# Patient Record
Sex: FEMALE | Race: WHITE | NOT HISPANIC OR LATINO | URBAN - METROPOLITAN AREA
[De-identification: names, ages, dates, MRNs, and addresses within clinical notes are randomized per-mention and may not be internally consistent; named-entity substitution may affect disease eponyms.]

---

## 2019-11-17 ENCOUNTER — EMERGENCY (EMERGENCY)
Facility: HOSPITAL | Age: 54
LOS: 0 days | Discharge: HOME | End: 2019-11-17
Attending: EMERGENCY MEDICINE | Admitting: EMERGENCY MEDICINE
Payer: MEDICARE

## 2019-11-17 ENCOUNTER — TRANSCRIPTION ENCOUNTER (OUTPATIENT)
Age: 54
End: 2019-11-17

## 2019-11-17 VITALS
DIASTOLIC BLOOD PRESSURE: 80 MMHG | OXYGEN SATURATION: 98 % | RESPIRATION RATE: 18 BRPM | SYSTOLIC BLOOD PRESSURE: 167 MMHG | TEMPERATURE: 98 F | HEART RATE: 86 BPM

## 2019-11-17 VITALS
SYSTOLIC BLOOD PRESSURE: 181 MMHG | OXYGEN SATURATION: 95 % | HEART RATE: 92 BPM | RESPIRATION RATE: 20 BRPM | DIASTOLIC BLOOD PRESSURE: 83 MMHG | TEMPERATURE: 98 F

## 2019-11-17 DIAGNOSIS — R06.02 SHORTNESS OF BREATH: ICD-10-CM

## 2019-11-17 DIAGNOSIS — J44.9 CHRONIC OBSTRUCTIVE PULMONARY DISEASE, UNSPECIFIED: ICD-10-CM

## 2019-11-17 DIAGNOSIS — R60.0 LOCALIZED EDEMA: ICD-10-CM

## 2019-11-17 LAB
ALBUMIN SERPL ELPH-MCNC: 3.4 G/DL — LOW (ref 3.5–5.2)
ALP SERPL-CCNC: 121 U/L — HIGH (ref 30–115)
ALT FLD-CCNC: 12 U/L — SIGNIFICANT CHANGE UP (ref 0–41)
ANION GAP SERPL CALC-SCNC: 12 MMOL/L — SIGNIFICANT CHANGE UP (ref 7–14)
AST SERPL-CCNC: 16 U/L — SIGNIFICANT CHANGE UP (ref 0–41)
BILIRUB SERPL-MCNC: 0.3 MG/DL — SIGNIFICANT CHANGE UP (ref 0.2–1.2)
BUN SERPL-MCNC: 11 MG/DL — SIGNIFICANT CHANGE UP (ref 10–20)
CALCIUM SERPL-MCNC: 8.9 MG/DL — SIGNIFICANT CHANGE UP (ref 8.5–10.1)
CHLORIDE SERPL-SCNC: 96 MMOL/L — LOW (ref 98–110)
CO2 SERPL-SCNC: 26 MMOL/L — SIGNIFICANT CHANGE UP (ref 17–32)
CREAT SERPL-MCNC: 0.6 MG/DL — LOW (ref 0.7–1.5)
GLUCOSE SERPL-MCNC: 231 MG/DL — HIGH (ref 70–99)
HCT VFR BLD CALC: 49.7 % — HIGH (ref 37–47)
HGB BLD-MCNC: 16.3 G/DL — HIGH (ref 12–16)
MCHC RBC-ENTMCNC: 31 PG — SIGNIFICANT CHANGE UP (ref 27–31)
MCHC RBC-ENTMCNC: 32.8 G/DL — SIGNIFICANT CHANGE UP (ref 32–37)
MCV RBC AUTO: 94.5 FL — SIGNIFICANT CHANGE UP (ref 81–99)
NRBC # BLD: 0 /100 WBCS — SIGNIFICANT CHANGE UP (ref 0–0)
NT-PROBNP SERPL-SCNC: 1397 PG/ML — HIGH (ref 0–300)
PLATELET # BLD AUTO: 174 K/UL — SIGNIFICANT CHANGE UP (ref 130–400)
POTASSIUM SERPL-MCNC: 4.8 MMOL/L — SIGNIFICANT CHANGE UP (ref 3.5–5)
POTASSIUM SERPL-SCNC: 4.8 MMOL/L — SIGNIFICANT CHANGE UP (ref 3.5–5)
PROT SERPL-MCNC: 6.5 G/DL — SIGNIFICANT CHANGE UP (ref 6–8)
RBC # BLD: 5.26 M/UL — SIGNIFICANT CHANGE UP (ref 4.2–5.4)
RBC # FLD: 13.9 % — SIGNIFICANT CHANGE UP (ref 11.5–14.5)
SODIUM SERPL-SCNC: 134 MMOL/L — LOW (ref 135–146)
TROPONIN T SERPL-MCNC: <0.01 NG/ML — SIGNIFICANT CHANGE UP
WBC # BLD: 8.92 K/UL — SIGNIFICANT CHANGE UP (ref 4.8–10.8)
WBC # FLD AUTO: 8.92 K/UL — SIGNIFICANT CHANGE UP (ref 4.8–10.8)

## 2019-11-17 PROCEDURE — 93010 ELECTROCARDIOGRAM REPORT: CPT

## 2019-11-17 PROCEDURE — 99285 EMERGENCY DEPT VISIT HI MDM: CPT

## 2019-11-17 PROCEDURE — 71046 X-RAY EXAM CHEST 2 VIEWS: CPT | Mod: 26

## 2019-11-17 RX ORDER — AZITHROMYCIN 500 MG/1
500 TABLET, FILM COATED ORAL ONCE
Refills: 0 | Status: COMPLETED | OUTPATIENT
Start: 2019-11-17 | End: 2019-11-17

## 2019-11-17 RX ORDER — FUROSEMIDE 40 MG
40 TABLET ORAL ONCE
Refills: 0 | Status: COMPLETED | OUTPATIENT
Start: 2019-11-17 | End: 2019-11-17

## 2019-11-17 RX ORDER — AZITHROMYCIN 500 MG/1
1 TABLET, FILM COATED ORAL
Qty: 4 | Refills: 0
Start: 2019-11-17 | End: 2019-11-20

## 2019-11-17 RX ORDER — IPRATROPIUM/ALBUTEROL SULFATE 18-103MCG
3 AEROSOL WITH ADAPTER (GRAM) INHALATION
Refills: 0 | Status: COMPLETED | OUTPATIENT
Start: 2019-11-17 | End: 2019-11-17

## 2019-11-17 RX ADMIN — Medication 3 MILLILITER(S): at 18:16

## 2019-11-17 RX ADMIN — AZITHROMYCIN 500 MILLIGRAM(S): 500 TABLET, FILM COATED ORAL at 18:17

## 2019-11-17 RX ADMIN — Medication 125 MILLIGRAM(S): at 17:58

## 2019-11-17 RX ADMIN — Medication 200 MILLIGRAM(S): at 17:58

## 2019-11-17 RX ADMIN — Medication 3 MILLILITER(S): at 17:58

## 2019-11-17 RX ADMIN — Medication 40 MILLIGRAM(S): at 18:16

## 2019-11-17 RX ADMIN — Medication 3 MILLILITER(S): at 18:18

## 2019-11-17 NOTE — ED ADULT TRIAGE NOTE - CHIEF COMPLAINT QUOTE
pt presents to ED c/o worsening cough and increased SOB. Pt sent from [facility], pt has hx of COPD and CHF, pt did not take lasix. Pt speaking in full sentences, not on home 02. Pt denies chest pain. +productive cough. pt presents to ED c/o worsening cough and increased SOB. Cough and congestion x 2 weeks. Pt sent from [facility], pt has hx of COPD and CHF, pt did not take lasix. Pt speaking in full sentences, not on home 02. Pt denies chest pain. +productive cough.

## 2019-11-17 NOTE — ED PROVIDER NOTE - NSFOLLOWUPINSTRUCTIONS_ED_ALL_ED_FT
Chronic Obstructive Pulmonary Disease     Chronic obstructive pulmonary disease (COPD) is a common lung condition in which airflow from the lungs is limited. Causes include smoking, secondhand smoke exposure, genetics, or recurrent infections. Symptoms include shortness of breath, coughing, wheezing, rapid breathing, fatigue, chest tightness, or frequent infections. Take all medicines (inhaled or pills) as directed by your health care provider. Avoid exposure to irritants such as smoke, chemicals, and fumes that aggravate your breathing.    If you are a smoker, the most important thing that you can do is stop smoking. Continuing to smoke will cause further lung damage and breathing trouble. Ask your health care provider for help with quitting smoking. He or she can direct you to community resources or hospitals that provide support.    SEEK IMMEDIATE MEDICAL CARE IF YOU HAVE THE FOLLOWING SYMPTOMS: shortness of breath at rest or when talking, bluish discoloration of lips, skin, fever, worsening cough, unexplained chest pain, or lightheadedness/dizziness.     Peripheral Edema    Peripheral edema is swelling that is caused by a buildup of fluid. Peripheral edema most often affects the lower legs, ankles, and feet. It can also develop in the arms, hands, and face. The area of the body that has peripheral edema will look swollen. It may also feel heavy or warm. Your clothes may start to feel tight. Pressing on the area may make a temporary dent in your skin. You may not be able to move your arm or leg as much as usual.     There are many causes of peripheral edema. It can be a complication of other diseases, such as congestive heart failure, kidney disease, or a problem with your blood circulation. It also can be a side effect of certain medicines. It often happens to women during pregnancy. Sometimes, the cause is not known. Treating the underlying condition is often the only treatment for peripheral edema.    HOME CARE INSTRUCTIONS  Pay attention to any changes in your symptoms. Take these actions to help with your discomfort:    Raise (elevate) your legs while you are sitting or lying down.  Move around often to prevent stiffness and to lessen swelling. Do not sit or stand for long periods of time.  Wear support stockings as told by your health care provider.  Follow instructions from your health care provider about limiting salt (sodium) in your diet. Sometimes eating less salt can reduce swelling.  Take over-the-counter and prescription medicines only as told by your health care provider. Your health care provider may prescribe medicine to help your body get rid of excess water (diuretic).  Keep all follow-up visits as told by your health care provider. This is important.    SEEK MEDICAL CARE IF:  You have a fever.  Your edema starts suddenly or is getting worse, especially if you are pregnant or have a medical condition.  You have swelling in only one leg.  You have increased swelling and pain in your legs.        SEEK IMMEDIATE MEDICAL CARE IF:  You develop shortness of breath, especially when you are lying down.  You have pain in your chest or abdomen.  You feel weak.  You faint.    ADDITIONAL NOTES AND INSTRUCTIONS    Please follow up with your Primary MD in 24-48 hr.  Seek immediate medical care for any new/worsening signs or symptoms.

## 2019-11-17 NOTE — ED PROVIDER NOTE - NS ED ROS FT
Constitutional: No fever, chills, unintended weight loss.  Eyes:  No visual changes, eye pain or discharge.  ENMT:  No hearing changes, pain, no sore throat or runny nose, no difficulty swallowing  Cardiac:  No chest pain, SOB, +edema. No chest pain with exertion.  Respiratory:  see hpi  GI:  No nausea, vomiting, diarrhea or abdominal pain.  :  No dysuria, frequency or burning.  MS:  No myalgia, muscle weakness, joint pain or back pain.  Neuro:  No headache or weakness.  No LOC.  Skin:  No skin rash.   Endocrine: No history of thyroid disease +dm

## 2019-11-17 NOTE — ED ADULT NURSE NOTE - PMH
CHF (congestive heart failure)    COPD (chronic obstructive pulmonary disease)    DM (diabetes mellitus)

## 2019-11-17 NOTE — ED PROVIDER NOTE - CARE PROVIDER_API CALL
Hang Santiago)  Internal Medicine  [address] [place] [address], [address]  [place] [address]  Phone: [phone]  Fax: [phone]  Established Patient  Follow Up Time: 4-6 Days

## 2019-11-17 NOTE — ED PROVIDER NOTE - CARE PLAN
Principal Discharge DX:	COPD (chronic obstructive pulmonary disease)  Secondary Diagnosis:	Edema  Secondary Diagnosis:	Cough

## 2019-11-17 NOTE — ED ADULT NURSE NOTE - CHIEF COMPLAINT QUOTE
pt presents to ED c/o worsening cough and increased SOB. Cough and congestion x 2 weeks. Pt sent from [facility], pt has hx of COPD and CHF, pt did not take lasix. Pt speaking in full sentences, not on home 02. Pt denies chest pain. +productive cough.

## 2019-11-17 NOTE — ED PROVIDER NOTE - OBJECTIVE STATEMENT
54y f h/o copd, gale, chf on bumetanide 2mg daily, cva no deficits, dm, htn p/w cough & sob x 2 wks. Sx similar to previous copd exac. Has been [place] [facility] [place] sx began incl today, cxr nl, s/p prednisone 20mg daily x 5d & home nebs x 2 today w/o improvement. Also rpts recent incr LE edema, which is chronic for her, bumetanide dosing was incr recently. No f/c, nv, abd pain, flank pain, calf pain. No recent travel/sx/immob. PMD Santiago / [facility].

## 2019-11-17 NOTE — ED PROVIDER NOTE - PATIENT PORTAL LINK FT
You can access the FollowMyHealth Patient Portal offered by [facility] by registering at the following website: [email or web address]. By joining [facility]’s FollowMyHealth portal, you will also be able to view your health information using other applications (apps) compatible with our system.

## 2019-11-17 NOTE — ED PROVIDER NOTE - PROGRESS NOTE DETAILS
s/w PMD Dr. Chatman covering Dr. Santiago, a/w plan for copd tx incl zpak & discharge, she will have office staff call pt vince to arrange close outpt f/u

## 2019-11-17 NOTE — ED ADULT NURSE NOTE - OBJECTIVE STATEMENT
pt sent by  for worsening SOB/cough/congestion x 2 weeks. pt has hx of copd, chf. does not wear home 02. denies chest pain. pt was given po steroid taper with no relief. denies fever/chills.

## 2019-11-17 NOTE — ED PROVIDER NOTE - CLINICAL SUMMARY MEDICAL DECISION MAKING FREE TEXT BOX
copd exac, worsening LE edema - copd>?chf - cxr napi, bnp 1300s, sx improved after nebs/steroids, pt eager for discharge - d/w pmd assoc Dr. Chatman, a/w plan for copd tx and close outpt f/u - all results d/w pt & copies given, strict return precautions discussed, rec outpt pmd f/u

## 2019-11-17 NOTE — ED PROVIDER NOTE - PHYSICAL EXAMINATION
VITAL SIGNS: I have reviewed nursing notes and confirm.  CONSTITUTIONAL: Well-developed; well-nourished; in no acute distress.  SKIN: Skin exam is warm and dry, no acute rash.  HEAD: Normocephalic; atraumatic.  EYES: PERRL, EOM intact; conjunctiva and sclera clear.  ENT: No nasal discharge; airway clear.  NECK: Supple; non tender.  CARD: S1, S2 normal; no murmurs, gallops, or rubs. Regular rate and rhythm.  RESP: mild tachypnea, good air entry bl, +wheezes bl, no rales/rhonchi  ABD: Normal bowel sounds; soft; non-distended; non-tender; no hepatosplenomegaly; no rgr.  BACK: non-tender, no CVAT  EXT: Normal ROM. No clubbing, cyanosis. +pitting edema distal LEs bl, L=R. DPI.  NEURO: Alert, oriented. Grossly unremarkable. No focal deficits.  PSYCH: Cooperative, appropriate.

## 2021-03-27 ENCOUNTER — APPOINTMENT (OUTPATIENT)
Dept: PULMONOLOGY | Facility: CLINIC | Age: 56
End: 2021-03-27

## 2022-05-27 ENCOUNTER — INPATIENT (INPATIENT)
Facility: HOSPITAL | Age: 57
LOS: 55 days | Discharge: NURSING, REHAB OR LONG-TERM CARE | End: 2022-07-22
Attending: INTERNAL MEDICINE | Admitting: INTERNAL MEDICINE
Payer: MEDICARE

## 2022-05-27 VITALS
RESPIRATION RATE: 20 BRPM | SYSTOLIC BLOOD PRESSURE: 110 MMHG | DIASTOLIC BLOOD PRESSURE: 72 MMHG | TEMPERATURE: 98 F | OXYGEN SATURATION: 96 % | HEART RATE: 118 BPM

## 2022-05-27 DIAGNOSIS — B96.1 KLEBSIELLA PNEUMONIAE [K. PNEUMONIAE] AS THE CAUSE OF DISEASES CLASSIFIED ELSEWHERE: ICD-10-CM

## 2022-05-27 DIAGNOSIS — E66.9 OBESITY, UNSPECIFIED: ICD-10-CM

## 2022-05-27 DIAGNOSIS — B96.5 PSEUDOMONAS (AERUGINOSA) (MALLEI) (PSEUDOMALLEI) AS THE CAUSE OF DISEASES CLASSIFIED ELSEWHERE: ICD-10-CM

## 2022-05-27 DIAGNOSIS — N31.9 NEUROMUSCULAR DYSFUNCTION OF BLADDER, UNSPECIFIED: ICD-10-CM
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
## 2022-05-27 DIAGNOSIS — Z88.0 ALLERGY STATUS TO PENICILLIN: ICD-10-CM

## 2022-05-27 DIAGNOSIS — Z79.01 LONG TERM (CURRENT) USE OF ANTICOAGULANTS: ICD-10-CM
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
## 2022-05-27 DIAGNOSIS — Z98.890 OTHER SPECIFIED POSTPROCEDURAL STATES: Chronic | ICD-10-CM
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
## 2022-05-27 LAB
ALBUMIN SERPL ELPH-MCNC: 3.9 G/DL — SIGNIFICANT CHANGE UP (ref 3.5–5.2)
ALP SERPL-CCNC: 101 U/L — SIGNIFICANT CHANGE UP (ref 30–115)
ALT FLD-CCNC: 26 U/L — SIGNIFICANT CHANGE UP (ref 0–41)
ANION GAP SERPL CALC-SCNC: 14 MMOL/L — SIGNIFICANT CHANGE UP (ref 7–14)
APPEARANCE UR: ABNORMAL
AST SERPL-CCNC: 25 U/L — SIGNIFICANT CHANGE UP (ref 0–41)
BACTERIA # UR AUTO: ABNORMAL
BASE EXCESS BLDV CALC-SCNC: 7.8 MMOL/L — HIGH (ref -2–3)
BASOPHILS # BLD AUTO: 0.06 K/UL — SIGNIFICANT CHANGE UP (ref 0–0.2)
BASOPHILS NFR BLD AUTO: 0.4 % — SIGNIFICANT CHANGE UP (ref 0–1)
BILIRUB SERPL-MCNC: 0.5 MG/DL — SIGNIFICANT CHANGE UP (ref 0.2–1.2)
BILIRUB UR-MCNC: NEGATIVE — SIGNIFICANT CHANGE UP
BUN SERPL-MCNC: 10 MG/DL — SIGNIFICANT CHANGE UP (ref 10–20)
CA-I SERPL-SCNC: 1.3 MMOL/L — SIGNIFICANT CHANGE UP (ref 1.15–1.33)
CALCIUM SERPL-MCNC: 10 MG/DL — SIGNIFICANT CHANGE UP (ref 8.5–10.1)
CHLORIDE SERPL-SCNC: 93 MMOL/L — LOW (ref 98–110)
CO2 SERPL-SCNC: 28 MMOL/L — SIGNIFICANT CHANGE UP (ref 17–32)
COLOR SPEC: YELLOW — SIGNIFICANT CHANGE UP
CREAT SERPL-MCNC: <0.5 MG/DL — LOW (ref 0.7–1.5)
DIFF PNL FLD: ABNORMAL
EGFR: 115 ML/MIN/1.73M2 — SIGNIFICANT CHANGE UP
EOSINOPHIL # BLD AUTO: 0.09 K/UL — SIGNIFICANT CHANGE UP (ref 0–0.7)
EOSINOPHIL NFR BLD AUTO: 0.6 % — SIGNIFICANT CHANGE UP (ref 0–8)
EPI CELLS # UR: 1 /HPF — SIGNIFICANT CHANGE UP (ref 0–5)
GAS PNL BLDV: 130 MMOL/L — LOW (ref 136–145)
GAS PNL BLDV: SIGNIFICANT CHANGE UP
GAS PNL BLDV: SIGNIFICANT CHANGE UP
GLUCOSE BLDC GLUCOMTR-MCNC: 152 MG/DL — HIGH (ref 70–99)
GLUCOSE SERPL-MCNC: 280 MG/DL — HIGH (ref 70–99)
GLUCOSE UR QL: ABNORMAL
HCO3 BLDV-SCNC: 35 MMOL/L — HIGH (ref 22–29)
HCT VFR BLD CALC: 27.9 % — LOW (ref 37–47)
HCT VFR BLDA CALC: 32 % — LOW (ref 39–51)
HGB BLD CALC-MCNC: 10.7 G/DL — LOW (ref 12.6–17.4)
HGB BLD-MCNC: 9.4 G/DL — LOW (ref 12–16)
HYALINE CASTS # UR AUTO: 5 /LPF — SIGNIFICANT CHANGE UP (ref 0–7)
IMM GRANULOCYTES NFR BLD AUTO: 2.3 % — HIGH (ref 0.1–0.3)
KETONES UR-MCNC: NEGATIVE — SIGNIFICANT CHANGE UP
LACTATE BLDV-MCNC: 2.2 MMOL/L — HIGH (ref 0.5–2)
LACTATE SERPL-SCNC: 2.3 MMOL/L — HIGH (ref 0.7–2)
LEUKOCYTE ESTERASE UR-ACNC: ABNORMAL
LYMPHOCYTES # BLD AUTO: 0.95 K/UL — LOW (ref 1.2–3.4)
LYMPHOCYTES # BLD AUTO: 6.4 % — LOW (ref 20.5–51.1)
MCHC RBC-ENTMCNC: 32.3 PG — HIGH (ref 27–31)
MCHC RBC-ENTMCNC: 33.7 G/DL — SIGNIFICANT CHANGE UP (ref 32–37)
MCV RBC AUTO: 95.9 FL — SIGNIFICANT CHANGE UP (ref 81–99)
MONOCYTES # BLD AUTO: 0.84 K/UL — HIGH (ref 0.1–0.6)
MONOCYTES NFR BLD AUTO: 5.7 % — SIGNIFICANT CHANGE UP (ref 1.7–9.3)
NEUTROPHILS # BLD AUTO: 12.54 K/UL — HIGH (ref 1.4–6.5)
NEUTROPHILS NFR BLD AUTO: 84.6 % — HIGH (ref 42.2–75.2)
NITRITE UR-MCNC: NEGATIVE — SIGNIFICANT CHANGE UP
NRBC # BLD: 0 /100 WBCS — SIGNIFICANT CHANGE UP (ref 0–0)
NT-PROBNP SERPL-SCNC: 397 PG/ML — HIGH (ref 0–300)
PCO2 BLDV: 60 MMHG — HIGH (ref 39–42)
PH BLDV: 7.37 — SIGNIFICANT CHANGE UP (ref 7.32–7.43)
PH UR: 7 — SIGNIFICANT CHANGE UP (ref 5–8)
PLATELET # BLD AUTO: 235 K/UL — SIGNIFICANT CHANGE UP (ref 130–400)
PO2 BLDV: 49 MMHG — SIGNIFICANT CHANGE UP
POTASSIUM BLDV-SCNC: 4.8 MMOL/L — SIGNIFICANT CHANGE UP (ref 3.5–5.1)
POTASSIUM SERPL-MCNC: 5 MMOL/L — SIGNIFICANT CHANGE UP (ref 3.5–5)
POTASSIUM SERPL-SCNC: 5 MMOL/L — SIGNIFICANT CHANGE UP (ref 3.5–5)
PROT SERPL-MCNC: 6.6 G/DL — SIGNIFICANT CHANGE UP (ref 6–8)
PROT UR-MCNC: ABNORMAL
RAPID RVP RESULT: SIGNIFICANT CHANGE UP
RBC # BLD: 2.91 M/UL — LOW (ref 4.2–5.4)
RBC # FLD: 14.6 % — HIGH (ref 11.5–14.5)
RBC CASTS # UR COMP ASSIST: 10 /HPF — HIGH (ref 0–4)
SAO2 % BLDV: 76.7 % — SIGNIFICANT CHANGE UP
SARS-COV-2 RNA SPEC QL NAA+PROBE: SIGNIFICANT CHANGE UP
SODIUM SERPL-SCNC: 135 MMOL/L — SIGNIFICANT CHANGE UP (ref 135–146)
SP GR SPEC: 1.02 — SIGNIFICANT CHANGE UP (ref 1.01–1.03)
TROPONIN T SERPL-MCNC: 0.02 NG/ML — HIGH
TROPONIN T SERPL-MCNC: 0.03 NG/ML — CRITICAL HIGH
UROBILINOGEN FLD QL: SIGNIFICANT CHANGE UP
WBC # BLD: 14.82 K/UL — HIGH (ref 4.8–10.8)
WBC # FLD AUTO: 14.82 K/UL — HIGH (ref 4.8–10.8)
WBC UR QL: 76 /HPF — HIGH (ref 0–5)

## 2022-05-27 PROCEDURE — 99223 1ST HOSP IP/OBS HIGH 75: CPT

## 2022-05-27 PROCEDURE — 93010 ELECTROCARDIOGRAM REPORT: CPT

## 2022-05-27 PROCEDURE — 93925 LOWER EXTREMITY STUDY: CPT | Mod: 26

## 2022-05-27 PROCEDURE — 71045 X-RAY EXAM CHEST 1 VIEW: CPT | Mod: 26

## 2022-05-27 PROCEDURE — 71275 CT ANGIOGRAPHY CHEST: CPT | Mod: 26,MA

## 2022-05-27 PROCEDURE — 93970 EXTREMITY STUDY: CPT | Mod: 26

## 2022-05-27 PROCEDURE — 99285 EMERGENCY DEPT VISIT HI MDM: CPT

## 2022-05-27 RX ORDER — INSULIN LISPRO 100/ML
VIAL (ML) SUBCUTANEOUS AT BEDTIME
Refills: 0 | Status: DISCONTINUED | OUTPATIENT
Start: 2022-05-27 | End: 2022-06-01

## 2022-05-27 RX ORDER — ALPRAZOLAM 0.25 MG
1 TABLET ORAL ONCE
Refills: 0 | Status: DISCONTINUED | OUTPATIENT
Start: 2022-05-27 | End: 2022-05-27

## 2022-05-27 RX ORDER — DEXTROSE 50 % IN WATER 50 %
12.5 SYRINGE (ML) INTRAVENOUS ONCE
Refills: 0 | Status: DISCONTINUED | OUTPATIENT
Start: 2022-05-27 | End: 2022-06-01

## 2022-05-27 RX ORDER — BUDESONIDE AND FORMOTEROL FUMARATE DIHYDRATE 160; 4.5 UG/1; UG/1
2 AEROSOL RESPIRATORY (INHALATION)
Refills: 0 | Status: DISCONTINUED | OUTPATIENT
Start: 2022-05-27 | End: 2022-05-29

## 2022-05-27 RX ORDER — OXYCODONE AND ACETAMINOPHEN 5; 325 MG/1; MG/1
1 TABLET ORAL ONCE
Refills: 0 | Status: DISCONTINUED | OUTPATIENT
Start: 2022-05-27 | End: 2022-05-27

## 2022-05-27 RX ORDER — APIXABAN 2.5 MG/1
5 TABLET, FILM COATED ORAL
Refills: 0 | Status: DISCONTINUED | OUTPATIENT
Start: 2022-05-27 | End: 2022-05-29

## 2022-05-27 RX ORDER — SODIUM CHLORIDE 9 MG/ML
1000 INJECTION, SOLUTION INTRAVENOUS
Refills: 0 | Status: DISCONTINUED | OUTPATIENT
Start: 2022-05-27 | End: 2022-06-01

## 2022-05-27 RX ORDER — BUMETANIDE 0.25 MG/ML
2 INJECTION INTRAMUSCULAR; INTRAVENOUS DAILY
Refills: 0 | Status: DISCONTINUED | OUTPATIENT
Start: 2022-05-27 | End: 2022-06-01

## 2022-05-27 RX ORDER — CLOPIDOGREL BISULFATE 75 MG/1
75 TABLET, FILM COATED ORAL DAILY
Refills: 0 | Status: DISCONTINUED | OUTPATIENT
Start: 2022-05-27 | End: 2022-05-31

## 2022-05-27 RX ORDER — TIOTROPIUM BROMIDE 18 UG/1
1 CAPSULE ORAL; RESPIRATORY (INHALATION) DAILY
Refills: 0 | Status: DISCONTINUED | OUTPATIENT
Start: 2022-05-27 | End: 2022-05-29

## 2022-05-27 RX ORDER — OXYCODONE AND ACETAMINOPHEN 5; 325 MG/1; MG/1
1 TABLET ORAL EVERY 6 HOURS
Refills: 0 | Status: DISCONTINUED | OUTPATIENT
Start: 2022-05-27 | End: 2022-05-30

## 2022-05-27 RX ORDER — CEFEPIME 1 G/1
1000 INJECTION, POWDER, FOR SOLUTION INTRAMUSCULAR; INTRAVENOUS EVERY 12 HOURS
Refills: 0 | Status: DISCONTINUED | OUTPATIENT
Start: 2022-05-27 | End: 2022-06-01

## 2022-05-27 RX ORDER — INSULIN LISPRO 100/ML
25 VIAL (ML) SUBCUTANEOUS
Refills: 0 | Status: DISCONTINUED | OUTPATIENT
Start: 2022-05-27 | End: 2022-05-30

## 2022-05-27 RX ORDER — GLUCAGON INJECTION, SOLUTION 0.5 MG/.1ML
1 INJECTION, SOLUTION SUBCUTANEOUS ONCE
Refills: 0 | Status: DISCONTINUED | OUTPATIENT
Start: 2022-05-27 | End: 2022-06-01

## 2022-05-27 RX ORDER — AMLODIPINE BESYLATE 2.5 MG/1
5 TABLET ORAL DAILY
Refills: 0 | Status: DISCONTINUED | OUTPATIENT
Start: 2022-05-27 | End: 2022-06-01

## 2022-05-27 RX ORDER — DEXTROSE 50 % IN WATER 50 %
25 SYRINGE (ML) INTRAVENOUS ONCE
Refills: 0 | Status: DISCONTINUED | OUTPATIENT
Start: 2022-05-27 | End: 2022-06-01

## 2022-05-27 RX ORDER — GABAPENTIN 400 MG/1
300 CAPSULE ORAL THREE TIMES A DAY
Refills: 0 | Status: DISCONTINUED | OUTPATIENT
Start: 2022-05-27 | End: 2022-06-01

## 2022-05-27 RX ORDER — ATORVASTATIN CALCIUM 80 MG/1
20 TABLET, FILM COATED ORAL AT BEDTIME
Refills: 0 | Status: DISCONTINUED | OUTPATIENT
Start: 2022-05-27 | End: 2022-06-01

## 2022-05-27 RX ORDER — INFLUENZA VIRUS VACCINE 15; 15; 15; 15 UG/.5ML; UG/.5ML; UG/.5ML; UG/.5ML
0.5 SUSPENSION INTRAMUSCULAR ONCE
Refills: 0 | Status: DISCONTINUED | OUTPATIENT
Start: 2022-05-27 | End: 2022-07-22

## 2022-05-27 RX ORDER — LOSARTAN POTASSIUM 100 MG/1
100 TABLET, FILM COATED ORAL DAILY
Refills: 0 | Status: DISCONTINUED | OUTPATIENT
Start: 2022-05-27 | End: 2022-06-01

## 2022-05-27 RX ORDER — SODIUM CHLORIDE 9 MG/ML
1000 INJECTION, SOLUTION INTRAVENOUS ONCE
Refills: 0 | Status: DISCONTINUED | OUTPATIENT
Start: 2022-05-27 | End: 2022-05-27

## 2022-05-27 RX ORDER — CEFEPIME 1 G/1
2000 INJECTION, POWDER, FOR SOLUTION INTRAMUSCULAR; INTRAVENOUS ONCE
Refills: 0 | Status: COMPLETED | OUTPATIENT
Start: 2022-05-27 | End: 2022-05-27

## 2022-05-27 RX ORDER — ALBUTEROL 90 UG/1
2.5 AEROSOL, METERED ORAL ONCE
Refills: 0 | Status: DISCONTINUED | OUTPATIENT
Start: 2022-05-27 | End: 2022-06-01

## 2022-05-27 RX ORDER — ALPRAZOLAM 0.25 MG
0.25 TABLET ORAL AT BEDTIME
Refills: 0 | Status: DISCONTINUED | OUTPATIENT
Start: 2022-05-27 | End: 2022-06-01

## 2022-05-27 RX ORDER — DEXTROSE 50 % IN WATER 50 %
15 SYRINGE (ML) INTRAVENOUS ONCE
Refills: 0 | Status: DISCONTINUED | OUTPATIENT
Start: 2022-05-27 | End: 2022-06-01

## 2022-05-27 RX ORDER — INSULIN GLARGINE 100 [IU]/ML
55 INJECTION, SOLUTION SUBCUTANEOUS AT BEDTIME
Refills: 0 | Status: DISCONTINUED | OUTPATIENT
Start: 2022-05-27 | End: 2022-05-30

## 2022-05-27 RX ADMIN — OXYCODONE AND ACETAMINOPHEN 1 TABLET(S): 5; 325 TABLET ORAL at 20:48

## 2022-05-27 RX ADMIN — GABAPENTIN 300 MILLIGRAM(S): 400 CAPSULE ORAL at 21:40

## 2022-05-27 RX ADMIN — Medication 1 MILLIGRAM(S): at 13:01

## 2022-05-27 RX ADMIN — CEFEPIME 100 MILLIGRAM(S): 1 INJECTION, POWDER, FOR SOLUTION INTRAMUSCULAR; INTRAVENOUS at 11:27

## 2022-05-27 RX ADMIN — ATORVASTATIN CALCIUM 20 MILLIGRAM(S): 80 TABLET, FILM COATED ORAL at 21:32

## 2022-05-27 RX ADMIN — INSULIN GLARGINE 55 UNIT(S): 100 INJECTION, SOLUTION SUBCUTANEOUS at 22:25

## 2022-05-27 RX ADMIN — APIXABAN 5 MILLIGRAM(S): 2.5 TABLET, FILM COATED ORAL at 17:58

## 2022-05-27 RX ADMIN — Medication 0.25 MILLIGRAM(S): at 21:40

## 2022-05-27 RX ADMIN — Medication 25 UNIT(S): at 17:58

## 2022-05-27 RX ADMIN — OXYCODONE AND ACETAMINOPHEN 1 TABLET(S): 5; 325 TABLET ORAL at 13:39

## 2022-05-27 RX ADMIN — CEFEPIME 100 MILLIGRAM(S): 1 INJECTION, POWDER, FOR SOLUTION INTRAMUSCULAR; INTRAVENOUS at 20:48

## 2022-05-27 NOTE — PATIENT PROFILE ADULT - FALL HARM RISK - RISK INTERVENTIONS

## 2022-05-27 NOTE — H&P ADULT - TIME BILLING
HPI as above.  Interval history: Pt seen and examined at bedside. No cp or sob. When pt was hypoxic at NH, ems came and was suctioned and found to have mucus plug.   Vital Signs (24 Hrs):  T(C): 37.7 (05-27-22 @ 10:14), Max: 37.7 (05-27-22 @ 10:14)  HR: 105 (05-27-22 @ 12:27) (105 - 118)  BP: 103/59 (05-27-22 @ 12:27) (103/59 - 110/72)  RR: 20 (05-27-22 @ 12:27) (20 - 20)  SpO2: 97% (05-27-22 @ 12:27) (96% - 97%)  Wt(kg): --  Daily     Daily     I&O's Summary    PHYSICAL EXAM:  GENERAL: NAD, well-developed  HEAD:  Atraumatic, Normocephalic  EYES: EOMI, PERRLA, conjunctiva and sclera clear  NECK: Supple, No JVD, trach  CHEST/LUNG: Clear to auscultation bilaterally; No wheeze  HEART: Regular rate and rhythm; No murmurs, rubs, or gallops  ABDOMEN: Soft, Nontender, Nondistended; Bowel sounds present  EXTREMITIES:  2+ Peripheral Pulses, No clubbing, cyanosis, or edema  PSYCH: AAOx3  NEUROLOGY: non-focal  SKIN: No rashes or lesions  Labs reviewed  Imaging reviewed independently and reviewed read  < from: Xray Chest 1 View-PORTABLE IMMEDIATE (05.27.22 @ 10:59) >    Impression:    1.  Left lower lobe atelectasis.    2.  Left perihilar opacity.    A CT scan of the chest was performed the same day, please see that report   for further information.    < end of copied text >      no new ekg    Plan      #Progress Note Handoff  Pending (specify):  Consults_________, Tests________, Test Results_______, Other_________  Family discussion:  Disposition: Home___/SNF___/Other________/Unknown at this time________ HPI as above.  Interval history: Pt seen and examined at bedside. No cp or sob. When pt was hypoxic at NH, ems came and was suctioned and found to have mucus plug.   Vital Signs (24 Hrs):  T(C): 37.7 (05-27-22 @ 10:14), Max: 37.7 (05-27-22 @ 10:14)  HR: 105 (05-27-22 @ 12:27) (105 - 118)  BP: 103/59 (05-27-22 @ 12:27) (103/59 - 110/72)  RR: 20 (05-27-22 @ 12:27) (20 - 20)  SpO2: 97% (05-27-22 @ 12:27) (96% - 97%)  Wt(kg): --  Daily     Daily     I&O's Summary    PHYSICAL EXAM:  GENERAL: NAD, well-developed  HEAD:  Atraumatic, Normocephalic  EYES: EOMI, PERRLA, conjunctiva and sclera clear  NECK: Supple, No JVD, trach  CHEST/LUNG: Clear to auscultation bilaterally; No wheeze  HEART: Regular rate and rhythm; No murmurs, rubs, or gallops  ABDOMEN: Soft, Nontender, Nondistended; Bowel sounds present  EXTREMITIES:  2+ Peripheral Pulses, No clubbing, cyanosis, or edema  PSYCH: AAOx3  NEUROLOGY: non-focal  SKIN: No rashes or lesions  Labs reviewed  Imaging reviewed independently and reviewed read  < from: Xray Chest 1 View-PORTABLE IMMEDIATE (05.27.22 @ 10:59) >    Impression:    1.  Left lower lobe atelectasis.    2.  Left perihilar opacity.    A CT scan of the chest was performed the same day, please see that report   for further information.    < end of copied text >      no new ekg    Plan as above, tam resident in real time, edits made      #Progress Note Handoff  Pending (specify):  follow up pulm, procal, wbc, am cortisol, cultures, will need chest PT and PT  Family discussion: tam pt and family at bedside  Disposition: possible cause is mucus plugging, prepare for DC placed deepti 48 hr dispo

## 2022-05-27 NOTE — ED PROVIDER NOTE - PHYSICAL EXAMINATION
VITAL SIGNS: I have reviewed nursing notes and confirm.  CONSTITUTIONAL: Well-developed; well-nourished; in no acute distress.  SKIN: Skin exam is warm and dry, no acute rash.  HEAD: Normocephalic; atraumatic.  EYES: PERRL, EOM intact; conjunctiva and sclera clear.  ENT: No nasal discharge; airway clear. TMs clear.  NECK: Supple; non tender. trach collar  CARD: S1, S2 normal; no murmurs, gallops, or rubs. Regular rate and rhythm.  RESP: No wheezes, rales or rhonchi.  ABD: Normal bowel sounds; soft; non-distended; non-tender;  EXT: Normal ROM. No clubbing, cyanosis or edema.  PSYCH: Cooperative, appropriate.

## 2022-05-27 NOTE — ED PROVIDER NOTE - OBJECTIVE STATEMENT
57 yo f with pmh of dvt on eliquis, chf, copd, respiratory failure, trach collar, obesity, iddm, uti, sent by [facility] for hypoxia.  as per records, pt had resp distress and was saturating to 70s on RA.  pt denies that she was in resp distress.  pt says she was sleeping and was woken up to be told she was going to the hospital.  pt has no sob, cp, abd pain, n/v/d, fever or chills.  pt says sometimes they put her on o2, but normally she is not on o2 by trach collar

## 2022-05-27 NOTE — ED ADULT TRIAGE NOTE - CHIEF COMPLAINT QUOTE
BIBA from [facility] home with low pox, patient has a trach mask, patient alert and responding, denies SOB and looks comfortable at this time.

## 2022-05-27 NOTE — ED PROVIDER NOTE - CARE PLAN
Principal Discharge DX:	Acute respiratory failure with hypoxia  Secondary Diagnosis:	PNA (pneumonia)   1

## 2022-05-27 NOTE — H&P ADULT - NSHPLABSRESULTS_GEN_ALL_CORE
Spoke to patient in regards to abnormal labs.    CBC Full  -  ( 27 May 2022 10:00 )  WBC Count : 14.82 K/uL  Hemoglobin : 9.4 g/dL  Hematocrit : 27.9 %  Platelet Count - Automated : 235 K/uL  Mean Cell Volume : 95.9 fL  Mean Cell Hemoglobin : 32.3 pg  Mean Cell Hemoglobin Concentration : 33.7 g/dL  Auto Neutrophil # : 12.54 K/uL  Auto Lymphocyte # : 0.95 K/uL  Auto Monocyte # : 0.84 K/uL  Auto Eosinophil # : 0.09 K/uL  Auto Basophil # : 0.06 K/uL  Auto Neutrophil % : 84.6 %  Auto Lymphocyte % : 6.4 %  Auto Monocyte % : 5.7 %  Auto Eosinophil % : 0.6 %  Auto Basophil % : 0.4 %    BMP:    05-27 @ 10:00    Blood Urea Nitrogen - 10  Calcium - 10.0  Carbond Dioxide - 28  Chloride - 93  Creatinine - <0.5  Glucose - 280  Potassium - 5.0  Sodium - 135      Hemoglobin A1c -     Urine Culture: Spoke to patient in regards to abnormal labs.    CBC Full  -  ( 27 May 2022 10:00 )  WBC Count : 14.82 K/uL  Hemoglobin : 9.4 g/dL  Hematocrit : 27.9 %  Platelet Count - Automated : 235 K/uL  Mean Cell Volume : 95.9 fL  Mean Cell Hemoglobin : 32.3 pg  Mean Cell Hemoglobin Concentration : 33.7 g/dL  Auto Neutrophil # : 12.54 K/uL  Auto Lymphocyte # : 0.95 K/uL  Auto Monocyte # : 0.84 K/uL  Auto Eosinophil # : 0.09 K/uL  Auto Basophil # : 0.06 K/uL  Auto Neutrophil % : 84.6 %  Auto Lymphocyte % : 6.4 %  Auto Monocyte % : 5.7 %  Auto Eosinophil % : 0.6 %  Auto Basophil % : 0.4 %    BMP:    05-27 @ 10:00    Blood Urea Nitrogen - 10  Calcium - 10.0  Carbond Dioxide - 28  Chloride - 93  Creatinine - <0.5  Glucose - 280  Potassium - 5.0  Sodium - 135      Hemoglobin A1c - pending    Urine Culture: pending    < from: CT Angio Chest PE Protocol w/ IV Cont (05.27.22 @ 10:42) >      Impression:    No emboli within the main pulmonary arteries. Segmental arteries are   poorly evaluated due to dilution artifact.    Interstitial edema. Volume loss of the left lung with left lower lobe  atelectasis.    Prominent mediastinal and right hilar adenopathy.    Atherosclerotic changes. Cardiomegaly.    Nodularity within the left adrenal gland. Dedicated CAT scan of the left   adrenal gland recommended on an outpatient basis.    < end of copied text >    < from: Xray Chest 1 View-PORTABLE IMMEDIATE (05.27.22 @ 10:59) >    Impression:    1.  Left lower lobe atelectasis.    2.  Left perihilar opacity.    A CT scan of the chest was performed the same day, please see that report   for further information.    < end of copied text >

## 2022-05-27 NOTE — H&P ADULT - HISTORY OF PRESENT ILLNESS
57 yo f with PMHx of DVT on Eliquis, COPD,  trach collar, obesity, IDDM, UTI, sent by [facility] for hypoxia. History provided by daughter at bedside, she reports the current illness going back to April 4th when pt was intubated on admission at [facility] ICU for hypoxic respiratory failure diagnosed with copd exacerbation and superimposed pneumonia, of note she also appears to have been in CHF exacerbation with severe b/l LE swelling treated with IV Lasix. Remained intubated 37 days per daughter, diagnosed with fever of unknown origin (up to 106F), treated with empiric broad spectrum antibiotics s/p tracheostomy and discharged to nursing home s/p 48 hours without fever (source never identified. Of note, patient's daughter and pt herself say the wiseman has not been replaced for over 3 weeks. She also reports a developing sacral pressure ulcer. Was at [facility] only 2 days when was noted to be hypoxic (saturating low 70's) and sent to [facility] ED. Prior to all this pt was independent and ambulatory. Pt denies sob, cp, abd pain, n/v/d, fever or chills.     In the ED pt was placed on 15L O2 via tracheostomy collar (baseline 8 L at [facility] with resolution of dyspnea. VS:    /59  T 99.9F RR 20  Admission VBG pH 7.37 pCO2 60 pO2 49. Pt not wheezing, not coughing. CXR suspicious for L-sided PNA, CTA neg for PE. Pt received x1 IV Levoquin and Cefepime in ED, admitted to medicine for acute hypoxic hypercapnic respiratory failure secondary to pneumonia, hospital-acquired. Records request sent to [facility] medical records  [phone]) 55 yo f with PMHx of DVT on Eliquis, COPD,  trach collar, obesity, IDDM, UTI, sent by [facility] for hypoxia. History provided by daughter at bedside, she reports the current illness going back to April 4th when pt was intubated on admission at [facility] ICU for hypoxic respiratory failure diagnosed with copd exacerbation and superimposed pneumonia, of note she also appears to have been in CHF exacerbation with severe b/l LE swelling treated with IV bumex. Remained intubated 37 days per daughter, diagnosed with fever of unknown origin (up to 106F), treated with empiric broad spectrum antibiotics and once 2 weeks s/p tracheostomy placement and 48 hours afebrile she was was discharged to nursing home for PT. Of note, patient's daughter and pt herself say the wiseman has not been replaced for over 3 weeks. She also reports a developing sacral pressure ulcer. Was at [facility] only 2 days when was noted to be hypoxic (saturating low 70's) and EMS called. While awaiting EMS, floor nurse on the unit suctioned the patient, and managed to bring up a very large mucus plug, with pt's saturation immediately improving afterwards to 80's. Nonetheless pt was sent to [facility] ED. Prior to all this pt was independent and ambulatory. Pt denies sob, cp, abd pain, n/v/d, fever or chills.     On arrival to ED pt was saturating 97%, 15L O2 via tracheostomy collar (baseline 8 L at [facility]) VS:    /59  T 99.9F RR 20  Admission VBG pH 7.37 pCO2 60 pO2 49. Pt not wheezing, not coughing. CXR suspicious for L-sided PNA, inconclusive. CTA neg for PE. Pt received x1 IV Levoquin and Cefepime in ED, admitted to medicine for acute hypoxic hypercapnic respiratory failure secondary to acute mucus plug (now resolved vs superimposed pneumonia  hospital-acquired.(less likely), Records request sent to [facility] medical records  [phone]) and patient will be continued on empiric antibiotics pending procal

## 2022-05-27 NOTE — ED ADULT NURSE NOTE - CHIEF COMPLAINT QUOTE
BIBA from [facility] [place] with low pox, patient has a trach mask, patient alert and responding, denies SOB and looks comfortable at this time.

## 2022-05-27 NOTE — ED PROVIDER NOTE - NS ED ROS FT
Review of Systems:  	•	CONSTITUTIONAL - no fever, no diaphoresis, no weight change  	•	SKIN - no rash  	•	HEMATOLOGIC - no bleeding, no bruising  	•	EYES - no eye pain, no blurred vision  	•	ENT - no change in hearing, no pain  	•	RESPIRATORY - no shortness of breath, no cough, +hypoxia  	•	CARDIAC - no chest pain, no palpitations  	•	GI - no abd pain, no nausea, no vomiting, no diarrhea, no constipation, no bleeding  	•	 - no dysuria, no hematuria, no flank pain, no urinary retention  	•	MUSCULOSKELETAL - no joint paint, no swelling, no redness  	•	NEUROLOGIC - no focal weakness or numbness, no headache, no paresthesias, no dizziness, no facial droop, no slurred speech, no vision changes  All other systems negative, unless specified in HPI

## 2022-05-27 NOTE — H&P ADULT - ASSESSMENT
Acute hypoxic respiratory failure secondary to   #Pneumonia, hospital acquired with possible superimposed   #COPD exacerbation in   #tracheostomy pt  - baseline O2 at [facility] 8L 35% via trach collar (per daughter--confirm via record request)  - current sat 99% on 15L via trach collar--titrated down to 13L, tolerating well, sat remains 99%  PLAN:   c/w empiric Cefepime IV for now  mrsa nares  continue weaning O2 as tolerated  Head out of bed 30 degrees  check procal  Pulmonary consult  ABG + CXR tonight    #Congestive HF?   - Pt and daughter deny history of HF but endorse leg swelling months ago,  - BNP 1.4K in 2019 --> now mildly elevated 397, no lower extremity edema/ JVD/ edema at lung bases on cxr. chronic LE skin changes noted  - will hold off on lasix for now  - continue to monitor closely, consider diuretics/ echocardiogram/ cardio consult should pt become volume overloaded    #Suspected dysphagia in   #Tracheostomy pt  -Pt noted to be coughing after meals during exam  -Concern for recurrent aspiration pneumonia  -S&S eval ordered, note speech remains intact  -HoB 35 degrees during feeds  -Carb Consistent diet for now as tolerated, f/u swallow recs    #Anxiety #Insomnia  - Pt reports panic attacks while at [facility], was started on Xanax 0.25 daily prn  - Also notes episodes of sundowning, delerium, confusion   - will hold off on xanax for now given high risk for delerium, shared decision making with pt  - melatonin prn      #UTI secondary to chronic indwelling wiseman  -wiseman not replaced x3 weeks  - UA LE (+) WBC 76  RBC 10 many bacteria  PLAN:  -change wiseman today  -urine culture ordered--> f/u  -empiric abx coverage      DVT at [facility] now on Eliquis   will order arterial and venous duplex--> f/u  c/w home eliquis  consider vascular consult    #DM  On Lantus 55 + Lispro 25 TID   c/w home regimen  gabapentin for diabetic neuropathy  FSBG q routine, a1c      Diet: Carb consistent  DVT Prophylaxis: Eliquis  Code status: Full Code   Acute hypoxic respiratory failure secondary to   #mucus plug (now resolved s/p suctioning) vs possible  #Pneumonia, hospital acquired vs possible superimposed   #COPD exacerbation (less likely) in  #tracheostomy pt  - baseline O2 suppl. at [facility] 8L 35% via trach collar (per daughter--confirm via record request)  - sat in ED 99%on 15L via trach collar--titrated down to 10L, tolerating well, sat remains 99%  PLAN:   c/w empiric Cefepime IV for now--doubt infection but continue monitoring for fever, f/u procal  c/w Trillegy 2 puff 2x daily, albuterol prn  Not on steroid taper s/p discharge from [facility] (confirmed w nursing home) holding off on steroids for now  mrsa nares  continue weaning O2 as tolerated  Head out of bed 30 degrees  Pulmonary consult  Chest PT  ABG in am    #Congestive HF?   - Pt and daughter deny history of HF but endorse leg swelling months ago,  - BNP 1.4K in 2019 --> now mildly elevated 397, no lower extremity edema/ JVD/ edema at lung bases on cxr. chronic LE skin changes noted  - will continue with home bumex 2mg daily  - continue to monitor closely, consider diuretics/ echocardiogram/ cardio consult should pt become volume overloaded    #Suspected dysphagia in   #Tracheostomy pt  -Pt noted to be coughing after meals during exam  -Concern for recurrent aspiration pneumonia  -S&S eval ordered, note speech remains intact  -HoB 35 degrees during feeds  -Carb Consistent diet for now as tolerated, f/u swallow recs    #Anxiety #Insomnia  - Pt reports panic attacks while at [facility], was started on Xanax 0.25 daily prn  - Also notes episodes of sundowning, delerium, confusion   - will hold off on xanax for now given high risk for delerium, shared decision making with pt  - melatonin prn      #UTI secondary to chronic indwelling wiseman  -wiseman not replaced x3 weeks  - UA LE (+) WBC 76  RBC 10 many bacteria  PLAN:  -change wiseman today  -urine culture ordered--> f/u  -empiric abx coverage      DVT at [facility] now on Eliquis   will order arterial and venous duplex--> f/u  c/w home eliquis  consider vascular consult    #DM  On Lantus 55 + Lispro 25 TID   c/w home regimen  gabapentin for diabetic neuropathy  FSBG q routine, a1c      Diet: Carb consistent  DVT Prophylaxis: Eliquis  Code status: Full Code   Acute hypoxic respiratory failure secondary to   #mucus plug (now resolved s/p suctioning) vs possible  #Pneumonia, hospital acquired vs possible superimposed, possible gram neg (perhilar opacity)   #COPD exacerbation (less likely) in  #tracheostomy pt  - baseline O2 suppl. at [facility] 8L 35% via trach collar (per daughter--confirm via record request)  - sat in ED 99%on 15L via trach collar--titrated down to 10L, tolerating well, sat remains 99%  PLAN:   c/w empiric Cefepime IV for now--doubt infection but continue monitoring for fever, f/u procal  c/w Trillegy 2 puff 2x daily, albuterol prn  Not on steroid taper s/p discharge from [facility] (confirmed w nursing home) holding off on steroids for now  mrsa nares  continue weaning O2 as tolerated  Head out of bed 30 degrees  Pulmonary consult  Chest PT  ABG in am  cortisol am     #Congestive HF  - Pt and daughter deny history of HF but endorse leg swelling months ago,  - BNP 1.4K in 2019 --> now mildly elevated 397, no lower extremity edema/ JVD/ edema at lung bases on cxr. chronic LE skin changes noted  - will continue with home bumex 2mg daily  - check echo  - continue to monitor closely, consider diuretics/ echocardiogram/ cardio consult should pt become volume overloaded    #Suspected dysphagia   #Tracheostomy pt  -Pt noted to be coughing after meals during exam  -Concern for recurrent aspiration pneumonia  -S&S eval ordered, note speech remains intact  -HoB 35 degrees during feeds  -Carb Consistent diet for now as tolerated, f/u swallow recs    #Anxiety #Insomnia  - Pt reports panic attacks while at [facility], was started on Xanax 0.25 daily prn  - Also notes episodes of sundowning, delerium, confusion   - will hold off on xanax for now given high risk for delerium, shared decision making with pt  - melatonin prn      #UTI secondary to chronic indwelling wiseman  -wiseman not replaced x3 weeks  - UA LE (+) WBC 76  RBC 10 many bacteria  PLAN:  -change wiseman today  -urine culture ordered--> f/u  -empiric abx coverage      DVT at [facility] now on Eliquis   will order arterial and venous duplex--> f/u  c/w home eliquis  consider vascular consult    #DM  On Lantus 55 + Lispro 25 TID   c/w home regimen  gabapentin for diabetic neuropathy  FSBG q routine, a1c      Diet: Carb consistent  DVT Prophylaxis: Eliquis  Code status: Full Code   Acute hypoxic respiratory failure secondary to   #mucus plug (now resolved s/p suctioning) vs possible  #Pneumonia, hospital acquired vs possible superimposed, possible gram neg (perhilar opacity)   #COPD exacerbation (less likely) in  #tracheostomy pt  - baseline O2 suppl. at [facility] 8L 35% via trach collar (per daughter--confirm via record request)  - sat in ED 99%on 15L via trach collar--titrated down to 10L, tolerating well, sat remains 99%  PLAN:   c/w empiric Cefepime IV for now--doubt infection but continue monitoring for fever, f/u procal  c/w Trillegy 2 puff 2x daily, albuterol prn  Not on steroid taper s/p discharge from [facility] (confirmed w nursing home) holding off on steroids for now  mrsa nares  continue weaning O2 as tolerated  Head out of bed 30 degrees  Pulmonary consult  Chest PT  ABG in am  cortisol am     #suspected Chronic HF  - Pt and daughter deny history of HF but endorse leg swelling months ago,  - BNP 1.4K in 2019 --> now mildly elevated 397, no lower extremity edema/ JVD/ edema at lung bases on cxr. chronic LE skin changes noted  - will continue with home bumex 2mg daily  - check echo  - continue to monitor closely, consider diuretics/ echocardiogram/ cardio consult should pt become volume overloaded    #Suspected dysphagia   #Tracheostomy pt  -Pt noted to be coughing after meals during exam  -Concern for recurrent aspiration pneumonia  -S&S eval ordered, note speech remains intact  -HoB 35 degrees during feeds  -Carb Consistent diet for now as tolerated, f/u swallow recs    #Anxiety #Insomnia  - Pt reports panic attacks while at [facility], was started on Xanax 0.25 daily prn  - Also notes episodes of sundowning, delerium, confusion   - will hold off on xanax for now given high risk for delerium, shared decision making with pt  - melatonin prn      #UTI secondary to chronic indwelling wiseman  -wiseman not replaced x3 weeks  - UA LE (+) WBC 76  RBC 10 many bacteria  PLAN:  -change wiseman today  -urine culture ordered--> f/u  -empiric abx coverage      DVT at [facility] now on Eliquis   will order arterial and venous duplex--> f/u  c/w home eliquis  consider vascular consult    #DM  On Lantus 55 + Lispro 25 TID   c/w home regimen  gabapentin for diabetic neuropathy  FSBG q routine, a1c      Diet: Carb consistent  DVT Prophylaxis: Eliquis  Code status: Full Code

## 2022-05-27 NOTE — ED PROVIDER NOTE - NSICDXPASTMEDICALHX_GEN_ALL_CORE_FT
PAST MEDICAL HISTORY:  CHF (congestive heart failure)     COPD (chronic obstructive pulmonary disease)     DM (diabetes mellitus)

## 2022-05-27 NOTE — ED ADULT NURSE NOTE - OBJECTIVE STATEMENT
as per transfer paper, patient sent from [facility] for low pOx. patient states she has no idea why she was sent to the hospital since she feels fine. states she was woken frrom sleep and taken to the hospital

## 2022-05-27 NOTE — H&P ADULT - NSHPPHYSICALEXAM_GEN_ALL_CORE
CONSTITUTIONAL: No acute distress, well-developed, well-groomed, AAOx3, conversational  HEAD: Atraumatic, normocephalic  NECK: Trach site dry and well perfused, no erythema/ swelling at entry site             No masses/ echymoses/ swelling  EYES: EOM intact, PERRLA, conjunctiva and sclera clear  ENT: Supple, no masses, no thyromegaly, no bruits, no JVD; moist mucous membranes  PULMONARY: Clear to auscultation bilaterally; no wheezes, rales, or rhonchi  CARDIOVASCULAR: Regular rate and rhythm; no murmurs, rubs, or gallops  GASTROINTESTINAL: Soft, non-tender, non-distended; bowel sounds present  MUSCULOSKELETAL: 2+ peripheral pulses; no clubbing, no cyanosis, no edema  NEUROLOGY: non-focal  SKIN: b/l lower extremity chronic venous stasis skin changes          midline buttocks sacral pressure ulcer stage 1

## 2022-05-27 NOTE — ED PROVIDER NOTE - PROGRESS NOTE DETAILS
86% on RA called asif's office for admission, waiting for call back as per dr luna, they do not cover [place] and has not seen pt in a few years. she would like pt admitted to a different doctor

## 2022-05-28 LAB
A1C WITH ESTIMATED AVERAGE GLUCOSE RESULT: 7.2 % — HIGH (ref 4–5.6)
ALBUMIN SERPL ELPH-MCNC: 3.9 G/DL — SIGNIFICANT CHANGE UP (ref 3.5–5.2)
ALP SERPL-CCNC: 100 U/L — SIGNIFICANT CHANGE UP (ref 30–115)
ALT FLD-CCNC: 24 U/L — SIGNIFICANT CHANGE UP (ref 0–41)
ANION GAP SERPL CALC-SCNC: 12 MMOL/L — SIGNIFICANT CHANGE UP (ref 7–14)
APTT BLD: 34.4 SEC — SIGNIFICANT CHANGE UP (ref 27–39.2)
AST SERPL-CCNC: 21 U/L — SIGNIFICANT CHANGE UP (ref 0–41)
BASOPHILS # BLD AUTO: 0.06 K/UL — SIGNIFICANT CHANGE UP (ref 0–0.2)
BASOPHILS NFR BLD AUTO: 0.6 % — SIGNIFICANT CHANGE UP (ref 0–1)
BILIRUB SERPL-MCNC: 0.5 MG/DL — SIGNIFICANT CHANGE UP (ref 0.2–1.2)
BUN SERPL-MCNC: 8 MG/DL — LOW (ref 10–20)
CALCIUM SERPL-MCNC: 10.2 MG/DL — HIGH (ref 8.5–10.1)
CHLORIDE SERPL-SCNC: 95 MMOL/L — LOW (ref 98–110)
CO2 SERPL-SCNC: 29 MMOL/L — SIGNIFICANT CHANGE UP (ref 17–32)
CREAT SERPL-MCNC: <0.5 MG/DL — LOW (ref 0.7–1.5)
EGFR: 124 ML/MIN/1.73M2 — SIGNIFICANT CHANGE UP
EOSINOPHIL # BLD AUTO: 0.17 K/UL — SIGNIFICANT CHANGE UP (ref 0–0.7)
EOSINOPHIL NFR BLD AUTO: 1.6 % — SIGNIFICANT CHANGE UP (ref 0–8)
ESTIMATED AVERAGE GLUCOSE: 160 MG/DL — HIGH (ref 68–114)
GLUCOSE BLDC GLUCOMTR-MCNC: 101 MG/DL — HIGH (ref 70–99)
GLUCOSE BLDC GLUCOMTR-MCNC: 122 MG/DL — HIGH (ref 70–99)
GLUCOSE BLDC GLUCOMTR-MCNC: 135 MG/DL — HIGH (ref 70–99)
GLUCOSE BLDC GLUCOMTR-MCNC: 228 MG/DL — HIGH (ref 70–99)
GLUCOSE SERPL-MCNC: 173 MG/DL — HIGH (ref 70–99)
HCT VFR BLD CALC: 29.1 % — LOW (ref 37–47)
HCV AB S/CO SERPL IA: 0.04 COI — SIGNIFICANT CHANGE UP
HCV AB SERPL-IMP: SIGNIFICANT CHANGE UP
HGB BLD-MCNC: 9.6 G/DL — LOW (ref 12–16)
IMM GRANULOCYTES NFR BLD AUTO: 2 % — HIGH (ref 0.1–0.3)
INR BLD: 1.25 RATIO — SIGNIFICANT CHANGE UP (ref 0.65–1.3)
LYMPHOCYTES # BLD AUTO: 1.47 K/UL — SIGNIFICANT CHANGE UP (ref 1.2–3.4)
LYMPHOCYTES # BLD AUTO: 13.7 % — LOW (ref 20.5–51.1)
MAGNESIUM SERPL-MCNC: 1.6 MG/DL — LOW (ref 1.8–2.4)
MCHC RBC-ENTMCNC: 32.2 PG — HIGH (ref 27–31)
MCHC RBC-ENTMCNC: 33 G/DL — SIGNIFICANT CHANGE UP (ref 32–37)
MCV RBC AUTO: 97.7 FL — SIGNIFICANT CHANGE UP (ref 81–99)
MONOCYTES # BLD AUTO: 0.97 K/UL — HIGH (ref 0.1–0.6)
MONOCYTES NFR BLD AUTO: 9.1 % — SIGNIFICANT CHANGE UP (ref 1.7–9.3)
MRSA PCR RESULT.: NEGATIVE — SIGNIFICANT CHANGE UP
NEUTROPHILS # BLD AUTO: 7.82 K/UL — HIGH (ref 1.4–6.5)
NEUTROPHILS NFR BLD AUTO: 73 % — SIGNIFICANT CHANGE UP (ref 42.2–75.2)
NRBC # BLD: 0 /100 WBCS — SIGNIFICANT CHANGE UP (ref 0–0)
PLATELET # BLD AUTO: 229 K/UL — SIGNIFICANT CHANGE UP (ref 130–400)
POTASSIUM SERPL-MCNC: 4.3 MMOL/L — SIGNIFICANT CHANGE UP (ref 3.5–5)
POTASSIUM SERPL-SCNC: 4.3 MMOL/L — SIGNIFICANT CHANGE UP (ref 3.5–5)
PROCALCITONIN SERPL-MCNC: 0.18 NG/ML — HIGH (ref 0.02–0.1)
PROT SERPL-MCNC: 6.5 G/DL — SIGNIFICANT CHANGE UP (ref 6–8)
PROTHROM AB SERPL-ACNC: 14.3 SEC — HIGH (ref 9.95–12.87)
RBC # BLD: 2.98 M/UL — LOW (ref 4.2–5.4)
RBC # FLD: 14.6 % — HIGH (ref 11.5–14.5)
SODIUM SERPL-SCNC: 136 MMOL/L — SIGNIFICANT CHANGE UP (ref 135–146)
WBC # BLD: 10.7 K/UL — SIGNIFICANT CHANGE UP (ref 4.8–10.8)
WBC # FLD AUTO: 10.7 K/UL — SIGNIFICANT CHANGE UP (ref 4.8–10.8)

## 2022-05-28 PROCEDURE — 99222 1ST HOSP IP/OBS MODERATE 55: CPT | Mod: 25

## 2022-05-28 PROCEDURE — 71045 X-RAY EXAM CHEST 1 VIEW: CPT | Mod: 26,77

## 2022-05-28 PROCEDURE — 31624 DX BRONCHOSCOPE/LAVAGE: CPT

## 2022-05-28 PROCEDURE — 71045 X-RAY EXAM CHEST 1 VIEW: CPT | Mod: 26

## 2022-05-28 PROCEDURE — 93306 TTE W/DOPPLER COMPLETE: CPT | Mod: 26

## 2022-05-28 PROCEDURE — 99221 1ST HOSP IP/OBS SF/LOW 40: CPT

## 2022-05-28 RX ORDER — MIDAZOLAM HYDROCHLORIDE 1 MG/ML
4 INJECTION, SOLUTION INTRAMUSCULAR; INTRAVENOUS ONCE
Refills: 0 | Status: DISCONTINUED | OUTPATIENT
Start: 2022-05-28 | End: 2022-05-28

## 2022-05-28 RX ORDER — MIDAZOLAM HYDROCHLORIDE 1 MG/ML
2 INJECTION, SOLUTION INTRAMUSCULAR; INTRAVENOUS
Refills: 0 | Status: DISCONTINUED | OUTPATIENT
Start: 2022-05-28 | End: 2022-05-28

## 2022-05-28 RX ORDER — CHLORHEXIDINE GLUCONATE 213 G/1000ML
15 SOLUTION TOPICAL EVERY 12 HOURS
Refills: 0 | Status: DISCONTINUED | OUTPATIENT
Start: 2022-05-28 | End: 2022-06-01

## 2022-05-28 RX ORDER — DEXMEDETOMIDINE HYDROCHLORIDE IN 0.9% SODIUM CHLORIDE 4 UG/ML
0.04 INJECTION INTRAVENOUS
Qty: 200 | Refills: 0 | Status: DISCONTINUED | OUTPATIENT
Start: 2022-05-28 | End: 2022-05-28

## 2022-05-28 RX ORDER — SODIUM CHLORIDE 9 MG/ML
500 INJECTION, SOLUTION INTRAVENOUS ONCE
Refills: 0 | Status: COMPLETED | OUTPATIENT
Start: 2022-05-28 | End: 2022-05-28

## 2022-05-28 RX ORDER — DEXMEDETOMIDINE HYDROCHLORIDE IN 0.9% SODIUM CHLORIDE 4 UG/ML
0.2 INJECTION INTRAVENOUS
Qty: 400 | Refills: 0 | Status: DISCONTINUED | OUTPATIENT
Start: 2022-05-28 | End: 2022-06-01

## 2022-05-28 RX ORDER — MIDAZOLAM HYDROCHLORIDE 1 MG/ML
2 INJECTION, SOLUTION INTRAMUSCULAR; INTRAVENOUS ONCE
Refills: 0 | Status: DISCONTINUED | OUTPATIENT
Start: 2022-05-28 | End: 2022-05-28

## 2022-05-28 RX ORDER — SODIUM CHLORIDE 9 MG/ML
1000 INJECTION, SOLUTION INTRAVENOUS ONCE
Refills: 0 | Status: COMPLETED | OUTPATIENT
Start: 2022-05-28 | End: 2022-05-28

## 2022-05-28 RX ADMIN — INSULIN GLARGINE 55 UNIT(S): 100 INJECTION, SOLUTION SUBCUTANEOUS at 23:24

## 2022-05-28 RX ADMIN — GABAPENTIN 300 MILLIGRAM(S): 400 CAPSULE ORAL at 22:31

## 2022-05-28 RX ADMIN — OXYCODONE AND ACETAMINOPHEN 1 TABLET(S): 5; 325 TABLET ORAL at 04:23

## 2022-05-28 RX ADMIN — BUMETANIDE 2 MILLIGRAM(S): 0.25 INJECTION INTRAMUSCULAR; INTRAVENOUS at 05:39

## 2022-05-28 RX ADMIN — CEFEPIME 100 MILLIGRAM(S): 1 INJECTION, POWDER, FOR SOLUTION INTRAMUSCULAR; INTRAVENOUS at 18:15

## 2022-05-28 RX ADMIN — OXYCODONE AND ACETAMINOPHEN 1 TABLET(S): 5; 325 TABLET ORAL at 23:28

## 2022-05-28 RX ADMIN — MIDAZOLAM HYDROCHLORIDE 2 MILLIGRAM(S): 1 INJECTION, SOLUTION INTRAMUSCULAR; INTRAVENOUS at 11:08

## 2022-05-28 RX ADMIN — MIDAZOLAM HYDROCHLORIDE 2 MILLIGRAM(S): 1 INJECTION, SOLUTION INTRAMUSCULAR; INTRAVENOUS at 10:30

## 2022-05-28 RX ADMIN — GABAPENTIN 300 MILLIGRAM(S): 400 CAPSULE ORAL at 05:39

## 2022-05-28 RX ADMIN — AMLODIPINE BESYLATE 5 MILLIGRAM(S): 2.5 TABLET ORAL at 05:39

## 2022-05-28 RX ADMIN — LOSARTAN POTASSIUM 100 MILLIGRAM(S): 100 TABLET, FILM COATED ORAL at 05:39

## 2022-05-28 RX ADMIN — DEXMEDETOMIDINE HYDROCHLORIDE IN 0.9% SODIUM CHLORIDE 4.9 MICROGRAM(S)/KG/HR: 4 INJECTION INTRAVENOUS at 11:30

## 2022-05-28 RX ADMIN — MIDAZOLAM HYDROCHLORIDE 2 MILLIGRAM(S): 1 INJECTION, SOLUTION INTRAMUSCULAR; INTRAVENOUS at 11:00

## 2022-05-28 RX ADMIN — ATORVASTATIN CALCIUM 20 MILLIGRAM(S): 80 TABLET, FILM COATED ORAL at 22:31

## 2022-05-28 RX ADMIN — CHLORHEXIDINE GLUCONATE 15 MILLILITER(S): 213 SOLUTION TOPICAL at 18:15

## 2022-05-28 RX ADMIN — DEXMEDETOMIDINE HYDROCHLORIDE IN 0.9% SODIUM CHLORIDE 4.9 MICROGRAM(S)/KG/HR: 4 INJECTION INTRAVENOUS at 22:32

## 2022-05-28 RX ADMIN — SODIUM CHLORIDE 500 MILLILITER(S): 9 INJECTION, SOLUTION INTRAVENOUS at 20:32

## 2022-05-28 RX ADMIN — CEFEPIME 100 MILLIGRAM(S): 1 INJECTION, POWDER, FOR SOLUTION INTRAMUSCULAR; INTRAVENOUS at 05:53

## 2022-05-28 RX ADMIN — APIXABAN 5 MILLIGRAM(S): 2.5 TABLET, FILM COATED ORAL at 05:39

## 2022-05-28 RX ADMIN — SODIUM CHLORIDE 1000 MILLILITER(S): 9 INJECTION, SOLUTION INTRAVENOUS at 13:25

## 2022-05-28 RX ADMIN — Medication 0.25 MILLIGRAM(S): at 22:31

## 2022-05-28 RX ADMIN — Medication 25 UNIT(S): at 08:06

## 2022-05-28 RX ADMIN — OXYCODONE AND ACETAMINOPHEN 1 TABLET(S): 5; 325 TABLET ORAL at 22:31

## 2022-05-28 NOTE — CONSULT NOTE ADULT - ASSESSMENT
ASSESSMENT:  55 yo f with PMHx of DVT on Eliquis, COPD,  trach collar, obesity, IDDM, UTI, sent by [employer] for hypoxia now s/p Trach exchange to XLT alongside Pulmonology    PLAN:  - Management per CCU and Pulmonology  - Patient saturating well after Trach exchange to XLT from 8  - Surgery to follow  - StrictIns and Outs  - Patient seen with Dr. Mike    05-28-22 @ 11:30    [place] TEAM [id number] ASSESSMENT:  55 yo f with PMHx of DVT on Eliquis, COPD,  trach collar, obesity, IDDM, UTI, sent by [employer] for hypoxia now s/p Trach exchange to 7 XLT alongside Pulmonology    PLAN:  - Management per CCU and Pulmonology  - Patient saturating well after Trach exchange to XLT from 8  - Surgery to follow  - StrictIns and Outs  - Patient seen with Dr. Mike    05-28-22 @ 11:30    [place] TEAM 9321 ASSESSMENT:  57 yo f with PMHx of DVT on Eliquis, COPD,  trach collar, obesity, IDDM, UTI, sent by [employer] for hypoxia now s/p Trach exchange to 7 XLT alongside Pulmonology    PLAN:  - Management per CCU and Pulmonology  - Patient saturating well after Trach exchange to XLT from 8  - Surgery to follow for possible bronchoscopy with debridement of the trachea should this be required in the future.  - Strict Ins and Outs  - Patient seen with Dr. Mike    05-28-22 @ 11:30    [place] TEAM [id number]

## 2022-05-28 NOTE — PROCEDURE NOTE - NSBRONCHPROCDETAILS_GEN_A_CORE_FT
The patient had previous size 8 Trach, 100% oxygen was delivered via trach collar, 2mg of versed was administered, fiberoptic bronchoscope was introduced through the trach where complete occlusion was noted by debris, entire trach was removed with clearance of an endotracheal tube adaptor and the tip of the endotracheal tube was noted to be in good position above the vernon.   The Right tracheobronchial tree was inspected closely to the level of the subsegmental bronchi. All bronchi are patent with no endobronchial lesions and no mucosal lesions noted.   The Left tracheobronchial tree was also patent and intact with the mucosa normal   The bronchoscope was then introduced to the ________lobe and washings/brushings were taken from that area.  The procedure was completed and all samples were submitted for appropriate studies  After adequate clearing of secretions was accomplished, the bronchoscope was removed from the patient and the procedure was ended.   The patient tolerated the procedure well and there were no complications. The patient had previous size 8 Trach, 100% oxygen was delivered via trach collar, versed was administered, fiberoptic bronchoscope was introduced through the trach where complete occlusion was noted by debris, trach was then removed with clearance of harden debris which occupied the entire length of trach, trach was reinserted with readvancement of bronchoscope to reveal large mobile, pedunculated mass at the end of the trach with near blockage of trachea. Cardiothoracic surgeon and Anesthesia called upon, decision made to exchange trach for an XLT. Insertion of size 7 XLT exchanged via guide-tubing. Flexible bronchscopy confirmed placement of trach with successfully bypass of mass, direct visualization made of vernon with close inspection of bilateral tracheobronchial tree, mild secretions suctioned and cleared from LLL.  After adequate clearing of secretions was accomplished, the bronchoscope was removed from the patient and the procedure was ended. Patient placed onto the vent. The patient tolerated the procedure well and there were no complications. The patient had previous size 8 Trach, 100% oxygen was delivered via trach collar, versed was administered, fiberoptic bronchoscope was introduced through the trach where complete occlusion was noted by debris,the patient could not be ambu baged. The trach was then removed and cleared of harden debris which occupied the entire length of trach. The trach was reinserted and the pt was ambu bagged.  The  bronchoscope was reinserted through the cleared trach to then  reveal a large mobile, pedunculated mass immediately distal to the end of the trach with near obstruction of of tracheal lumen.     Cardiothoracic surgeon and Anesthesia called upon, consensus decision made to exchange trach for an XLT. Insertion of size 7 XLT exchanged via guide-tubing. This was achieved. Flexible bronchscopy confirmed placement of trach with successfully bypass of mass, direct visualization made of vernon with close inspection of bilateral tracheobronchial tree, significant secretions suctioned and cleared from LLL.  After adequate clearing of secretions was accomplished, the bronchoscope was removed from the patient and the procedure was ended. Patient placed onto the vent. The patient tolerated the procedure well and there were no complications.    Oxygen saturations remained above 95 % throughout. The patient remained conversant and in stable condition.

## 2022-05-28 NOTE — CONSULT NOTE ADULT - ASSESSMENT
Impression:  Acute/ chronic COPD with exacerbation  chronic respiratory failure  mucous plug removed  pneumonia v, atelectasis L base  currently No Impending respiratory failure      SUGGEST:    Check O2 sat on NC, record, continue O2 as necessary to maintain sats > 90%  Continue IV solumedrol  bronchodilator treatments ATC and PRN  complete course of antibiotics  sputum gm stain cs DTA  check procal d/c abx if low  OOB to chair  GI and DVT prophylaxis  pulmonary toilet  Monitor clinically, convert to oral prednisone as clinical improvement allows       Impression:  Acute/ chronic COPD with exacerbation  chronic respiratory failure  mucous plug removed  pneumonia v, atelectasis L base  currently No Impending respiratory failure      SUGGEST:  Check O2 sat on NC, record, continue O2 as necessary to maintain sats > 90%  Continue IV solumedrol  bronchodilator treatments ATC and PRN  complete course of antibiotics  sputum gm stain cs   check procal d/c abx if low  OOB to chair  GI and DVT prophylaxis  pulmonary toilet  Monitor clinically, convert to oral prednisone as clinical improvement allows

## 2022-05-28 NOTE — CONSULT NOTE ADULT - SUBJECTIVE AND OBJECTIVE BOX
Patient is a 56y old  Female who presents with a chief complaint of       HPI:  55 yo f with PMHx of DVT on Eliquis, COPD,  trach collar, obesity, IDDM, UTI, sent by [facility] for hypoxia. History provided by daughter at bedside, she reports the current illness going back to  when pt was intubated on admission at [facility] ICU for hypoxic respiratory failure diagnosed with copd exacerbation and superimposed pneumonia, of note she also appears to have been in CHF exacerbation with severe b/l LE swelling treated with IV bumex. Remained intubated 37 days per daughter, diagnosed with fever of unknown origin (up to 106F), treated with empiric broad spectrum antibiotics and once 2 weeks s/p tracheostomy placement and 48 hours afebrile she was was discharged to nursing home for PT. Of note, patient's daughter and pt herself say the wiseman has not been replaced for over 3 weeks. She also reports a developing sacral pressure ulcer. Was at [facility] only 2 days when was noted to be hypoxic (saturating low 70's) and EMS called. While awaiting EMS, floor nurse on the unit suctioned the patient, and managed to bring up a very large mucus plug, with pt's saturation immediately improving afterwards to 80's. Nonetheless pt was sent to [facility] ED. Prior to all this pt was independent and ambulatory. Pt denies sob, cp, abd pain, n/v/d, fever or chills.     On arrival to ED pt was saturating 97%, 15L O2 via tracheostomy collar (baseline 8 L at [facility]) VS:    /59  T 99.9F RR 20  Admission VBG pH 7.37 pCO2 60 pO2 49. Pt not wheezing, not coughing. CXR suspicious for L-sided PNA, inconclusive. CTA neg for PE. Pt received x1 IV Levoquin and Cefepime in ED, admitted to medicine for acute hypoxic hypercapnic respiratory failure secondary to acute mucus plug (now resolved vs superimposed pneumonia  hospital-acquired.(less likely), Records request sent to [facility] medical records  [phone]) and patient will be continued on empiric antibiotics pending procal (27 May 2022 14:02)      Pt evaluated on rounds.  I reviewed the radiology tests and hospital record.    I reviewed previous notes on this patient.    Interval Events: No overnight events.      REVIEW OF SYSTEMS:   see HPI      OBJECTIVE:  ICU Vital Signs Last 24 Hrs  T(C): 36.7 (28 May 2022 05:18), Max: 37.7 (27 May 2022 10:14)  T(F): 98 (28 May 2022 05:18), Max: 99.9 (27 May 2022 10:14)  HR: 107 (28 May 2022 05:18) (92 - 118)  BP: 173/72 (28 May 2022 05:18) (103/59 - 173/72)    RR: 20 (28 May 2022 05:18) (19 - 20)  SpO2: 97% (27 May 2022 17:00) (96% - 97%)        CAPILLARY BLOOD GLUCOSE      POCT Blood Glucose.: 152 mg/dL (27 May 2022 21:07)        PHYSICAL EXAM:       · ENMT:   Airway patent,   Nasal mucosa clear.  Mouth with normal mucosa.   No thrush    · EYES:   Clear bilaterally,   pupils equal,   round and reactive to light.    · CARDIAC:   Normal rate,   regular rhythm.    Heart sounds S1, S2.   No murmurs, no rubs or gallops on auscultation  no edema        CAROTID:   normal systolic impulse  no bruits    · RESPIRATORY:   trach  decreased BS  rales L base  normal chest expansion  no retractions or use of accessory muscles  palpation of chest is normal with no fremitus      · GASTROINTESTINAL:  Abdomen soft,   non-tender,   + BS  liver/spleen not palpable    · MUSCULOSKELETAL:   no clubbing, cyanosis      · SKIN:   Skin normal color for race,   warm, dry   No evidence of rash.      · HEME LYMPH:   no splenomegaly.  No cervical  lymphadenopathy.  no inguinal lymphadenopathy    HOSPITAL MEDICATIONS:  MEDICATIONS  (STANDING):  ALPRAZolam 0.25 milliGRAM(s) Oral at bedtime  amLODIPine   Tablet 5 milliGRAM(s) Oral daily  apixaban 5 milliGRAM(s) Oral two times a day  atorvastatin 20 milliGRAM(s) Oral at bedtime  budesonide  80 MICROgram(s)/formoterol 4.5 MICROgram(s) Inhaler 2 Puff(s) Inhalation two times a day  buMETAnide 2 milliGRAM(s) Oral daily  cefepime   IVPB 1000 milliGRAM(s) IV Intermittent every 12 hours  clopidogrel Tablet 75 milliGRAM(s) Oral daily  dextrose 5%. 1000 milliLiter(s) (100 mL/Hr) IV Continuous <Continuous>  dextrose 5%. 1000 milliLiter(s) (50 mL/Hr) IV Continuous <Continuous>  dextrose 50% Injectable 25 Gram(s) IV Push once  dextrose 50% Injectable 12.5 Gram(s) IV Push once  dextrose 50% Injectable 25 Gram(s) IV Push once  gabapentin 300 milliGRAM(s) Oral three times a day  glucagon  Injectable 1 milliGRAM(s) IntraMuscular once  influenza   Vaccine 0.5 milliLiter(s) IntraMuscular once  insulin glargine Injectable (LANTUS) 55 Unit(s) SubCutaneous at bedtime  insulin lispro (ADMELOG) corrective regimen sliding scale   SubCutaneous at bedtime  insulin lispro Injectable (ADMELOG) 25 Unit(s) SubCutaneous three times a day before meals  losartan 100 milliGRAM(s) Oral daily  tiotropium 18 MICROgram(s) Capsule 1 Capsule(s) Inhalation daily    MEDICATIONS  (PRN):  ALBUTerol    0.083%. 2.5 milliGRAM(s) Nebulizer once PRN Shortness of Breath and/or Wheezing  dextrose Oral Gel 15 Gram(s) Oral once PRN Blood Glucose LESS THAN 70 milliGRAM(s)/deciliter  oxycodone    5 mG/acetaminophen 325 mG 1 Tablet(s) Oral every 6 hours PRN Severe Pain (7 - 10)    lactated ringers Bolus:   1000 milliLiter(s), IV Bolus, once, infuse over 60 Minute(s), Stop After 1 Doses  Provider's Contact #: [phone]  lactated ringers Bolus:   1000 milliLiter(s), IV Bolus, once, infuse over 60 Minute(s), Stop After 1 Doses  Provider's Contact #: [phone]      LABS:                        9.4    14.82 )-----------( 235      ( 27 May 2022 10:00 )             27.9     05-    135  |  93<L>  |  10  ----------------------------<  280<H>  5.0   |  28  |  <0.5<L>    Ca    10.0      27 May 2022 10:00    TPro  6.6  /  Alb  3.9  /  TBili  0.5  /  DBili  x   /  AST  25  /  ALT  26  /  AlkPhos  101        Urinalysis Basic - ( 27 May 2022 11:54 )    Color: Yellow / Appearance: Slightly Turbid / S.018 / pH: x  Gluc: x / Ketone: Negative  / Bili: Negative / Urobili: <2 mg/dL   Blood: x / Protein: 300 mg/dL / Nitrite: Negative   Leuk Esterase: Large / RBC: 10 /HPF / WBC 76 /HPF   Sq Epi: x / Non Sq Epi: 1 /HPF / Bacteria: Many      Venous Blood Gas:   @ 09:53  7.37/60/49/35/76.7  VBG Lactate: 2.20      CARDIAC MARKERS ( 27 May 2022 21:30 )  x     / 0.02 ng/mL / x     / x     / x      CARDIAC MARKERS ( 27 May 2022 16:51 )  x     / 0.03 ng/mL / x     / x     / x          SARS-CoV-2: NotDetec (27 May 2022 10:30)    RADIOLOGY: Today I personally interpreted the latest CXR and other pertinent films.                   Patient is a 56y old  Female who presents with a chief complaint of       HPI:  55 yo f with PMHx of DVT on Eliquis, COPD,  trach collar, obesity, IDDM, UTI, sent by [facility] for hypoxia. History provided by daughter at bedside, she reports the current illness going back to  when pt was intubated on admission at [facility] ICU for hypoxic respiratory failure diagnosed with copd exacerbation and superimposed pneumonia, of note she also appears to have been in CHF exacerbation with severe b/l LE swelling treated with IV bumex. Remained intubated 37 days per daughter, diagnosed with fever of unknown origin (up to 106F), treated with empiric broad spectrum antibiotics and once 2 weeks s/p tracheostomy placement and 48 hours afebrile she was was discharged to nursing home for PT. Of note, patient's daughter and pt herself say the wiseman has not been replaced for over 3 weeks. She also reports a developing sacral pressure ulcer. Was at [facility] only 2 days when was noted to be hypoxic (saturating low 70's) and EMS called. While awaiting EMS, floor nurse on the unit suctioned the patient, and managed to bring up a very large mucus plug, with pt's saturation immediately improving afterwards to 80's. Nonetheless pt was sent to [facility] ED. Prior to all this pt was independent and ambulatory. Pt denies sob, cp, abd pain, n/v/d, fever or chills.     On arrival to ED pt was saturating 97%, 15L O2 via tracheostomy collar (baseline 8 L at [facility]) VS:    /59  T 99.9F RR 20  Admission VBG pH 7.37 pCO2 60 pO2 49. Pt not wheezing, not coughing. CXR suspicious for L-sided PNA, inconclusive. CTA neg for PE. Pt received x1 IV Levoquin and Cefepime in ED, admitted to medicine for acute hypoxic hypercapnic respiratory failure secondary to acute mucus plug (now resolved vs superimposed pneumonia  hospital-acquired.(less likely), Records request sent to [facility] medical records  [phone]) and patient will be continued on empiric antibiotics pending procal (27 May 2022 14:02)      Pt evaluated on rounds.  I reviewed the radiology tests and hospital record.    I reviewed previous notes on this patient.    Interval Events: No overnight events.      REVIEW OF SYSTEMS:   see HPI      OBJECTIVE:  ICU Vital Signs Last 24 Hrs  T(C): 36.7 (28 May 2022 05:18), Max: 37.7 (27 May 2022 10:14)  T(F): 98 (28 May 2022 05:18), Max: 99.9 (27 May 2022 10:14)  HR: 107 (28 May 2022 05:18) (92 - 118)  BP: 173/72 (28 May 2022 05:18) (103/59 - 173/72)    RR: 20 (28 May 2022 05:18) (19 - 20)  SpO2: 97% (27 May 2022 17:00) (96% - 97%)        CAPILLARY BLOOD GLUCOSE      POCT Blood Glucose.: 152 mg/dL (27 May 2022 21:07)        PHYSICAL EXAM:       · ENMT:   Airway patent,   Nasal mucosa clear.  Mouth with normal mucosa.   No thrush    · EYES:   Clear bilaterally,   pupils equal,   round and reactive to light.    · CARDIAC:   Normal rate,   regular rhythm.    Heart sounds S1, S2.   No murmurs, no rubs or gallops on auscultation  no edema        CAROTID:   normal systolic impulse  no bruits    · RESPIRATORY:   trach  decreased BS  rales L base  normal chest expansion  no retractions or use of accessory muscles  palpation of chest is normal with no fremitus      · GASTROINTESTINAL:  Abdomen soft,   non-tender,   + BS  liver/spleen not palpable    · MUSCULOSKELETAL:   no clubbing, cyanosis      · SKIN:   Skin normal color for race,   warm, dry   venous stasis changes LE      · HEME LYMPH:   no splenomegaly.  No cervical  lymphadenopathy.  no inguinal lymphadenopathy    HOSPITAL MEDICATIONS:  MEDICATIONS  (STANDING):  ALPRAZolam 0.25 milliGRAM(s) Oral at bedtime  amLODIPine   Tablet 5 milliGRAM(s) Oral daily  apixaban 5 milliGRAM(s) Oral two times a day  atorvastatin 20 milliGRAM(s) Oral at bedtime  budesonide  80 MICROgram(s)/formoterol 4.5 MICROgram(s) Inhaler 2 Puff(s) Inhalation two times a day  buMETAnide 2 milliGRAM(s) Oral daily  cefepime   IVPB 1000 milliGRAM(s) IV Intermittent every 12 hours  clopidogrel Tablet 75 milliGRAM(s) Oral daily  dextrose 5%. 1000 milliLiter(s) (100 mL/Hr) IV Continuous <Continuous>  dextrose 5%. 1000 milliLiter(s) (50 mL/Hr) IV Continuous <Continuous>  dextrose 50% Injectable 25 Gram(s) IV Push once  dextrose 50% Injectable 12.5 Gram(s) IV Push once  dextrose 50% Injectable 25 Gram(s) IV Push once  gabapentin 300 milliGRAM(s) Oral three times a day  glucagon  Injectable 1 milliGRAM(s) IntraMuscular once  influenza   Vaccine 0.5 milliLiter(s) IntraMuscular once  insulin glargine Injectable (LANTUS) 55 Unit(s) SubCutaneous at bedtime  insulin lispro (ADMELOG) corrective regimen sliding scale   SubCutaneous at bedtime  insulin lispro Injectable (ADMELOG) 25 Unit(s) SubCutaneous three times a day before meals  losartan 100 milliGRAM(s) Oral daily  tiotropium 18 MICROgram(s) Capsule 1 Capsule(s) Inhalation daily    MEDICATIONS  (PRN):  ALBUTerol    0.083%. 2.5 milliGRAM(s) Nebulizer once PRN Shortness of Breath and/or Wheezing  dextrose Oral Gel 15 Gram(s) Oral once PRN Blood Glucose LESS THAN 70 milliGRAM(s)/deciliter  oxycodone    5 mG/acetaminophen 325 mG 1 Tablet(s) Oral every 6 hours PRN Severe Pain (7 - 10)    lactated ringers Bolus:   1000 milliLiter(s), IV Bolus, once, infuse over 60 Minute(s), Stop After 1 Doses  Provider's Contact #: [phone]  lactated ringers Bolus:   1000 milliLiter(s), IV Bolus, once, infuse over 60 Minute(s), Stop After 1 Doses  Provider's Contact #: [phone]      LABS:                        9.4    14.82 )-----------( 235      ( 27 May 2022 10:00 )             27.9     05-    135  |  93<L>  |  10  ----------------------------<  280<H>  5.0   |  28  |  <0.5<L>    Ca    10.0      27 May 2022 10:00    TPro  6.6  /  Alb  3.9  /  TBili  0.5  /  DBili  x   /  AST  25  /  ALT  26  /  AlkPhos  101        Urinalysis Basic - ( 27 May 2022 11:54 )    Color: Yellow / Appearance: Slightly Turbid / S.018 / pH: x  Gluc: x / Ketone: Negative  / Bili: Negative / Urobili: <2 mg/dL   Blood: x / Protein: 300 mg/dL / Nitrite: Negative   Leuk Esterase: Large / RBC: 10 /HPF / WBC 76 /HPF   Sq Epi: x / Non Sq Epi: 1 /HPF / Bacteria: Many      Venous Blood Gas:   @ 09:53  7.37/60/49/35/76.7  VBG Lactate: 2.20      CARDIAC MARKERS ( 27 May 2022 21:30 )  x     / 0.02 ng/mL / x     / x     / x      CARDIAC MARKERS ( 27 May 2022 16:51 )  x     / 0.03 ng/mL / x     / x     / x          SARS-CoV-2: NotDetec (27 May 2022 10:30)    RADIOLOGY: Today I personally interpreted the latest CXR and other pertinent films.

## 2022-05-28 NOTE — CONSULT NOTE ADULT - SUBJECTIVE AND OBJECTIVE BOX
GENERAL SURGERY CONSULT NOTE    Patient: CRUZ DUMONT , 56y (63)Female   MRN: [id number]  Location: [facility] (formerly [facility])  A  Visit: 22 Inpatient  Date: 22 @ 11:30    HPI: 57 yo f with PMHx of DVT on Eliquis, COPD,  trach collar, obesity, IDDM, UTI, sent by [facility] for hypoxia. Current illness going back to  when pt was intubated on admission at [facility] ICU for hypoxic respiratory failure diagnosed with copd exacerbation and superimposed pneumonia, and remained intubated for 37 days. Patient stayed at [facility] for 2 days until noted to be hypoxic (saturating low 70's) pt's saturation immediately improving afterwards to 80's after large mucus plug removed and was subsequently sent to [facility]    On arrival to ED pt was saturating 97%, 15L O2 via tracheostomy collar (baseline 8 L at [facility]) VS:    /59  T 99.9F RR 20  Admission VBG pH 7.37 pCO2 60 pO2 49. Pt was not wheezing, not coughing. CXR suspicious for L-sided PNA, inconclusive. CTA neg for PE. Pt received x1 IV Levaquin and Cefepime in ED, admitted to medicine for acute hypoxic hypercapnic respiratory failure secondary to acute mucus plug (now resolved vs superimposed pneumonia  hospital-acquired.     General Surgery Consulted emergently when patient found to be hypoxic to low 80s after CCU and Pulmonary team found large granuloma in trachea along tracheostomy tube. Patient currently had a size 8 trach. Surgery assisted Pulmonary and CCU with trach exchanged with ET tube guidance to a size XLT. Saturations improved to high 90s. Large granuloma was removed. Patient was no longer in distress after exchange.    PAST MEDICAL & SURGICAL HISTORY:  COPD (chronic obstructive pulmonary disease)      CHF (congestive heart failure)      DM (diabetes mellitus)      H/O tracheostomy          Home Medications:  albuterol sulfate 2.5 mg/3 mL (0.083 %) solution for nebulization:  (27 May 2022 15:04)  alprazolam 0.25 mg tablet:  (27 May 2022 15:04)  amlodipine 5 mg tablet:  (27 May 2022 15:04)  atorvastatin 20 mg tablet:  (27 May 2022 15:04)  bumetanide 2 mg tablet:  (27 May 2022 15:04)  clopidogrel 75 mg tablet:  (27 May 2022 15:04)  Eliquis 5 mg tablet:  (27 May 2022 15:04)  gabapentin 300 mg capsule:  (27 May 2022 15:04)  glimepiride 2 mg tablet:  (27 May 2022 15:04)  labetalol 100 mg tablet:  (27 May 2022 15:04)  LANTUS SOLOSTAR 100 UNIT/ML: 55 UNITS ONCE A DAY SUBCUTANEOUS 90 DAYS (27 May 2022 15:04)  losartan 100 mg tablet:  (27 May 2022 15:04)  metformin 1,000 mg tablet:  (27 May 2022 15:04)  Novolog Flexpen U-100 Insulin aspart 100 unit/mL (3 mL) subcutaneous:  (27 May 2022 15:04)  Trelegy Ellipta 100 mcg-62.5 mcg-25 mcg powder for inhalation:  (27 May 2022 15:04)        VITALS:  T(F): 98 (22 @ 09:15), Max: 98.2 (22 @ 17:00)  HR: 112 (22 @ 09:15) (92 - 112)  BP: 123/59 (22 @ 09:15) (103/59 - 173/72)  RR: 20 (22 @ 09:15) (19 - 20)  SpO2: 94% (22 @ 09:15) (94% - 97%)    PHYSICAL EXAM:  General: NAD, AAOx3, calm and cooperative  HEENT: NCAT, МАРИНА, EOMI, Tracheostomy in place, functioning.   Cardiac: RRR S1, S2, no Murmurs, rubs or gallops  Respiratory: CTAB, normal respiratory effort, breath sounds equal BL, no wheeze, mild UR congesion  Abdomen: Soft, non-distended, non-tender, no rebound, no guarding. +BS.    MEDICATIONS  (STANDING):  ALPRAZolam 0.25 milliGRAM(s) Oral at bedtime  amLODIPine   Tablet 5 milliGRAM(s) Oral daily  apixaban 5 milliGRAM(s) Oral two times a day  atorvastatin 20 milliGRAM(s) Oral at bedtime  budesonide  80 MICROgram(s)/formoterol 4.5 MICROgram(s) Inhaler 2 Puff(s) Inhalation two times a day  buMETAnide 2 milliGRAM(s) Oral daily  cefepime   IVPB 1000 milliGRAM(s) IV Intermittent every 12 hours  chlorhexidine 0.12% Liquid 15 milliLiter(s) Oral Mucosa every 12 hours  clopidogrel Tablet 75 milliGRAM(s) Oral daily  dexMEDEtomidine Infusion 0.041 MICROgram(s)/kG/Hr (1 mL/Hr) IV Continuous <Continuous>  dextrose 5%. 1000 milliLiter(s) (100 mL/Hr) IV Continuous <Continuous>  dextrose 5%. 1000 milliLiter(s) (50 mL/Hr) IV Continuous <Continuous>  dextrose 50% Injectable 25 Gram(s) IV Push once  dextrose 50% Injectable 12.5 Gram(s) IV Push once  dextrose 50% Injectable 25 Gram(s) IV Push once  gabapentin 300 milliGRAM(s) Oral three times a day  glucagon  Injectable 1 milliGRAM(s) IntraMuscular once  influenza   Vaccine 0.5 milliLiter(s) IntraMuscular once  insulin glargine Injectable (LANTUS) 55 Unit(s) SubCutaneous at bedtime  insulin lispro (ADMELOG) corrective regimen sliding scale   SubCutaneous at bedtime  insulin lispro Injectable (ADMELOG) 25 Unit(s) SubCutaneous three times a day before meals  losartan 100 milliGRAM(s) Oral daily  midazolam Injectable 4 milliGRAM(s) IV Push once  tiotropium 18 MICROgram(s) Capsule 1 Capsule(s) Inhalation daily    MEDICATIONS  (PRN):  ALBUTerol    0.083%. 2.5 milliGRAM(s) Nebulizer once PRN Shortness of Breath and/or Wheezing  dextrose Oral Gel 15 Gram(s) Oral once PRN Blood Glucose LESS THAN 70 milliGRAM(s)/deciliter  oxycodone    5 mG/acetaminophen 325 mG 1 Tablet(s) Oral every 6 hours PRN Severe Pain (7 - 10)      LAB/STUDIES:                        9.6    10.70 )-----------( 229      ( 28 May 2022 05:59 )             29.1         136  |  95<L>  |  8<L>  ----------------------------<  173<H>  4.3   |  29  |  <0.5<L>    Ca    10.2<H>      28 May 2022 05:59  Mg     1.6         TPro  6.5  /  Alb  3.9  /  TBili  0.5  /  DBili  x   /  AST  21  /  ALT  24  /  AlkPhos  100      PT/INR - ( 28 May 2022 05:59 )   PT: 14.30 sec;   INR: 1.25 ratio         PTT - ( 28 May 2022 05:59 )  PTT:34.4 sec  LIVER FUNCTIONS - ( 28 May 2022 05:59 )  Alb: 3.9 g/dL / Pro: 6.5 g/dL / ALK PHOS: 100 U/L / ALT: 24 U/L / AST: 21 U/L / GGT: x           Urinalysis Basic - ( 27 May 2022 11:54 )    Color: Yellow / Appearance: Slightly Turbid / S.018 / pH: x  Gluc: x / Ketone: Negative  / Bili: Negative / Urobili: <2 mg/dL   Blood: x / Protein: 300 mg/dL / Nitrite: Negative   Leuk Esterase: Large / RBC: 10 /HPF / WBC 76 /HPF   Sq Epi: x / Non Sq Epi: 1 /HPF / Bacteria: Many      CARDIAC MARKERS ( 27 May 2022 21:30 )  x     / 0.02 ng/mL / x     / x     / x      CARDIAC MARKERS ( 27 May 2022 16:51 )  x     / 0.03 ng/mL / x     / x     / x          IMAGING:  < from: Xray Chest 1 View- PORTABLE-Routine (Xray Chest 1 View- PORTABLE-Routine in AM.) (22 @ 08:38) >  INTERPRETATION:  Clinical History / Reason for exam: Pneumonia    Comparison : Chest radiograph May 27, 2022.    Technique/Positioning: Frontal.    Findings:    Support devices: Tracheostomy tube in place.    Cardiac/mediastinum/hilum: Mediastinum and left cardiac border obscured.   Thoracic aortic calcification. Mediastinal shift to the left    Lung parenchyma/Pleura: Left hemithoraxcomplete opacification.    Skeleton/soft tissues: Stable.    Impression:    Left hemithorax complete opacification, new.    < end of copied text >     GENERAL SURGERY CONSULT NOTE    Patient: CRUZ DUMONT , 56y (63)Female   MRN: [id number]  Location: [facility]  A  Visit: 22 Inpatient  Date: 22 @ 11:30    HPI: 57 yo f with PMHx of DVT on Eliquis, COPD,  trach collar, obesity, IDDM, UTI, sent by [facility] for hypoxia. Current illness going back to  when pt was intubated on admission at [facility] ICU for hypoxic respiratory failure diagnosed with copd exacerbation and superimposed pneumonia, and remained intubated for 37 days. Patient stayed at [facility] for 2 days until noted to be hypoxic (saturating low 70's) pt's saturation immediately improving afterwards to 80's after large mucus plug removed and was subsequently sent to [facility]    On arrival to ED pt was saturating 97%, 15L O2 via tracheostomy collar (baseline 8 L at [facility]) VS:    /59  T 99.9F RR 20  Admission VBG pH 7.37 pCO2 60 pO2 49. Pt was not wheezing, not coughing. CXR suspicious for L-sided PNA, inconclusive. CTA neg for PE. Pt received x1 IV Levaquin and Cefepime in ED, admitted to medicine for acute hypoxic hypercapnic respiratory failure secondary to acute mucus plug (now resolved vs superimposed pneumonia  hospital-acquired.     General Surgery Consulted emergently when patient found to be hypoxic to low 80s after CCU and Pulmonary team found large granuloma in trachea along tracheostomy tube. Patient currently had a size 8 trach. Surgery assisted Pulmonary and CCU with trach exchanged with ET tube guidance to a size 7 XLT. Saturations improved to high 90s. Large granuloma was removed. Patient was no longer in distress after exchange.    PAST MEDICAL & SURGICAL HISTORY:  COPD (chronic obstructive pulmonary disease)      CHF (congestive heart failure)      DM (diabetes mellitus)      H/O tracheostomy          Home Medications:  albuterol sulfate 2.5 mg/3 mL (0.083 %) solution for nebulization:  (27 May 2022 15:04)  alprazolam 0.25 mg tablet:  (27 May 2022 15:04)  amlodipine 5 mg tablet:  (27 May 2022 15:04)  atorvastatin 20 mg tablet:  (27 May 2022 15:04)  bumetanide 2 mg tablet:  (27 May 2022 15:04)  clopidogrel 75 mg tablet:  (27 May 2022 15:04)  Eliquis 5 mg tablet:  (27 May 2022 15:04)  gabapentin 300 mg capsule:  (27 May 2022 15:04)  glimepiride 2 mg tablet:  (27 May 2022 15:04)  labetalol 100 mg tablet:  (27 May 2022 15:04)  LANTUS SOLOSTAR 100 UNIT/ML: 55 UNITS ONCE A DAY SUBCUTANEOUS 90 DAYS (27 May 2022 15:04)  losartan 100 mg tablet:  (27 May 2022 15:04)  metformin 1,000 mg tablet:  (27 May 2022 15:04)  Novolog Flexpen U-100 Insulin aspart 100 unit/mL (3 mL) subcutaneous:  (27 May 2022 15:04)  Trelegy Ellipta 100 mcg-62.5 mcg-25 mcg powder for inhalation:  (27 May 2022 15:04)        VITALS:  T(F): 98 (22 @ 09:15), Max: 98.2 (22 @ 17:00)  HR: 112 (22 @ 09:15) (92 - 112)  BP: 123/59 (22 @ 09:15) (103/59 - 173/72)  RR: 20 (22 @ 09:15) (19 - 20)  SpO2: 94% (22 @ 09:15) (94% - 97%)    PHYSICAL EXAM:  General: NAD, AAOx3, calm and cooperative  HEENT: NCAT, МАРИНА, EOMI, Tracheostomy in place, functioning.   Cardiac: RRR S1, S2, no Murmurs, rubs or gallops  Respiratory: CTAB, normal respiratory effort, breath sounds equal BL, no wheeze, mild UR congesion  Abdomen: Soft, non-distended, non-tender, no rebound, no guarding. +BS.    MEDICATIONS  (STANDING):  ALPRAZolam 0.25 milliGRAM(s) Oral at bedtime  amLODIPine   Tablet 5 milliGRAM(s) Oral daily  apixaban 5 milliGRAM(s) Oral two times a day  atorvastatin 20 milliGRAM(s) Oral at bedtime  budesonide  80 MICROgram(s)/formoterol 4.5 MICROgram(s) Inhaler 2 Puff(s) Inhalation two times a day  buMETAnide 2 milliGRAM(s) Oral daily  cefepime   IVPB 1000 milliGRAM(s) IV Intermittent every 12 hours  chlorhexidine 0.12% Liquid 15 milliLiter(s) Oral Mucosa every 12 hours  clopidogrel Tablet 75 milliGRAM(s) Oral daily  dexMEDEtomidine Infusion 0.041 MICROgram(s)/kG/Hr (1 mL/Hr) IV Continuous <Continuous>  dextrose 5%. 1000 milliLiter(s) (100 mL/Hr) IV Continuous <Continuous>  dextrose 5%. 1000 milliLiter(s) (50 mL/Hr) IV Continuous <Continuous>  dextrose 50% Injectable 25 Gram(s) IV Push once  dextrose 50% Injectable 12.5 Gram(s) IV Push once  dextrose 50% Injectable 25 Gram(s) IV Push once  gabapentin 300 milliGRAM(s) Oral three times a day  glucagon  Injectable 1 milliGRAM(s) IntraMuscular once  influenza   Vaccine 0.5 milliLiter(s) IntraMuscular once  insulin glargine Injectable (LANTUS) 55 Unit(s) SubCutaneous at bedtime  insulin lispro (ADMELOG) corrective regimen sliding scale   SubCutaneous at bedtime  insulin lispro Injectable (ADMELOG) 25 Unit(s) SubCutaneous three times a day before meals  losartan 100 milliGRAM(s) Oral daily  midazolam Injectable 4 milliGRAM(s) IV Push once  tiotropium 18 MICROgram(s) Capsule 1 Capsule(s) Inhalation daily    MEDICATIONS  (PRN):  ALBUTerol    0.083%. 2.5 milliGRAM(s) Nebulizer once PRN Shortness of Breath and/or Wheezing  dextrose Oral Gel 15 Gram(s) Oral once PRN Blood Glucose LESS THAN 70 milliGRAM(s)/deciliter  oxycodone    5 mG/acetaminophen 325 mG 1 Tablet(s) Oral every 6 hours PRN Severe Pain (7 - 10)      LAB/STUDIES:                        9.6    10.70 )-----------( 229      ( 28 May 2022 05:59 )             29.1         136  |  95<L>  |  8<L>  ----------------------------<  173<H>  4.3   |  29  |  <0.5<L>    Ca    10.2<H>      28 May 2022 05:59  Mg     1.6         TPro  6.5  /  Alb  3.9  /  TBili  0.5  /  DBili  x   /  AST  21  /  ALT  24  /  AlkPhos  100      PT/INR - ( 28 May 2022 05:59 )   PT: 14.30 sec;   INR: 1.25 ratio         PTT - ( 28 May 2022 05:59 )  PTT:34.4 sec  LIVER FUNCTIONS - ( 28 May 2022 05:59 )  Alb: 3.9 g/dL / Pro: 6.5 g/dL / ALK PHOS: 100 U/L / ALT: 24 U/L / AST: 21 U/L / GGT: x           Urinalysis Basic - ( 27 May 2022 11:54 )    Color: Yellow / Appearance: Slightly Turbid / S.018 / pH: x  Gluc: x / Ketone: Negative  / Bili: Negative / Urobili: <2 mg/dL   Blood: x / Protein: 300 mg/dL / Nitrite: Negative   Leuk Esterase: Large / RBC: 10 /HPF / WBC 76 /HPF   Sq Epi: x / Non Sq Epi: 1 /HPF / Bacteria: Many      CARDIAC MARKERS ( 27 May 2022 21:30 )  x     / 0.02 ng/mL / x     / x     / x      CARDIAC MARKERS ( 27 May 2022 16:51 )  x     / 0.03 ng/mL / x     / x     / x          IMAGING:  < from: Xray Chest 1 View- PORTABLE-Routine (Xray Chest 1 View- PORTABLE-Routine in AM.) (22 @ 08:38) >  INTERPRETATION:  Clinical History / Reason for exam: Pneumonia    Comparison : Chest radiograph May 27, 2022.    Technique/Positioning: Frontal.    Findings:    Support devices: Tracheostomy tube in place.    Cardiac/mediastinum/hilum: Mediastinum and left cardiac border obscured.   Thoracic aortic calcification. Mediastinal shift to the left    Lung parenchyma/Pleura: Left hemithoraxcomplete opacification.    Skeleton/soft tissues: Stable.    Impression:    Left hemithorax complete opacification, new.    < end of copied text >     GENERAL SURGERY CONSULT NOTE    Patient: CRUZ DUMONT , 56y (63)Female   MRN: [id number]  Location: [facility] [place]  A  Visit: 22 Inpatient  Date: 22 @ 11:30    HPI: 55 yo f with PMHx of DVT on Eliquis, COPD,  trach collar, obesity, IDDM, UTI, sent by [facility] for hypoxia. Current illness going back to  when pt was intubated on admission at [facility] ICU for hypoxic respiratory failure diagnosed with copd exacerbation and superimposed pneumonia, and remained intubated for 37 days requiring tracheostomy. Patient stayed at [facility] for 2 days until noted to be hypoxic (saturating low 70's) pt's saturation immediately improving afterwards to 80's after large mucus plug removed and was subsequently sent to [facility]    On arrival to ED pt was saturating 97%, 15L O2 via tracheostomy collar (baseline 8 L at [facility]) VS:    /59  T 99.9F RR 20  Admission VBG pH 7.37 pCO2 60 pO2 49. Pt was not wheezing, not coughing. CXR suspicious for L-sided PNA, inconclusive. CTA neg for PE. Pt received x1 IV Levaquin and Cefepime in ED, admitted to medicine for acute hypoxic hypercapnic respiratory failure secondary to acute mucus plug (now resolved vs superimposed pneumonia  hospital-acquired.     General Surgery Consulted emergently when patient found to be hypoxic to low 80s after CCU and Pulmonary team found large granuloma in trachea along tracheostomy tube. Patient currently had a size 8 trach. Surgery assisted Pulmonary and CCU with trach exchanged with ET tube guidance to a size 7 XLT. Saturations improved to high 90s. Large granuloma was removed. Patient was no longer in distress after exchange.    PAST MEDICAL & SURGICAL HISTORY:  COPD (chronic obstructive pulmonary disease)      CHF (congestive heart failure)      DM (diabetes mellitus)      H/O tracheostomy          Home Medications:  albuterol sulfate 2.5 mg/3 mL (0.083 %) solution for nebulization:  (27 May 2022 15:04)  alprazolam 0.25 mg tablet:  (27 May 2022 15:04)  amlodipine 5 mg tablet:  (27 May 2022 15:04)  atorvastatin 20 mg tablet:  (27 May 2022 15:04)  bumetanide 2 mg tablet:  (27 May 2022 15:04)  clopidogrel 75 mg tablet:  (27 May 2022 15:04)  Eliquis 5 mg tablet:  (27 May 2022 15:04)  gabapentin 300 mg capsule:  (27 May 2022 15:04)  glimepiride 2 mg tablet:  (27 May 2022 15:04)  labetalol 100 mg tablet:  (27 May 2022 15:04)  LANTUS SOLOSTAR 100 UNIT/ML: 55 UNITS ONCE A DAY SUBCUTANEOUS 90 DAYS (27 May 2022 15:04)  losartan 100 mg tablet:  (27 May 2022 15:04)  metformin 1,000 mg tablet:  (27 May 2022 15:04)  Novolog Flexpen U-100 Insulin aspart 100 unit/mL (3 mL) subcutaneous:  (27 May 2022 15:04)  Trelegy Ellipta 100 mcg-62.5 mcg-25 mcg powder for inhalation:  (27 May 2022 15:04)        VITALS:  T(F): 98 (22 @ 09:15), Max: 98.2 (22 @ 17:00)  HR: 112 (22 @ 09:15) (92 - 112)  BP: 123/59 (22 @ 09:15) (103/59 - 173/72)  RR: 20 (22 @ 09:15) (19 - 20)  SpO2: 94% (22 @ 09:15) (94% - 97%)    PHYSICAL EXAM:  General: NAD, AAOx3, calm and cooperative  HEENT: NCAT, МАРИНА, EOMI, Tracheostomy in place, functioning.   Cardiac: RRR S1, S2, no Murmurs, rubs or gallops  Respiratory: CTAB, normal respiratory effort, breath sounds equal BL, no wheeze, mild UR congesion  Abdomen: Soft, non-distended, non-tender, no rebound, no guarding. +BS.    MEDICATIONS  (STANDING):  ALPRAZolam 0.25 milliGRAM(s) Oral at bedtime  amLODIPine   Tablet 5 milliGRAM(s) Oral daily  apixaban 5 milliGRAM(s) Oral two times a day  atorvastatin 20 milliGRAM(s) Oral at bedtime  budesonide  80 MICROgram(s)/formoterol 4.5 MICROgram(s) Inhaler 2 Puff(s) Inhalation two times a day  buMETAnide 2 milliGRAM(s) Oral daily  cefepime   IVPB 1000 milliGRAM(s) IV Intermittent every 12 hours  chlorhexidine 0.12% Liquid 15 milliLiter(s) Oral Mucosa every 12 hours  clopidogrel Tablet 75 milliGRAM(s) Oral daily  dexMEDEtomidine Infusion 0.041 MICROgram(s)/kG/Hr (1 mL/Hr) IV Continuous <Continuous>  dextrose 5%. 1000 milliLiter(s) (100 mL/Hr) IV Continuous <Continuous>  dextrose 5%. 1000 milliLiter(s) (50 mL/Hr) IV Continuous <Continuous>  dextrose 50% Injectable 25 Gram(s) IV Push once  dextrose 50% Injectable 12.5 Gram(s) IV Push once  dextrose 50% Injectable 25 Gram(s) IV Push once  gabapentin 300 milliGRAM(s) Oral three times a day  glucagon  Injectable 1 milliGRAM(s) IntraMuscular once  influenza   Vaccine 0.5 milliLiter(s) IntraMuscular once  insulin glargine Injectable (LANTUS) 55 Unit(s) SubCutaneous at bedtime  insulin lispro (ADMELOG) corrective regimen sliding scale   SubCutaneous at bedtime  insulin lispro Injectable (ADMELOG) 25 Unit(s) SubCutaneous three times a day before meals  losartan 100 milliGRAM(s) Oral daily  midazolam Injectable 4 milliGRAM(s) IV Push once  tiotropium 18 MICROgram(s) Capsule 1 Capsule(s) Inhalation daily    MEDICATIONS  (PRN):  ALBUTerol    0.083%. 2.5 milliGRAM(s) Nebulizer once PRN Shortness of Breath and/or Wheezing  dextrose Oral Gel 15 Gram(s) Oral once PRN Blood Glucose LESS THAN 70 milliGRAM(s)/deciliter  oxycodone    5 mG/acetaminophen 325 mG 1 Tablet(s) Oral every 6 hours PRN Severe Pain (7 - 10)      LAB/STUDIES:                        9.6    10.70 )-----------( 229      ( 28 May 2022 05:59 )             29.1         136  |  95<L>  |  8<L>  ----------------------------<  173<H>  4.3   |  29  |  <0.5<L>    Ca    10.2<H>      28 May 2022 05:59  Mg     1.6         TPro  6.5  /  Alb  3.9  /  TBili  0.5  /  DBili  x   /  AST  21  /  ALT  24  /  AlkPhos  100      PT/INR - ( 28 May 2022 05:59 )   PT: 14.30 sec;   INR: 1.25 ratio         PTT - ( 28 May 2022 05:59 )  PTT:34.4 sec  LIVER FUNCTIONS - ( 28 May 2022 05:59 )  Alb: 3.9 g/dL / Pro: 6.5 g/dL / ALK PHOS: 100 U/L / ALT: 24 U/L / AST: 21 U/L / GGT: x           Urinalysis Basic - ( 27 May 2022 11:54 )    Color: Yellow / Appearance: Slightly Turbid / S.018 / pH: x  Gluc: x / Ketone: Negative  / Bili: Negative / Urobili: <2 mg/dL   Blood: x / Protein: 300 mg/dL / Nitrite: Negative   Leuk Esterase: Large / RBC: 10 /HPF / WBC 76 /HPF   Sq Epi: x / Non Sq Epi: 1 /HPF / Bacteria: Many      CARDIAC MARKERS ( 27 May 2022 21:30 )  x     / 0.02 ng/mL / x     / x     / x      CARDIAC MARKERS ( 27 May 2022 16:51 )  x     / 0.03 ng/mL / x     / x     / x          IMAGING:  < from: Xray Chest 1 View- PORTABLE-Routine (Xray Chest 1 View- PORTABLE-Routine in AM.) (22 @ 08:38) >  INTERPRETATION:  Clinical History / Reason for exam: Pneumonia    Comparison : Chest radiograph May 27, 2022.    Technique/Positioning: Frontal.    Findings:    Support devices: Tracheostomy tube in place.    Cardiac/mediastinum/hilum: Mediastinum and left cardiac border obscured.   Thoracic aortic calcification. Mediastinal shift to the left    Lung parenchyma/Pleura: Left hemithoraxcomplete opacification.    Skeleton/soft tissues: Stable.    Impression:    Left hemithorax complete opacification, new.    < end of copied text >

## 2022-05-28 NOTE — CHART NOTE - NSCHARTNOTEFT_GEN_A_CORE
Pt transferred to [facility] Dr Delgado. Case discussed. Care appreciated. Pt for emergency bronchoscopy for Acute Resp Failure / Lung collapse.

## 2022-05-28 NOTE — SWALLOW BEDSIDE ASSESSMENT ADULT - SLP PERTINENT HISTORY OF CURRENT PROBLEM
pt is a 55 y/o F w/ PMHx: DVT, COPD, resp failure s/p recent trach at [facility] (at baseline on 8L via trach collar), obesity, IDDM, UTI, sent by [facility] for hypoxia. History provided by daughter, reports the current illness goes back to April 4th when pt was intubated on admission at [facility] for hypoxic respiratory failure diagnosed w/ COPD exacerbation and superimposed PNA. Pt w/ prolonged intubation, s/p trach ~2 weeks ago and then d/c'ed to NH. Pt admitted for AHRF 2' mucus plug (now resolved s/p suctioning) vs. possible aspiration PNA. Pt w/ suspected chronic HF, course c/b complete whiteout of the left lung with complete atelectasis, pt upgraded to CCU. General Sx c/s emergently when pt found to be hypoxic d/t large granuloma in trachea along tracheostomy tube. Pt previously had a size 8 trach, exchanged w/ size 7 XLT. Large granuloma removed and pt placed on vent.

## 2022-05-28 NOTE — SWALLOW BEDSIDE ASSESSMENT ADULT - SWALLOW EVAL: DIAGNOSIS
PO trials not appropriate at this time 2' decreased respiratory status, pt now requiring mechanical ventilation

## 2022-05-29 LAB
ALBUMIN SERPL ELPH-MCNC: 3.6 G/DL — SIGNIFICANT CHANGE UP (ref 3.5–5.2)
ALP SERPL-CCNC: 90 U/L — SIGNIFICANT CHANGE UP (ref 30–115)
ALT FLD-CCNC: 27 U/L — SIGNIFICANT CHANGE UP (ref 0–41)
ANION GAP SERPL CALC-SCNC: 10 MMOL/L — SIGNIFICANT CHANGE UP (ref 7–14)
AST SERPL-CCNC: 23 U/L — SIGNIFICANT CHANGE UP (ref 0–41)
BILIRUB SERPL-MCNC: 0.6 MG/DL — SIGNIFICANT CHANGE UP (ref 0.2–1.2)
BUN SERPL-MCNC: 11 MG/DL — SIGNIFICANT CHANGE UP (ref 10–20)
CALCIUM SERPL-MCNC: 9.9 MG/DL — SIGNIFICANT CHANGE UP (ref 8.5–10.1)
CHLORIDE SERPL-SCNC: 95 MMOL/L — LOW (ref 98–110)
CO2 SERPL-SCNC: 31 MMOL/L — SIGNIFICANT CHANGE UP (ref 17–32)
CREAT SERPL-MCNC: <0.5 MG/DL — LOW (ref 0.7–1.5)
CULTURE RESULTS: SIGNIFICANT CHANGE UP
EGFR: 124 ML/MIN/1.73M2 — SIGNIFICANT CHANGE UP
GLUCOSE BLDC GLUCOMTR-MCNC: 101 MG/DL — HIGH (ref 70–99)
GLUCOSE BLDC GLUCOMTR-MCNC: 108 MG/DL — HIGH (ref 70–99)
GLUCOSE BLDC GLUCOMTR-MCNC: 114 MG/DL — HIGH (ref 70–99)
GLUCOSE BLDC GLUCOMTR-MCNC: 96 MG/DL — SIGNIFICANT CHANGE UP (ref 70–99)
GLUCOSE SERPL-MCNC: 100 MG/DL — HIGH (ref 70–99)
HCT VFR BLD CALC: 26.7 % — LOW (ref 37–47)
HGB BLD-MCNC: 8.9 G/DL — LOW (ref 12–16)
MCHC RBC-ENTMCNC: 32.7 PG — HIGH (ref 27–31)
MCHC RBC-ENTMCNC: 33.3 G/DL — SIGNIFICANT CHANGE UP (ref 32–37)
MCV RBC AUTO: 98.2 FL — SIGNIFICANT CHANGE UP (ref 81–99)
NRBC # BLD: 0 /100 WBCS — SIGNIFICANT CHANGE UP (ref 0–0)
PLATELET # BLD AUTO: 198 K/UL — SIGNIFICANT CHANGE UP (ref 130–400)
POTASSIUM SERPL-MCNC: 4.2 MMOL/L — SIGNIFICANT CHANGE UP (ref 3.5–5)
POTASSIUM SERPL-SCNC: 4.2 MMOL/L — SIGNIFICANT CHANGE UP (ref 3.5–5)
PROT SERPL-MCNC: 6 G/DL — SIGNIFICANT CHANGE UP (ref 6–8)
RBC # BLD: 2.72 M/UL — LOW (ref 4.2–5.4)
RBC # FLD: 15.3 % — HIGH (ref 11.5–14.5)
SODIUM SERPL-SCNC: 136 MMOL/L — SIGNIFICANT CHANGE UP (ref 135–146)
SPECIMEN SOURCE: SIGNIFICANT CHANGE UP
WBC # BLD: 10.03 K/UL — SIGNIFICANT CHANGE UP (ref 4.8–10.8)
WBC # FLD AUTO: 10.03 K/UL — SIGNIFICANT CHANGE UP (ref 4.8–10.8)

## 2022-05-29 PROCEDURE — 71045 X-RAY EXAM CHEST 1 VIEW: CPT | Mod: 26

## 2022-05-29 PROCEDURE — 99233 SBSQ HOSP IP/OBS HIGH 50: CPT

## 2022-05-29 PROCEDURE — 99231 SBSQ HOSP IP/OBS SF/LOW 25: CPT

## 2022-05-29 RX ORDER — POLYETHYLENE GLYCOL 3350 17 G/17G
17 POWDER, FOR SOLUTION ORAL DAILY
Refills: 0 | Status: DISCONTINUED | OUTPATIENT
Start: 2022-05-29 | End: 2022-06-01

## 2022-05-29 RX ORDER — SENNA PLUS 8.6 MG/1
2 TABLET ORAL AT BEDTIME
Refills: 0 | Status: DISCONTINUED | OUTPATIENT
Start: 2022-05-29 | End: 2022-06-01

## 2022-05-29 RX ORDER — CHLORHEXIDINE GLUCONATE 213 G/1000ML
1 SOLUTION TOPICAL DAILY
Refills: 0 | Status: DISCONTINUED | OUTPATIENT
Start: 2022-05-29 | End: 2022-06-01

## 2022-05-29 RX ORDER — PANTOPRAZOLE SODIUM 20 MG/1
40 TABLET, DELAYED RELEASE ORAL
Refills: 0 | Status: DISCONTINUED | OUTPATIENT
Start: 2022-05-29 | End: 2022-05-29

## 2022-05-29 RX ORDER — ENOXAPARIN SODIUM 100 MG/ML
100 INJECTION SUBCUTANEOUS ONCE
Refills: 0 | Status: COMPLETED | OUTPATIENT
Start: 2022-05-29 | End: 2022-05-29

## 2022-05-29 RX ORDER — PANTOPRAZOLE SODIUM 20 MG/1
40 TABLET, DELAYED RELEASE ORAL DAILY
Refills: 0 | Status: DISCONTINUED | OUTPATIENT
Start: 2022-05-29 | End: 2022-06-01

## 2022-05-29 RX ORDER — ENOXAPARIN SODIUM 100 MG/ML
100 INJECTION SUBCUTANEOUS EVERY 12 HOURS
Refills: 0 | Status: DISCONTINUED | OUTPATIENT
Start: 2022-05-29 | End: 2022-05-29

## 2022-05-29 RX ADMIN — ENOXAPARIN SODIUM 100 MILLIGRAM(S): 100 INJECTION SUBCUTANEOUS at 17:05

## 2022-05-29 RX ADMIN — OXYCODONE AND ACETAMINOPHEN 1 TABLET(S): 5; 325 TABLET ORAL at 05:28

## 2022-05-29 RX ADMIN — APIXABAN 5 MILLIGRAM(S): 2.5 TABLET, FILM COATED ORAL at 05:29

## 2022-05-29 RX ADMIN — PANTOPRAZOLE SODIUM 40 MILLIGRAM(S): 20 TABLET, DELAYED RELEASE ORAL at 07:00

## 2022-05-29 RX ADMIN — CHLORHEXIDINE GLUCONATE 15 MILLILITER(S): 213 SOLUTION TOPICAL at 05:29

## 2022-05-29 RX ADMIN — INSULIN GLARGINE 55 UNIT(S): 100 INJECTION, SOLUTION SUBCUTANEOUS at 22:23

## 2022-05-29 RX ADMIN — ATORVASTATIN CALCIUM 20 MILLIGRAM(S): 80 TABLET, FILM COATED ORAL at 21:09

## 2022-05-29 RX ADMIN — CEFEPIME 100 MILLIGRAM(S): 1 INJECTION, POWDER, FOR SOLUTION INTRAMUSCULAR; INTRAVENOUS at 05:28

## 2022-05-29 RX ADMIN — AMLODIPINE BESYLATE 5 MILLIGRAM(S): 2.5 TABLET ORAL at 08:11

## 2022-05-29 RX ADMIN — OXYCODONE AND ACETAMINOPHEN 1 TABLET(S): 5; 325 TABLET ORAL at 06:19

## 2022-05-29 RX ADMIN — CHLORHEXIDINE GLUCONATE 1 APPLICATION(S): 213 SOLUTION TOPICAL at 12:19

## 2022-05-29 RX ADMIN — OXYCODONE AND ACETAMINOPHEN 1 TABLET(S): 5; 325 TABLET ORAL at 12:48

## 2022-05-29 RX ADMIN — BUMETANIDE 2 MILLIGRAM(S): 0.25 INJECTION INTRAMUSCULAR; INTRAVENOUS at 05:28

## 2022-05-29 RX ADMIN — CLOPIDOGREL BISULFATE 75 MILLIGRAM(S): 75 TABLET, FILM COATED ORAL at 12:18

## 2022-05-29 RX ADMIN — OXYCODONE AND ACETAMINOPHEN 1 TABLET(S): 5; 325 TABLET ORAL at 18:48

## 2022-05-29 RX ADMIN — GABAPENTIN 300 MILLIGRAM(S): 400 CAPSULE ORAL at 21:09

## 2022-05-29 RX ADMIN — SENNA PLUS 2 TABLET(S): 8.6 TABLET ORAL at 21:09

## 2022-05-29 RX ADMIN — Medication 0.25 MILLIGRAM(S): at 21:09

## 2022-05-29 RX ADMIN — CEFEPIME 100 MILLIGRAM(S): 1 INJECTION, POWDER, FOR SOLUTION INTRAMUSCULAR; INTRAVENOUS at 17:05

## 2022-05-29 RX ADMIN — OXYCODONE AND ACETAMINOPHEN 1 TABLET(S): 5; 325 TABLET ORAL at 12:18

## 2022-05-29 RX ADMIN — CHLORHEXIDINE GLUCONATE 15 MILLILITER(S): 213 SOLUTION TOPICAL at 17:05

## 2022-05-29 RX ADMIN — PANTOPRAZOLE SODIUM 40 MILLIGRAM(S): 20 TABLET, DELAYED RELEASE ORAL at 12:19

## 2022-05-29 RX ADMIN — LOSARTAN POTASSIUM 100 MILLIGRAM(S): 100 TABLET, FILM COATED ORAL at 05:29

## 2022-05-29 RX ADMIN — POLYETHYLENE GLYCOL 3350 17 GRAM(S): 17 POWDER, FOR SOLUTION ORAL at 17:06

## 2022-05-29 RX ADMIN — OXYCODONE AND ACETAMINOPHEN 1 TABLET(S): 5; 325 TABLET ORAL at 19:18

## 2022-05-29 RX ADMIN — GABAPENTIN 300 MILLIGRAM(S): 400 CAPSULE ORAL at 05:29

## 2022-05-29 RX ADMIN — GABAPENTIN 300 MILLIGRAM(S): 400 CAPSULE ORAL at 13:46

## 2022-05-29 NOTE — PROGRESS NOTE ADULT - SUBJECTIVE AND OBJECTIVE BOX
GENERAL SURGERY PROGRESS NOTE    Patient: CRUZ DUMONT , 56y (63)Female   MRN: [id number]  Location: [facility] [place]  A  Visit: 22 Inpatient  Date: 22 @ 01:11    Hospital Day #: 3  Post-Op Day #: None    Procedure/Dx/Injuries: 57 yo f with PMHx of DVT on Eliquis, COPD,  trach collar, obesity, IDDM, UTI, sent by [facility] for hypoxia now s/p Trach exchange to 7 XLT alongside Pulmonology    Events of past 24 hours: Patient was seen and evaluated at bedside. Ventilated 40/5 with Dex at 0.3    PAST MEDICAL & SURGICAL HISTORY:  COPD (chronic obstructive pulmonary disease)      CHF (congestive heart failure)      DM (diabetes mellitus)      H/O tracheostomy          Vitals:   T(F): 98.4 (22 @ 20:00), Max: 99.4 (22 @ 16:00)  HR: 67 (22 @ 00:00)  BP: 106/54 (22 @ 00:00)  RR: 15 (22 @ 00:00)  SpO2: 99% (22 @ 00:00)  Mode: AC/ CMV (Assist Control/ Continuous Mandatory Ventilation), RR (machine): 12, TV (machine): 400, FiO2: 40, PEEP: 5, ITime: 1, MAP: 8, PIP: 19    Diet, NPO:   Except Medications    PHYSICAL EXAM:  General: NAD, AAOx3, calm and cooperative  HEENT: NCAT, МАРИНА, EOMI, Tracheostomy in place, functioning.   Cardiac: RRR S1, S2, no Murmurs, rubs or gallops  Respiratory: CTAB, Ventilated 40/5  Abdomen: Soft, non-distended, non-tender, no rebound, no guarding. +BS.    MEDICATIONS  (STANDING):  ALPRAZolam 0.25 milliGRAM(s) Oral at bedtime  amLODIPine   Tablet 5 milliGRAM(s) Oral daily  apixaban 5 milliGRAM(s) Oral two times a day  atorvastatin 20 milliGRAM(s) Oral at bedtime  budesonide  80 MICROgram(s)/formoterol 4.5 MICROgram(s) Inhaler 2 Puff(s) Inhalation two times a day  buMETAnide 2 milliGRAM(s) Oral daily  cefepime   IVPB 1000 milliGRAM(s) IV Intermittent every 12 hours  chlorhexidine 0.12% Liquid 15 milliLiter(s) Oral Mucosa every 12 hours  chlorhexidine 4% Liquid 1 Application(s) Topical daily  clopidogrel Tablet 75 milliGRAM(s) Oral daily  dexMEDEtomidine Infusion 0.2 MICROgram(s)/kG/Hr (4.9 mL/Hr) IV Continuous <Continuous>  dextrose 5%. 1000 milliLiter(s) (50 mL/Hr) IV Continuous <Continuous>  dextrose 5%. 1000 milliLiter(s) (100 mL/Hr) IV Continuous <Continuous>  dextrose 50% Injectable 25 Gram(s) IV Push once  dextrose 50% Injectable 12.5 Gram(s) IV Push once  dextrose 50% Injectable 25 Gram(s) IV Push once  gabapentin 300 milliGRAM(s) Oral three times a day  glucagon  Injectable 1 milliGRAM(s) IntraMuscular once  influenza   Vaccine 0.5 milliLiter(s) IntraMuscular once  insulin glargine Injectable (LANTUS) 55 Unit(s) SubCutaneous at bedtime  insulin lispro (ADMELOG) corrective regimen sliding scale   SubCutaneous at bedtime  insulin lispro Injectable (ADMELOG) 25 Unit(s) SubCutaneous three times a day before meals  losartan 100 milliGRAM(s) Oral daily  tiotropium 18 MICROgram(s) Capsule 1 Capsule(s) Inhalation daily    MEDICATIONS  (PRN):  ALBUTerol    0.083%. 2.5 milliGRAM(s) Nebulizer once PRN Shortness of Breath and/or Wheezing  dextrose Oral Gel 15 Gram(s) Oral once PRN Blood Glucose LESS THAN 70 milliGRAM(s)/deciliter  oxycodone    5 mG/acetaminophen 325 mG 1 Tablet(s) Oral every 6 hours PRN Severe Pain (7 - 10)      DVT PROPHYLAXIS:   GI PROPHYLAXIS:   ANTICOAGULATION:   ANTIBIOTICS:  cefepime   IVPB 1000 milliGRAM(s)            LAB/STUDIES:  Labs:  CAPILLARY BLOOD GLUCOSE      POCT Blood Glucose.: 135 mg/dL (28 May 2022 23:20)  POCT Blood Glucose.: 122 mg/dL (28 May 2022 17:42)  POCT Blood Glucose.: 101 mg/dL (28 May 2022 13:02)  POCT Blood Glucose.: 228 mg/dL (28 May 2022 08:02)                          9.6    10.70 )-----------( 229      ( 28 May 2022 05:59 )             29.1       Auto Neutrophil %: 73.0 % (22 @ 05:59)  Auto Immature Granulocyte %: 2.0 % (22 @ 05:59)        136  |  95<L>  |  8<L>  ----------------------------<  173<H>  4.3   |  29  |  <0.5<L>      Calcium, Total Serum: 10.2 mg/dL (22 @ 05:59)      LFTs:             6.5  | 0.5  | 21       ------------------[100     ( 28 May 2022 05:59 )  3.9  | x    | 24          Lipase:x      Amylase:x         Lactate, Blood: 2.3 mmol/L (22 @ 10:00)  Blood Gas Venous - Lactate: 2.20 mmol/L (22 @ 09:53)      Coags:     14.30  ----< 1.25    ( 28 May 2022 05:59 )     34.4        CARDIAC MARKERS ( 27 May 2022 21:30 )  x     / 0.02 ng/mL / x     / x     / x      CARDIAC MARKERS ( 27 May 2022 16:51 )  x     / 0.03 ng/mL / x     / x     / x          Serum Pro-Brain Natriuretic Peptide: 397 pg/mL (22 @ 10:00)      Urinalysis Basic - ( 27 May 2022 11:54 )    Color: Yellow / Appearance: Slightly Turbid / S.018 / pH: x  Gluc: x / Ketone: Negative  / Bili: Negative / Urobili: <2 mg/dL   Blood: x / Protein: 300 mg/dL / Nitrite: Negative   Leuk Esterase: Large / RBC: 10 /HPF / WBC 76 /HPF   Sq Epi: x / Non Sq Epi: 1 /HPF / Bacteria: Many        Culture - Urine (collected 27 May 2022 11:54)  Source: Catheterized Catheterized  Preliminary Report (29 May 2022 00:40):    >100,000 CFU/ml Gram Negative Rods    IMAGING:  < from: Xray Chest 1 View- PORTABLE-Urgent (Xray Chest 1 View- PORTABLE-Urgent .) (22 @ 20:31) >    Findings:    Support devices: A tracheostomy tube is present. There is an enteric tube   coursing below the diaphragm.    Cardiac/mediastinum/hilum: Partially obscured and not well evaluated    Lung parenchyma/Pleura: Unchanged left opacity    Skeleton/soft tissues: No significant change.    Impression:  Unchanged left opacity    Lines and support devices as described above.    < end of copied text >

## 2022-05-29 NOTE — PROGRESS NOTE ADULT - SUBJECTIVE AND OBJECTIVE BOX
Patient is a 56y old  Female who presents with a chief complaint of     Over Night Events:  Patient seen and examined.   comfortable not on distress s/p bronch yesterday  significant granulation in trach longer tracheostomy was placed     ROS:  See HPI    PHYSICAL EXAM    ICU Vital Signs Last 24 Hrs  T(C): 37.1 (29 May 2022 04:00), Max: 37.4 (28 May 2022 16:00)  T(F): 98.8 (29 May 2022 04:00), Max: 99.4 (28 May 2022 16:00)  HR: 58 (29 May 2022 07:00) (58 - 136)  BP: 108/55 (29 May 2022 07:00) (76/44 - 123/59)  BP(mean): 79 (29 May 2022 07:00) (55 - 81)  ABP: --  ABP(mean): --  RR: 15 (29 May 2022 07:00) (12 - 50)  SpO2: 99% (29 May 2022 07:00) (94% - 100%)      General: Ao  HEENT: trach                 Lymph Nodes: NO cervical LN   Lungs: decrease left   Cardiovascular: Regular   Abdomen: Soft, Positive BS  Extremities: No clubbing   Skin: warm   Neurological: no focal   Musculoskeletal: move all ext     I&O's Detail    28 May 2022 07:01  -  29 May 2022 07:00  --------------------------------------------------------  IN:    Dexmedetomidine: 41.4 mL    Dexmedetomidine: 109.5 mL    IV PiggyBack: 100 mL    Lactated Ringers Bolus: 1500 mL  Total IN: 1750.9 mL    OUT:    Indwelling Catheter - Urethral (mL): 955 mL  Total OUT: 955 mL    Total NET: 795.9 mL          LABS:                          9.6    10.70 )-----------( 229      ( 28 May 2022 05:59 )             29.1         28 May 2022 05:59    136    |  95     |  8      ----------------------------<  173    4.3     |  29     |  <0.5     Ca    10.2       28 May 2022 05:59  Mg     1.6       28 May 2022 05:59                                               PT/INR - ( 28 May 2022 05:59 )   PT: 14.30 sec;   INR: 1.25 ratio         PTT - ( 28 May 2022 05:59 )  PTT:34.4 sec                                       Urinalysis Basic - ( 27 May 2022 11:54 )    Color: Yellow / Appearance: Slightly Turbid / S.018 / pH: x  Gluc: x / Ketone: Negative  / Bili: Negative / Urobili: <2 mg/dL   Blood: x / Protein: 300 mg/dL / Nitrite: Negative   Leuk Esterase: Large / RBC: 10 /HPF / WBC 76 /HPF   Sq Epi: x / Non Sq Epi: 1 /HPF / Bacteria: Many        Lactate, Blood: 2.3 mmol/L (22 @ 10:00)    CARDIAC MARKERS ( 27 May 2022 21:30 )  x     / 0.02 ng/mL / x     / x     / x      CARDIAC MARKERS ( 27 May 2022 16:51 )  x     / 0.03 ng/mL / x     / x     / x                                                            Culture - Urine (collected 27 May 2022 11:54)  Source: Catheterized Catheterized  Preliminary Report (29 May 2022 00:40):    >100,000 CFU/ml Gram Negative Rods                                                   Mode: AC/ CMV (Assist Control/ Continuous Mandatory Ventilation)  RR (machine): 12  TV (machine): 400  FiO2: 40  PEEP: 5  ITime: 1  MAP: 8  PIP: 23                                          MEDICATIONS  (STANDING):  ALPRAZolam 0.25 milliGRAM(s) Oral at bedtime  amLODIPine   Tablet 5 milliGRAM(s) Oral daily  apixaban 5 milliGRAM(s) Oral two times a day  atorvastatin 20 milliGRAM(s) Oral at bedtime  budesonide  80 MICROgram(s)/formoterol 4.5 MICROgram(s) Inhaler 2 Puff(s) Inhalation two times a day  buMETAnide 2 milliGRAM(s) Oral daily  cefepime   IVPB 1000 milliGRAM(s) IV Intermittent every 12 hours  chlorhexidine 0.12% Liquid 15 milliLiter(s) Oral Mucosa every 12 hours  chlorhexidine 4% Liquid 1 Application(s) Topical daily  clopidogrel Tablet 75 milliGRAM(s) Oral daily  dexMEDEtomidine Infusion 0.2 MICROgram(s)/kG/Hr (4.9 mL/Hr) IV Continuous <Continuous>  dextrose 5%. 1000 milliLiter(s) (100 mL/Hr) IV Continuous <Continuous>  dextrose 5%. 1000 milliLiter(s) (50 mL/Hr) IV Continuous <Continuous>  dextrose 50% Injectable 25 Gram(s) IV Push once  dextrose 50% Injectable 12.5 Gram(s) IV Push once  dextrose 50% Injectable 25 Gram(s) IV Push once  gabapentin 300 milliGRAM(s) Oral three times a day  glucagon  Injectable 1 milliGRAM(s) IntraMuscular once  influenza   Vaccine 0.5 milliLiter(s) IntraMuscular once  insulin glargine Injectable (LANTUS) 55 Unit(s) SubCutaneous at bedtime  insulin lispro (ADMELOG) corrective regimen sliding scale   SubCutaneous at bedtime  insulin lispro Injectable (ADMELOG) 25 Unit(s) SubCutaneous three times a day before meals  losartan 100 milliGRAM(s) Oral daily  pantoprazole  Injectable 40 milliGRAM(s) IV Push daily  tiotropium 18 MICROgram(s) Capsule 1 Capsule(s) Inhalation daily    MEDICATIONS  (PRN):  ALBUTerol    0.083%. 2.5 milliGRAM(s) Nebulizer once PRN Shortness of Breath and/or Wheezing  dextrose Oral Gel 15 Gram(s) Oral once PRN Blood Glucose LESS THAN 70 milliGRAM(s)/deciliter  oxycodone    5 mG/acetaminophen 325 mG 1 Tablet(s) Oral every 6 hours PRN Severe Pain (7 - 10)          Xrays:  TLC:  OG:  ET tube:                                                                                    left opacity    ECHO:  CAM ICU:

## 2022-05-29 NOTE — PROGRESS NOTE ADULT - SUBJECTIVE AND OBJECTIVE BOX
PATIENT:  CRUZ DUMONT  [id number]    CHIEF COMPLAINT:  Patient is a 56y old  Female who presents with a chief complaint of     INTERVAL HISTORYOVERNIGHT EVENTS:  None    MEDICATIONS:  MEDICATIONS  (STANDING):  ALPRAZolam 0.25 milliGRAM(s) Oral at bedtime  amLODIPine   Tablet 5 milliGRAM(s) Oral daily  apixaban 5 milliGRAM(s) Oral two times a day  atorvastatin 20 milliGRAM(s) Oral at bedtime  budesonide  80 MICROgram(s)/formoterol 4.5 MICROgram(s) Inhaler 2 Puff(s) Inhalation two times a day  buMETAnide 2 milliGRAM(s) Oral daily  cefepime   IVPB 1000 milliGRAM(s) IV Intermittent every 12 hours  chlorhexidine 0.12% Liquid 15 milliLiter(s) Oral Mucosa every 12 hours  chlorhexidine 4% Liquid 1 Application(s) Topical daily  clopidogrel Tablet 75 milliGRAM(s) Oral daily  dexMEDEtomidine Infusion 0.2 MICROgram(s)/kG/Hr (4.9 mL/Hr) IV Continuous <Continuous>  dextrose 5%. 1000 milliLiter(s) (100 mL/Hr) IV Continuous <Continuous>  dextrose 5%. 1000 milliLiter(s) (50 mL/Hr) IV Continuous <Continuous>  dextrose 50% Injectable 25 Gram(s) IV Push once  dextrose 50% Injectable 12.5 Gram(s) IV Push once  dextrose 50% Injectable 25 Gram(s) IV Push once  gabapentin 300 milliGRAM(s) Oral three times a day  glucagon  Injectable 1 milliGRAM(s) IntraMuscular once  influenza   Vaccine 0.5 milliLiter(s) IntraMuscular once  insulin glargine Injectable (LANTUS) 55 Unit(s) SubCutaneous at bedtime  insulin lispro (ADMELOG) corrective regimen sliding scale   SubCutaneous at bedtime  insulin lispro Injectable (ADMELOG) 25 Unit(s) SubCutaneous three times a day before meals  losartan 100 milliGRAM(s) Oral daily  tiotropium 18 MICROgram(s) Capsule 1 Capsule(s) Inhalation daily    MEDICATIONS  (PRN):  ALBUTerol    0.083%. 2.5 milliGRAM(s) Nebulizer once PRN Shortness of Breath and/or Wheezing  dextrose Oral Gel 15 Gram(s) Oral once PRN Blood Glucose LESS THAN 70 milliGRAM(s)/deciliter  oxycodone    5 mG/acetaminophen 325 mG 1 Tablet(s) Oral every 6 hours PRN Severe Pain (7 - 10)      ALLERGIES:  Allergies    Allergy Status Unknown    Intolerances        OBJECTIVE:  ICU Vital Signs Last 24 Hrs  T(C): 37.1 (29 May 2022 04:00), Max: 37.4 (28 May 2022 16:00)  T(F): 98.8 (29 May 2022 04:00), Max: 99.4 (28 May 2022 16:00)  HR: 64 (29 May 2022 04:00) (64 - 136)  BP: 109/53 (29 May 2022 04:00) (76/44 - 173/72)  BP(mean): 76 (29 May 2022 04:00) (55 - 81)  ABP: --  ABP(mean): --  RR: 15 (29 May 2022 04:00) (12 - 50)  SpO2: 99% (29 May 2022 04:00) (94% - 100%)    Mode: AC/ CMV (Assist Control/ Continuous Mandatory Ventilation)  RR (machine): 12  TV (machine): 400  FiO2: 40  PEEP: 5  ITime: 1  MAP: 8  PIP: 19    Adult Advanced Hemodynamics Last 24 Hrs  CVP(mm Hg): --  CVP(cm H2O): --  CO: --  CI: --  PA: --  PA(mean): --  PCWP: --  SVR: --  SVRI: --  PVR: --  PVRI: --  CAPILLARY BLOOD GLUCOSE      POCT Blood Glucose.: 135 mg/dL (28 May 2022 23:20)  POCT Blood Glucose.: 122 mg/dL (28 May 2022 17:42)  POCT Blood Glucose.: 101 mg/dL (28 May 2022 13:02)  POCT Blood Glucose.: 228 mg/dL (28 May 2022 08:02)    CAPILLARY BLOOD GLUCOSE      POCT Blood Glucose.: 135 mg/dL (28 May 2022 23:20)    I&O's Summary    28 May 2022 07:01  -  29 May 2022 04:38  --------------------------------------------------------  IN: 1736.3 mL / OUT: 745 mL / NET: 991.3 mL      Daily Height in cm: 154.94 (28 May 2022 10:15)    Daily     General: NAD, AAOx3, calm and cooperative  HEENT: NCAT, МАРИНА, EOMI, Tracheostomy in place, functioning.   Cardiac: RRR S1, S2, no Murmurs, rubs or gallops  Respiratory: CTAB, normal respiratory effort, breath sounds equal BL, no wheeze, mild UR congesion  Abdomen: Soft, non-distended, non-tender, no rebound, no guarding. +BS.  LABS:                          9.6    10.70 )-----------( 229      ( 28 May 2022 05:59 )             29.1         136  |  95<L>  |  8<L>  ----------------------------<  173<H>  4.3   |  29  |  <0.5<L>    Ca    10.2<H>      28 May 2022 05:59  Mg     1.6         TPro  6.5  /  Alb  3.9  /  TBili  0.5  /  DBili  x   /  AST  21  /  ALT  24  /  AlkPhos  100      LIVER FUNCTIONS - ( 28 May 2022 05:59 )  Alb: 3.9 g/dL / Pro: 6.5 g/dL / ALK PHOS: 100 U/L / ALT: 24 U/L / AST: 21 U/L / GGT: x           PT/INR - ( 28 May 2022 05:59 )   PT: 14.30 sec;   INR: 1.25 ratio         PTT - ( 28 May 2022 05:59 )  PTT:34.4 sec    CARDIAC MARKERS ( 27 May 2022 21:30 )  x     / 0.02 ng/mL / x     / x     / x      CARDIAC MARKERS ( 27 May 2022 16:51 )  x     / 0.03 ng/mL / x     / x     / x          Urinalysis Basic - ( 27 May 2022 11:54 )    Color: Yellow / Appearance: Slightly Turbid / S.018 / pH: x  Gluc: x / Ketone: Negative  / Bili: Negative / Urobili: <2 mg/dL   Blood: x / Protein: 300 mg/dL / Nitrite: Negative   Leuk Esterase: Large / RBC: 10 /HPF / WBC 76 /HPF   Sq Epi: x / Non Sq Epi: 1 /HPF / Bacteria: Many      IMAGING:      < from: Xray Chest 1 View- PORTABLE-Urgent (Xray Chest 1 View- PORTABLE-Urgent .) (22 @ 20:31) >  Impression:  Unchanged left opacity    Lines and support devices as described above.        --- End of Report ---        < end of copied text >

## 2022-05-29 NOTE — PROGRESS NOTE ADULT - ASSESSMENT
Impression:  Acute/ chronic COPD with exacerbation  chronic respiratory failure  mucous plug removed  pneumonia v, atelectasis L base  currently No Impending respiratory failure  hx DVT     SUGGEST:   repeat cxr today not done yet   frequent suction with saline   follow ct surgery   hold eliquis she will need bronch and debridement of the granulation   start lovenox tonight hold in am to reasses for any procedure   Check O2 sat on NC, record, continue O2 as necessary to maintain sats > 90%  Continue IV solumedrol  bronchodilator treatments ATC and PRN  complete course of antibiotics  follow urine   bld cx   sputum gm stain cs   procal   OOB to chair  GI and DVT prophylaxis  pulmonary toilet  keep same vent setting frequent suction  follow h/h   follow lytes   on plavix

## 2022-05-29 NOTE — PHYSICAL THERAPY INITIAL EVALUATION ADULT - LEVEL OF INDEPENDENCE: SIT/STAND, REHAB EVAL
per dtr, pt sat up at EOB for the 1st time in 7 wks, will hold sit to stand at this time/unable to perform

## 2022-05-29 NOTE — PROGRESS NOTE ADULT - ATTENDING COMMENTS
Patient seen and examined on morning rounds as described above    We had seen the patient in detail as described yesterday when she required an urgent assistance with establishment of her airway    She underwent a bronchoscopy with clearing of her secretions and her total toilet bronchoscopy by the pulmonary service.    Today's x-ray shows increased aeration in the left lung which was previously a complete whiteout.    She is being managed by the pulmonary service and the intensive care physicians.    She will require extensive pulmonary toileting and chest physiotherapy and we will consider a possible bronchoscopy.

## 2022-05-29 NOTE — PHYSICAL THERAPY INITIAL EVALUATION ADULT - GENERAL OBSERVATIONS, REHAB EVAL
[id number] pm. 57 y/o F rec'd/left in bed, nad, + trach/vent, tele, dtr present. pt is A+Ox4, pleasant and very motivated. pt sat up at EOB with max A (per dtr, for the 1st time in ~ 7 weeks) and was able to flex/extend knees. c/o mild lightheadedness in upright position. presents with bilat foot drop. vitals: 100/54 59 99% on vent. pt will benefit from yellow thera band ther ex, bed in chair position, bracing/AFO's for bilat ankle position.

## 2022-05-29 NOTE — PROGRESS NOTE ADULT - ASSESSMENT
57 yo f with PMHx of DVT on Eliquis, COPD,  trach collar, obesity, IDDM, UTI, [facility] for hypoxia. Current illness going back to April 4th when pt was intubated on admission at [facility] ICU for hypoxic respiratory failure diagnosed with copd exacerbation and superimposed pneumonia, and remained intubated for 37 days requiring tracheostomy. Patient stayed at [facility] for 2 days until noted to be hypoxic (saturating low 70's) pt's saturation immediately improving afterwards to 80's after large mucus plug removed and was subsequently sent to [facility]  On arrival to ED pt was saturating 97%, 15L O2 via tracheostomy collar (baseline 8 L at [facility])CXR suspicious for L-sided PNA, inconclusive. CTA neg for PE. Pt received x1 IV Levaquin and Cefepime in ED, admitted to MICU for acute hypoxic hypercapnic respiratory failure secondary to acute mucus plug now resolved vs superimposed pneumonia hospital-acquired.   General Surgery Consulted emergently when patient found to be hypoxic to low 80s after CCU and Pulmonary team found large granuloma in trachea along tracheostomy tube. Patient currently had a size 8 trach. Surgery assisted Pulmonary and CCU with trach exchanged with ET tube guidance to a size 7 XLT. Saturations improved to high 90s. Large granuloma was removed. Patient was no longer in distress after exchange.    #Acute/ chronic COPD with exacerbation  #chronic respiratory failure  #mucous plug removed  #pneumonia v, atelectasis L base  #currently No Impending respiratory failure        Check O2 sat on NC, record, continue O2 as necessary to maintain sats > 90%  Continue IV solumedrol  bronchodilator treatments ATC and PRN  complete course of antibiotics Cefepime  sputum gm stain cs   procal 0,18 5/27  f/u sx Surgery for possible bronchoscopy with debridement of the trachea should this be required in the future.    Activity OOB to chair  GI PPX  PPI  DVT PPX ELIQUIS

## 2022-05-29 NOTE — PROGRESS NOTE ADULT - ASSESSMENT
ASSESSMENT:  55 yo f with PMHx of DVT on Eliquis, COPD,  trach collar, obesity, IDDM, UTI, sent by [facility] for hypoxia now s/p Trach exchange to 7 XLT alongside Pulmonology    PLAN:  - Care per CCU  - Monitor Vent settings  - Surgery to follow for possible bronchoscopy with debridement of the trachea should this be required in the future.  - Pain Management  - Strict Ins and Out  - K>4, MG>2, Phos>3    GREEN TEAM SPECTRA: [id number]

## 2022-05-30 LAB
ALBUMIN SERPL ELPH-MCNC: 3.6 G/DL — SIGNIFICANT CHANGE UP (ref 3.5–5.2)
ALP SERPL-CCNC: 83 U/L — SIGNIFICANT CHANGE UP (ref 30–115)
ALT FLD-CCNC: 26 U/L — SIGNIFICANT CHANGE UP (ref 0–41)
ANION GAP SERPL CALC-SCNC: 11 MMOL/L — SIGNIFICANT CHANGE UP (ref 7–14)
AST SERPL-CCNC: 22 U/L — SIGNIFICANT CHANGE UP (ref 0–41)
BASOPHILS # BLD AUTO: 0.06 K/UL — SIGNIFICANT CHANGE UP (ref 0–0.2)
BASOPHILS NFR BLD AUTO: 0.7 % — SIGNIFICANT CHANGE UP (ref 0–1)
BILIRUB SERPL-MCNC: 0.6 MG/DL — SIGNIFICANT CHANGE UP (ref 0.2–1.2)
BUN SERPL-MCNC: 11 MG/DL — SIGNIFICANT CHANGE UP (ref 10–20)
CALCIUM SERPL-MCNC: 10.1 MG/DL — SIGNIFICANT CHANGE UP (ref 8.5–10.1)
CHLORIDE SERPL-SCNC: 94 MMOL/L — LOW (ref 98–110)
CO2 SERPL-SCNC: 30 MMOL/L — SIGNIFICANT CHANGE UP (ref 17–32)
CREAT SERPL-MCNC: <0.5 MG/DL — LOW (ref 0.7–1.5)
CULTURE RESULTS: SIGNIFICANT CHANGE UP
EGFR: 124 ML/MIN/1.73M2 — SIGNIFICANT CHANGE UP
EOSINOPHIL # BLD AUTO: 0.31 K/UL — SIGNIFICANT CHANGE UP (ref 0–0.7)
EOSINOPHIL NFR BLD AUTO: 3.5 % — SIGNIFICANT CHANGE UP (ref 0–8)
GAS PNL BLDA: SIGNIFICANT CHANGE UP
GLUCOSE BLDC GLUCOMTR-MCNC: 103 MG/DL — HIGH (ref 70–99)
GLUCOSE BLDC GLUCOMTR-MCNC: 104 MG/DL — HIGH (ref 70–99)
GLUCOSE BLDC GLUCOMTR-MCNC: 69 MG/DL — LOW (ref 70–99)
GLUCOSE BLDC GLUCOMTR-MCNC: 93 MG/DL — SIGNIFICANT CHANGE UP (ref 70–99)
GLUCOSE SERPL-MCNC: 88 MG/DL — SIGNIFICANT CHANGE UP (ref 70–99)
HCT VFR BLD CALC: 25.6 % — LOW (ref 37–47)
HGB BLD-MCNC: 8.6 G/DL — LOW (ref 12–16)
IMM GRANULOCYTES NFR BLD AUTO: 1.7 % — HIGH (ref 0.1–0.3)
LYMPHOCYTES # BLD AUTO: 1.96 K/UL — SIGNIFICANT CHANGE UP (ref 1.2–3.4)
LYMPHOCYTES # BLD AUTO: 22.1 % — SIGNIFICANT CHANGE UP (ref 20.5–51.1)
MAGNESIUM SERPL-MCNC: 1.6 MG/DL — LOW (ref 1.8–2.4)
MCHC RBC-ENTMCNC: 32.7 PG — HIGH (ref 27–31)
MCHC RBC-ENTMCNC: 33.6 G/DL — SIGNIFICANT CHANGE UP (ref 32–37)
MCV RBC AUTO: 97.3 FL — SIGNIFICANT CHANGE UP (ref 81–99)
MONOCYTES # BLD AUTO: 0.76 K/UL — HIGH (ref 0.1–0.6)
MONOCYTES NFR BLD AUTO: 8.6 % — SIGNIFICANT CHANGE UP (ref 1.7–9.3)
NEUTROPHILS # BLD AUTO: 5.62 K/UL — SIGNIFICANT CHANGE UP (ref 1.4–6.5)
NEUTROPHILS NFR BLD AUTO: 63.4 % — SIGNIFICANT CHANGE UP (ref 42.2–75.2)
NRBC # BLD: 0 /100 WBCS — SIGNIFICANT CHANGE UP (ref 0–0)
PHOSPHATE SERPL-MCNC: 4.7 MG/DL — SIGNIFICANT CHANGE UP (ref 2.1–4.9)
PLATELET # BLD AUTO: 192 K/UL — SIGNIFICANT CHANGE UP (ref 130–400)
POTASSIUM SERPL-MCNC: 4 MMOL/L — SIGNIFICANT CHANGE UP (ref 3.5–5)
POTASSIUM SERPL-SCNC: 4 MMOL/L — SIGNIFICANT CHANGE UP (ref 3.5–5)
PROT SERPL-MCNC: 5.7 G/DL — LOW (ref 6–8)
RBC # BLD: 2.63 M/UL — LOW (ref 4.2–5.4)
RBC # FLD: 15.2 % — HIGH (ref 11.5–14.5)
SODIUM SERPL-SCNC: 135 MMOL/L — SIGNIFICANT CHANGE UP (ref 135–146)
SPECIMEN SOURCE: SIGNIFICANT CHANGE UP
WBC # BLD: 8.86 K/UL — SIGNIFICANT CHANGE UP (ref 4.8–10.8)
WBC # FLD AUTO: 8.86 K/UL — SIGNIFICANT CHANGE UP (ref 4.8–10.8)

## 2022-05-30 PROCEDURE — 71045 X-RAY EXAM CHEST 1 VIEW: CPT | Mod: 26

## 2022-05-30 PROCEDURE — 99231 SBSQ HOSP IP/OBS SF/LOW 25: CPT

## 2022-05-30 PROCEDURE — 99233 SBSQ HOSP IP/OBS HIGH 50: CPT

## 2022-05-30 RX ORDER — SOD,AMMONIUM,POTASSIUM LACTATE
1 CREAM (GRAM) TOPICAL EVERY 12 HOURS
Refills: 0 | Status: DISCONTINUED | OUTPATIENT
Start: 2022-05-30 | End: 2022-06-01

## 2022-05-30 RX ORDER — ACETAMINOPHEN 500 MG
650 TABLET ORAL EVERY 6 HOURS
Refills: 0 | Status: DISCONTINUED | OUTPATIENT
Start: 2022-05-30 | End: 2022-06-01

## 2022-05-30 RX ORDER — DEXTROSE 50 % IN WATER 50 %
25 SYRINGE (ML) INTRAVENOUS ONCE
Refills: 0 | Status: COMPLETED | OUTPATIENT
Start: 2022-05-30 | End: 2022-05-30

## 2022-05-30 RX ORDER — ENOXAPARIN SODIUM 100 MG/ML
100 INJECTION SUBCUTANEOUS EVERY 12 HOURS
Refills: 0 | Status: DISCONTINUED | OUTPATIENT
Start: 2022-05-30 | End: 2022-05-31

## 2022-05-30 RX ORDER — KETOROLAC TROMETHAMINE 30 MG/ML
15 SYRINGE (ML) INJECTION ONCE
Refills: 0 | Status: DISCONTINUED | OUTPATIENT
Start: 2022-05-30 | End: 2022-05-30

## 2022-05-30 RX ORDER — INSULIN GLARGINE 100 [IU]/ML
15 INJECTION, SOLUTION SUBCUTANEOUS AT BEDTIME
Refills: 0 | Status: DISCONTINUED | OUTPATIENT
Start: 2022-05-30 | End: 2022-06-01

## 2022-05-30 RX ORDER — MAGNESIUM SULFATE 500 MG/ML
2 VIAL (ML) INJECTION ONCE
Refills: 0 | Status: COMPLETED | OUTPATIENT
Start: 2022-05-30 | End: 2022-05-30

## 2022-05-30 RX ORDER — MORPHINE SULFATE 50 MG/1
2 CAPSULE, EXTENDED RELEASE ORAL EVERY 4 HOURS
Refills: 0 | Status: DISCONTINUED | OUTPATIENT
Start: 2022-05-30 | End: 2022-06-01

## 2022-05-30 RX ORDER — ENOXAPARIN SODIUM 100 MG/ML
100 INJECTION SUBCUTANEOUS EVERY 12 HOURS
Refills: 0 | Status: DISCONTINUED | OUTPATIENT
Start: 2022-05-30 | End: 2022-05-30

## 2022-05-30 RX ORDER — OXYCODONE HYDROCHLORIDE 5 MG/1
5 TABLET ORAL EVERY 4 HOURS
Refills: 0 | Status: DISCONTINUED | OUTPATIENT
Start: 2022-05-30 | End: 2022-06-01

## 2022-05-30 RX ORDER — MORPHINE SULFATE 50 MG/1
2 CAPSULE, EXTENDED RELEASE ORAL ONCE
Refills: 0 | Status: DISCONTINUED | OUTPATIENT
Start: 2022-05-30 | End: 2022-05-30

## 2022-05-30 RX ORDER — DEXTROSE 50 % IN WATER 50 %
12.5 SYRINGE (ML) INTRAVENOUS ONCE
Refills: 0 | Status: COMPLETED | OUTPATIENT
Start: 2022-05-30 | End: 2022-05-30

## 2022-05-30 RX ORDER — MORPHINE SULFATE 50 MG/1
2 CAPSULE, EXTENDED RELEASE ORAL EVERY 6 HOURS
Refills: 0 | Status: DISCONTINUED | OUTPATIENT
Start: 2022-05-30 | End: 2022-05-30

## 2022-05-30 RX ADMIN — GABAPENTIN 300 MILLIGRAM(S): 400 CAPSULE ORAL at 05:09

## 2022-05-30 RX ADMIN — Medication 15 MILLIGRAM(S): at 04:15

## 2022-05-30 RX ADMIN — MORPHINE SULFATE 2 MILLIGRAM(S): 50 CAPSULE, EXTENDED RELEASE ORAL at 08:15

## 2022-05-30 RX ADMIN — PANTOPRAZOLE SODIUM 40 MILLIGRAM(S): 20 TABLET, DELAYED RELEASE ORAL at 11:51

## 2022-05-30 RX ADMIN — ENOXAPARIN SODIUM 100 MILLIGRAM(S): 100 INJECTION SUBCUTANEOUS at 08:15

## 2022-05-30 RX ADMIN — Medication 1 APPLICATION(S): at 18:06

## 2022-05-30 RX ADMIN — Medication 25 GRAM(S): at 19:34

## 2022-05-30 RX ADMIN — Medication 25 GRAM(S): at 10:46

## 2022-05-30 RX ADMIN — GABAPENTIN 300 MILLIGRAM(S): 400 CAPSULE ORAL at 16:10

## 2022-05-30 RX ADMIN — OXYCODONE HYDROCHLORIDE 5 MILLIGRAM(S): 5 TABLET ORAL at 16:11

## 2022-05-30 RX ADMIN — CHLORHEXIDINE GLUCONATE 1 APPLICATION(S): 213 SOLUTION TOPICAL at 05:08

## 2022-05-30 RX ADMIN — OXYCODONE HYDROCHLORIDE 5 MILLIGRAM(S): 5 TABLET ORAL at 16:40

## 2022-05-30 RX ADMIN — ATORVASTATIN CALCIUM 20 MILLIGRAM(S): 80 TABLET, FILM COATED ORAL at 22:08

## 2022-05-30 RX ADMIN — MORPHINE SULFATE 2 MILLIGRAM(S): 50 CAPSULE, EXTENDED RELEASE ORAL at 13:45

## 2022-05-30 RX ADMIN — MORPHINE SULFATE 2 MILLIGRAM(S): 50 CAPSULE, EXTENDED RELEASE ORAL at 04:15

## 2022-05-30 RX ADMIN — OXYCODONE AND ACETAMINOPHEN 1 TABLET(S): 5; 325 TABLET ORAL at 07:30

## 2022-05-30 RX ADMIN — AMLODIPINE BESYLATE 5 MILLIGRAM(S): 2.5 TABLET ORAL at 05:09

## 2022-05-30 RX ADMIN — BUMETANIDE 2 MILLIGRAM(S): 0.25 INJECTION INTRAMUSCULAR; INTRAVENOUS at 05:09

## 2022-05-30 RX ADMIN — CLOPIDOGREL BISULFATE 75 MILLIGRAM(S): 75 TABLET, FILM COATED ORAL at 11:50

## 2022-05-30 RX ADMIN — OXYCODONE AND ACETAMINOPHEN 1 TABLET(S): 5; 325 TABLET ORAL at 07:04

## 2022-05-30 RX ADMIN — MORPHINE SULFATE 2 MILLIGRAM(S): 50 CAPSULE, EXTENDED RELEASE ORAL at 14:00

## 2022-05-30 RX ADMIN — POLYETHYLENE GLYCOL 3350 17 GRAM(S): 17 POWDER, FOR SOLUTION ORAL at 11:52

## 2022-05-30 RX ADMIN — OXYCODONE HYDROCHLORIDE 5 MILLIGRAM(S): 5 TABLET ORAL at 20:17

## 2022-05-30 RX ADMIN — OXYCODONE AND ACETAMINOPHEN 1 TABLET(S): 5; 325 TABLET ORAL at 01:18

## 2022-05-30 RX ADMIN — Medication 0.25 MILLIGRAM(S): at 22:08

## 2022-05-30 RX ADMIN — Medication 1 APPLICATION(S): at 08:11

## 2022-05-30 RX ADMIN — INSULIN GLARGINE 15 UNIT(S): 100 INJECTION, SOLUTION SUBCUTANEOUS at 22:17

## 2022-05-30 RX ADMIN — LOSARTAN POTASSIUM 100 MILLIGRAM(S): 100 TABLET, FILM COATED ORAL at 05:09

## 2022-05-30 RX ADMIN — CHLORHEXIDINE GLUCONATE 15 MILLILITER(S): 213 SOLUTION TOPICAL at 05:09

## 2022-05-30 RX ADMIN — CEFEPIME 100 MILLIGRAM(S): 1 INJECTION, POWDER, FOR SOLUTION INTRAMUSCULAR; INTRAVENOUS at 18:07

## 2022-05-30 RX ADMIN — OXYCODONE AND ACETAMINOPHEN 1 TABLET(S): 5; 325 TABLET ORAL at 00:48

## 2022-05-30 RX ADMIN — MORPHINE SULFATE 2 MILLIGRAM(S): 50 CAPSULE, EXTENDED RELEASE ORAL at 08:11

## 2022-05-30 RX ADMIN — Medication 15 MILLIGRAM(S): at 04:44

## 2022-05-30 RX ADMIN — MORPHINE SULFATE 2 MILLIGRAM(S): 50 CAPSULE, EXTENDED RELEASE ORAL at 04:44

## 2022-05-30 RX ADMIN — MORPHINE SULFATE 2 MILLIGRAM(S): 50 CAPSULE, EXTENDED RELEASE ORAL at 18:15

## 2022-05-30 RX ADMIN — MORPHINE SULFATE 2 MILLIGRAM(S): 50 CAPSULE, EXTENDED RELEASE ORAL at 18:07

## 2022-05-30 RX ADMIN — OXYCODONE HYDROCHLORIDE 5 MILLIGRAM(S): 5 TABLET ORAL at 10:46

## 2022-05-30 RX ADMIN — CHLORHEXIDINE GLUCONATE 15 MILLILITER(S): 213 SOLUTION TOPICAL at 18:09

## 2022-05-30 RX ADMIN — GABAPENTIN 300 MILLIGRAM(S): 400 CAPSULE ORAL at 22:08

## 2022-05-30 RX ADMIN — MORPHINE SULFATE 2 MILLIGRAM(S): 50 CAPSULE, EXTENDED RELEASE ORAL at 22:38

## 2022-05-30 RX ADMIN — CEFEPIME 100 MILLIGRAM(S): 1 INJECTION, POWDER, FOR SOLUTION INTRAMUSCULAR; INTRAVENOUS at 05:08

## 2022-05-30 RX ADMIN — OXYCODONE HYDROCHLORIDE 5 MILLIGRAM(S): 5 TABLET ORAL at 20:47

## 2022-05-30 RX ADMIN — SENNA PLUS 2 TABLET(S): 8.6 TABLET ORAL at 22:08

## 2022-05-30 RX ADMIN — OXYCODONE HYDROCHLORIDE 5 MILLIGRAM(S): 5 TABLET ORAL at 10:30

## 2022-05-30 RX ADMIN — ENOXAPARIN SODIUM 100 MILLIGRAM(S): 100 INJECTION SUBCUTANEOUS at 18:09

## 2022-05-30 RX ADMIN — DEXMEDETOMIDINE HYDROCHLORIDE IN 0.9% SODIUM CHLORIDE 4.9 MICROGRAM(S)/KG/HR: 4 INJECTION INTRAVENOUS at 04:15

## 2022-05-30 RX ADMIN — MORPHINE SULFATE 2 MILLIGRAM(S): 50 CAPSULE, EXTENDED RELEASE ORAL at 22:08

## 2022-05-30 NOTE — PROGRESS NOTE ADULT - ASSESSMENT
57 yo f with PMHx of DVT on Eliquis, COPD,  trach collar, obesity, IDDM, UTI, [facility] for hypoxia. Current illness going back to April 4th when pt was intubated on admission at [facility] ICU for hypoxic respiratory failure diagnosed with copd exacerbation and superimposed pneumonia, and remained intubated for 37 days requiring tracheostomy. Patient stayed at [facility] for 2 days until noted to be hypoxic (saturating low 70's) pt's saturation immediately improving afterwards to 80's after large mucus plug removed and was subsequently sent to [facility]  On arrival to ED pt was saturating 97%, 15L O2 via tracheostomy collar (baseline 8 L at [facility])CXR suspicious for L-sided PNA, inconclusive. CTA neg for PE. Pt received x1 IV Levaquin and Cefepime in ED, admitted to MICU for acute hypoxic hypercapnic respiratory failure secondary to acute mucus plug now resolved vs superimposed pneumonia hospital-acquired.   General Surgery Consulted emergently when patient found to be hypoxic to low 80s after CCU and Pulmonary team found large granuloma in trachea along tracheostomy tube. Patient currently had a size 8 trach. Surgery assisted Pulmonary and CCU with trach exchanged with ET tube guidance to a size 7 XLT. Saturations improved to high 90s. Large granuloma was removed. Patient was no longer in distress after exchange.    #Acute/ chronic COPD with exacerbation  #chronic respiratory failure  #mucous plug removed  #pneumonia v, atelectasis L base  #currently No Impending respiratory failure      #Hx of DVT  - eliquis held  - On lovenox 100 BID , awaiting possible intervention by CTsgx       Check O2 sat on NC, record, continue O2 as necessary to maintain sats > 90%  Continue IV solumedrol  bronchodilator treatments ATC and PRN  complete course of antibiotics Cefepime  sputum gm stain cs   procal 0,18 5/27  f/u sx Surgery for possible bronchoscopy with debridement of the trachea should this be required in the future.    Activity OOB to chair  GI PPX  PPI  DVT PPX Therapeutic Lovenox

## 2022-05-30 NOTE — PROGRESS NOTE ADULT - SUBJECTIVE AND OBJECTIVE BOX
Patient is a 56y old  Female who presents with a chief complaint of     Over Night Events:  Patient seen and examined.   not on distress on vent     ROS:  See HPI    PHYSICAL EXAM    ICU Vital Signs Last 24 Hrs  T(C): 36.2 (30 May 2022 04:00), Max: 37.1 (29 May 2022 12:00)  T(F): 97.2 (30 May 2022 04:00), Max: 98.7 (29 May 2022 12:00)  HR: 57 (30 May 2022 07:00) (51 - 60)  BP: 125/60 (30 May 2022 07:00) (93/47 - 134/63)  BP(mean): 86 (30 May 2022 07:00) (68 - 91)  ABP: --  ABP(mean): --  RR: 18 (30 May 2022 07:00) (12 - 25)  SpO2: 99% (30 May 2022 07:00) (94% - 100%)      General: AO   HEENT:     trach            Lymph Nodes: NO cervical LN   Lungs: Bilateral BS  Cardiovascular: Regular   Abdomen: Soft, Positive BS  Extremities: No clubbing   Skin: warm   Neurological: no focal   Musculoskeletal: move all ext     I&O's Detail    29 May 2022 07:01  -  30 May 2022 07:00  --------------------------------------------------------  IN:    Dexmedetomidine: 147.6 mL    IV PiggyBack: 100 mL  Total IN: 247.6 mL    OUT:    Indwelling Catheter - Urethral (mL): 1570 mL  Total OUT: 1570 mL    Total NET: -1322.4 mL          LABS:                          8.6    8.86  )-----------( 192      ( 30 May 2022 05:00 )             25.6         30 May 2022 05:00    135    |  94     |  11     ----------------------------<  88     4.0     |  30     |  <0.5     Ca    10.1       30 May 2022 05:00  Phos  4.7       30 May 2022 05:00  Mg     1.6       30 May 2022 05:00    TPro  5.7    /  Alb  3.6    /  TBili  0.6    /  DBili  x      /  AST  22     /  ALT  26     /  AlkPhos  83     30 May 2022 05:00  Amylase x     lipase x                                                                                            Lactate, Blood: 2.3 mmol/L (05-27-22 @ 10:00)                                                          Culture - Urine (collected 28 May 2022 17:24)  Source: Catheterized Catheterized  Final Report (29 May 2022 18:18):    <10,000 CFU/mL Normal Urogenital Chante    Culture - Urine (collected 27 May 2022 11:54)  Source: Catheterized Catheterized  Preliminary Report (29 May 2022 00:40):    >100,000 CFU/ml Gram Negative Rods                                                   Mode: AC/ CMV (Assist Control/ Continuous Mandatory Ventilation)  RR (machine): 12  TV (machine): 400  FiO2: 40  PEEP: 5  ITime: 1  MAP: 8  PIP: 19                                          MEDICATIONS  (STANDING):  ALPRAZolam 0.25 milliGRAM(s) Oral at bedtime  amLODIPine   Tablet 5 milliGRAM(s) Oral daily  ammonium lactate 12% Lotion 1 Application(s) Topical every 12 hours  atorvastatin 20 milliGRAM(s) Oral at bedtime  buMETAnide 2 milliGRAM(s) Oral daily  cefepime   IVPB 1000 milliGRAM(s) IV Intermittent every 12 hours  chlorhexidine 0.12% Liquid 15 milliLiter(s) Oral Mucosa every 12 hours  chlorhexidine 4% Liquid 1 Application(s) Topical daily  clopidogrel Tablet 75 milliGRAM(s) Oral daily  dexMEDEtomidine Infusion 0.2 MICROgram(s)/kG/Hr (4.9 mL/Hr) IV Continuous <Continuous>  dextrose 5%. 1000 milliLiter(s) (100 mL/Hr) IV Continuous <Continuous>  dextrose 5%. 1000 milliLiter(s) (50 mL/Hr) IV Continuous <Continuous>  dextrose 50% Injectable 25 Gram(s) IV Push once  dextrose 50% Injectable 12.5 Gram(s) IV Push once  dextrose 50% Injectable 25 Gram(s) IV Push once  gabapentin 300 milliGRAM(s) Oral three times a day  glucagon  Injectable 1 milliGRAM(s) IntraMuscular once  influenza   Vaccine 0.5 milliLiter(s) IntraMuscular once  insulin glargine Injectable (LANTUS) 55 Unit(s) SubCutaneous at bedtime  insulin lispro (ADMELOG) corrective regimen sliding scale   SubCutaneous at bedtime  insulin lispro Injectable (ADMELOG) 25 Unit(s) SubCutaneous three times a day before meals  losartan 100 milliGRAM(s) Oral daily  pantoprazole  Injectable 40 milliGRAM(s) IV Push daily  polyethylene glycol 3350 17 Gram(s) Oral daily  senna 2 Tablet(s) Oral at bedtime    MEDICATIONS  (PRN):  ALBUTerol    0.083%. 2.5 milliGRAM(s) Nebulizer once PRN Shortness of Breath and/or Wheezing  dextrose Oral Gel 15 Gram(s) Oral once PRN Blood Glucose LESS THAN 70 milliGRAM(s)/deciliter  oxycodone    5 mG/acetaminophen 325 mG 1 Tablet(s) Oral every 6 hours PRN Severe Pain (7 - 10)          Xrays:  TLC:  OG:  ET tube:                                                                                    decrease left opacity still has lower atelectasis    ECHO:  [facility] ICU:

## 2022-05-30 NOTE — PROGRESS NOTE ADULT - ASSESSMENT
Impression:  Acute/ chronic COPD with exacerbation  chronic respiratory failure  mucous plug removed  pneumonia v, atelectasis L base  currently No Impending respiratory failure  hx DVT     SUGGEST:   frequent suction with saline   follow ct surgery   hold eliquis she will need bronch and debridement of the granulation    lovenox  Q12 hrs   Check O2 sat on NC, record, continue O2 as necessary to maintain sats > 90%  Continue IV solumedrol  bronchodilator treatments ATC and PRN  complete course of antibiotics  follow urine   bld cx   sputum gm stain cs   procal   OOB to chair  GI and DVT prophylaxis  pulmonary toilet  keep same vent setting frequent suction  follow h/h   follow lytes   on plavix follow ct surgery if need to stop for procedure

## 2022-05-30 NOTE — PROGRESS NOTE ADULT - SUBJECTIVE AND OBJECTIVE BOX
PATIENT:  CRUZ DUMONT  MRN-[id number]    Patient is a 56y old  Female who presents with a chief complaint of     INTERVAL HPI/OVERNIGHT EVENTS:    Overnight, no acute events overnight     Today, patient seen and examined at bedside    ICU Vital Signs Last 24 Hrs  T(C): 36.3 (30 May 2022 08:00), Max: 37.1 (29 May 2022 12:00)  T(F): 97.4 (30 May 2022 08:00), Max: 98.7 (29 May 2022 12:00)  HR: 58 (30 May 2022 10:00) (51 - 61)  BP: 130/62 (30 May 2022 10:00) (90/66 - 134/63)  BP(mean): 89 (30 May 2022 10:00) (68 - 91)  RR: 14 (30 May 2022 10:00) (12 - 33)  SpO2: 100% (30 May 2022 10:00) (98% - 100%)    I&O's Summary    29 May 2022 07:01  -  30 May 2022 07:00  --------------------------------------------------------  IN: 247.6 mL / OUT: 1570 mL / NET: -1322.4 mL    30 May 2022 07:01  -  30 May 2022 11:23  --------------------------------------------------------  IN: 12 mL / OUT: 450 mL / NET: -438 mL      Mode: AC/ CMV (Assist Control/ Continuous Mandatory Ventilation)  RR (machine): 12  TV (machine): 400  FiO2: 40  PEEP: 5  ITime: 1  MAP: 10  PIP: 17      LABS:                        8.6    8.86  )-----------( 192      ( 30 May 2022 05:00 )             25.6     05-30    135  |  94<L>  |  11  ----------------------------<  88  4.0   |  30  |  <0.5<L>    Ca    10.1      30 May 2022 05:00  Phos  4.7     05-30  Mg     1.6     05-30    TPro  5.7<L>  /  Alb  3.6  /  TBili  0.6  /  DBili  x   /  AST  22  /  ALT  26  /  AlkPhos  83  05-30        CAPILLARY BLOOD GLUCOSE      POCT Blood Glucose.: 93 mg/dL (30 May 2022 08:22)  POCT Blood Glucose.: 96 mg/dL (29 May 2022 21:24)  POCT Blood Glucose.: 101 mg/dL (29 May 2022 17:56)  POCT Blood Glucose.: 108 mg/dL (29 May 2022 12:52)        RADIOLOGY & ADDITIONAL TESTS:    Consultant(s) Notes Reviewed:  [x ] YES  [ ] NO    MEDICATIONS  (STANDING):  ALPRAZolam 0.25 milliGRAM(s) Oral at bedtime  amLODIPine   Tablet 5 milliGRAM(s) Oral daily  ammonium lactate 12% Lotion 1 Application(s) Topical every 12 hours  atorvastatin 20 milliGRAM(s) Oral at bedtime  buMETAnide 2 milliGRAM(s) Oral daily  cefepime   IVPB 1000 milliGRAM(s) IV Intermittent every 12 hours  chlorhexidine 0.12% Liquid 15 milliLiter(s) Oral Mucosa every 12 hours  chlorhexidine 4% Liquid 1 Application(s) Topical daily  clopidogrel Tablet 75 milliGRAM(s) Oral daily  dexMEDEtomidine Infusion 0.2 MICROgram(s)/kG/Hr (4.9 mL/Hr) IV Continuous <Continuous>  dextrose 5%. 1000 milliLiter(s) (50 mL/Hr) IV Continuous <Continuous>  dextrose 5%. 1000 milliLiter(s) (100 mL/Hr) IV Continuous <Continuous>  dextrose 50% Injectable 25 Gram(s) IV Push once  dextrose 50% Injectable 12.5 Gram(s) IV Push once  dextrose 50% Injectable 25 Gram(s) IV Push once  enoxaparin Injectable 100 milliGRAM(s) SubCutaneous every 12 hours  gabapentin 300 milliGRAM(s) Oral three times a day  glucagon  Injectable 1 milliGRAM(s) IntraMuscular once  influenza   Vaccine 0.5 milliLiter(s) IntraMuscular once  insulin glargine Injectable (LANTUS) 55 Unit(s) SubCutaneous at bedtime  insulin lispro (ADMELOG) corrective regimen sliding scale   SubCutaneous at bedtime  losartan 100 milliGRAM(s) Oral daily  pantoprazole  Injectable 40 milliGRAM(s) IV Push daily  polyethylene glycol 3350 17 Gram(s) Oral daily  senna 2 Tablet(s) Oral at bedtime    MEDICATIONS  (PRN):  acetaminophen     Tablet .. 650 milliGRAM(s) Oral every 6 hours PRN Mild Pain (1 - 3)  ALBUTerol    0.083%. 2.5 milliGRAM(s) Nebulizer once PRN Shortness of Breath and/or Wheezing  dextrose Oral Gel 15 Gram(s) Oral once PRN Blood Glucose LESS THAN 70 milliGRAM(s)/deciliter  morphine  - Injectable 2 milliGRAM(s) IV Push every 6 hours PRN Severe Pain (7 - 10)  oxyCODONE    IR 5 milliGRAM(s) Oral every 4 hours PRN Moderate Pain (4 - 6)      PHYSICAL EXAM:  General: NAD, AAOx3, calm and cooperative  HEENT: NCAT, МАРИНА, EOMI, Tracheostomy in place, functioning.   Cardiac: RRR S1, S2, no Murmurs, rubs or gallops  Respiratory: CTAB, normal respiratory effort, breath sounds equal BL, no wheeze, mild UR congesion  Abdomen: Soft, non-distended, non-tender, no rebound, no guarding. +BS.    Care Discussed with Consultants/Other Providers [ x] YES  [ ] NO PATIENT:  CRUZ DUMONT  MRN-[id number]    Patient is a 56y old  Female who presents with a chief complaint of     INTERVAL HPI/OVERNIGHT EVENTS:    Overnight, no acute events overnight     Today, patient seen and examined at bedside    ICU Vital Signs Last 24 Hrs  T(C): 36.3 (30 May 2022 08:00), Max: 37.1 (29 May 2022 12:00)  T(F): 97.4 (30 May 2022 08:00), Max: 98.7 (29 May 2022 12:00)  HR: 58 (30 May 2022 10:00) (51 - 61)  BP: 130/62 (30 May 2022 10:00) (90/66 - 134/63)  BP(mean): 89 (30 May 2022 10:00) (68 - 91)  RR: 14 (30 May 2022 10:00) (12 - 33)  SpO2: 100% (30 May 2022 10:00) (98% - 100%)    I&O's Summary    29 May 2022 07:01  -  30 May 2022 07:00  --------------------------------------------------------  IN: 247.6 mL / OUT: 1570 mL / NET: -1322.4 mL    30 May 2022 07:01  -  30 May 2022 11:23  --------------------------------------------------------  IN: 12 mL / OUT: 450 mL / NET: -438 mL      Mode: AC/ CMV (Assist Control/ Continuous Mandatory Ventilation)  RR (machine): 12  TV (machine): 400  FiO2: 40  PEEP: 5  ITime: 1  MAP: 10  PIP: 17      LABS:                        8.6    8.86  )-----------( 192      ( 30 May 2022 05:00 )             25.6     05-30    135  |  94<L>  |  11  ----------------------------<  88  4.0   |  30  |  <0.5<L>    Ca    10.1      30 May 2022 05:00  Phos  4.7     05-30  Mg     1.6     05-30    TPro  5.7<L>  /  Alb  3.6  /  TBili  0.6  /  DBili  x   /  AST  22  /  ALT  26  /  AlkPhos  83  05-30        CAPILLARY BLOOD GLUCOSE      POCT Blood Glucose.: 93 mg/dL (30 May 2022 08:22)  POCT Blood Glucose.: 96 mg/dL (29 May 2022 21:24)  POCT Blood Glucose.: 101 mg/dL (29 May 2022 17:56)  POCT Blood Glucose.: 108 mg/dL (29 May 2022 12:52)        RADIOLOGY & ADDITIONAL TESTS:    Consultant(s) Notes Reviewed:  [x ] YES  [ ] NO    MEDICATIONS  (STANDING):  ALPRAZolam 0.25 milliGRAM(s) Oral at bedtime  amLODIPine   Tablet 5 milliGRAM(s) Oral daily  ammonium lactate 12% Lotion 1 Application(s) Topical every 12 hours  atorvastatin 20 milliGRAM(s) Oral at bedtime  buMETAnide 2 milliGRAM(s) Oral daily  cefepime   IVPB 1000 milliGRAM(s) IV Intermittent every 12 hours  chlorhexidine 0.12% Liquid 15 milliLiter(s) Oral Mucosa every 12 hours  chlorhexidine 4% Liquid 1 Application(s) Topical daily  clopidogrel Tablet 75 milliGRAM(s) Oral daily  dexMEDEtomidine Infusion 0.2 MICROgram(s)/kG/Hr (4.9 mL/Hr) IV Continuous <Continuous>  dextrose 5%. 1000 milliLiter(s) (50 mL/Hr) IV Continuous <Continuous>  dextrose 5%. 1000 milliLiter(s) (100 mL/Hr) IV Continuous <Continuous>  dextrose 50% Injectable 25 Gram(s) IV Push once  dextrose 50% Injectable 12.5 Gram(s) IV Push once  dextrose 50% Injectable 25 Gram(s) IV Push once  enoxaparin Injectable 100 milliGRAM(s) SubCutaneous every 12 hours  gabapentin 300 milliGRAM(s) Oral three times a day  glucagon  Injectable 1 milliGRAM(s) IntraMuscular once  influenza   Vaccine 0.5 milliLiter(s) IntraMuscular once  insulin glargine Injectable (LANTUS) 55 Unit(s) SubCutaneous at bedtime  insulin lispro (ADMELOG) corrective regimen sliding scale   SubCutaneous at bedtime  losartan 100 milliGRAM(s) Oral daily  pantoprazole  Injectable 40 milliGRAM(s) IV Push daily  polyethylene glycol 3350 17 Gram(s) Oral daily  senna 2 Tablet(s) Oral at bedtime    MEDICATIONS  (PRN):  acetaminophen     Tablet .. 650 milliGRAM(s) Oral every 6 hours PRN Mild Pain (1 - 3)  ALBUTerol    0.083%. 2.5 milliGRAM(s) Nebulizer once PRN Shortness of Breath and/or Wheezing  dextrose Oral Gel 15 Gram(s) Oral once PRN Blood Glucose LESS THAN 70 milliGRAM(s)/deciliter  morphine  - Injectable 2 milliGRAM(s) IV Push every 6 hours PRN Severe Pain (7 - 10)  oxyCODONE    IR 5 milliGRAM(s) Oral every 4 hours PRN Moderate Pain (4 - 6)      PHYSICAL EXAM:  General: NAD, AAOx3, calm and cooperative  HEENT: NCAT, МАРИНА, EOMI, Tracheostomy in place, functioning.   Cardiac: RRR S1, S2, no Murmurs, rubs or gallops  Respiratory: CTAB, normal respiratory effort, breath sounds equal BL, no wheeze, mild UR congesion  Abdomen: Soft, non-distended, non-tender, no rebound, no guarding. +BS.  Extremities:  Doppler DP pulses, chroninc venous stasis changes bilateral lower extremity     Care Discussed with Consultants/Other Providers [ x] YES  [ ] NO

## 2022-05-30 NOTE — PROGRESS NOTE ADULT - SUBJECTIVE AND OBJECTIVE BOX
GENERAL SURGERY PROGRESS NOTE    Patient: CRUZ DUMONT , 56y ([birth year])Female   MRN: [id number]  Location: [facility] [place] [facility] [address]  Visit: 05-27-22 Inpatient  Date: 05-30-22 @ 09:15    Hospital Day #:  Post-Op Day #:    Procedure/Dx/Injuries:    Events of past 24 hours:    PAST MEDICAL & SURGICAL HISTORY:  COPD (chronic obstructive pulmonary disease)      CHF (congestive heart failure)      DM (diabetes mellitus)      H/O tracheostomy          Vitals:   T(F): 97.2 (05-30-22 @ 04:00), Max: 98.7 (05-29-22 @ 12:00)  HR: 58 (05-30-22 @ 08:20)  BP: 125/60 (05-30-22 @ 07:00)  RR: 18 (05-30-22 @ 07:00)  SpO2: 100% (05-30-22 @ 08:20)  Mode: AC/ CMV (Assist Control/ Continuous Mandatory Ventilation), RR (machine): 12, TV (machine): 400, FiO2: 40, PEEP: 5, ITime: 1, MAP: 10, PIP: 17    Diet, NPO:   Except Medications      Fluids:     I & O's:    05-29-22 @ 07:01  -  05-30-22 @ 07:00  --------------------------------------------------------  IN:    Dexmedetomidine: 147.6 mL    IV PiggyBack: 100 mL  Total IN: 247.6 mL    OUT:    Indwelling Catheter - Urethral (mL): 1570 mL  Total OUT: 1570 mL    Total NET: -1322.4 mL        Bowel Movement: : [] YES [] NO  Flatus: : [] YES [] NO    PHYSICAL EXAM:  General: NAD, AAOx3, calm and cooperative  HEENT: NCAT, МАРИНА, EOMI, Trachea ML, Neck supple  Cardiac: RRR S1, S2, no Murmurs, rubs or gallops  Respiratory: trached, CTAB, breath sounds equal BL, no wheeze, rhonchi or crackles  Abdomen: Soft, non-distended, non-tender, no rebound, no guarding. +BS.  Musculoskeletal: Strength 5/5 BL UE/LE, ROM intact, compartments soft  Neuro: Sensation grossly intact and equal throughout, no focal deficits  Vascular: Pulses 2+ throughout, extremities well perfused  Skin: Warm/dry, normal color, no jaundice      MEDICATIONS  (STANDING):  ALPRAZolam 0.25 milliGRAM(s) Oral at bedtime  amLODIPine   Tablet 5 milliGRAM(s) Oral daily  ammonium lactate 12% Lotion 1 Application(s) Topical every 12 hours  atorvastatin 20 milliGRAM(s) Oral at bedtime  buMETAnide 2 milliGRAM(s) Oral daily  cefepime   IVPB 1000 milliGRAM(s) IV Intermittent every 12 hours  chlorhexidine 0.12% Liquid 15 milliLiter(s) Oral Mucosa every 12 hours  chlorhexidine 4% Liquid 1 Application(s) Topical daily  clopidogrel Tablet 75 milliGRAM(s) Oral daily  dexMEDEtomidine Infusion 0.2 MICROgram(s)/kG/Hr (4.9 mL/Hr) IV Continuous <Continuous>  dextrose 5%. 1000 milliLiter(s) (100 mL/Hr) IV Continuous <Continuous>  dextrose 5%. 1000 milliLiter(s) (50 mL/Hr) IV Continuous <Continuous>  dextrose 50% Injectable 25 Gram(s) IV Push once  dextrose 50% Injectable 12.5 Gram(s) IV Push once  dextrose 50% Injectable 25 Gram(s) IV Push once  enoxaparin Injectable 100 milliGRAM(s) SubCutaneous every 12 hours  gabapentin 300 milliGRAM(s) Oral three times a day  glucagon  Injectable 1 milliGRAM(s) IntraMuscular once  influenza   Vaccine 0.5 milliLiter(s) IntraMuscular once  insulin glargine Injectable (LANTUS) 55 Unit(s) SubCutaneous at bedtime  insulin lispro (ADMELOG) corrective regimen sliding scale   SubCutaneous at bedtime  insulin lispro Injectable (ADMELOG) 25 Unit(s) SubCutaneous three times a day before meals  losartan 100 milliGRAM(s) Oral daily  pantoprazole  Injectable 40 milliGRAM(s) IV Push daily  polyethylene glycol 3350 17 Gram(s) Oral daily  senna 2 Tablet(s) Oral at bedtime    MEDICATIONS  (PRN):  acetaminophen     Tablet .. 650 milliGRAM(s) Oral every 6 hours PRN Mild Pain (1 - 3)  ALBUTerol    0.083%. 2.5 milliGRAM(s) Nebulizer once PRN Shortness of Breath and/or Wheezing  dextrose Oral Gel 15 Gram(s) Oral once PRN Blood Glucose LESS THAN 70 milliGRAM(s)/deciliter  morphine  - Injectable 2 milliGRAM(s) IV Push every 6 hours PRN Severe Pain (7 - 10)  oxyCODONE    IR 5 milliGRAM(s) Oral every 4 hours PRN Moderate Pain (4 - 6)      DVT PROPHYLAXIS: enoxaparin Injectable 100 milliGRAM(s) SubCutaneous every 12 hours    GI PROPHYLAXIS: pantoprazole  Injectable 40 milliGRAM(s) IV Push daily    ANTICOAGULATION:   ANTIBIOTICS:  cefepime   IVPB 1000 milliGRAM(s)            LAB/STUDIES:  Labs:  CAPILLARY BLOOD GLUCOSE      POCT Blood Glucose.: 93 mg/dL (30 May 2022 08:22)  POCT Blood Glucose.: 96 mg/dL (29 May 2022 21:24)  POCT Blood Glucose.: 101 mg/dL (29 May 2022 17:56)  POCT Blood Glucose.: 108 mg/dL (29 May 2022 12:52)                          8.6    8.86  )-----------( 192      ( 30 May 2022 05:00 )             25.6       Auto Neutrophil %: 63.4 % (05-30-22 @ 05:00)  Auto Immature Granulocyte %: 1.7 % (05-30-22 @ 05:00)    05-30    135  |  94<L>  |  11  ----------------------------<  88  4.0   |  30  |  <0.5<L>      Calcium, Total Serum: 10.1 mg/dL (05-30-22 @ 05:00)      LFTs:             5.7  | 0.6  | 22       ------------------[83      ( 30 May 2022 05:00 )  3.6  | x    | 26          Lipase:x      Amylase:x         Lactate, Blood: 2.3 mmol/L (05-27-22 @ 10:00)  Blood Gas Venous - Lactate: 2.20 mmol/L (05-27-22 @ 09:53)      Coags:        Serum Pro-Brain Natriuretic Peptide: 397 pg/mL (05-27-22 @ 10:00)          Culture - Urine (collected 28 May 2022 17:24)  Source: Catheterized Catheterized  Final Report (29 May 2022 18:18):    <10,000 CFU/mL Normal Urogenital Chante    Culture - Urine (collected 27 May 2022 11:54)  Source: Catheterized Catheterized  Preliminary Report (29 May 2022 00:40):    >100,000 CFU/ml Gram Negative Rods      IMAGING:      ACCESS/ DEVICES:  [ X] Peripheral IV  [ ] Central Venous Line	[ ] R	[ ] L	[ ] IJ	[ ] Fem	[ ] SC	Placed:   [ ] Arterial Line		[ ] R	[ ] L	[ ] Fem	[ ] Rad	[ ] Ax	Placed:   [ ] PICC:					[ ] Mediport  [ ] Urinary Catheter,  Date Placed:   [ ] Chest tube: [ ] Right, [ ] Left  [ ] BC/Evert Drains      ASSESSMENT:  57 yo f with PMHx of DVT on Eliquis, COPD,  trach collar, obesity, IDDM, UTI, sent by [employer] for hypoxia now s/p Trach exchange to 7 XLT alongside Pulmonology    Plan:   plan for possible bronch this week   CCU management   monitor vent   airway management   trach functioning well s/p exchange

## 2022-05-31 LAB
ALBUMIN SERPL ELPH-MCNC: 3.8 G/DL — SIGNIFICANT CHANGE UP (ref 3.5–5.2)
ALP SERPL-CCNC: 82 U/L — SIGNIFICANT CHANGE UP (ref 30–115)
ALT FLD-CCNC: 24 U/L — SIGNIFICANT CHANGE UP (ref 0–41)
ANION GAP SERPL CALC-SCNC: 12 MMOL/L — SIGNIFICANT CHANGE UP (ref 7–14)
AST SERPL-CCNC: 23 U/L — SIGNIFICANT CHANGE UP (ref 0–41)
BASOPHILS # BLD AUTO: 0.05 K/UL — SIGNIFICANT CHANGE UP (ref 0–0.2)
BASOPHILS NFR BLD AUTO: 0.6 % — SIGNIFICANT CHANGE UP (ref 0–1)
BILIRUB SERPL-MCNC: 0.5 MG/DL — SIGNIFICANT CHANGE UP (ref 0.2–1.2)
BUN SERPL-MCNC: 12 MG/DL — SIGNIFICANT CHANGE UP (ref 10–20)
CALCIUM SERPL-MCNC: 9.7 MG/DL — SIGNIFICANT CHANGE UP (ref 8.5–10.1)
CHLORIDE SERPL-SCNC: 94 MMOL/L — LOW (ref 98–110)
CO2 SERPL-SCNC: 30 MMOL/L — SIGNIFICANT CHANGE UP (ref 17–32)
CREAT SERPL-MCNC: <0.5 MG/DL — LOW (ref 0.7–1.5)
EGFR: 124 ML/MIN/1.73M2 — SIGNIFICANT CHANGE UP
EOSINOPHIL # BLD AUTO: 0.31 K/UL — SIGNIFICANT CHANGE UP (ref 0–0.7)
EOSINOPHIL NFR BLD AUTO: 3.5 % — SIGNIFICANT CHANGE UP (ref 0–8)
GLUCOSE BLDC GLUCOMTR-MCNC: 115 MG/DL — HIGH (ref 70–99)
GLUCOSE BLDC GLUCOMTR-MCNC: 131 MG/DL — HIGH (ref 70–99)
GLUCOSE BLDC GLUCOMTR-MCNC: 138 MG/DL — HIGH (ref 70–99)
GLUCOSE BLDC GLUCOMTR-MCNC: 149 MG/DL — HIGH (ref 70–99)
GLUCOSE SERPL-MCNC: 79 MG/DL — SIGNIFICANT CHANGE UP (ref 70–99)
HCT VFR BLD CALC: 24.8 % — LOW (ref 37–47)
HGB BLD-MCNC: 8.5 G/DL — LOW (ref 12–16)
IMM GRANULOCYTES NFR BLD AUTO: 1.8 % — HIGH (ref 0.1–0.3)
LYMPHOCYTES # BLD AUTO: 1.49 K/UL — SIGNIFICANT CHANGE UP (ref 1.2–3.4)
LYMPHOCYTES # BLD AUTO: 16.9 % — LOW (ref 20.5–51.1)
MAGNESIUM SERPL-MCNC: 1.7 MG/DL — LOW (ref 1.8–2.4)
MCHC RBC-ENTMCNC: 33.3 PG — HIGH (ref 27–31)
MCHC RBC-ENTMCNC: 34.3 G/DL — SIGNIFICANT CHANGE UP (ref 32–37)
MCV RBC AUTO: 97.3 FL — SIGNIFICANT CHANGE UP (ref 81–99)
MONOCYTES # BLD AUTO: 0.66 K/UL — HIGH (ref 0.1–0.6)
MONOCYTES NFR BLD AUTO: 7.5 % — SIGNIFICANT CHANGE UP (ref 1.7–9.3)
NEUTROPHILS # BLD AUTO: 6.17 K/UL — SIGNIFICANT CHANGE UP (ref 1.4–6.5)
NEUTROPHILS NFR BLD AUTO: 69.7 % — SIGNIFICANT CHANGE UP (ref 42.2–75.2)
NRBC # BLD: 0 /100 WBCS — SIGNIFICANT CHANGE UP (ref 0–0)
PLATELET # BLD AUTO: 194 K/UL — SIGNIFICANT CHANGE UP (ref 130–400)
POTASSIUM SERPL-MCNC: 4 MMOL/L — SIGNIFICANT CHANGE UP (ref 3.5–5)
POTASSIUM SERPL-SCNC: 4 MMOL/L — SIGNIFICANT CHANGE UP (ref 3.5–5)
PROCALCITONIN SERPL-MCNC: 0.22 NG/ML — HIGH (ref 0.02–0.1)
PROT SERPL-MCNC: 5.8 G/DL — LOW (ref 6–8)
RBC # BLD: 2.55 M/UL — LOW (ref 4.2–5.4)
RBC # FLD: 15.3 % — HIGH (ref 11.5–14.5)
SARS-COV-2 RNA SPEC QL NAA+PROBE: SIGNIFICANT CHANGE UP
SODIUM SERPL-SCNC: 136 MMOL/L — SIGNIFICANT CHANGE UP (ref 135–146)
WBC # BLD: 8.84 K/UL — SIGNIFICANT CHANGE UP (ref 4.8–10.8)
WBC # FLD AUTO: 8.84 K/UL — SIGNIFICANT CHANGE UP (ref 4.8–10.8)

## 2022-05-31 PROCEDURE — 71045 X-RAY EXAM CHEST 1 VIEW: CPT | Mod: 26

## 2022-05-31 PROCEDURE — 99233 SBSQ HOSP IP/OBS HIGH 50: CPT

## 2022-05-31 PROCEDURE — 99231 SBSQ HOSP IP/OBS SF/LOW 25: CPT

## 2022-05-31 RX ORDER — MAGNESIUM SULFATE 500 MG/ML
2 VIAL (ML) INJECTION
Refills: 0 | Status: COMPLETED | OUTPATIENT
Start: 2022-05-31 | End: 2022-05-31

## 2022-05-31 RX ADMIN — OXYCODONE HYDROCHLORIDE 5 MILLIGRAM(S): 5 TABLET ORAL at 21:54

## 2022-05-31 RX ADMIN — MORPHINE SULFATE 2 MILLIGRAM(S): 50 CAPSULE, EXTENDED RELEASE ORAL at 12:20

## 2022-05-31 RX ADMIN — OXYCODONE HYDROCHLORIDE 5 MILLIGRAM(S): 5 TABLET ORAL at 21:39

## 2022-05-31 RX ADMIN — ENOXAPARIN SODIUM 100 MILLIGRAM(S): 100 INJECTION SUBCUTANEOUS at 05:36

## 2022-05-31 RX ADMIN — OXYCODONE HYDROCHLORIDE 5 MILLIGRAM(S): 5 TABLET ORAL at 04:00

## 2022-05-31 RX ADMIN — CHLORHEXIDINE GLUCONATE 1 APPLICATION(S): 213 SOLUTION TOPICAL at 05:35

## 2022-05-31 RX ADMIN — OXYCODONE HYDROCHLORIDE 5 MILLIGRAM(S): 5 TABLET ORAL at 03:52

## 2022-05-31 RX ADMIN — Medication 1 APPLICATION(S): at 05:35

## 2022-05-31 RX ADMIN — POLYETHYLENE GLYCOL 3350 17 GRAM(S): 17 POWDER, FOR SOLUTION ORAL at 11:59

## 2022-05-31 RX ADMIN — GABAPENTIN 300 MILLIGRAM(S): 400 CAPSULE ORAL at 21:39

## 2022-05-31 RX ADMIN — OXYCODONE HYDROCHLORIDE 5 MILLIGRAM(S): 5 TABLET ORAL at 16:15

## 2022-05-31 RX ADMIN — ATORVASTATIN CALCIUM 20 MILLIGRAM(S): 80 TABLET, FILM COATED ORAL at 21:39

## 2022-05-31 RX ADMIN — CEFEPIME 100 MILLIGRAM(S): 1 INJECTION, POWDER, FOR SOLUTION INTRAMUSCULAR; INTRAVENOUS at 17:37

## 2022-05-31 RX ADMIN — INSULIN GLARGINE 15 UNIT(S): 100 INJECTION, SOLUTION SUBCUTANEOUS at 21:51

## 2022-05-31 RX ADMIN — Medication 25 GRAM(S): at 10:13

## 2022-05-31 RX ADMIN — OXYCODONE HYDROCHLORIDE 5 MILLIGRAM(S): 5 TABLET ORAL at 10:15

## 2022-05-31 RX ADMIN — MORPHINE SULFATE 2 MILLIGRAM(S): 50 CAPSULE, EXTENDED RELEASE ORAL at 07:48

## 2022-05-31 RX ADMIN — GABAPENTIN 300 MILLIGRAM(S): 400 CAPSULE ORAL at 05:36

## 2022-05-31 RX ADMIN — CHLORHEXIDINE GLUCONATE 15 MILLILITER(S): 213 SOLUTION TOPICAL at 05:35

## 2022-05-31 RX ADMIN — MORPHINE SULFATE 2 MILLIGRAM(S): 50 CAPSULE, EXTENDED RELEASE ORAL at 23:13

## 2022-05-31 RX ADMIN — GABAPENTIN 300 MILLIGRAM(S): 400 CAPSULE ORAL at 15:55

## 2022-05-31 RX ADMIN — SENNA PLUS 2 TABLET(S): 8.6 TABLET ORAL at 21:40

## 2022-05-31 RX ADMIN — MORPHINE SULFATE 2 MILLIGRAM(S): 50 CAPSULE, EXTENDED RELEASE ORAL at 12:15

## 2022-05-31 RX ADMIN — MORPHINE SULFATE 2 MILLIGRAM(S): 50 CAPSULE, EXTENDED RELEASE ORAL at 08:09

## 2022-05-31 RX ADMIN — BUMETANIDE 2 MILLIGRAM(S): 0.25 INJECTION INTRAMUSCULAR; INTRAVENOUS at 05:36

## 2022-05-31 RX ADMIN — CHLORHEXIDINE GLUCONATE 15 MILLILITER(S): 213 SOLUTION TOPICAL at 17:37

## 2022-05-31 RX ADMIN — PANTOPRAZOLE SODIUM 40 MILLIGRAM(S): 20 TABLET, DELAYED RELEASE ORAL at 11:59

## 2022-05-31 RX ADMIN — OXYCODONE HYDROCHLORIDE 5 MILLIGRAM(S): 5 TABLET ORAL at 10:30

## 2022-05-31 RX ADMIN — OXYCODONE HYDROCHLORIDE 5 MILLIGRAM(S): 5 TABLET ORAL at 15:58

## 2022-05-31 RX ADMIN — CEFEPIME 100 MILLIGRAM(S): 1 INJECTION, POWDER, FOR SOLUTION INTRAMUSCULAR; INTRAVENOUS at 05:35

## 2022-05-31 RX ADMIN — Medication 25 GRAM(S): at 11:58

## 2022-05-31 RX ADMIN — MORPHINE SULFATE 2 MILLIGRAM(S): 50 CAPSULE, EXTENDED RELEASE ORAL at 02:35

## 2022-05-31 RX ADMIN — MORPHINE SULFATE 2 MILLIGRAM(S): 50 CAPSULE, EXTENDED RELEASE ORAL at 17:37

## 2022-05-31 RX ADMIN — MORPHINE SULFATE 2 MILLIGRAM(S): 50 CAPSULE, EXTENDED RELEASE ORAL at 18:00

## 2022-05-31 RX ADMIN — Medication 1 APPLICATION(S): at 17:36

## 2022-05-31 RX ADMIN — MORPHINE SULFATE 2 MILLIGRAM(S): 50 CAPSULE, EXTENDED RELEASE ORAL at 23:30

## 2022-05-31 RX ADMIN — MORPHINE SULFATE 2 MILLIGRAM(S): 50 CAPSULE, EXTENDED RELEASE ORAL at 02:06

## 2022-05-31 RX ADMIN — LOSARTAN POTASSIUM 100 MILLIGRAM(S): 100 TABLET, FILM COATED ORAL at 05:36

## 2022-05-31 RX ADMIN — Medication 0.25 MILLIGRAM(S): at 21:39

## 2022-05-31 RX ADMIN — AMLODIPINE BESYLATE 5 MILLIGRAM(S): 2.5 TABLET ORAL at 05:36

## 2022-05-31 NOTE — PROGRESS NOTE ADULT - ASSESSMENT
55yo Female with PMHx of DVT on Eliquis, COPD,  trach collar, obesity, IDDM, UTI, sent by [employer] for hypoxia now s/p Trach exchange to 7 XLT alongside Pulmonology.    Plan:   -added on for bronchoscopy and trachea debridement today   -booked for bronchoscopy and trachea debridement tomorrow  -will obtain consent  -CCU management   -monitor vent   -airway management   -trach functioning well s/p exchange

## 2022-05-31 NOTE — OCCUPATIONAL THERAPY INITIAL EVALUATION ADULT - RANGE OF MOTION EXAMINATION, UPPER EXTREMITY
RUE shoulder ~1/4 AROM, PROM WFL, elbow/wrist/digits WFL, LUE shoulder ~3/4 AROM, elbow/wrist/digits WFL

## 2022-05-31 NOTE — PROGRESS NOTE ADULT - SUBJECTIVE AND OBJECTIVE BOX
Patient is a 56y old  Female who presents with a chief complaint of       HPI:  57 yo f with PMHx of DVT on Eliquis, COPD,  trach collar, obesity, IDDM, UTI, sent by [facility] for hypoxia. History provided by daughter at bedside, she reports the current illness going back to April 4th when pt was intubated on admission at [facility] ICU for hypoxic respiratory failure diagnosed with copd exacerbation and superimposed pneumonia, of note she also appears to have been in CHF exacerbation with severe b/l LE swelling treated with IV bumex. Remained intubated 37 days per daughter, diagnosed with fever of unknown origin (up to 106F), treated with empiric broad spectrum antibiotics and once 2 weeks s/p tracheostomy placement and 48 hours afebrile she was was discharged to nursing home for PT. Of note, patient's daughter and pt herself say the wiseman has not been replaced for over 3 weeks. She also reports a developing sacral pressure ulcer. Was at [facility] only 2 days when was noted to be hypoxic (saturating low 70's) and EMS called. While awaiting EMS, floor nurse on the unit suctioned the patient, and managed to bring up a very large mucus plug, with pt's saturation immediately improving afterwards to 80's. Nonetheless pt was sent to [facility] ED. Prior to all this pt was independent and ambulatory. Pt denies sob, cp, abd pain, n/v/d, fever or chills.     On arrival to ED pt was saturating 97%, 15L O2 via tracheostomy collar (baseline 8 L at [facility]) VS:    /59  T 99.9F RR 20  Admission VBG pH 7.37 pCO2 60 pO2 49. Pt not wheezing, not coughing. CXR suspicious for L-sided PNA, inconclusive. CTA neg for PE. Pt received x1 IV Levoquin and Cefepime in ED, admitted to medicine for acute hypoxic hypercapnic respiratory failure secondary to acute mucus plug (now resolved vs superimposed pneumonia  hospital-acquired.(less likely), Records request sent to [facility] medical records  [phone]) and patient will be continued on empiric antibiotics pending procal (27 May 2022 14:02)      Pt evaluated on rounds.  I reviewed the radiology tests and hospital record.    I reviewed previous notes on this patient.    Interval Events: No overnight events.      CAM:    SAT:    SBT:      REVIEW OF SYSTEMS:   see HPI      OBJECTIVE:  ICU Vital Signs Last 24 Hrs  T(C): 36.2 (31 May 2022 05:00), Max: 36.7 (30 May 2022 16:00)  T(F): 97.1 (31 May 2022 05:00), Max: 98 (30 May 2022 16:00)  HR: 54 (31 May 2022 06:00) (53 - 67)  BP: 108/53 (31 May 2022 06:00) (90/66 - 137/60)  BP(mean): 76 (31 May 2022 06:00) (69 - 89)  ABP: --  ABP(mean): --  RR: 14 (31 May 2022 06:00) (12 - 40)  SpO2: 100% (31 May 2022 06:00) (98% - 100%)    Mode: AC/ CMV (Assist Control/ Continuous Mandatory Ventilation), RR (machine): 12, TV (machine): 400, FiO2: 40, PEEP: 5, ITime: 1, MAP: 8, PIP: 20    05-29 @ 07:01  -  05-30 @ 07:00  --------------------------------------------------------  IN: 247.6 mL / OUT: 1570 mL / NET: -1322.4 mL    05-30 @ 07:01 - 05-31 @ 06:56  --------------------------------------------------------  IN: 1188 mL / OUT: 2260 mL / NET: -1072 mL      CAPILLARY BLOOD GLUCOSE      POCT Blood Glucose.: 103 mg/dL (30 May 2022 21:44)        PHYSICAL EXAM:       · ENMT:   Airway patent,   Nasal mucosa clear.  Mouth with normal mucosa.   No thrush    · EYES:   Clear bilaterally,   pupils equal,   round and reactive to light.    · CARDIAC:   Normal rate,   regular rhythm.    Heart sounds S1, S2.   No murmurs, no rubs or gallops on auscultation  no edema        CAROTID:   normal systolic impulse  no bruits    · RESPIRATORY:   rales  normal chest expansion  no retractions or use of accessory muscles  palpation of chest is normal with no fremitus  percussion of chest demonstrates no hyperresonance or dullness    · GASTROINTESTINAL:  Abdomen soft,   non-tender,   + BS  liver/spleen not palpable    · MUSCULOSKELETAL:   no clubbing, cyanosis      · SKIN:   Skin normal color for race,   warm, dry   No evidence of rash.        · HEME LYMPH:   no splenomegaly.  No cervical  lymphadenopathy.  no inguinal lymphadenopathy    HOSPITAL MEDICATIONS:  MEDICATIONS  (STANDING):  ALPRAZolam 0.25 milliGRAM(s) Oral at bedtime  amLODIPine   Tablet 5 milliGRAM(s) Oral daily  ammonium lactate 12% Lotion 1 Application(s) Topical every 12 hours  atorvastatin 20 milliGRAM(s) Oral at bedtime  buMETAnide 2 milliGRAM(s) Oral daily  cefepime   IVPB 1000 milliGRAM(s) IV Intermittent every 12 hours  chlorhexidine 0.12% Liquid 15 milliLiter(s) Oral Mucosa every 12 hours  chlorhexidine 4% Liquid 1 Application(s) Topical daily  clopidogrel Tablet 75 milliGRAM(s) Oral daily  dexMEDEtomidine Infusion 0.2 MICROgram(s)/kG/Hr (4.9 mL/Hr) IV Continuous <Continuous>  dextrose 5%. 1000 milliLiter(s) (100 mL/Hr) IV Continuous <Continuous>  dextrose 5%. 1000 milliLiter(s) (50 mL/Hr) IV Continuous <Continuous>  dextrose 50% Injectable 25 Gram(s) IV Push once  dextrose 50% Injectable 12.5 Gram(s) IV Push once  dextrose 50% Injectable 25 Gram(s) IV Push once  enoxaparin Injectable 100 milliGRAM(s) SubCutaneous every 12 hours  gabapentin 300 milliGRAM(s) Oral three times a day  glucagon  Injectable 1 milliGRAM(s) IntraMuscular once  influenza   Vaccine 0.5 milliLiter(s) IntraMuscular once  insulin glargine Injectable (LANTUS) 15 Unit(s) SubCutaneous at bedtime  insulin lispro (ADMELOG) corrective regimen sliding scale   SubCutaneous at bedtime  losartan 100 milliGRAM(s) Oral daily  pantoprazole  Injectable 40 milliGRAM(s) IV Push daily  polyethylene glycol 3350 17 Gram(s) Oral daily  senna 2 Tablet(s) Oral at bedtime    MEDICATIONS  (PRN):  acetaminophen     Tablet .. 650 milliGRAM(s) Oral every 6 hours PRN Mild Pain (1 - 3)  ALBUTerol    0.083%. 2.5 milliGRAM(s) Nebulizer once PRN Shortness of Breath and/or Wheezing  dextrose Oral Gel 15 Gram(s) Oral once PRN Blood Glucose LESS THAN 70 milliGRAM(s)/deciliter  morphine  - Injectable 2 milliGRAM(s) IV Push every 4 hours PRN Severe Pain (7 - 10)  oxyCODONE    IR 5 milliGRAM(s) Oral every 4 hours PRN Moderate Pain (4 - 6)    lactated ringers Bolus:   500 milliLiter(s), IV Bolus, once, infuse over 1 Hour(s), Stop After 1 Doses  lactated ringers Bolus:   1000 milliLiter(s), IV Bolus, once, infuse over 60 Minute(s), Stop After 1 Doses  lactated ringers Bolus:   1000 milliLiter(s), IV Bolus, once, infuse over 60 Minute(s), Stop After 1 Doses  Provider's Contact #: [phone]  lactated ringers Bolus:   1000 milliLiter(s), IV Bolus, once, infuse over 60 Minute(s), Stop After 1 Doses  Provider's Contact #: [phone]      LABS:                        8.5    8.84  )-----------( 194      ( 31 May 2022 05:13 )             24.8     05-30    135  |  94<L>  |  11  ----------------------------<  88  4.0   |  30  |  <0.5<L>    Ca    10.1      30 May 2022 05:00  Phos  4.7     05-30  Mg     1.6     05-30    TPro  5.7<L>  /  Alb  3.6  /  TBili  0.6  /  DBili  x   /  AST  22  /  ALT  26  /  AlkPhos  83  05-30        Arterial Blood Gas:  05-30 @ 12:55  7.38/62/128/37/99.1/9.2  ABG lactate: --      Mode: AC/ CMV (Assist Control/ Continuous Mandatory Ventilation), RR (machine): 12, TV (machine): 400, FiO2: 40, PEEP: 5, ITime: 1, MAP: 8, PIP: 20      SARS-CoV-2: NotDetec (27 May 2022 10:30)    Mode: AC/ CMV (Assist Control/ Continuous Mandatory Ventilation)  RR (machine): 12  TV (machine): 400  FiO2: 40  PEEP: 5  ITime: 1  MAP: 8  PIP: 20      ABG - ( 30 May 2022 12:55 )  pH, Arterial: 7.38  pH, Blood: x     /  pCO2: 62    /  pO2: 128   / HCO3: 37    / Base Excess: 9.2   /  SaO2: 99.1        RADIOLOGY: Today I personally interpreted the latest CXR and other pertinent films.    no ptx, no inftr. no free air.  CT scan

## 2022-05-31 NOTE — OCCUPATIONAL THERAPY INITIAL EVALUATION ADULT - PERTINENT HX OF CURRENT PROBLEM, REHAB EVAL
57 yo f with PMHx of DVT on Eliquis, COPD,  trach collar, obesity, IDDM, UTI, sent by [facility] for hypoxia

## 2022-05-31 NOTE — OCCUPATIONAL THERAPY INITIAL EVALUATION ADULT - LEVEL OF INDEPENDENCE, REHAB EVAL
pt came in the er with c/o head ache 8/10 .nose bleed .no bleeding noted at this time .
moderate assist (50% patients effort)

## 2022-05-31 NOTE — OCCUPATIONAL THERAPY INITIAL EVALUATION ADULT - LEVEL OF INDEPENDENCE: SIT/STAND, REHAB EVAL
pt with dizziness sitting EOB, returned to semi de jesus, will attempt in follow up session/unable to perform

## 2022-05-31 NOTE — OCCUPATIONAL THERAPY INITIAL EVALUATION ADULT - LIVES WITH, PROFILE
private home, dtr, son, spouse, +8 KEL, +stairs to bedroom but can stay on first floor/children/spouse

## 2022-05-31 NOTE — PROGRESS NOTE ADULT - ASSESSMENT
55 yo f with PMHx of DVT on Eliquis, COPD,  trach collar, obesity, IDDM, UTI, sent by [facility] for hypoxia. Current illness going back to April 4th when pt was intubated on admission at [facility] ICU for hypoxic respiratory failure diagnosed with copd exacerbation and superimposed pneumonia, and remained intubated for 37 days requiring tracheostomy. Patient stayed at [facility] for 2 days until noted to be hypoxic (saturating low 70's) pt's saturation immediately improving afterwards to 80's after large mucus plug removed and was subsequently sent to [facility]  On arrival to ED pt was saturating 97%, 15L O2 via tracheostomy collar (baseline 8 L at [facility])CXR suspicious for L-sided PNA, inconclusive. CTA neg for PE. Pt received x1 IV Levaquin and Cefepime in ED, admitted to MICU for acute hypoxic hypercapnic respiratory failure secondary to acute mucus plug now resolved vs superimposed pneumonia hospital-acquired.   General Surgery Consulted emergently when patient found to be hypoxic to low 80s after CCU and Pulmonary team found large granuloma in trachea along tracheostomy tube. Patient currently had a size 8 trach. Surgery assisted Pulmonary and CCU with trach exchanged with ET tube guidance to a size 7 XLT. Saturations improved to high 90s. Large granuloma was removed. Patient was no longer in distress after exchange.    #Acute/ chronic COPD with exacerbation  #chronic respiratory failure  #mucous plug removed  #pneumonia v, atelectasis L base  #currently No Impending respiratory failure  - s/p Trach exchange to 7 XLT alongside Pulmonology.  - bronchoscopy and trachea debridement tomorrow  -airway management   -trach functioning well s/p exchange           #Hx of DVT  - eliquis held  - On lovenox 100 BID , awaiting possible intervention by CTsgx   -Hold ac tonight       Check O2 sat on NC, record, continue O2 as necessary to maintain sats > 90%  Continue IV solumedrol  bronchodilator treatments ATC and PRN  complete course of antibiotics Cefepime  sputum gm stain cs   procal 0,18 5/27  f/u sx Surgery for possible bronchoscopy with debridement of the trachea should this be required in the future.    Activity OOB to chair  GI PPX  PPI  DVT PPX Therapeutic Lovenox

## 2022-05-31 NOTE — OCCUPATIONAL THERAPY INITIAL EVALUATION ADULT - LEVEL OF INDEPENDENCE: BED TO CHAIR, REHAB EVAL
pt with dizziness at EOB, not resolving, returned to semi de jesus, emy.l use of full body lift for OOB to chair with unit staff/unable to perform

## 2022-05-31 NOTE — OCCUPATIONAL THERAPY INITIAL EVALUATION ADULT - ADDITIONAL COMMENTS
Pt initially admitted to [facility] ~8 weeks ago, was then d/c'd to [facility], ~2 days readmitted to [facility] with hypoxemia, was independent prior to initial admission at [facility], required max assistance with all and was not performing bed mobility at [facility]. (per pt and dtr)

## 2022-05-31 NOTE — OCCUPATIONAL THERAPY INITIAL EVALUATION ADULT - PRECAUTIONS/LIMITATIONS, REHAB EVAL
trach on vent/aspiration precautions/cardiac precautions/fall precautions/oxygen therapy device and L/min

## 2022-05-31 NOTE — OCCUPATIONAL THERAPY INITIAL EVALUATION ADULT - GENERAL OBSERVATIONS, REHAB EVAL
Pt without activity orders at this time. OT requires updated orders to cont with evaluation
Pt received semi de jesus in bed in NAD, +trach on vent, +tele, +BP cuff, +pulse oxi, +wiseman, +IV drip, agreeable to OT evaluation, left in bed in chair mode in NAD, vitals stable, daughter present at bedside, RN present

## 2022-05-31 NOTE — PROGRESS NOTE ADULT - SUBJECTIVE AND OBJECTIVE BOX
PATIENT:  CRUZ DUMONT  MRN-[id number]    Patient is a 56y old  Female who presents with a chief complaint of     INTERVAL HPI/OVERNIGHT EVENTS:    Today, patient seen and examined at bedside  No acute events overnight   Pending bronchoscope and trachea debridement tomorrow.     ICU Vital Signs Last 24 Hrs  T(C): 36.1 (31 May 2022 12:00), Max: 36.7 (30 May 2022 16:00)  T(F): 97 (31 May 2022 12:00), Max: 98 (30 May 2022 16:00)  HR: 63 (31 May 2022 13:00) (53 - 69)  BP: 120/58 (31 May 2022 12:00) (100/57 - 137/60)  BP(mean): 83 (31 May 2022 12:00) (69 - 87)  RR: 18 (31 May 2022 13:00) (12 - 40)  SpO2: 100% (31 May 2022 13:00) (98% - 100%)    I&O's Summary    30 May 2022 07:01  -  31 May 2022 07:00  --------------------------------------------------------  IN: 1188 mL / OUT: 2260 mL / NET: -1072 mL    31 May 2022 07:01  -  31 May 2022 13:15  --------------------------------------------------------  IN: 336 mL / OUT: 750 mL / NET: -414 mL      Mode: AC/ CMV (Assist Control/ Continuous Mandatory Ventilation)  RR (machine): 12  TV (machine): 400  FiO2: 40  PEEP: 5  ITime: 1  MAP: 8  PIP: 18      LABS:                        8.5    8.84  )-----------( 194      ( 31 May 2022 05:13 )             24.8     05-31    136  |  94<L>  |  12  ----------------------------<  79  4.0   |  30  |  <0.5<L>    Ca    9.7      31 May 2022 05:13  Phos  4.7     05-30  Mg     1.7     05-31    TPro  5.8<L>  /  Alb  3.8  /  TBili  0.5  /  DBili  x   /  AST  23  /  ALT  24  /  AlkPhos  82  05-31        CAPILLARY BLOOD GLUCOSE      POCT Blood Glucose.: 138 mg/dL (31 May 2022 11:37)  POCT Blood Glucose.: 115 mg/dL (31 May 2022 07:56)  POCT Blood Glucose.: 103 mg/dL (30 May 2022 21:44)  POCT Blood Glucose.: 104 mg/dL (30 May 2022 19:15)  POCT Blood Glucose.: 69 mg/dL (30 May 2022 18:29)    ABG - ( 30 May 2022 12:55 )  pH, Arterial: 7.38  pH, Blood: x     /  pCO2: 62    /  pO2: 128   / HCO3: 37    / Base Excess: 9.2   /  SaO2: 99.1                RADIOLOGY & ADDITIONAL TESTS:    Consultant(s) Notes Reviewed:  [x ] YES  [ ] NO    MEDICATIONS  (STANDING):  ALPRAZolam 0.25 milliGRAM(s) Oral at bedtime  amLODIPine   Tablet 5 milliGRAM(s) Oral daily  ammonium lactate 12% Lotion 1 Application(s) Topical every 12 hours  atorvastatin 20 milliGRAM(s) Oral at bedtime  buMETAnide 2 milliGRAM(s) Oral daily  cefepime   IVPB 1000 milliGRAM(s) IV Intermittent every 12 hours  chlorhexidine 0.12% Liquid 15 milliLiter(s) Oral Mucosa every 12 hours  chlorhexidine 4% Liquid 1 Application(s) Topical daily  dexMEDEtomidine Infusion 0.2 MICROgram(s)/kG/Hr (4.9 mL/Hr) IV Continuous <Continuous>  dextrose 5%. 1000 milliLiter(s) (100 mL/Hr) IV Continuous <Continuous>  dextrose 5%. 1000 milliLiter(s) (50 mL/Hr) IV Continuous <Continuous>  dextrose 50% Injectable 25 Gram(s) IV Push once  dextrose 50% Injectable 12.5 Gram(s) IV Push once  dextrose 50% Injectable 25 Gram(s) IV Push once  enoxaparin Injectable 100 milliGRAM(s) SubCutaneous every 12 hours  gabapentin 300 milliGRAM(s) Oral three times a day  glucagon  Injectable 1 milliGRAM(s) IntraMuscular once  influenza   Vaccine 0.5 milliLiter(s) IntraMuscular once  insulin glargine Injectable (LANTUS) 15 Unit(s) SubCutaneous at bedtime  insulin lispro (ADMELOG) corrective regimen sliding scale   SubCutaneous at bedtime  losartan 100 milliGRAM(s) Oral daily  pantoprazole  Injectable 40 milliGRAM(s) IV Push daily  polyethylene glycol 3350 17 Gram(s) Oral daily  senna 2 Tablet(s) Oral at bedtime    MEDICATIONS  (PRN):  acetaminophen     Tablet .. 650 milliGRAM(s) Oral every 6 hours PRN Mild Pain (1 - 3)  ALBUTerol    0.083%. 2.5 milliGRAM(s) Nebulizer once PRN Shortness of Breath and/or Wheezing  dextrose Oral Gel 15 Gram(s) Oral once PRN Blood Glucose LESS THAN 70 milliGRAM(s)/deciliter  morphine  - Injectable 2 milliGRAM(s) IV Push every 4 hours PRN Severe Pain (7 - 10)  oxyCODONE    IR 5 milliGRAM(s) Oral every 4 hours PRN Moderate Pain (4 - 6)      PHYSICAL EXAM:  General: NAD, AAOx3, calm and cooperative  HEENT: NCAT, МАРИНА, EOMI, Tracheostomy in place, functioning.   Cardiac: RRR S1, S2, no Murmurs, rubs or gallops  Respiratory: CTAB, normal respiratory effort, breath sounds equal BL, no wheeze, mild UR congesion  Abdomen: Soft, non-distended, non-tender, no rebound, no guarding. +BS.  Extremities:  Doppler DP pulses, chroninc venous stasis changes bilateral lower extremity     Care Discussed with Consultants/Other Providers [ x] YES  [ ] NO

## 2022-05-31 NOTE — PROGRESS NOTE ADULT - SUBJECTIVE AND OBJECTIVE BOX
CT SURGERY PROGRESS NOTE    Patient: CRUZ DUMONT , 56y ([birth year])Female   MRN: [id number]  Location: [facility] 115 A  Visit: 05-27-22 Inpatient  Date: 05-31-22 @ 08:25    Hospital Day #: 5    Procedure/Dx/Injuries: c/s for trach exchange and bronchial mass biopsy/debridement    Events of past 24 hours: no acute events    PAST MEDICAL & SURGICAL HISTORY:  COPD (chronic obstructive pulmonary disease)  CHF (congestive heart failure)  DM (diabetes mellitus  H/O tracheostomy    Vitals:   T(F): 97 (05-31-22 @ 08:00), Max: 98 (05-30-22 @ 16:00)  HR: 62 (05-31-22 @ 07:00)  BP: 116/55 (05-31-22 @ 07:00)  RR: 16 (05-31-22 @ 07:00)  SpO2: 100% (05-31-22 @ 07:00)  Mode: AC/ CMV (Assist Control/ Continuous Mandatory Ventilation), RR (machine): 12, TV (machine): 400, FiO2: 40, PEEP: 5, ITime: 1, MAP: 8, PIP: 20    Diet, NPO with Tube Feed:   Tube Feeding Modality: Nasogastric  Jevity 1.2 Martin  Total Volume for 24 Hours (mL): 1200  Bolus  Total Volume of Bolus (mL):  300  Total # of Feeds: 4  Tube Feed Frequency: Every 6 hours   Tube Feed Start Time: 19:00  Bolus Feed Rate (mL per Hour): 100   Bolus Feed Duration (in Hours): 3  Free Water Flush  Bolus   Total Volume per Flush (mL): 80   Frequency: Every 6 Hours      Fluids:     I & O's:    05-30-22 @ 07:01  -  05-31-22 @ 07:00  --------------------------------------------------------  IN:    Dexmedetomidine: 128 mL    Enteral Tube Flush: 160 mL    Jevity 1.2: 900 mL  Total IN: 1188 mL    OUT:    Indwelling Catheter - Urethral (mL): 2260 mL  Total OUT: 2260 mL    Total NET: -1072 mL      PHYSICAL EXAM:  General: NAD, AAOx3, calm and cooperative  HEENT: NCAT, Trachea ML  Cardiac: RRR S1, S2, no Murmurs, rubs or gallops  Respiratory: trached 40/5, CTAB, breath sounds equal BL, no wheeze, rhonchi or crackles  Abdomen: Soft, non-distended, non-tender, no rebound, no guarding.  Musculoskeletal: ROM intact  Neuro: No focal deficits  Vascular: Pulses 2+ throughout, extremities well perfused  Skin: Warm/dry, normal color, no jaundice    MEDICATIONS  (STANDING):  ALPRAZolam 0.25 milliGRAM(s) Oral at bedtime  amLODIPine   Tablet 5 milliGRAM(s) Oral daily  ammonium lactate 12% Lotion 1 Application(s) Topical every 12 hours  atorvastatin 20 milliGRAM(s) Oral at bedtime  buMETAnide 2 milliGRAM(s) Oral daily  cefepime   IVPB 1000 milliGRAM(s) IV Intermittent every 12 hours  chlorhexidine 0.12% Liquid 15 milliLiter(s) Oral Mucosa every 12 hours  chlorhexidine 4% Liquid 1 Application(s) Topical daily  dexMEDEtomidine Infusion 0.2 MICROgram(s)/kG/Hr (4.9 mL/Hr) IV Continuous <Continuous>  dextrose 5%. 1000 milliLiter(s) (100 mL/Hr) IV Continuous <Continuous>  dextrose 5%. 1000 milliLiter(s) (50 mL/Hr) IV Continuous <Continuous>  dextrose 50% Injectable 25 Gram(s) IV Push once  dextrose 50% Injectable 12.5 Gram(s) IV Push once  dextrose 50% Injectable 25 Gram(s) IV Push once  enoxaparin Injectable 100 milliGRAM(s) SubCutaneous every 12 hours  gabapentin 300 milliGRAM(s) Oral three times a day  glucagon  Injectable 1 milliGRAM(s) IntraMuscular once  influenza   Vaccine 0.5 milliLiter(s) IntraMuscular once  insulin glargine Injectable (LANTUS) 15 Unit(s) SubCutaneous at bedtime  insulin lispro (ADMELOG) corrective regimen sliding scale   SubCutaneous at bedtime  losartan 100 milliGRAM(s) Oral daily  magnesium sulfate  IVPB 2 Gram(s) IV Intermittent every 2 hours  pantoprazole  Injectable 40 milliGRAM(s) IV Push daily  polyethylene glycol 3350 17 Gram(s) Oral daily  senna 2 Tablet(s) Oral at bedtime    MEDICATIONS  (PRN):  acetaminophen     Tablet .. 650 milliGRAM(s) Oral every 6 hours PRN Mild Pain (1 - 3)  ALBUTerol    0.083%. 2.5 milliGRAM(s) Nebulizer once PRN Shortness of Breath and/or Wheezing  dextrose Oral Gel 15 Gram(s) Oral once PRN Blood Glucose LESS THAN 70 milliGRAM(s)/deciliter  morphine  - Injectable 2 milliGRAM(s) IV Push every 4 hours PRN Severe Pain (7 - 10)  oxyCODONE    IR 5 milliGRAM(s) Oral every 4 hours PRN Moderate Pain (4 - 6)    DVT PROPHYLAXIS: enoxaparin Injectable 100 milliGRAM(s) SubCutaneous every 12 hours  GI PROPHYLAXIS: pantoprazole  Injectable 40 milliGRAM(s) IV Push daily  ANTIBIOTICS:  cefepime   IVPB 1000 milliGRAM(s)      LAB/STUDIES:  Labs:  CAPILLARY BLOOD GLUCOSE  POCT Blood Glucose.: 115 mg/dL (31 May 2022 07:56)  POCT Blood Glucose.: 103 mg/dL (30 May 2022 21:44)  POCT Blood Glucose.: 104 mg/dL (30 May 2022 19:15)  POCT Blood Glucose.: 69 mg/dL (30 May 2022 18:29)                    8.5    8.84  )-----------( 194      ( 31 May 2022 05:13 )             24.8       Auto Neutrophil %: 69.7 % (05-31-22 @ 05:13)  Auto Immature Granulocyte %: 1.8 % (05-31-22 @ 05:13)    05-31    136  |  94<L>  |  12  ----------------------------<  79  4.0   |  30  |  <0.5<L>      Calcium, Total Serum: 9.7 mg/dL (05-31-22 @ 05:13)  LFTs:             5.8  | 0.5  | 23       ------------------[82      ( 31 May 2022 05:13 )  3.8  | x    | 24          Lipase:x      Amylase:x         Blood Gas Arterial, Lactate: 0.50 mmol/L (05-30-22 @ 12:55)    ABG - ( 30 May 2022 12:55 )  pH: 7.38  /  pCO2: 62    /  pO2: 128   / HCO3: 37    / Base Excess: 9.2   /  SaO2: 99.1      Coags:    Serum Pro-Brain Natriuretic Peptide: 397 pg/mL (05-27-22 @ 10:00)    Culture - Blood (collected 29 May 2022 12:33)  Source: .Blood None  Preliminary Report (30 May 2022 19:01):    No growth to date.    Culture - Urine (collected 28 May 2022 17:24)  Source: Catheterized Catheterized  Final Report (29 May 2022 18:18):    <10,000 CFU/mL Normal Urogenital Chante    Culture - Urine (collected 28 May 2022 10:07)  Source: Catheterized Catheterized  Final Report (30 May 2022 10:17):    >=3 organisms. Probable collection contamination.

## 2022-06-01 LAB
-  AMIKACIN: SIGNIFICANT CHANGE UP
-  AZTREONAM: SIGNIFICANT CHANGE UP
-  CEFEPIME: SIGNIFICANT CHANGE UP
-  CEFTAZIDIME: SIGNIFICANT CHANGE UP
-  CIPROFLOXACIN: SIGNIFICANT CHANGE UP
-  GENTAMICIN: SIGNIFICANT CHANGE UP
-  IMIPENEM: SIGNIFICANT CHANGE UP
-  LEVOFLOXACIN: SIGNIFICANT CHANGE UP
-  MEROPENEM: SIGNIFICANT CHANGE UP
-  PIPERACILLIN/TAZOBACTAM: SIGNIFICANT CHANGE UP
-  TOBRAMYCIN: SIGNIFICANT CHANGE UP
ALBUMIN SERPL ELPH-MCNC: 3.6 G/DL — SIGNIFICANT CHANGE UP (ref 3.5–5.2)
ALP SERPL-CCNC: 85 U/L — SIGNIFICANT CHANGE UP (ref 30–115)
ALT FLD-CCNC: 21 U/L — SIGNIFICANT CHANGE UP (ref 0–41)
ANION GAP SERPL CALC-SCNC: 14 MMOL/L — SIGNIFICANT CHANGE UP (ref 7–14)
APTT BLD: 35.9 SEC — SIGNIFICANT CHANGE UP (ref 27–39.2)
AST SERPL-CCNC: 20 U/L — SIGNIFICANT CHANGE UP (ref 0–41)
BASOPHILS # BLD AUTO: 0.04 K/UL — SIGNIFICANT CHANGE UP (ref 0–0.2)
BASOPHILS NFR BLD AUTO: 0.5 % — SIGNIFICANT CHANGE UP (ref 0–1)
BILIRUB SERPL-MCNC: 0.7 MG/DL — SIGNIFICANT CHANGE UP (ref 0.2–1.2)
BLD GP AB SCN SERPL QL: SIGNIFICANT CHANGE UP
BUN SERPL-MCNC: 14 MG/DL — SIGNIFICANT CHANGE UP (ref 10–20)
CALCIUM SERPL-MCNC: 9.4 MG/DL — SIGNIFICANT CHANGE UP (ref 8.5–10.1)
CHLORIDE SERPL-SCNC: 90 MMOL/L — LOW (ref 98–110)
CO2 SERPL-SCNC: 29 MMOL/L — SIGNIFICANT CHANGE UP (ref 17–32)
CREAT SERPL-MCNC: <0.5 MG/DL — LOW (ref 0.7–1.5)
EGFR: 115 ML/MIN/1.73M2 — SIGNIFICANT CHANGE UP
EOSINOPHIL # BLD AUTO: 0.3 K/UL — SIGNIFICANT CHANGE UP (ref 0–0.7)
EOSINOPHIL NFR BLD AUTO: 3.7 % — SIGNIFICANT CHANGE UP (ref 0–8)
GLUCOSE BLDC GLUCOMTR-MCNC: 129 MG/DL — HIGH (ref 70–99)
GLUCOSE BLDC GLUCOMTR-MCNC: 139 MG/DL — HIGH (ref 70–99)
GLUCOSE BLDC GLUCOMTR-MCNC: 139 MG/DL — HIGH (ref 70–99)
GLUCOSE SERPL-MCNC: 130 MG/DL — HIGH (ref 70–99)
HCT VFR BLD CALC: 25.5 % — LOW (ref 37–47)
HGB BLD-MCNC: 8.6 G/DL — LOW (ref 12–16)
IMM GRANULOCYTES NFR BLD AUTO: 1.4 % — HIGH (ref 0.1–0.3)
INR BLD: 1.05 RATIO — SIGNIFICANT CHANGE UP (ref 0.65–1.3)
LYMPHOCYTES # BLD AUTO: 1.29 K/UL — SIGNIFICANT CHANGE UP (ref 1.2–3.4)
LYMPHOCYTES # BLD AUTO: 15.9 % — LOW (ref 20.5–51.1)
MAGNESIUM SERPL-MCNC: 1.9 MG/DL — SIGNIFICANT CHANGE UP (ref 1.8–2.4)
MCHC RBC-ENTMCNC: 33 PG — HIGH (ref 27–31)
MCHC RBC-ENTMCNC: 33.7 G/DL — SIGNIFICANT CHANGE UP (ref 32–37)
MCV RBC AUTO: 97.7 FL — SIGNIFICANT CHANGE UP (ref 81–99)
METHOD TYPE: SIGNIFICANT CHANGE UP
MONOCYTES # BLD AUTO: 0.71 K/UL — HIGH (ref 0.1–0.6)
MONOCYTES NFR BLD AUTO: 8.7 % — SIGNIFICANT CHANGE UP (ref 1.7–9.3)
NEUTROPHILS # BLD AUTO: 5.68 K/UL — SIGNIFICANT CHANGE UP (ref 1.4–6.5)
NEUTROPHILS NFR BLD AUTO: 69.8 % — SIGNIFICANT CHANGE UP (ref 42.2–75.2)
NRBC # BLD: 0 /100 WBCS — SIGNIFICANT CHANGE UP (ref 0–0)
PLATELET # BLD AUTO: 180 K/UL — SIGNIFICANT CHANGE UP (ref 130–400)
POTASSIUM SERPL-MCNC: 4.1 MMOL/L — SIGNIFICANT CHANGE UP (ref 3.5–5)
POTASSIUM SERPL-SCNC: 4.1 MMOL/L — SIGNIFICANT CHANGE UP (ref 3.5–5)
PROT SERPL-MCNC: 5.8 G/DL — LOW (ref 6–8)
PROTHROM AB SERPL-ACNC: 12.1 SEC — SIGNIFICANT CHANGE UP (ref 9.95–12.87)
RBC # BLD: 2.61 M/UL — LOW (ref 4.2–5.4)
RBC # FLD: 15.3 % — HIGH (ref 11.5–14.5)
SODIUM SERPL-SCNC: 133 MMOL/L — LOW (ref 135–146)
WBC # BLD: 8.13 K/UL — SIGNIFICANT CHANGE UP (ref 4.8–10.8)
WBC # FLD AUTO: 8.13 K/UL — SIGNIFICANT CHANGE UP (ref 4.8–10.8)

## 2022-06-01 PROCEDURE — 99233 SBSQ HOSP IP/OBS HIGH 50: CPT

## 2022-06-01 PROCEDURE — 31622 DX BRONCHOSCOPE/WASH: CPT

## 2022-06-01 RX ORDER — ALPRAZOLAM 0.25 MG
0.25 TABLET ORAL AT BEDTIME
Refills: 0 | Status: DISCONTINUED | OUTPATIENT
Start: 2022-06-01 | End: 2022-06-02

## 2022-06-01 RX ORDER — LOSARTAN POTASSIUM 100 MG/1
100 TABLET, FILM COATED ORAL DAILY
Refills: 0 | Status: DISCONTINUED | OUTPATIENT
Start: 2022-06-01 | End: 2022-06-28

## 2022-06-01 RX ORDER — SODIUM CHLORIDE 9 MG/ML
1000 INJECTION INTRAMUSCULAR; INTRAVENOUS; SUBCUTANEOUS
Refills: 0 | Status: DISCONTINUED | OUTPATIENT
Start: 2022-06-01 | End: 2022-06-01

## 2022-06-01 RX ORDER — BUMETANIDE 0.25 MG/ML
2 INJECTION INTRAMUSCULAR; INTRAVENOUS DAILY
Refills: 0 | Status: DISCONTINUED | OUTPATIENT
Start: 2022-06-01 | End: 2022-06-14

## 2022-06-01 RX ORDER — CEFEPIME 1 G/1
1000 INJECTION, POWDER, FOR SOLUTION INTRAMUSCULAR; INTRAVENOUS EVERY 12 HOURS
Refills: 0 | Status: DISCONTINUED | OUTPATIENT
Start: 2022-06-01 | End: 2022-06-02

## 2022-06-01 RX ORDER — PANTOPRAZOLE SODIUM 20 MG/1
40 TABLET, DELAYED RELEASE ORAL DAILY
Refills: 0 | Status: DISCONTINUED | OUTPATIENT
Start: 2022-06-01 | End: 2022-06-08

## 2022-06-01 RX ORDER — GABAPENTIN 400 MG/1
300 CAPSULE ORAL THREE TIMES A DAY
Refills: 0 | Status: DISCONTINUED | OUTPATIENT
Start: 2022-06-01 | End: 2022-06-17

## 2022-06-01 RX ORDER — AMLODIPINE BESYLATE 2.5 MG/1
5 TABLET ORAL DAILY
Refills: 0 | Status: DISCONTINUED | OUTPATIENT
Start: 2022-06-01 | End: 2022-06-28

## 2022-06-01 RX ORDER — SENNA PLUS 8.6 MG/1
2 TABLET ORAL AT BEDTIME
Refills: 0 | Status: DISCONTINUED | OUTPATIENT
Start: 2022-06-01 | End: 2022-06-13

## 2022-06-01 RX ORDER — MORPHINE SULFATE 50 MG/1
2 CAPSULE, EXTENDED RELEASE ORAL EVERY 4 HOURS
Refills: 0 | Status: DISCONTINUED | OUTPATIENT
Start: 2022-06-01 | End: 2022-06-02

## 2022-06-01 RX ORDER — OXYCODONE HYDROCHLORIDE 5 MG/1
5 TABLET ORAL EVERY 4 HOURS
Refills: 0 | Status: DISCONTINUED | OUTPATIENT
Start: 2022-06-01 | End: 2022-06-07

## 2022-06-01 RX ORDER — ENOXAPARIN SODIUM 100 MG/ML
100 INJECTION SUBCUTANEOUS EVERY 12 HOURS
Refills: 0 | Status: DISCONTINUED | OUTPATIENT
Start: 2022-06-01 | End: 2022-06-03

## 2022-06-01 RX ORDER — ATORVASTATIN CALCIUM 80 MG/1
20 TABLET, FILM COATED ORAL AT BEDTIME
Refills: 0 | Status: DISCONTINUED | OUTPATIENT
Start: 2022-06-01 | End: 2022-07-10

## 2022-06-01 RX ORDER — DEXMEDETOMIDINE HYDROCHLORIDE IN 0.9% SODIUM CHLORIDE 4 UG/ML
0.2 INJECTION INTRAVENOUS
Qty: 400 | Refills: 0 | Status: DISCONTINUED | OUTPATIENT
Start: 2022-06-01 | End: 2022-06-02

## 2022-06-01 RX ORDER — ACETAMINOPHEN 500 MG
650 TABLET ORAL EVERY 6 HOURS
Refills: 0 | Status: DISCONTINUED | OUTPATIENT
Start: 2022-06-01 | End: 2022-06-17

## 2022-06-01 RX ORDER — SOD,AMMONIUM,POTASSIUM LACTATE
1 CREAM (GRAM) TOPICAL EVERY 12 HOURS
Refills: 0 | Status: DISCONTINUED | OUTPATIENT
Start: 2022-06-01 | End: 2022-07-16

## 2022-06-01 RX ORDER — POLYETHYLENE GLYCOL 3350 17 G/17G
17 POWDER, FOR SOLUTION ORAL DAILY
Refills: 0 | Status: DISCONTINUED | OUTPATIENT
Start: 2022-06-01 | End: 2022-06-13

## 2022-06-01 RX ADMIN — SENNA PLUS 2 TABLET(S): 8.6 TABLET ORAL at 21:12

## 2022-06-01 RX ADMIN — ENOXAPARIN SODIUM 100 MILLIGRAM(S): 100 INJECTION SUBCUTANEOUS at 17:16

## 2022-06-01 RX ADMIN — SODIUM CHLORIDE 70 MILLILITER(S): 9 INJECTION INTRAMUSCULAR; INTRAVENOUS; SUBCUTANEOUS at 07:46

## 2022-06-01 RX ADMIN — OXYCODONE HYDROCHLORIDE 5 MILLIGRAM(S): 5 TABLET ORAL at 02:00

## 2022-06-01 RX ADMIN — MORPHINE SULFATE 2 MILLIGRAM(S): 50 CAPSULE, EXTENDED RELEASE ORAL at 23:57

## 2022-06-01 RX ADMIN — GABAPENTIN 300 MILLIGRAM(S): 400 CAPSULE ORAL at 21:12

## 2022-06-01 RX ADMIN — OXYCODONE HYDROCHLORIDE 5 MILLIGRAM(S): 5 TABLET ORAL at 15:30

## 2022-06-01 RX ADMIN — DEXMEDETOMIDINE HYDROCHLORIDE IN 0.9% SODIUM CHLORIDE 4.9 MICROGRAM(S)/KG/HR: 4 INJECTION INTRAVENOUS at 20:53

## 2022-06-01 RX ADMIN — CHLORHEXIDINE GLUCONATE 1 APPLICATION(S): 213 SOLUTION TOPICAL at 05:20

## 2022-06-01 RX ADMIN — DEXMEDETOMIDINE HYDROCHLORIDE IN 0.9% SODIUM CHLORIDE 4.9 MICROGRAM(S)/KG/HR: 4 INJECTION INTRAVENOUS at 15:16

## 2022-06-01 RX ADMIN — POLYETHYLENE GLYCOL 3350 17 GRAM(S): 17 POWDER, FOR SOLUTION ORAL at 12:54

## 2022-06-01 RX ADMIN — MORPHINE SULFATE 2 MILLIGRAM(S): 50 CAPSULE, EXTENDED RELEASE ORAL at 12:46

## 2022-06-01 RX ADMIN — CEFEPIME 100 MILLIGRAM(S): 1 INJECTION, POWDER, FOR SOLUTION INTRAMUSCULAR; INTRAVENOUS at 05:20

## 2022-06-01 RX ADMIN — GABAPENTIN 300 MILLIGRAM(S): 400 CAPSULE ORAL at 14:41

## 2022-06-01 RX ADMIN — MORPHINE SULFATE 2 MILLIGRAM(S): 50 CAPSULE, EXTENDED RELEASE ORAL at 16:43

## 2022-06-01 RX ADMIN — ATORVASTATIN CALCIUM 20 MILLIGRAM(S): 80 TABLET, FILM COATED ORAL at 21:12

## 2022-06-01 RX ADMIN — Medication 1 APPLICATION(S): at 05:20

## 2022-06-01 RX ADMIN — OXYCODONE HYDROCHLORIDE 5 MILLIGRAM(S): 5 TABLET ORAL at 01:42

## 2022-06-01 RX ADMIN — AMLODIPINE BESYLATE 5 MILLIGRAM(S): 2.5 TABLET ORAL at 05:21

## 2022-06-01 RX ADMIN — MORPHINE SULFATE 2 MILLIGRAM(S): 50 CAPSULE, EXTENDED RELEASE ORAL at 13:00

## 2022-06-01 RX ADMIN — OXYCODONE HYDROCHLORIDE 5 MILLIGRAM(S): 5 TABLET ORAL at 20:53

## 2022-06-01 RX ADMIN — MORPHINE SULFATE 2 MILLIGRAM(S): 50 CAPSULE, EXTENDED RELEASE ORAL at 04:38

## 2022-06-01 RX ADMIN — CHLORHEXIDINE GLUCONATE 15 MILLILITER(S): 213 SOLUTION TOPICAL at 05:20

## 2022-06-01 RX ADMIN — OXYCODONE HYDROCHLORIDE 5 MILLIGRAM(S): 5 TABLET ORAL at 14:40

## 2022-06-01 RX ADMIN — Medication 0.25 MILLIGRAM(S): at 21:12

## 2022-06-01 RX ADMIN — GABAPENTIN 300 MILLIGRAM(S): 400 CAPSULE ORAL at 05:21

## 2022-06-01 RX ADMIN — OXYCODONE HYDROCHLORIDE 5 MILLIGRAM(S): 5 TABLET ORAL at 21:15

## 2022-06-01 RX ADMIN — CEFEPIME 100 MILLIGRAM(S): 1 INJECTION, POWDER, FOR SOLUTION INTRAMUSCULAR; INTRAVENOUS at 17:15

## 2022-06-01 RX ADMIN — MORPHINE SULFATE 2 MILLIGRAM(S): 50 CAPSULE, EXTENDED RELEASE ORAL at 17:10

## 2022-06-01 RX ADMIN — PANTOPRAZOLE SODIUM 40 MILLIGRAM(S): 20 TABLET, DELAYED RELEASE ORAL at 12:54

## 2022-06-01 RX ADMIN — MORPHINE SULFATE 2 MILLIGRAM(S): 50 CAPSULE, EXTENDED RELEASE ORAL at 05:00

## 2022-06-01 RX ADMIN — BUMETANIDE 2 MILLIGRAM(S): 0.25 INJECTION INTRAMUSCULAR; INTRAVENOUS at 05:21

## 2022-06-01 RX ADMIN — LOSARTAN POTASSIUM 100 MILLIGRAM(S): 100 TABLET, FILM COATED ORAL at 05:20

## 2022-06-01 RX ADMIN — Medication 1 APPLICATION(S): at 17:14

## 2022-06-01 NOTE — PROGRESS NOTE ADULT - ASSESSMENT
55 yo f with PMHx of DVT on Eliquis, COPD,  trach collar, obesity, IDDM, UTI, [place] [facility] [place] for hypoxia. Current illness going back to April 4th when pt was intubated on admission at [facility] ICU for hypoxic respiratory failure diagnosed with copd exacerbation and superimposed pneumonia, and remained intubated for 37 days requiring tracheostomy. Patient stayed at [facility] for 2 days until noted to be hypoxic (saturating low 70's) pt's saturation immediately improving afterwards to 80's after large mucus plug removed and was subsequently sent to [facility]  On arrival to ED pt was saturating 97%, 15L O2 via tracheostomy collar (baseline 8 L at [facility])CXR suspicious for L-sided PNA, inconclusive. CTA neg for PE. Pt received x1 IV Levaquin and Cefepime in ED, admitted to MICU for acute hypoxic hypercapnic respiratory failure secondary to acute mucus plug now resolved vs superimposed pneumonia hospital-acquired.   General Surgery Consulted emergently when patient found to be hypoxic to low 80s after CCU and Pulmonary team found large granuloma in trachea along tracheostomy tube. Patient currently had a size 8 trach. Surgery assisted Pulmonary and CCU with trach exchanged with ET tube guidance to a size 7 XLT. Saturations improved to high 90s. Large granuloma was removed. Patient was no longer in distress after exchange.    #Acute/ chronic COPD with exacerbation  #chronic respiratory failure  #mucous plug removed  #pneumonia v, atelectasis L base  #currently No Impending respiratory failure  - s/p Trach exchange to 7 XLT alongside Pulmonology.  - bronchoscopy and trachea debridement today   -airway management   -trach functioning well s/p exchange           #Hx of DVT  - eliquis held  - On lovenox 100 BID   -AC held for procedure       Check O2 sat on NC, record, continue O2 as necessary to maintain sats > 90%  Continue IV solumedrol  bronchodilator treatments ATC and PRN  complete course of antibiotics Cefepime  sputum gm stain cs   procal 0,18 5/27  f/u sx Surgery for possible bronchoscopy with debridement of the trachea should this be required in the future.    Activity OOB to chair  GI PPX  PPI  DVT PPX Therapeutic Lovenox

## 2022-06-01 NOTE — PROGRESS NOTE ADULT - SUBJECTIVE AND OBJECTIVE BOX
PATIENT:  CRUZ DUMONT  MRN-[id number]    Patient is a 56y old  Female who presents with a chief complaint of     INTERVAL HPI/OVERNIGHT EVENTS:  No acute events overnight   Patient resting in bed comfortably , awaiting bronchoscopy and tracheal debridement     ICU Vital Signs Last 24 Hrs  T(C): 36.6 (01 Jun 2022 08:00), Max: 36.7 (01 Jun 2022 04:00)  T(F): 97.8 (01 Jun 2022 08:00), Max: 98.1 (01 Jun 2022 04:00)  HR: 59 (01 Jun 2022 10:00) (55 - 78)  BP: 143/66 (01 Jun 2022 10:00) (93/44 - 143/66)  BP(mean): 95 (01 Jun 2022 10:00) (64 - 95)  RR: 15 (01 Jun 2022 10:00) (12 - 37)  SpO2: 100% (01 Jun 2022 10:00) (96% - 100%)    I&O's Summary    31 May 2022 07:01  -  01 Jun 2022 07:00  --------------------------------------------------------  IN: 1098 mL / OUT: 1820 mL / NET: -722 mL    01 Jun 2022 07:01  -  01 Jun 2022 11:09  --------------------------------------------------------  IN: 228 mL / OUT: 480 mL / NET: -252 mL      Mode: AC/ CMV (Assist Control/ Continuous Mandatory Ventilation)  RR (machine): 12  TV (machine): 400  FiO2: 40  PEEP: 5  ITime: 1  MAP: 7  PIP: 20      LABS:                        8.6    8.13  )-----------( 180      ( 01 Jun 2022 04:44 )             25.5     06-01    133<L>  |  90<L>  |  14  ----------------------------<  130<H>  4.1   |  29  |  <0.5<L>    Ca    9.4      01 Jun 2022 04:44  Mg     1.9     06-01    TPro  5.8<L>  /  Alb  3.6  /  TBili  0.7  /  DBili  x   /  AST  20  /  ALT  21  /  AlkPhos  85  06-01    PT/INR - ( 01 Jun 2022 04:44 )   PT: 12.10 sec;   INR: 1.05 ratio         PTT - ( 01 Jun 2022 04:44 )  PTT:35.9 sec    CAPILLARY BLOOD GLUCOSE      POCT Blood Glucose.: 139 mg/dL (01 Jun 2022 05:28)  POCT Blood Glucose.: 149 mg/dL (31 May 2022 21:50)  POCT Blood Glucose.: 131 mg/dL (31 May 2022 17:52)  POCT Blood Glucose.: 138 mg/dL (31 May 2022 11:37)    ABG - ( 30 May 2022 12:55 )  pH, Arterial: 7.38  pH, Blood: x     /  pCO2: 62    /  pO2: 128   / HCO3: 37    / Base Excess: 9.2   /  SaO2: 99.1                RADIOLOGY & ADDITIONAL TESTS:    Consultant(s) Notes Reviewed:  [x ] YES  [ ] NO    MEDICATIONS  (STANDING):  influenza   Vaccine 0.5 milliLiter(s) IntraMuscular once    MEDICATIONS  (PRN):      PHYSICAL EXAM:  General: NAD, AAOx3, calm and cooperative  HEENT: NCAT, МАРИНА, EOMI, Tracheostomy in place, functioning.   Cardiac: RRR S1, S2, no Murmurs, rubs or gallops  Respiratory: CTAB, normal respiratory effort, breath sounds equal BL, no wheeze, mild UR congesion  Abdomen: Soft, non-distended, non-tender, no rebound, no guarding. +BS.  Extremities:  Doppler DP pulses, chroninc venous stasis changes bilateral lower extremity       Care Discussed with Consultants/Other Providers [ x] YES  [ ] NO

## 2022-06-01 NOTE — PRE-OP CHECKLIST - STERILIZATION AFFIRMATION
[FreeTextEntry1] : I reviewed the above findings with the patient. Treatment options for the prolapse were discussed and included doing nothing, Kegel exercises and behavioral modification, a pessary, or surgical correction.Surgically we discussed the abdominal vs the vaginal routes. Abdominally we discussed a  a sacral colpopexy laparoscopically and robotically.  Vaginally we discussed a native tissue repair versus vaginal mesh reconstruction. We discussed the FDA warning on transvaginal mesh. Surgically we also discussed the use of biologics. She would like to have a pessary fitted and placed and will  return for such. IUGA patient information of pelvic organ prolapse, pessary, and Kegel instructions was given to her. All questions were answered.\par 
n/a

## 2022-06-01 NOTE — PROGRESS NOTE ADULT - ATTENDING COMMENTS
56 y.o. F with COPD, obesity and DM with tracheostomy recently placed for episode of COPD exacerbation. Developed respiratory distress, tracheal soft tissue mass (granulation tissue?) partially obstructing trachea was found at bedside bronchoscopy, longer cannula placed. Plan for bronchoscopy and tracheal debridement of likely granulation tissue.    06/01/22: Stable, low ventilatory settings  Plan:   Scheduled for bronchoscopy and tracheal debridement today

## 2022-06-01 NOTE — PROGRESS NOTE ADULT - ASSESSMENT
Impression:  Acute/ chronic COPD with exacerbation  chronic respiratory failure  mucous plug removed  pneumonia/ atelectasis L base  currently No Impending respiratory failure  hx DVT     SUGGEST:   frequent suction with saline   follow ct surgery for bronch and debridement of the granulation tissue   lovenox  Q12 hrs   Check O2 sat on NC, record, continue O2 as necessary to maintain sats > 90%  Continue IV solumedrol  bronchodilator treatments ATC and PRN  complete course of antibiotics  follow urine   bld cx   sputum gm stain cs   procal   OOB to chair  GI and DVT prophylaxis  pulmonary toilet  keep same vent setting frequent suction  follow h/h   follow lytes

## 2022-06-01 NOTE — PROGRESS NOTE ADULT - SUBJECTIVE AND OBJECTIVE BOX
CT SURGERY PROGRESS NOTE    Patient: CRUZ DUMONT , 56y ([birth year])Female   MRN: [id number]  Location: [facility] A  Visit: 05-27-22 Inpatient    Hospital Day #: 6    Procedure/Dx/Injuries: c/s for trach exchange and bronchial mass biopsy/debridement    Events of past 24 hours: no acute events. pt pre-opped for OR. NGT placed for feeds    PAST MEDICAL & SURGICAL HISTORY:  COPD (chronic obstructive pulmonary disease)  CHF (congestive heart failure)  DM (diabetes mellitus  H/O tracheostomy    Vitals:   T(F): 97.9 (06-01-22 @ 00:00), Max: 97.9 (06-01-22 @ 00:00)  HR: 60 (06-01-22 @ 01:32)  BP: 101/51 (06-01-22 @ 01:32)  RR: 12 (06-01-22 @ 01:32)  SpO2: 99% (06-01-22 @ 01:32)  Mode: AC/ CMV (Assist Control/ Continuous Mandatory Ventilation), RR (machine): 12, TV (machine): 400, FiO2: 40, PEEP: 5, ITime: 1, MAP: 8, PIP: 17    Diet, NPO after Midnight:      NPO Start Date: 31-May-2022,   NPO Start Time: 23:59  Diet, NPO with Tube Feed:   Tube Feeding Modality: Nasogastric  Jevity 1.2 Martin  Total Volume for 24 Hours (mL): 1200  Bolus  Total Volume of Bolus (mL):  300  Total # of Feeds: 4  Tube Feed Frequency: Every 6 hours   Tube Feed Start Time: 19:00  Bolus Feed Rate (mL per Hour): 100   Bolus Feed Duration (in Hours): 3  Free Water Flush  Bolus   Total Volume per Flush (mL): 80   Frequency: Every 6 Hours    Fluids:   I & O's:    05-30-22 @ 07:01  -  05-31-22 @ 07:00  --------------------------------------------------------  IN:    Dexmedetomidine: 128 mL    Enteral Tube Flush: 160 mL    Jevity 1.2: 900 mL  Total IN: 1188 mL    OUT:    Indwelling Catheter - Urethral (mL): 2260 mL  Total OUT: 2260 mL    Total NET: -1072 mL      PHYSICAL EXAM:  General Appearance: NAD  HEENT: Normocephalic, atraumatic, +tracheostomy on vent  Heart: s1, s2,    Lungs: No increased work of breathing or accessory muscle use. Symmetric chest wall rise and fall. Bilateral breath sounds  Abdomen:  Soft, nontender, nondistended. No rebound or guarding.  MSK/Extremities: Warm & well-perfused.   Skin: Warm, dry. No jaundice.     MEDICATIONS  (STANDING):  ALPRAZolam 0.25 milliGRAM(s) Oral at bedtime  amLODIPine   Tablet 5 milliGRAM(s) Oral daily  ammonium lactate 12% Lotion 1 Application(s) Topical every 12 hours  atorvastatin 20 milliGRAM(s) Oral at bedtime  buMETAnide 2 milliGRAM(s) Oral daily  cefepime   IVPB 1000 milliGRAM(s) IV Intermittent every 12 hours  chlorhexidine 0.12% Liquid 15 milliLiter(s) Oral Mucosa every 12 hours  chlorhexidine 4% Liquid 1 Application(s) Topical daily  dexMEDEtomidine Infusion 0.2 MICROgram(s)/kG/Hr (4.9 mL/Hr) IV Continuous <Continuous>  dextrose 5%. 1000 milliLiter(s) (100 mL/Hr) IV Continuous <Continuous>  dextrose 5%. 1000 milliLiter(s) (50 mL/Hr) IV Continuous <Continuous>  dextrose 50% Injectable 25 Gram(s) IV Push once  dextrose 50% Injectable 12.5 Gram(s) IV Push once  dextrose 50% Injectable 25 Gram(s) IV Push once  gabapentin 300 milliGRAM(s) Oral three times a day  glucagon  Injectable 1 milliGRAM(s) IntraMuscular once  influenza   Vaccine 0.5 milliLiter(s) IntraMuscular once  insulin glargine Injectable (LANTUS) 15 Unit(s) SubCutaneous at bedtime  insulin lispro (ADMELOG) corrective regimen sliding scale   SubCutaneous at bedtime  losartan 100 milliGRAM(s) Oral daily  pantoprazole  Injectable 40 milliGRAM(s) IV Push daily  polyethylene glycol 3350 17 Gram(s) Oral daily  senna 2 Tablet(s) Oral at bedtime    MEDICATIONS  (PRN):  acetaminophen     Tablet .. 650 milliGRAM(s) Oral every 6 hours PRN Mild Pain (1 - 3)  ALBUTerol    0.083%. 2.5 milliGRAM(s) Nebulizer once PRN Shortness of Breath and/or Wheezing  dextrose Oral Gel 15 Gram(s) Oral once PRN Blood Glucose LESS THAN 70 milliGRAM(s)/deciliter  morphine  - Injectable 2 milliGRAM(s) IV Push every 4 hours PRN Severe Pain (7 - 10)  oxyCODONE    IR 5 milliGRAM(s) Oral every 4 hours PRN Moderate Pain (4 - 6)    GI PROPHYLAXIS: pantoprazole  Injectable 40 milliGRAM(s) IV Push daily  ANTIBIOTICS:  cefepime   IVPB 1000 milliGRAM(s)      LAB/STUDIES:  Labs:  CAPILLARY BLOOD GLUCOSE  POCT Blood Glucose.: 149 mg/dL (31 May 2022 21:50)  POCT Blood Glucose.: 131 mg/dL (31 May 2022 17:52)  POCT Blood Glucose.: 138 mg/dL (31 May 2022 11:37)  POCT Blood Glucose.: 115 mg/dL (31 May 2022 07:56)                      8.5    8.84  )-----------( 194      ( 31 May 2022 05:13 )             24.8       Auto Neutrophil %: 69.7 % (05-31-22 @ 05:13)  Auto Immature Granulocyte %: 1.8 % (05-31-22 @ 05:13)    05-31  136  |  94<L>  |  12  ----------------------------<  79  4.0   |  30  |  <0.5<L>    Calcium, Total Serum: 9.7 mg/dL (05-31-22 @ 05:13)    LFTs:        5.8  | 0.5  | 23       ------------------[82      ( 31 May 2022 05:13 )  3.8  | x    | 24           Blood Gas Arterial, Lactate: 0.50 mmol/L (05-30-22 @ 12:55)    ABG - ( 30 May 2022 12:55 )  pH: 7.38  /  pCO2: 62    /  pO2: 128   / HCO3: 37    / Base Excess: 9.2   /  SaO2: 99.1      Coags:    Serum Pro-Brain Natriuretic Peptide: 397 pg/mL (05-27-22 @ 10:00)    Culture - Blood (collected 29 May 2022 12:33)  Source: .Blood None  Preliminary Report (30 May 2022 19:01):    No growth to date.      IMAGING:  < from: Xray Chest 1 View- PORTABLE-Routine (05.31.22 @ 06:11) >  Impression:    1. Decreased patchy bilateral opacities.    < end of copied text >  < from: CT Angio Chest PE Protocol w/ IV Cont (05.27.22 @ 10:42) >  Impression:    No emboli within the main pulmonary arteries. Segmental arteries are   poorly evaluated due to dilution artifact.    Interstitial edema. Volume loss of the left lung with left lower lobe  atelectasis.    Prominent mediastinal and right hilar adenopathy.    Atherosclerotic changes. Cardiomegaly.    Nodularity within the left adrenal gland. Dedicated CAT scan of the left   adrenal gland recommended on an outpatient basis.    < end of copied text >        [employer] SPECTRA #[id number]

## 2022-06-01 NOTE — PROGRESS NOTE ADULT - ASSESSMENT
55yo Female with PMHx of DVT on Eliquis, COPD,  trach collar, obesity, IDDM, UTI, sent by [facility] for hypoxia now s/p Trach exchange to 7 XLT alongside Pulmonology.    Plan:   -booked for bronchoscopy and tracheal debridement today at 9am  -consented  -monitor vent   -trach functioning well s/p exchange   -please call if pt will need feeding access as it may be possible to do that during today's procedure, pt currently with NGT  - COVID 5/31 negative  - pending AM coags and type and screen   - keep NPO with IVF    spectra [id number]

## 2022-06-01 NOTE — PROGRESS NOTE ADULT - SUBJECTIVE AND OBJECTIVE BOX
Patient is a 56y old  Female who presents with a chief complaint of       HPI:  57 yo f with PMHx of DVT on Eliquis, COPD,  trach collar, obesity, IDDM, UTI, sent by [facility] for hypoxia. History provided by daughter at bedside, she reports the current illness going back to April 4th when pt was intubated on admission at [facility] ICU for hypoxic respiratory failure diagnosed with copd exacerbation and superimposed pneumonia, of note she also appears to have been in CHF exacerbation with severe b/l LE swelling treated with IV bumex. Remained intubated 37 days per daughter, diagnosed with fever of unknown origin (up to 106F), treated with empiric broad spectrum antibiotics and once 2 weeks s/p tracheostomy placement and 48 hours afebrile she was was discharged to nursing home for PT. Of note, patient's daughter and pt herself say the wiseman has not been replaced for over 3 weeks. She also reports a developing sacral pressure ulcer. Was at [facility] only 2 days when was noted to be hypoxic (saturating low 70's) and EMS called. While awaiting EMS, floor nurse on the unit suctioned the patient, and managed to bring up a very large mucus plug, with pt's saturation immediately improving afterwards to 80's. Nonetheless pt was sent to [facility] ED. Prior to all this pt was independent and ambulatory. Pt denies sob, cp, abd pain, n/v/d, fever or chills.     On arrival to ED pt was saturating 97%, 15L O2 via tracheostomy collar (baseline 8 L at [facility]) VS:    /59  T 99.9F RR 20  Admission VBG pH 7.37 pCO2 60 pO2 49. Pt not wheezing, not coughing. CXR suspicious for L-sided PNA, inconclusive. CTA neg for PE. Pt received x1 IV Levoquin and Cefepime in ED, admitted to medicine for acute hypoxic hypercapnic respiratory failure secondary to acute mucus plug (now resolved vs superimposed pneumonia  hospital-acquired.(less likely), Records request sent to [facility] medical records  [phone]) and patient will be continued on empiric antibiotics pending procal (27 May 2022 14:02)      Pt evaluated on rounds.  I reviewed the radiology tests and hospital record.    I reviewed previous notes on this patient.    Interval Events: No overnight events.      CAM:    SAT:    SBT:      REVIEW OF SYSTEMS:   see HPI      OBJECTIVE:  ICU Vital Signs Last 24 Hrs  T(C): 36.7 (01 Jun 2022 04:00), Max: 36.7 (01 Jun 2022 04:00)  T(F): 98.1 (01 Jun 2022 04:00), Max: 98.1 (01 Jun 2022 04:00)  HR: 59 (01 Jun 2022 05:00) (55 - 78)  BP: 124/60 (01 Jun 2022 05:00) (93/44 - 132/63)  BP(mean): 86 (01 Jun 2022 05:00) (64 - 91)  ABP: --  ABP(mean): --  RR: 16 (01 Jun 2022 05:00) (12 - 37)  SpO2: 100% (01 Jun 2022 05:00) (96% - 100%)    Mode: AC/ CMV (Assist Control/ Continuous Mandatory Ventilation), RR (machine): 12, TV (machine): 400, FiO2: 40, PEEP: 5, ITime: 1, MAP: 8, PIP: 17    05-30 @ 07:01  - 05-31 @ 07:00  --------------------------------------------------------  IN: 1188 mL / OUT: 2260 mL / NET: -1072 mL    05-31 @ 07:01  -  06-01 @ 06:25  --------------------------------------------------------  IN: 1042 mL / OUT: 1570 mL / NET: -528 mL      CAPILLARY BLOOD GLUCOSE      POCT Blood Glucose.: 139 mg/dL (01 Jun 2022 05:28)        PHYSICAL EXAM:       · ENMT:   Airway patent,   Nasal mucosa clear.  Mouth with normal mucosa.   No thrush    · EYES:   Clear bilaterally,   pupils equal,   round and reactive to light.    · CARDIAC:   Normal rate,   regular rhythm.    Heart sounds S1, S2.   No murmurs, no rubs or gallops on auscultation  no edema        CAROTID:   normal systolic impulse  no bruits    · RESPIRATORY:   rales  normal chest expansion  no retractions or use of accessory muscles  palpation of chest is normal with no fremitus  percussion of chest demonstrates no hyperresonance or dullness    · GASTROINTESTINAL:  Abdomen soft,   non-tender,   + BS  liver/spleen not palpable    · MUSCULOSKELETAL:   no clubbing, cyanosis      · SKIN:   Skin normal color for race,   warm, dry   No evidence of rash.        · HEME LYMPH:   no splenomegaly.  No cervical  lymphadenopathy.  no inguinal lymphadenopathy    HOSPITAL MEDICATIONS:  MEDICATIONS  (STANDING):  ALPRAZolam 0.25 milliGRAM(s) Oral at bedtime  amLODIPine   Tablet 5 milliGRAM(s) Oral daily  ammonium lactate 12% Lotion 1 Application(s) Topical every 12 hours  atorvastatin 20 milliGRAM(s) Oral at bedtime  buMETAnide 2 milliGRAM(s) Oral daily  cefepime   IVPB 1000 milliGRAM(s) IV Intermittent every 12 hours  chlorhexidine 0.12% Liquid 15 milliLiter(s) Oral Mucosa every 12 hours  chlorhexidine 4% Liquid 1 Application(s) Topical daily  dexMEDEtomidine Infusion 0.2 MICROgram(s)/kG/Hr (4.9 mL/Hr) IV Continuous <Continuous>  dextrose 5%. 1000 milliLiter(s) (100 mL/Hr) IV Continuous <Continuous>  dextrose 5%. 1000 milliLiter(s) (50 mL/Hr) IV Continuous <Continuous>  dextrose 50% Injectable 25 Gram(s) IV Push once  dextrose 50% Injectable 12.5 Gram(s) IV Push once  dextrose 50% Injectable 25 Gram(s) IV Push once  gabapentin 300 milliGRAM(s) Oral three times a day  glucagon  Injectable 1 milliGRAM(s) IntraMuscular once  influenza   Vaccine 0.5 milliLiter(s) IntraMuscular once  insulin glargine Injectable (LANTUS) 15 Unit(s) SubCutaneous at bedtime  insulin lispro (ADMELOG) corrective regimen sliding scale   SubCutaneous at bedtime  losartan 100 milliGRAM(s) Oral daily  pantoprazole  Injectable 40 milliGRAM(s) IV Push daily  polyethylene glycol 3350 17 Gram(s) Oral daily  senna 2 Tablet(s) Oral at bedtime    MEDICATIONS  (PRN):  acetaminophen     Tablet .. 650 milliGRAM(s) Oral every 6 hours PRN Mild Pain (1 - 3)  ALBUTerol    0.083%. 2.5 milliGRAM(s) Nebulizer once PRN Shortness of Breath and/or Wheezing  dextrose Oral Gel 15 Gram(s) Oral once PRN Blood Glucose LESS THAN 70 milliGRAM(s)/deciliter  morphine  - Injectable 2 milliGRAM(s) IV Push every 4 hours PRN Severe Pain (7 - 10)  oxyCODONE    IR 5 milliGRAM(s) Oral every 4 hours PRN Moderate Pain (4 - 6)    lactated ringers Bolus:   500 milliLiter(s), IV Bolus, once, infuse over 1 Hour(s), Stop After 1 Doses  lactated ringers Bolus:   1000 milliLiter(s), IV Bolus, once, infuse over 60 Minute(s), Stop After 1 Doses  lactated ringers Bolus:   1000 milliLiter(s), IV Bolus, once, infuse over 60 Minute(s), Stop After 1 Doses  Provider's Contact #: [phone]  lactated ringers Bolus:   1000 milliLiter(s), IV Bolus, once, infuse over 60 Minute(s), Stop After 1 Doses  Provider's Contact #: [phone]      LABS:                        8.6    8.13  )-----------( 180      ( 01 Jun 2022 04:44 )             25.5     06-01    133<L>  |  90<L>  |  14  ----------------------------<  130<H>  4.1   |  29  |  <0.5<L>    Ca    9.4      01 Jun 2022 04:44  Mg     1.9     06-01    TPro  5.8<L>  /  Alb  3.6  /  TBili  0.7  /  DBili  x   /  AST  20  /  ALT  21  /  AlkPhos  85  06-01    PT/INR - ( 01 Jun 2022 04:44 )   PT: 12.10 sec;   INR: 1.05 ratio         PTT - ( 01 Jun 2022 04:44 )  PTT:35.9 sec    Arterial Blood Gas:  05-30 @ 12:55  7.38/62/128/37/99.1/9.2  ABG lactate: --      Mode: AC/ CMV (Assist Control/ Continuous Mandatory Ventilation), RR (machine): 12, TV (machine): 400, FiO2: 40, PEEP: 5, ITime: 1, MAP: 8, PIP: 17      COVID-19 PCR: NotDetec (31 May 2022 17:00)  SARS-CoV-2: NotDetec (27 May 2022 10:30)    Mode: AC/ CMV (Assist Control/ Continuous Mandatory Ventilation)  RR (machine): 12  TV (machine): 400  FiO2: 40  PEEP: 5  ITime: 1  MAP: 8  PIP: 17      ABG - ( 30 May 2022 12:55 )  pH, Arterial: 7.38  pH, Blood: x     /  pCO2: 62    /  pO2: 128   / HCO3: 37    / Base Excess: 9.2   /  SaO2: 99.1            RADIOLOGY: Today I personally interpreted the latest CXR and other pertinent films.

## 2022-06-01 NOTE — CHART NOTE - NSCHARTNOTEFT_GEN_A_CORE
Pre-Op Note    Patient: CRUZ DUMONT  MRN: [id number]  56y Female  Location: [facility] 115 A  22 @ 13:04    Admit Diagnosis: ACUTE RESPIRATORY FAILURE WITH HYPOXIA; PNA (PNEUMONIA)        Procedure: Bronchoscopy and tracheal debridement  Consent in Chart: [x] Yes [  ] No  Diet: Diet, NPO after Midnight:      NPO Start Date: 31-May-2022,   NPO Start Time: 23:59 (22 @ 13:12)    Fluids:   EK Lead ECG:   Ventricular Rate 100 BPM    Atrial Rate 100 BPM    P-R Interval 172 ms    QRS Duration 94 ms    Q-T Interval 368 ms    QTC Calculation(Bazett) 474 ms    P Axis 41 degrees    R Axis 73 degrees    T Axis 127 degrees    Diagnosis Line Normal sinus rhythm  Low voltage QRS  Incomplete right bundle branch block  ST & T wave abnormality, consider anterolateral ischemia  Prolonged QT  Abnormal ECG    Confirmed by Joss Rangel ([id number]) on 2022 11:21:09 AM (22 @ 11:49)    CXR:  Xray Chest 1 View- PORTABLE-Routine:   ACC: [id number] EXAM:  XR CHEST PORTABLE ROUTINE 1V                          PROCEDURE DATE:  2022      INTERPRETATION:  Clinical History / Reason for exam: Follow-up.    Comparison : Chest radiograph dated May 30, 2022.    Technique/Positioning: Portable frontal.    Findings:    Support devices: Nasogastric tube in stable position. Overlying EKG leads.    Cardiac/mediastinum/hilum: Stable.    Lung parenchyma/Pleura: Decreased patchy bilateral opacities. No   pneumothorax.    Skeleton/soft tissues: Stable.    Impression:    1. Decreased patchy bilateral opacities.    --- End of Report ---      BJORN MCALLISTER MD; Attending Radiologist  This document has been electronically signed. May 31 2022  8:15AM (22 @ 06:11)      Vitals:  T(F): 98 (22 @ 12:00), Max: 98.1 (22 @ 04:00)  HR: 72 (22 @ 12:00) (55 - 90)  BP: 120/59 (22 @ 12:00) (101/50 - 143/66)  RR: 18 (22 @ 12:00) (12 - 37)  SpO2: 99% (22 @ 12:00) (96% - 100%)    Pre-OP Labs:  CAPILLARY BLOOD GLUCOSE  POCT Blood Glucose.: 139 mg/dL (2022 11:59)  POCT Blood Glucose.: 139 mg/dL (2022 05:28)  POCT Blood Glucose.: 149 mg/dL (31 May 2022 21:50)  POCT Blood Glucose.: 131 mg/dL (31 May 2022 17:52)                        8.6    8.13  )-----------( 180      ( 2022 04:44 )             25.5     06-    133<L>  |  90<L>  |  14  ----------------------------<  130<H>  4.1   |  29  |  <0.5<L>    Ca    9.4      2022 04:44  Mg     1.9     -    TPro  5.8<L>  /  Alb  3.6  /  TBili  0.7  /  DBili  x   /  AST  20  /  ALT  21  /  AlkPhos  85  06-01    PT/INR - ( 2022 04:44 )   PT: 12.10 sec;   INR: 1.05 ratio         PTT - ( 2022 04:44 )  PTT:35.9 sec      Type & Screen: ABO RH Interpretation: A POS (22 @ 04:44)    Pregnancy Test: n/a    COVID: COVID-19 PCR: NotDete (31 May 2022 17:00)  SARS-CoV-2: NotDete (27 May 2022 10:30)

## 2022-06-02 LAB
-  AMIKACIN: SIGNIFICANT CHANGE UP
-  AMOXICILLIN/CLAVULANIC ACID: SIGNIFICANT CHANGE UP
-  AMPICILLIN/SULBACTAM: SIGNIFICANT CHANGE UP
-  AMPICILLIN: SIGNIFICANT CHANGE UP
-  AZTREONAM: SIGNIFICANT CHANGE UP
-  CEFAZOLIN: SIGNIFICANT CHANGE UP
-  CEFEPIME: SIGNIFICANT CHANGE UP
-  CEFOXITIN: SIGNIFICANT CHANGE UP
-  CEFTRIAXONE: SIGNIFICANT CHANGE UP
-  CIPROFLOXACIN: SIGNIFICANT CHANGE UP
-  ERTAPENEM: SIGNIFICANT CHANGE UP
-  GENTAMICIN: SIGNIFICANT CHANGE UP
-  IMIPENEM: SIGNIFICANT CHANGE UP
-  LEVOFLOXACIN: SIGNIFICANT CHANGE UP
-  MEROPENEM: SIGNIFICANT CHANGE UP
-  NITROFURANTOIN: SIGNIFICANT CHANGE UP
-  PIPERACILLIN/TAZOBACTAM: SIGNIFICANT CHANGE UP
-  TIGECYCLINE: SIGNIFICANT CHANGE UP
-  TOBRAMYCIN: SIGNIFICANT CHANGE UP
-  TRIMETHOPRIM/SULFAMETHOXAZOLE: SIGNIFICANT CHANGE UP
ALBUMIN SERPL ELPH-MCNC: 3.9 G/DL — SIGNIFICANT CHANGE UP (ref 3.5–5.2)
ALP SERPL-CCNC: 90 U/L — SIGNIFICANT CHANGE UP (ref 30–115)
ALT FLD-CCNC: 21 U/L — SIGNIFICANT CHANGE UP (ref 0–41)
ANION GAP SERPL CALC-SCNC: 11 MMOL/L — SIGNIFICANT CHANGE UP (ref 7–14)
AST SERPL-CCNC: 20 U/L — SIGNIFICANT CHANGE UP (ref 0–41)
BASOPHILS # BLD AUTO: 0.05 K/UL — SIGNIFICANT CHANGE UP (ref 0–0.2)
BASOPHILS NFR BLD AUTO: 0.7 % — SIGNIFICANT CHANGE UP (ref 0–1)
BILIRUB SERPL-MCNC: 0.5 MG/DL — SIGNIFICANT CHANGE UP (ref 0.2–1.2)
BUN SERPL-MCNC: 13 MG/DL — SIGNIFICANT CHANGE UP (ref 10–20)
CALCIUM SERPL-MCNC: 9.8 MG/DL — SIGNIFICANT CHANGE UP (ref 8.5–10.1)
CHLORIDE SERPL-SCNC: 92 MMOL/L — LOW (ref 98–110)
CO2 SERPL-SCNC: 30 MMOL/L — SIGNIFICANT CHANGE UP (ref 17–32)
CREAT SERPL-MCNC: <0.5 MG/DL — LOW (ref 0.7–1.5)
CULTURE RESULTS: SIGNIFICANT CHANGE UP
EGFR: 115 ML/MIN/1.73M2 — SIGNIFICANT CHANGE UP
EOSINOPHIL # BLD AUTO: 0.2 K/UL — SIGNIFICANT CHANGE UP (ref 0–0.7)
EOSINOPHIL NFR BLD AUTO: 2.8 % — SIGNIFICANT CHANGE UP (ref 0–8)
GAS PNL BLDA: SIGNIFICANT CHANGE UP
GLUCOSE BLDC GLUCOMTR-MCNC: 127 MG/DL — HIGH (ref 70–99)
GLUCOSE BLDC GLUCOMTR-MCNC: 139 MG/DL — HIGH (ref 70–99)
GLUCOSE BLDC GLUCOMTR-MCNC: 204 MG/DL — HIGH (ref 70–99)
GLUCOSE BLDC GLUCOMTR-MCNC: 255 MG/DL — HIGH (ref 70–99)
GLUCOSE SERPL-MCNC: 202 MG/DL — HIGH (ref 70–99)
HCT VFR BLD CALC: 25.2 % — LOW (ref 37–47)
HGB BLD-MCNC: 8.6 G/DL — LOW (ref 12–16)
IMM GRANULOCYTES NFR BLD AUTO: 0.8 % — HIGH (ref 0.1–0.3)
LYMPHOCYTES # BLD AUTO: 1.43 K/UL — SIGNIFICANT CHANGE UP (ref 1.2–3.4)
LYMPHOCYTES # BLD AUTO: 20.1 % — LOW (ref 20.5–51.1)
MAGNESIUM SERPL-MCNC: 1.6 MG/DL — LOW (ref 1.8–2.4)
MCHC RBC-ENTMCNC: 33.2 PG — HIGH (ref 27–31)
MCHC RBC-ENTMCNC: 34.1 G/DL — SIGNIFICANT CHANGE UP (ref 32–37)
MCV RBC AUTO: 97.3 FL — SIGNIFICANT CHANGE UP (ref 81–99)
METHOD TYPE: SIGNIFICANT CHANGE UP
MONOCYTES # BLD AUTO: 0.64 K/UL — HIGH (ref 0.1–0.6)
MONOCYTES NFR BLD AUTO: 9 % — SIGNIFICANT CHANGE UP (ref 1.7–9.3)
NEUTROPHILS # BLD AUTO: 4.75 K/UL — SIGNIFICANT CHANGE UP (ref 1.4–6.5)
NEUTROPHILS NFR BLD AUTO: 66.6 % — SIGNIFICANT CHANGE UP (ref 42.2–75.2)
NRBC # BLD: 0 /100 WBCS — SIGNIFICANT CHANGE UP (ref 0–0)
ORGANISM # SPEC MICROSCOPIC CNT: SIGNIFICANT CHANGE UP
PLATELET # BLD AUTO: 175 K/UL — SIGNIFICANT CHANGE UP (ref 130–400)
POTASSIUM SERPL-MCNC: 4 MMOL/L — SIGNIFICANT CHANGE UP (ref 3.5–5)
POTASSIUM SERPL-SCNC: 4 MMOL/L — SIGNIFICANT CHANGE UP (ref 3.5–5)
PROT SERPL-MCNC: 6.1 G/DL — SIGNIFICANT CHANGE UP (ref 6–8)
RBC # BLD: 2.59 M/UL — LOW (ref 4.2–5.4)
RBC # FLD: 14.9 % — HIGH (ref 11.5–14.5)
SODIUM SERPL-SCNC: 133 MMOL/L — LOW (ref 135–146)
SPECIMEN SOURCE: SIGNIFICANT CHANGE UP
WBC # BLD: 7.13 K/UL — SIGNIFICANT CHANGE UP (ref 4.8–10.8)
WBC # FLD AUTO: 7.13 K/UL — SIGNIFICANT CHANGE UP (ref 4.8–10.8)

## 2022-06-02 PROCEDURE — 99233 SBSQ HOSP IP/OBS HIGH 50: CPT

## 2022-06-02 PROCEDURE — 93970 EXTREMITY STUDY: CPT | Mod: 26

## 2022-06-02 PROCEDURE — 74018 RADEX ABDOMEN 1 VIEW: CPT | Mod: 26

## 2022-06-02 PROCEDURE — 71045 X-RAY EXAM CHEST 1 VIEW: CPT | Mod: 26

## 2022-06-02 PROCEDURE — 99232 SBSQ HOSP IP/OBS MODERATE 35: CPT

## 2022-06-02 RX ORDER — MAGNESIUM SULFATE 500 MG/ML
2 VIAL (ML) INJECTION ONCE
Refills: 0 | Status: COMPLETED | OUTPATIENT
Start: 2022-06-02 | End: 2022-06-02

## 2022-06-02 RX ORDER — INSULIN GLARGINE 100 [IU]/ML
15 INJECTION, SOLUTION SUBCUTANEOUS AT BEDTIME
Refills: 0 | Status: DISCONTINUED | OUTPATIENT
Start: 2022-06-02 | End: 2022-06-18

## 2022-06-02 RX ORDER — INSULIN LISPRO 100/ML
VIAL (ML) SUBCUTANEOUS AT BEDTIME
Refills: 0 | Status: DISCONTINUED | OUTPATIENT
Start: 2022-06-02 | End: 2022-06-03

## 2022-06-02 RX ORDER — TRAMADOL HYDROCHLORIDE 50 MG/1
25 TABLET ORAL THREE TIMES A DAY
Refills: 0 | Status: DISCONTINUED | OUTPATIENT
Start: 2022-06-02 | End: 2022-06-03

## 2022-06-02 RX ORDER — SODIUM CHLORIDE 9 MG/ML
500 INJECTION INTRAMUSCULAR; INTRAVENOUS; SUBCUTANEOUS ONCE
Refills: 0 | Status: DISCONTINUED | OUTPATIENT
Start: 2022-06-02 | End: 2022-06-02

## 2022-06-02 RX ORDER — LACTULOSE 10 G/15ML
20 SOLUTION ORAL EVERY 6 HOURS
Refills: 0 | Status: COMPLETED | OUTPATIENT
Start: 2022-06-02 | End: 2022-06-03

## 2022-06-02 RX ORDER — CAPTOPRIL 12.5 MG/1
25 TABLET ORAL ONCE
Refills: 0 | Status: COMPLETED | OUTPATIENT
Start: 2022-06-02 | End: 2022-06-02

## 2022-06-02 RX ORDER — NIFEDIPINE 30 MG
30 TABLET, EXTENDED RELEASE 24 HR ORAL ONCE
Refills: 0 | Status: DISCONTINUED | OUTPATIENT
Start: 2022-06-02 | End: 2022-06-02

## 2022-06-02 RX ADMIN — Medication 0.5 MILLIGRAM(S): at 09:17

## 2022-06-02 RX ADMIN — SENNA PLUS 2 TABLET(S): 8.6 TABLET ORAL at 21:07

## 2022-06-02 RX ADMIN — POLYETHYLENE GLYCOL 3350 17 GRAM(S): 17 POWDER, FOR SOLUTION ORAL at 11:48

## 2022-06-02 RX ADMIN — MORPHINE SULFATE 2 MILLIGRAM(S): 50 CAPSULE, EXTENDED RELEASE ORAL at 10:54

## 2022-06-02 RX ADMIN — OXYCODONE HYDROCHLORIDE 5 MILLIGRAM(S): 5 TABLET ORAL at 15:11

## 2022-06-02 RX ADMIN — TRAMADOL HYDROCHLORIDE 25 MILLIGRAM(S): 50 TABLET ORAL at 15:11

## 2022-06-02 RX ADMIN — OXYCODONE HYDROCHLORIDE 5 MILLIGRAM(S): 5 TABLET ORAL at 22:08

## 2022-06-02 RX ADMIN — OXYCODONE HYDROCHLORIDE 5 MILLIGRAM(S): 5 TABLET ORAL at 13:08

## 2022-06-02 RX ADMIN — LACTULOSE 20 GRAM(S): 10 SOLUTION ORAL at 23:23

## 2022-06-02 RX ADMIN — CAPTOPRIL 25 MILLIGRAM(S): 12.5 TABLET ORAL at 19:26

## 2022-06-02 RX ADMIN — MORPHINE SULFATE 2 MILLIGRAM(S): 50 CAPSULE, EXTENDED RELEASE ORAL at 05:13

## 2022-06-02 RX ADMIN — TRAMADOL HYDROCHLORIDE 25 MILLIGRAM(S): 50 TABLET ORAL at 21:07

## 2022-06-02 RX ADMIN — MORPHINE SULFATE 2 MILLIGRAM(S): 50 CAPSULE, EXTENDED RELEASE ORAL at 00:20

## 2022-06-02 RX ADMIN — LOSARTAN POTASSIUM 100 MILLIGRAM(S): 100 TABLET, FILM COATED ORAL at 05:13

## 2022-06-02 RX ADMIN — OXYCODONE HYDROCHLORIDE 5 MILLIGRAM(S): 5 TABLET ORAL at 18:10

## 2022-06-02 RX ADMIN — MORPHINE SULFATE 2 MILLIGRAM(S): 50 CAPSULE, EXTENDED RELEASE ORAL at 05:36

## 2022-06-02 RX ADMIN — LACTULOSE 20 GRAM(S): 10 SOLUTION ORAL at 13:08

## 2022-06-02 RX ADMIN — TRAMADOL HYDROCHLORIDE 25 MILLIGRAM(S): 50 TABLET ORAL at 14:12

## 2022-06-02 RX ADMIN — CEFEPIME 100 MILLIGRAM(S): 1 INJECTION, POWDER, FOR SOLUTION INTRAMUSCULAR; INTRAVENOUS at 05:20

## 2022-06-02 RX ADMIN — ENOXAPARIN SODIUM 100 MILLIGRAM(S): 100 INJECTION SUBCUTANEOUS at 06:05

## 2022-06-02 RX ADMIN — Medication 0.5 MILLIGRAM(S): at 21:07

## 2022-06-02 RX ADMIN — PANTOPRAZOLE SODIUM 40 MILLIGRAM(S): 20 TABLET, DELAYED RELEASE ORAL at 11:48

## 2022-06-02 RX ADMIN — OXYCODONE HYDROCHLORIDE 5 MILLIGRAM(S): 5 TABLET ORAL at 21:41

## 2022-06-02 RX ADMIN — LACTULOSE 20 GRAM(S): 10 SOLUTION ORAL at 17:05

## 2022-06-02 RX ADMIN — TRAMADOL HYDROCHLORIDE 25 MILLIGRAM(S): 50 TABLET ORAL at 21:21

## 2022-06-02 RX ADMIN — BUMETANIDE 2 MILLIGRAM(S): 0.25 INJECTION INTRAMUSCULAR; INTRAVENOUS at 05:13

## 2022-06-02 RX ADMIN — Medication 1 APPLICATION(S): at 17:05

## 2022-06-02 RX ADMIN — GABAPENTIN 300 MILLIGRAM(S): 400 CAPSULE ORAL at 21:07

## 2022-06-02 RX ADMIN — Medication 1 APPLICATION(S): at 05:35

## 2022-06-02 RX ADMIN — DEXMEDETOMIDINE HYDROCHLORIDE IN 0.9% SODIUM CHLORIDE 4.9 MICROGRAM(S)/KG/HR: 4 INJECTION INTRAVENOUS at 06:04

## 2022-06-02 RX ADMIN — OXYCODONE HYDROCHLORIDE 5 MILLIGRAM(S): 5 TABLET ORAL at 17:08

## 2022-06-02 RX ADMIN — GABAPENTIN 300 MILLIGRAM(S): 400 CAPSULE ORAL at 05:13

## 2022-06-02 RX ADMIN — Medication 25 GRAM(S): at 09:15

## 2022-06-02 RX ADMIN — MORPHINE SULFATE 2 MILLIGRAM(S): 50 CAPSULE, EXTENDED RELEASE ORAL at 11:40

## 2022-06-02 RX ADMIN — ATORVASTATIN CALCIUM 20 MILLIGRAM(S): 80 TABLET, FILM COATED ORAL at 21:06

## 2022-06-02 RX ADMIN — AMLODIPINE BESYLATE 5 MILLIGRAM(S): 2.5 TABLET ORAL at 05:13

## 2022-06-02 RX ADMIN — GABAPENTIN 300 MILLIGRAM(S): 400 CAPSULE ORAL at 13:08

## 2022-06-02 NOTE — PROGRESS NOTE ADULT - ASSESSMENT
Impression:  Acute/ chronic COPD exacerbation, resolved  Chronic respiratory failure  Mucous plug removed  Pneumonia/ atelectasis L base  HO DVT, was on eliquis at home     SUGGEST:   frequent suction with saline   PS trial, 8/5 today  CT Sx following  Lovenox  Q12 hrs   500cc NS bolus  Replete Mg  KUB, if negative add lactulose 20gm q6h  S&S eval, if fails, CT Sx FU for PEG tube  Target SPO2 92-96%, titrate oxygen as tolerated  bronchodilator treatments ATC and PRN  procal negative, DC cefepime, received 6 days  OOB to chair  GI and DVT prophylaxis  pulmonary toilet  LE duplex

## 2022-06-02 NOTE — PROGRESS NOTE ADULT - SUBJECTIVE AND OBJECTIVE BOX
GENERAL SURGERY PROGRESS NOTE    Patient: CRUZ DUMONT , 56y ([birth year])Female   MRN: [id number]  Location: [facility] 115 A  Visit: 05-27-22 Inpatient  Date: 06-02-22 @ 09:22      Procedure/Dx/Injuries: s/p trach exchange and bronch    Events of past 24 hours: Pt underwent bronchoscopy yesterday (6/1), good visualization and no debridement required.     PAST MEDICAL & SURGICAL HISTORY:  COPD (chronic obstructive pulmonary disease)      CHF (congestive heart failure)      DM (diabetes mellitus)      H/O tracheostomy          Vitals:   T(F): 97.7 (06-02-22 @ 08:00), Max: 98.9 (06-01-22 @ 20:00)  HR: 55 (06-02-22 @ 09:04)  BP: 142/63 (06-02-22 @ 09:00)  RR: 18 (06-02-22 @ 09:00)  SpO2: 100% (06-02-22 @ 09:04)  Mode: AC/ CMV (Assist Control/ Continuous Mandatory Ventilation), RR (machine): 12, TV (machine): 400, FiO2: 40, PEEP: 5, PS: 8, ITime: 1, MAP: 8, PIP: 14    Diet, NPO with Tube Feed:   Tube Feeding Modality: Nasogastric  Jevity 1.2 Martin  Total Volume for 24 Hours (mL): 1200  Bolus  Total Volume of Bolus (mL):  300  Total # of Feeds: 4  Tube Feed Frequency: Every 6 hours   Tube Feed Start Time: 19:00  Bolus Feed Rate (mL per Hour): 100   Bolus Feed Duration (in Hours): 3  Free Water Flush  Bolus   Total Volume per Flush (mL): 80   Frequency: Every 6 Hours  Free Water Flush Instructions:  Pt ok ice chips per oral      Fluids:     I & O's:    06-01-22 @ 07:01  -  06-02-22 @ 07:00  --------------------------------------------------------  IN:    Dexmedetomidine: 18 mL    Dexmedetomidine: 147.5 mL    Enteral Tube Flush: 420 mL    Jevity 1.2: 1200 mL    sodium chloride 0.9%: 210 mL  Total IN: 1995.5 mL    OUT:    Indwelling Catheter - Urethral (mL): 1940 mL  Total OUT: 1940 mL    Total NET: 55.5 mL          PHYSICAL EXAM:  General Appearance: NAD  Heart: RRR  Lungs: trach in place, no increased work or breathing  Abdomen:  soft, non tender, non distended  MSK/Extremities:  mobile      MEDICATIONS  (STANDING):  amLODIPine   Tablet 5 milliGRAM(s) Oral daily  ammonium lactate 12% Lotion 1 Application(s) Topical every 12 hours  atorvastatin 20 milliGRAM(s) Oral at bedtime  buMETAnide 2 milliGRAM(s) Oral daily  enoxaparin Injectable 100 milliGRAM(s) SubCutaneous every 12 hours  gabapentin 300 milliGRAM(s) Oral three times a day  influenza   Vaccine 0.5 milliLiter(s) IntraMuscular once  LORazepam     Tablet 0.5 milliGRAM(s) Oral two times a day  losartan 100 milliGRAM(s) Oral daily  pantoprazole  Injectable 40 milliGRAM(s) IV Push daily  polyethylene glycol 3350 17 Gram(s) Oral daily  senna 2 Tablet(s) Oral at bedtime    MEDICATIONS  (PRN):  acetaminophen     Tablet .. 650 milliGRAM(s) Oral every 6 hours PRN Temp greater or equal to 38C (100.4F), Mild Pain (1 - 3)  morphine  - Injectable 2 milliGRAM(s) IV Push every 4 hours PRN Severe Pain (7 - 10)  oxyCODONE    IR 5 milliGRAM(s) Oral every 4 hours PRN Moderate Pain (4 - 6)      DVT PROPHYLAXIS: enoxaparin Injectable 100 milliGRAM(s) SubCutaneous every 12 hours    GI PROPHYLAXIS: pantoprazole  Injectable 40 milliGRAM(s) IV Push daily    ANTICOAGULATION:   ANTIBIOTICS:            LAB/STUDIES:  Labs:  CAPILLARY BLOOD GLUCOSE      POCT Blood Glucose.: 255 mg/dL (02 Jun 2022 06:58)  POCT Blood Glucose.: 129 mg/dL (01 Jun 2022 17:28)  POCT Blood Glucose.: 139 mg/dL (01 Jun 2022 11:59)                          8.6    7.13  )-----------( 175      ( 02 Jun 2022 05:12 )             25.2       Auto Neutrophil %: 66.6 % (06-02-22 @ 05:12)  Auto Immature Granulocyte %: 0.8 % (06-02-22 @ 05:12)    06-02    133<L>  |  92<L>  |  13  ----------------------------<  202<H>  4.0   |  30  |  <0.5<L>      Calcium, Total Serum: 9.8 mg/dL (06-02-22 @ 05:12)      LFTs:             6.1  | 0.5  | 20       ------------------[90      ( 02 Jun 2022 05:12 )  3.9  | x    | 21          Lipase:x      Amylase:x         Blood Gas Arterial, Lactate: 0.50 mmol/L (05-30-22 @ 12:55)    ABG - ( 30 May 2022 12:55 )  pH: 7.38  /  pCO2: 62    /  pO2: 128   / HCO3: 37    / Base Excess: 9.2   /  SaO2: 99.1              Coags:     12.10  ----< 1.05    ( 01 Jun 2022 04:44 )     35.9            Serum Pro-Brain Natriuretic Peptide: 397 pg/mL (05-27-22 @ 10:00)                  IMAGING:      ACCESS/ DEVICES:  [ ] Peripheral IV  [ ] Central Venous Line	[ ] R	[ ] L	[ ] IJ	[ ] Fem	[ ] SC	Placed:   [ ] Arterial Line		[ ] R	[ ] L	[ ] Fem	[ ] Rad	[ ] Ax	Placed:   [ ] PICC:					[ ] Mediport  [ ] Urinary Catheter,  Date Placed:   [ ] Chest tube: [ ] Right, [ ] Left  [ ] BC/Evert Drains

## 2022-06-02 NOTE — PROGRESS NOTE ADULT - SUBJECTIVE AND OBJECTIVE BOX
Patient is a 56y old  Female who presents with a chief complaint of       HPI:  57 yo f with PMHx of DVT on Eliquis, COPD,  trach collar, obesity, IDDM, UTI, sent by [facility] for hypoxia. History provided by daughter at bedside, she reports the current illness going back to April 4th when pt was intubated on admission at [facility] ICU for hypoxic respiratory failure diagnosed with copd exacerbation and superimposed pneumonia, of note she also appears to have been in CHF exacerbation with severe b/l LE swelling treated with IV bumex. Remained intubated 37 days per daughter, diagnosed with fever of unknown origin (up to 106F), treated with empiric broad spectrum antibiotics and once 2 weeks s/p tracheostomy placement and 48 hours afebrile she was was discharged to nursing home for PT. Of note, patient's daughter and pt herself say the wiseman has not been replaced for over 3 weeks. She also reports a developing sacral pressure ulcer. Was at [facility] only 2 days when was noted to be hypoxic (saturating low 70's) and EMS called. While awaiting EMS, floor nurse on the unit suctioned the patient, and managed to bring up a very large mucus plug, with pt's saturation immediately improving afterwards to 80's. Nonetheless pt was sent to [facility] ED. Prior to all this pt was independent and ambulatory. Pt denies sob, cp, abd pain, n/v/d, fever or chills.     On arrival to ED pt was saturating 97%, 15L O2 via tracheostomy collar (baseline 8 L at [facility]) VS:    /59  T 99.9F RR 20  Admission VBG pH 7.37 pCO2 60 pO2 49. Pt not wheezing, not coughing. CXR suspicious for L-sided PNA, inconclusive. CTA neg for PE. Pt received x1 IV Levoquin and Cefepime in ED, admitted to medicine for acute hypoxic hypercapnic respiratory failure secondary to acute mucus plug (now resolved vs superimposed pneumonia  hospital-acquired.(less likely), Records request sent to [facility] medical records  [phone]) and patient will be continued on empiric antibiotics pending procal (27 May 2022 14:02)      Pt evaluated on rounds.  I reviewed the radiology tests and hospital record.    I reviewed previous notes on this patient.    Interval Events: No overnight events.      CAM:    SAT:    SBT:      REVIEW OF SYSTEMS:   see HPI      OBJECTIVE:  ICU Vital Signs Last 24 Hrs  T(C): 36.8 (02 Jun 2022 04:00), Max: 37.2 (01 Jun 2022 20:00)  T(F): 98.2 (02 Jun 2022 04:00), Max: 98.9 (01 Jun 2022 20:00)  HR: 55 (02 Jun 2022 06:00) (51 - 90)  BP: 132/58 (02 Jun 2022 06:00) (109/52 - 154/67)  BP(mean): 83 (02 Jun 2022 06:00) (75 - 97)    RR: 18 (02 Jun 2022 06:00) (14 - 26)  SpO2: 100% (02 Jun 2022 06:00) (97% - 100%)    Mode: AC/ CMV (Assist Control/ Continuous Mandatory Ventilation), RR (machine): 12, TV (machine): 400, FiO2: 40, PEEP: 5, ITime: 1, MAP: 8, PIP: 13    05-31 @ 07:01 - 06-01 @ 07:00  --------------------------------------------------------  IN: 1098 mL / OUT: 1820 mL / NET: -722 mL    06-01 @ 07:01  -  06-02 @ 06:45  --------------------------------------------------------  IN: 1995.5 mL / OUT: 1740 mL / NET: 255.5 mL      CAPILLARY BLOOD GLUCOSE      POCT Blood Glucose.: 129 mg/dL (01 Jun 2022 17:28)        PHYSICAL EXAM:       · ENMT:   Airway patent,   Nasal mucosa clear.  Mouth with normal mucosa.   No thrush    · EYES:   Clear bilaterally,   pupils equal,   round and reactive to light.    · CARDIAC:   Normal rate,   regular rhythm.    Heart sounds S1, S2.   No murmurs, no rubs or gallops on auscultation  no edema        CAROTID:   normal systolic impulse  no bruits    · RESPIRATORY:   rales  normal chest expansion  no retractions or use of accessory muscles  palpation of chest is normal with no fremitus  percussion of chest demonstrates no hyperresonance or dullness    · GASTROINTESTINAL:  Abdomen soft,   non-tender,   + BS  liver/spleen not palpable    · MUSCULOSKELETAL:   no clubbing, cyanosis      · SKIN:   Skin normal color for race,   warm, dry   No evidence of rash.        · HEME LYMPH:   no splenomegaly.  No cervical  lymphadenopathy.  no inguinal lymphadenopathy    HOSPITAL MEDICATIONS:  MEDICATIONS  (STANDING):  ALPRAZolam 0.25 milliGRAM(s) Oral at bedtime  amLODIPine   Tablet 5 milliGRAM(s) Oral daily  ammonium lactate 12% Lotion 1 Application(s) Topical every 12 hours  atorvastatin 20 milliGRAM(s) Oral at bedtime  buMETAnide 2 milliGRAM(s) Oral daily  cefepime   IVPB 1000 milliGRAM(s) IV Intermittent every 12 hours  dexMEDEtomidine Infusion 0.201 MICROgram(s)/kG/Hr (4.9 mL/Hr) IV Continuous <Continuous>  enoxaparin Injectable 100 milliGRAM(s) SubCutaneous every 12 hours  gabapentin 300 milliGRAM(s) Oral three times a day  influenza   Vaccine 0.5 milliLiter(s) IntraMuscular once  losartan 100 milliGRAM(s) Oral daily  pantoprazole  Injectable 40 milliGRAM(s) IV Push daily  polyethylene glycol 3350 17 Gram(s) Oral daily  senna 2 Tablet(s) Oral at bedtime    MEDICATIONS  (PRN):  acetaminophen     Tablet .. 650 milliGRAM(s) Oral every 6 hours PRN Temp greater or equal to 38C (100.4F), Mild Pain (1 - 3)  morphine  - Injectable 2 milliGRAM(s) IV Push every 4 hours PRN Severe Pain (7 - 10)  oxyCODONE    IR 5 milliGRAM(s) Oral every 4 hours PRN Moderate Pain (4 - 6)    sodium chloride 0.9%.: Solution, 1000 milliLiter(s) infuse at 70 mL/Hr, Stop After 24 Hours  lactated ringers Bolus:   500 milliLiter(s), IV Bolus, once, infuse over 1 Hour(s), Stop After 1 Doses  lactated ringers Bolus:   1000 milliLiter(s), IV Bolus, once, infuse over 60 Minute(s), Stop After 1 Doses  lactated ringers Bolus:   1000 milliLiter(s), IV Bolus, once, infuse over 60 Minute(s), Stop After 1 Doses  Provider's Contact #: [phone]  lactated ringers Bolus:   1000 milliLiter(s), IV Bolus, once, infuse over 60 Minute(s), Stop After 1 Doses  Provider's Contact #: [phone]      LABS:                        8.6    7.13  )-----------( 175      ( 02 Jun 2022 05:12 )             25.2     06-01    133<L>  |  90<L>  |  14  ----------------------------<  130<H>  4.1   |  29  |  <0.5<L>    Ca    9.4      01 Jun 2022 04:44  Mg     1.9     06-01    TPro  5.8<L>  /  Alb  3.6  /  TBili  0.7  /  DBili  x   /  AST  20  /  ALT  21  /  AlkPhos  85  06-01    PT/INR - ( 01 Jun 2022 04:44 )   PT: 12.10 sec;   INR: 1.05 ratio         PTT - ( 01 Jun 2022 04:44 )  PTT:35.9 sec        Mode: AC/ CMV (Assist Control/ Continuous Mandatory Ventilation), RR (machine): 12, TV (machine): 400, FiO2: 40, PEEP: 5, ITime: 1, MAP: 8, PIP: 13      COVID-19 PCR: NotDete (31 May 2022 17:00)  SARS-CoV-2: NotDete (27 May 2022 10:30)    Mode: AC/ CMV (Assist Control/ Continuous Mandatory Ventilation)  RR (machine): 12  TV (machine): 400  FiO2: 40  PEEP: 5  ITime: 1  MAP: 8  PIP: 13          RADIOLOGY: Today I personally interpreted the latest CXR and other pertinent films.    no ptx, no inftr. no free air.  CT scan           Patient is a 56y old  Female who presents with a chief complaint of       HPI:  57 yo f with PMHx of DVT on Eliquis, COPD,  trach collar, obesity, IDDM, UTI, sent by [facility] for hypoxia. History provided by daughter at bedside, she reports the current illness going back to April 4th when pt was intubated on admission at [facility] ICU for hypoxic respiratory failure diagnosed with copd exacerbation and superimposed pneumonia, of note she also appears to have been in CHF exacerbation with severe b/l LE swelling treated with IV bumex. Remained intubated 37 days per daughter, diagnosed with fever of unknown origin (up to 106F), treated with empiric broad spectrum antibiotics and once 2 weeks s/p tracheostomy placement and 48 hours afebrile she was was discharged to nursing home for PT. Of note, patient's daughter and pt herself say the wiseman has not been replaced for over 3 weeks. She also reports a developing sacral pressure ulcer. Was at [facility] only 2 days when was noted to be hypoxic (saturating low 70's) and EMS called. While awaiting EMS, floor nurse on the unit suctioned the patient, and managed to bring up a very large mucus plug, with pt's saturation immediately improving afterwards to 80's. Nonetheless pt was sent to [facility] ED. Prior to all this pt was independent and ambulatory. Pt denies sob, cp, abd pain, n/v/d, fever or chills.     On arrival to ED pt was saturating 97%, 15L O2 via tracheostomy collar (baseline 8 L at [facility]) VS:    /59  T 99.9F RR 20  Admission VBG pH 7.37 pCO2 60 pO2 49. Pt not wheezing, not coughing. CXR suspicious for L-sided PNA, inconclusive. CTA neg for PE. Pt received x1 IV Levoquin and Cefepime in ED, admitted to medicine for acute hypoxic hypercapnic respiratory failure secondary to acute mucus plug (now resolved vs superimposed pneumonia  hospital-acquired.(less likely), Records request sent to [facility] medical records  [phone]) and patient will be continued on empiric antibiotics pending procal (27 May 2022 14:02)      Pt evaluated on rounds.  I reviewed the radiology tests and hospital record.    I reviewed previous notes on this patient.    Interval Events: No overnight events.      CAM:    SAT:    SBT:      REVIEW OF SYSTEMS:   see HPI    Overnight events  S/p CT Sx bronchoscopy, no further debridement was required. Remains on trach.     Drips      OBJECTIVE:  ICU Vital Signs Last 24 Hrs  T(C): 36.8 (02 Jun 2022 04:00), Max: 37.2 (01 Jun 2022 20:00)  T(F): 98.2 (02 Jun 2022 04:00), Max: 98.9 (01 Jun 2022 20:00)  HR: 55 (02 Jun 2022 06:00) (51 - 90)  BP: 132/58 (02 Jun 2022 06:00) (109/52 - 154/67)  BP(mean): 83 (02 Jun 2022 06:00) (75 - 97)    RR: 18 (02 Jun 2022 06:00) (14 - 26)  SpO2: 100% (02 Jun 2022 06:00) (97% - 100%)    Mode: AC/ CMV (Assist Control/ Continuous Mandatory Ventilation), RR (machine): 12, TV (machine): 400, FiO2: 40, PEEP: 5, ITime: 1, MAP: 8, PIP: 13    05-31 @ 07:01 - 06-01 @ 07:00  --------------------------------------------------------  IN: 1098 mL / OUT: 1820 mL / NET: -722 mL    06-01 @ 07:01 - 06-02 @ 06:45  --------------------------------------------------------  IN: 1995.5 mL / OUT: 1740 mL / NET: 255.5 mL      CAPILLARY BLOOD GLUCOSE      POCT Blood Glucose.: 129 mg/dL (01 Jun 2022 17:28)        PHYSICAL EXAM:       · ENT:   Airway patent,   Nasal mucosa clear.  Mouth with normal mucosa.   No thrush    · EYES:   Clear bilaterally,   pupils equal,   round and reactive to light.    · CARDIAC:   Normal rate,   regular rhythm.    Heart sounds S1, S2.   No murmurs, no rubs or gallops on auscultation  no edema    · RESPIRATORY:   +trach   rales  normal chest expansion  no retractions or use of accessory muscles  palpation of chest is normal with no fremitus  percussion of chest demonstrates no hyperresonance or dullness    GASTROINTESTINAL:  NG tube  Abdomen soft,   non-tender,   + BS  liver/spleen not palpable    ·MUSCULOSKELETAL:   no clubbing, cyanosis    · SKIN:   Skin normal color for race,   warm, dry   No evidence of rash.    HOSPITAL MEDICATIONS:  MEDICATIONS  (STANDING):  ALPRAZolam 0.25 milliGRAM(s) Oral at bedtime  amLODIPine   Tablet 5 milliGRAM(s) Oral daily  ammonium lactate 12% Lotion 1 Application(s) Topical every 12 hours  atorvastatin 20 milliGRAM(s) Oral at bedtime  buMETAnide 2 milliGRAM(s) Oral daily  cefepime   IVPB 1000 milliGRAM(s) IV Intermittent every 12 hours  dexMEDEtomidine Infusion 0.201 MICROgram(s)/kG/Hr (4.9 mL/Hr) IV Continuous <Continuous>  enoxaparin Injectable 100 milliGRAM(s) SubCutaneous every 12 hours  gabapentin 300 milliGRAM(s) Oral three times a day  influenza   Vaccine 0.5 milliLiter(s) IntraMuscular once  losartan 100 milliGRAM(s) Oral daily  pantoprazole  Injectable 40 milliGRAM(s) IV Push daily  polyethylene glycol 3350 17 Gram(s) Oral daily  senna 2 Tablet(s) Oral at bedtime    MEDICATIONS  (PRN):  acetaminophen     Tablet .. 650 milliGRAM(s) Oral every 6 hours PRN Temp greater or equal to 38C (100.4F), Mild Pain (1 - 3)  morphine  - Injectable 2 milliGRAM(s) IV Push every 4 hours PRN Severe Pain (7 - 10)  oxyCODONE    IR 5 milliGRAM(s) Oral every 4 hours PRN Moderate Pain (4 - 6)    sodium chloride 0.9%.: Solution, 1000 milliLiter(s) infuse at 70 mL/Hr, Stop After 24 Hours  lactated ringers Bolus:   500 milliLiter(s), IV Bolus, once, infuse over 1 Hour(s), Stop After 1 Doses  lactated ringers Bolus:   1000 milliLiter(s), IV Bolus, once, infuse over 60 Minute(s), Stop After 1 Doses  lactated ringers Bolus:   1000 milliLiter(s), IV Bolus, once, infuse over 60 Minute(s), Stop After 1 Doses  Provider's Contact #: [phone]  lactated ringers Bolus:   1000 milliLiter(s), IV Bolus, once, infuse over 60 Minute(s), Stop After 1 Doses  Provider's Contact #: [phone]    LABS:                        8.6    7.13  )-----------( 175      ( 02 Jun 2022 05:12 )             25.2     06-01    133<L>  |  90<L>  |  14  ----------------------------<  130<H>  4.1   |  29  |  <0.5<L>    Ca    9.4      01 Jun 2022 04:44  Mg     1.9     06-01    TPro  5.8<L>  /  Alb  3.6  /  TBili  0.7  /  DBili  x   /  AST  20  /  ALT  21  /  AlkPhos  85  06-01    PT/INR - ( 01 Jun 2022 04:44 )   PT: 12.10 sec;   INR: 1.05 ratio         PTT - ( 01 Jun 2022 04:44 )  PTT:35.9 sec        Mode: AC/ CMV (Assist Control/ Continuous Mandatory Ventilation), RR (machine): 12, TV (machine): 400, FiO2: 40, PEEP: 5, ITime: 1, MAP: 8, PIP: 13      [facility] ([facility] 17[facility]00[facility]  SARS-CoV-2: [facility] (27 May 2022 10:30)    Mode: AC/ CMV (Assist Control/ Continuous Mandatory Ventilation)  RR (machine): 12  TV (machine): 400  FiO2: 40  PEEP: 5  ITime: 1  MAP: 8  PIP: 13        RADIOLOGY: Today I personally interpreted the latest CXR and other pertinent films.    no ptx, no inftr. no free air.  CT scan

## 2022-06-02 NOTE — CONSULT NOTE ADULT - SUBJECTIVE AND OBJECTIVE BOX
55 yo F with PMHx of DVT on Eliquis, COPD,  chronic respiratory failure on trach collar, obesity, IDDM, UTI, sent by [facility] for hypoxia. Burn team was consulted for evaluation of bilateral axilla hydradenitis. Pt seen at bedside, pt on the vent, bilateral axilla evaluated.    Allergies    penicillin (Swelling)    PAST MEDICAL & SURGICAL HISTORY:  COPD (chronic obstructive pulmonary disease)  CHF (congestive heart failure)  DM (diabetes mellitus)  H/O tracheostomy    Social History:  Denies smoking, drugs and etoh     Vital Signs Last 24 Hrs  T(C): 36.2 (02 Jun 2022 16:00), Max: 37.2 (01 Jun 2022 20:00)  T(F): 97.2 (02 Jun 2022 16:00), Max: 98.9 (01 Jun 2022 20:00)  HR: 109 (02 Jun 2022 17:00) (51 - 109)  BP: 168/72 (02 Jun 2022 17:00) (109/52 - 186/74)  BP(mean): 104 (02 Jun 2022 17:00) (75 - 110)  RR: 38 (02 Jun 2022 17:00) (14 - 38)  SpO2: 98% (02 Jun 2022 17:00) (95% - 100%)                            8.6    7.13  )-----------( 175      ( 02 Jun 2022 05:12 )             25.2       PE:  Left axilla with a hyperpigmented scar clean and dry, no pus, no malodor, no drainage, no erythema, no tenderness  Right axilla less scaring but small pustule draining pus, no malodor, no erythema, mild tenderness upon palpation

## 2022-06-02 NOTE — PROGRESS NOTE ADULT - SUBJECTIVE AND OBJECTIVE BOX
PATIENT:  CRUZ DUMONT  MRN-[id number]    Patient is a 56y old  Female who presents with a chief complaint of     INTERVAL HPI/OVERNIGHT EVENTS:  Yesterday, patient had bronchoscopy       Today, patient seen and examined at bedside , SIMV trial today     ICU Vital Signs Last 24 Hrs  T(C): 35.6 (02 Jun 2022 12:00), Max: 37.2 (01 Jun 2022 20:00)  T(F): 96.1 (02 Jun 2022 12:00), Max: 98.9 (01 Jun 2022 20:00)  HR: 77 (02 Jun 2022 13:20) (51 - 77)  BP: 176/77 (02 Jun 2022 13:20) (109/52 - 185/72)  BP(mean): 110 (02 Jun 2022 13:20) (75 - 110)  ABP: --  ABP(mean): --  RR: 24 (02 Jun 2022 13:20) (14 - 31)  SpO2: 100% (02 Jun 2022 13:20) (98% - 100%)    I&O's Summary    01 Jun 2022 07:01  -  02 Jun 2022 07:00  --------------------------------------------------------  IN: 1995.5 mL / OUT: 1940 mL / NET: 55.5 mL    02 Jun 2022 07:01  -  02 Jun 2022 13:40  --------------------------------------------------------  IN: 58.5 mL / OUT: 645 mL / NET: -586.5 mL      Mode: AC/ CMV (Assist Control/ Continuous Mandatory Ventilation)  RR (machine): 12  TV (machine): 400  FiO2: 40  PEEP: 5  PS: 8  ITime: 1  MAP: 8  PIP: 14      LABS:                        8.6    7.13  )-----------( 175      ( 02 Jun 2022 05:12 )             25.2     06-02    133<L>  |  92<L>  |  13  ----------------------------<  202<H>  4.0   |  30  |  <0.5<L>    Ca    9.8      02 Jun 2022 05:12  Mg     1.6     06-02    TPro  6.1  /  Alb  3.9  /  TBili  0.5  /  DBili  x   /  AST  20  /  ALT  21  /  AlkPhos  90  06-02    PT/INR - ( 01 Jun 2022 04:44 )   PT: 12.10 sec;   INR: 1.05 ratio         PTT - ( 01 Jun 2022 04:44 )  PTT:35.9 sec    CAPILLARY BLOOD GLUCOSE      POCT Blood Glucose.: 204 mg/dL (02 Jun 2022 11:59)  POCT Blood Glucose.: 255 mg/dL (02 Jun 2022 06:58)  POCT Blood Glucose.: 129 mg/dL (01 Jun 2022 17:28)        RADIOLOGY & ADDITIONAL TESTS:    ACC: [id number] EXAM:  DUPLEX LOW ARTERIES COMP BILAT                          PROCEDURE DATE:  05/27/2022          INTERPRETATION:  Clinical History / Reason for exam: 56 years old female   with lower extremity swelling for evaluation of arterial circulation.    Right:  Patent common femoral, profunda, SFA, popliteal, posterior tibial,   peroneal, anterior tibial arteries.    Left:  Patent common femoral, profunda, SFA, popliteal, posterior tibial,   peroneal, anterior tibial arteries.    Impression:  Normal blood flow in bilateral lower extremity arterial system.  Mild diffuse atherosclerotic plaque in B-mode images.    --- End of Report ---            ANT VASQUEZ MD; Attending Vascular Surgeon  This document has been electronically signed. May 28 2022  9:42AM    Consultant(s) Notes Reviewed:  [x ] YES  [ ] NO    MEDICATIONS  (STANDING):  amLODIPine   Tablet 5 milliGRAM(s) Oral daily  ammonium lactate 12% Lotion 1 Application(s) Topical every 12 hours  atorvastatin 20 milliGRAM(s) Oral at bedtime  buMETAnide 2 milliGRAM(s) Oral daily  enoxaparin Injectable 100 milliGRAM(s) SubCutaneous every 12 hours  gabapentin 300 milliGRAM(s) Oral three times a day  influenza   Vaccine 0.5 milliLiter(s) IntraMuscular once  lactulose Syrup 20 Gram(s) Oral every 6 hours  LORazepam     Tablet 0.5 milliGRAM(s) Oral two times a day  losartan 100 milliGRAM(s) Oral daily  pantoprazole  Injectable 40 milliGRAM(s) IV Push daily  polyethylene glycol 3350 17 Gram(s) Oral daily  senna 2 Tablet(s) Oral at bedtime  sodium chloride 0.9% Bolus 500 milliLiter(s) IV Bolus once    MEDICATIONS  (PRN):  acetaminophen     Tablet .. 650 milliGRAM(s) Oral every 6 hours PRN Temp greater or equal to 38C (100.4F), Mild Pain (1 - 3)  oxyCODONE    IR 5 milliGRAM(s) Oral every 4 hours PRN Moderate Pain (4 - 6)      PHYSICAL EXAM:  General: NAD, AAOx3, calm and cooperative  HEENT: NCAT, МАРИНА, EOMI, Tracheostomy in place, functioning.   Cardiac: RRR S1, S2, no Murmurs, rubs or gallops  Respiratory: CTAB, normal respiratory effort, breath sounds equal BL, no wheeze, mild UR congesion  Abdomen: Soft, non-distended, non-tender, no rebound, no guarding. +BS.  Extremities:  Doppler DP pulses, chroninc venous stasis changes bilateral lower extremity     Care Discussed with Consultants/Other Providers [ x] YES  [ ] NO

## 2022-06-02 NOTE — CONSULT NOTE ADULT - SUBJECTIVE AND OBJECTIVE BOX
VASCULAR SURGERY CONSULT NOTE      HPI:  55 yo f with PMHx of DVT on Eliquis, COPD,  trach collar, obesity, IDDM, UTI, sent by [facility] for hypoxia. History provided by daughter at bedside, she reports the current illness going back to April 4th when pt was intubated on admission at [facility] ICU for hypoxic respiratory failure diagnosed with copd exacerbation and superimposed pneumonia, of note she also appears to have been in CHF exacerbation with severe b/l LE swelling treated with IV bumex. Remained intubated 37 days per daughter, diagnosed with fever of unknown origin (up to 106F), treated with empiric broad spectrum antibiotics and once 2 weeks s/p tracheostomy placement and 48 hours afebrile she was was discharged to nursing home for PT. Of note, patient's daughter and pt herself say the wiseman has not been replaced for over 3 weeks. She also reports a developing sacral pressure ulcer. Was at [facility] only 2 days when was noted to be hypoxic (saturating low 70's) and EMS called. While awaiting EMS, floor nurse on the unit suctioned the patient, and managed to bring up a very large mucus plug, with pt's saturation immediately improving afterwards to 80's. Nonetheless pt was sent to [facility] ED. Prior to all this pt was independent and ambulatory. Pt denies sob, cp, abd pain, n/v/d, fever or chills.     On arrival to ED pt was saturating 97%, 15L O2 via tracheostomy collar (baseline 8 L at [facility]) VS:    /59  T 99.9F RR 20  Admission VBG pH 7.37 pCO2 60 pO2 49. Pt not wheezing, not coughing. CXR suspicious for L-sided PNA, inconclusive. CTA neg for PE. Pt received x1 IV Levoquin and Cefepime in ED, admitted to medicine for acute hypoxic hypercapnic respiratory failure secondary to acute mucus plug (now resolved vs superimposed pneumonia  hospital-acquired.(less likely), Records request sent to [facility] medical records  [phone]) and patient will be continued on empiric antibiotics pending procal (27 May 2022 14:02)        PAST MEDICAL & SURGICAL HISTORY:  COPD (chronic obstructive pulmonary disease)      CHF (congestive heart failure)      DM (diabetes mellitus)      H/O tracheostomy        penicillin (Swelling)    Home Medications:  albuterol sulfate 2.5 mg/3 mL (0.083 %) solution for nebulization:  (27 May 2022 15:04)  alprazolam 0.25 mg tablet:  (27 May 2022 15:04)  amlodipine 5 mg tablet:  (27 May 2022 15:04)  atorvastatin 20 mg tablet:  (27 May 2022 15:04)  bumetanide 2 mg tablet:  (27 May 2022 15:04)  clopidogrel 75 mg tablet:  (27 May 2022 15:04)  Eliquis 5 mg tablet:  (27 May 2022 15:04)  gabapentin 300 mg capsule:  (27 May 2022 15:04)  glimepiride 2 mg tablet:  (27 May 2022 15:04)  labetalol 100 mg tablet:  (27 May 2022 15:04)  LANTUS SOLOSTAR 100 UNIT/ML: 55 UNITS ONCE A DAY SUBCUTANEOUS 90 DAYS (27 May 2022 15:04)  losartan 100 mg tablet:  (27 May 2022 15:04)  metformin 1,000 mg tablet:  (27 May 2022 15:04)  Novolog Flexpen U-100 Insulin aspart 100 unit/mL (3 mL) subcutaneous:  (27 May 2022 15:04)  Trelegy Ellipta 100 mcg-62.5 mcg-25 mcg powder for inhalation:  (27 May 2022 15:04)    No permtinent family history of PVD    REVIEW OF SYSTEMS:  GENERAL:                                         negative  SKIN:                                                 see HPI  OPTHALMOLOGIC:                          negative  ENMT:                                               negative  RESPIRATORY AND THORAX:        see HPI  CARDIOVASCULAR:                        see HPI  GASTROINTESTINAL:                       negative  NEPHROLOGY:                                  negative  MUSCULOSKELETAL:                       negative  NEUROLOGIC:                                   negative  PSYCHIATRIC:                                    negative  HEMATOLOGY/LYMPHATICS:         negative  ENDOCRINE:                                     see HPI  ALLERGIC/IMMUNOLOGIC:            negative    12 point ROS otherwise normal except as stated in HPI  FHx: none  SHX:  [ ]  smoking     [ ] alcohol use    PHYSICAL EXAM  Vital Signs Last 24 Hrs  T(C): 35.6 (02 Jun 2022 12:00), Max: 37.2 (01 Jun 2022 20:00)  T(F): 96.1 (02 Jun 2022 12:00), Max: 98.9 (01 Jun 2022 20:00)  HR: 81 (02 Jun 2022 14:00) (51 - 81)  BP: 166/72 (02 Jun 2022 14:00) (109/52 - 185/72)  BP(mean): 103 (02 Jun 2022 14:00) (75 - 110)  RR: 31 (02 Jun 2022 14:00) (14 - 31)  SpO2: 100% (02 Jun 2022 14:00) (98% - 100%)    Appearance: Normal	  HEENT:   Normal oral mucosa, PERRL, EOMI	  Neck: Supple, - JVD;   Cardiovascular: Normal S1 S2, No JVD, No murmurs,   Respiratory: Lungs clear to auscultation, No Rales, Rhonchi, Wheezing	  Gastrointestinal:  Soft, Non-tender, positive BS	  Skin: No rashes, No ecchymoses, No cyanosis  Extremities: Normal range of motion, No clubbing, cyanosis or edema  bilateral lower extremity discoloration from feet up to below knee  Neurologic: Non-focal  Psychiatry: A & O x 3, Mood & affect appropriate      PULSES:  Femoral:  Popliteal:  Dorsal Pedal: palpable b/l  Posterior Tibial: palpable b/l  Capillary:    MEDICATIONS:   MEDICATIONS  (STANDING):  amLODIPine   Tablet 5 milliGRAM(s) Oral daily  ammonium lactate 12% Lotion 1 Application(s) Topical every 12 hours  atorvastatin 20 milliGRAM(s) Oral at bedtime  buMETAnide 2 milliGRAM(s) Oral daily  enoxaparin Injectable 100 milliGRAM(s) SubCutaneous every 12 hours  gabapentin 300 milliGRAM(s) Oral three times a day  influenza   Vaccine 0.5 milliLiter(s) IntraMuscular once  lactulose Syrup 20 Gram(s) Oral every 6 hours  LORazepam     Tablet 0.5 milliGRAM(s) Oral two times a day  losartan 100 milliGRAM(s) Oral daily  pantoprazole  Injectable 40 milliGRAM(s) IV Push daily  polyethylene glycol 3350 17 Gram(s) Oral daily  senna 2 Tablet(s) Oral at bedtime    MEDICATIONS  (PRN):  acetaminophen     Tablet .. 650 milliGRAM(s) Oral every 6 hours PRN Temp greater or equal to 38C (100.4F), Mild Pain (1 - 3)  oxyCODONE    IR 5 milliGRAM(s) Oral every 4 hours PRN Moderate Pain (4 - 6)  traMADol 25 milliGRAM(s) Oral three times a day PRN Mild Pain (1 - 3)      LAB/STUDIES:                        8.6    7.13  )-----------( 175      ( 02 Jun 2022 05:12 )             25.2     06-02    133<L>  |  92<L>  |  13  ----------------------------<  202<H>  4.0   |  30  |  <0.5<L>    Ca    9.8      02 Jun 2022 05:12  Mg     1.6     06-02    TPro  6.1  /  Alb  3.9  /  TBili  0.5  /  DBili  x   /  AST  20  /  ALT  21  /  AlkPhos  90  06-02    PT/INR - ( 01 Jun 2022 04:44 )   PT: 12.10 sec;   INR: 1.05 ratio         PTT - ( 01 Jun 2022 04:44 )  PTT:35.9 sec  LIVER FUNCTIONS - ( 02 Jun 2022 05:12 )  Alb: 3.9 g/dL / Pro: 6.1 g/dL / ALK PHOS: 90 U/L / ALT: 21 U/L / AST: 20 U/L / GGT: x                       ABG - ( 02 Jun 2022 14:04 )  pH, Arterial: 7.45  pH, Blood: x     /  pCO2: 52    /  pO2: 128   / HCO3: 36    / Base Excess: 11.0  /  SaO2: 98.7                  IMAGING:      < from: [facility] Duplex Lower Extrem Arterial, Bilat (05.27.22 @ 20:01) >  Normal blood flow in bilateral lower extremity arterial system.  Mild diffuse atherosclerotic plaque in B-mode images.    < end of copied text >  < from: VA Duplex Lower Extrem Arterial, Bilat (05.27.22 @ 20:01) >  Normal blood flow in bilateral lower extremity arterial system.  Mild diffuse atherosclerotic plaque in B-mode images.    < end of copied text >

## 2022-06-02 NOTE — CONSULT NOTE ADULT - ASSESSMENT
55yo Female with PMHx of DVT on Eliquis, COPD, CHF, respiratory failure on  trach collar, obesity, IDDM, UTI, sent by [facility] for hypoxia, seen for bilateral axilla hydradenitis  -Clean right axilla with soap and water apply dry gauze/tape daily, left axilla dry and does not require any dressing  -no surgical intervention required, pt not interested in one

## 2022-06-02 NOTE — PROGRESS NOTE ADULT - ASSESSMENT
55 yo f with PMHx of DVT on Eliquis, COPD,  trach collar, obesity, IDDM, UTI, sent by [facility] for hypoxia. Current illness going back to April 4th when pt was intubated on admission at [facility] ICU for hypoxic respiratory failure diagnosed with copd exacerbation and superimposed pneumonia, and remained intubated for 37 days requiring tracheostomy. Patient stayed at [facility] for 2 days until noted to be hypoxic (saturating low 70's) pt's saturation immediately improving afterwards to 80's after large mucus plug removed and was subsequently sent to [facility]  On arrival to ED pt was saturating 97%, 15L O2 via tracheostomy collar (baseline 8 L at [facility])CXR suspicious for L-sided PNA, inconclusive. CTA neg for PE. Pt received x1 IV Levaquin and Cefepime in ED, admitted to MICU for acute hypoxic hypercapnic respiratory failure secondary to acute mucus plug now resolved vs superimposed pneumonia hospital-acquired.   General Surgery Consulted emergently when patient found to be hypoxic to low 80s after CCU and Pulmonary team found large granuloma in trachea along tracheostomy tube. Patient currently had a size 8 trach. Surgery assisted Pulmonary and CCU with trach exchanged with ET tube guidance to a size 7 XLT. Saturations improved to high 90s. Large granuloma was removed. Patient was no longer in distress after exchange.    #Acute/ chronic COPD with exacerbation  #chronic respiratory failure  #mucous plug removed  #pneumonia v, atelectasis L base  #currently No Impending respiratory failure  - s/p Trach exchange to 7 XLT alongside Pulmonology.  - S/p repeat bronchoscopy   -airway management   -trach functioning well s/p exchange   - -No further intervention required by CT surgery  - recall [id number] if further evaluation needed  -f/u speech and swallow , patient was eating by mouth at nursing home when off the vent   -weaning trials as tolerated      #Chronic Indwelling Singh  -5/28 Ucx - Contaminated   -5/28 Ucx- Normal perri   -5/27 Ucx- Klebsiella (CRE) and pseudomonas   - s/p cefepime   -Currently afebrile w/ no urinary complaints       #Nonobstructive Ileus  - Keep patient on bowel rest, npo except for meds   - Discontinue morphine   -C/w bowel regimen         #Hx of DVT  - eliquis held  - On lovenox 100 BID   -Monitor for signs of bleeding   -AC held for procedure         Activity OOB to chair  GI PPX  PPI  DVT PPX Therapeutic Lovenox

## 2022-06-02 NOTE — PROGRESS NOTE ADULT - ATTENDING COMMENTS
Attending Statement: I have personally performed a face to face diagnostic evaluation on this patient. The patient is suffering from:  Acute/ chronic COPD exacerbation, resolved  Chronic respiratory failure  Mucous plug removed  Pneumonia/ atelectasis L base  I have made amendments to the documentation where necessary. I have personally seen and examined this patient.  I have fully participated in the care of this patient.  I have reviewed all pertinent clinical information, including history, physical exam, plan and note.

## 2022-06-02 NOTE — PROGRESS NOTE ADULT - ASSESSMENT
· Assessment	  55yo Female with PMHx of DVT on Eliquis, COPD,  trach collar, obesity, IDDM, UTI, sent by [facility] for hypoxia now s/p Trach exchange to 7 XLT alongside Pulmonology. Pt underwent bronchoscopy yesterday (6/1), good visualization and no debridement required.     Plan:   -No further intervention required by CT surgery  -Care by primary team  -Thoracic surgery will sign off now, recall if further evaluation needed    spectra [id number]   Dressing: telfa dressing

## 2022-06-02 NOTE — CONSULT NOTE ADULT - ASSESSMENT
57 yo f with PMHx of DVT on Eliquis, COPD,  trach collar, obesity, IDDM, UTI, sent by [facility] for hypoxia. History provided by daughter at bedside, she reports the current illness going back to April 4th when pt was intubated on admission at [facility] ICU for hypoxic respiratory failure diagnosed with copd exacerbation and superimposed pneumonia, of note she also appears to have been in CHF exacerbation with severe b/l LE swelling treated with IV bumex. Recent admission with 37 days intubation for respiratory distress    vascular surgery called to evaluate for b/l lower extremity feet/leg pains and over sensitivity  Upon the exam: no evidence of ischemia. Has palpable pulses. N edema  Likely venous insufficiency with diabetic neuropathy  - I reviewed today's labs  - I reviewed and personally visualized all the radiology imagings  - I discussed the plan with attending Dr. Lennon  - please, consider increasing Gabapentin  - leg elevation  - ambulation is encouraged when possible    SPECTRA [id number]

## 2022-06-03 LAB
ALBUMIN SERPL ELPH-MCNC: 4.2 G/DL — SIGNIFICANT CHANGE UP (ref 3.5–5.2)
ALP SERPL-CCNC: 100 U/L — SIGNIFICANT CHANGE UP (ref 30–115)
ALT FLD-CCNC: 26 U/L — SIGNIFICANT CHANGE UP (ref 0–41)
ANION GAP SERPL CALC-SCNC: 13 MMOL/L — SIGNIFICANT CHANGE UP (ref 7–14)
AST SERPL-CCNC: 24 U/L — SIGNIFICANT CHANGE UP (ref 0–41)
BASOPHILS # BLD AUTO: 0.03 K/UL — SIGNIFICANT CHANGE UP (ref 0–0.2)
BASOPHILS NFR BLD AUTO: 0.4 % — SIGNIFICANT CHANGE UP (ref 0–1)
BILIRUB SERPL-MCNC: 0.8 MG/DL — SIGNIFICANT CHANGE UP (ref 0.2–1.2)
BUN SERPL-MCNC: 9 MG/DL — LOW (ref 10–20)
CALCIUM SERPL-MCNC: 10 MG/DL — SIGNIFICANT CHANGE UP (ref 8.5–10.1)
CHLORIDE SERPL-SCNC: 90 MMOL/L — LOW (ref 98–110)
CO2 SERPL-SCNC: 33 MMOL/L — HIGH (ref 17–32)
CREAT SERPL-MCNC: <0.5 MG/DL — LOW (ref 0.7–1.5)
CULTURE RESULTS: SIGNIFICANT CHANGE UP
EGFR: 124 ML/MIN/1.73M2 — SIGNIFICANT CHANGE UP
EOSINOPHIL # BLD AUTO: 0.19 K/UL — SIGNIFICANT CHANGE UP (ref 0–0.7)
EOSINOPHIL NFR BLD AUTO: 2.4 % — SIGNIFICANT CHANGE UP (ref 0–8)
GLUCOSE BLDC GLUCOMTR-MCNC: 150 MG/DL — HIGH (ref 70–99)
GLUCOSE BLDC GLUCOMTR-MCNC: 212 MG/DL — HIGH (ref 70–99)
GLUCOSE BLDC GLUCOMTR-MCNC: 229 MG/DL — HIGH (ref 70–99)
GLUCOSE BLDC GLUCOMTR-MCNC: 257 MG/DL — HIGH (ref 70–99)
GLUCOSE BLDC GLUCOMTR-MCNC: 272 MG/DL — HIGH (ref 70–99)
GLUCOSE SERPL-MCNC: 143 MG/DL — HIGH (ref 70–99)
HCT VFR BLD CALC: 26.5 % — LOW (ref 37–47)
HGB BLD-MCNC: 9.1 G/DL — LOW (ref 12–16)
IMM GRANULOCYTES NFR BLD AUTO: 0.6 % — HIGH (ref 0.1–0.3)
LYMPHOCYTES # BLD AUTO: 1.22 K/UL — SIGNIFICANT CHANGE UP (ref 1.2–3.4)
LYMPHOCYTES # BLD AUTO: 15.7 % — LOW (ref 20.5–51.1)
MAGNESIUM SERPL-MCNC: 1.7 MG/DL — LOW (ref 1.8–2.4)
MCHC RBC-ENTMCNC: 33 PG — HIGH (ref 27–31)
MCHC RBC-ENTMCNC: 34.3 G/DL — SIGNIFICANT CHANGE UP (ref 32–37)
MCV RBC AUTO: 96 FL — SIGNIFICANT CHANGE UP (ref 81–99)
MONOCYTES # BLD AUTO: 0.63 K/UL — HIGH (ref 0.1–0.6)
MONOCYTES NFR BLD AUTO: 8.1 % — SIGNIFICANT CHANGE UP (ref 1.7–9.3)
NEUTROPHILS # BLD AUTO: 5.66 K/UL — SIGNIFICANT CHANGE UP (ref 1.4–6.5)
NEUTROPHILS NFR BLD AUTO: 72.8 % — SIGNIFICANT CHANGE UP (ref 42.2–75.2)
NRBC # BLD: 0 /100 WBCS — SIGNIFICANT CHANGE UP (ref 0–0)
PLATELET # BLD AUTO: 180 K/UL — SIGNIFICANT CHANGE UP (ref 130–400)
POTASSIUM SERPL-MCNC: 3.9 MMOL/L — SIGNIFICANT CHANGE UP (ref 3.5–5)
POTASSIUM SERPL-SCNC: 3.9 MMOL/L — SIGNIFICANT CHANGE UP (ref 3.5–5)
PROT SERPL-MCNC: 6.8 G/DL — SIGNIFICANT CHANGE UP (ref 6–8)
RBC # BLD: 2.76 M/UL — LOW (ref 4.2–5.4)
RBC # FLD: 14.6 % — HIGH (ref 11.5–14.5)
SODIUM SERPL-SCNC: 136 MMOL/L — SIGNIFICANT CHANGE UP (ref 135–146)
SPECIMEN SOURCE: SIGNIFICANT CHANGE UP
WBC # BLD: 7.78 K/UL — SIGNIFICANT CHANGE UP (ref 4.8–10.8)
WBC # FLD AUTO: 7.78 K/UL — SIGNIFICANT CHANGE UP (ref 4.8–10.8)

## 2022-06-03 PROCEDURE — 99233 SBSQ HOSP IP/OBS HIGH 50: CPT

## 2022-06-03 PROCEDURE — 71045 X-RAY EXAM CHEST 1 VIEW: CPT | Mod: 26

## 2022-06-03 PROCEDURE — 99221 1ST HOSP IP/OBS SF/LOW 40: CPT

## 2022-06-03 RX ORDER — APIXABAN 2.5 MG/1
5 TABLET, FILM COATED ORAL EVERY 12 HOURS
Refills: 0 | Status: DISCONTINUED | OUTPATIENT
Start: 2022-06-03 | End: 2022-06-13

## 2022-06-03 RX ORDER — INSULIN LISPRO 100/ML
VIAL (ML) SUBCUTANEOUS EVERY 6 HOURS
Refills: 0 | Status: DISCONTINUED | OUTPATIENT
Start: 2022-06-03 | End: 2022-06-24

## 2022-06-03 RX ORDER — MAGNESIUM SULFATE 500 MG/ML
2 VIAL (ML) INJECTION ONCE
Refills: 0 | Status: COMPLETED | OUTPATIENT
Start: 2022-06-03 | End: 2022-06-03

## 2022-06-03 RX ORDER — KETOROLAC TROMETHAMINE 30 MG/ML
30 SYRINGE (ML) INJECTION THREE TIMES A DAY
Refills: 0 | Status: DISCONTINUED | OUTPATIENT
Start: 2022-06-03 | End: 2022-06-07

## 2022-06-03 RX ADMIN — Medication 0.5 MILLIGRAM(S): at 05:40

## 2022-06-03 RX ADMIN — OXYCODONE HYDROCHLORIDE 5 MILLIGRAM(S): 5 TABLET ORAL at 03:00

## 2022-06-03 RX ADMIN — Medication 1 APPLICATION(S): at 17:38

## 2022-06-03 RX ADMIN — APIXABAN 5 MILLIGRAM(S): 2.5 TABLET, FILM COATED ORAL at 17:38

## 2022-06-03 RX ADMIN — LACTULOSE 20 GRAM(S): 10 SOLUTION ORAL at 06:08

## 2022-06-03 RX ADMIN — Medication 30 MILLIGRAM(S): at 16:06

## 2022-06-03 RX ADMIN — Medication 30 MILLIGRAM(S): at 14:12

## 2022-06-03 RX ADMIN — GABAPENTIN 300 MILLIGRAM(S): 400 CAPSULE ORAL at 05:39

## 2022-06-03 RX ADMIN — GABAPENTIN 300 MILLIGRAM(S): 400 CAPSULE ORAL at 14:22

## 2022-06-03 RX ADMIN — Medication 2: at 23:03

## 2022-06-03 RX ADMIN — Medication 30 MILLIGRAM(S): at 11:01

## 2022-06-03 RX ADMIN — Medication 30 MILLIGRAM(S): at 18:13

## 2022-06-03 RX ADMIN — OXYCODONE HYDROCHLORIDE 5 MILLIGRAM(S): 5 TABLET ORAL at 02:08

## 2022-06-03 RX ADMIN — ATORVASTATIN CALCIUM 20 MILLIGRAM(S): 80 TABLET, FILM COATED ORAL at 22:07

## 2022-06-03 RX ADMIN — Medication 650 MILLIGRAM(S): at 18:57

## 2022-06-03 RX ADMIN — LOSARTAN POTASSIUM 100 MILLIGRAM(S): 100 TABLET, FILM COATED ORAL at 05:39

## 2022-06-03 RX ADMIN — Medication 30 MILLIGRAM(S): at 22:24

## 2022-06-03 RX ADMIN — Medication 1 APPLICATION(S): at 05:57

## 2022-06-03 RX ADMIN — GABAPENTIN 300 MILLIGRAM(S): 400 CAPSULE ORAL at 22:06

## 2022-06-03 RX ADMIN — OXYCODONE HYDROCHLORIDE 5 MILLIGRAM(S): 5 TABLET ORAL at 05:56

## 2022-06-03 RX ADMIN — BUMETANIDE 2 MILLIGRAM(S): 0.25 INJECTION INTRAMUSCULAR; INTRAVENOUS at 05:39

## 2022-06-03 RX ADMIN — POLYETHYLENE GLYCOL 3350 17 GRAM(S): 17 POWDER, FOR SOLUTION ORAL at 11:04

## 2022-06-03 RX ADMIN — INSULIN GLARGINE 15 UNIT(S): 100 INJECTION, SOLUTION SUBCUTANEOUS at 22:06

## 2022-06-03 RX ADMIN — PANTOPRAZOLE SODIUM 40 MILLIGRAM(S): 20 TABLET, DELAYED RELEASE ORAL at 11:04

## 2022-06-03 RX ADMIN — Medication 650 MILLIGRAM(S): at 19:57

## 2022-06-03 RX ADMIN — Medication 0.5 MILLIGRAM(S): at 17:38

## 2022-06-03 RX ADMIN — AMLODIPINE BESYLATE 5 MILLIGRAM(S): 2.5 TABLET ORAL at 05:39

## 2022-06-03 RX ADMIN — Medication 30 MILLIGRAM(S): at 22:06

## 2022-06-03 RX ADMIN — ENOXAPARIN SODIUM 100 MILLIGRAM(S): 100 INJECTION SUBCUTANEOUS at 05:40

## 2022-06-03 RX ADMIN — SENNA PLUS 2 TABLET(S): 8.6 TABLET ORAL at 22:07

## 2022-06-03 RX ADMIN — Medication 25 GRAM(S): at 07:55

## 2022-06-03 RX ADMIN — OXYCODONE HYDROCHLORIDE 5 MILLIGRAM(S): 5 TABLET ORAL at 05:39

## 2022-06-03 NOTE — PROGRESS NOTE ADULT - ASSESSMENT
Impression:  Acute/ chronic COPD exacerbation, resolved  Chronic respiratory failure  Mucous plug removed  Pneumonia/ atelectasis L base  HO DVT, was on eliquis at home     SUGGEST:   frequent suction with saline   PS trial, 8/5 today  CT Sx following  Lovenox  Q12 hrs   500cc NS bolus  Replete Mg  KUB, if negative add lactulose 20gm q6h  S&S eval, if fails, CT Sx FU for PEG tube  Target SPO2 92-96%, titrate oxygen as tolerated  bronchodilator treatments ATC and PRN  DC cefepime,   OOB to chair  GI and DVT prophylaxis  pulmonary toilet  LE duplex Impression:  Acute/ chronic COPD exacerbation, resolved  Chronic respiratory failure  Mucous plug removed  Pneumonia/ atelectasis L base  HO DVT, was on eliquis at home     SUGGEST:   frequent suction with saline   t-piece, dec FiO2 to 30%  CT Sx signed off  Restart PO eliquis  Replete Mg  Continue with bowel regimen  S&S eval, if fails, CT Sx FU for PEG tube  Target SPO2 92-96%, titrate oxygen as tolerated  bronchodilator treatments ATC and PRN  OOB to chair  GI and DVT prophylaxis  pulmonary toilet  Repeat duplex stable

## 2022-06-03 NOTE — SWALLOW BEDSIDE ASSESSMENT ADULT - SLP PERTINENT HISTORY OF CURRENT PROBLEM
pt is a 57 y/o F w/ PMHx: DVT, COPD, resp failure s/p recent trach at [facility] (at baseline on 8L via trach collar), obesity, IDDM, UTI, sent by [facility] for hypoxia. History provided by daughter, reports the current illness goes back to April 4th when pt was intubated on admission at [facility] for hypoxic respiratory failure diagnosed w/ COPD exacerbation and superimposed PNA. Pt w/ prolonged intubation, s/p trach ~2 weeks ago and then d/c'ed to NH. Pt admitted for AHRF 2' mucus plug (now resolved s/p suctioning) vs. possible aspiration PNA. Pt w/ suspected chronic HF, course c/b complete whiteout of the left lung with complete atelectasis, pt upgraded to CCU. General Sx c/s emergently when pt found to be hypoxic d/t large granuloma in trachea along tracheostomy tube. Pt previously had a size 8 trach, exchanged w/ size 7 XLT. Large granuloma removed and pt placed on vent. Pt underwent bronchoscopy on 6/1, good visualization and no tracheal debridement required.

## 2022-06-03 NOTE — PROGRESS NOTE ADULT - SUBJECTIVE AND OBJECTIVE BOX
VASCULAR SURGERY PROGRESS NOTE    CC: Hypoxia     Hospital Day # 8      Events of past 24 hours: No acute events from vascular perspective. Patient still complains of bilateral leg pain.           ROS otherwise negative except per subjective and HPI      PAST MEDICAL & SURGICAL HISTORY:  COPD (chronic obstructive pulmonary disease)      CHF (congestive heart failure)      DM (diabetes mellitus)      H/O tracheostomy          Vital Signs Last 24 Hrs  T(C): 36.5 (03 Jun 2022 08:00), Max: 36.7 (02 Jun 2022 20:00)  T(F): 97.7 (03 Jun 2022 08:00), Max: 98.1 (02 Jun 2022 20:00)  HR: 93 (03 Jun 2022 08:00) (54 - 109)  BP: 143/62 (03 Jun 2022 08:00) (126/58 - 186/74)  BP(mean): 89 (03 Jun 2022 08:00) (68 - 130)  RR: 16 (03 Jun 2022 08:13) (13 - 38)  SpO2: 99% (03 Jun 2022 08:13) (94% - 100%)    Pain (0-10):            Pain Control Adequate: [] YES [] N    Diet:    I&O's Detail    02 Jun 2022 07:01  -  03 Jun 2022 07:00  --------------------------------------------------------  IN:    Dexmedetomidine: 8.5 mL    Enteral Tube Flush: 240 mL    IV PiggyBack: 50 mL    Jevity 1.2: 300 mL  Total IN: 598.5 mL    OUT:    Indwelling Catheter - Urethral (mL): 1470 mL  Total OUT: 1470 mL    Total NET: -871.5 mL      03 Jun 2022 07:01  -  03 Jun 2022 08:36  --------------------------------------------------------  IN:    IV PiggyBack: 50 mL  Total IN: 50 mL    OUT:    Indwelling Catheter - Urethral (mL): 350 mL  Total OUT: 350 mL    Total NET: -300 mL      PHYSICAL EXAM    Appearance: Normal	  HEENT:   Normal oral mucosa, PERRL, EOMI	  Neck: Supple, - JVD;   Cardiovascular: Normal S1 S2, No JVD, No murmurs,   Respiratory: Lungs clear to auscultation, No Rales, Rhonchi, Wheezing	  Gastrointestinal:  Soft, Non-tender, positive BS	  Skin: No rashes, No ecchymoses, No cyanosis  Extremities: bilateral lower extremity discoloration from feet up to below knee  Neurologic: Non-focal  Psychiatry: A & O x 3, Mood & affect appropriate        MEDICATIONS:   MEDICATIONS  (STANDING):  amLODIPine   Tablet 5 milliGRAM(s) Oral daily  ammonium lactate 12% Lotion 1 Application(s) Topical every 12 hours  apixaban 5 milliGRAM(s) Oral every 12 hours  atorvastatin 20 milliGRAM(s) Oral at bedtime  buMETAnide 2 milliGRAM(s) Oral daily  gabapentin 300 milliGRAM(s) Oral three times a day  influenza   Vaccine 0.5 milliLiter(s) IntraMuscular once  insulin glargine Injectable (LANTUS) 15 Unit(s) SubCutaneous at bedtime  insulin lispro (ADMELOG) corrective regimen sliding scale   SubCutaneous at bedtime  ketorolac   Injectable 30 milliGRAM(s) IV Push three times a day  LORazepam     Tablet 0.5 milliGRAM(s) Oral two times a day  losartan 100 milliGRAM(s) Oral daily  pantoprazole  Injectable 40 milliGRAM(s) IV Push daily  polyethylene glycol 3350 17 Gram(s) Oral daily  senna 2 Tablet(s) Oral at bedtime    MEDICATIONS  (PRN):  acetaminophen     Tablet .. 650 milliGRAM(s) Oral every 6 hours PRN Temp greater or equal to 38C (100.4F), Mild Pain (1 - 3)  oxyCODONE    IR 5 milliGRAM(s) Oral every 4 hours PRN Moderate Pain (4 - 6)      LAB/STUDIES:                        9.1    7.78  )-----------( 180      ( 03 Jun 2022 05:16 )             26.5     06-03    136  |  90<L>  |  9<L>  ----------------------------<  143<H>  3.9   |  33<H>  |  <0.5<L>    Ca    10.0      03 Jun 2022 05:16  Mg     1.7     06-03    TPro  6.8  /  Alb  4.2  /  TBili  0.8  /  DBili  x   /  AST  24  /  ALT  26  /  AlkPhos  100  06-03      LIVER FUNCTIONS - ( 03 Jun 2022 05:16 )  Alb: 4.2 g/dL / Pro: 6.8 g/dL / ALK PHOS: 100 U/L / ALT: 26 U/L / AST: 24 U/L / GGT: x               ABG - ( 02 Jun 2022 14:04 )  pH, Arterial: 7.45  pH, Blood: x     /  pCO2: 52    /  pO2: 128   / HCO3: 36    / Base Excess: 11.0  /  SaO2: 98.7

## 2022-06-03 NOTE — SWALLOW BEDSIDE ASSESSMENT ADULT - SWALLOW EVAL: DIAGNOSIS
+toleration for small amount of ice chips and tsp sips of thin liquids laced w/ green dye w/o overt s/s aspiration/penetration

## 2022-06-03 NOTE — SWALLOW FEES ASSESSMENT ADULT - ROSENBEK'S PENETRATION ASPIRATION SCALE
+diffuse residue t/o laryngeal vestibule, however unable to definitively r/o aspiration 2' obstructed view Unable to definitively r/o aspiration 2' obstructed view, +laryngeal penetration observed

## 2022-06-03 NOTE — SWALLOW FEES ASSESSMENT ADULT - PRELIMINARY ENDOSCOPIC EXAMINATIONS
Interarytenoid/post-commissure edema/Interarytenoid/Arytenoid edema/Interarytenoid/Arytenoid erythema Interarytenoid/post-commissure edema/Interarytenoid/Arytenoid edema/Interarytenoid/Arytenoid erythema/Baseline pooling of secretions/Baseline penetration of secretions

## 2022-06-03 NOTE — SWALLOW BEDSIDE ASSESSMENT ADULT - SLP GENERAL OBSERVATIONS
pt received in bed awake alert w/o c/o pain. +NGT, Pt initially on humidified O2 via t-piece, switched by RT to humidified O2 via trach collar. +PMSV placed w/ RT, pt w/o voicing despite effort. No voicing w/ finger occlusion.

## 2022-06-03 NOTE — SWALLOW FEES ASSESSMENT ADULT - COMMENTS
irregular appearance of epiglottis, patches of granulated tissue, diffuse edema, globular shaped arytenoids SLP contacted SLP at [facility], pt underwent FEES at [facility] a few weeks ago w/ recommendations for regular w/ thin liquids diet. Per SLP, pt w/ diffuse edema, mild pharyngeal residue.

## 2022-06-03 NOTE — SWALLOW FEES ASSESSMENT ADULT - DEMONSTRATES NEED FOR REFERRAL TO ANOTHER SERVICE
2' irregular shaped epiglottis, patches of granulated tissue and globular shaped arytenoids/ENT 2' irregular shaped epiglottis, patches of granulated tissue, diffuse edema, and globular shaped arytenoids/ENT

## 2022-06-03 NOTE — PROGRESS NOTE ADULT - ASSESSMENT
ASSESSMENT:  55 yo f with PMHx of DVT on Eliquis, COPD,  trach collar, obesity, IDDM, UTI, sent by [facility] for hypoxia. History provided by daughter at bedside, she reports the current illness going back to April 4th when pt was intubated on admission at [facility] ICU for hypoxic respiratory failure diagnosed with copd exacerbation and superimposed pneumonia, of note she also appears to have been in CHF exacerbation with severe b/l LE swelling treated with IV bumex. Recent admission with 37 days intubation for respiratory distress    vascular surgery called to evaluate for b/l lower extremity feet/leg pains and over sensitivity  Upon the exam: no evidence of ischemia. Has palpable pulses. N edema  Likely venous insufficiency with diabetic neuropathy    PLAN:  - consider increasing Gabapentin  - Leg elevation recommended   - Encourage ambulation   - No vascular surgery intervention at present time   - Please recall vascular as needed.       VASCULAR TEAM SPECTRA: [id number]

## 2022-06-03 NOTE — PROGRESS NOTE ADULT - ASSESSMENT
57 yo f with PMHx of DVT on Eliquis, COPD,  trach collar, obesity, IDDM, UTI, sent by [facility] for hypoxia. Current illness going back to April 4th when pt was intubated on admission at [facility] ICU for hypoxic respiratory failure diagnosed with copd exacerbation and superimposed pneumonia, and remained intubated for 37 days requiring tracheostomy. Patient stayed at [facility] for 2 days until noted to be hypoxic (saturating low 70's) pt's saturation immediately improving afterwards to 80's after large mucus plug removed and was subsequently sent to [facility]  On arrival to ED pt was saturating 97%, 15L O2 via tracheostomy collar (baseline 8 L at [facility])CXR suspicious for L-sided PNA, inconclusive. CTA neg for PE. Pt received x1 IV Levaquin and Cefepime in ED, admitted to MICU for acute hypoxic hypercapnic respiratory failure secondary to acute mucus plug now resolved vs superimposed pneumonia hospital-acquired.   General Surgery Consulted emergently when patient found to be hypoxic to low 80s after CCU and Pulmonary team found large granuloma in trachea along tracheostomy tube. Patient currently had a size 8 trach. Surgery assisted Pulmonary and CCU with trach exchanged with ET tube guidance to a size 7 XLT. Saturations improved to high 90s. Large granuloma was removed. Patient was no longer in distress after exchange.    #Acute/ chronic COPD with exacerbation  #chronic respiratory failure  #mucous plug removed  #pneumonia v, atelectasis L base  #currently No Impending respiratory failure  - s/p Trach exchange to 7 XLT alongside Pulmonology.  - S/p repeat bronchoscopy   -airway management   -trach functioning well s/p exchange   - -No further intervention required by CT surgery  - recall [id number] if further evaluation needed  -f/u speech and swallow , patient was eating by mouth at nursing home when off the vent   -weaning trials as tolerated      #Chronic Indwelling Singh  -5/28 Ucx - Contaminated   -5/28 Ucx- Normal perri   -5/27 Ucx- Klebsiella (CRE) and pseudomonas   - s/p cefepime   -Currently afebrile w/ no urinary complaints       #Nonobstructive Ileus  - Keep patient on bowel rest, npo except for meds   - Discontinue morphine   -C/w bowel regimen         #Hx of DVT  - eliquis held  - On lovenox 100 BID   -Monitor for signs of bleeding   -AC held for procedure         Activity OOB to chair  GI PPX  PPI  DVT PPX Therapeutic Lovenox            55 yo f with PMHx of DVT on Eliquis, COPD,  trach collar, obesity, IDDM, UTI, sent [facility] for hypoxia. Current illness going back to April 4th when pt was intubated on admission at [facility] ICU for hypoxic respiratory failure diagnosed with copd exacerbation and superimposed pneumonia, and remained intubated for 37 days requiring tracheostomy. Patient stayed at [facility] for 2 days until noted to be hypoxic (saturating low 70's) pt's saturation immediately improving afterwards to 80's after large mucus plug removed and was subsequently sent to [facility]  On arrival to ED pt was saturating 97%, 15L O2 via tracheostomy collar (baseline 8 L at [facility])CXR suspicious for L-sided PNA, inconclusive. CTA neg for PE. Pt received x1 IV Levaquin and Cefepime in ED, admitted to MICU for acute hypoxic hypercapnic respiratory failure secondary to acute mucus plug now resolved vs superimposed pneumonia hospital-acquired.   General Surgery Consulted emergently when patient found to be hypoxic to low 80s after CCU and Pulmonary team found large granuloma in trachea along tracheostomy tube. Patient currently had a size 8 trach. Surgery assisted Pulmonary and CCU with trach exchanged with ET tube guidance to a size 7 XLT. Saturations improved to high 90s. Large granuloma was removed. Patient was no longer in distress after exchange.    #Acute/ chronic COPD with exacerbation  #chronic respiratory failure  #mucous plug removed  #pneumonia v, atelectasis L base  #currently No Impending respiratory failure  - s/p Trach exchange to 7 XLT alongside Pulmonology.  - S/p repeat bronchoscopy   -airway management   -trach functioning well s/p exchange   - -No further intervention required by CT surgery  - recall [id number] if further evaluation needed  -f/u speech and swallow , patient was eating by mouth at nursing home when off the vent . Pending Fees   -On t-piece and tolerating well     #Chronic Indwelling Singh  -5/28 Ucx - Contaminated   -5/28 Ucx- Normal perri   -5/27 Ucx- Klebsiella (CRE) and pseudomonas   - s/p cefepime   -Currently afebrile w/ no urinary complaints       #Nonobstructive Ileus  -abdomen currently soft, nontender, + BS   -Discontinue opiods   -C/w bowel regimen         #Hx of DVT  -resume patient's home eliquis   -Monitor for signs of bleeding         Activity OOB to chair  GI PPX  PPI  DVT PPX Eliquis

## 2022-06-03 NOTE — PROGRESS NOTE ADULT - SUBJECTIVE AND OBJECTIVE BOX
Patient is a 56y old  Female who presents with a chief complaint of hypoxia (02 Jun 2022 15:00)        HPI:  57 yo f with PMHx of DVT on Eliquis, COPD,  trach collar, obesity, IDDM, UTI, sent by [facility] for hypoxia. History provided by daughter at bedside, she reports the current illness going back to April 4th when pt was intubated on admission at [facility] ICU for hypoxic respiratory failure diagnosed with copd exacerbation and superimposed pneumonia, of note she also appears to have been in CHF exacerbation with severe b/l LE swelling treated with IV bumex. Remained intubated 37 days per daughter, diagnosed with fever of unknown origin (up to 106F), treated with empiric broad spectrum antibiotics and once 2 weeks s/p tracheostomy placement and 48 hours afebrile she was was discharged to nursing home for PT. Of note, patient's daughter and pt herself say the wiseman has not been replaced for over 3 weeks. She also reports a developing sacral pressure ulcer. Was at [facility] only 2 days when was noted to be hypoxic (saturating low 70's) and EMS called. While awaiting EMS, floor nurse on the unit suctioned the patient, and managed to bring up a very large mucus plug, with pt's saturation immediately improving afterwards to 80's. Nonetheless pt was sent to [facility] ED. Prior to all this pt was independent and ambulatory. Pt denies sob, cp, abd pain, n/v/d, fever or chills.     On arrival to ED pt was saturating 97%, 15L O2 via tracheostomy collar (baseline 8 L at [facility]) VS:    /59  T 99.9F RR 20  Admission VBG pH 7.37 pCO2 60 pO2 49. Pt not wheezing, not coughing. CXR suspicious for L-sided PNA, inconclusive. CTA neg for PE. Pt received x1 IV Levoquin and Cefepime in ED, admitted to medicine for acute hypoxic hypercapnic respiratory failure secondary to acute mucus plug (now resolved vs superimposed pneumonia  hospital-acquired.(less likely), Records request sent to [facility] medical records  [phone]) and patient will be continued on empiric antibiotics pending procal (27 May 2022 14:02)      Pt evaluated on rounds.  I reviewed the radiology tests and hospital record.    I reviewed previous notes on this patient.    Interval Events: No overnight events.      CAM:+    SAT:+    SBT:+      REVIEW OF SYSTEMS:   see HPI      OBJECTIVE:  ICU Vital Signs Last 24 Hrs  T(C): 36.7 (03 Jun 2022 04:00), Max: 36.7 (02 Jun 2022 20:00)  T(F): 98.1 (03 Jun 2022 04:00), Max: 98.1 (02 Jun 2022 20:00)  HR: 73 (03 Jun 2022 06:00) (54 - 109)  BP: 135/61 (03 Jun 2022 06:00) (124/58 - 186/74)  BP(mean): 88 (03 Jun 2022 06:00) (68 - 130)  ABP: --  ABP(mean): --  RR: 20 (03 Jun 2022 06:00) (13 - 38)  SpO2: 98% (03 Jun 2022 06:00) (94% - 100%)    Mode: standby, RR (machine): 40, FiO2: 40    06-01 @ 07:01  -  06-02 @ 07:00  --------------------------------------------------------  IN: 1995.5 mL / OUT: 1940 mL / NET: 55.5 mL    06-02 @ 07:01  -  06-03 @ 06:39  --------------------------------------------------------  IN: 518.5 mL / OUT: 1470 mL / NET: -951.5 mL      CAPILLARY BLOOD GLUCOSE      POCT Blood Glucose.: 150 mg/dL (03 Jun 2022 05:37)        PHYSICAL EXAM:       · ENMT:   Airway patent,   Nasal mucosa clear.  Mouth with normal mucosa.   No thrush    · EYES:   Clear bilaterally,   pupils equal,   round and reactive to light.    · CARDIAC:   Normal rate,   regular rhythm.    Heart sounds S1, S2.   No murmurs, no rubs or gallops on auscultation  no edema        CAROTID:   normal systolic impulse  no bruits    · RESPIRATORY:   rales  normal chest expansion  no retractions or use of accessory muscles  palpation of chest is normal with no fremitus  percussion of chest demonstrates no hyperresonance or dullness    · GASTROINTESTINAL:  Abdomen soft,   non-tender,   + BS  liver/spleen not palpable    · MUSCULOSKELETAL:   no clubbing, cyanosis      · SKIN:   Skin normal color for race,   warm, dry   No evidence of rash.        · HEME LYMPH:   no splenomegaly.  No cervical  lymphadenopathy.  no inguinal lymphadenopathy    HOSPITAL MEDICATIONS:  MEDICATIONS  (STANDING):  amLODIPine   Tablet 5 milliGRAM(s) Oral daily  ammonium lactate 12% Lotion 1 Application(s) Topical every 12 hours  atorvastatin 20 milliGRAM(s) Oral at bedtime  buMETAnide 2 milliGRAM(s) Oral daily  enoxaparin Injectable 100 milliGRAM(s) SubCutaneous every 12 hours  gabapentin 300 milliGRAM(s) Oral three times a day  influenza   Vaccine 0.5 milliLiter(s) IntraMuscular once  insulin glargine Injectable (LANTUS) 15 Unit(s) SubCutaneous at bedtime  insulin lispro (ADMELOG) corrective regimen sliding scale   SubCutaneous at bedtime  LORazepam     Tablet 0.5 milliGRAM(s) Oral two times a day  losartan 100 milliGRAM(s) Oral daily  pantoprazole  Injectable 40 milliGRAM(s) IV Push daily  polyethylene glycol 3350 17 Gram(s) Oral daily  senna 2 Tablet(s) Oral at bedtime    MEDICATIONS  (PRN):  acetaminophen     Tablet .. 650 milliGRAM(s) Oral every 6 hours PRN Temp greater or equal to 38C (100.4F), Mild Pain (1 - 3)  oxyCODONE    IR 5 milliGRAM(s) Oral every 4 hours PRN Moderate Pain (4 - 6)  traMADol 25 milliGRAM(s) Oral three times a day PRN Mild Pain (1 - 3)    sodium chloride 0.9% Bolus:   500 milliLiter(s), IV Bolus, once, infuse over 1 Hour(s), Stop After 1 Doses  sodium chloride 0.9%.: Solution, 1000 milliLiter(s) infuse at 70 mL/Hr, Stop After 24 Hours  lactated ringers Bolus:   500 milliLiter(s), IV Bolus, once, infuse over 1 Hour(s), Stop After 1 Doses  lactated ringers Bolus:   1000 milliLiter(s), IV Bolus, once, infuse over 60 Minute(s), Stop After 1 Doses  lactated ringers Bolus:   1000 milliLiter(s), IV Bolus, once, infuse over 60 Minute(s), Stop After 1 Doses  Provider's Contact #: [phone]  lactated ringers Bolus:   1000 milliLiter(s), IV Bolus, once, infuse over 60 Minute(s), Stop After 1 Doses  Provider's Contact #: [phone]      LABS:                        9.1    7.78  )-----------( 180      ( 03 Jun 2022 05:16 )             26.5     06-03    136  |  90<L>  |  9<L>  ----------------------------<  143<H>  3.9   |  33<H>  |  <0.5<L>    Ca    10.0      03 Jun 2022 05:16  Mg     1.7     06-03    TPro  6.8  /  Alb  4.2  /  TBili  0.8  /  DBili  x   /  AST  24  /  ALT  26  /  AlkPhos  100  06-03        Arterial Blood Gas:  06-02 @ 14:04  7.45/52/128/36/98.7/11.0  ABG lactate: --      Mode: standby, RR (machine): 40, FiO2: 40      COVID-19 PCR: NotDetec (31 May 2022 17:00)  SARS-CoV-2: NotDetec (27 May 2022 10:30)    Mode: standby  RR (machine): 40  FiO2: 40      ABG - ( 02 Jun 2022 14:04 )  pH, Arterial: 7.45  pH, Blood: x     /  pCO2: 52    /  pO2: 128   / HCO3: 36    / Base Excess: 11.0  /  SaO2: 98.7        RADIOLOGY: Today I personally interpreted the latest CXR and other pertinent films.                   Patient is a 56y old  Female who presents with a chief complaint of hypoxia (02 Jun 2022 15:00)        HPI:  55 yo f with PMHx of DVT on Eliquis, COPD,  trach collar, obesity, IDDM, UTI, sent by [facility] for hypoxia. History provided by daughter at bedside, she reports the current illness going back to April 4th when pt was intubated on admission at [facility] ICU for hypoxic respiratory failure diagnosed with copd exacerbation and superimposed pneumonia, of note she also appears to have been in CHF exacerbation with severe b/l LE swelling treated with IV bumex. Remained intubated 37 days per daughter, diagnosed with fever of unknown origin (up to 106F), treated with empiric broad spectrum antibiotics and once 2 weeks s/p tracheostomy placement and 48 hours afebrile she was was discharged to nursing home for PT. Of note, patient's daughter and pt herself say the wiseman has not been replaced for over 3 weeks. She also reports a developing sacral pressure ulcer. Was at [facility] only 2 days when was noted to be hypoxic (saturating low 70's) and EMS called. While awaiting EMS, floor nurse on the unit suctioned the patient, and managed to bring up a very large mucus plug, with pt's saturation immediately improving afterwards to 80's. Nonetheless pt was sent to [facility] ED. Prior to all this pt was independent and ambulatory. Pt denies sob, cp, abd pain, n/v/d, fever or chills.     On arrival to ED pt was saturating 97%, 15L O2 via tracheostomy collar (baseline 8 L at [facility]) VS:    /59  T 99.9F RR 20  Admission VBG pH 7.37 pCO2 60 pO2 49. Pt not wheezing, not coughing. CXR suspicious for L-sided PNA, inconclusive. CTA neg for PE. Pt received x1 IV Levoquin and Cefepime in ED, admitted to medicine for acute hypoxic hypercapnic respiratory failure secondary to acute mucus plug (now resolved vs superimposed pneumonia  hospital-acquired.(less likely), Records request sent to [facility] medical records  [phone]) and patient will be continued on empiric antibiotics pending procal (27 May 2022 14:02)      Pt evaluated on rounds.  I reviewed the radiology tests and hospital record.    I reviewed previous notes on this patient.    Interval Events: No overnight events.      CAM:+    SAT:+    SBT:+      REVIEW OF SYSTEMS:   see HPI    Overnight events  Tolerated PS trial, now on t-piece 40%    Drips  None      OBJECTIVE:  ICU Vital Signs Last 24 Hrs  T(C): 36.7 (03 Jun 2022 04:00), Max: 36.7 (02 Jun 2022 20:00)  T(F): 98.1 (03 Jun 2022 04:00), Max: 98.1 (02 Jun 2022 20:00)  HR: 73 (03 Jun 2022 06:00) (54 - 109)  BP: 135/61 (03 Jun 2022 06:00) (124/58 - 186/74)  BP(mean): 88 (03 Jun 2022 06:00) (68 - 130)  RR: 20 (03 Jun 2022 06:00) (13 - 38)  SpO2: 98% (03 Jun 2022 06:00) (94% - 100%)    Mode: standby, RR (machine): 40, FiO2: 40    06-01 @ 07:01  -  06-02 @ 07:00  --------------------------------------------------------  IN: 1995.5 mL / OUT: 1940 mL / NET: 55.5 mL    06-02 @ 07:01  -  06-03 @ 06:39  --------------------------------------------------------  IN: 518.5 mL / OUT: 1470 mL / NET: -951.5 mL      CAPILLARY BLOOD GLUCOSE      POCT Blood Glucose.: 150 mg/dL (03 Jun 2022 05:37)        PHYSICAL EXAM:       · ENMT:   Airway patent,   Nasal mucosa clear.  Mouth with normal mucosa.   No thrush    · EYES:   Clear bilaterally,   pupils equal,   round and reactive to light.    · CARDIAC:   Normal rate,   regular rhythm.    Heart sounds S1, S2.   No murmurs, no rubs or gallops on auscultation  no edema    · RESPIRATORY:   Dec BL BS  rales  normal chest expansion  no retractions or use of accessory muscles  palpation of chest is normal with no fremitus  percussion of chest demonstrates no hyperresonance or dullness    · GASTROINTESTINAL:  Abdomen soft,   non-tender,   + BS  liver/spleen not palpable    · MUSCULOSKELETAL:   no clubbing, cyanosis    · SKIN:   Venous stasis excoriations  Skin normal color for race,   warm, dry   No evidence of rash.      HOSPITAL MEDICATIONS:  MEDICATIONS  (STANDING):  amLODIPine   Tablet 5 milliGRAM(s) Oral daily  ammonium lactate 12% Lotion 1 Application(s) Topical every 12 hours  atorvastatin 20 milliGRAM(s) Oral at bedtime  buMETAnide 2 milliGRAM(s) Oral daily  enoxaparin Injectable 100 milliGRAM(s) SubCutaneous every 12 hours  gabapentin 300 milliGRAM(s) Oral three times a day  influenza   Vaccine 0.5 milliLiter(s) IntraMuscular once  insulin glargine Injectable (LANTUS) 15 Unit(s) SubCutaneous at bedtime  insulin lispro (ADMELOG) corrective regimen sliding scale   SubCutaneous at bedtime  LORazepam     Tablet 0.5 milliGRAM(s) Oral two times a day  losartan 100 milliGRAM(s) Oral daily  pantoprazole  Injectable 40 milliGRAM(s) IV Push daily  polyethylene glycol 3350 17 Gram(s) Oral daily  senna 2 Tablet(s) Oral at bedtime    MEDICATIONS  (PRN):  acetaminophen     Tablet .. 650 milliGRAM(s) Oral every 6 hours PRN Temp greater or equal to 38C (100.4F), Mild Pain (1 - 3)  oxyCODONE    IR 5 milliGRAM(s) Oral every 4 hours PRN Moderate Pain (4 - 6)  traMADol 25 milliGRAM(s) Oral three times a day PRN Mild Pain (1 - 3)    sodium chloride 0.9% Bolus:   500 milliLiter(s), IV Bolus, once, infuse over 1 Hour(s), Stop After 1 Doses  sodium chloride 0.9%.: Solution, 1000 milliLiter(s) infuse at 70 mL/Hr, Stop After 24 Hours  lactated ringers Bolus:   500 milliLiter(s), IV Bolus, once, infuse over 1 Hour(s), Stop After 1 Doses  lactated ringers Bolus:   1000 milliLiter(s), IV Bolus, once, infuse over 60 Minute(s), Stop After 1 Doses  lactated ringers Bolus:   1000 milliLiter(s), IV Bolus, once, infuse over 60 Minute(s), Stop After 1 Doses  Provider's Contact #: [phone]  lactated ringers Bolus:   1000 milliLiter(s), IV Bolus, once, infuse over 60 Minute(s), Stop After 1 Doses  Provider's Contact #: [phone]      LABS:                        9.1    7.78  )-----------( 180      ( 03 Jun 2022 05:16 )             26.5     06-03    136  |  90<L>  |  9<L>  ----------------------------<  143<H>  3.9   |  33<H>  |  <0.5<L>    Ca    10.0      03 Jun 2022 05:16  Mg     1.7     06-03    TPro  6.8  /  Alb  4.2  /  TBili  0.8  /  DBili  x   /  AST  24  /  ALT  26  /  AlkPhos  100  06-03        Arterial Blood Gas:  06-02 @ 14:04  7.45/52/128/36/98.7/11.0  ABG lactate: --      Mode: standby, RR (machine): 40, FiO2: 40      COVID-19 PCR: NotDetec (31 May 2022 17:00)  SARS-CoV-2: NotDetec (27 May 2022 10:30)    Mode: standby  RR (machine): 40  FiO2: 40      ABG - ( 02 Jun 2022 14:04 )  pH, Arterial: 7.45  pH, Blood: x     /  pCO2: 52    /  pO2: 128   / HCO3: 36    / Base Excess: 11.0  /  SaO2: 98.7        RADIOLOGY: Today I personally interpreted the latest CXR and other pertinent films.

## 2022-06-03 NOTE — PROGRESS NOTE ADULT - SUBJECTIVE AND OBJECTIVE BOX
PATIENT:  CRUZ DUMONT  MRN-[id number]    Patient is a 56y old  Female who presents with a chief complaint of hypoxia (02 Jun 2022 15:00)      INTERVAL HPI/OVERNIGHT EVENTS:  Yesterday, patient placed on T-piece     Overnight, no acute events     Today, patient seen and examined at bedside. Tolerating T-piece, pulse ox 100 on 40% fio2.   Afebrile. Denies abdominal pain. Had 2 bowel movements     ICU Vital Signs Last 24 Hrs  T(C): 36.3 (03 Jun 2022 12:00), Max: 36.7 (02 Jun 2022 20:00)  T(F): 97.3 (03 Jun 2022 12:00), Max: 98.1 (02 Jun 2022 20:00)  HR: 85 (03 Jun 2022 12:00) (70 - 109)  BP: 160/67 (03 Jun 2022 12:00) (126/58 - 186/74)  BP(mean): 97 (03 Jun 2022 12:00) (68 - 130)  RR: 22 (03 Jun 2022 12:53) (13 - 38)  SpO2: 97% (03 Jun 2022 12:53) (94% - 100%)    I&O's Summary    02 Jun 2022 07:01  -  03 Jun 2022 07:00  --------------------------------------------------------  IN: 598.5 mL / OUT: 1470 mL / NET: -871.5 mL    03 Jun 2022 07:01  -  03 Jun 2022 12:56  --------------------------------------------------------  IN: 350 mL / OUT: 1115 mL / NET: -765 mL      Mode: standby  RR (machine): 40  FiO2: 40      LABS:                        9.1    7.78  )-----------( 180      ( 03 Jun 2022 05:16 )             26.5     06-03    136  |  90<L>  |  9<L>  ----------------------------<  143<H>  3.9   |  33<H>  |  <0.5<L>    Ca    10.0      03 Jun 2022 05:16  Mg     1.7     06-03    TPro  6.8  /  Alb  4.2  /  TBili  0.8  /  DBili  x   /  AST  24  /  ALT  26  /  AlkPhos  100  06-03        CAPILLARY BLOOD GLUCOSE      POCT Blood Glucose.: 212 mg/dL (03 Jun 2022 11:11)  POCT Blood Glucose.: 272 mg/dL (03 Jun 2022 08:08)  POCT Blood Glucose.: 150 mg/dL (03 Jun 2022 05:37)  POCT Blood Glucose.: 127 mg/dL (02 Jun 2022 21:04)  POCT Blood Glucose.: 139 mg/dL (02 Jun 2022 16:55)    ABG - ( 02 Jun 2022 14:04 )  pH, Arterial: 7.45  pH, Blood: x     /  pCO2: 52    /  pO2: 128   / HCO3: 36    / Base Excess: 11.0  /  SaO2: 98.7                RADIOLOGY & ADDITIONAL TESTS:    Consultant(s) Notes Reviewed:  [x ] YES  [ ] NO    MEDICATIONS  (STANDING):  amLODIPine   Tablet 5 milliGRAM(s) Oral daily  ammonium lactate 12% Lotion 1 Application(s) Topical every 12 hours  apixaban 5 milliGRAM(s) Oral every 12 hours  atorvastatin 20 milliGRAM(s) Oral at bedtime  buMETAnide 2 milliGRAM(s) Oral daily  gabapentin 300 milliGRAM(s) Oral three times a day  influenza   Vaccine 0.5 milliLiter(s) IntraMuscular once  insulin glargine Injectable (LANTUS) 15 Unit(s) SubCutaneous at bedtime  insulin lispro (ADMELOG) corrective regimen sliding scale   SubCutaneous every 6 hours  ketorolac   Injectable 30 milliGRAM(s) IV Push three times a day  LORazepam     Tablet 0.5 milliGRAM(s) Oral two times a day  losartan 100 milliGRAM(s) Oral daily  pantoprazole  Injectable 40 milliGRAM(s) IV Push daily  polyethylene glycol 3350 17 Gram(s) Oral daily  senna 2 Tablet(s) Oral at bedtime    MEDICATIONS  (PRN):  acetaminophen     Tablet .. 650 milliGRAM(s) Oral every 6 hours PRN Temp greater or equal to 38C (100.4F), Mild Pain (1 - 3)  oxyCODONE    IR 5 milliGRAM(s) Oral every 4 hours PRN Moderate Pain (4 - 6)      PHYSICAL EXAM:  General: NAD, AAOx3, calm and cooperative  HEENT: NCAT, МАРИНА, EOMI, Tracheostomy in place, functioning.   Cardiac: RRR S1, S2, no Murmurs, rubs or gallops  Respiratory: CTAB, normal respiratory effort, breath sounds equal BL, no wheeze, mild UR congesion  Abdomen: Soft, non-distended, non-tender, no rebound, no guarding. +BS.  Extremities:  Doppler DP pulses, chroninc venous stasis changes bilateral lower extremity     Care Discussed with Consultants/Other Providers [ x] YES  [ ] NO PATIENT:  CRUZ DUMONT  MRN-[id number]    Patient is a 56y old  Female who presents with a chief complaint of hypoxia (02 Jun 2022 15:00)      INTERVAL HPI/OVERNIGHT EVENTS:  Yesterday, patient placed on T-piece     Overnight, no acute events     Today, patient seen and examined at bedside. Tolerating T-piece, pulse ox 100 on 40% fio2.   Afebrile. Denies abdominal pain. Had 2 bowel movements   Seen by speech and swallow today, pt had no voice when speaking valve placed which can be indicative of edema. plan for fees .      ICU Vital Signs Last 24 Hrs  T(C): 36.3 (03 Jun 2022 12:00), Max: 36.7 (02 Jun 2022 20:00)  T(F): 97.3 (03 Jun 2022 12:00), Max: 98.1 (02 Jun 2022 20:00)  HR: 85 (03 Jun 2022 12:00) (70 - 109)  BP: 160/67 (03 Jun 2022 12:00) (126/58 - 186/74)  BP(mean): 97 (03 Jun 2022 12:00) (68 - 130)  RR: 22 (03 Jun 2022 12:53) (13 - 38)  SpO2: 97% (03 Jun 2022 12:53) (94% - 100%)    I&O's Summary    02 Jun 2022 07:01  -  03 Jun 2022 07:00  --------------------------------------------------------  IN: 598.5 mL / OUT: 1470 mL / NET: -871.5 mL    03 Jun 2022 07:01  -  03 Jun 2022 12:56  --------------------------------------------------------  IN: 350 mL / OUT: 1115 mL / NET: -765 mL      Mode: standby  RR (machine): 40  FiO2: 40      LABS:                        9.1    7.78  )-----------( 180      ( 03 Jun 2022 05:16 )             26.5     06-03    136  |  90<L>  |  9<L>  ----------------------------<  143<H>  3.9   |  33<H>  |  <0.5<L>    Ca    10.0      03 Jun 2022 05:16  Mg     1.7     06-03    TPro  6.8  /  Alb  4.2  /  TBili  0.8  /  DBili  x   /  AST  24  /  ALT  26  /  AlkPhos  100  06-03        CAPILLARY BLOOD GLUCOSE      POCT Blood Glucose.: 212 mg/dL (03 Jun 2022 11:11)  POCT Blood Glucose.: 272 mg/dL (03 Jun 2022 08:08)  POCT Blood Glucose.: 150 mg/dL (03 Jun 2022 05:37)  POCT Blood Glucose.: 127 mg/dL (02 Jun 2022 21:04)  POCT Blood Glucose.: 139 mg/dL (02 Jun 2022 16:55)    ABG - ( 02 Jun 2022 14:04 )  pH, Arterial: 7.45  pH, Blood: x     /  pCO2: 52    /  pO2: 128   / HCO3: 36    / Base Excess: 11.0  /  SaO2: 98.7                RADIOLOGY & ADDITIONAL TESTS:    Consultant(s) Notes Reviewed:  [x ] YES  [ ] NO    MEDICATIONS  (STANDING):  amLODIPine   Tablet 5 milliGRAM(s) Oral daily  ammonium lactate 12% Lotion 1 Application(s) Topical every 12 hours  apixaban 5 milliGRAM(s) Oral every 12 hours  atorvastatin 20 milliGRAM(s) Oral at bedtime  buMETAnide 2 milliGRAM(s) Oral daily  gabapentin 300 milliGRAM(s) Oral three times a day  influenza   Vaccine 0.5 milliLiter(s) IntraMuscular once  insulin glargine Injectable (LANTUS) 15 Unit(s) SubCutaneous at bedtime  insulin lispro (ADMELOG) corrective regimen sliding scale   SubCutaneous every 6 hours  ketorolac   Injectable 30 milliGRAM(s) IV Push three times a day  LORazepam     Tablet 0.5 milliGRAM(s) Oral two times a day  losartan 100 milliGRAM(s) Oral daily  pantoprazole  Injectable 40 milliGRAM(s) IV Push daily  polyethylene glycol 3350 17 Gram(s) Oral daily  senna 2 Tablet(s) Oral at bedtime    MEDICATIONS  (PRN):  acetaminophen     Tablet .. 650 milliGRAM(s) Oral every 6 hours PRN Temp greater or equal to 38C (100.4F), Mild Pain (1 - 3)  oxyCODONE    IR 5 milliGRAM(s) Oral every 4 hours PRN Moderate Pain (4 - 6)      PHYSICAL EXAM:  General: NAD, AAOx3, calm and cooperative  HEENT: NCAT, МАРИНА, EOMI, Tracheostomy in place, functioning.   Cardiac: RRR S1, S2, no Murmurs, rubs or gallops  Respiratory: CTAB, normal respiratory effort, breath sounds equal BL, no wheeze, mild UR congesion  Abdomen: Soft, non-distended, non-tender, no rebound, no guarding. +BS.  Extremities:  Doppler DP pulses, chroninc venous stasis changes bilateral lower extremity     Care Discussed with Consultants/Other Providers [ x] YES  [ ] NO

## 2022-06-03 NOTE — SWALLOW FEES ASSESSMENT ADULT - DIAGNOSTIC IMPRESSIONS
+diffuse residue t/o laryngeal vestibule, however unable to definitively r/o aspiration 2' obstructed view +Diffuse residue t/o laryngeal vestibule; Unable to definitively r/o aspiration 2' obstructed view.

## 2022-06-04 LAB
ALBUMIN SERPL ELPH-MCNC: 4 G/DL — SIGNIFICANT CHANGE UP (ref 3.5–5.2)
ALP SERPL-CCNC: 103 U/L — SIGNIFICANT CHANGE UP (ref 30–115)
ALT FLD-CCNC: 32 U/L — SIGNIFICANT CHANGE UP (ref 0–41)
ANION GAP SERPL CALC-SCNC: 12 MMOL/L — SIGNIFICANT CHANGE UP (ref 7–14)
APPEARANCE UR: ABNORMAL
AST SERPL-CCNC: 26 U/L — SIGNIFICANT CHANGE UP (ref 0–41)
BACTERIA # UR AUTO: NEGATIVE — SIGNIFICANT CHANGE UP
BASOPHILS # BLD AUTO: 0.04 K/UL — SIGNIFICANT CHANGE UP (ref 0–0.2)
BASOPHILS NFR BLD AUTO: 0.5 % — SIGNIFICANT CHANGE UP (ref 0–1)
BILIRUB SERPL-MCNC: 0.6 MG/DL — SIGNIFICANT CHANGE UP (ref 0.2–1.2)
BILIRUB UR-MCNC: NEGATIVE — SIGNIFICANT CHANGE UP
BUN SERPL-MCNC: 13 MG/DL — SIGNIFICANT CHANGE UP (ref 10–20)
CALCIUM SERPL-MCNC: 9.9 MG/DL — SIGNIFICANT CHANGE UP (ref 8.5–10.1)
CHLORIDE SERPL-SCNC: 90 MMOL/L — LOW (ref 98–110)
CO2 SERPL-SCNC: 31 MMOL/L — SIGNIFICANT CHANGE UP (ref 17–32)
COLOR SPEC: ABNORMAL
CREAT SERPL-MCNC: <0.5 MG/DL — LOW (ref 0.7–1.5)
DIFF PNL FLD: ABNORMAL
EGFR: 115 ML/MIN/1.73M2 — SIGNIFICANT CHANGE UP
EOSINOPHIL # BLD AUTO: 0.17 K/UL — SIGNIFICANT CHANGE UP (ref 0–0.7)
EOSINOPHIL NFR BLD AUTO: 2.3 % — SIGNIFICANT CHANGE UP (ref 0–8)
EPI CELLS # UR: 2 /HPF — SIGNIFICANT CHANGE UP (ref 0–5)
GLUCOSE BLDC GLUCOMTR-MCNC: 192 MG/DL — HIGH (ref 70–99)
GLUCOSE BLDC GLUCOMTR-MCNC: 199 MG/DL — HIGH (ref 70–99)
GLUCOSE BLDC GLUCOMTR-MCNC: 218 MG/DL — HIGH (ref 70–99)
GLUCOSE BLDC GLUCOMTR-MCNC: 257 MG/DL — HIGH (ref 70–99)
GLUCOSE SERPL-MCNC: 232 MG/DL — HIGH (ref 70–99)
GLUCOSE UR QL: NEGATIVE — SIGNIFICANT CHANGE UP
HCT VFR BLD CALC: 26.5 % — LOW (ref 37–47)
HGB BLD-MCNC: 9.3 G/DL — LOW (ref 12–16)
HYALINE CASTS # UR AUTO: 22 /LPF — HIGH (ref 0–7)
IMM GRANULOCYTES NFR BLD AUTO: 0.7 % — HIGH (ref 0.1–0.3)
KETONES UR-MCNC: SIGNIFICANT CHANGE UP
LEUKOCYTE ESTERASE UR-ACNC: ABNORMAL
LYMPHOCYTES # BLD AUTO: 1.09 K/UL — LOW (ref 1.2–3.4)
LYMPHOCYTES # BLD AUTO: 14.4 % — LOW (ref 20.5–51.1)
MAGNESIUM SERPL-MCNC: 1.8 MG/DL — SIGNIFICANT CHANGE UP (ref 1.8–2.4)
MCHC RBC-ENTMCNC: 33.6 PG — HIGH (ref 27–31)
MCHC RBC-ENTMCNC: 35.1 G/DL — SIGNIFICANT CHANGE UP (ref 32–37)
MCV RBC AUTO: 95.7 FL — SIGNIFICANT CHANGE UP (ref 81–99)
MONOCYTES # BLD AUTO: 0.69 K/UL — HIGH (ref 0.1–0.6)
MONOCYTES NFR BLD AUTO: 9.1 % — SIGNIFICANT CHANGE UP (ref 1.7–9.3)
NEUTROPHILS # BLD AUTO: 5.51 K/UL — SIGNIFICANT CHANGE UP (ref 1.4–6.5)
NEUTROPHILS NFR BLD AUTO: 73 % — SIGNIFICANT CHANGE UP (ref 42.2–75.2)
NITRITE UR-MCNC: NEGATIVE — SIGNIFICANT CHANGE UP
NRBC # BLD: 0 /100 WBCS — SIGNIFICANT CHANGE UP (ref 0–0)
PH UR: 6 — SIGNIFICANT CHANGE UP (ref 5–8)
PLATELET # BLD AUTO: 175 K/UL — SIGNIFICANT CHANGE UP (ref 130–400)
POTASSIUM SERPL-MCNC: 4 MMOL/L — SIGNIFICANT CHANGE UP (ref 3.5–5)
POTASSIUM SERPL-SCNC: 4 MMOL/L — SIGNIFICANT CHANGE UP (ref 3.5–5)
PROT SERPL-MCNC: 6.3 G/DL — SIGNIFICANT CHANGE UP (ref 6–8)
PROT UR-MCNC: ABNORMAL
RBC # BLD: 2.77 M/UL — LOW (ref 4.2–5.4)
RBC # FLD: 14.4 % — SIGNIFICANT CHANGE UP (ref 11.5–14.5)
RBC CASTS # UR COMP ASSIST: >720 /HPF — HIGH (ref 0–4)
SODIUM SERPL-SCNC: 133 MMOL/L — LOW (ref 135–146)
SP GR SPEC: 1.02 — SIGNIFICANT CHANGE UP (ref 1.01–1.03)
UROBILINOGEN FLD QL: SIGNIFICANT CHANGE UP
WBC # BLD: 7.55 K/UL — SIGNIFICANT CHANGE UP (ref 4.8–10.8)
WBC # FLD AUTO: 7.55 K/UL — SIGNIFICANT CHANGE UP (ref 4.8–10.8)
WBC UR QL: 119 /HPF — HIGH (ref 0–5)

## 2022-06-04 PROCEDURE — 99233 SBSQ HOSP IP/OBS HIGH 50: CPT

## 2022-06-04 PROCEDURE — 71045 X-RAY EXAM CHEST 1 VIEW: CPT | Mod: 26

## 2022-06-04 RX ORDER — LANOLIN ALCOHOL/MO/W.PET/CERES
5 CREAM (GRAM) TOPICAL AT BEDTIME
Refills: 0 | Status: DISCONTINUED | OUTPATIENT
Start: 2022-06-04 | End: 2022-06-05

## 2022-06-04 RX ADMIN — GABAPENTIN 300 MILLIGRAM(S): 400 CAPSULE ORAL at 21:04

## 2022-06-04 RX ADMIN — OXYCODONE HYDROCHLORIDE 5 MILLIGRAM(S): 5 TABLET ORAL at 09:45

## 2022-06-04 RX ADMIN — Medication 30 MILLIGRAM(S): at 21:30

## 2022-06-04 RX ADMIN — OXYCODONE HYDROCHLORIDE 5 MILLIGRAM(S): 5 TABLET ORAL at 09:30

## 2022-06-04 RX ADMIN — GABAPENTIN 300 MILLIGRAM(S): 400 CAPSULE ORAL at 05:10

## 2022-06-04 RX ADMIN — APIXABAN 5 MILLIGRAM(S): 2.5 TABLET, FILM COATED ORAL at 18:24

## 2022-06-04 RX ADMIN — Medication 30 MILLIGRAM(S): at 16:45

## 2022-06-04 RX ADMIN — Medication 1 APPLICATION(S): at 18:25

## 2022-06-04 RX ADMIN — Medication 30 MILLIGRAM(S): at 16:23

## 2022-06-04 RX ADMIN — Medication 2: at 05:11

## 2022-06-04 RX ADMIN — AMLODIPINE BESYLATE 5 MILLIGRAM(S): 2.5 TABLET ORAL at 05:10

## 2022-06-04 RX ADMIN — APIXABAN 5 MILLIGRAM(S): 2.5 TABLET, FILM COATED ORAL at 05:10

## 2022-06-04 RX ADMIN — Medication 30 MILLIGRAM(S): at 05:10

## 2022-06-04 RX ADMIN — Medication 650 MILLIGRAM(S): at 05:10

## 2022-06-04 RX ADMIN — Medication 30 MILLIGRAM(S): at 21:04

## 2022-06-04 RX ADMIN — LOSARTAN POTASSIUM 100 MILLIGRAM(S): 100 TABLET, FILM COATED ORAL at 05:11

## 2022-06-04 RX ADMIN — ATORVASTATIN CALCIUM 20 MILLIGRAM(S): 80 TABLET, FILM COATED ORAL at 21:03

## 2022-06-04 RX ADMIN — BUMETANIDE 2 MILLIGRAM(S): 0.25 INJECTION INTRAMUSCULAR; INTRAVENOUS at 05:11

## 2022-06-04 RX ADMIN — GABAPENTIN 300 MILLIGRAM(S): 400 CAPSULE ORAL at 15:34

## 2022-06-04 RX ADMIN — Medication 30 MILLIGRAM(S): at 05:41

## 2022-06-04 RX ADMIN — Medication 650 MILLIGRAM(S): at 05:41

## 2022-06-04 RX ADMIN — POLYETHYLENE GLYCOL 3350 17 GRAM(S): 17 POWDER, FOR SOLUTION ORAL at 11:53

## 2022-06-04 RX ADMIN — INSULIN GLARGINE 15 UNIT(S): 100 INJECTION, SOLUTION SUBCUTANEOUS at 21:21

## 2022-06-04 RX ADMIN — Medication 1 APPLICATION(S): at 05:41

## 2022-06-04 RX ADMIN — Medication 0.5 MILLIGRAM(S): at 18:53

## 2022-06-04 RX ADMIN — Medication 0.5 MILLIGRAM(S): at 05:11

## 2022-06-04 RX ADMIN — PANTOPRAZOLE SODIUM 40 MILLIGRAM(S): 20 TABLET, DELAYED RELEASE ORAL at 11:53

## 2022-06-04 NOTE — PROGRESS NOTE ADULT - SUBJECTIVE AND OBJECTIVE BOX
Patient is a 56y old  Female who presents with a chief complaint of hypoxia (02 Jun 2022 15:00)      Over Night Events:  Patient seen and examined.   on vent trach collar     ROS:  See HPI    PHYSICAL EXAM    ICU Vital Signs Last 24 Hrs  T(C): 36.6 (04 Jun 2022 08:00), Max: 36.7 (03 Jun 2022 20:00)  T(F): 97.9 (04 Jun 2022 08:00), Max: 98.1 (03 Jun 2022 20:00)  HR: 72 (04 Jun 2022 08:00) (72 - 977)  BP: 140/64 (04 Jun 2022 08:00) (121/53 - 190/84)  BP(mean): 92 (04 Jun 2022 08:00) (77 - 120)  ABP: --  ABP(mean): --  RR: 9 (04 Jun 2022 08:00) (9 - 31)  SpO2: 100% (04 Jun 2022 08:00) (94% - 100%)      General: Awake   HEENT:       tracheostomy         Lymph Nodes: NO cervical LN   Lungs: Bilateral BS  Cardiovascular: Regular   Abdomen: Soft, Positive BS  Extremities: No clubbing   Skin: warm   Neurological: no focal deficit   Musculoskeletal: move all ext     I&O's Detail    03 Jun 2022 07:01  -  04 Jun 2022 07:00  --------------------------------------------------------  IN:    Enteral Tube Flush: 440 mL    IV PiggyBack: 50 mL    Jevity 1.2: 1200 mL  Total IN: 1690 mL    OUT:    Indwelling Catheter - Urethral (mL): 1900 mL  Total OUT: 1900 mL    Total NET: -210 mL      04 Jun 2022 07:01  -  04 Jun 2022 08:32  --------------------------------------------------------  IN:  Total IN: 0 mL    OUT:    Indwelling Catheter - Urethral (mL): 50 mL  Total OUT: 50 mL    Total NET: -50 mL          LABS:                          9.3    7.55  )-----------( 175      ( 04 Jun 2022 05:11 )             26.5         04 Jun 2022 05:11    133    |  90     |  13     ----------------------------<  232    4.0     |  31     |  <0.5     Ca    9.9        04 Jun 2022 05:11  Mg     1.8       04 Jun 2022 05:11    TPro  6.3    /  Alb  4.0    /  TBili  0.6    /  DBili  x      /  AST  26     /  ALT  32     /  AlkPhos  103    04 Jun 2022 05:11  Amylase x     lipase x                                                                                                                                                                                                 Mode: standby  FiO2: 40                                      ABG - ( 02 Jun 2022 14:04 )  pH, Arterial: 7.45  pH, Blood: x     /  pCO2: 52    /  pO2: 128   / HCO3: 36    / Base Excess: 11.0  /  SaO2: 98.7                MEDICATIONS  (STANDING):  amLODIPine   Tablet 5 milliGRAM(s) Oral daily  ammonium lactate 12% Lotion 1 Application(s) Topical every 12 hours  apixaban 5 milliGRAM(s) Oral every 12 hours  atorvastatin 20 milliGRAM(s) Oral at bedtime  buMETAnide 2 milliGRAM(s) Oral daily  gabapentin 300 milliGRAM(s) Oral three times a day  influenza   Vaccine 0.5 milliLiter(s) IntraMuscular once  insulin glargine Injectable (LANTUS) 15 Unit(s) SubCutaneous at bedtime  insulin lispro (ADMELOG) corrective regimen sliding scale   SubCutaneous every 6 hours  ketorolac   Injectable 30 milliGRAM(s) IV Push three times a day  LORazepam     Tablet 0.5 milliGRAM(s) Oral two times a day  losartan 100 milliGRAM(s) Oral daily  pantoprazole  Injectable 40 milliGRAM(s) IV Push daily  polyethylene glycol 3350 17 Gram(s) Oral daily  senna 2 Tablet(s) Oral at bedtime    MEDICATIONS  (PRN):  acetaminophen     Tablet .. 650 milliGRAM(s) Oral every 6 hours PRN Temp greater or equal to 38C (100.4F), Mild Pain (1 - 3)  oxyCODONE    IR 5 milliGRAM(s) Oral every 4 hours PRN Moderate Pain (4 - 6)          Xrays:  TLC:  OG:  ET tube:                                                                                       ECHO:  [facility] ICU:

## 2022-06-04 NOTE — PROGRESS NOTE ADULT - SUBJECTIVE AND OBJECTIVE BOX
PATIENT:  CRUZ DUMONT  MRN-[id number]    Patient is a 56y old  Female who presents with a chief complaint of hypoxia (02 Jun 2022 15:00)      INTERVAL HPI/OVERNIGHT EVENTS:  No acute events overnight   Patient now on trach collar tolerating well     ICU Vital Signs Last 24 Hrs  T(C): 36.6 (04 Jun 2022 08:00), Max: 36.7 (03 Jun 2022 20:00)  T(F): 97.9 (04 Jun 2022 08:00), Max: 98.1 (03 Jun 2022 20:00)  HR: 76 (04 Jun 2022 16:00) (72 - 977)  BP: 165/78 (04 Jun 2022 16:00) (121/53 - 190/84)  BP(mean): 112 (04 Jun 2022 16:00) (77 - 120)  RR: 21 (04 Jun 2022 16:00) (9 - 38)  SpO2: 99% (04 Jun 2022 16:00) (93% - 100%)    I&O's Summary    03 Jun 2022 07:01  -  04 Jun 2022 07:00  --------------------------------------------------------  IN: 1690 mL / OUT: 1900 mL / NET: -210 mL    04 Jun 2022 07:01  -  04 Jun 2022 16:32  --------------------------------------------------------  IN: 455 mL / OUT: 400 mL / NET: 55 mL      Mode: standby  FiO2: 40      LABS:                        9.3    7.55  )-----------( 175      ( 04 Jun 2022 05:11 )             26.5     06-04    133<L>  |  90<L>  |  13  ----------------------------<  232<H>  4.0   |  31  |  <0.5<L>    Ca    9.9      04 Jun 2022 05:11  Mg     1.8     06-04    TPro  6.3  /  Alb  4.0  /  TBili  0.6  /  DBili  x   /  AST  26  /  ALT  32  /  AlkPhos  103  06-04        CAPILLARY BLOOD GLUCOSE      POCT Blood Glucose.: 192 mg/dL (04 Jun 2022 11:49)  POCT Blood Glucose.: 257 mg/dL (04 Jun 2022 05:04)  POCT Blood Glucose.: 257 mg/dL (03 Jun 2022 22:05)  POCT Blood Glucose.: 229 mg/dL (03 Jun 2022 17:04)        RADIOLOGY & ADDITIONAL TESTS:    Consultant(s) Notes Reviewed:  [x ] YES  [ ] NO    MEDICATIONS  (STANDING):  amLODIPine   Tablet 5 milliGRAM(s) Oral daily  ammonium lactate 12% Lotion 1 Application(s) Topical every 12 hours  apixaban 5 milliGRAM(s) Oral every 12 hours  atorvastatin 20 milliGRAM(s) Oral at bedtime  buMETAnide 2 milliGRAM(s) Oral daily  gabapentin 300 milliGRAM(s) Oral three times a day  influenza   Vaccine 0.5 milliLiter(s) IntraMuscular once  insulin glargine Injectable (LANTUS) 15 Unit(s) SubCutaneous at bedtime  insulin lispro (ADMELOG) corrective regimen sliding scale   SubCutaneous every 6 hours  ketorolac   Injectable 30 milliGRAM(s) IV Push three times a day  LORazepam     Tablet 0.5 milliGRAM(s) Oral two times a day  losartan 100 milliGRAM(s) Oral daily  pantoprazole  Injectable 40 milliGRAM(s) IV Push daily  polyethylene glycol 3350 17 Gram(s) Oral daily  senna 2 Tablet(s) Oral at bedtime    MEDICATIONS  (PRN):  acetaminophen     Tablet .. 650 milliGRAM(s) Oral every 6 hours PRN Temp greater or equal to 38C (100.4F), Mild Pain (1 - 3)  oxyCODONE    IR 5 milliGRAM(s) Oral every 4 hours PRN Moderate Pain (4 - 6)      PHYSICAL EXAM:  General: NAD, AAOx3, calm and cooperative  HEENT: NCAT, МАРИНА, EOMI, Tracheostomy in place, functioning.   Cardiac: RRR S1, S2, no Murmurs, rubs or gallops  Respiratory: CTAB, normal respiratory effort, breath sounds equal BL, no wheeze, mild UR congesion  Abdomen: Soft, non-distended, non-tender, no rebound, no guarding. +BS.  Extremities:  Doppler DP pulses, chroninc venous stasis changes bilateral lower extremity     Care Discussed with Consultants/Other Providers [ x] YES  [ ] NO

## 2022-06-04 NOTE — PROGRESS NOTE ADULT - ASSESSMENT
55 yo f with PMHx of DVT on Eliquis, COPD,  trach collar, obesity, IDDM, UTI, sent by [facility] [place], operated by [facility] for hypoxia. Current illness going back to April 4th when pt was intubated on admission at [facility] ICU for hypoxic respiratory failure diagnosed with copd exacerbation and superimposed pneumonia, and remained intubated for 37 days requiring tracheostomy. Patient stayed at [facility] for 2 days until noted to be hypoxic (saturating low 70's) pt's saturation immediately improving afterwards to 80's after large mucus plug removed and was subsequently sent to [facility]  On arrival to ED pt was saturating 97%, 15L O2 via tracheostomy collar (baseline 8 L at [facility])CXR suspicious for L-sided PNA, inconclusive. CTA neg for PE. Pt received x1 IV Levaquin and Cefepime in ED, admitted to MICU for acute hypoxic hypercapnic respiratory failure secondary to acute mucus plug now resolved vs superimposed pneumonia hospital-acquired.   General Surgery Consulted emergently when patient found to be hypoxic to low 80s after CCU and Pulmonary team found large granuloma in trachea along tracheostomy tube. Patient currently had a size 8 trach. Surgery assisted Pulmonary and CCU with trach exchanged with ET tube guidance to a size 7 XLT. Saturations improved to high 90s. Large granuloma was removed. Patient was no longer in distress after exchange.    #Acute/ chronic COPD with exacerbation  #chronic respiratory failure  #mucous plug removed  #pneumonia v, atelectasis L base  #currently No Impending respiratory failure  - s/p Trach exchange to 7 XLT alongside Pulmonology.  - S/p repeat bronchoscopy   -airway management   -trach functioning well s/p exchange   - -No further intervention required by CT surgery  - recall 8064 if further evaluation needed  -f/u speech and swallow , patient was eating by mouth at nursing home when off the vent . Pending possible barium swallow   -On t-piece and tolerating well     #Chronic Indwelling Singh  -5/28 Ucx - Contaminated   -5/28 Ucx- Normal perri   -5/27 Ucx- Klebsiella (CRE) and pseudomonas   - s/p cefepime   -Currently afebrile w/ no urinary complaints       #Nonobstructive Ileus  -abdomen currently soft, nontender, + BS   -Discontinue opiods   -C/w bowel regimen         #Hx of DVT  -resume patient's home eliquis   -Monitor for signs of bleeding         Activity OOB to chair  GI PPX  PPI  DVT PPX Eliquis       Progress Note: Respiratory status stable on trach collar   F/u speech and swallow, if patient continues to fail may need PEG

## 2022-06-04 NOTE — PROGRESS NOTE ADULT - ASSESSMENT
Impression:  Acute/ chronic COPD exacerbation, resolved  Chronic respiratory failure  Mucous plug removed  Pneumonia/ atelectasis L base  HO DVT, was on eliquis at home     SUGGEST:   frequent suction with saline   t-piece, dec FiO2 to 30%  CT Sx signed off  Restart PO eliquis  Replete Mg  Continue with bowel regimen  S&S eval, if fails, CT Sx FU for PEG tube  Target SPO2 92-96%, titrate oxygen as tolerated  bronchodilator treatments ATC and PRN  OOB to chair  GI and DVT prophylaxis  pulmonary toilet  Repeat duplex stable  follow h/h   downgrade to 3 A vent unit   ventilator stand by

## 2022-06-05 LAB
ALBUMIN SERPL ELPH-MCNC: 3.8 G/DL — SIGNIFICANT CHANGE UP (ref 3.5–5.2)
ALP SERPL-CCNC: 97 U/L — SIGNIFICANT CHANGE UP (ref 30–115)
ALT FLD-CCNC: 36 U/L — SIGNIFICANT CHANGE UP (ref 0–41)
ANION GAP SERPL CALC-SCNC: 12 MMOL/L — SIGNIFICANT CHANGE UP (ref 7–14)
AST SERPL-CCNC: 26 U/L — SIGNIFICANT CHANGE UP (ref 0–41)
BASOPHILS # BLD AUTO: 0.04 K/UL — SIGNIFICANT CHANGE UP (ref 0–0.2)
BASOPHILS NFR BLD AUTO: 0.6 % — SIGNIFICANT CHANGE UP (ref 0–1)
BILIRUB SERPL-MCNC: 0.7 MG/DL — SIGNIFICANT CHANGE UP (ref 0.2–1.2)
BUN SERPL-MCNC: 13 MG/DL — SIGNIFICANT CHANGE UP (ref 10–20)
CALCIUM SERPL-MCNC: 9.7 MG/DL — SIGNIFICANT CHANGE UP (ref 8.5–10.1)
CHLORIDE SERPL-SCNC: 87 MMOL/L — LOW (ref 98–110)
CO2 SERPL-SCNC: 32 MMOL/L — SIGNIFICANT CHANGE UP (ref 17–32)
CREAT SERPL-MCNC: <0.5 MG/DL — LOW (ref 0.7–1.5)
CULTURE RESULTS: SIGNIFICANT CHANGE UP
EGFR: 115 ML/MIN/1.73M2 — SIGNIFICANT CHANGE UP
EOSINOPHIL # BLD AUTO: 0.22 K/UL — SIGNIFICANT CHANGE UP (ref 0–0.7)
EOSINOPHIL NFR BLD AUTO: 3.2 % — SIGNIFICANT CHANGE UP (ref 0–8)
GLUCOSE BLDC GLUCOMTR-MCNC: 195 MG/DL — HIGH (ref 70–99)
GLUCOSE BLDC GLUCOMTR-MCNC: 208 MG/DL — HIGH (ref 70–99)
GLUCOSE BLDC GLUCOMTR-MCNC: 227 MG/DL — HIGH (ref 70–99)
GLUCOSE BLDC GLUCOMTR-MCNC: 228 MG/DL — HIGH (ref 70–99)
GLUCOSE BLDC GLUCOMTR-MCNC: 237 MG/DL — HIGH (ref 70–99)
GLUCOSE SERPL-MCNC: 178 MG/DL — HIGH (ref 70–99)
HCT VFR BLD CALC: 25.2 % — LOW (ref 37–47)
HGB BLD-MCNC: 8.7 G/DL — LOW (ref 12–16)
IMM GRANULOCYTES NFR BLD AUTO: 0.6 % — HIGH (ref 0.1–0.3)
LYMPHOCYTES # BLD AUTO: 1.33 K/UL — SIGNIFICANT CHANGE UP (ref 1.2–3.4)
LYMPHOCYTES # BLD AUTO: 19.4 % — LOW (ref 20.5–51.1)
MAGNESIUM SERPL-MCNC: 1.7 MG/DL — LOW (ref 1.8–2.4)
MCHC RBC-ENTMCNC: 32.5 PG — HIGH (ref 27–31)
MCHC RBC-ENTMCNC: 34.5 G/DL — SIGNIFICANT CHANGE UP (ref 32–37)
MCV RBC AUTO: 94 FL — SIGNIFICANT CHANGE UP (ref 81–99)
MONOCYTES # BLD AUTO: 0.63 K/UL — HIGH (ref 0.1–0.6)
MONOCYTES NFR BLD AUTO: 9.2 % — SIGNIFICANT CHANGE UP (ref 1.7–9.3)
NEUTROPHILS # BLD AUTO: 4.6 K/UL — SIGNIFICANT CHANGE UP (ref 1.4–6.5)
NEUTROPHILS NFR BLD AUTO: 67 % — SIGNIFICANT CHANGE UP (ref 42.2–75.2)
NRBC # BLD: 0 /100 WBCS — SIGNIFICANT CHANGE UP (ref 0–0)
PLATELET # BLD AUTO: 161 K/UL — SIGNIFICANT CHANGE UP (ref 130–400)
POTASSIUM SERPL-MCNC: 4.2 MMOL/L — SIGNIFICANT CHANGE UP (ref 3.5–5)
POTASSIUM SERPL-SCNC: 4.2 MMOL/L — SIGNIFICANT CHANGE UP (ref 3.5–5)
PROT SERPL-MCNC: 6.2 G/DL — SIGNIFICANT CHANGE UP (ref 6–8)
RBC # BLD: 2.68 M/UL — LOW (ref 4.2–5.4)
RBC # FLD: 14.1 % — SIGNIFICANT CHANGE UP (ref 11.5–14.5)
SODIUM SERPL-SCNC: 131 MMOL/L — LOW (ref 135–146)
SPECIMEN SOURCE: SIGNIFICANT CHANGE UP
WBC # BLD: 6.86 K/UL — SIGNIFICANT CHANGE UP (ref 4.8–10.8)
WBC # FLD AUTO: 6.86 K/UL — SIGNIFICANT CHANGE UP (ref 4.8–10.8)

## 2022-06-05 PROCEDURE — 99233 SBSQ HOSP IP/OBS HIGH 50: CPT

## 2022-06-05 PROCEDURE — 71045 X-RAY EXAM CHEST 1 VIEW: CPT | Mod: 26

## 2022-06-05 RX ORDER — ACETAMINOPHEN 500 MG
650 TABLET ORAL ONCE
Refills: 0 | Status: COMPLETED | OUTPATIENT
Start: 2022-06-05 | End: 2022-06-05

## 2022-06-05 RX ORDER — LANOLIN ALCOHOL/MO/W.PET/CERES
5 CREAM (GRAM) TOPICAL AT BEDTIME
Refills: 0 | Status: COMPLETED | OUTPATIENT
Start: 2022-06-05 | End: 2022-06-05

## 2022-06-05 RX ORDER — MAGNESIUM SULFATE 500 MG/ML
1 VIAL (ML) INJECTION ONCE
Refills: 0 | Status: COMPLETED | OUTPATIENT
Start: 2022-06-05 | End: 2022-06-05

## 2022-06-05 RX ADMIN — ATORVASTATIN CALCIUM 20 MILLIGRAM(S): 80 TABLET, FILM COATED ORAL at 21:08

## 2022-06-05 RX ADMIN — LOSARTAN POTASSIUM 100 MILLIGRAM(S): 100 TABLET, FILM COATED ORAL at 05:38

## 2022-06-05 RX ADMIN — GABAPENTIN 300 MILLIGRAM(S): 400 CAPSULE ORAL at 05:02

## 2022-06-05 RX ADMIN — APIXABAN 5 MILLIGRAM(S): 2.5 TABLET, FILM COATED ORAL at 17:50

## 2022-06-05 RX ADMIN — Medication 0.5 MILLIGRAM(S): at 14:02

## 2022-06-05 RX ADMIN — Medication 650 MILLIGRAM(S): at 18:13

## 2022-06-05 RX ADMIN — Medication 1 APPLICATION(S): at 17:50

## 2022-06-05 RX ADMIN — PANTOPRAZOLE SODIUM 40 MILLIGRAM(S): 20 TABLET, DELAYED RELEASE ORAL at 11:54

## 2022-06-05 RX ADMIN — Medication 30 MILLIGRAM(S): at 21:09

## 2022-06-05 RX ADMIN — Medication 650 MILLIGRAM(S): at 00:45

## 2022-06-05 RX ADMIN — Medication 0.5 MILLIGRAM(S): at 05:02

## 2022-06-05 RX ADMIN — BUMETANIDE 2 MILLIGRAM(S): 0.25 INJECTION INTRAMUSCULAR; INTRAVENOUS at 05:02

## 2022-06-05 RX ADMIN — OXYCODONE HYDROCHLORIDE 5 MILLIGRAM(S): 5 TABLET ORAL at 00:30

## 2022-06-05 RX ADMIN — OXYCODONE HYDROCHLORIDE 5 MILLIGRAM(S): 5 TABLET ORAL at 09:30

## 2022-06-05 RX ADMIN — Medication 1 APPLICATION(S): at 05:34

## 2022-06-05 RX ADMIN — Medication 0.5 MILLIGRAM(S): at 21:09

## 2022-06-05 RX ADMIN — INSULIN GLARGINE 15 UNIT(S): 100 INJECTION, SOLUTION SUBCUTANEOUS at 21:09

## 2022-06-05 RX ADMIN — Medication 100 GRAM(S): at 11:54

## 2022-06-05 RX ADMIN — Medication 30 MILLIGRAM(S): at 06:00

## 2022-06-05 RX ADMIN — Medication 650 MILLIGRAM(S): at 00:00

## 2022-06-05 RX ADMIN — Medication 650 MILLIGRAM(S): at 12:23

## 2022-06-05 RX ADMIN — Medication 650 MILLIGRAM(S): at 11:54

## 2022-06-05 RX ADMIN — OXYCODONE HYDROCHLORIDE 5 MILLIGRAM(S): 5 TABLET ORAL at 16:11

## 2022-06-05 RX ADMIN — GABAPENTIN 300 MILLIGRAM(S): 400 CAPSULE ORAL at 13:06

## 2022-06-05 RX ADMIN — Medication 650 MILLIGRAM(S): at 17:50

## 2022-06-05 RX ADMIN — Medication 30 MILLIGRAM(S): at 13:07

## 2022-06-05 RX ADMIN — OXYCODONE HYDROCHLORIDE 5 MILLIGRAM(S): 5 TABLET ORAL at 09:04

## 2022-06-05 RX ADMIN — Medication 30 MILLIGRAM(S): at 13:35

## 2022-06-05 RX ADMIN — Medication 5 MILLIGRAM(S): at 00:56

## 2022-06-05 RX ADMIN — AMLODIPINE BESYLATE 5 MILLIGRAM(S): 2.5 TABLET ORAL at 05:02

## 2022-06-05 RX ADMIN — Medication 30 MILLIGRAM(S): at 05:38

## 2022-06-05 RX ADMIN — Medication 30 MILLIGRAM(S): at 21:15

## 2022-06-05 RX ADMIN — OXYCODONE HYDROCHLORIDE 5 MILLIGRAM(S): 5 TABLET ORAL at 15:45

## 2022-06-05 RX ADMIN — GABAPENTIN 300 MILLIGRAM(S): 400 CAPSULE ORAL at 21:09

## 2022-06-05 RX ADMIN — OXYCODONE HYDROCHLORIDE 5 MILLIGRAM(S): 5 TABLET ORAL at 21:09

## 2022-06-05 RX ADMIN — APIXABAN 5 MILLIGRAM(S): 2.5 TABLET, FILM COATED ORAL at 09:04

## 2022-06-05 NOTE — DIETITIAN INITIAL EVALUATION ADULT - ORAL INTAKE PTA/DIET HISTORY
I spoke to the patient's son, who is present. Per son, the patient followed a regular diet at home; consumed three meals at 100%; denies MVI use.

## 2022-06-05 NOTE — PROGRESS NOTE ADULT - SUBJECTIVE AND OBJECTIVE BOX
Patient is a 56y old  Female who presents with a chief complaint of ACUTE RESPIRATORY FAILURE WITH HYPOXIA; PNA (PNEUMONIA)     (2022 00:08)      Over Night Events:  Patient seen and examined.   off vent more then 48 hrs   hematuria overnight   ROS:  See HPI    PHYSICAL EXAM    ICU Vital Signs Last 24 Hrs  T(C): 37.1 (2022 04:00), Max: 37.3 (2022 00:00)  T(F): 98.7 (2022 04:00), Max: 99.2 (2022 00:00)  HR: 74 (2022 06:00) (66 - 89)  BP: 150/52 (2022 06:00) (138/65 - 171/70)  BP(mean): 82 (2022 06:00) (82 - 112)  ABP: --  ABP(mean): --  RR: 21 (2022 06:00) (18 - 39)  SpO2: 99% (2022 06:00) (93% - 100%)      General: AOx3  HEENT:      trach           Lymph Nodes: NO cervical LN   Lungs: Bilateral BS  Cardiovascular: Regular   Abdomen: Soft, Positive BS  Extremities: No clubbing   Skin: warm   Neurological: no focal   Musculoskeletal: move all ext     I&O's Detail    2022 07:01  -  2022 07:00  --------------------------------------------------------  IN:    Enteral Tube Flush: 395 mL    Jevity 1.2: 1200 mL  Total IN: 1595 mL    OUT:    Indwelling Catheter - Urethral (mL): 990 mL  Total OUT: 990 mL    Total NET: 605 mL          LABS:                          8.7    6.86  )-----------( 161      ( 2022 04:55 )             25.2         2022 04:55    131    |  87     |  13     ----------------------------<  178    4.2     |  32     |  <0.5     Ca    9.7        2022 04:55  Mg     1.7       2022 04:55    TPro  6.2    /  Alb  3.8    /  TBili  0.7    /  DBili  x      /  AST  26     /  ALT  36     /  AlkPhos  97     2022 04:55  Amylase x     lipase x                                                                                        Urinalysis Basic - ( 2022 21:00 )    Color: Dark Brown / Appearance: Turbid / S.019 / pH: x  Gluc: x / Ketone: Trace  / Bili: Negative / Urobili: <2 mg/dL   Blood: x / Protein: 300 mg/dL / Nitrite: Negative   Leuk Esterase: Large / RBC: >720 /HPF /  /HPF   Sq Epi: x / Non Sq Epi: 2 /HPF / Bacteria: Negative                                                                                                                                                     MEDICATIONS  (STANDING):  amLODIPine   Tablet 5 milliGRAM(s) Oral daily  ammonium lactate 12% Lotion 1 Application(s) Topical every 12 hours  apixaban 5 milliGRAM(s) Oral every 12 hours  atorvastatin 20 milliGRAM(s) Oral at bedtime  buMETAnide 2 milliGRAM(s) Oral daily  gabapentin 300 milliGRAM(s) Oral three times a day  influenza   Vaccine 0.5 milliLiter(s) IntraMuscular once  insulin glargine Injectable (LANTUS) 15 Unit(s) SubCutaneous at bedtime  insulin lispro (ADMELOG) corrective regimen sliding scale   SubCutaneous every 6 hours  ketorolac   Injectable 30 milliGRAM(s) IV Push three times a day  LORazepam     Tablet 0.5 milliGRAM(s) Oral two times a day  losartan 100 milliGRAM(s) Oral daily  magnesium sulfate  IVPB 1 Gram(s) IV Intermittent once  pantoprazole  Injectable 40 milliGRAM(s) IV Push daily  polyethylene glycol 3350 17 Gram(s) Oral daily  senna 2 Tablet(s) Oral at bedtime    MEDICATIONS  (PRN):  acetaminophen     Tablet .. 650 milliGRAM(s) Oral every 6 hours PRN Temp greater or equal to 38C (100.4F), Mild Pain (1 - 3)  oxyCODONE    IR 5 milliGRAM(s) Oral every 4 hours PRN Moderate Pain (4 - 6)          Xrays:  TLC:  OG:  ET tube:                                                                                    small left opacity    ECHO:  [facility] ICU:

## 2022-06-05 NOTE — PROGRESS NOTE ADULT - ASSESSMENT
Impression:  Acute/ chronic COPD exacerbation, resolved  Chronic respiratory failure  Mucous plug removed  Pneumonia/ atelectasis L base  HO DVT, was on eliquis at home     SUGGEST:   frequent suction with saline   t-piece, dec FiO2 to 30%  CT Sx signed off  Restart PO eliquis h/h been stable   Replete Mg  Continue with bowel regimen  S&S eval, if fails, CT Sx FU for PEG tube  Target SPO2 92-96%, titrate oxygen as tolerated  bronchodilator treatments ATC and PRN  OOB to chair  GI and DVT prophylaxis  pulmonary toilet  Repeat duplex stable  follow h/h   downgrade to  vent unit need frequent suction   patient been off vent for 48 hrs

## 2022-06-05 NOTE — DIETITIAN INITIAL EVALUATION ADULT - ENTERAL
1.Recommend change TF formula to Glucerna 1.2 at 300mL Q6hrs, to provide (1440kcal, 72gm protein, 972mL free H2O)

## 2022-06-05 NOTE — DIETITIAN INITIAL EVALUATION ADULT - NAME AND PHONE
Intervention: 1.Enteral Nutrition 2.Coordination of Care  Monitor/Evaluate: Diet order, energy intake, nutrition focused physical findings, glucose and anemia profile

## 2022-06-05 NOTE — DIETITIAN INITIAL EVALUATION ADULT - COLLABORATION WITH OTHER PROVIDERS
I spoke to the patient's physician name Dr. Albarado ([facility] #[id number]) regarding my nutritional recommendations.

## 2022-06-05 NOTE — DIETITIAN INITIAL EVALUATION ADULT - PERTINENT LABORATORY DATA
06-04    133<L>  |  90<L>  |  13  ----------------------------<  232<H>  4.0   |  31  |  <0.5<L>    Ca    9.9      04 Jun 2022 05:11  Mg     1.8     06-04    TPro  6.3  /  Alb  4.0  /  TBili  0.6  /  DBili  x   /  AST  26  /  ALT  32  /  AlkPhos  103  06-04  POC: 192, 257, 257, 229, 212, 272, 150mg/dL  A1C with Estimated Average Glucose Result: 7.2 % (05-28-22 @ 05:59)

## 2022-06-05 NOTE — DIETITIAN INITIAL EVALUATION ADULT - OTHER CALCULATIONS
Estimated Calorie Needs: MSJ-1500 x AF 1-1.9=[id number]/day -Due to obesity  Estimated Protein Needs: 58-72grams/day (1.2-1.5grams/kg of IBW-48kg) -Due to obesity  Estimated Fluid Needs: Fluids per CCU team

## 2022-06-05 NOTE — DIETITIAN INITIAL EVALUATION ADULT - NSFNSGIIOFT_GEN_A_CORE
Dx: 55y/o female with h/o DVT on Eliquis, COPD,  trach collar, obesity, IDDM, UTI, sent by [employer] for hypoxia. Initially admitted to the MICU for acute hypoxic hypercapnic respiratory failure secondary to acute mucus plug now resolved vs superimposed pneumonia hospital-acquired. General surgery was consulted emergently when the patient was found to be hypoxic to low 80s after CCU and pulmonary team found large granuloma in trachea along tracheostomy tube. Patient currently had a size 8 trach. Surgery assisted pulmonary and CCU with trach exchanged with ET tube guidance to a size 7 XLT. Saturations improved to high the 90s. Large granuloma was removed. Patient was no longer in distress after exchange. MAP-[id number]

## 2022-06-05 NOTE — DIETITIAN INITIAL EVALUATION ADULT - PERTINENT MEDS FT
MEDICATIONS  (STANDING):  amLODIPine   Tablet 5 milliGRAM(s) Oral daily  ammonium lactate 12% Lotion 1 Application(s) Topical every 12 hours  apixaban 5 milliGRAM(s) Oral every 12 hours  atorvastatin 20 milliGRAM(s) Oral at bedtime  buMETAnide 2 milliGRAM(s) Oral daily  gabapentin 300 milliGRAM(s) Oral three times a day  influenza   Vaccine 0.5 milliLiter(s) IntraMuscular once  insulin glargine Injectable (LANTUS) 15 Unit(s) SubCutaneous at bedtime  insulin lispro (ADMELOG) corrective regimen sliding scale   SubCutaneous every 6 hours  ketorolac   Injectable 30 milliGRAM(s) IV Push three times a day  LORazepam     Tablet 0.5 milliGRAM(s) Oral two times a day  losartan 100 milliGRAM(s) Oral daily  melatonin 5 milliGRAM(s) Oral at bedtime  pantoprazole  Injectable 40 milliGRAM(s) IV Push daily  polyethylene glycol 3350 17 Gram(s) Oral daily  senna 2 Tablet(s) Oral at bedtime    MEDICATIONS  (PRN):  acetaminophen     Tablet .. 650 milliGRAM(s) Oral every 6 hours PRN Temp greater or equal to 38C (100.4F), Mild Pain (1 - 3)  oxyCODONE    IR 5 milliGRAM(s) Oral every 4 hours PRN Moderate Pain (4 - 6)

## 2022-06-05 NOTE — PROGRESS NOTE ADULT - SUBJECTIVE AND OBJECTIVE BOX
PATIENT:  CRUZ DUMONT  [id number]    CHIEF COMPLAINT:  Patient is a 56y old  Female who presents with a chief complaint of ACUTE RESPIRATORY FAILURE WITH HYPOXIA; PNA (PNEUMONIA)     (2022 00:08)      INTERVAL HISTORYOVERNIGHT EVENTS: Episode of Hematouria         MEDICATIONS:  MEDICATIONS  (STANDING):  amLODIPine   Tablet 5 milliGRAM(s) Oral daily  ammonium lactate 12% Lotion 1 Application(s) Topical every 12 hours  apixaban 5 milliGRAM(s) Oral every 12 hours  atorvastatin 20 milliGRAM(s) Oral at bedtime  buMETAnide 2 milliGRAM(s) Oral daily  gabapentin 300 milliGRAM(s) Oral three times a day  influenza   Vaccine 0.5 milliLiter(s) IntraMuscular once  insulin glargine Injectable (LANTUS) 15 Unit(s) SubCutaneous at bedtime  insulin lispro (ADMELOG) corrective regimen sliding scale   SubCutaneous every 6 hours  ketorolac   Injectable 30 milliGRAM(s) IV Push three times a day  LORazepam     Tablet 0.5 milliGRAM(s) Oral two times a day  losartan 100 milliGRAM(s) Oral daily  magnesium sulfate  IVPB 1 Gram(s) IV Intermittent once  pantoprazole  Injectable 40 milliGRAM(s) IV Push daily  polyethylene glycol 3350 17 Gram(s) Oral daily  senna 2 Tablet(s) Oral at bedtime    MEDICATIONS  (PRN):  acetaminophen     Tablet .. 650 milliGRAM(s) Oral every 6 hours PRN Temp greater or equal to 38C (100.4F), Mild Pain (1 - 3)  oxyCODONE    IR 5 milliGRAM(s) Oral every 4 hours PRN Moderate Pain (4 - 6)      ALLERGIES:  Allergies    penicillin (Swelling)    Intolerances        OBJECTIVE:  ICU Vital Signs Last 24 Hrs  T(C): 37.1 (2022 04:00), Max: 37.3 (2022 00:00)  T(F): 98.7 (2022 04:00), Max: 99.2 (2022 00:00)  HR: 74 (2022 06:00) (66 - 89)  BP: 150/52 (2022 06:00) (138/65 - 171/70)  BP(mean): 82 (2022 06:00) (82 - 112)  ABP: --  ABP(mean): --  RR: 21 (2022 06:00) (18 - 39)  SpO2: 99% (2022 06:00) (93% - 100%)      Adult Advanced Hemodynamics Last 24 Hrs  CVP(mm Hg): --  CVP(cm H2O): --  CO: --  CI: --  PA: --  PA(mean): --  PCWP: --  SVR: --  SVRI: --  PVR: --  PVRI: --  CAPILLARY BLOOD GLUCOSE      POCT Blood Glucose.: 195 mg/dL (2022 05:40)  POCT Blood Glucose.: 227 mg/dL (2022 00:12)  POCT Blood Glucose.: 199 mg/dL (2022 20:45)  POCT Blood Glucose.: 218 mg/dL (2022 18:20)  POCT Blood Glucose.: 192 mg/dL (2022 11:49)    CAPILLARY BLOOD GLUCOSE      POCT Blood Glucose.: 195 mg/dL (2022 05:40)    I&O's Summary    2022 07:01  -  2022 07:00  --------------------------------------------------------  IN: 1595 mL / OUT: 990 mL / NET: 605 mL      Daily Height in cm: 154.94 (2022 00:08)    Daily Weight in k.4 (2022 06:00)    PHYSICAL EXAMINATION:  General: NAD, AAOx3, calm and cooperative  HEENT: NCAT, МАРИНА, EOMI, Tracheostomy in place, functioning.   Cardiac: RRR S1, S2, no Murmurs, rubs or gallops  Respiratory: CTAB, normal respiratory effort, breath sounds equal BL, no wheeze, mild UR congesion  Abdomen: Soft, non-distended, non-tender, no rebound, no guarding. +BS.  Extremities:  Doppler DP pulses, chroninc venous stasis changes bilateral lower extremity     LABS:                          8.7    6.86  )-----------( 161      ( 2022 04:55 )             25.2     06-    131<L>  |  87<L>  |  13  ----------------------------<  178<H>  4.2   |  32  |  <0.5<L>    Ca    9.7      2022 04:55  Mg     1.7     06-    TPro  6.2  /  Alb  3.8  /  TBili  0.7  /  DBili  x   /  AST  26  /  ALT  36  /  AlkPhos  97  06-05    LIVER FUNCTIONS - ( 2022 04:55 )  Alb: 3.8 g/dL / Pro: 6.2 g/dL / ALK PHOS: 97 U/L / ALT: 36 U/L / AST: 26 U/L / GGT: x                   Urinalysis Basic - ( 2022 21:00 )    Color: Dark Brown / Appearance: Turbid / S.019 / pH: x  Gluc: x / Ketone: Trace  / Bili: Negative / Urobili: <2 mg/dL   Blood: x / Protein: 300 mg/dL / Nitrite: Negative   Leuk Esterase: Large / RBC: >720 /HPF /  /HPF   Sq Epi: x / Non Sq Epi: 2 /HPF / Bacteria: Negative        TELEMETRY:     EKG:     IMAGING:

## 2022-06-05 NOTE — DIETITIAN INITIAL EVALUATION ADULT - NSFNSNUTRCHEWSWALLOWFT_GEN_A_CORE
S/p FEES test(6/3), recs include keep the patient NPO and continue to provide alternate means of nutrition and hydration, which was taken.

## 2022-06-05 NOTE — PROGRESS NOTE ADULT - ASSESSMENT
55 yo f with PMHx of DVT on Eliquis, COPD,  trach collar, obesity, IDDM, UTI, sent by [facility] for hypoxia. Current illness going back to April 4th when pt was intubated on admission at [facility] ICU for hypoxic respiratory failure diagnosed with copd exacerbation and superimposed pneumonia, and remained intubated for 37 days requiring tracheostomy. Patient stayed at [facility] for 2 days until noted to be hypoxic (saturating low 70's) pt's saturation immediately improving afterwards to 80's after large mucus plug removed and was subsequently sent to [facility]  On arrival to ED pt was saturating 97%, 15L O2 via tracheostomy collar (baseline 8 L at [facility])CXR suspicious for L-sided PNA, inconclusive. CTA neg for PE. Pt received x1 IV Levaquin and Cefepime in ED, admitted to MICU for acute hypoxic hypercapnic respiratory failure secondary to acute mucus plug now resolved vs superimposed pneumonia hospital-acquired.   General Surgery Consulted emergently when patient found to be hypoxic to low 80s after CCU and Pulmonary team found large granuloma in trachea along tracheostomy tube. Patient currently had a size 8 trach. Surgery assisted Pulmonary and CCU with trach exchanged with ET tube guidance to a size 7 XLT. Saturations improved to high 90s. Large granuloma was removed. Patient was no longer in distress after exchange.    #Acute/ chronic COPD with exacerbation  #chronic respiratory failure  #mucous plug removed  #pneumonia v, atelectasis L base  #currently No Impending respiratory failure  - s/p Trach exchange to 7 XLT alongside Pulmonology.  - S/p repeat bronchoscopy   -airway management   -trach functioning well s/p exchange   - -No further intervention required by CT surgery  - recall [id number] if further evaluation needed  -f/u speech and swallow , patient was eating by mouth at nursing home when off the vent . Pending possible barium swallow   -On t-piece and tolerating well     #Chronic Indwelling Singh  -5/28 Ucx - Contaminated   -5/28 Ucx- Normal perri   -5/27 Ucx- Klebsiella (CRE) and pseudomonas   - s/p cefepime   -Currently afebrile w/ no urinary complaints   - episode of Hematouria 6/4> improving> h/h Stable  - UA       #Nonobstructive Ileus  -abdomen currently soft, nontender, + BS   -Discontinue opiods   -C/w bowel regimen         #Hx of DVT  -resume patient's home eliquis   -Monitor for signs of bleeding         Activity OOB to chair  GI PPX  PPI  DVT PPX Eliquis       Progress Note: Respiratory status stable on trach collar   F/u speech and swallow, if patient continues to fail may need PEG    57 yo f with PMHx of DVT on Eliquis, COPD,  trach collar, obesity, IDDM, UTI, sent [facility] for hypoxia. Current illness going back to April 4th when pt was intubated on admission at [facility] ICU for hypoxic respiratory failure diagnosed with copd exacerbation and superimposed pneumonia, and remained intubated for 37 days requiring tracheostomy. Patient stayed at [facility] for 2 days until noted to be hypoxic (saturating low 70's) pt's saturation immediately improving afterwards to 80's after large mucus plug removed and was subsequently sent to [facility]  On arrival to ED pt was saturating 97%, 15L O2 via tracheostomy collar (baseline 8 L at [facility])CXR suspicious for L-sided PNA, inconclusive. CTA neg for PE. Pt received x1 IV Levaquin and Cefepime in ED, admitted to MICU for acute hypoxic hypercapnic respiratory failure secondary to acute mucus plug now resolved vs superimposed pneumonia hospital-acquired.   General Surgery Consulted emergently when patient found to be hypoxic to low 80s after CCU and Pulmonary team found large granuloma in trachea along tracheostomy tube. Patient currently had a size 8 trach. Surgery assisted Pulmonary and CCU with trach exchanged with ET tube guidance to a size 7 XLT. Saturations improved to high 90s. Large granuloma was removed. Patient was no longer in distress after exchange.    #Acute/ chronic COPD with exacerbation  #chronic respiratory failure  #mucous plug removed  #pneumonia v, atelectasis L base  #currently No Impending respiratory failure  - s/p Trach exchange to 7 XLT alongside Pulmonology.  - S/p repeat bronchoscopy   -airway management   -trach functioning well s/p exchange   - -No further intervention required by CT surgery  - recall [id number] if further evaluation needed  -f/u speech and swallow , patient was eating by mouth at nursing home when off the vent . Pending possible barium swallow   -On t-piece and tolerating well     #Chronic Indwelling Singh  -5/28 Ucx - Contaminated   -5/28 Ucx- Normal perri   -5/27 Ucx- Klebsiella (CRE) and pseudomonas   - s/p cefepime   -Currently afebrile w/ no urinary complaints   - episode of Hematouria 6/4> improving> h/h Stable  - UA       #Nonobstructive Ileus  -abdomen currently soft, nontender, + BS   -Discontinue opiods   -C/w bowel regimen         #Hx of DVT  -resume patient's home eliquis   -Monitor for signs of bleeding         Activity OOB to chair  GI PPX  PPI  DVT PPX Eliquis

## 2022-06-06 LAB
ALBUMIN SERPL ELPH-MCNC: 4.1 G/DL — SIGNIFICANT CHANGE UP (ref 3.5–5.2)
ALP SERPL-CCNC: 106 U/L — SIGNIFICANT CHANGE UP (ref 30–115)
ALT FLD-CCNC: 36 U/L — SIGNIFICANT CHANGE UP (ref 0–41)
ANION GAP SERPL CALC-SCNC: 10 MMOL/L — SIGNIFICANT CHANGE UP (ref 7–14)
ANION GAP SERPL CALC-SCNC: 13 MMOL/L — SIGNIFICANT CHANGE UP (ref 7–14)
AST SERPL-CCNC: 24 U/L — SIGNIFICANT CHANGE UP (ref 0–41)
BASOPHILS # BLD AUTO: 0.04 K/UL — SIGNIFICANT CHANGE UP (ref 0–0.2)
BASOPHILS NFR BLD AUTO: 0.6 % — SIGNIFICANT CHANGE UP (ref 0–1)
BILIRUB SERPL-MCNC: 0.6 MG/DL — SIGNIFICANT CHANGE UP (ref 0.2–1.2)
BUN SERPL-MCNC: 13 MG/DL — SIGNIFICANT CHANGE UP (ref 10–20)
BUN SERPL-MCNC: 14 MG/DL — SIGNIFICANT CHANGE UP (ref 10–20)
CALCIUM SERPL-MCNC: 10 MG/DL — SIGNIFICANT CHANGE UP (ref 8.5–10.1)
CALCIUM SERPL-MCNC: 10.1 MG/DL — SIGNIFICANT CHANGE UP (ref 8.5–10.1)
CHLORIDE SERPL-SCNC: 89 MMOL/L — LOW (ref 98–110)
CHLORIDE SERPL-SCNC: 89 MMOL/L — LOW (ref 98–110)
CO2 SERPL-SCNC: 30 MMOL/L — SIGNIFICANT CHANGE UP (ref 17–32)
CO2 SERPL-SCNC: 34 MMOL/L — HIGH (ref 17–32)
CREAT SERPL-MCNC: <0.5 MG/DL — LOW (ref 0.7–1.5)
CREAT SERPL-MCNC: <0.5 MG/DL — LOW (ref 0.7–1.5)
EGFR: 115 ML/MIN/1.73M2 — SIGNIFICANT CHANGE UP
EGFR: 115 ML/MIN/1.73M2 — SIGNIFICANT CHANGE UP
EOSINOPHIL # BLD AUTO: 0.23 K/UL — SIGNIFICANT CHANGE UP (ref 0–0.7)
EOSINOPHIL NFR BLD AUTO: 3.2 % — SIGNIFICANT CHANGE UP (ref 0–8)
GLUCOSE BLDC GLUCOMTR-MCNC: 184 MG/DL — HIGH (ref 70–99)
GLUCOSE BLDC GLUCOMTR-MCNC: 197 MG/DL — HIGH (ref 70–99)
GLUCOSE BLDC GLUCOMTR-MCNC: 201 MG/DL — HIGH (ref 70–99)
GLUCOSE BLDC GLUCOMTR-MCNC: 225 MG/DL — HIGH (ref 70–99)
GLUCOSE SERPL-MCNC: 188 MG/DL — HIGH (ref 70–99)
GLUCOSE SERPL-MCNC: 196 MG/DL — HIGH (ref 70–99)
HCT VFR BLD CALC: 27 % — LOW (ref 37–47)
HGB BLD-MCNC: 9.5 G/DL — LOW (ref 12–16)
IMM GRANULOCYTES NFR BLD AUTO: 0.4 % — HIGH (ref 0.1–0.3)
LYMPHOCYTES # BLD AUTO: 1.33 K/UL — SIGNIFICANT CHANGE UP (ref 1.2–3.4)
LYMPHOCYTES # BLD AUTO: 18.5 % — LOW (ref 20.5–51.1)
MAGNESIUM SERPL-MCNC: 1.9 MG/DL — SIGNIFICANT CHANGE UP (ref 1.8–2.4)
MCHC RBC-ENTMCNC: 33.8 PG — HIGH (ref 27–31)
MCHC RBC-ENTMCNC: 35.2 G/DL — SIGNIFICANT CHANGE UP (ref 32–37)
MCV RBC AUTO: 96.1 FL — SIGNIFICANT CHANGE UP (ref 81–99)
MONOCYTES # BLD AUTO: 0.62 K/UL — HIGH (ref 0.1–0.6)
MONOCYTES NFR BLD AUTO: 8.6 % — SIGNIFICANT CHANGE UP (ref 1.7–9.3)
NEUTROPHILS # BLD AUTO: 4.95 K/UL — SIGNIFICANT CHANGE UP (ref 1.4–6.5)
NEUTROPHILS NFR BLD AUTO: 68.7 % — SIGNIFICANT CHANGE UP (ref 42.2–75.2)
NRBC # BLD: 0 /100 WBCS — SIGNIFICANT CHANGE UP (ref 0–0)
PLATELET # BLD AUTO: 182 K/UL — SIGNIFICANT CHANGE UP (ref 130–400)
POTASSIUM SERPL-MCNC: 4.5 MMOL/L — SIGNIFICANT CHANGE UP (ref 3.5–5)
POTASSIUM SERPL-MCNC: 4.7 MMOL/L — SIGNIFICANT CHANGE UP (ref 3.5–5)
POTASSIUM SERPL-SCNC: 4.5 MMOL/L — SIGNIFICANT CHANGE UP (ref 3.5–5)
POTASSIUM SERPL-SCNC: 4.7 MMOL/L — SIGNIFICANT CHANGE UP (ref 3.5–5)
PROT SERPL-MCNC: 6.3 G/DL — SIGNIFICANT CHANGE UP (ref 6–8)
RBC # BLD: 2.81 M/UL — LOW (ref 4.2–5.4)
RBC # FLD: 14.4 % — SIGNIFICANT CHANGE UP (ref 11.5–14.5)
SODIUM SERPL-SCNC: 132 MMOL/L — LOW (ref 135–146)
SODIUM SERPL-SCNC: 133 MMOL/L — LOW (ref 135–146)
WBC # BLD: 7.2 K/UL — SIGNIFICANT CHANGE UP (ref 4.8–10.8)
WBC # FLD AUTO: 7.2 K/UL — SIGNIFICANT CHANGE UP (ref 4.8–10.8)

## 2022-06-06 PROCEDURE — 71045 X-RAY EXAM CHEST 1 VIEW: CPT | Mod: 26

## 2022-06-06 PROCEDURE — 99232 SBSQ HOSP IP/OBS MODERATE 35: CPT

## 2022-06-06 PROCEDURE — 71045 X-RAY EXAM CHEST 1 VIEW: CPT | Mod: 26,77

## 2022-06-06 RX ADMIN — OXYCODONE HYDROCHLORIDE 5 MILLIGRAM(S): 5 TABLET ORAL at 12:36

## 2022-06-06 RX ADMIN — Medication 30 MILLIGRAM(S): at 05:24

## 2022-06-06 RX ADMIN — GABAPENTIN 300 MILLIGRAM(S): 400 CAPSULE ORAL at 21:04

## 2022-06-06 RX ADMIN — PANTOPRAZOLE SODIUM 40 MILLIGRAM(S): 20 TABLET, DELAYED RELEASE ORAL at 12:50

## 2022-06-06 RX ADMIN — Medication 30 MILLIGRAM(S): at 13:48

## 2022-06-06 RX ADMIN — Medication 0.5 MILLIGRAM(S): at 18:08

## 2022-06-06 RX ADMIN — Medication 650 MILLIGRAM(S): at 10:18

## 2022-06-06 RX ADMIN — Medication 30 MILLIGRAM(S): at 21:11

## 2022-06-06 RX ADMIN — OXYCODONE HYDROCHLORIDE 5 MILLIGRAM(S): 5 TABLET ORAL at 16:48

## 2022-06-06 RX ADMIN — OXYCODONE HYDROCHLORIDE 5 MILLIGRAM(S): 5 TABLET ORAL at 08:05

## 2022-06-06 RX ADMIN — Medication 30 MILLIGRAM(S): at 21:20

## 2022-06-06 RX ADMIN — ATORVASTATIN CALCIUM 20 MILLIGRAM(S): 80 TABLET, FILM COATED ORAL at 21:03

## 2022-06-06 RX ADMIN — Medication 30 MILLIGRAM(S): at 06:15

## 2022-06-06 RX ADMIN — OXYCODONE HYDROCHLORIDE 5 MILLIGRAM(S): 5 TABLET ORAL at 12:35

## 2022-06-06 RX ADMIN — INSULIN GLARGINE 15 UNIT(S): 100 INJECTION, SOLUTION SUBCUTANEOUS at 21:03

## 2022-06-06 RX ADMIN — OXYCODONE HYDROCHLORIDE 5 MILLIGRAM(S): 5 TABLET ORAL at 16:54

## 2022-06-06 RX ADMIN — POLYETHYLENE GLYCOL 3350 17 GRAM(S): 17 POWDER, FOR SOLUTION ORAL at 12:35

## 2022-06-06 RX ADMIN — APIXABAN 5 MILLIGRAM(S): 2.5 TABLET, FILM COATED ORAL at 05:22

## 2022-06-06 RX ADMIN — BUMETANIDE 2 MILLIGRAM(S): 0.25 INJECTION INTRAMUSCULAR; INTRAVENOUS at 05:23

## 2022-06-06 RX ADMIN — OXYCODONE HYDROCHLORIDE 5 MILLIGRAM(S): 5 TABLET ORAL at 09:06

## 2022-06-06 RX ADMIN — AMLODIPINE BESYLATE 5 MILLIGRAM(S): 2.5 TABLET ORAL at 05:22

## 2022-06-06 RX ADMIN — Medication 1 APPLICATION(S): at 17:47

## 2022-06-06 RX ADMIN — OXYCODONE HYDROCHLORIDE 5 MILLIGRAM(S): 5 TABLET ORAL at 22:00

## 2022-06-06 RX ADMIN — GABAPENTIN 300 MILLIGRAM(S): 400 CAPSULE ORAL at 13:48

## 2022-06-06 RX ADMIN — Medication 650 MILLIGRAM(S): at 10:19

## 2022-06-06 RX ADMIN — APIXABAN 5 MILLIGRAM(S): 2.5 TABLET, FILM COATED ORAL at 18:02

## 2022-06-06 RX ADMIN — Medication 0.5 MILLIGRAM(S): at 05:23

## 2022-06-06 RX ADMIN — SENNA PLUS 2 TABLET(S): 8.6 TABLET ORAL at 21:03

## 2022-06-06 RX ADMIN — Medication 1 APPLICATION(S): at 06:16

## 2022-06-06 RX ADMIN — LOSARTAN POTASSIUM 100 MILLIGRAM(S): 100 TABLET, FILM COATED ORAL at 05:22

## 2022-06-06 RX ADMIN — Medication 30 MILLIGRAM(S): at 13:49

## 2022-06-06 RX ADMIN — OXYCODONE HYDROCHLORIDE 5 MILLIGRAM(S): 5 TABLET ORAL at 21:03

## 2022-06-06 RX ADMIN — GABAPENTIN 300 MILLIGRAM(S): 400 CAPSULE ORAL at 05:23

## 2022-06-06 NOTE — SWALLOW BEDSIDE ASSESSMENT ADULT - SLP PERTINENT HISTORY OF CURRENT PROBLEM
pt is a 57 y/o F w/ PMHx: DVT, COPD, resp failure s/p recent trach at [facility] (at baseline on 8L via trach collar), obesity, IDDM, UTI, sent by [facility] for hypoxia. History provided by daughter, reports the current illness goes back to April 4th when pt was intubated on admission at [facility] for hypoxic respiratory failure diagnosed w/ COPD exacerbation and superimposed PNA. Pt w/ prolonged intubation, s/p trach ~2 weeks ago and then d/c'ed to NH. Pt admitted for AHRF 2' mucus plug (now resolved s/p suctioning) vs. possible aspiration PNA. Pt w/ suspected chronic HF, course c/b complete whiteout of the left lung with complete atelectasis, pt upgraded to CCU. General Sx c/s emergently when pt found to be hypoxic d/t large granuloma in trachea along tracheostomy tube. Pt previously had a size 8 trach, exchanged w/ size 7 XLT. Large granuloma removed and pt placed on vent. Pt underwent bronchoscopy on 6/1, good visualization and no tracheal debridement required.

## 2022-06-06 NOTE — PROGRESS NOTE ADULT - ASSESSMENT
55 yo f with PMHx of DVT on Eliquis, COPD,  trach collar, obesity, IDDM, UTI, sent by [facility] for hypoxia. Current illness going back to April 4th when pt was intubated on admission at [facility] ICU for hypoxic respiratory failure diagnosed with copd exacerbation and superimposed pneumonia, and remained intubated for 37 days requiring tracheostomy. Patient stayed at [facility] for 2 days until noted to be hypoxic (saturating low 70's) pt's saturation immediately improving afterwards to 80's after large mucus plug removed and was subsequently sent to [facility]  On arrival to ED pt was saturating 97%, 15L O2 via tracheostomy collar (baseline 8 L at [facility])CXR suspicious for L-sided PNA, inconclusive. CTA neg for PE. Pt received x1 IV Levaquin and Cefepime in ED, admitted to MICU for acute hypoxic hypercapnic respiratory failure secondary to acute mucus plug now resolved vs superimposed pneumonia hospital-acquired.   General Surgery Consulted emergently when patient found to be hypoxic to low 80s after CCU and Pulmonary team found large granuloma in trachea along tracheostomy tube. Patient currently had a size 8 trach. Surgery assisted Pulmonary and CCU with trach exchanged with ET tube guidance to a size 7 XLT. Saturations improved to high 90s. Large granuloma was removed. Patient was no longer in distress after exchange.    #Acute/ chronic COPD with exacerbation (Resolved)  #mucous plug removed  #pneumonia vs atelectasis L base  - On 5/28, Pulmonary team found large granuloma in trachea along tracheostomy tube. Patient had a size 8 trach on admission. Surgery assisted Pulmonary and CCU with trach exchanged with ET tube guidance to a size 7 XLT. Saturations improved to high 90s.  - trach functioning well s/p exchange   - Pt off vent 72 hrs and is saturating well.  - repeat chest x-ray this AM (6/6) shows possible mucus plugging. Will attempt deep suctioning and repeat chest x-ray to look for improvement.  - Sp/Sw eval, if fails, CT Sx FU for PEG tube    #Chronic Indwelling Singh  - 5/28 Ucx - Contaminated   - 5/28 Ucx- Normal perri   - 5/27 Ucx- Klebsiella (CRE) and pseudomonas   - s/p cefepime  - Currently afebrile w/ no urinary complaints   - episode of Hematouria 6/4 -> improving -> h/h Stable  - UA     #Nonobstructive Ileus  - abdomen currently soft, nontender, + BS   - off opioids  - C/w bowel regimen     #Hx of DVT  - resumed patient's home eliquis (5mg BID)  - Monitor for signs of bleeding     Activity OOB to chair  GI PPX  PPI  DVT PPX Eliquis   Dispo: Acute    Handoff: deep suctioning followed up by Chest x-ray; possible bronchoscopy depending on chest x-ray 55 yo f with PMHx of DVT on Eliquis, COPD,  trach collar, obesity, IDDM, UTI, sent by [facility] for hypoxia. Current illness going back to April 4th when pt was intubated on admission at [facility] ICU for hypoxic respiratory failure diagnosed with copd exacerbation and superimposed pneumonia, and remained intubated for 37 days requiring tracheostomy. Patient stayed at [facility] for 2 days until noted to be hypoxic (saturating low 70's) pt's saturation immediately improving afterwards to 80's after large mucus plug removed and was subsequently sent to [facility]  On arrival to ED pt was saturating 97%, 15L O2 via tracheostomy collar (baseline 8 L at [facility])CXR suspicious for L-sided PNA, inconclusive. CTA neg for PE. Pt received x1 IV Levaquin and Cefepime in ED, admitted to MICU for acute hypoxic hypercapnic respiratory failure secondary to acute mucus plug now resolved vs superimposed pneumonia hospital-acquired.   General Surgery Consulted emergently when patient found to be hypoxic to low 80s after CCU and Pulmonary team found large granuloma in trachea along tracheostomy tube. Patient currently had a size 8 trach. Surgery assisted Pulmonary and CCU with trach exchanged with ET tube guidance to a size 7 XLT. Saturations improved to high 90s. Large granuloma was removed. Patient was no longer in distress after exchange.    #Acute/ chronic COPD with exacerbation (Resolved)  #mucous plug removed  #pneumonia vs atelectasis L base  - On 5/28, Pulmonary team found large granuloma in trachea along tracheostomy tube. Patient had a size 8 trach on admission. Surgery assisted Pulmonary and CCU with trach exchanged with ET tube guidance to a size 7 XLT. Saturations improved to high 90s.  - trach functioning well s/p exchange   - Pt off vent 72 hrs and is saturating well.  - repeat chest x-ray this AM (6/6) shows possible mucus plugging. Will attempt deep suctioning and repeat chest x-ray to look for improvement.  ***Repeat Chest X-ray shows resolution of Mucus plug after suctioning and chest PT  - Sp/Sw eval, if fails, CT Sx FU for PEG tube  ***Pt scheduled for modified barium swallow as requested by Sp/Sw    #Chronic Indwelling Singh  - 5/28 Ucx - Contaminated   - 5/28 Ucx- Normal perri   - 5/27 Ucx- Klebsiella (CRE) and pseudomonas   - s/p cefepime  - Currently afebrile w/ no urinary complaints   - episode of Hematouria 6/4 -> improving -> h/h Stable  - UA     #Nonobstructive Ileus  - abdomen currently soft, nontender, + BS   - off opioids  - C/w bowel regimen     #Hx of DVT  - resumed patient's home eliquis (5mg BID)  - Monitor for signs of bleeding     Activity OOB to chair  GI PPX  PPI  DVT PPX Eliquis   Dispo: Acute    Handoff: Pt scheduled for modified barium swallow as requested by Sp/Sw; Possible Downgrade tomorrow

## 2022-06-06 NOTE — PROGRESS NOTE ADULT - SUBJECTIVE AND OBJECTIVE BOX
Patient is a 56y old  Female who presents with a chief complaint of ACUTE RESPIRATORY FAILURE WITH HYPOXIA; PNA (PNEUMONIA)     (2022 00:08)      Over Night Events:  Patient seen and examined.   feel good off vent 72 hrs   doing well   no complain     ROS:  See HPI    PHYSICAL EXAM    ICU Vital Signs Last 24 Hrs  T(C): 37.2 (2022 04:00), Max: 37.3 (2022 00:00)  T(F): 98.9 (2022 04:00), Max: 99.2 (2022 00:00)  HR: 79 (2022 06:00) (71 - 91)  BP: 142/61 (2022 06:00) (121/56 - 154/72)  BP(mean): 88 (2022 06:00) (80 - 104)  ABP: --  ABP(mean): --  RR: 19 (2022 06:00) (9 - 22)  SpO2: 98% (2022 06:00) (97% - 100%)      General: AOX3  HEENT:        trach         Lymph Nodes: NO cervical LN   Lungs: Bilateral BS  Cardiovascular: Regular   Abdomen: Soft, Positive BS  Extremities: No clubbing   Skin: warm   Neurological: no focal   Musculoskeletal: move all ext     I&O's Detail    2022 07:01  -  2022 07:00  --------------------------------------------------------  IN:    Enteral Tube Flush: 480 mL    Glucerna: 1200 mL  Total IN: 1680 mL    OUT:    Indwelling Catheter - Urethral (mL): 1435 mL  Total OUT: 1435 mL    Total NET: 245 mL          LABS:                          8.7    6.86  )-----------( 161      ( 2022 04:55 )             25.2         2022 01:36    133    |  89     |  14     ----------------------------<  196    4.5     |  34     |  <0.5     Ca    10.0       2022 01:36  Mg     1.7       2022 04:55                                                                                      Urinalysis Basic - ( 2022 21:00 )    Color: Dark Brown / Appearance: Turbid / S.019 / pH: x  Gluc: x / Ketone: Trace  / Bili: Negative / Urobili: <2 mg/dL   Blood: x / Protein: 300 mg/dL / Nitrite: Negative   Leuk Esterase: Large / RBC: >720 /HPF /  /HPF   Sq Epi: x / Non Sq Epi: 2 /HPF / Bacteria: Negative                                                                Culture - Urine (collected 2022 21:00)  Source: Clean Catch Clean Catch (Midstream)  Final Report (2022 22:53):    <10,000 CFU/mL Normal Urogenital Chante                                                                                           MEDICATIONS  (STANDING):  amLODIPine   Tablet 5 milliGRAM(s) Oral daily  ammonium lactate 12% Lotion 1 Application(s) Topical every 12 hours  apixaban 5 milliGRAM(s) Oral every 12 hours  atorvastatin 20 milliGRAM(s) Oral at bedtime  buMETAnide 2 milliGRAM(s) Oral daily  gabapentin 300 milliGRAM(s) Oral three times a day  influenza   Vaccine 0.5 milliLiter(s) IntraMuscular once  insulin glargine Injectable (LANTUS) 15 Unit(s) SubCutaneous at bedtime  insulin lispro (ADMELOG) corrective regimen sliding scale   SubCutaneous every 6 hours  ketorolac   Injectable 30 milliGRAM(s) IV Push three times a day  LORazepam     Tablet 0.5 milliGRAM(s) Oral two times a day  losartan 100 milliGRAM(s) Oral daily  pantoprazole  Injectable 40 milliGRAM(s) IV Push daily  polyethylene glycol 3350 17 Gram(s) Oral daily  senna 2 Tablet(s) Oral at bedtime    MEDICATIONS  (PRN):  acetaminophen     Tablet .. 650 milliGRAM(s) Oral every 6 hours PRN Temp greater or equal to 38C (100.4F), Mild Pain (1 - 3)  oxyCODONE    IR 5 milliGRAM(s) Oral every 4 hours PRN Moderate Pain (4 - 6)          Xrays:  TLC:  OG:  ET tube:                                                                                    left opacity mucus plug    ECHO:  [facility] ICU:

## 2022-06-06 NOTE — PROGRESS NOTE ADULT - ASSESSMENT
Impression:  Acute/ chronic COPD exacerbation, resolved  Chronic respiratory failure  Mucous plug removed  Pneumonia/ atelectasis L base  HO DVT, was on eliquis at home     SUGGEST:   frequent suction with saline   t-piece, dec FiO2 to 30%  CT Sx signed off  Restart PO eliquis h/h been stable   Replete Mg  Continue with bowel regimen  S&S eval, if fails, CT Sx FU for PEG tube  Target SPO2 92-96%, titrate oxygen as tolerated  bronchodilator treatments ATC and PRN  OOB to chair  GI and DVT prophylaxis  pulmonary toilet  Repeat duplex stable  follow h/h   keep MICu   s/p abx   do aggressive suction and then repeat cxr if not improved will need bronch

## 2022-06-06 NOTE — PROGRESS NOTE ADULT - SUBJECTIVE AND OBJECTIVE BOX
Patient is a 56y old  Female who presents with a chief complaint of ACUTE RESPIRATORY FAILURE WITH HYPOXIA; PNA (PNEUMONIA)     (2022 00:08)      INTERVAL HPI/OVERNIGHT EVENTS:   No overnight events   Afebrile, hemodynamically stable     **Chest X-Ray from this AM shows possible mucus plug. Plan is to perform suctioning and repeating a chest x-ray. If not improved, will plan for bronchoscopy today or tomorrow.    ICU Vital Signs Last 24 Hrs  T(C): 37.2 (2022 08:00), Max: 37.3 (2022 00:00)  T(F): 98.9 (2022 08:00), Max: 99.2 (2022 00:00)  HR: 92 (2022 08:00) (71 - 92)  BP: 132/61 (2022 08:00) (121/56 - 154/72)  BP(mean): 88 (2022 08:00) (80 - 104)  ABP: --  ABP(mean): --  RR: 26 (2022 08:00) (9 - 26)  SpO2: 95% (2022 08:00) (95% - 100%)    I&O's Summary    2022 07:01  -  2022 07:00  --------------------------------------------------------  IN: 1680 mL / OUT: 1435 mL / NET: 245 mL          LABS:                        9.5    7.20  )-----------( 182      ( 2022 07:50 )             27.0     06-06    133<L>  |  89<L>  |  14  ----------------------------<  196<H>  4.5   |  34<H>  |  <0.5<L>    Ca    10.0      2022 01:36  Mg     1.7     06-05    TPro  6.2  /  Alb  3.8  /  TBili  0.7  /  DBili  x   /  AST  26  /  ALT  36  /  AlkPhos  97  06-      Urinalysis Basic - ( 2022 21:00 )    Color: Dark Brown / Appearance: Turbid / S.019 / pH: x  Gluc: x / Ketone: Trace  / Bili: Negative / Urobili: <2 mg/dL   Blood: x / Protein: 300 mg/dL / Nitrite: Negative   Leuk Esterase: Large / RBC: >720 /HPF /  /HPF   Sq Epi: x / Non Sq Epi: 2 /HPF / Bacteria: Negative      CAPILLARY BLOOD GLUCOSE      POCT Blood Glucose.: 225 mg/dL (2022 06:23)  POCT Blood Glucose.: 208 mg/dL (2022 21:06)  POCT Blood Glucose.: 228 mg/dL (2022 17:40)  POCT Blood Glucose.: 237 mg/dL (2022 12:04)        RADIOLOGY & ADDITIONAL TESTS:    Consultant(s) Notes Reviewed:  [x ] YES  [ ] NO    MEDICATIONS  (STANDING):  amLODIPine   Tablet 5 milliGRAM(s) Oral daily  ammonium lactate 12% Lotion 1 Application(s) Topical every 12 hours  apixaban 5 milliGRAM(s) Oral every 12 hours  atorvastatin 20 milliGRAM(s) Oral at bedtime  buMETAnide 2 milliGRAM(s) Oral daily  gabapentin 300 milliGRAM(s) Oral three times a day  influenza   Vaccine 0.5 milliLiter(s) IntraMuscular once  insulin glargine Injectable (LANTUS) 15 Unit(s) SubCutaneous at bedtime  insulin lispro (ADMELOG) corrective regimen sliding scale   SubCutaneous every 6 hours  ketorolac   Injectable 30 milliGRAM(s) IV Push three times a day  LORazepam     Tablet 0.5 milliGRAM(s) Oral two times a day  losartan 100 milliGRAM(s) Oral daily  pantoprazole  Injectable 40 milliGRAM(s) IV Push daily  polyethylene glycol 3350 17 Gram(s) Oral daily  senna 2 Tablet(s) Oral at bedtime    MEDICATIONS  (PRN):  acetaminophen     Tablet .. 650 milliGRAM(s) Oral every 6 hours PRN Temp greater or equal to 38C (100.4F), Mild Pain (1 - 3)  oxyCODONE    IR 5 milliGRAM(s) Oral every 4 hours PRN Moderate Pain (4 - 6)      PHYSICAL EXAM:  GENERAL: Off vent for 72 hours  HEAD:  Atraumatic, Normocephalic  EYES: EOMI, PERRLA, conjunctiva and sclera clear  NECK: Tracheostomy in place, functioning.   NERVOUS SYSTEM:  Alert & Awake.   CHEST/LUNG: B/L good air entry; No rales, rhonchi, or wheezing  HEART: S1S2 normal, no S3, Regular rate and rhythm; No murmurs  ABDOMEN: Soft, Nontender, Nondistended; Bowel sounds present  EXTREMITIES:  2+ Peripheral Pulses, No clubbing, cyanosis, or edema  LYMPH: No lymphadenopathy noted  SKIN: No rashes or lesions    Care Discussed with Consultants/Other Providers [ x] YES  [ ] NO Patient is a 56y old  Female who presents with a chief complaint of ACUTE RESPIRATORY FAILURE WITH HYPOXIA; PNA (PNEUMONIA)     (2022 00:08)      INTERVAL HPI/OVERNIGHT EVENTS:   No overnight events   Afebrile, hemodynamically stable     **Chest X-Ray from this AM shows possible mucus plug. Plan is to perform suctioning and repeating a chest x-ray. If not improved, will plan for bronchoscopy today or tomorrow.    ****@13:49, Chest X-ray shows resolution of Mucus plug after suctioning and chest PT    ICU Vital Signs Last 24 Hrs  T(C): 37.2 (2022 08:00), Max: 37.3 (2022 00:00)  T(F): 98.9 (2022 08:00), Max: 99.2 (2022 00:00)  HR: 92 (2022 08:00) (71 - 92)  BP: 132/61 (2022 08:00) (121/56 - 154/72)  BP(mean): 88 (2022 08:00) (80 - 104)  ABP: --  ABP(mean): --  RR: 26 (2022 08:00) (9 - 26)  SpO2: 95% (2022 08:00) (95% - 100%)    I&O's Summary    2022 07:01  -  2022 07:00  --------------------------------------------------------  IN: 1680 mL / OUT: 1435 mL / NET: 245 mL          LABS:                        9.5    7.20  )-----------( 182      ( 2022 07:50 )             27.0     06-06    133<L>  |  89<L>  |  14  ----------------------------<  196<H>  4.5   |  34<H>  |  <0.5<L>    Ca    10.0      2022 01:36  Mg     1.7     06-05    TPro  6.2  /  Alb  3.8  /  TBili  0.7  /  DBili  x   /  AST  26  /  ALT  36  /  AlkPhos  97  -      Urinalysis Basic - ( 2022 21:00 )    Color: Dark Brown / Appearance: Turbid / S.019 / pH: x  Gluc: x / Ketone: Trace  / Bili: Negative / Urobili: <2 mg/dL   Blood: x / Protein: 300 mg/dL / Nitrite: Negative   Leuk Esterase: Large / RBC: >720 /HPF /  /HPF   Sq Epi: x / Non Sq Epi: 2 /HPF / Bacteria: Negative      CAPILLARY BLOOD GLUCOSE      POCT Blood Glucose.: 225 mg/dL (2022 06:23)  POCT Blood Glucose.: 208 mg/dL (2022 21:06)  POCT Blood Glucose.: 228 mg/dL (2022 17:40)  POCT Blood Glucose.: 237 mg/dL (2022 12:04)        RADIOLOGY & ADDITIONAL TESTS:    Consultant(s) Notes Reviewed:  [x ] YES  [ ] NO    MEDICATIONS  (STANDING):  amLODIPine   Tablet 5 milliGRAM(s) Oral daily  ammonium lactate 12% Lotion 1 Application(s) Topical every 12 hours  apixaban 5 milliGRAM(s) Oral every 12 hours  atorvastatin 20 milliGRAM(s) Oral at bedtime  buMETAnide 2 milliGRAM(s) Oral daily  gabapentin 300 milliGRAM(s) Oral three times a day  influenza   Vaccine 0.5 milliLiter(s) IntraMuscular once  insulin glargine Injectable (LANTUS) 15 Unit(s) SubCutaneous at bedtime  insulin lispro (ADMELOG) corrective regimen sliding scale   SubCutaneous every 6 hours  ketorolac   Injectable 30 milliGRAM(s) IV Push three times a day  LORazepam     Tablet 0.5 milliGRAM(s) Oral two times a day  losartan 100 milliGRAM(s) Oral daily  pantoprazole  Injectable 40 milliGRAM(s) IV Push daily  polyethylene glycol 3350 17 Gram(s) Oral daily  senna 2 Tablet(s) Oral at bedtime    MEDICATIONS  (PRN):  acetaminophen     Tablet .. 650 milliGRAM(s) Oral every 6 hours PRN Temp greater or equal to 38C (100.4F), Mild Pain (1 - 3)  oxyCODONE    IR 5 milliGRAM(s) Oral every 4 hours PRN Moderate Pain (4 - 6)      PHYSICAL EXAM:  GENERAL: Off vent for 72 hours  HEAD:  Atraumatic, Normocephalic  EYES: EOMI, PERRLA, conjunctiva and sclera clear  NECK: Tracheostomy in place, functioning.   NERVOUS SYSTEM:  Alert & Awake.   CHEST/LUNG: B/L good air entry; No rales, rhonchi, or wheezing  HEART: S1S2 normal, no S3, Regular rate and rhythm; No murmurs  ABDOMEN: Soft, Nontender, Nondistended; Bowel sounds present  EXTREMITIES:  2+ Peripheral Pulses, No clubbing, cyanosis, or edema  LYMPH: No lymphadenopathy noted  SKIN: No rashes or lesions    Care Discussed with Consultants/Other Providers [ x] YES  [ ] NO

## 2022-06-06 NOTE — SWALLOW BEDSIDE ASSESSMENT ADULT - SLP GENERAL OBSERVATIONS
pt received in bed awake alert w/o c/o pain. +humidified O2 via t-piece +NGT in place pt received in bed awake alert w/o c/o pain. +mouthing +humidified O2 via t-piece +NGT in place; pt reporting feeling hungry, MD aware.

## 2022-06-07 LAB
ALBUMIN SERPL ELPH-MCNC: 3.8 G/DL — SIGNIFICANT CHANGE UP (ref 3.5–5.2)
ALP SERPL-CCNC: 100 U/L — SIGNIFICANT CHANGE UP (ref 30–115)
ALT FLD-CCNC: 35 U/L — SIGNIFICANT CHANGE UP (ref 0–41)
ANION GAP SERPL CALC-SCNC: 11 MMOL/L — SIGNIFICANT CHANGE UP (ref 7–14)
AST SERPL-CCNC: 23 U/L — SIGNIFICANT CHANGE UP (ref 0–41)
BASOPHILS # BLD AUTO: 0.03 K/UL — SIGNIFICANT CHANGE UP (ref 0–0.2)
BASOPHILS NFR BLD AUTO: 0.4 % — SIGNIFICANT CHANGE UP (ref 0–1)
BILIRUB SERPL-MCNC: 0.7 MG/DL — SIGNIFICANT CHANGE UP (ref 0.2–1.2)
BUN SERPL-MCNC: 15 MG/DL — SIGNIFICANT CHANGE UP (ref 10–20)
CALCIUM SERPL-MCNC: 10 MG/DL — SIGNIFICANT CHANGE UP (ref 8.5–10.1)
CHLORIDE SERPL-SCNC: 89 MMOL/L — LOW (ref 98–110)
CO2 SERPL-SCNC: 32 MMOL/L — SIGNIFICANT CHANGE UP (ref 17–32)
CREAT SERPL-MCNC: <0.5 MG/DL — LOW (ref 0.7–1.5)
EGFR: 115 ML/MIN/1.73M2 — SIGNIFICANT CHANGE UP
EOSINOPHIL # BLD AUTO: 0.25 K/UL — SIGNIFICANT CHANGE UP (ref 0–0.7)
EOSINOPHIL NFR BLD AUTO: 3 % — SIGNIFICANT CHANGE UP (ref 0–8)
GLUCOSE BLDC GLUCOMTR-MCNC: 151 MG/DL — HIGH (ref 70–99)
GLUCOSE BLDC GLUCOMTR-MCNC: 176 MG/DL — HIGH (ref 70–99)
GLUCOSE BLDC GLUCOMTR-MCNC: 201 MG/DL — HIGH (ref 70–99)
GLUCOSE BLDC GLUCOMTR-MCNC: 204 MG/DL — HIGH (ref 70–99)
GLUCOSE BLDC GLUCOMTR-MCNC: 214 MG/DL — HIGH (ref 70–99)
GLUCOSE SERPL-MCNC: 181 MG/DL — HIGH (ref 70–99)
HCT VFR BLD CALC: 24.5 % — LOW (ref 37–47)
HGB BLD-MCNC: 8.8 G/DL — LOW (ref 12–16)
IMM GRANULOCYTES NFR BLD AUTO: 0.6 % — HIGH (ref 0.1–0.3)
LYMPHOCYTES # BLD AUTO: 1.34 K/UL — SIGNIFICANT CHANGE UP (ref 1.2–3.4)
LYMPHOCYTES # BLD AUTO: 16 % — LOW (ref 20.5–51.1)
MAGNESIUM SERPL-MCNC: 1.7 MG/DL — LOW (ref 1.8–2.4)
MCHC RBC-ENTMCNC: 34 PG — HIGH (ref 27–31)
MCHC RBC-ENTMCNC: 35.9 G/DL — SIGNIFICANT CHANGE UP (ref 32–37)
MCV RBC AUTO: 94.6 FL — SIGNIFICANT CHANGE UP (ref 81–99)
MONOCYTES # BLD AUTO: 0.66 K/UL — HIGH (ref 0.1–0.6)
MONOCYTES NFR BLD AUTO: 7.9 % — SIGNIFICANT CHANGE UP (ref 1.7–9.3)
NEUTROPHILS # BLD AUTO: 6.05 K/UL — SIGNIFICANT CHANGE UP (ref 1.4–6.5)
NEUTROPHILS NFR BLD AUTO: 72.1 % — SIGNIFICANT CHANGE UP (ref 42.2–75.2)
NRBC # BLD: 0 /100 WBCS — SIGNIFICANT CHANGE UP (ref 0–0)
PLATELET # BLD AUTO: 167 K/UL — SIGNIFICANT CHANGE UP (ref 130–400)
POTASSIUM SERPL-MCNC: 4.6 MMOL/L — SIGNIFICANT CHANGE UP (ref 3.5–5)
POTASSIUM SERPL-SCNC: 4.6 MMOL/L — SIGNIFICANT CHANGE UP (ref 3.5–5)
PROT SERPL-MCNC: 6.1 G/DL — SIGNIFICANT CHANGE UP (ref 6–8)
RBC # BLD: 2.59 M/UL — LOW (ref 4.2–5.4)
RBC # FLD: 14.2 % — SIGNIFICANT CHANGE UP (ref 11.5–14.5)
SODIUM SERPL-SCNC: 132 MMOL/L — LOW (ref 135–146)
WBC # BLD: 8.38 K/UL — SIGNIFICANT CHANGE UP (ref 4.8–10.8)
WBC # FLD AUTO: 8.38 K/UL — SIGNIFICANT CHANGE UP (ref 4.8–10.8)

## 2022-06-07 PROCEDURE — 74230 X-RAY XM SWLNG FUNCJ C+: CPT | Mod: 26

## 2022-06-07 PROCEDURE — 99232 SBSQ HOSP IP/OBS MODERATE 35: CPT | Mod: GC

## 2022-06-07 PROCEDURE — 71045 X-RAY EXAM CHEST 1 VIEW: CPT | Mod: 26

## 2022-06-07 RX ORDER — HYDROMORPHONE HYDROCHLORIDE 2 MG/ML
0.5 INJECTION INTRAMUSCULAR; INTRAVENOUS; SUBCUTANEOUS EVERY 4 HOURS
Refills: 0 | Status: DISCONTINUED | OUTPATIENT
Start: 2022-06-07 | End: 2022-06-10

## 2022-06-07 RX ADMIN — OXYCODONE HYDROCHLORIDE 5 MILLIGRAM(S): 5 TABLET ORAL at 01:03

## 2022-06-07 RX ADMIN — BUMETANIDE 2 MILLIGRAM(S): 0.25 INJECTION INTRAMUSCULAR; INTRAVENOUS at 05:35

## 2022-06-07 RX ADMIN — APIXABAN 5 MILLIGRAM(S): 2.5 TABLET, FILM COATED ORAL at 18:24

## 2022-06-07 RX ADMIN — HYDROMORPHONE HYDROCHLORIDE 0.5 MILLIGRAM(S): 2 INJECTION INTRAMUSCULAR; INTRAVENOUS; SUBCUTANEOUS at 23:02

## 2022-06-07 RX ADMIN — OXYCODONE HYDROCHLORIDE 5 MILLIGRAM(S): 5 TABLET ORAL at 13:30

## 2022-06-07 RX ADMIN — LOSARTAN POTASSIUM 100 MILLIGRAM(S): 100 TABLET, FILM COATED ORAL at 05:35

## 2022-06-07 RX ADMIN — Medication 30 MILLIGRAM(S): at 06:14

## 2022-06-07 RX ADMIN — INSULIN GLARGINE 15 UNIT(S): 100 INJECTION, SOLUTION SUBCUTANEOUS at 23:17

## 2022-06-07 RX ADMIN — APIXABAN 5 MILLIGRAM(S): 2.5 TABLET, FILM COATED ORAL at 05:35

## 2022-06-07 RX ADMIN — POLYETHYLENE GLYCOL 3350 17 GRAM(S): 17 POWDER, FOR SOLUTION ORAL at 11:18

## 2022-06-07 RX ADMIN — HYDROMORPHONE HYDROCHLORIDE 0.5 MILLIGRAM(S): 2 INJECTION INTRAMUSCULAR; INTRAVENOUS; SUBCUTANEOUS at 17:06

## 2022-06-07 RX ADMIN — ATORVASTATIN CALCIUM 20 MILLIGRAM(S): 80 TABLET, FILM COATED ORAL at 22:34

## 2022-06-07 RX ADMIN — GABAPENTIN 300 MILLIGRAM(S): 400 CAPSULE ORAL at 05:35

## 2022-06-07 RX ADMIN — OXYCODONE HYDROCHLORIDE 5 MILLIGRAM(S): 5 TABLET ORAL at 08:49

## 2022-06-07 RX ADMIN — Medication 1 APPLICATION(S): at 18:31

## 2022-06-07 RX ADMIN — HYDROMORPHONE HYDROCHLORIDE 0.5 MILLIGRAM(S): 2 INJECTION INTRAMUSCULAR; INTRAVENOUS; SUBCUTANEOUS at 16:51

## 2022-06-07 RX ADMIN — OXYCODONE HYDROCHLORIDE 5 MILLIGRAM(S): 5 TABLET ORAL at 09:21

## 2022-06-07 RX ADMIN — Medication 30 MILLIGRAM(S): at 05:35

## 2022-06-07 RX ADMIN — Medication 0.5 MILLIGRAM(S): at 18:31

## 2022-06-07 RX ADMIN — Medication 0: at 01:00

## 2022-06-07 RX ADMIN — OXYCODONE HYDROCHLORIDE 5 MILLIGRAM(S): 5 TABLET ORAL at 14:00

## 2022-06-07 RX ADMIN — GABAPENTIN 300 MILLIGRAM(S): 400 CAPSULE ORAL at 13:31

## 2022-06-07 RX ADMIN — SENNA PLUS 2 TABLET(S): 8.6 TABLET ORAL at 22:32

## 2022-06-07 RX ADMIN — Medication 1 APPLICATION(S): at 05:53

## 2022-06-07 RX ADMIN — OXYCODONE HYDROCHLORIDE 5 MILLIGRAM(S): 5 TABLET ORAL at 01:58

## 2022-06-07 RX ADMIN — GABAPENTIN 300 MILLIGRAM(S): 400 CAPSULE ORAL at 22:34

## 2022-06-07 RX ADMIN — AMLODIPINE BESYLATE 5 MILLIGRAM(S): 2.5 TABLET ORAL at 05:35

## 2022-06-07 RX ADMIN — PANTOPRAZOLE SODIUM 40 MILLIGRAM(S): 20 TABLET, DELAYED RELEASE ORAL at 11:17

## 2022-06-07 RX ADMIN — Medication 0.5 MILLIGRAM(S): at 05:36

## 2022-06-07 RX ADMIN — HYDROMORPHONE HYDROCHLORIDE 0.5 MILLIGRAM(S): 2 INJECTION INTRAMUSCULAR; INTRAVENOUS; SUBCUTANEOUS at 22:32

## 2022-06-07 NOTE — PROGRESS NOTE ADULT - SUBJECTIVE AND OBJECTIVE BOX
CRUZ DUMONT 56y Female  MRN#: [id number]   CODE STATUS: Full code    Hospital Day: 11d    Pt is currently admitted with the primary diagnosis of Acute Hypoxic Resp failure     SUBJECTIVE  Hospital Course    Overnight events     Subjective complaints     Present Today:   - Samantha:  No [x  ], Yes [   ] : Indication:     - Type of IV Access:       .. CVC/Piccline:  No [x  ], Yes [   ] : Indication:       .. Midline: No [ x ], Yes [   ] : Indication:                                             ----------------------------------------------------------  OBJECTIVE  PAST MEDICAL & SURGICAL HISTORY  COPD (chronic obstructive pulmonary disease)    CHF (congestive heart failure)    DM (diabetes mellitus)    H/O tracheostomy                                              -----------------------------------------------------------  ALLERGIES:  penicillin (Swelling)                                            ------------------------------------------------------------    HOME MEDICATIONS  Home Medications:  albuterol sulfate 2.5 mg/3 mL (0.083 %) solution for nebulization:  (27 May 2022 15:04)  alprazolam 0.25 mg tablet:  (27 May 2022 15:04)  amlodipine 5 mg tablet:  (27 May 2022 15:04)  atorvastatin 20 mg tablet:  (27 May 2022 15:04)  bumetanide 2 mg tablet:  (27 May 2022 15:04)  clopidogrel 75 mg tablet:  (27 May 2022 15:04)  Eliquis 5 mg tablet:  (27 May 2022 15:04)  gabapentin 300 mg capsule:  (27 May 2022 15:04)  glimepiride 2 mg tablet:  (27 May 2022 15:04)  labetalol 100 mg tablet:  (27 May 2022 15:04)  LANTUS SOLOSTAR 100 UNIT/ML: 55 UNITS ONCE A DAY SUBCUTANEOUS 90 DAYS (27 May 2022 15:04)  losartan 100 mg tablet:  (27 May 2022 15:04)  metformin 1,000 mg tablet:  (27 May 2022 15:04)  Novolog Flexpen U-100 Insulin aspart 100 unit/mL (3 mL) subcutaneous:  (27 May 2022 15:04)  Trelegy Ellipta 100 mcg-62.5 mcg-25 mcg powder for inhalation:  (27 May 2022 15:04)                           MEDICATIONS:  STANDING MEDICATIONS  amLODIPine   Tablet 5 milliGRAM(s) Oral daily  ammonium lactate 12% Lotion 1 Application(s) Topical every 12 hours  apixaban 5 milliGRAM(s) Oral every 12 hours  atorvastatin 20 milliGRAM(s) Oral at bedtime  buMETAnide 2 milliGRAM(s) Oral daily  gabapentin 300 milliGRAM(s) Oral three times a day  influenza   Vaccine 0.5 milliLiter(s) IntraMuscular once  insulin glargine Injectable (LANTUS) 15 Unit(s) SubCutaneous at bedtime  insulin lispro (ADMELOG) corrective regimen sliding scale   SubCutaneous every 6 hours  LORazepam     Tablet 0.5 milliGRAM(s) Oral two times a day  losartan 100 milliGRAM(s) Oral daily  pantoprazole  Injectable 40 milliGRAM(s) IV Push daily  polyethylene glycol 3350 17 Gram(s) Oral daily  senna 2 Tablet(s) Oral at bedtime    PRN MEDICATIONS  acetaminophen     Tablet .. 650 milliGRAM(s) Oral every 6 hours PRN  oxyCODONE    IR 5 milliGRAM(s) Oral every 4 hours PRN                                            ------------------------------------------------------------  VITAL SIGNS: Last 24 Hours  T(C): 37.2 (07 Jun 2022 12:07), Max: 37.2 (06 Jun 2022 16:00)  T(F): 99 (07 Jun 2022 12:07), Max: 99 (06 Jun 2022 16:00)  HR: 92 (07 Jun 2022 12:07) (73 - 99)  BP: 158/70 (07 Jun 2022 12:07) (112/55 - 172/73)  BP(mean): 104 (07 Jun 2022 02:00) (94 - 105)  RR: 18 (07 Jun 2022 12:07) (12 - 29)  SpO2: 99% (07 Jun 2022 03:33) (94% - 99%)      06-06-22 @ 07:01  -  06-07-22 @ 07:00  --------------------------------------------------------  IN: 1280 mL / OUT: 1485 mL / NET: -205 mL                                             --------------------------------------------------------------  LABS:                        8.8    8.38  )-----------( 167      ( 07 Jun 2022 04:30 )             24.5     06-07    132<L>  |  89<L>  |  15  ----------------------------<  181<H>  4.6   |  32  |  <0.5<L>    Ca    10.0      07 Jun 2022 04:30  Mg     1.7     06-07    TPro  6.1  /  Alb  3.8  /  TBili  0.7  /  DBili  x   /  AST  23  /  ALT  35  /  AlkPhos  100  06-07                Culture - Blood (collected 05 Jun 2022 04:55)  Source: .Blood None  Preliminary Report (06 Jun 2022 13:01):    No growth to date.    Culture - Blood (collected 05 Jun 2022 02:01)  Source: .Blood Blood  Preliminary Report (06 Jun 2022 08:01):    No growth to date.    Culture - Urine (collected 04 Jun 2022 21:00)  Source: Clean Catch Clean Catch (Midstream)  Final Report (05 Jun 2022 22:53):    <10,000 CFU/mL Normal Urogenital Chante                                                    -------------------------------------------------------------  RADIOLOGY:                                            --------------------------------------------------------------    PHYSICAL EXAM:  General: alert oriented  HEENT: tracheostomy  LUNGS: ronchi  HEART: normal s1 s2  ABDOMEN: soft  EXT: stasis dermatitis, tender to touch                                             --------------------------------------------------------------    ASSESSMENT & PLAN  55 yo f with PMHx of DVT on Eliquis, COPD,  trach collar, obesity, IDDM, UTI, sent by [facility] [place], operated by [facility] for hypoxia. Current illness going back to April 4th when pt was intubated on admission at [facility] ICU for hypoxic respiratory failure diagnosed with copd exacerbation and superimposed pneumonia, and remained intubated for 37 days requiring tracheostomy. Patient stayed at [facility] for 2 days until noted to be hypoxic (saturating low 70's) pt's saturation immediately improving afterwards to 80's after large mucus plug removed and was subsequently sent to [facility]  On arrival to ED pt was saturating 97%, 15L O2 via tracheostomy collar (baseline 8 L at [facility])CXR suspicious for L-sided PNA, inconclusive. CTA neg for PE. Pt received x1 IV Levaquin and Cefepime in ED, admitted to MICU for acute hypoxic hypercapnic respiratory failure secondary to acute mucus plug now resolved vs superimposed pneumonia hospital-acquired.   General Surgery Consulted emergently when patient found to be hypoxic to low 80s after CCU and Pulmonary team found large granuloma in trachea along tracheostomy tube. Patient currently had a size 8 trach. Surgery assisted Pulmonary and CCU with trach exchanged with ET tube guidance to a size 7 XLT. Saturations improved to high 90s. Large granuloma was removed. Patient was no longer in distress after exchange.    ASSESSMENT & PLAN:   #Acute/ chronic COPD with exacerbation (Resolved)  #mucous plug removed  #pneumonia vs atelectasis L base  - On 5/28, Pulmonary team found large granuloma in trachea along tracheostomy tube. Patient had a size 8 trach on admission. Surgery assisted Pulmonary and CCU with trach exchanged with ET tube guidance to a size 7 XLT. Saturations improved to high 90s.  - trach functioning well s/p exchange   - Pt off vent 72 hrs and is saturating well.  - repeat chest x-ray (6/6) shows possible mucus plugging. Will attempt deep suctioning and repeat chest x-ray to look for improvement.  ***Repeat Chest X-ray shows resolution of Mucus plug after suctioning and chest PT  - Sp/Sw eval, if fails, CT Sx FU for PEG tube  - Modified barium swallow test done: patient still unable to eat, will need further training    #Chronic Indwelling Singh  - 5/28 Ucx - Contaminated   - 5/28 Ucx- Normal chante   - 5/27 Ucx- Klebsiella (CRE) and pseudomonas   - s/p cefepime  - Currently afebrile w/ no urinary complaints   - episode of Hematouria 6/4 -> improving -> h/h Stable  - UA     #Nonobstructive Ileus  - abdomen currently soft, nontender, + BS   - off opioids  - C/w bowel regimen     #Hx of DVT  - resumed patient's home eliquis (5mg BID)  - Monitor for signs of bleeding     Activity OOB to chair  GI PPX  PPI  DVT PPX Eliquis   Dispo: Acute.

## 2022-06-07 NOTE — PROGRESS NOTE ADULT - ATTENDING COMMENTS
#s/p Trach:  Pending Modified Barium swallow per ST to decide on PO feeds vs maybe plan for PEG?? Currently has NG feeds.   D/c Toradol. C/w Oxycodone PRN for LE pain. comfortable on my exam.

## 2022-06-07 NOTE — CHART NOTE - NSCHARTNOTEFT_GEN_A_CORE
CCU Transfer Note    Transfer from: CCU/ICU  Transfer to:  ( x ) Medicine    (  ) Telemetry    (  ) RCU    (  ) Palliative    (  ) Stroke Unit    (  ) _______________      CCU COURSE:  55 yo f with PMHx of DVT on Eliquis, COPD,  trach collar, obesity, IDDM, UTI, sent by [facility] for hypoxia. Current illness going back to April 4th when pt was intubated on admission at [facility] ICU for hypoxic respiratory failure diagnosed with copd exacerbation and superimposed pneumonia, and remained intubated for 37 days requiring tracheostomy. Patient stayed at [facility] for 2 days until noted to be hypoxic (saturating low 70's) pt's saturation immediately improving afterwards to 80's after large mucus plug removed and was subsequently sent to [facility]  On arrival to ED pt was saturating 97%, 15L O2 via tracheostomy collar (baseline 8 L at [facility])CXR suspicious for L-sided PNA, inconclusive. CTA neg for PE. Pt received x1 IV Levaquin and Cefepime in ED, admitted to MICU for acute hypoxic hypercapnic respiratory failure secondary to acute mucus plug now resolved vs superimposed pneumonia hospital-acquired.   General Surgery Consulted emergently when patient found to be hypoxic to low 80s after CCU and Pulmonary team found large granuloma in trachea along tracheostomy tube. Patient currently had a size 8 trach. Surgery assisted Pulmonary and CCU with trach exchanged with ET tube guidance to a size 7 XLT. Saturations improved to high 90s. Large granuloma was removed. Patient was no longer in distress after exchange.    ASSESSMENT & PLAN:   #Acute/ chronic COPD with exacerbation (Resolved)  #mucous plug removed  #pneumonia vs atelectasis L base  - On 5/28, Pulmonary team found large granuloma in trachea along tracheostomy tube. Patient had a size 8 trach on admission. Surgery assisted Pulmonary and CCU with trach exchanged with ET tube guidance to a size 7 XLT. Saturations improved to high 90s.  - trach functioning well s/p exchange   - Pt off vent 72 hrs and is saturating well.  - repeat chest x-ray (6/6) shows possible mucus plugging. Will attempt deep suctioning and repeat chest x-ray to look for improvement.  ***Repeat Chest X-ray shows resolution of Mucus plug after suctioning and chest PT  - Sp/Sw eval, if fails, CT Sx FU for PEG tube  ***Pt scheduled for modified barium swallow as requested by Sp/Sw    #Chronic Indwelling Singh  - 5/28 Ucx - Contaminated   - 5/28 Ucx- Normal perri   - 5/27 Ucx- Klebsiella (CRE) and pseudomonas   - s/p cefepime  - Currently afebrile w/ no urinary complaints   - episode of Hematouria 6/4 -> improving -> h/h Stable  - UA     #Nonobstructive Ileus  - abdomen currently soft, nontender, + BS   - off opioids  - C/w bowel regimen     #Hx of DVT  - resumed patient's home eliquis (5mg BID)  - Monitor for signs of bleeding     Activity OOB to chair  GI PPX  PPI  DVT PPX Eliquis   Dispo: Acute

## 2022-06-08 LAB
ALBUMIN SERPL ELPH-MCNC: 4 G/DL — SIGNIFICANT CHANGE UP (ref 3.5–5.2)
ALP SERPL-CCNC: 106 U/L — SIGNIFICANT CHANGE UP (ref 30–115)
ALT FLD-CCNC: 35 U/L — SIGNIFICANT CHANGE UP (ref 0–41)
ANION GAP SERPL CALC-SCNC: 13 MMOL/L — SIGNIFICANT CHANGE UP (ref 7–14)
AST SERPL-CCNC: 24 U/L — SIGNIFICANT CHANGE UP (ref 0–41)
BASOPHILS # BLD AUTO: 0.06 K/UL — SIGNIFICANT CHANGE UP (ref 0–0.2)
BASOPHILS NFR BLD AUTO: 0.7 % — SIGNIFICANT CHANGE UP (ref 0–1)
BILIRUB SERPL-MCNC: 0.7 MG/DL — SIGNIFICANT CHANGE UP (ref 0.2–1.2)
BUN SERPL-MCNC: 13 MG/DL — SIGNIFICANT CHANGE UP (ref 10–20)
CALCIUM SERPL-MCNC: 10.3 MG/DL — HIGH (ref 8.5–10.1)
CHLORIDE SERPL-SCNC: 89 MMOL/L — LOW (ref 98–110)
CO2 SERPL-SCNC: 31 MMOL/L — SIGNIFICANT CHANGE UP (ref 17–32)
CREAT SERPL-MCNC: 0.5 MG/DL — LOW (ref 0.7–1.5)
EGFR: 109 ML/MIN/1.73M2 — SIGNIFICANT CHANGE UP
EOSINOPHIL # BLD AUTO: 0.25 K/UL — SIGNIFICANT CHANGE UP (ref 0–0.7)
EOSINOPHIL NFR BLD AUTO: 2.8 % — SIGNIFICANT CHANGE UP (ref 0–8)
GLUCOSE BLDC GLUCOMTR-MCNC: 202 MG/DL — HIGH (ref 70–99)
GLUCOSE BLDC GLUCOMTR-MCNC: 205 MG/DL — HIGH (ref 70–99)
GLUCOSE BLDC GLUCOMTR-MCNC: 219 MG/DL — HIGH (ref 70–99)
GLUCOSE BLDC GLUCOMTR-MCNC: 232 MG/DL — HIGH (ref 70–99)
GLUCOSE BLDC GLUCOMTR-MCNC: 245 MG/DL — HIGH (ref 70–99)
GLUCOSE SERPL-MCNC: 242 MG/DL — HIGH (ref 70–99)
HCT VFR BLD CALC: 26 % — LOW (ref 37–47)
HGB BLD-MCNC: 9.2 G/DL — LOW (ref 12–16)
IMM GRANULOCYTES NFR BLD AUTO: 0.6 % — HIGH (ref 0.1–0.3)
LYMPHOCYTES # BLD AUTO: 1.3 K/UL — SIGNIFICANT CHANGE UP (ref 1.2–3.4)
LYMPHOCYTES # BLD AUTO: 14.5 % — LOW (ref 20.5–51.1)
MAGNESIUM SERPL-MCNC: 1.7 MG/DL — LOW (ref 1.8–2.4)
MCHC RBC-ENTMCNC: 34.1 PG — HIGH (ref 27–31)
MCHC RBC-ENTMCNC: 35.4 G/DL — SIGNIFICANT CHANGE UP (ref 32–37)
MCV RBC AUTO: 96.3 FL — SIGNIFICANT CHANGE UP (ref 81–99)
MONOCYTES # BLD AUTO: 0.87 K/UL — HIGH (ref 0.1–0.6)
MONOCYTES NFR BLD AUTO: 9.7 % — HIGH (ref 1.7–9.3)
NEUTROPHILS # BLD AUTO: 6.43 K/UL — SIGNIFICANT CHANGE UP (ref 1.4–6.5)
NEUTROPHILS NFR BLD AUTO: 71.7 % — SIGNIFICANT CHANGE UP (ref 42.2–75.2)
NRBC # BLD: 0 /100 WBCS — SIGNIFICANT CHANGE UP (ref 0–0)
PLATELET # BLD AUTO: 186 K/UL — SIGNIFICANT CHANGE UP (ref 130–400)
POTASSIUM SERPL-MCNC: 4.6 MMOL/L — SIGNIFICANT CHANGE UP (ref 3.5–5)
POTASSIUM SERPL-SCNC: 4.6 MMOL/L — SIGNIFICANT CHANGE UP (ref 3.5–5)
PROT SERPL-MCNC: 6.4 G/DL — SIGNIFICANT CHANGE UP (ref 6–8)
RBC # BLD: 2.7 M/UL — LOW (ref 4.2–5.4)
RBC # FLD: 14.6 % — HIGH (ref 11.5–14.5)
SODIUM SERPL-SCNC: 133 MMOL/L — LOW (ref 135–146)
WBC # BLD: 8.96 K/UL — SIGNIFICANT CHANGE UP (ref 4.8–10.8)
WBC # FLD AUTO: 8.96 K/UL — SIGNIFICANT CHANGE UP (ref 4.8–10.8)

## 2022-06-08 PROCEDURE — 71045 X-RAY EXAM CHEST 1 VIEW: CPT | Mod: 26

## 2022-06-08 PROCEDURE — 99233 SBSQ HOSP IP/OBS HIGH 50: CPT

## 2022-06-08 RX ORDER — PANTOPRAZOLE SODIUM 20 MG/1
40 TABLET, DELAYED RELEASE ORAL
Refills: 0 | Status: DISCONTINUED | OUTPATIENT
Start: 2022-06-08 | End: 2022-07-10

## 2022-06-08 RX ADMIN — GABAPENTIN 300 MILLIGRAM(S): 400 CAPSULE ORAL at 21:56

## 2022-06-08 RX ADMIN — HYDROMORPHONE HYDROCHLORIDE 0.5 MILLIGRAM(S): 2 INJECTION INTRAMUSCULAR; INTRAVENOUS; SUBCUTANEOUS at 12:24

## 2022-06-08 RX ADMIN — HYDROMORPHONE HYDROCHLORIDE 0.5 MILLIGRAM(S): 2 INJECTION INTRAMUSCULAR; INTRAVENOUS; SUBCUTANEOUS at 23:14

## 2022-06-08 RX ADMIN — GABAPENTIN 300 MILLIGRAM(S): 400 CAPSULE ORAL at 13:14

## 2022-06-08 RX ADMIN — Medication 1 APPLICATION(S): at 05:48

## 2022-06-08 RX ADMIN — HYDROMORPHONE HYDROCHLORIDE 0.5 MILLIGRAM(S): 2 INJECTION INTRAMUSCULAR; INTRAVENOUS; SUBCUTANEOUS at 23:44

## 2022-06-08 RX ADMIN — HYDROMORPHONE HYDROCHLORIDE 0.5 MILLIGRAM(S): 2 INJECTION INTRAMUSCULAR; INTRAVENOUS; SUBCUTANEOUS at 18:19

## 2022-06-08 RX ADMIN — APIXABAN 5 MILLIGRAM(S): 2.5 TABLET, FILM COATED ORAL at 17:54

## 2022-06-08 RX ADMIN — AMLODIPINE BESYLATE 5 MILLIGRAM(S): 2.5 TABLET ORAL at 05:46

## 2022-06-08 RX ADMIN — APIXABAN 5 MILLIGRAM(S): 2.5 TABLET, FILM COATED ORAL at 05:47

## 2022-06-08 RX ADMIN — ATORVASTATIN CALCIUM 20 MILLIGRAM(S): 80 TABLET, FILM COATED ORAL at 21:56

## 2022-06-08 RX ADMIN — HYDROMORPHONE HYDROCHLORIDE 0.5 MILLIGRAM(S): 2 INJECTION INTRAMUSCULAR; INTRAVENOUS; SUBCUTANEOUS at 19:13

## 2022-06-08 RX ADMIN — HYDROMORPHONE HYDROCHLORIDE 0.5 MILLIGRAM(S): 2 INJECTION INTRAMUSCULAR; INTRAVENOUS; SUBCUTANEOUS at 15:08

## 2022-06-08 RX ADMIN — BUMETANIDE 2 MILLIGRAM(S): 0.25 INJECTION INTRAMUSCULAR; INTRAVENOUS at 05:47

## 2022-06-08 RX ADMIN — Medication 650 MILLIGRAM(S): at 09:54

## 2022-06-08 RX ADMIN — Medication 650 MILLIGRAM(S): at 11:00

## 2022-06-08 RX ADMIN — INSULIN GLARGINE 15 UNIT(S): 100 INJECTION, SOLUTION SUBCUTANEOUS at 22:31

## 2022-06-08 RX ADMIN — PANTOPRAZOLE SODIUM 40 MILLIGRAM(S): 20 TABLET, DELAYED RELEASE ORAL at 21:56

## 2022-06-08 RX ADMIN — POLYETHYLENE GLYCOL 3350 17 GRAM(S): 17 POWDER, FOR SOLUTION ORAL at 11:36

## 2022-06-08 RX ADMIN — GABAPENTIN 300 MILLIGRAM(S): 400 CAPSULE ORAL at 05:47

## 2022-06-08 RX ADMIN — Medication 0.5 MILLIGRAM(S): at 05:46

## 2022-06-08 RX ADMIN — Medication 0.5 MILLIGRAM(S): at 17:55

## 2022-06-08 RX ADMIN — HYDROMORPHONE HYDROCHLORIDE 0.5 MILLIGRAM(S): 2 INJECTION INTRAMUSCULAR; INTRAVENOUS; SUBCUTANEOUS at 06:52

## 2022-06-08 RX ADMIN — LOSARTAN POTASSIUM 100 MILLIGRAM(S): 100 TABLET, FILM COATED ORAL at 05:47

## 2022-06-08 RX ADMIN — HYDROMORPHONE HYDROCHLORIDE 0.5 MILLIGRAM(S): 2 INJECTION INTRAMUSCULAR; INTRAVENOUS; SUBCUTANEOUS at 11:07

## 2022-06-08 RX ADMIN — SENNA PLUS 2 TABLET(S): 8.6 TABLET ORAL at 21:56

## 2022-06-08 RX ADMIN — HYDROMORPHONE HYDROCHLORIDE 0.5 MILLIGRAM(S): 2 INJECTION INTRAMUSCULAR; INTRAVENOUS; SUBCUTANEOUS at 19:43

## 2022-06-08 RX ADMIN — Medication 1 APPLICATION(S): at 17:55

## 2022-06-08 NOTE — SWALLOW BEDSIDE ASSESSMENT ADULT - SWALLOW EVAL: DIAGNOSIS
pt tolerated ~8 oz of puree using modified MDTP ([facility]ysphag[facility]) w/o overt s/s aspiration/penetration; Passy Charo Speaking Valve placed in line w/ t-piece, pt w/ c/o SOB and discomfort, O2 desaturation to 93%, Speaking valve removed. RT present

## 2022-06-08 NOTE — SWALLOW BEDSIDE ASSESSMENT ADULT - SLP PERTINENT HISTORY OF CURRENT PROBLEM
pt is a 55 y/o F w/ PMHx: DVT, COPD, resp failure s/p recent trach at [facility] (at baseline on 8L via trach collar), obesity, IDDM, UTI, sent by [facility] for hypoxia. History provided by daughter, reports the current illness goes back to April 4th when pt was intubated on admission at [facility] for hypoxic respiratory failure diagnosed w/ COPD exacerbation and superimposed PNA. Pt w/ prolonged intubation, s/p trach ~2 weeks ago and then d/c'ed to NH. Pt admitted for AHRF 2' mucus plug (now resolved s/p suctioning) vs. possible aspiration PNA. Pt w/ suspected chronic HF, course c/b complete whiteout of the left lung with complete atelectasis, pt upgraded to CCU. General Sx c/s emergently when pt found to be hypoxic d/t large granuloma in trachea along tracheostomy tube. Pt previously had a size 8 trach, exchanged w/ size 7 XLT. Large granuloma removed and pt placed on vent. Pt underwent bronchoscopy on 6/1, good visualization and no tracheal debridement required.

## 2022-06-08 NOTE — SWALLOW BEDSIDE ASSESSMENT ADULT - SLP GENERAL OBSERVATIONS
pt received in bed awake alert w/o c/o pain. +humidified O2 via t-piece, +NGT in place +spouse at bedside

## 2022-06-08 NOTE — PROGRESS NOTE ADULT - ATTENDING COMMENTS
Patient is a 57 y/o female  with PMHx of DVT on Eliquis, COPD,  trach collar, obesity, IDDM, UTI, [facility] due to mucous plug.     #mucous plug removed  # chronic COPD  #pneumonia vs atelectasis L base  - On 5/28, Pulmonary team found large granuloma in trachea along tracheostomy tube. Patient had a size 8 trach on admission. Surgery assisted Pulmonary and CCU with trach exchanged with ET tube guidance to a size 7 XLT. Saturations improved to high 90s.  - trach functioning well s/p exchange   - Pt off vent 72 hrs and is saturating well.  -repeated CXR 6/8- partially reopened lung with lower lobe atelectasis     Dysphagia   - Modified barium swallow test done: patient still unable to eat, will need further training  - for now on NGT feedings    #Chronic Indwelling Singh  - 5/28 Ucx - Contaminated   - 5/28 Ucx- Normal perri   - 5/27 Ucx- Klebsiella (CRE) and pseudomonas   - s/p cefepime  - Currently afebrile w/ no urinary complaints   - episode of Hematouria 6/4 -> improving -> h/h Stable       #Nonobstructive Ileus  - abdomen currently soft, nontender, + BS   - off opioids  - C/w bowel regimen     #Hx of DVT  - resumed patient's home eliquis (5mg BID)      Activity OOB to chair  GI PPX  PPI  DVT PPX Eliquis   Dispo: Acute, f/up Speech tx. , continue NGT feedings, repeat CXR in am, TOV today     Total time spent to complete patient's bedside assessment, review medical chart, discuss medical plan of care with covering medical team was more than 35 minutes

## 2022-06-08 NOTE — PROGRESS NOTE ADULT - SUBJECTIVE AND OBJECTIVE BOX
CRUZ DUMONT 56y Female  MRN#: [id number]   CODE STATUS: Full code    Hospital Day: 12d    Pt is currently admitted with the primary diagnosis of AHRF due to a mucus plug    SUBJECTIVE    Subjective complaints   Patient is doing well still has excruciating pain in her LE  Present Today:   - Singh:  No [ X ], Yes [   ] : Indication:     - Type of IV Access:       .. CVC/Piccline:  No [X  ], Yes [   ] : Indication:       .. Midline: No [X  ], Yes [   ] : Indication:                                             ----------------------------------------------------------  OBJECTIVE  PAST MEDICAL & SURGICAL HISTORY  COPD (chronic obstructive pulmonary disease)    CHF (congestive heart failure)    DM (diabetes mellitus)    H/O tracheostomy                                              -----------------------------------------------------------  ALLERGIES:  penicillin (Swelling)                                            ------------------------------------------------------------    HOME MEDICATIONS  Home Medications:  albuterol sulfate 2.5 mg/3 mL (0.083 %) solution for nebulization:  (27 May 2022 15:04)  alprazolam 0.25 mg tablet:  (27 May 2022 15:04)  amlodipine 5 mg tablet:  (27 May 2022 15:04)  atorvastatin 20 mg tablet:  (27 May 2022 15:04)  bumetanide 2 mg tablet:  (27 May 2022 15:04)  clopidogrel 75 mg tablet:  (27 May 2022 15:04)  Eliquis 5 mg tablet:  (27 May 2022 15:04)  gabapentin 300 mg capsule:  (27 May 2022 15:04)  glimepiride 2 mg tablet:  (27 May 2022 15:04)  labetalol 100 mg tablet:  (27 May 2022 15:04)  LANTUS SOLOSTAR 100 UNIT/ML: 55 UNITS ONCE A DAY SUBCUTANEOUS 90 DAYS (27 May 2022 15:04)  losartan 100 mg tablet:  (27 May 2022 15:04)  metformin 1,000 mg tablet:  (27 May 2022 15:04)  Novolog Flexpen U-100 Insulin aspart 100 unit/mL (3 mL) subcutaneous:  (27 May 2022 15:04)  Trelegy Ellipta 100 mcg-62.5 mcg-25 mcg powder for inhalation:  (27 May 2022 15:04)                           MEDICATIONS:  STANDING MEDICATIONS  amLODIPine   Tablet 5 milliGRAM(s) Oral daily  ammonium lactate 12% Lotion 1 Application(s) Topical every 12 hours  apixaban 5 milliGRAM(s) Oral every 12 hours  atorvastatin 20 milliGRAM(s) Oral at bedtime  buMETAnide 2 milliGRAM(s) Oral daily  gabapentin 300 milliGRAM(s) Oral three times a day  influenza   Vaccine 0.5 milliLiter(s) IntraMuscular once  insulin glargine Injectable (LANTUS) 15 Unit(s) SubCutaneous at bedtime  insulin lispro (ADMELOG) corrective regimen sliding scale   SubCutaneous every 6 hours  LORazepam     Tablet 0.5 milliGRAM(s) Oral two times a day  losartan 100 milliGRAM(s) Oral daily  pantoprazole    Tablet 40 milliGRAM(s) Oral before breakfast  polyethylene glycol 3350 17 Gram(s) Oral daily  senna 2 Tablet(s) Oral at bedtime    PRN MEDICATIONS  acetaminophen     Tablet .. 650 milliGRAM(s) Oral every 6 hours PRN  HYDROmorphone  Injectable 0.5 milliGRAM(s) IV Push every 4 hours PRN  oxyCODONE    IR 5 milliGRAM(s) Oral every 4 hours PRN                                            ------------------------------------------------------------  VITAL SIGNS: Last 24 Hours  T(C): 37.3 (08 Jun 2022 12:48), Max: 37.3 (08 Jun 2022 12:48)  T(F): 99.2 (08 Jun 2022 12:48), Max: 99.2 (08 Jun 2022 12:48)  HR: 89 (08 Jun 2022 12:48) (89 - 91)  BP: 137/63 (08 Jun 2022 12:48) (135/66 - 150/65)  BP(mean): --  RR: 18 (08 Jun 2022 12:48) (16 - 18)  SpO2: 92% (08 Jun 2022 04:30) (92% - 93%)      06-07-22 @ 07:01  -  06-08-22 @ 07:00  --------------------------------------------------------  IN: 720 mL / OUT: 1250 mL / NET: -530 mL    06-08-22 @ 07:01  -  06-08-22 @ 14:02  --------------------------------------------------------  IN: 360 mL / OUT: 1100 mL / NET: -740 mL                                             --------------------------------------------------------------  LABS:                        9.2    8.96  )-----------( 186      ( 08 Jun 2022 08:26 )             26.0     06-08    133<L>  |  89<L>  |  13  ----------------------------<  242<H>  4.6   |  31  |  0.5<L>    Ca    10.3<H>      08 Jun 2022 08:26  Mg     1.7     06-08    TPro  6.4  /  Alb  4.0  /  TBili  0.7  /  DBili  x   /  AST  24  /  ALT  35  /  AlkPhos  106  06-08                                                              -------------------------------------------------------------  RADIOLOGY:                                            --------------------------------------------------------------    PHYSICAL EXAM:  General: alert  HEENT: tracheostomy  LUNGS: minimal ronchi  HEART: normal s1 s2  ABDOMEN: soft non tender  EXT: stasis dermatitis                                             --------------------------------------------------------------    ASSESSMENT & PLAN  57 yo f with PMHx of DVT on Eliquis, COPD,  trach collar, obesity, IDDM, UTI, sent by [facility] for hypoxia. Current illness going back to April 4th when pt was intubated on admission at [facility] ICU for hypoxic respiratory failure diagnosed with copd exacerbation and superimposed pneumonia, and remained intubated for 37 days requiring tracheostomy. Patient stayed at [facility] for 2 days until noted to be hypoxic (saturating low 70's) pt's saturation immediately improving afterwards to 80's after large mucus plug removed and was subsequently sent to [facility]  On arrival to ED pt was saturating 97%, 15L O2 via tracheostomy collar (baseline 8 L at [facility])CXR suspicious for L-sided PNA, inconclusive. CTA neg for PE. Pt received x1 IV Levaquin and Cefepime in ED, admitted to MICU for acute hypoxic hypercapnic respiratory failure secondary to acute mucus plug now resolved vs superimposed pneumonia hospital-acquired.   General Surgery Consulted emergently when patient found to be hypoxic to low 80s after CCU and Pulmonary team found large granuloma in trachea along tracheostomy tube. Patient currently had a size 8 trach. Surgery assisted Pulmonary and CCU with trach exchanged with ET tube guidance to a size 7 XLT. Saturations improved to high 90s. Large granuloma was removed. Patient was no longer in distress after exchange.    ASSESSMENT & PLAN:   #Acute/ chronic COPD with exacerbation (Resolved)  #mucous plug removed  #pneumonia vs atelectasis L base  - On 5/28, Pulmonary team found large granuloma in trachea along tracheostomy tube. Patient had a size 8 trach on admission. Surgery assisted Pulmonary and CCU with trach exchanged with ET tube guidance to a size 7 XLT. Saturations improved to high 90s.  - trach functioning well s/p exchange   - Pt off vent 72 hrs and is saturating well.  - repeat chest x-ray (6/6) shows possible mucus plugging. Will attempt deep suctioning and repeat chest x-ray to look for improvement.  ***Repeat Chest X-ray shows resolution of Mucus plug after suctioning and chest PT  - Sp/Sw eval, if fails, CT Sx FU for PEG tube  - Modified barium swallow test done: patient still unable to eat, will need further training  - Repeat CXR 6/8: left lung opacity/effusion    #Chronic Indwelling Singh  - 5/28 Ucx - Contaminated   - 5/28 Ucx- Normal perri   - 5/27 Ucx- Klebsiella (CRE) and pseudomonas   - s/p cefepime  - Currently afebrile w/ no urinary complaints   - episode of Hematouria 6/4 -> improving -> h/h Stable  - UA     #Nonobstructive Ileus  - abdomen currently soft, nontender, + BS   - off opioids  - C/w bowel regimen     #Hx of DVT  - resumed patient's home eliquis (5mg BID)  - Monitor for signs of bleeding     Activity OOB to chair  GI PPX  PPI  DVT PPX Eliquis   Dispo: Acute.

## 2022-06-09 LAB
ALBUMIN SERPL ELPH-MCNC: 4 G/DL — SIGNIFICANT CHANGE UP (ref 3.5–5.2)
ALP SERPL-CCNC: 103 U/L — SIGNIFICANT CHANGE UP (ref 30–115)
ALT FLD-CCNC: 30 U/L — SIGNIFICANT CHANGE UP (ref 0–41)
ANION GAP SERPL CALC-SCNC: 11 MMOL/L — SIGNIFICANT CHANGE UP (ref 7–14)
AST SERPL-CCNC: 21 U/L — SIGNIFICANT CHANGE UP (ref 0–41)
BASOPHILS # BLD AUTO: 0.05 K/UL — SIGNIFICANT CHANGE UP (ref 0–0.2)
BASOPHILS NFR BLD AUTO: 0.5 % — SIGNIFICANT CHANGE UP (ref 0–1)
BILIRUB SERPL-MCNC: 0.7 MG/DL — SIGNIFICANT CHANGE UP (ref 0.2–1.2)
BUN SERPL-MCNC: 13 MG/DL — SIGNIFICANT CHANGE UP (ref 10–20)
CALCIUM SERPL-MCNC: 10.5 MG/DL — HIGH (ref 8.5–10.1)
CHLORIDE SERPL-SCNC: 91 MMOL/L — LOW (ref 98–110)
CO2 SERPL-SCNC: 31 MMOL/L — SIGNIFICANT CHANGE UP (ref 17–32)
CREAT SERPL-MCNC: <0.5 MG/DL — LOW (ref 0.7–1.5)
EGFR: 115 ML/MIN/1.73M2 — SIGNIFICANT CHANGE UP
EOSINOPHIL # BLD AUTO: 0.38 K/UL — SIGNIFICANT CHANGE UP (ref 0–0.7)
EOSINOPHIL NFR BLD AUTO: 4.1 % — SIGNIFICANT CHANGE UP (ref 0–8)
GLUCOSE BLDC GLUCOMTR-MCNC: 197 MG/DL — HIGH (ref 70–99)
GLUCOSE BLDC GLUCOMTR-MCNC: 209 MG/DL — HIGH (ref 70–99)
GLUCOSE BLDC GLUCOMTR-MCNC: 224 MG/DL — HIGH (ref 70–99)
GLUCOSE BLDC GLUCOMTR-MCNC: 248 MG/DL — HIGH (ref 70–99)
GLUCOSE SERPL-MCNC: 194 MG/DL — HIGH (ref 70–99)
HCT VFR BLD CALC: 26.6 % — LOW (ref 37–47)
HGB BLD-MCNC: 9.1 G/DL — LOW (ref 12–16)
IMM GRANULOCYTES NFR BLD AUTO: 0.6 % — HIGH (ref 0.1–0.3)
LYMPHOCYTES # BLD AUTO: 1.68 K/UL — SIGNIFICANT CHANGE UP (ref 1.2–3.4)
LYMPHOCYTES # BLD AUTO: 17.9 % — LOW (ref 20.5–51.1)
MAGNESIUM SERPL-MCNC: 1.7 MG/DL — LOW (ref 1.8–2.4)
MCHC RBC-ENTMCNC: 33.5 PG — HIGH (ref 27–31)
MCHC RBC-ENTMCNC: 34.2 G/DL — SIGNIFICANT CHANGE UP (ref 32–37)
MCV RBC AUTO: 97.8 FL — SIGNIFICANT CHANGE UP (ref 81–99)
MONOCYTES # BLD AUTO: 0.91 K/UL — HIGH (ref 0.1–0.6)
MONOCYTES NFR BLD AUTO: 9.7 % — HIGH (ref 1.7–9.3)
NEUTROPHILS # BLD AUTO: 6.29 K/UL — SIGNIFICANT CHANGE UP (ref 1.4–6.5)
NEUTROPHILS NFR BLD AUTO: 67.2 % — SIGNIFICANT CHANGE UP (ref 42.2–75.2)
NRBC # BLD: 0 /100 WBCS — SIGNIFICANT CHANGE UP (ref 0–0)
PLATELET # BLD AUTO: 202 K/UL — SIGNIFICANT CHANGE UP (ref 130–400)
POTASSIUM SERPL-MCNC: 4.4 MMOL/L — SIGNIFICANT CHANGE UP (ref 3.5–5)
POTASSIUM SERPL-SCNC: 4.4 MMOL/L — SIGNIFICANT CHANGE UP (ref 3.5–5)
PROT SERPL-MCNC: 6.5 G/DL — SIGNIFICANT CHANGE UP (ref 6–8)
RBC # BLD: 2.72 M/UL — LOW (ref 4.2–5.4)
RBC # FLD: 14.6 % — HIGH (ref 11.5–14.5)
SODIUM SERPL-SCNC: 133 MMOL/L — LOW (ref 135–146)
WBC # BLD: 9.37 K/UL — SIGNIFICANT CHANGE UP (ref 4.8–10.8)
WBC # FLD AUTO: 9.37 K/UL — SIGNIFICANT CHANGE UP (ref 4.8–10.8)

## 2022-06-09 PROCEDURE — 99233 SBSQ HOSP IP/OBS HIGH 50: CPT

## 2022-06-09 RX ORDER — HYDROMORPHONE HYDROCHLORIDE 2 MG/ML
1 INJECTION INTRAMUSCULAR; INTRAVENOUS; SUBCUTANEOUS ONCE
Refills: 0 | Status: DISCONTINUED | OUTPATIENT
Start: 2022-06-09 | End: 2022-06-09

## 2022-06-09 RX ORDER — MAGNESIUM SULFATE 500 MG/ML
2 VIAL (ML) INJECTION ONCE
Refills: 0 | Status: COMPLETED | OUTPATIENT
Start: 2022-06-09 | End: 2022-06-09

## 2022-06-09 RX ADMIN — HYDROMORPHONE HYDROCHLORIDE 0.5 MILLIGRAM(S): 2 INJECTION INTRAMUSCULAR; INTRAVENOUS; SUBCUTANEOUS at 21:05

## 2022-06-09 RX ADMIN — APIXABAN 5 MILLIGRAM(S): 2.5 TABLET, FILM COATED ORAL at 05:41

## 2022-06-09 RX ADMIN — AMLODIPINE BESYLATE 5 MILLIGRAM(S): 2.5 TABLET ORAL at 05:41

## 2022-06-09 RX ADMIN — Medication 25 GRAM(S): at 11:02

## 2022-06-09 RX ADMIN — HYDROMORPHONE HYDROCHLORIDE 0.5 MILLIGRAM(S): 2 INJECTION INTRAMUSCULAR; INTRAVENOUS; SUBCUTANEOUS at 16:29

## 2022-06-09 RX ADMIN — Medication 0: at 11:02

## 2022-06-09 RX ADMIN — INSULIN GLARGINE 15 UNIT(S): 100 INJECTION, SOLUTION SUBCUTANEOUS at 22:58

## 2022-06-09 RX ADMIN — HYDROMORPHONE HYDROCHLORIDE 0.5 MILLIGRAM(S): 2 INJECTION INTRAMUSCULAR; INTRAVENOUS; SUBCUTANEOUS at 20:35

## 2022-06-09 RX ADMIN — Medication 1 APPLICATION(S): at 05:54

## 2022-06-09 RX ADMIN — APIXABAN 5 MILLIGRAM(S): 2.5 TABLET, FILM COATED ORAL at 17:10

## 2022-06-09 RX ADMIN — Medication 0.5 MILLIGRAM(S): at 05:40

## 2022-06-09 RX ADMIN — SENNA PLUS 2 TABLET(S): 8.6 TABLET ORAL at 21:43

## 2022-06-09 RX ADMIN — BUMETANIDE 2 MILLIGRAM(S): 0.25 INJECTION INTRAMUSCULAR; INTRAVENOUS at 05:41

## 2022-06-09 RX ADMIN — HYDROMORPHONE HYDROCHLORIDE 0.5 MILLIGRAM(S): 2 INJECTION INTRAMUSCULAR; INTRAVENOUS; SUBCUTANEOUS at 12:19

## 2022-06-09 RX ADMIN — LOSARTAN POTASSIUM 100 MILLIGRAM(S): 100 TABLET, FILM COATED ORAL at 05:41

## 2022-06-09 RX ADMIN — HYDROMORPHONE HYDROCHLORIDE 1 MILLIGRAM(S): 2 INJECTION INTRAMUSCULAR; INTRAVENOUS; SUBCUTANEOUS at 09:31

## 2022-06-09 RX ADMIN — HYDROMORPHONE HYDROCHLORIDE 0.5 MILLIGRAM(S): 2 INJECTION INTRAMUSCULAR; INTRAVENOUS; SUBCUTANEOUS at 06:40

## 2022-06-09 RX ADMIN — Medication 1 APPLICATION(S): at 17:09

## 2022-06-09 RX ADMIN — PANTOPRAZOLE SODIUM 40 MILLIGRAM(S): 20 TABLET, DELAYED RELEASE ORAL at 05:41

## 2022-06-09 RX ADMIN — GABAPENTIN 300 MILLIGRAM(S): 400 CAPSULE ORAL at 05:41

## 2022-06-09 RX ADMIN — ATORVASTATIN CALCIUM 20 MILLIGRAM(S): 80 TABLET, FILM COATED ORAL at 21:43

## 2022-06-09 RX ADMIN — GABAPENTIN 300 MILLIGRAM(S): 400 CAPSULE ORAL at 21:43

## 2022-06-09 RX ADMIN — HYDROMORPHONE HYDROCHLORIDE 0.5 MILLIGRAM(S): 2 INJECTION INTRAMUSCULAR; INTRAVENOUS; SUBCUTANEOUS at 03:24

## 2022-06-09 RX ADMIN — POLYETHYLENE GLYCOL 3350 17 GRAM(S): 17 POWDER, FOR SOLUTION ORAL at 11:03

## 2022-06-09 RX ADMIN — GABAPENTIN 300 MILLIGRAM(S): 400 CAPSULE ORAL at 14:05

## 2022-06-09 RX ADMIN — HYDROMORPHONE HYDROCHLORIDE 0.5 MILLIGRAM(S): 2 INJECTION INTRAMUSCULAR; INTRAVENOUS; SUBCUTANEOUS at 03:54

## 2022-06-09 RX ADMIN — Medication 325 MILLIGRAM(S): at 09:02

## 2022-06-09 RX ADMIN — Medication 0.5 MILLIGRAM(S): at 17:10

## 2022-06-09 NOTE — CHART NOTE - NSCHARTNOTEFT_GEN_A_CORE
Registered Dietitian Follow-Up     Patient Profile Reviewed                           Yes [x]   No []  Nutrition History Previously Obtained        Yes [x]  No []      Pertinent Medical Interventions:  55 yo f with PMHx of DVT on Eliquis, COPD,  trach collar, obesity, IDDM, UTI, sent by [email or web address] for hypoxia. C    Nutrition Interval History:   SLP faisal recommending patient to continue NPO with alternate means of nutrition/hydration   Receiving Glucerna 1.2 at 300mL Q6hrs, provides (1440kcal, 72gm protein, 972mL free H2O)    Pertinent Subjective Information:  **Patient meeting *** % of estimated energy needs in-house     Diet order:   Diet, NPO with Tube Feed:   Tube Feeding Modality: Nasogastric  Glucerna 1.2 Martin  Total Volume for 24 Hours (mL): 1200  Bolus  Total Volume of Bolus (mL):  300  Tube Feed Frequency: Every 6 hours   Tube Feed Start Time: 06:00  Bolus Feed Rate (mL per Hour): 50   Bolus Feed Duration (in Hours): 24  Bolus   Total Volume per Flush (mL): 30   Frequency: Every 6 Hours  Free Water Flush Instructions:  30 ml water flush pre and post bolus feeds (22 @ 23:38) [Active]    Anthropometrics:  Height (cm): 154.9 (22 @ 00:08)  Weight (kg): 97.4 (22 @ 18:03)  BMI (kg/m2): 40.6 (22 @ 00:08)    OTHER WEIGHTS:    Daily Weight in k.3 (), Weight in k.4 (), Weight in k.3 (), Weight in k ()  % Weight Change --> patient with gradual weight loss from 97.4kg --> 94.3kg    MEDICATIONS  (STANDING):  amLODIPine   Tablet 5 milliGRAM(s) Oral daily  ammonium lactate 12% Lotion 1 Application(s) Topical every 12 hours  apixaban 5 milliGRAM(s) Oral every 12 hours  atorvastatin 20 milliGRAM(s) Oral at bedtime  buMETAnide 2 milliGRAM(s) Oral daily  gabapentin 300 milliGRAM(s) Oral three times a day  influenza   Vaccine 0.5 milliLiter(s) IntraMuscular once  insulin glargine Injectable (LANTUS) 15 Unit(s) SubCutaneous at bedtime  insulin lispro (ADMELOG) corrective regimen sliding scale   SubCutaneous every 6 hours  LORazepam     Tablet 0.5 milliGRAM(s) Oral two times a day  losartan 100 milliGRAM(s) Oral daily  pantoprazole    Tablet 40 milliGRAM(s) Oral before breakfast  polyethylene glycol 3350 17 Gram(s) Oral daily  senna 2 Tablet(s) Oral at bedtime    MEDICATIONS  (PRN):  acetaminophen     Tablet .. 650 milliGRAM(s) Oral every 6 hours PRN Temp greater or equal to 38C (100.4F), Mild Pain (1 - 3)  HYDROmorphone  Injectable 0.5 milliGRAM(s) IV Push every 4 hours PRN Severe Pain (7 - 10)    Pertinent Labs:  @ 06:39: Na 133<L>, BUN 13, Cr <0.5<L>, <H>, K+ 4.4, Phos --, Mg 1.7<L>, Alk Phos 103, ALT/SGPT 30, AST/SGOT 21, HbA1c --    Finger Sticks:  POCT Blood Glucose.: 248 mg/dL ( @ 11:00)  POCT Blood Glucose.: 209 mg/dL ( @ 06:30)  POCT Blood Glucose.: 202 mg/dL ( @ 22:30)  POCT Blood Glucose.: 232 mg/dL ( @ 16:38)    Physical Findings:  - Appearance: A&Ox***  - GI function: No Signs/Symptoms of GI distress  - Tubes:  - Oral/Mouth cavity:  - Skin: Intact  - Edema:     Nutrition Requirements:   Weight Used:     Estimated Energy Needs    Continue [x]  Adjust []  Estimated Protein Needs    Continue [x]  Adjust []  Estimated Fluid Needs        Continue [x]  Adjust []    Nutrient Intake:      [x] Previous Nutrition Diagnosis:            [x] Ongoing          [] Resolved  #1 Altered nutrition related lab values  Goal/Expected Outcome:               [x] Ongoing          [] Resolved  #2  Goal/Expected Outcome:     [] No active nutrition diagnosis identified at this time    Nutrition Intervention:   Meals and Snacks, Medical Food Supplement, Vitamin Supplement, Nutrition Related Medication, Coordination of Care      Indicator/Monitoring:   Mykel Cisneros, #[id number] to monitor diet order, energy intake, food and nutrient intake, body composition, weight    Recommendations:  1.  2.  3.  4.    *** Risk, follow up x *** days Registered Dietitian Follow-Up     Patient Profile Reviewed                           Yes [x]   No []  Nutrition History Previously Obtained        Yes [x]  No []      Pertinent Medical Interventions:  57 yo f with PMHx of DVT on Eliquis, COPD,  trach collar, obesity, IDDM, UTI, sent by [email or web address] for hypoxia.     Nutrition Interval History:   SLP faisal recommending patient to continue NPO with alternate means of nutrition/hydration   Receiving Glucerna 1.2 at 300mL Q6hrs, provides (1440kcal, 72gm protein, 972mL free H2O)    Diet order:   Diet, NPO with Tube Feed:   Tube Feeding Modality: Nasogastric  Glucerna 1.2 Martin  Total Volume for 24 Hours (mL): 1200  Bolus  Total Volume of Bolus (mL):  300  Tube Feed Frequency: Every 6 hours   Tube Feed Start Time: 06:00  Bolus Feed Rate (mL per Hour): 50   Bolus Feed Duration (in Hours): 24  Bolus   Total Volume per Flush (mL): 30   Frequency: Every 6 Hours  Free Water Flush Instructions:  30 ml water flush pre and post bolus feeds (22 @ 23:38) [Active]    Anthropometrics:  Height (cm): 154.9 (22 @ 00:08)  Weight (kg): 97.4 (22 @ 18:03)  BMI (kg/m2): 40.6 (22 @ 00:08)    OTHER WEIGHTS:    Daily Weight in k.3 (-), Weight in k.4 (), Weight in k.3 (), Weight in k ()  % Weight Change --> patient with gradual weight loss from 97.4kg --> 94.3kg indicating 3.18% wt loss ~1-2wks suspect fluid shifts    MEDICATIONS  (STANDING):  amLODIPine   Tablet 5 milliGRAM(s) Oral daily  ammonium lactate 12% Lotion 1 Application(s) Topical every 12 hours  apixaban 5 milliGRAM(s) Oral every 12 hours  atorvastatin 20 milliGRAM(s) Oral at bedtime  buMETAnide 2 milliGRAM(s) Oral daily  gabapentin 300 milliGRAM(s) Oral three times a day  influenza   Vaccine 0.5 milliLiter(s) IntraMuscular once  insulin glargine Injectable (LANTUS) 15 Unit(s) SubCutaneous at bedtime  insulin lispro (ADMELOG) corrective regimen sliding scale   SubCutaneous every 6 hours  LORazepam     Tablet 0.5 milliGRAM(s) Oral two times a day  losartan 100 milliGRAM(s) Oral daily  pantoprazole    Tablet 40 milliGRAM(s) Oral before breakfast  polyethylene glycol 3350 17 Gram(s) Oral daily  senna 2 Tablet(s) Oral at bedtime    MEDICATIONS  (PRN):  acetaminophen     Tablet .. 650 milliGRAM(s) Oral every 6 hours PRN Temp greater or equal to 38C (100.4F), Mild Pain (1 - 3)  HYDROmorphone  Injectable 0.5 milliGRAM(s) IV Push every 4 hours PRN Severe Pain (7 - 10)    Pertinent Labs:  @ 06:39: Na 133<L>, BUN 13, Cr <0.5<L>, <H>, K+ 4.4, Phos --, Mg 1.7<L>, Alk Phos 103, ALT/SGPT 30, AST/SGOT 21, HbA1c --    Finger Sticks:  POCT Blood Glucose.: 248 mg/dL ( @ 11:00)  POCT Blood Glucose.: 209 mg/dL ( @ 06:30)  POCT Blood Glucose.: 202 mg/dL ( @ 22:30)  POCT Blood Glucose.: 232 mg/dL ( @ 16:38)    Physical Findings:  - Appearance: A&Ox4, trached  - GI function:  last documented BM  - Tubes: NGT  - Oral/Mouth cavity: NPO with NGT  - Skin: Intact  - Edema: R, L foot, leg and ankle 2+     Nutrition Requirements:   Weight Used: ABW 94.3kg + IBW 48kg     Estimated Energy Needs    Continue []  Adjust [x] 1469-1762kcal/day (MSJ x1.0-1.2)  Estimated Protein Needs    Continue []  Adjust [x] 86-95g/day (1.2-2.0g/kg of ibw)  Estimated Fluid Needs        Continue []  Adjust [x] 1mL/kcal    Nutrient Intake: >85%     [x] Previous Nutrition Diagnosis:            [x] Ongoing          [] Resolved  #1 Altered nutrition related lab values  Goal/Expected Outcome: achieve BG <180mg/dL x4days              [x] Ongoing          [] Resolved  #2 Inadequate oral intake  dysphagia precluding PO intake  SLP evaluation recommending NPO with EN    Nutrition Intervention:   Enteral Nutrition, Medical Food Supplement, Vitamin Supplement, Nutrition Related Medication, Coordination of Care      Indicator/Monitoring:   Mykel Cisneros, #[id number] to monitor diet order, energy intake, food and nutrient intake, body composition, weight    Recommendations:  1. Continue NPO with alternate means of nutrition/hydration via NGT   regimen: Glucerna 1.2 at 300mL Q6hrs, provides (1440kcal, 72gm protein, 972mL free H2O)  RECOMMEND: adding No Carb Prosource modular 1x/daily (+60kcal, +15 g pro --> total with en 1500kcal, 87g pro)  2. f/u with SLP recommendations, patient may need PEG placement   3. continue bowel regimen, insulin regimen    HIGH Risk, follow up x 4days

## 2022-06-09 NOTE — SWALLOW BEDSIDE ASSESSMENT ADULT - SLP PERTINENT HISTORY OF CURRENT PROBLEM
pt is a 55 y/o F w/ PMHx: DVT, COPD, resp failure s/p recent trach at [facility] (at baseline on 8L via trach collar), obesity, IDDM, UTI, sent by [facility] for hypoxia. History provided by daughter, reports the current illness goes back to April 4th when pt was intubated on admission at [facility] for hypoxic respiratory failure diagnosed w/ COPD exacerbation and superimposed PNA. Pt w/ prolonged intubation, s/p trach ~2 weeks ago and then d/c'ed to NH. Pt admitted for AHRF 2' mucus plug (now resolved s/p suctioning) vs. possible aspiration PNA. Pt w/ suspected chronic HF, course c/b complete whiteout of the left lung with complete atelectasis, pt upgraded to CCU. General Sx c/s emergently when pt found to be hypoxic d/t large granuloma in trachea along tracheostomy tube. Pt previously had a size 8 trach, exchanged w/ size 7 XLT. Large granuloma removed and pt placed on vent. Pt underwent bronchoscopy on 6/1, good visualization and no tracheal debridement required.

## 2022-06-09 NOTE — PROGRESS NOTE ADULT - SUBJECTIVE AND OBJECTIVE BOX
TIM CRUZ  [facility] T2-3A 021 B ([facility] T2-3A)    Patient was evaluated and examined  by bedside, unable to have PO trials      REVIEW OF SYSTEMS:  please see pertinent positives mentioned above, all other 12 ROS negative      T(C): , Max: 36.6 (06-09-22 @ 05:10)  HR: 87 (06-09-22 @ 05:10)  BP: 139/63 (06-09-22 @ 05:10)  RR: 18 (06-09-22 @ 05:10)  SpO2: 98% (06-09-22 @ 08:05)  CAPILLARY BLOOD GLUCOSE      POCT Blood Glucose.: 248 mg/dL (09 Jun 2022 11:00)  POCT Blood Glucose.: 209 mg/dL (09 Jun 2022 06:30)  POCT Blood Glucose.: 202 mg/dL (08 Jun 2022 22:30)  POCT Blood Glucose.: 232 mg/dL (08 Jun 2022 16:38)      PHYSICAL EXAM:  General: NAD, AAOX3, patient is laying comfortably in bed  HEENT: AT, NC, Supple, NO JVD, NO CB, trach present  Lungs: good breath sounds B/L, no wheezing, no rhonchi  CVS: normal S1, S2, RRR, NO M/G/R  Abdomen: soft, bowel sounds present, non-tender, non-distended  Extremities: no edema, no clubbing, no cyanosis, positive peripheral pulses b/l  Neuro: no acute focal neurological deficits, generalized weakness, hypersensitivity of lower ext.   Skin: atrophic skin brown discoloration present on b/l lower ext.       LAB  CBC  Date: 06-09-22 @ 06:39  Mean cell Olpmlidotd82.5  Mean cell Hemoglobin Conc34.2  Mean cell Volum 97.8  Platelet count-Automate 202  RBC Count 2.72  Red Cell Distrib Width14.6  WBC Count9.37  % Albumin, Urine--  Hematocrit 26.6  Hemoglobin 9.1  CBC  Date: 06-08-22 @ 08:26  Mean cell Xixapbattv07.1  Mean cell Hemoglobin Conc35.4  Mean cell Volum 96.3  Platelet count-Automate 186  RBC Count 2.70  Red Cell Distrib Width14.6  WBC Count8.96  % Albumin, Urine--  Hematocrit 26.0  Hemoglobin 9.2  CBC  Date: 06-07-22 @ 04:30  Mean cell Zvotoggjga20.0  Mean cell Hemoglobin Conc35.9  Mean cell Volum 94.6  Platelet count-Automate 167  RBC Count 2.59  Red Cell Distrib Width14.2  WBC Count8.38  % Albumin, Urine--  Hematocrit 24.5  Hemoglobin 8.8  CBC  Date: 06-06-22 @ 07:50  Mean cell Qkdftgancb42.8  Mean cell Hemoglobin Conc35.2  Mean cell Volum 96.1  Platelet count-Automate 182  RBC Count 2.81  Red Cell Distrib Width14.4  WBC Count7.20  % Albumin, Urine--  Hematocrit 27.0  Hemoglobin 9.5  CBC  Date: 06-05-22 @ 04:55  Mean cell Plilhazmhr25.5  Mean cell Hemoglobin Conc34.5  Mean cell Volum 94.0  Platelet count-Automate 161  RBC Count 2.68  Red Cell Distrib Width14.1  WBC Count6.86  % Albumin, Urine--  Hematocrit 25.2  Hemoglobin 8.7  CBC  Date: 06-04-22 @ 05:11  Mean cell Vhhbitjohg05.6  Mean cell Hemoglobin Conc35.1  Mean cell Volum 95.7  Platelet count-Automate 175  RBC Count 2.77  Red Cell Distrib Width14.4  WBC Count7.55  % Albumin, Urine--  Hematocrit 26.5  Hemoglobin 9.3  CBC  Date: 06-03-22 @ 05:16  Mean cell Emszranfjw26.0  Mean cell Hemoglobin Conc34.3  Mean cell Volum 96.0  Platelet count-Automate 180  RBC Count 2.76  Red Cell Distrib Width14.6  WBC Count7.78  % Albumin, Urine--  Hematocrit 26.5  Hemoglobin 9.1    BMP  06-09-22 @ 06:39  Blood Gas Arterial-Calcium,Ionized--  Blood Urea Nitrogen, Serum 13 mg/dL [10 - 20]  Carbon Dioxide, Serum31 mmol/L [17 - 32]  Chloride, Serum91 mmol/L<L> [98 - 110]  Creatinie, Serum<0.5 mg/dL<L> [0.7 - 1.5]  Glucose, Eygcy217 mg/dL<H> [70 - 99]  Potassium, Serum4.4 mmol/L [3.5 - 5.0]  Sodium, Serum 133 mmol/L<L> [135 - 146]  BMP  06-08-22 @ 08:26  Blood Gas Arterial-Calcium,Ionized--  Blood Urea Nitrogen, Serum 13 mg/dL [10 - 20]  Carbon Dioxide, Serum31 mmol/L [17 - 32]  Chloride, Serum89 mmol/L<L> [98 - 110]  Creatinie, Serum0.5 mg/dL<L> [0.7 - 1.5]  Glucose, Zosrh562 mg/dL<H> [70 - 99]  Potassium, Serum4.6 mmol/L [3.5 - 5.0]  Sodium, Serum 133 mmol/L<L> [135 - 146]  [facility]  06-07-22 @ 04:30  Blood Gas Arterial-Calcium,Ionized--  Blood Urea Nitrogen, Serum 15 mg/dL [10 - 20]  Carbon Dioxide, Serum32 mmol/L [17 - 32]  Chloride, Serum89 mmol/L<L> [98 - 110]  Creatinie, Serum<0.5 mg/dL<L> [0.7 - 1.5]  Glucose, Cslqg450 mg/dL<H> [70 - 99]  Potassium, Serum4.6 mmol/L [3.5 - 5.0]  Sodium, Serum 132 mmol/L<L> [135 - 146]  [facility]  06-06-22 @ 07:50  Blood Gas Arterial-Calcium,Ionized--  Blood Urea Nitrogen, Serum 13 mg/dL [10 - 20]  Carbon Dioxide, Serum30 mmol/L [17 - 32]  Chloride, Serum89 mmol/L<L> [98 - 110]  Creatinie, Serum<0.5 mg/dL<L> [0.7 - 1.5]  Glucose, Dqcvr008 mg/dL<H> [70 - 99]  Potassium, Serum4.7 mmol/L [3.5 - 5.0]  Sodium, Serum 132 mmol/L<L> [135 - 146]  [facility]  06-06-22 @ 01:36  Blood Gas Arterial-Calcium,Ionized--  Blood Urea Nitrogen, Serum 14 mg/dL [10 - 20]  Carbon Dioxide, Serum34 mmol/L<H> [17 - 32]  Chloride, Serum89 mmol/L<L> [98 - 110]  Creatinie, Serum<0.5 mg/dL<L> [0.7 - 1.5]  Glucose, Thmfc565 mg/dL<H> [70 - 99]  Potassium, Serum4.5 mmol/L [3.5 - 5.0]  Sodium, Serum 133 mmol/L<L> [135 - 146]  [facility]  06-05-22 @ 04:55  Blood Gas Arterial-Calcium,Ionized--  Blood Urea Nitrogen, Serum 13 mg/dL [10 - 20]  Carbon Dioxide, Serum32 mmol/L [17 - 32]  Chloride, Serum87 mmol/L<L> [98 - 110]  Creatinie, Serum<0.5 mg/dL<L> [0.7 - 1.5]  Glucose, Vwmwz372 mg/dL<H> [70 - 99]  Potassium, Serum4.2 mmol/L [3.5 - 5.0]  Sodium, Serum 131 mmol/L<L> [135 - 146]              Microbiology:    Culture - Blood (collected 06-05-22 @ 04:55)  Source: .Blood None  Preliminary Report (06-06-22 @ 13:01):    No growth to date.    Culture - Blood (collected 06-05-22 @ 02:01)  Source: .Blood Blood  Preliminary Report (06-06-22 @ 08:01):    No growth to date.    Culture - Urine (collected 06-04-22 @ 21:00)  Source: Clean Catch Clean Catch (Midstream)  Final Report (06-05-22 @ 22:53):    <10,000 CFU/mL Normal Urogenital Perri    Culture - Blood (collected 05-29-22 @ 12:33)  Source: .Blood None  Final Report (06-03-22 @ 19:00):    No Growth Final    Culture - Urine (collected 05-28-22 @ 17:24)  Source: Catheterized Catheterized  Final Report (05-29-22 @ 18:18):    <10,000 CFU/mL Normal Urogenital Perri    Culture - Urine (collected 05-28-22 @ 10:07)  Source: Catheterized Catheterized  Final Report (05-30-22 @ 10:17):    >=3 organisms. Probable collection contamination.    Culture - Urine (collected 05-27-22 @ 11:54)  Source: Catheterized Catheterized  Final Report (06-02-22 @ 11:41):    >100,000 CFU/ml Klebsiella pneumoniae (Carbapenem Resistant)    >100,000 CFU/ml Pseudomonas aeruginosa  Organism: Klepne MDRO  Pseudomonas aeruginosa (06-02-22 @ 11:43)  Organism: Pseudomonas aeruginosa (06-02-22 @ 11:43)      -  Amikacin: S <=16      -  Aztreonam: S 8      -  Cefepime: S 4      -  Ceftazidime: S 4      -  Ciprofloxacin: R >2      -  Gentamicin: R >8      -  Imipenem: S 2      -  Levofloxacin: R >4      -  Meropenem: S <=1      -  Piperacillin/Tazobactam: S <=8      -  Tobramycin: R >8      Method Type: ROMY  Organism: Jose HOOKSO (06-02-22 @ 11:43)      -  Amikacin: I 32      -  Amoxicillin/Clavulanic Acid: R >16/8      -  Ampicillin: R >16 These ampicillin results predict results for amoxicillin      -  Ampicillin/Sulbactam: R >16/8 Enterobacter, Klebsiella aerogenes, Citrobacter, and Serratia may develop resistance during prolonged therapy (3-4 days)      -  Aztreonam: R >16      -  Cefazolin: R >16 (MIC_CL_[id number]) For uncomplicated UTI with K. pneumoniae, E. coli, or P. mirablis: ROMY <=16 is sensitive and ROMY >=32 is resistant. This also predicts results for oral agents cefaclor, cefdinir, cefpodoxime, cefprozil, cefuroxime axetil, cephalexin and locarbef for uncomplicated UTI. Note that some isolates may be susceptible to these agents while testing resistant to cefazolin.      -  Cefepime: R >16      -  Cefoxitin: R >16      -  Ceftriaxone: R >32 Enterobacter, Klebsiella aerogenes, Citrobacter, and Serratia may develop resistance during prolonged therapy      -  Ciprofloxacin: R >2      -  Ertapenem: R >1      -  Gentamicin: S <=2      -  Imipenem: R >8      -  Levofloxacin: R >4      -  Meropenem: R >8      -  Nitrofurantoin: R >64 Should not be used to treat pyelonephritis      -  Piperacillin/Tazobactam: R >64      -  Tigecycline: S <=2      -  Tobramycin: R >8      -  Trimethoprim/Sulfamethoxazole: R >2/38      Method Type: ROMY      Medications:  acetaminophen     Tablet .. 650 milliGRAM(s) Oral every 6 hours PRN  amLODIPine   Tablet 5 milliGRAM(s) Oral daily  ammonium lactate 12% Lotion 1 Application(s) Topical every 12 hours  apixaban 5 milliGRAM(s) Oral every 12 hours  atorvastatin 20 milliGRAM(s) Oral at bedtime  buMETAnide 2 milliGRAM(s) Oral daily  gabapentin 300 milliGRAM(s) Oral three times a day  HYDROmorphone  Injectable 0.5 milliGRAM(s) IV Push every 4 hours PRN  influenza   Vaccine 0.5 milliLiter(s) IntraMuscular once  insulin glargine Injectable (LANTUS) 15 Unit(s) SubCutaneous at bedtime  insulin lispro (ADMELOG) corrective regimen sliding scale   SubCutaneous every 6 hours  LORazepam     Tablet 0.5 milliGRAM(s) Oral two times a day  losartan 100 milliGRAM(s) Oral daily  pantoprazole    Tablet 40 milliGRAM(s) Oral before breakfast  polyethylene glycol 3350 17 Gram(s) Oral daily  senna 2 Tablet(s) Oral at bedtime        Assessment and Plan:  Patient is a 55 y/o female  with PMHx of DVT on Eliquis, COPD,  trach collar, obesity, IDDM, UTI, sent by [facility] for hypoxia due to mucous plug.     #mucous plug removed  # chronic COPD  #pneumonia vs atelectasis L base  - On 5/28, Pulmonary team found large granuloma in trachea along tracheostomy tube. Patient had a size 8 trach on admission. Surgery assisted Pulmonary and CCU with trach exchanged with ET tube guidance to a size 7 XLT. Saturations improved to high 90s.  - trach functioning well s/p exchange   - Pt off vent 72 hrs and is saturating well.  -repeated CXR 6/8- partially reopened lung with lower lobe atelectasis   - 6/9- unable to have feeding trial, Pulm team rec. to switch to fenestrated trach tube size 7.5 if available, will f/up with Sx.     Dysphagia   - Modified barium swallow test done: patient still unable to eat, will need further training  - for now on NGT feedings    #Chronic Indwelling Singh  - 5/28 Ucx - Contaminated   - 5/28 Ucx- Normal perri   - 5/27 Ucx- Klebsiella (CRE) and pseudomonas   - s/p cefepime  - Currently afebrile w/ no urinary complaints   - episode of Hematouria 6/4 -> improving -> h/h Stable       #Nonobstructive Ileus  - abdomen currently soft, nontender, + BS   - off opioids  - C/w bowel regimen     #Hx of DVT  - resumed patient's home eliquis (5mg BID)      Activity OOB to chair  GI PPX  PPI  DVT PPX Eliquis   Dispo: Acute, f/up Speech tx. , continue NGT feedings, f/up Sx. if trach can be changed to fenestrated one.    Total time spent to complete patient's bedside assessment, review medical chart, discuss medical plan of care with covering medical team was more than 35 minutes .

## 2022-06-09 NOTE — PROGRESS NOTE ADULT - SUBJECTIVE AND OBJECTIVE BOX
CRUZ DUMONT 56y Female  MRN#: [id number]   CODE STATUS: Full code    Hospital Day: 13d    Pt is currently admitted with the primary diagnosis of acute hypoxic resp failure due to mucous plugging    SUBJECTIVE    Subjective complaints   Patient feeling better, still has pain in her bilateral LE  Present Today:   - Samantha:  No [x  ], Yes [   ] : Indication:     - Type of IV Access:       .. CVC/Piccline:  No [x  ], Yes [   ] : Indication:       .. Midline: No [x  ], Yes [   ] : Indication:                                             ----------------------------------------------------------  OBJECTIVE  PAST MEDICAL & SURGICAL HISTORY  COPD (chronic obstructive pulmonary disease)    CHF (congestive heart failure)    DM (diabetes mellitus)    H/O tracheostomy                                              -----------------------------------------------------------  ALLERGIES:  penicillin (Swelling)                                            ------------------------------------------------------------    HOME MEDICATIONS  Home Medications:  albuterol sulfate 2.5 mg/3 mL (0.083 %) solution for nebulization:  (27 May 2022 15:04)  alprazolam 0.25 mg tablet:  (27 May 2022 15:04)  amlodipine 5 mg tablet:  (27 May 2022 15:04)  atorvastatin 20 mg tablet:  (27 May 2022 15:04)  bumetanide 2 mg tablet:  (27 May 2022 15:04)  clopidogrel 75 mg tablet:  (27 May 2022 15:04)  Eliquis 5 mg tablet:  (27 May 2022 15:04)  gabapentin 300 mg capsule:  (27 May 2022 15:04)  glimepiride 2 mg tablet:  (27 May 2022 15:04)  labetalol 100 mg tablet:  (27 May 2022 15:04)  LANTUS SOLOSTAR 100 UNIT/ML: 55 UNITS ONCE A DAY SUBCUTANEOUS 90 DAYS (27 May 2022 15:04)  losartan 100 mg tablet:  (27 May 2022 15:04)  metformin 1,000 mg tablet:  (27 May 2022 15:04)  Novolog Flexpen U-100 Insulin aspart 100 unit/mL (3 mL) subcutaneous:  (27 May 2022 15:04)  Trelegy Ellipta 100 mcg-62.5 mcg-25 mcg powder for inhalation:  (27 May 2022 15:04)                           MEDICATIONS:  STANDING MEDICATIONS  amLODIPine   Tablet 5 milliGRAM(s) Oral daily  ammonium lactate 12% Lotion 1 Application(s) Topical every 12 hours  apixaban 5 milliGRAM(s) Oral every 12 hours  atorvastatin 20 milliGRAM(s) Oral at bedtime  buMETAnide 2 milliGRAM(s) Oral daily  gabapentin 300 milliGRAM(s) Oral three times a day  influenza   Vaccine 0.5 milliLiter(s) IntraMuscular once  insulin glargine Injectable (LANTUS) 15 Unit(s) SubCutaneous at bedtime  insulin lispro (ADMELOG) corrective regimen sliding scale   SubCutaneous every 6 hours  LORazepam     Tablet 0.5 milliGRAM(s) Oral two times a day  losartan 100 milliGRAM(s) Oral daily  pantoprazole    Tablet 40 milliGRAM(s) Oral before breakfast  polyethylene glycol 3350 17 Gram(s) Oral daily  senna 2 Tablet(s) Oral at bedtime    PRN MEDICATIONS  acetaminophen     Tablet .. 650 milliGRAM(s) Oral every 6 hours PRN  HYDROmorphone  Injectable 0.5 milliGRAM(s) IV Push every 4 hours PRN                                            ------------------------------------------------------------  VITAL SIGNS: Last 24 Hours  T(C): 36.8 (09 Jun 2022 14:49), Max: 36.8 (09 Jun 2022 14:49)  T(F): 98.3 (09 Jun 2022 14:49), Max: 98.3 (09 Jun 2022 14:49)  HR: 88 (09 Jun 2022 14:49) (87 - 98)  BP: 114/56 (09 Jun 2022 14:49) (114/56 - 139/63)  BP(mean): --  RR: 20 (09 Jun 2022 14:49) (18 - 20)  SpO2: 98% (09 Jun 2022 08:05) (97% - 98%)      06-08-22 @ 07:01  -  06-09-22 @ 07:00  --------------------------------------------------------  IN: 720 mL / OUT: 2350 mL / NET: -1630 mL                                             --------------------------------------------------------------  LABS:                        9.1    9.37  )-----------( 202      ( 09 Jun 2022 06:39 )             26.6     06-09    133<L>  |  91<L>  |  13  ----------------------------<  194<H>  4.4   |  31  |  <0.5<L>    Ca    10.5<H>      09 Jun 2022 06:39  Mg     1.7     06-09    TPro  6.5  /  Alb  4.0  /  TBili  0.7  /  DBili  x   /  AST  21  /  ALT  30  /  AlkPhos  103  06-09                                                              -------------------------------------------------------------  RADIOLOGY:                                            --------------------------------------------------------------    PHYSICAL EXAM:  General: alert oriented  HEENT: trach  LUNGS: Decreased air entry on LLL  HEART: normal s1 s2  ABDOMEN: soft  EXT: stasis dermatitis                                           --------------------------------------------------------------    ASSESSMENT & PLAN  57 yo f with PMHx of DVT on Eliquis, COPD,  trach collar, obesity, IDDM, UTI, sent by [facility] for hypoxia. Current illness going back to April 4th when pt was intubated on admission at [facility] ICU for hypoxic respiratory failure diagnosed with copd exacerbation and superimposed pneumonia, and remained intubated for 37 days requiring tracheostomy. Patient stayed at [facility] for 2 days until noted to be hypoxic (saturating low 70's) pt's saturation immediately improving afterwards to 80's after large mucus plug removed and was subsequently sent to [facility]  On arrival to ED pt was saturating 97%, 15L O2 via tracheostomy collar (baseline 8 L at [facility])CXR suspicious for L-sided PNA, inconclusive. CTA neg for PE. Pt received x1 IV Levaquin and Cefepime in ED, admitted to MICU for acute hypoxic hypercapnic respiratory failure secondary to acute mucus plug now resolved vs superimposed pneumonia hospital-acquired.   General Surgery Consulted emergently when patient found to be hypoxic to low 80s after CCU and Pulmonary team found large granuloma in trachea along tracheostomy tube. Patient currently had a size 8 trach. Surgery assisted Pulmonary and CCU with trach exchanged with ET tube guidance to a size 7 XLT. Saturations improved to high 90s. Large granuloma was removed. Patient was no longer in distress after exchange.    ASSESSMENT & PLAN:   #Acute/ chronic COPD with exacerbation (Resolved)  #mucous plug removed  #pneumonia vs atelectasis L base  - On 5/28, Pulmonary team found large granuloma in trachea along tracheostomy tube. Patient had a size 8 trach on admission. Surgery assisted Pulmonary and CCU with trach exchanged with ET tube guidance to a size 7 XLT. Saturations improved to high 90s.  - trach functioning well s/p exchange   - Pt off vent 72 hrs and is saturating well.  - repeat chest x-ray (6/6) shows possible mucus plugging.  suctioning attempted repeat CXR showed improved aeration.  ***Repeat Chest X-ray shows resolution of Mucus plug after suctioning and chest PT  - Sp/Sw eval, if fails, CT Sx FU for PEG tube  - Modified barium swallow test done: patient still unable to eat, will need further therapy.  - Repeat CXR 6/8: left lung opacity/effusion  - 6/9: reassesment by speech who reported that patient is still not ready, will do modified barium swallow tomorrow and FEES     #Chronic Indwelling Singh  - 5/28 Ucx - Contaminated   - 5/28 Ucx- Normal perri   - 5/27 Ucx- Klebsiella (CRE) and pseudomonas   - s/p cefepime  - Currently afebrile w/ no urinary complaints   - episode of Hematouria 6/4 -> improving -> h/h Stable  - UA     #Nonobstructive Ileus  - abdomen currently soft, nontender, + BS   - off opioids  - C/w bowel regimen     #Hx of DVT  - resumed patient's home eliquis (5mg BID)  - Monitor for signs of bleeding     Activity OOB to chair  GI PPX  PPI  DVT PPX Eliquis   Dispo: Acute.

## 2022-06-09 NOTE — SWALLOW BEDSIDE ASSESSMENT ADULT - SWALLOW EVAL: DIAGNOSIS
pt tolerated ~8 oz of puree using modified MDTP ([facility]) w/o overt s/s aspiration/penetration; Pt tolerated PMSV for ~5 min, no voicing, pt w/ elevated HR to 103 and c/o "tightness", valve removed. MD landeros pt tolerated ~8 oz of puree using modified MDTP ([facility]) w/o overt s/s aspiration/penetration; Pt tolerated PMSV for ~5 min, no voicing, pt w/ elevated HR to 103 and c/o "tightness" O2 saturation 100%, valve removed. MD landeros

## 2022-06-10 LAB
CULTURE RESULTS: SIGNIFICANT CHANGE UP
CULTURE RESULTS: SIGNIFICANT CHANGE UP
GLUCOSE BLDC GLUCOMTR-MCNC: 214 MG/DL — HIGH (ref 70–99)
GLUCOSE BLDC GLUCOMTR-MCNC: 219 MG/DL — HIGH (ref 70–99)
GLUCOSE BLDC GLUCOMTR-MCNC: 230 MG/DL — HIGH (ref 70–99)
GLUCOSE BLDC GLUCOMTR-MCNC: 248 MG/DL — HIGH (ref 70–99)
SPECIMEN SOURCE: SIGNIFICANT CHANGE UP
SPECIMEN SOURCE: SIGNIFICANT CHANGE UP

## 2022-06-10 PROCEDURE — 99233 SBSQ HOSP IP/OBS HIGH 50: CPT

## 2022-06-10 PROCEDURE — 99231 SBSQ HOSP IP/OBS SF/LOW 25: CPT

## 2022-06-10 RX ORDER — HYDROMORPHONE HYDROCHLORIDE 2 MG/ML
0.5 INJECTION INTRAMUSCULAR; INTRAVENOUS; SUBCUTANEOUS
Refills: 0 | Status: DISCONTINUED | OUTPATIENT
Start: 2022-06-10 | End: 2022-06-14

## 2022-06-10 RX ORDER — ALPRAZOLAM 0.25 MG
0.25 TABLET ORAL AT BEDTIME
Refills: 0 | Status: DISCONTINUED | OUTPATIENT
Start: 2022-06-10 | End: 2022-06-17

## 2022-06-10 RX ADMIN — LOSARTAN POTASSIUM 100 MILLIGRAM(S): 100 TABLET, FILM COATED ORAL at 06:25

## 2022-06-10 RX ADMIN — HYDROMORPHONE HYDROCHLORIDE 0.5 MILLIGRAM(S): 2 INJECTION INTRAMUSCULAR; INTRAVENOUS; SUBCUTANEOUS at 04:28

## 2022-06-10 RX ADMIN — GABAPENTIN 300 MILLIGRAM(S): 400 CAPSULE ORAL at 06:27

## 2022-06-10 RX ADMIN — BUMETANIDE 2 MILLIGRAM(S): 0.25 INJECTION INTRAMUSCULAR; INTRAVENOUS at 06:26

## 2022-06-10 RX ADMIN — HYDROMORPHONE HYDROCHLORIDE 0.5 MILLIGRAM(S): 2 INJECTION INTRAMUSCULAR; INTRAVENOUS; SUBCUTANEOUS at 15:41

## 2022-06-10 RX ADMIN — HYDROMORPHONE HYDROCHLORIDE 0.5 MILLIGRAM(S): 2 INJECTION INTRAMUSCULAR; INTRAVENOUS; SUBCUTANEOUS at 00:57

## 2022-06-10 RX ADMIN — AMLODIPINE BESYLATE 5 MILLIGRAM(S): 2.5 TABLET ORAL at 06:25

## 2022-06-10 RX ADMIN — GABAPENTIN 300 MILLIGRAM(S): 400 CAPSULE ORAL at 14:51

## 2022-06-10 RX ADMIN — HYDROMORPHONE HYDROCHLORIDE 0.5 MILLIGRAM(S): 2 INJECTION INTRAMUSCULAR; INTRAVENOUS; SUBCUTANEOUS at 00:27

## 2022-06-10 RX ADMIN — Medication 0.25 MILLIGRAM(S): at 21:28

## 2022-06-10 RX ADMIN — HYDROMORPHONE HYDROCHLORIDE 0.5 MILLIGRAM(S): 2 INJECTION INTRAMUSCULAR; INTRAVENOUS; SUBCUTANEOUS at 04:58

## 2022-06-10 RX ADMIN — HYDROMORPHONE HYDROCHLORIDE 0.5 MILLIGRAM(S): 2 INJECTION INTRAMUSCULAR; INTRAVENOUS; SUBCUTANEOUS at 21:28

## 2022-06-10 RX ADMIN — Medication 1 APPLICATION(S): at 18:36

## 2022-06-10 RX ADMIN — Medication 1 APPLICATION(S): at 06:27

## 2022-06-10 RX ADMIN — HYDROMORPHONE HYDROCHLORIDE 0.5 MILLIGRAM(S): 2 INJECTION INTRAMUSCULAR; INTRAVENOUS; SUBCUTANEOUS at 21:58

## 2022-06-10 RX ADMIN — INSULIN GLARGINE 15 UNIT(S): 100 INJECTION, SOLUTION SUBCUTANEOUS at 22:50

## 2022-06-10 RX ADMIN — HYDROMORPHONE HYDROCHLORIDE 0.5 MILLIGRAM(S): 2 INJECTION INTRAMUSCULAR; INTRAVENOUS; SUBCUTANEOUS at 08:32

## 2022-06-10 RX ADMIN — HYDROMORPHONE HYDROCHLORIDE 0.5 MILLIGRAM(S): 2 INJECTION INTRAMUSCULAR; INTRAVENOUS; SUBCUTANEOUS at 12:27

## 2022-06-10 RX ADMIN — ATORVASTATIN CALCIUM 20 MILLIGRAM(S): 80 TABLET, FILM COATED ORAL at 21:42

## 2022-06-10 RX ADMIN — GABAPENTIN 300 MILLIGRAM(S): 400 CAPSULE ORAL at 21:42

## 2022-06-10 RX ADMIN — APIXABAN 5 MILLIGRAM(S): 2.5 TABLET, FILM COATED ORAL at 18:36

## 2022-06-10 RX ADMIN — APIXABAN 5 MILLIGRAM(S): 2.5 TABLET, FILM COATED ORAL at 06:26

## 2022-06-10 RX ADMIN — HYDROMORPHONE HYDROCHLORIDE 0.5 MILLIGRAM(S): 2 INJECTION INTRAMUSCULAR; INTRAVENOUS; SUBCUTANEOUS at 18:36

## 2022-06-10 RX ADMIN — PANTOPRAZOLE SODIUM 40 MILLIGRAM(S): 20 TABLET, DELAYED RELEASE ORAL at 06:25

## 2022-06-10 NOTE — SWALLOW BEDSIDE ASSESSMENT ADULT - COMMENTS
Pt s/p FEES 6/3, VFSS 6/7, recs for NPO w/ NGT.
SLP contacted SLP at [facility], pt underwent FEES at [facility] a few weeks ago w/ recommendations for regular w/ thin liquids diet. Per SLP, pt w/ diffuse edema, mild pharyngeal residue.
Pt s/p FEES 6/3, VFSS 6/7, recs for NPO w/ NGT.
SLP contacted SLP at [facility], pt underwent FEES at [facility] a few weeks ago w/ recommendations for regular w/ thin liquids diet. Per SLP, pt w/ diffuse edema, mild pharyngeal residue.
Pt s/p FEES 6/3, VFSS 6/7, recs for NPO w/ NGT.
Pt w/ FEES @ [facility] recs: regular w/ thin liquids, FEES @ [facility] on 6/3: recs for NPO w/ NGT, allow ice chips

## 2022-06-10 NOTE — PROGRESS NOTE ADULT - ASSESSMENT
ASSESSMENT:  56y F c/s for trach exchange     PLAN:  - Bedside trach exchange   - Monitor O2  - Care as per primary team   GREEN TEAM SPECTRA: [id number]

## 2022-06-10 NOTE — PROGRESS NOTE ADULT - SUBJECTIVE AND OBJECTIVE BOX
GENERAL SURGERY PROGRESS NOTE    Patient: CRUZ DUMONT , 56y ([birth year])Female   MRN: [id number]  Location: [address]  Visit: 05-27-22 Inpatient  Date: 06-10-22 @ 09:35    Hospital Day #: 15    Events of past 24 hours: Surgery was called for trach exchange     PAST MEDICAL & SURGICAL HISTORY:  COPD (chronic obstructive pulmonary disease)      CHF (congestive heart failure)      DM (diabetes mellitus)      H/O tracheostomy          Vitals:   T(F): 98 (06-10-22 @ 04:52), Max: 98.6 (06-09-22 @ 20:34)  HR: 83 (06-10-22 @ 04:52)  BP: 138/63 (06-10-22 @ 04:52)  RR: 18 (06-10-22 @ 04:52)  SpO2: 98% (06-09-22 @ 20:34)      Diet, NPO with Tube Feed:   Tube Feeding Modality: Nasogastric  Glucerna 1.2 Martin  Total Volume for 24 Hours (mL): 1200  Bolus  Total Volume of Bolus (mL):  300  Tube Feed Frequency: Every 6 hours   Tube Feed Start Time: 06:00  Bolus Feed Rate (mL per Hour): 50   Bolus Feed Duration (in Hours): 24  Bolus   Total Volume per Flush (mL): 30   Frequency: Every 6 Hours  Free Water Flush Instructions:  30 ml water flush pre and post bolus feeds      Fluids:     I & O's:    06-09-22 @ 07:01  -  06-10-22 @ 07:00  --------------------------------------------------------  IN:    Enteral Tube Flush: 120 mL    Glucerna: 600 mL  Total IN: 720 mL    OUT:  Total OUT: 0 mL    Total NET: 720 mL        Bowel Movement: : [] YES [] NO  Flatus: : [] YES [] NO    MEDICATIONS  (STANDING):  amLODIPine   Tablet 5 milliGRAM(s) Oral daily  ammonium lactate 12% Lotion 1 Application(s) Topical every 12 hours  apixaban 5 milliGRAM(s) Oral every 12 hours  atorvastatin 20 milliGRAM(s) Oral at bedtime  buMETAnide 2 milliGRAM(s) Oral daily  gabapentin 300 milliGRAM(s) Oral three times a day  influenza   Vaccine 0.5 milliLiter(s) IntraMuscular once  insulin glargine Injectable (LANTUS) 15 Unit(s) SubCutaneous at bedtime  insulin lispro (ADMELOG) corrective regimen sliding scale   SubCutaneous every 6 hours  losartan 100 milliGRAM(s) Oral daily  pantoprazole    Tablet 40 milliGRAM(s) Oral before breakfast  polyethylene glycol 3350 17 Gram(s) Oral daily  senna 2 Tablet(s) Oral at bedtime    MEDICATIONS  (PRN):  acetaminophen     Tablet .. 650 milliGRAM(s) Oral every 6 hours PRN Temp greater or equal to 38C (100.4F), Mild Pain (1 - 3)  HYDROmorphone  Injectable 0.5 milliGRAM(s) IV Push every 4 hours PRN Severe Pain (7 - 10)      DVT PROPHYLAXIS:   GI PROPHYLAXIS: pantoprazole    Tablet 40 milliGRAM(s) Oral before breakfast    ANTICOAGULATION:   ANTIBIOTICS:            LAB/STUDIES:  Labs:  CAPILLARY BLOOD GLUCOSE      POCT Blood Glucose.: 214 mg/dL (10 Rigoberto 2022 05:35)  POCT Blood Glucose.: 197 mg/dL (09 Jun 2022 22:56)  POCT Blood Glucose.: 224 mg/dL (09 Jun 2022 16:33)  POCT Blood Glucose.: 248 mg/dL (09 Jun 2022 11:00)                          9.1    9.37  )-----------( 202      ( 09 Jun 2022 06:39 )             26.6         06-09    133<L>  |  91<L>  |  13  ----------------------------<  194<H>  4.4   |  31  |  <0.5<L>          LFTs:             6.5  | 0.7  | 21       ------------------[103     ( 09 Jun 2022 06:39 )  4.0  | x    | 30          Lipase:x      Amylase:x             Coags:                        IMAGING:

## 2022-06-10 NOTE — PROGRESS NOTE ADULT - SUBJECTIVE AND OBJECTIVE BOX
TIM CRUZ  [facility] T2-3A 021 B ([facility] T2-3A)      Patient was evaluated and examined  by bedside, no active complains        REVIEW OF SYSTEMS:  please see pertinent positives mentioned above, all other 12 ROS negative      T(C): , Max: 37 (06-09-22 @ 20:34)  HR: 83 (06-10-22 @ 04:52)  BP: 138/63 (06-10-22 @ 04:52)  RR: 18 (06-10-22 @ 04:52)  SpO2: 98% (06-10-22 @ 08:45)  CAPILLARY BLOOD GLUCOSE      POCT Blood Glucose.: 248 mg/dL (10 Rigoberto 2022 11:28)  POCT Blood Glucose.: 214 mg/dL (10 Rigoberto 2022 05:35)  POCT Blood Glucose.: 197 mg/dL (09 Jun 2022 22:56)  POCT Blood Glucose.: 224 mg/dL (09 Jun 2022 16:33)      PHYSICAL EXAM:  General: NAD, AAOX3, patient is laying comfortably in bed  HEENT: AT, NC, Supple, NO JVD, NO CB, trach present  Lungs: good breath sounds B/L, no wheezing, no rhonchi  CVS: normal S1, S2, RRR, NO M/G/R  Abdomen: soft, bowel sounds present, non-tender, non-distended  Extremities: no edema, no clubbing, no cyanosis, positive peripheral pulses b/l  Neuro: no acute focal neurological deficits, generalized weakness, hypersensitivity of lower ext.   Skin: atrophic skin brown discoloration present on b/l lower ext.         LAB  CBC  Date: 06-09-22 @ 06:39  Mean cell Taobtcwheg88.5  Mean cell Hemoglobin Conc34.2  Mean cell Volum 97.8  Platelet count-Automate 202  RBC Count 2.72  Red Cell Distrib Width14.6  WBC Count9.37  % Albumin, Urine--  Hematocrit 26.6  Hemoglobin 9.1  CBC  Date: 06-08-22 @ 08:26  Mean cell Btlcpswckx87.1  Mean cell Hemoglobin Conc35.4  Mean cell Volum 96.3  Platelet count-Automate 186  RBC Count 2.70  Red Cell Distrib Width14.6  WBC Count8.96  % Albumin, Urine--  Hematocrit 26.0  Hemoglobin 9.2  CBC  Date: 06-07-22 @ 04:30  Mean cell Jmzpmqeagd44.0  Mean cell Hemoglobin Conc35.9  Mean cell Volum 94.6  Platelet count-Automate 167  RBC Count 2.59  Red Cell Distrib Width14.2  WBC Count8.38  % Albumin, Urine--  Hematocrit 24.5  Hemoglobin 8.8  CBC  Date: 06-06-22 @ 07:50  Mean cell Uziajayhaj60.8  Mean cell Hemoglobin Conc35.2  Mean cell Volum 96.1  Platelet count-Automate 182  RBC Count 2.81  Red Cell Distrib Width14.4  WBC Count7.20  % Albumin, Urine--  Hematocrit 27.0  Hemoglobin 9.5  CBC  Date: 06-05-22 @ 04:55  Mean cell Gbunvecfnv69.5  Mean cell Hemoglobin Conc34.5  Mean cell Volum 94.0  Platelet count-Automate 161  RBC Count 2.68  Red Cell Distrib Width14.1  WBC Count6.86  % Albumin, Urine--  Hematocrit 25.2  Hemoglobin 8.7  CBC  Date: 06-04-22 @ 05:11  Mean cell Qhrgpmbygu26.6  Mean cell Hemoglobin Conc35.1  Mean cell Volum 95.7  Platelet count-Automate 175  RBC Count 2.77  Red Cell Distrib Width14.4  WBC Count7.55  % Albumin, Urine--  Hematocrit 26.5  Hemoglobin 9.3    BMP  06-09-22 @ 06:39  Blood Gas Arterial-Calcium,Ionized--  Blood Urea Nitrogen, Serum 13 mg/dL [10 - 20]  Carbon Dioxide, Serum31 mmol/L [17 - 32]  Chloride, Serum91 mmol/L<L> [98 - 110]  Creatinie, Serum<0.5 mg/dL<L> [0.7 - 1.5]  Glucose, Zxpdo643 mg/dL<H> [70 - 99]  Potassium, Serum4.4 mmol/L [3.5 - 5.0]  Sodium, Serum 133 mmol/L<L> [135 - 146]  BMP  06-08-22 @ 08:26  Blood Gas Arterial-Calcium,Ionized--  Blood Urea Nitrogen, Serum 13 mg/dL [10 - 20]  Carbon Dioxide, Serum31 mmol/L [17 - 32]  Chloride, Serum89 mmol/L<L> [98 - 110]  Creatinie, Serum0.5 mg/dL<L> [0.7 - 1.5]  Glucose, Bbkak779 mg/dL<H> [70 - 99]  Potassium, Serum4.6 mmol/L [3.5 - 5.0]  Sodium, Serum 133 mmol/L<L> [135 - 146]  [facility]  06-07-22 @ 04:30  Blood Gas Arterial-Calcium,Ionized--  Blood Urea Nitrogen, Serum 15 mg/dL [10 - 20]  Carbon Dioxide, Serum32 mmol/L [17 - 32]  Chloride, Serum89 mmol/L<L> [98 - 110]  Creatinie, Serum<0.5 mg/dL<L> [0.7 - 1.5]  Glucose, Ghroa614 mg/dL<H> [70 - 99]  Potassium, Serum4.6 mmol/L [3.5 - 5.0]  Sodium, Serum 132 mmol/L<L> [135 - 146]  [facility]  06-06-22 @ 07:50  Blood Gas Arterial-Calcium,Ionized--  Blood Urea Nitrogen, Serum 13 mg/dL [10 - 20]  Carbon Dioxide, Serum30 mmol/L [17 - 32]  Chloride, Serum89 mmol/L<L> [98 - 110]  Creatinie, Serum<0.5 mg/dL<L> [0.7 - 1.5]  Glucose, Quvgy721 mg/dL<H> [70 - 99]  Potassium, Serum4.7 mmol/L [3.5 - 5.0]  Sodium, Serum 132 mmol/L<L> [135 - 146]  [facility]  06-06-22 @ 01:36  Blood Gas Arterial-Calcium,Ionized--  Blood Urea Nitrogen, Serum 14 mg/dL [10 - 20]  Carbon Dioxide, Serum34 mmol/L<H> [17 - 32]  Chloride, Serum89 mmol/L<L> [98 - 110]  Creatinie, Serum<0.5 mg/dL<L> [0.7 - 1.5]  Glucose, Kynnt044 mg/dL<H> [70 - 99]  Potassium, Serum4.5 mmol/L [3.5 - 5.0]  Sodium, Serum 133 mmol/L<L> [135 - 146]              Microbiology:    Culture - Blood (collected 06-05-22 @ 04:55)  Source: .Blood None  Preliminary Report (06-06-22 @ 13:01):    No growth to date.    Culture - Blood (collected 06-05-22 @ 02:01)  Source: .Blood Blood  Final Report (06-10-22 @ 08:00):    No Growth Final    Culture - Urine (collected 06-04-22 @ 21:00)  Source: Clean Catch Clean Catch (Midstream)  Final Report (06-05-22 @ 22:53):    <10,000 CFU/mL Normal Urogenital Perri    Culture - Blood (collected 05-29-22 @ 12:33)  Source: .Blood None  Final Report (06-03-22 @ 19:00):    No Growth Final    Culture - Urine (collected 05-28-22 @ 17:24)  Source: Catheterized Catheterized  Final Report (05-29-22 @ 18:18):    <10,000 CFU/mL Normal Urogenital Perri    Culture - Urine (collected 05-28-22 @ 10:07)  Source: Catheterized Catheterized  Final Report (05-30-22 @ 10:17):    >=3 organisms. Probable collection contamination.    Culture - Urine (collected 05-27-22 @ 11:54)  Source: Catheterized Catheterized  Final Report (06-02-22 @ 11:41):    >100,000 CFU/ml Klebsiella pneumoniae (Carbapenem Resistant)    >100,000 CFU/ml Pseudomonas aeruginosa  Organism: Klepne MDRO  Pseudomonas aeruginosa (06-02-22 @ 11:43)  Organism: Pseudomonas aeruginosa (06-02-22 @ 11:43)      -  Amikacin: S <=16      -  Aztreonam: S 8      -  Cefepime: S 4      -  Ceftazidime: S 4      -  Ciprofloxacin: R >2      -  Gentamicin: R >8      -  Imipenem: S 2      -  Levofloxacin: R >4      -  Meropenem: S <=1      -  Piperacillin/Tazobactam: S <=8      -  Tobramycin: R >8      Method Type: ROMY  Organism: Klepne MDRO (06-02-22 @ 11:43)      -  Amikacin: I 32      -  Amoxicillin/Clavulanic Acid: R >16/8      -  Ampicillin: R >16 These ampicillin results predict results for amoxicillin      -  Ampicillin/Sulbactam: R >16/8 Enterobacter, Klebsiella aerogenes, Citrobacter, and Serratia may develop resistance during prolonged therapy (3-4 days)      -  Aztreonam: R >16      -  Cefazolin: R >16 (MIC_CL_COM_ENTERIC_CEFAZU) For uncomplicated UTI with K. pneumoniae, E. coli, or P. mirablis: ROMY <=16 is sensitive and ROMY >=32 is resistant. This also predicts results for oral agents cefaclor, cefdinir, cefpodoxime, cefprozil, cefuroxime axetil, cephalexin and locarbef for uncomplicated UTI. Note that some isolates may be susceptible to these agents while testing resistant to cefazolin.      -  Cefepime: R >16      -  Cefoxitin: R >16      -  Ceftriaxone: R >32 Enterobacter, Klebsiella aerogenes, Citrobacter, and Serratia may develop resistance during prolonged therapy      -  Ciprofloxacin: R >2      -  Ertapenem: R >1      -  Gentamicin: S <=2      -  Imipenem: R >8      -  Levofloxacin: R >4      -  Meropenem: R >8      -  Nitrofurantoin: R >64 Should not be used to treat pyelonephritis      -  Piperacillin/Tazobactam: R >64      -  Tigecycline: S <=2      -  Tobramycin: R >8      -  Trimethoprim/Sulfamethoxazole: R >2/38      Method Type: ROMY        Medications:  acetaminophen     Tablet .. 650 milliGRAM(s) Oral every 6 hours PRN  amLODIPine   Tablet 5 milliGRAM(s) Oral daily  ammonium lactate 12% Lotion 1 Application(s) Topical every 12 hours  apixaban 5 milliGRAM(s) Oral every 12 hours  atorvastatin 20 milliGRAM(s) Oral at bedtime  buMETAnide 2 milliGRAM(s) Oral daily  gabapentin 300 milliGRAM(s) Oral three times a day  HYDROmorphone  Injectable 0.5 milliGRAM(s) IV Push every 4 hours PRN  influenza   Vaccine 0.5 milliLiter(s) IntraMuscular once  insulin glargine Injectable (LANTUS) 15 Unit(s) SubCutaneous at bedtime  insulin lispro (ADMELOG) corrective regimen sliding scale   SubCutaneous every 6 hours  losartan 100 milliGRAM(s) Oral daily  pantoprazole    Tablet 40 milliGRAM(s) Oral before breakfast  polyethylene glycol 3350 17 Gram(s) Oral daily  senna 2 Tablet(s) Oral at bedtime        Assessment and Plan:  Patient is a 55 y/o female  with PMHx of DVT on Eliquis, COPD,  trach collar, obesity, IDDM, UTI, sent by [facility] for hypoxia due to mucous plug.     #mucous plug removed  # chronic COPD  #pneumonia vs atelectasis L base  - On 5/28, Pulmonary team found large granuloma in trachea along tracheostomy tube. Patient had a size 8 trach on admission. Surgery assisted Pulmonary and CCU with trach exchanged with ET tube guidance to a size 7 XLT. Saturations improved to high 90s.  - trach functioning well s/p exchange   - Pt off vent 72 hrs and is saturating well.  -repeated CXR 6/8- partially reopened lung with lower lobe atelectasis   - 6/9- unable to have feeding trial, Pulm team rec. to switch to fenestrated trach tube size 7.5 if available, will f/up with Sx.     #Dysphagia   - Modified barium swallow test done, patient was not able to tolerate test, we have asked Surgical team to change tracheostomy , and speech tx. will repeat another swallow study.   - for now on NGT feedings    #Chronic Indwelling Singh  - 5/28 Ucx - Contaminated   - 5/28 Ucx- Normal perri   - 5/27 Ucx- Klebsiella (CRE) and pseudomonas   - s/p cefepime  - Currently afebrile w/ no urinary complaints   - episode of Hematouria 6/4 -> improving -> h/h Stable       #Nonobstructive Ileus  - abdomen currently soft, nontender, + BS   - off opioids  - C/w bowel regimen     #Hx of DVT  - resumed patient's home eliquis (5mg BID)      Activity OOB to chair  GI PPX  PPI  DVT PPX Eliquis   Dispo: Acute, f/up Speech tx. , continue NGT feedings, f/up Sx. if trach can be changed to fenestrated one than speech tx. will repeat the swallow study.    Total time spent to complete patient's bedside assessment, review medical chart, discuss medical plan of care with covering medical team was more than 35 minutes .

## 2022-06-10 NOTE — SWALLOW BEDSIDE ASSESSMENT ADULT - SWALLOW EVAL: DIAGNOSIS
Pt tolerated PMSV for ~3 minutes, no voicing, pts HR increased from 85 to 95 while wearing valve w/ c/o "tightness" and "difficulty breathing", valve removed. MD aware. Pulse ox 100% t/o trials.

## 2022-06-10 NOTE — SWALLOW BEDSIDE ASSESSMENT ADULT - SLP PERTINENT HISTORY OF CURRENT PROBLEM
pt is a 57 y/o F w/ PMHx: DVT, COPD, resp failure s/p recent trach at [facility] (at baseline on 8L via trach collar), obesity, IDDM, UTI, sent by [facility] for hypoxia. History provided by daughter, reports the current illness goes back to April 4th when pt was intubated on admission at [facility] for hypoxic respiratory failure diagnosed w/ COPD exacerbation and superimposed PNA. Pt w/ prolonged intubation, s/p trach ~2 weeks ago and then d/c'ed to NH. Pt admitted for AHRF 2' mucus plug (now resolved s/p suctioning) vs. possible aspiration PNA. Pt w/ suspected chronic HF, course c/b complete whiteout of the left lung with complete atelectasis, pt upgraded to CCU. General Sx c/s emergently when pt found to be hypoxic d/t large granuloma in trachea along tracheostomy tube. Pt previously had a size 8 trach, exchanged w/ size 7 XLT. Large granuloma removed and pt placed on vent. Pt underwent bronchoscopy on 6/1, good visualization and no tracheal debridement required.

## 2022-06-10 NOTE — PROGRESS NOTE ADULT - SUBJECTIVE AND OBJECTIVE BOX
CRUZ DUMONT 56y Female  MRN#: [id number]   CODE STATUS: Full code    Hospital Day: 14d    Pt is currently admitted with the primary diagnosis of mucous plug of the trach    SUBJECTIVE    Subjective complaints   Patient has significant pain in her LE  Present Today:   - Singh:  No [ x ], Yes [   ] : Indication:     - Type of IV Access:       .. CVC/Piccline:  No [x  ], Yes [   ] : Indication:       .. Midline: No [ x ], Yes [   ] : Indication:                                             ----------------------------------------------------------  OBJECTIVE  PAST MEDICAL & SURGICAL HISTORY  COPD (chronic obstructive pulmonary disease)    CHF (congestive heart failure)    DM (diabetes mellitus)    H/O tracheostomy                                              -----------------------------------------------------------  ALLERGIES:  penicillin (Swelling)                                            ------------------------------------------------------------    HOME MEDICATIONS  Home Medications:  albuterol sulfate 2.5 mg/3 mL (0.083 %) solution for nebulization:  (27 May 2022 15:04)  alprazolam 0.25 mg tablet:  (27 May 2022 15:04)  amlodipine 5 mg tablet:  (27 May 2022 15:04)  atorvastatin 20 mg tablet:  (27 May 2022 15:04)  bumetanide 2 mg tablet:  (27 May 2022 15:04)  clopidogrel 75 mg tablet:  (27 May 2022 15:04)  Eliquis 5 mg tablet:  (27 May 2022 15:04)  gabapentin 300 mg capsule:  (27 May 2022 15:04)  glimepiride 2 mg tablet:  (27 May 2022 15:04)  labetalol 100 mg tablet:  (27 May 2022 15:04)  LANTUS SOLOSTAR 100 UNIT/ML: 55 UNITS ONCE A DAY SUBCUTANEOUS 90 DAYS (27 May 2022 15:04)  losartan 100 mg tablet:  (27 May 2022 15:04)  metformin 1,000 mg tablet:  (27 May 2022 15:04)  Novolog Flexpen U-100 Insulin aspart 100 unit/mL (3 mL) subcutaneous:  (27 May 2022 15:04)  Trelegy Ellipta 100 mcg-62.5 mcg-25 mcg powder for inhalation:  (27 May 2022 15:04)                           MEDICATIONS:  STANDING MEDICATIONS  amLODIPine   Tablet 5 milliGRAM(s) Oral daily  ammonium lactate 12% Lotion 1 Application(s) Topical every 12 hours  apixaban 5 milliGRAM(s) Oral every 12 hours  atorvastatin 20 milliGRAM(s) Oral at bedtime  buMETAnide 2 milliGRAM(s) Oral daily  gabapentin 300 milliGRAM(s) Oral three times a day  HYDROmorphone  Injectable 0.5 milliGRAM(s) IV Push every 3 hours  influenza   Vaccine 0.5 milliLiter(s) IntraMuscular once  insulin glargine Injectable (LANTUS) 15 Unit(s) SubCutaneous at bedtime  insulin lispro (ADMELOG) corrective regimen sliding scale   SubCutaneous every 6 hours  losartan 100 milliGRAM(s) Oral daily  pantoprazole    Tablet 40 milliGRAM(s) Oral before breakfast  polyethylene glycol 3350 17 Gram(s) Oral daily  senna 2 Tablet(s) Oral at bedtime    PRN MEDICATIONS  acetaminophen     Tablet .. 650 milliGRAM(s) Oral every 6 hours PRN                                            ------------------------------------------------------------  VITAL SIGNS: Last 24 Hours  T(C): 36.7 (10 Rigoberto 2022 04:52), Max: 37 (09 Jun 2022 20:34)  T(F): 98 (10 Rigoberto 2022 04:52), Max: 98.6 (09 Jun 2022 20:34)  HR: 83 (10 Rigoberto 2022 04:52) (83 - 91)  BP: 134/60 (10 Rigoberto 2022 14:39) (127/60 - 138/63)  BP(mean): --  RR: 18 (10 Rigoberto 2022 04:52) (18 - 20)  SpO2: 98% (10 Rigoberto 2022 08:45) (98% - 100%)      06-09-22 @ 07:01  -  06-10-22 @ 07:00  --------------------------------------------------------  IN: 720 mL / OUT: 0 mL / NET: 720 mL                                             --------------------------------------------------------------  LABS:                        9.1    9.37  )-----------( 202      ( 09 Jun 2022 06:39 )             26.6     06-09    133<L>  |  91<L>  |  13  ----------------------------<  194<H>  4.4   |  31  |  <0.5<L>    Ca    10.5<H>      09 Jun 2022 06:39  Mg     1.7     06-09    TPro  6.5  /  Alb  4.0  /  TBili  0.7  /  DBili  x   /  AST  21  /  ALT  30  /  AlkPhos  103  06-09                                                              -------------------------------------------------------------  RADIOLOGY:                                            --------------------------------------------------------------    PHYSICAL EXAM:  General: alert oriented  HEENT: non remarkable  LUNGS: clear air sounds  HEART: normal s1 s2  ABDOMEN: soft non tender  EXT: extremely tender LE                                           --------------------------------------------------------------    ASSESSMENT & PLAN  55 yo f with PMHx of DVT on Eliquis, COPD,  trach collar, obesity, IDDM, UTI, sent [facility] for hypoxia. Current illness going back to April 4th when pt was intubated on admission at [facility] ICU for hypoxic respiratory failure diagnosed with copd exacerbation and superimposed pneumonia, and remained intubated for 37 days requiring tracheostomy. Patient stayed at [facility] for 2 days until noted to be hypoxic (saturating low 70's) pt's saturation immediately improving afterwards to 80's after large mucus plug removed and was subsequently sent to [facility]  On arrival to ED pt was saturating 97%, 15L O2 via tracheostomy collar (baseline 8 L at [facility])CXR suspicious for L-sided PNA, inconclusive. CTA neg for PE. Pt received x1 IV Levaquin and Cefepime in ED, admitted to MICU for acute hypoxic hypercapnic respiratory failure secondary to acute mucus plug now resolved vs superimposed pneumonia hospital-acquired.   General Surgery Consulted emergently when patient found to be hypoxic to low 80s after CCU and Pulmonary team found large granuloma in trachea along tracheostomy tube. Patient currently had a size 8 trach. Surgery assisted Pulmonary and CCU with trach exchanged with ET tube guidance to a size 7 XLT. Saturations improved to high 90s. Large granuloma was removed. Patient was no longer in distress after exchange.    ASSESSMENT & PLAN:   #Acute/ chronic COPD with exacerbation (Resolved)  #mucous plug removed  #pneumonia vs atelectasis L base  - On 5/28, Pulmonary team found large granuloma in trachea along tracheostomy tube. Patient had a size 8 trach on admission. Surgery assisted Pulmonary and CCU with trach exchanged with ET tube guidance to a size 7 XLT. Saturations improved to high 90s.  - trach functioning well s/p exchange   - Pt off vent 72 hrs and is saturating well.  - repeat chest x-ray (6/6) shows possible mucus plugging.  suctioning attempted repeat CXR showed improved aeration.  ***Repeat Chest X-ray shows resolution of Mucus plug after suctioning and chest PT  - Sp/Sw eval, if fails, CT Sx FU for PEG tube  - Modified barium swallow test done: patient still unable to eat, will need further therapy.  - Repeat CXR 6/8: left lung opacity/effusion  - Trach change to Fenestrated 8    #Chronic Indwelling Singh  - 5/28 Ucx - Contaminated   - 5/28 Ucx- Normal perri   - 5/27 Ucx- Klebsiella (CRE) and pseudomonas   - s/p cefepime  - Currently afebrile w/ no urinary complaints   - episode of Hematouria 6/4 -> improving -> h/h Stable  - UA     #Nonobstructive Ileus  - abdomen currently soft, nontender, + BS   - off opioids  - C/w bowel regimen     #Hx of DVT  - resumed patient's home eliquis (5mg BID)  - Monitor for signs of bleeding     Activity OOB to chair  GI PPX  PPI  DVT PPX Eliquis   Dispo: Acute.

## 2022-06-10 NOTE — CHART NOTE - NSCHARTNOTEFT_GEN_A_CORE
Pt planned for VFSS today, however possible trach downsize to fenestrated trach today w/ surgery. Study deferred until 6/13. Pt  w/ c/o pain to B/L CRISS. RN aware. SLP to f/u.

## 2022-06-11 LAB
ALBUMIN SERPL ELPH-MCNC: 4 G/DL — SIGNIFICANT CHANGE UP (ref 3.5–5.2)
ALP SERPL-CCNC: 106 U/L — SIGNIFICANT CHANGE UP (ref 30–115)
ALT FLD-CCNC: 26 U/L — SIGNIFICANT CHANGE UP (ref 0–41)
ANION GAP SERPL CALC-SCNC: 10 MMOL/L — SIGNIFICANT CHANGE UP (ref 7–14)
AST SERPL-CCNC: 21 U/L — SIGNIFICANT CHANGE UP (ref 0–41)
BASOPHILS # BLD AUTO: 0.04 K/UL — SIGNIFICANT CHANGE UP (ref 0–0.2)
BASOPHILS NFR BLD AUTO: 0.5 % — SIGNIFICANT CHANGE UP (ref 0–1)
BILIRUB SERPL-MCNC: 0.7 MG/DL — SIGNIFICANT CHANGE UP (ref 0.2–1.2)
BUN SERPL-MCNC: 13 MG/DL — SIGNIFICANT CHANGE UP (ref 10–20)
CALCIUM SERPL-MCNC: 10.3 MG/DL — HIGH (ref 8.5–10.1)
CHLORIDE SERPL-SCNC: 88 MMOL/L — LOW (ref 98–110)
CO2 SERPL-SCNC: 31 MMOL/L — SIGNIFICANT CHANGE UP (ref 17–32)
CREAT SERPL-MCNC: <0.5 MG/DL — LOW (ref 0.7–1.5)
EGFR: 115 ML/MIN/1.73M2 — SIGNIFICANT CHANGE UP
EOSINOPHIL # BLD AUTO: 0.39 K/UL — SIGNIFICANT CHANGE UP (ref 0–0.7)
EOSINOPHIL NFR BLD AUTO: 4.9 % — SIGNIFICANT CHANGE UP (ref 0–8)
GLUCOSE BLDC GLUCOMTR-MCNC: 167 MG/DL — HIGH (ref 70–99)
GLUCOSE BLDC GLUCOMTR-MCNC: 212 MG/DL — HIGH (ref 70–99)
GLUCOSE BLDC GLUCOMTR-MCNC: 213 MG/DL — HIGH (ref 70–99)
GLUCOSE BLDC GLUCOMTR-MCNC: 221 MG/DL — HIGH (ref 70–99)
GLUCOSE BLDC GLUCOMTR-MCNC: 301 MG/DL — HIGH (ref 70–99)
GLUCOSE SERPL-MCNC: 211 MG/DL — HIGH (ref 70–99)
HCT VFR BLD CALC: 25.4 % — LOW (ref 37–47)
HGB BLD-MCNC: 9.1 G/DL — LOW (ref 12–16)
IMM GRANULOCYTES NFR BLD AUTO: 0.5 % — HIGH (ref 0.1–0.3)
LYMPHOCYTES # BLD AUTO: 1.4 K/UL — SIGNIFICANT CHANGE UP (ref 1.2–3.4)
LYMPHOCYTES # BLD AUTO: 17.8 % — LOW (ref 20.5–51.1)
MAGNESIUM SERPL-MCNC: 1.8 MG/DL — SIGNIFICANT CHANGE UP (ref 1.8–2.4)
MCHC RBC-ENTMCNC: 34 PG — HIGH (ref 27–31)
MCHC RBC-ENTMCNC: 35.8 G/DL — SIGNIFICANT CHANGE UP (ref 32–37)
MCV RBC AUTO: 94.8 FL — SIGNIFICANT CHANGE UP (ref 81–99)
MONOCYTES # BLD AUTO: 0.67 K/UL — HIGH (ref 0.1–0.6)
MONOCYTES NFR BLD AUTO: 8.5 % — SIGNIFICANT CHANGE UP (ref 1.7–9.3)
NEUTROPHILS # BLD AUTO: 5.34 K/UL — SIGNIFICANT CHANGE UP (ref 1.4–6.5)
NEUTROPHILS NFR BLD AUTO: 67.8 % — SIGNIFICANT CHANGE UP (ref 42.2–75.2)
NRBC # BLD: 0 /100 WBCS — SIGNIFICANT CHANGE UP (ref 0–0)
PLATELET # BLD AUTO: 213 K/UL — SIGNIFICANT CHANGE UP (ref 130–400)
POTASSIUM SERPL-MCNC: 4.3 MMOL/L — SIGNIFICANT CHANGE UP (ref 3.5–5)
POTASSIUM SERPL-SCNC: 4.3 MMOL/L — SIGNIFICANT CHANGE UP (ref 3.5–5)
PROT SERPL-MCNC: 6.5 G/DL — SIGNIFICANT CHANGE UP (ref 6–8)
RBC # BLD: 2.68 M/UL — LOW (ref 4.2–5.4)
RBC # FLD: 14.1 % — SIGNIFICANT CHANGE UP (ref 11.5–14.5)
SODIUM SERPL-SCNC: 129 MMOL/L — LOW (ref 135–146)
WBC # BLD: 7.88 K/UL — SIGNIFICANT CHANGE UP (ref 4.8–10.8)
WBC # FLD AUTO: 7.88 K/UL — SIGNIFICANT CHANGE UP (ref 4.8–10.8)

## 2022-06-11 PROCEDURE — 99233 SBSQ HOSP IP/OBS HIGH 50: CPT

## 2022-06-11 RX ORDER — INSULIN LISPRO 100/ML
4 VIAL (ML) SUBCUTANEOUS
Refills: 0 | Status: DISCONTINUED | OUTPATIENT
Start: 2022-06-11 | End: 2022-06-13

## 2022-06-11 RX ADMIN — PANTOPRAZOLE SODIUM 40 MILLIGRAM(S): 20 TABLET, DELAYED RELEASE ORAL at 06:23

## 2022-06-11 RX ADMIN — GABAPENTIN 300 MILLIGRAM(S): 400 CAPSULE ORAL at 13:22

## 2022-06-11 RX ADMIN — BUMETANIDE 2 MILLIGRAM(S): 0.25 INJECTION INTRAMUSCULAR; INTRAVENOUS at 06:22

## 2022-06-11 RX ADMIN — HYDROMORPHONE HYDROCHLORIDE 0.5 MILLIGRAM(S): 2 INJECTION INTRAMUSCULAR; INTRAVENOUS; SUBCUTANEOUS at 11:47

## 2022-06-11 RX ADMIN — LOSARTAN POTASSIUM 100 MILLIGRAM(S): 100 TABLET, FILM COATED ORAL at 06:23

## 2022-06-11 RX ADMIN — GABAPENTIN 300 MILLIGRAM(S): 400 CAPSULE ORAL at 06:22

## 2022-06-11 RX ADMIN — POLYETHYLENE GLYCOL 3350 17 GRAM(S): 17 POWDER, FOR SOLUTION ORAL at 11:47

## 2022-06-11 RX ADMIN — Medication 4 UNIT(S): at 17:36

## 2022-06-11 RX ADMIN — HYDROMORPHONE HYDROCHLORIDE 0.5 MILLIGRAM(S): 2 INJECTION INTRAMUSCULAR; INTRAVENOUS; SUBCUTANEOUS at 17:09

## 2022-06-11 RX ADMIN — HYDROMORPHONE HYDROCHLORIDE 0.5 MILLIGRAM(S): 2 INJECTION INTRAMUSCULAR; INTRAVENOUS; SUBCUTANEOUS at 00:16

## 2022-06-11 RX ADMIN — HYDROMORPHONE HYDROCHLORIDE 0.5 MILLIGRAM(S): 2 INJECTION INTRAMUSCULAR; INTRAVENOUS; SUBCUTANEOUS at 03:00

## 2022-06-11 RX ADMIN — HYDROMORPHONE HYDROCHLORIDE 0.5 MILLIGRAM(S): 2 INJECTION INTRAMUSCULAR; INTRAVENOUS; SUBCUTANEOUS at 09:03

## 2022-06-11 RX ADMIN — HYDROMORPHONE HYDROCHLORIDE 0.5 MILLIGRAM(S): 2 INJECTION INTRAMUSCULAR; INTRAVENOUS; SUBCUTANEOUS at 06:18

## 2022-06-11 RX ADMIN — INSULIN GLARGINE 15 UNIT(S): 100 INJECTION, SOLUTION SUBCUTANEOUS at 21:20

## 2022-06-11 RX ADMIN — HYDROMORPHONE HYDROCHLORIDE 0.5 MILLIGRAM(S): 2 INJECTION INTRAMUSCULAR; INTRAVENOUS; SUBCUTANEOUS at 23:06

## 2022-06-11 RX ADMIN — GABAPENTIN 300 MILLIGRAM(S): 400 CAPSULE ORAL at 21:21

## 2022-06-11 RX ADMIN — HYDROMORPHONE HYDROCHLORIDE 0.5 MILLIGRAM(S): 2 INJECTION INTRAMUSCULAR; INTRAVENOUS; SUBCUTANEOUS at 08:48

## 2022-06-11 RX ADMIN — Medication 1 APPLICATION(S): at 07:21

## 2022-06-11 RX ADMIN — HYDROMORPHONE HYDROCHLORIDE 0.5 MILLIGRAM(S): 2 INJECTION INTRAMUSCULAR; INTRAVENOUS; SUBCUTANEOUS at 12:03

## 2022-06-11 RX ADMIN — Medication 0.25 MILLIGRAM(S): at 21:21

## 2022-06-11 RX ADMIN — HYDROMORPHONE HYDROCHLORIDE 0.5 MILLIGRAM(S): 2 INJECTION INTRAMUSCULAR; INTRAVENOUS; SUBCUTANEOUS at 14:15

## 2022-06-11 RX ADMIN — APIXABAN 5 MILLIGRAM(S): 2.5 TABLET, FILM COATED ORAL at 06:22

## 2022-06-11 RX ADMIN — HYDROMORPHONE HYDROCHLORIDE 0.5 MILLIGRAM(S): 2 INJECTION INTRAMUSCULAR; INTRAVENOUS; SUBCUTANEOUS at 14:00

## 2022-06-11 RX ADMIN — HYDROMORPHONE HYDROCHLORIDE 0.5 MILLIGRAM(S): 2 INJECTION INTRAMUSCULAR; INTRAVENOUS; SUBCUTANEOUS at 00:46

## 2022-06-11 RX ADMIN — APIXABAN 5 MILLIGRAM(S): 2.5 TABLET, FILM COATED ORAL at 17:07

## 2022-06-11 RX ADMIN — SENNA PLUS 2 TABLET(S): 8.6 TABLET ORAL at 21:21

## 2022-06-11 RX ADMIN — Medication 4: at 12:06

## 2022-06-11 RX ADMIN — HYDROMORPHONE HYDROCHLORIDE 0.5 MILLIGRAM(S): 2 INJECTION INTRAMUSCULAR; INTRAVENOUS; SUBCUTANEOUS at 20:25

## 2022-06-11 RX ADMIN — HYDROMORPHONE HYDROCHLORIDE 0.5 MILLIGRAM(S): 2 INJECTION INTRAMUSCULAR; INTRAVENOUS; SUBCUTANEOUS at 17:25

## 2022-06-11 RX ADMIN — AMLODIPINE BESYLATE 5 MILLIGRAM(S): 2.5 TABLET ORAL at 06:22

## 2022-06-11 RX ADMIN — Medication 1 APPLICATION(S): at 17:07

## 2022-06-11 RX ADMIN — HYDROMORPHONE HYDROCHLORIDE 0.5 MILLIGRAM(S): 2 INJECTION INTRAMUSCULAR; INTRAVENOUS; SUBCUTANEOUS at 23:21

## 2022-06-11 RX ADMIN — HYDROMORPHONE HYDROCHLORIDE 0.5 MILLIGRAM(S): 2 INJECTION INTRAMUSCULAR; INTRAVENOUS; SUBCUTANEOUS at 06:48

## 2022-06-11 RX ADMIN — ATORVASTATIN CALCIUM 20 MILLIGRAM(S): 80 TABLET, FILM COATED ORAL at 21:21

## 2022-06-11 RX ADMIN — HYDROMORPHONE HYDROCHLORIDE 0.5 MILLIGRAM(S): 2 INJECTION INTRAMUSCULAR; INTRAVENOUS; SUBCUTANEOUS at 20:10

## 2022-06-11 NOTE — CHART NOTE - NSCHARTNOTEFT_GEN_A_CORE
Tracheostomy exchanged at bedside to size 8 with attending.  Patient should be suctioned PRN and work with RT/speech for help with speaking/speaking valve  patient left in stable condition  please recall PRN [id number]

## 2022-06-11 NOTE — PROGRESS NOTE ADULT - SUBJECTIVE AND OBJECTIVE BOX
CRUZ DUMONT  [facility] T2-3A 021 B ([facility] T2-3A)      Patient was evaluated and examined  by bedside, no active complains      REVIEW OF SYSTEMS:  please see pertinent positives mentioned above, all other 12 ROS negative      T(C): , Max: 36.7 (06-11-22 @ 05:00)  HR: 88 (06-11-22 @ 05:00)  BP: 140/63 (06-11-22 @ 05:00)  RR: 18 (06-11-22 @ 05:00)  SpO2: 96% (06-11-22 @ 08:05)  CAPILLARY BLOOD GLUCOSE      POCT Blood Glucose.: 301 mg/dL (11 Jun 2022 11:27)  POCT Blood Glucose.: 221 mg/dL (11 Jun 2022 06:20)  POCT Blood Glucose.: 212 mg/dL (11 Jun 2022 00:11)  POCT Blood Glucose.: 230 mg/dL (10 Rigoberto 2022 22:49)  POCT Blood Glucose.: 219 mg/dL (10 Rigoberto 2022 18:45)      PHYSICAL EXAM:  General: NAD, AAOX3, patient is laying comfortably in bed  HEENT: AT, NC, Supple, NO JVD, NO CB, trach present  Lungs: good breath sounds B/L, no wheezing, no rhonchi  CVS: normal S1, S2, RRR, NO M/G/R  Abdomen: soft, bowel sounds present, non-tender, non-distended  Extremities: no edema, no clubbing, no cyanosis, positive peripheral pulses b/l  Neuro: no acute focal neurological deficits, generalized weakness, hypersensitivity of lower ext.   Skin: atrophic skin brown discoloration present on b/l lower ext.         LAB  CBC  Date: 06-11-22 @ 05:56  Mean cell Hfbqlsgunz70.0  Mean cell Hemoglobin Conc35.8  Mean cell Volum 94.8  Platelet count-Automate 213  RBC Count 2.68  Red Cell Distrib Width14.1  WBC Count7.88  % Albumin, Urine--  Hematocrit 25.4  Hemoglobin 9.1  CBC  Date: 06-09-22 @ 06:39  Mean cell Nskaxscdjd84.5  Mean cell Hemoglobin Conc34.2  Mean cell Volum 97.8  Platelet count-Automate 202  RBC Count 2.72  Red Cell Distrib Width14.6  WBC Count9.37  % Albumin, Urine--  Hematocrit 26.6  Hemoglobin 9.1  CBC  Date: 06-08-22 @ 08:26  Mean cell Fikzzxrtdw66.1  Mean cell Hemoglobin Conc35.4  Mean cell Volum 96.3  Platelet count-Automate 186  RBC Count 2.70  Red Cell Distrib Width14.6  WBC Count8.96  % Albumin, Urine--  Hematocrit 26.0  Hemoglobin 9.2  CBC  Date: 06-07-22 @ 04:30  Mean cell Siqudjfntt28.0  Mean cell Hemoglobin Conc35.9  Mean cell Volum 94.6  Platelet count-Automate 167  RBC Count 2.59  Red Cell Distrib Width14.2  WBC Count8.38  % Albumin, Urine--  Hematocrit 24.5  Hemoglobin 8.8  CBC  Date: 06-06-22 @ 07:50  Mean cell Sofynebvhf74.8  Mean cell Hemoglobin Conc35.2  Mean cell Volum 96.1  Platelet count-Automate 182  RBC Count 2.81  Red Cell Distrib Width14.4  WBC Count7.20  % Albumin, Urine--  Hematocrit 27.0  Hemoglobin 9.5  CBC  Date: 06-05-22 @ 04:55  Mean cell Jdzdszyrby04.5  Mean cell Hemoglobin Conc34.5  Mean cell Volum 94.0  Platelet count-Automate 161  RBC Count 2.68  Red Cell Distrib Width14.1  WBC Count6.86  % Albumin, Urine--  Hematocrit 25.2  Hemoglobin 8.7    BMP  06-11-22 @ 05:56  Blood Gas Arterial-Calcium,Ionized--  Blood Urea Nitrogen, Serum 13 mg/dL [10 - 20]  Carbon Dioxide, Serum31 mmol/L [17 - 32]  Chloride, Serum88 mmol/L<L> [98 - 110]  Creatinie, Serum<0.5 mg/dL<L> [0.7 - 1.5]  Glucose, Yzupm875 mg/dL<H> [70 - 99]  Potassium, Serum4.3 mmol/L [3.5 - 5.0]  Sodium, Serum 129 mmol/L<L> [135 - 146]  BMP  06-09-22 @ 06:39  Blood Gas Arterial-Calcium,Ionized--  Blood Urea Nitrogen, Serum 13 mg/dL [10 - 20]  Carbon Dioxide, Serum31 mmol/L [17 - 32]  Chloride, Serum91 mmol/L<L> [98 - 110]  Creatinie, Serum<0.5 mg/dL<L> [0.7 - 1.5]  Glucose, Bitws346 mg/dL<H> [70 - 99]  Potassium, Serum4.4 mmol/L [3.5 - 5.0]  Sodium, Serum 133 mmol/L<L> [135 - 146]  [facility]  06-08-22 @ 08:26  Blood Gas Arterial-Calcium,Ionized--  Blood Urea Nitrogen, Serum 13 mg/dL [10 - 20]  Carbon Dioxide, Serum31 mmol/L [17 - 32]  Chloride, Serum89 mmol/L<L> [98 - 110]  Creatinie, Serum0.5 mg/dL<L> [0.7 - 1.5]  Glucose, Fpgki573 mg/dL<H> [70 - 99]  Potassium, Serum4.6 mmol/L [3.5 - 5.0]  Sodium, Serum 133 mmol/L<L> [135 - 146]  [facility]  06-07-22 @ 04:30  Blood Gas Arterial-Calcium,Ionized--  Blood Urea Nitrogen, Serum 15 mg/dL [10 - 20]  Carbon Dioxide, Serum32 mmol/L [17 - 32]  Chloride, Serum89 mmol/L<L> [98 - 110]  Creatinie, Serum<0.5 mg/dL<L> [0.7 - 1.5]  Glucose, Ksuhf289 mg/dL<H> [70 - 99]  Potassium, Serum4.6 mmol/L [3.5 - 5.0]  Sodium, Serum 132 mmol/L<L> [135 - 146]              Microbiology:    Culture - Blood (collected 06-05-22 @ 04:55)  Source: .Blood None  Final Report (06-10-22 @ 12:00):    No Growth Final    Culture - Blood (collected 06-05-22 @ 02:01)  Source: .Blood Blood  Final Report (06-10-22 @ 08:00):    No Growth Final    Culture - Urine (collected 06-04-22 @ 21:00)  Source: Clean Catch Clean Catch (Midstream)  Final Report (06-05-22 @ 22:53):    <10,000 CFU/mL Normal Urogenital Perri    Culture - Blood (collected 05-29-22 @ 12:33)  Source: .Blood None  Final Report (06-03-22 @ 19:00):    No Growth Final    Culture - Urine (collected 05-28-22 @ 17:24)  Source: Catheterized Catheterized  Final Report (05-29-22 @ 18:18):    <10,000 CFU/mL Normal Urogenital Perri    Culture - Urine (collected 05-28-22 @ 10:07)  Source: Catheterized Catheterized  Final Report (05-30-22 @ 10:17):    >=3 organisms. Probable collection contamination.    Culture - Urine (collected 05-27-22 @ 11:54)  Source: Catheterized Catheterized  Final Report (06-02-22 @ 11:41):    >100,000 CFU/ml Klebsiella pneumoniae (Carbapenem Resistant)    >100,000 CFU/ml Pseudomonas aeruginosa  Organism: Klepne MDRO  Pseudomonas aeruginosa (06-02-22 @ 11:43)  Organism: Pseudomonas aeruginosa (06-02-22 @ 11:43)      -  Amikacin: S <=16      -  Aztreonam: S 8      -  Cefepime: S 4      -  Ceftazidime: S 4      -  Ciprofloxacin: R >2      -  Gentamicin: R >8      -  Imipenem: S 2      -  Levofloxacin: R >4      -  Meropenem: S <=1      -  Piperacillin/Tazobactam: S <=8      -  Tobramycin: R >8      Method Type: ROMY  Organism: Elisapnwendi MDRO (06-02-22 @ 11:43)      -  Amikacin: I 32      -  Amoxicillin/Clavulanic Acid: R >16/8      -  Ampicillin: R >16 These ampicillin results predict results for amoxicillin      -  Ampicillin/Sulbactam: R >16/8 Enterobacter, Klebsiella aerogenes, Citrobacter, and Serratia may develop resistance during prolonged therapy (3-4 days)      -  Aztreonam: R >16      -  Cefazolin: R >16 (MIC_CL_[id number]) For uncomplicated UTI with K. pneumoniae, E. coli, or P. mirablis: ROMY <=16 is sensitive and ROMY >=32 is resistant. This also predicts results for oral agents cefaclor, cefdinir, cefpodoxime, cefprozil, cefuroxime axetil, cephalexin and locarbef for uncomplicated UTI. Note that some isolates may be susceptible to these agents while testing resistant to cefazolin.      -  Cefepime: R >16      -  Cefoxitin: R >16      -  Ceftriaxone: R >32 Enterobacter, Klebsiella aerogenes, Citrobacter, and Serratia may develop resistance during prolonged therapy      -  Ciprofloxacin: R >2      -  Ertapenem: R >1      -  Gentamicin: S <=2      -  Imipenem: R >8      -  Levofloxacin: R >4      -  Meropenem: R >8      -  Nitrofurantoin: R >64 Should not be used to treat pyelonephritis      -  Piperacillin/Tazobactam: R >64      -  Tigecycline: S <=2      -  Tobramycin: R >8      -  Trimethoprim/Sulfamethoxazole: R >2/38      Method Type: ROMY      Medications:  acetaminophen     Tablet .. 650 milliGRAM(s) Oral every 6 hours PRN  ALPRAZolam 0.25 milliGRAM(s) Oral at bedtime  amLODIPine   Tablet 5 milliGRAM(s) Oral daily  ammonium lactate 12% Lotion 1 Application(s) Topical every 12 hours  apixaban 5 milliGRAM(s) Oral every 12 hours  atorvastatin 20 milliGRAM(s) Oral at bedtime  buMETAnide 2 milliGRAM(s) Oral daily  gabapentin 300 milliGRAM(s) Oral three times a day  HYDROmorphone  Injectable 0.5 milliGRAM(s) IV Push every 3 hours  influenza   Vaccine 0.5 milliLiter(s) IntraMuscular once  insulin glargine Injectable (LANTUS) 15 Unit(s) SubCutaneous at bedtime  insulin lispro (ADMELOG) corrective regimen sliding scale   SubCutaneous every 6 hours  insulin lispro Injectable (ADMELOG) 4 Unit(s) SubCutaneous three times a day before meals  losartan 100 milliGRAM(s) Oral daily  pantoprazole    Tablet 40 milliGRAM(s) Oral before breakfast  polyethylene glycol 3350 17 Gram(s) Oral daily  senna 2 Tablet(s) Oral at bedtime        Assessment and Plan:  Patient is a 57 y/o female  with PMHx of DVT on Eliquis, COPD,  trach collar, obesity, IDDM, UTI, sent by [facility] for hypoxia due to mucous plug.     #mucous plug removed  # chronic COPD  #pneumonia vs atelectasis L base  - On 5/28, Pulmonary team found large granuloma in trachea along tracheostomy tube. Patient had a size 8 trach on admission. Surgery assisted Pulmonary and CCU with trach exchanged with ET tube guidance to a size 7 XLT. Saturations improved to high 90s.  - trach functioning well s/p exchange   - Pt off vent 72 hrs and is saturating well.  -repeated CXR 6/8- partially reopened lung with lower lobe atelectasis   - 6/9- unable to have feeding trial, Pulm team rec. to switch to fenestrated trach tube size 7.5 or size8  if available, will f/up with Sx. ( waiting for past 48 hours)    #Dysphagia   - Modified barium swallow test done, patient was not able to tolerate test, we have asked Surgical team to change tracheostomy , and speech tx. will repeat another swallow study.   - for now on NGT feedings    # DM - hyperglycemia- increased insulin regimen dose to optimize glycemic control    # Hyponatremia- monitor BMP     #Chronic Indwelling Singh  - 5/28 Ucx - Contaminated   - 5/28 Ucx- Normal perri   - 5/27 Ucx- Klebsiella (CRE) and pseudomonas   - s/p cefepime  - Currently afebrile w/ no urinary complaints   - episode of Hematouria 6/4 -> improving -> h/h Stable       #Nonobstructive Ileus  - abdomen currently soft, nontender, + BS   - off opioids  - C/w bowel regimen     #Hx of DVT  - resumed patient's home eliquis (5mg BID)      Activity OOB to chair  GI PPX  PPI  DVT PPX Eliquis   Dispo: Acute, f/up Speech tx. , continue NGT feedings, f/up Sx. to change tracheostomy to  fenestrated one than speech tx. will repeat the swallow study.    Total time spent to complete patient's bedside assessment, review medical chart, discuss medical plan of care with covering medical team was more than 35 minutes .

## 2022-06-12 LAB
GLUCOSE BLDC GLUCOMTR-MCNC: 193 MG/DL — HIGH (ref 70–99)
GLUCOSE BLDC GLUCOMTR-MCNC: 207 MG/DL — HIGH (ref 70–99)
GLUCOSE BLDC GLUCOMTR-MCNC: 226 MG/DL — HIGH (ref 70–99)
GLUCOSE BLDC GLUCOMTR-MCNC: 235 MG/DL — HIGH (ref 70–99)
GLUCOSE BLDC GLUCOMTR-MCNC: 256 MG/DL — HIGH (ref 70–99)
GLUCOSE BLDC GLUCOMTR-MCNC: 269 MG/DL — HIGH (ref 70–99)

## 2022-06-12 PROCEDURE — 99233 SBSQ HOSP IP/OBS HIGH 50: CPT

## 2022-06-12 PROCEDURE — 71045 X-RAY EXAM CHEST 1 VIEW: CPT | Mod: 26

## 2022-06-12 RX ORDER — HYDROMORPHONE HYDROCHLORIDE 2 MG/ML
1 INJECTION INTRAMUSCULAR; INTRAVENOUS; SUBCUTANEOUS ONCE
Refills: 0 | Status: DISCONTINUED | OUTPATIENT
Start: 2022-06-12 | End: 2022-06-12

## 2022-06-12 RX ADMIN — HYDROMORPHONE HYDROCHLORIDE 0.5 MILLIGRAM(S): 2 INJECTION INTRAMUSCULAR; INTRAVENOUS; SUBCUTANEOUS at 05:02

## 2022-06-12 RX ADMIN — HYDROMORPHONE HYDROCHLORIDE 1 MILLIGRAM(S): 2 INJECTION INTRAMUSCULAR; INTRAVENOUS; SUBCUTANEOUS at 13:09

## 2022-06-12 RX ADMIN — Medication 1 APPLICATION(S): at 18:06

## 2022-06-12 RX ADMIN — HYDROMORPHONE HYDROCHLORIDE 0.5 MILLIGRAM(S): 2 INJECTION INTRAMUSCULAR; INTRAVENOUS; SUBCUTANEOUS at 08:20

## 2022-06-12 RX ADMIN — Medication 4 UNIT(S): at 06:44

## 2022-06-12 RX ADMIN — ATORVASTATIN CALCIUM 20 MILLIGRAM(S): 80 TABLET, FILM COATED ORAL at 22:28

## 2022-06-12 RX ADMIN — HYDROMORPHONE HYDROCHLORIDE 1 MILLIGRAM(S): 2 INJECTION INTRAMUSCULAR; INTRAVENOUS; SUBCUTANEOUS at 13:24

## 2022-06-12 RX ADMIN — HYDROMORPHONE HYDROCHLORIDE 0.5 MILLIGRAM(S): 2 INJECTION INTRAMUSCULAR; INTRAVENOUS; SUBCUTANEOUS at 21:15

## 2022-06-12 RX ADMIN — GABAPENTIN 300 MILLIGRAM(S): 400 CAPSULE ORAL at 22:28

## 2022-06-12 RX ADMIN — Medication 4 UNIT(S): at 11:53

## 2022-06-12 RX ADMIN — HYDROMORPHONE HYDROCHLORIDE 0.5 MILLIGRAM(S): 2 INJECTION INTRAMUSCULAR; INTRAVENOUS; SUBCUTANEOUS at 02:20

## 2022-06-12 RX ADMIN — PANTOPRAZOLE SODIUM 40 MILLIGRAM(S): 20 TABLET, DELAYED RELEASE ORAL at 06:19

## 2022-06-12 RX ADMIN — HYDROMORPHONE HYDROCHLORIDE 0.5 MILLIGRAM(S): 2 INJECTION INTRAMUSCULAR; INTRAVENOUS; SUBCUTANEOUS at 21:45

## 2022-06-12 RX ADMIN — HYDROMORPHONE HYDROCHLORIDE 0.5 MILLIGRAM(S): 2 INJECTION INTRAMUSCULAR; INTRAVENOUS; SUBCUTANEOUS at 18:03

## 2022-06-12 RX ADMIN — HYDROMORPHONE HYDROCHLORIDE 0.5 MILLIGRAM(S): 2 INJECTION INTRAMUSCULAR; INTRAVENOUS; SUBCUTANEOUS at 15:14

## 2022-06-12 RX ADMIN — SENNA PLUS 2 TABLET(S): 8.6 TABLET ORAL at 22:28

## 2022-06-12 RX ADMIN — HYDROMORPHONE HYDROCHLORIDE 0.5 MILLIGRAM(S): 2 INJECTION INTRAMUSCULAR; INTRAVENOUS; SUBCUTANEOUS at 18:18

## 2022-06-12 RX ADMIN — Medication 1 APPLICATION(S): at 06:20

## 2022-06-12 RX ADMIN — INSULIN GLARGINE 15 UNIT(S): 100 INJECTION, SOLUTION SUBCUTANEOUS at 22:28

## 2022-06-12 RX ADMIN — LOSARTAN POTASSIUM 100 MILLIGRAM(S): 100 TABLET, FILM COATED ORAL at 06:19

## 2022-06-12 RX ADMIN — HYDROMORPHONE HYDROCHLORIDE 0.5 MILLIGRAM(S): 2 INJECTION INTRAMUSCULAR; INTRAVENOUS; SUBCUTANEOUS at 14:49

## 2022-06-12 RX ADMIN — Medication 2: at 11:52

## 2022-06-12 RX ADMIN — GABAPENTIN 300 MILLIGRAM(S): 400 CAPSULE ORAL at 13:01

## 2022-06-12 RX ADMIN — APIXABAN 5 MILLIGRAM(S): 2.5 TABLET, FILM COATED ORAL at 18:03

## 2022-06-12 RX ADMIN — GABAPENTIN 300 MILLIGRAM(S): 400 CAPSULE ORAL at 06:19

## 2022-06-12 RX ADMIN — APIXABAN 5 MILLIGRAM(S): 2.5 TABLET, FILM COATED ORAL at 06:19

## 2022-06-12 RX ADMIN — HYDROMORPHONE HYDROCHLORIDE 0.5 MILLIGRAM(S): 2 INJECTION INTRAMUSCULAR; INTRAVENOUS; SUBCUTANEOUS at 08:04

## 2022-06-12 RX ADMIN — BUMETANIDE 2 MILLIGRAM(S): 0.25 INJECTION INTRAMUSCULAR; INTRAVENOUS at 06:19

## 2022-06-12 RX ADMIN — Medication 4 UNIT(S): at 18:04

## 2022-06-12 RX ADMIN — HYDROMORPHONE HYDROCHLORIDE 0.5 MILLIGRAM(S): 2 INJECTION INTRAMUSCULAR; INTRAVENOUS; SUBCUTANEOUS at 02:05

## 2022-06-12 RX ADMIN — AMLODIPINE BESYLATE 5 MILLIGRAM(S): 2.5 TABLET ORAL at 06:19

## 2022-06-12 RX ADMIN — HYDROMORPHONE HYDROCHLORIDE 0.5 MILLIGRAM(S): 2 INJECTION INTRAMUSCULAR; INTRAVENOUS; SUBCUTANEOUS at 11:21

## 2022-06-12 RX ADMIN — POLYETHYLENE GLYCOL 3350 17 GRAM(S): 17 POWDER, FOR SOLUTION ORAL at 11:47

## 2022-06-12 RX ADMIN — HYDROMORPHONE HYDROCHLORIDE 0.5 MILLIGRAM(S): 2 INJECTION INTRAMUSCULAR; INTRAVENOUS; SUBCUTANEOUS at 11:06

## 2022-06-12 RX ADMIN — Medication 0.25 MILLIGRAM(S): at 22:28

## 2022-06-12 RX ADMIN — HYDROMORPHONE HYDROCHLORIDE 0.5 MILLIGRAM(S): 2 INJECTION INTRAMUSCULAR; INTRAVENOUS; SUBCUTANEOUS at 05:17

## 2022-06-12 NOTE — PROGRESS NOTE ADULT - SUBJECTIVE AND OBJECTIVE BOX
CRUZ DUMONT 56y Female  MRN#: [id number]   CODE STATUS: Full code    Hospital Day: 16d    Pt is currently admitted with the primary diagnosis of Acute hypoxic Failure    SUBJECTIVE    Subjective complaints   Patient doing better but still has LE pain  Present Today:   - Samantha:  No [  ], Yes [x   ] : Indication:     - Type of IV Access:       .. CVC/Piccline:  No [  x], Yes [   ] : Indication:       .. Midline: No [ x ], Yes [   ] : Indication:                                             ----------------------------------------------------------  OBJECTIVE  PAST MEDICAL & SURGICAL HISTORY  COPD (chronic obstructive pulmonary disease)    CHF (congestive heart failure)    DM (diabetes mellitus)    H/O tracheostomy                                              -----------------------------------------------------------  ALLERGIES:  penicillin (Swelling)                                            ------------------------------------------------------------    HOME MEDICATIONS  Home Medications:  albuterol sulfate 2.5 mg/3 mL (0.083 %) solution for nebulization:  (27 May 2022 15:04)  alprazolam 0.25 mg tablet:  (27 May 2022 15:04)  amlodipine 5 mg tablet:  (27 May 2022 15:04)  atorvastatin 20 mg tablet:  (27 May 2022 15:04)  bumetanide 2 mg tablet:  (27 May 2022 15:04)  clopidogrel 75 mg tablet:  (27 May 2022 15:04)  Eliquis 5 mg tablet:  (27 May 2022 15:04)  gabapentin 300 mg capsule:  (27 May 2022 15:04)  glimepiride 2 mg tablet:  (27 May 2022 15:04)  labetalol 100 mg tablet:  (27 May 2022 15:04)  LANTUS SOLOSTAR 100 UNIT/ML: 55 UNITS ONCE A DAY SUBCUTANEOUS 90 DAYS (27 May 2022 15:04)  losartan 100 mg tablet:  (27 May 2022 15:04)  metformin 1,000 mg tablet:  (27 May 2022 15:04)  Novolog Flexpen U-100 Insulin aspart 100 unit/mL (3 mL) subcutaneous:  (27 May 2022 15:04)  Trelegy Ellipta 100 mcg-62.5 mcg-25 mcg powder for inhalation:  (27 May 2022 15:04)                           MEDICATIONS:  STANDING MEDICATIONS  ALPRAZolam 0.25 milliGRAM(s) Oral at bedtime  amLODIPine   Tablet 5 milliGRAM(s) Oral daily  ammonium lactate 12% Lotion 1 Application(s) Topical every 12 hours  apixaban 5 milliGRAM(s) Oral every 12 hours  atorvastatin 20 milliGRAM(s) Oral at bedtime  buMETAnide 2 milliGRAM(s) Oral daily  gabapentin 300 milliGRAM(s) Oral three times a day  HYDROmorphone  Injectable 0.5 milliGRAM(s) IV Push every 3 hours  influenza   Vaccine 0.5 milliLiter(s) IntraMuscular once  insulin glargine Injectable (LANTUS) 15 Unit(s) SubCutaneous at bedtime  insulin lispro (ADMELOG) corrective regimen sliding scale   SubCutaneous every 6 hours  insulin lispro Injectable (ADMELOG) 4 Unit(s) SubCutaneous three times a day before meals  losartan 100 milliGRAM(s) Oral daily  pantoprazole    Tablet 40 milliGRAM(s) Oral before breakfast  polyethylene glycol 3350 17 Gram(s) Oral daily  senna 2 Tablet(s) Oral at bedtime    PRN MEDICATIONS  acetaminophen     Tablet .. 650 milliGRAM(s) Oral every 6 hours PRN                                            ------------------------------------------------------------  VITAL SIGNS: Last 24 Hours  T(C): 35.6 (12 Jun 2022 14:32), Max: 36.3 (11 Jun 2022 20:22)  T(F): 96.1 (12 Jun 2022 14:32), Max: 97.4 (11 Jun 2022 20:22)  HR: 87 (12 Jun 2022 14:32) (87 - 106)  BP: 116/51 (12 Jun 2022 14:32) (116/51 - 140/50)  BP(mean): --  RR: 18 (12 Jun 2022 14:32) (18 - 18)  SpO2: 99% (12 Jun 2022 05:30) (98% - 99%)                                             --------------------------------------------------------------  LABS:                        9.1    7.88  )-----------( 213      ( 11 Jun 2022 05:56 )             25.4     06-11    129<L>  |  88<L>  |  13  ----------------------------<  211<H>  4.3   |  31  |  <0.5<L>    Ca    10.3<H>      11 Jun 2022 05:56  Mg     1.8     06-11    TPro  6.5  /  Alb  4.0  /  TBili  0.7  /  DBili  x   /  AST  21  /  ALT  26  /  AlkPhos  106  06-11                                                              -------------------------------------------------------------  RADIOLOGY:                                            --------------------------------------------------------------    PHYSICAL EXAM:  General: alert oriented  HEENT: trach  LUNGS: benjamin (improved)  HEART: normal s1 s2  ABDOMEN: soft  EXT: extremely tender                                             --------------------------------------------------------------    ASSESSMENT & PLAN  55 yo f with PMHx of DVT on Eliquis, COPD,  trach collar, obesity, IDDM, UTI, sent by [facility] for hypoxia. Current illness going back to April 4th when pt was intubated on admission at [facility] ICU for hypoxic respiratory failure diagnosed with copd exacerbation and superimposed pneumonia, and remained intubated for 37 days requiring tracheostomy. Patient stayed at [facility] for 2 days until noted to be hypoxic (saturating low 70's) pt's saturation immediately improving afterwards to 80's after large mucus plug removed and was subsequently sent to [facility]  On arrival to ED pt was saturating 97%, 15L O2 via tracheostomy collar (baseline 8 L at [facility])CXR suspicious for L-sided PNA, inconclusive. CTA neg for PE. Pt received x1 IV Levaquin and Cefepime in ED, admitted to MICU for acute hypoxic hypercapnic respiratory failure secondary to acute mucus plug now resolved vs superimposed pneumonia hospital-acquired.   General Surgery Consulted emergently when patient found to be hypoxic to low 80s after CCU and Pulmonary team found large granuloma in trachea along tracheostomy tube. Patient currently had a size 8 trach. Surgery assisted Pulmonary and CCU with trach exchanged with ET tube guidance to a size 7 XLT. Saturations improved to high 90s. Large granuloma was removed. Patient was no longer in distress after exchange.    ASSESSMENT & PLAN:   #Acute/ chronic COPD with exacerbation (Resolved)  #mucous plug removed  #pneumonia vs atelectasis L base  - On 5/28, Pulmonary team found large granuloma in trachea along tracheostomy tube. Patient had a size 8 trach on admission. Surgery assisted Pulmonary and CCU with trach exchanged with ET tube guidance to a size 7 XLT. Saturations improved to high 90s.  - trach functioning well s/p exchange   - Pt off vent 72 hrs and is saturating well.  - repeat chest x-ray (6/6) shows possible mucus plugging.  suctioning attempted repeat CXR showed improved aeration.  ***Repeat Chest X-ray shows resolution of Mucus plug after suctioning and chest PT  - Sp/Sw eval, if fails, CT Sx FU for PEG tube  - Modified barium swallow test done: patient still unable to eat, will need further therapy.  - Repeat CXR 6/8: left lung opacity/effusion  - Trach changed to Fenestrated 8  - Speech re-eval on Mo[place]      #Chronic Indwelling Singh  - 5/28 Ucx - Contaminated   - 5/28 Ucx- Normal perri   - 5/27 Ucx- Klebsiella (CRE) and pseudomonas   - s/p cefepime  - Currently afebrile w/ no urinary complaints   - episode of Hematouria 6/4 -> improving -> h/h Stable  - UA     #Nonobstructive Ileus  - abdomen currently soft, nontender, + BS   - off opioids  - C/w bowel regimen     #Hx of DVT  - resumed patient's home eliquis (5mg BID)  - Monitor for signs of bleeding     Activity OOB to chair  GI PPX  PPI  DVT PPX Eliquis   Dispo: Acute.

## 2022-06-12 NOTE — PROGRESS NOTE ADULT - SUBJECTIVE AND OBJECTIVE BOX
CRUZ DUMONT  [facility] T2-3A 021 B ([facility] T2-3A)      Patient was evaluated and examined  by bedside, no active complains    REVIEW OF SYSTEMS:  please see pertinent positives mentioned above, all other 12 ROS negative      T(C): , Max: 36.3 (06-11-22 @ 14:00)  HR: 106 (06-12-22 @ 05:30)  BP: 138/73 (06-12-22 @ 05:30)  RR: 18 (06-12-22 @ 05:30)  SpO2: 99% (06-12-22 @ 05:30)  CAPILLARY BLOOD GLUCOSE      POCT Blood Glucose.: 235 mg/dL (12 Jun 2022 06:42)  POCT Blood Glucose.: 226 mg/dL (12 Jun 2022 00:58)  POCT Blood Glucose.: 167 mg/dL (11 Jun 2022 21:18)  POCT Blood Glucose.: 213 mg/dL (11 Jun 2022 17:23)      PHYSICAL EXAM:  General: NAD, AAOX3, patient is laying comfortably in bed  HEENT: AT, NC, Supple, NO JVD, NO CB, trach present  Lungs: good breath sounds B/L, no wheezing, no rhonchi  CVS: normal S1, S2, RRR, NO M/G/R  Abdomen: soft, bowel sounds present, non-tender, non-distended  Extremities: no edema, no clubbing, no cyanosis, positive peripheral pulses b/l  Neuro: no acute focal neurological deficits, generalized weakness, hypersensitivity of lower ext.   Skin: atrophic skin brown discoloration present on b/l lower ext.         LAB  CBC  Date: 06-11-22 @ 05:56  Mean cell Ashogidahq74.0  Mean cell Hemoglobin Conc35.8  Mean cell Volum 94.8  Platelet count-Automate 213  RBC Count 2.68  Red Cell Distrib Width14.1  WBC Count7.88  % Albumin, Urine--  Hematocrit 25.4  Hemoglobin 9.1  CBC  Date: 06-09-22 @ 06:39  Mean cell Enellvpinh88.5  Mean cell Hemoglobin Conc34.2  Mean cell Volum 97.8  Platelet count-Automate 202  RBC Count 2.72  Red Cell Distrib Width14.6  WBC Count9.37  % Albumin, Urine--  Hematocrit 26.6  Hemoglobin 9.1  CBC  Date: 06-08-22 @ 08:26  Mean cell Lkololiaac60.1  Mean cell Hemoglobin Conc35.4  Mean cell Volum 96.3  Platelet count-Automate 186  RBC Count 2.70  Red Cell Distrib Width14.6  WBC Count8.96  % Albumin, Urine--  Hematocrit 26.0  Hemoglobin 9.2  CBC  Date: 06-07-22 @ 04:30  Mean cell Ysxaufmqhd39.0  Mean cell Hemoglobin Conc35.9  Mean cell Volum 94.6  Platelet count-Automate 167  RBC Count 2.59  Red Cell Distrib Width14.2  WBC Count8.38  % Albumin, Urine--  Hematocrit 24.5  Hemoglobin 8.8  CBC  Date: 06-06-22 @ 07:50  Mean cell Znztqnvtyn67.8  Mean cell Hemoglobin Conc35.2  Mean cell Volum 96.1  Platelet count-Automate 182  RBC Count 2.81  Red Cell Distrib Width14.4  WBC Count7.20  % Albumin, Urine--  Hematocrit 27.0  Hemoglobin 9.5    [facility]  06-11-22 @ 05:56  Blood Gas Arterial-Calcium,Ionized--  Blood Urea Nitrogen, Serum 13 mg/dL [10 - 20]  Carbon Dioxide, Serum31 mmol/L [17 - 32]  Chloride, Serum88 mmol/L<L> [98 - 110]  Creatinie, Serum<0.5 mg/dL<L> [0.7 - 1.5]  Glucose, Pkhbj796 mg/dL<H> [70 - 99]  Potassium, Serum4.3 mmol/L [3.5 - 5.0]  Sodium, Serum 129 mmol/L<L> [135 - 146]  [facility]  06-09-22 @ 06:39  Blood Gas Arterial-Calcium,Ionized--  Blood Urea Nitrogen, Serum 13 mg/dL [10 - 20]  Carbon Dioxide, Serum31 mmol/L [17 - 32]  Chloride, Serum91 mmol/L<L> [98 - 110]  Creatinie, Serum<0.5 mg/dL<L> [0.7 - 1.5]  Glucose, Swgcf089 mg/dL<H> [70 - 99]  Potassium, Serum4.4 mmol/L [3.5 - 5.0]  Sodium, Serum 133 mmol/L<L> [135 - 146]  [facility]  06-08-22 @ 08:26  Blood Gas Arterial-Calcium,Ionized--  Blood Urea Nitrogen, Serum 13 mg/dL [10 - 20]  Carbon Dioxide, Serum31 mmol/L [17 - 32]  Chloride, Serum89 mmol/L<L> [98 - 110]  Creatinie, Serum0.5 mg/dL<L> [0.7 - 1.5]  Glucose, Tmjhv902 mg/dL<H> [70 - 99]  Potassium, Serum4.6 mmol/L [3.5 - 5.0]  Sodium, Serum 133 mmol/L<L> [135 - 146]              Microbiology:    Culture - Blood (collected 06-05-22 @ 04:55)  Source: .Blood None  Final Report (06-10-22 @ 12:00):    No Growth Final    Culture - Blood (collected 06-05-22 @ 02:01)  Source: .Blood Blood  Final Report (06-10-22 @ 08:00):    No Growth Final    Culture - Urine (collected 06-04-22 @ 21:00)  Source: Clean Catch Clean Catch (Midstream)  Final Report (06-05-22 @ 22:53):    <10,000 CFU/mL Normal Urogenital Eprri    Culture - Blood (collected 05-29-22 @ 12:33)  Source: .Blood None  Final Report (06-03-22 @ 19:00):    No Growth Final    Culture - Urine (collected 05-28-22 @ 17:24)  Source: Catheterized Catheterized  Final Report (05-29-22 @ 18:18):    <10,000 CFU/mL Normal Urogenital Perri    Culture - Urine (collected 05-28-22 @ 10:07)  Source: Catheterized Catheterized  Final Report (05-30-22 @ 10:17):    >=3 organisms. Probable collection contamination.    Culture - Urine (collected 05-27-22 @ 11:54)  Source: Catheterized Catheterized  Final Report (06-02-22 @ 11:41):    >100,000 CFU/ml Klebsiella pneumoniae (Carbapenem Resistant)    >100,000 CFU/ml Pseudomonas aeruginosa  Organism: Klepne MDRO  Pseudomonas aeruginosa (06-02-22 @ 11:43)  Organism: Pseudomonas aeruginosa (06-02-22 @ 11:43)      -  Amikacin: S <=16      -  Aztreonam: S 8      -  Cefepime: S 4      -  Ceftazidime: S 4      -  Ciprofloxacin: R >2      -  Gentamicin: R >8      -  Imipenem: S 2      -  Levofloxacin: R >4      -  Meropenem: S <=1      -  Piperacillin/Tazobactam: S <=8      -  Tobramycin: R >8      Method Type: ROMY  Organism: Jose MDRO (06-02-22 @ 11:43)      -  Amikacin: I 32      -  Amoxicillin/Clavulanic Acid: R >16/8      -  Ampicillin: R >16 These ampicillin results predict results for amoxicillin      -  Ampicillin/Sulbactam: R >16/8 Enterobacter, Klebsiella aerogenes, Citrobacter, and Serratia may develop resistance during prolonged therapy (3-4 days)      -  Aztreonam: R >16      -  Cefazolin: R >16 (MIC_CL_COM_ENTERIC_CEFAZU) For uncomplicated UTI with K. pneumoniae, E. coli, or P. mirablis: ROMY <=16 is sensitive and ROMY >=32 is resistant. This also predicts results for oral agents cefaclor, cefdinir, cefpodoxime, cefprozil, cefuroxime axetil, cephalexin and locarbef for uncomplicated UTI. Note that some isolates may be susceptible to these agents while testing resistant to cefazolin.      -  Cefepime: R >16      -  Cefoxitin: R >16      -  Ceftriaxone: R >32 Enterobacter, Klebsiella aerogenes, Citrobacter, and Serratia may develop resistance during prolonged therapy      -  Ciprofloxacin: R >2      -  Ertapenem: R >1      -  Gentamicin: S <=2      -  Imipenem: R >8      -  Levofloxacin: R >4      -  Meropenem: R >8      -  Nitrofurantoin: R >64 Should not be used to treat pyelonephritis      -  Piperacillin/Tazobactam: R >64      -  Tigecycline: S <=2      -  Tobramycin: R >8      -  Trimethoprim/Sulfamethoxazole: R >2/38      Method Type: ROMY        Medications:  acetaminophen     Tablet .. 650 milliGRAM(s) Oral every 6 hours PRN  ALPRAZolam 0.25 milliGRAM(s) Oral at bedtime  amLODIPine   Tablet 5 milliGRAM(s) Oral daily  ammonium lactate 12% Lotion 1 Application(s) Topical every 12 hours  apixaban 5 milliGRAM(s) Oral every 12 hours  atorvastatin 20 milliGRAM(s) Oral at bedtime  buMETAnide 2 milliGRAM(s) Oral daily  gabapentin 300 milliGRAM(s) Oral three times a day  HYDROmorphone  Injectable 0.5 milliGRAM(s) IV Push every 3 hours  influenza   Vaccine 0.5 milliLiter(s) IntraMuscular once  insulin glargine Injectable (LANTUS) 15 Unit(s) SubCutaneous at bedtime  insulin lispro (ADMELOG) corrective regimen sliding scale   SubCutaneous every 6 hours  insulin lispro Injectable (ADMELOG) 4 Unit(s) SubCutaneous three times a day before meals  losartan 100 milliGRAM(s) Oral daily  pantoprazole    Tablet 40 milliGRAM(s) Oral before breakfast  polyethylene glycol 3350 17 Gram(s) Oral daily  senna 2 Tablet(s) Oral at bedtime        Assessment and Plan:  Patient is a 55 y/o female  with PMHx of DVT on Eliquis, COPD,  trach collar, obesity, IDDM, UTI, sent by [facility] for hypoxia due to mucous plug.     #mucous plug removed  # chronic COPD  #pneumonia vs atelectasis L base  - On 5/28, Pulmonary team found large granuloma in trachea along tracheostomy tube. Patient had a size 8 trach on admission. Surgery assisted Pulmonary and CCU with trach exchanged with ET tube guidance to a size 7 XLT. Saturations improved to high 90s.  - trach functioning well s/p exchange   - Pt off vent 72 hrs and is saturating well.  -repeated CXR 6/8- partially reopened lung with lower lobe atelectasis   - 6/11 - Surgical team replaced size 8 fenestrated trach tube.     #Dysphagia   - 1st  Modified barium swallow test done, patient was not able to tolerate test,  Surgical team has changed  tracheostomy on 6/11   -  speech tx. will repeat barium  swallow study on 6/13 with new trach.  - for now on NGT feedings    # DM - hyperglycemia- increased insulin regimen dose to optimize glycemic control  - lantus 15 units subc. once daily at bedtime, Lispro 4 units three times a day before meals    # Hyponatremia- monitor BMP     #Chronic Indwelling Singh  - 5/28 Ucx - Contaminated   - 5/28 Ucx- Normal perri   - 5/27 Ucx- Klebsiella (CRE) and pseudomonas   - s/p cefepime  - Currently afebrile w/ no urinary complaints   - episode of Hematouria 6/4 -> resolved  -> h/h Stable       #Nonobstructive Ileus  - abdomen currently soft, nontender, + BS   - off opioids  - C/w bowel regimen     #Hx of DVT  - resumed patient's home eliquis (5mg BID)      Activity OOB to chair  GI PPX  PPI  DVT PPX Eliquis   Dispo: Acute, f/up Speech therapist  to  repeat the barium  swallow study on 6/13.    Total time spent to complete patient's bedside assessment, review medical chart, discuss medical plan of care with covering medical team was more than 35 minutes .

## 2022-06-13 LAB
ALBUMIN SERPL ELPH-MCNC: 4.1 G/DL — SIGNIFICANT CHANGE UP (ref 3.5–5.2)
ALP SERPL-CCNC: 115 U/L — SIGNIFICANT CHANGE UP (ref 30–115)
ALT FLD-CCNC: 27 U/L — SIGNIFICANT CHANGE UP (ref 0–41)
ANION GAP SERPL CALC-SCNC: 12 MMOL/L — SIGNIFICANT CHANGE UP (ref 7–14)
AST SERPL-CCNC: 24 U/L — SIGNIFICANT CHANGE UP (ref 0–41)
BASOPHILS # BLD AUTO: 0.06 K/UL — SIGNIFICANT CHANGE UP (ref 0–0.2)
BASOPHILS NFR BLD AUTO: 0.7 % — SIGNIFICANT CHANGE UP (ref 0–1)
BILIRUB SERPL-MCNC: 0.6 MG/DL — SIGNIFICANT CHANGE UP (ref 0.2–1.2)
BUN SERPL-MCNC: 13 MG/DL — SIGNIFICANT CHANGE UP (ref 10–20)
CALCIUM SERPL-MCNC: 10.2 MG/DL — HIGH (ref 8.5–10.1)
CHLORIDE SERPL-SCNC: 89 MMOL/L — LOW (ref 98–110)
CO2 SERPL-SCNC: 31 MMOL/L — SIGNIFICANT CHANGE UP (ref 17–32)
CREAT SERPL-MCNC: <0.5 MG/DL — LOW (ref 0.7–1.5)
EGFR: 115 ML/MIN/1.73M2 — SIGNIFICANT CHANGE UP
EOSINOPHIL # BLD AUTO: 0.33 K/UL — SIGNIFICANT CHANGE UP (ref 0–0.7)
EOSINOPHIL NFR BLD AUTO: 3.7 % — SIGNIFICANT CHANGE UP (ref 0–8)
GLUCOSE BLDC GLUCOMTR-MCNC: 182 MG/DL — HIGH (ref 70–99)
GLUCOSE BLDC GLUCOMTR-MCNC: 194 MG/DL — HIGH (ref 70–99)
GLUCOSE BLDC GLUCOMTR-MCNC: 200 MG/DL — HIGH (ref 70–99)
GLUCOSE BLDC GLUCOMTR-MCNC: 272 MG/DL — HIGH (ref 70–99)
GLUCOSE SERPL-MCNC: 212 MG/DL — HIGH (ref 70–99)
HCT VFR BLD CALC: 27.7 % — LOW (ref 37–47)
HGB BLD-MCNC: 9.7 G/DL — LOW (ref 12–16)
IMM GRANULOCYTES NFR BLD AUTO: 0.6 % — HIGH (ref 0.1–0.3)
LYMPHOCYTES # BLD AUTO: 1.37 K/UL — SIGNIFICANT CHANGE UP (ref 1.2–3.4)
LYMPHOCYTES # BLD AUTO: 15.4 % — LOW (ref 20.5–51.1)
MAGNESIUM SERPL-MCNC: 1.6 MG/DL — LOW (ref 1.8–2.4)
MCHC RBC-ENTMCNC: 33.7 PG — HIGH (ref 27–31)
MCHC RBC-ENTMCNC: 35 G/DL — SIGNIFICANT CHANGE UP (ref 32–37)
MCV RBC AUTO: 96.2 FL — SIGNIFICANT CHANGE UP (ref 81–99)
MONOCYTES # BLD AUTO: 0.71 K/UL — HIGH (ref 0.1–0.6)
MONOCYTES NFR BLD AUTO: 8 % — SIGNIFICANT CHANGE UP (ref 1.7–9.3)
NEUTROPHILS # BLD AUTO: 6.36 K/UL — SIGNIFICANT CHANGE UP (ref 1.4–6.5)
NEUTROPHILS NFR BLD AUTO: 71.6 % — SIGNIFICANT CHANGE UP (ref 42.2–75.2)
NRBC # BLD: 0 /100 WBCS — SIGNIFICANT CHANGE UP (ref 0–0)
PLATELET # BLD AUTO: 212 K/UL — SIGNIFICANT CHANGE UP (ref 130–400)
POTASSIUM SERPL-MCNC: 4.1 MMOL/L — SIGNIFICANT CHANGE UP (ref 3.5–5)
POTASSIUM SERPL-SCNC: 4.1 MMOL/L — SIGNIFICANT CHANGE UP (ref 3.5–5)
PROT SERPL-MCNC: 6.5 G/DL — SIGNIFICANT CHANGE UP (ref 6–8)
RBC # BLD: 2.88 M/UL — LOW (ref 4.2–5.4)
RBC # FLD: 14 % — SIGNIFICANT CHANGE UP (ref 11.5–14.5)
SODIUM SERPL-SCNC: 132 MMOL/L — LOW (ref 135–146)
WBC # BLD: 8.88 K/UL — SIGNIFICANT CHANGE UP (ref 4.8–10.8)
WBC # FLD AUTO: 8.88 K/UL — SIGNIFICANT CHANGE UP (ref 4.8–10.8)

## 2022-06-13 PROCEDURE — 99233 SBSQ HOSP IP/OBS HIGH 50: CPT

## 2022-06-13 PROCEDURE — 71045 X-RAY EXAM CHEST 1 VIEW: CPT | Mod: 26

## 2022-06-13 PROCEDURE — 74230 X-RAY XM SWLNG FUNCJ C+: CPT | Mod: 26

## 2022-06-13 PROCEDURE — 99223 1ST HOSP IP/OBS HIGH 75: CPT

## 2022-06-13 RX ORDER — HYDROMORPHONE HYDROCHLORIDE 2 MG/ML
1 INJECTION INTRAMUSCULAR; INTRAVENOUS; SUBCUTANEOUS ONCE
Refills: 0 | Status: DISCONTINUED | OUTPATIENT
Start: 2022-06-13 | End: 2022-06-13

## 2022-06-13 RX ORDER — INSULIN LISPRO 100/ML
6 VIAL (ML) SUBCUTANEOUS
Refills: 0 | Status: DISCONTINUED | OUTPATIENT
Start: 2022-06-13 | End: 2022-06-14

## 2022-06-13 RX ORDER — POLYETHYLENE GLYCOL 3350 17 G/17G
17 POWDER, FOR SOLUTION ORAL DAILY
Refills: 0 | Status: DISCONTINUED | OUTPATIENT
Start: 2022-06-13 | End: 2022-07-10

## 2022-06-13 RX ORDER — SENNA PLUS 8.6 MG/1
2 TABLET ORAL AT BEDTIME
Refills: 0 | Status: DISCONTINUED | OUTPATIENT
Start: 2022-06-13 | End: 2022-07-10

## 2022-06-13 RX ORDER — ENOXAPARIN SODIUM 100 MG/ML
100 INJECTION SUBCUTANEOUS EVERY 12 HOURS
Refills: 0 | Status: DISCONTINUED | OUTPATIENT
Start: 2022-06-13 | End: 2022-06-14

## 2022-06-13 RX ORDER — MAGNESIUM SULFATE 500 MG/ML
2 VIAL (ML) INJECTION
Refills: 0 | Status: COMPLETED | OUTPATIENT
Start: 2022-06-13 | End: 2022-06-13

## 2022-06-13 RX ORDER — DEXAMETHASONE 0.5 MG/5ML
6 ELIXIR ORAL EVERY 8 HOURS
Refills: 0 | Status: DISCONTINUED | OUTPATIENT
Start: 2022-06-13 | End: 2022-06-14

## 2022-06-13 RX ADMIN — Medication 1 APPLICATION(S): at 05:37

## 2022-06-13 RX ADMIN — Medication 6 UNIT(S): at 12:15

## 2022-06-13 RX ADMIN — HYDROMORPHONE HYDROCHLORIDE 0.5 MILLIGRAM(S): 2 INJECTION INTRAMUSCULAR; INTRAVENOUS; SUBCUTANEOUS at 09:54

## 2022-06-13 RX ADMIN — HYDROMORPHONE HYDROCHLORIDE 1 MILLIGRAM(S): 2 INJECTION INTRAMUSCULAR; INTRAVENOUS; SUBCUTANEOUS at 15:43

## 2022-06-13 RX ADMIN — HYDROMORPHONE HYDROCHLORIDE 0.5 MILLIGRAM(S): 2 INJECTION INTRAMUSCULAR; INTRAVENOUS; SUBCUTANEOUS at 11:00

## 2022-06-13 RX ADMIN — GABAPENTIN 300 MILLIGRAM(S): 400 CAPSULE ORAL at 05:36

## 2022-06-13 RX ADMIN — HYDROMORPHONE HYDROCHLORIDE 0.5 MILLIGRAM(S): 2 INJECTION INTRAMUSCULAR; INTRAVENOUS; SUBCUTANEOUS at 17:37

## 2022-06-13 RX ADMIN — Medication 6 UNIT(S): at 17:16

## 2022-06-13 RX ADMIN — HYDROMORPHONE HYDROCHLORIDE 0.5 MILLIGRAM(S): 2 INJECTION INTRAMUSCULAR; INTRAVENOUS; SUBCUTANEOUS at 06:22

## 2022-06-13 RX ADMIN — HYDROMORPHONE HYDROCHLORIDE 0.5 MILLIGRAM(S): 2 INJECTION INTRAMUSCULAR; INTRAVENOUS; SUBCUTANEOUS at 23:27

## 2022-06-13 RX ADMIN — HYDROMORPHONE HYDROCHLORIDE 0.5 MILLIGRAM(S): 2 INJECTION INTRAMUSCULAR; INTRAVENOUS; SUBCUTANEOUS at 11:15

## 2022-06-13 RX ADMIN — GABAPENTIN 300 MILLIGRAM(S): 400 CAPSULE ORAL at 14:00

## 2022-06-13 RX ADMIN — Medication 2: at 17:16

## 2022-06-13 RX ADMIN — HYDROMORPHONE HYDROCHLORIDE 0.5 MILLIGRAM(S): 2 INJECTION INTRAMUSCULAR; INTRAVENOUS; SUBCUTANEOUS at 06:07

## 2022-06-13 RX ADMIN — GABAPENTIN 300 MILLIGRAM(S): 400 CAPSULE ORAL at 21:12

## 2022-06-13 RX ADMIN — Medication 4 UNIT(S): at 06:07

## 2022-06-13 RX ADMIN — ENOXAPARIN SODIUM 100 MILLIGRAM(S): 100 INJECTION SUBCUTANEOUS at 17:19

## 2022-06-13 RX ADMIN — HYDROMORPHONE HYDROCHLORIDE 0.5 MILLIGRAM(S): 2 INJECTION INTRAMUSCULAR; INTRAVENOUS; SUBCUTANEOUS at 03:16

## 2022-06-13 RX ADMIN — APIXABAN 5 MILLIGRAM(S): 2.5 TABLET, FILM COATED ORAL at 05:36

## 2022-06-13 RX ADMIN — HYDROMORPHONE HYDROCHLORIDE 1 MILLIGRAM(S): 2 INJECTION INTRAMUSCULAR; INTRAVENOUS; SUBCUTANEOUS at 08:51

## 2022-06-13 RX ADMIN — ATORVASTATIN CALCIUM 20 MILLIGRAM(S): 80 TABLET, FILM COATED ORAL at 21:43

## 2022-06-13 RX ADMIN — HYDROMORPHONE HYDROCHLORIDE 0.5 MILLIGRAM(S): 2 INJECTION INTRAMUSCULAR; INTRAVENOUS; SUBCUTANEOUS at 21:16

## 2022-06-13 RX ADMIN — HYDROMORPHONE HYDROCHLORIDE 0.5 MILLIGRAM(S): 2 INJECTION INTRAMUSCULAR; INTRAVENOUS; SUBCUTANEOUS at 00:43

## 2022-06-13 RX ADMIN — Medication 6 MILLIGRAM(S): at 12:17

## 2022-06-13 RX ADMIN — HYDROMORPHONE HYDROCHLORIDE 0.5 MILLIGRAM(S): 2 INJECTION INTRAMUSCULAR; INTRAVENOUS; SUBCUTANEOUS at 14:00

## 2022-06-13 RX ADMIN — Medication 1 APPLICATION(S): at 17:23

## 2022-06-13 RX ADMIN — HYDROMORPHONE HYDROCHLORIDE 0.5 MILLIGRAM(S): 2 INJECTION INTRAMUSCULAR; INTRAVENOUS; SUBCUTANEOUS at 00:28

## 2022-06-13 RX ADMIN — BUMETANIDE 2 MILLIGRAM(S): 0.25 INJECTION INTRAMUSCULAR; INTRAVENOUS at 05:36

## 2022-06-13 RX ADMIN — Medication 0.25 MILLIGRAM(S): at 21:12

## 2022-06-13 RX ADMIN — HYDROMORPHONE HYDROCHLORIDE 1 MILLIGRAM(S): 2 INJECTION INTRAMUSCULAR; INTRAVENOUS; SUBCUTANEOUS at 15:58

## 2022-06-13 RX ADMIN — HYDROMORPHONE HYDROCHLORIDE 0.5 MILLIGRAM(S): 2 INJECTION INTRAMUSCULAR; INTRAVENOUS; SUBCUTANEOUS at 14:15

## 2022-06-13 RX ADMIN — HYDROMORPHONE HYDROCHLORIDE 1 MILLIGRAM(S): 2 INJECTION INTRAMUSCULAR; INTRAVENOUS; SUBCUTANEOUS at 08:36

## 2022-06-13 RX ADMIN — HYDROMORPHONE HYDROCHLORIDE 0.5 MILLIGRAM(S): 2 INJECTION INTRAMUSCULAR; INTRAVENOUS; SUBCUTANEOUS at 17:22

## 2022-06-13 RX ADMIN — HYDROMORPHONE HYDROCHLORIDE 0.5 MILLIGRAM(S): 2 INJECTION INTRAMUSCULAR; INTRAVENOUS; SUBCUTANEOUS at 10:10

## 2022-06-13 RX ADMIN — HYDROMORPHONE HYDROCHLORIDE 0.5 MILLIGRAM(S): 2 INJECTION INTRAMUSCULAR; INTRAVENOUS; SUBCUTANEOUS at 03:01

## 2022-06-13 RX ADMIN — SENNA PLUS 2 TABLET(S): 8.6 TABLET ORAL at 21:12

## 2022-06-13 RX ADMIN — Medication 6 MILLIGRAM(S): at 21:13

## 2022-06-13 RX ADMIN — HYDROMORPHONE HYDROCHLORIDE 1 MILLIGRAM(S): 2 INJECTION INTRAMUSCULAR; INTRAVENOUS; SUBCUTANEOUS at 20:13

## 2022-06-13 RX ADMIN — Medication 25 GRAM(S): at 12:24

## 2022-06-13 RX ADMIN — Medication 25 GRAM(S): at 10:44

## 2022-06-13 RX ADMIN — PANTOPRAZOLE SODIUM 40 MILLIGRAM(S): 20 TABLET, DELAYED RELEASE ORAL at 05:36

## 2022-06-13 NOTE — CONSULT NOTE ADULT - SUBJECTIVE AND OBJECTIVE BOX
Gastroenterology Consultation:    Patient is a 56y old  Female who presents with a chief complaint of trach malfunction (13 Jun 2022 08:45)      Admitted on: 05-27-22  HPI:  57 yo f with PMHx of DVT on Eliquis, COPD,  trach collar, obesity, IDDM, UTI, sent by [facility] for hypoxia. History provided by daughter at bedside, she reports the current illness going back to April 4th when pt was intubated on admission at [facility] ICU for hypoxic respiratory failure diagnosed with copd exacerbation and superimposed pneumonia, of note she also appears to have been in CHF exacerbation with severe b/l LE swelling treated with IV bumex. Remained intubated 37 days per daughter, diagnosed with fever of unknown origin (up to 106F), treated with empiric broad spectrum antibiotics and once 2 weeks s/p tracheostomy placement and 48 hours afebrile she was was discharged to nursing home for PT came from [facility] for hypoxia, SOB found to have mucus plug s/p emergent bronchoscopy found to have tracheal granulation tissue s/p debridment with exchanged trachostomy tube GI was called for PEG tube.       Prior EGD: none in one content   Prior Colonoscopy: none in one content       PAST MEDICAL & SURGICAL HISTORY:  COPD (chronic obstructive pulmonary disease)      CHF (congestive heart failure)      DM (diabetes mellitus)      H/O tracheostomy          FAMILY HISTORY:  No pertinent family history in first degree relatives        Social History:  Tobacco: past   Alcohol: N  Drugs: N    Home Medications:  albuterol sulfate 2.5 mg/3 mL (0.083 %) solution for nebulization:  (27 May 2022 15:04)  alprazolam 0.25 mg tablet:  (27 May 2022 15:04)  amlodipine 5 mg tablet:  (27 May 2022 15:04)  atorvastatin 20 mg tablet:  (27 May 2022 15:04)  bumetanide 2 mg tablet:  (27 May 2022 15:04)  clopidogrel 75 mg tablet:  (27 May 2022 15:04)  Eliquis 5 mg tablet:  (27 May 2022 15:04)  gabapentin 300 mg capsule:  (27 May 2022 15:04)  glimepiride 2 mg tablet:  (27 May 2022 15:04)  labetalol 100 mg tablet:  (27 May 2022 15:04)  LANTUS SOLOSTAR 100 UNIT/ML: 55 UNITS ONCE A DAY SUBCUTANEOUS 90 DAYS (27 May 2022 15:04)  losartan 100 mg tablet:  (27 May 2022 15:04)  metformin 1,000 mg tablet:  (27 May 2022 15:04)  Novolog Flexpen U-100 Insulin aspart 100 unit/mL (3 mL) subcutaneous:  (27 May 2022 15:04)  Treleiva Ellipta 100 mcg-62.5 mcg-25 mcg powder for inhalation:  (27 May 2022 15:04)    MEDICATIONS  (STANDING):  ALPRAZolam 0.25 milliGRAM(s) Oral at bedtime  amLODIPine   Tablet 5 milliGRAM(s) Oral daily  ammonium lactate 12% Lotion 1 Application(s) Topical every 12 hours  apixaban 5 milliGRAM(s) Oral every 12 hours  atorvastatin 20 milliGRAM(s) Oral at bedtime  buMETAnide 2 milliGRAM(s) Oral daily  dexAMETHasone  Injectable 6 milliGRAM(s) IV Push every 8 hours  gabapentin 300 milliGRAM(s) Oral three times a day  HYDROmorphone  Injectable 0.5 milliGRAM(s) IV Push every 3 hours  influenza   Vaccine 0.5 milliLiter(s) IntraMuscular once  insulin glargine Injectable (LANTUS) 15 Unit(s) SubCutaneous at bedtime  insulin lispro (ADMELOG) corrective regimen sliding scale   SubCutaneous every 6 hours  insulin lispro Injectable (ADMELOG) 6 Unit(s) SubCutaneous three times a day before meals  losartan 100 milliGRAM(s) Oral daily  pantoprazole    Tablet 40 milliGRAM(s) Oral before breakfast  polyethylene glycol 3350 17 Gram(s) Oral daily  senna 2 Tablet(s) Oral at bedtime    MEDICATIONS  (PRN):  acetaminophen     Tablet .. 650 milliGRAM(s) Oral every 6 hours PRN Temp greater or equal to 38C (100.4F), Mild Pain (1 - 3)      Allergies  penicillin (Swelling)      Review of Systems:   Constitutional:  No Fever, No Chills  ENT/Mouth:  No Hearing Changes,  No Difficulty Swallowing  Eyes:  No Eye Pain, No Vision Changes  Cardiovascular:  No Chest Pain, No Palpitations  Respiratory:  No Cough, No Dyspnea  Gastrointestinal:  As described in HPI  Musculoskeletal:  No Joint Swelling, No Back Pain  Skin:  No Skin Lesions, No Jaundice  Neuro:  No Syncope, No Dizziness  Heme/Lymph:  No Bruising, No Bleeding.          Physical Examination:  T(C): 36.8 (06-13-22 @ 05:01), Max: 36.8 (06-13-22 @ 05:01)  HR: 96 (06-13-22 @ 05:01) (87 - 96)  BP: 99/49 (06-13-22 @ 05:01) (99/49 - 121/57)  RR: 20 (06-13-22 @ 08:17) (18 - 20)  SpO2: 97% (06-13-22 @ 08:17) (90% - 100%)      06-12-22 @ 07:01  -  06-13-22 @ 07:00  --------------------------------------------------------  IN: 660 mL / OUT: 0 mL / NET: 660 mL        Constitutional: No acute distress.  Eyes:. Conjunctivae are clear, Sclera is non-icteric.  Ears Nose and Throat: The external ears are normal appearing,  Oral mucosa is pink and moist.  Respiratory:  No signs of respiratory distress. Lung sounds are clear bilaterally.  Cardiovascular:  S1 S2, Regular rate and rhythm.  GI: Abdomen is soft, symmetric, and non-tender without distention. .   Neuro: No Tremor, No involuntary movements  Skin: No rashes, No Jaundice.          Data:                        9.7    8.88  )-----------( 212      ( 13 Jun 2022 07:33 )             27.7     Hgb Trend:  9.7  06-13-22 @ 07:33  9.1  06-11-22 @ 05:56        06-13    132<L>  |  89<L>  |  13  ----------------------------<  212<H>  4.1   |  31  |  <0.5<L>    Ca    10.2<H>      13 Jun 2022 07:33  Mg     1.6     06-13    TPro  6.5  /  Alb  4.1  /  TBili  0.6  /  DBili  x   /  AST  24  /  ALT  27  /  AlkPhos  115  06-13    Liver panel trend:  TBili 0.6   /   AST 24   /   ALT 27   /   AlkP 115   /   Tptn 6.5   /   Alb 4.1    /   DBili --      06-13  TBili 0.7   /   AST 21   /   ALT 26   /   AlkP 106   /   Tptn 6.5   /   Alb 4.0    /   DBili --      06-11  TBili 0.7   /   AST 21   /   ALT 30   /   AlkP 103   /   Tptn 6.5   /   Alb 4.0    /   DBili --      06-09  TBili 0.7   /   AST 24   /   ALT 35   /   AlkP 106   /   Tptn 6.4   /   Alb 4.0    /   DBili --      06-08  TBili 0.7   /   AST 23   /   ALT 35   /   AlkP 100   /   Tptn 6.1   /   Alb 3.8    /   DBili --      06-07  TBili 0.6   /   AST 24   /   ALT 36   /   AlkP 106   /   Tptn 6.3   /   Alb 4.1    /   DBili --      06-06  TBili 0.7   /   AST 26   /   ALT 36   /   AlkP 97   /   Tptn 6.2   /   Alb 3.8    /   DBili --      06-05  TBili 0.6   /   AST 26   /   ALT 32   /   AlkP 103   /   Tptn 6.3   /   Alb 4.0    /   DBili --      06-04              Radiology:

## 2022-06-13 NOTE — PROGRESS NOTE ADULT - ASSESSMENT
57 yo f with PMHx of DVT on Eliquis, COPD,  trach collar, obesity, IDDM, UTI, [facility]. Current illness going back to April 4th when pt was intubated on admission at [facility] ICU for hypoxic respiratory failure diagnosed with copd exacerbation and superimposed pneumonia, and remained intubated for 37 days requiring tracheostomy. Patient stayed at [facility] for 2 days until noted to be hypoxic (saturating low 70's) pt's saturation immediately improving afterwards to 80's after large mucus plug removed and was subsequently sent to [facility]  On arrival to ED pt was saturating 97%, 15L O2 via tracheostomy collar (baseline 8 L at [facility])CXR suspicious for L-sided PNA, inconclusive. CTA neg for PE. Pt received x1 IV Levaquin and Cefepime in ED, admitted to MICU for acute hypoxic hypercapnic respiratory failure secondary to acute mucus plug now resolved vs superimposed pneumonia hospital-acquired.   General Surgery Consulted emergently when patient found to be hypoxic to low 80s after CCU and Pulmonary team found large granuloma in trachea along tracheostomy tube. Patient currently had a size 8 trach. Surgery assisted Pulmonary and CCU with trach exchanged with ET tube guidance to a size 7 XLT. Saturations improved to high 90s. Large granuloma was removed. Patient was no longer in distress after exchange.    #Acute/ chronic COPD with exacerbation (Resolved)  #mucous plug removed  #pneumonia vs atelectasis L base  - On 5/28, Pulmonary team found large granuloma in trachea along tracheostomy tube. Patient had a size 8 trach on admission. Surgery assisted Pulmonary and CCU with trach exchanged with ET tube guidance to a size 7 XLT. Saturations improved to high 90s.  - trach functioning well s/p exchange   - Pt off vent 72 hrs and is saturating well.  - repeat chest x-ray (6/6) shows possible mucus plugging. suctioning attempted repeat CXR showed improved aeration.  ***Repeat Chest X-ray shows resolution of Mucus plug after suctioning and chest PT  - Sp/Sw eval, if fails, CT Sx FU for PEG tube  - Modified barium swallow test done: patient still unable to eat, will need further therapy  - Repeat CXR 6/8: left lung opacity/effusion  - 6/11 Trach changed to Fenestrated 8 by surgery  - Speech re-eval today- esophagram    #Chronic Indwelling Singh  - 5/28 Ucx - Contaminated   - 5/28 Ucx- Normal perri   - 5/27 Ucx- Klebsiella (CRE) and pseudomonas   - s/p cefepime  - Currently afebrile w/ no urinary complaints   - episode of Hematouria 6/4 -> improving -> h/h Stable  - UA     #Nonobstructive Ileus  - abdomen currently soft, nontender, + BS   - off opioids  - C/w bowel regimen     #Hx of DVT  - resumed patient's home eliquis (5mg BID)  - Monitor for signs of bleeding     Activity OOB to chair  GI PPX  PPI  DVT PPX Eliquis   Dispo: Acute.                 55 yo f with PMHx of DVT on Eliquis, COPD,  trach collar, obesity, IDDM, UTI, [facility] for hypoxia. Current illness going back to April 4th when pt was intubated on admission at [facility] ICU for hypoxic respiratory failure diagnosed with copd exacerbation and superimposed pneumonia, and remained intubated for 37 days requiring tracheostomy. Patient stayed at [facility] for 2 days until noted to be hypoxic (saturating low 70's) pt's saturation immediately improving afterwards to 80's after large mucus plug removed and was subsequently sent to [facility]  On arrival to ED pt was saturating 97%, 15L O2 via tracheostomy collar (baseline 8 L at [facility])CXR suspicious for L-sided PNA, inconclusive. CTA neg for PE. Pt received x1 IV Levaquin and Cefepime in ED, admitted to MICU for acute hypoxic hypercapnic respiratory failure secondary to acute mucus plug now resolved vs superimposed pneumonia hospital-acquired.   General Surgery Consulted emergently when patient found to be hypoxic to low 80s after CCU and Pulmonary team found large granuloma in trachea along tracheostomy tube. Patient currently had a size 8 trach. Surgery assisted Pulmonary and CCU with trach exchanged with ET tube guidance to a size 7 XLT. Saturations improved to high 90s. Large granuloma was removed. Patient was no longer in distress after exchange.    #Acute/ chronic COPD with exacerbation (Resolved)  #mucous plug removed  #pneumonia vs atelectasis L base  - On 5/28, Pulmonary team found large granuloma in trachea along tracheostomy tube. Patient had a size 8 trach on admission. Surgery assisted Pulmonary and CCU with trach exchanged with ET tube guidance to a size 7 XLT. Saturations improved to high 90s.  - trach functioning well s/p exchange   - Pt off vent 72 hrs and is saturating well.  - repeat chest x-ray (6/6) shows possible mucus plugging. suctioning attempted repeat CXR showed improved aeration.  ***Repeat Chest X-ray shows resolution of Mucus plug after suctioning and chest PT  - Sp/Sw eval, if fails, CT Sx FU for PEG tube  - Modified barium swallow test done: patient still unable to eat, will need further therapy  - Repeat CXR 6/8: left lung opacity/effusion  - 6/11 Trach changed to Fenestrated 8 by surgery  - 6/12 and 6/13: repeat CXR showing recurrent mucous plug. continue with chest PT/aggressive suctining    #Dysphagia  - 6/13: s/p esophagram, not tolerated PO, noted epiglottis swelling  - ENT consult for epliglottis swelling  - starting IV decadron 6mg q8  - GI consult for peg tube placement    #Chronic Indwelling Singh  - 5/28 Ucx - Contaminated   - 5/28 Ucx- Normal perri   - 5/27 Ucx- Klebsiella (CRE) and pseudomonas   - s/p cefepime  - Currently afebrile w/ no urinary complaints   - episode of Hematouria 6/4 -> improving -> h/h Stable  - UA     #Nonobstructive Ileus  - abdomen currently soft, nontender, + BS   - off opioids  - C/w bowel regimen - hold for diarrhea    #Hx of DVT  - resumed patient's home eliquis (5mg BID)  - Monitor for signs of bleeding     #DM  - c/w lantus 15, increased lispro to 6u TID    #Misc:  Activity OOB to chair  GI PPX  PPI  DVT PPX Eliquis   Dispo: Acute  Pending:

## 2022-06-13 NOTE — PROGRESS NOTE ADULT - SUBJECTIVE AND OBJECTIVE BOX
CRUZ DUMONT 56y Female  MRN#: [id number]   Hospital Day: 17d    SUBJECTIVE  Patient is a 56y old Female who presents with a chief complaint of trach malfunction (12 Jun 2022 11:35)  Currently admitted to medicine with the primary diagnosis of Acute respiratory failure with hypoxia    INTERVAL HPI AND OVERNIGHT EVENTS:  Patient was examined and seen at bedside. This morning she is resting comfortably in bed and reports no issues or overnight events.     OBJECTIVE  PAST MEDICAL & SURGICAL HISTORY  COPD (chronic obstructive pulmonary disease)    CHF (congestive heart failure)    DM (diabetes mellitus)    H/O tracheostomy      ALLERGIES:  penicillin (Swelling)    MEDICATIONS:  STANDING MEDICATIONS  ALPRAZolam 0.25 milliGRAM(s) Oral at bedtime  amLODIPine   Tablet 5 milliGRAM(s) Oral daily  ammonium lactate 12% Lotion 1 Application(s) Topical every 12 hours  apixaban 5 milliGRAM(s) Oral every 12 hours  atorvastatin 20 milliGRAM(s) Oral at bedtime  buMETAnide 2 milliGRAM(s) Oral daily  gabapentin 300 milliGRAM(s) Oral three times a day  HYDROmorphone  Injectable 0.5 milliGRAM(s) IV Push every 3 hours  influenza   Vaccine 0.5 milliLiter(s) IntraMuscular once  insulin glargine Injectable (LANTUS) 15 Unit(s) SubCutaneous at bedtime  insulin lispro (ADMELOG) corrective regimen sliding scale   SubCutaneous every 6 hours  insulin lispro Injectable (ADMELOG) 4 Unit(s) SubCutaneous three times a day before meals  losartan 100 milliGRAM(s) Oral daily  pantoprazole    Tablet 40 milliGRAM(s) Oral before breakfast  polyethylene glycol 3350 17 Gram(s) Oral daily  senna 2 Tablet(s) Oral at bedtime    PRN MEDICATIONS  acetaminophen     Tablet .. 650 milliGRAM(s) Oral every 6 hours PRN      VITAL SIGNS: Last 24 Hours  T(C): 36.8 (13 Jun 2022 05:01), Max: 36.8 (13 Jun 2022 05:01)  T(F): 98.3 (13 Jun 2022 05:01), Max: 98.3 (13 Jun 2022 05:01)  HR: 96 (13 Jun 2022 05:01) (87 - 96)  BP: 99/49 (13 Jun 2022 05:01) (99/49 - 121/57)  BP(mean): --  RR: 20 (13 Jun 2022 05:01) (18 - 20)  SpO2: 100% (13 Jun 2022 05:01) (90% - 100%)    LABS:                        9.7    8.88  )-----------( 212      ( 13 Jun 2022 07:33 )             27.7       RADIOLOGY:    PHYSICAL EXAM:  CONSTITUTIONAL: No acute distress, well-developed, well-groomed, trached  PULMONARY: Clear to auscultation bilaterally; no wheezes, rales, or rhonchi  CARDIOVASCULAR: Regular rate and rhythm; no murmurs, rubs, or gallops  GASTROINTESTINAL: Soft, non-tender, non-distended; bowel sounds present  MUSCULOSKELETAL: 2+ peripheral pulses; no clubbing, no cyanosis, no edema  NEUROLOGY: non-focal  SKIN: bilateral LE hyperpigmentation

## 2022-06-13 NOTE — CHART NOTE - NSCHARTNOTEFT_GEN_A_CORE
Registered Dietitian Follow-Up     Patient Profile Reviewed                           Yes [x]   No []  Nutrition History Previously Obtained        Yes [x]  No []      Pertinent Medical Interventions:  57 yo f with PMHx of DVT on Eliquis, COPD,  trach collar, obesity, IDDM, UTI, sent by [facility] for hypoxia.   epiglottis  edema- - started on IV Dexa- consulted ENT    Nutrition Interval History:   [facility]  noted patient with moderate-severe pharyngeal dysphagia persisting recommending NPO w/ non-oral means of nutrition/hydration, pt would benefit from PEG tube for primary means of nutrition/hydration in conjunction w/ dysphagia tx; Continue ice chips. GI consulted for placement of PEG.  **Receiving EN regimen via NGT: Glucerna 1.2 at 300mL Q6hrs, provides (1440kcal, 72gm protein, 972mL free H2O) + No Carb Prosource modular 1x/daily (+60kcal, +15 g pro --> total with en 1500kcal, 87g pro)    Diet order:   Diet, NPO with Tube Feed:   Tube Feeding Modality: Nasogastric  Glucerna 1.2 Martin  Total Volume for 24 Hours (mL): 1200  Bolus  Total Volume of Bolus (mL):  300  Tube Feed Frequency: Every 6 hours   Tube Feed Start Time: 06:00  Bolus Feed Rate (mL per Hour): 50   Bolus Feed Duration (in Hours): 24  Bolus   Total Volume per Flush (mL): 30   Frequency: Every 6 Hours  Free Water Flush Instructions:  30 ml water flush pre and post bolus feeds  No Carb Prosource (1pkg = 15gms Protein)     Qty per Day:  1x (22 @ 18:40) [Active]    Anthropometrics:  Height (cm): 154.9 (22 @ 00:08)  Weight (kg): 97.4 (22 @ 18:03)  BMI (kg/m2): 40.6 (22 @ 00:08)    OTHER WEIGHTS:    Daily Weight in k.3 (), Weight in k.4 (), Weight in k.3 (), Weight in k ()  % Weight Change --> patient with gradual weight loss from 97.4kg --> 94.3kg indicating 3.18% wt loss ~1-2wks suspect fluid shifts    MEDICATIONS  (STANDING):  ALPRAZolam 0.25 milliGRAM(s) Oral at bedtime  amLODIPine   Tablet 5 milliGRAM(s) Oral daily  ammonium lactate 12% Lotion 1 Application(s) Topical every 12 hours  apixaban 5 milliGRAM(s) Oral every 12 hours  atorvastatin 20 milliGRAM(s) Oral at bedtime  buMETAnide 2 milliGRAM(s) Oral daily  dexAMETHasone  Injectable 6 milliGRAM(s) IV Push every 8 hours  gabapentin 300 milliGRAM(s) Oral three times a day  HYDROmorphone  Injectable 0.5 milliGRAM(s) IV Push every 3 hours  influenza   Vaccine 0.5 milliLiter(s) IntraMuscular once  insulin glargine Injectable (LANTUS) 15 Unit(s) SubCutaneous at bedtime  insulin lispro (ADMELOG) corrective regimen sliding scale   SubCutaneous every 6 hours  insulin lispro Injectable (ADMELOG) 6 Unit(s) SubCutaneous three times a day before meals  losartan 100 milliGRAM(s) Oral daily  pantoprazole    Tablet 40 milliGRAM(s) Oral before breakfast  polyethylene glycol 3350 17 Gram(s) Oral daily  senna 2 Tablet(s) Oral at bedtime    MEDICATIONS  (PRN):  acetaminophen     Tablet .. 650 milliGRAM(s) Oral every 6 hours PRN Temp greater or equal to 38C (100.4F), Mild Pain (1 - 3)    Pertinent Labs:  @ 07:33: Na 132<L>, BUN 13, Cr <0.5<L>, <H>, K+ 4.1, Phos --, Mg 1.6<L>, Alk Phos 115, ALT/SGPT 27, AST/SGOT 24, HbA1c --    Finger Sticks:  POCT Blood Glucose.: 200 mg/dL ( @ 12:10)  POCT Blood Glucose.: 194 mg/dL ( @ 05:47)  POCT Blood Glucose.: 182 mg/dL ( @ 00:12)  POCT Blood Glucose.: 193 mg/dL ( @ 22:15)  POCT Blood Glucose.: 207 mg/dL ( @ 17:51)    Physical Findings:  - Appearance: A&Ox4, trached  - GI function:  last documented BM?? no documentation since  - Tubes: NGT  - Oral/Mouth cavity: NPO with NGT  - Skin: Intact  - Edema: R, L foot, leg and ankle 2+ last documented on  no documentation since     Nutrition Requirements:   Weight Used: ABW 94.3kg + IBW 48kg     Estimated Energy Needs    Continue []  Adjust [x] 1469-1762kcal/day (MSJ x1.0-1.2)  Estimated Protein Needs    Continue []  Adjust [x] 86-95g/day (1.2-2.0g/kg of ibw)  Estimated Fluid Needs        Continue []  Adjust [x] 1mL/kcal    Nutrient Intake: >85%     [x] Previous Nutrition Diagnosis:            [x] Ongoing          [] Resolved  #1 Altered nutrition related lab values  Goal/Expected Outcome: achieve BG <180mg/dL x4days              [x] Ongoing          [] Resolved  #2 Inadequate oral intake  dysphagia precluding PO intake  SLP evaluation recommending NPO with EN    Nutrition Intervention:   Enteral Nutrition, Medical Food Supplement, Vitamin Supplement, Nutrition Related Medication, Coordination of Care      Indicator/Monitoring:   Mykel Cisneros, #[id number] to monitor diet order, energy intake, food and nutrient intake, body composition, weight    Recommendations:  1. Continue NPO with alternate means of nutrition/hydration via NGT awaiting PEG placement  regimen: Glucerna 1.2 at 300mL Q6hrs, provides (1440kcal, 72gm protein, 972mL free H2O) + No Carb Prosource modular 1x/daily (+60kcal, +15 g pro --> total with en 1500kcal, 87g pro)  2. continue bowel regimen, insulin regimen  3. If patient to be d/c home consult nutrition support team for discharge recommendations     HIGH Risk, follow up x 4days.

## 2022-06-13 NOTE — PROGRESS NOTE ADULT - ATTENDING COMMENTS
Patient is a 57 y/o female  with PMHx of DVT on Eliquis, COPD,  trach collar, obesity, IDDM, UTI, sent by [facility] for hypoxia due to mucous plug.     #mucous plug removed  # chronic COPD  #pneumonia vs atelectasis L base  - On 5/28, Pulmonary team found large granuloma in trachea along tracheostomy tube. Patient had a size 8 trach on admission. Surgery assisted Pulmonary and CCU with trach exchanged with ET tube guidance to a size 7 XLT. Saturations improved to high 90s.  - trach functioning well s/p exchange   - Pt off vent 72 hrs and is saturating well.  -repeated CXR 6/13- complete left  lung atelectasis with possible mucus plug- Pulm. team on board, for now cont. chest PT, frequent suctioning  - 6/11 - Surgical team replaced size 8 fenestrated trach tube.     #Dysphagia   - 1st  Modified barium swallow test done, patient was not able to tolerate test,  Surgical team has changed  tracheostomy on 6/11   -  speech tx.  repeated barium  swallow study on 6/13 with new trach.- still high risk for aspiration  -suspected epiglottis  edema- 6/13- started on IV Dexa- consulted ENT  - for now on NGT feedings  -will consult GI for peg eval.     # DM - hyperglycemia- increased insulin regimen dose to optimize glycemic control  - lantus 15 units subc. once daily at bedtime, Lispro 4 units three times a day before meals    # Hyponatremia- monitor BMP     #Chronic Indwelling Singh  - 5/28 Ucx - Contaminated   - 5/28 Ucx- Normal perri   - 5/27 Ucx- Klebsiella (CRE) and pseudomonas   - s/p cefepime  - Currently afebrile w/ no urinary complaints   - episode of Hematouria 6/4 -> resolved  -> h/h Stable       #Nonobstructive Ileus  - abdomen currently soft, nontender, + BS   - off opioids  - C/w bowel regimen     #Hx of DVT  - resumed patient's home eliquis (5mg BID)      Activity OOB to chair  GI PPX  PPI  DVT PPX Eliquis   Dispo: Acute, f/up pulm for left lung atelectasis, f/up GI for peg placement, f/up ENT for epiglottis swelling , started on IV dexa    Total time spent to complete patient's bedside assessment, review medical chart, discuss medical plan of care with covering medical team was more than 35 minutes .

## 2022-06-13 NOTE — CONSULT NOTE ADULT - ASSESSMENT
57 yo f with PMHx of DVT on Eliquis, COPD,  trach collar, obesity, IDDM, UTI, sent by [facility] for hypoxia. History provided by daughter at bedside, she reports the current illness going back to April 4th when pt was intubated on admission at [facility] ICU for hypoxic respiratory failure diagnosed with copd exacerbation and superimposed pneumonia, of note she also appears to have been in CHF exacerbation with severe b/l LE swelling treated with IV bumex. Remained intubated 37 days per daughter, diagnosed with fever of unknown origin (up to 106F), treated with empiric broad spectrum antibiotics and once 2 weeks s/p tracheostomy placement and 48 hours afebrile she was was discharged to nursing home for PT came from [facility] for hypoxia, SOB found to have mucus plug s/p emergent bronchoscopy found to have tracheal granulation tissue s/p debridment with exchanged trachostomy tube GI was called for PEG tube.     #)Acute hypercapnic and hypoxic resp failure s/p trach   #)PEG Tube placement   #)Normocytic anemia   -Indication moderate to severe pharyngeal dysphagia   -Speech and swallow/MBS showed pharyngeal dysphagia   -Prior abdominal urgeries  -No Fever  -on eliquis for DVT last dose today     Recs;   -need to hold eliuqis for 2 days before the procedure if high risk for thrombosis can bridge with heparin drip/lovenox and need to be stopped before the procedure   -No evidence of overt GI bleed  -Trend CBC, keep Hb>8  -active type and screen 57 yo f with PMHx of DVT on Eliquis, COPD,  trach collar, obesity, IDDM, UTI, sent by [facility] for hypoxia. History provided by daughter at bedside, she reports the current illness going back to April 4th when pt was intubated on admission at [facility] ICU for hypoxic respiratory failure diagnosed with copd exacerbation and superimposed pneumonia, of note she also appears to have been in CHF exacerbation with severe b/l LE swelling treated with IV bumex. Remained intubated 37 days per daughter, diagnosed with fever of unknown origin (up to 106F), treated with empiric broad spectrum antibiotics and once 2 weeks s/p tracheostomy placement and 48 hours afebrile she was was discharged to nursing home for PT came from [facility] for hypoxia, SOB found to have mucus plug s/p emergent bronchoscopy found to have tracheal granulation tissue s/p debridment with exchanged trachostomy tube GI was called for PEG tube.     #)Acute hypercapnic and hypoxic resp failure s/p trach   #)PEG Tube placement   #)Normocytic anemia   -Indication moderate to severe pharyngeal dysphagia   -Speech and swallow/MBS showed pharyngeal dysphagia   -Prior abdominal urgeries  -No Fever  -on eliquis for DVT last dose today     Recs;   -need to hold eliuqis for 2 days before the procedure if high risk for thrombosis can bridge with heparin drip/lovenox and need to be stopped before the procedure   -No evidence of overt GI bleed  -Trend CBC, keep Hb>8  -active type and screen  -will plan for PEG tube on wednesday

## 2022-06-14 LAB
ALBUMIN SERPL ELPH-MCNC: 4.2 G/DL — SIGNIFICANT CHANGE UP (ref 3.5–5.2)
ALP SERPL-CCNC: 117 U/L — HIGH (ref 30–115)
ALT FLD-CCNC: 28 U/L — SIGNIFICANT CHANGE UP (ref 0–41)
ANION GAP SERPL CALC-SCNC: 12 MMOL/L — SIGNIFICANT CHANGE UP (ref 7–14)
ANION GAP SERPL CALC-SCNC: 16 MMOL/L — HIGH (ref 7–14)
AST SERPL-CCNC: 22 U/L — SIGNIFICANT CHANGE UP (ref 0–41)
BASOPHILS # BLD AUTO: 0.01 K/UL — SIGNIFICANT CHANGE UP (ref 0–0.2)
BASOPHILS NFR BLD AUTO: 0.1 % — SIGNIFICANT CHANGE UP (ref 0–1)
BILIRUB SERPL-MCNC: 0.7 MG/DL — SIGNIFICANT CHANGE UP (ref 0.2–1.2)
BLD GP AB SCN SERPL QL: SIGNIFICANT CHANGE UP
BUN SERPL-MCNC: 20 MG/DL — SIGNIFICANT CHANGE UP (ref 10–20)
BUN SERPL-MCNC: 21 MG/DL — HIGH (ref 10–20)
CALCIUM SERPL-MCNC: 10.1 MG/DL — SIGNIFICANT CHANGE UP (ref 8.5–10.1)
CALCIUM SERPL-MCNC: 9.9 MG/DL — SIGNIFICANT CHANGE UP (ref 8.5–10.1)
CHLORIDE SERPL-SCNC: 85 MMOL/L — LOW (ref 98–110)
CHLORIDE SERPL-SCNC: 87 MMOL/L — LOW (ref 98–110)
CO2 SERPL-SCNC: 27 MMOL/L — SIGNIFICANT CHANGE UP (ref 17–32)
CO2 SERPL-SCNC: 31 MMOL/L — SIGNIFICANT CHANGE UP (ref 17–32)
CREAT SERPL-MCNC: 0.5 MG/DL — LOW (ref 0.7–1.5)
CREAT SERPL-MCNC: <0.5 MG/DL — LOW (ref 0.7–1.5)
EGFR: 109 ML/MIN/1.73M2 — SIGNIFICANT CHANGE UP
EGFR: 115 ML/MIN/1.73M2 — SIGNIFICANT CHANGE UP
EOSINOPHIL # BLD AUTO: 0 K/UL — SIGNIFICANT CHANGE UP (ref 0–0.7)
EOSINOPHIL NFR BLD AUTO: 0 % — SIGNIFICANT CHANGE UP (ref 0–8)
GLUCOSE BLDC GLUCOMTR-MCNC: 247 MG/DL — HIGH (ref 70–99)
GLUCOSE BLDC GLUCOMTR-MCNC: 256 MG/DL — HIGH (ref 70–99)
GLUCOSE BLDC GLUCOMTR-MCNC: 299 MG/DL — HIGH (ref 70–99)
GLUCOSE BLDC GLUCOMTR-MCNC: 303 MG/DL — HIGH (ref 70–99)
GLUCOSE BLDC GLUCOMTR-MCNC: 314 MG/DL — HIGH (ref 70–99)
GLUCOSE BLDC GLUCOMTR-MCNC: 322 MG/DL — HIGH (ref 70–99)
GLUCOSE BLDC GLUCOMTR-MCNC: 336 MG/DL — HIGH (ref 70–99)
GLUCOSE SERPL-MCNC: 183 MG/DL — HIGH (ref 70–99)
GLUCOSE SERPL-MCNC: 289 MG/DL — HIGH (ref 70–99)
HCT VFR BLD CALC: 25.7 % — LOW (ref 37–47)
HGB BLD-MCNC: 9.3 G/DL — LOW (ref 12–16)
IMM GRANULOCYTES NFR BLD AUTO: 1 % — HIGH (ref 0.1–0.3)
INR BLD: 1.02 RATIO — SIGNIFICANT CHANGE UP (ref 0.65–1.3)
LYMPHOCYTES # BLD AUTO: 0.61 K/UL — LOW (ref 1.2–3.4)
LYMPHOCYTES # BLD AUTO: 9.1 % — LOW (ref 20.5–51.1)
MAGNESIUM SERPL-MCNC: 1.9 MG/DL — SIGNIFICANT CHANGE UP (ref 1.8–2.4)
MCHC RBC-ENTMCNC: 33.5 PG — HIGH (ref 27–31)
MCHC RBC-ENTMCNC: 36.2 G/DL — SIGNIFICANT CHANGE UP (ref 32–37)
MCV RBC AUTO: 92.4 FL — SIGNIFICANT CHANGE UP (ref 81–99)
MONOCYTES # BLD AUTO: 0.38 K/UL — SIGNIFICANT CHANGE UP (ref 0.1–0.6)
MONOCYTES NFR BLD AUTO: 5.6 % — SIGNIFICANT CHANGE UP (ref 1.7–9.3)
NEUTROPHILS # BLD AUTO: 5.67 K/UL — SIGNIFICANT CHANGE UP (ref 1.4–6.5)
NEUTROPHILS NFR BLD AUTO: 84.2 % — HIGH (ref 42.2–75.2)
NRBC # BLD: 0 /100 WBCS — SIGNIFICANT CHANGE UP (ref 0–0)
OSMOLALITY SERPL: 282 MOS/KG — SIGNIFICANT CHANGE UP (ref 275–300)
PLATELET # BLD AUTO: 233 K/UL — SIGNIFICANT CHANGE UP (ref 130–400)
POTASSIUM SERPL-MCNC: 4.2 MMOL/L — SIGNIFICANT CHANGE UP (ref 3.5–5)
POTASSIUM SERPL-MCNC: 4.7 MMOL/L — SIGNIFICANT CHANGE UP (ref 3.5–5)
POTASSIUM SERPL-SCNC: 4.2 MMOL/L — SIGNIFICANT CHANGE UP (ref 3.5–5)
POTASSIUM SERPL-SCNC: 4.7 MMOL/L — SIGNIFICANT CHANGE UP (ref 3.5–5)
PROT SERPL-MCNC: 6.6 G/DL — SIGNIFICANT CHANGE UP (ref 6–8)
PROTHROM AB SERPL-ACNC: 11.7 SEC — SIGNIFICANT CHANGE UP (ref 9.95–12.87)
RBC # BLD: 2.78 M/UL — LOW (ref 4.2–5.4)
RBC # FLD: 13.2 % — SIGNIFICANT CHANGE UP (ref 11.5–14.5)
SARS-COV-2 RNA SPEC QL NAA+PROBE: SIGNIFICANT CHANGE UP
SODIUM SERPL-SCNC: 128 MMOL/L — LOW (ref 135–146)
SODIUM SERPL-SCNC: 130 MMOL/L — LOW (ref 135–146)
WBC # BLD: 6.74 K/UL — SIGNIFICANT CHANGE UP (ref 4.8–10.8)
WBC # FLD AUTO: 6.74 K/UL — SIGNIFICANT CHANGE UP (ref 4.8–10.8)

## 2022-06-14 PROCEDURE — 99233 SBSQ HOSP IP/OBS HIGH 50: CPT

## 2022-06-14 PROCEDURE — 31615 TRCHEOBRNCHSC EST TRACHS INC: CPT | Mod: 59

## 2022-06-14 PROCEDURE — 71045 X-RAY EXAM CHEST 1 VIEW: CPT | Mod: 26

## 2022-06-14 PROCEDURE — 99223 1ST HOSP IP/OBS HIGH 75: CPT | Mod: 25

## 2022-06-14 PROCEDURE — 31575 DIAGNOSTIC LARYNGOSCOPY: CPT

## 2022-06-14 RX ORDER — HYDROMORPHONE HYDROCHLORIDE 2 MG/ML
0.5 INJECTION INTRAMUSCULAR; INTRAVENOUS; SUBCUTANEOUS ONCE
Refills: 0 | Status: DISCONTINUED | OUTPATIENT
Start: 2022-06-14 | End: 2022-06-14

## 2022-06-14 RX ORDER — HYDROMORPHONE HYDROCHLORIDE 2 MG/ML
1 INJECTION INTRAMUSCULAR; INTRAVENOUS; SUBCUTANEOUS EVERY 4 HOURS
Refills: 0 | Status: DISCONTINUED | OUTPATIENT
Start: 2022-06-14 | End: 2022-06-16

## 2022-06-14 RX ORDER — KETOROLAC TROMETHAMINE 30 MG/ML
15 SYRINGE (ML) INJECTION ONCE
Refills: 0 | Status: DISCONTINUED | OUTPATIENT
Start: 2022-06-14 | End: 2022-06-14

## 2022-06-14 RX ORDER — SODIUM CHLORIDE 9 MG/ML
1000 INJECTION INTRAMUSCULAR; INTRAVENOUS; SUBCUTANEOUS
Refills: 0 | Status: DISCONTINUED | OUTPATIENT
Start: 2022-06-14 | End: 2022-06-15

## 2022-06-14 RX ORDER — HYDROMORPHONE HYDROCHLORIDE 2 MG/ML
1 INJECTION INTRAMUSCULAR; INTRAVENOUS; SUBCUTANEOUS ONCE
Refills: 0 | Status: DISCONTINUED | OUTPATIENT
Start: 2022-06-14 | End: 2022-06-14

## 2022-06-14 RX ORDER — INSULIN LISPRO 100/ML
10 VIAL (ML) SUBCUTANEOUS
Refills: 0 | Status: DISCONTINUED | OUTPATIENT
Start: 2022-06-14 | End: 2022-06-16

## 2022-06-14 RX ORDER — OXYCODONE AND ACETAMINOPHEN 5; 325 MG/1; MG/1
2 TABLET ORAL EVERY 6 HOURS
Refills: 0 | Status: DISCONTINUED | OUTPATIENT
Start: 2022-06-14 | End: 2022-06-17

## 2022-06-14 RX ORDER — DEXAMETHASONE 0.5 MG/5ML
6 ELIXIR ORAL EVERY 12 HOURS
Refills: 0 | Status: DISCONTINUED | OUTPATIENT
Start: 2022-06-14 | End: 2022-06-16

## 2022-06-14 RX ADMIN — GABAPENTIN 300 MILLIGRAM(S): 400 CAPSULE ORAL at 13:21

## 2022-06-14 RX ADMIN — ENOXAPARIN SODIUM 100 MILLIGRAM(S): 100 INJECTION SUBCUTANEOUS at 05:02

## 2022-06-14 RX ADMIN — HYDROMORPHONE HYDROCHLORIDE 1 MILLIGRAM(S): 2 INJECTION INTRAMUSCULAR; INTRAVENOUS; SUBCUTANEOUS at 14:05

## 2022-06-14 RX ADMIN — Medication 1 APPLICATION(S): at 06:32

## 2022-06-14 RX ADMIN — PANTOPRAZOLE SODIUM 40 MILLIGRAM(S): 20 TABLET, DELAYED RELEASE ORAL at 06:32

## 2022-06-14 RX ADMIN — Medication 4: at 12:17

## 2022-06-14 RX ADMIN — Medication 4: at 00:19

## 2022-06-14 RX ADMIN — GABAPENTIN 300 MILLIGRAM(S): 400 CAPSULE ORAL at 05:04

## 2022-06-14 RX ADMIN — LOSARTAN POTASSIUM 100 MILLIGRAM(S): 100 TABLET, FILM COATED ORAL at 05:03

## 2022-06-14 RX ADMIN — HYDROMORPHONE HYDROCHLORIDE 1 MILLIGRAM(S): 2 INJECTION INTRAMUSCULAR; INTRAVENOUS; SUBCUTANEOUS at 17:33

## 2022-06-14 RX ADMIN — HYDROMORPHONE HYDROCHLORIDE 0.5 MILLIGRAM(S): 2 INJECTION INTRAMUSCULAR; INTRAVENOUS; SUBCUTANEOUS at 09:13

## 2022-06-14 RX ADMIN — SODIUM CHLORIDE 75 MILLILITER(S): 9 INJECTION INTRAMUSCULAR; INTRAVENOUS; SUBCUTANEOUS at 17:33

## 2022-06-14 RX ADMIN — Medication 0: at 17:30

## 2022-06-14 RX ADMIN — Medication 1 APPLICATION(S): at 17:31

## 2022-06-14 RX ADMIN — Medication 6 MILLIGRAM(S): at 17:29

## 2022-06-14 RX ADMIN — HYDROMORPHONE HYDROCHLORIDE 0.5 MILLIGRAM(S): 2 INJECTION INTRAMUSCULAR; INTRAVENOUS; SUBCUTANEOUS at 13:52

## 2022-06-14 RX ADMIN — HYDROMORPHONE HYDROCHLORIDE 0.5 MILLIGRAM(S): 2 INJECTION INTRAMUSCULAR; INTRAVENOUS; SUBCUTANEOUS at 10:33

## 2022-06-14 RX ADMIN — HYDROMORPHONE HYDROCHLORIDE 1 MILLIGRAM(S): 2 INJECTION INTRAMUSCULAR; INTRAVENOUS; SUBCUTANEOUS at 13:35

## 2022-06-14 RX ADMIN — Medication 0.25 MILLIGRAM(S): at 21:10

## 2022-06-14 RX ADMIN — HYDROMORPHONE HYDROCHLORIDE 0.5 MILLIGRAM(S): 2 INJECTION INTRAMUSCULAR; INTRAVENOUS; SUBCUTANEOUS at 08:43

## 2022-06-14 RX ADMIN — Medication 2: at 23:39

## 2022-06-14 RX ADMIN — HYDROMORPHONE HYDROCHLORIDE 1 MILLIGRAM(S): 2 INJECTION INTRAMUSCULAR; INTRAVENOUS; SUBCUTANEOUS at 22:07

## 2022-06-14 RX ADMIN — AMLODIPINE BESYLATE 5 MILLIGRAM(S): 2.5 TABLET ORAL at 05:03

## 2022-06-14 RX ADMIN — BUMETANIDE 2 MILLIGRAM(S): 0.25 INJECTION INTRAMUSCULAR; INTRAVENOUS at 05:03

## 2022-06-14 RX ADMIN — HYDROMORPHONE HYDROCHLORIDE 0.5 MILLIGRAM(S): 2 INJECTION INTRAMUSCULAR; INTRAVENOUS; SUBCUTANEOUS at 05:07

## 2022-06-14 RX ADMIN — GABAPENTIN 300 MILLIGRAM(S): 400 CAPSULE ORAL at 21:07

## 2022-06-14 RX ADMIN — INSULIN GLARGINE 15 UNIT(S): 100 INJECTION, SOLUTION SUBCUTANEOUS at 21:16

## 2022-06-14 RX ADMIN — HYDROMORPHONE HYDROCHLORIDE 0.5 MILLIGRAM(S): 2 INJECTION INTRAMUSCULAR; INTRAVENOUS; SUBCUTANEOUS at 10:03

## 2022-06-14 RX ADMIN — Medication 4: at 06:10

## 2022-06-14 RX ADMIN — POLYETHYLENE GLYCOL 3350 17 GRAM(S): 17 POWDER, FOR SOLUTION ORAL at 12:26

## 2022-06-14 RX ADMIN — Medication 15 MILLIGRAM(S): at 00:30

## 2022-06-14 RX ADMIN — HYDROMORPHONE HYDROCHLORIDE 1 MILLIGRAM(S): 2 INJECTION INTRAMUSCULAR; INTRAVENOUS; SUBCUTANEOUS at 18:03

## 2022-06-14 RX ADMIN — Medication 10 UNIT(S): at 12:16

## 2022-06-14 RX ADMIN — HYDROMORPHONE HYDROCHLORIDE 0.5 MILLIGRAM(S): 2 INJECTION INTRAMUSCULAR; INTRAVENOUS; SUBCUTANEOUS at 03:35

## 2022-06-14 RX ADMIN — SENNA PLUS 2 TABLET(S): 8.6 TABLET ORAL at 21:07

## 2022-06-14 RX ADMIN — Medication 6 MILLIGRAM(S): at 05:04

## 2022-06-14 RX ADMIN — Medication 10 UNIT(S): at 17:30

## 2022-06-14 RX ADMIN — ATORVASTATIN CALCIUM 20 MILLIGRAM(S): 80 TABLET, FILM COATED ORAL at 21:07

## 2022-06-14 RX ADMIN — HYDROMORPHONE HYDROCHLORIDE 0.5 MILLIGRAM(S): 2 INJECTION INTRAMUSCULAR; INTRAVENOUS; SUBCUTANEOUS at 14:22

## 2022-06-14 NOTE — CONSULT NOTE ADULT - SUBJECTIVE AND OBJECTIVE BOX
Pt is a 56y Female PMHx DVT, COPD, resp failure s/p recent trach at [facility] (at baseline on 8L via trach collar), obesity, IDDM, UTI, sent by [facility] for hypoxia. History provided by daughter, reports the current illness goes back to April 4th when pt was intubated on admission at [facility] for hypoxic respiratory failure diagnosed w/ COPD exacerbation and superimposed PNA. Pt w/ prolonged intubation, s/p trach ~2 weeks ago and then d/c'ed to NH. Pt admitted for AHRF 2' mucus plug (now resolved s/p suctioning) vs. possible aspiration PNA. Pt w/ suspected chronic HF, course c/b complete whiteout of the left lung with complete atelectasis, pt upgraded to CCU. General Sx c/s emergently when pt found to be hypoxic d/t large granuloma in trachea along tracheostomy tube. Pt previously had a size 8 trach, exchanged w/ size 7 XLT. Large granuloma removed and pt placed on vent. Pt underwent bronchoscopy on 6/1, good visualization and no tracheal debridement required.  Tracheostomy exchanged at bedside to size 8 cuffed fenestrated on 6/11.    Patient s/p barium swallow 6/13 and failed, still with visible residue of contrast, planned for PEG tomorrow.  Currently on decadron IV.    REVIEW OF SYSTEMS   [x] A ten-point review of systems was otherwise negative except as noted.  [ ] Due to altered mental status/intubation, subjective information were not able to be obtained from patient. History was obtained, to the extent possible, from review of the chart and collateral sources of information.    Allergies  penicillin (Swelling)    MEDICATIONS:  acetaminophen     Tablet .. 650 milliGRAM(s) Oral every 6 hours PRN  ALPRAZolam 0.25 milliGRAM(s) Oral at bedtime  amLODIPine   Tablet 5 milliGRAM(s) Oral daily  ammonium lactate 12% Lotion 1 Application(s) Topical every 12 hours  atorvastatin 20 milliGRAM(s) Oral at bedtime  dexAMETHasone  Injectable 6 milliGRAM(s) IV Push every 12 hours  gabapentin 300 milliGRAM(s) Oral three times a day  HYDROmorphone  Injectable 0.5 milliGRAM(s) IV Push every 3 hours  influenza   Vaccine 0.5 milliLiter(s) IntraMuscular once  insulin glargine Injectable (LANTUS) 15 Unit(s) SubCutaneous at bedtime  insulin lispro (ADMELOG) corrective regimen sliding scale   SubCutaneous every 6 hours  insulin lispro Injectable (ADMELOG) 10 Unit(s) SubCutaneous three times a day before meals  losartan 100 milliGRAM(s) Oral daily  pantoprazole    Tablet 40 milliGRAM(s) Oral before breakfast  polyethylene glycol 3350 17 Gram(s) Oral daily  senna 2 Tablet(s) Oral at bedtime  sodium chloride 0.9%. 1000 milliLiter(s) IV Continuous <Continuous>      Vital Signs Last 24 Hrs  T(C): 36 (14 Jun 2022 11:55), Max: 36.7 (13 Jun 2022 20:00)  T(F): 96.8 (14 Jun 2022 11:55), Max: 98.1 (13 Jun 2022 20:00)  HR: 87 (14 Jun 2022 11:55) (83 - 102)  BP: 130/61 (14 Jun 2022 11:55) (118/56 - 143/64)  BP(mean): --  RR: 19 (14 Jun 2022 11:55) (18 - 20)  SpO2: 97% (14 Jun 2022 09:16) (93% - 100%)      06-13 @ 07:01  -  06-14 @ 07:00  --------------------------------------------------------  IN:    Enteral Tube Flush: 60 mL    Glucerna: 600 mL  Total IN: 660 mL    OUT:  Total OUT: 0 mL    Total NET: 660 mL          PHYSICAL EXAM:    GEN: Well-developed, well-nourished. NAD, awake and alert. No drooling or pooling of secretions. No stridor or stertor. Good vocal quality, no hoarseness.   SKIN: Good color, non diaphoretic  HEENT: NC/AT; Oral mucosa pink and moist. No erythema or edema noted to buccal mucosa, tongue, FOM, uvula or posterior oropharynx. Uvula midline, +Trach with T-piece satting 97%  NECK:  Trachea midline. Neck supple, no TTP to B/L lateral neck, no cervical LAD.  RESP: No dyspnea, non-labored breathing. No use of accessory muscles.  CARDIO: +S1/S2  ABDO: Soft, NT.  EXT: JONAS x 4    LABS:  CBC-                        9.3    6.74  )-----------( 233      ( 14 Jun 2022 06:47 )             25.7     BMP/CMP-  14 Jun 2022 06:47    128    |  85     |  20     ----------------------------<  289    4.7     |  27     |  <0.5     Ca    10.1       14 Jun 2022 06:47  Mg     1.9       14 Jun 2022 06:47    TPro  6.6    /  Alb  4.2    /  TBili  0.7    /  DBili  x      /  AST  22     /  ALT  28     /  AlkPhos  117    14 Jun 2022 06:47    Coagulation Studies-    Endocrine Panel-  Calcium, Total Serum: 10.1 mg/dL (06-14 @ 06:47)

## 2022-06-14 NOTE — PROGRESS NOTE ADULT - ATTENDING COMMENTS
Patient is a 57 y/o female  with PMHx of DVT on Eliquis, COPD,  trach collar, obesity, IDDM, UTI, sent by [facility] for hypoxia due to mucous plug.     #mucous plug removed  # chronic COPD  #pneumonia vs atelectasis L base  - On 5/28, Pulmonary team found large granuloma in trachea along tracheostomy tube. Patient had a size 8 trach on admission. Surgery assisted Pulmonary and CCU with trach exchanged with ET tube guidance to a size 7 XLT. Saturations improved to high 90s.  - trach functioning well s/p exchange   - Pt off vent 72 hrs and is saturating well.  -repeated CXR 6/14- partially reopened lung with atelectasis - Pulm. team on board, for now cont. chest PT, frequent suctioning  - 6/11 - Surgical team replaced size 8 fenestrated trach tube.     #suspected epiglottis  edema- 6/13- started on IV Dexa- tapered to 6 mg IV every 12 hours- consulted ENT- f/up rec.    #Dysphagia   - 1st  Modified barium swallow test done, patient was not able to tolerate test,  Surgical team has changed  tracheostomy on 6/11   -  speech tx.  repeated barium  swallow study on 6/13 with new trach.- still high risk for aspiration  - for now on NGT feedings  -consulted GI - scheduled for peg  tomorrow( 6/15).     # DM - hyperglycemia- increased insulin regimen dose to optimize glycemic control  - lantus 15 units subc. once daily at bedtime, Lispro 4 units three times a day before meals    # Hyponatremia- monitor BMP     #Chronic Indwelling Singh  - 5/28 Ucx - Contaminated   - 5/28 Ucx- Normal perri   - 5/27 Ucx- Klebsiella (CRE) and pseudomonas   - s/p cefepime  - Currently afebrile w/ no urinary complaints   - episode of Hematouria 6/4 -> resolved  -> h/h Stable       #Nonobstructive Ileus  - abdomen currently soft, nontender, + BS   - off opioids  - C/w bowel regimen     #Hx of DVT  - resumed patient's home eliquis (5mg BID)- on hold for peg tomorrow      Activity OOB to chair  GI PPX  PPI  DVT PPX Eliquis   Dispo: Acute,  as per  GI for peg placement tomorrow, NPO post midnight with holding Eliquis tonight and tomorrow morning , f/up ENT for epiglottis swelling ,  on IV dexa    Total time spent to complete patient's bedside assessment, review medical chart, discuss medical plan of care with covering medical team was more than 35 minutes .

## 2022-06-14 NOTE — PROGRESS NOTE ADULT - ASSESSMENT
55 yo f with PMHx of DVT on Eliquis, COPD,  trach collar, obesity, IDDM, UTI, sent by [facility] for hypoxia. Current illness going back to April 4th when pt was intubated on admission at [facility] ICU for hypoxic respiratory failure diagnosed with copd exacerbation and superimposed pneumonia, and remained intubated for 37 days requiring tracheostomy. Patient stayed at [facility] for 2 days until noted to be hypoxic (saturating low 70's) pt's saturation immediately improving afterwards to 80's after large mucus plug removed and was subsequently sent to [facility]  On arrival to ED pt was saturating 97%, 15L O2 via tracheostomy collar (baseline 8 L at [facility])CXR suspicious for L-sided PNA, inconclusive. CTA neg for PE. Pt received x1 IV Levaquin and Cefepime in ED, admitted to MICU for acute hypoxic hypercapnic respiratory failure secondary to acute mucus plug now resolved vs superimposed pneumonia hospital-acquired.   General Surgery Consulted emergently when patient found to be hypoxic to low 80s after CCU and Pulmonary team found large granuloma in trachea along tracheostomy tube. Patient currently had a size 8 trach. Surgery assisted Pulmonary and CCU with trach exchanged with ET tube guidance to a size 7 XLT. Saturations improved to high 90s. Large granuloma was removed. Patient was no longer in distress after exchange.    #Acute/ chronic COPD with exacerbation (Resolved)  #mucous plug removed  #pneumonia vs atelectasis L base  - On 5/28, Pulmonary team found large granuloma in trachea along tracheostomy tube. Patient had a size 8 trach on admission. Surgery assisted Pulmonary and CCU with trach exchanged with ET tube guidance to a size 7 XLT. Saturations improved to high 90s.  - trach functioning well s/p exchange   - Pt off vent 72 hrs and is saturating well  - repeat chest x-ray (6/6) shows possible mucus plugging. suctioning attempted repeat CXR showed improved aeration.  ***Repeat Chest X-ray shows resolution of Mucus plug after suctioning and chest PT  - S/p Sw eval, if fails, CT Sx FU for PEG tube  - Modified barium swallow test done: patient still unable to eat, will need further therapy  - Repeat CXR 6/8: left lung opacity/effusion  - 6/11 Trach changed to Fenestrated 8 by surgery  - 6/12 and 6/13: repeat CXR showing recurrent mucous plug  - 6/14 CXR: improving L atelectasis  - c/w aggressive L sided chest PT and frequent suctioning q6    #Dysphagia  - 6/13: s/p esophagram, not tolerated PO, noted epiglottis swelling  - ENT consult pending for epliglottis swelling  - IV decadron 6mg decreased from q8 to q12  - as per GI, plan for PEG tube 6/15. holding Lovenox starting PM dose. 4pm INR, T/S, COVID PCR    #Hyponatremia  - serum osm, Uosm, Caleb  - start NS @75cc/hr  - dc Bumex in the interim  - f/u 4pm BMP    #Chronic Indwelling Singh  - 5/28 Ucx - Contaminated   - 5/28 Ucx- Normal perri   - 5/27 Ucx- Klebsiella (CRE) and pseudomonas   - s/p cefepime  - Currently afebrile w/ no urinary complaints   - episode of Hematouria 6/4 -> improving -> h/h Stable    #Nonobstructive Ileus  - abdomen currently soft, nontender, + BS   - off opioids  - C/w bowel regimen - hold for diarrhea    #Hx of DVT  - on home Eliquis  - Lovenox holding for PEG tube placement tomorrow    #DM  - c/w lantus 15, increased lispro to 10uTID    #Misc:  Activity OOB to chair  GI PPX  PPI  DVT PPX Lovenox - held  Dispo: Acute  Pending: PEG tube placement tomorrow. f/u 4pm BMP, ENT consult

## 2022-06-14 NOTE — PROGRESS NOTE ADULT - SUBJECTIVE AND OBJECTIVE BOX
Gastroenterology progress note:     Patient is a 56y old  Female who presents with a chief complaint of SOB (13 Jun 2022 13:16)       Admitted on: 05-27-22    We are following the patient for PEG tube placement      Interval History:  No overnight events   Denies any nausea, vomiting, abdominal pain       PAST MEDICAL & SURGICAL HISTORY:  COPD (chronic obstructive pulmonary disease)      CHF (congestive heart failure)      DM (diabetes mellitus)      H/O tracheostomy          MEDICATIONS  (STANDING):  ALPRAZolam 0.25 milliGRAM(s) Oral at bedtime  amLODIPine   Tablet 5 milliGRAM(s) Oral daily  ammonium lactate 12% Lotion 1 Application(s) Topical every 12 hours  atorvastatin 20 milliGRAM(s) Oral at bedtime  buMETAnide 2 milliGRAM(s) Oral daily  dexAMETHasone  Injectable 6 milliGRAM(s) IV Push every 12 hours  gabapentin 300 milliGRAM(s) Oral three times a day  HYDROmorphone  Injectable 0.5 milliGRAM(s) IV Push every 3 hours  influenza   Vaccine 0.5 milliLiter(s) IntraMuscular once  insulin glargine Injectable (LANTUS) 15 Unit(s) SubCutaneous at bedtime  insulin lispro (ADMELOG) corrective regimen sliding scale   SubCutaneous every 6 hours  insulin lispro Injectable (ADMELOG) 10 Unit(s) SubCutaneous three times a day before meals  losartan 100 milliGRAM(s) Oral daily  pantoprazole    Tablet 40 milliGRAM(s) Oral before breakfast  polyethylene glycol 3350 17 Gram(s) Oral daily  senna 2 Tablet(s) Oral at bedtime    MEDICATIONS  (PRN):  acetaminophen     Tablet .. 650 milliGRAM(s) Oral every 6 hours PRN Temp greater or equal to 38C (100.4F), Mild Pain (1 - 3)      Allergies  penicillin (Swelling)      Review of Systems:   Cardiovascular:  No Chest Pain, No Palpitations  Respiratory:  No Cough, No Dyspnea  Gastrointestinal:  As described in HPI    Physical Examination:  T(C): 36.7 (06-14-22 @ 04:59), Max: 36.7 (06-13-22 @ 20:00)  HR: 83 (06-14-22 @ 04:59) (83 - 102)  BP: 130/60 (06-14-22 @ 04:59) (118/56 - 143/64)  RR: 20 (06-14-22 @ 04:59) (18 - 20)  SpO2: 97% (06-14-22 @ 09:16) (93% - 100%)      06-13-22 @ 07:01  -  06-14-22 @ 07:00  --------------------------------------------------------  IN: 660 mL / OUT: 0 mL / NET: 660 mL      Constitutional: No acute distress.  Respiratory:  No signs of respiratory distress. Lung sounds are clear bilaterally.  Cardiovascular:  S1 S2, Regular rate and rhythm.  Abdominal: Abdomen is soft, symmetric, and non-tender without distention. .   Skin: No rashes, No Jaundice.        Data:                        9.3    6.74  )-----------( 233      ( 14 Jun 2022 06:47 )             25.7     Hgb trend:  9.3  06-14-22 @ 06:47  9.7  06-13-22 @ 07:33        06-14    128<L>  |  85<L>  |  20  ----------------------------<  289<H>  4.7   |  27  |  <0.5<L>    Ca    10.1      14 Jun 2022 06:47  Mg     1.9     06-14    TPro  6.6  /  Alb  4.2  /  TBili  0.7  /  DBili  x   /  AST  22  /  ALT  28  /  AlkPhos  117<H>  06-14    Liver panel trend:  TBili 0.7   /   AST 22   /   ALT 28   /   AlkP 117   /   Tptn 6.6   /   Alb 4.2    /   DBili --      06-14  TBili 0.6   /   AST 24   /   ALT 27   /   AlkP 115   /   Tptn 6.5   /   Alb 4.1    /   DBili --      06-13  TBili 0.7   /   AST 21   /   ALT 26   /   AlkP 106   /   Tptn 6.5   /   Alb 4.0    /   DBili --      06-11  TBili 0.7   /   AST 21   /   ALT 30   /   AlkP 103   /   Tptn 6.5   /   Alb 4.0    /   DBili --      06-09  TBili 0.7   /   AST 24   /   ALT 35   /   AlkP 106   /   Tptn 6.4   /   Alb 4.0    /   DBili --      06-08  TBili 0.7   /   AST 23   /   ALT 35   /   AlkP 100   /   Tptn 6.1   /   Alb 3.8    /   DBili --      06-07  TBili 0.6   /   AST 24   /   ALT 36   /   AlkP 106   /   Tptn 6.3   /   Alb 4.1    /   DBili --      06-06  TBili 0.7   /   AST 26   /   ALT 36   /   AlkP 97   /   Tptn 6.2   /   Alb 3.8    /   DBili --      06-05             Radiology:

## 2022-06-14 NOTE — PROGRESS NOTE ADULT - SUBJECTIVE AND OBJECTIVE BOX
CRUZ DUMONT 56y Female  MRN#: [id number]   Hospital Day: 18d    SUBJECTIVE  Patient is a 56y old Female who presents with a chief complaint of SOB (14 Jun 2022 10:53)  Currently admitted to medicine with the primary diagnosis of Acute respiratory failure with hypoxia    INTERVAL HPI AND OVERNIGHT EVENTS:  Patient was examined and seen at bedside. This morning she was crying in leg pain.      OBJECTIVE  PAST MEDICAL & SURGICAL HISTORY  COPD (chronic obstructive pulmonary disease)    CHF (congestive heart failure)    DM (diabetes mellitus)    H/O tracheostomy      ALLERGIES:  penicillin (Swelling)    MEDICATIONS:  STANDING MEDICATIONS  ALPRAZolam 0.25 milliGRAM(s) Oral at bedtime  amLODIPine   Tablet 5 milliGRAM(s) Oral daily  ammonium lactate 12% Lotion 1 Application(s) Topical every 12 hours  atorvastatin 20 milliGRAM(s) Oral at bedtime  dexAMETHasone  Injectable 6 milliGRAM(s) IV Push every 12 hours  gabapentin 300 milliGRAM(s) Oral three times a day  HYDROmorphone  Injectable 0.5 milliGRAM(s) IV Push every 3 hours  influenza   Vaccine 0.5 milliLiter(s) IntraMuscular once  insulin glargine Injectable (LANTUS) 15 Unit(s) SubCutaneous at bedtime  insulin lispro (ADMELOG) corrective regimen sliding scale   SubCutaneous every 6 hours  insulin lispro Injectable (ADMELOG) 10 Unit(s) SubCutaneous three times a day before meals  losartan 100 milliGRAM(s) Oral daily  pantoprazole    Tablet 40 milliGRAM(s) Oral before breakfast  polyethylene glycol 3350 17 Gram(s) Oral daily  senna 2 Tablet(s) Oral at bedtime  sodium chloride 0.9%. 1000 milliLiter(s) IV Continuous <Continuous>    PRN MEDICATIONS  acetaminophen     Tablet .. 650 milliGRAM(s) Oral every 6 hours PRN      VITAL SIGNS: Last 24 Hours  T(C): 36.7 (14 Jun 2022 04:59), Max: 36.7 (13 Jun 2022 20:00)  T(F): 98 (14 Jun 2022 04:59), Max: 98.1 (13 Jun 2022 20:00)  HR: 83 (14 Jun 2022 04:59) (83 - 102)  BP: 130/60 (14 Jun 2022 04:59) (118/56 - 143/64)  BP(mean): --  RR: 20 (14 Jun 2022 04:59) (18 - 20)  SpO2: 97% (14 Jun 2022 09:16) (93% - 100%)    LABS:                        9.3    6.74  )-----------( 233      ( 14 Jun 2022 06:47 )             25.7     06-14    128<L>  |  85<L>  |  20  ----------------------------<  289<H>  4.7   |  27  |  <0.5<L>    Ca    10.1      14 Jun 2022 06:47  Mg     1.9     06-14    TPro  6.6  /  Alb  4.2  /  TBili  0.7  /  DBili  x   /  AST  22  /  ALT  28  /  AlkPhos  117<H>  06-14      RADIOLOGY:    PHYSICAL EXAM:  CONSTITUTIONAL: No acute distress, well-developed, well-groomed, trached  PULMONARY: Clear to auscultation bilaterally; no wheezes, rales, or rhonchi  CARDIOVASCULAR: Regular rate and rhythm; no murmurs, rubs, or gallops  GASTROINTESTINAL: Soft, non-tender, non-distended; bowel sounds present  MUSCULOSKELETAL: 2+ peripheral pulses; no clubbing, no cyanosis, no edema  NEUROLOGY: non-focal  SKIN: bilateral LE hyperpigmentation

## 2022-06-14 NOTE — PROGRESS NOTE ADULT - ASSESSMENT
55 yo f with PMHx of DVT on Eliquis, COPD,  trach collar, obesity, IDDM, UTI, sent by [facility] for hypoxia. History provided by daughter at bedside, she reports the current illness going back to April 4th when pt was intubated on admission at [facility] ICU for hypoxic respiratory failure diagnosed with copd exacerbation and superimposed pneumonia, of note she also appears to have been in CHF exacerbation with severe b/l LE swelling treated with IV bumex. Remained intubated 37 days per daughter, diagnosed with fever of unknown origin (up to 106F), treated with empiric broad spectrum antibiotics and once 2 weeks s/p tracheostomy placement and 48 hours afebrile she was was discharged to nursing home for PT came from [facility] for hypoxia, SOB found to have mucus plug s/p emergent bronchoscopy found to have tracheal granulation tissue s/p debridment with exchanged trachostomy tube GI was called for PEG tube.     #)Acute hypercapnic and hypoxic resp failure s/p trach   #)PEG Tube placement   #)Normocytic anemia   -Indication moderate to severe pharyngeal dysphagia   -Speech and swallow/MBS showed pharyngeal dysphagia   -Prior abdominal urgeries  -No Fever  -on eliquis for DVT last dose 6/13  -Now on therapeutic lovenox last dose this AM     Recs;   -Hold eliquis today evening and tomorrow   -No evidence of overt GI bleed  -Trend CBC, keep Hb>8  -active type and screen  -NPO after midnight for EGD tomorrow

## 2022-06-14 NOTE — CONSULT NOTE ADULT - ASSESSMENT
Pt is a 56y Female PMHx DVT, COPD, resp failure s/p recent trach at [facility] (at baseline on 8L via trach collar), obesity, IDDM, UTI, admitted with COPD exacerbation and mucus plugging.     - will plan to scop later today with Attending  - ENT to follow

## 2022-06-15 ENCOUNTER — TRANSCRIPTION ENCOUNTER (OUTPATIENT)
Age: 57
End: 2022-06-15

## 2022-06-15 LAB
ALBUMIN SERPL ELPH-MCNC: 4.1 G/DL — SIGNIFICANT CHANGE UP (ref 3.5–5.2)
ALP SERPL-CCNC: 95 U/L — SIGNIFICANT CHANGE UP (ref 30–115)
ALT FLD-CCNC: 28 U/L — SIGNIFICANT CHANGE UP (ref 0–41)
ANION GAP SERPL CALC-SCNC: 12 MMOL/L — SIGNIFICANT CHANGE UP (ref 7–14)
AST SERPL-CCNC: 18 U/L — SIGNIFICANT CHANGE UP (ref 0–41)
BILIRUB SERPL-MCNC: 0.6 MG/DL — SIGNIFICANT CHANGE UP (ref 0.2–1.2)
BUN SERPL-MCNC: 16 MG/DL — SIGNIFICANT CHANGE UP (ref 10–20)
CALCIUM SERPL-MCNC: 10.3 MG/DL — HIGH (ref 8.5–10.1)
CHLORIDE SERPL-SCNC: 91 MMOL/L — LOW (ref 98–110)
CO2 SERPL-SCNC: 28 MMOL/L — SIGNIFICANT CHANGE UP (ref 17–32)
CREAT SERPL-MCNC: <0.5 MG/DL — LOW (ref 0.7–1.5)
EGFR: 115 ML/MIN/1.73M2 — SIGNIFICANT CHANGE UP
GLUCOSE BLDC GLUCOMTR-MCNC: 259 MG/DL — HIGH (ref 70–99)
GLUCOSE BLDC GLUCOMTR-MCNC: 269 MG/DL — HIGH (ref 70–99)
GLUCOSE BLDC GLUCOMTR-MCNC: 278 MG/DL — HIGH (ref 70–99)
GLUCOSE BLDC GLUCOMTR-MCNC: 280 MG/DL — HIGH (ref 70–99)
GLUCOSE SERPL-MCNC: 219 MG/DL — HIGH (ref 70–99)
HCT VFR BLD CALC: 26.2 % — LOW (ref 37–47)
HGB BLD-MCNC: 9.5 G/DL — LOW (ref 12–16)
MAGNESIUM SERPL-MCNC: 1.7 MG/DL — LOW (ref 1.8–2.4)
MCHC RBC-ENTMCNC: 33.7 PG — HIGH (ref 27–31)
MCHC RBC-ENTMCNC: 36.3 G/DL — SIGNIFICANT CHANGE UP (ref 32–37)
MCV RBC AUTO: 92.9 FL — SIGNIFICANT CHANGE UP (ref 81–99)
NRBC # BLD: 0 /100 WBCS — SIGNIFICANT CHANGE UP (ref 0–0)
PLATELET # BLD AUTO: 222 K/UL — SIGNIFICANT CHANGE UP (ref 130–400)
POTASSIUM SERPL-MCNC: 3.9 MMOL/L — SIGNIFICANT CHANGE UP (ref 3.5–5)
POTASSIUM SERPL-SCNC: 3.9 MMOL/L — SIGNIFICANT CHANGE UP (ref 3.5–5)
PROT SERPL-MCNC: 6.4 G/DL — SIGNIFICANT CHANGE UP (ref 6–8)
RBC # BLD: 2.82 M/UL — LOW (ref 4.2–5.4)
RBC # FLD: 13 % — SIGNIFICANT CHANGE UP (ref 11.5–14.5)
SODIUM SERPL-SCNC: 131 MMOL/L — LOW (ref 135–146)
WBC # BLD: 10.7 K/UL — SIGNIFICANT CHANGE UP (ref 4.8–10.8)
WBC # FLD AUTO: 10.7 K/UL — SIGNIFICANT CHANGE UP (ref 4.8–10.8)

## 2022-06-15 PROCEDURE — 43246 EGD PLACE GASTROSTOMY TUBE: CPT

## 2022-06-15 PROCEDURE — 71045 X-RAY EXAM CHEST 1 VIEW: CPT | Mod: 26

## 2022-06-15 PROCEDURE — 99232 SBSQ HOSP IP/OBS MODERATE 35: CPT

## 2022-06-15 RX ORDER — CEFAZOLIN SODIUM 1 G
1000 VIAL (EA) INJECTION ONCE
Refills: 0 | Status: COMPLETED | OUTPATIENT
Start: 2022-06-15 | End: 2022-06-15

## 2022-06-15 RX ORDER — BUMETANIDE 0.25 MG/ML
2 INJECTION INTRAMUSCULAR; INTRAVENOUS DAILY
Refills: 0 | Status: DISCONTINUED | OUTPATIENT
Start: 2022-06-15 | End: 2022-06-28

## 2022-06-15 RX ORDER — PANTOPRAZOLE SODIUM 20 MG/1
40 TABLET, DELAYED RELEASE ORAL ONCE
Refills: 0 | Status: COMPLETED | OUTPATIENT
Start: 2022-06-15 | End: 2022-06-15

## 2022-06-15 RX ORDER — MAGNESIUM SULFATE 500 MG/ML
2 VIAL (ML) INJECTION ONCE
Refills: 0 | Status: COMPLETED | OUTPATIENT
Start: 2022-06-15 | End: 2022-06-15

## 2022-06-15 RX ORDER — CALCIUM CARBONATE 500(1250)
1 TABLET ORAL ONCE
Refills: 0 | Status: COMPLETED | OUTPATIENT
Start: 2022-06-15 | End: 2022-06-15

## 2022-06-15 RX ADMIN — HYDROMORPHONE HYDROCHLORIDE 1 MILLIGRAM(S): 2 INJECTION INTRAMUSCULAR; INTRAVENOUS; SUBCUTANEOUS at 06:22

## 2022-06-15 RX ADMIN — Medication 6 MILLIGRAM(S): at 17:35

## 2022-06-15 RX ADMIN — PANTOPRAZOLE SODIUM 40 MILLIGRAM(S): 20 TABLET, DELAYED RELEASE ORAL at 06:51

## 2022-06-15 RX ADMIN — INSULIN GLARGINE 15 UNIT(S): 100 INJECTION, SOLUTION SUBCUTANEOUS at 21:36

## 2022-06-15 RX ADMIN — HYDROMORPHONE HYDROCHLORIDE 1 MILLIGRAM(S): 2 INJECTION INTRAMUSCULAR; INTRAVENOUS; SUBCUTANEOUS at 10:39

## 2022-06-15 RX ADMIN — Medication 2: at 12:37

## 2022-06-15 RX ADMIN — OXYCODONE AND ACETAMINOPHEN 2 TABLET(S): 5; 325 TABLET ORAL at 09:45

## 2022-06-15 RX ADMIN — HYDROMORPHONE HYDROCHLORIDE 1 MILLIGRAM(S): 2 INJECTION INTRAMUSCULAR; INTRAVENOUS; SUBCUTANEOUS at 11:09

## 2022-06-15 RX ADMIN — Medication 1 APPLICATION(S): at 18:46

## 2022-06-15 RX ADMIN — HYDROMORPHONE HYDROCHLORIDE 1 MILLIGRAM(S): 2 INJECTION INTRAMUSCULAR; INTRAVENOUS; SUBCUTANEOUS at 22:59

## 2022-06-15 RX ADMIN — ATORVASTATIN CALCIUM 20 MILLIGRAM(S): 80 TABLET, FILM COATED ORAL at 21:20

## 2022-06-15 RX ADMIN — Medication 6 MILLIGRAM(S): at 05:05

## 2022-06-15 RX ADMIN — Medication 25 GRAM(S): at 21:19

## 2022-06-15 RX ADMIN — Medication 1 APPLICATION(S): at 05:40

## 2022-06-15 RX ADMIN — AMLODIPINE BESYLATE 5 MILLIGRAM(S): 2.5 TABLET ORAL at 05:04

## 2022-06-15 RX ADMIN — OXYCODONE AND ACETAMINOPHEN 2 TABLET(S): 5; 325 TABLET ORAL at 20:27

## 2022-06-15 RX ADMIN — Medication 0.25 MILLIGRAM(S): at 21:20

## 2022-06-15 RX ADMIN — PANTOPRAZOLE SODIUM 40 MILLIGRAM(S): 20 TABLET, DELAYED RELEASE ORAL at 17:32

## 2022-06-15 RX ADMIN — GABAPENTIN 300 MILLIGRAM(S): 400 CAPSULE ORAL at 05:04

## 2022-06-15 RX ADMIN — HYDROMORPHONE HYDROCHLORIDE 1 MILLIGRAM(S): 2 INJECTION INTRAMUSCULAR; INTRAVENOUS; SUBCUTANEOUS at 22:29

## 2022-06-15 RX ADMIN — OXYCODONE AND ACETAMINOPHEN 2 TABLET(S): 5; 325 TABLET ORAL at 09:15

## 2022-06-15 RX ADMIN — HYDROMORPHONE HYDROCHLORIDE 1 MILLIGRAM(S): 2 INJECTION INTRAMUSCULAR; INTRAVENOUS; SUBCUTANEOUS at 02:32

## 2022-06-15 RX ADMIN — Medication 1 TABLET(S): at 09:44

## 2022-06-15 RX ADMIN — Medication 2: at 17:00

## 2022-06-15 RX ADMIN — GABAPENTIN 300 MILLIGRAM(S): 400 CAPSULE ORAL at 21:20

## 2022-06-15 RX ADMIN — Medication 2: at 05:38

## 2022-06-15 RX ADMIN — LOSARTAN POTASSIUM 100 MILLIGRAM(S): 100 TABLET, FILM COATED ORAL at 05:04

## 2022-06-15 RX ADMIN — SENNA PLUS 2 TABLET(S): 8.6 TABLET ORAL at 21:20

## 2022-06-15 RX ADMIN — OXYCODONE AND ACETAMINOPHEN 2 TABLET(S): 5; 325 TABLET ORAL at 20:57

## 2022-06-15 RX ADMIN — HYDROMORPHONE HYDROCHLORIDE 1 MILLIGRAM(S): 2 INJECTION INTRAMUSCULAR; INTRAVENOUS; SUBCUTANEOUS at 14:39

## 2022-06-15 RX ADMIN — HYDROMORPHONE HYDROCHLORIDE 1 MILLIGRAM(S): 2 INJECTION INTRAMUSCULAR; INTRAVENOUS; SUBCUTANEOUS at 18:09

## 2022-06-15 RX ADMIN — HYDROMORPHONE HYDROCHLORIDE 1 MILLIGRAM(S): 2 INJECTION INTRAMUSCULAR; INTRAVENOUS; SUBCUTANEOUS at 18:39

## 2022-06-15 RX ADMIN — Medication 100 MILLIGRAM(S): at 13:05

## 2022-06-15 NOTE — DISCHARGE NOTE PROVIDER - PROVIDER TOKENS
PROVIDER:[TOKEN:[[id number]],FOLLOWUP:[2 weeks]] PROVIDER:[TOKEN:[[id number]],FOLLOWUP:[1 week]] PROVIDER:[TOKEN:[[id number]],FOLLOWUP:[1 week]],PROVIDER:[TOKEN:[[id number]],FOLLOWUP:[2 weeks]],PROVIDER:[TOKEN:[[id number]],FOLLOWUP:[1 week]] PROVIDER:[TOKEN:[[id number]],FOLLOWUP:[1 week]],PROVIDER:[TOKEN:[[id number]],FOLLOWUP:[2 weeks]],PROVIDER:[TOKEN:[[id number]],FOLLOWUP:[1 week]],PROVIDER:[TOKEN:[[id number]],FOLLOWUP:[1 week]],PROVIDER:[TOKEN:[[id number]],FOLLOWUP:[2 weeks]]

## 2022-06-15 NOTE — DISCHARGE NOTE PROVIDER - CARE PROVIDERS DIRECT ADDRESSES
[email or web address] ,[email or web address],[email or web address] ,[email or web address],DirectAddress_Unknown,[email or web address],DirectAddress_Unknown

## 2022-06-15 NOTE — DISCHARGE NOTE PROVIDER - NSDCCPCAREPLAN_GEN_ALL_CORE_FT
PRINCIPAL DISCHARGE DIAGNOSIS  Diagnosis: Acute respiratory failure with hypoxia  Assessment and Plan of Treatment: Patient jairo in for hypoxia. Was at [facility] only 2 days when was noted to be hypoxic (saturating low 70's) and EMS called. While awaiting EMS, floor nurse on the unit suctioned the patient, and managed to bring up a very large mucus plug, with patient's saturation immediately improving afterwards to 80's. Nonetheless pt was sent to [facility] ED.    Admitted to MICU for acute hypoxic hypercapnic respiratory failure secondary to acute mucus plug now resolved vs superimposed pneumonia hospital-acquired. General Surgery Consulted emergently when patient found to be hypoxic to low 80s after CCU and Pulmonary team found large granuloma in trachea along tracheostomy tube. Patient had a size 8 trach. Surgery assisted Pulmonary and CCU with trach exchanged with ET tube guidance to a size 7 XLT. Saturations improved to high 90s. Large granuloma was removed. Patient was no longer in distress after exchange. Had an episode of hematuria on 6/4 in setting, did not require any blood transfusions and had CRE klebsiella and pseudomonas UTI s/p cefepime treatment.   Downgraded to medical floors, and course complicated by recurrent mucous plugging on 6/6 and 6/12 which improved after aggressive chest PT and frequent suctioning. Trach was exchanged again on 6/11 by surgery. Was evaluated by speech/swallow s/p esophagram and still wasn't tolerating anything PO so she is s/p PEG tube placement by GI on 6/15.         SECONDARY DISCHARGE DIAGNOSES  Diagnosis: PNA (pneumonia)  Assessment and Plan of Treatment:      PRINCIPAL DISCHARGE DIAGNOSIS  Diagnosis: Acute respiratory failure with hypoxia  Assessment and Plan of Treatment: Patient jairo in for hypoxia. Was at [facility] only 2 days when was noted to be hypoxic (saturating low 70's) and EMS called. While awaiting EMS, floor nurse on the unit suctioned the patient, and managed to bring up a very large mucus plug, with patient's saturation immediately improving afterwards to 80's. Nonetheless pt was sent to [facility] ED.    Admitted to MICU for acute hypoxic hypercapnic respiratory failure secondary to acute mucus plug now resolved vs superimposed pneumonia hospital-acquired. General Surgery Consulted emergently when patient found to be hypoxic to low 80s after CCU and Pulmonary team found large granuloma in trachea along tracheostomy tube. Patient had a size 8 trach. Surgery assisted Pulmonary and CCU with trach exchanged with ET tube guidance to a size 7 XLT. Saturations improved to high 90s. Large granuloma was removed. Patient was no longer in distress after exchange. Had an episode of hematuria on 6/4 in setting, did not require any blood transfusions and had CRE klebsiella and pseudomonas UTI s/p cefepime treatment.   Downgraded to medical floors, and course complicated by recurrent mucous plugging on 6/6 and 6/12 which improved after aggressive chest PT and frequent suctioning. Trach was exchanged again on 6/11 by surgery. Was evaluated by speech/swallow s/p esophagram and still wasn't tolerating anything PO so she is s/p PEG tube placement by GI on 6/15.         SECONDARY DISCHARGE DIAGNOSES  Diagnosis: Polyneuropathy associated with disorder  Assessment and Plan of Treatment: You were also found to have polyneuropathy on this admission, when evaluated by our neurologist, you were found to have a foot drop. An MRI was carried out and was found to be benign.     PRINCIPAL DISCHARGE DIAGNOSIS  Diagnosis: Acute respiratory failure with hypoxia  Assessment and Plan of Treatment: Patient jairo in for hypoxia. Was at [facility] only 2 days when was noted to be hypoxic (saturating low 70's) and EMS called. While awaiting EMS, floor nurse on the unit suctioned the patient, and managed to bring up a very large mucus plug, with patient's saturation immediately improving afterwards to 80's. Nonetheless pt was sent to [facility] ED.    Admitted to MICU for acute hypoxic hypercapnic respiratory failure secondary to acute mucus plug now resolved vs superimposed pneumonia hospital-acquired. General Surgery Consulted emergently when patient found to be hypoxic to low 80s after CCU and Pulmonary team found large granuloma in trachea along tracheostomy tube. Patient had a size 8 trach. Surgery assisted Pulmonary and CCU with trach exchanged with ET tube guidance to a size 7 XLT. Saturations improved to high 90s. Large granuloma was removed. Patient was no longer in distress after exchange. Had an episode of hematuria on 6/4 in setting, did not require any blood transfusions and had CRE klebsiella and pseudomonas UTI s/p cefepime treatment.   Downgraded to medical floors, and course complicated by recurrent mucous plugging on 6/6 and 6/12 which improved after aggressive chest PT and frequent suctioning. Trach was exchanged again on 6/11 by surgery. Was evaluated by speech/swallow s/p esophagram and still wasn't tolerating anything PO so she is s/p PEG tube placement by GI on 6/15.  Need speech therapy outpatient, continue with peg feeds due to failed fees and barium swallow.        SECONDARY DISCHARGE DIAGNOSES  Diagnosis: Polyneuropathy associated with disorder  Assessment and Plan of Treatment: You were also found to have polyneuropathy on this admission, when evaluated by our neurologist, you were found to have a foot drop. An MRI was carried out and was found to be benign.     PRINCIPAL DISCHARGE DIAGNOSIS  Diagnosis: Acute respiratory failure with hypoxia  Assessment and Plan of Treatment: Patient jairo in for hypoxia. Was at [facility] only 2 days when was noted to be hypoxic (saturating low 70's) and EMS called. While awaiting EMS, floor nurse on the unit suctioned the patient, and managed to bring up a very large mucus plug, with patient's saturation immediately improving afterwards to 80's. Nonetheless pt was sent to [facility] ED.    Admitted to MICU for acute hypoxic hypercapnic respiratory failure secondary to acute mucus plug now resolved vs superimposed pneumonia hospital-acquired. General Surgery Consulted emergently when patient found to be hypoxic to low 80s after CCU and Pulmonary team found large granuloma in trachea along tracheostomy tube. Patient had a size 8 trach. Surgery assisted Pulmonary and CCU with trach exchanged with ET tube guidance to a size 7 XLT. Saturations improved to high 90s. Large granuloma was removed. Patient was no longer in distress after exchange. Had an episode of hematuria on 6/4 in setting, did not require any blood transfusions and had CRE klebsiella and pseudomonas UTI s/p cefepime treatment.   Downgraded to medical floors, and course complicated by recurrent mucous plugging on 6/6 and 6/12 which improved after aggressive chest PT and frequent suctioning. Trach was exchanged again on 6/11 by surgery. Was evaluated by speech/swallow s/p esophagram and still wasn't tolerating anything PO so she is s/p PEG tube placement by GI on 6/15.  Need speech therapy outpatient, continue with peg feeds due to failed fees and barium swallow.  POLYNEUROPAHY  You were also found to have polyneuropathy on this admission, when evaluated by our neurologist, you were found to have a foot drop. An MRI was carried out and was found to be benign.         PRINCIPAL DISCHARGE DIAGNOSIS  Diagnosis: Acute respiratory failure with hypoxia  Assessment and Plan of Treatment: Patient jairo in for hypoxia. Was at [facility] only 2 days when was noted to be hypoxic (saturating low 70's) and EMS called. While awaiting EMS, floor nurse on the unit suctioned the patient, and managed to bring up a very large mucus plug, with patient's saturation immediately improving afterwards to 80's. Nonetheless pt was sent to [facility] ED.    Admitted to MICU for acute hypoxic hypercapnic respiratory failure secondary to acute mucus plug now resolved vs superimposed pneumonia hospital-acquired. General Surgery Consulted emergently when patient found to be hypoxic to low 80s after CCU and Pulmonary team found large granuloma in trachea along tracheostomy tube. Patient had a size 8 trach. Surgery assisted Pulmonary and CCU with trach exchanged with ET tube guidance to a size 7 XLT. Saturations improved to high 90s. Large granuloma was removed. Patient was no longer in distress after exchange. Had an episode of hematuria on 6/4 in setting, did not require any blood transfusions and had CRE klebsiella and pseudomonas UTI s/p cefepime treatment.   Downgraded to medical floors, and course complicated by recurrent mucous plugging on 6/6 and 6/12 which improved after aggressive chest PT and frequent suctioning. Trach was exchanged again on 6/11 by surgery. Was evaluated by speech/swallow s/p esophagram and still wasn't tolerating anything PO so she is s/p PEG tube placement by GI on 6/15.  Need speech therapy outpatient, continue with peg feeds due to failed fees and barium swallow.  POLYNEUROPAHY  You were also found to have polyneuropathy on this admission, when evaluated by our neurologist, you were found to have a foot drop. An MRI was carried out and was found to be benign.  #Skin         PRINCIPAL DISCHARGE DIAGNOSIS  Diagnosis: Acute respiratory failure with hypoxia  Assessment and Plan of Treatment: Patient jairo in for hypoxia. Was at [facility] only 2 days when was noted to be hypoxic (saturating low 70's) and EMS called. While awaiting EMS, floor nurse on the unit suctioned the patient, and managed to bring up a very large mucus plug, with patient's saturation immediately improving afterwards to 80's. Nonetheless pt was sent to [facility] ED.    Admitted to MICU for acute hypoxic hypercapnic respiratory failure secondary to acute mucus plug now resolved vs superimposed pneumonia hospital-acquired. General Surgery Consulted emergently when patient found to be hypoxic to low 80s after CCU and Pulmonary team found large granuloma in trachea along tracheostomy tube. Patient had a size 8 trach. Surgery assisted Pulmonary and CCU with trach exchanged with ET tube guidance to a size 7 XLT. Saturations improved to high 90s. Large granuloma was removed. Patient was no longer in distress after exchange. Had an episode of hematuria on 6/4 in setting, did not require any blood transfusions and had CRE klebsiella and pseudomonas UTI s/p cefepime treatment.   Downgraded to medical floors, and course complicated by recurrent mucous plugging on 6/6 and 6/12 which improved after aggressive chest PT and frequent suctioning. Trach was exchanged again on 6/11 by surgery. Was evaluated by speech/swallow s/p esophagram and still wasn't tolerating anything PO so she is s/p PEG tube placement by GI on 6/15.  Need speech therapy outpatient, continue with peg feeds due to failed fees and barium swallow.  POLYNEUROPAHY  You were also found to have polyneuropathy on this admission, when evaluated by our neurologist, you were found to have a foot drop. An MRI was carried out and was found to be benign.  #Skin        SECONDARY DISCHARGE DIAGNOSES  Diagnosis: Pneumonia, aspiration  Assessment and Plan of Treatment:     Diagnosis: Bacterial pneumonia  Assessment and Plan of Treatment:   Please take your medications as directed. Don’t skip doses. Follow up with your primary care physician within 3 days. Continue taking your antibiotics as directed until they are all gone—even if you start to feel better. This will prevent the pneumonia from  Coughing up mucus is normal. Don’t use medicines to suppress your cough unless your cough is dry, painful, or interferes with your sleep. Get plenty of rest until your fever, shortness of breath, and chest pain go away. Plan to get a flu shot every year. Ask your primary care doctor about pneumonia vaccines.  Seek immediate medical attention if you experience chest pain, trouble breathing, blue lips or fingernails, fever of 100.4°F  (38°C) or higher, yellow, green, bloody, or smelly sputum, more than normal mucus production, vomiting or diarrhea.     PRINCIPAL DISCHARGE DIAGNOSIS  Diagnosis: Acute respiratory failure with hypoxia  Assessment and Plan of Treatment: Patient jairo in for hypoxia. Was at [facility] only 2 days when was noted to be hypoxic (saturating low 70's) and EMS called. While awaiting EMS, floor nurse on the unit suctioned the patient, and managed to bring up a very large mucus plug, with patient's saturation immediately improving afterwards to 80's. Nonetheless pt was sent to [facility] ED.    Admitted to MICU for acute hypoxic hypercapnic respiratory failure secondary to acute mucus plug now resolved vs superimposed pneumonia hospital-acquired. General Surgery Consulted emergently when patient found to be hypoxic to low 80s after CCU and Pulmonary team found large granuloma in trachea along tracheostomy tube. Patient had a size 8 trach. Surgery assisted Pulmonary and CCU with trach exchanged with ET tube guidance to a size 7 XLT. Saturations improved to high 90s. Large granuloma was removed. Patient was no longer in distress after exchange. Had an episode of hematuria on 6/4 in setting, did not require any blood transfusions and had CRE klebsiella and pseudomonas UTI s/p cefepime treatment.   Downgraded to medical floors, and course complicated by recurrent mucous plugging on 6/6 and 6/12 which improved after aggressive chest PT and frequent suctioning. Trach was exchanged again on 6/11 by surgery. Was evaluated by speech/swallow s/p esophagram and still wasn't tolerating anything PO so she is s/p PEG tube placement by GI on 6/15.  Need speech therapy outpatient, continue with peg feeds due to failed fees and barium swallow.  #Skin  Hyperpigmentation changes in your legs  You will be discharged with skin care routine, please adhere to it.  Continue LWC: Diprolene Cream then Lachydrin Cream to b/l lower extremities two times daily. Can benefit from thorough cleaning to lift plaques before dressing change.   A skin bi      SECONDARY DISCHARGE DIAGNOSES  Diagnosis: Bacterial pneumonia  Assessment and Plan of Treatment: Please take your medications as directed. Don’t skip doses. Follow up with your primary care physician within 3 days. Continue taking your antibiotics as directed until they are all gone—even if you start to feel better. This will prevent the pneumonia from  Coughing up mucus is normal. Don’t use medicines to suppress your cough unless your cough is dry, painful, or interferes with your sleep. Get plenty of rest until your fever, shortness of breath, and chest pain go away. Plan to get a flu shot every year. Ask your primary care doctor about pneumonia vaccines.  Seek immediate medical attention if you experience chest pain, trouble breathing, blue lips or fingernails, fever of 100.4°F  (38°C) or higher, yellow, green, bloody, or smelly sputum, more than normal mucus production, vomiting or diarrhea.  #POLYNEUROPAHY  You were also found to have polyneuropathy on this admission, when evaluated by our neurologist, you were found to have a foot drop. An MRI was carried out and was found to be benign.

## 2022-06-15 NOTE — DISCHARGE NOTE PROVIDER - INSTRUCTIONS
Tube feeding with PEG- Glucerna 1.2 Martin; Total volume for 24 hours (mL):1200  Bolus: 300mL bolus; Tube feed frequency: every 6 hours; Tube feed start time: 6:00AM; Feed rate: 300; Feed duration: 1 hour; Bolus total volume per flush: 30- Every 6 hours.

## 2022-06-15 NOTE — DISCHARGE NOTE PROVIDER - CARE PROVIDER_API CALL
Hang Santiago)  Internal Medicine  [address] [place] [address], [address]  [place] [address]  Phone: [phone]  Fax: [phone]  Follow Up Time: 2 weeks   Hang Santiago)  Internal Medicine  [address] [place] [address], [address]  [place] [address]  Phone: [phone]  Fax: [phone]  Follow Up Time: 1 week   Hang Santiago)  Internal Medicine  [address] [facility], [address]  [place] [address]  Phone: [phone]  Fax: [phone]  Follow Up Time: 1 week    Migel Turner)  Otolaryngology  [address] [facility], [address] [place] [address]  Phone: [phone]  Fax: [phone]  Follow Up Time: 2 weeks    Abdirashid Yen)  Dermatology; Internal Medicine  [address] [place] [address]  Phone: [phone]  Fax: [phone]  Follow Up Time: 1 week   Hang Santiago)  Internal Medicine  [address] [facility], [address]  [place] [address]  Phone: [phone]  Fax: [phone]  Follow Up Time: 1 week    Migel Turner)  Otolaryngology  [address] [facility], [address] [place] [address]  Phone: [phone]  Fax: [phone]  Follow Up Time: 2 weeks    Abdirashid Yen)  Dermatology; Internal Medicine  [address] [place] [address]  Phone: [phone]  Fax: [phone]  Follow Up Time: 1 week    Madhuri Patiño)  Plastic Surgery  [address] [facility] [address]  [place] [address]  Phone: [phone]  Fax: [phone]  Follow Up Time: 1 week    David Mccarty)  Critical Care Medicine; Pulmonary Disease; Sleep Medicine  [address] [facility]  [place] [address]  Phone: [phone]  Fax: [phone]  Follow Up Time: 2 weeks

## 2022-06-15 NOTE — DISCHARGE NOTE PROVIDER - NPI NUMBER (FOR SYSADMIN USE ONLY) :
[[id number]] [[id number]],[[id number]],[[id number]] [[id number]],[[id number]],[[id number]],[[id number]]

## 2022-06-15 NOTE — DISCHARGE NOTE PROVIDER - HOSPITAL COURSE
57 yo f with PMHx of DVT on Eliquis, COPD,  trach collar, obesity, IDDM, UTI, sent by [facility] for hypoxia. History provided by daughter at bedside, she reports the current illness going back to April 4th when pt was intubated on admission at [facility] ICU for hypoxic respiratory failure diagnosed with copd exacerbation and superimposed pneumonia, of note she also appears to have been in CHF exacerbation with severe b/l LE swelling treated with IV bumex. Remained intubated 37 days per daughter, diagnosed with fever of unknown origin (up to 106F), treated with empiric broad spectrum antibiotics and once 2 weeks s/p tracheostomy placement and 48 hours afebrile she was was discharged to nursing home for PT. Of note, patient's daughter and pt herself say the wiseman has not been replaced for over 3 weeks. She also reports a developing sacral pressure ulcer. Was at [facility] only 2 days when was noted to be hypoxic (saturating low 70's) and EMS called. While awaiting EMS, floor nurse on the unit suctioned the patient, and managed to bring up a very large mucus plug, with pt's saturation immediately improving afterwards to 80's. Nonetheless pt was sent to [facility] ED. Prior to all this pt was independent and ambulatory. Pt denies sob, cp, abd pain, n/v/d, fever or chills.     On arrival to ED pt was saturating 97%, 15L O2 via tracheostomy collar (baseline 8 L at [facility]) VS:    /59  T 99.9F RR 20  Admission VBG pH 7.37 pCO2 60 pO2 49. Pt not wheezing, not coughing. CXR suspicious for L-sided PNA, inconclusive. CTA neg for PE. Pt received x1 IV Levoquin and Cefepime in ED.    Admitted to MICU for acute hypoxic hypercapnic respiratory failure secondary to acute mucus plug now resolved vs superimposed pneumonia hospital-acquired.   General Surgery Consulted emergently when patient found to be hypoxic to low 80s after CCU and Pulmonary team found large granuloma in trachea along tracheostomy tube. Patient currently had a size 8 trach. Surgery assisted Pulmonary and CCU with trach exchanged with ET tube guidance to a size 7 XLT. Saturations improved to high 90s. Large granuloma was removed. Patient was no longer in distress after exchange.      #Acute/ chronic COPD with exacerbation (Resolved)  #mucous plug removed  #pneumonia vs atelectasis L base  - On 5/28, Pulmonary team found large granuloma in trachea along tracheostomy tube. Patient had a size 8 trach on admission. Surgery assisted Pulmonary and CCU with trach exchanged with ET tube guidance to a size 7 XLT. Saturations improved to high 90s.  - trach functioning well s/p exchange   - Pt off vent 72 hrs and is saturating well  - repeat chest x-ray (6/6) shows possible mucus plugging. suctioning attempted repeat CXR showed improved aeration.  ***Repeat Chest X-ray shows resolution of Mucus plug after suctioning and chest PT  - S/p Sw eval, if fails, CT Sx FU for PEG tube  - Modified barium swallow test done: patient still unable to eat, will need further therapy  - Repeat CXR 6/8: left lung opacity/effusion  - 6/11 Trach changed to Fenestrated 8 by surgery  - 6/12 and 6/13: repeat CXR showing recurrent mucous plug  - 6/14 CXR: improving L atelectasis  - c/w aggressive L sided chest PT and frequent suctioning q6  - as per pulm, if recurrent atelectasis despite chest PT, suctioning, may consider bronchoscopy    #Dysphagia  - 6/13: s/p esophagram, not tolerated PO, noted epiglottis swelling  - 6/14:IV decadron 6mg decreased from q8 to q12  - ENT consult s/p scope 6/14: c/w decadron, repeat in 1 week if pt is still in the hospital  - as per GI, plan for PEG tube 6/15 today    #Hyponatremia- improving  - serum osm, Uosm, Caleb    #Chronic Indwelling Wiseman  - 5/28 Ucx - Contaminated   - 5/28 Ucx- Normal perri   - 5/27 Ucx- Klebsiella (CRE) and pseudomonas   - s/p cefepime  - Currently afebrile w/ no urinary complaints   - episode of Hematouria 6/4 -> improving -> h/h Stable    #venous stasis   - c/w dilaudid and percocet for pain control    #Nonobstructive Ileus  - abdomen currently soft, nontender, + BS   - off opioids  - C/w bowel regimen - hold for diarrhea               55 yo f with PMHx of DVT on Eliquis, COPD,  trach collar, obesity, IDDM, UTI, sent by [facility] for hypoxia. History provided by daughter at bedside, she reports the current illness going back to April 4th when pt was intubated on admission at [facility] ICU for hypoxic respiratory failure diagnosed with copd exacerbation and superimposed pneumonia, of note she also appears to have been in CHF exacerbation with severe b/l LE swelling treated with IV bumex. Remained intubated 37 days per daughter, diagnosed with fever of unknown origin (up to 106F), treated with empiric broad spectrum antibiotics and once 2 weeks s/p tracheostomy placement and 48 hours afebrile she was was discharged to nursing home for PT. Of note, patient's daughter and pt herself say the wiseman has not been replaced for over 3 weeks. She also reports a developing sacral pressure ulcer. Was at [facility] only 2 days when was noted to be hypoxic (saturating low 70's) and EMS called. While awaiting EMS, floor nurse on the unit suctioned the patient, and managed to bring up a very large mucus plug, with pt's saturation immediately improving afterwards to 80's. Nonetheless pt was sent to [facility] ED. Prior to all this pt was independent and ambulatory. Pt denies sob, cp, abd pain, n/v/d, fever or chills.     On arrival to ED pt was saturating 97%, 15L O2 via tracheostomy collar (baseline 8 L at [facility]) VS:    /59  T 99.9F RR 20  Admission VBG pH 7.37 pCO2 60 pO2 49. Pt not wheezing, not coughing. CXR suspicious for L-sided PNA, inconclusive. CTA neg for PE. Pt received x1 IV Levoquin and Cefepime in ED.    Admitted to MICU for acute hypoxic hypercapnic respiratory failure secondary to acute mucus plug now resolved vs superimposed pneumonia hospital-acquired.   General Surgery Consulted emergently when patient found to be hypoxic to low 80s after CCU and Pulmonary team found large granuloma in trachea along tracheostomy tube. Patient had a size 8 trach. Surgery assisted Pulmonary and CCU with trach exchanged with ET tube guidance to a size 7 XLT. Saturations improved to high 90s. Large granuloma was removed. Patient was no longer in distress after exchange. Had an episode of hematuria on 6/4 in setting, did not require any blood transfusions and had CRE klebsiella and pseudomonas UTI s/p cefepime treatment.     Downgraded to medical floors, and course complicated by recurrent mucous plugging on 6/6 and 6/12 which improved after aggressive chest PT and frequent suctioning. 6/11: Trach changed to Fenestrated 8 by surgery. Was evaluated by speech/swallow s/p esophagram and still wasn't tolerating anything PO so she is s/p PEG tube placement by GI on 6/15.                    57 yo previously independent f with PMHx of DVT on Eliquis, COPD,  trach collar, obesity, IDDM, UTI, admitted to [facility] medicine. According to the daughter, the pts states that the sx began at April 4th when pt was intubated on admission at [facility] ICU for hypoxic respiratory failure diagnosed with copd exacerbation and superimposed pneumonia, of note she also appears to have been in CHF exacerbation with severe b/l LE swelling treated with IV bumex. The patient remained intubated 37 days per daughter, diagnosed with fever of unknown origin (up to 106F), treated with empiric broad spectrum antibiotics and once 2 weeks s/p tracheostomy placement and 48 hours afebrile she was discharged to nursing home for PT. Of note, patient's daughter and pt herself say the wiseman has not been replaced for over 3 weeks. She also reports a developing sacral pressure ulcer. Was at [facility] only 2 days when was noted to be hypoxic (saturating low 70's) and EMS called. A Floor nurse on the unit suctioned the patient, and managed to bring up a very large mucus plug, with pt's saturation immediately improving afterwards to 80's. Nonetheless pt was sent to [facility] ED. Pt denies sob, cp, abd pain, n/v/d, fever or chills.     On arrival to ED pt was saturating 97%, 15L O2 via tracheostomy collar (baseline 8 L at [facility]) VS:    /59  T 99.9F RR 20  Admission VBG pH 7.37 pCO2 60 pO2 49. Pt not wheezing, not coughing. CXR suspicious for L-sided PNA, inconclusive. CTA neg for PE. Pt received x1 IV Levoquin and Cefepime in ED.    Admitted to MICU for acute hypoxic hypercapnic respiratory failure secondary to acute mucus plug now resolved vs superimposed pneumonia hospital-acquired.   General Surgery Consulted emergently when patient found to be hypoxic to low 80s after CCU and Pulmonary team found large granuloma in trachea along tracheostomy tube. Patient had a size 8 trach. Surgery assisted Pulmonary and CCU with trach exchanged with ET tube guidance to a size 7 XLT. Saturations improved to high 90s. Large granuloma was removed. Patient was no longer in distress after exchange. Had an episode of hematuria on 6/4 in setting, did not require any blood transfusions and had CRE klebsiella and pseudomonas UTI s/p cefepime treatment.     Downgraded to medical floors, and course complicated by recurrent mucous plugging on 6/6 and 6/12 which improved after aggressive chest PT and frequent suctioning. 6/11: Trach changed to Fenestrated 8 by surgery. Was evaluated by speech/swallow s/p esophagram and still wasn't tolerating anything PO so she is s/p PEG tube placement by GI on 6/15. Patient was also found to have a foot drop L>>R, MRI was carried out on 6/23 and found to be unremarkable except for-- Mild degenerative changes anddisc bulging at L4-5 and L5-S1 without   significant spinal canal or neuroforaminal narrowing.                     57 yo previously independent f with PMHx of DVT on Eliquis, COPD,  trach collar, obesity, IDDM, UTI, admitted to [facility] medicine. According to the daughter, the pts states that the sx began at April 4th when pt was intubated on admission at [facility] ICU for hypoxic respiratory failure diagnosed with copd exacerbation and superimposed pneumonia, of note she also appears to have been in CHF exacerbation with severe b/l LE swelling treated with IV bumex. The patient remained intubated 37 days per daughter, diagnosed with fever of unknown origin (up to 106F), treated with empiric broad spectrum antibiotics and once 2 weeks s/p tracheostomy placement and 48 hours afebrile she was discharged to nursing home for PT. Of note, patient's daughter and pt herself say the wiseman has not been replaced for over 3 weeks. She also reports a developing sacral pressure ulcer. Was at [facility] only 2 days when was noted to be hypoxic (saturating low 70's) and EMS called. A Floor nurse on the unit suctioned the patient, and managed to bring up a very large mucus plug, with pt's saturation immediately improving afterwards to 80's. Nonetheless pt was sent to [facility] ED. Pt denies sob, cp, abd pain, n/v/d, fever or chills.     On arrival to ED pt was saturating 97%, 15L O2 via tracheostomy collar (baseline 8 L at [facility]) VS:    /59  T 99.9F RR 20  Admission VBG pH 7.37 pCO2 60 pO2 49. Pt not wheezing, not coughing. CXR suspicious for L-sided PNA, inconclusive. CTA neg for PE. Pt received x1 IV Levoquin and Cefepime in ED.    Admitted to MICU for acute hypoxic hypercapnic respiratory failure secondary to acute mucus plug now resolved vs superimposed pneumonia hospital-acquired.   General Surgery Consulted emergently when patient found to be hypoxic to low 80s after CCU and Pulmonary team found large granuloma in trachea along tracheostomy tube. Patient had a size 8 trach. Surgery assisted Pulmonary and CCU with trach exchanged with ET tube guidance to a size 7 XLT. Saturations improved to high 90s. Large granuloma was removed. Patient was no longer in distress after exchange. Had an episode of hematuria on 6/4 in setting, did not require any blood transfusions and had CRE klebsiella and pseudomonas UTI s/p cefepime treatment.     Downgraded to medical floors, and course complicated by recurrent mucous plugging on 6/6 and 6/12 which improved after aggressive chest PT and frequent suctioning. 6/11: Trach changed to Fenestrated 8 by surgery. Was evaluated by speech/swallow s/p esophagram and still wasn't tolerating anything PO so she is s/p PEG tube placement by GI on 6/15. Patient was also found to have a foot drop L>>R, MRI was carried out on 6/23 and found to be unremarkable except for-- Mild degenerative changes anddisc bulging at L4-5 and L5-S1 without   significant spinal canal or neuroforaminal narrowing.      PEG (6/15): KUB showing free intraperitoneal air (6/19).  CT Abdo confirmed that this was just due to PEG tube adjustment    Dysphagia: Strict NPO as of per 's FEES eval from 6/13/22.  [facility] will sign out to  at SNF for further plans with swallow rehab at SNF.  7/6 failed fees study, 7/7 failed barium swallow, f/u speech therapy outpatient, continue to receive nutrition via peg.    B/l LE Hyperpigmentation changes: unclear etiology, present since 1 year. 7/1 Betamethasone cream and Ammonium lactate dressings per Burn, patient's legs feel mildly better.                   55 yo previously independent f with PMHx of DVT on Eliquis, COPD,  trach collar, obesity, IDDM, UTI, admitted to [facility] medicine. According to the daughter, the pts states that the sx began at April 4th when pt was intubated on admission at [facility] ICU for hypoxic respiratory failure diagnosed with copd exacerbation and superimposed pneumonia, of note she also appears to have been in CHF exacerbation with severe b/l LE swelling treated with IV bumex. The patient remained intubated 37 days per daughter, diagnosed with fever of unknown origin (up to 106F), treated with empiric broad spectrum antibiotics and once 2 weeks s/p tracheostomy placement and 48 hours afebrile she was discharged to nursing home for PT. Of note, patient's daughter and pt herself say the wiseman has not been replaced for over 3 weeks. She also reports a developing sacral pressure ulcer. Was at [facility] only 2 days when was noted to be hypoxic (saturating low 70's) and EMS called. A Floor nurse on the unit suctioned the patient, and managed to bring up a very large mucus plug, with pt's saturation immediately improving afterwards to 80's. Nonetheless pt was sent to [facility] ED. Pt denies sob, cp, abd pain, n/v/d, fever or chills.     On arrival to ED pt was saturating 97%, 15L O2 via tracheostomy collar (baseline 8 L at [facility]) VS:    /59  T 99.9F RR 20  Admission VBG pH 7.37 pCO2 60 pO2 49. Pt not wheezing, not coughing. CXR suspicious for L-sided PNA, inconclusive. CTA neg for PE. Pt received x1 IV Levoquin and Cefepime in ED.    Admitted to MICU for acute hypoxic hypercapnic respiratory failure secondary to acute mucus plug now resolved vs superimposed pneumonia hospital-acquired.   General Surgery Consulted emergently when patient found to be hypoxic to low 80s after CCU and Pulmonary team found large granuloma in trachea along tracheostomy tube. Patient had a size 8 trach. Surgery assisted Pulmonary and CCU with trach exchanged with ET tube guidance to a size 7 XLT. Saturations improved to high 90s. Large granuloma was removed. Patient was no longer in distress after exchange. Had an episode of hematuria on 6/4 in setting, did not require any blood transfusions and had CRE klebsiella and pseudomonas UTI s/p cefepime treatment.     Downgraded to medical floors, and course complicated by recurrent mucous plugging on 6/6 and 6/12 which improved after aggressive chest PT and frequent suctioning. 6/11: Trach changed to Fenestrated 8 by surgery. Was evaluated by speech/swallow s/p esophagram and still wasn't tolerating anything PO so she is s/p PEG tube placement by GI on 6/15. Patient was also found to have a foot drop L>>R, MRI was carried out on 6/23 and found to be unremarkable except for-- Mild degenerative changes anddisc bulging at L4-5 and L5-S1 without   significant spinal canal or neuroforaminal narrowing.    6/28 patient developed a fever and leukocytosis and infectious workup was performed.  Patient blood cultures positive for serratia bacteremia and patient was placed on ciprofloxacin until 7/7.    PEG (6/15): KUB showing free intraperitoneal air (6/19).  CT Abdo confirmed that this was just due to PEG tube adjustment    Dysphagia: Strict NPO as of per 's FEES eval from 6/13/22.  [facility] will sign out to  at SNF for further plans with swallow rehab at SNF.  7/6 failed fees study, 7/7 failed barium swallow, f/u speech therapy outpatient, continue to receive nutrition via peg.    B/l LE Hyperpigmentation changes: unclear etiology, present since 1 year. 7/1 Betamethasone cream and Ammonium lactate dressings per Burn, patient's legs feel mildly better.                   55 yo previously independent f with PMHx of DVT on Eliquis, COPD,  trach collar, obesity, IDDM, UTI, admitted to [facility] medicine. According to the daughter, the pts states that the sx began at April 4th when pt was intubated on admission at [facility] ICU for hypoxic respiratory failure diagnosed with copd exacerbation and superimposed pneumonia, of note she also appears to have been in CHF exacerbation with severe b/l LE swelling treated with IV bumex. The patient remained intubated 37 days per daughter, diagnosed with fever of unknown origin (up to 106F), treated with empiric broad spectrum antibiotics and once 2 weeks s/p tracheostomy placement and 48 hours afebrile she was discharged to nursing home for PT. Of note, patient's daughter and pt herself say the wiseman has not been replaced for over 3 weeks. She also reports a developing sacral pressure ulcer. Was at [facility] only 2 days when was noted to be hypoxic (saturating low 70's) and EMS called. A Floor nurse on the unit suctioned the patient, and managed to bring up a very large mucus plug, with pt's saturation immediately improving afterwards to 80's. Nonetheless pt was sent to [facility] ED. Pt denies sob, cp, abd pain, n/v/d, fever or chills.     On arrival to ED pt was saturating 97%, 15L O2 via tracheostomy collar (baseline 8 L at [facility]) VS:    /59  T 99.9F RR 20  Admission VBG pH 7.37 pCO2 60 pO2 49. Pt not wheezing, not coughing. CXR suspicious for L-sided PNA, inconclusive. CTA neg for PE. Pt received x1 IV Levoquin and Cefepime in ED.    Admitted to MICU for acute hypoxic hypercapnic respiratory failure secondary to acute mucus plug now resolved vs superimposed pneumonia hospital-acquired.   General Surgery Consulted emergently when patient found to be hypoxic to low 80s after CCU and Pulmonary team found large granuloma in trachea along tracheostomy tube. Patient had a size 8 trach. Surgery assisted Pulmonary and CCU with trach exchanged with ET tube guidance to a size 7 XLT. Saturations improved to high 90s. Large granuloma was removed. Patient was no longer in distress after exchange. Had an episode of hematuria on 6/4 in setting, did not require any blood transfusions and had CRE klebsiella and pseudomonas UTI s/p cefepime treatment.     Downgraded to medical floors, and course complicated by recurrent mucous plugging on 6/6 and 6/12 which improved after aggressive chest PT and frequent suctioning. 6/11: Trach changed to Fenestrated 8 by surgery. Was evaluated by speech/swallow s/p esophagram and still wasn't tolerating anything PO so she is s/p PEG tube placement by GI on 6/15. Patient was also found to have a foot drop L>>R, MRI was carried out on 6/23 and found to be unremarkable except for-- Mild degenerative changes and disc bulging at L4-5 and L5-S1 without significant spinal canal or neuroforaminal narrowing.    6/28 patient developed a fever and leukocytosis and infectious workup was performed.  Patient blood cultures positive for serratia bacteremia and patient was placed on ciprofloxacin until 7/7.    PEG (6/15): KUB showing free intraperitoneal air (6/19).  CT Abdo confirmed that this was just due to PEG tube adjustment    Dysphagia: Strict NPO as of per 's FEES eval from 6/13/22.  [facility] ST will sign out to ST at SNF for further plans with swallow rehab at SNF.  7/6 failed fees study, 7/7 failed barium swallow, f/u speech therapy outpatient, continue to receive nutrition via peg.    B/l LE Hyperpigmentation changes: unclear etiology, present since 1 year. 7/1 Betamethasone cream and Ammonium lactate dressings per Burn, patient's legs feel mildly better, skin biopsy taken                   57 yo previously independent f with PMHx of DVT on Eliquis, COPD,  trach collar, obesity, IDDM, UTI, admitted to [facility] medicine. According to the daughter, the pts states that the sx began at April 4th when pt was intubated on admission at [facility] ICU for hypoxic respiratory failure diagnosed with copd exacerbation and superimposed pneumonia, of note she also appears to have been in CHF exacerbation with severe b/l LE swelling treated with IV bumex. The patient remained intubated 37 days per daughter, diagnosed with fever of unknown origin (up to 106F), treated with empiric broad spectrum antibiotics and once 2 weeks s/p tracheostomy placement and 48 hours afebrile she was discharged to nursing home for PT. Of note, patient's daughter and pt herself say the wiseman has not been replaced for over 3 weeks. She also reports a developing sacral pressure ulcer. Was at [facility] only 2 days when was noted to be hypoxic (saturating low 70's) and EMS called. A Floor nurse on the unit suctioned the patient, and managed to bring up a very large mucus plug, with pt's saturation immediately improving afterwards to 80's. Nonetheless pt was sent to [facility] ED. Pt denies sob, cp, abd pain, n/v/d, fever or chills.     On arrival to ED pt was saturating 97%, 15L O2 via tracheostomy collar (baseline 8 L at [facility]) VS:    /59  T 99.9F RR 20  Admission VBG pH 7.37 pCO2 60 pO2 49. Pt not wheezing, not coughing. CXR suspicious for L-sided PNA, inconclusive. CTA neg for PE. Pt received x1 IV Levoquin and Cefepime in ED.    Admitted to MICU for acute hypoxic hypercapnic respiratory failure secondary to acute mucus plug now resolved vs superimposed pneumonia hospital-acquired.   General Surgery Consulted emergently when patient found to be hypoxic to low 80s after CCU and Pulmonary team found large granuloma in trachea along tracheostomy tube. Patient had a size 8 trach. Surgery assisted Pulmonary and CCU with trach exchanged with ET tube guidance to a size 7 XLT. Saturations improved to high 90s. Large granuloma was removed. Patient was no longer in distress after exchange. Had an episode of hematuria on 6/4 in setting, did not require any blood transfusions and had CRE klebsiella and pseudomonas UTI s/p cefepime treatment.     Downgraded to medical floors, and course complicated by recurrent mucous plugging on 6/6 and 6/12 which improved after aggressive chest PT and frequent suctioning. 6/11: Trach changed to Fenestrated 8 by surgery. Was evaluated by speech/swallow s/p esophagram and still wasn't tolerating anything PO so she is s/p PEG tube placement by GI on 6/15. Patient was also found to have a foot drop L>>R, MRI was carried out on 6/23 and found to be unremarkable except for-- Mild degenerative changes and disc bulging at L4-5 and L5-S1 without significant spinal canal or neuroforaminal narrowing.    6/28 patient developed a fever and leukocytosis and infectious workup was performed.  Patient blood cultures positive for serratia bacteremia and patient was placed on ciprofloxacin until 7/7.      B/l LE Hyperpigmentation changes: unclear etiology, present since 1 year. 7/1 Betamethasone cream and Ammonium lactate dressings per Burn, patient's legs feel mildly better, skin biopsy taken            #VDRF: Vent management per Pulm.  s/p trach exchange/s/p mucus plugs removal this admission  Monitor oxygenation. Lately been saturating ok on T piece (around 7L O2)      #sepsis: resolved   -UA +; blood cultures (6/28)= Serratia. Sens. noted  -s/p Cefepime and Vanc (6/28) > changed to CTX 2gm QD per ID (6/29) till 7/7  - 6/30 blood culture NGTD, 6/29 urine culture NGTD  CT Abdo per ID: only showed some cecitis    #TME(again on 7/16 AM): Drowsy but arousable. Follows some commands after much stimulation- resolved    #s/p PEG (6/15)  #Persistent Dysphagia:  ST planning repeat FEES on 7/6- poor visualization  - s/p barium swallow study on 7/7- still unable to take PO intake  -Patient will need close outpatient f/up at  SNF for further plans with swallow studies and tx.  -continue peg feedings with pre/post flushes   -as per speech tx- patient is allowed to have ice chips and sips of water po  -daily speech tx.      #LE neuropathic pain:  Patient has apparently been bed bound since April 4th (her [facility] admission for hypoxic failure where she ended up being intubated)  Apparently has L > R foot drop which may be due to chronic contractures of Calf muscle/Achilles tendon.   Can have PT evaluate her for that. But options are limited at this stage.   Given the asymmetrical foot drop, Neurology ruled out Any focal neuropathy with a MRI Lumbar spine WNL.     #Chronic atropic B/l LE Hyperpigmentation changes:  -LWC: Diprolene Cream then Lachydrin Cream to b/l lower extremities two times daily.  -Can benefit from thorough cleaning to lift plaques before dressing change.   - Skin biopsy done on 07/15/2022 => follow up with derm as OP    #Lower extremity Pain management*** :   - Gabapentin to 600 TID,   - Dc on On PO dilaudid 4mg Q4 PRN. Titrate PRN.       #Suspected epiglottis edema:   - s/p DExa Taper (ended 6/18).   - Outpatient ENT f/u.     #Hx of DVT- restarted Eliquis    # Severe Physical deconditioning : patient unable to walk due to severe legs pain, on pain management, continue to encourage PT tx. daily as tolerated     GI PPX  PPI  DVT PPX  ELIQUIS     55 yo previously independent f with PMHx of DVT on Eliquis, COPD,  trach collar, obesity, IDDM, UTI, admitted to [facility] medicine. According to the daughter, the pts states that the sx began at April 4th when pt was intubated on admission at [facility] ICU for hypoxic respiratory failure diagnosed with copd exacerbation and superimposed pneumonia, of note she also appears to have been in CHF exacerbation with severe b/l LE swelling treated with IV bumex. The patient remained intubated 37 days per daughter, diagnosed with fever of unknown origin (up to 106F), treated with empiric broad spectrum antibiotics and once 2 weeks s/p tracheostomy placement and 48 hours afebrile she was discharged to nursing home for PT. Of note, patient's daughter and pt herself say the wiseman has not been replaced for over 3 weeks. She also reports a developing sacral pressure ulcer. Was at [facility] only 2 days when was noted to be hypoxic (saturating low 70's) and EMS called. A Floor nurse on the unit suctioned the patient, and managed to bring up a very large mucus plug, with pt's saturation immediately improving afterwards to 80's. Nonetheless pt was sent to [facility] ED. Pt denies sob, cp, abd pain, n/v/d, fever or chills.     On arrival to ED pt was saturating 97%, 15L O2 via tracheostomy collar (baseline 8 L at [facility]) VS:    /59  T 99.9F RR 20  Admission VBG pH 7.37 pCO2 60 pO2 49. Pt not wheezing, not coughing. CXR suspicious for L-sided PNA, inconclusive. CTA neg for PE. Pt received x1 IV Levoquin and Cefepime in ED.    Admitted to MICU for acute hypoxic hypercapnic respiratory failure secondary to acute mucus plug now resolved vs superimposed pneumonia hospital-acquired.   General Surgery Consulted emergently when patient found to be hypoxic to low 80s after CCU and Pulmonary team found large granuloma in trachea along tracheostomy tube. Patient had a size 8 trach. Surgery assisted Pulmonary and CCU with trach exchanged with ET tube guidance to a size 7 XLT. Saturations improved to high 90s. Large granuloma was removed. Patient was no longer in distress after exchange. Had an episode of hematuria on 6/4 in setting, did not require any blood transfusions and had CRE klebsiella and pseudomonas UTI s/p cefepime treatment.     Downgraded to medical floors, and course complicated by recurrent mucous plugging on 6/6 and 6/12 which improved after aggressive chest PT and frequent suctioning. 6/11: Trach changed to Fenestrated 8 by surgery. Was evaluated by speech/swallow s/p esophagram and still wasn't tolerating anything PO so she is s/p PEG tube placement by GI on 6/15. Patient was also found to have a foot drop L>>R, MRI was carried out on 6/23 and found to be unremarkable except for-- Mild degenerative changes and disc bulging at L4-5 and L5-S1 without significant spinal canal or neuroforaminal narrowing.    6/28 patient developed a fever and leukocytosis and infectious workup was performed.  Patient blood cultures positive for serratia bacteremia and patient was placed on ciprofloxacin until 7/7.      B/l LE Hyperpigmentation changes: unclear etiology, present since 1 year. 7/1 Betamethasone cream and Ammonium lactate dressings per Burn, patient's legs feel mildly better, skin biopsy taken      #VDRF: Vent management per Pulm.  s/p trach exchange/s/p mucus plugs removal this admission  Monitor oxygenation. Lately been saturating ok on T piece (around 7L O2)      #sepsis: resolved   -UA +; blood cultures (6/28)= Serratia. Sens. noted  -s/p Cefepime and Vanc (6/28) > changed to CTX 2gm QD per ID (6/29) till 7/7  - 6/30 blood culture NGTD, 6/29 urine culture NGTD  CT Abdo per ID: only showed some cecitis    #TME(again on 7/16 AM): Drowsy but arousable. Follows some commands after much stimulation- resolved. No more abx.    #s/p PEG (6/15)  #Persistent Dysphagia:  ST repeat FEES on 7/6- poor visualization  - s/p barium swallow study on 7/7- still unable to take PO intake  -Patient will need close outpatient f/up at  SNF for further rehab with swallow studies and tx.  -continue peg feedings with pre/post flushes   -as per speech tx- patient is allowed to have ice chips and sips of water po  -daily speech tx.      #LE neuropathic pain:  Patient has apparently been bed bound since April 4th (her [facility] admission for hypoxic failure where she ended up being intubated)  Apparently has L > R foot drop which may be due to chronic contractures of Calf muscle/Achilles tendon.   Can have PT evaluate her for that. But options are limited at this stage.   Given the asymmetrical foot drop, Neurology ruled out Any focal neuropathy with a MRI Lumbar spine WNL.     #Chronic atropic B/l LE Hyperpigmentation changes:  -LWC: Diprolene Cream then Lachydrin Cream to b/l lower extremities two times daily.  -Can benefit from thorough cleaning to lift plaques before dressing change.   - Skin biopsy done on 07/15/2022 => follow up with derm as OP    #Lower extremity Pain management*** :   - Gabapentin to 600 TID,   - Dc on On PO dilaudid 4mg Q4 PRN (via PEG). Titrate PRN.       #Suspected epiglottis edema:   - s/p DExa Taper (ended 6/18).   - Outpatient ENT f/u.     #Hx of DVT- restarted Eliquis    # Severe Physical deconditioning : patient unable to walk due to severe legs pain, on pain management, continue to encourage PT tx. daily as tolerated     GI PPX  PPI  DVT PPX  ELIQUIS    Attending Note:  Patient seen and examined independently. I personally had a face-to-face encounter with the patient, examined the patient myself, personally reviewed labs & Radiology images,  and reviewed the plan of care with the housestaff. Agree with resident's note but my note supersedes that of the resident in the matters hereby listed.   d.c to SNF 55 yo previously independent f with PMHx of DVT on Eliquis, COPD,  trach collar, obesity, IDDM, UTI, admitted to [facility] medicine. According to the daughter, the pts states that the sx began at April 4th when pt was intubated on admission at [facility] ICU for hypoxic respiratory failure diagnosed with copd exacerbation and superimposed pneumonia, of note she also appears to have been in CHF exacerbation with severe b/l LE swelling treated with IV bumex. The patient remained intubated 37 days per daughter, diagnosed with fever of unknown origin (up to 106F), treated with empiric broad spectrum antibiotics and once 2 weeks s/p tracheostomy placement and 48 hours afebrile she was discharged to nursing home for PT. Of note, patient's daughter and pt herself say the wiseman has not been replaced for over 3 weeks. She also reports a developing sacral pressure ulcer. Was at [facility] only 2 days when was noted to be hypoxic (saturating low 70's) and EMS called. A Floor nurse on the unit suctioned the patient, and managed to bring up a very large mucus plug, with pt's saturation immediately improving afterwards to 80's. Nonetheless pt was sent to [facility] ED. Pt denies sob, cp, abd pain, n/v/d, fever or chills.     On arrival to ED pt was saturating 97%, 15L O2 via tracheostomy collar (baseline 8 L at [facility]) VS:    /59  T 99.9F RR 20  Admission VBG pH 7.37 pCO2 60 pO2 49. Pt not wheezing, not coughing. CXR suspicious for L-sided PNA, inconclusive. CTA neg for PE. Pt received x1 IV Levoquin and Cefepime in ED.    Admitted to MICU for acute hypoxic hypercapnic respiratory failure secondary to acute mucus plug now resolved vs superimposed pneumonia hospital-acquired.   General Surgery Consulted emergently when patient found to be hypoxic to low 80s after CCU and Pulmonary team found large granuloma in trachea along tracheostomy tube. Patient had a size 8 trach. Surgery assisted Pulmonary and CCU with trach exchanged with ET tube guidance to a size 7 XLT. Saturations improved to high 90s. Large granuloma was removed. Patient was no longer in distress after exchange. Had an episode of hematuria on 6/4 in setting, did not require any blood transfusions and had CRE klebsiella and pseudomonas UTI s/p cefepime treatment.     Downgraded to medical floors, and course complicated by recurrent mucous plugging on 6/6 and 6/12 which improved after aggressive chest PT and frequent suctioning. 6/11: Trach changed to Fenestrated 8 by surgery. Was evaluated by speech/swallow s/p esophagram and still wasn't tolerating anything PO so she is s/p PEG tube placement by GI on 6/15. Patient was also found to have a foot drop L>>R, MRI was carried out on 6/23 and found to be unremarkable except for-- Mild degenerative changes and disc bulging at L4-5 and L5-S1 without significant spinal canal or neuroforaminal narrowing.    6/28 patient developed a fever and leukocytosis and infectious workup was performed.  Patient blood cultures positive for serratia bacteremia and patient was placed on ciprofloxacin until 7/7.      B/l LE Hyperpigmentation changes: unclear etiology, present since 1 year. 7/1 Betamethasone cream and Ammonium lactate dressings per Burn, patient's legs feel mildly better, skin biopsy taken      #VDRF: Vent management per Pulm.  s/p trach exchange/s/p mucus plugs removal this admission  Monitor oxygenation. Lately been saturating ok on T piece (around 7L O2)  - Patient had an episode of shortness of breath prior to DC, CXR showed signs of mucous plug, suctioning done and CXR improving, needs aggressive suctioning      #sepsis: resolved   -UA +; blood cultures (6/28)= Serratia. Sens. noted  -s/p Cefepime and Vanc (6/28) > changed to CTX 2gm QD per ID (6/29) till 7/7  - 6/30 blood culture NGTD, 6/29 urine culture NGTD  CT Abdo per ID: only showed some cecitis    #TME(again on 7/16 AM): Drowsy but arousable. Follows some commands after much stimulation- resolved. No more abx.    #s/p PEG (6/15)  #Persistent Dysphagia:  ST repeat FEES on 7/6- poor visualization  - s/p barium swallow study on 7/7- still unable to take PO intake  -Patient will need close outpatient f/up at  SNF for further rehab with swallow studies and tx.  -continue peg feedings with pre/post flushes   -as per speech tx- patient is allowed to have ice chips and sips of water po  -daily speech tx.      #LE neuropathic pain:  Patient has apparently been bed bound since April 4th (her [facility] admission for hypoxic failure where she ended up being intubated)  Apparently has L > R foot drop which may be due to chronic contractures of Calf muscle/Achilles tendon.   Can have PT evaluate her for that. But options are limited at this stage.   Given the asymmetrical foot drop, Neurology ruled out Any focal neuropathy with a MRI Lumbar spine WNL.     #Chronic atropic B/l LE Hyperpigmentation changes:  -LWC: Diprolene Cream then Lachydrin Cream to b/l lower extremities two times daily.  -Can benefit from thorough cleaning to lift plaques before dressing change.   - Skin biopsy done on 07/15/2022 => follow up with derm as OP    #Lower extremity Pain management*** :   - Gabapentin to 600 TID,   - Dc on On PO dilaudid 4mg Q4 PRN (via PEG). Titrate PRN.       #Suspected epiglottis edema:   - s/p DExa Taper (ended 6/18).   - Outpatient ENT f/u.     #Hx of DVT- restarted Eliquis    # Severe Physical deconditioning : patient unable to walk due to severe legs pain, on pain management, continue to encourage PT tx. daily as tolerated     GI PPX  PPI  DVT PPX  ELIQUIS    Attending Note:  Patient seen and examined independently. I personally had a face-to-face encounter with the patient, examined the patient myself, personally reviewed labs & Radiology images,  and reviewed the plan of care with the housestaff. Agree with resident's note but my note supersedes that of the resident in the matters hereby listed.   d.c to SNF 57 yo previously independent f with PMHx of DVT on Eliquis, COPD,  trach collar, obesity, IDDM, UTI, admitted to [facility] medicine. According to the daughter, the pts states that the sx began at April 4th when pt was intubated on admission at [facility] ICU for hypoxic respiratory failure diagnosed with copd exacerbation and superimposed pneumonia, of note she also appears to have been in CHF exacerbation with severe b/l LE swelling treated with IV bumex. The patient remained intubated 37 days per daughter, diagnosed with fever of unknown origin (up to 106F), treated with empiric broad spectrum antibiotics and once 2 weeks s/p tracheostomy placement and 48 hours afebrile she was discharged to nursing home for PT. Of note, patient's daughter and pt herself say the wiseman has not been replaced for over 3 weeks. She also reports a developing sacral pressure ulcer. Was at [facility] only 2 days when was noted to be hypoxic (saturating low 70's) and EMS called. A Floor nurse on the unit suctioned the patient, and managed to bring up a very large mucus plug, with pt's saturation immediately improving afterwards to 80's. Nonetheless pt was sent to [facility] ED. Pt denies sob, cp, abd pain, n/v/d, fever or chills.     On arrival to ED pt was saturating 97%, 15L O2 via tracheostomy collar (baseline 8 L at [facility]) VS:    /59  T 99.9F RR 20  Admission VBG pH 7.37 pCO2 60 pO2 49. Pt not wheezing, not coughing. CXR suspicious for L-sided PNA, inconclusive. CTA neg for PE. Pt received x1 IV Levoquin and Cefepime in ED.    Admitted to MICU for acute hypoxic hypercapnic respiratory failure secondary to acute mucus plug now resolved vs superimposed pneumonia hospital-acquired.   General Surgery Consulted emergently when patient found to be hypoxic to low 80s after CCU and Pulmonary team found large granuloma in trachea along tracheostomy tube. Patient had a size 8 trach. Surgery assisted Pulmonary and CCU with trach exchanged with ET tube guidance to a size 7 XLT. Saturations improved to high 90s. Large granuloma was removed. Patient was no longer in distress after exchange. Had an episode of hematuria on 6/4 in setting, did not require any blood transfusions and had CRE klebsiella and pseudomonas UTI s/p cefepime treatment.     Downgraded to medical floors, and course complicated by recurrent mucous plugging on 6/6 and 6/12 which improved after aggressive chest PT and frequent suctioning. 6/11: Trach changed to Fenestrated 8 by surgery. Was evaluated by speech/swallow s/p esophagram and still wasn't tolerating anything PO so she is s/p PEG tube placement by GI on 6/15. Patient was also found to have a foot drop L>>R, MRI was carried out on 6/23 and found to be unremarkable except for-- Mild degenerative changes and disc bulging at L4-5 and L5-S1 without significant spinal canal or neuroforaminal narrowing.    6/28 patient developed a fever and leukocytosis and infectious workup was performed.  Patient blood cultures positive for serratia bacteremia and patient was placed on ciprofloxacin until 7/7.      B/l LE Hyperpigmentation changes: unclear etiology, present since 1 year. 7/1 Betamethasone cream and Ammonium lactate dressings per Burn, patient's legs feel mildly better, skin biopsy taken      #VDRF: Vent management per Pulm.  s/p trach exchange/s/p mucus plugs removal this admission  Monitor oxygenation. Lately been saturating ok on T piece (around 7L O2)  - Patient had an episode of shortness of breath prior to DC on 07/19, CXR showed signs of mucous plug, suctioning done and CXR improving, needs aggressive suctioning on dc      #sepsis: resolved   -UA +; blood cultures (6/28)= Serratia. Sens. noted  -s/p Cefepime and Vanc (6/28) > changed to CTX 2gm QD per ID (6/29) till 7/7  - 6/30 blood culture NGTD, 6/29 urine culture NGTD  CT Abdo per ID: only showed some cecitis    #TME(again on 7/16 AM): Drowsy but arousable. Follows some commands after much stimulation- resolved. No more abx.    #s/p PEG (6/15)  #Persistent Dysphagia:  ST repeat FEES on 7/6- poor visualization  - s/p barium swallow study on 7/7- still unable to take PO intake  -Patient will need close outpatient f/up at  SNF for further rehab with swallow studies and tx.  -continue peg feedings with pre/post flushes   -as per speech tx- patient is allowed to have ice chips and sips of water po  -daily speech tx.      #LE neuropathic pain:  Patient has apparently been bed bound since April 4th (her [facility] admission for hypoxic failure where she ended up being intubated)  Apparently has L > R foot drop which may be due to chronic contractures of Calf muscle/Achilles tendon.   Can have PT evaluate her for that. But options are limited at this stage.   Given the asymmetrical foot drop, Neurology ruled out Any focal neuropathy with a MRI Lumbar spine WNL.     #Chronic atropic B/l LE Hyperpigmentation changes:  -LWC: Diprolene Cream then Lachydrin Cream to b/l lower extremities two times daily.  -Can benefit from thorough cleaning to lift plaques before dressing change.   - Skin biopsy done on 07/15/2022 => follow up with derm as OP    #Lower extremity Pain management*** :   - Gabapentin to 600 TID,   - Dc on On PO dilaudid 4mg Q4 PRN (via PEG). Titrate PRN.       #Suspected epiglottis edema:   - s/p DExa Taper (ended 6/18).   - Outpatient ENT f/u.     #Hx of DVT- restarted Eliquis    # Severe Physical deconditioning : patient unable to walk due to severe legs pain, on pain management, continue to encourage PT tx. daily as tolerated     GI PPX  PPI  DVT PPX  ELIQUIS    Attending Note:  Patient seen and examined independently. I personally had a face-to-face encounter with the patient, examined the patient myself, personally reviewed labs & Radiology images,  and reviewed the plan of care with the housestaff. Agree with resident's note but my note supersedes that of the resident in the matters hereby listed.   d.c to SNF

## 2022-06-15 NOTE — PROGRESS NOTE ADULT - SUBJECTIVE AND OBJECTIVE BOX
CRUZ DUMONT 56y Female  MRN#: [id number]   Hospital Day: 19d    SUBJECTIVE  Patient is a 56y old Female who presents with a chief complaint of trach issue (14 Jun 2022 12:33)  Currently admitted to medicine with the primary diagnosis of Acute respiratory failure with hypoxia    INTERVAL HPI AND OVERNIGHT EVENTS:  Patient was examined and seen at bedside. This morning she is resting comfortably in bed, reports having severe heartburn.     OBJECTIVE  PAST MEDICAL & SURGICAL HISTORY  COPD (chronic obstructive pulmonary disease)    CHF (congestive heart failure)    DM (diabetes mellitus)    H/O tracheostomy      ALLERGIES:  penicillin (Swelling)    MEDICATIONS:  STANDING MEDICATIONS  ALPRAZolam 0.25 milliGRAM(s) Oral at bedtime  amLODIPine   Tablet 5 milliGRAM(s) Oral daily  ammonium lactate 12% Lotion 1 Application(s) Topical every 12 hours  atorvastatin 20 milliGRAM(s) Oral at bedtime  dexAMETHasone  Injectable 6 milliGRAM(s) IV Push every 12 hours  gabapentin 300 milliGRAM(s) Oral three times a day  influenza   Vaccine 0.5 milliLiter(s) IntraMuscular once  insulin glargine Injectable (LANTUS) 15 Unit(s) SubCutaneous at bedtime  insulin lispro (ADMELOG) corrective regimen sliding scale   SubCutaneous every 6 hours  insulin lispro Injectable (ADMELOG) 10 Unit(s) SubCutaneous three times a day before meals  losartan 100 milliGRAM(s) Oral daily  pantoprazole    Tablet 40 milliGRAM(s) Oral before breakfast  polyethylene glycol 3350 17 Gram(s) Oral daily  senna 2 Tablet(s) Oral at bedtime    PRN MEDICATIONS  acetaminophen     Tablet .. 650 milliGRAM(s) Oral every 6 hours PRN  HYDROmorphone  Injectable 1 milliGRAM(s) IV Push every 4 hours PRN  oxycodone    5 mG/acetaminophen 325 mG 2 Tablet(s) Oral/Enteral Tube every 6 hours PRN      VITAL SIGNS: Last 24 Hours  T(C): 36.1 (15 Rigoberto 2022 06:43), Max: 36.3 (14 Jun 2022 20:42)  T(F): 96.9 (15 Rigoberto 2022 06:43), Max: 97.4 (14 Jun 2022 20:42)  HR: 77 (15 Rigoberto 2022 04:00) (77 - 87)  BP: 164/70 (15 Rigoberto 2022 06:43) (115/56 - 164/70)  BP(mean): --  RR: 19 (15 Rigoberto 2022 06:43) (19 - 20)  SpO2: 95% (15 Rigoberto 2022 08:36) (91% - 100%)    LABS:                        9.3    6.74  )-----------( 233      ( 14 Jun 2022 06:47 )             25.7     06-14    130<L>  |  87<L>  |  21<H>  ----------------------------<  183<H>  4.2   |  31  |  0.5<L>    Ca    9.9      14 Jun 2022 18:44  Mg     1.9     06-14    TPro  6.6  /  Alb  4.2  /  TBili  0.7  /  DBili  x   /  AST  22  /  ALT  28  /  AlkPhos  117<H>  06-14    PT/INR - ( 14 Jun 2022 18:44 )   PT: 11.70 sec;   INR: 1.02 ratio        RADIOLOGY:  < from: Xray Chest 1 View- PORTABLE-Routine (Xray Chest 1 View- PORTABLE-Routine in AM.) (06.14.22 @ 07:56) >    Impression:    Improved aeration of the left lung with persistent left pleural   effusion/basilar opacity.  Support devices as above.    --- End of Report ---    < end of copied text >    PHYSICAL EXAM:  CONSTITUTIONAL: No acute distress, well-developed, well-groomed, trached  PULMONARY: Clear to auscultation bilaterally; no wheezes, rales, or rhonchi  CARDIOVASCULAR: Regular rate and rhythm; no murmurs, rubs, or gallops  GASTROINTESTINAL: Soft, non-tender, non-distended; bowel sounds present  MUSCULOSKELETAL: 2+ peripheral pulses; no clubbing, no cyanosis, no edema  NEUROLOGY: non-focal  SKIN: bilateral LE hyperpigmentation

## 2022-06-15 NOTE — DISCHARGE NOTE PROVIDER - NSDCFUADDINST_GEN_ALL_CORE_FT
Follow up with your PCP regarding your swallowing and PEG tube issues Follow up with your PCP regarding your swallowing and PEG tube issues, and your regarding skin biopsy.    Local wound care:  Diprolene Cream then Lachydrin Cream to b/l lower extremities two times daily. Can benefit from thorough cleaning to lift plaques before dressing change.   - s/p skin biopsy 7/15.    Follow up with your PCP regarding your swallowing and PEG tube issues, and your regarding skin biopsy.    Local wound care:  Diprolene Cream then Lachydrin Cream to b/l lower extremities two times daily. Can benefit from thorough cleaning to lift plaques before dressing change.   - s/p skin biopsy 7/15.     Patient needs aggressive and frequent suctioning

## 2022-06-15 NOTE — PROGRESS NOTE ADULT - ASSESSMENT
57 yo f with PMHx of DVT on Eliquis, COPD,  trach collar, obesity, IDDM, UTI, sent by [facility] for hypoxia. Current illness going back to April 4th when pt was intubated on admission at [facility] ICU for hypoxic respiratory failure diagnosed with copd exacerbation and superimposed pneumonia, and remained intubated for 37 days requiring tracheostomy. Patient stayed at [facility] for 2 days until noted to be hypoxic (saturating low 70's) pt's saturation immediately improving afterwards to 80's after large mucus plug removed and was subsequently sent to [facility]  On arrival to ED pt was saturating 97%, 15L O2 via tracheostomy collar (baseline 8 L at [facility])CXR suspicious for L-sided PNA, inconclusive. CTA neg for PE. Pt received x1 IV Levaquin and Cefepime in ED, admitted to MICU for acute hypoxic hypercapnic respiratory failure secondary to acute mucus plug now resolved vs superimposed pneumonia hospital-acquired.   General Surgery Consulted emergently when patient found to be hypoxic to low 80s after CCU and Pulmonary team found large granuloma in trachea along tracheostomy tube. Patient currently had a size 8 trach. Surgery assisted Pulmonary and CCU with trach exchanged with ET tube guidance to a size 7 XLT. Saturations improved to high 90s. Large granuloma was removed. Patient was no longer in distress after exchange.    #Acute/ chronic COPD with exacerbation (Resolved)  #mucous plug removed  #pneumonia vs atelectasis L base  - On 5/28, Pulmonary team found large granuloma in trachea along tracheostomy tube. Patient had a size 8 trach on admission. Surgery assisted Pulmonary and CCU with trach exchanged with ET tube guidance to a size 7 XLT. Saturations improved to high 90s.  - trach functioning well s/p exchange   - Pt off vent 72 hrs and is saturating well  - repeat chest x-ray (6/6) shows possible mucus plugging. suctioning attempted repeat CXR showed improved aeration.  ***Repeat Chest X-ray shows resolution of Mucus plug after suctioning and chest PT  - S/p Sw eval, if fails, CT Sx FU for PEG tube  - Modified barium swallow test done: patient still unable to eat, will need further therapy  - Repeat CXR 6/8: left lung opacity/effusion  - 6/11 Trach changed to Fenestrated 8 by surgery  - 6/12 and 6/13: repeat CXR showing recurrent mucous plug  - 6/14 CXR: improving L atelectasis  - c/w aggressive L sided chest PT and frequent suctioning q6  - as per pulm, if recurrent atelectasis despite chest PT, suctioning, may consider bronchoscopy    #Dysphagia  - 6/13: s/p esophagram, not tolerated PO, noted epiglottis swelling  - 6/14:IV decadron 6mg decreased from q8 to q12  - ENT consult s/p scope 6/14: c/w decadron, repeat in 1 week if pt is still in the hospital  - as per GI, plan for PEG tube 6/15 today    #Hyponatremia- improving  - serum osm, Uosm, Caleb    #Chronic Indwelling Singh  - 5/28 Ucx - Contaminated   - 5/28 Ucx- Normal perri   - 5/27 Ucx- Klebsiella (CRE) and pseudomonas   - s/p cefepime  - Currently afebrile w/ no urinary complaints   - episode of Hematouria 6/4 -> improving -> h/h Stable    #venous stasis   - c/w dilaudid and percocet for pain control    #Nonobstructive Ileus  - abdomen currently soft, nontender, + BS   - off opioids  - C/w bowel regimen - hold for diarrhea    #Hx of DVT  - on home Eliquis  - Lovenox holding for PEG tube placement tomorrow    #DM  - c/w lantus 15, increased lispro to 10uTID    #Misc:  Activity OOB to chair  GI PPX  PPI  DVT PPX Lovenox - held for PEG tube  Dispo: Acute  Pending: PEG tube placement today, c/w IV decadron

## 2022-06-15 NOTE — DISCHARGE NOTE PROVIDER - NSDCMRMEDTOKEN_GEN_ALL_CORE_FT
albuterol sulfate 2.5 mg/3 mL (0.083 %) solution for nebulization:   alprazolam 0.25 mg tablet:   amlodipine 5 mg tablet:   atorvastatin 20 mg tablet:   bumetanide 2 mg tablet:   clopidogrel 75 mg tablet:   Eliquis 5 mg tablet:   gabapentin 300 mg capsule:   glimepiride 2 mg tablet:   guaiFENesin 200 mg oral tablet: 1 tab(s) orally every 6 hours x 3 days   labetalol 100 mg tablet:   LANTUS SOLOSTAR 100 UNIT/ML: 55 UNITS ONCE A DAY SUBCUTANEOUS 90 DAYS  losartan 100 mg tablet:   metformin 1,000 mg tablet:   Novolog Flexpen U-100 Insulin aspart 100 unit/mL (3 mL) subcutaneous:   Trelegy Ellipta 100 mcg-62.5 mcg-25 mcg powder for inhalation:    albuterol sulfate 2.5 mg/3 mL (0.083 %) solution for nebulization:   ALPRAZolam 0.5 mg oral tablet: 1 tab(s) orally every 12 hours, As needed, anxiety via PEG tube  amLODIPine 5 mg oral tablet: 1 tab(s) orally once a day via PEG tube  apixaban 5 mg oral tablet: 1 tab(s) orally 2 times a day via PEG tube  atorvastatin 20 mg oral tablet: 1 tab(s) orally once a day (at bedtime) via PEG tube  bumetanide 2 mg oral tablet: 1 tab(s) orally once a day via PEG tube  gabapentin 600 mg oral tablet: 1 tab(s) orally 3 times a day via PEG tube  HYDROmorphone 4 mg oral tablet: 1 tab(s) orally every 8 hours, As needed, for severe pain  insulin glargine 100 units/mL subcutaneous solution: 20 unit(s) subcutaneous once a day (at bedtime)  insulin lispro 100 units/mL injectable solution: 7 unit(s) injectable 3 times a day (before meals)  losartan 100 mg oral tablet: 1 tab(s) orally once a day via PEG tube  Multiple Vitamins oral tablet: 1 tab(s) orally once a day via PEG tube  pantoprazole 40 mg oral delayed release tablet: 1 tab(s) orally once a day (before a meal) via PEG tube  polyethylene glycol 3350 oral powder for reconstitution: 17 gram(s) orally once a day via PEG tube  senna leaf extract oral tablet: 2 tab(s) orally once a day (at bedtime) via PEG tube  Trelegy Ellipta 100 mcg-62.5 mcg-25 mcg powder for inhalation:    albuterol sulfate 2.5 mg/3 mL (0.083 %) solution for nebulization:   ALPRAZolam 0.5 mg oral tablet: 1 tab(s) orally every 12 hours, As needed, anxiety via PEG tube  amLODIPine 5 mg oral tablet: 1 tab(s) orally once a day via PEG tube  apixaban 5 mg oral tablet: 1 tab(s) orally 2 times a day via PEG tube  atorvastatin 20 mg oral tablet: 1 tab(s) orally once a day (at bedtime) via PEG tube  bumetanide 2 mg oral tablet: 1 tab(s) orally once a day via PEG tube  gabapentin 600 mg oral tablet: 1 tab(s) orally 3 times a day via PEG tube  HYDROmorphone 4 mg oral tablet: 1 tab(s) orally every 8 hours, As needed, for severe pain  insulin glargine 100 units/mL subcutaneous solution: 20 unit(s) subcutaneous once a day (at bedtime)  insulin lispro 100 units/mL injectable solution: 9 unit(s) injectable 3 times a day (before meals)  losartan 100 mg oral tablet: 1 tab(s) orally once a day via PEG tube  Multiple Vitamins oral tablet: 1 tab(s) orally once a day via PEG tube  pantoprazole 40 mg oral delayed release tablet: 1 tab(s) orally once a day (before a meal) via PEG tube  polyethylene glycol 3350 oral powder for reconstitution: 17 gram(s) orally once a day via PEG tube  senna leaf extract oral tablet: 2 tab(s) orally once a day (at bedtime) via PEG tube  Trelegy Ellipta 100 mcg-62.5 mcg-25 mcg powder for inhalation:    albuterol sulfate 2.5 mg/3 mL (0.083 %) solution for nebulization:   ALPRAZolam 0.5 mg oral tablet: 1 tab(s) orally every 12 hours, As needed, anxiety via PEG tube  apixaban 5 mg oral tablet: 1 tab(s) orally 2 times a day via PEG tube  atorvastatin 20 mg oral tablet: 1 tab(s) orally once a day (at bedtime) via PEG tube  gabapentin 600 mg oral tablet: 1 tab(s) orally 3 times a day via PEG tube  HYDROmorphone 4 mg oral tablet: 1 tab(s) orally every 8 hours, As needed, for severe pain  insulin glargine 100 units/mL subcutaneous solution: 20 unit(s) subcutaneous once a day (at bedtime)  insulin lispro 100 units/mL injectable solution: 9 unit(s) injectable 3 times a day (before meals)  losartan 100 mg oral tablet: 1 tab(s) orally once a day via PEG tube  Multiple Vitamins oral tablet: 1 tab(s) orally once a day via PEG tube  oxycodone-acetaminophen 5 mg-325 mg oral tablet: 2 tab(s) orally every 6 hours, As needed, Moderate Pain (4 - 6)  pantoprazole 40 mg oral delayed release tablet: 1 tab(s) orally once a day (before a meal) via PEG tube  polyethylene glycol 3350 oral powder for reconstitution: 17 gram(s) orally once a day via PEG tube  senna leaf extract oral tablet: 2 tab(s) orally once a day (at bedtime) via PEG tube  Trelegy Ellipta 100 mcg-62.5 mcg-25 mcg powder for inhalation:    albuterol sulfate 2.5 mg/3 mL (0.083 %) solution for nebulization: milliliter(s) inhaled 3 times a day, As Needed  ALPRAZolam 0.5 mg oral tablet: 1 tab(s) orally every 12 hours, As needed, anxiety via PEG tube  ammonium lactate 12% topical lotion: 1 application topically 2 times a day  apixaban 5 mg oral tablet: 1 tab(s) orally 2 times a day via PEG tube  atorvastatin 20 mg oral tablet: 1 tab(s) orally once a day (at bedtime) via PEG tube  betamethasone valerate 0.1% topical cream: 1 application topically 2 times a day  gabapentin 600 mg oral tablet: 1 tab(s) orally 3 times a day via PEG tube  HYDROmorphone 4 mg oral tablet: 1 tab(s) orally every 4 hours, As Needed  insulin glargine 100 units/mL subcutaneous solution: 20 unit(s) subcutaneous once a day (at bedtime)  insulin lispro 100 units/mL injectable solution: 9 unit(s) injectable 3 times a day (before meals)  insulin lispro 100 units/mL injectable solution: 6  injectable 4 times a day (before meals and at bedtime)    Before meals  losartan 100 mg oral tablet: 1 tab(s) orally once a day via PEG tube  Multiple Vitamins oral tablet: 1 tab(s) orally once a day via PEG tube  pantoprazole 40 mg oral delayed release tablet: 1 tab(s) orally once a day (before a meal) via PEG tube  polyethylene glycol 3350 oral powder for reconstitution: 17 gram(s) orally once a day via PEG tube  senna leaf extract oral tablet: 2 tab(s) orally once a day (at bedtime) via PEG tube  Trelegy Ellipta 100 mcg-62.5 mcg-25 mcg powder for inhalation:    albuterol sulfate 2.5 mg/3 mL (0.083 %) solution for nebulization: milliliter(s) inhaled 3 times a day, As Needed  ALPRAZolam 0.5 mg oral tablet: 1 tab(s) orally every 12 hours, As needed, anxiety via PEG tube  ammonium lactate 12% topical lotion: 1 application topically 2 times a day  apixaban 5 mg oral tablet: 1 tab(s) orally 2 times a day via PEG tube  atorvastatin 20 mg oral tablet: 1 tab(s) orally once a day (at bedtime) via PEG tube  betamethasone valerate 0.1% topical cream: 1 application topically 2 times a day  gabapentin 600 mg oral tablet: 1 tab(s) orally 3 times a day via PEG tube  HYDROmorphone 4 mg oral tablet: 1 tab(s) orally every 4 hours, As Needed  insulin glargine 100 units/mL subcutaneous solution: 20 unit(s) subcutaneous once a day (at bedtime)  insulin lispro 100 units/mL injectable solution: 6  injectable 4 times a day (before meals and at bedtime)    Before meals  losartan 100 mg oral tablet: 1 tab(s) orally once a day via PEG tube  Multiple Vitamins oral tablet: 1 tab(s) orally once a day via PEG tube  pantoprazole 40 mg oral delayed release tablet: 1 tab(s) orally once a day (before a meal) via PEG tube  polyethylene glycol 3350 oral powder for reconstitution: 17 gram(s) orally once a day via PEG tube  senna leaf extract oral tablet: 2 tab(s) orally once a day (at bedtime) via PEG tube  Trelegy Ellipta 100 mcg-62.5 mcg-25 mcg powder for inhalation:    albuterol sulfate 2.5 mg/3 mL (0.083 %) solution for nebulization: milliliter(s) inhaled 3 times a day, As Needed  ALPRAZolam 0.5 mg oral tablet: 1 tab(s) orally every 12 hours, As needed, anxiety via PEG tube  ammonium lactate 12% topical lotion: 1 application topically 2 times a day  apixaban 5 mg oral tablet: 1 tab(s) orally 2 times a day via PEG tube  atorvastatin 20 mg oral tablet: 1 tab(s) orally once a day (at bedtime) via PEG tube  betamethasone valerate 0.1% topical cream: 1 application topically 2 times a day  gabapentin 600 mg oral tablet: 1 tab(s) orally 3 times a day via PEG tube  HYDROmorphone 4 mg oral tablet: 1 tab(s) orally every 4 hours, As Needed  insulin glargine 100 units/mL subcutaneous solution: 20 unit(s) subcutaneous once a day (at bedtime)  insulin lispro 100 units/mL injectable solution: 6  injectable 4 times a day (before feeds Q6H)  losartan 100 mg oral tablet: 1 tab(s) orally once a day via PEG tube  Multiple Vitamins oral tablet: 1 tab(s) orally once a day via PEG tube  pantoprazole 40 mg oral delayed release tablet: 1 tab(s) orally once a day (before a meal) via PEG tube  Plavix 75 mg oral tablet: 1 tab(s) orally once a day  polyethylene glycol 3350 oral powder for reconstitution: 17 gram(s) orally once a day via PEG tube  senna leaf extract oral tablet: 2 tab(s) orally once a day (at bedtime) via PEG tube  Trelegy Ellipta 100 mcg-62.5 mcg-25 mcg powder for inhalation:    acetylcysteine 20% inhalation solution: 4 milliliter(s) inhaled 4 times a day  albuterol sulfate 2.5 mg/3 mL (0.083 %) solution for nebulization: milliliter(s) inhaled 3 times a day, As Needed  ALPRAZolam 0.5 mg oral tablet: 1 tab(s) orally every 12 hours, As needed, anxiety via PEG tube  ammonium lactate 12% topical lotion: 1 application topically 2 times a day  apixaban 5 mg oral tablet: 1 tab(s) orally 2 times a day via PEG tube  atorvastatin 20 mg oral tablet: 1 tab(s) orally once a day (at bedtime) via PEG tube  betamethasone valerate 0.1% topical cream: 1 application topically 2 times a day  gabapentin 600 mg oral tablet: 1 tab(s) orally 3 times a day via PEG tube  HYDROmorphone 4 mg oral tablet: 1 tab(s) orally every 4 hours, As Needed  insulin glargine 100 units/mL subcutaneous solution: 20 unit(s) subcutaneous once a day (at bedtime)  insulin lispro 100 units/mL injectable solution: 6  injectable 4 times a day (before feeds Q6H)  losartan 100 mg oral tablet: 1 tab(s) orally once a day via PEG tube  Multiple Vitamins oral tablet: 1 tab(s) orally once a day via PEG tube  pantoprazole 40 mg oral delayed release tablet: 1 tab(s) orally once a day (before a meal) via PEG tube  Plavix 75 mg oral tablet: 1 tab(s) orally once a day  polyethylene glycol 3350 oral powder for reconstitution: 17 gram(s) orally once a day via PEG tube  senna leaf extract oral tablet: 2 tab(s) orally once a day (at bedtime) via PEG tube  Trelegy Ellipta 100 mcg-62.5 mcg-25 mcg powder for inhalation:

## 2022-06-15 NOTE — PROGRESS NOTE ADULT - ATTENDING COMMENTS
#s/p PEG (6/15):   MAy start feeds from 6/16.    #Suspected epiglottis edema:   On DExa > Taper.   Outpatient ENT f/u.     # Mucus plugs removed. Trach exchanged  CXR PRN    Anticiptae for STR 6/16

## 2022-06-16 LAB
ALBUMIN SERPL ELPH-MCNC: 4.2 G/DL — SIGNIFICANT CHANGE UP (ref 3.5–5.2)
ALP SERPL-CCNC: 96 U/L — SIGNIFICANT CHANGE UP (ref 30–115)
ALT FLD-CCNC: 25 U/L — SIGNIFICANT CHANGE UP (ref 0–41)
ANION GAP SERPL CALC-SCNC: 14 MMOL/L — SIGNIFICANT CHANGE UP (ref 7–14)
AST SERPL-CCNC: 17 U/L — SIGNIFICANT CHANGE UP (ref 0–41)
BASOPHILS # BLD AUTO: 0.01 K/UL — SIGNIFICANT CHANGE UP (ref 0–0.2)
BASOPHILS NFR BLD AUTO: 0.1 % — SIGNIFICANT CHANGE UP (ref 0–1)
BILIRUB SERPL-MCNC: 0.6 MG/DL — SIGNIFICANT CHANGE UP (ref 0.2–1.2)
BUN SERPL-MCNC: 14 MG/DL — SIGNIFICANT CHANGE UP (ref 10–20)
CALCIUM SERPL-MCNC: 10.5 MG/DL — HIGH (ref 8.5–10.1)
CHLORIDE SERPL-SCNC: 92 MMOL/L — LOW (ref 98–110)
CO2 SERPL-SCNC: 27 MMOL/L — SIGNIFICANT CHANGE UP (ref 17–32)
CREAT SERPL-MCNC: <0.5 MG/DL — LOW (ref 0.7–1.5)
EGFR: 115 ML/MIN/1.73M2 — SIGNIFICANT CHANGE UP
EOSINOPHIL # BLD AUTO: 0 K/UL — SIGNIFICANT CHANGE UP (ref 0–0.7)
EOSINOPHIL NFR BLD AUTO: 0 % — SIGNIFICANT CHANGE UP (ref 0–8)
GLUCOSE BLDC GLUCOMTR-MCNC: 217 MG/DL — HIGH (ref 70–99)
GLUCOSE BLDC GLUCOMTR-MCNC: 240 MG/DL — HIGH (ref 70–99)
GLUCOSE BLDC GLUCOMTR-MCNC: 261 MG/DL — HIGH (ref 70–99)
GLUCOSE BLDC GLUCOMTR-MCNC: 269 MG/DL — HIGH (ref 70–99)
GLUCOSE BLDC GLUCOMTR-MCNC: 280 MG/DL — HIGH (ref 70–99)
GLUCOSE BLDC GLUCOMTR-MCNC: 290 MG/DL — HIGH (ref 70–99)
GLUCOSE SERPL-MCNC: 201 MG/DL — HIGH (ref 70–99)
HCT VFR BLD CALC: 27.1 % — LOW (ref 37–47)
HGB BLD-MCNC: 9.8 G/DL — LOW (ref 12–16)
IMM GRANULOCYTES NFR BLD AUTO: 0.9 % — HIGH (ref 0.1–0.3)
LYMPHOCYTES # BLD AUTO: 1.11 K/UL — LOW (ref 1.2–3.4)
LYMPHOCYTES # BLD AUTO: 12.2 % — LOW (ref 20.5–51.1)
MAGNESIUM SERPL-MCNC: 1.9 MG/DL — SIGNIFICANT CHANGE UP (ref 1.8–2.4)
MCHC RBC-ENTMCNC: 33.6 PG — HIGH (ref 27–31)
MCHC RBC-ENTMCNC: 36.2 G/DL — SIGNIFICANT CHANGE UP (ref 32–37)
MCV RBC AUTO: 92.8 FL — SIGNIFICANT CHANGE UP (ref 81–99)
MONOCYTES # BLD AUTO: 0.68 K/UL — HIGH (ref 0.1–0.6)
MONOCYTES NFR BLD AUTO: 7.5 % — SIGNIFICANT CHANGE UP (ref 1.7–9.3)
NEUTROPHILS # BLD AUTO: 7.22 K/UL — HIGH (ref 1.4–6.5)
NEUTROPHILS NFR BLD AUTO: 79.3 % — HIGH (ref 42.2–75.2)
NRBC # BLD: 0 /100 WBCS — SIGNIFICANT CHANGE UP (ref 0–0)
PLATELET # BLD AUTO: 253 K/UL — SIGNIFICANT CHANGE UP (ref 130–400)
POTASSIUM SERPL-MCNC: 4.3 MMOL/L — SIGNIFICANT CHANGE UP (ref 3.5–5)
POTASSIUM SERPL-SCNC: 4.3 MMOL/L — SIGNIFICANT CHANGE UP (ref 3.5–5)
PROT SERPL-MCNC: 6.7 G/DL — SIGNIFICANT CHANGE UP (ref 6–8)
RBC # BLD: 2.92 M/UL — LOW (ref 4.2–5.4)
RBC # FLD: 13.1 % — SIGNIFICANT CHANGE UP (ref 11.5–14.5)
SODIUM SERPL-SCNC: 133 MMOL/L — LOW (ref 135–146)
WBC # BLD: 9.1 K/UL — SIGNIFICANT CHANGE UP (ref 4.8–10.8)
WBC # FLD AUTO: 9.1 K/UL — SIGNIFICANT CHANGE UP (ref 4.8–10.8)

## 2022-06-16 PROCEDURE — 99233 SBSQ HOSP IP/OBS HIGH 50: CPT

## 2022-06-16 PROCEDURE — 71045 X-RAY EXAM CHEST 1 VIEW: CPT | Mod: 26

## 2022-06-16 RX ORDER — SODIUM CHLORIDE 9 MG/ML
1000 INJECTION, SOLUTION INTRAVENOUS
Refills: 0 | Status: DISCONTINUED | OUTPATIENT
Start: 2022-06-16 | End: 2022-07-22

## 2022-06-16 RX ORDER — INSULIN LISPRO 100/ML
7 VIAL (ML) SUBCUTANEOUS EVERY 6 HOURS
Refills: 0 | Status: DISCONTINUED | OUTPATIENT
Start: 2022-06-16 | End: 2022-06-24

## 2022-06-16 RX ORDER — DEXTROSE 50 % IN WATER 50 %
25 SYRINGE (ML) INTRAVENOUS ONCE
Refills: 0 | Status: DISCONTINUED | OUTPATIENT
Start: 2022-06-16 | End: 2022-07-22

## 2022-06-16 RX ORDER — DEXTROSE 50 % IN WATER 50 %
12.5 SYRINGE (ML) INTRAVENOUS ONCE
Refills: 0 | Status: DISCONTINUED | OUTPATIENT
Start: 2022-06-16 | End: 2022-07-22

## 2022-06-16 RX ORDER — DEXAMETHASONE 0.5 MG/5ML
3 ELIXIR ORAL EVERY 12 HOURS
Refills: 0 | Status: COMPLETED | OUTPATIENT
Start: 2022-06-16 | End: 2022-06-18

## 2022-06-16 RX ORDER — HYDROMORPHONE HYDROCHLORIDE 2 MG/ML
1 INJECTION INTRAMUSCULAR; INTRAVENOUS; SUBCUTANEOUS EVERY 4 HOURS
Refills: 0 | Status: DISCONTINUED | OUTPATIENT
Start: 2022-06-16 | End: 2022-06-17

## 2022-06-16 RX ORDER — APIXABAN 2.5 MG/1
5 TABLET, FILM COATED ORAL
Refills: 0 | Status: DISCONTINUED | OUTPATIENT
Start: 2022-06-16 | End: 2022-07-10

## 2022-06-16 RX ORDER — DEXTROSE 50 % IN WATER 50 %
15 SYRINGE (ML) INTRAVENOUS ONCE
Refills: 0 | Status: DISCONTINUED | OUTPATIENT
Start: 2022-06-16 | End: 2022-07-22

## 2022-06-16 RX ORDER — GLUCAGON INJECTION, SOLUTION 0.5 MG/.1ML
1 INJECTION, SOLUTION SUBCUTANEOUS ONCE
Refills: 0 | Status: DISCONTINUED | OUTPATIENT
Start: 2022-06-16 | End: 2022-07-22

## 2022-06-16 RX ADMIN — HYDROMORPHONE HYDROCHLORIDE 1 MILLIGRAM(S): 2 INJECTION INTRAMUSCULAR; INTRAVENOUS; SUBCUTANEOUS at 07:02

## 2022-06-16 RX ADMIN — Medication 0.25 MILLIGRAM(S): at 21:24

## 2022-06-16 RX ADMIN — Medication 7 UNIT(S): at 23:39

## 2022-06-16 RX ADMIN — HYDROMORPHONE HYDROCHLORIDE 1 MILLIGRAM(S): 2 INJECTION INTRAMUSCULAR; INTRAVENOUS; SUBCUTANEOUS at 14:31

## 2022-06-16 RX ADMIN — GABAPENTIN 300 MILLIGRAM(S): 400 CAPSULE ORAL at 14:30

## 2022-06-16 RX ADMIN — APIXABAN 5 MILLIGRAM(S): 2.5 TABLET, FILM COATED ORAL at 17:06

## 2022-06-16 RX ADMIN — Medication 2: at 23:38

## 2022-06-16 RX ADMIN — HYDROMORPHONE HYDROCHLORIDE 1 MILLIGRAM(S): 2 INJECTION INTRAMUSCULAR; INTRAVENOUS; SUBCUTANEOUS at 23:13

## 2022-06-16 RX ADMIN — HYDROMORPHONE HYDROCHLORIDE 1 MILLIGRAM(S): 2 INJECTION INTRAMUSCULAR; INTRAVENOUS; SUBCUTANEOUS at 18:34

## 2022-06-16 RX ADMIN — GABAPENTIN 300 MILLIGRAM(S): 400 CAPSULE ORAL at 21:22

## 2022-06-16 RX ADMIN — Medication 2: at 11:54

## 2022-06-16 RX ADMIN — Medication 1 APPLICATION(S): at 05:39

## 2022-06-16 RX ADMIN — HYDROMORPHONE HYDROCHLORIDE 1 MILLIGRAM(S): 2 INJECTION INTRAMUSCULAR; INTRAVENOUS; SUBCUTANEOUS at 02:31

## 2022-06-16 RX ADMIN — HYDROMORPHONE HYDROCHLORIDE 1 MILLIGRAM(S): 2 INJECTION INTRAMUSCULAR; INTRAVENOUS; SUBCUTANEOUS at 15:01

## 2022-06-16 RX ADMIN — Medication 7 UNIT(S): at 17:10

## 2022-06-16 RX ADMIN — OXYCODONE AND ACETAMINOPHEN 2 TABLET(S): 5; 325 TABLET ORAL at 08:51

## 2022-06-16 RX ADMIN — LOSARTAN POTASSIUM 100 MILLIGRAM(S): 100 TABLET, FILM COATED ORAL at 05:23

## 2022-06-16 RX ADMIN — HYDROMORPHONE HYDROCHLORIDE 1 MILLIGRAM(S): 2 INJECTION INTRAMUSCULAR; INTRAVENOUS; SUBCUTANEOUS at 03:01

## 2022-06-16 RX ADMIN — POLYETHYLENE GLYCOL 3350 17 GRAM(S): 17 POWDER, FOR SOLUTION ORAL at 11:57

## 2022-06-16 RX ADMIN — OXYCODONE AND ACETAMINOPHEN 2 TABLET(S): 5; 325 TABLET ORAL at 08:21

## 2022-06-16 RX ADMIN — OXYCODONE AND ACETAMINOPHEN 2 TABLET(S): 5; 325 TABLET ORAL at 17:05

## 2022-06-16 RX ADMIN — Medication 650 MILLIGRAM(S): at 02:18

## 2022-06-16 RX ADMIN — HYDROMORPHONE HYDROCHLORIDE 1 MILLIGRAM(S): 2 INJECTION INTRAMUSCULAR; INTRAVENOUS; SUBCUTANEOUS at 18:28

## 2022-06-16 RX ADMIN — OXYCODONE AND ACETAMINOPHEN 2 TABLET(S): 5; 325 TABLET ORAL at 17:35

## 2022-06-16 RX ADMIN — GABAPENTIN 300 MILLIGRAM(S): 400 CAPSULE ORAL at 05:24

## 2022-06-16 RX ADMIN — Medication 6 MILLIGRAM(S): at 05:23

## 2022-06-16 RX ADMIN — SENNA PLUS 2 TABLET(S): 8.6 TABLET ORAL at 21:23

## 2022-06-16 RX ADMIN — Medication 1 APPLICATION(S): at 17:11

## 2022-06-16 RX ADMIN — INSULIN GLARGINE 15 UNIT(S): 100 INJECTION, SOLUTION SUBCUTANEOUS at 21:33

## 2022-06-16 RX ADMIN — Medication 3 MILLIGRAM(S): at 17:08

## 2022-06-16 RX ADMIN — HYDROMORPHONE HYDROCHLORIDE 1 MILLIGRAM(S): 2 INJECTION INTRAMUSCULAR; INTRAVENOUS; SUBCUTANEOUS at 22:37

## 2022-06-16 RX ADMIN — ATORVASTATIN CALCIUM 20 MILLIGRAM(S): 80 TABLET, FILM COATED ORAL at 21:22

## 2022-06-16 RX ADMIN — AMLODIPINE BESYLATE 5 MILLIGRAM(S): 2.5 TABLET ORAL at 05:24

## 2022-06-16 RX ADMIN — HYDROMORPHONE HYDROCHLORIDE 1 MILLIGRAM(S): 2 INJECTION INTRAMUSCULAR; INTRAVENOUS; SUBCUTANEOUS at 10:33

## 2022-06-16 RX ADMIN — PANTOPRAZOLE SODIUM 40 MILLIGRAM(S): 20 TABLET, DELAYED RELEASE ORAL at 06:32

## 2022-06-16 RX ADMIN — Medication 650 MILLIGRAM(S): at 01:17

## 2022-06-16 RX ADMIN — Medication 7 UNIT(S): at 12:27

## 2022-06-16 RX ADMIN — Medication 2: at 00:22

## 2022-06-16 RX ADMIN — Medication 0: at 17:09

## 2022-06-16 RX ADMIN — HYDROMORPHONE HYDROCHLORIDE 1 MILLIGRAM(S): 2 INJECTION INTRAMUSCULAR; INTRAVENOUS; SUBCUTANEOUS at 06:32

## 2022-06-16 RX ADMIN — BUMETANIDE 2 MILLIGRAM(S): 0.25 INJECTION INTRAMUSCULAR; INTRAVENOUS at 05:23

## 2022-06-16 NOTE — PROGRESS NOTE ADULT - SUBJECTIVE AND OBJECTIVE BOX
SUBJECTIVE:    Patient is a 56y old Female who presents with a chief complaint of SOB (16 Jun 2022 10:09)    Currently admitted to medicine with the primary diagnosis of Acute respiratory failure with hypoxia    Today is hospital day 20d. This morning she is resting in bed.  No overnight events.     PAST MEDICAL & SURGICAL HISTORY  COPD (chronic obstructive pulmonary disease)    CHF (congestive heart failure)    DM (diabetes mellitus)    H/O tracheostomy      ALLERGIES:  penicillin (Swelling)    MEDICATIONS:  STANDING MEDICATIONS  ALPRAZolam 0.25 milliGRAM(s) Oral at bedtime  amLODIPine   Tablet 5 milliGRAM(s) Oral daily  ammonium lactate 12% Lotion 1 Application(s) Topical every 12 hours  apixaban 5 milliGRAM(s) Oral two times a day  atorvastatin 20 milliGRAM(s) Oral at bedtime  buMETAnide 2 milliGRAM(s) Oral daily  dexAMETHasone  Injectable 3 milliGRAM(s) IV Push every 12 hours  dextrose 5%. 1000 milliLiter(s) IV Continuous <Continuous>  dextrose 5%. 1000 milliLiter(s) IV Continuous <Continuous>  dextrose 50% Injectable 25 Gram(s) IV Push once  dextrose 50% Injectable 12.5 Gram(s) IV Push once  dextrose 50% Injectable 25 Gram(s) IV Push once  gabapentin 300 milliGRAM(s) Oral three times a day  glucagon  Injectable 1 milliGRAM(s) IntraMuscular once  influenza   Vaccine 0.5 milliLiter(s) IntraMuscular once  insulin glargine Injectable (LANTUS) 15 Unit(s) SubCutaneous at bedtime  insulin lispro (ADMELOG) corrective regimen sliding scale   SubCutaneous every 6 hours  insulin lispro Injectable (ADMELOG) 7 Unit(s) SubCutaneous every 6 hours  losartan 100 milliGRAM(s) Oral daily  pantoprazole    Tablet 40 milliGRAM(s) Oral before breakfast  polyethylene glycol 3350 17 Gram(s) Oral daily  senna 2 Tablet(s) Oral at bedtime    PRN MEDICATIONS  acetaminophen     Tablet .. 650 milliGRAM(s) Oral every 6 hours PRN  dextrose Oral Gel 15 Gram(s) Oral once PRN  HYDROmorphone  Injectable 1 milliGRAM(s) IV Push every 4 hours PRN  oxycodone    5 mG/acetaminophen 325 mG 2 Tablet(s) Oral/Enteral Tube every 6 hours PRN    VITALS:   T(F): 97.3  HR: 87  BP: 138/64  RR: 20  SpO2: 98%    LABS:                        9.8    9.10  )-----------( 253      ( 16 Jun 2022 06:05 )             27.1     06-16    133<L>  |  92<L>  |  14  ----------------------------<  201<H>  4.3   |  27  |  <0.5<L>    Ca    10.5<H>      16 Jun 2022 06:05  Mg     1.9     06-16    TPro  6.7  /  Alb  4.2  /  TBili  0.6  /  DBili  x   /  AST  17  /  ALT  25  /  AlkPhos  96  06-16    PT/INR - ( 14 Jun 2022 18:44 )   PT: 11.70 sec;   INR: 1.02 ratio      RADIOLOGY:    PHYSICAL EXAM:  GEN: No acute distress  LUNGS: Clear to auscultation bilaterally,  no wheezes, tacheostomy  HEART:  Regular rate and rhythm; no murmurs, rubs, or gallops  ABD: Soft, non-tender, non-distended. PEG  EXT: no clubbing, no cyanosis, no edema/2+PP/JONAS/Skin Intact.   NEURO: no focal deficit    Intravenous access:   NG tube:   Singh Catheter:   Indwelling Urethral Catheter:     Connect To:  Straight Drainage/Gravity    Indication:  Urine Output Monitoring in Critically Ill (06-09-22 @ 04:48) (not performed) [Active]  Indwelling Urethral Catheter:     Connect To:  Straight Drainage/Gravity    Indication:  Urine Output Monitoring in Critically Ill (06-07-22 @ 23:36) (not performed) [Active]  Indwelling Urethral Catheter:     Connect To:  Straight Drainage/Gravity    Indication:  Urine Output Monitoring in Critically Ill (06-05-22 @ 06:34) (not performed) [Active]  Indwelling Urethral Catheter:     Connect To:  Straight Drainage/Gravity    Indication:  Urine Output Monitoring in Critically Ill (06-04-22 @ 20:41) (not performed) [Active]  Indwelling Urethral Catheter:     Connect To:  Straight Drainage/Gravity    Indication:  Urine Output Monitoring in Critically Ill (06-04-22 @ 03:45) (not performed) [Active]  Indwelling Urethral Catheter:     Connect To:  Straight Drainage/Gravity    Indication:  Urine Output Monitoring in Critically Ill (06-03-22 @ 01:23) (not performed) [Active]

## 2022-06-16 NOTE — PROGRESS NOTE ADULT - ASSESSMENT
55 yo f with PMHx of DVT on Eliquis, COPD,  trach collar, obesity, IDDM, UTI, sent by [facility] for hypoxia. History provided by daughter at bedside, she reports the current illness going back to April 4th when pt was intubated on admission at [facility] ICU for hypoxic respiratory failure diagnosed with copd exacerbation and superimposed pneumonia, of note she also appears to have been in CHF exacerbation with severe b/l LE swelling treated with IV bumex. Remained intubated 37 days per daughter, diagnosed with fever of unknown origin (up to 106F), treated with empiric broad spectrum antibiotics and once 2 weeks s/p tracheostomy placement and 48 hours afebrile she was was discharged to nursing home for PT came from [facility] for hypoxia, SOB found to have mucus plug s/p emergent bronchoscopy found to have tracheal granulation tissue s/p debridment with exchanged trachostomy tube GI was called for PEG tube.     #)Acute hypercapnic and hypoxic resp failure s/p trach   #)s/p pEG tube placement for dysphagia 6/15    PEG tube site evaluated no erythema or tenderness at the site  PEG tube loosened external bumper at 5cm     Recs:   Can resume feedings through PEG tube   can start anticoagulation   Flush with 100 cc of water before and after using    recall as needed

## 2022-06-16 NOTE — PROGRESS NOTE ADULT - SUBJECTIVE AND OBJECTIVE BOX
Gastroenterology progress note:     Patient is a 56y old  Female who presents with a chief complaint of trach issue (14 Jun 2022 12:33)       Admitted on: 05-27-22    We are following the patient for PEG tube placement      Interval History:  s/p PEG yesterday   no oevrnight events        PAST MEDICAL & SURGICAL HISTORY:  COPD (chronic obstructive pulmonary disease)      CHF (congestive heart failure)      DM (diabetes mellitus)      H/O tracheostomy          MEDICATIONS  (STANDING):  ALPRAZolam 0.25 milliGRAM(s) Oral at bedtime  amLODIPine   Tablet 5 milliGRAM(s) Oral daily  ammonium lactate 12% Lotion 1 Application(s) Topical every 12 hours  atorvastatin 20 milliGRAM(s) Oral at bedtime  buMETAnide 2 milliGRAM(s) Oral daily  dexAMETHasone  Injectable 6 milliGRAM(s) IV Push every 12 hours  gabapentin 300 milliGRAM(s) Oral three times a day  influenza   Vaccine 0.5 milliLiter(s) IntraMuscular once  insulin glargine Injectable (LANTUS) 15 Unit(s) SubCutaneous at bedtime  insulin lispro (ADMELOG) corrective regimen sliding scale   SubCutaneous every 6 hours  insulin lispro Injectable (ADMELOG) 10 Unit(s) SubCutaneous three times a day before meals  losartan 100 milliGRAM(s) Oral daily  pantoprazole    Tablet 40 milliGRAM(s) Oral before breakfast  polyethylene glycol 3350 17 Gram(s) Oral daily  senna 2 Tablet(s) Oral at bedtime    MEDICATIONS  (PRN):  acetaminophen     Tablet .. 650 milliGRAM(s) Oral every 6 hours PRN Temp greater or equal to 38C (100.4F), Mild Pain (1 - 3)  HYDROmorphone  Injectable 1 milliGRAM(s) IV Push every 4 hours PRN Severe Pain (7 - 10)  oxycodone    5 mG/acetaminophen 325 mG 2 Tablet(s) Oral/Enteral Tube every 6 hours PRN Moderate Pain (4 - 6)      Allergies  penicillin (Swelling)      Review of Systems:   Cardiovascular:  No Chest Pain, No Palpitations  Respiratory:  No Cough, No Dyspnea  Gastrointestinal:  As described in HPI    Physical Examination:  T(C): 36.4 (06-16-22 @ 05:00), Max: 36.4 (06-16-22 @ 05:00)  HR: 78 (06-16-22 @ 05:00) (58 - 85)  BP: 158/76 (06-16-22 @ 05:00) (127/63 - 171/74)  RR: 18 (06-16-22 @ 05:00) (18 - 19)  SpO2: 98% (06-16-22 @ 05:00) (95% - 98%)  Weight (kg): 97.4 (06-15-22 @ 12:27)    06-15-22 @ 07:01  -  06-16-22 @ 07:00  --------------------------------------------------------  IN: 360 mL / OUT: 0 mL / NET: 360 mL      Constitutional: No acute distress.  Respiratory:  No signs of respiratory distress. Lung sounds are clear bilaterally.  Cardiovascular:  S1 S2, Regular rate and rhythm.  Abdominal: Abdomen is soft, symmetric, and non-tender without distention. .   Skin: No rashes, No Jaundice.        Data:                        9.8    9.10  )-----------( 253      ( 16 Jun 2022 06:05 )             27.1     Hgb trend:  9.8  06-16-22 @ 06:05  9.5  06-15-22 @ 18:03  9.3  06-14-22 @ 06:47        06-16    133<L>  |  92<L>  |  14  ----------------------------<  201<H>  4.3   |  27  |  <0.5<L>    Ca    10.5<H>      16 Jun 2022 06:05  Mg     1.9     06-16    TPro  6.7  /  Alb  4.2  /  TBili  0.6  /  DBili  x   /  AST  17  /  ALT  25  /  AlkPhos  96  06-16    Liver panel trend:  TBili 0.6   /   AST 17   /   ALT 25   /   AlkP 96   /   Tptn 6.7   /   Alb 4.2    /   DBili --      06-16  TBili 0.6   /   AST 18   /   ALT 28   /   AlkP 95   /   Tptn 6.4   /   Alb 4.1    /   DBili --      06-15  TBili 0.7   /   AST 22   /   ALT 28   /   AlkP 117   /   Tptn 6.6   /   Alb 4.2    /   DBili --      06-14  TBili 0.6   /   AST 24   /   ALT 27   /   AlkP 115   /   Tptn 6.5   /   Alb 4.1    /   DBili --      06-13  TBili 0.7   /   AST 21   /   ALT 26   /   AlkP 106   /   Tptn 6.5   /   Alb 4.0    /   DBili --      06-11  TBili 0.7   /   AST 21   /   ALT 30   /   AlkP 103   /   Tptn 6.5   /   Alb 4.0    /   DBili --      06-09  TBili 0.7   /   AST 24   /   ALT 35   /   AlkP 106   /   Tptn 6.4   /   Alb 4.0    /   DBili --      06-08  TBili 0.7   /   AST 23   /   ALT 35   /   AlkP 100   /   Tptn 6.1   /   Alb 3.8    /   DBili --      06-07      PT/INR - ( 14 Jun 2022 18:44 )   PT: 11.70 sec;   INR: 1.02 ratio                Radiology:

## 2022-06-16 NOTE — PROGRESS NOTE ADULT - ATTENDING COMMENTS
#s/p PEG (6/15):   MAy start feeds from 6/16.    #Dysphagia:  Repeat ST eval on 6/17. Also for recs on further swallow rehab as outpatient.     #LE neuropathic pain:  Patient requiring a lot of opiates since this admission.   Pain management consult on 6/17, to draft an outpatient regimen and follow up plan.     #Suspected epiglottis edema:   On DExa > Taper to 3mg Q12 x 2 more days.   Outpatient ENT f/u.     # Mucus plugs removed. Trach exchanged  CXR PRN    Anticiptae for STR 6/17

## 2022-06-16 NOTE — PROGRESS NOTE ADULT - ASSESSMENT
5 yo f with PMHx of DVT on Eliquis, COPD,  trach collar, obesity, IDDM, UTI, sent by [facility] for hypoxia. Current illness going back to April 4th when pt was intubated on admission at [facility] ICU for hypoxic respiratory failure diagnosed with copd exacerbation and superimposed pneumonia, and remained intubated for 37 days requiring tracheostomy. Patient stayed at [facility] for 2 days until noted to be hypoxic (saturating low 70's) pt's saturation immediately improving afterwards to 80's after large mucus plug removed and was subsequently sent to [facility]  On arrival to ED pt was saturating 97%, 15L O2 via tracheostomy collar (baseline 8 L at [facility])CXR suspicious for L-sided PNA, inconclusive. CTA neg for PE. Pt received x1 IV Levaquin and Cefepime in ED, admitted to MICU for acute hypoxic hypercapnic respiratory failure secondary to acute mucus plug now resolved vs superimposed pneumonia hospital-acquired.   General Surgery Consulted emergently when patient found to be hypoxic to low 80s after CCU and Pulmonary team found large granuloma in trachea along tracheostomy tube. Patient currently had a size 8 trach. Surgery assisted Pulmonary and CCU with trach exchanged with ET tube guidance to a size 7 XLT. Saturations improved to high 90s. Large granuloma was removed. Patient was no longer in distress after exchange.    #Acute/ chronic COPD with exacerbation (Resolved)  #mucous plug removed  #pneumonia vs atelectasis L base  - On 5/28, Pulmonary team found large granuloma in trachea along tracheostomy tube. Patient had a size 8 trach on admission. Surgery assisted Pulmonary and CCU with trach exchanged with ET tube guidance to a size 7 XLT. Saturations improved to high 90s.  - trach functioning well s/p exchange   - Pt off vent 72 hrs and is saturating well  - repeat chest x-ray (6/6) shows possible mucus plugging. suctioning attempted repeat CXR showed improved aeration.  ***Repeat Chest X-ray shows resolution of Mucus plug after suctioning and chest PT  - S/p Sw eval, if fails, CT Sx FU for PEG tube  - Modified barium swallow test done: patient still unable to eat, will need further therapy  - Repeat CXR 6/8: left lung opacity/effusion  - 6/11 Trach changed to Fenestrated 8 by surgery  - 6/12 and 6/13: repeat CXR showing recurrent mucous plug  - 6/14 CXR: improving L atelectasis  - c/w aggressive L sided chest PT and frequent suctioning q6  - as per pulm, if recurrent atelectasis despite chest PT, suctioning, may consider bronchoscopy    #Dysphagia, epiglottis swelling  - 6/13: s/p esophagram, not tolerated PO, noted epiglottis swelling  - 6/14:IV decadron 6mg decreased from q8 to q12  - ENT consult s/p scope 6/14: repeat in 1 week if pt is still in the hospital  - c/w decadron taper -decadron 3mg IV BID stop after 2 days  - s/p PEG tube 6/15  - started PEG feeds  - Outpatient ENT f/u.     #Hyponatremia- improving  - serum osm, Uosm, Caleb    #Chronic Indwelling Singh  - 5/28 Ucx - Contaminated   - 5/28 Ucx- Normal perri   - 5/27 Ucx- Klebsiella (CRE) and pseudomonas   - s/p cefepime  - Currently afebrile w/ no urinary complaints   - episode of Hematouria 6/4 -> improving -> h/h Stable    #venous stasis   - c/w dilaudid and percocet for pain control    #Nonobstructive Ileus  - abdomen currently soft, nontender, + BS   - off opioids  - C/w bowel regimen - hold for diarrhea    #Hx of DVT  - on home Eliquis  - restarted Eliquis today    #DM  - c/w lantus 15, lispto 7 units Q6H    #Misc:  Activity OOB to chair  GI PPX  PPI  DVT PPX  ELIQUIS  Dispo: Acute  Pending: c/w IV decadron taper    DISPO: STR placement

## 2022-06-17 DIAGNOSIS — E11.40 TYPE 2 DIABETES MELLITUS WITH DIABETIC NEUROPATHY, UNSPECIFIED: ICD-10-CM

## 2022-06-17 LAB
ALBUMIN SERPL ELPH-MCNC: 4 G/DL — SIGNIFICANT CHANGE UP (ref 3.5–5.2)
ALP SERPL-CCNC: 93 U/L — SIGNIFICANT CHANGE UP (ref 30–115)
ALT FLD-CCNC: 28 U/L — SIGNIFICANT CHANGE UP (ref 0–41)
ANION GAP SERPL CALC-SCNC: 16 MMOL/L — HIGH (ref 7–14)
AST SERPL-CCNC: 15 U/L — SIGNIFICANT CHANGE UP (ref 0–41)
BASOPHILS # BLD AUTO: 0.01 K/UL — SIGNIFICANT CHANGE UP (ref 0–0.2)
BASOPHILS NFR BLD AUTO: 0.1 % — SIGNIFICANT CHANGE UP (ref 0–1)
BILIRUB SERPL-MCNC: 0.7 MG/DL — SIGNIFICANT CHANGE UP (ref 0.2–1.2)
BUN SERPL-MCNC: 14 MG/DL — SIGNIFICANT CHANGE UP (ref 10–20)
CALCIUM SERPL-MCNC: 9.9 MG/DL — SIGNIFICANT CHANGE UP (ref 8.5–10.1)
CHLORIDE SERPL-SCNC: 90 MMOL/L — LOW (ref 98–110)
CO2 SERPL-SCNC: 28 MMOL/L — SIGNIFICANT CHANGE UP (ref 17–32)
CREAT SERPL-MCNC: <0.5 MG/DL — LOW (ref 0.7–1.5)
EGFR: 115 ML/MIN/1.73M2 — SIGNIFICANT CHANGE UP
EOSINOPHIL # BLD AUTO: 0.01 K/UL — SIGNIFICANT CHANGE UP (ref 0–0.7)
EOSINOPHIL NFR BLD AUTO: 0.1 % — SIGNIFICANT CHANGE UP (ref 0–8)
GLUCOSE BLDC GLUCOMTR-MCNC: 202 MG/DL — HIGH (ref 70–99)
GLUCOSE BLDC GLUCOMTR-MCNC: 205 MG/DL — HIGH (ref 70–99)
GLUCOSE BLDC GLUCOMTR-MCNC: 221 MG/DL — HIGH (ref 70–99)
GLUCOSE BLDC GLUCOMTR-MCNC: 228 MG/DL — HIGH (ref 70–99)
GLUCOSE BLDC GLUCOMTR-MCNC: 269 MG/DL — HIGH (ref 70–99)
GLUCOSE SERPL-MCNC: 203 MG/DL — HIGH (ref 70–99)
HCT VFR BLD CALC: 28.3 % — LOW (ref 37–47)
HGB BLD-MCNC: 10.1 G/DL — LOW (ref 12–16)
IMM GRANULOCYTES NFR BLD AUTO: 0.8 % — HIGH (ref 0.1–0.3)
LYMPHOCYTES # BLD AUTO: 0.62 K/UL — LOW (ref 1.2–3.4)
LYMPHOCYTES # BLD AUTO: 4.3 % — LOW (ref 20.5–51.1)
MAGNESIUM SERPL-MCNC: 1.6 MG/DL — LOW (ref 1.8–2.4)
MCHC RBC-ENTMCNC: 33.7 PG — HIGH (ref 27–31)
MCHC RBC-ENTMCNC: 35.7 G/DL — SIGNIFICANT CHANGE UP (ref 32–37)
MCV RBC AUTO: 94.3 FL — SIGNIFICANT CHANGE UP (ref 81–99)
MONOCYTES # BLD AUTO: 0.86 K/UL — HIGH (ref 0.1–0.6)
MONOCYTES NFR BLD AUTO: 6 % — SIGNIFICANT CHANGE UP (ref 1.7–9.3)
NEUTROPHILS # BLD AUTO: 12.7 K/UL — HIGH (ref 1.4–6.5)
NEUTROPHILS NFR BLD AUTO: 88.7 % — HIGH (ref 42.2–75.2)
NRBC # BLD: 0 /100 WBCS — SIGNIFICANT CHANGE UP (ref 0–0)
PLATELET # BLD AUTO: 234 K/UL — SIGNIFICANT CHANGE UP (ref 130–400)
POTASSIUM SERPL-MCNC: 4 MMOL/L — SIGNIFICANT CHANGE UP (ref 3.5–5)
POTASSIUM SERPL-SCNC: 4 MMOL/L — SIGNIFICANT CHANGE UP (ref 3.5–5)
PROT SERPL-MCNC: 6.4 G/DL — SIGNIFICANT CHANGE UP (ref 6–8)
RBC # BLD: 3 M/UL — LOW (ref 4.2–5.4)
RBC # FLD: 13.4 % — SIGNIFICANT CHANGE UP (ref 11.5–14.5)
SODIUM SERPL-SCNC: 134 MMOL/L — LOW (ref 135–146)
WBC # BLD: 14.31 K/UL — HIGH (ref 4.8–10.8)
WBC # FLD AUTO: 14.31 K/UL — HIGH (ref 4.8–10.8)

## 2022-06-17 PROCEDURE — 99223 1ST HOSP IP/OBS HIGH 75: CPT

## 2022-06-17 PROCEDURE — 99233 SBSQ HOSP IP/OBS HIGH 50: CPT

## 2022-06-17 RX ORDER — HYDROMORPHONE HYDROCHLORIDE 2 MG/ML
1 INJECTION INTRAMUSCULAR; INTRAVENOUS; SUBCUTANEOUS ONCE
Refills: 0 | Status: DISCONTINUED | OUTPATIENT
Start: 2022-06-17 | End: 2022-06-17

## 2022-06-17 RX ORDER — MAGNESIUM SULFATE 500 MG/ML
2 VIAL (ML) INJECTION ONCE
Refills: 0 | Status: COMPLETED | OUTPATIENT
Start: 2022-06-17 | End: 2022-06-17

## 2022-06-17 RX ORDER — HYDROMORPHONE HYDROCHLORIDE 2 MG/ML
1 INJECTION INTRAMUSCULAR; INTRAVENOUS; SUBCUTANEOUS EVERY 24 HOURS
Refills: 0 | Status: DISCONTINUED | OUTPATIENT
Start: 2022-06-17 | End: 2022-06-21

## 2022-06-17 RX ORDER — DULOXETINE HYDROCHLORIDE 30 MG/1
60 CAPSULE, DELAYED RELEASE ORAL DAILY
Refills: 0 | Status: DISCONTINUED | OUTPATIENT
Start: 2022-06-17 | End: 2022-06-21

## 2022-06-17 RX ORDER — GABAPENTIN 400 MG/1
600 CAPSULE ORAL THREE TIMES A DAY
Refills: 0 | Status: DISCONTINUED | OUTPATIENT
Start: 2022-06-17 | End: 2022-07-10

## 2022-06-17 RX ORDER — ACETAMINOPHEN 500 MG
1000 TABLET ORAL EVERY 8 HOURS
Refills: 0 | Status: DISCONTINUED | OUTPATIENT
Start: 2022-06-17 | End: 2022-06-17

## 2022-06-17 RX ORDER — HYDROMORPHONE HYDROCHLORIDE 2 MG/ML
4 INJECTION INTRAMUSCULAR; INTRAVENOUS; SUBCUTANEOUS EVERY 4 HOURS
Refills: 0 | Status: DISCONTINUED | OUTPATIENT
Start: 2022-06-17 | End: 2022-06-22

## 2022-06-17 RX ADMIN — HYDROMORPHONE HYDROCHLORIDE 1 MILLIGRAM(S): 2 INJECTION INTRAMUSCULAR; INTRAVENOUS; SUBCUTANEOUS at 16:01

## 2022-06-17 RX ADMIN — DULOXETINE HYDROCHLORIDE 60 MILLIGRAM(S): 30 CAPSULE, DELAYED RELEASE ORAL at 17:28

## 2022-06-17 RX ADMIN — HYDROMORPHONE HYDROCHLORIDE 1 MILLIGRAM(S): 2 INJECTION INTRAMUSCULAR; INTRAVENOUS; SUBCUTANEOUS at 09:02

## 2022-06-17 RX ADMIN — Medication 7 UNIT(S): at 12:18

## 2022-06-17 RX ADMIN — APIXABAN 5 MILLIGRAM(S): 2.5 TABLET, FILM COATED ORAL at 05:01

## 2022-06-17 RX ADMIN — PANTOPRAZOLE SODIUM 40 MILLIGRAM(S): 20 TABLET, DELAYED RELEASE ORAL at 06:13

## 2022-06-17 RX ADMIN — GABAPENTIN 600 MILLIGRAM(S): 400 CAPSULE ORAL at 13:01

## 2022-06-17 RX ADMIN — AMLODIPINE BESYLATE 5 MILLIGRAM(S): 2.5 TABLET ORAL at 05:01

## 2022-06-17 RX ADMIN — Medication 7 UNIT(S): at 05:43

## 2022-06-17 RX ADMIN — GABAPENTIN 600 MILLIGRAM(S): 400 CAPSULE ORAL at 21:41

## 2022-06-17 RX ADMIN — APIXABAN 5 MILLIGRAM(S): 2.5 TABLET, FILM COATED ORAL at 17:28

## 2022-06-17 RX ADMIN — Medication 2: at 23:49

## 2022-06-17 RX ADMIN — GABAPENTIN 300 MILLIGRAM(S): 400 CAPSULE ORAL at 05:01

## 2022-06-17 RX ADMIN — POLYETHYLENE GLYCOL 3350 17 GRAM(S): 17 POWDER, FOR SOLUTION ORAL at 12:20

## 2022-06-17 RX ADMIN — HYDROMORPHONE HYDROCHLORIDE 1 MILLIGRAM(S): 2 INJECTION INTRAMUSCULAR; INTRAVENOUS; SUBCUTANEOUS at 09:17

## 2022-06-17 RX ADMIN — OXYCODONE AND ACETAMINOPHEN 2 TABLET(S): 5; 325 TABLET ORAL at 11:20

## 2022-06-17 RX ADMIN — Medication 1 APPLICATION(S): at 17:28

## 2022-06-17 RX ADMIN — Medication 0.25 MILLIGRAM(S): at 21:41

## 2022-06-17 RX ADMIN — SENNA PLUS 2 TABLET(S): 8.6 TABLET ORAL at 21:41

## 2022-06-17 RX ADMIN — Medication 1 APPLICATION(S): at 05:53

## 2022-06-17 RX ADMIN — HYDROMORPHONE HYDROCHLORIDE 4 MILLIGRAM(S): 2 INJECTION INTRAMUSCULAR; INTRAVENOUS; SUBCUTANEOUS at 20:26

## 2022-06-17 RX ADMIN — Medication 7 UNIT(S): at 23:49

## 2022-06-17 RX ADMIN — HYDROMORPHONE HYDROCHLORIDE 4 MILLIGRAM(S): 2 INJECTION INTRAMUSCULAR; INTRAVENOUS; SUBCUTANEOUS at 23:57

## 2022-06-17 RX ADMIN — ATORVASTATIN CALCIUM 20 MILLIGRAM(S): 80 TABLET, FILM COATED ORAL at 21:41

## 2022-06-17 RX ADMIN — Medication 3 MILLIGRAM(S): at 05:00

## 2022-06-17 RX ADMIN — HYDROMORPHONE HYDROCHLORIDE 1 MILLIGRAM(S): 2 INJECTION INTRAMUSCULAR; INTRAVENOUS; SUBCUTANEOUS at 05:00

## 2022-06-17 RX ADMIN — Medication 3 MILLIGRAM(S): at 17:28

## 2022-06-17 RX ADMIN — INSULIN GLARGINE 15 UNIT(S): 100 INJECTION, SOLUTION SUBCUTANEOUS at 21:41

## 2022-06-17 RX ADMIN — BUMETANIDE 2 MILLIGRAM(S): 0.25 INJECTION INTRAMUSCULAR; INTRAVENOUS at 05:01

## 2022-06-17 RX ADMIN — Medication 7 UNIT(S): at 17:29

## 2022-06-17 RX ADMIN — HYDROMORPHONE HYDROCHLORIDE 1 MILLIGRAM(S): 2 INJECTION INTRAMUSCULAR; INTRAVENOUS; SUBCUTANEOUS at 15:46

## 2022-06-17 RX ADMIN — HYDROMORPHONE HYDROCHLORIDE 1 MILLIGRAM(S): 2 INJECTION INTRAMUSCULAR; INTRAVENOUS; SUBCUTANEOUS at 05:30

## 2022-06-17 RX ADMIN — Medication 25 GRAM(S): at 12:21

## 2022-06-17 RX ADMIN — LOSARTAN POTASSIUM 100 MILLIGRAM(S): 100 TABLET, FILM COATED ORAL at 05:01

## 2022-06-17 RX ADMIN — OXYCODONE AND ACETAMINOPHEN 2 TABLET(S): 5; 325 TABLET ORAL at 01:40

## 2022-06-17 RX ADMIN — HYDROMORPHONE HYDROCHLORIDE 4 MILLIGRAM(S): 2 INJECTION INTRAMUSCULAR; INTRAVENOUS; SUBCUTANEOUS at 19:56

## 2022-06-17 NOTE — SWALLOW BEDSIDE ASSESSMENT ADULT - SLP PERTINENT HISTORY OF CURRENT PROBLEM
pt is a 57 y/o F w/ PMHx: DVT, COPD, resp failure s/p recent trach at [facility] (at baseline on 8L via trach collar), obesity, IDDM, UTI, sent by [facility] for hypoxia. History provided by daughter, reports the current illness goes back to April 4th when pt was intubated on admission at [facility] for hypoxic respiratory failure diagnosed w/ COPD exacerbation and superimposed PNA. Pt w/ prolonged intubation, s/p trach ~2 weeks ago and then d/c'ed to NH. Pt admitted for AHRF 2' mucus plug (now resolved s/p suctioning) vs. possible aspiration PNA. Pt w/ suspected chronic HF, course c/b complete whiteout of the left lung with complete atelectasis, pt upgraded to CCU. General Sx c/s emergently when pt found to be hypoxic d/t large granuloma in trachea along tracheostomy tube. Pt previously had a size 8 trach, exchanged w/ size 7 XLT. Large granuloma removed and pt placed on vent. Pt underwent bronchoscopy on 6/1, good visualization and no tracheal debridement required.

## 2022-06-17 NOTE — SWALLOW BEDSIDE ASSESSMENT ADULT - SWALLOW EVAL: DIAGNOSIS
Pt educated on dysphagia and POC, all questions answered and addressed regarding ST tx at rehab, options for home care and oupt services upon d/c to home

## 2022-06-17 NOTE — CONSULT NOTE ADULT - ASSESSMENT
56F with PMH of DVT (on Eliquis), COPD,  trach collar, obesity, IDDM, UTI, sent by [facility] for hypoxia on 5/27/22 and was admitted for AHRF. Prior to this admission, patient was hospitalized on 4/4/22 at [facility] [place] for AHRF requiring intubation for COPD exacerbation and superimposed pneumonia. She also appeared to have a CHF exacerbation with severe b/l LE swelling. She remained intubated 37 days and was finally trached and extubated. Patient reports b/l LE weakness since extubation. She is now unable to dorsiflex her left foot. Prior to admission at [facility] [place], patient was able to ambulate. 56F with PMH of DVT (on Eliquis), COPD,  trach collar, obesity, IDDM, UTI, sent by [email or web address] for hypoxia on 5/27/22 and was admitted for AHRF. Prior to this admission, patient was hospitalized on 4/4/22 at [facility] for AHRF requiring intubation for COPD exacerbation and superimposed pneumonia. She also appeared to have a CHF exacerbation with severe b/l LE swelling. She remained intubated 37 days and was finally trached and extubated. Patient reports b/l LE weakness since extubation, but mentions that the left foot drop is new on this admission. After her admission at [facility], she was transferred briefly to a [facility] clinic and was then admitted to [facility]. Prior to admission at [facility], patient was able to ambulate. Her LE's are darkly discolored from the knee down, which she says began in her feet roughly 1-2 years ago, then spread more proximally.    Impression  - Patient has severe LE pain and b/l LE weakness, more pronounce distally and in the LLE. Her symptoms have been present for about 1.5-2 months, following an extended hospitalization. Symptoms be secondary to a combination of critical illness neuropathy and diabetic neuropathy. Alternatively, given her comorbidities and extended hospitalization, she is at increased risk of hypoperfusion-induced spinal cord infarct, which can present with b/l LE weakness. She also complains of severe LE neuropathic pain, worse since this admission. Her dilaudid was recently discontinued and she is now on Percocet and Tylenol, which she says does not provide enough relief.     Recommendations  - Start Cymbalta 60mg daily  - Recommend MR Thoracic and Lumbar spine   - EMG/NCS would be ideal, however patient can not tolerate at this time due to severe pain  - Consider restarting Dilaudid for pain control 56F with PMH of DVT (on Eliquis), COPD,  trach collar, obesity, IDDM, UTI, sent by [facility] for hypoxia on 5/27/22 and was admitted for AHRF. Prior to this admission, patient was hospitalized on 4/4/22 at [facility] for AHRF requiring intubation for COPD exacerbation and superimposed pneumonia. She also appeared to have a CHF exacerbation with severe b/l LE swelling. She remained intubated 37 days and was finally trached and extubated. Patient reports b/l LE weakness since extubation, but mentions that the left foot drop is new on this admission. After her admission at [facility], she was transferred briefly to a [facility] clinic and was then admitted to [facility]. Prior to admission at [facility], patient was able to ambulate. Her LE's are darkly discolored from the knee down, which she says began in her feet roughly 1-2 years ago, then spread more proximally.    Impression  - Patient has severe LE pain and b/l LE weakness, more pronounce distally and in the LLE. Her symptoms have been present for about 1.5-2 months, following an extended hospitalization. Symptoms be secondary to a combination of critical illness neuropathy and diabetic neuropathy. Alternatively, given her comorbidities and extended hospitalization, she is at increased risk of hypoperfusion-induced spinal cord infarct, which can present with b/l LE weakness. She also complains of severe LE neuropathic pain, worse since this admission. Her dilaudid was recently discontinued and she is now on Percocet and Tylenol, which she says does not provide enough relief.     Recommendations  - Start Cymbalta 60mg daily  - Recommend MR Thoracic and Lumbar spine w/w/o aracely  - pt will need IV sedation for MRI  - patient unable to tolerate electrodiagnostic studies at this time secondary to severe pain  - Pain management f/u

## 2022-06-17 NOTE — SWALLOW BEDSIDE ASSESSMENT ADULT - ADDITIONAL RECOMMENDATIONS
Dysphagia tx upon admission to [facility], SLP at [facility] contacted regarding hospital course, instrumental swallow studies and recommendations for dysphagia tx and a repeat instrumental swallow study prior to resuming po.

## 2022-06-17 NOTE — PROGRESS NOTE ADULT - ASSESSMENT
55 yo f with PMHx of DVT on Eliquis, COPD,  trach collar, obesity, IDDM, UTI, sent by [facility] for hypoxia. Current illness going back to April 4th when pt was intubated on admission at [facility] ICU for hypoxic respiratory failure diagnosed with copd exacerbation and superimposed pneumonia, and remained intubated for 37 days requiring tracheostomy. Patient stayed at [facility] for 2 days until noted to be hypoxic (saturating low 70's) pt's saturation immediately improving afterwards to 80's after large mucus plug removed and was subsequently sent to [facility]  On arrival to ED pt was saturating 97%, 15L O2 via tracheostomy collar (baseline 8 L at [facility])CXR suspicious for L-sided PNA, inconclusive. CTA neg for PE. Pt received x1 IV Levaquin and Cefepime in ED, admitted to MICU for acute hypoxic hypercapnic respiratory failure secondary to acute mucus plug now resolved vs superimposed pneumonia hospital-acquired.   General Surgery Consulted emergently when patient found to be hypoxic to low 80s after CCU and Pulmonary team found large granuloma in trachea along tracheostomy tube. Patient currently had a size 8 trach. Surgery assisted Pulmonary and CCU with trach exchanged with ET tube guidance to a size 7 XLT. Saturations improved to high 90s. Large granuloma was removed. Patient was no longer in distress after exchange.    #Acute/ chronic COPD with exacerbation (Resolved)  #mucous plug removed  #pneumonia vs atelectasis L base  - On 5/28, Pulmonary team found large granuloma in trachea along tracheostomy tube. Patient had a size 8 trach on admission. Surgery assisted Pulmonary and CCU with trach exchanged with ET tube guidance to a size 7 XLT. Saturations improved to high 90s.  - trach functioning well s/p exchange   - Pt off vent 72 hrs and is saturating well  - repeat chest x-ray (6/6) shows possible mucus plugging. suctioning attempted repeat CXR showed improved aeration.  ***Repeat Chest X-ray shows resolution of Mucus plug after suctioning and chest PT  - S/p Sw eval, if fails, CT Sx FU for PEG tube  - Modified barium swallow test done: patient still unable to eat, will need further therapy  - Repeat CXR 6/8: left lung opacity/effusion  - 6/11 Trach changed to Fenestrated 8 by surgery  - 6/12 and 6/13: repeat CXR showing recurrent mucous plug  - 6/14 CXR: improving L atelectasis  - c/w aggressive L sided chest PT and frequent suctioning q6  - as per pulm, if recurrent atelectasis despite chest PT, suctioning, may consider bronchoscopy    #Dysphagia, epiglottis swelling  - 6/13: s/p esophagram, not tolerated PO, noted epiglottis swelling  - ENT consult s/p scope 6/14: repeat in 1 week if pt is still in the hospital  - c/w decadron taper -decadron 3mg IV BID stop after 2 days  - s/p PEG tube 6/15  - started PEG feeds  - Outpatient ENT f/u.     # B/L pain likely diabetic neuropathy  # left foot drop  #venous stasis   - pain management consult appreciated  - Increase gabapentin to 600mg TID  - d/c hydromorphone  - c/w Continue percocet 5-325mg 2 tabs Q6h prn; plan to taper off in the next 10-14 days  - c/w acetaminophen to 1000mg Q8h standing for 14 days  - f/u Neurology cs    #Hyponatremia- improving  - serum osm, Uosm, Caleb    # hypomagmesemia  - repleted    #Chronic Indwelling Singh  - 5/28 Ucx - Contaminated   - 5/28 Ucx- Normal perri   - 5/27 Ucx- Klebsiella (CRE) and pseudomonas   - s/p cefepime  - Currently afebrile w/ no urinary complaints   - episode of Hematouria 6/4 -> improving -> h/h Stable    #Nonobstructive Ileus  - abdomen currently soft, nontender, + BS   - off opioids  - C/w bowel regimen - hold for diarrhea    #Hx of DVT  - on home Eliquis  - restarted Eliquis today    #DM  - c/w lantus 15, lispto 7 units Q6H    #Misc:  Activity OOB to chair  GI PPX  PPI  DVT PPX  ELIQUIS  Dispo: Acute  Pending: c/w IV decadron taper, Neurology consult, LE pain improvement    DISPO: STR placement

## 2022-06-17 NOTE — CONSULT NOTE ADULT - PROBLEM SELECTOR RECOMMENDATION 9
Pain likely neuropathic, possibly 2/2 DM. Pain should improved with better glycemic control. Patient is NOT on chronic opioid therapy for this pain and should have opioids weaned as pain improves. Low risk of opioid abuse per ORT.  1) Increase gabapentin to 600mg TID; patient has failed pregabalin 2/2 AEs  2) Stop hydromorphone IV  3) Continue percocet 5-325mg 2 tabs Q6h prn; plan to taper off in the next 10-14 days  4) Change acetaminophen to 1000mg Q8h standing for 14 days  5) Continue miralax, senna

## 2022-06-17 NOTE — PROGRESS NOTE ADULT - ATTENDING COMMENTS
#s/p PEG (6/15):   started feeds/meds from 6/16.    #Dysphagia:  Strict NPO as of now per ST's FEES eval from 6/13/22.   [facility] will sign out to ST at SNF for further plans with swallow rehab at SNF       #LE neuropathic pain:  PAtient has apparently been bed bound since April 4th (her [facility] admission for hypoxic failure where she ended up being intubated)  Apparently has L > R foot drop which may be due to chronic contractures of Calf muscle/Achilles tendon. Can have PT evaluate her for that.  But options are limited at this stage.   Given the asymmetrical foot drop, Neurology wants to rule out Any focal neuropathy.  Check MRI Lumbar spine w/ & w/o Contrast (IV Ativan 1mg on call)     Neuropathy is likely Diabetic?   Pain management: Patient requiring a lot of opiates since this admission.   6/17: Pain management recommended transitioning to PErcocet 2 tab Q6 PRN Which patient did not tolerate.  Only dilaudid seems to work thus far. Was requiring 1mg IV Q4 PRN 6 times a day. Equivalent PO dilaudid dose would have been around 30mg.   On 6/17, starting with PO dilaudid 4mg Q4 PRN. 1mg IV dilaudid Q24 PRN for breakthroughs.   Increased Gabapentin to 600 TID.   Added Cymbalta 60 QD  Beware patient also on Xanax 0.25 QHS  On Senna, Miralax.     #B/l LE Hyperpigmentation changes:   unclear etiology  Present since 1 year.   May be related to CHF related stasis dermatitis / hemosiderin deposition.   Dermatology eval to maybe consider biopsy? Could also explain alternate differentials for Neuropathy?       #Suspected epiglottis edema:   On DExa > Taper to 3mg Q12 x 2 more days (ends 6/18).   Outpatient ENT f/u.     # Mucus plugs removed. Trach exchanged  CXR PRN

## 2022-06-17 NOTE — CHART NOTE - NSCHARTNOTEFT_GEN_A_CORE
Called by primary team who notified need for ENT follow up appointment outpatient as patient is planned for discharge.  Called ENT office at [address] [place] [address] and made an appointment for 6/23/22 at 7:30am.   Please have patient bring her discharge paperwork and insurance card to her appointment.

## 2022-06-17 NOTE — CONSULT NOTE ADULT - SUBJECTIVE AND OBJECTIVE BOX
Neurology Consult    56F with PMH of DVT (on Eliquis), COPD,  trach collar, obesity, IDDM, UTI, sent by Florinda Lamar for hypoxia on 5/27/22 and was admitted for AHRF. Prior to this admission, patient was hospitalized on 4/4/22 at [facility] for AHRF requiring intubation for COPD exacerbation and superimposed pneumonia. She also appeared to have a CHF exacerbation with severe b/l LE swelling. She remained intubated 37 days and was finally trached and extubated. Patient reports b/l LE weakness since extubation. She is now unable to dorsiflex her left foot. Prior to admission at [facility], patient was able to ambulate.      PAST MEDICAL & SURGICAL HISTORY:  COPD (chronic obstructive pulmonary disease)      CHF (congestive heart failure)      DM (diabetes mellitus)      H/O tracheostomy          FAMILY HISTORY:  No pertinent family history in first degree relatives        Social History: (-) x 3    Allergies    penicillin (Swelling)    Intolerances        MEDICATIONS  (STANDING):  acetaminophen     Tablet .. 1000 milliGRAM(s) Oral every 8 hours  ALPRAZolam 0.25 milliGRAM(s) Oral at bedtime  amLODIPine   Tablet 5 milliGRAM(s) Oral daily  ammonium lactate 12% Lotion 1 Application(s) Topical every 12 hours  apixaban 5 milliGRAM(s) Oral two times a day  atorvastatin 20 milliGRAM(s) Oral at bedtime  buMETAnide 2 milliGRAM(s) Oral daily  dexAMETHasone  Injectable 3 milliGRAM(s) IV Push every 12 hours  dextrose 5%. 1000 milliLiter(s) (50 mL/Hr) IV Continuous <Continuous>  dextrose 5%. 1000 milliLiter(s) (100 mL/Hr) IV Continuous <Continuous>  dextrose 50% Injectable 25 Gram(s) IV Push once  dextrose 50% Injectable 12.5 Gram(s) IV Push once  dextrose 50% Injectable 25 Gram(s) IV Push once  gabapentin 600 milliGRAM(s) Oral three times a day  glucagon  Injectable 1 milliGRAM(s) IntraMuscular once  influenza   Vaccine 0.5 milliLiter(s) IntraMuscular once  insulin glargine Injectable (LANTUS) 15 Unit(s) SubCutaneous at bedtime  insulin lispro (ADMELOG) corrective regimen sliding scale   SubCutaneous every 6 hours  insulin lispro Injectable (ADMELOG) 7 Unit(s) SubCutaneous every 6 hours  losartan 100 milliGRAM(s) Oral daily  pantoprazole    Tablet 40 milliGRAM(s) Oral before breakfast  polyethylene glycol 3350 17 Gram(s) Oral daily  senna 2 Tablet(s) Oral at bedtime    MEDICATIONS  (PRN):  dextrose Oral Gel 15 Gram(s) Oral once PRN Blood Glucose LESS THAN 70 milliGRAM(s)/deciliter  oxycodone    5 mG/acetaminophen 325 mG 2 Tablet(s) Oral/Enteral Tube every 6 hours PRN Moderate Pain (4 - 6)      Review of systems:    Constitutional: as per HPI  Eyes: No eye pain or discharge  ENMT:  No difficulty hearing; No sinus or throat pain  Neck: No pain or stiffness  Respiratory: No cough, wheezing, chills or hemoptysis  Cardiovascular: No chest pain, palpitations, shortness of breath, dyspnea on exertion  Gastrointestinal: No abdominal pain, nausea, vomiting or hematemesis; No diarrhea or constipation.   Genitourinary: No dysuria, frequency, hematuria or incontinence  Neurological: As per HPI  Skin: No rashes or lesions   Endocrine: No heat or cold intolerance; No hair loss  Musculoskeletal: No joint pain or swelling  Psychiatric: No depression, anxiety, mood swings  Heme/Lymph: No easy bruising or bleeding gums    Vital Signs Last 24 Hrs  T(C): 36.1 (17 Jun 2022 05:00), Max: 36.4 (16 Jun 2022 20:49)  T(F): 97 (17 Jun 2022 05:00), Max: 97.6 (16 Jun 2022 20:49)  HR: 75 (17 Jun 2022 06:40) (70 - 87)  BP: 132/60 (17 Jun 2022 06:40) (106/59 - 138/64)  BP(mean): --  RR: 17 (17 Jun 2022 05:00) (17 - 20)  SpO2: 100% (16 Jun 2022 20:49) (100% - 100%)      Neurological Examination:  General:  trach in place  Cognitive/Language:  Awake, alert, and oriented to person, place, time and date.  Recent and remote memory intact.  Fund of knowledge is appropriate.  Naming, repetition and comprehension intact.   Nondysarthric.    Cranial Nerves  - Eyes: Visual acuity intact, Visual fields full.  EOMI w/o nystagmus, skew or reported double vision.  PERRL.  No ptosis/weakness of eyelid closure.    - Face:  Facial sensation normal V1 - 3, no facial asymmetry.    - Ears/Nose/Throat:  Hearing grossly intact b/l to finger rub.  Palate elevates midline.  Tongue and uvula midline.   Motor examination:  Upper Extremities: L 5/5, R 5/5; Lower extremities: L hip flexion 4/5, R hip flexion 4/5, L knee extension 4/5, R knee extension 4/5, L dorsiflexion 0/5, R dorsiflexion 3/5, L plantar flexion 2/5, R plantar flexion 4/5, L foot eversion 0/5, R foot eversion 4/5, L foot inversion 0/5, R foot inversion 4/5.  Sensory examination:   Decreased sensation to light touch and vibration in b/l distal LEs  Reflexes:   2+ b/l biceps, tbrachioradialis. 1+ b/l patella. Unable to evaluate achilles secondary to pain.    Cerebellum:   Gait deferred      Labs:   CBC Full  -  ( 17 Jun 2022 07:42 )  WBC Count : 14.31 K/uL  RBC Count : 3.00 M/uL  Hemoglobin : 10.1 g/dL  Hematocrit : 28.3 %  Platelet Count - Automated : 234 K/uL  Mean Cell Volume : 94.3 fL  Mean Cell Hemoglobin : 33.7 pg  Mean Cell Hemoglobin Concentration : 35.7 g/dL  Auto Neutrophil # : 12.70 K/uL  Auto Lymphocyte # : 0.62 K/uL  Auto Monocyte # : 0.86 K/uL  Auto Eosinophil # : 0.01 K/uL  Auto Basophil # : 0.01 K/uL  Auto Neutrophil % : 88.7 %  Auto Lymphocyte % : 4.3 %  Auto Monocyte % : 6.0 %  Auto Eosinophil % : 0.1 %  Auto Basophil % : 0.1 %    06-17    134<L>  |  90<L>  |  14  ----------------------------<  203<H>  4.0   |  28  |  <0.5<L>    Ca    9.9      17 Jun 2022 07:42  Mg     1.6     06-17    TPro  6.4  /  Alb  4.0  /  TBili  0.7  /  DBili  x   /  AST  15  /  ALT  28  /  AlkPhos  93  06-17    LIVER FUNCTIONS - ( 17 Jun 2022 07:42 )  Alb: 4.0 g/dL / Pro: 6.4 g/dL / ALK PHOS: 93 U/L / ALT: 28 U/L / AST: 15 U/L / GGT: x                   Neuroimaging:  NCHCT:     06-17-22 @ 12:42       Neurology Consult    56F with PMH of DVT (on Eliquis), COPD,  trach collar, obesity, IDDM, UTI, sent by Florinda Lamar for hypoxia on 5/27/22 and was admitted for AHRF. Prior to this admission, patient was hospitalized on 4/4/22 at [facility] for AHRF requiring intubation for COPD exacerbation and superimposed pneumonia. She also appeared to have a CHF exacerbation with severe b/l LE swelling. She remained intubated 37 days and was finally trached and extubated. Patient reports b/l LE weakness since extubation, but mentions that the left foot drop is new on this admission. After her admission at [facility], she was transferred briefly to a [facility] clinic and was then admitted to [facility]. Prior to admission at [facility], patient was able to ambulate. Her LE's are darkly discolored from the knee down, which she says began in her feet roughly 1-2 years ago, then spread more proximally.      PAST MEDICAL & SURGICAL HISTORY:  COPD (chronic obstructive pulmonary disease)      CHF (congestive heart failure)      DM (diabetes mellitus)      H/O tracheostomy          FAMILY HISTORY:  No pertinent family history in first degree relatives        Social History: (-) x 3    Allergies    penicillin (Swelling)    Intolerances        MEDICATIONS  (STANDING):  acetaminophen     Tablet .. 1000 milliGRAM(s) Oral every 8 hours  ALPRAZolam 0.25 milliGRAM(s) Oral at bedtime  amLODIPine   Tablet 5 milliGRAM(s) Oral daily  ammonium lactate 12% Lotion 1 Application(s) Topical every 12 hours  apixaban 5 milliGRAM(s) Oral two times a day  atorvastatin 20 milliGRAM(s) Oral at bedtime  buMETAnide 2 milliGRAM(s) Oral daily  dexAMETHasone  Injectable 3 milliGRAM(s) IV Push every 12 hours  dextrose 5%. 1000 milliLiter(s) (50 mL/Hr) IV Continuous <Continuous>  dextrose 5%. 1000 milliLiter(s) (100 mL/Hr) IV Continuous <Continuous>  dextrose 50% Injectable 25 Gram(s) IV Push once  dextrose 50% Injectable 12.5 Gram(s) IV Push once  dextrose 50% Injectable 25 Gram(s) IV Push once  gabapentin 600 milliGRAM(s) Oral three times a day  glucagon  Injectable 1 milliGRAM(s) IntraMuscular once  influenza   Vaccine 0.5 milliLiter(s) IntraMuscular once  insulin glargine Injectable (LANTUS) 15 Unit(s) SubCutaneous at bedtime  insulin lispro (ADMELOG) corrective regimen sliding scale   SubCutaneous every 6 hours  insulin lispro Injectable (ADMELOG) 7 Unit(s) SubCutaneous every 6 hours  losartan 100 milliGRAM(s) Oral daily  pantoprazole    Tablet 40 milliGRAM(s) Oral before breakfast  polyethylene glycol 3350 17 Gram(s) Oral daily  senna 2 Tablet(s) Oral at bedtime    MEDICATIONS  (PRN):  dextrose Oral Gel 15 Gram(s) Oral once PRN Blood Glucose LESS THAN 70 milliGRAM(s)/deciliter  oxycodone    5 mG/acetaminophen 325 mG 2 Tablet(s) Oral/Enteral Tube every 6 hours PRN Moderate Pain (4 - 6)      Review of systems:    Constitutional: as per HPI  Eyes: No eye pain or discharge  ENMT:  No difficulty hearing; No sinus or throat pain  Neck: No pain or stiffness  Respiratory: No cough, wheezing, chills or hemoptysis  Cardiovascular: No chest pain, palpitations, shortness of breath, dyspnea on exertion  Gastrointestinal: No abdominal pain, nausea, vomiting or hematemesis; No diarrhea or constipation.   Genitourinary: No dysuria, frequency, hematuria or incontinence  Neurological: As per HPI  Skin: No rashes or lesions   Endocrine: No heat or cold intolerance; No hair loss  Musculoskeletal: No joint pain or swelling  Psychiatric: No depression, anxiety, mood swings  Heme/Lymph: No easy bruising or bleeding gums    Vital Signs Last 24 Hrs  T(C): 36.1 (17 Jun 2022 05:00), Max: 36.4 (16 Jun 2022 20:49)  T(F): 97 (17 Jun 2022 05:00), Max: 97.6 (16 Jun 2022 20:49)  HR: 75 (17 Jun 2022 06:40) (70 - 87)  BP: 132/60 (17 Jun 2022 06:40) (106/59 - 138/64)  BP(mean): --  RR: 17 (17 Jun 2022 05:00) (17 - 20)  SpO2: 100% (16 Jun 2022 20:49) (100% - 100%)      Neurological Examination:  General:  trach in place. Severe b/l LE skin discoloration with peeling  Cognitive/Language:  Awake, alert, and oriented to person, place, time and date.  Recent and remote memory intact.    Cranial Nerves  - Eyes: Visual acuity intact, Visual fields full.  EOMI w/o nystagmus, skew or reported double vision.  PERRL.  No ptosis/weakness of eyelid closure.    - Face:  Facial sensation normal V1 - 3, no facial asymmetry.    - Ears/Nose/Throat:  Hearing grossly intact b/l to finger rub.  Palate elevates midline.  Tongue and uvula midline.   Motor examination:  Upper Extremities: L 5/5, R 5/5; Lower extremities: L hip flexion 4/5, R hip flexion 4/5, L knee extension 4/5, R knee extension 4/5, L dorsiflexion 0/5, R dorsiflexion 3/5, L plantar flexion 2/5, R plantar flexion 4/5, L foot eversion 0/5, R foot eversion 4/5, L foot inversion 0/5, R foot inversion 4/5.  Sensory examination:   Decreased sensation to light touch and vibration in b/l distal LEs  Reflexes:   2+ b/l biceps, tbrachioradialis. 1+ b/l patella. Unable to evaluate achilles secondary to pain.    Cerebellum:   Gait deferred      Labs:   CBC Full  -  ( 17 Jun 2022 07:42 )  WBC Count : 14.31 K/uL  RBC Count : 3.00 M/uL  Hemoglobin : 10.1 g/dL  Hematocrit : 28.3 %  Platelet Count - Automated : 234 K/uL  Mean Cell Volume : 94.3 fL  Mean Cell Hemoglobin : 33.7 pg  Mean Cell Hemoglobin Concentration : 35.7 g/dL  Auto Neutrophil # : 12.70 K/uL  Auto Lymphocyte # : 0.62 K/uL  Auto Monocyte # : 0.86 K/uL  Auto Eosinophil # : 0.01 K/uL  Auto Basophil # : 0.01 K/uL  Auto Neutrophil % : 88.7 %  Auto Lymphocyte % : 4.3 %  Auto Monocyte % : 6.0 %  Auto Eosinophil % : 0.1 %  Auto Basophil % : 0.1 %    06-17    134<L>  |  90<L>  |  14  ----------------------------<  203<H>  4.0   |  28  |  <0.5<L>    Ca    9.9      17 Jun 2022 07:42  Mg     1.6     06-17    TPro  6.4  /  Alb  4.0  /  TBili  0.7  /  DBili  x   /  AST  15  /  ALT  28  /  AlkPhos  93  06-17    LIVER FUNCTIONS - ( 17 Jun 2022 07:42 )  Alb: 4.0 g/dL / Pro: 6.4 g/dL / ALK PHOS: 93 U/L / ALT: 28 U/L / AST: 15 U/L / GGT: x                   Neuroimaging:  NCT:     06-17-22 @ 12:42

## 2022-06-17 NOTE — CHART NOTE - [id number]_GEN_A_CORE
Registered Dietitian Follow-Up     Patient Profile Reviewed                           Yes [x]   No []  Nutrition History Previously Obtained        Yes [x]  No []      Pertinent Medical Interventions:  57 yo f with PMHx of DVT on Eliquis, COPD,  trach collar, obesity, IDDM, UTI, sent by [facility] for hypoxia.   now w/ epiglottis edema    Nutrition Interval History:   -[facility] [place]  noted patient with moderate-severe pharyngeal dysphagia persisting recommending NPO w/ non-oral means of nutrition/hydration, pt would benefit from PEG tube for primary means of nutrition/hydration in conjunction w/ dysphagia tx; Continue ice chips  -PEG PLACED now started feeds via PEG (was previous receiving via NGT)  **Receiving EN regimen via PEG: Glucerna 1.2 at 300mL Q6hrs, provides (1440kcal, 72gm protein, 972mL free H2O) + No Carb Prosource modular 1x/daily (+60kcal, +15 g pro --> total with en 1500kcal, 87g pro)    Diet, NPO with Tube Feed:   Tube Feeding Modality: Nasogastric  Glucerna 1.2 Martin  Total Volume for 24 Hours (mL): 1200  Bolus  Total Volume of Bolus (mL):  300  Tube Feed Frequency: Every 6 hours   Tube Feed Start Time: 06:00  Bolus Feed Rate (mL per Hour): 50   Bolus Feed Duration (in Hours): 24  Bolus   Total Volume per Flush (mL): 30   Frequency: Every 6 Hours  Free Water Flush Instructions:  30 ml water flush pre and post bolus feeds  No Carb Prosource (1pkg = 15gms Protein)     Qty per Day:  1x (22 @ 11:53) [Active]    Anthropometrics:  Height (cm): 154.9 (22 @ 00:08)  Weight (kg): 97.4 (22 @ 18:03)  BMI (kg/m2): 40.6 (22 @ 00:08)    OTHER WEIGHTS:    Daily Weight in k.3 (), Weight in k.4 (), Weight in k.3 (), Weight in k ()  % Weight Change --> patient with gradual weight loss from 97.4kg --> 94.3kg indicating 3.18% wt loss ~1-2wks suspect fluid shifts    MEDICATIONS  (STANDING):  ALPRAZolam 0.25 milliGRAM(s) Oral at bedtime  amLODIPine   Tablet 5 milliGRAM(s) Oral daily  ammonium lactate 12% Lotion 1 Application(s) Topical every 12 hours  apixaban 5 milliGRAM(s) Oral two times a day  atorvastatin 20 milliGRAM(s) Oral at bedtime  buMETAnide 2 milliGRAM(s) Oral daily  dexAMETHasone  Injectable 3 milliGRAM(s) IV Push every 12 hours  dextrose 5%. 1000 milliLiter(s) (50 mL/Hr) IV Continuous <Continuous>  dextrose 5%. 1000 milliLiter(s) (100 mL/Hr) IV Continuous <Continuous>  dextrose 50% Injectable 25 Gram(s) IV Push once  dextrose 50% Injectable 12.5 Gram(s) IV Push once  dextrose 50% Injectable 25 Gram(s) IV Push once  gabapentin 300 milliGRAM(s) Oral three times a day  glucagon  Injectable 1 milliGRAM(s) IntraMuscular once  influenza   Vaccine 0.5 milliLiter(s) IntraMuscular once  insulin glargine Injectable (LANTUS) 15 Unit(s) SubCutaneous at bedtime  insulin lispro (ADMELOG) corrective regimen sliding scale   SubCutaneous every 6 hours  insulin lispro Injectable (ADMELOG) 7 Unit(s) SubCutaneous every 6 hours  losartan 100 milliGRAM(s) Oral daily  magnesium sulfate  IVPB 2 Gram(s) IV Intermittent once  pantoprazole    Tablet 40 milliGRAM(s) Oral before breakfast  polyethylene glycol 3350 17 Gram(s) Oral daily  senna 2 Tablet(s) Oral at bedtime    MEDICATIONS  (PRN):  acetaminophen     Tablet .. 650 milliGRAM(s) Oral every 6 hours PRN Temp greater or equal to 38C (100.4F), Mild Pain (1 - 3)  dextrose Oral Gel 15 Gram(s) Oral once PRN Blood Glucose LESS THAN 70 milliGRAM(s)/deciliter  HYDROmorphone  Injectable 1 milliGRAM(s) IV Push every 4 hours PRN Severe Pain (7 - 10)  oxycodone    5 mG/acetaminophen 325 mG 2 Tablet(s) Oral/Enteral Tube every 6 hours PRN Moderate Pain (4 - 6)    Labs:     Sodium: 134 (L)  Magnesium (1.6 (L)    POCT Blood Glucose.: 205 mg/dL (2022 11:23)  POCT Blood Glucose.: 202 mg/dL (2022 05:36)  POCT Blood Glucose.: 280 mg/dL (2022 23:34)  POCT Blood Glucose.: 261 mg/dL (2022 21:19)  POCT Blood Glucose.: 240 mg/dL (2022 17:02)    Physical Findings:  - Appearance: A&Ox4, trached  - GI function:  last documented BM?? no documentation since  - Tubes: PEG  - Oral/Mouth cavity: NPO with PEG  - Skin: Intact  - Edema: R, L foot, leg and ankle 2+ last documented on  no documentation since     Nutrition Requirements:   Weight Used: ABW 94.3kg + IBW 48kg     Estimated Energy Needs    Continue []  Adjust [x] 1469-1762kcal/day (MSJ x1.0-1.2)  Estimated Protein Needs    Continue []  Adjust [x] 86-95g/day (1.2-2.0g/kg of ibw)  Estimated Fluid Needs        Continue []  Adjust [x] 1mL/kcal    Nutrient Intake: >85%     [x] Previous Nutrition Diagnosis:            [x] Ongoing          [] Resolved  #1 Altered nutrition related lab values  Goal/Expected Outcome: achieve BG <180mg/dL x4days              [x] Ongoing          [] Resolved  #2 Inadequate oral intake  dysphagia precluding PO intake  SLP evaluation recommending NPO with EN    Nutrition Intervention:   Enteral Nutrition, Medical Food Supplement, Vitamin Supplement, Nutrition Related Medication, Coordination of Care      Indicator/Monitoring:   Mykel Cisneros, #[id number] to monitor diet order, energy intake, food and nutrient intake, body composition, weight    Recommendations:  1. Continue NPO with alternate means of nutrition/hydration via **Gastrostomy tube**  regimen: Glucerna 1.2 at 300mL Q6hrs, provides (1440kcal, 72gm protein, 972mL free H2O) + No Carb Prosource modular 1x/daily (+60kcal, +15 g pro --> total with en 1500kcal, 87g pro)  2. continue bowel regimen, insulin regimen, replete electrolytes  3. confirm d/c plans, f patient to be d/c home consult nutrition support team for discharge recommendations     HIGH Risk, follow up x 4days. Registered Dietitian Follow-Up     Patient Profile Reviewed                           Yes [x]   No []  Nutrition History Previously Obtained        Yes [x]  No []      Pertinent Medical Interventions:  57 yo f with PMHx of DVT on Eliquis, COPD,  trach collar, obesity, IDDM, UTI, sent by [facility] for hypoxia.   now w/ epiglottis edema    Nutrition Interval History:   -[facility] [place]  noted patient with moderate-severe pharyngeal dysphagia persisting recommending NPO w/ non-oral means of nutrition/hydration, pt would benefit from PEG tube for primary means of nutrition/hydration in conjunction w/ dysphagia tx; Continue ice chips  -PEG PLACED now started feeds via PEG (was previous receiving via NGT)  **Receiving EN regimen via PEG: Glucerna 1.2 at 300mL Q6hrs, provides (1440kcal, 72gm protein, 972mL free H2O) + No Carb Prosource modular 1x/daily (+60kcal, +15 g pro --> total with en 1500kcal, 87g pro)    Diet, NPO with Tube Feed:   Tube Feeding Modality: Nasogastric  Glucerna 1.2 Martin  Total Volume for 24 Hours (mL): 1200  Bolus  Total Volume of Bolus (mL):  300  Tube Feed Frequency: Every 6 hours   Tube Feed Start Time: 06:00  Bolus Feed Rate (mL per Hour): 50   Bolus Feed Duration (in Hours): 24  Bolus   Total Volume per Flush (mL): 30   Frequency: Every 6 Hours  Free Water Flush Instructions:  30 ml water flush pre and post bolus feeds  No Carb Prosource (1pkg = 15gms Protein)     Qty per Day:  1x (22 @ 11:53) [Active]    Anthropometrics:  Height (cm): 154.9 (22 @ 00:08)  Weight (kg): 97.4 (22 @ 18:03)  BMI (kg/m2): 40.6 (22 @ 00:08)    OTHER WEIGHTS:    Daily Weight in k.3 (), Weight in k.4 (), Weight in k.3 (), Weight in k ()  % Weight Change --> patient with gradual weight loss from 97.4kg --> 94.3kg indicating 3.18% wt loss ~1-2wks suspect fluid shifts    MEDICATIONS  (STANDING):  ALPRAZolam 0.25 milliGRAM(s) Oral at bedtime  amLODIPine   Tablet 5 milliGRAM(s) Oral daily  ammonium lactate 12% Lotion 1 Application(s) Topical every 12 hours  apixaban 5 milliGRAM(s) Oral two times a day  atorvastatin 20 milliGRAM(s) Oral at bedtime  buMETAnide 2 milliGRAM(s) Oral daily  dexAMETHasone  Injectable 3 milliGRAM(s) IV Push every 12 hours  dextrose 5%. 1000 milliLiter(s) (50 mL/Hr) IV Continuous <Continuous>  dextrose 5%. 1000 milliLiter(s) (100 mL/Hr) IV Continuous <Continuous>  dextrose 50% Injectable 25 Gram(s) IV Push once  dextrose 50% Injectable 12.5 Gram(s) IV Push once  dextrose 50% Injectable 25 Gram(s) IV Push once  gabapentin 300 milliGRAM(s) Oral three times a day  glucagon  Injectable 1 milliGRAM(s) IntraMuscular once  influenza   Vaccine 0.5 milliLiter(s) IntraMuscular once  insulin glargine Injectable (LANTUS) 15 Unit(s) SubCutaneous at bedtime  insulin lispro (ADMELOG) corrective regimen sliding scale   SubCutaneous every 6 hours  insulin lispro Injectable (ADMELOG) 7 Unit(s) SubCutaneous every 6 hours  losartan 100 milliGRAM(s) Oral daily  magnesium sulfate  IVPB 2 Gram(s) IV Intermittent once  pantoprazole    Tablet 40 milliGRAM(s) Oral before breakfast  polyethylene glycol 3350 17 Gram(s) Oral daily  senna 2 Tablet(s) Oral at bedtime    MEDICATIONS  (PRN):  acetaminophen     Tablet .. 650 milliGRAM(s) Oral every 6 hours PRN Temp greater or equal to 38C (100.4F), Mild Pain (1 - 3)  dextrose Oral Gel 15 Gram(s) Oral once PRN Blood Glucose LESS THAN 70 milliGRAM(s)/deciliter  HYDROmorphone  Injectable 1 milliGRAM(s) IV Push every 4 hours PRN Severe Pain (7 - 10)  oxycodone    5 mG/acetaminophen 325 mG 2 Tablet(s) Oral/Enteral Tube every 6 hours PRN Moderate Pain (4 - 6)    Labs:     Sodium: 134 (L)  Magnesium (1.6 (L)    POCT Blood Glucose.: 205 mg/dL (2022 11:23)  POCT Blood Glucose.: 202 mg/dL (2022 05:36)  POCT Blood Glucose.: 280 mg/dL (2022 23:34)  POCT Blood Glucose.: 261 mg/dL (2022 21:19)  POCT Blood Glucose.: 240 mg/dL (2022 17:02)    Physical Findings:  - Appearance: A&Ox4, trached  - GI function:  last documented BM?? no documentation since  - Tubes: PEG  - Oral/Mouth cavity: NPO with PEG  - Skin: Intact  - Edema: R, L foot, leg and ankle 2+ last documented on  no documentation since     Nutrition Requirements:   Weight Used: ABW 94.3kg + IBW 48kg     Estimated Energy Needs    Continue []  Adjust [x] 1469-1762kcal/day (MSJ x1.0-1.2)  Estimated Protein Needs    Continue []  Adjust [x] 86-95g/day (1.2-2.0g/kg of ibw)  Estimated Fluid Needs        Continue []  Adjust [x] 1mL/kcal    Nutrient Intake: >85%     [x] Previous Nutrition Diagnosis:            [x] Ongoing          [] Resolved  #1 Altered nutrition related lab values  Goal/Expected Outcome: achieve BG <180mg/dL x4days              [x] Ongoing          [] Resolved  #2 Inadequate oral intake  dysphagia precluding PO intake  SLP evaluation recommending NPO with EN    Nutrition Intervention:   Enteral Nutrition, Medical Food Supplement, Vitamin Supplement, Nutrition Related Medication, Coordination of Care      Indicator/Monitoring:   Mykel Cisneros, #[id number] to monitor diet order, energy intake, food and nutrient intake, body composition, weights    Recommendations:  1. Continue NPO with alternate means of nutrition/hydration via **Gastrostomy tube**  regimen: Glucerna 1.2 at 300mL Q6hrs, provides (1440kcal, 72gm protein, 972mL free H2O) + No Carb Prosource modular 1x/daily (+60kcal, +15 g pro --> total with en 1500kcal, 87g pro)  2. continue bowel regimen, insulin regimen, replete electrolytes  3. confirm d/c plans, f patient to be d/c home consult nutrition support team for discharge recommendations     HIGH Risk, follow up x 4days. Registered Dietitian Follow-Up     Patient Profile Reviewed                           Yes [x]   No []  Nutrition History Previously Obtained        Yes [x]  No []      Pertinent Medical Interventions:  55 yo f with PMHx of DVT on Eliquis, COPD,  trach collar, obesity, IDDM, UTI, sent by [facility] for hypoxia.   now w/ epiglottis edema    Nutrition Interval History:   -[facility] [place]  noted patient with moderate-severe pharyngeal dysphagia persisting recommending NPO w/ non-oral means of nutrition/hydration, pt would benefit from PEG tube for primary means of nutrition/hydration in conjunction w/ dysphagia tx; Continue ice chips  -PEG PLACED now started feeds via PEG (was previous receiving via NGT)  **Receiving EN regimen via PEG: Glucerna 1.2 at 300mL Q6hrs, provides (1440kcal, 72gm protein, 972mL free H2O) + No Carb Prosource modular 1x/daily (+60kcal, +15 g pro --> total with en 1500kcal, 87g pro) patient tolerating EN with no signs/symptoms of GI distress per RN    Diet, NPO with Tube Feed:   Tube Feeding Modality: Nasogastric  Glucerna 1.2 Martin  Total Volume for 24 Hours (mL): 1200  Bolus  Total Volume of Bolus (mL):  300  Tube Feed Frequency: Every 6 hours   Tube Feed Start Time: 06:00  Bolus Feed Rate (mL per Hour): 50   Bolus Feed Duration (in Hours): 24  Bolus   Total Volume per Flush (mL): 30   Frequency: Every 6 Hours  Free Water Flush Instructions:  30 ml water flush pre and post bolus feeds  No Carb Prosource (1pkg = 15gms Protein)     Qty per Day:  1x (22 @ 11:53) [Active]    Anthropometrics:  Height (cm): 154.9 (22 @ 00:08)  Weight (kg): 97.4 (22 @ 18:03)  BMI (kg/m2): 40.6 (22 @ 00:08)    OTHER WEIGHTS:    Daily Weight in k.3 (-), Weight in k.4 (), Weight in k.3 (), Weight in k ()  % Weight Change --> patient with gradual weight loss from 97.4kg --> 94.3kg indicating 3.18% wt loss ~1-2wks suspect fluid shifts    MEDICATIONS  (STANDING):  ALPRAZolam 0.25 milliGRAM(s) Oral at bedtime  amLODIPine   Tablet 5 milliGRAM(s) Oral daily  ammonium lactate 12% Lotion 1 Application(s) Topical every 12 hours  apixaban 5 milliGRAM(s) Oral two times a day  atorvastatin 20 milliGRAM(s) Oral at bedtime  buMETAnide 2 milliGRAM(s) Oral daily  dexAMETHasone  Injectable 3 milliGRAM(s) IV Push every 12 hours  dextrose 5%. 1000 milliLiter(s) (50 mL/Hr) IV Continuous <Continuous>  dextrose 5%. 1000 milliLiter(s) (100 mL/Hr) IV Continuous <Continuous>  dextrose 50% Injectable 25 Gram(s) IV Push once  dextrose 50% Injectable 12.5 Gram(s) IV Push once  dextrose 50% Injectable 25 Gram(s) IV Push once  gabapentin 300 milliGRAM(s) Oral three times a day  glucagon  Injectable 1 milliGRAM(s) IntraMuscular once  influenza   Vaccine 0.5 milliLiter(s) IntraMuscular once  insulin glargine Injectable (LANTUS) 15 Unit(s) SubCutaneous at bedtime  insulin lispro (ADMELOG) corrective regimen sliding scale   SubCutaneous every 6 hours  insulin lispro Injectable (ADMELOG) 7 Unit(s) SubCutaneous every 6 hours  losartan 100 milliGRAM(s) Oral daily  magnesium sulfate  IVPB 2 Gram(s) IV Intermittent once  pantoprazole    Tablet 40 milliGRAM(s) Oral before breakfast  polyethylene glycol 3350 17 Gram(s) Oral daily  senna 2 Tablet(s) Oral at bedtime    MEDICATIONS  (PRN):  acetaminophen     Tablet .. 650 milliGRAM(s) Oral every 6 hours PRN Temp greater or equal to 38C (100.4F), Mild Pain (1 - 3)  dextrose Oral Gel 15 Gram(s) Oral once PRN Blood Glucose LESS THAN 70 milliGRAM(s)/deciliter  HYDROmorphone  Injectable 1 milliGRAM(s) IV Push every 4 hours PRN Severe Pain (7 - 10)  oxycodone    5 mG/acetaminophen 325 mG 2 Tablet(s) Oral/Enteral Tube every 6 hours PRN Moderate Pain (4 - 6)    Labs:     Sodium: 134 (L)  Magnesium (1.6 (L)    POCT Blood Glucose.: 205 mg/dL (2022 11:23)  POCT Blood Glucose.: 202 mg/dL (2022 05:36)  POCT Blood Glucose.: 280 mg/dL (2022 23:34)  POCT Blood Glucose.: 261 mg/dL (2022 21:19)  POCT Blood Glucose.: 240 mg/dL (2022 17:02)    Physical Findings:  - Appearance: A&Ox4, trached  - GI function:  last documented BM?? no documentation since  - Tubes: PEG  - Oral/Mouth cavity: NPO with PEG  - Skin: Intact  - Edema: R, L foot, leg and ankle 2+ last documented on  no documentation since     Nutrition Requirements:   Weight Used: ABW 94.3kg + IBW 48kg     Estimated Energy Needs    Continue []  Adjust [x] 1469-1762kcal/day (MSJ x1.0-1.2)  Estimated Protein Needs    Continue []  Adjust [x] 86-95g/day (1.2-2.0g/kg of ibw)  Estimated Fluid Needs        Continue []  Adjust [x] 1mL/kcal    Nutrient Intake: >85%     [x] Previous Nutrition Diagnosis:            [x] Ongoing          [] Resolved  #1 Altered nutrition related lab values  Goal/Expected Outcome: achieve BG <180mg/dL x4days              [x] Ongoing          [] Resolved  #2 Inadequate oral intake  dysphagia precluding PO intake  SLP evaluation recommending NPO with EN    Nutrition Intervention:   Enteral Nutrition, Medical Food Supplement, Vitamin Supplement, Nutrition Related Medication, Coordination of Care      Indicator/Monitoring:   Mykel Cisneros, #[id number] to monitor diet order, energy intake, food and nutrient intake, body composition, weights    Recommendations:  1. Continue NPO with alternate means of nutrition/hydration via **Gastrostomy tube**  regimen: Glucerna 1.2 at 300mL Q6hrs, provides (1440kcal, 72gm protein, 972mL free H2O) + No Carb Prosource modular 1x/daily (+60kcal, +15 g pro --> total with en 1500kcal, 87g pro)  2. continue bowel regimen, insulin regimen, replete electrolytes  3. confirm d/c plans, f patient to be d/c home consult nutrition support team for discharge recommendations     HIGH Risk, follow up x 4days. Registered Dietitian Follow-Up     Patient Profile Reviewed                           Yes [x]   No []  Nutrition History Previously Obtained        Yes [x]  No []      Pertinent Medical Interventions:  57 yo f with PMHx of DVT on Eliquis, COPD,  trach collar, obesity, IDDM, UTI, sent by [facility] for hypoxia.   now w/ epiglottis edema    Nutrition Interval History:   -[facility] [place]  noted patient with moderate-severe pharyngeal dysphagia persisting recommending NPO w/ non-oral means of nutrition/hydration, pt would benefit from PEG tube for primary means of nutrition/hydration in conjunction w/ dysphagia tx; Continue ice chips  -PEG PLACED now started feeds via PEG (was previous receiving via NGT)  **Receiving EN regimen via PEG: Glucerna 1.2 at 300mL Q6hrs, provides (1440kcal, 72gm protein, 972mL free H2O) + No Carb Prosource modular 1x/daily (+60kcal, +15 g pro --> total with en 1500kcal, 87g pro) patient tolerating EN with no signs/symptoms of GI distress per RN    Diet, NPO with Tube Feed:   Tube Feeding Modality: Nasogastric  Glucerna 1.2 Martin  Total Volume for 24 Hours (mL): 1200  Bolus  Total Volume of Bolus (mL):  300  Tube Feed Frequency: Every 6 hours   Tube Feed Start Time: 06:00  Bolus Feed Rate (mL per Hour): 50   Bolus Feed Duration (in Hours): 24  Bolus   Total Volume per Flush (mL): 30   Frequency: Every 6 Hours  Free Water Flush Instructions:  30 ml water flush pre and post bolus feeds  No Carb Prosource (1pkg = 15gms Protein)     Qty per Day:  1x (22 @ 11:53) [Active]    Anthropometrics:  Height (cm): 154.9 (22 @ 00:08)  Weight (kg): 97.4 (22 @ 18:03)  BMI (kg/m2): 40.6 (22 @ 00:08)    OTHER WEIGHTS:    Daily Weight in k.3 (-), Weight in k.4 (), Weight in k.3 (), Weight in k ()  % Weight Change --> patient with gradual weight loss from 97.4kg --> 94.3kg indicating 3.18% wt loss ~1-2wks suspect fluid shifts    MEDICATIONS  (STANDING):  ALPRAZolam 0.25 milliGRAM(s) Oral at bedtime  amLODIPine   Tablet 5 milliGRAM(s) Oral daily  ammonium lactate 12% Lotion 1 Application(s) Topical every 12 hours  apixaban 5 milliGRAM(s) Oral two times a day  atorvastatin 20 milliGRAM(s) Oral at bedtime  buMETAnide 2 milliGRAM(s) Oral daily  dexAMETHasone  Injectable 3 milliGRAM(s) IV Push every 12 hours  dextrose 5%. 1000 milliLiter(s) (50 mL/Hr) IV Continuous <Continuous>  dextrose 5%. 1000 milliLiter(s) (100 mL/Hr) IV Continuous <Continuous>  dextrose 50% Injectable 25 Gram(s) IV Push once  dextrose 50% Injectable 12.5 Gram(s) IV Push once  dextrose 50% Injectable 25 Gram(s) IV Push once  gabapentin 300 milliGRAM(s) Oral three times a day  glucagon  Injectable 1 milliGRAM(s) IntraMuscular once  influenza   Vaccine 0.5 milliLiter(s) IntraMuscular once  insulin glargine Injectable (LANTUS) 15 Unit(s) SubCutaneous at bedtime  insulin lispro (ADMELOG) corrective regimen sliding scale   SubCutaneous every 6 hours  insulin lispro Injectable (ADMELOG) 7 Unit(s) SubCutaneous every 6 hours  losartan 100 milliGRAM(s) Oral daily  magnesium sulfate  IVPB 2 Gram(s) IV Intermittent once  pantoprazole    Tablet 40 milliGRAM(s) Oral before breakfast  polyethylene glycol 3350 17 Gram(s) Oral daily  senna 2 Tablet(s) Oral at bedtime    MEDICATIONS  (PRN):  acetaminophen     Tablet .. 650 milliGRAM(s) Oral every 6 hours PRN Temp greater or equal to 38C (100.4F), Mild Pain (1 - 3)  dextrose Oral Gel 15 Gram(s) Oral once PRN Blood Glucose LESS THAN 70 milliGRAM(s)/deciliter  HYDROmorphone  Injectable 1 milliGRAM(s) IV Push every 4 hours PRN Severe Pain (7 - 10)  oxycodone    5 mG/acetaminophen 325 mG 2 Tablet(s) Oral/Enteral Tube every 6 hours PRN Moderate Pain (4 - 6)    Labs:     Sodium: 134 (L)  Magnesium (1.6 (L)    POCT Blood Glucose.: 205 mg/dL (2022 11:23)  POCT Blood Glucose.: 202 mg/dL (2022 05:36)  POCT Blood Glucose.: 280 mg/dL (2022 23:34)  POCT Blood Glucose.: 261 mg/dL (2022 21:19)  POCT Blood Glucose.: 240 mg/dL (2022 17:02)    Physical Findings:  - Appearance: A&Ox4, trached  - GI function:  last documented BM?? no documentation since  - Tubes: PEG  - Oral/Mouth cavity: NPO with PEG  - Skin: Intact  - Edema: R, L foot, leg and ankle 2+ last documented on  no documentation since     Nutrition Requirements:   Weight Used: ABW 94.3kg + IBW 48kg     Estimated Energy Needs    Continue []  Adjust [x] 1469-1762kcal/day (MSJ x1.0-1.2)  Estimated Protein Needs    Continue []  Adjust [x] 86-95g/day (1.8-2.0g/kg of Ibw)  Estimated Fluid Needs        Continue []  Adjust [x] 1mL/kcal    Nutrient Intake: >85%     [x] Previous Nutrition Diagnosis:            [x] Ongoing          [] Resolved  #1 Altered nutrition related lab values  Goal/Expected Outcome: achieve BG <180mg/dL x4days              [x] Ongoing          [] Resolved  #2 Inadequate oral intake  dysphagia precluding PO intake  SLP evaluation recommending NPO with EN    Nutrition Intervention:   Enteral Nutrition, Medical Food Supplement, Vitamin Supplement, Nutrition Related Medication, Coordination of Care      Indicator/Monitoring:   Mykel Cisneros, #[id number] to monitor diet order, energy intake, food and nutrient intake, body composition, weights    Recommendations:  1. Continue NPO with alternate means of nutrition/hydration via **Gastrostomy tube**  regimen: Glucerna 1.2 at 300mL Q6hrs, provides (1440kcal, 72gm protein, 972mL free H2O) + No Carb Prosource modular 1x/daily (+60kcal, +15 g pro --> total with en 1500kcal, 87g pro)  2. continue bowel regimen, insulin regimen, replete electrolytes  3. confirm d/c plans, f patient to be d/c home consult nutrition support team for discharge recommendations     HIGH Risk, follow up x 4days.

## 2022-06-17 NOTE — PROGRESS NOTE ADULT - SUBJECTIVE AND OBJECTIVE BOX
SUBJECTIVE:    Patient is a 56y old Female who presents with a chief complaint of hypoxia/pneumonia (17 Jun 2022 10:06)    Currently admitted to medicine with the primary diagnosis of Acute respiratory failure with hypoxia  Today is hospital day 21d. This morning she is resting comfortably in bed and reports LE pain, and pain at the PEG site  No or overnight events.     PAST MEDICAL & SURGICAL HISTORY  COPD (chronic obstructive pulmonary disease)    CHF (congestive heart failure)    DM (diabetes mellitus)    H/O tracheostomy    ALLERGIES:  penicillin (Swelling)    MEDICATIONS:  STANDING MEDICATIONS  acetaminophen     Tablet .. 1000 milliGRAM(s) Oral every 8 hours  ALPRAZolam 0.25 milliGRAM(s) Oral at bedtime  amLODIPine   Tablet 5 milliGRAM(s) Oral daily  ammonium lactate 12% Lotion 1 Application(s) Topical every 12 hours  apixaban 5 milliGRAM(s) Oral two times a day  atorvastatin 20 milliGRAM(s) Oral at bedtime  buMETAnide 2 milliGRAM(s) Oral daily  dexAMETHasone  Injectable 3 milliGRAM(s) IV Push every 12 hours  dextrose 5%. 1000 milliLiter(s) IV Continuous <Continuous>  dextrose 5%. 1000 milliLiter(s) IV Continuous <Continuous>  dextrose 50% Injectable 25 Gram(s) IV Push once  dextrose 50% Injectable 12.5 Gram(s) IV Push once  dextrose 50% Injectable 25 Gram(s) IV Push once  gabapentin 600 milliGRAM(s) Oral three times a day  glucagon  Injectable 1 milliGRAM(s) IntraMuscular once  influenza   Vaccine 0.5 milliLiter(s) IntraMuscular once  insulin glargine Injectable (LANTUS) 15 Unit(s) SubCutaneous at bedtime  insulin lispro (ADMELOG) corrective regimen sliding scale   SubCutaneous every 6 hours  insulin lispro Injectable (ADMELOG) 7 Unit(s) SubCutaneous every 6 hours  losartan 100 milliGRAM(s) Oral daily  pantoprazole    Tablet 40 milliGRAM(s) Oral before breakfast  polyethylene glycol 3350 17 Gram(s) Oral daily  senna 2 Tablet(s) Oral at bedtime    PRN MEDICATIONS  dextrose Oral Gel 15 Gram(s) Oral once PRN  oxycodone    5 mG/acetaminophen 325 mG 2 Tablet(s) Oral/Enteral Tube every 6 hours PRN    VITALS:   T(F): 97  HR: 75  BP: 132/60  RR: 17  SpO2: 100%    LABS:                        10.1   14.31 )-----------( 234      ( 17 Jun 2022 07:42 )             28.3     06-17    134<L>  |  90<L>  |  14  ----------------------------<  203<H>  4.0   |  28  |  <0.5<L>    Ca    9.9      17 Jun 2022 07:42  Mg     1.6     06-17    TPro  6.4  /  Alb  4.0  /  TBili  0.7  /  DBili  x   /  AST  15  /  ALT  28  /  AlkPhos  93  06-17    RADIOLOGY:    PHYSICAL EXAM:  GEN: No acute distress  LUNGS: bronchial breath sounds bilaterally,  no wheezes, tracheostomy  HEART:  Regular rate and rhythm; no murmurs, rubs, or gallops  ABD: Soft, mild tenderness at the PEG site, non-distended. PEG site is clean, dry, non- erythemous  EXT: no clubbing,  no edema. Skin hyperpigmented on BL LE, tenderness to palpation on b/l LE  NEURO: unable to move LE likely due o pain, left foot drop    Intravenous access:   NG tube:   Singh Catheter:   Indwelling Urethral Catheter:     Connect To:  Straight Drainage/Gravity    Indication:  Urine Output Monitoring in Critically Ill (06-09-22 @ 04:48) (not performed) [Active]  Indwelling Urethral Catheter:     Connect To:  Straight Drainage/Gravity    Indication:  Urine Output Monitoring in Critically Ill (06-07-22 @ 23:36) (not performed) [Active]  Indwelling Urethral Catheter:     Connect To:  Straight Drainage/Gravity    Indication:  Urine Output Monitoring in Critically Ill (06-05-22 @ 06:34) (not performed) [Active]  Indwelling Urethral Catheter:     Connect To:  Straight Drainage/Gravity    Indication:  Urine Output Monitoring in Critically Ill (06-04-22 @ 20:41) (not performed) [Active]  Indwelling Urethral Catheter:     Connect To:  Straight Drainage/Gravity    Indication:  Urine Output Monitoring in Critically Ill (06-04-22 @ 03:45) (not performed) [Active]

## 2022-06-17 NOTE — CONSULT NOTE ADULT - ASSESSMENT
Patient is a  65 y/o woman with history of DM, CHF, COPD, DVT, neurogenic bladder, and anxiety who was admitted on 5/27/2022 with hypoxemic respiratory failure likely 2/2 PNA, COPD exacerbation requiring tracheostomy.    Opioid Risk Assessment Tool                                                                         Female       Male  Family History  Alcohol                                                              1                3  Illegal drugs                                                       2                3  Rx drugs                                                            4                4    Personal History   Alcohol                                                              3                3  Illegal drugs                                                       4                4  Rx drugs                                                            5                5    Age between 16—45 years                                  1                1  History of preadolescent sexual abuse                 3                0    Psychological disease  ADD, OCD, bipolar, schizophrenia                        2                2  Depression                                                        1                1    Total Score                                                        0              __    0 - 3 = low risk for future opioid abuse  4 - 7 = moderate risk for future opioid abuse  8+ = high risk for future opioid abuse

## 2022-06-17 NOTE — CONSULT NOTE ADULT - SUBJECTIVE AND OBJECTIVE BOX
Pain Medicine Consult Note    History of Present Illness  Patient is a  65 y/o woman with history of DM, CHF, COPD, DVT, neurogenic bladder, and anxiety who was admitted on 2022 with hypoxemic respiratory failure likely 2/2 PNA, COPD exacerbation requiring tracheostomy. She has had a PEG placement yesterday because she has developed dysphagia and epiglottic edema during the admission. The patient states that she has developed pain in the bilateral legs over the past year. She notes that this pain has been associated with darkening skin color over this time frame. However, the patient states that the pain has gotten much worse during this admission. She describes a stabbing pain in the legs and feet, right greater than left, with associated allodynia over the plantar aspect of the feet. She endorses having some pins and needles sensation over the feet but denies having focal numbness. No weakness in the lower extremities. The patient states that the pain makes standing and walking very difficult/painful. She denies having any history of chronic pain or chronic opioid use.     Current Inpatient Medication Regimen:  acetaminophen     Tablet .. 650 milliGRAM(s) Oral every 6 hours PRN  ALPRAZolam 0.25 milliGRAM(s) Oral at bedtime  amLODIPine   Tablet 5 milliGRAM(s) Oral daily  ammonium lactate 12% Lotion 1 Application(s) Topical every 12 hours  apixaban 5 milliGRAM(s) Oral two times a day  atorvastatin 20 milliGRAM(s) Oral at bedtime  buMETAnide 2 milliGRAM(s) Oral daily  dexAMETHasone  Injectable 3 milliGRAM(s) IV Push every 12 hours  dextrose 5%. 1000 milliLiter(s) IV Continuous <Continuous>  dextrose 5%. 1000 milliLiter(s) IV Continuous <Continuous>  dextrose 50% Injectable 25 Gram(s) IV Push once  dextrose 50% Injectable 12.5 Gram(s) IV Push once  dextrose 50% Injectable 25 Gram(s) IV Push once  dextrose Oral Gel 15 Gram(s) Oral once PRN  gabapentin 300 milliGRAM(s) Oral three times a day  glucagon  Injectable 1 milliGRAM(s) IntraMuscular once  HYDROmorphone  Injectable 1 milliGRAM(s) IV Push every 4 hours PRN  influenza   Vaccine 0.5 milliLiter(s) IntraMuscular once  insulin glargine Injectable (LANTUS) 15 Unit(s) SubCutaneous at bedtime  insulin lispro (ADMELOG) corrective regimen sliding scale   SubCutaneous every 6 hours  insulin lispro Injectable (ADMELOG) 7 Unit(s) SubCutaneous every 6 hours  losartan 100 milliGRAM(s) Oral daily  magnesium sulfate  IVPB 2 Gram(s) IV Intermittent once  oxycodone    5 mG/acetaminophen 325 mG 2 Tablet(s) Oral/Enteral Tube every 6 hours PRN  pantoprazole    Tablet 40 milliGRAM(s) Oral before breakfast  polyethylene glycol 3350 17 Gram(s) Oral daily  senna 2 Tablet(s) Oral at bedtime      Home Analgesic Regimen:  gabapentin 300mg TID    Allergies:  penicillin (Swelling)      Past Medical History:  COPD (chronic obstructive pulmonary disease)  CHF (congestive heart failure)  DM (diabetes mellitus)  Neurogenic bladder  DVT  Anxiety  Pneumonia    Past Surgical History:  Tracheostomy  PEG tube placement      Family History:  DM (grandfather)    Social History:  Tobacco - active smoker  EtOH - denies  Drugs - denies      Review of Systems:  General: no fevers or chills  Eyes: no diplopia or blurred vision  ENT: no rhinorrhea  CV: no chest pain  Resp: no cough  GI: no constipation or diarrhea  : +chronic indwelling wiseman  Neuro: no focal LE weakness, numbness  Psych: +anxiety    Physical Exam:  T(C): 36.1 (22 @ 05:00), Max: 36.4 (22 @ 20:49)  HR: 75 (22 @ 06:40) (70 - 87)  BP: 132/60 (22 @ 06:40) (106/59 - 138/64)  RR: 17 (22 @ 05:00) (17 - 20)  SpO2: 100% (22 @ 20:49) (100% - 100%)  Gen: NAD, patient with tracheostomy in place  Eyes: no glasses or scleral icterus  Head: Normocephalic / Atraumatic  CV: no JVD  Lungs: nonlabored breathing on trach collar  Abdomen: +PEG tube in place  : wiseman catheter in place  Neuro: AOx3, Cranial nerves intact, +5/5 strength in bilateral LEs, +allodynia over the plantar aspect of the feet bilaterally  Extremities: bilateral skin darkening over the legs, peeling skin over the feet w/o erythema, edema  Psych: normal affect      Labs:  CBC  14.31 K/uL<H> [4.80 - 10.80] > 10.1 g/dL<L> [12.0 - 16.0] / 28.3 %<L> [37.0 - 47.0] < 234 K/uL [130 - 400]      BMP  134 mmol/L<L> [135 - 146] | 90 mmol/L<L> [98 - 110] | 14 mg/dL [10 - 20]  4.0 mmol/L [3.5 - 5.0] | 28 mmol/L [17 - 32] | <0.5 mg/dL<L> [0.7 - 1.5]    203 mg/dL<H> [70 - 99]        Imaging Studies:  CXR (2022)  Findings:    Support devices: Enteric tube removed. Stable partially imaged   tracheostomy    Cardiac/mediastinum/hilum: Stable cardiomegaly.    Lung parenchyma/Pleura: Low lung volumes. Stable left basilar   opacity/effusion. No pneumothorax.    Skeleton/soft tissues: Stable.    Impression:    Stable left basilar opacity/effusion.

## 2022-06-18 LAB
ALBUMIN SERPL ELPH-MCNC: 3.6 G/DL — SIGNIFICANT CHANGE UP (ref 3.5–5.2)
ALP SERPL-CCNC: 101 U/L — SIGNIFICANT CHANGE UP (ref 30–115)
ALT FLD-CCNC: 34 U/L — SIGNIFICANT CHANGE UP (ref 0–41)
ANION GAP SERPL CALC-SCNC: 12 MMOL/L — SIGNIFICANT CHANGE UP (ref 7–14)
AST SERPL-CCNC: 19 U/L — SIGNIFICANT CHANGE UP (ref 0–41)
BASOPHILS # BLD AUTO: 0.01 K/UL — SIGNIFICANT CHANGE UP (ref 0–0.2)
BASOPHILS NFR BLD AUTO: 0.1 % — SIGNIFICANT CHANGE UP (ref 0–1)
BILIRUB SERPL-MCNC: 0.8 MG/DL — SIGNIFICANT CHANGE UP (ref 0.2–1.2)
BUN SERPL-MCNC: 16 MG/DL — SIGNIFICANT CHANGE UP (ref 10–20)
CALCIUM SERPL-MCNC: 9.4 MG/DL — SIGNIFICANT CHANGE UP (ref 8.5–10.1)
CHLORIDE SERPL-SCNC: 87 MMOL/L — LOW (ref 98–110)
CO2 SERPL-SCNC: 30 MMOL/L — SIGNIFICANT CHANGE UP (ref 17–32)
CREAT SERPL-MCNC: <0.5 MG/DL — LOW (ref 0.7–1.5)
EGFR: 115 ML/MIN/1.73M2 — SIGNIFICANT CHANGE UP
EOSINOPHIL # BLD AUTO: 0.03 K/UL — SIGNIFICANT CHANGE UP (ref 0–0.7)
EOSINOPHIL NFR BLD AUTO: 0.2 % — SIGNIFICANT CHANGE UP (ref 0–8)
GLUCOSE BLDC GLUCOMTR-MCNC: 177 MG/DL — HIGH (ref 70–99)
GLUCOSE BLDC GLUCOMTR-MCNC: 185 MG/DL — HIGH (ref 70–99)
GLUCOSE BLDC GLUCOMTR-MCNC: 226 MG/DL — HIGH (ref 70–99)
GLUCOSE BLDC GLUCOMTR-MCNC: 232 MG/DL — HIGH (ref 70–99)
GLUCOSE BLDC GLUCOMTR-MCNC: 282 MG/DL — HIGH (ref 70–99)
GLUCOSE SERPL-MCNC: 234 MG/DL — HIGH (ref 70–99)
HCT VFR BLD CALC: 26.9 % — LOW (ref 37–47)
HGB BLD-MCNC: 9.6 G/DL — LOW (ref 12–16)
IMM GRANULOCYTES NFR BLD AUTO: 0.8 % — HIGH (ref 0.1–0.3)
LYMPHOCYTES # BLD AUTO: 0.4 K/UL — LOW (ref 1.2–3.4)
LYMPHOCYTES # BLD AUTO: 2.5 % — LOW (ref 20.5–51.1)
MAGNESIUM SERPL-MCNC: 1.7 MG/DL — LOW (ref 1.8–2.4)
MCHC RBC-ENTMCNC: 33.7 PG — HIGH (ref 27–31)
MCHC RBC-ENTMCNC: 35.7 G/DL — SIGNIFICANT CHANGE UP (ref 32–37)
MCV RBC AUTO: 94.4 FL — SIGNIFICANT CHANGE UP (ref 81–99)
MONOCYTES # BLD AUTO: 0.94 K/UL — HIGH (ref 0.1–0.6)
MONOCYTES NFR BLD AUTO: 5.9 % — SIGNIFICANT CHANGE UP (ref 1.7–9.3)
NEUTROPHILS # BLD AUTO: 14.37 K/UL — HIGH (ref 1.4–6.5)
NEUTROPHILS NFR BLD AUTO: 90.5 % — HIGH (ref 42.2–75.2)
NRBC # BLD: 0 /100 WBCS — SIGNIFICANT CHANGE UP (ref 0–0)
PLATELET # BLD AUTO: 221 K/UL — SIGNIFICANT CHANGE UP (ref 130–400)
POTASSIUM SERPL-MCNC: 4.5 MMOL/L — SIGNIFICANT CHANGE UP (ref 3.5–5)
POTASSIUM SERPL-SCNC: 4.5 MMOL/L — SIGNIFICANT CHANGE UP (ref 3.5–5)
PROT SERPL-MCNC: 5.7 G/DL — LOW (ref 6–8)
RBC # BLD: 2.85 M/UL — LOW (ref 4.2–5.4)
RBC # FLD: 13.2 % — SIGNIFICANT CHANGE UP (ref 11.5–14.5)
SODIUM SERPL-SCNC: 129 MMOL/L — LOW (ref 135–146)
WBC # BLD: 15.87 K/UL — HIGH (ref 4.8–10.8)
WBC # FLD AUTO: 15.87 K/UL — HIGH (ref 4.8–10.8)

## 2022-06-18 PROCEDURE — 99233 SBSQ HOSP IP/OBS HIGH 50: CPT

## 2022-06-18 RX ORDER — INSULIN GLARGINE 100 [IU]/ML
17 INJECTION, SOLUTION SUBCUTANEOUS AT BEDTIME
Refills: 0 | Status: DISCONTINUED | OUTPATIENT
Start: 2022-06-18 | End: 2022-06-19

## 2022-06-18 RX ORDER — MAGNESIUM SULFATE 500 MG/ML
2 VIAL (ML) INJECTION ONCE
Refills: 0 | Status: COMPLETED | OUTPATIENT
Start: 2022-06-18 | End: 2022-06-18

## 2022-06-18 RX ADMIN — INSULIN GLARGINE 17 UNIT(S): 100 INJECTION, SOLUTION SUBCUTANEOUS at 21:50

## 2022-06-18 RX ADMIN — Medication 7 UNIT(S): at 05:12

## 2022-06-18 RX ADMIN — GABAPENTIN 600 MILLIGRAM(S): 400 CAPSULE ORAL at 21:26

## 2022-06-18 RX ADMIN — Medication 7 UNIT(S): at 17:44

## 2022-06-18 RX ADMIN — HYDROMORPHONE HYDROCHLORIDE 4 MILLIGRAM(S): 2 INJECTION INTRAMUSCULAR; INTRAVENOUS; SUBCUTANEOUS at 19:19

## 2022-06-18 RX ADMIN — GABAPENTIN 600 MILLIGRAM(S): 400 CAPSULE ORAL at 15:29

## 2022-06-18 RX ADMIN — HYDROMORPHONE HYDROCHLORIDE 1 MILLIGRAM(S): 2 INJECTION INTRAMUSCULAR; INTRAVENOUS; SUBCUTANEOUS at 16:49

## 2022-06-18 RX ADMIN — Medication 2: at 11:35

## 2022-06-18 RX ADMIN — HYDROMORPHONE HYDROCHLORIDE 4 MILLIGRAM(S): 2 INJECTION INTRAMUSCULAR; INTRAVENOUS; SUBCUTANEOUS at 08:22

## 2022-06-18 RX ADMIN — Medication 1 APPLICATION(S): at 05:35

## 2022-06-18 RX ADMIN — PANTOPRAZOLE SODIUM 40 MILLIGRAM(S): 20 TABLET, DELAYED RELEASE ORAL at 05:32

## 2022-06-18 RX ADMIN — HYDROMORPHONE HYDROCHLORIDE 1 MILLIGRAM(S): 2 INJECTION INTRAMUSCULAR; INTRAVENOUS; SUBCUTANEOUS at 17:30

## 2022-06-18 RX ADMIN — GABAPENTIN 600 MILLIGRAM(S): 400 CAPSULE ORAL at 05:33

## 2022-06-18 RX ADMIN — APIXABAN 5 MILLIGRAM(S): 2.5 TABLET, FILM COATED ORAL at 17:48

## 2022-06-18 RX ADMIN — Medication 3 MILLIGRAM(S): at 05:33

## 2022-06-18 RX ADMIN — HYDROMORPHONE HYDROCHLORIDE 4 MILLIGRAM(S): 2 INJECTION INTRAMUSCULAR; INTRAVENOUS; SUBCUTANEOUS at 18:40

## 2022-06-18 RX ADMIN — HYDROMORPHONE HYDROCHLORIDE 4 MILLIGRAM(S): 2 INJECTION INTRAMUSCULAR; INTRAVENOUS; SUBCUTANEOUS at 12:34

## 2022-06-18 RX ADMIN — Medication 7 UNIT(S): at 11:35

## 2022-06-18 RX ADMIN — POLYETHYLENE GLYCOL 3350 17 GRAM(S): 17 POWDER, FOR SOLUTION ORAL at 11:37

## 2022-06-18 RX ADMIN — BUMETANIDE 2 MILLIGRAM(S): 0.25 INJECTION INTRAMUSCULAR; INTRAVENOUS at 05:33

## 2022-06-18 RX ADMIN — Medication 1 APPLICATION(S): at 17:49

## 2022-06-18 RX ADMIN — HYDROMORPHONE HYDROCHLORIDE 4 MILLIGRAM(S): 2 INJECTION INTRAMUSCULAR; INTRAVENOUS; SUBCUTANEOUS at 23:07

## 2022-06-18 RX ADMIN — Medication 25 GRAM(S): at 16:31

## 2022-06-18 RX ADMIN — AMLODIPINE BESYLATE 5 MILLIGRAM(S): 2.5 TABLET ORAL at 05:35

## 2022-06-18 RX ADMIN — HYDROMORPHONE HYDROCHLORIDE 4 MILLIGRAM(S): 2 INJECTION INTRAMUSCULAR; INTRAVENOUS; SUBCUTANEOUS at 09:06

## 2022-06-18 RX ADMIN — APIXABAN 5 MILLIGRAM(S): 2.5 TABLET, FILM COATED ORAL at 05:32

## 2022-06-18 RX ADMIN — HYDROMORPHONE HYDROCHLORIDE 4 MILLIGRAM(S): 2 INJECTION INTRAMUSCULAR; INTRAVENOUS; SUBCUTANEOUS at 13:31

## 2022-06-18 RX ADMIN — HYDROMORPHONE HYDROCHLORIDE 4 MILLIGRAM(S): 2 INJECTION INTRAMUSCULAR; INTRAVENOUS; SUBCUTANEOUS at 04:07

## 2022-06-18 RX ADMIN — ATORVASTATIN CALCIUM 20 MILLIGRAM(S): 80 TABLET, FILM COATED ORAL at 21:26

## 2022-06-18 RX ADMIN — LOSARTAN POTASSIUM 100 MILLIGRAM(S): 100 TABLET, FILM COATED ORAL at 05:33

## 2022-06-18 RX ADMIN — HYDROMORPHONE HYDROCHLORIDE 4 MILLIGRAM(S): 2 INJECTION INTRAMUSCULAR; INTRAVENOUS; SUBCUTANEOUS at 00:27

## 2022-06-18 NOTE — PROGRESS NOTE ADULT - ASSESSMENT
57 yo f with PMHx of DVT on Eliquis, COPD,  trach collar, obesity, IDDM, UTI, [facility] [place] for hypoxia. Current illness going back to April 4th when pt was intubated on admission at [facility] ICU for hypoxic respiratory failure diagnosed with copd exacerbation and superimposed pneumonia, and remained intubated for 37 days requiring tracheostomy. Patient stayed at [facility] for 2 days until noted to be hypoxic (saturating low 70's) pt's saturation immediately improving afterwards to 80's after large mucus plug removed and was subsequently sent to [facility]  On arrival to ED pt was saturating 97%, 15L O2 via tracheostomy collar (baseline 8 L at [facility])CXR suspicious for L-sided PNA, inconclusive. CTA neg for PE. Pt received x1 IV Levaquin and Cefepime in ED, admitted to MICU for acute hypoxic hypercapnic respiratory failure secondary to acute mucus plug now resolved vs superimposed pneumonia hospital-acquired.   General Surgery Consulted emergently when patient found to be hypoxic to low 80s after CCU and Pulmonary team found large granuloma in trachea along tracheostomy tube. Patient currently had a size 8 trach. Surgery assisted Pulmonary and CCU with trach exchanged with ET tube guidance to a size 7 XLT. Saturations improved to high 90s. Large granuloma was removed. Patient was no longer in distress after exchange.    #Acute/ chronic COPD with exacerbation (Resolved)  #mucous plug removed  #pneumonia vs atelectasis L base  - On 5/28, Pulmonary team found large granuloma in trachea along tracheostomy tube. Patient had a size 8 trach on admission. Surgery assisted Pulmonary and CCU with trach exchanged with ET tube guidance to a size 7 XLT. Saturations improved to high 90s.  - trach functioning well s/p exchange   - Pt off vent 72 hrs and is saturating well  - repeat chest x-ray (6/6) shows possible mucus plugging. suctioning attempted repeat CXR showed improved aeration.  ***Repeat Chest X-ray shows resolution of Mucus plug after suctioning and chest PT  - S/p Sw eval, if fails, CT Sx FU for PEG tube  - Modified barium swallow test done: patient still unable to eat, will need further therapy  - Repeat CXR 6/8: left lung opacity/effusion  - 6/11 Trach changed to Fenestrated 8 by surgery  - 6/12 and 6/13: repeat CXR showing recurrent mucous plug  - 6/14 CXR: improving L atelectasis  - c/w aggressive L sided chest PT and frequent suctioning q6  - as per pulm, if recurrent atelectasis despite chest PT, suctioning, may consider bronchoscopy    #s/p PEG (6/15):   started feeds/meds from 6/16.    #Dysphagia:  Strict NPO as of now per ST's FEES eval from 6/13/22.   [facility] will sign out to  at [facility] for further plans with swallow rehab at SNF       #LE neuropathic pain:  PAtient has apparently been bed bound since April 4th (her [facility] admission for hypoxic failure where she ended up being intubated)  Apparently has L > R foot drop which may be due to chronic contractures of Calf muscle/Achilles tendon. Can have PT evaluate her for that.  But options are limited at this stage.   Given the asymmetrical foot drop, Neurology wants to rule out Any focal neuropathy.  Check MRI Lumbar spine w/ & w/o Contrast (IV Ativan 1mg on call)     Neuropathy is likely Diabetic?   Pain management: Patient requiring a lot of opiates since this admission.   6/17: Pain management recommended transitioning to PErcocet 2 tab Q6 PRN Which patient did not tolerate.  Only dilaudid seems to work thus far. Was requiring 1mg IV Q4 PRN 6 times a day. Equivalent PO dilaudid dose would have been around 30mg.   On 6/17, starting with PO dilaudid 4mg Q4 PRN. 1mg IV dilaudid Q24 PRN for breakthroughs.   Increased Gabapentin to 600 TID.   Added Cymbalta 60 QD  Beware patient also on Xanax 0.25 QHS  On Senna, Miralax.     #B/l LE Hyperpigmentation changes:   unclear etiology  Present since 1 year.   May be related to CHF related stasis dermatitis / hemosiderin deposition.   Dermatology eval to maybe consider biopsy? Could also explain alternate differentials for Neuropathy?       #Suspected epiglottis edema:   On DExa > Taper to 3mg Q12 x 2 more days (ends 6/18).   Outpatient ENT f/u.     # Mucus plugs removed. Trach exchanged  CXR PRN .    #DM:   steroids ended 6/18.  6/18- Increased lantus from 15 to 17.     #Hyponatremia- monitor  correct glucose.    # hypomagmesemia  - repleted    #Chronic Indwelling Singh  - 5/28 Ucx - Contaminated   - 5/28 Ucx- Normal perri   - 5/27 Ucx- Klebsiella (CRE) and pseudomonas   - s/p cefepime  - Currently afebrile w/ no urinary complaints   - episode of Hematouria 6/4 -> improving -> h/h Stable    #Hx of DVT  - on home Eliquis  - restarted Eliquis      #Misc:  Activity OOB to chair  GI PPX  PPI  DVT PPX  ELIQUIS  Dispo: Acute  Pending: c/w IV decadron taper, Neurology consult, LE pain improvement    DISPO: STR placement

## 2022-06-19 LAB
ALBUMIN SERPL ELPH-MCNC: 3.3 G/DL — LOW (ref 3.5–5.2)
ALP SERPL-CCNC: 122 U/L — HIGH (ref 30–115)
ALT FLD-CCNC: 44 U/L — HIGH (ref 0–41)
ANION GAP SERPL CALC-SCNC: 11 MMOL/L — SIGNIFICANT CHANGE UP (ref 7–14)
AST SERPL-CCNC: 37 U/L — SIGNIFICANT CHANGE UP (ref 0–41)
BASOPHILS # BLD AUTO: 0.02 K/UL — SIGNIFICANT CHANGE UP (ref 0–0.2)
BASOPHILS NFR BLD AUTO: 0.1 % — SIGNIFICANT CHANGE UP (ref 0–1)
BILIRUB SERPL-MCNC: 0.7 MG/DL — SIGNIFICANT CHANGE UP (ref 0.2–1.2)
BUN SERPL-MCNC: 20 MG/DL — SIGNIFICANT CHANGE UP (ref 10–20)
CALCIUM SERPL-MCNC: 8.7 MG/DL — SIGNIFICANT CHANGE UP (ref 8.5–10.1)
CHLORIDE SERPL-SCNC: 85 MMOL/L — LOW (ref 98–110)
CO2 SERPL-SCNC: 31 MMOL/L — SIGNIFICANT CHANGE UP (ref 17–32)
CREAT SERPL-MCNC: <0.5 MG/DL — LOW (ref 0.7–1.5)
EGFR: 115 ML/MIN/1.73M2 — SIGNIFICANT CHANGE UP
EOSINOPHIL # BLD AUTO: 0.16 K/UL — SIGNIFICANT CHANGE UP (ref 0–0.7)
EOSINOPHIL NFR BLD AUTO: 1.1 % — SIGNIFICANT CHANGE UP (ref 0–8)
GLUCOSE BLDC GLUCOMTR-MCNC: 128 MG/DL — HIGH (ref 70–99)
GLUCOSE BLDC GLUCOMTR-MCNC: 139 MG/DL — HIGH (ref 70–99)
GLUCOSE BLDC GLUCOMTR-MCNC: 186 MG/DL — HIGH (ref 70–99)
GLUCOSE BLDC GLUCOMTR-MCNC: 232 MG/DL — HIGH (ref 70–99)
GLUCOSE BLDC GLUCOMTR-MCNC: 296 MG/DL — HIGH (ref 70–99)
GLUCOSE SERPL-MCNC: 262 MG/DL — HIGH (ref 70–99)
HCT VFR BLD CALC: 25.9 % — LOW (ref 37–47)
HGB BLD-MCNC: 9.3 G/DL — LOW (ref 12–16)
IMM GRANULOCYTES NFR BLD AUTO: 0.6 % — HIGH (ref 0.1–0.3)
LYMPHOCYTES # BLD AUTO: 0.64 K/UL — LOW (ref 1.2–3.4)
LYMPHOCYTES # BLD AUTO: 4.4 % — LOW (ref 20.5–51.1)
MAGNESIUM SERPL-MCNC: 1.7 MG/DL — LOW (ref 1.8–2.4)
MCHC RBC-ENTMCNC: 34.1 PG — HIGH (ref 27–31)
MCHC RBC-ENTMCNC: 35.9 G/DL — SIGNIFICANT CHANGE UP (ref 32–37)
MCV RBC AUTO: 94.9 FL — SIGNIFICANT CHANGE UP (ref 81–99)
MONOCYTES # BLD AUTO: 1.24 K/UL — HIGH (ref 0.1–0.6)
MONOCYTES NFR BLD AUTO: 8.6 % — SIGNIFICANT CHANGE UP (ref 1.7–9.3)
NEUTROPHILS # BLD AUTO: 12.35 K/UL — HIGH (ref 1.4–6.5)
NEUTROPHILS NFR BLD AUTO: 85.2 % — HIGH (ref 42.2–75.2)
NRBC # BLD: 0 /100 WBCS — SIGNIFICANT CHANGE UP (ref 0–0)
PLATELET # BLD AUTO: 188 K/UL — SIGNIFICANT CHANGE UP (ref 130–400)
POTASSIUM SERPL-MCNC: 4.5 MMOL/L — SIGNIFICANT CHANGE UP (ref 3.5–5)
POTASSIUM SERPL-SCNC: 4.5 MMOL/L — SIGNIFICANT CHANGE UP (ref 3.5–5)
PROT SERPL-MCNC: 5.6 G/DL — LOW (ref 6–8)
RBC # BLD: 2.73 M/UL — LOW (ref 4.2–5.4)
RBC # FLD: 13.3 % — SIGNIFICANT CHANGE UP (ref 11.5–14.5)
SODIUM SERPL-SCNC: 127 MMOL/L — LOW (ref 135–146)
WBC # BLD: 14.5 K/UL — HIGH (ref 4.8–10.8)
WBC # FLD AUTO: 14.5 K/UL — HIGH (ref 4.8–10.8)

## 2022-06-19 PROCEDURE — 74018 RADEX ABDOMEN 1 VIEW: CPT | Mod: 26

## 2022-06-19 PROCEDURE — 71045 X-RAY EXAM CHEST 1 VIEW: CPT | Mod: 26

## 2022-06-19 PROCEDURE — 93010 ELECTROCARDIOGRAM REPORT: CPT

## 2022-06-19 PROCEDURE — 99233 SBSQ HOSP IP/OBS HIGH 50: CPT

## 2022-06-19 RX ORDER — SODIUM CHLORIDE 9 MG/ML
1000 INJECTION INTRAMUSCULAR; INTRAVENOUS; SUBCUTANEOUS
Refills: 0 | Status: DISCONTINUED | OUTPATIENT
Start: 2022-06-19 | End: 2022-06-23

## 2022-06-19 RX ORDER — SODIUM CHLORIDE 9 MG/ML
500 INJECTION INTRAMUSCULAR; INTRAVENOUS; SUBCUTANEOUS ONCE
Refills: 0 | Status: COMPLETED | OUTPATIENT
Start: 2022-06-19 | End: 2022-06-19

## 2022-06-19 RX ORDER — INSULIN GLARGINE 100 [IU]/ML
20 INJECTION, SOLUTION SUBCUTANEOUS AT BEDTIME
Refills: 0 | Status: DISCONTINUED | OUTPATIENT
Start: 2022-06-19 | End: 2022-07-22

## 2022-06-19 RX ADMIN — HYDROMORPHONE HYDROCHLORIDE 4 MILLIGRAM(S): 2 INJECTION INTRAMUSCULAR; INTRAVENOUS; SUBCUTANEOUS at 06:24

## 2022-06-19 RX ADMIN — GABAPENTIN 600 MILLIGRAM(S): 400 CAPSULE ORAL at 15:21

## 2022-06-19 RX ADMIN — Medication 7 UNIT(S): at 00:18

## 2022-06-19 RX ADMIN — HYDROMORPHONE HYDROCHLORIDE 4 MILLIGRAM(S): 2 INJECTION INTRAMUSCULAR; INTRAVENOUS; SUBCUTANEOUS at 22:38

## 2022-06-19 RX ADMIN — HYDROMORPHONE HYDROCHLORIDE 1 MILLIGRAM(S): 2 INJECTION INTRAMUSCULAR; INTRAVENOUS; SUBCUTANEOUS at 16:02

## 2022-06-19 RX ADMIN — SODIUM CHLORIDE 50 MILLILITER(S): 9 INJECTION INTRAMUSCULAR; INTRAVENOUS; SUBCUTANEOUS at 15:21

## 2022-06-19 RX ADMIN — HYDROMORPHONE HYDROCHLORIDE 4 MILLIGRAM(S): 2 INJECTION INTRAMUSCULAR; INTRAVENOUS; SUBCUTANEOUS at 18:29

## 2022-06-19 RX ADMIN — GABAPENTIN 600 MILLIGRAM(S): 400 CAPSULE ORAL at 21:20

## 2022-06-19 RX ADMIN — AMLODIPINE BESYLATE 5 MILLIGRAM(S): 2.5 TABLET ORAL at 05:11

## 2022-06-19 RX ADMIN — POLYETHYLENE GLYCOL 3350 17 GRAM(S): 17 POWDER, FOR SOLUTION ORAL at 11:55

## 2022-06-19 RX ADMIN — APIXABAN 5 MILLIGRAM(S): 2.5 TABLET, FILM COATED ORAL at 18:29

## 2022-06-19 RX ADMIN — Medication 7 UNIT(S): at 05:39

## 2022-06-19 RX ADMIN — SODIUM CHLORIDE 1000 MILLILITER(S): 9 INJECTION INTRAMUSCULAR; INTRAVENOUS; SUBCUTANEOUS at 07:33

## 2022-06-19 RX ADMIN — SENNA PLUS 2 TABLET(S): 8.6 TABLET ORAL at 00:15

## 2022-06-19 RX ADMIN — HYDROMORPHONE HYDROCHLORIDE 1 MILLIGRAM(S): 2 INJECTION INTRAMUSCULAR; INTRAVENOUS; SUBCUTANEOUS at 17:34

## 2022-06-19 RX ADMIN — APIXABAN 5 MILLIGRAM(S): 2.5 TABLET, FILM COATED ORAL at 05:12

## 2022-06-19 RX ADMIN — Medication 1 APPLICATION(S): at 17:53

## 2022-06-19 RX ADMIN — GABAPENTIN 600 MILLIGRAM(S): 400 CAPSULE ORAL at 05:12

## 2022-06-19 RX ADMIN — Medication 7 UNIT(S): at 18:30

## 2022-06-19 RX ADMIN — HYDROMORPHONE HYDROCHLORIDE 4 MILLIGRAM(S): 2 INJECTION INTRAMUSCULAR; INTRAVENOUS; SUBCUTANEOUS at 11:48

## 2022-06-19 RX ADMIN — LOSARTAN POTASSIUM 100 MILLIGRAM(S): 100 TABLET, FILM COATED ORAL at 05:10

## 2022-06-19 RX ADMIN — BUMETANIDE 2 MILLIGRAM(S): 0.25 INJECTION INTRAMUSCULAR; INTRAVENOUS at 05:11

## 2022-06-19 RX ADMIN — Medication 1 APPLICATION(S): at 05:12

## 2022-06-19 RX ADMIN — INSULIN GLARGINE 20 UNIT(S): 100 INJECTION, SOLUTION SUBCUTANEOUS at 21:59

## 2022-06-19 RX ADMIN — ATORVASTATIN CALCIUM 20 MILLIGRAM(S): 80 TABLET, FILM COATED ORAL at 21:20

## 2022-06-19 RX ADMIN — PANTOPRAZOLE SODIUM 40 MILLIGRAM(S): 20 TABLET, DELAYED RELEASE ORAL at 06:10

## 2022-06-19 RX ADMIN — DULOXETINE HYDROCHLORIDE 60 MILLIGRAM(S): 30 CAPSULE, DELAYED RELEASE ORAL at 11:53

## 2022-06-19 RX ADMIN — Medication 7 UNIT(S): at 11:50

## 2022-06-19 RX ADMIN — Medication 2: at 11:49

## 2022-06-19 RX ADMIN — SENNA PLUS 2 TABLET(S): 8.6 TABLET ORAL at 21:21

## 2022-06-19 NOTE — PROGRESS NOTE ADULT - ASSESSMENT
55 yo f with PMHx of DVT on Eliquis, COPD,  trach collar, obesity, IDDM, UTI, [facility]. Current illness going back to April 4th when pt was intubated on admission at [facility] ICU for hypoxic respiratory failure diagnosed with copd exacerbation and superimposed pneumonia, and remained intubated for 37 days requiring tracheostomy. Patient stayed at [facility] for 2 days until noted to be hypoxic (saturating low 70's) pt's saturation immediately improving afterwards to 80's after large mucus plug removed and was subsequently sent to [facility]  On arrival to ED pt was saturating 97%, 15L O2 via tracheostomy collar (baseline 8 L at [facility])CXR suspicious for L-sided PNA, inconclusive. CTA neg for PE. Pt received x1 IV Levaquin and Cefepime in ED, admitted to MICU for acute hypoxic hypercapnic respiratory failure secondary to acute mucus plug now resolved vs superimposed pneumonia hospital-acquired.   General Surgery Consulted emergently when patient found to be hypoxic to low 80s after CCU and Pulmonary team found large granuloma in trachea along tracheostomy tube. Patient currently had a size 8 trach. Surgery assisted Pulmonary and CCU with trach exchanged with ET tube guidance to a size 7 XLT. Saturations improved to high 90s. Large granuloma was removed. Patient was no longer in distress after exchange.    #Acute/ chronic COPD with exacerbation (Resolved)  #mucous plug removed  #pneumonia vs atelectasis L base  - On 5/28, Pulmonary team found large granuloma in trachea along tracheostomy tube. Patient had a size 8 trach on admission. Surgery assisted Pulmonary and CCU with trach exchanged with ET tube guidance to a size 7 XLT. Saturations improved to high 90s.  - trach functioning well s/p exchange   - Pt off vent 72 hrs and is saturating well  - repeat chest x-ray (6/6) shows possible mucus plugging. suctioning attempted repeat CXR showed improved aeration.  ***Repeat Chest X-ray shows resolution of Mucus plug after suctioning and chest PT  - S/p Sw eval, if fails, CT Sx FU for PEG tube  - Modified barium swallow test done: patient still unable to eat, will need further therapy  - Repeat CXR 6/8: left lung opacity/effusion  - 6/11 Trach changed to Fenestrated 8 by surgery  - 6/12 and 6/13: repeat CXR showing recurrent mucous plug  - 6/14 CXR: improving L atelectasis  - c/w aggressive L sided chest PT and frequent suctioning q6  - as per pulm, if recurrent atelectasis despite chest PT, suctioning, may consider bronchoscopy    #s/p PEG (6/15):   started feeds/meds from 6/16.    #Dysphagia:  Strict NPO as of now per ST's FEES eval from 6/13/22.   [facility] will sign out to  at [facility] for further plans with swallow rehab at [facility]     #LE neuropathic pain:  PAtient has apparently been bed bound since April 4th (her [facility] admission for hypoxic failure where she ended up being intubated)  Apparently has L > R foot drop which may be due to chronic contractures of Calf muscle/Achilles tendon. Can have PT evaluate her for that.  But options are limited at this stage.   Given the asymmetrical foot drop, Neurology wants to rule out Any focal neuropathy.  Check MRI Lumbar spine w/ & w/o Contrast (will need it with anesthesia on monday). f/u Radiology*    Neuropathy is likely Diabetic?   Pain management: Patient requiring a lot of opiates since this admission.   6/17: Pain management recommended transitioning to PErcocet 2 tab Q6 PRN Which patient did not tolerate.  Only dilaudid seems to work thus far. Was requiring 1mg IV Q4 PRN 6 times a day. Equivalent PO dilaudid dose would have been around 30mg.   On 6/17, starting with PO dilaudid 4mg Q4 PRN. 1mg IV dilaudid Q24 PRN for breakthroughs.   Increased Gabapentin to 600 TID.   Added Cymbalta 60 QD  Beware patient also on Xanax 0.25 QHS  On Senna, Miralax.     #B/l LE Hyperpigmentation changes:   unclear etiology  Present since 1 year.   May be related to CHF related stasis dermatitis / hemosiderin deposition.   Dermatology eval to maybe consider biopsy? Could also explain alternate differentials for Neuropathy?     #Suspected epiglottis edema:   On DExa > Taper to 3mg Q12 x 2 more days (ended 6/18).   Outpatient ENT f/u.     # Mucus plugs removed. Trach exchanged  CXR PRN .    #DM:   steroids ended 6/18.  6/19- Increased lantus from 17 to 20.    #Hyponatremia- monitor  NS @ 50    #Sinus tachy @ 110s (6/19): 100.2 temp. WBC persists despite stopping steroids.   Repeat CXR, UA, B Cx. s/p 1L. c/w NS @ 50.     # hypomagmesemia  - repleted    #Chronic Indwelling Singh  - 5/28 Ucx - Contaminated   - 5/28 Ucx- Normal perri   - 5/27 Ucx- Klebsiella (CRE) and pseudomonas   - s/p cefepime  - Currently afebrile w/ no urinary complaints   - episode of Hematouria 6/4 -> improving -> h/h Stable    #Hx of DVT  - on home Eliquis  - restarted Eliquis      #Misc:  Activity OOB to chair  GI PPX  PPI  DVT PPX  ELIQUIS  Dispo: Acute  Pending: LE pain improvement, MRI spine, dermatology eval, tachycardia w/u.     DISPO: STR placement

## 2022-06-19 NOTE — PROGRESS NOTE ADULT - SUBJECTIVE AND OBJECTIVE BOX
CRUZ DUMONT 56y Female  MRN#: [id number]   CODE STATUS: Full code  Hospital Day: 23d    Pt is currently admitted with the primary diagnosis of mucus plug    SUBJECTIVE     Subjective complaints   Patient was not able to lay flat for the MRI  Still has pain in her LE  Present Today:   - Singh:  No [x  ], Yes [   ] : Indication:     - Type of IV Access:       .. CVC/Piccline:  No [ x ], Yes [   ] : Indication:       .. Midline: No [x  ], Yes [   ] : Indication:                                             ----------------------------------------------------------  OBJECTIVE  PAST MEDICAL & SURGICAL HISTORY  COPD (chronic obstructive pulmonary disease)    CHF (congestive heart failure)    DM (diabetes mellitus)    H/O tracheostomy                                              -----------------------------------------------------------  ALLERGIES:  penicillin (Swelling)                                            ------------------------------------------------------------    HOME MEDICATIONS  Home Medications:  albuterol sulfate 2.5 mg/3 mL (0.083 %) solution for nebulization:  (27 May 2022 15:04)  alprazolam 0.25 mg tablet:  (27 May 2022 15:04)  amlodipine 5 mg tablet:  (27 May 2022 15:04)  atorvastatin 20 mg tablet:  (27 May 2022 15:04)  bumetanide 2 mg tablet:  (27 May 2022 15:04)  clopidogrel 75 mg tablet:  (27 May 2022 15:04)  Eliquis 5 mg tablet:  (27 May 2022 15:04)  gabapentin 300 mg capsule:  (27 May 2022 15:04)  glimepiride 2 mg tablet:  (27 May 2022 15:04)  labetalol 100 mg tablet:  (27 May 2022 15:04)  LANTUS SOLOSTAR 100 UNIT/ML: 55 UNITS ONCE A DAY SUBCUTANEOUS 90 DAYS (27 May 2022 15:04)  losartan 100 mg tablet:  (27 May 2022 15:04)  metformin 1,000 mg tablet:  (27 May 2022 15:04)  Novolog Flexpen U-100 Insulin aspart 100 unit/mL (3 mL) subcutaneous:  (27 May 2022 15:04)  Trelegy Ellipta 100 mcg-62.5 mcg-25 mcg powder for inhalation:  (27 May 2022 15:04)                           MEDICATIONS:  STANDING MEDICATIONS  amLODIPine   Tablet 5 milliGRAM(s) Oral daily  ammonium lactate 12% Lotion 1 Application(s) Topical every 12 hours  apixaban 5 milliGRAM(s) Oral two times a day  atorvastatin 20 milliGRAM(s) Oral at bedtime  buMETAnide 2 milliGRAM(s) Oral daily  dextrose 5%. 1000 milliLiter(s) IV Continuous <Continuous>  dextrose 5%. 1000 milliLiter(s) IV Continuous <Continuous>  dextrose 50% Injectable 25 Gram(s) IV Push once  dextrose 50% Injectable 12.5 Gram(s) IV Push once  dextrose 50% Injectable 25 Gram(s) IV Push once  DULoxetine 60 milliGRAM(s) Oral daily  gabapentin 600 milliGRAM(s) Oral three times a day  glucagon  Injectable 1 milliGRAM(s) IntraMuscular once  influenza   Vaccine 0.5 milliLiter(s) IntraMuscular once  insulin glargine Injectable (LANTUS) 20 Unit(s) SubCutaneous at bedtime  insulin lispro (ADMELOG) corrective regimen sliding scale   SubCutaneous every 6 hours  insulin lispro Injectable (ADMELOG) 7 Unit(s) SubCutaneous every 6 hours  losartan 100 milliGRAM(s) Oral daily  pantoprazole    Tablet 40 milliGRAM(s) Oral before breakfast  polyethylene glycol 3350 17 Gram(s) Oral daily  senna 2 Tablet(s) Oral at bedtime  sodium chloride 0.9%. 1000 milliLiter(s) IV Continuous <Continuous>    PRN MEDICATIONS  dextrose Oral Gel 15 Gram(s) Oral once PRN  HYDROmorphone   Tablet 4 milliGRAM(s) Oral every 4 hours PRN  HYDROmorphone  Injectable 1 milliGRAM(s) IV Push every 24 hours PRN  LORazepam   Injectable 1 milliGRAM(s) IV Push once PRN                                            ------------------------------------------------------------  VITAL SIGNS: Last 24 Hours  T(C): 37.9 (19 Jun 2022 05:44), Max: 37.9 (19 Jun 2022 05:44)  T(F): 100.2 (19 Jun 2022 05:44), Max: 100.2 (19 Jun 2022 05:44)  HR: 125 (19 Jun 2022 05:44) (79 - 125)  BP: 133/61 (19 Jun 2022 05:44) (115/57 - 133/61)  BP(mean): --  RR: 20 (19 Jun 2022 05:44) (18 - 20)  SpO2: 98% (19 Jun 2022 08:17) (97% - 99%)      06-18-22 @ 07:01  -  06-19-22 @ 07:00  --------------------------------------------------------  IN: 360 mL / OUT: 0 mL / NET: 360 mL                                             --------------------------------------------------------------  LABS:                        9.3    14.50 )-----------( 188      ( 19 Jun 2022 09:05 )             25.9     06-19    127<L>  |  85<L>  |  20  ----------------------------<  262<H>  4.5   |  31  |  <0.5<L>    Ca    8.7      19 Jun 2022 09:05  Mg     1.7     06-19    TPro  5.6<L>  /  Alb  3.3<L>  /  TBili  0.7  /  DBili  x   /  AST  37  /  ALT  44<H>  /  AlkPhos  122<H>  06-19                                                              -------------------------------------------------------------  RADIOLOGY:                                            --------------------------------------------------------------    PHYSICAL EXAM:  General: alert oriented  HEENT: non remarkable  LUNGS: ronchi  HEART: normal s1 s2  ABDOMEN: soft  EXT: atrophic dermatitis                                             --------------------------------------------------------------    ASSESSMENT & PLAN  57 yo f with PMHx of DVT on Eliquis, COPD,  trach collar, obesity, IDDM, UTI, sent by [facility] for hypoxia. Current illness going back to April 4th when pt was intubated on admission at [facility] ICU for hypoxic respiratory failure diagnosed with copd exacerbation and superimposed pneumonia, and remained intubated for 37 days requiring tracheostomy. Patient stayed at [facility] for 2 days until noted to be hypoxic (saturating low 70's) pt's saturation immediately improving afterwards to 80's after large mucus plug removed and was subsequently sent to [facility]  On arrival to ED pt was saturating 97%, 15L O2 via tracheostomy collar (baseline 8 L at [facility])CXR suspicious for L-sided PNA, inconclusive. CTA neg for PE. Pt received x1 IV Levaquin and Cefepime in ED, admitted to MICU for acute hypoxic hypercapnic respiratory failure secondary to acute mucus plug now resolved vs superimposed pneumonia hospital-acquired.   General Surgery Consulted emergently when patient found to be hypoxic to low 80s after CCU and Pulmonary team found large granuloma in trachea along tracheostomy tube. Patient currently had a size 8 trach. Surgery assisted Pulmonary and CCU with trach exchanged with ET tube guidance to a size 7 XLT. Saturations improved to high 90s. Large granuloma was removed. Patient was no longer in distress after exchange.    #Acute/ chronic COPD with exacerbation (Resolved)  #mucous plug removed  #pneumonia vs atelectasis L base  - On 5/28, Pulmonary team found large granuloma in trachea along tracheostomy tube. Patient had a size 8 trach on admission. Surgery assisted Pulmonary and CCU with trach exchanged with ET tube guidance to a size 7 XLT. Saturations improved to high 90s.  - trach functioning well s/p exchange   - Pt off vent 72 hrs and is saturating well  - repeat chest x-ray (6/6) shows possible mucus plugging. suctioning attempted repeat CXR showed improved aeration.  ***Repeat Chest X-ray shows resolution of Mucus plug after suctioning and chest PT  - S/p Sw eval, if fails, CT Sx FU for PEG tube  - Modified barium swallow test done: patient still unable to eat, will need further therapy  - Repeat CXR 6/8: left lung opacity/effusion  - 6/11 Trach changed to Fenestrated 8 by surgery  - 6/12 and 6/13: repeat CXR showing recurrent mucous plug  - 6/14 CXR: improving L atelectasis  - c/w aggressive L sided chest PT and frequent suctioning q6  - as per pulm, if recurrent atelectasis despite chest PT, suctioning, may consider bronchoscopy    #Basilar curvilnear lesion on CXR:  - Likely atelactasis  -  intraperitoneal free air needs to be exluded, f/u kub    #s/p PEG (6/15):   started feeds/meds from 6/16.    #Dysphagia:  Strict NPO as of now per ST's FEES eval from 6/13/22.   [facility] will sign out to  at [facility] for further plans with swallow rehab at [facility]       #LE neuropathic pain:  PAtient has apparently been bed bound since April 4th (her [facility] admission for hypoxic failure where she ended up being intubated)  Apparently has L > R foot drop which may be due to chronic contractures of Calf muscle/Achilles tendon. Can have PT evaluate her for that.  But options are limited at this stage.   Given the asymmetrical foot drop, Neurology wants to rule out Any focal neuropathy.  Check MRI Lumbar spine w/ & w/o Contrast (IV Ativan 1mg on call)     Neuropathy is likely Diabetic?   Pain management: Patient requiring a lot of opiates since this admission.   6/17: Pain management recommended transitioning to PErcocet 2 tab Q6 PRN Which patient did not tolerate.  Only dilaudid seems to work thus far. Was requiring 1mg IV Q4 PRN 6 times a day. Equivalent PO dilaudid dose would have been around 30mg.   On 6/17, starting with PO dilaudid 4mg Q4 PRN. 1mg IV dilaudid Q24 PRN for breakthroughs.   Increased Gabapentin to 600 TID.   Added Cymbalta 60 QD  Beware patient also on Xanax 0.25 QHS  On Senna, Miralax.     #B/l LE Hyperpigmentation changes:   unclear etiology  Present since 1 year.   May be related to CHF related stasis dermatitis / hemosiderin deposition.   Dermatology eval to maybe consider biopsy? Could also explain alternate differentials for Neuropathy?       #Suspected epiglottis edema:   On DExa > Taper to 3mg Q12 x 2 more days (ends 6/18).   Outpatient ENT f/u.     # Mucus plugs removed. Trach exchanged  CXR PRN .    #DM:   steroids ended 6/18.  6/18- Increased lantus from 15 to 17.     #Hyponatremia- monitor  correct glucose.    # hypomagmesemia  - repleted    #Chronic Indwelling Singh  - 5/28 Ucx - Contaminated   - 5/28 Ucx- Normal perri   - 5/27 Ucx- Klebsiella (CRE) and pseudomonas   - s/p cefepime  - Currently afebrile w/ no urinary complaints   - episode of Hematouria 6/4 -> improving -> h/h Stable    #Hx of DVT  - on home Eliquis  - restarted Eliquis      #Misc:  Activity OOB to chair  GI PPX  PPI  DVT PPX  ELIQUIS  Dispo: Acute  Pending: c/w IV decadron taper, Neurology consult, LE pain improvement    DISPO: STR placement

## 2022-06-19 NOTE — PROGRESS NOTE ADULT - SUBJECTIVE AND OBJECTIVE BOX
S: AAO x 3  trach/PEG  mouths words  LE pain persists      All other pertinent ROS negative.      06-18-22 @ 07:01  -  06-19-22 @ 07:00  --------------------------------------------------------  IN: 360 mL / OUT: 0 mL / NET: 360 mL      Vital Signs Last 24 Hrs  T(C): 36 (19 Jun 2022 12:40), Max: 37.9 (19 Jun 2022 05:44)  T(F): 96.8 (19 Jun 2022 12:40), Max: 100.2 (19 Jun 2022 05:44)  HR: 93 (19 Jun 2022 12:40) (93 - 125)  BP: 117/60 (19 Jun 2022 12:40) (115/57 - 133/61)  BP(mean): --  RR: 20 (19 Jun 2022 12:40) (19 - 20)  SpO2: 98% (19 Jun 2022 08:17) (97% - 99%)  PHYSICAL EXAM:    Constitutional: NAD,as above   HEENT: PERR, EOMI, Normal Hearing, MMM  Neck: Soft and supple, No LAD, No JVD  Respiratory: Breath sounds are clear bilaterally, No wheezing, rales or rhonchi  Cardiovascular: S1 and S2, regular rate and rhythm, no Murmurs, gallops or rubs  Gastrointestinal: Bowel Sounds present, soft, nontender, nondistended, no guarding, no rebound  Extremities: b/l shins dark discoloration. TTP     MEDICATIONS:  MEDICATIONS  (STANDING):  amLODIPine   Tablet 5 milliGRAM(s) Oral daily  ammonium lactate 12% Lotion 1 Application(s) Topical every 12 hours  apixaban 5 milliGRAM(s) Oral two times a day  atorvastatin 20 milliGRAM(s) Oral at bedtime  buMETAnide 2 milliGRAM(s) Oral daily  dextrose 5%. 1000 milliLiter(s) (100 mL/Hr) IV Continuous <Continuous>  dextrose 5%. 1000 milliLiter(s) (50 mL/Hr) IV Continuous <Continuous>  dextrose 50% Injectable 25 Gram(s) IV Push once  dextrose 50% Injectable 12.5 Gram(s) IV Push once  dextrose 50% Injectable 25 Gram(s) IV Push once  DULoxetine 60 milliGRAM(s) Oral daily  gabapentin 600 milliGRAM(s) Oral three times a day  glucagon  Injectable 1 milliGRAM(s) IntraMuscular once  influenza   Vaccine 0.5 milliLiter(s) IntraMuscular once  insulin glargine Injectable (LANTUS) 20 Unit(s) SubCutaneous at bedtime  insulin lispro (ADMELOG) corrective regimen sliding scale   SubCutaneous every 6 hours  insulin lispro Injectable (ADMELOG) 7 Unit(s) SubCutaneous every 6 hours  losartan 100 milliGRAM(s) Oral daily  pantoprazole    Tablet 40 milliGRAM(s) Oral before breakfast  polyethylene glycol 3350 17 Gram(s) Oral daily  senna 2 Tablet(s) Oral at bedtime  sodium chloride 0.9%. 1000 milliLiter(s) (50 mL/Hr) IV Continuous <Continuous>      LABS: All Labs Reviewed:                        9.3    14.50 )-----------( 188      ( 19 Jun 2022 09:05 )             25.9     06-19    127<L>  |  85<L>  |  20  ----------------------------<  262<H>  4.5   |  31  |  <0.5<L>    Ca    8.7      19 Jun 2022 09:05  Mg     1.7     06-19    TPro  5.6<L>  /  Alb  3.3<L>  /  TBili  0.7  /  DBili  x   /  AST  37  /  ALT  44<H>  /  AlkPhos  122<H>  06-19          Blood Culture:     Radiology: reviewed

## 2022-06-20 LAB
ALBUMIN SERPL ELPH-MCNC: 3.4 G/DL — LOW (ref 3.5–5.2)
ALP SERPL-CCNC: 151 U/L — HIGH (ref 30–115)
ALT FLD-CCNC: 66 U/L — HIGH (ref 0–41)
ANION GAP SERPL CALC-SCNC: 6 MMOL/L — LOW (ref 7–14)
AST SERPL-CCNC: 48 U/L — HIGH (ref 0–41)
BASOPHILS # BLD AUTO: 0.02 K/UL — SIGNIFICANT CHANGE UP (ref 0–0.2)
BASOPHILS NFR BLD AUTO: 0.1 % — SIGNIFICANT CHANGE UP (ref 0–1)
BILIRUB SERPL-MCNC: 0.8 MG/DL — SIGNIFICANT CHANGE UP (ref 0.2–1.2)
BUN SERPL-MCNC: 13 MG/DL — SIGNIFICANT CHANGE UP (ref 10–20)
CALCIUM SERPL-MCNC: 9.4 MG/DL — SIGNIFICANT CHANGE UP (ref 8.5–10.1)
CHLORIDE SERPL-SCNC: 89 MMOL/L — LOW (ref 98–110)
CO2 SERPL-SCNC: 34 MMOL/L — HIGH (ref 17–32)
CREAT SERPL-MCNC: <0.5 MG/DL — LOW (ref 0.7–1.5)
EGFR: 124 ML/MIN/1.73M2 — SIGNIFICANT CHANGE UP
EOSINOPHIL # BLD AUTO: 0.57 K/UL — SIGNIFICANT CHANGE UP (ref 0–0.7)
EOSINOPHIL NFR BLD AUTO: 4.1 % — SIGNIFICANT CHANGE UP (ref 0–8)
GLUCOSE BLDC GLUCOMTR-MCNC: 153 MG/DL — HIGH (ref 70–99)
GLUCOSE BLDC GLUCOMTR-MCNC: 157 MG/DL — HIGH (ref 70–99)
GLUCOSE BLDC GLUCOMTR-MCNC: 167 MG/DL — HIGH (ref 70–99)
GLUCOSE BLDC GLUCOMTR-MCNC: 184 MG/DL — HIGH (ref 70–99)
GLUCOSE BLDC GLUCOMTR-MCNC: 218 MG/DL — HIGH (ref 70–99)
GLUCOSE SERPL-MCNC: 194 MG/DL — HIGH (ref 70–99)
HCT VFR BLD CALC: 25.6 % — LOW (ref 37–47)
HGB BLD-MCNC: 8.8 G/DL — LOW (ref 12–16)
IMM GRANULOCYTES NFR BLD AUTO: 0.6 % — HIGH (ref 0.1–0.3)
LYMPHOCYTES # BLD AUTO: 0.95 K/UL — LOW (ref 1.2–3.4)
LYMPHOCYTES # BLD AUTO: 6.8 % — LOW (ref 20.5–51.1)
MAGNESIUM SERPL-MCNC: 1.6 MG/DL — LOW (ref 1.8–2.4)
MCHC RBC-ENTMCNC: 33.1 PG — HIGH (ref 27–31)
MCHC RBC-ENTMCNC: 34.4 G/DL — SIGNIFICANT CHANGE UP (ref 32–37)
MCV RBC AUTO: 96.2 FL — SIGNIFICANT CHANGE UP (ref 81–99)
MONOCYTES # BLD AUTO: 1.07 K/UL — HIGH (ref 0.1–0.6)
MONOCYTES NFR BLD AUTO: 7.7 % — SIGNIFICANT CHANGE UP (ref 1.7–9.3)
NEUTROPHILS # BLD AUTO: 11.24 K/UL — HIGH (ref 1.4–6.5)
NEUTROPHILS NFR BLD AUTO: 80.7 % — HIGH (ref 42.2–75.2)
NRBC # BLD: 0 /100 WBCS — SIGNIFICANT CHANGE UP (ref 0–0)
PLATELET # BLD AUTO: 176 K/UL — SIGNIFICANT CHANGE UP (ref 130–400)
POTASSIUM SERPL-MCNC: 4.4 MMOL/L — SIGNIFICANT CHANGE UP (ref 3.5–5)
POTASSIUM SERPL-SCNC: 4.4 MMOL/L — SIGNIFICANT CHANGE UP (ref 3.5–5)
PROT SERPL-MCNC: 5.8 G/DL — LOW (ref 6–8)
RBC # BLD: 2.66 M/UL — LOW (ref 4.2–5.4)
RBC # FLD: 13.2 % — SIGNIFICANT CHANGE UP (ref 11.5–14.5)
SODIUM SERPL-SCNC: 129 MMOL/L — LOW (ref 135–146)
WBC # BLD: 13.94 K/UL — HIGH (ref 4.8–10.8)
WBC # FLD AUTO: 13.94 K/UL — HIGH (ref 4.8–10.8)

## 2022-06-20 PROCEDURE — 99221 1ST HOSP IP/OBS SF/LOW 40: CPT

## 2022-06-20 PROCEDURE — 99233 SBSQ HOSP IP/OBS HIGH 50: CPT

## 2022-06-20 PROCEDURE — 74178 CT ABD&PLV WO CNTR FLWD CNTR: CPT | Mod: 26

## 2022-06-20 RX ORDER — IOHEXOL 300 MG/ML
30 INJECTION, SOLUTION INTRAVENOUS ONCE
Refills: 0 | Status: DISCONTINUED | OUTPATIENT
Start: 2022-06-20 | End: 2022-06-21

## 2022-06-20 RX ORDER — MAGNESIUM SULFATE 500 MG/ML
2 VIAL (ML) INJECTION ONCE
Refills: 0 | Status: COMPLETED | OUTPATIENT
Start: 2022-06-20 | End: 2022-06-20

## 2022-06-20 RX ORDER — DIATRIZOATE MEGLUMINE 180 MG/ML
30 INJECTION, SOLUTION INTRAVESICAL ONCE
Refills: 0 | Status: COMPLETED | OUTPATIENT
Start: 2022-06-20 | End: 2022-06-20

## 2022-06-20 RX ORDER — DIATRIZOATE MEGLUMINE 180 MG/ML
60 INJECTION, SOLUTION INTRAVESICAL ONCE
Refills: 0 | Status: DISCONTINUED | OUTPATIENT
Start: 2022-06-20 | End: 2022-06-20

## 2022-06-20 RX ORDER — HYDROMORPHONE HYDROCHLORIDE 2 MG/ML
1 INJECTION INTRAMUSCULAR; INTRAVENOUS; SUBCUTANEOUS ONCE
Refills: 0 | Status: DISCONTINUED | OUTPATIENT
Start: 2022-06-20 | End: 2022-06-20

## 2022-06-20 RX ADMIN — DULOXETINE HYDROCHLORIDE 60 MILLIGRAM(S): 30 CAPSULE, DELAYED RELEASE ORAL at 11:34

## 2022-06-20 RX ADMIN — Medication 1 APPLICATION(S): at 18:55

## 2022-06-20 RX ADMIN — BUMETANIDE 2 MILLIGRAM(S): 0.25 INJECTION INTRAMUSCULAR; INTRAVENOUS at 05:09

## 2022-06-20 RX ADMIN — SENNA PLUS 2 TABLET(S): 8.6 TABLET ORAL at 21:13

## 2022-06-20 RX ADMIN — HYDROMORPHONE HYDROCHLORIDE 4 MILLIGRAM(S): 2 INJECTION INTRAMUSCULAR; INTRAVENOUS; SUBCUTANEOUS at 09:57

## 2022-06-20 RX ADMIN — PANTOPRAZOLE SODIUM 40 MILLIGRAM(S): 20 TABLET, DELAYED RELEASE ORAL at 06:26

## 2022-06-20 RX ADMIN — Medication 7 UNIT(S): at 00:30

## 2022-06-20 RX ADMIN — HYDROMORPHONE HYDROCHLORIDE 4 MILLIGRAM(S): 2 INJECTION INTRAMUSCULAR; INTRAVENOUS; SUBCUTANEOUS at 20:52

## 2022-06-20 RX ADMIN — Medication 25 GRAM(S): at 11:35

## 2022-06-20 RX ADMIN — LOSARTAN POTASSIUM 100 MILLIGRAM(S): 100 TABLET, FILM COATED ORAL at 05:09

## 2022-06-20 RX ADMIN — GABAPENTIN 600 MILLIGRAM(S): 400 CAPSULE ORAL at 05:09

## 2022-06-20 RX ADMIN — POLYETHYLENE GLYCOL 3350 17 GRAM(S): 17 POWDER, FOR SOLUTION ORAL at 11:37

## 2022-06-20 RX ADMIN — APIXABAN 5 MILLIGRAM(S): 2.5 TABLET, FILM COATED ORAL at 05:10

## 2022-06-20 RX ADMIN — HYDROMORPHONE HYDROCHLORIDE 1 MILLIGRAM(S): 2 INJECTION INTRAMUSCULAR; INTRAVENOUS; SUBCUTANEOUS at 23:37

## 2022-06-20 RX ADMIN — Medication 1 APPLICATION(S): at 05:10

## 2022-06-20 RX ADMIN — Medication 7 UNIT(S): at 11:38

## 2022-06-20 RX ADMIN — ATORVASTATIN CALCIUM 20 MILLIGRAM(S): 80 TABLET, FILM COATED ORAL at 21:13

## 2022-06-20 RX ADMIN — AMLODIPINE BESYLATE 5 MILLIGRAM(S): 2.5 TABLET ORAL at 05:10

## 2022-06-20 RX ADMIN — INSULIN GLARGINE 20 UNIT(S): 100 INJECTION, SOLUTION SUBCUTANEOUS at 22:54

## 2022-06-20 RX ADMIN — GABAPENTIN 600 MILLIGRAM(S): 400 CAPSULE ORAL at 14:09

## 2022-06-20 RX ADMIN — HYDROMORPHONE HYDROCHLORIDE 4 MILLIGRAM(S): 2 INJECTION INTRAMUSCULAR; INTRAVENOUS; SUBCUTANEOUS at 05:55

## 2022-06-20 RX ADMIN — GABAPENTIN 600 MILLIGRAM(S): 400 CAPSULE ORAL at 21:13

## 2022-06-20 RX ADMIN — HYDROMORPHONE HYDROCHLORIDE 4 MILLIGRAM(S): 2 INJECTION INTRAMUSCULAR; INTRAVENOUS; SUBCUTANEOUS at 13:58

## 2022-06-20 RX ADMIN — HYDROMORPHONE HYDROCHLORIDE 1 MILLIGRAM(S): 2 INJECTION INTRAMUSCULAR; INTRAVENOUS; SUBCUTANEOUS at 16:23

## 2022-06-20 RX ADMIN — Medication 7 UNIT(S): at 06:26

## 2022-06-20 RX ADMIN — DIATRIZOATE MEGLUMINE 30 MILLILITER(S): 180 INJECTION, SOLUTION INTRAVESICAL at 19:44

## 2022-06-20 NOTE — CONSULT NOTE ADULT - ATTENDING COMMENTS
patient seen. had peg tube 5 days prior and had pneumoperitoneum noted on ray. abdomen soft, no peritonitis. CT sdcan showed peg in good place, a small amount of fouid next to peg, probably related to the procedure. peg study showed no evidence of leak. will sign off.
I edited the note
The patient is a 56-year-old lady we were called urgently to the bedside in the CCU to evaluate by Dr. Delgado while she was in the process of getting a bronchoscopy done.    The patient has a complicated past medical history as described above, having had a recent prolonged hospitalization for sepsis and prolonged intubation at [facility].  She was discharged just a couple of days ago following a tracheostomy and was sent in from the nursing home with progressive hypoxia and respiratory distress.    She has multiple medical issues as listed above and her chest x-ray revealed that she had a complete whiteout of the left lung with complete atelectasis.    The pulmonary team was in the midst of performing a bronchoscopy when they noticed that they had issues with the airway which has been described in their note and basically there was an excessive amount of granulation tissue in the proximal trachea just distal to the tracheostomy site which seem to have completely occluded the inner tube of the previous tracheostomy.    When I reached the patient's bedside the patient was being manually ventilated and the patient was in mild respiratory distress with anesthesia and pulmonary and critical care at the bedside.    I assessed the patient as described and then went on to assist the pulmonary team with the bronchoscopy and confirmed the findings that have been recorded in the pulmonary note.    I made a decision that the patient would benefit from an exchange of the tracheostomy tube and therefore we proceeded to do the same with the extended length tube that would go beyond the obstruction in the trachea.  On bronchoscopy prior to the exchange it was clearly evident that the distal trachea and bronchus was open with an excessive amount of retained secretions.    We successfully exchange the endotracheal tube over tube exchanger which we used as a guide.    We will continue to follow the patient and consider a possible repeat endoscopy with debridement of the trachea should this be required in the future.    I was present at the bedside with the patient for a significant amount of time.    I also had the opportunity to speak to the patient while the patient was being ventilated manually before we did the tube exchange and then subsequently went over the chart in detail and also spent a significant amount of time as the teaching attending for the residents that were involved in the care of this patient.
55 yo F w/ MMP p/w severe b/l LE burning/stabbing pain with allodynia suspect neuropathic pain possibly secondary to RAUL but with significant discoloration also with b/l foot drop L >> R.  Possible L peroneal neuropathy superimposed on RAUL.  Recommend r/o cord/radicular process.  Recommend analgesic control in the meantime.  Discussed with hospitalist.

## 2022-06-20 NOTE — CONSULT NOTE ADULT - SUBJECTIVE AND OBJECTIVE BOX
GENERAL SURGERY CONSULT NOTE    Patient: CRUZ DUMONT , 56y ([birth year])Female   MRN: [id number]  Location: [facility] T2-3A 029 A  Visit: 05-27-22 Inpatient  Date: 06-20-22 @ 12:27    HPI:  57 yo f with PMHx of DVT on Eliquis, COPD,  trach collar, obesity, IDDM, UTI, sent by [facility] for hypoxia. History provided by daughter at bedside, she reports the current illness going back to April 4th when pt was intubated on admission at [facility] ICU for hypoxic respiratory failure diagnosed with copd exacerbation and superimposed pneumonia, of note she also appears to have been in CHF exacerbation with severe b/l LE swelling treated with IV bumex. Remained intubated 37 days per daughter, diagnosed with fever of unknown origin (up to 106F), treated with empiric broad spectrum antibiotics and once 2 weeks s/p tracheostomy placement and 48 hours afebrile she was was discharged to nursing home for PT. Of note, patient's daughter and pt herself say the wiseman has not been replaced for over 3 weeks. She also reports a developing sacral pressure ulcer. Was at [facility] only 2 days when was noted to be hypoxic (saturating low 70's) and EMS called. While awaiting EMS, floor nurse on the unit suctioned the patient, and managed to bring up a very large mucus plug, with pt's saturation immediately improving afterwards to 80's. Nonetheless pt was sent to [facility] ED. Prior to all this pt was independent and ambulatory. Pt denies sob, cp, abd pain, n/v/d, fever or chills.     On arrival to ED pt was saturating 97%, 15L O2 via tracheostomy collar (baseline 8 L at [facility]) VS:    /59  T 99.9F RR 20  Admission VBG pH 7.37 pCO2 60 pO2 49. Pt not wheezing, not coughing. CXR suspicious for L-sided PNA, inconclusive. CTA neg for PE. Pt received x1 IV Levoquin and Cefepime in ED, admitted to medicine for acute hypoxic hypercapnic respiratory failure secondary to acute mucus plug (now resolved vs superimposed pneumonia  hospital-acquired.(less likely), Records request sent to [facility] medical records  [phone]) and patient will be continued on empiric antibiotics pending procal (27 May 2022 14:02)    Surgery Consulted on 6/20 for radiographic read of Pneumoperitoneum.  Patient is awake, alert, has previously been on Tube feeds, PEG placed by GI around 6/14 per chart review. Denies acute pain or discomfort currently    PAST MEDICAL & SURGICAL HISTORY:  COPD (chronic obstructive pulmonary disease)      CHF (congestive heart failure)      DM (diabetes mellitus)      H/O tracheostomy          Home Medications:  albuterol sulfate 2.5 mg/3 mL (0.083 %) solution for nebulization:  (27 May 2022 15:04)  alprazolam 0.25 mg tablet:  (27 May 2022 15:04)  amlodipine 5 mg tablet:  (27 May 2022 15:04)  atorvastatin 20 mg tablet:  (27 May 2022 15:04)  bumetanide 2 mg tablet:  (27 May 2022 15:04)  clopidogrel 75 mg tablet:  (27 May 2022 15:04)  Eliquis 5 mg tablet:  (27 May 2022 15:04)  gabapentin 300 mg capsule:  (27 May 2022 15:04)  glimepiride 2 mg tablet:  (27 May 2022 15:04)  labetalol 100 mg tablet:  (27 May 2022 15:04)  LANTUS SOLOSTAR 100 UNIT/ML: 55 UNITS ONCE A DAY SUBCUTANEOUS 90 DAYS (27 May 2022 15:04)  losartan 100 mg tablet:  (27 May 2022 15:04)  metformin 1,000 mg tablet:  (27 May 2022 15:04)  Novolog Flexpen U-100 Insulin aspart 100 unit/mL (3 mL) subcutaneous:  (27 May 2022 15:04)  Trelegy Ellipta 100 mcg-62.5 mcg-25 mcg powder for inhalation:  (27 May 2022 15:04)        VITALS:  T(F): 97.7 (06-20-22 @ 12:10), Max: 98.4 (06-19-22 @ 20:08)  HR: 74 (06-20-22 @ 12:10) (74 - 93)  BP: 141/63 (06-20-22 @ 12:10) (117/60 - 141/63)  RR: 19 (06-20-22 @ 12:10) (18 - 20)  SpO2: 98% (06-20-22 @ 11:02) (98% - 99%)    PHYSICAL EXAM:  General: NAD, AAOx3, calm and cooperative  HEENT: NCAT, МАРИНА, EOMI, Tracheostomy in place  Cardiac: RRR by radial pulse  Respiratory: CTAB, normal respiratory effort, breath sounds equal BL, no wheeze, rhonchi or crackles  Abdomen: Soft, non-distended, minimally-tender around gastrostomy tube site, no rebound, no guarding. +BS.  Musculoskeletal: Strength 5/5 BL UE/LE, ROM intact, compartments soft  Neuro: Sensation grossly intact and equal throughout, no focal deficits  Vascular: Pulses 2+ throughout, extremities well perfused  Skin: Warm/dry, no jaundice, Bilateral lower extremities with hemosiderin discoloration c/w with previous diagnosis of CHF.     MEDICATIONS  (STANDING):  amLODIPine   Tablet 5 milliGRAM(s) Oral daily  ammonium lactate 12% Lotion 1 Application(s) Topical every 12 hours  apixaban 5 milliGRAM(s) Oral two times a day  atorvastatin 20 milliGRAM(s) Oral at bedtime  buMETAnide 2 milliGRAM(s) Oral daily  dextrose 5%. 1000 milliLiter(s) (50 mL/Hr) IV Continuous <Continuous>  dextrose 5%. 1000 milliLiter(s) (100 mL/Hr) IV Continuous <Continuous>  dextrose 50% Injectable 25 Gram(s) IV Push once  dextrose 50% Injectable 12.5 Gram(s) IV Push once  dextrose 50% Injectable 25 Gram(s) IV Push once  DULoxetine 60 milliGRAM(s) Oral daily  gabapentin 600 milliGRAM(s) Oral three times a day  glucagon  Injectable 1 milliGRAM(s) IntraMuscular once  influenza   Vaccine 0.5 milliLiter(s) IntraMuscular once  insulin glargine Injectable (LANTUS) 20 Unit(s) SubCutaneous at bedtime  insulin lispro (ADMELOG) corrective regimen sliding scale   SubCutaneous every 6 hours  insulin lispro Injectable (ADMELOG) 7 Unit(s) SubCutaneous every 6 hours  losartan 100 milliGRAM(s) Oral daily  pantoprazole    Tablet 40 milliGRAM(s) Oral before breakfast  polyethylene glycol 3350 17 Gram(s) Oral daily  senna 2 Tablet(s) Oral at bedtime  sodium chloride 0.9%. 1000 milliLiter(s) (50 mL/Hr) IV Continuous <Continuous>  triamcinolone 0.1% Cream 1 Application(s) Topical every 12 hours    MEDICATIONS  (PRN):  dextrose Oral Gel 15 Gram(s) Oral once PRN Blood Glucose LESS THAN 70 milliGRAM(s)/deciliter  HYDROmorphone   Tablet 4 milliGRAM(s) Oral every 4 hours PRN Moderate Pain (4 - 6)  HYDROmorphone  Injectable 1 milliGRAM(s) IV Push every 24 hours PRN Severe Pain (7 - 10)  LORazepam   Injectable 1 milliGRAM(s) IV Push once PRN Agitation      LAB/STUDIES:                        8.8    13.94 )-----------( 176      ( 20 Jun 2022 06:18 )             25.6     06-20    129<L>  |  89<L>  |  13  ----------------------------<  194<H>  4.4   |  34<H>  |  <0.5<L>    Ca    9.4      20 Jun 2022 06:18  Mg     1.6     06-20    TPro  5.8<L>  /  Alb  3.4<L>  /  TBili  0.8  /  DBili  x   /  AST  48<H>  /  ALT  66<H>  /  AlkPhos  151<H>  06-20      LIVER FUNCTIONS - ( 20 Jun 2022 06:18 )  Alb: 3.4 g/dL / Pro: 5.8 g/dL / ALK PHOS: 151 U/L / ALT: 66 U/L / AST: 48 U/L / GGT: x                         IMAGING:  < from: Xray Kidney Ureter Bladder (06.19.22 @ 15:51) >  IMPRESSION:    Findings suspicious for free airunder the diaphragm.    < end of copied text >      ACCESS DEVICES:  [x ] Peripheral IV  [ ] Central Venous Line	[ ] R	[ ] L	[ ] IJ	[ ] Fem	[ ] SC	Placed:   [ ] Arterial Line		[ ] R	[ ] L	[ ] Fem	[ ] Rad	[ ] Ax	Placed:   [ ] PICC:					[ ] Mediport  [ ] Urinary Catheter, Date Placed:

## 2022-06-20 NOTE — PROGRESS NOTE ADULT - ASSESSMENT
57 yo f with PMHx of DVT on Eliquis, COPD,  trach collar, obesity, IDDM, UTI, sent by [facility] for hypoxia. Current illness going back to April 4th when pt was intubated on admission at [facility] ICU for hypoxic respiratory failure diagnosed with copd exacerbation and superimposed pneumonia, and remained intubated for 37 days requiring tracheostomy. Patient stayed at [facility] for 2 days until noted to be hypoxic (saturating low 70's) pt's saturation immediately improving afterwards to 80's after large mucus plug removed and was subsequently sent to [facility]  On arrival to ED pt was saturating 97%, 15L O2 via tracheostomy collar (baseline 8 L at [facility])CXR suspicious for L-sided PNA, inconclusive. CTA neg for PE. Pt received x1 IV Levaquin and Cefepime in ED, admitted to MICU for acute hypoxic hypercapnic respiratory failure secondary to acute mucus plug now resolved vs superimposed pneumonia hospital-acquired.   General Surgery Consulted emergently when patient found to be hypoxic to low 80s after CCU and Pulmonary team found large granuloma in trachea along tracheostomy tube. Patient currently had a size 8 trach. Surgery assisted Pulmonary and CCU with trach exchanged with ET tube guidance to a size 7 XLT. Saturations improved to high 90s. Large granuloma was removed. Patient was no longer in distress after exchange.    #Acute/ chronic COPD with exacerbation (Resolved)  #mucous plug removed  #pneumonia vs atelectasis L base  - On 5/28, Pulmonary team found large granuloma in trachea along tracheostomy tube. Patient had a size 8 trach on admission. Surgery assisted Pulmonary and CCU with trach exchanged with ET tube guidance to a size 7 XLT. Saturations improved to high 90s.  - trach functioning well s/p exchange   - Pt off vent 72 hrs and is saturating well  - repeat chest x-ray (6/6) shows possible mucus plugging. suctioning attempted repeat CXR showed improved aeration.  ***Repeat Chest X-ray shows resolution of Mucus plug after suctioning and chest PT  - S/p Sw eval, if fails, CT Sx FU for PEG tube  - Modified barium swallow test done: patient still unable to eat, will need further therapy  - Repeat CXR 6/8: left lung opacity/effusion  - 6/11 Trach changed to Fenestrated 8 by surgery  - 6/12 and 6/13: repeat CXR showing recurrent mucous plug  - 6/14 CXR: improving L atelectasis  - c/w aggressive L sided chest PT and frequent suctioning q6  - as per pulm, if recurrent atelectasis despite chest PT, suctioning, may consider bronchoscopy    #s/p PEG (6/15):   started feeds/meds from 6/16.    #Dysphagia:  Strict NPO as of now per ST's FEES eval from 6/13/22.   [facility] will sign out to  at [facility] for further plans with swallow rehab at SNF     #LE neuropathic pain:  PAtient has apparently been bed bound since April 4th (her [facility] admission for hypoxic failure where she ended up being intubated)  Apparently has L > R foot drop which may be due to chronic contractures of Calf muscle/Achilles tendon. Can have PT evaluate her for that.  But options are limited at this stage.   Given the asymmetrical foot drop, Neurology wants to rule out Any focal neuropathy.  Check MRI Lumbar spine w/ & w/o Contrast (will need it with anesthesia). f/u Radiology*    Neuropathy is likely Diabetic?   Pain management: Patient requiring a lot of opiates since this admission.   6/17: Pain management recommended transitioning to PErcocet 2 tab Q6 PRN Which patient did not tolerate.  Only dilaudid seems to work thus far. Was requiring 1mg IV Q4 PRN 6 times a day. Equivalent PO dilaudid dose would have been around 30mg.   On 6/17, starting with PO dilaudid 4mg Q4 PRN. 1mg IV dilaudid Q24 PRN for breakthroughs.   Increased Gabapentin to 600 TID.   Added Cymbalta 60 QD  Beware patient also on Xanax 0.25 QHS  On Senna, Miralax.     #B/l LE Hyperpigmentation changes:   unclear etiology  Present since 1 year.   May be related to CHF related stasis dermatitis / hemosiderin deposition.   Dermatology eval to maybe consider biopsy? Could also explain alternate differentials for Neuropathy?     #KUB showing free intraperitoneal air? (6/19):   Cefepime, flagyl. CT Abdo. Surgery consult.     #Suspected epiglottis edema:   On DExa > Taper to 3mg Q12 x 2 more days (ended 6/18).   Outpatient ENT f/u.     # Mucus plugs removed. Trach exchanged  CXR PRN .    #DM:   steroids ended 6/18.  6/19- Increased lantus from 17 to 20.    #Hyponatremia- monitor  NS @ 50    #Sinus tachy @ 110s (6/19): 100.2 temp. WBC persists despite stopping steroids.   Repeat CXR, UA, B Cx. s/p 1L. c/w NS @ 50.     # hypomagmesemia  - repleted    #Chronic Indwelling Singh  - 5/28 Ucx - Contaminated   - 5/28 Ucx- Normal perri   - 5/27 Ucx- Klebsiella (CRE) and pseudomonas   - s/p cefepime  - Currently afebrile w/ no urinary complaints   - episode of Hematouria 6/4 -> improving -> h/h Stable    #Hx of DVT  - on home Eliquis  - restarted Eliquis      #Misc:  Activity OOB to chair  GI PPX  PPI  DVT PPX  ELIQUIS  Dispo: Acute  Pending: LE pain improvement, MRI spine, dermatology eval, tachycardia w/u.     DISPO: STR placement

## 2022-06-20 NOTE — CONSULT NOTE ADULT - ASSESSMENT
55 yo f with PMHx of DVT on Eliquis, COPD,  trach collar, obesity, IDDM, UTI, sent by [facility] for hypoxia. Patient here for COPD and mucus plug.     Impression:  - b/l LE hyperpigmentation    Recommendations:  -  57 yo f with PMHx of DVT on Eliquis, COPD,  trach collar, obesity, IDDM, UTI, sent by [facility] for hypoxia. Patient here for COPD and mucus plug.     Impression:  - eczematous rash (this can cause hyperpigmentation)      - not likely related to the neuropathic pain     Recommendations:  - triamcinalone cream 0.1% twice daily   - no urgent need for skin biopsy inpatient     - if primary team wishes to pursue skin biopsy inpatient, would need to consult burn team   - neuropathic pain work up/managment per primary team and neurology 57 yo f with PMHx of DVT on Eliquis, COPD,  trach collar, obesity, IDDM, UTI, sent by [facility] for hypoxia. Patient here for COPD and mucus plug.     Impression:  - eczematous rash (this can cause hyperpigmentation)      - not likely related to the neuropathic pain     Recommendations:  - triamcinalone cream 0.1% twice daily   - no urgent need for skin biopsy inpatient     - if primary team wishes to pursue skin biopsy inpatient, would need to consult burn team   - neuropathic pain work up/managment per primary team and neurology    Plan discussed w/ Dr. Yen, attending dermatologist  57 yo f with PMHx of DVT on Eliquis, COPD,  trach collar, obesity, IDDM, UTI, sent by [facility] for hypoxia. Patient here for COPD and mucus plug.     Impression:  - eczematous rash (this can cause hyperpigmentation)      - not likely related to the neuropathic pain     Recommendations:  - triamcinalone cream 0.1% twice daily   - no urgent need for skin biopsy inpatient     - if primary team wishes to pursue skin biopsy inpatient, would need to consult burn team   - neuropathic pain work up/managment per primary team and neurology

## 2022-06-20 NOTE — CONSULT NOTE ADULT - ASSESSMENT
ASSESSMENT:  56yF w/ PMHx of COPD, CHF, DM who presented with COPD exacerbation, Lobar PNA. Hospital course significant for tracheostomy exchange, debridement of granuloma, and PEG placement by GI. Surgery consulted for pneumoperitoneum. Physical exam findings, imaging, and labs as documented above.     PLAN:  -Small amount of pneumoperitoneum on KUB reviewed, likely consistent with PEG procedure performed this hospitalization, especially given stable Vital signs and benign abdominal exam.   -Recommend CT A/P with PO and IV contrast (PO contrast can be given via Gastrostomy tube)  -Surgery will follow up CT scan. For now, no acute surgical intervention    Lines/Tubes: PIV, Tracheostomy, Gastrostomy    Above plan discussed with Attending Surgeon Dr. Jiménez, patient, and Primary team  06-20-22 @ 12:27   Respiratory Care Protocol Assessment    Respiratory assessment completed, pathway(s) are indicated per the following triggers:                              Recommendations: None. per  Acuity Score: 2   they will be scheduled   Acuity Frequency: None.    Additional comments:      Goal: Return to current home regimen

## 2022-06-20 NOTE — PROGRESS NOTE ADULT - SUBJECTIVE AND OBJECTIVE BOX
S: Le pain  breathing stable  no abdo pain      All other pertinent ROS negative.      06-19-22 @ 07:01  -  06-20-22 @ 07:00  --------------------------------------------------------  IN: 1550 mL / OUT: 0 mL / NET: 1550 mL      Vital Signs Last 24 Hrs  T(C): 36.5 (20 Jun 2022 12:10), Max: 36.9 (19 Jun 2022 20:08)  T(F): 97.7 (20 Jun 2022 12:10), Max: 98.4 (19 Jun 2022 20:08)  HR: 74 (20 Jun 2022 12:10) (74 - 88)  BP: 141/63 (20 Jun 2022 12:10) (118/58 - 141/63)  BP(mean): --  RR: 19 (20 Jun 2022 12:10) (18 - 20)  SpO2: 98% (20 Jun 2022 11:02) (98% - 99%)  PHYSICAL EXAM:    Constitutional: NAD, awake and alert, trach/PEG  HEENT: PERR, EOMI, Normal Hearing, MMM  Neck: Soft and supple, No LAD, No JVD  Respiratory: Breath sounds are clear bilaterally, No wheezing, rales or rhonchi  Cardiovascular: S1 and S2, regular rate and rhythm, no Murmurs, gallops or rubs  Gastrointestinal: Bowel Sounds present, soft, nontender, nondistended, no guarding, no rebound  Extremities: black discoloration of shins. TTP L>R foot drop      MEDICATIONS:  MEDICATIONS  (STANDING):  amLODIPine   Tablet 5 milliGRAM(s) Oral daily  ammonium lactate 12% Lotion 1 Application(s) Topical every 12 hours  apixaban 5 milliGRAM(s) Oral two times a day  atorvastatin 20 milliGRAM(s) Oral at bedtime  buMETAnide 2 milliGRAM(s) Oral daily  dextrose 5%. 1000 milliLiter(s) (50 mL/Hr) IV Continuous <Continuous>  dextrose 5%. 1000 milliLiter(s) (100 mL/Hr) IV Continuous <Continuous>  dextrose 50% Injectable 25 Gram(s) IV Push once  dextrose 50% Injectable 12.5 Gram(s) IV Push once  dextrose 50% Injectable 25 Gram(s) IV Push once  diatrizoate meglumine/diatrizoate sodium. 60 milliLiter(s) Oral once  DULoxetine 60 milliGRAM(s) Oral daily  gabapentin 600 milliGRAM(s) Oral three times a day  glucagon  Injectable 1 milliGRAM(s) IntraMuscular once  influenza   Vaccine 0.5 milliLiter(s) IntraMuscular once  insulin glargine Injectable (LANTUS) 20 Unit(s) SubCutaneous at bedtime  insulin lispro (ADMELOG) corrective regimen sliding scale   SubCutaneous every 6 hours  insulin lispro Injectable (ADMELOG) 7 Unit(s) SubCutaneous every 6 hours  iohexol 300 mG (iodine)/mL Oral Solution 30 milliLiter(s) Oral once  losartan 100 milliGRAM(s) Oral daily  pantoprazole    Tablet 40 milliGRAM(s) Oral before breakfast  polyethylene glycol 3350 17 Gram(s) Oral daily  senna 2 Tablet(s) Oral at bedtime  sodium chloride 0.9%. 1000 milliLiter(s) (50 mL/Hr) IV Continuous <Continuous>  triamcinolone 0.1% Cream 1 Application(s) Topical every 12 hours      LABS: All Labs Reviewed:                        8.8    13.94 )-----------( 176      ( 20 Jun 2022 06:18 )             25.6     06-20    129<L>  |  89<L>  |  13  ----------------------------<  194<H>  4.4   |  34<H>  |  <0.5<L>    Ca    9.4      20 Jun 2022 06:18  Mg     1.6     06-20    TPro  5.8<L>  /  Alb  3.4<L>  /  TBili  0.8  /  DBili  x   /  AST  48<H>  /  ALT  66<H>  /  AlkPhos  151<H>  06-20          Blood Culture:     Radiology: reviewed

## 2022-06-20 NOTE — CONSULT NOTE ADULT - SUBJECTIVE AND OBJECTIVE BOX
HPI:  57 yo f with PMHx of DVT on Eliquis, COPD,  trach collar, obesity, IDDM, UTI, sent by [facility] for hypoxia. History provided by daughter at bedside, she reports the current illness going back to April 4th when pt was intubated on admission at [facility] ICU for hypoxic respiratory failure diagnosed with copd exacerbation and superimposed pneumonia, of note she also appears to have been in CHF exacerbation with severe b/l LE swelling treated with IV bumex. Remained intubated 37 days per daughter, diagnosed with fever of unknown origin (up to 106F), treated with empiric broad spectrum antibiotics and once 2 weeks s/p tracheostomy placement and 48 hours afebrile she was was discharged to nursing home for PT. Of note, patient's daughter and pt herself say the wiseman has not been replaced for over 3 weeks. She also reports a developing sacral pressure ulcer. Was at [facility] only 2 days when was noted to be hypoxic (saturating low 70's) and EMS called. While awaiting EMS, floor nurse on the unit suctioned the patient, and managed to bring up a very large mucus plug, with pt's saturation immediately improving afterwards to 80's. Nonetheless pt was sent to [facility] ED. Prior to all this pt was independent and ambulatory. Pt denies sob, cp, abd pain, n/v/d, fever or chills.     On arrival to ED pt was saturating 97%, 15L O2 via tracheostomy collar (baseline 8 L at [facility]) VS:    /59  T 99.9F RR 20  Admission VBG pH 7.37 pCO2 60 pO2 49. Pt not wheezing, not coughing. CXR suspicious for L-sided PNA, inconclusive. CTA neg for PE. Pt received x1 IV Levoquin and Cefepime in ED, admitted to medicine for acute hypoxic hypercapnic respiratory failure secondary to acute mucus plug (now resolved vs superimposed pneumonia  hospital-acquired.(less likely), Records request sent to [facility] medical records  [phone]) and patient will be continued on empiric antibiotics pending procal (27 May 2022 14:02)      PAST MEDICAL & SURGICAL HISTORY  COPD (chronic obstructive pulmonary disease)    CHF (congestive heart failure)    DM (diabetes mellitus)    H/O tracheostomy        FAMILY HISTORY:  FAMILY HISTORY:  No pertinent family history in first degree relatives        SOCIAL HISTORY:  []smoker  []Alcohol  []Drug    ALLERGIES:  penicillin (Swelling)      MEDICATIONS:  MEDICATIONS  (STANDING):  amLODIPine   Tablet 5 milliGRAM(s) Oral daily  ammonium lactate 12% Lotion 1 Application(s) Topical every 12 hours  apixaban 5 milliGRAM(s) Oral two times a day  atorvastatin 20 milliGRAM(s) Oral at bedtime  buMETAnide 2 milliGRAM(s) Oral daily  dextrose 5%. 1000 milliLiter(s) (100 mL/Hr) IV Continuous <Continuous>  dextrose 5%. 1000 milliLiter(s) (50 mL/Hr) IV Continuous <Continuous>  dextrose 50% Injectable 25 Gram(s) IV Push once  dextrose 50% Injectable 12.5 Gram(s) IV Push once  dextrose 50% Injectable 25 Gram(s) IV Push once  DULoxetine 60 milliGRAM(s) Oral daily  gabapentin 600 milliGRAM(s) Oral three times a day  glucagon  Injectable 1 milliGRAM(s) IntraMuscular once  influenza   Vaccine 0.5 milliLiter(s) IntraMuscular once  insulin glargine Injectable (LANTUS) 20 Unit(s) SubCutaneous at bedtime  insulin lispro (ADMELOG) corrective regimen sliding scale   SubCutaneous every 6 hours  insulin lispro Injectable (ADMELOG) 7 Unit(s) SubCutaneous every 6 hours  losartan 100 milliGRAM(s) Oral daily  magnesium sulfate  IVPB 2 Gram(s) IV Intermittent once  pantoprazole    Tablet 40 milliGRAM(s) Oral before breakfast  polyethylene glycol 3350 17 Gram(s) Oral daily  senna 2 Tablet(s) Oral at bedtime  sodium chloride 0.9%. 1000 milliLiter(s) (50 mL/Hr) IV Continuous <Continuous>    MEDICATIONS  (PRN):  dextrose Oral Gel 15 Gram(s) Oral once PRN Blood Glucose LESS THAN 70 milliGRAM(s)/deciliter  HYDROmorphone   Tablet 4 milliGRAM(s) Oral every 4 hours PRN Moderate Pain (4 - 6)  HYDROmorphone  Injectable 1 milliGRAM(s) IV Push every 24 hours PRN Severe Pain (7 - 10)  LORazepam   Injectable 1 milliGRAM(s) IV Push once PRN Agitation      HOME MEDICATIONS:  Home Medications:  albuterol sulfate 2.5 mg/3 mL (0.083 %) solution for nebulization:  (27 May 2022 15:04)  alprazolam 0.25 mg tablet:  (27 May 2022 15:04)  amlodipine 5 mg tablet:  (27 May 2022 15:04)  atorvastatin 20 mg tablet:  (27 May 2022 15:04)  bumetanide 2 mg tablet:  (27 May 2022 15:04)  clopidogrel 75 mg tablet:  (27 May 2022 15:04)  Eliquis 5 mg tablet:  (27 May 2022 15:04)  gabapentin 300 mg capsule:  (27 May 2022 15:04)  glimepiride 2 mg tablet:  (27 May 2022 15:04)  labetalol 100 mg tablet:  (27 May 2022 15:04)  LANTUS SOLOSTAR 100 UNIT/ML: 55 UNITS ONCE A DAY SUBCUTANEOUS 90 DAYS (27 May 2022 15:04)  losartan 100 mg tablet:  (27 May 2022 15:04)  metformin 1,000 mg tablet:  (27 May 2022 15:04)  Novolog Flexpen U-100 Insulin aspart 100 unit/mL (3 mL) subcutaneous:  (27 May 2022 15:04)  Trelegy Ellipta 100 mcg-62.5 mcg-25 mcg powder for inhalation:  (27 May 2022 15:04)      VITALS:   T(F): 98.1 (06-20 @ 05:23), Max: 100.2 (06-19 @ 05:44)  HR: 79 (06-20 @ 05:23) (70 - 125)  BP: 118/58 (06-20 @ 05:23) (106/59 - 133/61)  BP(mean): --  RR: 20 (06-20 @ 05:23) (17 - 20)  SpO2: 99% (06-20 @ 05:23) (96% - 100%)    I&O's Summary    19 Jun 2022 07:01  -  20 Jun 2022 07:00  --------------------------------------------------------  IN: 1550 mL / OUT: 0 mL / NET: 1550 mL        REVIEW OF SYSTEMS:  CONSTITUTIONAL: No weakness, fevers or chills  EYES: No visual changes  ENT: No vertigo or throat pain   NECK: No pain or stiffness  RESPIRATORY: No cough, wheezing, hemoptysis; No shortness of breath  CARDIOVASCULAR: No chest pain or palpitations  GASTROINTESTINAL: No abdominal or epigastric pain. No nausea, vomiting, or hematemesis; No diarrhea or constipation. No melena or hematochezia.  GENITOURINARY: No Polyuria  NEUROLOGICAL:  No tremors, no Weakness or numbness  SKIN: No itching, no rashes  MSK: no joint pain    PHYSICAL EXAM:  GENERAL: Patient is awake , alert and oriented,  not in acute distress  EYES: No proptosis, no lid lag  NECK: No thyroid enlargement, no palpable nodules , no bruit  LUNGS: Clear to auscultation bilaterally   CARDIOVASCULAR: S1/S2 present, RRR , no murmurs or rubs  ABD: Soft, non-tender, non-distended, +BS  EXT: No MC  SKIN: No abdominal striae  NEURO: No tremors, DTR 2+    LABS:                        8.8    13.94 )-----------( 176      ( 20 Jun 2022 06:18 )             25.6     06-20    129<L>  |  89<L>  |  13  ----------------------------<  194<H>  4.4   |  34<H>  |  <0.5<L>    Ca    9.4      20 Jun 2022 06:18  Mg     1.6     06-20    TPro  5.8<L>  /  Alb  3.4<L>  /  TBili  0.8  /  DBili  x   /  AST  48<H>  /  ALT  66<H>  /  AlkPhos  151<H>  06-20              POCT Blood Glucose.: 184 mg/dL (06-20-22 @ 05:33)  POCT Blood Glucose.: 128 mg/dL (06-19-22 @ 23:46)  POCT Blood Glucose.: 139 mg/dL (06-19-22 @ 21:50)  POCT Blood Glucose.: 186 mg/dL (06-19-22 @ 18:10)  POCT Blood Glucose.: 296 mg/dL (06-19-22 @ 11:46)  POCT Blood Glucose.: 232 mg/dL (06-19-22 @ 05:25)  POCT Blood Glucose.: 177 mg/dL (06-18-22 @ 23:48)  POCT Blood Glucose.: 185 mg/dL (06-18-22 @ 21:46)  POCT Blood Glucose.: 232 mg/dL (06-18-22 @ 17:40)  POCT Blood Glucose.: 282 mg/dL (06-18-22 @ 11:15)  POCT Blood Glucose.: 226 mg/dL (06-18-22 @ 05:08)  POCT Blood Glucose.: 269 mg/dL (06-17-22 @ 23:46)  POCT Blood Glucose.: 228 mg/dL (06-17-22 @ 21:04)  POCT Blood Glucose.: 221 mg/dL (06-17-22 @ 16:47)  POCT Blood Glucose.: 205 mg/dL (06-17-22 @ 11:23)       HPI:  57 yo f with PMHx of DVT on Eliquis, COPD,  trach collar, obesity, IDDM, UTI, sent by [facility] for hypoxia. History provided by daughter at bedside, she reports the current illness going back to April 4th when pt was intubated on admission at [facility] ICU for hypoxic respiratory failure diagnosed with copd exacerbation and superimposed pneumonia, of note she also appears to have been in CHF exacerbation with severe b/l LE swelling treated with IV bumex. Remained intubated 37 days per daughter, diagnosed with fever of unknown origin (up to 106F), treated with empiric broad spectrum antibiotics and once 2 weeks s/p tracheostomy placement and 48 hours afebrile she was was discharged to nursing home for PT. Of note, patient's daughter and pt herself say the wiseman has not been replaced for over 3 weeks. She also reports a developing sacral pressure ulcer. Was at [facility] only 2 days when was noted to be hypoxic (saturating low 70's) and EMS called. While awaiting EMS, floor nurse on the unit suctioned the patient, and managed to bring up a very large mucus plug, with pt's saturation immediately improving afterwards to 80's. Nonetheless pt was sent to [facility] ED. Prior to all this pt was independent and ambulatory. Pt denies sob, cp, abd pain, n/v/d, fever or chills.     On arrival to ED pt was saturating 97%, 15L O2 via tracheostomy collar (baseline 8 L at [facility]) VS:    /59  T 99.9F RR 20  Admission VBG pH 7.37 pCO2 60 pO2 49. Pt not wheezing, not coughing. CXR suspicious for L-sided PNA, inconclusive. CTA neg for PE. Pt received x1 IV Levoquin and Cefepime in ED, admitted to medicine for acute hypoxic hypercapnic respiratory failure secondary to acute mucus plug (now resolved vs superimposed pneumonia  hospital-acquired.(less likely), Records request sent to [facility] medical records  [phone]) and patient will be continued on empiric antibiotics pending procal (27 May 2022 14:02)      PAST MEDICAL & SURGICAL HISTORY  COPD (chronic obstructive pulmonary disease)    CHF (congestive heart failure)    DM (diabetes mellitus)    H/O tracheostomy        FAMILY HISTORY:  FAMILY HISTORY:  No pertinent family history in first degree relatives        SOCIAL HISTORY:  []smoker  []Alcohol  []Drug    ALLERGIES:  penicillin (Swelling)      MEDICATIONS:  MEDICATIONS  (STANDING):  amLODIPine   Tablet 5 milliGRAM(s) Oral daily  ammonium lactate 12% Lotion 1 Application(s) Topical every 12 hours  apixaban 5 milliGRAM(s) Oral two times a day  atorvastatin 20 milliGRAM(s) Oral at bedtime  buMETAnide 2 milliGRAM(s) Oral daily  dextrose 5%. 1000 milliLiter(s) (100 mL/Hr) IV Continuous <Continuous>  dextrose 5%. 1000 milliLiter(s) (50 mL/Hr) IV Continuous <Continuous>  dextrose 50% Injectable 25 Gram(s) IV Push once  dextrose 50% Injectable 12.5 Gram(s) IV Push once  dextrose 50% Injectable 25 Gram(s) IV Push once  DULoxetine 60 milliGRAM(s) Oral daily  gabapentin 600 milliGRAM(s) Oral three times a day  glucagon  Injectable 1 milliGRAM(s) IntraMuscular once  influenza   Vaccine 0.5 milliLiter(s) IntraMuscular once  insulin glargine Injectable (LANTUS) 20 Unit(s) SubCutaneous at bedtime  insulin lispro (ADMELOG) corrective regimen sliding scale   SubCutaneous every 6 hours  insulin lispro Injectable (ADMELOG) 7 Unit(s) SubCutaneous every 6 hours  losartan 100 milliGRAM(s) Oral daily  magnesium sulfate  IVPB 2 Gram(s) IV Intermittent once  pantoprazole    Tablet 40 milliGRAM(s) Oral before breakfast  polyethylene glycol 3350 17 Gram(s) Oral daily  senna 2 Tablet(s) Oral at bedtime  sodium chloride 0.9%. 1000 milliLiter(s) (50 mL/Hr) IV Continuous <Continuous>    MEDICATIONS  (PRN):  dextrose Oral Gel 15 Gram(s) Oral once PRN Blood Glucose LESS THAN 70 milliGRAM(s)/deciliter  HYDROmorphone   Tablet 4 milliGRAM(s) Oral every 4 hours PRN Moderate Pain (4 - 6)  HYDROmorphone  Injectable 1 milliGRAM(s) IV Push every 24 hours PRN Severe Pain (7 - 10)  LORazepam   Injectable 1 milliGRAM(s) IV Push once PRN Agitation      HOME MEDICATIONS:  Home Medications:  albuterol sulfate 2.5 mg/3 mL (0.083 %) solution for nebulization:  (27 May 2022 15:04)  alprazolam 0.25 mg tablet:  (27 May 2022 15:04)  amlodipine 5 mg tablet:  (27 May 2022 15:04)  atorvastatin 20 mg tablet:  (27 May 2022 15:04)  bumetanide 2 mg tablet:  (27 May 2022 15:04)  clopidogrel 75 mg tablet:  (27 May 2022 15:04)  Eliquis 5 mg tablet:  (27 May 2022 15:04)  gabapentin 300 mg capsule:  (27 May 2022 15:04)  glimepiride 2 mg tablet:  (27 May 2022 15:04)  labetalol 100 mg tablet:  (27 May 2022 15:04)  LANTUS SOLOSTAR 100 UNIT/ML: 55 UNITS ONCE A DAY SUBCUTANEOUS 90 DAYS (27 May 2022 15:04)  losartan 100 mg tablet:  (27 May 2022 15:04)  metformin 1,000 mg tablet:  (27 May 2022 15:04)  Novolog Flexpen U-100 Insulin aspart 100 unit/mL (3 mL) subcutaneous:  (27 May 2022 15:04)  Trelegy Ellipta 100 mcg-62.5 mcg-25 mcg powder for inhalation:  (27 May 2022 15:04)      VITALS:   T(F): 98.1 (06-20 @ 05:23), Max: 100.2 (06-19 @ 05:44)  HR: 79 (06-20 @ 05:23) (70 - 125)  BP: 118/58 (06-20 @ 05:23) (106/59 - 133/61)  BP(mean): --  RR: 20 (06-20 @ 05:23) (17 - 20)  SpO2: 99% (06-20 @ 05:23) (96% - 100%)    I&O's Summary    19 Jun 2022 07:01  -  20 Jun 2022 07:00  --------------------------------------------------------  IN: 1550 mL / OUT: 0 mL / NET: 1550 mL        REVIEW OF SYSTEMS:  CONSTITUTIONAL: No weakness, fevers or chills  EYES: No visual changes  ENT: No vertigo or throat pain   NECK: No pain or stiffness  RESPIRATORY: No cough, wheezing, hemoptysis; No shortness of breath  CARDIOVASCULAR: No chest pain or palpitations  GASTROINTESTINAL: No abdominal or epigastric pain. No nausea, vomiting, or hematemesis; No diarrhea or constipation. No melena or hematochezia.  GENITOURINARY: No Polyuria  NEUROLOGICAL:  No tremors, no Weakness or numbness  SKIN: No itching, no rashes  MSK: no joint pain    PHYSICAL EXAM:  GENERAL: Patient is awake , alert and oriented,  not in acute distress  EYES: No proptosis, no lid lag  NECK: No thyroid enlargement, no palpable nodules , no bruit  LUNGS: Clear to auscultation bilaterally   CARDIOVASCULAR: S1/S2 present, RRR , no murmurs or rubs  ABD: Soft, non-tender, non-distended, +BS  EXT: tenderness to palpation in b/l LE   SKIN: + b/l LE hyperpigmentation with skin flaking in b/l plantar surfaces of feet   NEURO: No tremors, DTR 2+    LABS:                        8.8    13.94 )-----------( 176      ( 20 Jun 2022 06:18 )             25.6     06-20    129<L>  |  89<L>  |  13  ----------------------------<  194<H>  4.4   |  34<H>  |  <0.5<L>    Ca    9.4      20 Jun 2022 06:18  Mg     1.6     06-20    TPro  5.8<L>  /  Alb  3.4<L>  /  TBili  0.8  /  DBili  x   /  AST  48<H>  /  ALT  66<H>  /  AlkPhos  151<H>  06-20              POCT Blood Glucose.: 184 mg/dL (06-20-22 @ 05:33)  POCT Blood Glucose.: 128 mg/dL (06-19-22 @ 23:46)  POCT Blood Glucose.: 139 mg/dL (06-19-22 @ 21:50)  POCT Blood Glucose.: 186 mg/dL (06-19-22 @ 18:10)  POCT Blood Glucose.: 296 mg/dL (06-19-22 @ 11:46)  POCT Blood Glucose.: 232 mg/dL (06-19-22 @ 05:25)  POCT Blood Glucose.: 177 mg/dL (06-18-22 @ 23:48)  POCT Blood Glucose.: 185 mg/dL (06-18-22 @ 21:46)  POCT Blood Glucose.: 232 mg/dL (06-18-22 @ 17:40)  POCT Blood Glucose.: 282 mg/dL (06-18-22 @ 11:15)  POCT Blood Glucose.: 226 mg/dL (06-18-22 @ 05:08)  POCT Blood Glucose.: 269 mg/dL (06-17-22 @ 23:46)  POCT Blood Glucose.: 228 mg/dL (06-17-22 @ 21:04)  POCT Blood Glucose.: 221 mg/dL (06-17-22 @ 16:47)  POCT Blood Glucose.: 205 mg/dL (06-17-22 @ 11:23)       HPI:  57 yo f with PMHx of DVT on Eliquis, COPD,  trach collar, obesity, IDDM, UTI, sent by [facility] for hypoxia. History provided by daughter at bedside, she reports the current illness going back to April 4th when pt was intubated on admission at [facility] ICU for hypoxic respiratory failure diagnosed with copd exacerbation and superimposed pneumonia, of note she also appears to have been in CHF exacerbation with severe b/l LE swelling treated with IV bumex. Remained intubated 37 days per daughter, diagnosed with fever of unknown origin (up to 106F), treated with empiric broad spectrum antibiotics and once 2 weeks s/p tracheostomy placement and 48 hours afebrile she was was discharged to nursing home for PT. Of note, patient's daughter and pt herself say the wiseman has not been replaced for over 3 weeks. She also reports a developing sacral pressure ulcer. Was at [facility] only 2 days when was noted to be hypoxic (saturating low 70's) and EMS called. While awaiting EMS, floor nurse on the unit suctioned the patient, and managed to bring up a very large mucus plug, with pt's saturation immediately improving afterwards to 80's. Nonetheless pt was sent to [facility] ED. Prior to all this pt was independent and ambulatory. Pt denies sob, cp, abd pain, n/v/d, fever or chills.     On arrival to ED pt was saturating 97%, 15L O2 via tracheostomy collar (baseline 8 L at [facility]) VS:    /59  T 99.9F RR 20  Admission VBG pH 7.37 pCO2 60 pO2 49. Pt not wheezing, not coughing. CXR suspicious for L-sided PNA, inconclusive. CTA neg for PE. Pt received x1 IV Levoquin and Cefepime in ED, admitted to medicine for acute hypoxic hypercapnic respiratory failure secondary to acute mucus plug (now resolved vs superimposed pneumonia  hospital-acquired.(less likely), Records request sent to [facility] medical records  [phone]) and patient will be continued on empiric antibiotics pending procal (27 May 2022 14:02)      PAST MEDICAL & SURGICAL HISTORY  COPD (chronic obstructive pulmonary disease)    CHF (congestive heart failure)    DM (diabetes mellitus)    H/O tracheostomy        FAMILY HISTORY:  FAMILY HISTORY:  No pertinent family history in first degree relatives        SOCIAL HISTORY:  []smoker  []Alcohol  []Drug    ALLERGIES:  penicillin (Swelling)      MEDICATIONS:  MEDICATIONS  (STANDING):  amLODIPine   Tablet 5 milliGRAM(s) Oral daily  ammonium lactate 12% Lotion 1 Application(s) Topical every 12 hours  apixaban 5 milliGRAM(s) Oral two times a day  atorvastatin 20 milliGRAM(s) Oral at bedtime  buMETAnide 2 milliGRAM(s) Oral daily  dextrose 5%. 1000 milliLiter(s) (100 mL/Hr) IV Continuous <Continuous>  dextrose 5%. 1000 milliLiter(s) (50 mL/Hr) IV Continuous <Continuous>  dextrose 50% Injectable 25 Gram(s) IV Push once  dextrose 50% Injectable 12.5 Gram(s) IV Push once  dextrose 50% Injectable 25 Gram(s) IV Push once  DULoxetine 60 milliGRAM(s) Oral daily  gabapentin 600 milliGRAM(s) Oral three times a day  glucagon  Injectable 1 milliGRAM(s) IntraMuscular once  influenza   Vaccine 0.5 milliLiter(s) IntraMuscular once  insulin glargine Injectable (LANTUS) 20 Unit(s) SubCutaneous at bedtime  insulin lispro (ADMELOG) corrective regimen sliding scale   SubCutaneous every 6 hours  insulin lispro Injectable (ADMELOG) 7 Unit(s) SubCutaneous every 6 hours  losartan 100 milliGRAM(s) Oral daily  magnesium sulfate  IVPB 2 Gram(s) IV Intermittent once  pantoprazole    Tablet 40 milliGRAM(s) Oral before breakfast  polyethylene glycol 3350 17 Gram(s) Oral daily  senna 2 Tablet(s) Oral at bedtime  sodium chloride 0.9%. 1000 milliLiter(s) (50 mL/Hr) IV Continuous <Continuous>    MEDICATIONS  (PRN):  dextrose Oral Gel 15 Gram(s) Oral once PRN Blood Glucose LESS THAN 70 milliGRAM(s)/deciliter  HYDROmorphone   Tablet 4 milliGRAM(s) Oral every 4 hours PRN Moderate Pain (4 - 6)  HYDROmorphone  Injectable 1 milliGRAM(s) IV Push every 24 hours PRN Severe Pain (7 - 10)  LORazepam   Injectable 1 milliGRAM(s) IV Push once PRN Agitation      HOME MEDICATIONS:  Home Medications:  albuterol sulfate 2.5 mg/3 mL (0.083 %) solution for nebulization:  (27 May 2022 15:04)  alprazolam 0.25 mg tablet:  (27 May 2022 15:04)  amlodipine 5 mg tablet:  (27 May 2022 15:04)  atorvastatin 20 mg tablet:  (27 May 2022 15:04)  bumetanide 2 mg tablet:  (27 May 2022 15:04)  clopidogrel 75 mg tablet:  (27 May 2022 15:04)  Eliquis 5 mg tablet:  (27 May 2022 15:04)  gabapentin 300 mg capsule:  (27 May 2022 15:04)  glimepiride 2 mg tablet:  (27 May 2022 15:04)  labetalol 100 mg tablet:  (27 May 2022 15:04)  LANTUS SOLOSTAR 100 UNIT/ML: 55 UNITS ONCE A DAY SUBCUTANEOUS 90 DAYS (27 May 2022 15:04)  losartan 100 mg tablet:  (27 May 2022 15:04)  metformin 1,000 mg tablet:  (27 May 2022 15:04)  Novolog Flexpen U-100 Insulin aspart 100 unit/mL (3 mL) subcutaneous:  (27 May 2022 15:04)  Trelegy Ellipta 100 mcg-62.5 mcg-25 mcg powder for inhalation:  (27 May 2022 15:04)      VITALS:   T(F): 98.1 (06-20 @ 05:23), Max: 100.2 (06-19 @ 05:44)  HR: 79 (06-20 @ 05:23) (70 - 125)  BP: 118/58 (06-20 @ 05:23) (106/59 - 133/61)  BP(mean): --  RR: 20 (06-20 @ 05:23) (17 - 20)  SpO2: 99% (06-20 @ 05:23) (96% - 100%)    I&O's Summary    19 Jun 2022 07:01  -  20 Jun 2022 07:00  --------------------------------------------------------  IN: 1550 mL / OUT: 0 mL / NET: 1550 mL        REVIEW OF SYSTEMS:  CONSTITUTIONAL: No weakness, fevers or chills  EYES: No visual changes  ENT: No vertigo or throat pain   NECK: No pain or stiffness  RESPIRATORY: No cough, wheezing, hemoptysis; No shortness of breath  CARDIOVASCULAR: No chest pain or palpitations  GASTROINTESTINAL: No abdominal or epigastric pain. No nausea, vomiting, or hematemesis; No diarrhea or constipation. No melena or hematochezia.  GENITOURINARY: No Polyuria  NEUROLOGICAL:  No tremors, no Weakness or numbness  MSK: no joint pain    PHYSICAL EXAM:  GENERAL: Patient is awake , alert and oriented,  not in acute distress  EYES: No proptosis, no lid lag  NECK: No thyroid enlargement, no palpable nodules , no bruit  LUNGS: Clear to auscultation bilaterally   CARDIOVASCULAR: S1/S2 present, RRR , no murmurs or rubs  ABD: Soft, non-tender, non-distended, +BS  EXT: tenderness to palpation in b/l LE   SKIN: (photos reviewed) Hyperpigmented scaling patches bilateral shins with lichenification dorsal feet  NEURO: No tremors, DTR 2+    LABS:                        8.8    13.94 )-----------( 176      ( 20 Jun 2022 06:18 )             25.6     06-20    129<L>  |  89<L>  |  13  ----------------------------<  194<H>  4.4   |  34<H>  |  <0.5<L>    Ca    9.4      20 Jun 2022 06:18  Mg     1.6     06-20    TPro  5.8<L>  /  Alb  3.4<L>  /  TBili  0.8  /  DBili  x   /  AST  48<H>  /  ALT  66<H>  /  AlkPhos  151<H>  06-20              POCT Blood Glucose.: 184 mg/dL (06-20-22 @ 05:33)  POCT Blood Glucose.: 128 mg/dL (06-19-22 @ 23:46)  POCT Blood Glucose.: 139 mg/dL (06-19-22 @ 21:50)  POCT Blood Glucose.: 186 mg/dL (06-19-22 @ 18:10)  POCT Blood Glucose.: 296 mg/dL (06-19-22 @ 11:46)  POCT Blood Glucose.: 232 mg/dL (06-19-22 @ 05:25)  POCT Blood Glucose.: 177 mg/dL (06-18-22 @ 23:48)  POCT Blood Glucose.: 185 mg/dL (06-18-22 @ 21:46)  POCT Blood Glucose.: 232 mg/dL (06-18-22 @ 17:40)  POCT Blood Glucose.: 282 mg/dL (06-18-22 @ 11:15)  POCT Blood Glucose.: 226 mg/dL (06-18-22 @ 05:08)  POCT Blood Glucose.: 269 mg/dL (06-17-22 @ 23:46)  POCT Blood Glucose.: 228 mg/dL (06-17-22 @ 21:04)  POCT Blood Glucose.: 221 mg/dL (06-17-22 @ 16:47)  POCT Blood Glucose.: 205 mg/dL (06-17-22 @ 11:23)

## 2022-06-21 LAB
ALBUMIN SERPL ELPH-MCNC: 3.3 G/DL — LOW (ref 3.5–5.2)
ALP SERPL-CCNC: 147 U/L — HIGH (ref 30–115)
ALT FLD-CCNC: 48 U/L — HIGH (ref 0–41)
ANION GAP SERPL CALC-SCNC: 8 MMOL/L — SIGNIFICANT CHANGE UP (ref 7–14)
APPEARANCE UR: CLEAR — SIGNIFICANT CHANGE UP
AST SERPL-CCNC: 22 U/L — SIGNIFICANT CHANGE UP (ref 0–41)
BASOPHILS # BLD AUTO: 0.02 K/UL — SIGNIFICANT CHANGE UP (ref 0–0.2)
BASOPHILS NFR BLD AUTO: 0.2 % — SIGNIFICANT CHANGE UP (ref 0–1)
BILIRUB SERPL-MCNC: 0.9 MG/DL — SIGNIFICANT CHANGE UP (ref 0.2–1.2)
BILIRUB UR-MCNC: NEGATIVE — SIGNIFICANT CHANGE UP
BUN SERPL-MCNC: 7 MG/DL — LOW (ref 10–20)
CALCIUM SERPL-MCNC: 9.2 MG/DL — SIGNIFICANT CHANGE UP (ref 8.5–10.1)
CHLORIDE SERPL-SCNC: 88 MMOL/L — LOW (ref 98–110)
CO2 SERPL-SCNC: 32 MMOL/L — SIGNIFICANT CHANGE UP (ref 17–32)
COLOR SPEC: YELLOW — SIGNIFICANT CHANGE UP
CREAT SERPL-MCNC: <0.5 MG/DL — LOW (ref 0.7–1.5)
DIFF PNL FLD: NEGATIVE — SIGNIFICANT CHANGE UP
EGFR: 136 ML/MIN/1.73M2 — SIGNIFICANT CHANGE UP
EOSINOPHIL # BLD AUTO: 0.41 K/UL — SIGNIFICANT CHANGE UP (ref 0–0.7)
EOSINOPHIL NFR BLD AUTO: 3.6 % — SIGNIFICANT CHANGE UP (ref 0–8)
GLUCOSE BLDC GLUCOMTR-MCNC: 151 MG/DL — HIGH (ref 70–99)
GLUCOSE BLDC GLUCOMTR-MCNC: 191 MG/DL — HIGH (ref 70–99)
GLUCOSE BLDC GLUCOMTR-MCNC: 211 MG/DL — HIGH (ref 70–99)
GLUCOSE SERPL-MCNC: 150 MG/DL — HIGH (ref 70–99)
GLUCOSE UR QL: NEGATIVE — SIGNIFICANT CHANGE UP
HCT VFR BLD CALC: 25 % — LOW (ref 37–47)
HGB BLD-MCNC: 8.9 G/DL — LOW (ref 12–16)
IMM GRANULOCYTES NFR BLD AUTO: 0.5 % — HIGH (ref 0.1–0.3)
KETONES UR-MCNC: NEGATIVE — SIGNIFICANT CHANGE UP
LEUKOCYTE ESTERASE UR-ACNC: NEGATIVE — SIGNIFICANT CHANGE UP
LYMPHOCYTES # BLD AUTO: 0.69 K/UL — LOW (ref 1.2–3.4)
LYMPHOCYTES # BLD AUTO: 6.1 % — LOW (ref 20.5–51.1)
MAGNESIUM SERPL-MCNC: 1.6 MG/DL — LOW (ref 1.8–2.4)
MCHC RBC-ENTMCNC: 33.8 PG — HIGH (ref 27–31)
MCHC RBC-ENTMCNC: 35.6 G/DL — SIGNIFICANT CHANGE UP (ref 32–37)
MCV RBC AUTO: 95.1 FL — SIGNIFICANT CHANGE UP (ref 81–99)
MONOCYTES # BLD AUTO: 0.79 K/UL — HIGH (ref 0.1–0.6)
MONOCYTES NFR BLD AUTO: 6.9 % — SIGNIFICANT CHANGE UP (ref 1.7–9.3)
NEUTROPHILS # BLD AUTO: 9.41 K/UL — HIGH (ref 1.4–6.5)
NEUTROPHILS NFR BLD AUTO: 82.7 % — HIGH (ref 42.2–75.2)
NITRITE UR-MCNC: NEGATIVE — SIGNIFICANT CHANGE UP
NRBC # BLD: 0 /100 WBCS — SIGNIFICANT CHANGE UP (ref 0–0)
PH UR: 7 — SIGNIFICANT CHANGE UP (ref 5–8)
PLATELET # BLD AUTO: 183 K/UL — SIGNIFICANT CHANGE UP (ref 130–400)
POTASSIUM SERPL-MCNC: 3.9 MMOL/L — SIGNIFICANT CHANGE UP (ref 3.5–5)
POTASSIUM SERPL-SCNC: 3.9 MMOL/L — SIGNIFICANT CHANGE UP (ref 3.5–5)
PROT SERPL-MCNC: 5.3 G/DL — LOW (ref 6–8)
PROT UR-MCNC: ABNORMAL
RBC # BLD: 2.63 M/UL — LOW (ref 4.2–5.4)
RBC # FLD: 13 % — SIGNIFICANT CHANGE UP (ref 11.5–14.5)
SODIUM SERPL-SCNC: 128 MMOL/L — LOW (ref 135–146)
SP GR SPEC: 1.04 — HIGH (ref 1.01–1.03)
UROBILINOGEN FLD QL: ABNORMAL
WBC # BLD: 11.38 K/UL — HIGH (ref 4.8–10.8)
WBC # FLD AUTO: 11.38 K/UL — HIGH (ref 4.8–10.8)

## 2022-06-21 PROCEDURE — 99232 SBSQ HOSP IP/OBS MODERATE 35: CPT

## 2022-06-21 PROCEDURE — 99233 SBSQ HOSP IP/OBS HIGH 50: CPT

## 2022-06-21 PROCEDURE — 74018 RADEX ABDOMEN 1 VIEW: CPT | Mod: 26

## 2022-06-21 RX ORDER — DIATRIZOATE MEGLUMINE 180 MG/ML
30 INJECTION, SOLUTION INTRAVESICAL ONCE
Refills: 0 | Status: DISCONTINUED | OUTPATIENT
Start: 2022-06-21 | End: 2022-06-21

## 2022-06-21 RX ORDER — IOHEXOL 300 MG/ML
30 INJECTION, SOLUTION INTRAVENOUS ONCE
Refills: 0 | Status: DISCONTINUED | OUTPATIENT
Start: 2022-06-21 | End: 2022-06-21

## 2022-06-21 RX ORDER — METRONIDAZOLE 500 MG
500 TABLET ORAL ONCE
Refills: 0 | Status: DISCONTINUED | OUTPATIENT
Start: 2022-06-21 | End: 2022-06-21

## 2022-06-21 RX ORDER — METRONIDAZOLE 500 MG
TABLET ORAL
Refills: 0 | Status: DISCONTINUED | OUTPATIENT
Start: 2022-06-21 | End: 2022-06-21

## 2022-06-21 RX ORDER — CEFEPIME 1 G/1
2000 INJECTION, POWDER, FOR SOLUTION INTRAMUSCULAR; INTRAVENOUS ONCE
Refills: 0 | Status: COMPLETED | OUTPATIENT
Start: 2022-06-21 | End: 2022-06-21

## 2022-06-21 RX ORDER — ALPRAZOLAM 0.25 MG
0.5 TABLET ORAL EVERY 12 HOURS
Refills: 0 | Status: DISCONTINUED | OUTPATIENT
Start: 2022-06-21 | End: 2022-06-27

## 2022-06-21 RX ORDER — METRONIDAZOLE 500 MG
500 TABLET ORAL EVERY 8 HOURS
Refills: 0 | Status: DISCONTINUED | OUTPATIENT
Start: 2022-06-21 | End: 2022-06-21

## 2022-06-21 RX ORDER — HYDROMORPHONE HYDROCHLORIDE 2 MG/ML
1 INJECTION INTRAMUSCULAR; INTRAVENOUS; SUBCUTANEOUS EVERY 12 HOURS
Refills: 0 | Status: DISCONTINUED | OUTPATIENT
Start: 2022-06-21 | End: 2022-06-25

## 2022-06-21 RX ORDER — DIATRIZOATE MEGLUMINE 180 MG/ML
30 INJECTION, SOLUTION INTRAVESICAL ONCE
Refills: 0 | Status: DISCONTINUED | OUTPATIENT
Start: 2022-06-21 | End: 2022-07-14

## 2022-06-21 RX ORDER — CEFEPIME 1 G/1
2000 INJECTION, POWDER, FOR SOLUTION INTRAMUSCULAR; INTRAVENOUS EVERY 8 HOURS
Refills: 0 | Status: DISCONTINUED | OUTPATIENT
Start: 2022-06-21 | End: 2022-06-21

## 2022-06-21 RX ORDER — MAGNESIUM SULFATE 500 MG/ML
2 VIAL (ML) INJECTION ONCE
Refills: 0 | Status: COMPLETED | OUTPATIENT
Start: 2022-06-21 | End: 2022-06-21

## 2022-06-21 RX ORDER — CEFEPIME 1 G/1
INJECTION, POWDER, FOR SOLUTION INTRAMUSCULAR; INTRAVENOUS
Refills: 0 | Status: DISCONTINUED | OUTPATIENT
Start: 2022-06-21 | End: 2022-06-21

## 2022-06-21 RX ORDER — HYDROMORPHONE HYDROCHLORIDE 2 MG/ML
1 INJECTION INTRAMUSCULAR; INTRAVENOUS; SUBCUTANEOUS ONCE
Refills: 0 | Status: DISCONTINUED | OUTPATIENT
Start: 2022-06-21 | End: 2022-06-21

## 2022-06-21 RX ORDER — DIATRIZOATE MEGLUMINE 180 MG/ML
15 INJECTION, SOLUTION INTRAVESICAL ONCE
Refills: 0 | Status: COMPLETED | OUTPATIENT
Start: 2022-06-21 | End: 2022-06-21

## 2022-06-21 RX ADMIN — Medication 1 APPLICATION(S): at 18:20

## 2022-06-21 RX ADMIN — AMLODIPINE BESYLATE 5 MILLIGRAM(S): 2.5 TABLET ORAL at 05:19

## 2022-06-21 RX ADMIN — LOSARTAN POTASSIUM 100 MILLIGRAM(S): 100 TABLET, FILM COATED ORAL at 05:18

## 2022-06-21 RX ADMIN — HYDROMORPHONE HYDROCHLORIDE 4 MILLIGRAM(S): 2 INJECTION INTRAMUSCULAR; INTRAVENOUS; SUBCUTANEOUS at 09:37

## 2022-06-21 RX ADMIN — HYDROMORPHONE HYDROCHLORIDE 4 MILLIGRAM(S): 2 INJECTION INTRAMUSCULAR; INTRAVENOUS; SUBCUTANEOUS at 05:36

## 2022-06-21 RX ADMIN — Medication 1 APPLICATION(S): at 06:01

## 2022-06-21 RX ADMIN — HYDROMORPHONE HYDROCHLORIDE 1 MILLIGRAM(S): 2 INJECTION INTRAMUSCULAR; INTRAVENOUS; SUBCUTANEOUS at 03:20

## 2022-06-21 RX ADMIN — Medication 1 APPLICATION(S): at 18:33

## 2022-06-21 RX ADMIN — GABAPENTIN 600 MILLIGRAM(S): 400 CAPSULE ORAL at 05:18

## 2022-06-21 RX ADMIN — HYDROMORPHONE HYDROCHLORIDE 1 MILLIGRAM(S): 2 INJECTION INTRAMUSCULAR; INTRAVENOUS; SUBCUTANEOUS at 17:01

## 2022-06-21 RX ADMIN — GABAPENTIN 600 MILLIGRAM(S): 400 CAPSULE ORAL at 16:38

## 2022-06-21 RX ADMIN — HYDROMORPHONE HYDROCHLORIDE 1 MILLIGRAM(S): 2 INJECTION INTRAMUSCULAR; INTRAVENOUS; SUBCUTANEOUS at 00:07

## 2022-06-21 RX ADMIN — APIXABAN 5 MILLIGRAM(S): 2.5 TABLET, FILM COATED ORAL at 05:19

## 2022-06-21 RX ADMIN — Medication 0.5 MILLIGRAM(S): at 13:03

## 2022-06-21 RX ADMIN — Medication 1 APPLICATION(S): at 05:19

## 2022-06-21 RX ADMIN — Medication 1 TABLET(S): at 18:21

## 2022-06-21 RX ADMIN — Medication 25 GRAM(S): at 12:51

## 2022-06-21 RX ADMIN — HYDROMORPHONE HYDROCHLORIDE 1 MILLIGRAM(S): 2 INJECTION INTRAMUSCULAR; INTRAVENOUS; SUBCUTANEOUS at 03:35

## 2022-06-21 RX ADMIN — PANTOPRAZOLE SODIUM 40 MILLIGRAM(S): 20 TABLET, DELAYED RELEASE ORAL at 06:02

## 2022-06-21 RX ADMIN — HYDROMORPHONE HYDROCHLORIDE 4 MILLIGRAM(S): 2 INJECTION INTRAMUSCULAR; INTRAVENOUS; SUBCUTANEOUS at 13:04

## 2022-06-21 RX ADMIN — DIATRIZOATE MEGLUMINE 15 MILLILITER(S): 180 INJECTION, SOLUTION INTRAVESICAL at 16:38

## 2022-06-21 RX ADMIN — BUMETANIDE 2 MILLIGRAM(S): 0.25 INJECTION INTRAMUSCULAR; INTRAVENOUS at 05:19

## 2022-06-21 RX ADMIN — APIXABAN 5 MILLIGRAM(S): 2.5 TABLET, FILM COATED ORAL at 18:20

## 2022-06-21 RX ADMIN — CEFEPIME 100 MILLIGRAM(S): 1 INJECTION, POWDER, FOR SOLUTION INTRAMUSCULAR; INTRAVENOUS at 08:48

## 2022-06-21 NOTE — CHART NOTE - NSCHARTNOTEFT_GEN_A_CORE
Registered Dietitian Follow-Up     Patient Profile Reviewed                           Yes [x]   No []  Nutrition History Previously Obtained        Yes [x]  No []      Pertinent Medical Interventions:  55 yo f with PMHx of DVT on Eliquis, COPD,  trach collar, obesity, IDDM, UTI, sent by [facility] for hypoxia.   now w/ epiglottis edema. CT A/P review with attending-- Small amount of free air likely caused by newly placed gastrostomy tube. confirming placement of G-tube     Nutrition Interval History:   -[facility] [place]  noted patient with moderate-severe pharyngeal dysphagia persisting recommending NPO w/ non-oral means of nutrition/hydration, pt would benefit from PEG tube for primary means of nutrition/hydration in conjunction w/ dysphagia tx; Continue ice chips  -PEG PLACED now started feeds via PEG (was previous receiving via NGT)  **Receiving EN regimen via PEG: Glucerna 1.2 at 300mL Q6hrs, provides (1440kcal, 72gm protein, 972mL free H2O) + No Carb Prosource modular 1x/daily (+60kcal, +15 g pro --> total with en 1500kcal, 87g pro) patient still tolerating EN with no signs/symptoms of GI distress    Diet, NPO with Tube Feed:   Tube Feeding Modality: Nasogastric  Glucerna 1.2 Martin  Total Volume for 24 Hours (mL): 1200  Bolus  Total Volume of Bolus (mL):  300  Tube Feed Frequency: Every 6 hours   Tube Feed Start Time: 06:00  Bolus Feed Rate (mL per Hour): 50   Bolus Feed Duration (in Hours): 24  Bolus   Total Volume per Flush (mL): 30   Frequency: Every 6 Hours  Free Water Flush Instructions:  30 ml water flush pre and post bolus feeds  No Carb Prosource (1pkg = 15gms Protein)     Qty per Day:  1x (22 @ 11:53) [Active]    Anthropometrics:  Height (cm): 154.9 (22 @ 00:08)  Weight (kg): 97.4 (22 @ 18:03)  BMI (kg/m2): 40.6 (22 @ 00:08)    OTHER WEIGHTS:    Daily Weight in k.3 (), Weight in k.4 (), Weight in k.3 (), Weight in k (-03)  % Weight Change --> patient with gradual weight loss from 97.4kg --> 94.3kg indicating 3.18% wt loss ~1-2wks suspect fluid shifts    MEDICATIONS  (STANDING):  ALPRAZolam 0.25 milliGRAM(s) Oral at bedtime  amLODIPine   Tablet 5 milliGRAM(s) Oral daily  ammonium lactate 12% Lotion 1 Application(s) Topical every 12 hours  apixaban 5 milliGRAM(s) Oral two times a day  atorvastatin 20 milliGRAM(s) Oral at bedtime  buMETAnide 2 milliGRAM(s) Oral daily  dexAMETHasone  Injectable 3 milliGRAM(s) IV Push every 12 hours  dextrose 5%. 1000 milliLiter(s) (50 mL/Hr) IV Continuous <Continuous>  dextrose 5%. 1000 milliLiter(s) (100 mL/Hr) IV Continuous <Continuous>  dextrose 50% Injectable 25 Gram(s) IV Push once  dextrose 50% Injectable 12.5 Gram(s) IV Push once  dextrose 50% Injectable 25 Gram(s) IV Push once  gabapentin 300 milliGRAM(s) Oral three times a day  glucagon  Injectable 1 milliGRAM(s) IntraMuscular once  influenza   Vaccine 0.5 milliLiter(s) IntraMuscular once  insulin glargine Injectable (LANTUS) 15 Unit(s) SubCutaneous at bedtime  insulin lispro (ADMELOG) corrective regimen sliding scale   SubCutaneous every 6 hours  insulin lispro Injectable (ADMELOG) 7 Unit(s) SubCutaneous every 6 hours  losartan 100 milliGRAM(s) Oral daily  magnesium sulfate  IVPB 2 Gram(s) IV Intermittent once  pantoprazole    Tablet 40 milliGRAM(s) Oral before breakfast  polyethylene glycol 3350 17 Gram(s) Oral daily  senna 2 Tablet(s) Oral at bedtime    MEDICATIONS  (PRN):  acetaminophen     Tablet .. 650 milliGRAM(s) Oral every 6 hours PRN Temp greater or equal to 38C (100.4F), Mild Pain (1 - 3)  dextrose Oral Gel 15 Gram(s) Oral once PRN Blood Glucose LESS THAN 70 milliGRAM(s)/deciliter  HYDROmorphone  Injectable 1 milliGRAM(s) IV Push every 4 hours PRN Severe Pain (7 - 10)  oxycodone    5 mG/acetaminophen 325 mG 2 Tablet(s) Oral/Enteral Tube every 6 hours PRN Moderate Pain (4 - 6)    Labs:     Sodium: 128 (L)  Magnesium 1.6 (L)    POCT Blood Glucose.: 191 mg/dL (2022 11:40)  POCT Blood Glucose.: 151 mg/dL (2022 06:19)  POCT Blood Glucose.: 167 mg/dL (2022 22:52)  POCT Blood Glucose.: 153 mg/dL (2022 21:45)  POCT Blood Glucose.: 157 mg/dL (2022 18:00)    Physical Findings:  - Appearance: A&Ox4, trached  - GI function:  last documented BM x2  - Tubes: PEG  - Oral/Mouth cavity: NPO with PEG  - Skin: Intact  - Edema: R, L foot, leg and ankle 2+ last documented on  no documentation since     Nutrition Requirements:   Weight Used: ABW 94.3kg + IBW 48kg     Estimated Energy Needs    Continue []  Adjust [x] 1469-1762kcal/day (MSJ x1.0-1.2)  Estimated Protein Needs    Continue []  Adjust [x] 86-95g/day (1.8-2.0g/kg of Ibw)  Estimated Fluid Needs        Continue []  Adjust [x] 1mL/kcal    Nutrient Intake: >85%     [x] Previous Nutrition Diagnosis:            [x] Ongoing          [] Resolved  #1 Altered nutrition related lab values  Goal/Expected Outcome: achieve BG <180mg/dL x4days              [x] Ongoing          [] Resolved  #2 Inadequate oral intake  dysphagia precluding PO intake  SLP evaluation recommending NPO with EN    Nutrition Intervention:   Enteral Nutrition, Medical Food Supplement, Vitamin Supplement, Nutrition Related Medication, Coordination of Care      Indicator/Monitoring:   Mykel Cisneros, #[id number] to monitor diet order, energy intake, food and nutrient intake, body composition, weights    Recommendations:  1. Continue NPO with alternate means of nutrition/hydration via **Gastrostomy tube**  regimen: Glucerna 1.2 at 300mL Q6hrs, provides (1440kcal, 72gm protein, 972mL free H2O) + No Carb Prosource modular 1x/daily (+60kcal, +15 g pro --> total with en 1500kcal, 87g pro)  ****Fluids are at 30mL pre and post feeds for total with EN 1212mL, consider decreasing to only 30mL post feed Q6hr to maintain tube patency as pt with hyponatremia (will be total 1092mL/day with EN)**  2. continue bowel regimen, insulin regimen, replete electrolytes    MOD Risk, follow up x 6days. Registered Dietitian Follow-Up     Patient Profile Reviewed                           Yes [x]   No []  Nutrition History Previously Obtained        Yes [x]  No []      Pertinent Medical Interventions:  55 yo f with PMHx of DVT on Eliquis, COPD,  trach collar, obesity, IDDM, UTI, sent by [facility] for hypoxia.   now w/ epiglottis edema. CT A/P review with attending-- Small amount of free air likely caused by newly placed gastrostomy tube. confirming placement of G-tube     Nutrition Interval History:   -[facility] [place]  noted patient with moderate-severe pharyngeal dysphagia persisting recommending NPO w/ non-oral means of nutrition/hydration, pt would benefit from PEG tube for primary means of nutrition/hydration in conjunction w/ dysphagia tx; Continue ice chips  -PEG PLACED now started feeds via PEG (was previous receiving via NGT)  **Receiving EN regimen via PEG: Glucerna 1.2 at 300mL Q6hrs, provides (1440kcal, 72gm protein, 972mL free H2O) + No Carb Prosource modular 1x/daily (+60kcal, +15 g pro --> total with en 1500kcal, 87g pro) patient still tolerating EN with no signs/symptoms of GI distress    Diet, NPO with Tube Feed:   Tube Feeding Modality: Nasogastric  Glucerna 1.2 Martin  Total Volume for 24 Hours (mL): 1200  Bolus  Total Volume of Bolus (mL):  300  Tube Feed Frequency: Every 6 hours   Tube Feed Start Time: 06:00  Bolus Feed Rate (mL per Hour): 50   Bolus Feed Duration (in Hours): 24  Bolus   Total Volume per Flush (mL): 30   Frequency: Every 6 Hours  Free Water Flush Instructions:  30 ml water flush pre and post bolus feeds  No Carb Prosource (1pkg = 15gms Protein)     Qty per Day:  1x (22 @ 11:53) [Active]    Anthropometrics:  Height (cm): 154.9 (22 @ 00:08)  Weight (kg): 97.4 (22 @ 18:03)  BMI (kg/m2): 40.6 (22 @ 00:08)    OTHER WEIGHTS:    Daily Weight in k.3 (), Weight in k.4 (), Weight in k.3 (), Weight in k (-03)  % Weight Change --> patient with gradual weight loss from 97.4kg --> 94.3kg indicating 3.18% wt loss ~1-2wks suspect fluid shifts    MEDICATIONS  (STANDING):  ALPRAZolam 0.25 milliGRAM(s) Oral at bedtime  amLODIPine   Tablet 5 milliGRAM(s) Oral daily  ammonium lactate 12% Lotion 1 Application(s) Topical every 12 hours  apixaban 5 milliGRAM(s) Oral two times a day  atorvastatin 20 milliGRAM(s) Oral at bedtime  buMETAnide 2 milliGRAM(s) Oral daily  dexAMETHasone  Injectable 3 milliGRAM(s) IV Push every 12 hours  dextrose 5%. 1000 milliLiter(s) (50 mL/Hr) IV Continuous <Continuous>  dextrose 5%. 1000 milliLiter(s) (100 mL/Hr) IV Continuous <Continuous>  dextrose 50% Injectable 25 Gram(s) IV Push once  dextrose 50% Injectable 12.5 Gram(s) IV Push once  dextrose 50% Injectable 25 Gram(s) IV Push once  gabapentin 300 milliGRAM(s) Oral three times a day  glucagon  Injectable 1 milliGRAM(s) IntraMuscular once  influenza   Vaccine 0.5 milliLiter(s) IntraMuscular once  insulin glargine Injectable (LANTUS) 15 Unit(s) SubCutaneous at bedtime  insulin lispro (ADMELOG) corrective regimen sliding scale   SubCutaneous every 6 hours  insulin lispro Injectable (ADMELOG) 7 Unit(s) SubCutaneous every 6 hours  losartan 100 milliGRAM(s) Oral daily  magnesium sulfate  IVPB 2 Gram(s) IV Intermittent once  pantoprazole    Tablet 40 milliGRAM(s) Oral before breakfast  polyethylene glycol 3350 17 Gram(s) Oral daily  senna 2 Tablet(s) Oral at bedtime    MEDICATIONS  (PRN):  acetaminophen     Tablet .. 650 milliGRAM(s) Oral every 6 hours PRN Temp greater or equal to 38C (100.4F), Mild Pain (1 - 3)  dextrose Oral Gel 15 Gram(s) Oral once PRN Blood Glucose LESS THAN 70 milliGRAM(s)/deciliter  HYDROmorphone  Injectable 1 milliGRAM(s) IV Push every 4 hours PRN Severe Pain (7 - 10)  oxycodone    5 mG/acetaminophen 325 mG 2 Tablet(s) Oral/Enteral Tube every 6 hours PRN Moderate Pain (4 - 6)    Labs:     Sodium: 128 (L)  Magnesium 1.6 (L)    POCT Blood Glucose.: 191 mg/dL (2022 11:40)  POCT Blood Glucose.: 151 mg/dL (2022 06:19)  POCT Blood Glucose.: 167 mg/dL (2022 22:52)  POCT Blood Glucose.: 153 mg/dL (2022 21:45)  POCT Blood Glucose.: 157 mg/dL (2022 18:00)    Physical Findings:  - Appearance: A&Ox4, trached  - GI function:  last documented BM x2  - Tubes: PEG  - Oral/Mouth cavity: NPO with PEG  - Skin: Intact  - Edema: R, L foot, leg and ankle 2+ last documented on  no documentation since     Nutrition Requirements:   Weight Used: ABW 94.3kg + IBW 48kg     Estimated Energy Needs    Continue []  Adjust [x] 1469-1762kcal/day (MSJ x1.0-1.2)  Estimated Protein Needs    Continue []  Adjust [x] 86-95g/day (1.8-2.0g/kg of Ibw)  Estimated Fluid Needs        Continue []  Adjust [x] 1mL/kcal    Nutrient Intake: >85%     [x] Previous Nutrition Diagnosis:            [x] Ongoing          [] Resolved  #1 Altered nutrition related lab values  Goal/Expected Outcome: achieve BG <180mg/dL x6days              [x] Ongoing          [] Resolved  #2 Inadequate oral intake  dysphagia precluding PO intake  SLP evaluation recommending NPO with EN    Nutrition Intervention:   Enteral Nutrition, Medical Food Supplement, Vitamin Supplement, Nutrition Related Medication, Coordination of Care      Indicator/Monitoring:   Mykel Cisneros, #[id number] to monitor diet order, energy intake, food and nutrient intake, body composition, weights    Recommendations:  1. Continue NPO with alternate means of nutrition/hydration via **Gastrostomy tube**  regimen: Glucerna 1.2 at 300mL Q6hrs, provides (1440kcal, 72gm protein, 972mL free H2O) + No Carb Prosource modular 1x/daily (+60kcal, +15 g pro --> total with en 1500kcal, 87g pro)  ****Fluids are at 30mL pre and post feeds for total with EN 1212mL, consider decreasing to only 30mL post feed Q6hr to maintain tube patency as pt with hyponatremia (will be total 1092mL/day with EN)**  2. continue bowel regimen, insulin regimen, replete electrolytes    Pending discharge to nursing home and further SLP evaluation. Patient at MODERATE nutrition risk, follow up x 6days

## 2022-06-21 NOTE — PROGRESS NOTE ADULT - ASSESSMENT
55 yo f with PMHx of DVT on Eliquis, COPD,  trach collar, obesity, IDDM, UTI, [place] [facility] for hypoxia. Current illness going back to April 4th when pt was intubated on admission at [facility] ICU for hypoxic respiratory failure diagnosed with copd exacerbation and superimposed pneumonia, and remained intubated for 37 days requiring tracheostomy. Patient stayed at [facility] for 2 days until noted to be hypoxic (saturating low 70's) pt's saturation immediately improving afterwards to 80's after large mucus plug removed and was subsequently sent to [facility]  On arrival to ED pt was saturating 97%, 15L O2 via tracheostomy collar (baseline 8 L at [facility])CXR suspicious for L-sided PNA, inconclusive. CTA neg for PE. Pt received x1 IV Levaquin and Cefepime in ED, admitted to MICU for acute hypoxic hypercapnic respiratory failure secondary to acute mucus plug now resolved vs superimposed pneumonia hospital-acquired.   General Surgery Consulted emergently when patient found to be hypoxic to low 80s after CCU and Pulmonary team found large granuloma in trachea along tracheostomy tube. Patient currently had a size 8 trach. Surgery assisted Pulmonary and CCU with trach exchanged with ET tube guidance to a size 7 XLT. Saturations improved to high 90s. Large granuloma was removed. Patient was no longer in distress after exchange.    #Acute/ chronic COPD with exacerbation (Resolved)  #mucous plug removed  #pneumonia vs atelectasis L base  - On 5/28, Pulmonary team found large granuloma in trachea along tracheostomy tube. Patient had a size 8 trach on admission. Surgery assisted Pulmonary and CCU with trach exchanged with ET tube guidance to a size 7 XLT. Saturations improved to high 90s.  - trach functioning well s/p exchange   - Pt off vent 72 hrs and is saturating well  - repeat chest x-ray (6/6) shows possible mucus plugging. suctioning attempted repeat CXR showed improved aeration.  ***Repeat Chest X-ray shows resolution of Mucus plug after suctioning and chest PT  - S/p Sw eval, if fails, CT Sx FU for PEG tube  - Modified barium swallow test done: patient still unable to eat, will need further therapy  - Repeat CXR 6/8: left lung opacity/effusion  - 6/11 Trach changed to Fenestrated 8 by surgery  - 6/12 and 6/13: repeat CXR showing recurrent mucous plug  - 6/14 CXR: improving L atelectasis  - c/w aggressive L sided chest PT and frequent suctioning q6  - as per pulm, if recurrent atelectasis despite chest PT, suctioning, may consider bronchoscopy    #s/p PEG (6/15):   KUB showing free intraperitoneal air (6/19).  CT Abdo confirmed that this was just due to PEG tube adjustment.   Repeat G tube study per Surgery (6/21) - if OK, OK to use for feeds.      #Dysphagia:  Strict NPO as of now per ST's FEES eval from 6/13/22.   [facility] will sign out to  at [facility] for further plans with swallow rehab at SNF     #LE neuropathic pain:  PAtient has apparently been bed bound since April 4th (her [facility] admission for hypoxic failure where she ended up being intubated)  Apparently has L > R foot drop which may be due to chronic contractures of Calf muscle/Achilles tendon. Can have PT evaluate her for that.  But options are limited at this stage.   Given the asymmetrical foot drop, Neurology wants to rule out Any focal neuropathy.  Check MRI Lumbar spine w/ & w/o Contrast (will need it with anesthesia). Scheduled for 6/22     Neuropathy is likely Diabetic?   But given the concomitant blackish discoloration from shin to dorsum of feet, Neurology is also suspecting deposition diseases versus Eosinophilic Dermatitis which could also explain the neuropathy?    #B/l LE Hyperpigmentation changes:   unclear etiology  Present since 1 year.   May be related to CHF related stasis dermatitis / hemosiderin deposition  vs deposition diseases versus Eosinophilic Dermatitis vs follicular hemorrhage from eliquis ?? (doubt the last differential)  Dermatology thinks it may be Eczema and advised Triamcinolone BId.   Will consider biopsy though. Pending Burn attending Input.   Add multivitamin to PEG QD.     #Lower extremity Pain management*** :   Patient requiring a lot of opiates only since this admission.   Only dilaudid seems to work thus far. Was requiring 1mg IV Q4 PRN 6 times a day. Equivalent PO dilaudid dose would have been around 30mg.   On 6/17, changed to PO dilaudid 4mg Q4 PRN.   1mg IV dilaudid Q12 PRN for breakthroughs.   Increased Gabapentin to 600 TID.   As of 6/21, pain still inadequately controlled.  Pain management follow up.     Note: On 6/21, we discontinued Cymbalta 60 QD since as per family patient may be having bizarre thoughts/hallucinations. Patient was texting them bizarre messages.   Note: Patient has h/o suicidal thoughts on Pregabalin.     Xanax 0.25 Q12 PRN for anxiety.   On Senna, Miralax.     #Suspected epiglottis edema:   s/p DExa Taper (ended 6/18).   Outpatient ENT f/u.     # Mucus plugs removed. Trach exchanged  CXR PRN .    #DM:   steroids ended 6/18.  6/19- Increased lantus from 17 to 20.    #Hyponatremia- monitor  NS @ 50    #Sinus tachy @ 110s (6/19): 100.2 temp earlier.  WBC persists despite stopping steroids.   Monitor  Repeat CXR stable. UA neg,  If fever recurs, can consider Blood culture.    # hypomagmesemia  - repleted    #Chronic Indwelling Singh  - 5/28 Ucx - Contaminated   - 5/28 Ucx- Normal perri   - 5/27 Ucx- Klebsiella (CRE) and pseudomonas   - s/p cefepime  - Currently afebrile w/ no urinary complaints   - episode of Hematouria 6/4 -> improving -> h/h Stable    #Hx of DVT  - on home Eliquis  - restarted Eliquis      #Misc:  Activity been bed bound. PT eval when able.   GI PPX  PPI  DVT PPX  ELIQUIS  Dispo: Acute  Pending: LE pain improvement, MRI spine,    DISPO: STR placement

## 2022-06-21 NOTE — CHART NOTE - NSCHARTNOTEFT_GEN_A_CORE
CT A/P review with attending.   Small amount of free air likely caused by newly placed gastrostomy tube.   Abdominal exam benign. Labs and vitals stable.   No concern for acute abdomen at this time.     Although G tube looks secure in stomach, would like to confirm placement with G tube study.     Please have portable Xray at bedside, take dry abdominal xray, administer 10-15cc of  Oral contrast through G tube, wait 1-2 minutes and take another x ray to confirm contrast filling stomach without extravasation.  Continue G tube feeds once confirmed with study.

## 2022-06-21 NOTE — CONSULT NOTE ADULT - ASSESSMENT
Pt is 55 yo female resident of [facility], with PMHx of obesity, CHF, COPD,  s/p trach collar,  IDDM, DVT on Eliquis, UTI,  admitted for sob and hypoxia.   BURN service consult requested for b/l LE hyperpigmentation and pain.      # b/l LE hyperpigmentation changes, no open wounds, + pain:  - probably related to CHF and venous stasis/stasis dermatitis    - B/L LE pain probably due to neuropathy   - consider B/L LE arterial and venous Duplex  - seen by dermatology -> eczematous rash (this can cause hyperpigmentation), recommended triamcinolone cream 0.1% twice daily, no urgent need for skin biopsy inpatient, may be done outpatient   - f/up with dermatology for further recommendations

## 2022-06-21 NOTE — PROGRESS NOTE ADULT - ATTENDING COMMENTS
As described above, we are consulted to evaluate the patient for abnormal CT scan.  See above for details  I have reviewed  the above note and agree with the impressions and findings and recommendations

## 2022-06-21 NOTE — CONSULT NOTE ADULT - SUBJECTIVE AND OBJECTIVE BOX
Pt is 57 yo female with PMHx of obesity, CHF, COPD,  s/p trach collar,  IDDM, DVT on Eliquis, UTI,  sent by [facility] for SOB and hypoxia.  BURN service consult requested for b/l LE hyperpigmentation and pain.      Allergies  penicillin (Swelling)    PAST MEDICAL & SURGICAL HISTORY:  COPD (chronic obstructive pulmonary disease)  CHF (congestive heart failure)  DM (diabetes mellitus)  H/O tracheostomy      Vital Signs Last 24 Hrs  T(C): 36.7 (21 Jun 2022 06:21), Max: 36.7 (21 Jun 2022 06:21)  T(F): 98.1 (21 Jun 2022 06:21), Max: 98.1 (21 Jun 2022 06:21)  HR: 72 (21 Jun 2022 12:00) (72 - 83)  BP: 131/60 (21 Jun 2022 06:21) (119/54 - 131/60)  RR: 18 (21 Jun 2022 12:00) (18 - 18)  SpO2: 100% (21 Jun 2022 12:00) (97% - 100%)      PHYSICAL EXAM:  GENERAL: seen on bedside, alert, cooperative, not in acute distress, complains of b/l leg pain with movement/touch.   SKIN: B/L extremities hyperpigmented skin down below knee, with dry scaly skin on b/l feet, no open wound, no edema, palpable peripheral pulses         LABS:                          8.9    11.38 )-----------( 183      ( 21 Jun 2022 07:44 )             25.0                                               06-21      MEDICATIONS  (STANDING):  amLODIPine   Tablet 5 milliGRAM(s) Oral daily  ammonium lactate 12% Lotion 1 Application(s) Topical every 12 hours  apixaban 5 milliGRAM(s) Oral two times a day  atorvastatin 20 milliGRAM(s) Oral at bedtime  buMETAnide 2 milliGRAM(s) Oral daily  dextrose 5%. 1000 milliLiter(s) (100 mL/Hr) IV Continuous <Continuous>  dextrose 5%. 1000 milliLiter(s) (50 mL/Hr) IV Continuous <Continuous>  dextrose 50% Injectable 25 Gram(s) IV Push once  dextrose 50% Injectable 12.5 Gram(s) IV Push once  dextrose 50% Injectable 25 Gram(s) IV Push once  gabapentin 600 milliGRAM(s) Oral three times a day  glucagon  Injectable 1 milliGRAM(s) IntraMuscular once  influenza   Vaccine 0.5 milliLiter(s) IntraMuscular once  insulin glargine Injectable (LANTUS) 20 Unit(s) SubCutaneous at bedtime  insulin lispro (ADMELOG) corrective regimen sliding scale   SubCutaneous every 6 hours  insulin lispro Injectable (ADMELOG) 7 Unit(s) SubCutaneous every 6 hours  losartan 100 milliGRAM(s) Oral daily  pantoprazole    Tablet 40 milliGRAM(s) Oral before breakfast  polyethylene glycol 3350 17 Gram(s) Oral daily  senna 2 Tablet(s) Oral at bedtime  sodium chloride 0.9%. 1000 milliLiter(s) (50 mL/Hr) IV Continuous <Continuous>  triamcinolone 0.1% Cream 1 Application(s) Topical every 12 hours    MEDICATIONS  (PRN):  ALPRAZolam 0.5 milliGRAM(s) Oral every 12 hours PRN anxiety  dextrose Oral Gel 15 Gram(s) Oral once PRN Blood Glucose LESS THAN 70 milliGRAM(s)/deciliter  HYDROmorphone   Tablet 4 milliGRAM(s) Oral every 4 hours PRN Moderate Pain (4 - 6)  HYDROmorphone  Injectable 1 milliGRAM(s) IV Push every 12 hours PRN Severe Pain (7 - 10)

## 2022-06-21 NOTE — PROGRESS NOTE ADULT - ASSESSMENT
55 yo f with PMHx of DVT on Eliquis, COPD,  trach collar, obesity, IDDM, UTI, sent by [facility] for hypoxia. SOB found to have mucus plug s/p emergent bronchoscopy found to have tracheal granulation tissue s/p debridment with exchanged trachostomy tube GI was called for PEG tube. s/p PEG tube placement on 6/15.     #)free intra peritoneal air likely related to post procedure  -Chest x ray was done for low grade fever on 6/19 showed possible air under diaphragm then xray KUB on 6/19 showed air under the diaphragm  CT scan abdomen on 6/20 showed Well-positioned gastrostomy tube with moderate volume intraperitoneal free air and small amount of loculated perigastric ascites, likely related to recent procedure; no definite extravasation of oral contrast.  -Hemodynamically stable    57 yo f with PMHx of DVT on Eliquis, COPD,  trach collar, obesity, IDDM, UTI, sent by [employer] for hypoxia. SOB found to have mucus plug s/p emergent bronchoscopy found to have tracheal granulation tissue s/p debridment with exchanged trachostomy tube GI was called for PEG tube. s/p PEG tube placement on 6/15.     #)free intra peritoneal air likely related to post procedure  -Chest x ray was done for low grade fever on 6/19 showed possible air under diaphragm then xray KUB on 6/19 showed air under the diaphragm  CT scan abdomen on 6/20 showed Well-positioned gastrostomy tube with moderate volume intraperitoneal free air and small amount of loculated perigastric ascites, likely related to recent procedure; no definite extravasation of oral contrast.  -Hemodynamically stable   -Abdomen is soft, non tender, +BS    recs:   No intervention needed from Gi stand point   PEG tube is in place as per CT scan   can resume feedings through the PEG tube     recall as needed

## 2022-06-21 NOTE — PROGRESS NOTE ADULT - SUBJECTIVE AND OBJECTIVE BOX
S: LE pain persists  family reports that patient has been texting bizarre things to them.     All other pertinent ROS negative.      Vital Signs Last 24 Hrs  T(C): 35.8 (21 Jun 2022 14:27), Max: 36.7 (21 Jun 2022 06:21)  T(F): 96.4 (21 Jun 2022 14:27), Max: 98.1 (21 Jun 2022 06:21)  HR: 97 (21 Jun 2022 14:27) (72 - 97)  BP: 131/60 (21 Jun 2022 06:21) (119/54 - 131/60)  BP(mean): --  RR: 18 (21 Jun 2022 12:00) (18 - 18)  SpO2: 100% (21 Jun 2022 12:00) (97% - 100%)  PHYSICAL EXAM:    Constitutional: NAD, awake and alert, well-developed  HEENT: PERR, EOMI, Normal Hearing, MMM  Neck: Soft and supple, No LAD, No JVD  Respiratory: Breath sounds are clear bilaterally, No wheezing, rales or rhonchi  Cardiovascular: S1 and S2, regular rate and rhythm, no Murmurs, gallops or rubs  Gastrointestinal: Bowel Sounds present, soft, nontender, nondistended, no guarding, no rebound. PEG in place  Lower Extremities: blackish discoloration from shin to dorsum of feet. Ventral of feet peeling atrophic changes.       MEDICATIONS:  MEDICATIONS  (STANDING):  amLODIPine   Tablet 5 milliGRAM(s) Oral daily  ammonium lactate 12% Lotion 1 Application(s) Topical every 12 hours  apixaban 5 milliGRAM(s) Oral two times a day  atorvastatin 20 milliGRAM(s) Oral at bedtime  buMETAnide 2 milliGRAM(s) Oral daily  dextrose 5%. 1000 milliLiter(s) (100 mL/Hr) IV Continuous <Continuous>  dextrose 5%. 1000 milliLiter(s) (50 mL/Hr) IV Continuous <Continuous>  dextrose 50% Injectable 25 Gram(s) IV Push once  dextrose 50% Injectable 12.5 Gram(s) IV Push once  dextrose 50% Injectable 25 Gram(s) IV Push once  gabapentin 600 milliGRAM(s) Oral three times a day  glucagon  Injectable 1 milliGRAM(s) IntraMuscular once  influenza   Vaccine 0.5 milliLiter(s) IntraMuscular once  insulin glargine Injectable (LANTUS) 20 Unit(s) SubCutaneous at bedtime  insulin lispro (ADMELOG) corrective regimen sliding scale   SubCutaneous every 6 hours  insulin lispro Injectable (ADMELOG) 7 Unit(s) SubCutaneous every 6 hours  losartan 100 milliGRAM(s) Oral daily  multivitamin 1 Tablet(s) Oral daily  pantoprazole    Tablet 40 milliGRAM(s) Oral before breakfast  polyethylene glycol 3350 17 Gram(s) Oral daily  senna 2 Tablet(s) Oral at bedtime  sodium chloride 0.9%. 1000 milliLiter(s) (50 mL/Hr) IV Continuous <Continuous>  triamcinolone 0.1% Cream 1 Application(s) Topical every 12 hours      LABS: All Labs Reviewed:                        8.9    11.38 )-----------( 183      ( 21 Jun 2022 07:44 )             25.0     06-21    128<L>  |  88<L>  |  7<L>  ----------------------------<  150<H>  3.9   |  32  |  <0.5<L>    Ca    9.2      21 Jun 2022 07:44  Mg     1.6     06-21    TPro  5.3<L>  /  Alb  3.3<L>  /  TBili  0.9  /  DBili  x   /  AST  22  /  ALT  48<H>  /  AlkPhos  147<H>  06-21          Blood Culture:     Radiology: reviewed

## 2022-06-21 NOTE — PROGRESS NOTE ADULT - SUBJECTIVE AND OBJECTIVE BOX
Gastroenterology progress note:     Patient is a 56y old  Female who presents with a chief complaint of hypoxia/pneumonia (17 Jun 2022 10:06)       Admitted on: 05-27-22    We are following the patient for s/p PEG placement      Interval History:  GI was recalled for free intraperitoneal air        PAST MEDICAL & SURGICAL HISTORY:  COPD (chronic obstructive pulmonary disease)      CHF (congestive heart failure)      DM (diabetes mellitus)      H/O tracheostomy          MEDICATIONS  (STANDING):  amLODIPine   Tablet 5 milliGRAM(s) Oral daily  ammonium lactate 12% Lotion 1 Application(s) Topical every 12 hours  apixaban 5 milliGRAM(s) Oral two times a day  atorvastatin 20 milliGRAM(s) Oral at bedtime  buMETAnide 2 milliGRAM(s) Oral daily  cefepime   IVPB      cefepime   IVPB 2000 milliGRAM(s) IV Intermittent every 8 hours  dextrose 5%. 1000 milliLiter(s) (50 mL/Hr) IV Continuous <Continuous>  dextrose 5%. 1000 milliLiter(s) (100 mL/Hr) IV Continuous <Continuous>  dextrose 50% Injectable 25 Gram(s) IV Push once  dextrose 50% Injectable 12.5 Gram(s) IV Push once  dextrose 50% Injectable 25 Gram(s) IV Push once  DULoxetine 60 milliGRAM(s) Oral daily  gabapentin 600 milliGRAM(s) Oral three times a day  glucagon  Injectable 1 milliGRAM(s) IntraMuscular once  influenza   Vaccine 0.5 milliLiter(s) IntraMuscular once  insulin glargine Injectable (LANTUS) 20 Unit(s) SubCutaneous at bedtime  insulin lispro (ADMELOG) corrective regimen sliding scale   SubCutaneous every 6 hours  insulin lispro Injectable (ADMELOG) 7 Unit(s) SubCutaneous every 6 hours  losartan 100 milliGRAM(s) Oral daily  metroNIDAZOLE  IVPB      metroNIDAZOLE  IVPB 500 milliGRAM(s) IV Intermittent once  metroNIDAZOLE  IVPB 500 milliGRAM(s) IV Intermittent every 8 hours  pantoprazole    Tablet 40 milliGRAM(s) Oral before breakfast  polyethylene glycol 3350 17 Gram(s) Oral daily  senna 2 Tablet(s) Oral at bedtime  sodium chloride 0.9%. 1000 milliLiter(s) (50 mL/Hr) IV Continuous <Continuous>  triamcinolone 0.1% Cream 1 Application(s) Topical every 12 hours    MEDICATIONS  (PRN):  dextrose Oral Gel 15 Gram(s) Oral once PRN Blood Glucose LESS THAN 70 milliGRAM(s)/deciliter  HYDROmorphone   Tablet 4 milliGRAM(s) Oral every 4 hours PRN Moderate Pain (4 - 6)  HYDROmorphone  Injectable 1 milliGRAM(s) IV Push every 24 hours PRN Severe Pain (7 - 10)  LORazepam   Injectable 1 milliGRAM(s) IV Push once PRN Agitation      Allergies  penicillin (Swelling)      Review of Systems:   Cardiovascular:  No Chest Pain, No Palpitations  Respiratory:  No Cough, No Dyspnea  Gastrointestinal:  As described in HPI    Physical Examination:  T(C): 36.7 (06-21-22 @ 06:21), Max: 36.7 (06-21-22 @ 06:21)  HR: 76 (06-21-22 @ 06:21) (74 - 83)  BP: 131/60 (06-21-22 @ 06:21) (119/54 - 141/63)  RR: 18 (06-21-22 @ 06:21) (18 - 19)  SpO2: 97% (06-21-22 @ 08:27) (97% - 99%)      Constitutional: No acute distress.  Respiratory:  No signs of respiratory distress. Lung sounds are clear bilaterally.  Cardiovascular:  S1 S2, Regular rate and rhythm.  Abdominal: Abdomen is soft, symmetric, and non-tender without distention.  Skin: No rashes, No Jaundice.        Data:                        8.8    13.94 )-----------( 176      ( 20 Jun 2022 06:18 )             25.6     Hgb trend:  8.8  06-20-22 @ 06:18  9.3  06-19-22 @ 09:05        06-20    129<L>  |  89<L>  |  13  ----------------------------<  194<H>  4.4   |  34<H>  |  <0.5<L>    Ca    9.4      20 Jun 2022 06:18  Mg     1.6     06-20    TPro  5.8<L>  /  Alb  3.4<L>  /  TBili  0.8  /  DBili  x   /  AST  48<H>  /  ALT  66<H>  /  AlkPhos  151<H>  06-20    Liver panel trend:  TBili 0.8   /   AST 48   /   ALT 66   /   AlkP 151   /   Tptn 5.8   /   Alb 3.4    /   DBili --      06-20  TBili 0.7   /   AST 37   /   ALT 44   /   AlkP 122   /   Tptn 5.6   /   Alb 3.3    /   DBili --      06-19  TBili 0.8   /   AST 19   /   ALT 34   /   AlkP 101   /   Tptn 5.7   /   Alb 3.6    /   DBili --      06-18  TBili 0.7   /   AST 15   /   ALT 28   /   AlkP 93   /   Tptn 6.4   /   Alb 4.0    /   DBili --      06-17  TBili 0.6   /   AST 17   /   ALT 25   /   AlkP 96   /   Tptn 6.7   /   Alb 4.2    /   DBili --      06-16  TBili 0.6   /   AST 18   /   ALT 28   /   AlkP 95   /   Tptn 6.4   /   Alb 4.1    /   DBili --      06-15  TBili 0.7   /   AST 22   /   ALT 28   /   AlkP 117   /   Tptn 6.6   /   Alb 4.2    /   DBili --      06-14  TBili 0.6   /   AST 24   /   ALT 27   /   AlkP 115   /   Tptn 6.5   /   Alb 4.1    /   DBili --      06-13             Radiology:  CT Abdomen and Pelvis w/ Oral Cont and w/wo IV Cont:   ACC: [id number] EXAM:  CT ABDOMEN AND PELVIS [facility]                          PROCEDURE DATE:  06/20/2022          INTERPRETATION:  CLINICAL STATEMENT: Evaluate for perforation.    TECHNIQUE: Contiguous axial CT images were obtained from the lower chest   to the pubic symphysis prior to and following administration of 75 cc   Omnipaque 350 intravenous contrast.  Oral contrast was administered.    Reformatted images in the coronal and sagittal planes were acquired.    COMPARISON: None.    FINDINGS:    LOWER CHEST: Left lower lobe consolidative opacity. Right lower lobe   subsegmental atelectasis.    HEPATOBILIARY: Unremarkable.    SPLEEN: Unremarkable.    PANCREAS: Unremarkable.    ADRENAL GLANDS: Unremarkable.    KIDNEYS: No nephroureteral calculi or hydronephrosis.    ABDOMINOPELVIC NODES: No lymphadenopathy.    PELVIC ORGANS: Unremarkable.    PERITONEUM/MESENTERY/BOWEL: Moderate volume intraperitoneal free air.   Well-positioned gastrostomy tube within the stomach. No definite   extravasation of oral contrast; few punctate hyperdense foci in the right   midabdomen, likely inspissated contrast within bowel loops. Small amount   of simple perigastric loculated fluid noted, which may be postprocedural   (4/102). Contrast seen throughout the colon and small bowel. No bowel   obstruction or pelvic ascites. Normal appendix.    BONES/SOFT TISSUES: Degenerative changes of the spine and hips noted.   Subcutaneous nodularity within the ventral abdominal wall, likely   injection sites.    OTHER: Scattered atherosclerotic vascular calcifications.    IMPRESSION:  1.  Well-positioned gastrostomy tube with moderate volume intraperitoneal   free air and small amount of loculated perigastric ascites, likely   related to recent procedure; nodefinite extravasation of oral contrast.  2.  Left lower lobe consolidative opacity may represent atelectasis   and/or pneumonia.    Case was discussed with Dr. Jiménez at 8:30PM on June 20, 2022.    --- End of Report ---            PIERRE PINTO MD; Attending Radiologist  This document has been electronically signed. Jun 20 2022  8:52PM (06-20-22 @ 20:03)

## 2022-06-21 NOTE — CONSULT NOTE ADULT - NS ATTEND AMEND GEN_ALL_CORE FT
As above   pt reports pain and notes that she has had it for some time     Exam -   pt awake, alert responding appropriately  - mouthing words     BLE - hyperpigmented skin of mid -lower legs ; no open wounds   sensitive / tender to touch legs and feet including plantar aspect       A/p   As above   No Burn Surgery intervention
Pt on vent discussion limited   Reports no pain but some tenderness to palpation right axilla   No major issues with sites     EXAM   scarring and skin pitting bilateral axillae, blackheads   right axilla - draining sinus with thin  purulent fluid expressed, sl tenderness     A/p   chronic H. supp   No groin involvement   Advised pt of option for surgical treatment if desired or if condition worsens   If worsening or concerns for infection affecting other procedures may need I& D   She will consider

## 2022-06-22 LAB
CULTURE RESULTS: SIGNIFICANT CHANGE UP
GLUCOSE BLDC GLUCOMTR-MCNC: 138 MG/DL — HIGH (ref 70–99)
GLUCOSE BLDC GLUCOMTR-MCNC: 151 MG/DL — HIGH (ref 70–99)
GLUCOSE BLDC GLUCOMTR-MCNC: 186 MG/DL — HIGH (ref 70–99)
GLUCOSE BLDC GLUCOMTR-MCNC: 249 MG/DL — HIGH (ref 70–99)
GLUCOSE BLDC GLUCOMTR-MCNC: 250 MG/DL — HIGH (ref 70–99)
SARS-COV-2 RNA SPEC QL NAA+PROBE: SIGNIFICANT CHANGE UP
SPECIMEN SOURCE: SIGNIFICANT CHANGE UP
VIT B12 SERPL-MCNC: 438 PG/ML — SIGNIFICANT CHANGE UP (ref 232–1245)

## 2022-06-22 PROCEDURE — 74018 RADEX ABDOMEN 1 VIEW: CPT | Mod: 26

## 2022-06-22 PROCEDURE — 99233 SBSQ HOSP IP/OBS HIGH 50: CPT

## 2022-06-22 RX ORDER — HYDROMORPHONE HYDROCHLORIDE 2 MG/ML
4 INJECTION INTRAMUSCULAR; INTRAVENOUS; SUBCUTANEOUS EVERY 6 HOURS
Refills: 0 | Status: DISCONTINUED | OUTPATIENT
Start: 2022-06-22 | End: 2022-06-23

## 2022-06-22 RX ORDER — IPRATROPIUM/ALBUTEROL SULFATE 18-103MCG
3 AEROSOL WITH ADAPTER (GRAM) INHALATION EVERY 6 HOURS
Refills: 0 | Status: DISCONTINUED | OUTPATIENT
Start: 2022-06-22 | End: 2022-07-22

## 2022-06-22 RX ADMIN — SENNA PLUS 2 TABLET(S): 8.6 TABLET ORAL at 21:37

## 2022-06-22 RX ADMIN — HYDROMORPHONE HYDROCHLORIDE 4 MILLIGRAM(S): 2 INJECTION INTRAMUSCULAR; INTRAVENOUS; SUBCUTANEOUS at 21:37

## 2022-06-22 RX ADMIN — HYDROMORPHONE HYDROCHLORIDE 4 MILLIGRAM(S): 2 INJECTION INTRAMUSCULAR; INTRAVENOUS; SUBCUTANEOUS at 12:44

## 2022-06-22 RX ADMIN — Medication 7 UNIT(S): at 18:44

## 2022-06-22 RX ADMIN — Medication 0.5 MILLIGRAM(S): at 16:34

## 2022-06-22 RX ADMIN — SODIUM CHLORIDE 50 MILLILITER(S): 9 INJECTION INTRAMUSCULAR; INTRAVENOUS; SUBCUTANEOUS at 01:45

## 2022-06-22 RX ADMIN — Medication 0.5 MILLIGRAM(S): at 01:41

## 2022-06-22 RX ADMIN — Medication 7 UNIT(S): at 16:27

## 2022-06-22 RX ADMIN — Medication 1 APPLICATION(S): at 05:44

## 2022-06-22 RX ADMIN — Medication 1 APPLICATION(S): at 17:13

## 2022-06-22 RX ADMIN — HYDROMORPHONE HYDROCHLORIDE 4 MILLIGRAM(S): 2 INJECTION INTRAMUSCULAR; INTRAVENOUS; SUBCUTANEOUS at 02:52

## 2022-06-22 RX ADMIN — APIXABAN 5 MILLIGRAM(S): 2.5 TABLET, FILM COATED ORAL at 05:43

## 2022-06-22 RX ADMIN — ATORVASTATIN CALCIUM 20 MILLIGRAM(S): 80 TABLET, FILM COATED ORAL at 21:37

## 2022-06-22 RX ADMIN — AMLODIPINE BESYLATE 5 MILLIGRAM(S): 2.5 TABLET ORAL at 05:42

## 2022-06-22 RX ADMIN — HYDROMORPHONE HYDROCHLORIDE 4 MILLIGRAM(S): 2 INJECTION INTRAMUSCULAR; INTRAVENOUS; SUBCUTANEOUS at 16:34

## 2022-06-22 RX ADMIN — Medication 7 UNIT(S): at 06:29

## 2022-06-22 RX ADMIN — Medication 1 TABLET(S): at 16:34

## 2022-06-22 RX ADMIN — Medication 7 UNIT(S): at 00:21

## 2022-06-22 RX ADMIN — Medication 1 APPLICATION(S): at 17:14

## 2022-06-22 RX ADMIN — GABAPENTIN 600 MILLIGRAM(S): 400 CAPSULE ORAL at 21:37

## 2022-06-22 RX ADMIN — Medication 1 APPLICATION(S): at 05:42

## 2022-06-22 RX ADMIN — GABAPENTIN 600 MILLIGRAM(S): 400 CAPSULE ORAL at 14:26

## 2022-06-22 RX ADMIN — LOSARTAN POTASSIUM 100 MILLIGRAM(S): 100 TABLET, FILM COATED ORAL at 05:43

## 2022-06-22 RX ADMIN — GABAPENTIN 600 MILLIGRAM(S): 400 CAPSULE ORAL at 05:43

## 2022-06-22 RX ADMIN — HYDROMORPHONE HYDROCHLORIDE 4 MILLIGRAM(S): 2 INJECTION INTRAMUSCULAR; INTRAVENOUS; SUBCUTANEOUS at 22:07

## 2022-06-22 RX ADMIN — APIXABAN 5 MILLIGRAM(S): 2.5 TABLET, FILM COATED ORAL at 17:16

## 2022-06-22 RX ADMIN — BUMETANIDE 2 MILLIGRAM(S): 0.25 INJECTION INTRAMUSCULAR; INTRAVENOUS at 05:43

## 2022-06-22 RX ADMIN — HYDROMORPHONE HYDROCHLORIDE 4 MILLIGRAM(S): 2 INJECTION INTRAMUSCULAR; INTRAVENOUS; SUBCUTANEOUS at 03:22

## 2022-06-22 NOTE — PROGRESS NOTE ADULT - SUBJECTIVE AND OBJECTIVE BOX
[facility] CRUZ  [facility]      Patient was evaluated and examined  by bedside, more sleepy today       REVIEW OF SYSTEMS:  unable to obtain, patient is sleepy      T(C): , Max: 38 (06-22-22 @ 05:00)  HR: 109 (06-22-22 @ 13:39)  BP: 140/65 (06-22-22 @ 13:39)  RR: 18 (06-22-22 @ 13:39)  SpO2: 95% (06-22-22 @ 10:56)  CAPILLARY BLOOD GLUCOSE      POCT Blood Glucose.: 186 mg/dL (22 Jun 2022 11:33)  POCT Blood Glucose.: 138 mg/dL (22 Jun 2022 06:25)  POCT Blood Glucose.: 151 mg/dL (22 Jun 2022 00:11)  POCT Blood Glucose.: 211 mg/dL (21 Jun 2022 20:53)      PHYSICAL EXAM:  General: NAD, sleepy, patient is laying comfortably in bed  HEENT: AT, NC, Supple, NO JVD, NO CB, trach present  Lungs: CTA B/L, no wheezing, no rhonchi  CVS: normal S1, S2, RRR, NO M/G/R  Abdomen: soft, bowel sounds present, non-tender, non-distended, peg present  Extremities: no edema, no clubbing, no cyanosis, positive peripheral pulses b/l  Neuro: sleepy today, follows verbal commands  Skin: no rash, no ecchymosis      LAB  CBC  Date: 06-21-22 @ 07:44  Mean cell Dyfepahgpg46.8  Mean cell Hemoglobin Conc35.6  Mean cell Volum 95.1  Platelet count-Automate 183  RBC Count 2.63  Red Cell Distrib Width13.0  WBC Count11.38  % Albumin, Urine--  Hematocrit 25.0  Hemoglobin 8.9  CBC  Date: 06-20-22 @ 06:18  Mean cell Yzkkhogqmw59.1  Mean cell Hemoglobin Conc34.4  Mean cell Volum 96.2  Platelet count-Automate 176  RBC Count 2.66  Red Cell Distrib Width13.2  WBC Count13.94  % Albumin, Urine--  Hematocrit 25.6  Hemoglobin 8.8  CBC  Date: 06-19-22 @ 09:05  Mean cell Adwmwoymjn23.1  Mean cell Hemoglobin Conc35.9  Mean cell Volum 94.9  Platelet count-Automate 188  RBC Count 2.73  Red Cell Distrib Width13.3  WBC Count14.50  % Albumin, Urine--  Hematocrit 25.9  Hemoglobin 9.3  CBC  Date: 06-18-22 @ 07:52  Mean cell Xdbjzpcade35.7  Mean cell Hemoglobin Conc35.7  Mean cell Volum 94.4  Platelet count-Automate 221  RBC Count 2.85  Red Cell Distrib Width13.2  WBC Count15.87  % Albumin, Urine--  Hematocrit 26.9  Hemoglobin 9.6  CBC  Date: 06-17-22 @ 07:42  Mean cell Hunjfrbgzg35.7  Mean cell Hemoglobin Conc35.7  Mean cell Volum 94.3  Platelet count-Automate 234  RBC Count 3.00  Red Cell Distrib Width13.4  WBC Count14.31  % Albumin, Urine--  Hematocrit 28.3  Hemoglobin 10.1  CBC  Date: 06-16-22 @ 06:05  Mean cell Pzqptwgcmv98.6  Mean cell Hemoglobin Conc36.2  Mean cell Volum 92.8  Platelet count-Automate 253  RBC Count 2.92  Red Cell Distrib Width13.1  WBC Count9.10  % Albumin, Urine--  Hematocrit 27.1  Hemoglobin 9.8  CBC  Date: 06-15-22 @ 18:03  Mean cell Jjwcnlwxwb50.7  Mean cell Hemoglobin Conc36.3  Mean cell Volum 92.9  Platelet count-Automate 222  RBC Count 2.82  Red Cell Distrib Width13.0  WBC Count10.70  % Albumin, Urine--  Hematocrit 26.2  Hemoglobin 9.5    BMP  06-21-22 @ 07:44  Blood Gas Arterial-Calcium,Ionized--  Blood Urea Nitrogen, Serum 7 mg/dL<L> [10 - 20]  Carbon Dioxide, Serum32 mmol/L [17 - 32]  Chloride, Serum88 mmol/L<L> [98 - 110]  Creatinie, Serum<0.5 mg/dL<L> [0.7 - 1.5]  Glucose, Vzkxb535 mg/dL<H> [70 - 99]  Potassium, Serum3.9 mmol/L [3.5 - 5.0]  Sodium, Serum 128 mmol/L<L> [135 - 146]  BMP  06-20-22 @ 06:18  Blood Gas Arterial-Calcium,Ionized--  Blood Urea Nitrogen, Serum 13 mg/dL [10 - 20]  Carbon Dioxide, Serum34 mmol/L<H> [17 - 32]  Chloride, Serum89 mmol/L<L> [98 - 110]  Creatinie, Serum<0.5 mg/dL<L> [0.7 - 1.5]  Glucose, Gehdp644 mg/dL<H> [70 - 99]  Potassium, Serum4.4 mmol/L [3.5 - 5.0]  Sodium, Serum 129 mmol/L<L> [135 - 146]  [facility]  06-19-22 @ 09:05  Blood Gas Arterial-Calcium,Ionized--  Blood Urea Nitrogen, Serum 20 mg/dL [10 - 20]  Carbon Dioxide, Serum31 mmol/L [17 - 32]  Chloride, Serum85 mmol/L<L> [98 - 110]  Creatinie, Serum<0.5 mg/dL<L> [0.7 - 1.5]  Glucose, Cryqj972 mg/dL<H> [70 - 99]  Potassium, Serum4.5 mmol/L [3.5 - 5.0]  Sodium, Serum 127 mmol/L<L> [135 - 146]  [facility]  06-18-22 @ 07:52  Blood Gas Arterial-Calcium,Ionized--  Blood Urea Nitrogen, Serum 16 mg/dL [10 - 20]  Carbon Dioxide, Serum30 mmol/L [17 - 32]  Chloride, Serum87 mmol/L<L> [98 - 110]  Creatinie, Serum<0.5 mg/dL<L> [0.7 - 1.5]  Glucose, Ffssv850 mg/dL<H> [70 - 99]  Potassium, Serum4.5 mmol/L [3.5 - 5.0]  Sodium, Serum 129 mmol/L<L> [135 - 146]              Microbiology:    Culture - Urine (collected 06-21-22 @ 00:40)  Source: Catheterized Catheterized  Final Report (06-22-22 @ 07:05):    <10,000 CFU/mL Normal Urogenital Chante    Culture - Blood (collected 06-05-22 @ 04:55)  Source: .Blood None  Final Report (06-10-22 @ 12:00):    No Growth Final    Culture - Blood (collected 06-05-22 @ 02:01)  Source: .Blood Blood  Final Report (06-10-22 @ 08:00):    No Growth Final    Culture - Urine (collected 06-04-22 @ 21:00)  Source: Clean Catch Clean Catch (Midstream)  Final Report (06-05-22 @ 22:53):    <10,000 CFU/mL Normal Urogenital Chante    Culture - Blood (collected 05-29-22 @ 12:33)  Source: .Blood None  Final Report (06-03-22 @ 19:00):    No Growth Final    Culture - Urine (collected 05-28-22 @ 17:24)  Source: Catheterized Catheterized  Final Report (05-29-22 @ 18:18):    <10,000 CFU/mL Normal Urogenital Chante    Culture - Urine (collected 05-28-22 @ 10:07)  Source: Catheterized Catheterized  Final Report (05-30-22 @ 10:17):    >=3 organisms. Probable collection contamination.    Culture - Urine (collected 05-27-22 @ 11:54)  Source: Catheterized Catheterized  Final Report (06-02-22 @ 11:41):    >100,000 CFU/ml Klebsiella pneumoniae (Carbapenem Resistant)    >100,000 CFU/ml Pseudomonas aeruginosa  Organism: Klepne MDRO  Pseudomonas aeruginosa (06-02-22 @ 11:43)  Organism: Pseudomonas aeruginosa (06-02-22 @ 11:43)      -  Amikacin: S <=16      -  Aztreonam: S 8      -  Cefepime: S 4      -  Ceftazidime: S 4      -  Ciprofloxacin: R >2      -  Gentamicin: R >8      -  Imipenem: S 2      -  Levofloxacin: R >4      -  Meropenem: S <=1      -  Piperacillin/Tazobactam: S <=8      -  Tobramycin: R >8      Method Type: ROMY  Organism: Klepne MDRO (06-02-22 @ 11:43)      -  Amikacin: I 32      -  Amoxicillin/Clavulanic Acid: R >16/8      -  Ampicillin: R >16 These ampicillin results predict results for amoxicillin      -  Ampicillin/Sulbactam: R >16/8 Enterobacter, Klebsiella aerogenes, Citrobacter, and Serratia may develop resistance during prolonged therapy (3-4 days)      -  Aztreonam: R >16      -  Cefazolin: R >16 (MIC_CL_COM_ENTERIC_CEFAZU) For uncomplicated UTI with K. pneumoniae, E. coli, or P. mirablis: ROMY <=16 is sensitive and ROMY >=32 is resistant. This also predicts results for oral agents cefaclor, cefdinir, cefpodoxime, cefprozil, cefuroxime axetil, cephalexin and locarbef for uncomplicated UTI. Note that some isolates may be susceptible to these agents while testing resistant to cefazolin.      -  Cefepime: R >16      -  Cefoxitin: R >16      -  Ceftriaxone: R >32 Enterobacter, Klebsiella aerogenes, Citrobacter, and Serratia may develop resistance during prolonged therapy      -  Ciprofloxacin: R >2      -  Ertapenem: R >1      -  Gentamicin: S <=2      -  Imipenem: R >8      -  Levofloxacin: R >4      -  Meropenem: R >8      -  Nitrofurantoin: R >64 Should not be used to treat pyelonephritis      -  Piperacillin/Tazobactam: R >64      -  Tigecycline: S <=2      -  Tobramycin: R >8      -  Trimethoprim/Sulfamethoxazole: R >2/38      Method Type: ROMY        Medications:  ALPRAZolam 0.5 milliGRAM(s) Oral every 12 hours PRN  amLODIPine   Tablet 5 milliGRAM(s) Oral daily  ammonium lactate 12% Lotion 1 Application(s) Topical every 12 hours  apixaban 5 milliGRAM(s) Oral two times a day  atorvastatin 20 milliGRAM(s) Oral at bedtime  buMETAnide 2 milliGRAM(s) Oral daily  dextrose 5%. 1000 milliLiter(s) IV Continuous <Continuous>  dextrose 5%. 1000 milliLiter(s) IV Continuous <Continuous>  dextrose 50% Injectable 25 Gram(s) IV Push once  dextrose 50% Injectable 12.5 Gram(s) IV Push once  dextrose 50% Injectable 25 Gram(s) IV Push once  dextrose Oral Gel 15 Gram(s) Oral once PRN  diatrizoate meglumine/diatrizoate sodium. 30 milliLiter(s) Oral once  gabapentin 600 milliGRAM(s) Oral three times a day  glucagon  Injectable 1 milliGRAM(s) IntraMuscular once  HYDROmorphone   Tablet 4 milliGRAM(s) Oral every 6 hours PRN  HYDROmorphone  Injectable 1 milliGRAM(s) IV Push every 12 hours PRN  influenza   Vaccine 0.5 milliLiter(s) IntraMuscular once  insulin glargine Injectable (LANTUS) 20 Unit(s) SubCutaneous at bedtime  insulin lispro (ADMELOG) corrective regimen sliding scale   SubCutaneous every 6 hours  insulin lispro Injectable (ADMELOG) 7 Unit(s) SubCutaneous every 6 hours  losartan 100 milliGRAM(s) Oral daily  multivitamin 1 Tablet(s) Oral daily  pantoprazole    Tablet 40 milliGRAM(s) Oral before breakfast  polyethylene glycol 3350 17 Gram(s) Oral daily  senna 2 Tablet(s) Oral at bedtime  sodium chloride 0.9%. 1000 milliLiter(s) IV Continuous <Continuous>  triamcinolone 0.1% Cream 1 Application(s) Topical every 12 hours        Assessment and Plan:  55 yo female with PMHx of DVT on Eliquis, COPD,  trach collar, obesity, IDDM, UTI, sent by [facility] for hypoxia. Current illness going back to April 4th when pt was intubated on admission at [facility] ICU for hypoxic respiratory failure diagnosed with copd exacerbation and superimposed pneumonia, and remained intubated for 37 days requiring tracheostomy. Patient stayed at [facility] for 2 days until noted to be hypoxic (saturating low 70's) pt's saturation immediately improving afterwards to 80's after large mucus plug removed and was subsequently sent to [facility]  On arrival to ED pt was saturating 97%, 15L O2 via tracheostomy collar (baseline 8 L at [facility])CXR suspicious for L-sided PNA, inconclusive. CTA neg for PE. Pt received x1 IV Levaquin and Cefepime in ED, admitted to MICU for acute hypoxic hypercapnic respiratory failure secondary to acute mucus plug now resolved vs superimposed pneumonia hospital-acquired.   General Surgery Consulted emergently when patient found to be hypoxic to low 80s after CCU and Pulmonary team found large granuloma in trachea along tracheostomy tube. Patient currently had a size 8 trach. Surgery assisted Pulmonary and CCU with trach exchanged with ET tube guidance to a size 7 XLT. Saturations improved to high 90s. Large granuloma was removed. Patient was no longer in distress after exchange.    #Acute/ chronic COPD with exacerbation (Resolved)  #mucous plug removed  #pneumonia vs atelectasis L base  - On 5/28, Pulmonary team found large granuloma in trachea along tracheostomy tube. Patient had a size 8 trach on admission. Surgery assisted Pulmonary and CCU with trach exchanged with ET tube guidance to a size 7 XLT. Saturations improved to high 90s.  - trach functioning well s/p exchange   - 6/11 Trach changed to Fenestrated 8 by surgery  - c/w aggressive L sided chest PT and frequent suctioning q6  - as per pulm, if recurrent atelectasis despite chest PT, suctioning, may consider bronchoscopy    #s/p PEG (6/15):   KUB showing free intraperitoneal air (6/19).  CT Abdo confirmed that this was just due to PEG tube adjustment.   Repeat G tube study per Surgery (6/21) -normal, restarted on peg feedings      #Dysphagia:  Strict NPO as of now per 's FEES eval from 6/13/22.   [facility] will sign out to  at  for further plans with swallow rehab at SNF     #LE neuropathic pain:  PAtient has apparently been bed bound since April 4th (her [facility] admission for hypoxic failure where she ended up being intubated)  Apparently has L > R foot drop which may be due to chronic contractures of Calf muscle/Achilles tendon. Can have PT evaluate her for that.  But options are limited at this stage.   Given the asymmetrical foot drop, Neurology wants to rule out Any focal neuropathy.  Check MRI Lumbar spine w/ & w/o Contrast (will need it with anesthesia). Scheduled for 6/23   -pain management      #B/l LE Hyperpigmentation changes:   unclear etiology  Present since 1 year.   May be related to CHF related stasis dermatitis / hemosiderin deposition  vs deposition diseases versus Eosinophilic Dermatitis vs follicular hemorrhage from eliquis ?? (doubt the last differential)  Dermatology thinks it may be Eczema and advised Triamcinolone BId.   Will consider biopsy though. Pending Burn attending Input.   Add multivitamin to PEG QD.         Xanax 0.25 Q12 PRN for anxiety.   On Senna, Miralax.     #Suspected epiglottis edema:   s/p DExa Taper (ended 6/18).   Outpatient ENT f/u.     #DM:   steroids ended 6/18.  6/19- Increased lantus from 17 to 20.      #Chronic Indwelling Singh  - 5/28 Ucx - Contaminated   - 5/28 Ucx- Normal chante   - 5/27 Ucx- Klebsiella (CRE) and pseudomonas   - s/p cefepime  - Currently afebrile w/ no urinary complaints   - episode of Hematouria 6/4 -> improving -> h/h Stable    #Hx of DVT  - on home Eliquis  - restarted Eliquis      Activity been bed bound. PT eval when able.   GI PPX  PPI  DVT PPX  ELIQUIS  Dispo: Acute  Pending: restarted on peg feedings today, awaiting for completion of Lumbar spine  MRI - planned for tomorrow,    DISPO: STR placement    Total time spent to complete patient's bedside assessment, review medical chart, discuss medical plan of care with covering medical team was more than 35 minutes

## 2022-06-22 NOTE — PROGRESS NOTE ADULT - SUBJECTIVE AND OBJECTIVE BOX
GENERAL SURGERY PROGRESS NOTE    Patient: CRUZ DUMONT , 56y (63)Female   MRN: [id number]  Location: [address]  Visit: 22 Inpatient  Date: 22 @ 09:02    Hospital Day #:   Post-Op Day #:    Procedure/Dx/Injuries: malfunctioning gastrostomy tube    Events of past 24 hours: Gastrostomy tube study done and shows no active extravasation of contrast.     PAST MEDICAL & SURGICAL HISTORY:  COPD (chronic obstructive pulmonary disease)  CHF (congestive heart failure)  DM (diabetes mellitus)  H/O tracheostomy      Vitals:   T(F): 100.4 (22 @ 05:00), Max: 100.4 (22 @ 05:00)  HR: 98 (22 @ 05:00)  BP: 147/81 (22 @ 05:00)  RR: 20 (22 @ 05:00)  SpO2: 94% (22 @ 08:40)      Diet, NPO with Tube Feed:   Tube Feeding Modality: Nasogastric  Glucerna 1.2 Martin  Total Volume for 24 Hours (mL): 1200  Bolus  Total Volume of Bolus (mL):  300  Tube Feed Frequency: Every 6 hours   Tube Feed Start Time: 06:00  Bolus Feed Rate (mL per Hour): 50   Bolus Feed Duration (in Hours): 24  Bolus   Total Volume per Flush (mL): 30   Frequency: Every 6 Hours  Free Water Flush Instructions:  30 ml water flush pre and post bolus feeds  No Carb Prosource (1pkg = 15gms Protein)     Qty per Day:  1x      Fluids:     I & O's:    22 @ 07:01  -  22 @ 07:00  --------------------------------------------------------  IN:    Enteral Tube Flush: 120 mL    Glucerna: 600 mL  Total IN: 720 mL    OUT:  Total OUT: 0 mL    Total NET: 720 mL      PHYSICAL EXAM:  GENERAL: NAD, well-appearing  CHEST/LUNG: Clear to auscultation bilaterally  HEART: Regular rate and rhythm  ABDOMEN: Soft, Nontender, Nondistended; gastrostomy tube in place.  EXTREMITIES:  Bilateral lower extremities with hemosiderin discoloration c/w with previous diagnosis of CHF.      MEDICATIONS  (STANDING):  amLODIPine   Tablet 5 milliGRAM(s) Oral daily  ammonium lactate 12% Lotion 1 Application(s) Topical every 12 hours  apixaban 5 milliGRAM(s) Oral two times a day  atorvastatin 20 milliGRAM(s) Oral at bedtime  buMETAnide 2 milliGRAM(s) Oral daily  dextrose 5%. 1000 milliLiter(s) (50 mL/Hr) IV Continuous <Continuous>  dextrose 5%. 1000 milliLiter(s) (100 mL/Hr) IV Continuous <Continuous>  dextrose 50% Injectable 25 Gram(s) IV Push once  dextrose 50% Injectable 12.5 Gram(s) IV Push once  dextrose 50% Injectable 25 Gram(s) IV Push once  diatrizoate meglumine/diatrizoate sodium. 30 milliLiter(s) Oral once  gabapentin 600 milliGRAM(s) Oral three times a day  glucagon  Injectable 1 milliGRAM(s) IntraMuscular once  influenza   Vaccine 0.5 milliLiter(s) IntraMuscular once  insulin glargine Injectable (LANTUS) 20 Unit(s) SubCutaneous at bedtime  insulin lispro (ADMELOG) corrective regimen sliding scale   SubCutaneous every 6 hours  insulin lispro Injectable (ADMELOG) 7 Unit(s) SubCutaneous every 6 hours  losartan 100 milliGRAM(s) Oral daily  multivitamin 1 Tablet(s) Oral daily  pantoprazole    Tablet 40 milliGRAM(s) Oral before breakfast  polyethylene glycol 3350 17 Gram(s) Oral daily  senna 2 Tablet(s) Oral at bedtime  sodium chloride 0.9%. 1000 milliLiter(s) (50 mL/Hr) IV Continuous <Continuous>  triamcinolone 0.1% Cream 1 Application(s) Topical every 12 hours    MEDICATIONS  (PRN):  ALPRAZolam 0.5 milliGRAM(s) Oral every 12 hours PRN anxiety  dextrose Oral Gel 15 Gram(s) Oral once PRN Blood Glucose LESS THAN 70 milliGRAM(s)/deciliter  HYDROmorphone   Tablet 4 milliGRAM(s) Oral every 4 hours PRN Moderate Pain (4 - 6)  HYDROmorphone  Injectable 1 milliGRAM(s) IV Push every 12 hours PRN Severe Pain (7 - 10)    DVT PROPHYLAXIS:   GI PROPHYLAXIS: pantoprazole    Tablet 40 milliGRAM(s) Oral before breakfast    ANTICOAGULATION:   ANTIBIOTICS:        LAB/STUDIES:  Labs:  CAPILLARY BLOOD GLUCOSE      POCT Blood Glucose.: 138 mg/dL (2022 06:25)  POCT Blood Glucose.: 151 mg/dL (2022 00:11)  POCT Blood Glucose.: 211 mg/dL (2022 20:53)  POCT Blood Glucose.: 191 mg/dL (2022 11:40)                          8.9    11.38 )-----------( 183      ( 2022 07:44 )             25.0         06-21    128<L>  |  88<L>  |  7<L>  ----------------------------<  150<H>  3.9   |  32  |  <0.5<L>          LFTs:             5.3  | 0.9  | 22       ------------------[147     ( 2022 07:44 )  3.3  | x    | 48          Lipase:x      Amylase:x             Coags:            Urinalysis Basic - ( 2022 00:40 )    Color: Yellow / Appearance: Clear / S.036 / pH: x  Gluc: x / Ketone: Negative  / Bili: Negative / Urobili: 6 mg/dL   Blood: x / Protein: 30 mg/dL / Nitrite: Negative   Leuk Esterase: Negative / RBC: 1 /HPF / WBC 2 /HPF   Sq Epi: x / Non Sq Epi: 1 /HPF / Bacteria: Negative        Culture - Urine (collected 2022 00:40)  Source: Catheterized Catheterized  Final Report (2022 07:05):    <10,000 CFU/mL Normal Urogenital Chante        IMAGING:  < from: CT Abdomen and Pelvis w/ Oral Cont and w/wo IV Cont (22 @ 20:03) >    IMPRESSION:  1.  Well-positioned gastrostomy tube with moderate volume intraperitoneal   free air and small amount of loculated perigastric ascites, likely   related to recent procedure; nodefinite extravasation of oral contrast.  2.  Left lower lobe consolidative opacity may represent atelectasis   and/or pneumonia.        ACCESS/ DEVICES:  [x] Peripheral IV

## 2022-06-22 NOTE — PROGRESS NOTE ADULT - ASSESSMENT
56yF w/ PMHx of COPD, CHF, DM who presented with COPD exacerbation, Lobar PNA. Hospital course significant for tracheostomy exchange, debridement of granuloma, and PEG placement by GI. Surgery consulted for pneumoperitoneum. Physical exam findings, imaging, and labs as documented above.     PLAN:  -G tube study reveals no active extravasation.   -Restart tube feeds.   -No acute surgical intervention.    Lines/Tubes: PIV, Tracheostomy, Gastrostomy

## 2022-06-23 ENCOUNTER — APPOINTMENT (OUTPATIENT)
Dept: OTOLARYNGOLOGY | Facility: CLINIC | Age: 57
End: 2022-06-23

## 2022-06-23 LAB
GLUCOSE BLDC GLUCOMTR-MCNC: 177 MG/DL — HIGH (ref 70–99)
GLUCOSE BLDC GLUCOMTR-MCNC: 181 MG/DL — HIGH (ref 70–99)
GLUCOSE BLDC GLUCOMTR-MCNC: 182 MG/DL — HIGH (ref 70–99)
GLUCOSE BLDC GLUCOMTR-MCNC: 197 MG/DL — HIGH (ref 70–99)
GLUCOSE BLDC GLUCOMTR-MCNC: 221 MG/DL — HIGH (ref 70–99)
GLUCOSE BLDC GLUCOMTR-MCNC: 221 MG/DL — HIGH (ref 70–99)
SARS-COV-2 RNA SPEC QL NAA+PROBE: SIGNIFICANT CHANGE UP

## 2022-06-23 PROCEDURE — 99233 SBSQ HOSP IP/OBS HIGH 50: CPT

## 2022-06-23 PROCEDURE — 72158 MRI LUMBAR SPINE W/O & W/DYE: CPT | Mod: 26

## 2022-06-23 RX ORDER — HYDROMORPHONE HYDROCHLORIDE 2 MG/ML
1 INJECTION INTRAMUSCULAR; INTRAVENOUS; SUBCUTANEOUS
Qty: 0 | Refills: 0 | DISCHARGE
Start: 2022-06-23

## 2022-06-23 RX ORDER — PANTOPRAZOLE SODIUM 20 MG/1
1 TABLET, DELAYED RELEASE ORAL
Qty: 0 | Refills: 0 | DISCHARGE
Start: 2022-06-23

## 2022-06-23 RX ORDER — LABETALOL HCL 100 MG
10 TABLET ORAL ONCE
Refills: 0 | Status: COMPLETED | OUTPATIENT
Start: 2022-06-23 | End: 2022-06-23

## 2022-06-23 RX ORDER — ATORVASTATIN CALCIUM 80 MG/1
1 TABLET, FILM COATED ORAL
Qty: 0 | Refills: 0 | DISCHARGE
Start: 2022-06-23

## 2022-06-23 RX ORDER — INSULIN GLARGINE 100 [IU]/ML
20 INJECTION, SOLUTION SUBCUTANEOUS
Qty: 0 | Refills: 0 | DISCHARGE
Start: 2022-06-23

## 2022-06-23 RX ORDER — AMLODIPINE BESYLATE 2.5 MG/1
1 TABLET ORAL
Qty: 0 | Refills: 0 | DISCHARGE
Start: 2022-06-23

## 2022-06-23 RX ORDER — LOSARTAN POTASSIUM 100 MG/1
1 TABLET, FILM COATED ORAL
Qty: 0 | Refills: 0 | DISCHARGE
Start: 2022-06-23

## 2022-06-23 RX ORDER — LABETALOL HCL 100 MG
100 TABLET ORAL ONCE
Refills: 0 | Status: COMPLETED | OUTPATIENT
Start: 2022-06-23 | End: 2022-06-23

## 2022-06-23 RX ORDER — ALPRAZOLAM 0.25 MG
1 TABLET ORAL
Qty: 0 | Refills: 0 | DISCHARGE
Start: 2022-06-23

## 2022-06-23 RX ORDER — POLYETHYLENE GLYCOL 3350 17 G/17G
17 POWDER, FOR SOLUTION ORAL
Qty: 0 | Refills: 0 | DISCHARGE
Start: 2022-06-23

## 2022-06-23 RX ORDER — APIXABAN 2.5 MG/1
1 TABLET, FILM COATED ORAL
Qty: 0 | Refills: 0 | DISCHARGE
Start: 2022-06-23

## 2022-06-23 RX ORDER — INSULIN LISPRO 100/ML
7 VIAL (ML) SUBCUTANEOUS
Qty: 0 | Refills: 0 | DISCHARGE
Start: 2022-06-23

## 2022-06-23 RX ORDER — INSULIN LISPRO 100/ML
9 VIAL (ML) SUBCUTANEOUS
Qty: 0 | Refills: 0 | DISCHARGE
Start: 2022-06-23

## 2022-06-23 RX ORDER — SENNA PLUS 8.6 MG/1
2 TABLET ORAL
Qty: 0 | Refills: 0 | DISCHARGE
Start: 2022-06-23

## 2022-06-23 RX ORDER — GABAPENTIN 400 MG/1
1 CAPSULE ORAL
Qty: 0 | Refills: 0 | DISCHARGE
Start: 2022-06-23

## 2022-06-23 RX ORDER — BUMETANIDE 0.25 MG/ML
1 INJECTION INTRAMUSCULAR; INTRAVENOUS
Qty: 0 | Refills: 0 | DISCHARGE
Start: 2022-06-23

## 2022-06-23 RX ORDER — HYDROMORPHONE HYDROCHLORIDE 2 MG/ML
4 INJECTION INTRAMUSCULAR; INTRAVENOUS; SUBCUTANEOUS EVERY 8 HOURS
Refills: 0 | Status: DISCONTINUED | OUTPATIENT
Start: 2022-06-23 | End: 2022-06-25

## 2022-06-23 RX ADMIN — Medication 1 APPLICATION(S): at 19:06

## 2022-06-23 RX ADMIN — Medication 100 MILLIGRAM(S): at 22:12

## 2022-06-23 RX ADMIN — SENNA PLUS 2 TABLET(S): 8.6 TABLET ORAL at 21:45

## 2022-06-23 RX ADMIN — APIXABAN 5 MILLIGRAM(S): 2.5 TABLET, FILM COATED ORAL at 17:30

## 2022-06-23 RX ADMIN — Medication 10 MILLIGRAM(S): at 23:40

## 2022-06-23 RX ADMIN — Medication 1 APPLICATION(S): at 06:06

## 2022-06-23 RX ADMIN — GABAPENTIN 600 MILLIGRAM(S): 400 CAPSULE ORAL at 14:11

## 2022-06-23 RX ADMIN — POLYETHYLENE GLYCOL 3350 17 GRAM(S): 17 POWDER, FOR SOLUTION ORAL at 12:58

## 2022-06-23 RX ADMIN — Medication 1 APPLICATION(S): at 19:05

## 2022-06-23 RX ADMIN — Medication 1 TABLET(S): at 14:11

## 2022-06-23 RX ADMIN — HYDROMORPHONE HYDROCHLORIDE 1 MILLIGRAM(S): 2 INJECTION INTRAMUSCULAR; INTRAVENOUS; SUBCUTANEOUS at 22:16

## 2022-06-23 RX ADMIN — HYDROMORPHONE HYDROCHLORIDE 4 MILLIGRAM(S): 2 INJECTION INTRAMUSCULAR; INTRAVENOUS; SUBCUTANEOUS at 15:08

## 2022-06-23 RX ADMIN — ATORVASTATIN CALCIUM 20 MILLIGRAM(S): 80 TABLET, FILM COATED ORAL at 21:46

## 2022-06-23 RX ADMIN — GABAPENTIN 600 MILLIGRAM(S): 400 CAPSULE ORAL at 21:46

## 2022-06-23 RX ADMIN — Medication 2 MILLIGRAM(S): at 10:38

## 2022-06-23 RX ADMIN — Medication 0.5 MILLIGRAM(S): at 12:58

## 2022-06-23 RX ADMIN — HYDROMORPHONE HYDROCHLORIDE 4 MILLIGRAM(S): 2 INJECTION INTRAMUSCULAR; INTRAVENOUS; SUBCUTANEOUS at 15:42

## 2022-06-23 RX ADMIN — HYDROMORPHONE HYDROCHLORIDE 1 MILLIGRAM(S): 2 INJECTION INTRAMUSCULAR; INTRAVENOUS; SUBCUTANEOUS at 21:46

## 2022-06-23 RX ADMIN — Medication 7 UNIT(S): at 12:58

## 2022-06-23 RX ADMIN — INSULIN GLARGINE 20 UNIT(S): 100 INJECTION, SOLUTION SUBCUTANEOUS at 21:46

## 2022-06-23 RX ADMIN — HYDROMORPHONE HYDROCHLORIDE 1 MILLIGRAM(S): 2 INJECTION INTRAMUSCULAR; INTRAVENOUS; SUBCUTANEOUS at 07:53

## 2022-06-23 RX ADMIN — Medication 7 UNIT(S): at 17:30

## 2022-06-23 NOTE — PRE-ANESTHESIA EVALUATION ADULT - NSANTHADDINFOFT_GEN_ALL_CORE
Case was discussed with primary physician taking care of pt  with active epiglotitis and respiratory condition of the pt its very hihg risk  for the pt to go for an non emrgent MRI with anesthesia  Dr Giron agreed to try MRI without anesthesia

## 2022-06-23 NOTE — PROGRESS NOTE ADULT - ASSESSMENT
57 yo f with PMHx of DVT on Eliquis, COPD,  trach collar, obesity, IDDM, UTI, sent by [facility] for hypoxia. Current illness going back to April 4th when pt was intubated on admission at [facility] ICU for hypoxic respiratory failure diagnosed with copd exacerbation and superimposed pneumonia, and remained intubated for 37 days requiring tracheostomy. Patient stayed at [facility] for 2 days until noted to be hypoxic (saturating low 70's) pt's saturation immediately improving afterwards to 80's after large mucus plug removed and was subsequently sent to [facility]. s/p large granuloma removal and trache size exchange.    Pt appears to be stable this morning.  MRI was carried out today and found to have Mild degenerative changes and disc bulging at L4-5 and L5-S1 without significant spinal canal or neuroforaminal narrowing.    #Acute/ chronic COPD with exacerbation (Resolved)  #mucous plug removed  #pneumonia vs atelectasis L base  - On 5/28, Pulmonary team found large granuloma in trachea along tracheostomy tube. Patient had a size 8 trach on admission. Surgery assisted Pulmonary and CCU with trach exchanged with ET tube guidance to a size 7 XLT. Saturations improved to high 90s.  - trach functioning well s/p exchange   - Pt off vent 72 hrs and is saturating well  - repeat chest x-ray (6/6) shows possible mucus plugging. suctioning attempted repeat CXR showed improved aeration.  ***Repeat Chest X-ray shows resolution of Mucus plug after suctioning and chest PT  - S/p Sw eval, if fails, CT Sx FU for PEG tube  - Modified barium swallow test done: patient still unable to eat, will need further therapy  - Repeat CXR 6/8: left lung opacity/effusion  - 6/11 Trach changed to Fenestrated 8 by surgery  - 6/12 and 6/13: repeat CXR showing recurrent mucous plug  - 6/14 CXR: improving L atelectasis  - c/w aggressive L sided chest PT and frequent suctioning q6  - as per pulm, if recurrent atelectasis despite chest PT, suctioning, may consider bronchoscopy    #s/p PEG (6/15):   KUB showing free intraperitoneal air (6/19).  CT Abdo confirmed that this was just due to PEG tube adjustment.   Repeat G tube study per Surgery (6/21) - if OK, OK to use for feeds.      #Dysphagia:  Strict NPO as of now per ST's FEES eval from 6/13/22.   [facility] will sign out to  at SNF for further plans with swallow rehab at SNF     #LE neuropathic pain:  PAtient has apparently been bed bound since April 4th (her [facility] admission for hypoxic failure where she ended up being intubated)  Apparently has L > R foot drop which may be due to chronic contractures of Calf muscle/Achilles tendon. Can have PT evaluate her for that.  But options are limited at this stage.   Given the asymmetrical foot drop, Neurology wants to rule out Any focal neuropathy.  Check MRI Lumbar spine w/ & w/o Contrast (will need it with anesthesia). Scheduled for 6/22     Neuropathy is likely Diabetic?   But given the concomitant blackish discoloration from shin to dorsum of feet, Neurology is also suspecting deposition diseases versus Eosinophilic Dermatitis which could also explain the neuropathy?    #B/l LE Hyperpigmentation changes:   unclear etiology  Present since 1 year.   May be related to CHF related stasis dermatitis / hemosiderin deposition  vs deposition diseases versus Eosinophilic Dermatitis vs follicular hemorrhage from eliquis ?? (doubt the last differential)  Dermatology thinks it may be Eczema and advised Triamcinolone BId.   Will consider biopsy though. Pending Burn attending Input.   Add multivitamin to PEG QD.     #Lower extremity Pain management*** :   Patient requiring a lot of opiates only since this admission.   Only dilaudid seems to work thus far. Was requiring 1mg IV Q4 PRN 6 times a day. Equivalent PO dilaudid dose would have been around 30mg.   On 6/17, changed to PO dilaudid 4mg Q4 PRN.   1mg IV dilaudid Q12 PRN for breakthroughs.   Increased Gabapentin to 600 TID.   As of 6/21, pain still inadequately controlled.  Pain management follow up.     Note: On 6/21, we discontinued Cymbalta 60 QD since as per family patient may be having bizarre thoughts/hallucinations. Patient was texting them bizarre messages.   Note: Patient has h/o suicidal thoughts on Pregabalin.     Xanax 0.25 Q12 PRN for anxiety.   On Senna, Miralax.     #Suspected epiglottis edema:   s/p DExa Taper (ended 6/18).   Outpatient ENT f/u.     # Mucus plugs removed. Trach exchanged  CXR PRN .    #DM:   steroids ended 6/18.  6/19- Increased lantus from 17 to 20.    #Hyponatremia- monitor  NS @ 50    #Sinus tachy @ 110s (6/19): 100.2 temp earlier.  WBC persists despite stopping steroids.   Monitor  Repeat CXR stable. UA neg,  If fever recurs, can consider Blood culture.    # hypomagmesemia  - repleted    #Chronic Indwelling Singh  - 5/28 Ucx - Contaminated   - 5/28 Ucx- Normal perri   - 5/27 Ucx- Klebsiella (CRE) and pseudomonas   - s/p cefepime  - Currently afebrile w/ no urinary complaints   - episode of Hematouria 6/4 -> improving -> h/h Stable    #Hx of DVT  - on home Eliquis  - restarted Eliquis      #Misc:  Activity been bed bound. PT eval when able.   GI PPX  PPI  DVT PPX  ELIQUIS  Dispo: Acute  Pending: LE pain improvement, MRI spine,    DISPO: STR placement

## 2022-06-23 NOTE — PROGRESS NOTE ADULT - SUBJECTIVE AND OBJECTIVE BOX
TIM CRUZ  [facility]            Patient was evaluated and examined  by bedside,                 REVIEW OF SYSTEMS:  please see pertinent positives mentioned above, all other 12 ROS negative      T(C): , Max: 37.2 (06-22-22 @ 13:39)  HR: 96 (06-23-22 @ 05:19)  BP: 122/56 (06-23-22 @ 04:31)  RR: 20 (06-23-22 @ 11:24)  SpO2: 96% (06-23-22 @ 11:24)  CAPILLARY BLOOD GLUCOSE      POCT Blood Glucose.: 182 mg/dL (23 Jun 2022 12:53)  POCT Blood Glucose.: 181 mg/dL (23 Jun 2022 07:30)  POCT Blood Glucose.: 177 mg/dL (23 Jun 2022 05:22)  POCT Blood Glucose.: 221 mg/dL (23 Jun 2022 00:10)  POCT Blood Glucose.: 249 mg/dL (22 Jun 2022 21:37)  POCT Blood Glucose.: 250 mg/dL (22 Jun 2022 18:34)      PHYSICAL EXAM:  General: NAD, sleepy, patient is laying comfortably in bed  HEENT: AT, NC, Supple, NO JVD, NO CB, trach present  Lungs: good breath sounds  B/L, no wheezing, no rhonchi  CVS: normal S1, S2, RRR, NO M/G/R  Abdomen: soft, bowel sounds present, non-tender, non-distended, peg present  Extremities: no edema, no clubbing, no cyanosis, positive peripheral pulses b/l  Neuro: awake, follows verbal commands, chronic hypersensitivity of lower extremities  Skin: no rash, no ecchymosis, chronic dark brown atrophic skin discoloration of b/l lower ext.      LAB  CBC  Date: 06-21-22 @ 07:44  Mean cell Wtabddhhci50.8  Mean cell Hemoglobin Conc35.6  Mean cell Volum 95.1  Platelet count-Automate 183  RBC Count 2.63  Red Cell Distrib Width13.0  WBC Count11.38  % Albumin, Urine--  Hematocrit 25.0  Hemoglobin 8.9        BMP  06-21-22 @ 07:44  Blood Gas Arterial-Calcium,Ionized--  Blood Urea Nitrogen, Serum 7 mg/dL<L> [10 - 20]  Carbon Dioxide, Serum32 mmol/L [17 - 32]  Chloride, Serum88 mmol/L<L> [98 - 110]  Creatinie, Serum<0.5 mg/dL<L> [0.7 - 1.5]  Glucose, Uknaz204 mg/dL<H> [70 - 99]  Potassium, Serum3.9 mmol/L [3.5 - 5.0]  Sodium, Serum 128 mmol/L<L> [135 - 146]          Microbiology:    Culture - Urine (collected 06-21-22 @ 00:40)  Source: Catheterized Catheterized  Final Report (06-22-22 @ 07:05):    <10,000 CFU/mL Normal Urogenital Perri    Culture - Blood (collected 06-05-22 @ 04:55)  Source: .Blood None  Final Report (06-10-22 @ 12:00):    No Growth Final    Culture - Blood (collected 06-05-22 @ 02:01)  Source: .Blood Blood  Final Report (06-10-22 @ 08:00):    No Growth Final    Culture - Urine (collected 06-04-22 @ 21:00)  Source: Clean Catch Clean Catch (Midstream)  Final Report (06-05-22 @ 22:53):    <10,000 CFU/mL Normal Urogenital Perri    Culture - Blood (collected 05-29-22 @ 12:33)  Source: .Blood None  Final Report (06-03-22 @ 19:00):    No Growth Final    Culture - Urine (collected 05-28-22 @ 17:24)  Source: Catheterized Catheterized  Final Report (05-29-22 @ 18:18):    <10,000 CFU/mL Normal Urogenital Perri    Culture - Urine (collected 05-28-22 @ 10:07)  Source: Catheterized Catheterized  Final Report (05-30-22 @ 10:17):    >=3 organisms. Probable collection contamination.    Culture - Urine (collected 05-27-22 @ 11:54)  Source: Catheterized Catheterized  Final Report (06-02-22 @ 11:41):    >100,000 CFU/ml Klebsiella pneumoniae (Carbapenem Resistant)    >100,000 CFU/ml Pseudomonas aeruginosa  Organism: Klepne MDRO  Pseudomonas aeruginosa (06-02-22 @ 11:43)  Organism: Pseudomonas aeruginosa (06-02-22 @ 11:43)      -  Amikacin: S <=16      -  Aztreonam: S 8      -  Cefepime: S 4      -  Ceftazidime: S 4      -  Ciprofloxacin: R >2      -  Gentamicin: R >8      -  Imipenem: S 2      -  Levofloxacin: R >4      -  Meropenem: S <=1      -  Piperacillin/Tazobactam: S <=8      -  Tobramycin: R >8      Method Type: ROMY  Organism: Jose MDRO (06-02-22 @ 11:43)      -  Amikacin: I 32      -  Amoxicillin/Clavulanic Acid: R >16/8      -  Ampicillin: R >16 These ampicillin results predict results for amoxicillin      -  Ampicillin/Sulbactam: R >16/8 Enterobacter, Klebsiella aerogenes, Citrobacter, and Serratia may develop resistance during prolonged therapy (3-4 days)      -  Aztreonam: R >16      -  Cefazolin: R >16 (MIC_CL_COM_ENTERIC_CEFAZU) For uncomplicated UTI with K. pneumoniae, E. coli, or P. mirablis: ROMY <=16 is sensitive and ROMY >=32 is resistant. This also predicts results for oral agents cefaclor, cefdinir, cefpodoxime, cefprozil, cefuroxime axetil, cephalexin and locarbef for uncomplicated UTI. Note that some isolates may be susceptible to these agents while testing resistant to cefazolin.      -  Cefepime: R >16      -  Cefoxitin: R >16      -  Ceftriaxone: R >32 Enterobacter, Klebsiella aerogenes, Citrobacter, and Serratia may develop resistance during prolonged therapy      -  Ciprofloxacin: R >2      -  Ertapenem: R >1      -  Gentamicin: S <=2      -  Imipenem: R >8      -  Levofloxacin: R >4      -  Meropenem: R >8      -  Nitrofurantoin: R >64 Should not be used to treat pyelonephritis      -  Piperacillin/Tazobactam: R >64      -  Tigecycline: S <=2      -  Tobramycin: R >8      -  Trimethoprim/Sulfamethoxazole: R >2/38      Method Type: ROMY      Medications:  albuterol/ipratropium for Nebulization 3 milliLiter(s) Nebulizer every 6 hours PRN  ALPRAZolam 0.5 milliGRAM(s) Oral every 12 hours PRN  amLODIPine   Tablet 5 milliGRAM(s) Oral daily  ammonium lactate 12% Lotion 1 Application(s) Topical every 12 hours  apixaban 5 milliGRAM(s) Oral two times a day  atorvastatin 20 milliGRAM(s) Oral at bedtime  buMETAnide 2 milliGRAM(s) Oral daily  dextrose 5%. 1000 milliLiter(s) IV Continuous <Continuous>  dextrose 5%. 1000 milliLiter(s) IV Continuous <Continuous>  dextrose 50% Injectable 25 Gram(s) IV Push once  dextrose 50% Injectable 12.5 Gram(s) IV Push once  dextrose 50% Injectable 25 Gram(s) IV Push once  dextrose Oral Gel 15 Gram(s) Oral once PRN  diatrizoate meglumine/diatrizoate sodium. 30 milliLiter(s) Oral once  gabapentin 600 milliGRAM(s) Oral three times a day  glucagon  Injectable 1 milliGRAM(s) IntraMuscular once  HYDROmorphone   Tablet 4 milliGRAM(s) Oral every 8 hours PRN  HYDROmorphone  Injectable 1 milliGRAM(s) IV Push every 12 hours PRN  influenza   Vaccine 0.5 milliLiter(s) IntraMuscular once  insulin glargine Injectable (LANTUS) 20 Unit(s) SubCutaneous at bedtime  insulin lispro (ADMELOG) corrective regimen sliding scale   SubCutaneous every 6 hours  insulin lispro Injectable (ADMELOG) 7 Unit(s) SubCutaneous every 6 hours  losartan 100 milliGRAM(s) Oral daily  multivitamin 1 Tablet(s) Oral daily  pantoprazole    Tablet 40 milliGRAM(s) Oral before breakfast  polyethylene glycol 3350 17 Gram(s) Oral daily  senna 2 Tablet(s) Oral at bedtime  sodium chloride 0.9%. 1000 milliLiter(s) IV Continuous <Continuous>  triamcinolone 0.1% Cream 1 Application(s) Topical every 12 hours        Assessment and Plan:  55 yo female with PMHx of DVT on Eliquis, COPD,  trach collar, obesity, IDDM, UTI, sent by [employer] for hypoxia. Current illness going back to April 4th when pt was intubated on admission at [facility] ICU for hypoxic respiratory failure diagnosed with copd exacerbation and superimposed pneumonia, and remained intubated for 37 days requiring tracheostomy. Patient stayed at [facility] for 2 days until noted to be hypoxic (saturating low 70's) pt's saturation immediately improving afterwards to 80's after large mucus plug removed and was subsequently sent to [facility]  On arrival to ED pt was saturating 97%, 15L O2 via tracheostomy collar (baseline 8 L at [facility])CXR suspicious for L-sided PNA, inconclusive. CTA neg for PE. Pt received x1 IV Levaquin and Cefepime in ED, admitted to MICU for acute hypoxic hypercapnic respiratory failure secondary to acute mucus plug now resolved vs superimposed pneumonia hospital-acquired.   General Surgery Consulted emergently when patient found to be hypoxic to low 80s after CCU and Pulmonary team found large granuloma in trachea along tracheostomy tube. Patient currently had a size 8 trach. Surgery assisted Pulmonary and CCU with trach exchanged with ET tube guidance to a size 7 XLT. Saturations improved to high 90s. Large granuloma was removed. Patient was no longer in distress after exchange.    #Acute/ chronic COPD with exacerbation (Resolved)  #mucous plug removed  #pneumonia vs atelectasis L base  - On 5/28, Pulmonary team found large granuloma in trachea along tracheostomy tube. Patient had a size 8 trach on admission. Surgery assisted Pulmonary and CCU with trach exchanged with ET tube guidance to a size 7 XLT. Saturations improved to high 90s.  - trach functioning well s/p exchange   - 6/11 Trach changed to Fenestrated 8 by surgery  - c/w aggressive L sided chest PT and frequent suctioning q6  - as per pulm, if recurrent atelectasis despite chest PT, suctioning, may consider bronchoscopy    #s/p PEG (6/15):   KUB showing free intraperitoneal air (6/19).  CT Abdo confirmed that this was just due to PEG tube adjustment.   Repeat G tube study per Surgery (6/21) -normal, restarted on peg feedings      #Dysphagia:  Strict NPO as of now per ST's FEES eval from 6/13/22.   [facility] ST will sign out to ST at SNF for further plans with swallow rehab at SNF     #LE neuropathic pain:  PAtient has apparently been bed bound since April 4th (her [facility] admission for hypoxic failure where she ended up being intubated)  Apparently has L > R foot drop which may be due to chronic contractures of Calf muscle/Achilles tendon. Can have PT evaluate her for that.  But options are limited at this stage.   Given the asymmetrical foot drop, Neurology wants to rule out Any focal neuropathy.  -on 6/23-  s/p  MRI Lumbar spine w/ & w/o Contrast  - chronic DJD no acute changes.  -continue pain management      #B/l LE Hyperpigmentation changes:   unclear etiology  Present since 1 year.   May be related to CHF related stasis dermatitis / hemosiderin deposition  vs deposition diseases versus Eosinophilic Dermatitis vs follicular hemorrhage from eliquis ?? (doubt the last differential)  Dermatology thinks it may be Eczema and advised Triamcinolone BId.   Will consider biopsy though. Pending Burn attending Input.   Add multivitamin to PEG QD.         Xanax 0.25 Q12 PRN for anxiety.   On Senna, Miralax.     #Suspected epiglottis edema:   s/p DExa Taper (ended 6/18).   Outpatient ENT f/u.     #DM:   steroids ended 6/18.  6/19- Increased lantus from 17 to 20.      #Chronic Indwelling Singh  - 5/28 Ucx - Contaminated   - 5/28 Ucx- Normal perri   - 5/27 Ucx- Klebsiella (CRE) and pseudomonas   - s/p cefepime  - Currently afebrile w/ no urinary complaints   - episode of Hematouria 6/4 -> improving -> h/h Stable    #Hx of DVT  - on home Eliquis  - restarted Eliquis      Activity been bed bound. PT eval when able.   GI PPX  PPI  DVT PPX  ELIQUIS  Dispo: Acute  Pending: patient was medically optimized, start d/c planning to [facility].     DISPO: STR placement once bed is available    Total time spent to complete patient's bedside assessment, review medical chart, discuss medical plan of care with covering medical team was more than 35 minutes       CRUZ DUMONT  [facility] T2[facility]3A)            Patient was evaluated and examined  by bedside, no active complains                REVIEW OF SYSTEMS:  please see pertinent positives mentioned above, all other 12 ROS negative      T(C): , Max: 37.2 (06-22-22 @ 13:39)  HR: 96 (06-23-22 @ 05:19)  BP: 122/56 (06-23-22 @ 04:31)  RR: 20 (06-23-22 @ 11:24)  SpO2: 96% (06-23-22 @ 11:24)  CAPILLARY BLOOD GLUCOSE      POCT Blood Glucose.: 182 mg/dL (23 Jun 2022 12:53)  POCT Blood Glucose.: 181 mg/dL (23 Jun 2022 07:30)  POCT Blood Glucose.: 177 mg/dL (23 Jun 2022 05:22)  POCT Blood Glucose.: 221 mg/dL (23 Jun 2022 00:10)  POCT Blood Glucose.: 249 mg/dL (22 Jun 2022 21:37)  POCT Blood Glucose.: 250 mg/dL (22 Jun 2022 18:34)      PHYSICAL EXAM:  General: NAD, sleepy, patient is laying comfortably in bed  HEENT: AT, NC, Supple, NO JVD, NO CB, trach present  Lungs: good breath sounds  B/L, no wheezing, no rhonchi  CVS: normal S1, S2, RRR, NO M/G/R  Abdomen: soft, bowel sounds present, non-tender, non-distended, peg present  Extremities: no edema, no clubbing, no cyanosis, positive peripheral pulses b/l  Neuro: awake, follows verbal commands, chronic hypersensitivity of lower extremities  Skin: no rash, no ecchymosis, chronic dark brown atrophic skin discoloration of b/l lower ext.      LAB  CBC  Date: 06-21-22 @ 07:44  Mean cell Jxbgwieedi38.8  Mean cell Hemoglobin Conc35.6  Mean cell Volum 95.1  Platelet count-Automate 183  RBC Count 2.63  Red Cell Distrib Width13.0  WBC Count11.38  % Albumin, Urine--  Hematocrit 25.0  Hemoglobin 8.9        BMP  06-21-22 @ 07:44  Blood Gas Arterial-Calcium,Ionized--  Blood Urea Nitrogen, Serum 7 mg/dL<L> [10 - 20]  Carbon Dioxide, Serum32 mmol/L [17 - 32]  Chloride, Serum88 mmol/L<L> [98 - 110]  Creatinie, Serum<0.5 mg/dL<L> [0.7 - 1.5]  Glucose, Scoms506 mg/dL<H> [70 - 99]  Potassium, Serum3.9 mmol/L [3.5 - 5.0]  Sodium, Serum 128 mmol/L<L> [135 - 146]          Microbiology:    Culture - Urine (collected 06-21-22 @ 00:40)  Source: Catheterized Catheterized  Final Report (06-22-22 @ 07:05):    <10,000 CFU/mL Normal Urogenital Perri    Culture - Blood (collected 06-05-22 @ 04:55)  Source: .Blood None  Final Report (06-10-22 @ 12:00):    No Growth Final    Culture - Blood (collected 06-05-22 @ 02:01)  Source: .Blood Blood  Final Report (06-10-22 @ 08:00):    No Growth Final    Culture - Urine (collected 06-04-22 @ 21:00)  Source: Clean Catch Clean Catch (Midstream)  Final Report (06-05-22 @ 22:53):    <10,000 CFU/mL Normal Urogenital Perri    Culture - Blood (collected 05-29-22 @ 12:33)  Source: .Blood None  Final Report (06-03-22 @ 19:00):    No Growth Final    Culture - Urine (collected 05-28-22 @ 17:24)  Source: Catheterized Catheterized  Final Report (05-29-22 @ 18:18):    <10,000 CFU/mL Normal Urogenital Perri    Culture - Urine (collected 05-28-22 @ 10:07)  Source: Catheterized Catheterized  Final Report (05-30-22 @ 10:17):    >=3 organisms. Probable collection contamination.    Culture - Urine (collected 05-27-22 @ 11:54)  Source: Catheterized Catheterized  Final Report (06-02-22 @ 11:41):    >100,000 CFU/ml Klebsiella pneumoniae (Carbapenem Resistant)    >100,000 CFU/ml Pseudomonas aeruginosa  Organism: Klepne MDRO  Pseudomonas aeruginosa (06-02-22 @ 11:43)  Organism: Pseudomonas aeruginosa (06-02-22 @ 11:43)      -  Amikacin: S <=16      -  Aztreonam: S 8      -  Cefepime: S 4      -  Ceftazidime: S 4      -  Ciprofloxacin: R >2      -  Gentamicin: R >8      -  Imipenem: S 2      -  Levofloxacin: R >4      -  Meropenem: S <=1      -  Piperacillin/Tazobactam: S <=8      -  Tobramycin: R >8      Method Type: ROMY  Organism: Jose MDRO (06-02-22 @ 11:43)      -  Amikacin: I 32      -  Amoxicillin/Clavulanic Acid: R >16/8      -  Ampicillin: R >16 These ampicillin results predict results for amoxicillin      -  Ampicillin/Sulbactam: R >16/8 Enterobacter, Klebsiella aerogenes, Citrobacter, and Serratia may develop resistance during prolonged therapy (3-4 days)      -  Aztreonam: R >16      -  Cefazolin: R >16 (MIC_CL_COM_ENTERIC_CEFAZU) For uncomplicated UTI with K. pneumoniae, E. coli, or P. mirablis: ROMY <=16 is sensitive and ROMY >=32 is resistant. This also predicts results for oral agents cefaclor, cefdinir, cefpodoxime, cefprozil, cefuroxime axetil, cephalexin and locarbef for uncomplicated UTI. Note that some isolates may be susceptible to these agents while testing resistant to cefazolin.      -  Cefepime: R >16      -  Cefoxitin: R >16      -  Ceftriaxone: R >32 Enterobacter, Klebsiella aerogenes, Citrobacter, and Serratia may develop resistance during prolonged therapy      -  Ciprofloxacin: R >2      -  Ertapenem: R >1      -  Gentamicin: S <=2      -  Imipenem: R >8      -  Levofloxacin: R >4      -  Meropenem: R >8      -  Nitrofurantoin: R >64 Should not be used to treat pyelonephritis      -  Piperacillin/Tazobactam: R >64      -  Tigecycline: S <=2      -  Tobramycin: R >8      -  Trimethoprim/Sulfamethoxazole: R >2/38      Method Type: ROMY      Medications:  albuterol/ipratropium for Nebulization 3 milliLiter(s) Nebulizer every 6 hours PRN  ALPRAZolam 0.5 milliGRAM(s) Oral every 12 hours PRN  amLODIPine   Tablet 5 milliGRAM(s) Oral daily  ammonium lactate 12% Lotion 1 Application(s) Topical every 12 hours  apixaban 5 milliGRAM(s) Oral two times a day  atorvastatin 20 milliGRAM(s) Oral at bedtime  buMETAnide 2 milliGRAM(s) Oral daily  dextrose 5%. 1000 milliLiter(s) IV Continuous <Continuous>  dextrose 5%. 1000 milliLiter(s) IV Continuous <Continuous>  dextrose 50% Injectable 25 Gram(s) IV Push once  dextrose 50% Injectable 12.5 Gram(s) IV Push once  dextrose 50% Injectable 25 Gram(s) IV Push once  dextrose Oral Gel 15 Gram(s) Oral once PRN  diatrizoate meglumine/diatrizoate sodium. 30 milliLiter(s) Oral once  gabapentin 600 milliGRAM(s) Oral three times a day  glucagon  Injectable 1 milliGRAM(s) IntraMuscular once  HYDROmorphone   Tablet 4 milliGRAM(s) Oral every 8 hours PRN  HYDROmorphone  Injectable 1 milliGRAM(s) IV Push every 12 hours PRN  influenza   Vaccine 0.5 milliLiter(s) IntraMuscular once  insulin glargine Injectable (LANTUS) 20 Unit(s) SubCutaneous at bedtime  insulin lispro (ADMELOG) corrective regimen sliding scale   SubCutaneous every 6 hours  insulin lispro Injectable (ADMELOG) 7 Unit(s) SubCutaneous every 6 hours  losartan 100 milliGRAM(s) Oral daily  multivitamin 1 Tablet(s) Oral daily  pantoprazole    Tablet 40 milliGRAM(s) Oral before breakfast  polyethylene glycol 3350 17 Gram(s) Oral daily  senna 2 Tablet(s) Oral at bedtime  sodium chloride 0.9%. 1000 milliLiter(s) IV Continuous <Continuous>  triamcinolone 0.1% Cream 1 Application(s) Topical every 12 hours        Assessment and Plan:  57 yo female with PMHx of DVT on Eliquis, COPD,  trach collar, obesity, IDDM, UTI, sent by [facility] for hypoxia. Current illness going back to April 4th when pt was intubated on admission at [facility] ICU for hypoxic respiratory failure diagnosed with copd exacerbation and superimposed pneumonia, and remained intubated for 37 days requiring tracheostomy. Patient stayed at [facility] for 2 days until noted to be hypoxic (saturating low 70's) pt's saturation immediately improving afterwards to 80's after large mucus plug removed and was subsequently sent to [facility]  On arrival to ED pt was saturating 97%, 15L O2 via tracheostomy collar (baseline 8 L at [facility])CXR suspicious for L-sided PNA, inconclusive. CTA neg for PE. Pt received x1 IV Levaquin and Cefepime in ED, admitted to MICU for acute hypoxic hypercapnic respiratory failure secondary to acute mucus plug now resolved vs superimposed pneumonia hospital-acquired.   General Surgery Consulted emergently when patient found to be hypoxic to low 80s after CCU and Pulmonary team found large granuloma in trachea along tracheostomy tube. Patient currently had a size 8 trach. Surgery assisted Pulmonary and CCU with trach exchanged with ET tube guidance to a size 7 XLT. Saturations improved to high 90s. Large granuloma was removed. Patient was no longer in distress after exchange.    #Acute/ chronic COPD with exacerbation (Resolved)  #mucous plug removed  #pneumonia vs atelectasis L base  - On 5/28, Pulmonary team found large granuloma in trachea along tracheostomy tube. Patient had a size 8 trach on admission. Surgery assisted Pulmonary and CCU with trach exchanged with ET tube guidance to a size 7 XLT. Saturations improved to high 90s.  - trach functioning well s/p exchange   - 6/11 Trach changed to Fenestrated 8 by surgery  - c/w aggressive L sided chest PT and frequent suctioning q6  - as per pulm, if recurrent atelectasis despite chest PT, suctioning, may consider bronchoscopy    #s/p PEG (6/15):   KUB showing free intraperitoneal air (6/19).  CT Abdo confirmed that this was just due to PEG tube adjustment.   Repeat G tube study per Surgery (6/21) -normal, restarted on peg feedings      #Dysphagia:  Strict NPO as of now per ST's FEES eval from 6/13/22.   [facility] will sign out to  at SNF for further plans with swallow rehab at SNF     #LE neuropathic pain:  PAtient has apparently been bed bound since April 4th (her [facility] admission for hypoxic failure where she ended up being intubated)  Apparently has L > R foot drop which may be due to chronic contractures of Calf muscle/Achilles tendon. Can have PT evaluate her for that.  But options are limited at this stage.   Given the asymmetrical foot drop, Neurology wants to rule out Any focal neuropathy.  -on 6/23-  s/p  MRI Lumbar spine w/ & w/o Contrast  - chronic DJD no acute changes.  -continue pain management      #B/l LE Hyperpigmentation changes:   unclear etiology  Present since 1 year.   May be related to CHF related stasis dermatitis / hemosiderin deposition  vs deposition diseases versus Eosinophilic Dermatitis vs follicular hemorrhage from eliquis ?? (doubt the last differential)  Dermatology thinks it may be Eczema and advised Triamcinolone BId.   Will consider biopsy though. Pending Burn attending Input.   Add multivitamin to PEG QD.         Xanax 0.25 Q12 PRN for anxiety.   On Senna, Miralax.     #Suspected epiglottis edema:   s/p DExa Taper (ended 6/18).   Outpatient ENT f/u.     #DM:   steroids ended 6/18.  6/19- Increased lantus from 17 to 20.      #Chronic Indwelling Singh  - 5/28 Ucx - Contaminated   - 5/28 Ucx- Normal perri   - 5/27 Ucx- Klebsiella (CRE) and pseudomonas   - s/p cefepime  - Currently afebrile w/ no urinary complaints   - episode of Hematouria 6/4 -> improving -> h/h Stable    #Hx of DVT  - on home Eliquis  - restarted Eliquis      Activity been bed bound. PT eval when able.   GI PPX  PPI  DVT PPX  ELIQUIS  Dispo: Acute  Pending: patient was medically optimized, start d/c planning to [facility].     DISPO: STR placement once bed is available    Total time spent to complete patient's bedside assessment, review medical chart, discuss medical plan of care with covering medical team was more than 35 minutes

## 2022-06-23 NOTE — PRE-ANESTHESIA EVALUATION ADULT - NSANTHPMHFT_GEN_ALL_CORE
56F with hx DVT on Eliquis, anemia, COPD, trach collar (s/p trach April 2022 for COPD exacerbation/PNA), morbid obesity, IDDM sent by [facility] for hypoxia possibly 2/2 mucus plug vs PNA vs COPD. Hypoxia resolved. Now s/p trach exchange. Scheduled for bronch and trachea debridement of granulation tomorrow.    EKG: Normal sinus rhythm  Low voltage QRS  Incomplete right bundle branch block  ST & T wave abnormality, consider anterolateral ischemia  Prolonged QT  Abnormal ECG    TTE:  1. Left ventricular ejection fraction, by visual estimation, is 50 to   55%.   2. The left ventricular diastolic function could not be assessed in this   study.   3. Mildly reduced RV systolic function.   4. Moderately enlarged right ventricle.   5. Mild to moderate mitral annular calcification.   6. Mild thickening and calcification of the anterior and posterior   mitral valve leaflets.   7. Mild tricuspid regurgitation.   8. Estimated pulmonary artery systolic pressure is 37.6 mmHg assuming a   right atrial pressure of 5 mmHg, which is consistent with borderline   pulmonary hypertension.   9. Moderate pleural effusion in the left lateral region.  10. Limited sonographic windows. Suboptimal exam.
resp failure s/p trach
pt had very complicated history before and following this admission- hypoxia-needed granuloma removal in trachea, upgraded to bigger size fenestrated tracheostomy, removal of mucous plug from tracheo bronchial tree  Epiglotitis  PNA  Obese  anemia  on trach collar
Chart reviewed, All evaluation seen, pt examined. Labs, EKG seen.

## 2022-06-23 NOTE — PRE-ANESTHESIA EVALUATION ADULT - MALLAMPATI CLASS
Class III - visualization of the soft palate and the base of the uvula
Class III - visualization of the soft palate and the base of the uvula
Class IV (difficult) - the soft palate is not visible at all
racheostomy.

## 2022-06-23 NOTE — PROGRESS NOTE ADULT - SUBJECTIVE AND OBJECTIVE BOX
[facility]            Patient was evaluated and examined  by bedside, no active complains                REVIEW OF SYSTEMS:  please see pertinent positives mentioned above, all other 12 ROS negative      T(C): , Max: 37.2 (06-22-22 @ 13:39)  HR: 96 (06-23-22 @ 05:19)  BP: 122/56 (06-23-22 @ 04:31)  RR: 20 (06-23-22 @ 11:24)  SpO2: 96% (06-23-22 @ 11:24)  CAPILLARY BLOOD GLUCOSE      POCT Blood Glucose.: 182 mg/dL (23 Jun 2022 12:53)  POCT Blood Glucose.: 181 mg/dL (23 Jun 2022 07:30)  POCT Blood Glucose.: 177 mg/dL (23 Jun 2022 05:22)  POCT Blood Glucose.: 221 mg/dL (23 Jun 2022 00:10)  POCT Blood Glucose.: 249 mg/dL (22 Jun 2022 21:37)  POCT Blood Glucose.: 250 mg/dL (22 Jun 2022 18:34)      PHYSICAL EXAM:  General: NAD, sleepy, patient is laying comfortably in bed  HEENT: AT, NC, Supple, NO JVD, NO CB, trach present  Lungs: good breath sounds  B/L, no wheezing, no rhonchi  CVS: normal S1, S2, RRR, NO M/G/R  Abdomen: soft, bowel sounds present, non-tender, non-distended, peg present  Extremities: no edema, no clubbing, no cyanosis, positive peripheral pulses b/l  Neuro: awake, follows verbal commands, chronic hypersensitivity of lower extremities  Skin: no rash, no ecchymosis, chronic dark brown atrophic skin discoloration of b/l lower ext.      LAB  CBC  Date: 06-21-22 @ 07:44  Mean cell Skwczoqtxo22.8  Mean cell Hemoglobin Conc35.6  Mean cell Volum 95.1  Platelet count-Automate 183  RBC Count 2.63  Red Cell Distrib Width13.0  WBC Count11.38  % Albumin, Urine--  Hematocrit 25.0  Hemoglobin 8.9        BMP  06-21-22 @ 07:44  Blood Gas Arterial-Calcium,Ionized--  Blood Urea Nitrogen, Serum 7 mg/dL<L> [10 - 20]  Carbon Dioxide, Serum32 mmol/L [17 - 32]  Chloride, Serum88 mmol/L<L> [98 - 110]  Creatinie, Serum<0.5 mg/dL<L> [0.7 - 1.5]  Glucose, Wdpsj999 mg/dL<H> [70 - 99]  Potassium, Serum3.9 mmol/L [3.5 - 5.0]  Sodium, Serum 128 mmol/L<L> [135 - 146]          Microbiology:    Culture - Urine (collected 06-21-22 @ 00:40)  Source: Catheterized Catheterized  Final Report (06-22-22 @ 07:05):    <10,000 CFU/mL Normal Urogenital Perri    Culture - Blood (collected 06-05-22 @ 04:55)  Source: .Blood None  Final Report (06-10-22 @ 12:00):    No Growth Final    Culture - Blood (collected 06-05-22 @ 02:01)  Source: .Blood Blood  Final Report (06-10-22 @ 08:00):    No Growth Final    Culture - Urine (collected 06-04-22 @ 21:00)  Source: Clean Catch Clean Catch (Midstream)  Final Report (06-05-22 @ 22:53):    <10,000 CFU/mL Normal Urogenital Perri    Culture - Blood (collected 05-29-22 @ 12:33)  Source: .Blood None  Final Report (06-03-22 @ 19:00):    No Growth Final    Culture - Urine (collected 05-28-22 @ 17:24)  Source: Catheterized Catheterized  Final Report (05-29-22 @ 18:18):    <10,000 CFU/mL Normal Urogenital Perri    Culture - Urine (collected 05-28-22 @ 10:07)  Source: Catheterized Catheterized  Final Report (05-30-22 @ 10:17):    >=3 organisms. Probable collection contamination.    Culture - Urine (collected 05-27-22 @ 11:54)  Source: Catheterized Catheterized  Final Report (06-02-22 @ 11:41):    >100,000 CFU/ml Klebsiella pneumoniae (Carbapenem Resistant)    >100,000 CFU/ml Pseudomonas aeruginosa  Organism: Klepne MDRO  Pseudomonas aeruginosa (06-02-22 @ 11:43)  Organism: Pseudomonas aeruginosa (06-02-22 @ 11:43)      -  Amikacin: S <=16      -  Aztreonam: S 8      -  Cefepime: S 4      -  Ceftazidime: S 4      -  Ciprofloxacin: R >2      -  Gentamicin: R >8      -  Imipenem: S 2      -  Levofloxacin: R >4      -  Meropenem: S <=1      -  Piperacillin/Tazobactam: S <=8      -  Tobramycin: R >8      Method Type: ROMY  Organism: Jose MDRO (06-02-22 @ 11:43)      -  Amikacin: I 32      -  Amoxicillin/Clavulanic Acid: R >16/8      -  Ampicillin: R >16 These ampicillin results predict results for amoxicillin      -  Ampicillin/Sulbactam: R >16/8 Enterobacter, Klebsiella aerogenes, Citrobacter, and Serratia may develop resistance during prolonged therapy (3-4 days)      -  Aztreonam: R >16      -  Cefazolin: R >16 (MIC_CL_COM_ENTERIC_CEFAZU) For uncomplicated UTI with K. pneumoniae, E. coli, or P. mirablis: ROMY <=16 is sensitive and ROMY >=32 is resistant. This also predicts results for oral agents cefaclor, cefdinir, cefpodoxime, cefprozil, cefuroxime axetil, cephalexin and locarbef for uncomplicated UTI. Note that some isolates may be susceptible to these agents while testing resistant to cefazolin.      -  Cefepime: R >16      -  Cefoxitin: R >16      -  Ceftriaxone: R >32 Enterobacter, Klebsiella aerogenes, Citrobacter, and Serratia may develop resistance during prolonged therapy      -  Ciprofloxacin: R >2      -  Ertapenem: R >1      -  Gentamicin: S <=2      -  Imipenem: R >8      -  Levofloxacin: R >4      -  Meropenem: R >8      -  Nitrofurantoin: R >64 Should not be used to treat pyelonephritis      -  Piperacillin/Tazobactam: R >64      -  Tigecycline: S <=2      -  Tobramycin: R >8      -  Trimethoprim/Sulfamethoxazole: R >2/38      Method Type: ROMY      Medications:  albuterol/ipratropium for Nebulization 3 milliLiter(s) Nebulizer every 6 hours PRN  ALPRAZolam 0.5 milliGRAM(s) Oral every 12 hours PRN  amLODIPine   Tablet 5 milliGRAM(s) Oral daily  ammonium lactate 12% Lotion 1 Application(s) Topical every 12 hours  apixaban 5 milliGRAM(s) Oral two times a day  atorvastatin 20 milliGRAM(s) Oral at bedtime  buMETAnide 2 milliGRAM(s) Oral daily  dextrose 5%. 1000 milliLiter(s) IV Continuous <Continuous>  dextrose 5%. 1000 milliLiter(s) IV Continuous <Continuous>  dextrose 50% Injectable 25 Gram(s) IV Push once  dextrose 50% Injectable 12.5 Gram(s) IV Push once  dextrose 50% Injectable 25 Gram(s) IV Push once  dextrose Oral Gel 15 Gram(s) Oral once PRN  diatrizoate meglumine/diatrizoate sodium. 30 milliLiter(s) Oral once  gabapentin 600 milliGRAM(s) Oral three times a day  glucagon  Injectable 1 milliGRAM(s) IntraMuscular once  HYDROmorphone   Tablet 4 milliGRAM(s) Oral every 8 hours PRN  HYDROmorphone  Injectable 1 milliGRAM(s) IV Push every 12 hours PRN  influenza   Vaccine 0.5 milliLiter(s) IntraMuscular once  insulin glargine Injectable (LANTUS) 20 Unit(s) SubCutaneous at bedtime  insulin lispro (ADMELOG) corrective regimen sliding scale   SubCutaneous every 6 hours  insulin lispro Injectable (ADMELOG) 7 Unit(s) SubCutaneous every 6 hours  losartan 100 milliGRAM(s) Oral daily  multivitamin 1 Tablet(s) Oral daily  pantoprazole    Tablet 40 milliGRAM(s) Oral before breakfast  polyethylene glycol 3350 17 Gram(s) Oral daily  senna 2 Tablet(s) Oral at bedtime  sodium chloride 0.9%. 1000 milliLiter(s) IV Continuous <Continuous>  triamcinolone 0.1% Cream 1 Application(s) Topical every 12 hours        Assessment and Plan:  57 yo female with PMHx of DVT on Eliquis, COPD,  trach collar, obesity, IDDM, UTI, sent [facility] for hypoxia. Current illness going back to April 4th when pt was intubated on admission at [facility] ICU for hypoxic respiratory failure diagnosed with copd exacerbation and superimposed pneumonia, and remained intubated for 37 days requiring tracheostomy. Patient stayed at [facility] for 2 days until noted to be hypoxic (saturating low 70's) pt's saturation immediately improving afterwards to 80's after large mucus plug removed and was subsequently sent to [facility]  On arrival to ED pt was saturating 97%, 15L O2 via tracheostomy collar (baseline 8 L at [facility])CXR suspicious for L-sided PNA, inconclusive. CTA neg for PE. Pt received x1 IV Levaquin and Cefepime in ED, admitted to MICU for acute hypoxic hypercapnic respiratory failure secondary to acute mucus plug now resolved vs superimposed pneumonia hospital-acquired.   General Surgery Consulted emergently when patient found to be hypoxic to low 80s after CCU and Pulmonary team found large granuloma in trachea along tracheostomy tube. Patient currently had a size 8 trach. Surgery assisted Pulmonary and CCU with trach exchanged with ET tube guidance to a size 7 XLT. Saturations improved to high 90s. Large granuloma was removed. Patient was no longer in distress after exchange.    #Acute/ chronic COPD with exacerbation (Resolved)  #mucous plug removed  #pneumonia vs atelectasis L base  - On 5/28, Pulmonary team found large granuloma in trachea along tracheostomy tube. Patient had a size 8 trach on admission. Surgery assisted Pulmonary and CCU with trach exchanged with ET tube guidance to a size 7 XLT. Saturations improved to high 90s.  - trach functioning well s/p exchange   - 6/11 Trach changed to Fenestrated 8 by surgery  - c/w aggressive L sided chest PT and frequent suctioning q6  - as per pulm, if recurrent atelectasis despite chest PT, suctioning, may consider bronchoscopy    #s/p PEG (6/15):   KUB showing free intraperitoneal air (6/19).  CT Abdo confirmed that this was just due to PEG tube adjustment.   Repeat G tube study per Surgery (6/21) -normal, restarted on peg feedings      #Dysphagia:  Strict NPO as of now per ST's FEES eval from 6/13/22.   [facility] will sign out to  at SNF for further plans with swallow rehab at SNF     #LE neuropathic pain:  PAtient has apparently been bed bound since April 4th (her [facility] admission for hypoxic failure where she ended up being intubated)  Apparently has L > R foot drop which may be due to chronic contractures of Calf muscle/Achilles tendon. Can have PT evaluate her for that.  But options are limited at this stage.   Given the asymmetrical foot drop, Neurology wants to rule out Any focal neuropathy.  -on 6/23-  s/p  MRI Lumbar spine w/ & w/o Contrast  - chronic DJD no acute changes.  -continue pain management      #B/l LE Hyperpigmentation changes:   unclear etiology  Present since 1 year.   May be related to CHF related stasis dermatitis / hemosiderin deposition  vs deposition diseases versus Eosinophilic Dermatitis vs follicular hemorrhage from eliquis ?? (doubt the last differential)  Dermatology thinks it may be Eczema and advised Triamcinolone BId.   Will consider biopsy though. Pending Burn attending Input.   Add multivitamin to PEG QD.         Xanax 0.25 Q12 PRN for anxiety.   On Senna, Miralax.     #Suspected epiglottis edema:   s/p DExa Taper (ended 6/18).   Outpatient ENT f/u.     #DM:   steroids ended 6/18.  6/19- Increased lantus from 17 to 20.      #Chronic Indwelling Singh  - 5/28 Ucx - Contaminated   - 5/28 Ucx- Normal perri   - 5/27 Ucx- Klebsiella (CRE) and pseudomonas   - s/p cefepime  - Currently afebrile w/ no urinary complaints   - episode of Hematouria 6/4 -> improving -> h/h Stable    #Hx of DVT  - on home Eliquis  - restarted Eliquis      Activity been bed bound. PT eval when able.   GI PPX  PPI  DVT PPX  ELIQUIS  Dispo: Acute  Pending: patient was medically optimized, start d/c planning to [facility].     DISPO: STR placement once bed is available    Total time spent to complete patient's bedside assessment, review medical chart, discuss medical plan of care with covering medical team was more than 35 minutes

## 2022-06-24 LAB
GLUCOSE BLDC GLUCOMTR-MCNC: 144 MG/DL — HIGH (ref 70–99)
GLUCOSE BLDC GLUCOMTR-MCNC: 162 MG/DL — HIGH (ref 70–99)
GLUCOSE BLDC GLUCOMTR-MCNC: 180 MG/DL — HIGH (ref 70–99)
GLUCOSE BLDC GLUCOMTR-MCNC: 207 MG/DL — HIGH (ref 70–99)
GLUCOSE BLDC GLUCOMTR-MCNC: 220 MG/DL — HIGH (ref 70–99)
GLUCOSE BLDC GLUCOMTR-MCNC: 235 MG/DL — HIGH (ref 70–99)

## 2022-06-24 PROCEDURE — 99233 SBSQ HOSP IP/OBS HIGH 50: CPT

## 2022-06-24 RX ORDER — INSULIN LISPRO 100/ML
VIAL (ML) SUBCUTANEOUS
Refills: 0 | Status: DISCONTINUED | OUTPATIENT
Start: 2022-06-24 | End: 2022-07-22

## 2022-06-24 RX ORDER — INSULIN LISPRO 100/ML
9 VIAL (ML) SUBCUTANEOUS EVERY 6 HOURS
Refills: 0 | Status: DISCONTINUED | OUTPATIENT
Start: 2022-06-24 | End: 2022-07-19

## 2022-06-24 RX ORDER — BACITRACIN ZINC 500 UNIT/G
1 OINTMENT IN PACKET (EA) TOPICAL EVERY 8 HOURS
Refills: 0 | Status: DISCONTINUED | OUTPATIENT
Start: 2022-06-24 | End: 2022-06-25

## 2022-06-24 RX ADMIN — Medication 9 UNIT(S): at 17:11

## 2022-06-24 RX ADMIN — APIXABAN 5 MILLIGRAM(S): 2.5 TABLET, FILM COATED ORAL at 05:15

## 2022-06-24 RX ADMIN — Medication 9 UNIT(S): at 11:58

## 2022-06-24 RX ADMIN — HYDROMORPHONE HYDROCHLORIDE 4 MILLIGRAM(S): 2 INJECTION INTRAMUSCULAR; INTRAVENOUS; SUBCUTANEOUS at 14:45

## 2022-06-24 RX ADMIN — HYDROMORPHONE HYDROCHLORIDE 1 MILLIGRAM(S): 2 INJECTION INTRAMUSCULAR; INTRAVENOUS; SUBCUTANEOUS at 11:10

## 2022-06-24 RX ADMIN — APIXABAN 5 MILLIGRAM(S): 2.5 TABLET, FILM COATED ORAL at 17:12

## 2022-06-24 RX ADMIN — POLYETHYLENE GLYCOL 3350 17 GRAM(S): 17 POWDER, FOR SOLUTION ORAL at 11:40

## 2022-06-24 RX ADMIN — HYDROMORPHONE HYDROCHLORIDE 4 MILLIGRAM(S): 2 INJECTION INTRAMUSCULAR; INTRAVENOUS; SUBCUTANEOUS at 22:44

## 2022-06-24 RX ADMIN — LOSARTAN POTASSIUM 100 MILLIGRAM(S): 100 TABLET, FILM COATED ORAL at 05:14

## 2022-06-24 RX ADMIN — Medication 7 UNIT(S): at 00:05

## 2022-06-24 RX ADMIN — Medication 1 APPLICATION(S): at 17:14

## 2022-06-24 RX ADMIN — PANTOPRAZOLE SODIUM 40 MILLIGRAM(S): 20 TABLET, DELAYED RELEASE ORAL at 05:14

## 2022-06-24 RX ADMIN — ATORVASTATIN CALCIUM 20 MILLIGRAM(S): 80 TABLET, FILM COATED ORAL at 22:15

## 2022-06-24 RX ADMIN — Medication 1 APPLICATION(S): at 05:21

## 2022-06-24 RX ADMIN — Medication 1 APPLICATION(S): at 17:12

## 2022-06-24 RX ADMIN — Medication 7 UNIT(S): at 06:30

## 2022-06-24 RX ADMIN — HYDROMORPHONE HYDROCHLORIDE 1 MILLIGRAM(S): 2 INJECTION INTRAMUSCULAR; INTRAVENOUS; SUBCUTANEOUS at 23:48

## 2022-06-24 RX ADMIN — INSULIN GLARGINE 20 UNIT(S): 100 INJECTION, SOLUTION SUBCUTANEOUS at 22:15

## 2022-06-24 RX ADMIN — GABAPENTIN 600 MILLIGRAM(S): 400 CAPSULE ORAL at 14:06

## 2022-06-24 RX ADMIN — HYDROMORPHONE HYDROCHLORIDE 4 MILLIGRAM(S): 2 INJECTION INTRAMUSCULAR; INTRAVENOUS; SUBCUTANEOUS at 14:11

## 2022-06-24 RX ADMIN — HYDROMORPHONE HYDROCHLORIDE 4 MILLIGRAM(S): 2 INJECTION INTRAMUSCULAR; INTRAVENOUS; SUBCUTANEOUS at 22:14

## 2022-06-24 RX ADMIN — Medication 4: at 17:18

## 2022-06-24 RX ADMIN — Medication 1 APPLICATION(S): at 14:06

## 2022-06-24 RX ADMIN — GABAPENTIN 600 MILLIGRAM(S): 400 CAPSULE ORAL at 05:14

## 2022-06-24 RX ADMIN — AMLODIPINE BESYLATE 5 MILLIGRAM(S): 2.5 TABLET ORAL at 05:15

## 2022-06-24 RX ADMIN — Medication 0.5 MILLIGRAM(S): at 14:05

## 2022-06-24 RX ADMIN — Medication 9 UNIT(S): at 23:34

## 2022-06-24 RX ADMIN — GABAPENTIN 600 MILLIGRAM(S): 400 CAPSULE ORAL at 22:15

## 2022-06-24 RX ADMIN — BUMETANIDE 2 MILLIGRAM(S): 0.25 INJECTION INTRAMUSCULAR; INTRAVENOUS at 05:15

## 2022-06-24 RX ADMIN — HYDROMORPHONE HYDROCHLORIDE 1 MILLIGRAM(S): 2 INJECTION INTRAMUSCULAR; INTRAVENOUS; SUBCUTANEOUS at 11:30

## 2022-06-24 RX ADMIN — HYDROMORPHONE HYDROCHLORIDE 1 MILLIGRAM(S): 2 INJECTION INTRAMUSCULAR; INTRAVENOUS; SUBCUTANEOUS at 23:33

## 2022-06-24 RX ADMIN — Medication 2: at 11:59

## 2022-06-24 RX ADMIN — Medication 1 TABLET(S): at 11:40

## 2022-06-24 NOTE — SPEAKING VALVE EVALUATION - ADDITIONAL COMMENTS
+toleration w/o coughing or c/o distress, however therapeutic po trials of ice chips provided which induced a wet voice, coughing and increase in RR and HR, valve removed pt suctioned and valve left off pt as pt was due for pain meds which are sedative. Pt not to wear valve while sleeping  Pt face timed daughter w/ valve on and is happy to verbally be able to communicate

## 2022-06-24 NOTE — PROGRESS NOTE ADULT - ATTENDING COMMENTS
Patient is a 57 yo female with PMHx of DVT on Eliquis, COPD,  trach collar, obesity, IDDM, UTI, [facility] [place] by [facility] for hypoxia. Current illness going back to April 4th when pt was intubated on admission at [facility] ICU for hypoxic respiratory failure diagnosed with copd exacerbation and superimposed pneumonia, and remained intubated for 37 days requiring tracheostomy. Patient stayed at [facility] for 2 days until noted to be hypoxic (saturating low 70's) pt's saturation immediately improving afterwards to 80's after large mucus plug removed and was subsequently sent to [facility]  On arrival to ED pt was saturating 97%, 15L O2 via tracheostomy collar (baseline 8 L at [facility])CXR suspicious for L-sided PNA, inconclusive. CTA neg for PE. Pt received x1 IV Levaquin and Cefepime in ED, admitted to MICU for acute hypoxic hypercapnic respiratory failure secondary to acute mucus plug now resolved vs superimposed pneumonia hospital-acquired.   General Surgery Consulted emergently when patient found to be hypoxic to low 80s after CCU and Pulmonary team found large granuloma in trachea along tracheostomy tube. Patient currently had a size 8 trach. Surgery assisted Pulmonary and CCU with trach exchanged with ET tube guidance to a size 7 XLT. Saturations improved to high 90s. Large granuloma was removed. Patient was no longer in distress after exchange.    #Acute/ chronic COPD with exacerbation (Resolved)  #mucous plug removed  #pneumonia vs atelectasis L base  - On 5/28, Pulmonary team found large granuloma in trachea along tracheostomy tube. Patient had a size 8 trach on admission. Surgery assisted Pulmonary and CCU with trach exchanged with ET tube guidance to a size 7 XLT. Saturations improved to high 90s.  - trach functioning well s/p exchange   - 6/11 Trach changed to Fenestrated 8 by surgery  - c/w aggressive L sided chest PT and frequent suctioning q6  - as per pulm, if recurrent atelectasis despite chest PT, suctioning, may consider bronchoscopy    #s/p PEG (6/15):   KUB showing free intraperitoneal air (6/19).  CT Abdo confirmed that this was just due to PEG tube adjustment.   Repeat G tube study per Surgery (6/21) -normal, restarted on peg feedings      #Dysphagia:  Strict NPO as of now per ST's FEES eval from 6/13/22.   [facility] will sign out to ST at [facility] for further plans with swallow rehab at [facility]     #LE neuropathic pain:  PAtient has apparently been bed bound since April 4th (her [facility] admission for hypoxic failure where she ended up being intubated)  Apparently has L > R foot drop which may be due to chronic contractures of Calf muscle/Achilles tendon. Can have PT evaluate her for that.  But options are limited at this stage.   Given the asymmetrical foot drop, Neurology wants to rule out Any focal neuropathy.  -on 6/23-  s/p  MRI Lumbar spine w/ & w/o Contrast  - chronic DJD no acute changes.  -continue pain management      # Chronic B/l LE Hyperpigmentation changes:   due to stasis dermatitis  Dermatology thinks it may be Eczema and advised Triamcinolone BId.   - daily  multivitamin to PEG QD.         Xanax 0.25 Q12 PRN for anxiety.   On Senna, Miralax.     #Suspected epiglottis edema:   s/p DExa Taper (ended 6/18).   Outpatient ENT f/u.     #DM:   steroids ended 6/18.  6/19- Increased lantus from 17 to 20. increased lispro from 7 to 9 units subc. before peg feedings      #Chronic Indwelling Singh  - 5/28 Ucx - Contaminated   - 5/28 Ucx- Normal perri   - 5/27 Ucx- Klebsiella (CRE) and pseudomonas   - s/p cefepime  - Currently afebrile w/ no urinary complaints   - episode of Hematouria 6/4 -> improving -> h/h Stable    #Hx of DVT  - on home Eliquis  - restarted Eliquis      Activity been bed bound. PT eval when able.   GI PPX  PPI  DVT PPX  ELIQUIS  Dispo: Acute  Pending: patient was medically optimized,  d/c planning to [facility], operated by [facility].     DISPO: STR placement once bed is available    Total time spent to complete patient's bedside assessment, review medical chart, discuss medical plan of care with covering medical team was more than 35 minutes

## 2022-06-24 NOTE — SPEAKING VALVE EVALUATION - SLP PERTINENT HISTORY OF CURRENT PROBLEM
pt is a 57 y/o F w/ PMHx: DVT, COPD, resp failure s/p recent trach at [facility] (at baseline on 8L via trach collar), obesity, IDDM, UTI, sent by [facility] for hypoxia. History provided by daughter, reports the current illness goes back to April 4th when pt was intubated on admission at [facility] for hypoxic respiratory failure diagnosed w/ COPD exacerbation and superimposed PNA. Pt w/ prolonged intubation, s/p trach ~2 weeks ago and then d/c'ed to NH. Pt admitted for AHRF 2' mucus plug (now resolved s/p suctioning) vs. possible aspiration PNA. Pt w/ suspected chronic HF, course c/b complete whiteout of the left lung with complete atelectasis, pt upgraded to CCU. General Sx c/s emergently when pt found to be hypoxic d/t large granuloma in trachea along tracheostomy tube. Pt previously had a size 8 trach, exchanged w/ size 7 XLT. Large granuloma removed and pt placed on vent. Pt underwent bronchoscopy on 6/1, good visualization and no tracheal debridement required.

## 2022-06-24 NOTE — SPEAKING VALVE EVALUATION - SLP GENERAL OBSERVATIONS
Pt received in bed asleep, woke to verbal stim, Tracheal suctioning provided, s/p sedation for MRI yesterday, more awake today.

## 2022-06-24 NOTE — SPEAKING VALVE EVALUATION - REMOVE VALVE FOR
SpO2 < 92%/Increase RR (1.5 x baseline)/Increased HR (1.5 x baseline)/Increased work of breathing/Evidence of air trapping/Excessive coughing/Diaphoresis/Sleep/Subjective complaints/Aerosolized respiratory treatments

## 2022-06-24 NOTE — SPEAKING VALVE EVALUATION - RECOMMENDATIONS
+PMSV use/trials as tolerated w/ distant RN supervision, on monitor, remove if pt w/ excessive coughing, desaturation, increase in RR/HR or c/o distress.

## 2022-06-24 NOTE — PROGRESS NOTE ADULT - ASSESSMENT
55 yo female with PMHx of DVT on Eliquis, COPD,  trach collar, obesity, IDDM, UTI, sent by [facility] for hypoxia. Current illness going back to April 4th when pt was intubated on admission at [facility] ICU for hypoxic respiratory failure diagnosed with copd exacerbation and superimposed pneumonia, and remained intubated for 37 days requiring tracheostomy. Patient stayed at [facility] for 2 days until noted to be hypoxic (saturating low 70's) pt's saturation immediately improving afterwards to 80's after large mucus plug removed and was subsequently sent to [facility]  On arrival to ED pt was saturating 97%, 15L O2 via tracheostomy collar (baseline 8 L at [facility])CXR suspicious for L-sided PNA, inconclusive. CTA neg for PE. Pt received x1 IV Levaquin and Cefepime in ED, admitted to MICU for acute hypoxic hypercapnic respiratory failure secondary to acute mucus plug now resolved vs superimposed pneumonia hospital-acquired.   General Surgery Consulted emergently when patient found to be hypoxic to low 80s after CCU and Pulmonary team found large granuloma in trachea along tracheostomy tube. Patient currently had a size 8 trach. Surgery assisted Pulmonary and CCU with trach exchanged with ET tube guidance to a size 7 XLT. Saturations improved to high 90s. Large granuloma was removed. Patient was no longer in distress after exchange.    #Acute/ chronic COPD with exacerbation (Resolved)  #mucous plug removed  #pneumonia vs atelectasis L base  - On 5/28, Pulmonary team found large granuloma in trachea along tracheostomy tube. Patient had a size 8 trach on admission. Surgery assisted Pulmonary and CCU with trach exchanged with ET tube guidance to a size 7 XLT. Saturations improved to high 90s.  - trach functioning well s/p exchange   - 6/11 Trach changed to Fenestrated 8 by surgery  - c/w aggressive L sided chest PT and frequent suctioning q6  - as per pulm, if recurrent atelectasis despite chest PT, suctioning, may consider bronchoscopy    #s/p PEG (6/15):   KUB showing free intraperitoneal air (6/19).  CT Abdo confirmed that this was just due to PEG tube adjustment.   Repeat G tube study per Surgery (6/21) -normal, restarted on peg feedings      #Dysphagia:  Strict NPO as of now per ST's FEES eval from 6/13/22.   [facility] will sign out to ST at [facility] for further plans with swallow rehab at SNF     #LE neuropathic pain:  PAtient has apparently been bed bound since April 4th (her [facility] admission for hypoxic failure where she ended up being intubated)  Apparently has L > R foot drop which may be due to chronic contractures of Calf muscle/Achilles tendon. Can have PT evaluate her for that.  But options are limited at this stage.   Given the asymmetrical foot drop, Neurology wants to rule out Any focal neuropathy.  -6/23 s/p  MRI Lumbar spine w/ & w/o Contrast, mild degenerative changes  - chronic DJD no acute changes.  -continue pain management      #B/l LE Hyperpigmentation changes:   unclear etiology  Present since 1 year.   May be related to CHF related stasis dermatitis / hemosiderin deposition  vs deposition diseases versus Eosinophilic Dermatitis vs follicular hemorrhage from eliquis ?? (doubt the last differential)  Dermatology thinks it may be Eczema and advised Triamcinolone BId.   Will consider biopsy though. Pending Burn attending Input.   Add multivitamin to PEG QD.     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   Outpatient ENT f/u.     #DM:   steroids ended 6/18.  6/19- Increased lantus from 17 to 20.    #Chronic Indwelling Singh  - 5/28 Ucx - Contaminated   - 5/28 Ucx- Normal perri   - 5/27 Ucx- Klebsiella (CRE) and pseudomonas   - s/p cefepime  - Currently afebrile w/ no urinary complaints   - episode of Hematouria 6/4 -> improving -> h/h Stable    #Hx of DVT  - on home Eliquis  - restarted Eliquis    Activity been bed bound. PT eval when able.   GI PPX  PPI  DVT PPX  ELIQUIS  Dispo: Acute  Pending: patient was medically optimized, start d/c planning to [facility] [place], operated by [facility].

## 2022-06-24 NOTE — SPEAKING VALVE EVALUATION - RECOMMENDED SPEAKING VALVE GUIDELINES
Placement of speaking valve as tolerated/Place in line with vent/Suction prior to placement/Cuff fully deflated/Level of supervision

## 2022-06-24 NOTE — SPEAKING VALVE EVALUATION - SPECIFY REASON(S)
PT seen for PMSV evaluation, previous attempts pt w/o voice and decreased toleration, pt s/p decadron

## 2022-06-24 NOTE — SPEAKING VALVE EVALUATION - DIAGNOSTIC IMPRESSIONS
+toleration of PMSV w/ adequate voice and w/o distress. +overt s/s of penetration/aspiration w/ ice chip trials w/ valve on

## 2022-06-24 NOTE — PROGRESS NOTE ADULT - SUBJECTIVE AND OBJECTIVE BOX
SUBJECTIVE:  Patient is a 56y old Female who presents with a chief complaint of trach malfunction (23 Jun 2022 14:14)   Acute respiratory failure with hypoxia     Today is hospital day 28d. There are no new issues or overnight events. Patient seen and examined this morning.  Interacts by nodding had, giving a thumbs up and other head and hand movements.  Interacting appropriately.      HPI  HPI:  57 yo f with PMHx of DVT on Eliquis, COPD,  trach collar, obesity, IDDM, UTI, sent by [facility] for hypoxia. History provided by daughter at bedside, she reports the current illness going back to April 4th when pt was intubated on admission at [facility] ICU for hypoxic respiratory failure diagnosed with copd exacerbation and superimposed pneumonia, of note she also appears to have been in CHF exacerbation with severe b/l LE swelling treated with IV bumex. Remained intubated 37 days per daughter, diagnosed with fever of unknown origin (up to 106F), treated with empiric broad spectrum antibiotics and once 2 weeks s/p tracheostomy placement and 48 hours afebrile she was was discharged to nursing home for PT. Of note, patient's daughter and pt herself say the wiseman has not been replaced for over 3 weeks. She also reports a developing sacral pressure ulcer. Was at [facility] only 2 days when was noted to be hypoxic (saturating low 70's) and EMS called. While awaiting EMS, floor nurse on the unit suctioned the patient, and managed to bring up a very large mucus plug, with pt's saturation immediately improving afterwards to 80's. Nonetheless pt was sent to [facility] ED. Prior to all this pt was independent and ambulatory. Pt denies sob, cp, abd pain, n/v/d, fever or chills.     On arrival to ED pt was saturating 97%, 15L O2 via tracheostomy collar (baseline 8 L at [facility]) VS:    /59  T 99.9F RR 20  Admission VBG pH 7.37 pCO2 60 pO2 49. Pt not wheezing, not coughing. CXR suspicious for L-sided PNA, inconclusive. CTA neg for PE. Pt received x1 IV Levoquin and Cefepime in ED, admitted to medicine for acute hypoxic hypercapnic respiratory failure secondary to acute mucus plug (now resolved vs superimposed pneumonia  hospital-acquired.(less likely), Records request sent to [facility] medical records  [phone]) and patient will be continued on empiric antibiotics pending procal (27 May 2022 14:02)      PAST MEDICAL & SURGICAL HISTORY  COPD (chronic obstructive pulmonary disease)    CHF (congestive heart failure)    DM (diabetes mellitus)    H/O tracheostomy      ALLERGIES:  penicillin (Swelling)    MEDICATIONS:  HOME MEDICATIONS  albuterol sulfate 2.5 mg/3 mL (0.083 %) solution for nebulization:   ALPRAZolam 0.5 mg oral tablet: 1 tab(s) orally every 12 hours, As needed, anxiety via PEG tube  amLODIPine 5 mg oral tablet: 1 tab(s) orally once a day via PEG tube  apixaban 5 mg oral tablet: 1 tab(s) orally 2 times a day via PEG tube  atorvastatin 20 mg oral tablet: 1 tab(s) orally once a day (at bedtime) via PEG tube  bumetanide 2 mg oral tablet: 1 tab(s) orally once a day via PEG tube  gabapentin 600 mg oral tablet: 1 tab(s) orally 3 times a day via PEG tube  HYDROmorphone 4 mg oral tablet: 1 tab(s) orally every 8 hours, As needed, for severe pain  insulin glargine 100 units/mL subcutaneous solution: 20 unit(s) subcutaneous once a day (at bedtime)  insulin lispro 100 units/mL injectable solution: 7 unit(s) injectable 3 times a day (before meals)  losartan 100 mg oral tablet: 1 tab(s) orally once a day via PEG tube  Multiple Vitamins oral tablet: 1 tab(s) orally once a day via PEG tube  pantoprazole 40 mg oral delayed release tablet: 1 tab(s) orally once a day (before a meal) via PEG tube  polyethylene glycol 3350 oral powder for reconstitution: 17 gram(s) orally once a day via PEG tube  senna leaf extract oral tablet: 2 tab(s) orally once a day (at bedtime) via PEG tube  Trelegy Ellipta 100 mcg-62.5 mcg-25 mcg powder for inhalation:     STANDING MEDICATIONS  amLODIPine   Tablet 5 milliGRAM(s) Oral daily  ammonium lactate 12% Lotion 1 Application(s) Topical every 12 hours  apixaban 5 milliGRAM(s) Oral two times a day  atorvastatin 20 milliGRAM(s) Oral at bedtime  BACItracin   Ointment 1 Application(s) Topical every 8 hours  buMETAnide 2 milliGRAM(s) Oral daily  dextrose 5%. 1000 milliLiter(s) IV Continuous <Continuous>  dextrose 5%. 1000 milliLiter(s) IV Continuous <Continuous>  dextrose 50% Injectable 25 Gram(s) IV Push once  dextrose 50% Injectable 12.5 Gram(s) IV Push once  dextrose 50% Injectable 25 Gram(s) IV Push once  diatrizoate meglumine/diatrizoate sodium. 30 milliLiter(s) Oral once  gabapentin 600 milliGRAM(s) Oral three times a day  glucagon  Injectable 1 milliGRAM(s) IntraMuscular once  influenza   Vaccine 0.5 milliLiter(s) IntraMuscular once  insulin glargine Injectable (LANTUS) 20 Unit(s) SubCutaneous at bedtime  insulin lispro (ADMELOG) corrective regimen sliding scale   SubCutaneous three times a day before meals  insulin lispro Injectable (ADMELOG) 9 Unit(s) SubCutaneous every 6 hours  losartan 100 milliGRAM(s) Oral daily  multivitamin 1 Tablet(s) Oral daily  pantoprazole    Tablet 40 milliGRAM(s) Oral before breakfast  polyethylene glycol 3350 17 Gram(s) Oral daily  senna 2 Tablet(s) Oral at bedtime  triamcinolone 0.1% Cream 1 Application(s) Topical every 12 hours    PRN MEDICATIONS  albuterol/ipratropium for Nebulization 3 milliLiter(s) Nebulizer every 6 hours PRN  ALPRAZolam 0.5 milliGRAM(s) Oral every 12 hours PRN  dextrose Oral Gel 15 Gram(s) Oral once PRN  HYDROmorphone   Tablet 4 milliGRAM(s) Oral every 8 hours PRN  HYDROmorphone  Injectable 1 milliGRAM(s) IV Push every 12 hours PRN    VITALS:   T(C): 37.5 (06-24-22 @ 06:00), Max: 37.5 (06-24-22 @ 06:00)  T(F): 99.5 (06-24-22 @ 06:00), Max: 99.5 (06-24-22 @ 06:00)  HR: 96 (06-24-22 @ 06:00) (89 - 124)  BP: 125/59 (06-24-22 @ 06:00) (125/59 - 180/87)  BP(mean): --  ABP: --  ABP(mean): --  RR: 18 (06-24-22 @ 06:00) (18 - 20)  SpO2: 99% (06-24-22 @ 06:00) (96% - 100%)  LABS:              I&O's Detail    23 Jun 2022 07:01  -  24 Jun 2022 07:00  --------------------------------------------------------  IN:    Enteral Tube Flush: 60 mL    Glucerna: 600 mL  Total IN: 660 mL    OUT:  Total OUT: 0 mL    Total NET: 660 mL                    RADIOLOGY:  EKG  12 Lead ECG:   Ventricular Rate 129 BPM    Atrial Rate 129 BPM    P-R Interval 166 ms    QRS Duration 94 ms    Q-T Interval 290 ms    QTC Calculation(Bazett) 424 ms    P Axis 45 degrees    R Axis 99 degrees    T Axis 61 degrees    Diagnosis Line Sinus tachycardia  Possible Left atrial enlargement  Incomplete right bundle branch block  Abnormal ECG    Confirmed by Cyndee Rodríguez MD ([id number]) on 6/19/2022 9:33:53 AM (06-19-22 @ 06:12)  12 Lead ECG:   Ventricular Rate 100 BPM    Atrial Rate 100 BPM    P-R Interval 172 ms    QRS Duration 94 ms    Q-T Interval 368 ms    QTC Calculation(Bazett) 474 ms    P Axis 41 degrees    R Axis 73 degrees    T Axis 127 degrees    Diagnosis Line Normal sinus rhythm  Low voltage QRS  Incomplete right bundle branch block  ST & T wave abnormality, consider anterolateral ischemia  Prolonged QT  Abnormal ECG    Confirmed by Joss Rangel ([id number]) on 5/28/2022 11:21:09 AM (05-27-22 @ 11:49)    Xray Kidney Ureter Bladder:   ACC: [id number] EXAM:  XR KUB 1 VIEW                          PROCEDURE DATE:  06/22/2022          INTERPRETATION:  CLINICAL HISTORY / REASON FOR EXAM: Gastrostomy placement    TECHNIQUE: Single frontal view of the chest and upper abdomen    COMPARISON: Correlation with prior CT 6/20/2022    FINDINGS/  IMPRESSION:    Stomach is not fully visualized. Tracheostomy tube is present. Heart size   is stable. Stable left basilar opacity and effusion. Stable streaky right   basilar opacity/atelectasis. Stable bones.    --- End of Report ---            HOMERO MARIN MD; Attending Radiologist  This document has been electronically signed. Jun 23 2022  8:14AM (06-22-22 @ 17:43)  Xray Kidney Ureter Bladder:   ACC: [id number] EXAM:  XR KUB 1 VIEW                          PROCEDURE DATE:  06/21/2022          INTERPRETATION:  Indication: PEG tube    Technique: Abdominal radiographs    Comparison: CT abdomen pelvis dated 6/20/2022, abdominal radiograph dated   6/19/2022      Findings/  impression:    Contrast injected via gastrostomy tube opacifies the stomach.    Intraperitoneal free air better evaluated on prior CT. Nonobstructive   bowel gas pattern. Contrast within colon. Stable osseous structures.    --- End of Report ---            SLOANE CASTLE MD; Attending Radiologist  This document has been electronically signed. Jun 22 2022  9:15AM (06-21-22 @ 23:21)  Xray Kidney Ureter Bladder:   ACC: [id number] EXAM:  XR KUB 1 VIEW                          PROCEDURE DATE:  06/19/2022          INTERPRETATION:  Clinical History / Reason for exam: Free air.    Supine and upright view the abdomen were obtained.    G-tube is in the region of the stomach. On the upright view of the   abdomen, lucency seen underneath the right hemidiaphragm suspicious for   free air. There is a nonobstructive bowel gas pattern. There are   degenerative changes.    IMPRESSION:    Findings suspicious for free airunder the diaphragm.    Findings discussed with Dr. Blair during the time interpretation on June 20, 2022 at approximately 11:25 AM.    --- End of Report ---            IAN OWENS MD; Attending Radiologist  This document has been electronically signed. Jun 20 2022 11:25AM (06-19-22 @ 15:51)  Xray Chest 1 View-PORTABLE IMMEDIATE:   ACC: [id number] EXAM:  XR CHEST PORTABLE IMMED 1V                          PROCEDURE DATE:  06/19/2022          INTERPRETATION:  Clinical History / Reason for exam: low grade fever    Comparison : Chest radiograph:  6/16/2022    Technique/Positioning: Frontal view of the chest    Findings:    Support devices: A tracheostomy tube is present.    Cardiac/mediastinum/hilum: No significant change    Lung parenchyma/Pleura: Unchanged left basilar opacity. New right   curvilinear opacity. Low lung volume. No definite pneumothorax.    Skeleton/soft tissues: No significant change.    Impression:  Low lung volume. Unchanged left basilar opacity/effusion. New right   basilar curvilinear line which may represent focal atelectasis; free   intraperitoneal air is not excluded. Recommend abdominal radiograph.   Callback request submitted            --- End of Report ---            BREANNA DECKER MD; Attending Radiologist  This document has been electronically signed. Jun 19 2022 12:10PM (06-19-22 @ 10:56)    Physical Exam:  General: no acute distress, lying in bed  Head: normocephalic and atraumatic  Neck: supple, trach intact  Heart: regular rate and rhythm, S1 and S2 normal, no murmurs, rubs or gallops noted on exam  Lungs: Symmetric chest expansion bilaterally, decreased breath sounds bilaterally, no wheezes rhonchi or crackles heard  Abdomen: Bowel sounds present, non tender on light and deep palpation  Extramities: No edema, no clubbing or cyanosis notes, positive peripheral pulses

## 2022-06-25 LAB
BASOPHILS # BLD AUTO: 0.03 K/UL — SIGNIFICANT CHANGE UP (ref 0–0.2)
BASOPHILS NFR BLD AUTO: 0.2 % — SIGNIFICANT CHANGE UP (ref 0–1)
EOSINOPHIL # BLD AUTO: 0.32 K/UL — SIGNIFICANT CHANGE UP (ref 0–0.7)
EOSINOPHIL NFR BLD AUTO: 2.6 % — SIGNIFICANT CHANGE UP (ref 0–8)
GLUCOSE BLDC GLUCOMTR-MCNC: 138 MG/DL — HIGH (ref 70–99)
GLUCOSE BLDC GLUCOMTR-MCNC: 143 MG/DL — HIGH (ref 70–99)
GLUCOSE BLDC GLUCOMTR-MCNC: 155 MG/DL — HIGH (ref 70–99)
GLUCOSE BLDC GLUCOMTR-MCNC: 187 MG/DL — HIGH (ref 70–99)
HCT VFR BLD CALC: 23.9 % — LOW (ref 37–47)
HGB BLD-MCNC: 8.1 G/DL — LOW (ref 12–16)
IMM GRANULOCYTES NFR BLD AUTO: 1.1 % — HIGH (ref 0.1–0.3)
LYMPHOCYTES # BLD AUTO: 1.27 K/UL — SIGNIFICANT CHANGE UP (ref 1.2–3.4)
LYMPHOCYTES # BLD AUTO: 10.3 % — LOW (ref 20.5–51.1)
MCHC RBC-ENTMCNC: 32.8 PG — HIGH (ref 27–31)
MCHC RBC-ENTMCNC: 33.9 G/DL — SIGNIFICANT CHANGE UP (ref 32–37)
MCV RBC AUTO: 96.8 FL — SIGNIFICANT CHANGE UP (ref 81–99)
MONOCYTES # BLD AUTO: 0.94 K/UL — HIGH (ref 0.1–0.6)
MONOCYTES NFR BLD AUTO: 7.6 % — SIGNIFICANT CHANGE UP (ref 1.7–9.3)
NEUTROPHILS # BLD AUTO: 9.6 K/UL — HIGH (ref 1.4–6.5)
NEUTROPHILS NFR BLD AUTO: 78.2 % — HIGH (ref 42.2–75.2)
NRBC # BLD: 0 /100 WBCS — SIGNIFICANT CHANGE UP (ref 0–0)
PLATELET # BLD AUTO: 269 K/UL — SIGNIFICANT CHANGE UP (ref 130–400)
RBC # BLD: 2.47 M/UL — LOW (ref 4.2–5.4)
RBC # FLD: 13.1 % — SIGNIFICANT CHANGE UP (ref 11.5–14.5)
WBC # BLD: 12.29 K/UL — HIGH (ref 4.8–10.8)
WBC # FLD AUTO: 12.29 K/UL — HIGH (ref 4.8–10.8)

## 2022-06-25 PROCEDURE — 99233 SBSQ HOSP IP/OBS HIGH 50: CPT

## 2022-06-25 RX ORDER — HYDROMORPHONE HYDROCHLORIDE 2 MG/ML
2 INJECTION INTRAMUSCULAR; INTRAVENOUS; SUBCUTANEOUS EVERY 8 HOURS
Refills: 0 | Status: DISCONTINUED | OUTPATIENT
Start: 2022-06-25 | End: 2022-06-27

## 2022-06-25 RX ORDER — HYDROMORPHONE HYDROCHLORIDE 2 MG/ML
1 INJECTION INTRAMUSCULAR; INTRAVENOUS; SUBCUTANEOUS ONCE
Refills: 0 | Status: DISCONTINUED | OUTPATIENT
Start: 2022-06-25 | End: 2022-06-25

## 2022-06-25 RX ORDER — BACITRACIN ZINC 500 UNIT/G
1 OINTMENT IN PACKET (EA) TOPICAL
Qty: 0 | Refills: 0 | DISCHARGE
Start: 2022-06-25

## 2022-06-25 RX ORDER — HYDROMORPHONE HYDROCHLORIDE 2 MG/ML
1 INJECTION INTRAMUSCULAR; INTRAVENOUS; SUBCUTANEOUS EVERY 12 HOURS
Refills: 0 | Status: DISCONTINUED | OUTPATIENT
Start: 2022-06-25 | End: 2022-06-25

## 2022-06-25 RX ADMIN — APIXABAN 5 MILLIGRAM(S): 2.5 TABLET, FILM COATED ORAL at 06:19

## 2022-06-25 RX ADMIN — Medication 9 UNIT(S): at 11:58

## 2022-06-25 RX ADMIN — Medication 9 UNIT(S): at 17:51

## 2022-06-25 RX ADMIN — HYDROMORPHONE HYDROCHLORIDE 2 MILLIGRAM(S): 2 INJECTION INTRAMUSCULAR; INTRAVENOUS; SUBCUTANEOUS at 17:12

## 2022-06-25 RX ADMIN — Medication 2: at 11:58

## 2022-06-25 RX ADMIN — HYDROMORPHONE HYDROCHLORIDE 1 MILLIGRAM(S): 2 INJECTION INTRAMUSCULAR; INTRAVENOUS; SUBCUTANEOUS at 11:47

## 2022-06-25 RX ADMIN — Medication 9 UNIT(S): at 06:21

## 2022-06-25 RX ADMIN — HYDROMORPHONE HYDROCHLORIDE 1 MILLIGRAM(S): 2 INJECTION INTRAMUSCULAR; INTRAVENOUS; SUBCUTANEOUS at 22:48

## 2022-06-25 RX ADMIN — Medication 1 TABLET(S): at 11:47

## 2022-06-25 RX ADMIN — Medication 0.5 MILLIGRAM(S): at 06:58

## 2022-06-25 RX ADMIN — LOSARTAN POTASSIUM 100 MILLIGRAM(S): 100 TABLET, FILM COATED ORAL at 06:20

## 2022-06-25 RX ADMIN — GABAPENTIN 600 MILLIGRAM(S): 400 CAPSULE ORAL at 21:35

## 2022-06-25 RX ADMIN — GABAPENTIN 600 MILLIGRAM(S): 400 CAPSULE ORAL at 06:20

## 2022-06-25 RX ADMIN — HYDROMORPHONE HYDROCHLORIDE 4 MILLIGRAM(S): 2 INJECTION INTRAMUSCULAR; INTRAVENOUS; SUBCUTANEOUS at 06:19

## 2022-06-25 RX ADMIN — BUMETANIDE 2 MILLIGRAM(S): 0.25 INJECTION INTRAMUSCULAR; INTRAVENOUS at 06:20

## 2022-06-25 RX ADMIN — Medication 1 APPLICATION(S): at 17:12

## 2022-06-25 RX ADMIN — APIXABAN 5 MILLIGRAM(S): 2.5 TABLET, FILM COATED ORAL at 17:50

## 2022-06-25 RX ADMIN — AMLODIPINE BESYLATE 5 MILLIGRAM(S): 2.5 TABLET ORAL at 06:20

## 2022-06-25 RX ADMIN — HYDROMORPHONE HYDROCHLORIDE 1 MILLIGRAM(S): 2 INJECTION INTRAMUSCULAR; INTRAVENOUS; SUBCUTANEOUS at 12:08

## 2022-06-25 RX ADMIN — Medication 1 APPLICATION(S): at 17:13

## 2022-06-25 RX ADMIN — GABAPENTIN 600 MILLIGRAM(S): 400 CAPSULE ORAL at 13:37

## 2022-06-25 RX ADMIN — HYDROMORPHONE HYDROCHLORIDE 2 MILLIGRAM(S): 2 INJECTION INTRAMUSCULAR; INTRAVENOUS; SUBCUTANEOUS at 16:59

## 2022-06-25 RX ADMIN — ATORVASTATIN CALCIUM 20 MILLIGRAM(S): 80 TABLET, FILM COATED ORAL at 21:35

## 2022-06-25 RX ADMIN — SENNA PLUS 2 TABLET(S): 8.6 TABLET ORAL at 21:35

## 2022-06-25 RX ADMIN — INSULIN GLARGINE 20 UNIT(S): 100 INJECTION, SOLUTION SUBCUTANEOUS at 21:36

## 2022-06-25 RX ADMIN — Medication 1 APPLICATION(S): at 06:20

## 2022-06-25 RX ADMIN — Medication 2: at 17:51

## 2022-06-25 RX ADMIN — POLYETHYLENE GLYCOL 3350 17 GRAM(S): 17 POWDER, FOR SOLUTION ORAL at 11:47

## 2022-06-25 NOTE — PROGRESS NOTE ADULT - ASSESSMENT
57 yo female with PMHx of DVT on Eliquis, COPD,  trach collar, obesity, IDDM, UTI, sent by [facility] for hypoxia. Current illness going back to April 4th when pt was intubated on admission at [facility] ICU for hypoxic respiratory failure diagnosed with copd exacerbation and superimposed pneumonia, and remained intubated for 37 days requiring tracheostomy. Patient stayed at [facility] for 2 days until noted to be hypoxic (saturating low 70's) pt's saturation immediately improving afterwards to 80's after large mucus plug removed and was subsequently sent to [facility]  On arrival to ED pt was saturating 97%, 15L O2 via tracheostomy collar (baseline 8 L at [facility])CXR suspicious for L-sided PNA, inconclusive. CTA neg for PE. Pt received x1 IV Levaquin and Cefepime in ED, admitted to MICU for acute hypoxic hypercapnic respiratory failure secondary to acute mucus plug now resolved vs superimposed pneumonia hospital-acquired.   General Surgery Consulted emergently when patient found to be hypoxic to low 80s after CCU and Pulmonary team found large granuloma in trachea along tracheostomy tube. Patient currently had a size 8 trach. Surgery assisted Pulmonary and CCU with trach exchanged with ET tube guidance to a size 7 XLT. Saturations improved to high 90s. Large granuloma was removed. Patient was no longer in distress after exchange.    #Acute/ chronic COPD with exacerbation (Resolved)  #mucous plug removed  #pneumonia vs atelectasis L base  - On 5/28, Pulmonary team found large granuloma in trachea along tracheostomy tube. Patient had a size 8 trach on admission. Surgery assisted Pulmonary and CCU with trach exchanged with ET tube guidance to a size 7 XLT. Saturations improved to high 90s.  - trach functioning well s/p exchange   - 6/11 Trach changed to Fenestrated 8 by surgery  - c/w aggressive L sided chest PT and frequent suctioning q6  - as per pulm, if recurrent atelectasis despite chest PT, suctioning, may consider bronchoscopy    #s/p PEG (6/15):   KUB showing free intraperitoneal air (6/19).  CT Abdo confirmed that this was just due to PEG tube adjustment.   Repeat G tube study per Surgery (6/21) -normal, restarted on peg feedings      #Dysphagia:  Strict NPO as of now per ST's FEES eval from 6/13/22.   [facility] will sign out to ST at [facility] for further plans with swallow rehab at SNF     #LE neuropathic pain:  PAtient has apparently been bed bound since April 4th (her [facility] admission for hypoxic failure where she ended up being intubated)  Apparently has L > R foot drop which may be due to chronic contractures of Calf muscle/Achilles tendon. Can have PT evaluate her for that.  But options are limited at this stage.   Given the asymmetrical foot drop, Neurology wants to rule out Any focal neuropathy.  -6/23 s/p  MRI Lumbar spine w/ & w/o Contrast, mild degenerative changes  - chronic DJD no acute changes.  -continue pain management      #B/l LE Hyperpigmentation changes:   unclear etiology  Present since 1 year.   May be related to CHF related stasis dermatitis / hemosiderin deposition  vs deposition diseases versus Eosinophilic Dermatitis vs follicular hemorrhage from eliquis ?? (doubt the last differential)  Dermatology thinks it may be Eczema and advised Triamcinolone BId.   Will consider biopsy though. Pending Burn attending Input.   Add multivitamin to PEG QD.     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   Outpatient ENT f/u.     #DM:   steroids ended 6/18.  6/19- Increased lantus from 17 to 20.    #Chronic Indwelling Singh  - 5/28 Ucx - Contaminated   - 5/28 Ucx- Normal perri   - 5/27 Ucx- Klebsiella (CRE) and pseudomonas   - s/p cefepime  - Currently afebrile w/ no urinary complaints   - episode of Hematouria 6/4 -> improving -> h/h Stable    #Hx of DVT  - on home Eliquis  - restarted Eliquis    Activity been bed bound. PT eval when able.   GI PPX  PPI  DVT PPX  ELIQUIS  Dispo: Acute  Pending:  d/c planning to [facility] pending BMP results

## 2022-06-25 NOTE — PROGRESS NOTE ADULT - SUBJECTIVE AND OBJECTIVE BOX
SUBJECTIVE:  Patient is a 56y old Female who presents with a chief complaint of trach malfunction (23 Jun 2022 14:14)   Acute respiratory failure with hypoxia     Today is hospital day 29d. There are no new issues or overnight events. Patient seen and examined this morning.  Interacts by nodding had, giving a thumbs up and other head and hand movements.  Interacting appropriately.      HPI  HPI:  55 yo f with PMHx of DVT on Eliquis, COPD,  trach collar, obesity, IDDM, UTI, sent by [facility] for hypoxia. History provided by daughter at bedside, she reports the current illness going back to April 4th when pt was intubated on admission at [facility] ICU for hypoxic respiratory failure diagnosed with copd exacerbation and superimposed pneumonia, of note she also appears to have been in CHF exacerbation with severe b/l LE swelling treated with IV bumex. Remained intubated 37 days per daughter, diagnosed with fever of unknown origin (up to 106F), treated with empiric broad spectrum antibiotics and once 2 weeks s/p tracheostomy placement and 48 hours afebrile she was was discharged to nursing home for PT. Of note, patient's daughter and pt herself say the wiseman has not been replaced for over 3 weeks. She also reports a developing sacral pressure ulcer. Was at [facility] only 2 days when was noted to be hypoxic (saturating low 70's) and EMS called. While awaiting EMS, floor nurse on the unit suctioned the patient, and managed to bring up a very large mucus plug, with pt's saturation immediately improving afterwards to 80's. Nonetheless pt was sent to [facility] ED. Prior to all this pt was independent and ambulatory. Pt denies sob, cp, abd pain, n/v/d, fever or chills.     On arrival to ED pt was saturating 97%, 15L O2 via tracheostomy collar (baseline 8 L at [facility]) VS:    /59  T 99.9F RR 20  Admission VBG pH 7.37 pCO2 60 pO2 49. Pt not wheezing, not coughing. CXR suspicious for L-sided PNA, inconclusive. CTA neg for PE. Pt received x1 IV Levoquin and Cefepime in ED, admitted to medicine for acute hypoxic hypercapnic respiratory failure secondary to acute mucus plug (now resolved vs superimposed pneumonia  hospital-acquired.(less likely), Records request sent to [facility] medical records  [phone]) and patient will be continued on empiric antibiotics pending procal (27 May 2022 14:02)      PAST MEDICAL & SURGICAL HISTORY  COPD (chronic obstructive pulmonary disease)    CHF (congestive heart failure)    DM (diabetes mellitus)    H/O tracheostomy      ALLERGIES:  penicillin (Swelling)    MEDICATIONS:  HOME MEDICATIONS  albuterol sulfate 2.5 mg/3 mL (0.083 %) solution for nebulization:   ALPRAZolam 0.5 mg oral tablet: 1 tab(s) orally every 12 hours, As needed, anxiety via PEG tube  amLODIPine 5 mg oral tablet: 1 tab(s) orally once a day via PEG tube  apixaban 5 mg oral tablet: 1 tab(s) orally 2 times a day via PEG tube  atorvastatin 20 mg oral tablet: 1 tab(s) orally once a day (at bedtime) via PEG tube  bumetanide 2 mg oral tablet: 1 tab(s) orally once a day via PEG tube  gabapentin 600 mg oral tablet: 1 tab(s) orally 3 times a day via PEG tube  HYDROmorphone 4 mg oral tablet: 1 tab(s) orally every 8 hours, As needed, for severe pain  insulin glargine 100 units/mL subcutaneous solution: 20 unit(s) subcutaneous once a day (at bedtime)  insulin lispro 100 units/mL injectable solution: 7 unit(s) injectable 3 times a day (before meals)  losartan 100 mg oral tablet: 1 tab(s) orally once a day via PEG tube  Multiple Vitamins oral tablet: 1 tab(s) orally once a day via PEG tube  pantoprazole 40 mg oral delayed release tablet: 1 tab(s) orally once a day (before a meal) via PEG tube  polyethylene glycol 3350 oral powder for reconstitution: 17 gram(s) orally once a day via PEG tube  senna leaf extract oral tablet: 2 tab(s) orally once a day (at bedtime) via PEG tube  Trelegy Ellipta 100 mcg-62.5 mcg-25 mcg powder for inhalation:     STANDING MEDICATIONS  amLODIPine   Tablet 5 milliGRAM(s) Oral daily  ammonium lactate 12% Lotion 1 Application(s) Topical every 12 hours  apixaban 5 milliGRAM(s) Oral two times a day  atorvastatin 20 milliGRAM(s) Oral at bedtime  BACItracin   Ointment 1 Application(s) Topical every 8 hours  buMETAnide 2 milliGRAM(s) Oral daily  dextrose 5%. 1000 milliLiter(s) IV Continuous <Continuous>  dextrose 5%. 1000 milliLiter(s) IV Continuous <Continuous>  dextrose 50% Injectable 25 Gram(s) IV Push once  dextrose 50% Injectable 12.5 Gram(s) IV Push once  dextrose 50% Injectable 25 Gram(s) IV Push once  diatrizoate meglumine/diatrizoate sodium. 30 milliLiter(s) Oral once  gabapentin 600 milliGRAM(s) Oral three times a day  glucagon  Injectable 1 milliGRAM(s) IntraMuscular once  influenza   Vaccine 0.5 milliLiter(s) IntraMuscular once  insulin glargine Injectable (LANTUS) 20 Unit(s) SubCutaneous at bedtime  insulin lispro (ADMELOG) corrective regimen sliding scale   SubCutaneous three times a day before meals  insulin lispro Injectable (ADMELOG) 9 Unit(s) SubCutaneous every 6 hours  losartan 100 milliGRAM(s) Oral daily  multivitamin 1 Tablet(s) Oral daily  pantoprazole    Tablet 40 milliGRAM(s) Oral before breakfast  polyethylene glycol 3350 17 Gram(s) Oral daily  senna 2 Tablet(s) Oral at bedtime  triamcinolone 0.1% Cream 1 Application(s) Topical every 12 hours    PRN MEDICATIONS  albuterol/ipratropium for Nebulization 3 milliLiter(s) Nebulizer every 6 hours PRN  ALPRAZolam 0.5 milliGRAM(s) Oral every 12 hours PRN  dextrose Oral Gel 15 Gram(s) Oral once PRN  HYDROmorphone   Tablet 4 milliGRAM(s) Oral every 8 hours PRN  HYDROmorphone  Injectable 1 milliGRAM(s) IV Push every 12 hours PRN    VITALS:   T(C): 37.5 (06-24-22 @ 06:00), Max: 37.5 (06-24-22 @ 06:00)  T(F): 99.5 (06-24-22 @ 06:00), Max: 99.5 (06-24-22 @ 06:00)  HR: 96 (06-24-22 @ 06:00) (89 - 124)  BP: 125/59 (06-24-22 @ 06:00) (125/59 - 180/87)  BP(mean): --  ABP: --  ABP(mean): --  RR: 18 (06-24-22 @ 06:00) (18 - 20)  SpO2: 99% (06-24-22 @ 06:00) (96% - 100%)  LABS:              I&O's Detail    23 Jun 2022 07:01  -  24 Jun 2022 07:00  --------------------------------------------------------  IN:    Enteral Tube Flush: 60 mL    Glucerna: 600 mL  Total IN: 660 mL    OUT:  Total OUT: 0 mL    Total NET: 660 mL                    RADIOLOGY:  EKG  12 Lead ECG:   Ventricular Rate 129 BPM    Atrial Rate 129 BPM    P-R Interval 166 ms    QRS Duration 94 ms    Q-T Interval 290 ms    QTC Calculation(Bazett) 424 ms    P Axis 45 degrees    R Axis 99 degrees    T Axis 61 degrees    Diagnosis Line Sinus tachycardia  Possible Left atrial enlargement  Incomplete right bundle branch block  Abnormal ECG    Confirmed by Cyndee Rodríguez MD ([id number]) on 6/19/2022 9:33:53 AM (06-19-22 @ 06:12)  12 Lead ECG:   Ventricular Rate 100 BPM    Atrial Rate 100 BPM    P-R Interval 172 ms    QRS Duration 94 ms    Q-T Interval 368 ms    QTC Calculation(Bazett) 474 ms    P Axis 41 degrees    R Axis 73 degrees    T Axis 127 degrees    Diagnosis Line Normal sinus rhythm  Low voltage QRS  Incomplete right bundle branch block  ST & T wave abnormality, consider anterolateral ischemia  Prolonged QT  Abnormal ECG    Confirmed by Joss Rangel ([id number]) on 5/28/2022 11:21:09 AM (05-27-22 @ 11:49)    Xray Kidney Ureter Bladder:   ACC: [id number] EXAM:  XR KUB 1 VIEW                          PROCEDURE DATE:  06/22/2022          INTERPRETATION:  CLINICAL HISTORY / REASON FOR EXAM: Gastrostomy placement    TECHNIQUE: Single frontal view of the chest and upper abdomen    COMPARISON: Correlation with prior CT 6/20/2022    FINDINGS/  IMPRESSION:    Stomach is not fully visualized. Tracheostomy tube is present. Heart size   is stable. Stable left basilar opacity and effusion. Stable streaky right   basilar opacity/atelectasis. Stable bones.    --- End of Report ---            HOMERO MARIN MD; Attending Radiologist  This document has been electronically signed. Jun 23 2022  8:14AM (06-22-22 @ 17:43)  Xray Kidney Ureter Bladder:   ACC: [id number] EXAM:  XR KUB 1 VIEW                          PROCEDURE DATE:  06/21/2022          INTERPRETATION:  Indication: PEG tube    Technique: Abdominal radiographs    Comparison: CT abdomen pelvis dated 6/20/2022, abdominal radiograph dated   6/19/2022      Findings/  impression:    Contrast injected via gastrostomy tube opacifies the stomach.    Intraperitoneal free air better evaluated on prior CT. Nonobstructive   bowel gas pattern. Contrast within colon. Stable osseous structures.    --- End of Report ---            SLOANE CASTLE MD; Attending Radiologist  This document has been electronically signed. Jun 22 2022  9:15AM (06-21-22 @ 23:21)  Xray Kidney Ureter Bladder:   ACC: [id number] EXAM:  XR KUB 1 VIEW                          PROCEDURE DATE:  06/19/2022          INTERPRETATION:  Clinical History / Reason for exam: Free air.    Supine and upright view the abdomen were obtained.    G-tube is in the region of the stomach. On the upright view of the   abdomen, lucency seen underneath the right hemidiaphragm suspicious for   free air. There is a nonobstructive bowel gas pattern. There are   degenerative changes.    IMPRESSION:    Findings suspicious for free airunder the diaphragm.    Findings discussed with Dr. Blair during the time interpretation on June 20, 2022 at approximately 11:25 AM.    --- End of Report ---            IAN OWENS MD; Attending Radiologist  This document has been electronically signed. Jun 20 2022 11:25AM (06-19-22 @ 15:51)  Xray Chest 1 View-PORTABLE IMMEDIATE:   ACC: [id number] EXAM:  XR CHEST PORTABLE IMMED 1V                          PROCEDURE DATE:  06/19/2022          INTERPRETATION:  Clinical History / Reason for exam: low grade fever    Comparison : Chest radiograph:  6/16/2022    Technique/Positioning: Frontal view of the chest    Findings:    Support devices: A tracheostomy tube is present.    Cardiac/mediastinum/hilum: No significant change    Lung parenchyma/Pleura: Unchanged left basilar opacity. New right   curvilinear opacity. Low lung volume. No definite pneumothorax.    Skeleton/soft tissues: No significant change.    Impression:  Low lung volume. Unchanged left basilar opacity/effusion. New right   basilar curvilinear line which may represent focal atelectasis; free   intraperitoneal air is not excluded. Recommend abdominal radiograph.   Callback request submitted            --- End of Report ---            BREANNA DECKER MD; Attending Radiologist  This document has been electronically signed. Jun 19 2022 12:10PM (06-19-22 @ 10:56)    Physical Exam:  General: no acute distress, lying in bed  Head: normocephalic and atraumatic  Neck: supple, trach intact  Heart: regular rate and rhythm, S1 and S2 normal, no murmurs, rubs or gallops noted on exam  Lungs: Symmetric chest expansion bilaterally, decreased breath sounds bilaterally, no wheezes rhonchi or crackles heard  Abdomen: Bowel sounds present, non tender on light and deep palpation  Extramities: No edema, no clubbing or cyanosis notes, positive peripheral pulses

## 2022-06-25 NOTE — PROGRESS NOTE ADULT - ATTENDING COMMENTS
57 yo female with PMHx of DVT on Eliquis, COPD,  trach collar, obesity, IDDM, UTI, sent by [facility] for hypoxia. Current illness going back to April 4th when pt was intubated on admission at [facility] ICU for hypoxic respiratory failure diagnosed with copd exacerbation and superimposed pneumonia, and remained intubated for 37 days requiring tracheostomy. Patient stayed at [facility] for 2 days until noted to be hypoxic (saturating low 70's) pt's saturation immediately improving afterwards to 80's after large mucus plug removed and was subsequently sent to [facility]        Acute on chronic COPD with exacerbation - now resolved  Mucous plug removed  Pneumonia vs atelectasis L base  Dysphagia  DM2        5/28- s/p trach exchanged  6/11 Trach changed to size 8 by surgery  Pulm noted- if recurrent atelectasis despite chest PT, suctioning, may consider bronchoscopy  s/p PEG 06/15-restarted on peg feedings    LE neuropathic pain-follow neuro recs  Chronic B/l LE Hyperpigmentation changes-due to stasis dermatitis. Continue Triamcinolone BId.   Xanax 0.25 Q12 PRN for anxiety.   Suspected epiglottis edema- completed Dexa  Hx of DVT- on home Eliquis    Handoff: Stable for discharge to to [facility]. 55 yo female with PMHx of DVT on Eliquis, COPD,  trach collar, obesity, IDDM, UTI, sent by [facility] for hypoxia. Current illness going back to April 4th when pt was intubated on admission at [facility] ICU for hypoxic respiratory failure diagnosed with copd exacerbation and superimposed pneumonia, and remained intubated for 37 days requiring tracheostomy. Patient stayed at [facility] for 2 days until noted to be hypoxic (saturating low 70's) pt's saturation immediately improving afterwards to 80's after large mucus plug removed and was subsequently sent to [facility]        Acute on chronic COPD with exacerbation - now resolved  Mucous plug removed  Pneumonia vs atelectasis L base  Dysphagia  DM2        5/28- s/p trach exchanged  6/11 Trach changed to size 8 by surgery  Pulm noted- if recurrent atelectasis despite chest PT, suctioning, may consider bronchoscopy  s/p PEG 06/15-restarted on peg feedings    LE neuropathic pain-follow neuro recs  Chronic B/l LE Hyperpigmentation changes-due to stasis dermatitis. Continue Triamcinolone BId.   Xanax 0.25 Q12 PRN for anxiety.   Suspected epiglottis edema- completed Dexa  Hx of DVT- on home Eliquis    Handoff: Stable for discharge to to [facility] [place].    Addendum: Auth was obtained but family refusing discharge saying she looks very lethargic as compared to yesterday. Will anticipate for discharge tomorrow after blood work if remains stable

## 2022-06-26 LAB
ALBUMIN SERPL ELPH-MCNC: 3.3 G/DL — LOW (ref 3.5–5.2)
ALBUMIN SERPL ELPH-MCNC: 3.6 G/DL — SIGNIFICANT CHANGE UP (ref 3.5–5.2)
ALP SERPL-CCNC: 137 U/L — HIGH (ref 30–115)
ALP SERPL-CCNC: 146 U/L — HIGH (ref 30–115)
ALT FLD-CCNC: 22 U/L — SIGNIFICANT CHANGE UP (ref 0–41)
ALT FLD-CCNC: 22 U/L — SIGNIFICANT CHANGE UP (ref 0–41)
ANION GAP SERPL CALC-SCNC: 11 MMOL/L — SIGNIFICANT CHANGE UP (ref 7–14)
ANION GAP SERPL CALC-SCNC: 7 MMOL/L — SIGNIFICANT CHANGE UP (ref 7–14)
AST SERPL-CCNC: 13 U/L — SIGNIFICANT CHANGE UP (ref 0–41)
AST SERPL-CCNC: 15 U/L — SIGNIFICANT CHANGE UP (ref 0–41)
BASOPHILS # BLD AUTO: 0.04 K/UL — SIGNIFICANT CHANGE UP (ref 0–0.2)
BASOPHILS NFR BLD AUTO: 0.3 % — SIGNIFICANT CHANGE UP (ref 0–1)
BILIRUB SERPL-MCNC: 0.4 MG/DL — SIGNIFICANT CHANGE UP (ref 0.2–1.2)
BILIRUB SERPL-MCNC: 0.4 MG/DL — SIGNIFICANT CHANGE UP (ref 0.2–1.2)
BUN SERPL-MCNC: 11 MG/DL — SIGNIFICANT CHANGE UP (ref 10–20)
BUN SERPL-MCNC: 12 MG/DL — SIGNIFICANT CHANGE UP (ref 10–20)
CALCIUM SERPL-MCNC: 9.3 MG/DL — SIGNIFICANT CHANGE UP (ref 8.5–10.1)
CALCIUM SERPL-MCNC: 9.4 MG/DL — SIGNIFICANT CHANGE UP (ref 8.5–10.1)
CHLORIDE SERPL-SCNC: 81 MMOL/L — LOW (ref 98–110)
CHLORIDE SERPL-SCNC: 88 MMOL/L — LOW (ref 98–110)
CO2 SERPL-SCNC: 39 MMOL/L — HIGH (ref 17–32)
CO2 SERPL-SCNC: 40 MMOL/L — HIGH (ref 17–32)
CREAT SERPL-MCNC: <0.5 MG/DL — LOW (ref 0.7–1.5)
CREAT SERPL-MCNC: <0.5 MG/DL — LOW (ref 0.7–1.5)
EGFR: 124 ML/MIN/1.73M2 — SIGNIFICANT CHANGE UP
EGFR: 124 ML/MIN/1.73M2 — SIGNIFICANT CHANGE UP
EOSINOPHIL # BLD AUTO: 0.23 K/UL — SIGNIFICANT CHANGE UP (ref 0–0.7)
EOSINOPHIL NFR BLD AUTO: 2 % — SIGNIFICANT CHANGE UP (ref 0–8)
GLUCOSE BLDC GLUCOMTR-MCNC: 132 MG/DL — HIGH (ref 70–99)
GLUCOSE BLDC GLUCOMTR-MCNC: 143 MG/DL — HIGH (ref 70–99)
GLUCOSE BLDC GLUCOMTR-MCNC: 148 MG/DL — HIGH (ref 70–99)
GLUCOSE BLDC GLUCOMTR-MCNC: 202 MG/DL — HIGH (ref 70–99)
GLUCOSE BLDC GLUCOMTR-MCNC: 218 MG/DL — HIGH (ref 70–99)
GLUCOSE BLDC GLUCOMTR-MCNC: 221 MG/DL — HIGH (ref 70–99)
GLUCOSE SERPL-MCNC: 101 MG/DL — HIGH (ref 70–99)
GLUCOSE SERPL-MCNC: 190 MG/DL — HIGH (ref 70–99)
HCT VFR BLD CALC: 25.2 % — LOW (ref 37–47)
HGB BLD-MCNC: 8.8 G/DL — LOW (ref 12–16)
IMM GRANULOCYTES NFR BLD AUTO: 1.1 % — HIGH (ref 0.1–0.3)
LYMPHOCYTES # BLD AUTO: 0.85 K/UL — LOW (ref 1.2–3.4)
LYMPHOCYTES # BLD AUTO: 7.3 % — LOW (ref 20.5–51.1)
MCHC RBC-ENTMCNC: 33.8 PG — HIGH (ref 27–31)
MCHC RBC-ENTMCNC: 34.9 G/DL — SIGNIFICANT CHANGE UP (ref 32–37)
MCV RBC AUTO: 96.9 FL — SIGNIFICANT CHANGE UP (ref 81–99)
MONOCYTES # BLD AUTO: 0.79 K/UL — HIGH (ref 0.1–0.6)
MONOCYTES NFR BLD AUTO: 6.8 % — SIGNIFICANT CHANGE UP (ref 1.7–9.3)
NEUTROPHILS # BLD AUTO: 9.66 K/UL — HIGH (ref 1.4–6.5)
NEUTROPHILS NFR BLD AUTO: 82.5 % — HIGH (ref 42.2–75.2)
NRBC # BLD: 0 /100 WBCS — SIGNIFICANT CHANGE UP (ref 0–0)
PLATELET # BLD AUTO: 269 K/UL — SIGNIFICANT CHANGE UP (ref 130–400)
POTASSIUM SERPL-MCNC: 4.3 MMOL/L — SIGNIFICANT CHANGE UP (ref 3.5–5)
POTASSIUM SERPL-MCNC: 5 MMOL/L — SIGNIFICANT CHANGE UP (ref 3.5–5)
POTASSIUM SERPL-SCNC: 4.3 MMOL/L — SIGNIFICANT CHANGE UP (ref 3.5–5)
POTASSIUM SERPL-SCNC: 5 MMOL/L — SIGNIFICANT CHANGE UP (ref 3.5–5)
PROT SERPL-MCNC: 5.9 G/DL — LOW (ref 6–8)
PROT SERPL-MCNC: 6.1 G/DL — SIGNIFICANT CHANGE UP (ref 6–8)
RBC # BLD: 2.6 M/UL — LOW (ref 4.2–5.4)
RBC # FLD: 13 % — SIGNIFICANT CHANGE UP (ref 11.5–14.5)
SODIUM SERPL-SCNC: 131 MMOL/L — LOW (ref 135–146)
SODIUM SERPL-SCNC: 135 MMOL/L — SIGNIFICANT CHANGE UP (ref 135–146)
WBC # BLD: 11.7 K/UL — HIGH (ref 4.8–10.8)
WBC # FLD AUTO: 11.7 K/UL — HIGH (ref 4.8–10.8)

## 2022-06-26 PROCEDURE — 71045 X-RAY EXAM CHEST 1 VIEW: CPT | Mod: 26

## 2022-06-26 PROCEDURE — 99233 SBSQ HOSP IP/OBS HIGH 50: CPT

## 2022-06-26 RX ORDER — ALPRAZOLAM 0.25 MG
0.5 TABLET ORAL ONCE
Refills: 0 | Status: DISCONTINUED | OUTPATIENT
Start: 2022-06-26 | End: 2022-06-26

## 2022-06-26 RX ORDER — HYDROMORPHONE HYDROCHLORIDE 2 MG/ML
2 INJECTION INTRAMUSCULAR; INTRAVENOUS; SUBCUTANEOUS ONCE
Refills: 0 | Status: DISCONTINUED | OUTPATIENT
Start: 2022-06-26 | End: 2022-06-26

## 2022-06-26 RX ADMIN — APIXABAN 5 MILLIGRAM(S): 2.5 TABLET, FILM COATED ORAL at 17:55

## 2022-06-26 RX ADMIN — Medication 9 UNIT(S): at 18:14

## 2022-06-26 RX ADMIN — Medication 9 UNIT(S): at 12:42

## 2022-06-26 RX ADMIN — POLYETHYLENE GLYCOL 3350 17 GRAM(S): 17 POWDER, FOR SOLUTION ORAL at 12:44

## 2022-06-26 RX ADMIN — HYDROMORPHONE HYDROCHLORIDE 2 MILLIGRAM(S): 2 INJECTION INTRAMUSCULAR; INTRAVENOUS; SUBCUTANEOUS at 17:54

## 2022-06-26 RX ADMIN — Medication 1 APPLICATION(S): at 05:38

## 2022-06-26 RX ADMIN — Medication 9 UNIT(S): at 05:46

## 2022-06-26 RX ADMIN — BUMETANIDE 2 MILLIGRAM(S): 0.25 INJECTION INTRAMUSCULAR; INTRAVENOUS at 05:37

## 2022-06-26 RX ADMIN — Medication 0.5 MILLIGRAM(S): at 14:57

## 2022-06-26 RX ADMIN — GABAPENTIN 600 MILLIGRAM(S): 400 CAPSULE ORAL at 05:37

## 2022-06-26 RX ADMIN — APIXABAN 5 MILLIGRAM(S): 2.5 TABLET, FILM COATED ORAL at 05:37

## 2022-06-26 RX ADMIN — Medication 1 TABLET(S): at 12:43

## 2022-06-26 RX ADMIN — Medication 0.5 MILLIGRAM(S): at 05:40

## 2022-06-26 RX ADMIN — HYDROMORPHONE HYDROCHLORIDE 2 MILLIGRAM(S): 2 INJECTION INTRAMUSCULAR; INTRAVENOUS; SUBCUTANEOUS at 13:27

## 2022-06-26 RX ADMIN — Medication 9 UNIT(S): at 00:18

## 2022-06-26 RX ADMIN — GABAPENTIN 600 MILLIGRAM(S): 400 CAPSULE ORAL at 21:52

## 2022-06-26 RX ADMIN — PANTOPRAZOLE SODIUM 40 MILLIGRAM(S): 20 TABLET, DELAYED RELEASE ORAL at 05:37

## 2022-06-26 RX ADMIN — HYDROMORPHONE HYDROCHLORIDE 2 MILLIGRAM(S): 2 INJECTION INTRAMUSCULAR; INTRAVENOUS; SUBCUTANEOUS at 09:47

## 2022-06-26 RX ADMIN — HYDROMORPHONE HYDROCHLORIDE 2 MILLIGRAM(S): 2 INJECTION INTRAMUSCULAR; INTRAVENOUS; SUBCUTANEOUS at 10:19

## 2022-06-26 RX ADMIN — INSULIN GLARGINE 20 UNIT(S): 100 INJECTION, SOLUTION SUBCUTANEOUS at 21:52

## 2022-06-26 RX ADMIN — SENNA PLUS 2 TABLET(S): 8.6 TABLET ORAL at 21:52

## 2022-06-26 RX ADMIN — Medication 4: at 12:43

## 2022-06-26 RX ADMIN — GABAPENTIN 600 MILLIGRAM(S): 400 CAPSULE ORAL at 15:43

## 2022-06-26 RX ADMIN — Medication 1 APPLICATION(S): at 18:13

## 2022-06-26 RX ADMIN — HYDROMORPHONE HYDROCHLORIDE 2 MILLIGRAM(S): 2 INJECTION INTRAMUSCULAR; INTRAVENOUS; SUBCUTANEOUS at 18:30

## 2022-06-26 RX ADMIN — Medication 0.5 MILLIGRAM(S): at 20:02

## 2022-06-26 RX ADMIN — HYDROMORPHONE HYDROCHLORIDE 2 MILLIGRAM(S): 2 INJECTION INTRAMUSCULAR; INTRAVENOUS; SUBCUTANEOUS at 15:43

## 2022-06-26 RX ADMIN — AMLODIPINE BESYLATE 5 MILLIGRAM(S): 2.5 TABLET ORAL at 05:38

## 2022-06-26 RX ADMIN — LOSARTAN POTASSIUM 100 MILLIGRAM(S): 100 TABLET, FILM COATED ORAL at 05:37

## 2022-06-26 RX ADMIN — ATORVASTATIN CALCIUM 20 MILLIGRAM(S): 80 TABLET, FILM COATED ORAL at 21:52

## 2022-06-26 RX ADMIN — HYDROMORPHONE HYDROCHLORIDE 2 MILLIGRAM(S): 2 INJECTION INTRAMUSCULAR; INTRAVENOUS; SUBCUTANEOUS at 01:46

## 2022-06-26 NOTE — PROGRESS NOTE ADULT - SUBJECTIVE AND OBJECTIVE BOX
Progress Note:  Provider Speciality                            Hospitalist      CRUZ DUMONT MRN-[id number] 56y Female     CHIEF PRESENTING COMPLAINT:  Patient is a 56y old  Female who presents with a chief complaint of Hypoxia (25 Jun 2022 11:17)        SUBJECTIVE:  Patient was seen and examined at bedside. Reports  bilateral LE pain , tenderness around trach site  No significant overnight events reported.     HISTORY OF PRESENTING ILLNESS:  HPI:  57 yo f with PMHx of DVT on Eliquis, COPD,  trach collar, obesity, IDDM, UTI, sent by [facility] for hypoxia. History provided by daughter at bedside, she reports the current illness going back to April 4th when pt was intubated on admission at [facility] ICU for hypoxic respiratory failure diagnosed with copd exacerbation and superimposed pneumonia, of note she also appears to have been in CHF exacerbation with severe b/l LE swelling treated with IV bumex. Remained intubated 37 days per daughter, diagnosed with fever of unknown origin (up to 106F), treated with empiric broad spectrum antibiotics and once 2 weeks s/p tracheostomy placement and 48 hours afebrile she was was discharged to nursing home for PT. Of note, patient's daughter and pt herself say the wiseman has not been replaced for over 3 weeks. She also reports a developing sacral pressure ulcer. Was at [facility] only 2 days when was noted to be hypoxic (saturating low 70's) and EMS called. While awaiting EMS, floor nurse on the unit suctioned the patient, and managed to bring up a very large mucus plug, with pt's saturation immediately improving afterwards to 80's. Nonetheless pt was sent to [facility] ED. Prior to all this pt was independent and ambulatory. Pt denies sob, cp, abd pain, n/v/d, fever or chills.     On arrival to ED pt was saturating 97%, 15L O2 via tracheostomy collar (baseline 8 L at [facility]) VS:    /59  T 99.9F RR 20  Admission VBG pH 7.37 pCO2 60 pO2 49. Pt not wheezing, not coughing. CXR suspicious for L-sided PNA, inconclusive. CTA neg for PE. Pt received x1 IV Levoquin and Cefepime in ED, admitted to medicine for acute hypoxic hypercapnic respiratory failure secondary to acute mucus plug (now resolved vs superimposed pneumonia  hospital-acquired.(less likely), Records request sent to [facility] medical records  [phone]) and patient will be continued on empiric antibiotics pending procal (27 May 2022 14:02)        REVIEW OF SYSTEMS:  Patient denies any headache, any vision complaints, runny nose, fever, chills. Denies chest pain, shortness of breath, palpitation. Denies nausea, vomiting, abdominal pain or diarrhoea, Denies dysuria. At least 10 systems were reviewed in ROS. All systems reviewed  are within normal limits except for the complaints as described in Subjective.    PAST MEDICAL & SURGICAL HISTORY:  PAST MEDICAL & SURGICAL HISTORY:  COPD (chronic obstructive pulmonary disease)      CHF (congestive heart failure)      DM (diabetes mellitus)      H/O tracheostomy              VITAL SIGNS:  Vital Signs Last 24 Hrs  T(C): 37.2 (26 Jun 2022 12:27), Max: 37.2 (25 Jun 2022 21:39)  T(F): 98.9 (26 Jun 2022 12:27), Max: 99 (25 Jun 2022 21:39)  HR: 107 (26 Jun 2022 12:27) (90 - 107)  BP: 124/56 (26 Jun 2022 12:27) (124/56 - 146/60)  BP(mean): --  RR: 18 (26 Jun 2022 12:27) (18 - 19)  SpO2: 100% (26 Jun 2022 12:27) (97% - 100%)          PHYSICAL EXAMINATION:  Not in acute distress, anxious looking  General: No icterus  HEENT:   no JVD.  Heart: S1+S2 audible  Lungs: +trach,  bilateral  moderate air entry, no wheezing, no crepitations.  Abdomen: +pEG, Soft, non-tender, non-distended , no  rigidity or guarding.  CNS: Awake alert, CN  grossly intact.  Extremities:  bilateral LE hyperpigmentation dry skin        CONSULTS:  Consultant(s) Notes Reviewed by me.   Care Discussed with Consultants/Other Providers where required.        MEDICATIONS:  MEDICATIONS  (STANDING):  ALPRAZolam 0.5 milliGRAM(s) Oral once  amLODIPine   Tablet 5 milliGRAM(s) Oral daily  ammonium lactate 12% Lotion 1 Application(s) Topical every 12 hours  apixaban 5 milliGRAM(s) Oral two times a day  atorvastatin 20 milliGRAM(s) Oral at bedtime  buMETAnide 2 milliGRAM(s) Oral daily  dextrose 5%. 1000 milliLiter(s) (100 mL/Hr) IV Continuous <Continuous>  dextrose 5%. 1000 milliLiter(s) (50 mL/Hr) IV Continuous <Continuous>  dextrose 50% Injectable 25 Gram(s) IV Push once  dextrose 50% Injectable 12.5 Gram(s) IV Push once  dextrose 50% Injectable 25 Gram(s) IV Push once  diatrizoate meglumine/diatrizoate sodium. 30 milliLiter(s) Oral once  gabapentin 600 milliGRAM(s) Oral three times a day  glucagon  Injectable 1 milliGRAM(s) IntraMuscular once  influenza   Vaccine 0.5 milliLiter(s) IntraMuscular once  insulin glargine Injectable (LANTUS) 20 Unit(s) SubCutaneous at bedtime  insulin lispro (ADMELOG) corrective regimen sliding scale   SubCutaneous three times a day before meals  insulin lispro Injectable (ADMELOG) 9 Unit(s) SubCutaneous every 6 hours  losartan 100 milliGRAM(s) Oral daily  multivitamin 1 Tablet(s) Oral daily  pantoprazole    Tablet 40 milliGRAM(s) Oral before breakfast  polyethylene glycol 3350 17 Gram(s) Oral daily  senna 2 Tablet(s) Oral at bedtime  triamcinolone 0.1% Cream 1 Application(s) Topical every 12 hours    MEDICATIONS  (PRN):  albuterol/ipratropium for Nebulization 3 milliLiter(s) Nebulizer every 6 hours PRN Shortness of Breath and/or Wheezing  ALPRAZolam 0.5 milliGRAM(s) Oral every 12 hours PRN anxiety  dextrose Oral Gel 15 Gram(s) Oral once PRN Blood Glucose LESS THAN 70 milliGRAM(s)/deciliter  HYDROmorphone   Tablet 2 milliGRAM(s) Oral every 8 hours PRN Moderate Pain (4 - 6)            ASSESSMENT:    57 yo female with PMHx of DVT on Eliquis, COPD,  trach collar, obesity, IDDM, UTI, sent by [facility] for hypoxia. Current illness going back to April 4th when pt was intubated on admission at [facility] ICU for hypoxic respiratory failure diagnosed with copd exacerbation and superimposed pneumonia, and remained intubated for 37 days requiring tracheostomy. Patient stayed at [facility] for 2 days until noted to be hypoxic (saturating low 70's) pt's saturation immediately improving afterwards to 80's after large mucus plug removed and was subsequently sent to [facility]        Acute on chronic COPD with exacerbation - now resolved  Mucous plug-removed  Pneumonia vs atelectasis L base  CAUTI  Dysphagia  DM2        5/28- s/p trach exchanged  6/11 Trach changed to size 8 by surgery  Pulm noted- if recurrent atelectasis despite chest PT, suctioning, may consider bronchoscopy  s/p PEG 06/15-restarted on peg feedings after  adjustment & surgery clearance   LE neuropathic pain-follow neuro recs  Chronic B/l LE Hyperpigmentation changes-due to stasis dermatitis. Continue Triamcinolone BId.   Xanax 0.25 Q12 PRN for anxiety.   LE neuropathic pain-Pain management- currently on Dilaudid 2 mg q 8 prn .6/23 MRI Lumbar spine w/ & w/o Contrast  - chronic DJD no acute changes.  Burn noted for b/l lE hyperpigmentation & dryness. Dermatology input also noted. No surgical intervention. Topical steroid. Possible eczema  CAUTI with Chronic Indwelling Wiseman. Completed abx. Resolved  Suspected epiglottis edema- completed Dexa  Hx of DVT- on home Eliquis  Activity been bed bound. PT eval when able.     Handoff: Pt was anticipated for discharge to to [facility] today( Auth obtained) but family does not agree for discharge & pt complaining of severe LE pain and vishal-trach pain also. Anticipated again for SNF . If refuses tomorrow despited medical stability , pt or HCP has to appeal the discharge.    Total time spent to complete patient's bedside assessment, physical examination, review medical chart including labs & imaging, discuss medical plan of care with housestaff was more than 35 minutes

## 2022-06-27 LAB
ALBUMIN SERPL ELPH-MCNC: 3.6 G/DL — SIGNIFICANT CHANGE UP (ref 3.5–5.2)
ALP SERPL-CCNC: 135 U/L — HIGH (ref 30–115)
ALT FLD-CCNC: 20 U/L — SIGNIFICANT CHANGE UP (ref 0–41)
ANION GAP SERPL CALC-SCNC: 7 MMOL/L — SIGNIFICANT CHANGE UP (ref 7–14)
AST SERPL-CCNC: 14 U/L — SIGNIFICANT CHANGE UP (ref 0–41)
BASOPHILS # BLD AUTO: 0.04 K/UL — SIGNIFICANT CHANGE UP (ref 0–0.2)
BASOPHILS NFR BLD AUTO: 0.3 % — SIGNIFICANT CHANGE UP (ref 0–1)
BILIRUB SERPL-MCNC: 0.4 MG/DL — SIGNIFICANT CHANGE UP (ref 0.2–1.2)
BUN SERPL-MCNC: 12 MG/DL — SIGNIFICANT CHANGE UP (ref 10–20)
CALCIUM SERPL-MCNC: 9.8 MG/DL — SIGNIFICANT CHANGE UP (ref 8.5–10.1)
CHLORIDE SERPL-SCNC: 84 MMOL/L — LOW (ref 98–110)
CO2 SERPL-SCNC: 43 MMOL/L — CRITICAL HIGH (ref 17–32)
CREAT SERPL-MCNC: <0.5 MG/DL — LOW (ref 0.7–1.5)
EGFR: 115 ML/MIN/1.73M2 — SIGNIFICANT CHANGE UP
EOSINOPHIL # BLD AUTO: 0.27 K/UL — SIGNIFICANT CHANGE UP (ref 0–0.7)
EOSINOPHIL NFR BLD AUTO: 2.2 % — SIGNIFICANT CHANGE UP (ref 0–8)
GAS PNL BLDA: SIGNIFICANT CHANGE UP
GLUCOSE BLDC GLUCOMTR-MCNC: 135 MG/DL — HIGH (ref 70–99)
GLUCOSE BLDC GLUCOMTR-MCNC: 143 MG/DL — HIGH (ref 70–99)
GLUCOSE BLDC GLUCOMTR-MCNC: 172 MG/DL — HIGH (ref 70–99)
GLUCOSE BLDC GLUCOMTR-MCNC: 223 MG/DL — HIGH (ref 70–99)
GLUCOSE SERPL-MCNC: 141 MG/DL — HIGH (ref 70–99)
HCT VFR BLD CALC: 36.9 % — LOW (ref 37–47)
HGB BLD-MCNC: 9.3 G/DL — LOW (ref 12–16)
IMM GRANULOCYTES NFR BLD AUTO: 0.8 % — HIGH (ref 0.1–0.3)
LYMPHOCYTES # BLD AUTO: 1.11 K/UL — LOW (ref 1.2–3.4)
LYMPHOCYTES # BLD AUTO: 9.1 % — LOW (ref 20.5–51.1)
MAGNESIUM SERPL-MCNC: 1.8 MG/DL — SIGNIFICANT CHANGE UP (ref 1.8–2.4)
MCHC RBC-ENTMCNC: 33.9 PG — HIGH (ref 27–31)
MCHC RBC-ENTMCNC: 34.6 G/DL — SIGNIFICANT CHANGE UP (ref 32–37)
MCV RBC AUTO: 98.2 FL — SIGNIFICANT CHANGE UP (ref 81–99)
MONOCYTES # BLD AUTO: 0.94 K/UL — HIGH (ref 0.1–0.6)
MONOCYTES NFR BLD AUTO: 7.7 % — SIGNIFICANT CHANGE UP (ref 1.7–9.3)
NEUTROPHILS # BLD AUTO: 9.78 K/UL — HIGH (ref 1.4–6.5)
NEUTROPHILS NFR BLD AUTO: 79.9 % — HIGH (ref 42.2–75.2)
NRBC # BLD: 0 /100 WBCS — SIGNIFICANT CHANGE UP (ref 0–0)
PLATELET # BLD AUTO: 264 K/UL — SIGNIFICANT CHANGE UP (ref 130–400)
POTASSIUM SERPL-MCNC: 4.6 MMOL/L — SIGNIFICANT CHANGE UP (ref 3.5–5)
POTASSIUM SERPL-SCNC: 4.6 MMOL/L — SIGNIFICANT CHANGE UP (ref 3.5–5)
PROT SERPL-MCNC: 6.4 G/DL — SIGNIFICANT CHANGE UP (ref 6–8)
RBC # BLD: 2.74 M/UL — LOW (ref 4.2–5.4)
RBC # FLD: 13 % — SIGNIFICANT CHANGE UP (ref 11.5–14.5)
SODIUM SERPL-SCNC: 134 MMOL/L — LOW (ref 135–146)
WBC # BLD: 12.24 K/UL — HIGH (ref 4.8–10.8)
WBC # FLD AUTO: 12.24 K/UL — HIGH (ref 4.8–10.8)

## 2022-06-27 PROCEDURE — 99233 SBSQ HOSP IP/OBS HIGH 50: CPT

## 2022-06-27 RX ORDER — KETOROLAC TROMETHAMINE 30 MG/ML
30 SYRINGE (ML) INJECTION ONCE
Refills: 0 | Status: DISCONTINUED | OUTPATIENT
Start: 2022-06-27 | End: 2022-06-27

## 2022-06-27 RX ORDER — ACETAMINOPHEN 500 MG
975 TABLET ORAL EVERY 8 HOURS
Refills: 0 | Status: DISCONTINUED | OUTPATIENT
Start: 2022-06-27 | End: 2022-06-28

## 2022-06-27 RX ADMIN — Medication 30 MILLIGRAM(S): at 22:11

## 2022-06-27 RX ADMIN — Medication 9 UNIT(S): at 12:06

## 2022-06-27 RX ADMIN — Medication 975 MILLIGRAM(S): at 14:00

## 2022-06-27 RX ADMIN — GABAPENTIN 600 MILLIGRAM(S): 400 CAPSULE ORAL at 05:40

## 2022-06-27 RX ADMIN — Medication 30 MILLIGRAM(S): at 22:41

## 2022-06-27 RX ADMIN — HYDROMORPHONE HYDROCHLORIDE 2 MILLIGRAM(S): 2 INJECTION INTRAMUSCULAR; INTRAVENOUS; SUBCUTANEOUS at 05:45

## 2022-06-27 RX ADMIN — Medication 1 APPLICATION(S): at 05:41

## 2022-06-27 RX ADMIN — BUMETANIDE 2 MILLIGRAM(S): 0.25 INJECTION INTRAMUSCULAR; INTRAVENOUS at 05:40

## 2022-06-27 RX ADMIN — LOSARTAN POTASSIUM 100 MILLIGRAM(S): 100 TABLET, FILM COATED ORAL at 05:40

## 2022-06-27 RX ADMIN — Medication 975 MILLIGRAM(S): at 13:08

## 2022-06-27 RX ADMIN — APIXABAN 5 MILLIGRAM(S): 2.5 TABLET, FILM COATED ORAL at 05:40

## 2022-06-27 RX ADMIN — Medication 1 APPLICATION(S): at 17:20

## 2022-06-27 RX ADMIN — SENNA PLUS 2 TABLET(S): 8.6 TABLET ORAL at 21:17

## 2022-06-27 RX ADMIN — Medication 9 UNIT(S): at 06:37

## 2022-06-27 RX ADMIN — Medication 975 MILLIGRAM(S): at 21:16

## 2022-06-27 RX ADMIN — PANTOPRAZOLE SODIUM 40 MILLIGRAM(S): 20 TABLET, DELAYED RELEASE ORAL at 05:40

## 2022-06-27 RX ADMIN — POLYETHYLENE GLYCOL 3350 17 GRAM(S): 17 POWDER, FOR SOLUTION ORAL at 11:22

## 2022-06-27 RX ADMIN — Medication 9 UNIT(S): at 17:44

## 2022-06-27 RX ADMIN — Medication 1 APPLICATION(S): at 17:22

## 2022-06-27 RX ADMIN — Medication 9 UNIT(S): at 00:09

## 2022-06-27 RX ADMIN — APIXABAN 5 MILLIGRAM(S): 2.5 TABLET, FILM COATED ORAL at 17:19

## 2022-06-27 RX ADMIN — INSULIN GLARGINE 20 UNIT(S): 100 INJECTION, SOLUTION SUBCUTANEOUS at 21:17

## 2022-06-27 RX ADMIN — Medication 1 APPLICATION(S): at 05:40

## 2022-06-27 RX ADMIN — HYDROMORPHONE HYDROCHLORIDE 2 MILLIGRAM(S): 2 INJECTION INTRAMUSCULAR; INTRAVENOUS; SUBCUTANEOUS at 14:30

## 2022-06-27 RX ADMIN — GABAPENTIN 600 MILLIGRAM(S): 400 CAPSULE ORAL at 13:09

## 2022-06-27 RX ADMIN — Medication 0.5 MILLIGRAM(S): at 17:24

## 2022-06-27 RX ADMIN — HYDROMORPHONE HYDROCHLORIDE 2 MILLIGRAM(S): 2 INJECTION INTRAMUSCULAR; INTRAVENOUS; SUBCUTANEOUS at 06:15

## 2022-06-27 RX ADMIN — Medication 1 TABLET(S): at 11:22

## 2022-06-27 RX ADMIN — Medication 2: at 06:37

## 2022-06-27 RX ADMIN — HYDROMORPHONE HYDROCHLORIDE 2 MILLIGRAM(S): 2 INJECTION INTRAMUSCULAR; INTRAVENOUS; SUBCUTANEOUS at 13:54

## 2022-06-27 RX ADMIN — ATORVASTATIN CALCIUM 20 MILLIGRAM(S): 80 TABLET, FILM COATED ORAL at 21:16

## 2022-06-27 RX ADMIN — Medication 975 MILLIGRAM(S): at 21:54

## 2022-06-27 RX ADMIN — AMLODIPINE BESYLATE 5 MILLIGRAM(S): 2.5 TABLET ORAL at 05:41

## 2022-06-27 RX ADMIN — GABAPENTIN 600 MILLIGRAM(S): 400 CAPSULE ORAL at 21:16

## 2022-06-27 NOTE — PROGRESS NOTE ADULT - SUBJECTIVE AND OBJECTIVE BOX
Progress Note:  Provider Speciality                            Hospitalist      CRUZ DUMONT MRN-[id number] 56y Female     CHIEF PRESENTING COMPLAINT:  Patient is a 56y old  Female who presents with a chief complaint of Hypoxia (25 Jun 2022 11:17)      SUBJECTIVE:  Patient was seen and examined at bedside. Reports  bilateral LE pain , tenderness around trach site  No significant overnight events reported.     HISTORY OF PRESENTING ILLNESS:  HPI:  57 yo f with PMHx of DVT on Eliquis, COPD,  trach collar, obesity, IDDM, UTI, sent by [facility] for hypoxia. History provided by daughter at bedside, she reports the current illness going back to April 4th when pt was intubated on admission at [facility] ICU for hypoxic respiratory failure diagnosed with copd exacerbation and superimposed pneumonia, of note she also appears to have been in CHF exacerbation with severe b/l LE swelling treated with IV bumex. Remained intubated 37 days per daughter, diagnosed with fever of unknown origin (up to 106F), treated with empiric broad spectrum antibiotics and once 2 weeks s/p tracheostomy placement and 48 hours afebrile she was was discharged to nursing home for PT. Of note, patient's daughter and pt herself say the wiseman has not been replaced for over 3 weeks. She also reports a developing sacral pressure ulcer. Was at [facility] only 2 days when was noted to be hypoxic (saturating low 70's) and EMS called. While awaiting EMS, floor nurse on the unit suctioned the patient, and managed to bring up a very large mucus plug, with pt's saturation immediately improving afterwards to 80's. Nonetheless pt was sent to [facility] ED. Prior to all this pt was independent and ambulatory. Pt denies sob, cp, abd pain, n/v/d, fever or chills.     On arrival to ED pt was saturating 97%, 15L O2 via tracheostomy collar (baseline 8 L at [facility]) VS:    /59  T 99.9F RR 20  Admission VBG pH 7.37 pCO2 60 pO2 49. Pt not wheezing, not coughing. CXR suspicious for L-sided PNA, inconclusive. CTA neg for PE. Pt received x1 IV Levoquin and Cefepime in ED, admitted to medicine for acute hypoxic hypercapnic respiratory failure secondary to acute mucus plug (now resolved vs superimposed pneumonia  hospital-acquired.(less likely), Records request sent to [facility] medical records  [phone]) and patient will be continued on empiric antibiotics pending procal (27 May 2022 14:02)        REVIEW OF SYSTEMS:  Patient denies any headache, any vision complaints, runny nose, fever, chills. Denies chest pain, shortness of breath, palpitation. Denies nausea, vomiting, abdominal pain or diarrhoea, Denies dysuria. At least 10 systems were reviewed in ROS. All systems reviewed  are within normal limits except for the complaints as described in Subjective.    PAST MEDICAL & SURGICAL HISTORY:  PAST MEDICAL & SURGICAL HISTORY:  COPD (chronic obstructive pulmonary disease)  CHF (congestive heart failure)  DM (diabetes mellitus)  H/O tracheostomy      VITAL SIGNS:  T(C): 35.9 (27 Jun 2022 12:30), Max: 36.3 (26 Jun 2022 21:55)  T(F): 96.7 (27 Jun 2022 12:30), Max: 97.3 (26 Jun 2022 21:55)  HR: 97 (27 Jun 2022 12:30) (97 - 112)  BP: 136/56 (27 Jun 2022 12:30) (136/56 - 175/70)  RR: 20 (27 Jun 2022 12:30) (20 - 20)  SpO2: 98% (27 Jun 2022 12:30) (96% - 100%)      PHYSICAL EXAMINATION:  Not in acute distress, sleepy with low MS  General: No icterus  HEENT:   no JVD.  Heart: S1+S2 audible  Lungs: +trach,  bilateral  moderate air entry, no wheezing, no crepitations.  Abdomen: +pEG, Soft, non-tender, non-distended , no  rigidity or guarding.  CNS: Awake alert, CN  grossly intact.  Extremities:  bilateral LE hyperpigmentation dry skin        CONSULTS:  Consultant(s) Notes Reviewed by me.   Care Discussed with Consultants/Other Providers where required.        MEDICATIONS:  MEDICATIONS  (STANDING):  ALPRAZolam 0.5 milliGRAM(s) Oral once  amLODIPine   Tablet 5 milliGRAM(s) Oral daily  ammonium lactate 12% Lotion 1 Application(s) Topical every 12 hours  apixaban 5 milliGRAM(s) Oral two times a day  atorvastatin 20 milliGRAM(s) Oral at bedtime  buMETAnide 2 milliGRAM(s) Oral daily  diatrizoate meglumine/diatrizoate sodium. 30 milliLiter(s) Oral once  gabapentin 600 milliGRAM(s) Oral three times a day  glucagon  Injectable 1 milliGRAM(s) IntraMuscular once  influenza   Vaccine 0.5 milliLiter(s) IntraMuscular once  insulin glargine Injectable (LANTUS) 20 Unit(s) SubCutaneous at bedtime  insulin lispro (ADMELOG) corrective regimen sliding scale   SubCutaneous three times a day before meals  insulin lispro Injectable (ADMELOG) 9 Unit(s) SubCutaneous every 6 hours  losartan 100 milliGRAM(s) Oral daily  multivitamin 1 Tablet(s) Oral daily  pantoprazole    Tablet 40 milliGRAM(s) Oral before breakfast  polyethylene glycol 3350 17 Gram(s) Oral daily  senna 2 Tablet(s) Oral at bedtime  triamcinolone 0.1% Cream 1 Application(s) Topical every 12 hours    MEDICATIONS  (PRN):  albuterol/ipratropium for Nebulization 3 milliLiter(s) Nebulizer every 6 hours PRN Shortness of Breath and/or Wheezing  ALPRAZolam 0.5 milliGRAM(s) Oral every 12 hours PRN anxiety  dextrose Oral Gel 15 Gram(s) Oral once PRN Blood Glucose LESS THAN 70 milliGRAM(s)/deciliter  HYDROmorphone   Tablet 2 milliGRAM(s) Oral every 8 hours PRN Moderate Pain (4 - 6)      ASSESSMENT:    57 yo female with PMHx of DVT on Eliquis, COPD,  trach collar, obesity, IDDM, UTI, sent by [facility] for hypoxia. Current illness going back to April 4th when pt was intubated on admission at [facility] ICU for hypoxic respiratory failure diagnosed with copd exacerbation and superimposed pneumonia, and remained intubated for 37 days requiring tracheostomy. Patient stayed at [facility] for 2 days until noted to be hypoxic (saturating low 70's) pt's saturation immediately improving afterwards to 80's after large mucus plug removed and was subsequently sent to [facility]    Acute worsening Hypercarbic Respiratory Failure 6/27  - Due possible Dilaudid CNS depression and High O2 supplementation   - Will decrease O2 supplement  - Keep Sats 88%   - d/c Dilaudid for now   - Pulmonary Consulted case discussed   ABG - ( 27 Jun 2022 15:42 )  pH, Arterial: 7.38  pH,  /  pCO2: 90    /  pO2: 94    / HCO3: 53    / Base Excess: x     /  SaO2: 99.2      Acute on chronic COPD with exacerbation - now resolved  Mucous plug-removed  Pneumonia vs atelectasis L base  CAUTI  Dysphagia  DM2      5/28- s/p trach exchanged  6/11 Trach changed to size 8 by surgery  Pulm noted- if recurrent atelectasis despite chest PT, suctioning, may consider bronchoscopy  s/p PEG 06/15-restarted on peg feedings after  adjustment & surgery clearance   LE neuropathic pain-follow neuro recs  Chronic B/l LE Hyperpigmentation changes-due to stasis dermatitis. Continue Triamcinolone BId.   Xanax 0.25 Q12 PRN for anxiety.   LE neuropathic pain-Pain management- currently on Dilaudid 2 mg q 8 prn .6/23 MRI Lumbar spine w/ & w/o Contrast  - chronic DJD no acute changes.  Burn noted for b/l lE hyperpigmentation & dryness. Dermatology input also noted. No surgical intervention. Topical steroid. Possible eczema  CAUTI with Chronic Indwelling Wiseman. Completed abx. Resolved  Suspected epiglottis edema- completed Dexa  Hx of DVT- on home Eliquis  Activity been bed bound. PT eval when able.     Handoff: Pt was anticipated for discharge to to [facility] today( Auth obtained) but family does not agree for discharge & pt complaining of severe LE pain and vishal-trach pain also. Anticipated again for SNF . If refuses tomorrow despited medical stability , pt or HCP has to appeal the discharge.    Total time spent to complete patient's bedside assessment, physical examination, review medical chart including labs & imaging, discuss medical plan of care with housestaff was more than 35 minutes

## 2022-06-27 NOTE — PROGRESS NOTE ADULT - ASSESSMENT
57 yo female with PMHx of DVT on Eliquis, COPD,  trach collar, obesity, IDDM, UTI, sent by [facility] for hypoxia. Current illness going back to April 4th when pt was intubated on admission at [facility] ICU for hypoxic respiratory failure diagnosed with copd exacerbation and superimposed pneumonia, and remained intubated for 37 days requiring tracheostomy. Patient stayed at [facility] for 2 days until noted to be hypoxic (saturating low 70's) pt's saturation immediately improving afterwards to 80's after large mucus plug removed and was subsequently sent to [facility]  On arrival to ED pt was saturating 97%, 15L O2 via tracheostomy collar (baseline 8 L at [facility])CXR suspicious for L-sided PNA, inconclusive. CTA neg for PE. Pt received x1 IV Levaquin and Cefepime in ED, admitted to MICU for acute hypoxic hypercapnic respiratory failure secondary to acute mucus plug now resolved vs superimposed pneumonia hospital-acquired.   General Surgery Consulted emergently when patient found to be hypoxic to low 80s after CCU and Pulmonary team found large granuloma in trachea along tracheostomy tube. Patient currently had a size 8 trach. Surgery assisted Pulmonary and CCU with trach exchanged with ET tube guidance to a size 7 XLT. Saturations improved to high 90s. Large granuloma was removed. Patient was no longer in distress after exchange.    #Acute/ chronic COPD with exacerbation (Resolved)  #mucous plug removed  #pneumonia vs atelectasis L base  - On 5/28, Pulmonary team found large granuloma in trachea along tracheostomy tube. Patient had a size 8 trach on admission. Surgery assisted Pulmonary and CCU with trach exchanged with ET tube guidance to a size 7 XLT. Saturations improved to high 90s.  - trach functioning well s/p exchange   - 6/11 Trach changed to Fenestrated 8 by surgery  - c/w aggressive L sided chest PT and frequent suctioning q6  -Critical value Co2:43 ordered ABG will- f/u ABG    #s/p PEG (6/15):   KUB showing free intraperitoneal air (6/19).  CT Abdo confirmed that this was just due to PEG tube adjustment.   Repeat G tube study per Surgery (6/21) -normal, restarted on peg feedings      #Dysphagia:  Strict NPO as of now per 's FEES eval from 6/13/22.   [facility] ST will sign out to ST at [facility] for further plans with swallow rehab at [facility]     #LE neuropathic pain:  PAtient has apparently been bed bound since April 4th (her [facility] admission for hypoxic failure where she ended up being intubated)  Apparently has L > R foot drop which may be due to chronic contractures of Calf muscle/Achilles tendon. Can have PT evaluate her for that.  But options are limited at this stage.   Given the asymmetrical foot drop, Neurology wants to rule out Any focal neuropathy.  -6/23 s/p  MRI Lumbar spine w/ & w/o Contrast, mild degenerative changes  - chronic DJD no acute changes.  -continue pain management: adding tylenol 1g q8      #B/l LE Hyperpigmentation changes:   unclear etiology  Present since 1 year.   May be related to CHF related stasis dermatitis / hemosiderin deposition  vs deposition diseases versus Eosinophilic Dermatitis vs follicular hemorrhage from eliquis ?? (doubt the last differential)  Dermatology thinks it may be Eczema and advised Triamcinolone BId.   Will consider biopsy though. Pending Burn attending Input.   Add multivitamin to PEG QD.     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   Outpatient ENT f/u.     #DM:   steroids ended 6/18.  6/19- Increased lantus from 17 to 20.    #Chronic Indwelling Singh  - 5/28 Ucx - Contaminated   - 5/28 Ucx- Normal perri   - 5/27 Ucx- Klebsiella (CRE) and pseudomonas   - s/p cefepime  - Currently afebrile w/ no urinary complaints   - episode of Hematouria 6/4 -> improving -> h/h Stable    #Hx of DVT  - on home Eliquis  - restarted Eliquis    Activity been bed bound. PT eval when able.   GI PPX  PPI  DVT PPX  ELIQUIS  Dispo: Acute  Pending:  d/c planning to [facility] pending BMP results

## 2022-06-27 NOTE — PROGRESS NOTE ADULT - SUBJECTIVE AND OBJECTIVE BOX
NAME: CRUZ DUMONT  MRN: [id number]  LOCATION: [facility]- T2-3A 023 A    Patient is a 56y old  Female who presents with a chief complaint of Hypoxia (25 Jun 2022 11:17)      HPI:  57 yo f with PMHx of DVT on Eliquis, COPD,  trach collar, obesity, IDDM, UTI, sent by [facility] for hypoxia. History provided by daughter at bedside, she reports the current illness going back to April 4th when pt was intubated on admission at [facility] ICU for hypoxic respiratory failure diagnosed with copd exacerbation and superimposed pneumonia, of note she also appears to have been in CHF exacerbation with severe b/l LE swelling treated with IV bumex. Remained intubated 37 days per daughter, diagnosed with fever of unknown origin (up to 106F), treated with empiric broad spectrum antibiotics and once 2 weeks s/p tracheostomy placement and 48 hours afebrile she was was discharged to nursing home for PT. Of note, patient's daughter and pt herself say the wiseman has not been replaced for over 3 weeks. She also reports a developing sacral pressure ulcer. Was at [facility] only 2 days when was noted to be hypoxic (saturating low 70's) and EMS called. While awaiting EMS, floor nurse on the unit suctioned the patient, and managed to bring up a very large mucus plug, with pt's saturation immediately improving afterwards to 80's. Nonetheless pt was sent to [facility] ED. Prior to all this pt was independent and ambulatory. Pt denies sob, cp, abd pain, n/v/d, fever or chills.     On arrival to ED pt was saturating 97%, 15L O2 via tracheostomy collar (baseline 8 L at [facility]) VS:    /59  T 99.9F RR 20  Admission VBG pH 7.37 pCO2 60 pO2 49. Pt not wheezing, not coughing. CXR suspicious for L-sided PNA, inconclusive. CTA neg for PE. Pt received x1 IV Levoquin and Cefepime in ED, admitted to medicine for acute hypoxic hypercapnic respiratory failure secondary to acute mucus plug (now resolved vs superimposed pneumonia  hospital-acquired.(less likely), Records request sent to [facility] medical records  [phone]) and patient will be continued on empiric antibiotics pending procal (27 May 2022 14:02)      OVERNIGHT EVENTS: Pt is said to have experienced chest pain Over night. X-ray demonstrated stable lung opacities. Pt was also stating that she experienced pain in her LE and VS were s/f HTN, possibly 2/2 to pain. Patient denies any fever, chills, SOB, palpitations, N/V/D/C.     T(F): 96.7 (06-27-22 @ 12:30), Max: 97.3 (06-26-22 @ 21:55)  HR: 97 (06-27-22 @ 12:30) (97 - 112)  BP: 136/56 (06-27-22 @ 12:30) (136/56 - 175/70)  RR: 20 (06-27-22 @ 12:30) (20 - 20)  SpO2: 98% (06-27-22 @ 12:30) (96% - 100%)  I&O's Summary    26 Jun 2022 07:01  -  27 Jun 2022 07:00  --------------------------------------------------------  IN: 660 mL / OUT: 0 mL / NET: 660 mL          PHYSICAL EXAM:  GENERAL: NAD, well-groomed, well-developed  HEAD:  Atraumatic, Normocephalic  EYES: EOMI, PERRLA, conjunctiva and sclera clear  NECK: Supple, No JVD, Normal thyroid  CHEST/LUNG: crackles in anterior aspect of the lungs.  HEART: Regular rate and rhythm; No murmurs, rubs, or gallops  ABDOMEN: Soft, Nontender, Nondistended; Bowel sounds present  EXTREMITIES: , No clubbing, cyanosis, or edema, pulses difficult to palpate this morning 2/2 pain.  SKIN: discoloration B/L    LABS:                        9.3    12.24 )-----------( 264      ( 27 Jun 2022 12:32 )             36.9     06-27    134<L>  |  84<L>  |  12  ----------------------------<  141<H>  4.6   |  43<HH>  |  <0.5<L>    Ca    9.8      27 Jun 2022 12:32  Mg     1.8     06-27    TPro  6.4  /  Alb  3.6  /  TBili  0.4  /  DBili  x   /  AST  14  /  ALT  20  /  AlkPhos  135<H>  06-27        CAPILLARY BLOOD GLUCOSE      POCT Blood Glucose.: 143 mg/dL (27 Jun 2022 11:43)  POCT Blood Glucose.: 172 mg/dL (27 Jun 2022 06:20)  POCT Blood Glucose.: 218 mg/dL (26 Jun 2022 23:49)  POCT Blood Glucose.: 221 mg/dL (26 Jun 2022 21:49)  POCT Blood Glucose.: 148 mg/dL (26 Jun 2022 18:05)    ABG - ( 27 Jun 2022 15:42 )  pH, Arterial: 7.38  pH, Blood: x     /  pCO2: 90    /  pO2: 94    / HCO3: 53    / Base Excess: x     /  SaO2: 99.2                      MEDICATIONS  (STANDING):  acetaminophen     Tablet .. 975 milliGRAM(s) Oral every 8 hours  amLODIPine   Tablet 5 milliGRAM(s) Oral daily  ammonium lactate 12% Lotion 1 Application(s) Topical every 12 hours  apixaban 5 milliGRAM(s) Oral two times a day  atorvastatin 20 milliGRAM(s) Oral at bedtime  buMETAnide 2 milliGRAM(s) Oral daily  dextrose 5%. 1000 milliLiter(s) (100 mL/Hr) IV Continuous <Continuous>  dextrose 5%. 1000 milliLiter(s) (50 mL/Hr) IV Continuous <Continuous>  dextrose 50% Injectable 25 Gram(s) IV Push once  dextrose 50% Injectable 12.5 Gram(s) IV Push once  dextrose 50% Injectable 25 Gram(s) IV Push once  diatrizoate meglumine/diatrizoate sodium. 30 milliLiter(s) Oral once  gabapentin 600 milliGRAM(s) Oral three times a day  glucagon  Injectable 1 milliGRAM(s) IntraMuscular once  influenza   Vaccine 0.5 milliLiter(s) IntraMuscular once  insulin glargine Injectable (LANTUS) 20 Unit(s) SubCutaneous at bedtime  insulin lispro (ADMELOG) corrective regimen sliding scale   SubCutaneous three times a day before meals  insulin lispro Injectable (ADMELOG) 9 Unit(s) SubCutaneous every 6 hours  losartan 100 milliGRAM(s) Oral daily  multivitamin 1 Tablet(s) Oral daily  pantoprazole    Tablet 40 milliGRAM(s) Oral before breakfast  polyethylene glycol 3350 17 Gram(s) Oral daily  senna 2 Tablet(s) Oral at bedtime  triamcinolone 0.1% Cream 1 Application(s) Topical every 12 hours    MEDICATIONS  (PRN):  albuterol/ipratropium for Nebulization 3 milliLiter(s) Nebulizer every 6 hours PRN Shortness of Breath and/or Wheezing  ALPRAZolam 0.5 milliGRAM(s) Oral every 12 hours PRN anxiety  dextrose Oral Gel 15 Gram(s) Oral once PRN Blood Glucose LESS THAN 70 milliGRAM(s)/deciliter  HYDROmorphone   Tablet 2 milliGRAM(s) Oral every 8 hours PRN Moderate Pain (4 - 6)

## 2022-06-28 LAB
ALBUMIN SERPL ELPH-MCNC: 3.3 G/DL — LOW (ref 3.5–5.2)
ALP SERPL-CCNC: 150 U/L — HIGH (ref 30–115)
ALT FLD-CCNC: 24 U/L — SIGNIFICANT CHANGE UP (ref 0–41)
ANION GAP SERPL CALC-SCNC: 9 MMOL/L — SIGNIFICANT CHANGE UP (ref 7–14)
APPEARANCE UR: ABNORMAL
AST SERPL-CCNC: 26 U/L — SIGNIFICANT CHANGE UP (ref 0–41)
BACTERIA # UR AUTO: NEGATIVE — SIGNIFICANT CHANGE UP
BASE EXCESS BLDA CALC-SCNC: 20.8 MMOL/L — HIGH (ref -2–3)
BASOPHILS # BLD AUTO: 0.05 K/UL — SIGNIFICANT CHANGE UP (ref 0–0.2)
BASOPHILS NFR BLD AUTO: 0.3 % — SIGNIFICANT CHANGE UP (ref 0–1)
BILIRUB SERPL-MCNC: 0.6 MG/DL — SIGNIFICANT CHANGE UP (ref 0.2–1.2)
BILIRUB UR-MCNC: NEGATIVE — SIGNIFICANT CHANGE UP
BUN SERPL-MCNC: 23 MG/DL — HIGH (ref 10–20)
CALCIUM SERPL-MCNC: 9.6 MG/DL — SIGNIFICANT CHANGE UP (ref 8.5–10.1)
CHLORIDE SERPL-SCNC: 82 MMOL/L — LOW (ref 98–110)
CO2 SERPL-SCNC: 41 MMOL/L — CRITICAL HIGH (ref 17–32)
COLOR SPEC: YELLOW — SIGNIFICANT CHANGE UP
CREAT SERPL-MCNC: 0.8 MG/DL — SIGNIFICANT CHANGE UP (ref 0.7–1.5)
DIFF PNL FLD: ABNORMAL
EGFR: 86 ML/MIN/1.73M2 — SIGNIFICANT CHANGE UP
EOSINOPHIL # BLD AUTO: 0.06 K/UL — SIGNIFICANT CHANGE UP (ref 0–0.7)
EOSINOPHIL NFR BLD AUTO: 0.3 % — SIGNIFICANT CHANGE UP (ref 0–8)
EPI CELLS # UR: 6 /HPF — HIGH (ref 0–5)
GLUCOSE BLDC GLUCOMTR-MCNC: 100 MG/DL — HIGH (ref 70–99)
GLUCOSE BLDC GLUCOMTR-MCNC: 112 MG/DL — HIGH (ref 70–99)
GLUCOSE BLDC GLUCOMTR-MCNC: 174 MG/DL — HIGH (ref 70–99)
GLUCOSE BLDC GLUCOMTR-MCNC: 174 MG/DL — HIGH (ref 70–99)
GLUCOSE BLDC GLUCOMTR-MCNC: 213 MG/DL — HIGH (ref 70–99)
GLUCOSE BLDC GLUCOMTR-MCNC: 220 MG/DL — HIGH (ref 70–99)
GLUCOSE SERPL-MCNC: 88 MG/DL — SIGNIFICANT CHANGE UP (ref 70–99)
GLUCOSE UR QL: NEGATIVE — SIGNIFICANT CHANGE UP
GRAM STN FLD: SIGNIFICANT CHANGE UP
GRAM STN FLD: SIGNIFICANT CHANGE UP
HCO3 BLDA-SCNC: 48 MMOL/L — CRITICAL HIGH (ref 21–28)
HCT VFR BLD CALC: 25.3 % — LOW (ref 37–47)
HGB BLD-MCNC: 8.6 G/DL — LOW (ref 12–16)
HOROWITZ INDEX BLDA+IHG-RTO: 50 — SIGNIFICANT CHANGE UP
HYALINE CASTS # UR AUTO: 15 /LPF — HIGH (ref 0–7)
IMM GRANULOCYTES NFR BLD AUTO: 0.6 % — HIGH (ref 0.1–0.3)
KETONES UR-MCNC: NEGATIVE — SIGNIFICANT CHANGE UP
LACTATE SERPL-SCNC: 2.1 MMOL/L — HIGH (ref 0.7–2)
LEUKOCYTE ESTERASE UR-ACNC: ABNORMAL
LYMPHOCYTES # BLD AUTO: 0.26 K/UL — LOW (ref 1.2–3.4)
LYMPHOCYTES # BLD AUTO: 1.4 % — LOW (ref 20.5–51.1)
MAGNESIUM SERPL-MCNC: 1.5 MG/DL — LOW (ref 1.8–2.4)
MCHC RBC-ENTMCNC: 33.1 PG — HIGH (ref 27–31)
MCHC RBC-ENTMCNC: 34 G/DL — SIGNIFICANT CHANGE UP (ref 32–37)
MCV RBC AUTO: 97.3 FL — SIGNIFICANT CHANGE UP (ref 81–99)
MONOCYTES # BLD AUTO: 0.25 K/UL — SIGNIFICANT CHANGE UP (ref 0.1–0.6)
MONOCYTES NFR BLD AUTO: 1.3 % — LOW (ref 1.7–9.3)
NEUTROPHILS # BLD AUTO: 18.29 K/UL — HIGH (ref 1.4–6.5)
NEUTROPHILS NFR BLD AUTO: 96.1 % — HIGH (ref 42.2–75.2)
NITRITE UR-MCNC: NEGATIVE — SIGNIFICANT CHANGE UP
NRBC # BLD: 0 /100 WBCS — SIGNIFICANT CHANGE UP (ref 0–0)
PCO2 BLDA: 63 MMHG — HIGH (ref 25–48)
PH BLDA: 7.49 — HIGH (ref 7.35–7.45)
PH UR: 5.5 — SIGNIFICANT CHANGE UP (ref 5–8)
PLATELET # BLD AUTO: 239 K/UL — SIGNIFICANT CHANGE UP (ref 130–400)
PO2 BLDA: 56 MMHG — LOW (ref 83–108)
POTASSIUM SERPL-MCNC: 4.5 MMOL/L — SIGNIFICANT CHANGE UP (ref 3.5–5)
POTASSIUM SERPL-SCNC: 4.5 MMOL/L — SIGNIFICANT CHANGE UP (ref 3.5–5)
PROCALCITONIN SERPL-MCNC: 19.4 NG/ML — HIGH (ref 0.02–0.1)
PROT SERPL-MCNC: 5.8 G/DL — LOW (ref 6–8)
PROT UR-MCNC: ABNORMAL
RBC # BLD: 2.6 M/UL — LOW (ref 4.2–5.4)
RBC # FLD: 13.2 % — SIGNIFICANT CHANGE UP (ref 11.5–14.5)
RBC CASTS # UR COMP ASSIST: 10 /HPF — HIGH (ref 0–4)
SAO2 % BLDA: 93 % — LOW (ref 94–98)
SODIUM SERPL-SCNC: 132 MMOL/L — LOW (ref 135–146)
SP GR SPEC: 1.02 — SIGNIFICANT CHANGE UP (ref 1.01–1.03)
SPECIMEN SOURCE: SIGNIFICANT CHANGE UP
SPECIMEN SOURCE: SIGNIFICANT CHANGE UP
UROBILINOGEN FLD QL: ABNORMAL
WBC # BLD: 19.03 K/UL — HIGH (ref 4.8–10.8)
WBC # FLD AUTO: 19.03 K/UL — HIGH (ref 4.8–10.8)
WBC UR QL: 31 /HPF — HIGH (ref 0–5)

## 2022-06-28 PROCEDURE — 99233 SBSQ HOSP IP/OBS HIGH 50: CPT

## 2022-06-28 PROCEDURE — 93010 ELECTROCARDIOGRAM REPORT: CPT

## 2022-06-28 PROCEDURE — 71045 X-RAY EXAM CHEST 1 VIEW: CPT | Mod: 26

## 2022-06-28 RX ORDER — HYDROMORPHONE HYDROCHLORIDE 2 MG/ML
2 INJECTION INTRAMUSCULAR; INTRAVENOUS; SUBCUTANEOUS EVERY 8 HOURS
Refills: 0 | Status: DISCONTINUED | OUTPATIENT
Start: 2022-06-28 | End: 2022-06-29

## 2022-06-28 RX ORDER — VANCOMYCIN HCL 1 G
1500 VIAL (EA) INTRAVENOUS ONCE
Refills: 0 | Status: COMPLETED | OUTPATIENT
Start: 2022-06-28 | End: 2022-06-28

## 2022-06-28 RX ORDER — ACETAMINOPHEN 500 MG
650 TABLET ORAL EVERY 8 HOURS
Refills: 0 | Status: DISCONTINUED | OUTPATIENT
Start: 2022-06-28 | End: 2022-07-12

## 2022-06-28 RX ORDER — SODIUM CHLORIDE 9 MG/ML
500 INJECTION, SOLUTION INTRAVENOUS ONCE
Refills: 0 | Status: COMPLETED | OUTPATIENT
Start: 2022-06-28 | End: 2022-06-28

## 2022-06-28 RX ORDER — VANCOMYCIN HCL 1 G
1500 VIAL (EA) INTRAVENOUS EVERY 12 HOURS
Refills: 0 | Status: DISCONTINUED | OUTPATIENT
Start: 2022-06-28 | End: 2022-06-29

## 2022-06-28 RX ORDER — CEFEPIME 1 G/1
2000 INJECTION, POWDER, FOR SOLUTION INTRAMUSCULAR; INTRAVENOUS EVERY 12 HOURS
Refills: 0 | Status: DISCONTINUED | OUTPATIENT
Start: 2022-06-28 | End: 2022-06-29

## 2022-06-28 RX ORDER — KETOROLAC TROMETHAMINE 30 MG/ML
15 SYRINGE (ML) INJECTION ONCE
Refills: 0 | Status: DISCONTINUED | OUTPATIENT
Start: 2022-06-28 | End: 2022-06-28

## 2022-06-28 RX ORDER — ALPRAZOLAM 0.25 MG
0.5 TABLET ORAL EVERY 12 HOURS
Refills: 0 | Status: DISCONTINUED | OUTPATIENT
Start: 2022-06-29 | End: 2022-07-06

## 2022-06-28 RX ORDER — HYDROMORPHONE HYDROCHLORIDE 2 MG/ML
2 INJECTION INTRAMUSCULAR; INTRAVENOUS; SUBCUTANEOUS ONCE
Refills: 0 | Status: DISCONTINUED | OUTPATIENT
Start: 2022-06-28 | End: 2022-06-28

## 2022-06-28 RX ORDER — CEFEPIME 1 G/1
2000 INJECTION, POWDER, FOR SOLUTION INTRAMUSCULAR; INTRAVENOUS ONCE
Refills: 0 | Status: COMPLETED | OUTPATIENT
Start: 2022-06-28 | End: 2022-06-28

## 2022-06-28 RX ORDER — HYDROMORPHONE HYDROCHLORIDE 2 MG/ML
1 INJECTION INTRAMUSCULAR; INTRAVENOUS; SUBCUTANEOUS EVERY 6 HOURS
Refills: 0 | Status: DISCONTINUED | OUTPATIENT
Start: 2022-06-28 | End: 2022-06-28

## 2022-06-28 RX ORDER — SODIUM CHLORIDE 9 MG/ML
1000 INJECTION, SOLUTION INTRAVENOUS ONCE
Refills: 0 | Status: DISCONTINUED | OUTPATIENT
Start: 2022-06-28 | End: 2022-06-28

## 2022-06-28 RX ADMIN — Medication 975 MILLIGRAM(S): at 14:00

## 2022-06-28 RX ADMIN — SODIUM CHLORIDE 500 MILLILITER(S): 9 INJECTION, SOLUTION INTRAVENOUS at 04:56

## 2022-06-28 RX ADMIN — GABAPENTIN 600 MILLIGRAM(S): 400 CAPSULE ORAL at 22:08

## 2022-06-28 RX ADMIN — Medication 300 MILLIGRAM(S): at 06:27

## 2022-06-28 RX ADMIN — APIXABAN 5 MILLIGRAM(S): 2.5 TABLET, FILM COATED ORAL at 05:07

## 2022-06-28 RX ADMIN — Medication 1 APPLICATION(S): at 17:25

## 2022-06-28 RX ADMIN — CEFEPIME 100 MILLIGRAM(S): 1 INJECTION, POWDER, FOR SOLUTION INTRAMUSCULAR; INTRAVENOUS at 17:26

## 2022-06-28 RX ADMIN — Medication 4: at 11:56

## 2022-06-28 RX ADMIN — Medication 15 MILLIGRAM(S): at 12:30

## 2022-06-28 RX ADMIN — HYDROMORPHONE HYDROCHLORIDE 2 MILLIGRAM(S): 2 INJECTION INTRAMUSCULAR; INTRAVENOUS; SUBCUTANEOUS at 19:07

## 2022-06-28 RX ADMIN — Medication 975 MILLIGRAM(S): at 05:37

## 2022-06-28 RX ADMIN — Medication 1 APPLICATION(S): at 05:09

## 2022-06-28 RX ADMIN — Medication 300 MILLIGRAM(S): at 19:07

## 2022-06-28 RX ADMIN — Medication 650 MILLIGRAM(S): at 22:38

## 2022-06-28 RX ADMIN — CEFEPIME 100 MILLIGRAM(S): 1 INJECTION, POWDER, FOR SOLUTION INTRAMUSCULAR; INTRAVENOUS at 06:27

## 2022-06-28 RX ADMIN — Medication 1 TABLET(S): at 11:54

## 2022-06-28 RX ADMIN — PANTOPRAZOLE SODIUM 40 MILLIGRAM(S): 20 TABLET, DELAYED RELEASE ORAL at 05:06

## 2022-06-28 RX ADMIN — INSULIN GLARGINE 20 UNIT(S): 100 INJECTION, SOLUTION SUBCUTANEOUS at 22:07

## 2022-06-28 RX ADMIN — Medication 975 MILLIGRAM(S): at 13:23

## 2022-06-28 RX ADMIN — HYDROMORPHONE HYDROCHLORIDE 2 MILLIGRAM(S): 2 INJECTION INTRAMUSCULAR; INTRAVENOUS; SUBCUTANEOUS at 02:49

## 2022-06-28 RX ADMIN — Medication 15 MILLIGRAM(S): at 11:54

## 2022-06-28 RX ADMIN — Medication 9 UNIT(S): at 00:52

## 2022-06-28 RX ADMIN — GABAPENTIN 600 MILLIGRAM(S): 400 CAPSULE ORAL at 05:06

## 2022-06-28 RX ADMIN — Medication 1 APPLICATION(S): at 17:26

## 2022-06-28 RX ADMIN — Medication 650 MILLIGRAM(S): at 22:08

## 2022-06-28 RX ADMIN — SODIUM CHLORIDE 1000 MILLILITER(S): 9 INJECTION, SOLUTION INTRAVENOUS at 09:43

## 2022-06-28 RX ADMIN — Medication 650 MILLIGRAM(S): at 19:07

## 2022-06-28 RX ADMIN — APIXABAN 5 MILLIGRAM(S): 2.5 TABLET, FILM COATED ORAL at 17:26

## 2022-06-28 RX ADMIN — HYDROMORPHONE HYDROCHLORIDE 2 MILLIGRAM(S): 2 INJECTION INTRAMUSCULAR; INTRAVENOUS; SUBCUTANEOUS at 02:19

## 2022-06-28 RX ADMIN — Medication 975 MILLIGRAM(S): at 05:07

## 2022-06-28 RX ADMIN — ATORVASTATIN CALCIUM 20 MILLIGRAM(S): 80 TABLET, FILM COATED ORAL at 22:08

## 2022-06-28 RX ADMIN — GABAPENTIN 600 MILLIGRAM(S): 400 CAPSULE ORAL at 13:23

## 2022-06-28 NOTE — PROGRESS NOTE ADULT - ATTENDING COMMENTS
Impression:    Acute on chronic hypercapnic respiratory failure improved ( Possible opiate induced )   Chronic hypoxemic respiratory failure  SP Trach 4/2022 in [facility] for failure to extubate after COPD exacerbation  HO PEG placed 6/15/2022  Mucous plug removed via bronchoscopy/ tracheal granulation tissue s/p bronch and XLT placement 5/30   Pneumonia/ atelectasis L base treated   HO DVT on Eliquis      Plan as outlined above

## 2022-06-28 NOTE — PROGRESS NOTE ADULT - ASSESSMENT
57 yo female with PMHx of DVT on Eliquis, COPD,  trach collar, obesity, IDDM, UTI, sent by [facility] for hypoxia. Current illness going back to April 4th when pt was intubated on admission at [facility] ICU for hypoxic respiratory failure diagnosed with copd exacerbation and superimposed pneumonia, and remained intubated for 37 days requiring tracheostomy. Patient stayed at [facility] for 2 days until noted to be hypoxic (saturating low 70's) pt's saturation immediately improving afterwards to 80's after large mucus plug removed and was subsequently sent to [facility]  On arrival to ED pt was saturating 97%, 15L O2 via tracheostomy collar (baseline 8 L at [facility])CXR suspicious for L-sided PNA, inconclusive. CTA neg for PE. Pt received x1 IV Levaquin and Cefepime in ED, admitted to MICU for acute hypoxic hypercapnic respiratory failure secondary to acute mucus plug now resolved vs superimposed pneumonia hospital-acquired.   General Surgery Consulted emergently when patient found to be hypoxic to low 80s after CCU and Pulmonary team found large granuloma in trachea along tracheostomy tube. Patient currently had a size 8 trach. Surgery assisted Pulmonary and CCU with trach exchanged with ET tube guidance to a size 7 XLT. Saturations improved to high 90s. Large granuloma was removed. Patient was no longer in distress after exchange.    #Acute/ chronic COPD with exacerbation (Resolved)  #mucous plug removed  #pneumonia vs atelectasis L base  - On 5/28, Pulmonary team found large granuloma in trachea along tracheostomy tube. Patient had a size 8 trach on admission. Surgery assisted Pulmonary and CCU with trach exchanged with ET tube guidance to a size 7 XLT. Saturations improved to high 90s.  - trach functioning well s/p exchange   - 6/11 Trach changed to Fenestrated 8 by surgery  - c/w aggressive L sided chest PT and frequent suctioning q6  -Critical value Co2:43 ordered ABG will- f/u ABG    #s/p PEG (6/15):   KUB showing free intraperitoneal air (6/19).  CT Abdo confirmed that this was just due to PEG tube adjustment.   Repeat G tube study per Surgery (6/21) -normal, restarted on peg feedings      #Dysphagia:  Strict NPO as of now per 's FEES eval from 6/13/22.   [facility] ST will sign out to ST at [facility] for further plans with swallow rehab at [facility]     #LE neuropathic pain:  PAtient has apparently been bed bound since April 4th (her [facility] admission for hypoxic failure where she ended up being intubated)  Apparently has L > R foot drop which may be due to chronic contractures of Calf muscle/Achilles tendon. Can have PT evaluate her for that.  But options are limited at this stage.   Given the asymmetrical foot drop, Neurology wants to rule out Any focal neuropathy.  -6/23 s/p  MRI Lumbar spine w/ & w/o Contrast, mild degenerative changes  - chronic DJD no acute changes.  -continue pain management: adding tylenol 1g q8      #B/l LE Hyperpigmentation changes:   unclear etiology  Present since 1 year.   May be related to CHF related stasis dermatitis / hemosiderin deposition  vs deposition diseases versus Eosinophilic Dermatitis vs follicular hemorrhage from eliquis ?? (doubt the last differential)  Dermatology thinks it may be Eczema and advised Triamcinolone BId.   Will consider biopsy though. Pending Burn attending Input.   Add multivitamin to PEG QD.     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   Outpatient ENT f/u.     #DM:   steroids ended 6/18.  6/19- Increased lantus from 17 to 20.    #Chronic Indwelling Singh  - 5/28 Ucx - Contaminated   - 5/28 Ucx- Normal perri   - 5/27 Ucx- Klebsiella (CRE) and pseudomonas   - s/p cefepime  - Currently afebrile w/ no urinary complaints   - episode of Hematouria 6/4 -> improving -> h/h Stable    #Hx of DVT  - on home Eliquis  - restarted Eliquis    Activity been bed bound. PT eval when able.   GI PPX  PPI  DVT PPX  ELIQUIS  Dispo: Acute  Pending:  d/c planning to [facility] pending BMP results     55 yo female with PMHx of DVT on Eliquis, COPD,  trach collar, obesity, IDDM, UTI, sent by [facility] for hypoxia. Current illness going back to April 4th when pt was intubated on admission at [facility] ICU for hypoxic respiratory failure diagnosed with copd exacerbation and superimposed pneumonia, and remained intubated for 37 days requiring tracheostomy. Patient stayed at [facility] for 2 days until noted to be hypoxic (saturating low 70's) pt's saturation immediately improving afterwards to 80's after large mucus plug removed and was subsequently sent to [facility]  On arrival to ED pt was saturating 97%, 15L O2 via tracheostomy collar (baseline 8 L at [facility])CXR suspicious for L-sided PNA, inconclusive. CTA neg for PE. Pt received x1 IV Levaquin and Cefepime in ED, admitted to MICU for acute hypoxic hypercapnic respiratory failure secondary to acute mucus plug now resolved vs superimposed pneumonia hospital-acquired.   General Surgery Consulted emergently when patient found to be hypoxic to low 80s after CCU and Pulmonary team found large granuloma in trachea along tracheostomy tube. Patient currently had a size 8 trach. Surgery assisted Pulmonary and CCU with trach exchanged with ET tube guidance to a size 7 XLT. Saturations improved to high 90s. Large granuloma was removed. Patient was no longer in distress after exchange.    #Acute/ chronic COPD with exacerbation (Resolved)  #mucous plug removed  #pneumonia vs atelectasis L base  - On 5/28, Pulmonary team found large granuloma in trachea along tracheostomy tube. Patient had a size 8 trach on admission. Surgery assisted Pulmonary and CCU with trach exchanged with ET tube guidance to a size 7 XLT. Saturations improved to high 90s.  - trach functioning well s/p exchange   - 6/11 Trach changed to Fenestrated 8 by surgery  - Frequent suctioning with saline  -Wean O2 as tolerated to keep sats 88-92%  -Bronchodilator treatments q4hrs PRN.  -Critical value Co2:63 ordered ABG will- f/u ABG    #s/p PEG (6/15):   KUB showing free intraperitoneal air (6/19).  CT Abdo confirmed that this was just due to PEG tube adjustment.   Repeat G tube study per Surgery (6/21) -normal, restarted on peg feedings      #Dysphagia:  Strict NPO as of now per ST's FEES eval from 6/13/22.   [facility] will sign out to ST at [facility] for further plans with swallow rehab at SNF     #LE neuropathic pain:  PAtient has apparently been bed bound since April 4th (her [facility] admission for hypoxic failure where she ended up being intubated)  Apparently has L > R foot drop which may be due to chronic contractures of Calf muscle/Achilles tendon. Can have PT evaluate her for that.  But options are limited at this stage.   Given the asymmetrical foot drop, Neurology wants to rule out Any focal neuropathy.  -6/23 s/p  MRI Lumbar spine w/ & w/o Contrast, mild degenerative changes  - chronic DJD no acute changes.  -continue pain management: adding tylenol 1g q8      #B/l LE Hyperpigmentation changes:   unclear etiology  Present since 1 year.   May be related to CHF related stasis dermatitis / hemosiderin deposition  vs deposition diseases versus Eosinophilic Dermatitis vs follicular hemorrhage from eliquis ?? (doubt the last differential)  Dermatology thinks it may be Eczema and advised Triamcinolone BId.   Will consider biopsy though. Pending Burn attending Input.   Add multivitamin to PEG QD.     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   Outpatient ENT f/u.     #DM:   steroids ended 6/18.  6/19- Increased lantus from 17 to 20.    #Chronic Indwelling Singh  - 5/28 Ucx - Contaminated   - 5/28 Ucx- Normal perri   - 5/27 Ucx- Klebsiella (CRE) and pseudomonas   - s/p cefepime  - Currently afebrile w/ no urinary complaints   - episode of Hematouria 6/4 -> improving -> h/h Stable    #Hx of DVT  - on home Eliquis  - restarted Eliquis    Activity been bed bound. PT eval when able.   GI PPX  PPI  DVT PPX  ELIQUIS  Dispo: Acute  Pending:  d/[facility] pending BMP results     57 yo female with PMHx of DVT on Eliquis, COPD,  trach collar, obesity, IDDM, UTI, sent by [facility] for hypoxia. Current illness going back to April 4th when pt was intubated on admission at [facility] ICU for hypoxic respiratory failure diagnosed with copd exacerbation and superimposed pneumonia, and remained intubated for 37 days requiring tracheostomy. Patient stayed at [facility] for 2 days until noted to be hypoxic (saturating low 70's) pt's saturation immediately improving afterwards to 80's after large mucus plug removed and was subsequently sent to [facility]  On arrival to ED pt was saturating 97%, 15L O2 via tracheostomy collar (baseline 8 L at [facility])CXR suspicious for L-sided PNA, inconclusive. CTA neg for PE. Pt received x1 IV Levaquin and Cefepime in ED, admitted to MICU for acute hypoxic hypercapnic respiratory failure secondary to acute mucus plug now resolved vs superimposed pneumonia hospital-acquired.   General Surgery Consulted emergently when patient found to be hypoxic to low 80s after CCU and Pulmonary team found large granuloma in trachea along tracheostomy tube. Patient currently had a size 8 trach. Surgery assisted Pulmonary and CCU with trach exchanged with ET tube guidance to a size 7 XLT. Saturations improved to high 90s. Large granuloma was removed. Patient was no longer in distress after exchange.    #sepsis  Patient was noted to be tachy O/N, hypotensive, febrile to 102.8, FiO2 requirements were increased and the patient was desaturating to 82-86%  s/p Cefepime and Vanc  -ID consult  -F/u Blood cultures  -F/u UA    Hypercarbic respiratory failure    -Maintaining sats 88%/decrease O2 titration  -f/u on pulm reccs.     #Acute/ chronic COPD with exacerbation (Resolved)  #mucous plug removed  #pneumonia vs atelectasis L base  - On 5/28, Pulmonary team found large granuloma in trachea along tracheostomy tube. Patient had a size 8 trach on admission. Surgery assisted Pulmonary and CCU with trach exchanged with ET tube guidance to a size 7 XLT. Saturations improved to high 90s.  - trach functioning well s/p exchange   - 6/11 Trach changed to Fenestrated 8 by surgery      #s/p PEG (6/15):   KUB showing free intraperitoneal air (6/19).  CT Abdo confirmed that this was just due to PEG tube adjustment.   Repeat G tube study per Surgery (6/21) -normal, restarted on peg feedings      #Dysphagia:  Strict NPO as of now per 's FEES eval from 6/13/22.   [facility] will sign out to  at SNF for further plans with swallow rehab at SNF     #LE neuropathic pain:  PAtient has apparently been bed bound since April 4th (her [facility] admission for hypoxic failure where she ended up being intubated)  Apparently has L > R foot drop which may be due to chronic contractures of Calf muscle/Achilles tendon. Can have PT evaluate her for that.  But options are limited at this stage.   Given the asymmetrical foot drop, Neurology wants to rule out Any focal neuropathy.  -6/23 s/p  MRI Lumbar spine w/ & w/o Contrast, mild degenerative changes  - chronic DJD no acute changes.  -continue pain management: currently on 2mg Dilaudid po q8 prn as pharmacy dosages do not accommodate 1mg and standing dose of       #B/l LE Hyperpigmentation changes:   unclear etiology  Present since 1 year.   May be related to CHF related stasis dermatitis / hemosiderin deposition  vs deposition diseases versus Eosinophilic Dermatitis vs follicular hemorrhage from eliquis ?? (doubt the last differential)  Dermatology thinks it may be Eczema and advised Triamcinolone BId.   Add multivitamin to PEG QD.     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   Outpatient ENT f/u.     #DM:   steroids ended 6/18.  6/19- Increased lantus from 17 to 20.    #Chronic Indwelling Singh  - 5/28 Ucx - Contaminated   - 5/28 Ucx- Normal perri   - 5/27 Ucx- Klebsiella (CRE) and pseudomonas   - s/p cefepime  - Currently afebrile w/ no urinary complaints   - episode of Hematouria 6/4 -> improving -> h/h Stable    #Hx of DVT  - on home Eliquis  - restarted Eliquis    Activity been bed bound. PT eval when able.   GI PPX  PPI  DVT PPX  ELIQUIS  Dispo: Acute  Pending:  d/c planning to [facility] pending BMP results

## 2022-06-28 NOTE — CHART NOTE - NSCHARTNOTEFT_GEN_A_CORE
Registered Dietitian Follow-Up     Patient Profile Reviewed                           Yes [x]   No []  Nutrition History Previously Obtained        Yes [x]  No []      Pertinent Medical Interventions:  now w/ #Hypercarbic respiratory failure & sepsis  blood cultures  positive for gram negative rods    Nutrition Interval History:   -MBS  noted patient with moderate-severe pharyngeal dysphagia persisting recommending NPO w/ non-oral means of nutrition/hydration, pt would benefit from PEG tube for primary means of nutrition/hydration in conjunction w/ dysphagia tx; Continue ice chips  -PEG PLACED started feeds via PEG (was previous receiving via NGT)    today feeds being held due to pt desaturating, plan to restart when patient stabilizes. otherwise patient has been tolerating enteral nutrition with no signs/symptoms of GI distress prior to this.  monitoring closely. **EN regimen via PEG: Glucerna 1.2 at 300mL Q6hrs, provides (1440kcal, 72gm protein, 972mL free H2O) + No Carb Prosource modular 1x/daily (+60kcal, +15 g pro --> total with en 1500kcal, 87g pro)**    Diet order:   Diet, NPO with Tube Feed:   Tube Feeding Modality: Gastrostomy  Glucerna 1.2 Martin  Total Volume for 24 Hours (mL): 1200  Bolus  Total Volume of Bolus (mL):  300  Tube Feed Frequency: Every 6 hours   Tube Feed Start Time: 06:00  Bolus Feed Rate (mL per Hour): 300   Bolus Feed Duration (in Hours): 1  Bolus   Total Volume per Flush (mL): 30   Frequency: Every 6 Hours  Free Water Flush Instructions:  give 30mL POST feeds  No Carb Prosource (1pkg = 15gms Protein)     Qty per Day:  1x (22 @ 16:30) [Active]    Anthropometrics:  Height (cm): 154.9 (22 @ 00:08)  Weight (kg): 97.4 (22 @ 18:03)  BMI (kg/m2): 40.6 (22 @ 00:08)    OTHER WEIGHTS:    Daily Weight in k.3 (), Weight in k.4 (), Weight in k.3 (), Weight in k ()  % Weight Change --> patient with gradual weight loss from 97.4kg --> 94.3kg indicating 3.18% wt loss ~2-4wks suspect fluid shifts    MEDICATIONS  (STANDING):  acetaminophen     Tablet .. 650 milliGRAM(s) Oral every 8 hours  ammonium lactate 12% Lotion 1 Application(s) Topical every 12 hours  apixaban 5 milliGRAM(s) Oral two times a day  atorvastatin 20 milliGRAM(s) Oral at bedtime  cefepime   IVPB 2000 milliGRAM(s) IV Intermittent every 12 hours  dextrose 5%. 1000 milliLiter(s) (100 mL/Hr) IV Continuous <Continuous>  dextrose 5%. 1000 milliLiter(s) (50 mL/Hr) IV Continuous <Continuous>  dextrose 50% Injectable 25 Gram(s) IV Push once  dextrose 50% Injectable 12.5 Gram(s) IV Push once  dextrose 50% Injectable 25 Gram(s) IV Push once  diatrizoate meglumine/diatrizoate sodium. 30 milliLiter(s) Oral once  gabapentin 600 milliGRAM(s) Oral three times a day  glucagon  Injectable 1 milliGRAM(s) IntraMuscular once  influenza   Vaccine 0.5 milliLiter(s) IntraMuscular once  insulin glargine Injectable (LANTUS) 20 Unit(s) SubCutaneous at bedtime  insulin lispro (ADMELOG) corrective regimen sliding scale   SubCutaneous three times a day before meals  insulin lispro Injectable (ADMELOG) 9 Unit(s) SubCutaneous every 6 hours  multivitamin 1 Tablet(s) Oral daily  pantoprazole    Tablet 40 milliGRAM(s) Oral before breakfast  polyethylene glycol 3350 17 Gram(s) Oral daily  senna 2 Tablet(s) Oral at bedtime  triamcinolone 0.1% Cream 1 Application(s) Topical every 12 hours    MEDICATIONS  (PRN):  albuterol/ipratropium for Nebulization 3 milliLiter(s) Nebulizer every 6 hours PRN Shortness of Breath and/or Wheezing  ALPRAZolam 0.5 milliGRAM(s) Oral every 12 hours PRN anxiety  dextrose Oral Gel 15 Gram(s) Oral once PRN Blood Glucose LESS THAN 70 milliGRAM(s)/deciliter  HYDROmorphone  Injectable 0.5 milliGRAM(s) IV Push every 8 hours PRN Severe Pain (7 - 10)    Pertinent Labs:  @ 08:28: Na --, BUN --, Cr --, BG --, K+ --, Phos --, Mg 1.9, Alk Phos --, ALT/SGPT --, AST/SGOT --, HbA1c --   @ 00:54: Na 127<L>, BUN 29<H>, Cr 0.6<L>, <H>, K+ 4.6, Phos --, Mg --, Alk Phos 133<H>, ALT/SGPT 20, AST/SGOT 24, HbA1c --    Finger Sticks:  POCT Blood Glucose.: 166 mg/dL ( @ 06:58)  POCT Blood Glucose.: 174 mg/dL ( @ 23:38)  POCT Blood Glucose.: 220 mg/dL ( @ 21:55)  POCT Blood Glucose.: 112 mg/dL ( @ 18:59)    Physical Findings:  - Appearance: A&Oxx4, trach  - GI function: BM x3   - Tubes: PEG  - Oral/Mouth cavity: NPO per SLP  - Skin: incontinence associated dermatitis  - Edema: left arm; right arm; left leg; right leg 2+ on     Nutrition Requirements:   Weight Used: ABW 94.3kg + IBW 48kg with consideration for sepsis     Estimated Energy Needs    Continue [x]  Adjust [] 1469-1762kcal/day (MSJ x1.0-1.2)  Estimated Protein Needs    Continue [x]  Adjust [] 86-95g/day (1.8-2.0g/kg of Ibw)  Estimated Fluid Needs        Continue [x]  Adjust [] 1mL/kcal    Nutrient Intake: >85%     [x] Previous Nutrition Diagnosis:            [x] Ongoing          [] Resolved  #1 Altered nutrition related lab values  Goal/Expected Outcome: achieve BG <180mg/dL x6days              [x] Ongoing          [] Resolved  #2 Inadequate oral intake  dysphagia precluding PO intake  SLP evaluation recommending NPO with EN    Nutrition Intervention:   Enteral Nutrition, Medical Food Supplement, Vitamin Supplement, Nutrition Related Medication, Coordination of Care      Indicator/Monitoring:   Mykel Cisneros, #[id number] to monitor diet order, energy intake, food and nutrient intake, body composition, weights    Recommendations:  1. As medically able, continue NPO with alternate means of nutrition/hydration via **Gastrostomy tube**  regimen: Glucerna 1.2 at 300mL Q6hrs, provides (1440kcal, 72gm protein, 972mL free H2O) + No Carb Prosource modular 1x/daily (+60kcal, +15 g pro --> total with en 1500kcal, 87g pro)  ****30mL post feed Q6hr to maintain tube patency as pt with hyponatremia (will be total 1092mL/day with EN)**  2. continue bowel regimen, insulin regimen, multivitamin, replete electrolytes    Pending discharge to nursing home and further SLP evaluation. Patient at HIGH nutrition risk, follow up x 4days.

## 2022-06-28 NOTE — PROGRESS NOTE ADULT - SUBJECTIVE AND OBJECTIVE BOX
Progress Note:    Provider Speciality                            Hospitalist    CRUZ DUMONT MRN-[id number] 56y Female     CHIEF PRESENTING COMPLAINT:  Patient is a 56y old  Female who presents with a chief complaint of Hypoxia (25 Jun 2022 11:17)      SUBJECTIVE:  Patient was seen and examined at bedside. Reports  bilateral LE pain , tenderness around trach site  No significant overnight events reported.     HISTORY OF PRESENTING ILLNESS:  HPI:  55 yo f with PMHx of DVT on Eliquis, COPD,  trach collar, obesity, IDDM, UTI, sent by [facility] for hypoxia. History provided by daughter at bedside, she reports the current illness going back to April 4th when pt was intubated on admission at [facility] ICU for hypoxic respiratory failure diagnosed with copd exacerbation and superimposed pneumonia, of note she also appears to have been in CHF exacerbation with severe b/l LE swelling treated with IV bumex. Remained intubated 37 days per daughter, diagnosed with fever of unknown origin (up to 106F), treated with empiric broad spectrum antibiotics and once 2 weeks s/p tracheostomy placement and 48 hours afebrile she was was discharged to nursing home for PT. Of note, patient's daughter and pt herself say the wiseman has not been replaced for over 3 weeks. She also reports a developing sacral pressure ulcer. Was at [facility] only 2 days when was noted to be hypoxic (saturating low 70's) and EMS called. While awaiting EMS, floor nurse on the unit suctioned the patient, and managed to bring up a very large mucus plug, with pt's saturation immediately improving afterwards to 80's. Nonetheless pt was sent to [facility] ED. Prior to all this pt was independent and ambulatory. Pt denies sob, cp, abd pain, n/v/d, fever or chills.     On arrival to ED pt was saturating 97%, 15L O2 via tracheostomy collar (baseline 8 L at [facility]) VS:    /59  T 99.9F RR 20  Admission VBG pH 7.37 pCO2 60 pO2 49. Pt not wheezing, not coughing. CXR suspicious for L-sided PNA, inconclusive. CTA neg for PE. Pt received x1 IV Levoquin and Cefepime in ED, admitted to medicine for acute hypoxic hypercapnic respiratory failure secondary to acute mucus plug (now resolved vs superimposed pneumonia  hospital-acquired.(less likely), Records request sent to [facility] medical records  [phone]) and patient will be continued on empiric antibiotics pending procal (27 May 2022 14:02)      REVIEW OF SYSTEMS:  Patient denies any headache, any vision complaints, runny nose, fever, chills. Denies chest pain, shortness of breath, palpitation. Denies nausea, vomiting, abdominal pain or diarrhoea, Denies dysuria. At least 10 systems were reviewed in ROS. All systems reviewed  are within normal limits except for the complaints as described in Subjective.    PAST MEDICAL & SURGICAL HISTORY:  PAST MEDICAL & SURGICAL HISTORY:  COPD (chronic obstructive pulmonary disease)  CHF (congestive heart failure)  DM (diabetes mellitus)  H/O tracheostomy      VITAL SIGNS:  T(C): 37.3 (28 Jun 2022 13:15), Max: 39.3 (28 Jun 2022 05:00)  T(F): 99.1 (28 Jun 2022 13:15), Max: 102.8 (28 Jun 2022 05:00)  HR: 111 (28 Jun 2022 13:15) (89 - 132)  BP: 107/59 (28 Jun 2022 13:15) (78/42 - 122/50)  RR: 18 (28 Jun 2022 05:00) (18 - 18)  SpO2: 96% (28 Jun 2022 13:15) (93% - 100%)      PHYSICAL EXAMINATION:  Not in acute distress, sleepy with low MS  General: No icterus  HEENT:   no JVD.  Heart: S1+S2 audible  Lungs: +trach,  bilateral  moderate air entry, no wheezing, no crepitations.  Abdomen: +pEG, Soft, non-tender, non-distended , no  rigidity or guarding.  CNS: Awake alert, CN  grossly intact.  Extremities:  bilateral LE hyperpigmentation dry skin      CONSULTS:  Consultant(s) Notes Reviewed by me.   Care Discussed with Consultants/Other Providers where required.      MEDICATIONS:  MEDICATIONS  (STANDING):  ALPRAZolam 0.5 milliGRAM(s) Oral once  amLODIPine   Tablet 5 milliGRAM(s) Oral daily  ammonium lactate 12% Lotion 1 Application(s) Topical every 12 hours  apixaban 5 milliGRAM(s) Oral two times a day  atorvastatin 20 milliGRAM(s) Oral at bedtime  buMETAnide 2 milliGRAM(s) Oral daily  diatrizoate meglumine/diatrizoate sodium. 30 milliLiter(s) Oral once  gabapentin 600 milliGRAM(s) Oral three times a day  glucagon  Injectable 1 milliGRAM(s) IntraMuscular once  influenza   Vaccine 0.5 milliLiter(s) IntraMuscular once  insulin glargine Injectable (LANTUS) 20 Unit(s) SubCutaneous at bedtime  insulin lispro (ADMELOG) corrective regimen sliding scale   SubCutaneous three times a day before meals  insulin lispro Injectable (ADMELOG) 9 Unit(s) SubCutaneous every 6 hours  losartan 100 milliGRAM(s) Oral daily  multivitamin 1 Tablet(s) Oral daily  pantoprazole    Tablet 40 milliGRAM(s) Oral before breakfast  polyethylene glycol 3350 17 Gram(s) Oral daily  senna 2 Tablet(s) Oral at bedtime  triamcinolone 0.1% Cream 1 Application(s) Topical every 12 hours    MEDICATIONS  (PRN):  albuterol/ipratropium for Nebulization 3 milliLiter(s) Nebulizer every 6 hours PRN Shortness of Breath and/or Wheezing  ALPRAZolam 0.5 milliGRAM(s) Oral every 12 hours PRN anxiety  dextrose Oral Gel 15 Gram(s) Oral once PRN Blood Glucose LESS THAN 70 milliGRAM(s)/deciliter  HYDROmorphone   Tablet 2 milliGRAM(s) Oral every 8 hours PRN Moderate Pain (4 - 6)      ASSESSMENT:    55 yo female with PMHx of DVT on Eliquis, COPD,  trach collar, obesity, IDDM, UTI, sent [facility] for hypoxia. Current illness going back to April 4th when pt was intubated on admission at [facility] ICU for hypoxic respiratory failure diagnosed with copd exacerbation and superimposed pneumonia, and remained intubated for 37 days requiring tracheostomy. Patient stayed at [facility] for 2 days until noted to be hypoxic (saturating low 70's) pt's saturation immediately improving afterwards to 80's after large mucus plug removed and was subsequently sent to [facility]    # Sepsis today 6/28th  - 78/42, 102, hr 145, Pco2 63   - Blood cxs sent   - UA reviewed   - Cefepime / Vancomycin   - ID consult     # Acute worsening Hypercarbic Respiratory Failure 6/27  - Due possible Dilaudid CNS depression and High O2 supplementation   - Keep Sats 88% / Decrease O2 titration   - Restarted Dilaudid 1mg po q6 for severe leg pain   - Pulmonary Consulted case discussed   - PCO2 improving from 90 to 63     ABG - ( 28 Jun 2022 07:17 )  pH, Arterial: 7.49  pH, Blood: x     /  pCO2: 63    /  pO2: 56    / HCO3: 48    / Base Excess: 20.8  /  SaO2: 93.0        Acute on Chronic COPD with exacerbation - now resolved  Mucous Plug-removed  Pneumonia vs atelectasis L base  CAUTI  Dysphagia  DM2      5/28- s/p trach exchanged  6/11 Trach changed to size 8 by surgery  Pulm noted- if recurrent atelectasis despite chest PT, suctioning, may consider bronchoscopy  s/p PEG 06/15-restarted on peg feedings after  adjustment & surgery clearance   LE neuropathic pain-follow neuro recs  Chronic B/l LE Hyperpigmentation changes-due to stasis dermatitis. Continue Triamcinolone BId.   Xanax 0.25 Q12 PRN for anxiety.   LE neuropathic pain-Pain management- currently on Dilaudid 2 mg q 8 prn .6/23 MRI Lumbar spine w/ & w/o Contrast  - chronic DJD no acute changes.  Burn noted for b/l lE hyperpigmentation & dryness. Dermatology input also noted. No surgical intervention. Topical steroid. Possible eczema  CAUTI with Chronic Indwelling Wiseman. Completed abx. Resolved  Suspected epiglottis edema- completed Dexa  Hx of DVT- on home Eliquis  Activity been bed bound. PT eval when able.     Dispo: [facility] when ready - Hypercarbia / Sepsis Hypotension June 28th/27th f/u ID / started Cefepime / Vanco

## 2022-06-28 NOTE — PROGRESS NOTE ADULT - SUBJECTIVE AND OBJECTIVE BOX
NAME: CRUZ DUMONT  MRN: [id number]  LOCATION: [facility]- T2-3A 023 A    Patient is a 56y old  Female who presents with a chief complaint of SOB (2022 17:52)      HPI:  57 yo f with PMHx of DVT on Eliquis, COPD,  trach collar, obesity, IDDM, UTI, sent by [facility] for hypoxia. History provided by daughter at bedside, she reports the current illness going back to  when pt was intubated on admission at [facility] ICU for hypoxic respiratory failure diagnosed with copd exacerbation and superimposed pneumonia, of note she also appears to have been in CHF exacerbation with severe b/l LE swelling treated with IV bumex. Remained intubated 37 days per daughter, diagnosed with fever of unknown origin (up to 106F), treated with empiric broad spectrum antibiotics and once 2 weeks s/p tracheostomy placement and 48 hours afebrile she was was discharged to nursing home for PT. Of note, patient's daughter and pt herself say the wiseman has not been replaced for over 3 weeks. She also reports a developing sacral pressure ulcer. Was at [facility] only 2 days when was noted to be hypoxic (saturating low 70's) and EMS called. While awaiting EMS, floor nurse on the unit suctioned the patient, and managed to bring up a very large mucus plug, with pt's saturation immediately improving afterwards to 80's. Nonetheless pt was sent to [facility] ED. Prior to all this pt was independent and ambulatory. Pt denies sob, cp, abd pain, n/v/d, fever or chills.     On arrival to ED pt was saturating 97%, 15L O2 via tracheostomy collar (baseline 8 L at [facility]) VS:    /59  T 99.9F RR 20  Admission VBG pH 7.37 pCO2 60 pO2 49. Pt not wheezing, not coughing. CXR suspicious for L-sided PNA, inconclusive. CTA neg for PE. Pt received x1 IV Levoquin and Cefepime in ED, admitted to medicine for acute hypoxic hypercapnic respiratory failure secondary to acute mucus plug (now resolved vs superimposed pneumonia  hospital-acquired.(less likely), Records request sent to [facility] medical records  [phone]) and patient will be continued on empiric antibiotics pending procal (27 May 2022 14:02)      OVERNIGHT EVENTS: According to overnight nurse, the patient was tachy overnight, experienced hypotension, was febrile to 102.8, U/A to get urine.     T(F): 100.2 (22 @ 07:23), Max: 102.8 (22 @ 05:00)  HR: 107 (22 @ 07:23) (89 - 132)  BP: 88/40 (22 @ 07:23) (78/42 - 136/56)  RR: 18 (22 @ 05:00) (18 - 20)  SpO2: 93% (22 @ 05:00) (93% - 100%)  I&O's Summary    2022 07:01  -  2022 07:00  --------------------------------------------------------  IN: 300 mL / OUT: 0 mL / NET: 300 mL        REVIEW OF SYSTEMS:  CONSTITUTIONAL: No fever, weight loss, or fatigue  EYES: No eye pain, visual disturbances, or discharge  ENMT:  No difficulty hearing, tinnitus, vertigo; No sinus or throat pain  NECK: No pain or stiffness  BREASTS: No pain, masses, or nipple discharge  RESPIRATORY: No cough, wheezing, chills or hemoptysis; No shortness of breath  CARDIOVASCULAR: No chest pain, palpitations, dizziness, or leg swelling  GASTROINTESTINAL: No abdominal or epigastric pain. No nausea, vomiting, or hematemesis; No diarrhea or constipation. No melena or hematochezia.  GENITOURINARY: No dysuria, frequency, hematuria, or incontinence  NEUROLOGICAL: No headaches, memory loss, loss of strength, numbness, or tremors  SKIN: No itching, burning, rashes, or lesions   LYMPH NODES: No enlarged glands  ENDOCRINE: No heat or cold intolerance; No hair loss  MUSCULOSKELETAL: No joint pain or swelling; No muscle, back, or extremity pain  PSYCHIATRIC: No depression, anxiety, mood swings, or difficulty sleeping  HEME/LYMPH: No easy bruising, or bleeding gums  ALLERY AND IMMUNOLOGIC: No hives or eczema    PHYSICAL EXAM:  GENERAL: NAD, well-groomed, well-developed  HEAD:  Atraumatic, Normocephalic  EYES: EOMI, PERRLA, conjunctiva and sclera clear  ENMT: No tonsillar erythema, exudates, or enlargement; Moist mucous membranes, Good dentition, No lesions  NECK: Supple, No JVD, Normal thyroid  NERVOUS SYSTEM:  Alert & Oriented X3, Good concentration; Motor Strength 5/5 B/L upper and lower extremities; DTRs 2+ intact and symmetric  CHEST/LUNG: Clear to percussion bilaterally; No rales, rhonchi, wheezing, or rubs  HEART: Regular rate and rhythm; No murmurs, rubs, or gallops  ABDOMEN: Soft, Nontender, Nondistended; Bowel sounds present  EXTREMITIES:  2+ Peripheral Pulses, No clubbing, cyanosis, or edema  LYMPH: No lymphadenopathy noted  SKIN: No rashes or lesions    LABS:                        8.6    19.03 )-----------( 239      ( 2022 06:02 )             25.3         132<L>  |  82<L>  |  23<H>  ----------------------------<  88  4.5   |  41<HH>  |  0.8    Ca    9.6      2022 06:02  Mg     1.5         TPro  5.8<L>  /  Alb  3.3<L>  /  TBili  0.6  /  DBili  x   /  AST  26  /  ALT  24  /  AlkPhos  150<H>        Urinalysis Basic - ( 2022 06:20 )    Color: Yellow / Appearance: Slightly Turbid / S.020 / pH: x  Gluc: x / Ketone: Negative  / Bili: Negative / Urobili: 3 mg/dL   Blood: x / Protein: 30 mg/dL / Nitrite: Negative   Leuk Esterase: Large / RBC: 10 /HPF / WBC 31 /HPF   Sq Epi: x / Non Sq Epi: 6 /HPF / Bacteria: Negative      CAPILLARY BLOOD GLUCOSE      POCT Blood Glucose.: 174 mg/dL (2022 07:57)  POCT Blood Glucose.: 100 mg/dL (2022 06:15)  POCT Blood Glucose.: 223 mg/dL (2022 21:30)  POCT Blood Glucose.: 135 mg/dL (2022 17:42)  POCT Blood Glucose.: 143 mg/dL (2022 11:43)    ABG - ( 2022 07:17 )  pH, Arterial: 7.49  pH, Blood: x     /  pCO2: 63    /  pO2: 56    / HCO3: 48    / Base Excess: 20.8  /  SaO2: 93.0                      MEDICATIONS  (STANDING):  acetaminophen     Tablet .. 975 milliGRAM(s) Oral every 8 hours  ammonium lactate 12% Lotion 1 Application(s) Topical every 12 hours  apixaban 5 milliGRAM(s) Oral two times a day  atorvastatin 20 milliGRAM(s) Oral at bedtime  cefepime   IVPB 2000 milliGRAM(s) IV Intermittent every 12 hours  dextrose 5%. 1000 milliLiter(s) (100 mL/Hr) IV Continuous <Continuous>  dextrose 5%. 1000 milliLiter(s) (50 mL/Hr) IV Continuous <Continuous>  dextrose 50% Injectable 25 Gram(s) IV Push once  dextrose 50% Injectable 12.5 Gram(s) IV Push once  dextrose 50% Injectable 25 Gram(s) IV Push once  diatrizoate meglumine/diatrizoate sodium. 30 milliLiter(s) Oral once  gabapentin 600 milliGRAM(s) Oral three times a day  glucagon  Injectable 1 milliGRAM(s) IntraMuscular once  influenza   Vaccine 0.5 milliLiter(s) IntraMuscular once  insulin glargine Injectable (LANTUS) 20 Unit(s) SubCutaneous at bedtime  insulin lispro (ADMELOG) corrective regimen sliding scale   SubCutaneous three times a day before meals  insulin lispro Injectable (ADMELOG) 9 Unit(s) SubCutaneous every 6 hours  lactated ringers Bolus 500 milliLiter(s) IV Bolus once  multivitamin 1 Tablet(s) Oral daily  pantoprazole    Tablet 40 milliGRAM(s) Oral before breakfast  polyethylene glycol 3350 17 Gram(s) Oral daily  senna 2 Tablet(s) Oral at bedtime  triamcinolone 0.1% Cream 1 Application(s) Topical every 12 hours  vancomycin  IVPB 1500 milliGRAM(s) IV Intermittent every 12 hours    MEDICATIONS  (PRN):  albuterol/ipratropium for Nebulization 3 milliLiter(s) Nebulizer every 6 hours PRN Shortness of Breath and/or Wheezing  ALPRAZolam 0.5 milliGRAM(s) Oral every 12 hours PRN anxiety  dextrose Oral Gel 15 Gram(s) Oral once PRN Blood Glucose LESS THAN 70 milliGRAM(s)/deciliter       NAME: CRUZ DUMONT  MRN: [id number]  LOCATION: [facility]- T2-3A 023 A    Patient is a 56y old  Female who presents with a chief complaint of SOB (2022 17:52)      HPI:  57 yo f with PMHx of DVT on Eliquis, COPD,  trach collar, obesity, IDDM, UTI, sent by [facility] for hypoxia. History provided by daughter at bedside, she reports the current illness going back to  when pt was intubated on admission at [facility] ICU for hypoxic respiratory failure diagnosed with copd exacerbation and superimposed pneumonia, of note she also appears to have been in CHF exacerbation with severe b/l LE swelling treated with IV bumex. Remained intubated 37 days per daughter, diagnosed with fever of unknown origin (up to 106F), treated with empiric broad spectrum antibiotics and once 2 weeks s/p tracheostomy placement and 48 hours afebrile she was was discharged to nursing home for PT. Of note, patient's daughter and pt herself say the wiseman has not been replaced for over 3 weeks. She also reports a developing sacral pressure ulcer. Was at [facility] only 2 days when was noted to be hypoxic (saturating low 70's) and EMS called. While awaiting EMS, floor nurse on the unit suctioned the patient, and managed to bring up a very large mucus plug, with pt's saturation immediately improving afterwards to 80's. Nonetheless pt was sent to [facility] ED. Prior to all this pt was independent and ambulatory. Pt denies sob, cp, abd pain, n/v/d, fever or chills.     On arrival to ED pt was saturating 97%, 15L O2 via tracheostomy collar (baseline 8 L at [facility]) VS:    /59  T 99.9F RR 20  Admission VBG pH 7.37 pCO2 60 pO2 49. Pt not wheezing, not coughing. CXR suspicious for L-sided PNA, inconclusive. CTA neg for PE. Pt received x1 IV Levoquin and Cefepime in ED, admitted to medicine for acute hypoxic hypercapnic respiratory failure secondary to acute mucus plug (now resolved vs superimposed pneumonia  hospital-acquired.(less likely), Records request sent to [facility] medical records  [phone]) and patient will be continued on empiric antibiotics pending procal (27 May 2022 14:02)      OVERNIGHT EVENTS: According to overnight nurse, the patient was tachy overnight, experienced hypotension, was febrile to 102.8. The pt was given tramadol and fever abated for a couple of hours. The patient then became tachy once again and was subsequently given  cc;'s. blood Cx, Urine culture, U/A, CXR was ordered and patient was subsequently placed on cefepime and vanc.     Pt was seen at the bed side and was arousable but lethargic, blood pressure medications including amlodipine 5mg, bumetonide 2mg, [id number]. Patients feeds were held as the patients was not wake and blood glucose levels were monitored. Labs revealed elevated white blood cell count, vitals were s/f fever, tachycardia. The patient is currently stable and     T(F): 100.2 (22 @ 07:23), Max: 102.8 (22 @ 05:00)  HR: 107 (22 @ 07:23) (89 - 132)  BP: 88/40 (22 @ 07:23) (78/42 - 136/56)  RR: 18 (22 @ 05:00) (18 - 20)  SpO2: 93% (22 @ 05:00) (93% - 100%)  I&O's Summary    2022 07:01  -  2022 07:00  --------------------------------------------------------  IN: 300 mL / OUT: 0 mL / NET: 300 mL      PHYSICAL EXAM:  GENERAL: Patient lethargic but arousable and well-developed  HEAD:  Atraumatic, Normocephalic  EYES: EOMI, PERRLA, conjunctiva and sclera clear  ENMT: No tonsillar erythema, exudates, or enlargement; Moist mucous membranes, Good dentition, No lesions  NECK: Supple, No JVD  CHEST/LUNG: Clear to auscultation bilaterally; No rales, rhonchi, wheezing, or rubs  HEART: Regular rate and rhythm; No murmurs, rubs, or gallops  ABDOMEN: Soft, Nontender, Nondistended; Bowel sounds present  EXTREMITIES:  2+ Peripheral Pulses in the UE, exam limited in the LE d/t pain, No clubbing, cyanosis, or edema  SKIN: No rashes or lesions    LABS:                        8.6    19.03 )-----------( 239      ( 2022 06:02 )             25.3         132<L>  |  82<L>  |  23<H>  ----------------------------<  88  4.5   |  41<HH>  |  0.8    Ca    9.6      2022 06:02  Mg     1.5         TPro  5.8<L>  /  Alb  3.3<L>  /  TBili  0.6  /  DBili  x   /  AST  26  /  ALT  24  /  AlkPhos  150<H>        Urinalysis Basic - ( 2022 06:20 )    Color: Yellow / Appearance: Slightly Turbid / S.020 / pH: x  Gluc: x / Ketone: Negative  / Bili: Negative / Urobili: 3 mg/dL   Blood: x / Protein: 30 mg/dL / Nitrite: Negative   Leuk Esterase: Large / RBC: 10 /HPF / WBC 31 /HPF   Sq Epi: x / Non Sq Epi: 6 /HPF / Bacteria: Negative      CAPILLARY BLOOD GLUCOSE      POCT Blood Glucose.: 174 mg/dL (2022 07:57)  POCT Blood Glucose.: 100 mg/dL (2022 06:15)  POCT Blood Glucose.: 223 mg/dL (2022 21:30)  POCT Blood Glucose.: 135 mg/dL (2022 17:42)  POCT Blood Glucose.: 143 mg/dL (2022 11:43)    ABG - ( 2022 07:17 )  pH, Arterial: 7.49  pH, Blood: x     /  pCO2: 63    /  pO2: 56    / HCO3: 48    / Base Excess: 20.8  /  SaO2: 93.0                      MEDICATIONS  (STANDING):  acetaminophen     Tablet .. 975 milliGRAM(s) Oral every 8 hours  ammonium lactate 12% Lotion 1 Application(s) Topical every 12 hours  apixaban 5 milliGRAM(s) Oral two times a day  atorvastatin 20 milliGRAM(s) Oral at bedtime  cefepime   IVPB 2000 milliGRAM(s) IV Intermittent every 12 hours  dextrose 5%. 1000 milliLiter(s) (100 mL/Hr) IV Continuous <Continuous>  dextrose 5%. 1000 milliLiter(s) (50 mL/Hr) IV Continuous <Continuous>  dextrose 50% Injectable 25 Gram(s) IV Push once  dextrose 50% Injectable 12.5 Gram(s) IV Push once  dextrose 50% Injectable 25 Gram(s) IV Push once  diatrizoate meglumine/diatrizoate sodium. 30 milliLiter(s) Oral once  gabapentin 600 milliGRAM(s) Oral three times a day  glucagon  Injectable 1 milliGRAM(s) IntraMuscular once  influenza   Vaccine 0.5 milliLiter(s) IntraMuscular once  insulin glargine Injectable (LANTUS) 20 Unit(s) SubCutaneous at bedtime  insulin lispro (ADMELOG) corrective regimen sliding scale   SubCutaneous three times a day before meals  insulin lispro Injectable (ADMELOG) 9 Unit(s) SubCutaneous every 6 hours  lactated ringers Bolus 500 milliLiter(s) IV Bolus once  multivitamin 1 Tablet(s) Oral daily  pantoprazole    Tablet 40 milliGRAM(s) Oral before breakfast  polyethylene glycol 3350 17 Gram(s) Oral daily  senna 2 Tablet(s) Oral at bedtime  triamcinolone 0.1% Cream 1 Application(s) Topical every 12 hours  vancomycin  IVPB 1500 milliGRAM(s) IV Intermittent every 12 hours    MEDICATIONS  (PRN):  albuterol/ipratropium for Nebulization 3 milliLiter(s) Nebulizer every 6 hours PRN Shortness of Breath and/or Wheezing  ALPRAZolam 0.5 milliGRAM(s) Oral every 12 hours PRN anxiety  dextrose Oral Gel 15 Gram(s) Oral once PRN Blood Glucose LESS THAN 70 milliGRAM(s)/deciliter       NAME: CRUZ DUMONT  MRN: [id number]  LOCATION: [facility]- T2-3A 023 A    Patient is a 56y old  Female who presents with a chief complaint of SOB (2022 17:52)      HPI:  57 yo f with PMHx of DVT on Eliquis, COPD,  trach collar, obesity, IDDM, UTI, sent by [facility] for hypoxia. History provided by daughter at bedside, she reports the current illness going back to  when pt was intubated on admission at [facility] ICU for hypoxic respiratory failure diagnosed with copd exacerbation and superimposed pneumonia, of note she also appears to have been in CHF exacerbation with severe b/l LE swelling treated with IV bumex. Remained intubated 37 days per daughter, diagnosed with fever of unknown origin (up to 106F), treated with empiric broad spectrum antibiotics and once 2 weeks s/p tracheostomy placement and 48 hours afebrile she was was discharged to nursing home for PT. Of note, patient's daughter and pt herself say the wiseman has not been replaced for over 3 weeks. She also reports a developing sacral pressure ulcer. Was at [facility] only 2 days when was noted to be hypoxic (saturating low 70's) and EMS called. While awaiting EMS, floor nurse on the unit suctioned the patient, and managed to bring up a very large mucus plug, with pt's saturation immediately improving afterwards to 80's. Nonetheless pt was sent to [facility] ED. Prior to all this pt was independent and ambulatory. Pt denies sob, cp, abd pain, n/v/d, fever or chills.     On arrival to ED pt was saturating 97%, 15L O2 via tracheostomy collar (baseline 8 L at [facility]) VS:    /59  T 99.9F RR 20  Admission VBG pH 7.37 pCO2 60 pO2 49. Pt not wheezing, not coughing. CXR suspicious for L-sided PNA, inconclusive. CTA neg for PE. Pt received x1 IV Levoquin and Cefepime in ED, admitted to medicine for acute hypoxic hypercapnic respiratory failure secondary to acute mucus plug (now resolved vs superimposed pneumonia  hospital-acquired.(less likely), Records request sent to [facility] medical records  [phone]) and patient will be continued on empiric antibiotics pending procal (27 May 2022 14:02)      OVERNIGHT EVENTS: According to overnight nurse, the patient was tachy overnight, experienced hypotension, was febrile to 102.8. The pt was given tramadol and fever abated for a couple of hours. The patient then became tachy once again and was subsequently given  cc;'s. blood Cx, Urine culture, U/A, CXR was ordered and patient was subsequently placed on cefepime and vanc.     Pt was seen at the bed side and was arousable but lethargic, blood pressure medications including amlodipine 5mg, bumetonide 2mg, [id number]. Patients feeds were held as the patients was not wake and blood glucose levels were monitored. Labs revealed elevated white blood cell count, vitals were s/f fever, tachycardia. The patient is currently stable and pain is currently being managed with dilaudid.      T(F): 100.2 (22 @ 07:23), Max: 102.8 (22 @ 05:00)  HR: 107 (22 @ 07:23) (89 - 132)  BP: 88/40 (22 @ 07:23) (78/42 - 136/56)  RR: 18 (22 @ 05:00) (18 - 20)  SpO2: 93% (22 @ 05:00) (93% - 100%)  I&O's Summary    2022 07:01  -  2022 07:00  --------------------------------------------------------  IN: 300 mL / OUT: 0 mL / NET: 300 mL      PHYSICAL EXAM:  GENERAL: Patient lethargic but arousable and well-developed  HEAD:  Atraumatic, Normocephalic  EYES: EOMI, PERRLA, conjunctiva and sclera clear  ENMT: No tonsillar erythema, exudates, or enlargement; Moist mucous membranes, Good dentition, No lesions  NECK: Supple, No JVD  CHEST/LUNG: Clear to auscultation bilaterally; No rales, rhonchi, wheezing, or rubs  HEART: Regular rate and rhythm; No murmurs, rubs, or gallops  ABDOMEN: Soft, Nontender, Nondistended; Bowel sounds present  EXTREMITIES:  2+ Peripheral Pulses in the UE, exam limited in the LE d/t pain, No clubbing, cyanosis, or edema  SKIN: No rashes or lesions    LABS:                        8.6    19.03 )-----------( 239      ( 2022 06:02 )             25.3         132<L>  |  82<L>  |  23<H>  ----------------------------<  88  4.5   |  41<HH>  |  0.8    Ca    9.6      2022 06:02  Mg     1.5         TPro  5.8<L>  /  Alb  3.3<L>  /  TBili  0.6  /  DBili  x   /  AST  26  /  ALT  24  /  AlkPhos  150<H>        Urinalysis Basic - ( 2022 06:20 )    Color: Yellow / Appearance: Slightly Turbid / S.020 / pH: x  Gluc: x / Ketone: Negative  / Bili: Negative / Urobili: 3 mg/dL   Blood: x / Protein: 30 mg/dL / Nitrite: Negative   Leuk Esterase: Large / RBC: 10 /HPF / WBC 31 /HPF   Sq Epi: x / Non Sq Epi: 6 /HPF / Bacteria: Negative      CAPILLARY BLOOD GLUCOSE      POCT Blood Glucose.: 174 mg/dL (2022 07:57)  POCT Blood Glucose.: 100 mg/dL (2022 06:15)  POCT Blood Glucose.: 223 mg/dL (2022 21:30)  POCT Blood Glucose.: 135 mg/dL (2022 17:42)  POCT Blood Glucose.: 143 mg/dL (2022 11:43)    ABG - ( 2022 07:17 )  pH, Arterial: 7.49  pH, Blood: x     /  pCO2: 63    /  pO2: 56    / HCO3: 48    / Base Excess: 20.8  /  SaO2: 93.0                      MEDICATIONS  (STANDING):  acetaminophen     Tablet .. 975 milliGRAM(s) Oral every 8 hours  ammonium lactate 12% Lotion 1 Application(s) Topical every 12 hours  apixaban 5 milliGRAM(s) Oral two times a day  atorvastatin 20 milliGRAM(s) Oral at bedtime  cefepime   IVPB 2000 milliGRAM(s) IV Intermittent every 12 hours  dextrose 5%. 1000 milliLiter(s) (100 mL/Hr) IV Continuous <Continuous>  dextrose 5%. 1000 milliLiter(s) (50 mL/Hr) IV Continuous <Continuous>  dextrose 50% Injectable 25 Gram(s) IV Push once  dextrose 50% Injectable 12.5 Gram(s) IV Push once  dextrose 50% Injectable 25 Gram(s) IV Push once  diatrizoate meglumine/diatrizoate sodium. 30 milliLiter(s) Oral once  gabapentin 600 milliGRAM(s) Oral three times a day  glucagon  Injectable 1 milliGRAM(s) IntraMuscular once  influenza   Vaccine 0.5 milliLiter(s) IntraMuscular once  insulin glargine Injectable (LANTUS) 20 Unit(s) SubCutaneous at bedtime  insulin lispro (ADMELOG) corrective regimen sliding scale   SubCutaneous three times a day before meals  insulin lispro Injectable (ADMELOG) 9 Unit(s) SubCutaneous every 6 hours  lactated ringers Bolus 500 milliLiter(s) IV Bolus once  multivitamin 1 Tablet(s) Oral daily  pantoprazole    Tablet 40 milliGRAM(s) Oral before breakfast  polyethylene glycol 3350 17 Gram(s) Oral daily  senna 2 Tablet(s) Oral at bedtime  triamcinolone 0.1% Cream 1 Application(s) Topical every 12 hours  vancomycin  IVPB 1500 milliGRAM(s) IV Intermittent every 12 hours    MEDICATIONS  (PRN):  albuterol/ipratropium for Nebulization 3 milliLiter(s) Nebulizer every 6 hours PRN Shortness of Breath and/or Wheezing  ALPRAZolam 0.5 milliGRAM(s) Oral every 12 hours PRN anxiety  dextrose Oral Gel 15 Gram(s) Oral once PRN Blood Glucose LESS THAN 70 milliGRAM(s)/deciliter

## 2022-06-28 NOTE — PROGRESS NOTE ADULT - SUBJECTIVE AND OBJECTIVE BOX
Patient is a 56y old  Female who presents with a chief complaint of SOB (2022 17:52)        SUBJECTIVE:      REVIEW OF SYSTEMS:    All Pertinent ROS are negative except per HPI       PHYSICAL EXAM  Vital Signs Last 24 Hrs  T(C): 37.9 (2022 07:23), Max: 39.3 (2022 05:00)  T(F): 100.2 (2022 07:23), Max: 102.8 (2022 05:00)  HR: 107 (:23) (89 - 132)  BP: 88/40 (:23) (78/42 - 136/56)  BP(mean): --  RR: 18 (2022 05:00) (18 - 20)  SpO2: 93% (2022 05:00) (93% - 100%)    CONSTITUTIONAL:  Well nourished.  NAD    ENT:   Airway patent,   No thrush    CARDIAC:   Normal rate,   regular rhythm.    no edema      RESPIRATORY:   No Wheezing  Normal chest expansion  Not tachypneic,  No use of accessory muscles    GASTROINTESTINAL:  Abdomen soft,   non-tender,   no guarding,   + BS    MUSCULOSKELETAL:   range of motion is not limited,  no clubbing, cyanosis    NEUROLOGICAL:   Alert and oriented   no motor or deficits.    SKIN:   Skin normal color for race,   warm, dry   No evidence of rash.      22 @ 07:01  -  22 @ 07:00  --------------------------------------------------------  IN:    Glucerna: 300 mL  Total IN: 300 mL    OUT:  Total OUT: 0 mL    Total NET: 300 mL          LABS:                          8.6    19.03 )-----------( 239      ( 2022 06:02 )             25.3                                                   132<L>  |  82<L>  |  23<H>  ----------------------------<  88  4.5   |  41<HH>  |  0.8    Ca    9.6      2022 06:02  Mg     1.5         TPro  5.8<L>  /  Alb  3.3<L>  /  TBili  0.6  /  DBili  x   /  AST  26  /  ALT  24  /  AlkPhos  150<H>                                               Urinalysis Basic - ( 2022 06:20 )    Color: Yellow / Appearance: Slightly Turbid / S.020 / pH: x  Gluc: x / Ketone: Negative  / Bili: Negative / Urobili: 3 mg/dL   Blood: x / Protein: 30 mg/dL / Nitrite: Negative   Leuk Esterase: Large / RBC: 10 /HPF / WBC 31 /HPF   Sq Epi: x / Non Sq Epi: 6 /HPF / Bacteria: Negative                                                  LIVER FUNCTIONS - ( 2022 06:02 )  Alb: 3.3 g/dL / Pro: 5.8 g/dL / ALK PHOS: 150 U/L / ALT: 24 U/L / AST: 26 U/L / GGT: x                                                                                            ABG - ( 2022 07:17 )  pH, Arterial: 7.49  pH, Blood: x     /  pCO2: 63    /  pO2: 56    / HCO3: 48    / Base Excess: 20.8  /  SaO2: 93.0                MEDICATIONS  (STANDING):  acetaminophen     Tablet .. 975 milliGRAM(s) Oral every 8 hours  ammonium lactate 12% Lotion 1 Application(s) Topical every 12 hours  apixaban 5 milliGRAM(s) Oral two times a day  atorvastatin 20 milliGRAM(s) Oral at bedtime  cefepime   IVPB 2000 milliGRAM(s) IV Intermittent every 12 hours  dextrose 5%. 1000 milliLiter(s) (100 mL/Hr) IV Continuous <Continuous>  dextrose 5%. 1000 milliLiter(s) (50 mL/Hr) IV Continuous <Continuous>  dextrose 50% Injectable 25 Gram(s) IV Push once  dextrose 50% Injectable 12.5 Gram(s) IV Push once  dextrose 50% Injectable 25 Gram(s) IV Push once  diatrizoate meglumine/diatrizoate sodium. 30 milliLiter(s) Oral once  gabapentin 600 milliGRAM(s) Oral three times a day  glucagon  Injectable 1 milliGRAM(s) IntraMuscular once  influenza   Vaccine 0.5 milliLiter(s) IntraMuscular once  insulin glargine Injectable (LANTUS) 20 Unit(s) SubCutaneous at bedtime  insulin lispro (ADMELOG) corrective regimen sliding scale   SubCutaneous three times a day before meals  insulin lispro Injectable (ADMELOG) 9 Unit(s) SubCutaneous every 6 hours  multivitamin 1 Tablet(s) Oral daily  pantoprazole    Tablet 40 milliGRAM(s) Oral before breakfast  polyethylene glycol 3350 17 Gram(s) Oral daily  senna 2 Tablet(s) Oral at bedtime  triamcinolone 0.1% Cream 1 Application(s) Topical every 12 hours  vancomycin  IVPB 1500 milliGRAM(s) IV Intermittent every 12 hours    MEDICATIONS  (PRN):  albuterol/ipratropium for Nebulization 3 milliLiter(s) Nebulizer every 6 hours PRN Shortness of Breath and/or Wheezing  ALPRAZolam 0.5 milliGRAM(s) Oral every 12 hours PRN anxiety  dextrose Oral Gel 15 Gram(s) Oral once PRN Blood Glucose LESS THAN 70 milliGRAM(s)/deciliter      X-Rays reviewed    CXR interpreted by me: Patient is a 56y old  Female who presents with a chief complaint of SOB (2022 17:52)         SUBJECTIVE:  Improved MS this am.  Repeat ABG improved PcO2.        REVIEW OF SYSTEMS:    All Pertinent ROS are negative except per HPI       PHYSICAL EXAM  Vital Signs Last 24 Hrs  T(C): 37.9 (2022 07:23), Max: 39.3 (2022 05:00)  T(F): 100.2 (2022 07:23), Max: 102.8 (2022 05:00)  HR: 107 (:23) (89 - 132)  BP: 88/40 (2022 07:23) (78/42 - 136/56)  BP(mean): --  RR: 18 (2022 05:00) (18 - 20)  SpO2: 93% (2022 05:00) (93% - 100%)    CONSTITUTIONAL:  Ill appearing in NAD    ENT:   Airway patent,   Trach   CARDIAC:   Normal rate,   regular rhythm.        RESPIRATORY:   Exp Wheezing  Normal chest expansion  Not tachypneic,  No use of accessory muscles    GASTROINTESTINAL:  Abdomen soft,   non-tender,   no guarding,   + BS    MUSCULOSKELETAL:   range of motion is not limited,  no clubbing, cyanosis    NEUROLOGICAL:   Alert  no motor or deficits.    SKIN:   Skin normal color for race,   warm, dry       22 @ 07:01  -  22 @ 07:00  --------------------------------------------------------  IN:    Glucerna: 300 mL  Total IN: 300 mL    OUT:  Total OUT: 0 mL    Total NET: 300 mL          LABS:                          8.6    19.03 )-----------( 239      ( 2022 06:02 )             25.3                                                   132<L>  |  82<L>  |  23<H>  ----------------------------<  88  4.5   |  41<HH>  |  0.8    Ca    9.6      2022 06:02  Mg     1.5         TPro  5.8<L>  /  Alb  3.3<L>  /  TBili  0.6  /  DBili  x   /  AST  26  /  ALT  24  /  AlkPhos  150<H>                                               Urinalysis Basic - ( 2022 06:20 )    Color: Yellow / Appearance: Slightly Turbid / S.020 / pH: x  Gluc: x / Ketone: Negative  / Bili: Negative / Urobili: 3 mg/dL   Blood: x / Protein: 30 mg/dL / Nitrite: Negative   Leuk Esterase: Large / RBC: 10 /HPF / WBC 31 /HPF   Sq Epi: x / Non Sq Epi: 6 /HPF / Bacteria: Negative                                                  LIVER FUNCTIONS - ( 2022 06:02 )  Alb: 3.3 g/dL / Pro: 5.8 g/dL / ALK PHOS: 150 U/L / ALT: 24 U/L / AST: 26 U/L / GGT: x                                                                                            ABG - ( 2022 07:17 )  pH, Arterial: 7.49  pH, Blood: x     /  pCO2: 63    /  pO2: 56    / HCO3: 48    / Base Excess: 20.8  /  SaO2: 93.0                MEDICATIONS  (STANDING):  acetaminophen     Tablet .. 975 milliGRAM(s) Oral every 8 hours  ammonium lactate 12% Lotion 1 Application(s) Topical every 12 hours  apixaban 5 milliGRAM(s) Oral two times a day  atorvastatin 20 milliGRAM(s) Oral at bedtime  cefepime   IVPB 2000 milliGRAM(s) IV Intermittent every 12 hours  dextrose 5%. 1000 milliLiter(s) (100 mL/Hr) IV Continuous <Continuous>  dextrose 5%. 1000 milliLiter(s) (50 mL/Hr) IV Continuous <Continuous>  dextrose 50% Injectable 25 Gram(s) IV Push once  dextrose 50% Injectable 12.5 Gram(s) IV Push once  dextrose 50% Injectable 25 Gram(s) IV Push once  diatrizoate meglumine/diatrizoate sodium. 30 milliLiter(s) Oral once  gabapentin 600 milliGRAM(s) Oral three times a day  glucagon  Injectable 1 milliGRAM(s) IntraMuscular once  influenza   Vaccine 0.5 milliLiter(s) IntraMuscular once  insulin glargine Injectable (LANTUS) 20 Unit(s) SubCutaneous at bedtime  insulin lispro (ADMELOG) corrective regimen sliding scale   SubCutaneous three times a day before meals  insulin lispro Injectable (ADMELOG) 9 Unit(s) SubCutaneous every 6 hours  multivitamin 1 Tablet(s) Oral daily  pantoprazole    Tablet 40 milliGRAM(s) Oral before breakfast  polyethylene glycol 3350 17 Gram(s) Oral daily  senna 2 Tablet(s) Oral at bedtime  triamcinolone 0.1% Cream 1 Application(s) Topical every 12 hours  vancomycin  IVPB 1500 milliGRAM(s) IV Intermittent every 12 hours    MEDICATIONS  (PRN):  albuterol/ipratropium for Nebulization 3 milliLiter(s) Nebulizer every 6 hours PRN Shortness of Breath and/or Wheezing  ALPRAZolam 0.5 milliGRAM(s) Oral every 12 hours PRN anxiety  dextrose Oral Gel 15 Gram(s) Oral once PRN Blood Glucose LESS THAN 70 milliGRAM(s)/deciliter      X-Rays reviewed

## 2022-06-28 NOTE — PROGRESS NOTE ADULT - ASSESSMENT
Impression:    Acute on chronic COPD exacerbation, resolved  Chronic hypercapnic respiratory failure  HO Trach 4/2022 in [facility] for failure to extubate after COPD exacerbation  HO PEG placed 6/15/2022  Mucous plug removed via bronchoscopy/ tracheal granulation tissue s/p bronch and XLT placement 5/30   Pneumonia/ atelectasis L base  HO DVT, was on eliquis at home     SUGGEST:   Frequent suction with saline   t-piece,  FiO2 30%  On Eliquis  Continue with bowel regimen  Target SPO2 92-96%, titrate oxygen as tolerated  bronchodilator treatments q4hrs PRN  OOB to chair  GI and DVT prophylaxis  pulmonary toilet  follow h/h  Impression:    Acute on chronic hypercapnic respiratory failure improved ( Possible opiate induced )   Chronic hypoxemic respiratory failure  SP Trach 4/2022 in [facility] for failure to extubate after COPD exacerbation  HO PEG placed 6/15/2022  Mucous plug removed via bronchoscopy/ tracheal granulation tissue s/p bronch and XLT placement 5/30   Pneumonia/ atelectasis L base treated   HO DVT on Eliquis      SUGGEST:   Frequent suction with saline   Wean O2 as tolerated to keep cristiana 88-92%.    Continue Eliquis   Continue with bowel regimen  Bronchodilator treatments q4hrs PRN  OOB to chair  GI and DVT prophylaxis  Pulmonary toilet

## 2022-06-29 LAB
ALBUMIN SERPL ELPH-MCNC: 2.9 G/DL — LOW (ref 3.5–5.2)
ALP SERPL-CCNC: 133 U/L — HIGH (ref 30–115)
ALT FLD-CCNC: 20 U/L — SIGNIFICANT CHANGE UP (ref 0–41)
ANION GAP SERPL CALC-SCNC: 5 MMOL/L — LOW (ref 7–14)
AST SERPL-CCNC: 24 U/L — SIGNIFICANT CHANGE UP (ref 0–41)
BASOPHILS # BLD AUTO: 0.04 K/UL — SIGNIFICANT CHANGE UP (ref 0–0.2)
BASOPHILS NFR BLD AUTO: 0.3 % — SIGNIFICANT CHANGE UP (ref 0–1)
BILIRUB SERPL-MCNC: 0.6 MG/DL — SIGNIFICANT CHANGE UP (ref 0.2–1.2)
BUN SERPL-MCNC: 29 MG/DL — HIGH (ref 10–20)
CALCIUM SERPL-MCNC: 9.2 MG/DL — SIGNIFICANT CHANGE UP (ref 8.5–10.1)
CHLORIDE SERPL-SCNC: 81 MMOL/L — LOW (ref 98–110)
CO2 SERPL-SCNC: 41 MMOL/L — CRITICAL HIGH (ref 17–32)
CREAT SERPL-MCNC: 0.6 MG/DL — LOW (ref 0.7–1.5)
EGFR: 105 ML/MIN/1.73M2 — SIGNIFICANT CHANGE UP
EOSINOPHIL # BLD AUTO: 0.1 K/UL — SIGNIFICANT CHANGE UP (ref 0–0.7)
EOSINOPHIL NFR BLD AUTO: 0.7 % — SIGNIFICANT CHANGE UP (ref 0–8)
GAS PNL BLDA: SIGNIFICANT CHANGE UP
GLUCOSE BLDC GLUCOMTR-MCNC: 126 MG/DL — HIGH (ref 70–99)
GLUCOSE BLDC GLUCOMTR-MCNC: 151 MG/DL — HIGH (ref 70–99)
GLUCOSE BLDC GLUCOMTR-MCNC: 166 MG/DL — HIGH (ref 70–99)
GLUCOSE BLDC GLUCOMTR-MCNC: 221 MG/DL — HIGH (ref 70–99)
GLUCOSE BLDC GLUCOMTR-MCNC: 90 MG/DL — SIGNIFICANT CHANGE UP (ref 70–99)
GLUCOSE SERPL-MCNC: 151 MG/DL — HIGH (ref 70–99)
GRAM STN FLD: SIGNIFICANT CHANGE UP
HCT VFR BLD CALC: 23.8 % — LOW (ref 37–47)
HGB BLD-MCNC: 8.4 G/DL — LOW (ref 12–16)
IMM GRANULOCYTES NFR BLD AUTO: 0.7 % — HIGH (ref 0.1–0.3)
LYMPHOCYTES # BLD AUTO: 0.59 K/UL — LOW (ref 1.2–3.4)
LYMPHOCYTES # BLD AUTO: 4.2 % — LOW (ref 20.5–51.1)
MAGNESIUM SERPL-MCNC: 1.9 MG/DL — SIGNIFICANT CHANGE UP (ref 1.8–2.4)
MCHC RBC-ENTMCNC: 34 PG — HIGH (ref 27–31)
MCHC RBC-ENTMCNC: 35.3 G/DL — SIGNIFICANT CHANGE UP (ref 32–37)
MCV RBC AUTO: 96.4 FL — SIGNIFICANT CHANGE UP (ref 81–99)
METHOD TYPE: SIGNIFICANT CHANGE UP
MONOCYTES # BLD AUTO: 0.61 K/UL — HIGH (ref 0.1–0.6)
MONOCYTES NFR BLD AUTO: 4.4 % — SIGNIFICANT CHANGE UP (ref 1.7–9.3)
NEUTROPHILS # BLD AUTO: 12.48 K/UL — HIGH (ref 1.4–6.5)
NEUTROPHILS NFR BLD AUTO: 89.7 % — HIGH (ref 42.2–75.2)
NRBC # BLD: 0 /100 WBCS — SIGNIFICANT CHANGE UP (ref 0–0)
PLATELET # BLD AUTO: 187 K/UL — SIGNIFICANT CHANGE UP (ref 130–400)
POTASSIUM SERPL-MCNC: 4.6 MMOL/L — SIGNIFICANT CHANGE UP (ref 3.5–5)
POTASSIUM SERPL-SCNC: 4.6 MMOL/L — SIGNIFICANT CHANGE UP (ref 3.5–5)
PROT SERPL-MCNC: 5.3 G/DL — LOW (ref 6–8)
RBC # BLD: 2.47 M/UL — LOW (ref 4.2–5.4)
RBC # FLD: 13.5 % — SIGNIFICANT CHANGE UP (ref 11.5–14.5)
S MARCESCENS DNA BLD POS NAA+NON-PROBE: SIGNIFICANT CHANGE UP
SODIUM SERPL-SCNC: 127 MMOL/L — LOW (ref 135–146)
WBC # BLD: 13.92 K/UL — HIGH (ref 4.8–10.8)
WBC # FLD AUTO: 13.92 K/UL — HIGH (ref 4.8–10.8)

## 2022-06-29 PROCEDURE — 99233 SBSQ HOSP IP/OBS HIGH 50: CPT

## 2022-06-29 RX ORDER — CEFTRIAXONE 500 MG/1
2000 INJECTION, POWDER, FOR SOLUTION INTRAMUSCULAR; INTRAVENOUS EVERY 24 HOURS
Refills: 0 | Status: DISCONTINUED | OUTPATIENT
Start: 2022-06-29 | End: 2022-07-06

## 2022-06-29 RX ORDER — HYDROMORPHONE HYDROCHLORIDE 2 MG/ML
0.5 INJECTION INTRAMUSCULAR; INTRAVENOUS; SUBCUTANEOUS EVERY 8 HOURS
Refills: 0 | Status: DISCONTINUED | OUTPATIENT
Start: 2022-06-29 | End: 2022-06-30

## 2022-06-29 RX ADMIN — Medication 0.5 MILLIGRAM(S): at 21:47

## 2022-06-29 RX ADMIN — HYDROMORPHONE HYDROCHLORIDE 0.5 MILLIGRAM(S): 2 INJECTION INTRAMUSCULAR; INTRAVENOUS; SUBCUTANEOUS at 21:20

## 2022-06-29 RX ADMIN — GABAPENTIN 600 MILLIGRAM(S): 400 CAPSULE ORAL at 13:06

## 2022-06-29 RX ADMIN — HYDROMORPHONE HYDROCHLORIDE 2 MILLIGRAM(S): 2 INJECTION INTRAMUSCULAR; INTRAVENOUS; SUBCUTANEOUS at 03:54

## 2022-06-29 RX ADMIN — GABAPENTIN 600 MILLIGRAM(S): 400 CAPSULE ORAL at 06:40

## 2022-06-29 RX ADMIN — Medication 650 MILLIGRAM(S): at 21:43

## 2022-06-29 RX ADMIN — CEFTRIAXONE 100 MILLIGRAM(S): 500 INJECTION, POWDER, FOR SOLUTION INTRAMUSCULAR; INTRAVENOUS at 17:26

## 2022-06-29 RX ADMIN — Medication 1 APPLICATION(S): at 06:43

## 2022-06-29 RX ADMIN — Medication 1 APPLICATION(S): at 17:27

## 2022-06-29 RX ADMIN — CEFEPIME 100 MILLIGRAM(S): 1 INJECTION, POWDER, FOR SOLUTION INTRAMUSCULAR; INTRAVENOUS at 05:52

## 2022-06-29 RX ADMIN — ATORVASTATIN CALCIUM 20 MILLIGRAM(S): 80 TABLET, FILM COATED ORAL at 21:28

## 2022-06-29 RX ADMIN — Medication 9 UNIT(S): at 11:54

## 2022-06-29 RX ADMIN — Medication 0.5 MILLIGRAM(S): at 09:32

## 2022-06-29 RX ADMIN — APIXABAN 5 MILLIGRAM(S): 2.5 TABLET, FILM COATED ORAL at 06:40

## 2022-06-29 RX ADMIN — POLYETHYLENE GLYCOL 3350 17 GRAM(S): 17 POWDER, FOR SOLUTION ORAL at 11:27

## 2022-06-29 RX ADMIN — APIXABAN 5 MILLIGRAM(S): 2.5 TABLET, FILM COATED ORAL at 17:27

## 2022-06-29 RX ADMIN — Medication 300 MILLIGRAM(S): at 06:40

## 2022-06-29 RX ADMIN — HYDROMORPHONE HYDROCHLORIDE 0.5 MILLIGRAM(S): 2 INJECTION INTRAMUSCULAR; INTRAVENOUS; SUBCUTANEOUS at 21:05

## 2022-06-29 RX ADMIN — HYDROMORPHONE HYDROCHLORIDE 0.5 MILLIGRAM(S): 2 INJECTION INTRAMUSCULAR; INTRAVENOUS; SUBCUTANEOUS at 13:03

## 2022-06-29 RX ADMIN — Medication 9 UNIT(S): at 17:35

## 2022-06-29 RX ADMIN — Medication 2: at 06:59

## 2022-06-29 RX ADMIN — Medication 1 APPLICATION(S): at 06:40

## 2022-06-29 RX ADMIN — Medication 650 MILLIGRAM(S): at 13:07

## 2022-06-29 RX ADMIN — SENNA PLUS 2 TABLET(S): 8.6 TABLET ORAL at 21:28

## 2022-06-29 RX ADMIN — INSULIN GLARGINE 20 UNIT(S): 100 INJECTION, SOLUTION SUBCUTANEOUS at 21:28

## 2022-06-29 RX ADMIN — Medication 650 MILLIGRAM(S): at 21:28

## 2022-06-29 RX ADMIN — PANTOPRAZOLE SODIUM 40 MILLIGRAM(S): 20 TABLET, DELAYED RELEASE ORAL at 06:42

## 2022-06-29 RX ADMIN — HYDROMORPHONE HYDROCHLORIDE 2 MILLIGRAM(S): 2 INJECTION INTRAMUSCULAR; INTRAVENOUS; SUBCUTANEOUS at 04:24

## 2022-06-29 RX ADMIN — GABAPENTIN 600 MILLIGRAM(S): 400 CAPSULE ORAL at 21:27

## 2022-06-29 RX ADMIN — Medication 1 APPLICATION(S): at 17:29

## 2022-06-29 RX ADMIN — Medication 1 TABLET(S): at 11:26

## 2022-06-29 RX ADMIN — Medication 650 MILLIGRAM(S): at 06:39

## 2022-06-29 RX ADMIN — Medication 650 MILLIGRAM(S): at 08:25

## 2022-06-29 RX ADMIN — Medication 4: at 11:53

## 2022-06-29 RX ADMIN — Medication 9 UNIT(S): at 00:02

## 2022-06-29 NOTE — PROGRESS NOTE ADULT - ASSESSMENT
55 yo female with PMHx of DVT on Eliquis, COPD,  trach collar, obesity, IDDM, UTI, sent by [facility] for hypoxia. Current illness going back to April 4th when pt was intubated on admission at [facility] ICU for hypoxic respiratory failure diagnosed with copd exacerbation and superimposed pneumonia, and remained intubated for 37 days requiring tracheostomy. Patient stayed at [facility] for 2 days until noted to be hypoxic (saturating low 70's) pt's saturation immediately improving afterwards to 80's after large mucus plug removed and was subsequently sent to [facility]  On arrival to ED pt was saturating 97%, 15L O2 via tracheostomy collar (baseline 8 L at [facility])CXR suspicious for L-sided PNA, inconclusive. CTA neg for PE. Pt received x1 IV Levaquin and Cefepime in ED, admitted to MICU for acute hypoxic hypercapnic respiratory failure secondary to acute mucus plug now resolved vs superimposed pneumonia hospital-acquired.   General Surgery Consulted emergently when patient found to be hypoxic to low 80s after CCU and Pulmonary team found large granuloma in trachea along tracheostomy tube. Patient currently had a size 8 trach. Surgery assisted Pulmonary and CCU with trach exchanged with ET tube guidance to a size 7 XLT. Saturations improved to high 90s. Large granuloma was removed. Patient was no longer in distress after exchange.    #sepsis  -6/28 Patient was noted to be tachy O/N, hypotensive, febrile to 102.8, FiO2 requirements were increased and the patient was desaturating to 82-86%  -blood cultures 6/29 positive for gram negative rods  -on Cefepime and Vanc  -ID consult  -F/u UA    #Hypercarbic respiratory failure  -Maintaining sats 88%/decrease O2 titration  -f/u on pulm recs     #Acute/ chronic COPD with exacerbation (Resolved)  #mucous plug removed  #pneumonia vs atelectasis L base  - On 5/28, Pulmonary team found large granuloma in trachea along tracheostomy tube. Patient had a size 8 trach on admission. Surgery assisted Pulmonary and CCU with trach exchanged with ET tube guidance to a size 7 XLT. Saturations improved to high 90s.  - trach functioning well s/p exchange   - 6/11 Trach changed to Fenestrated 8 by surgery      #s/p PEG (6/15):   KUB showing free intraperitoneal air (6/19).  CT Abdo confirmed that this was just due to PEG tube adjustment.   Repeat G tube study per Surgery (6/21) -normal, restarted on peg feedings      #Dysphagia:  Strict NPO as of now per 's FEES eval from 6/13/22.   [facility] will sign out to  at SNF for further plans with swallow rehab at SNF     #LE neuropathic pain:  PAtient has apparently been bed bound since April 4th (her [facility] admission for hypoxic failure where she ended up being intubated)  Apparently has L > R foot drop which may be due to chronic contractures of Calf muscle/Achilles tendon. Can have PT evaluate her for that.  But options are limited at this stage.   Given the asymmetrical foot drop, Neurology wants to rule out Any focal neuropathy.  -6/23 s/p  MRI Lumbar spine w/ & w/o Contrast, mild degenerative changes  - chronic DJD no acute changes.  -continue pain management: currently on 2mg Dilaudid po q8 prn as pharmacy dosages do not accommodate 1mg and standing dose of       #B/l LE Hyperpigmentation changes:   unclear etiology  Present since 1 year.   May be related to CHF related stasis dermatitis / hemosiderin deposition  vs deposition diseases versus Eosinophilic Dermatitis vs follicular hemorrhage from eliquis ?? (doubt the last differential)  Dermatology thinks it may be Eczema and advised Triamcinolone BId.   Add multivitamin to PEG QD.     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   Outpatient ENT f/u.     #DM:   steroids ended 6/18.  6/19- Increased lantus from 17 to 20.    #Chronic Indwelling Singh  - 5/28 Ucx - Contaminated   - 5/28 Ucx- Normal perri   - 5/27 Ucx- Klebsiella (CRE) and pseudomonas   - s/p cefepime  - Currently afebrile w/ no urinary complaints   - episode of Hematouria 6/4 -> improving -> h/h Stable    #Hx of DVT  - on home Eliquis  - restarted Eliquis    Activity been bed bound. PT eval when able.   GI PPX  PPI  DVT PPX  ELIQUIS  Dispo: Acute  Pending:  d/c planning to [facility]

## 2022-06-29 NOTE — PROGRESS NOTE ADULT - ATTENDING COMMENTS
55 yo f with PMHx of DVT on Eliquis, COPD,  trach collar, obesity, IDDM, UTI, sent by [facility] for hypoxia. Current illness going back to April 4th when pt was intubated on admission at [facility] ICU for hypoxic respiratory failure diagnosed with copd exacerbation and superimposed pneumonia, and remained intubated for 37 days requiring tracheostomy. Patient stayed at [facility] for 2 days until noted to be hypoxic (saturating low 70's) pt's saturation immediately improving afterwards to 80's after large mucus plug removed and was subsequently sent to [facility]  On arrival to ED pt was saturating 97%, 15L O2 via tracheostomy collar (baseline 8 L at [facility])CXR suspicious for L-sided PNA, inconclusive. CTA neg for PE. Pt received x1 IV Levaquin and Cefepime in ED, admitted to MICU for acute hypoxic hypercapnic respiratory failure secondary to acute mucus plug now resolved vs superimposed pneumonia hospital-acquired.   General Surgery Consulted emergently when patient found to be hypoxic to low 80s after CCU and Pulmonary team found large granuloma in trachea along tracheostomy tube. Patient currently had a size 8 trach. Surgery assisted Pulmonary and CCU with trach exchanged with ET tube guidance to a size 7 XLT. Saturations improved to high 90s. Large granuloma was removed. Patient was no longer in distress after exchange.    #VDRF:  Vent management per Pulm.  s/p trach exchange  s/p mucus plugs removal this admission  Monitor oxygenation. Lately been saturating ok on T piece.    #sepsis (developed again on 6/27/22):   -6/28: hypotensive, febrile to 102.8  -UA +; blood cultures (6/28)= Serratia. f/u Sens.  -Started on Cefepime and Vanc (6/28) > changed to CTX 2gm QD per ID (6/29)   - f/u repeat blood culture, urine culture    #s/p PEG (6/15):     #Dysphagia:  Strict NPO as of now per ST's FEES eval from 6/13/22.   [facility] ST will sign out to ST at SNF for further plans with swallow rehab at SNF     #LE neuropathic pain:  PAtient has apparently been bed bound since April 4th (her [facility] admission for hypoxic failure where she ended up being intubated)  Apparently has L > R foot drop which may be due to chronic contractures of Calf muscle/Achilles tendon.   Can have PT evaluate her for that. But options are limited at this stage.   Given the asymmetrical foot drop, Neurology ruled out Any focal neuropathy with a MRI Lumbar spine WNL.     Neuropathy is likely Diabetic?   But given the concomitant blackish discoloration from shin to dorsum of feet, Neurology is also suspecting deposition diseases versus Eosinophilic Dermatitis which could also explain the neuropathy?    #B/l LE Hyperpigmentation changes:   unclear etiology  Present since 1 year.   May be related to CHF related stasis dermatitis / hemosiderin deposition  vs deposition diseases versus Eosinophilic Dermatitis   Dermatology thinks it may be Eczema and advised Triamcinolone BId.   Will consider biopsy though if no improvement.    #Lower extremity Pain management*** :   earlier this admission was on Gabapentin to 600 TID, dilaudid  Currently just IV dilaudid 0.5 Q8 PRN    Note: On 6/21, we discontinued Cymbalta 60 QD since as per family patient may be having bizarre thoughts/hallucinations. Patient was texting them bizarre messages.   Note: Patient has h/o suicidal thoughts on Pregabalin.     Xanax 0.25 Q12 PRN for anxiety.   On Senna, Miralax.     #Suspected epiglottis edema:   s/p DExa Taper (ended 6/18).   Outpatient ENT f/u.     #DM:   steroids ended 6/18.  6/19- Increased lantus from 17 to 20.    #Hyponatremia- monitor  ?SiADH      #Chronic Indwelling Singh  - 5/28 Ucx - Contaminated   - 5/28 Ucx- Normal perri   - 5/27 Ucx- Klebsiella (CRE) and pseudomonas   - s/p cefepime  - send repeat U. Cx (6/29)    #Hx of DVT  - on home Eliquis  - restarted Eliquis      #Misc:  Activity been bed bound. PT eval when able.   GI PPX  PPI  DVT PPX  ELIQUIS  Dispo: Acute  Pending: sepsis treatment

## 2022-06-29 NOTE — PROGRESS NOTE ADULT - SUBJECTIVE AND OBJECTIVE BOX
SUBJECTIVE:  Patient is a 56y old Female who presents with a chief complaint of SOB (2022 17:57)   Acute respiratory failure with hypoxia     Today is hospital day 33d. There are no new issues or overnight events. This morning the patient was seen and examined.  She does not appear to be in distress and can shake her head yes or no to questions.  When asked if her pain was controlled she shook her head yes.      HPI  HPI:  55 yo f with PMHx of DVT on Eliquis, COPD,  trach collar, obesity, IDDM, UTI, sent by [facility] for hypoxia. History provided by daughter at bedside, she reports the current illness going back to  when pt was intubated on admission at [facility] ICU for hypoxic respiratory failure diagnosed with copd exacerbation and superimposed pneumonia, of note she also appears to have been in CHF exacerbation with severe b/l LE swelling treated with IV bumex. Remained intubated 37 days per daughter, diagnosed with fever of unknown origin (up to 106F), treated with empiric broad spectrum antibiotics and once 2 weeks s/p tracheostomy placement and 48 hours afebrile she was was discharged to nursing home for PT. Of note, patient's daughter and pt herself say the wiseman has not been replaced for over 3 weeks. She also reports a developing sacral pressure ulcer. Was at [facility] only 2 days when was noted to be hypoxic (saturating low 70's) and EMS called. While awaiting EMS, floor nurse on the unit suctioned the patient, and managed to bring up a very large mucus plug, with pt's saturation immediately improving afterwards to 80's. Nonetheless pt was sent to [facility] ED. Prior to all this pt was independent and ambulatory. Pt denies sob, cp, abd pain, n/v/d, fever or chills.     On arrival to ED pt was saturating 97%, 15L O2 via tracheostomy collar (baseline 8 L at [facility]) VS:    /59  T 99.9F RR 20  Admission VBG pH 7.37 pCO2 60 pO2 49. Pt not wheezing, not coughing. CXR suspicious for L-sided PNA, inconclusive. CTA neg for PE. Pt received x1 IV Levoquin and Cefepime in ED, admitted to medicine for acute hypoxic hypercapnic respiratory failure secondary to acute mucus plug (now resolved vs superimposed pneumonia  hospital-acquired.(less likely), Records request sent to [facility] medical records  [phone]) and patient will be continued on empiric antibiotics pending procal (27 May 2022 14:02)      PAST MEDICAL & SURGICAL HISTORY  COPD (chronic obstructive pulmonary disease)    CHF (congestive heart failure)    DM (diabetes mellitus)    H/O tracheostomy      ALLERGIES:  penicillin (Swelling)    MEDICATIONS:  HOME MEDICATIONS  albuterol sulfate 2.5 mg/3 mL (0.083 %) solution for nebulization:   ALPRAZolam 0.5 mg oral tablet: 1 tab(s) orally every 12 hours, As needed, anxiety via PEG tube  amLODIPine 5 mg oral tablet: 1 tab(s) orally once a day via PEG tube  apixaban 5 mg oral tablet: 1 tab(s) orally 2 times a day via PEG tube  atorvastatin 20 mg oral tablet: 1 tab(s) orally once a day (at bedtime) via PEG tube  bumetanide 2 mg oral tablet: 1 tab(s) orally once a day via PEG tube  gabapentin 600 mg oral tablet: 1 tab(s) orally 3 times a day via PEG tube  HYDROmorphone 4 mg oral tablet: 1 tab(s) orally every 8 hours, As needed, for severe pain  insulin glargine 100 units/mL subcutaneous solution: 20 unit(s) subcutaneous once a day (at bedtime)  insulin lispro 100 units/mL injectable solution: 9 unit(s) injectable 3 times a day (before meals)  losartan 100 mg oral tablet: 1 tab(s) orally once a day via PEG tube  Multiple Vitamins oral tablet: 1 tab(s) orally once a day via PEG tube  pantoprazole 40 mg oral delayed release tablet: 1 tab(s) orally once a day (before a meal) via PEG tube  polyethylene glycol 3350 oral powder for reconstitution: 17 gram(s) orally once a day via PEG tube  senna leaf extract oral tablet: 2 tab(s) orally once a day (at bedtime) via PEG tube  Trelegy Ellipta 100 mcg-62.5 mcg-25 mcg powder for inhalation:     STANDING MEDICATIONS  acetaminophen     Tablet .. 650 milliGRAM(s) Oral every 8 hours  ammonium lactate 12% Lotion 1 Application(s) Topical every 12 hours  apixaban 5 milliGRAM(s) Oral two times a day  atorvastatin 20 milliGRAM(s) Oral at bedtime  cefepime   IVPB 2000 milliGRAM(s) IV Intermittent every 12 hours  dextrose 5%. 1000 milliLiter(s) IV Continuous <Continuous>  dextrose 5%. 1000 milliLiter(s) IV Continuous <Continuous>  dextrose 50% Injectable 25 Gram(s) IV Push once  dextrose 50% Injectable 12.5 Gram(s) IV Push once  dextrose 50% Injectable 25 Gram(s) IV Push once  diatrizoate meglumine/diatrizoate sodium. 30 milliLiter(s) Oral once  gabapentin 600 milliGRAM(s) Oral three times a day  glucagon  Injectable 1 milliGRAM(s) IntraMuscular once  influenza   Vaccine 0.5 milliLiter(s) IntraMuscular once  insulin glargine Injectable (LANTUS) 20 Unit(s) SubCutaneous at bedtime  insulin lispro (ADMELOG) corrective regimen sliding scale   SubCutaneous three times a day before meals  insulin lispro Injectable (ADMELOG) 9 Unit(s) SubCutaneous every 6 hours  multivitamin 1 Tablet(s) Oral daily  pantoprazole    Tablet 40 milliGRAM(s) Oral before breakfast  polyethylene glycol 3350 17 Gram(s) Oral daily  senna 2 Tablet(s) Oral at bedtime  triamcinolone 0.1% Cream 1 Application(s) Topical every 12 hours  vancomycin  IVPB 1500 milliGRAM(s) IV Intermittent every 12 hours    PRN MEDICATIONS  albuterol/ipratropium for Nebulization 3 milliLiter(s) Nebulizer every 6 hours PRN  ALPRAZolam 0.5 milliGRAM(s) Oral every 12 hours PRN  dextrose Oral Gel 15 Gram(s) Oral once PRN  HYDROmorphone   Tablet 2 milliGRAM(s) Oral every 8 hours PRN    VITALS:   T(C): 37.6 (22 @ 04:58), Max: 37.6 (22 @ 04:58)  T(F): 99.6 (22 @ 04:58), Max: 99.6 (22 @ 04:58)  HR: 99 (22 @ 04:58) (99 - 111)  BP: 116/53 (22 @ 04:58) (101/51 - 122/50)  BP(mean): --  ABP: --  ABP(mean): --  RR: 17 (22 @ 06:00) (17 - 18)  SpO2: 99% (22 @ 06:00) (95% - 99%)  LABS:                        8.6    19.03 )-----------( 239      ( 2022 06:02 )             25.3         127<L>  |  81<L>  |  29<H>  ----------------------------<  151<H>  4.6   |  41<HH>  |  0.6<L>    Ca    9.2      2022 00:54  Mg     1.5         TPro  5.3<L>  /  Alb  2.9<L>  /  TBili  0.6  /  DBili  x   /  AST  24  /  ALT  20  /  AlkPhos  133<H>        Urinalysis Basic - ( 2022 06:20 )    Color: Yellow / Appearance: Slightly Turbid / S.020 / pH: x  Gluc: x / Ketone: Negative  / Bili: Negative / Urobili: 3 mg/dL   Blood: x / Protein: 30 mg/dL / Nitrite: Negative   Leuk Esterase: Large / RBC: 10 /HPF / WBC 31 /HPF   Sq Epi: x / Non Sq Epi: 6 /HPF / Bacteria: Negative      I&O's Detail    2022 07:01  -  2022 07:00  --------------------------------------------------------  IN:    Enteral Tube Flush: 60 mL    Glucerna: 600 mL  Total IN: 660 mL    OUT:  Total OUT: 0 mL    Total NET: 660 mL        ABG - ( 2022 07:17 )  pH, Arterial: 7.49  pH, Blood: x     /  pCO2: 63    /  pO2: 56    / HCO3: 48    / Base Excess: 20.8  /  SaO2: 93.0                    Culture - Sputum (collected 2022 10:20)  Source: Trach Asp Tracheal Aspirate  Gram Stain (2022 23:39):    Numerous polymorphonuclear leukocytes per low power field    Few Squamous epithelial cells per low power field    Few Gram positive cocci in pairs per oil power field    Moderate Gram Negative Coccobacilli per oil power field    Culture - Blood (collected 2022 06:02)  Source: .Blood Blood-Peripheral  Gram Stain (2022 01:14):    Growth in aerobic bottle: Gram Negative Rods    Growth in anaerobic bottle: Gram Negative Rods  Preliminary Report (2022 01:14):    Growth in aerobic bottle: Gram Negative Rods    Growth in anaerobic bottle: Gram Negative Rods    ***Blood Panel PCR results on this specimen are available    approximately 3 hours after the Gram stain result.***    Gram stain, PCR, and/or culture results may not always    correspond due to difference in methodologies.    ************************************************************    This PCR assay was performed by multiplex PCR. This    Assay tests for 66 bacterial and resistance gene targets.    Please refer to the [facility] test directory    at [email or web address] for details.  Organism: Blood Culture PCR (2022 06:28)  Organism: Blood Culture PCR (2022 06:28)          RADIOLOGY:  EKG  12 Lead ECG:   Ventricular Rate 118 BPM    Atrial Rate 118 BPM    P-R Interval 154 ms    QRS Duration 92 ms    Q-T Interval 308 ms    QTC Calculation(Bazett) 431 ms    P Axis 49 degrees    R Axis 64 degrees    T Axis 31 degrees    Diagnosis Line Sinus tachycardia  Possible Left atrial enlargement  Incomplete right bundle branch block  ST & T wave abnormality, consider anterior ischemia  Abnormal ECG    Confirmed by Joe Person ([id number]) on 2022 11:47:13 AM (22 @ 05:59)  12 Lead ECG:   Ventricular Rate 129 BPM    Atrial Rate 129 BPM    P-R Interval 166 ms    QRS Duration 94 ms    Q-T Interval 290 ms    QTC Calculation(Bazett) 424 ms    P Axis 45 degrees    R Axis 99 degrees    T Axis 61 degrees    Diagnosis Line Sinus tachycardia  Possible Left atrial enlargement  Incomplete right bundle branch block  Abnormal ECG    Confirmed by Cyndee Rodríguez MD ([id number]) on 2022 9:33:53 AM (22 @ 06:12)    Xray Chest 1 View- PORTABLE-Routine:   ACC: [id number] EXAM:  XR CHEST PORTABLE ROUTINE 1V                          PROCEDURE DATE:  2022          INTERPRETATION:  Clinical History / Reason for exam: Chest tightness    Comparison : Chest radiograph 2022.    Technique/Positioning: Single AP chest radiograph.    Findings:    Support devices: Stable tracheostomy tube.    Cardiac/mediastinum/hilum: Calcified thoracic aorta.    Lung parenchyma/Pleura: Left basilar opacity. No pneumothorax.    Skeleton/soft tissues: Resolution offree intraperitoneal air.    Impression:    Stable left basilar opacity.        --- End of Report ---            CARISSA RIVERA MD; Attending Radiologist  This document has been electronically signed. 2022  9:00AM (22 @ 20:55)  Xray Kidney Ureter Bladder:   ACC: [id number] EXAM:  XR KUB 1 VIEW                          PROCEDURE DATE:  2022          INTERPRETATION:  CLINICAL HISTORY / REASON FOR EXAM: Gastrostomy placement    TECHNIQUE: Single frontal view of the chest and upper abdomen    COMPARISON: Correlation with prior CT 2022    FINDINGS/  IMPRESSION:    Stomach is not fully visualized. Tracheostomy tube is present. Heart size   is stable. Stable left basilar opacity and effusion. Stable streaky right   basilar opacity/atelectasis. Stable bones.    --- End of Report ---            HOMERO MARIN MD; Attending Radiologist  This document has been electronically signed. 2022  8:14AM (22 @ 17:43)    Physical Exam:  General: no acute distress, lying in bed  Head: normocephalic and atraumatic  Neck: supple, trach is clean and clear of secretions  Heart: regular rate and rhythm, S1 and S2 normal, no murmurs, rubs or gallops noted on exam  Lungs: Symmetric chest expansion bilaterally, decreased breath sounds bilaterally, no wheezes rhonchi or crackles heard  Abdomen: Bowel sounds present, non tender on light and deep palpation  Extremities: No edema, no clubbing or cyanosis notes, positive peripheral pulses

## 2022-06-29 NOTE — CONSULT NOTE ADULT - ASSESSMENT
ASSESSMENT  57 yo f with PMHx of DVT on Eliquis, COPD,  trach collar, obesity, IDDM, UTI, sent by [facility] for hypoxia.    IMPRESSION  #Acute on Chronic COPD with exacerbation  #Dysphagia s/p PEG  #Serratia bacteremia  - blood Cx 6/28 +    #DM2  #Obesity BMI (kg/m2): 40.6  #Abx allergy: penicillin (Swelling)    RECOMMENDATIONS  - source of bacteremia unclear - mild erythema surrounding PEG site with drainage, but not tender   - follow-up urine cx -- if this is negative, check CT Abd/pelvis to ensure no fluid collection/abscess by PEG site   - narrow to ceftriaxone 2g daily   - repeat blood cx for surveillance     Please call or message on [employer] Teams if with any questions.  Spectra [phone]

## 2022-06-29 NOTE — CONSULT NOTE ADULT - SUBJECTIVE AND OBJECTIVE BOX
CRUZ DUMONT  56y, Female  Allergy: penicillin (Swelling)      CHIEF COMPLAINT: Hypoxia (2022 08:11)      LOS  33d    HPI:  55 yo f with PMHx of DVT on Eliquis, COPD,  trach collar, obesity, IDDM, UTI, sent by [facility] for hypoxia. History provided by daughter at bedside, she reports the current illness going back to  when pt was intubated on admission at [facility] ICU for hypoxic respiratory failure diagnosed with copd exacerbation and superimposed pneumonia, of note she also appears to have been in CHF exacerbation with severe b/l LE swelling treated with IV bumex. Remained intubated 37 days per daughter, diagnosed with fever of unknown origin (up to 106F), treated with empiric broad spectrum antibiotics and once 2 weeks s/p tracheostomy placement and 48 hours afebrile she was was discharged to nursing home for PT. Of note, patient's daughter and pt herself say the wiseman has not been replaced for over 3 weeks. She also reports a developing sacral pressure ulcer. Was at [facility] only 2 days when was noted to be hypoxic (saturating low 70's) and EMS called. While awaiting EMS, floor nurse on the unit suctioned the patient, and managed to bring up a very large mucus plug, with pt's saturation immediately improving afterwards to 80's. Nonetheless pt was sent to [facility] ED. Prior to all this pt was independent and ambulatory. Pt denies sob, cp, abd pain, n/v/d, fever or chills.     On arrival to ED pt was saturating 97%, 15L O2 via tracheostomy collar (baseline 8 L at [facility]) VS:    /59  T 99.9F RR 20  Admission VBG pH 7.37 pCO2 60 pO2 49. Pt not wheezing, not coughing. CXR suspicious for L-sided PNA, inconclusive. CTA neg for PE. Pt received x1 IV Levoquin and Cefepime in ED, admitted to medicine for acute hypoxic hypercapnic respiratory failure secondary to acute mucus plug (now resolved vs superimposed pneumonia  hospital-acquired.(less likely), Records request sent to [facility] medical records  [phone]) and patient will be continued on empiric antibiotics pending procal (27 May 2022 14:02)      INFECTIOUS DISEASE HISTORY:  History as above.   Prolonged hospitaliation.   Found to be hypotensive with sepsis on   Blood Cx  with Serratia shona     PAST MEDICAL & SURGICAL HISTORY:  COPD (chronic obstructive pulmonary disease)      CHF (congestive heart failure)      DM (diabetes mellitus)      H/O tracheostomy          FAMILY HISTORY  No pertinent family history in first degree relatives        SOCIAL HISTORY  Social History:  Denies smoking, drugs and etoh (27 May 2022 14:02)        ROS  General: Denies rigors, nightsweats  HEENT: Denies headache, rhinorrhea, sore throat, eye pain  CV: Denies CP, palpitations  PULM: Denies wheezing, hemoptysis  GI: Denies hematemesis, hematochezia, melena  : Denies discharge, hematuria  MSK: Denies arthralgias, myalgias  SKIN: Denies rash, lesions  NEURO: Denies paresthesias, weakness  PSYCH: Denies depression, anxiety    VITALS:  T(F): 99.6, Max: 99.6 (22 @ 04:58)  HR: 99  BP: 116/53  RR: 17Vital Signs Last 24 Hrs  T(C): 37.6 (2022 04:58), Max: 37.6 (2022 04:58)  T(F): 99.6 (2022 04:58), Max: 99.6 (2022 04:58)  HR: 99 (2022 04:58) (99 - 111)  BP: 116/53 (2022 04:58) (101/51 - 116/53)  BP(mean): --  RR: 17 (2022 06:00) (17 - 18)  SpO2: 91% (2022 08:32) (91% - 99%)    PHYSICAL EXAM:  Gen: NAD, resting in bed  HEENT: Normocephalic, atraumatic  Neck: supple, no lymphadenopathy  CV: Regular rate & regular rhythm  Lungs: decreased BS at bases, no fremitus  Abdomen: Soft, BS present  Ext: Warm, well perfused  Neuro: non focal, awake  Skin: no rash, no erythema  Lines: no phlebitis    TESTS & MEASUREMENTS:                        8.4    13.92 )-----------( 187      ( 2022 08:28 )             23.8         127<L>  |  81<L>  |  29<H>  ----------------------------<  151<H>  4.6   |  41<HH>  |  0.6<L>    Ca    9.2      2022 00:54  Mg     1.9         TPro  5.3<L>  /  Alb  2.9<L>  /  TBili  0.6  /  DBili  x   /  AST  24  /  ALT  20  /  AlkPhos  133<H>        LIVER FUNCTIONS - ( 2022 00:54 )  Alb: 2.9 g/dL / Pro: 5.3 g/dL / ALK PHOS: 133 U/L / ALT: 20 U/L / AST: 24 U/L / GGT: x           Urinalysis Basic - ( 2022 06:20 )    Color: Yellow / Appearance: Slightly Turbid / S.020 / pH: x  Gluc: x / Ketone: Negative  / Bili: Negative / Urobili: 3 mg/dL   Blood: x / Protein: 30 mg/dL / Nitrite: Negative   Leuk Esterase: Large / RBC: 10 /HPF / WBC 31 /HPF   Sq Epi: x / Non Sq Epi: 6 /HPF / Bacteria: Negative        Culture - Sputum (collected 22 @ 10:20)  Source: Trach Asp Tracheal Aspirate  Gram Stain (22 @ 23:39):    Numerous polymorphonuclear leukocytes per low power field    Few Squamous epithelial cells per low power field    Few Gram positive cocci in pairs per oil power field    Moderate Gram Negative Coccobacilli per oil power field    Culture - Blood (collected 22 @ 06:02)  Source: .Blood Blood-Peripheral  Gram Stain (22 @ 01:14):    Growth in aerobic bottle: Gram Negative Rods    Growth in anaerobic bottle: Gram Negative Rods  Preliminary Report (22 @ 01:14):    Growth in aerobic bottle: Gram Negative Rods    Growth in anaerobic bottle: Gram Negative Rods    ***Blood Panel PCR results on this specimen are available    approximately 3 hours after the Gram stain result.***    Gram stain, PCR, and/or culture results may not always    correspond due to difference in methodologies.    ************************************************************    This PCR assay was performed by multiplex PCR. This    Assay tests for 66 bacterial and resistance gene targets.    Please refer to the [facility] test directory    at [email or web address] for details.  Organism: Blood Culture PCR (22 @ 06:28)  Organism: Blood Culture PCR (22 @ 06:28)      -  Serratia marcescens: Detec      Method Type: PCR    Culture - Urine (collected 22 @ 00:40)  Source: Catheterized Catheterized  Final Report (22 @ 07:05):    <10,000 CFU/mL Normal Urogenital Chante    Culture - Blood (collected 22 @ 04:55)  Source: .Blood None  Final Report (06-10-22 @ 12:00):    No Growth Final    Culture - Blood (collected 22 @ 02:01)  Source: .Blood Blood  Final Report (06-10-22 @ 08:00):    No Growth Final    Culture - Urine (collected 22 @ 21:00)  Source: Clean Catch Clean Catch (Midstream)  Final Report (22 @ 22:53):    <10,000 CFU/mL Normal Urogenital Chante    Culture - Blood (collected 22 @ 12:33)  Source: .Blood None  Final Report (22 @ 19:00):    No Growth Final    Culture - Urine (collected 22 @ 17:24)  Source: Catheterized Catheterized  Final Report (22 @ 18:18):    <10,000 CFU/mL Normal Urogenital Chante    Culture - Urine (collected 22 @ 10:07)  Source: Catheterized Catheterized  Final Report (22 @ 10:17):    >=3 organisms. Probable collection contamination.    Culture - Urine (collected 22 @ 11:54)  Source: Catheterized Catheterized  Final Report (22 @ 11:41):    >100,000 CFU/ml Klebsiella pneumoniae (Carbapenem Resistant)    >100,000 CFU/ml Pseudomonas aeruginosa  Organism: Klepne MDRO  Pseudomonas aeruginosa (22 @ 11:43)  Organism: Pseudomonas aeruginosa (22 @ 11:43)      -  Amikacin: S <=16      -  Aztreonam: S 8      -  Cefepime: S 4      -  Ceftazidime: S 4      -  Ciprofloxacin: R >2      -  Gentamicin: R >8      -  Imipenem: S 2      -  Levofloxacin: R >4      -  Meropenem: S <=1      -  Piperacillin/Tazobactam: S <=8      -  Tobramycin: R >8      Method Type: ROMY  Organism: Jose MDRO (22 @ 11:43)      -  Amikacin: I 32      -  Amoxicillin/Clavulanic Acid: R >16/8      -  Ampicillin: R >16 These ampicillin results predict results for amoxicillin      -  Ampicillin/Sulbactam: R >16/8 Enterobacter, Klebsiella aerogenes, Citrobacter, and Serratia may develop resistance during prolonged therapy (3-4 days)      -  Aztreonam: R >16      -  Cefazolin: R >16 (MIC_CL_COM_ENTERIC_CEFAZU) For uncomplicated UTI with K. pneumoniae, E. coli, or P. mirablis: ROMY <=16 is sensitive and ROMY >=32 is resistant. This also predicts results for oral agents cefaclor, cefdinir, cefpodoxime, cefprozil, cefuroxime axetil, cephalexin and locarbef for uncomplicated UTI. Note that some isolates may be susceptible to these agents while testing resistant to cefazolin.      -  Cefepime: R >16      -  Cefoxitin: R >16      -  Ceftriaxone: R >32 Enterobacter, Klebsiella aerogenes, Citrobacter, and Serratia may develop resistance during prolonged therapy      -  Ciprofloxacin: R >2      -  Ertapenem: R >1      -  Gentamicin: S <=2      -  Imipenem: R >8      -  Levofloxacin: R >4      -  Meropenem: R >8      -  Nitrofurantoin: R >64 Should not be used to treat pyelonephritis      -  Piperacillin/Tazobactam: R >64      -  Tigecycline: S <=2      -  Tobramycin: R >8      -  Trimethoprim/Sulfamethoxazole: R >2/38      Method Type: ROMY        Lactate, Blood: 2.1 mmol/L (22 @ 06:02)      INFECTIOUS DISEASES TESTING  Procalcitonin, Serum: 19.40 ng/mL (22 @ 06:02)  COVID-19 PCR: NotDetec (22 @ 15:36)  COVID-19 PCR: NotDetec (22 @ 07:11)  COVID-19 PCR: NotDetec (22 @ 11:49)  COVID-19 PCR: NotDetec (22 @ 17:00)  Procalcitonin, Serum: 0.22 ng/mL (22 @ 12:33)  MRSA PCR Result.: Negative (22 @ 10:06)  Procalcitonin, Serum: 0.18 ng/mL (22 @ 16:51)      RADIOLOGY & ADDITIONAL TESTS:  I have personally reviewed the last Chest xray  CXR  Xray Chest 1 View AP/PA:   ACC: [id number] EXAM:  XR CHEST 1 VIEW                          PROCEDURE DATE:  2022          INTERPRETATION:  Clinical History / Reason for exam: Sepsis.    Comparison : Chest radiograph 2 days prior.    Technique/Positioning: Frontal portable.    Findings:    Support devices: Tracheostomy catheter is seen    Cardiac/mediastinum/hilum: Heart is enlarged    Lung parenchyma/Pleura: Bilateral opacities    Skeleton/soft tissues: Unchanged    Impression:    Cardiomegaly with bilateral opacifications, worsening. Support devices as   described.        --- End of Report ---            NAILA HAWLEY MD; Attending Interventional Radiologist  This document has been electronically signed. 2022  8:13AM (22 @ 08:11)      CT      CARDIOLOGY TESTING  12 Lead ECG:   Ventricular Rate 118 BPM    Atrial Rate 118 BPM    P-R Interval 154 ms    QRS Duration 92 ms    Q-T Interval 308 ms    QTC Calculation(Bazett) 431 ms    P Axis 49 degrees    R Axis 64 degrees    T Axis 31 degrees    Diagnosis Line Sinus tachycardia  Possible Left atrial enlargement  Incomplete right bundle branch block  ST & T wave abnormality, consider anterior ischemia  Abnormal ECG    Confirmed by Joe Person ([id number]) on 2022 11:47:13 AM (22 @ 05:59)  12 Lead ECG:   Ventricular Rate 129 BPM    Atrial Rate 129 BPM    P-R Interval 166 ms    QRS Duration 94 ms    Q-T Interval 290 ms    QTC Calculation(Bazett) 424 ms    P Axis 45 degrees    R Axis 99 degrees    T Axis 61 degrees    Diagnosis Line Sinus tachycardia  Possible Left atrial enlargement  Incomplete right bundle branch block  Abnormal ECG    Confirmed by Cyndee Rodríguez MD ([id number]) on 2022 9:33:53 AM (22 @ 06:12)      MEDICATIONS  acetaminophen     Tablet .. 650 Oral every 8 hours  ammonium lactate 12% Lotion 1 Topical every 12 hours  apixaban 5 Oral two times a day  atorvastatin 20 Oral at bedtime  cefepime   IVPB 2000 IV Intermittent every 12 hours  dextrose 5%. 1000 IV Continuous <Continuous>  dextrose 5%. 1000 IV Continuous <Continuous>  dextrose 50% Injectable 25 IV Push once  dextrose 50% Injectable 12.5 IV Push once  dextrose 50% Injectable 25 IV Push once  diatrizoate meglumine/diatrizoate sodium. 30 Oral once  gabapentin 600 Oral three times a day  glucagon  Injectable 1 IntraMuscular once  influenza   Vaccine 0.5 IntraMuscular once  insulin glargine Injectable (LANTUS) 20 SubCutaneous at bedtime  insulin lispro (ADMELOG) corrective regimen sliding scale  SubCutaneous three times a day before meals  insulin lispro Injectable (ADMELOG) 9 SubCutaneous every 6 hours  multivitamin 1 Oral daily  pantoprazole    Tablet 40 Oral before breakfast  polyethylene glycol 3350 17 Oral daily  senna 2 Oral at bedtime  triamcinolone 0.1% Cream 1 Topical every 12 hours      Weight  Weight (kg): 97.4 (22 @ 10:18)    ANTIBIOTICS:  cefepime   IVPB 2000 milliGRAM(s) IV Intermittent every 12 hours      ALLERGIES:  penicillin (Swelling)      ASSESSMENT  55 yo f with PMHx of DVT on Eliquis, COPD,  trach collar, obesity, IDDM, UTI, sent by [facility] for hypoxia.    #Acute on Chronic COPD with exacerbation On arrival to ED pt was sat  Acute on Chronic COPD with exacerbation - now resolved  Mucous Plug-removed  Pneumonia vs atelectasis L base  CAUTI  Dysphagia  DM2      - s/p trach exchanged   Trach changed to size 8 by surgery  Pulm noted- if recurrent atelectasis despite chest PT, suctioning, may consider bronchoscopy  s/p PEG 06/15-restarted on peg feedings after  adjustment & surgery clearance   LE neuropathic pain-follow neuro recs  Chronic B/l LE Hyperpigmentation changes-due to stasis dermatitis. Continue Triamcinolone BId.   Xanax 0.25 Q12 PRN for anxiety.   LE neuropathic pain-Pain management- currently on Dilaudid 2 mg q 8 prn . MRI Lumbar spine w/ & w/o Contrast  - chronic DJD no acute changes.  Burn noted for b/l lE hyperpigmentation & dryness. Dermatology input also noted. No surgical intervention. Topical steroid. Possible eczema  CAUTI with Chronic Indwelling Wiseman. Completed abx. Resolved  Suspected epiglottis edema- completed Dexa  Hx of DVT- on home Eliquis  Activity been bed bound. PT eval when able.       IMPRESSION  #  #Severe Sepsis on admission T<96.8F, T>101F, Pulse>90, Resp Rate>20, WBC>12, wbc<4, Bands>10%, lactic acidosis, metabolic encephalopathy, metabolic acidosis, metabolic alkalosis, MIRIAM due to suspected Gram negative pneumonia, aspiration pneumonia, pyelonephritis, cystitis, cellulitis, bacteremia  Pt has an acute illness which poses threat to bodily function   #Lactic acidosis  #Hyponatremia   #Obesity BMI (kg/m2): 40.6  #DM   #Abx allergy: penicillin (Swelling)        RECOMMENDATIONS  - F/u blood cultures, urine culture, wound culture  - Continue  - Continue  - Please check vanc trough 30 min prior to the 4th dose     Spectra [id number]     CRUZ DUMONT  56y, Female  Allergy: penicillin (Swelling)      CHIEF COMPLAINT: Hypoxia (2022 08:11)      LOS  33d    HPI:  57 yo f with PMHx of DVT on Eliquis, COPD,  trach collar, obesity, IDDM, UTI, sent by [facility] for hypoxia. History provided by daughter at bedside, she reports the current illness going back to  when pt was intubated on admission at [facility] ICU for hypoxic respiratory failure diagnosed with copd exacerbation and superimposed pneumonia, of note she also appears to have been in CHF exacerbation with severe b/l LE swelling treated with IV bumex. Remained intubated 37 days per daughter, diagnosed with fever of unknown origin (up to 106F), treated with empiric broad spectrum antibiotics and once 2 weeks s/p tracheostomy placement and 48 hours afebrile she was was discharged to nursing home for PT. Of note, patient's daughter and pt herself say the wiseman has not been replaced for over 3 weeks. She also reports a developing sacral pressure ulcer. Was at [facility] only 2 days when was noted to be hypoxic (saturating low 70's) and EMS called. While awaiting EMS, floor nurse on the unit suctioned the patient, and managed to bring up a very large mucus plug, with pt's saturation immediately improving afterwards to 80's. Nonetheless pt was sent to [facility] ED. Prior to all this pt was independent and ambulatory. Pt denies sob, cp, abd pain, n/v/d, fever or chills.     On arrival to ED pt was saturating 97%, 15L O2 via tracheostomy collar (baseline 8 L at [facility]) VS:    /59  T 99.9F RR 20  Admission VBG pH 7.37 pCO2 60 pO2 49. Pt not wheezing, not coughing. CXR suspicious for L-sided PNA, inconclusive. CTA neg for PE. Pt received x1 IV Levoquin and Cefepime in ED, admitted to medicine for acute hypoxic hypercapnic respiratory failure secondary to acute mucus plug (now resolved vs superimposed pneumonia  hospital-acquired.(less likely), Records request sent to [facility] medical records  [phone]) and patient will be continued on empiric antibiotics pending procal (27 May 2022 14:02)      INFECTIOUS DISEASE HISTORY:  History as above.   Prolonged hospitaliation.   Found to be hypotensive with sepsis on   Blood Cx  with Serratia shona     PAST MEDICAL & SURGICAL HISTORY:  COPD (chronic obstructive pulmonary disease)      CHF (congestive heart failure)      DM (diabetes mellitus)      H/O tracheostomy          FAMILY HISTORY  No pertinent family history in first degree relatives        SOCIAL HISTORY  Social History:  Denies smoking, drugs and etoh (27 May 2022 14:02)        ROS  General: Denies rigors, nightsweats  HEENT: Denies headache, rhinorrhea, sore throat, eye pain  CV: Denies CP, palpitations  PULM: Denies wheezing, hemoptysis  GI: Denies hematemesis, hematochezia, melena  : Denies discharge, hematuria  MSK: Denies arthralgias, myalgias  SKIN: Denies rash, lesions  NEURO: Denies paresthesias, weakness  PSYCH: Denies depression, anxiety    VITALS:  T(F): 99.6, Max: 99.6 (22 @ 04:58)  HR: 99  BP: 116/53  RR: 17Vital Signs Last 24 Hrs  T(C): 37.6 (2022 04:58), Max: 37.6 (2022 04:58)  T(F): 99.6 (2022 04:58), Max: 99.6 (2022 04:58)  HR: 99 (2022 04:58) (99 - 111)  BP: 116/53 (2022 04:58) (101/51 - 116/53)  BP(mean): --  RR: 17 (2022 06:00) (17 - 18)  SpO2: 91% (2022 08:32) (91% - 99%)    PHYSICAL EXAM:  Gen: NAD, resting in bed  HEENT: Normocephalic, atraumatic  Neck: supple, no lymphadenopathy  CV: Regular rate & regular rhythm  Lungs: decreased BS at bases, no fremitus  Abdomen: Soft, BS present  Ext: Warm, well perfused  Neuro: non focal, awake  Skin: no rash, no erythema  Lines: no phlebitis    TESTS & MEASUREMENTS:                        8.4    13.92 )-----------( 187      ( 2022 08:28 )             23.8         127<L>  |  81<L>  |  29<H>  ----------------------------<  151<H>  4.6   |  41<HH>  |  0.6<L>    Ca    9.2      2022 00:54  Mg     1.9         TPro  5.3<L>  /  Alb  2.9<L>  /  TBili  0.6  /  DBili  x   /  AST  24  /  ALT  20  /  AlkPhos  133<H>        LIVER FUNCTIONS - ( 2022 00:54 )  Alb: 2.9 g/dL / Pro: 5.3 g/dL / ALK PHOS: 133 U/L / ALT: 20 U/L / AST: 24 U/L / GGT: x           Urinalysis Basic - ( 2022 06:20 )    Color: Yellow / Appearance: Slightly Turbid / S.020 / pH: x  Gluc: x / Ketone: Negative  / Bili: Negative / Urobili: 3 mg/dL   Blood: x / Protein: 30 mg/dL / Nitrite: Negative   Leuk Esterase: Large / RBC: 10 /HPF / WBC 31 /HPF   Sq Epi: x / Non Sq Epi: 6 /HPF / Bacteria: Negative        Culture - Sputum (collected 22 @ 10:20)  Source: Trach Asp Tracheal Aspirate  Gram Stain (22 @ 23:39):    Numerous polymorphonuclear leukocytes per low power field    Few Squamous epithelial cells per low power field    Few Gram positive cocci in pairs per oil power field    Moderate Gram Negative Coccobacilli per oil power field    Culture - Blood (collected 22 @ 06:02)  Source: .Blood Blood-Peripheral  Gram Stain (22 @ 01:14):    Growth in aerobic bottle: Gram Negative Rods    Growth in anaerobic bottle: Gram Negative Rods  Preliminary Report (22 @ 01:14):    Growth in aerobic bottle: Gram Negative Rods    Growth in anaerobic bottle: Gram Negative Rods    ***Blood Panel PCR results on this specimen are available    approximately 3 hours after the Gram stain result.***    Gram stain, PCR, and/or culture results may not always    correspond due to difference in methodologies.    ************************************************************    This PCR assay was performed by multiplex PCR. This    Assay tests for 66 bacterial and resistance gene targets.    Please refer to the [facility] test directory    at [email or web address] for details.  Organism: Blood Culture PCR (22 @ 06:28)  Organism: Blood Culture PCR (22 @ 06:28)      -  Serratia marcescens: Detec      Method Type: PCR    Culture - Urine (collected 22 @ 00:40)  Source: Catheterized Catheterized  Final Report (22 @ 07:05):    <10,000 CFU/mL Normal Urogenital Chante    Culture - Blood (collected 22 @ 04:55)  Source: .Blood None  Final Report (06-10-22 @ 12:00):    No Growth Final    Culture - Blood (collected 22 @ 02:01)  Source: .Blood Blood  Final Report (06-10-22 @ 08:00):    No Growth Final    Culture - Urine (collected 22 @ 21:00)  Source: Clean Catch Clean Catch (Midstream)  Final Report (22 @ 22:53):    <10,000 CFU/mL Normal Urogenital Chante    Culture - Blood (collected 22 @ 12:33)  Source: .Blood None  Final Report (22 @ 19:00):    No Growth Final    Culture - Urine (collected 22 @ 17:24)  Source: Catheterized Catheterized  Final Report (22 @ 18:18):    <10,000 CFU/mL Normal Urogenital Chante    Culture - Urine (collected 22 @ 10:07)  Source: Catheterized Catheterized  Final Report (22 @ 10:17):    >=3 organisms. Probable collection contamination.    Culture - Urine (collected 22 @ 11:54)  Source: Catheterized Catheterized  Final Report (22 @ 11:41):    >100,000 CFU/ml Klebsiella pneumoniae (Carbapenem Resistant)    >100,000 CFU/ml Pseudomonas aeruginosa  Organism: Klepne MDRO  Pseudomonas aeruginosa (22 @ 11:43)  Organism: Pseudomonas aeruginosa (22 @ 11:43)      -  Amikacin: S <=16      -  Aztreonam: S 8      -  Cefepime: S 4      -  Ceftazidime: S 4      -  Ciprofloxacin: R >2      -  Gentamicin: R >8      -  Imipenem: S 2      -  Levofloxacin: R >4      -  Meropenem: S <=1      -  Piperacillin/Tazobactam: S <=8      -  Tobramycin: R >8      Method Type: ROMY  Organism: Jose MDRO (22 @ 11:43)      -  Amikacin: I 32      -  Amoxicillin/Clavulanic Acid: R >16/8      -  Ampicillin: R >16 These ampicillin results predict results for amoxicillin      -  Ampicillin/Sulbactam: R >16/8 Enterobacter, Klebsiella aerogenes, Citrobacter, and Serratia may develop resistance during prolonged therapy (3-4 days)      -  Aztreonam: R >16      -  Cefazolin: R >16 (MIC_CL_COM_ENTERIC_CEFAZU) For uncomplicated UTI with K. pneumoniae, E. coli, or P. mirablis: ROMY <=16 is sensitive and ROMY >=32 is resistant. This also predicts results for oral agents cefaclor, cefdinir, cefpodoxime, cefprozil, cefuroxime axetil, cephalexin and locarbef for uncomplicated UTI. Note that some isolates may be susceptible to these agents while testing resistant to cefazolin.      -  Cefepime: R >16      -  Cefoxitin: R >16      -  Ceftriaxone: R >32 Enterobacter, Klebsiella aerogenes, Citrobacter, and Serratia may develop resistance during prolonged therapy      -  Ciprofloxacin: R >2      -  Ertapenem: R >1      -  Gentamicin: S <=2      -  Imipenem: R >8      -  Levofloxacin: R >4      -  Meropenem: R >8      -  Nitrofurantoin: R >64 Should not be used to treat pyelonephritis      -  Piperacillin/Tazobactam: R >64      -  Tigecycline: S <=2      -  Tobramycin: R >8      -  Trimethoprim/Sulfamethoxazole: R >2/38      Method Type: ROMY        Lactate, Blood: 2.1 mmol/L (22 @ 06:02)      INFECTIOUS DISEASES TESTING  Procalcitonin, Serum: 19.40 ng/mL (22 @ 06:02)  COVID-19 PCR: NotDetec (22 @ 15:36)  COVID-19 PCR: NotDetec (22 @ 07:11)  COVID-19 PCR: NotDetec (22 @ 11:49)  COVID-19 PCR: NotDetec (22 @ 17:00)  Procalcitonin, Serum: 0.22 ng/mL (22 @ 12:33)  MRSA PCR Result.: Negative (22 @ 10:06)  Procalcitonin, Serum: 0.18 ng/mL (22 @ 16:51)      RADIOLOGY & ADDITIONAL TESTS:  I have personally reviewed the last Chest xray  CXR  Xray Chest 1 View AP/PA:   ACC: [id number] EXAM:  XR CHEST 1 VIEW                          PROCEDURE DATE:  2022          INTERPRETATION:  Clinical History / Reason for exam: Sepsis.    Comparison : Chest radiograph 2 days prior.    Technique/Positioning: Frontal portable.    Findings:    Support devices: Tracheostomy catheter is seen    Cardiac/mediastinum/hilum: Heart is enlarged    Lung parenchyma/Pleura: Bilateral opacities    Skeleton/soft tissues: Unchanged    Impression:    Cardiomegaly with bilateral opacifications, worsening. Support devices as   described.        --- End of Report ---            NAILA HAWLEY MD; Attending Interventional Radiologist  This document has been electronically signed. 2022  8:13AM (22 @ 08:11)      CT      CARDIOLOGY TESTING  12 Lead ECG:   Ventricular Rate 118 BPM    Atrial Rate 118 BPM    P-R Interval 154 ms    QRS Duration 92 ms    Q-T Interval 308 ms    QTC Calculation(Bazett) 431 ms    P Axis 49 degrees    R Axis 64 degrees    T Axis 31 degrees    Diagnosis Line Sinus tachycardia  Possible Left atrial enlargement  Incomplete right bundle branch block  ST & T wave abnormality, consider anterior ischemia  Abnormal ECG    Confirmed by Joe Person ([id number]) on 2022 11:47:13 AM (22 @ 05:59)  12 Lead ECG:   Ventricular Rate 129 BPM    Atrial Rate 129 BPM    P-R Interval 166 ms    QRS Duration 94 ms    Q-T Interval 290 ms    QTC Calculation(Bazett) 424 ms    P Axis 45 degrees    R Axis 99 degrees    T Axis 61 degrees    Diagnosis Line Sinus tachycardia  Possible Left atrial enlargement  Incomplete right bundle branch block  Abnormal ECG    Confirmed by Cyndee Rodríguez MD ([id number]) on 2022 9:33:53 AM (22 @ 06:12)      MEDICATIONS  acetaminophen     Tablet .. 650 Oral every 8 hours  ammonium lactate 12% Lotion 1 Topical every 12 hours  apixaban 5 Oral two times a day  atorvastatin 20 Oral at bedtime  cefepime   IVPB 2000 IV Intermittent every 12 hours  dextrose 5%. 1000 IV Continuous <Continuous>  dextrose 5%. 1000 IV Continuous <Continuous>  dextrose 50% Injectable 25 IV Push once  dextrose 50% Injectable 12.5 IV Push once  dextrose 50% Injectable 25 IV Push once  diatrizoate meglumine/diatrizoate sodium. 30 Oral once  gabapentin 600 Oral three times a day  glucagon  Injectable 1 IntraMuscular once  influenza   Vaccine 0.5 IntraMuscular once  insulin glargine Injectable (LANTUS) 20 SubCutaneous at bedtime  insulin lispro (ADMELOG) corrective regimen sliding scale  SubCutaneous three times a day before meals  insulin lispro Injectable (ADMELOG) 9 SubCutaneous every 6 hours  multivitamin 1 Oral daily  pantoprazole    Tablet 40 Oral before breakfast  polyethylene glycol 3350 17 Oral daily  senna 2 Oral at bedtime  triamcinolone 0.1% Cream 1 Topical every 12 hours      Weight  Weight (kg): 97.4 (22 @ 10:18)    ANTIBIOTICS:  cefepime   IVPB 2000 milliGRAM(s) IV Intermittent every 12 hours      ALLERGIES:  penicillin (Swelling)

## 2022-06-30 LAB
-  AMIKACIN: SIGNIFICANT CHANGE UP
-  AMIKACIN: SIGNIFICANT CHANGE UP
-  AMPICILLIN/SULBACTAM: SIGNIFICANT CHANGE UP
-  AMPICILLIN: SIGNIFICANT CHANGE UP
-  AZTREONAM: SIGNIFICANT CHANGE UP
-  AZTREONAM: SIGNIFICANT CHANGE UP
-  CEFAZOLIN: SIGNIFICANT CHANGE UP
-  CEFEPIME: SIGNIFICANT CHANGE UP
-  CEFEPIME: SIGNIFICANT CHANGE UP
-  CEFOXITIN: SIGNIFICANT CHANGE UP
-  CEFTAZIDIME/AVIBACTAM: SIGNIFICANT CHANGE UP
-  CEFTAZIDIME: SIGNIFICANT CHANGE UP
-  CEFTOLOZANE/TAZOBACTAM: SIGNIFICANT CHANGE UP
-  CEFTRIAXONE: SIGNIFICANT CHANGE UP
-  CIPROFLOXACIN: SIGNIFICANT CHANGE UP
-  CIPROFLOXACIN: SIGNIFICANT CHANGE UP
-  ERTAPENEM: SIGNIFICANT CHANGE UP
-  GENTAMICIN: SIGNIFICANT CHANGE UP
-  GENTAMICIN: SIGNIFICANT CHANGE UP
-  IMIPENEM: SIGNIFICANT CHANGE UP
-  LEVOFLOXACIN: SIGNIFICANT CHANGE UP
-  LEVOFLOXACIN: SIGNIFICANT CHANGE UP
-  MEROPENEM: SIGNIFICANT CHANGE UP
-  MEROPENEM: SIGNIFICANT CHANGE UP
-  PIPERACILLIN/TAZOBACTAM: SIGNIFICANT CHANGE UP
-  PIPERACILLIN/TAZOBACTAM: SIGNIFICANT CHANGE UP
-  TOBRAMYCIN: SIGNIFICANT CHANGE UP
-  TOBRAMYCIN: SIGNIFICANT CHANGE UP
-  TRIMETHOPRIM/SULFAMETHOXAZOLE: SIGNIFICANT CHANGE UP
ALBUMIN SERPL ELPH-MCNC: 3.2 G/DL — LOW (ref 3.5–5.2)
ALP SERPL-CCNC: 139 U/L — HIGH (ref 30–115)
ALT FLD-CCNC: 20 U/L — SIGNIFICANT CHANGE UP (ref 0–41)
ANION GAP SERPL CALC-SCNC: 5 MMOL/L — LOW (ref 7–14)
AST SERPL-CCNC: 21 U/L — SIGNIFICANT CHANGE UP (ref 0–41)
BASOPHILS # BLD AUTO: 0.02 K/UL — SIGNIFICANT CHANGE UP (ref 0–0.2)
BASOPHILS NFR BLD AUTO: 0.2 % — SIGNIFICANT CHANGE UP (ref 0–1)
BILIRUB SERPL-MCNC: 0.8 MG/DL — SIGNIFICANT CHANGE UP (ref 0.2–1.2)
BUN SERPL-MCNC: 17 MG/DL — SIGNIFICANT CHANGE UP (ref 10–20)
CALCIUM SERPL-MCNC: 9 MG/DL — SIGNIFICANT CHANGE UP (ref 8.5–10.1)
CHLORIDE SERPL-SCNC: 86 MMOL/L — LOW (ref 98–110)
CO2 SERPL-SCNC: 42 MMOL/L — CRITICAL HIGH (ref 17–32)
CREAT SERPL-MCNC: <0.5 MG/DL — LOW (ref 0.7–1.5)
CULTURE RESULTS: SIGNIFICANT CHANGE UP
EGFR: 124 ML/MIN/1.73M2 — SIGNIFICANT CHANGE UP
EOSINOPHIL # BLD AUTO: 0.16 K/UL — SIGNIFICANT CHANGE UP (ref 0–0.7)
EOSINOPHIL NFR BLD AUTO: 1.4 % — SIGNIFICANT CHANGE UP (ref 0–8)
GLUCOSE BLDC GLUCOMTR-MCNC: 118 MG/DL — HIGH (ref 70–99)
GLUCOSE BLDC GLUCOMTR-MCNC: 139 MG/DL — HIGH (ref 70–99)
GLUCOSE BLDC GLUCOMTR-MCNC: 148 MG/DL — HIGH (ref 70–99)
GLUCOSE BLDC GLUCOMTR-MCNC: 155 MG/DL — HIGH (ref 70–99)
GLUCOSE BLDC GLUCOMTR-MCNC: 204 MG/DL — HIGH (ref 70–99)
GLUCOSE SERPL-MCNC: 160 MG/DL — HIGH (ref 70–99)
HCT VFR BLD CALC: 25 % — LOW (ref 37–47)
HGB BLD-MCNC: 8.5 G/DL — LOW (ref 12–16)
IMM GRANULOCYTES NFR BLD AUTO: 0.5 % — HIGH (ref 0.1–0.3)
LYMPHOCYTES # BLD AUTO: 1.1 K/UL — LOW (ref 1.2–3.4)
LYMPHOCYTES # BLD AUTO: 9.8 % — LOW (ref 20.5–51.1)
MCHC RBC-ENTMCNC: 33.1 PG — HIGH (ref 27–31)
MCHC RBC-ENTMCNC: 34 G/DL — SIGNIFICANT CHANGE UP (ref 32–37)
MCV RBC AUTO: 97.3 FL — SIGNIFICANT CHANGE UP (ref 81–99)
METHOD TYPE: SIGNIFICANT CHANGE UP
METHOD TYPE: SIGNIFICANT CHANGE UP
MONOCYTES # BLD AUTO: 0.72 K/UL — HIGH (ref 0.1–0.6)
MONOCYTES NFR BLD AUTO: 6.4 % — SIGNIFICANT CHANGE UP (ref 1.7–9.3)
NEUTROPHILS # BLD AUTO: 9.15 K/UL — HIGH (ref 1.4–6.5)
NEUTROPHILS NFR BLD AUTO: 81.7 % — HIGH (ref 42.2–75.2)
NRBC # BLD: 0 /100 WBCS — SIGNIFICANT CHANGE UP (ref 0–0)
ORGANISM # SPEC MICROSCOPIC CNT: SIGNIFICANT CHANGE UP
PLATELET # BLD AUTO: 181 K/UL — SIGNIFICANT CHANGE UP (ref 130–400)
POTASSIUM SERPL-MCNC: 4 MMOL/L — SIGNIFICANT CHANGE UP (ref 3.5–5)
POTASSIUM SERPL-SCNC: 4 MMOL/L — SIGNIFICANT CHANGE UP (ref 3.5–5)
PROT SERPL-MCNC: 5.9 G/DL — LOW (ref 6–8)
RBC # BLD: 2.57 M/UL — LOW (ref 4.2–5.4)
RBC # FLD: 13.5 % — SIGNIFICANT CHANGE UP (ref 11.5–14.5)
SODIUM SERPL-SCNC: 133 MMOL/L — LOW (ref 135–146)
SPECIMEN SOURCE: SIGNIFICANT CHANGE UP
TROPONIN T SERPL-MCNC: <0.01 NG/ML — SIGNIFICANT CHANGE UP
WBC # BLD: 11.21 K/UL — HIGH (ref 4.8–10.8)
WBC # FLD AUTO: 11.21 K/UL — HIGH (ref 4.8–10.8)

## 2022-06-30 PROCEDURE — 93010 ELECTROCARDIOGRAM REPORT: CPT

## 2022-06-30 PROCEDURE — 99233 SBSQ HOSP IP/OBS HIGH 50: CPT

## 2022-06-30 RX ORDER — HYDROMORPHONE HYDROCHLORIDE 2 MG/ML
0.5 INJECTION INTRAMUSCULAR; INTRAVENOUS; SUBCUTANEOUS EVERY 6 HOURS
Refills: 0 | Status: DISCONTINUED | OUTPATIENT
Start: 2022-06-30 | End: 2022-06-30

## 2022-06-30 RX ORDER — HYDROMORPHONE HYDROCHLORIDE 2 MG/ML
1 INJECTION INTRAMUSCULAR; INTRAVENOUS; SUBCUTANEOUS EVERY 6 HOURS
Refills: 0 | Status: DISCONTINUED | OUTPATIENT
Start: 2022-06-30 | End: 2022-07-04

## 2022-06-30 RX ADMIN — Medication 650 MILLIGRAM(S): at 14:07

## 2022-06-30 RX ADMIN — APIXABAN 5 MILLIGRAM(S): 2.5 TABLET, FILM COATED ORAL at 17:14

## 2022-06-30 RX ADMIN — Medication 1 APPLICATION(S): at 17:14

## 2022-06-30 RX ADMIN — HYDROMORPHONE HYDROCHLORIDE 1 MILLIGRAM(S): 2 INJECTION INTRAMUSCULAR; INTRAVENOUS; SUBCUTANEOUS at 23:04

## 2022-06-30 RX ADMIN — Medication 3 MILLILITER(S): at 20:20

## 2022-06-30 RX ADMIN — Medication 0.5 MILLIGRAM(S): at 21:23

## 2022-06-30 RX ADMIN — GABAPENTIN 600 MILLIGRAM(S): 400 CAPSULE ORAL at 05:32

## 2022-06-30 RX ADMIN — Medication 3 MILLILITER(S): at 13:28

## 2022-06-30 RX ADMIN — Medication 2: at 07:53

## 2022-06-30 RX ADMIN — APIXABAN 5 MILLIGRAM(S): 2.5 TABLET, FILM COATED ORAL at 05:32

## 2022-06-30 RX ADMIN — HYDROMORPHONE HYDROCHLORIDE 1 MILLIGRAM(S): 2 INJECTION INTRAMUSCULAR; INTRAVENOUS; SUBCUTANEOUS at 16:16

## 2022-06-30 RX ADMIN — HYDROMORPHONE HYDROCHLORIDE 1 MILLIGRAM(S): 2 INJECTION INTRAMUSCULAR; INTRAVENOUS; SUBCUTANEOUS at 22:38

## 2022-06-30 RX ADMIN — INSULIN GLARGINE 20 UNIT(S): 100 INJECTION, SOLUTION SUBCUTANEOUS at 21:27

## 2022-06-30 RX ADMIN — Medication 650 MILLIGRAM(S): at 05:32

## 2022-06-30 RX ADMIN — Medication 650 MILLIGRAM(S): at 21:53

## 2022-06-30 RX ADMIN — HYDROMORPHONE HYDROCHLORIDE 0.5 MILLIGRAM(S): 2 INJECTION INTRAMUSCULAR; INTRAVENOUS; SUBCUTANEOUS at 04:21

## 2022-06-30 RX ADMIN — PANTOPRAZOLE SODIUM 40 MILLIGRAM(S): 20 TABLET, DELAYED RELEASE ORAL at 05:32

## 2022-06-30 RX ADMIN — Medication 9 UNIT(S): at 12:03

## 2022-06-30 RX ADMIN — Medication 650 MILLIGRAM(S): at 06:02

## 2022-06-30 RX ADMIN — HYDROMORPHONE HYDROCHLORIDE 0.5 MILLIGRAM(S): 2 INJECTION INTRAMUSCULAR; INTRAVENOUS; SUBCUTANEOUS at 04:06

## 2022-06-30 RX ADMIN — Medication 1 APPLICATION(S): at 08:09

## 2022-06-30 RX ADMIN — Medication 1 APPLICATION(S): at 05:31

## 2022-06-30 RX ADMIN — GABAPENTIN 600 MILLIGRAM(S): 400 CAPSULE ORAL at 14:07

## 2022-06-30 RX ADMIN — Medication 1 TABLET(S): at 12:02

## 2022-06-30 RX ADMIN — HYDROMORPHONE HYDROCHLORIDE 0.5 MILLIGRAM(S): 2 INJECTION INTRAMUSCULAR; INTRAVENOUS; SUBCUTANEOUS at 10:23

## 2022-06-30 RX ADMIN — Medication 650 MILLIGRAM(S): at 21:23

## 2022-06-30 RX ADMIN — GABAPENTIN 600 MILLIGRAM(S): 400 CAPSULE ORAL at 21:24

## 2022-06-30 RX ADMIN — CEFTRIAXONE 100 MILLIGRAM(S): 500 INJECTION, POWDER, FOR SOLUTION INTRAMUSCULAR; INTRAVENOUS at 17:14

## 2022-06-30 RX ADMIN — ATORVASTATIN CALCIUM 20 MILLIGRAM(S): 80 TABLET, FILM COATED ORAL at 21:23

## 2022-06-30 RX ADMIN — Medication 650 MILLIGRAM(S): at 15:10

## 2022-06-30 RX ADMIN — HYDROMORPHONE HYDROCHLORIDE 1 MILLIGRAM(S): 2 INJECTION INTRAMUSCULAR; INTRAVENOUS; SUBCUTANEOUS at 17:04

## 2022-06-30 RX ADMIN — Medication 4: at 12:02

## 2022-06-30 RX ADMIN — HYDROMORPHONE HYDROCHLORIDE 0.5 MILLIGRAM(S): 2 INJECTION INTRAMUSCULAR; INTRAVENOUS; SUBCUTANEOUS at 11:12

## 2022-06-30 NOTE — PROGRESS NOTE ADULT - ATTENDING COMMENTS
57 yo f with PMHx of DVT on Eliquis, COPD,  trach collar, obesity, IDDM, UTI, sent by [facility] for hypoxia. Current illness going back to April 4th when pt was intubated on admission at [facility] ICU for hypoxic respiratory failure diagnosed with copd exacerbation and superimposed pneumonia, and remained intubated for 37 days requiring tracheostomy. Patient stayed at [facility] for 2 days until noted to be hypoxic (saturating low 70's) pt's saturation immediately improving afterwards to 80's after large mucus plug removed and was subsequently sent to [facility]  On arrival to ED pt was saturating 97%, 15L O2 via tracheostomy collar (baseline 8 L at [facility])CXR suspicious for L-sided PNA, inconclusive. CTA neg for PE. Pt received x1 IV Levaquin and Cefepime in ED, admitted to MICU for acute hypoxic hypercapnic respiratory failure secondary to acute mucus plug now resolved vs superimposed pneumonia hospital-acquired.   General Surgery Consulted emergently when patient found to be hypoxic to low 80s after CCU and Pulmonary team found large granuloma in trachea along tracheostomy tube. Patient currently had a size 8 trach. Surgery assisted Pulmonary and CCU with trach exchanged with ET tube guidance to a size 7 XLT. Saturations improved to high 90s. Large granuloma was removed. Patient was no longer in distress after exchange.    #VDRF:  Vent management per Pulm.  s/p trach exchange  s/p mucus plugs removal this admission  Monitor oxygenation. Lately been saturating ok on T piece.    #sepsis (developed again on 6/27/22):   -6/28: hypotensive, febrile to 102.8  -UA +; blood cultures (6/28)= Serratia. f/u Sens.  -Started on Cefepime and Vanc (6/28) > changed to CTX 2gm QD per ID (6/29)   - f/u repeat blood culture, urine culture  Hemodynamically stable lately    #s/p PEG (6/15):     #Dysphagia:  Strict NPO as of now per ST's FEES eval from 6/13/22.   [facility] ST will sign out to ST at SNF for further plans with swallow rehab at SNF     #LE neuropathic pain:  PAtient has apparently been bed bound since April 4th (her [facility] admission for hypoxic failure where she ended up being intubated)  Apparently has L > R foot drop which may be due to chronic contractures of Calf muscle/Achilles tendon.   Can have PT evaluate her for that. But options are limited at this stage.   Given the asymmetrical foot drop, Neurology ruled out Any focal neuropathy with a MRI Lumbar spine WNL.     Neuropathy is likely Diabetic?   But given the concomitant blackish discoloration from shin to dorsum of feet, Neurology is also suspecting deposition diseases versus Eosinophilic Dermatitis which could also explain the neuropathy?    #B/l LE Hyperpigmentation changes:   unclear etiology  Present since 1 year.   May be related to CHF related stasis dermatitis / hemosiderin deposition  vs deposition diseases versus Eosinophilic Dermatitis   Dermatology thinks it may be Eczema and advised Triamcinolone BId.   Reconsult Burn for biopsy since no improvement with Steroid cream.     #Lower extremity Pain management*** :   earlier this admission was on Gabapentin to 600 TID, dilaudid  Currently just IV dilaudid 1 Q6 PRN    Note: On 6/21, we discontinued Cymbalta 60 QD since as per family patient may be having bizarre thoughts/hallucinations. Patient was texting them bizarre messages.   Note: Patient has h/o suicidal thoughts on Pregabalin.     Xanax 0.25 Q12 PRN for anxiety.   On Senna, Miralax.     #Suspected epiglottis edema:   s/p DExa Taper (ended 6/18).   Outpatient ENT f/u.     #DM:   steroids ended 6/18.  6/19- Increased lantus from 17 to 20.    #Hyponatremia- monitor  ?SiADH      #Chronic Indwelling Singh  - 5/28 Ucx - Contaminated   - 5/28 Ucx- Normal perri   - 5/27 Ucx- Klebsiella (CRE) and pseudomonas   - s/p cefepime  - send repeat U. Cx (6/29)    #Hx of DVT  - on home Eliquis  - restarted Eliquis      #Misc:  Activity been bed bound. PT eval when able.   GI PPX  PPI  DVT PPX  ELIQUIS  Dispo: Acute  Pending: sepsis treatment .

## 2022-06-30 NOTE — PROGRESS NOTE ADULT - ASSESSMENT
57 yo female with PMHx of DVT on Eliquis, COPD,  trach collar, obesity, IDDM, UTI, sent by [facility] for hypoxia. Current illness going back to April 4th when pt was intubated on admission at [facility] ICU for hypoxic respiratory failure diagnosed with copd exacerbation and superimposed pneumonia, and remained intubated for 37 days requiring tracheostomy. Patient stayed at [facility] for 2 days until noted to be hypoxic (saturating low 70's) pt's saturation immediately improving afterwards to 80's after large mucus plug removed and was subsequently sent to [facility]  On arrival to ED pt was saturating 97%, 15L O2 via tracheostomy collar (baseline 8 L at [facility])CXR suspicious for L-sided PNA, inconclusive. CTA neg for PE. Pt received x1 IV Levaquin and Cefepime in ED, admitted to MICU for acute hypoxic hypercapnic respiratory failure secondary to acute mucus plug now resolved vs superimposed pneumonia hospital-acquired.   General Surgery Consulted emergently when patient found to be hypoxic to low 80s after CCU and Pulmonary team found large granuloma in trachea along tracheostomy tube. Patient currently had a size 8 trach. Surgery assisted Pulmonary and CCU with trach exchanged with ET tube guidance to a size 7 XLT. Saturations improved to high 90s. Large granuloma was removed. Patient was no longer in distress after exchange.    #sepsis  -6/28 Patient was noted to be tachy O/N, hypotensive, febrile to 102.8, FiO2 requirements were increased and the patient was desaturating to 82-86%  -blood cultures 6/29 positive for gram negative rods, serratia bacteremia  -on Ceftriaxone  -ID following  -F/u UA, trach aspirate and blood cultures  -6/30 WBC 11.21 (6/29 was 13.92 and 6/28 was 19.03)    #Hypercarbic respiratory failure  -Maintaining sats 88%/decrease O2 titration  -f/u on pulm recs     #Acute/ chronic COPD with exacerbation (Resolved)  #mucous plug removed  #pneumonia vs atelectasis L base  - On 5/28, Pulmonary team found large granuloma in trachea along tracheostomy tube. Patient had a size 8 trach on admission. Surgery assisted Pulmonary and CCU with trach exchanged with ET tube guidance to a size 7 XLT. Saturations improved to high 90s.  - trach functioning well s/p exchange   - 6/11 Trach changed to Fenestrated 8 by surgery      #s/p PEG (6/15):   KUB showing free intraperitoneal air (6/19).  CT Abdo confirmed that this was just due to PEG tube adjustment.   Repeat G tube study per Surgery (6/21) -normal, restarted on peg feedings      #Dysphagia:  Strict NPO as of now per 's FEES eval from 6/13/22.   [facility] will sign out to  at SNF for further plans with swallow rehab at SNF     #LE neuropathic pain:  PAtient has apparently been bed bound since April 4th (her [facility] admission for hypoxic failure where she ended up being intubated)  Apparently has L > R foot drop which may be due to chronic contractures of Calf muscle/Achilles tendon. Can have PT evaluate her for that.  But options are limited at this stage.   Given the asymmetrical foot drop, Neurology wants to rule out Any focal neuropathy.  -6/23 s/p  MRI Lumbar spine w/ & w/o Contrast, mild degenerative changes  - chronic DJD no acute changes.  -continue pain management: currently on 2mg Dilaudid po q8 prn as pharmacy dosages do not accommodate 1mg and standing dose of       #B/l LE Hyperpigmentation changes:   unclear etiology  Present since 1 year.   May be related to CHF related stasis dermatitis / hemosiderin deposition  vs deposition diseases versus Eosinophilic Dermatitis vs follicular hemorrhage from eliquis ?? (doubt the last differential)  Dermatology thinks it may be Eczema and advised Triamcinolone BId.   Add multivitamin to PEG QD.     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   Outpatient ENT f/u.     #DM:   steroids ended 6/18.  6/19- Increased lantus from 17 to 20.    #Chronic Indwelling Singh  - 5/28 Ucx - Contaminated   - 5/28 Ucx- Normal perri   - 5/27 Ucx- Klebsiella (CRE) and pseudomonas   - s/p cefepime  - Currently afebrile w/ no urinary complaints   - episode of Hematouria 6/4 -> improving -> h/h Stable    #Hx of DVT  - on home Eliquis  - restarted Eliquis    Activity been bed bound. PT eval when able.   GI PPX  PPI  DVT PPX  ELIQUIS  Dispo: Acute  Pending:  d/c planning to [facility]

## 2022-06-30 NOTE — PROGRESS NOTE ADULT - SUBJECTIVE AND OBJECTIVE BOX
SUBJECTIVE:  Patient is a 56y old Female who presents with a chief complaint of Hypoxia (29 Jun 2022 12:58)   Acute respiratory failure with hypoxia     Today is hospital day 34d. Overnight the patient felt chest tightness and anxiety and EKG was ordered and troponins were ordered.  This morning on examination when asked if she still has the chest tightness she nods yes and when asked about pain in her legs she nods that she still has the leg pain.      HPI  HPI:  57 yo f with PMHx of DVT on Eliquis, COPD,  trach collar, obesity, IDDM, UTI, sent by [facility] for hypoxia. History provided by daughter at bedside, she reports the current illness going back to April 4th when pt was intubated on admission at [facility] ICU for hypoxic respiratory failure diagnosed with copd exacerbation and superimposed pneumonia, of note she also appears to have been in CHF exacerbation with severe b/l LE swelling treated with IV bumex. Remained intubated 37 days per daughter, diagnosed with fever of unknown origin (up to 106F), treated with empiric broad spectrum antibiotics and once 2 weeks s/p tracheostomy placement and 48 hours afebrile she was was discharged to nursing home for PT. Of note, patient's daughter and pt herself say the wiseman has not been replaced for over 3 weeks. She also reports a developing sacral pressure ulcer. Was at [facility] only 2 days when was noted to be hypoxic (saturating low 70's) and EMS called. While awaiting EMS, floor nurse on the unit suctioned the patient, and managed to bring up a very large mucus plug, with pt's saturation immediately improving afterwards to 80's. Nonetheless pt was sent to [facility] ED. Prior to all this pt was independent and ambulatory. Pt denies sob, cp, abd pain, n/v/d, fever or chills.     On arrival to ED pt was saturating 97%, 15L O2 via tracheostomy collar (baseline 8 L at [facility]) VS:    /59  T 99.9F RR 20  Admission VBG pH 7.37 pCO2 60 pO2 49. Pt not wheezing, not coughing. CXR suspicious for L-sided PNA, inconclusive. CTA neg for PE. Pt received x1 IV Levoquin and Cefepime in ED, admitted to medicine for acute hypoxic hypercapnic respiratory failure secondary to acute mucus plug (now resolved vs superimposed pneumonia  hospital-acquired.(less likely), Records request sent to [facility] medical records  [phone]) and patient will be continued on empiric antibiotics pending procal (27 May 2022 14:02)      PAST MEDICAL & SURGICAL HISTORY  COPD (chronic obstructive pulmonary disease)    CHF (congestive heart failure)    DM (diabetes mellitus)    H/O tracheostomy      ALLERGIES:  penicillin (Swelling)    MEDICATIONS:  HOME MEDICATIONS  albuterol sulfate 2.5 mg/3 mL (0.083 %) solution for nebulization:   ALPRAZolam 0.5 mg oral tablet: 1 tab(s) orally every 12 hours, As needed, anxiety via PEG tube  amLODIPine 5 mg oral tablet: 1 tab(s) orally once a day via PEG tube  apixaban 5 mg oral tablet: 1 tab(s) orally 2 times a day via PEG tube  atorvastatin 20 mg oral tablet: 1 tab(s) orally once a day (at bedtime) via PEG tube  bumetanide 2 mg oral tablet: 1 tab(s) orally once a day via PEG tube  gabapentin 600 mg oral tablet: 1 tab(s) orally 3 times a day via PEG tube  HYDROmorphone 4 mg oral tablet: 1 tab(s) orally every 8 hours, As needed, for severe pain  insulin glargine 100 units/mL subcutaneous solution: 20 unit(s) subcutaneous once a day (at bedtime)  insulin lispro 100 units/mL injectable solution: 9 unit(s) injectable 3 times a day (before meals)  losartan 100 mg oral tablet: 1 tab(s) orally once a day via PEG tube  Multiple Vitamins oral tablet: 1 tab(s) orally once a day via PEG tube  pantoprazole 40 mg oral delayed release tablet: 1 tab(s) orally once a day (before a meal) via PEG tube  polyethylene glycol 3350 oral powder for reconstitution: 17 gram(s) orally once a day via PEG tube  senna leaf extract oral tablet: 2 tab(s) orally once a day (at bedtime) via PEG tube  Trelegy Ellipta 100 mcg-62.5 mcg-25 mcg powder for inhalation:     STANDING MEDICATIONS  acetaminophen     Tablet .. 650 milliGRAM(s) Oral every 8 hours  ammonium lactate 12% Lotion 1 Application(s) Topical every 12 hours  apixaban 5 milliGRAM(s) Oral two times a day  atorvastatin 20 milliGRAM(s) Oral at bedtime  cefTRIAXone   IVPB 2000 milliGRAM(s) IV Intermittent every 24 hours  dextrose 5%. 1000 milliLiter(s) IV Continuous <Continuous>  dextrose 5%. 1000 milliLiter(s) IV Continuous <Continuous>  dextrose 50% Injectable 25 Gram(s) IV Push once  dextrose 50% Injectable 12.5 Gram(s) IV Push once  dextrose 50% Injectable 25 Gram(s) IV Push once  diatrizoate meglumine/diatrizoate sodium. 30 milliLiter(s) Oral once  gabapentin 600 milliGRAM(s) Oral three times a day  glucagon  Injectable 1 milliGRAM(s) IntraMuscular once  influenza   Vaccine 0.5 milliLiter(s) IntraMuscular once  insulin glargine Injectable (LANTUS) 20 Unit(s) SubCutaneous at bedtime  insulin lispro (ADMELOG) corrective regimen sliding scale   SubCutaneous three times a day before meals  insulin lispro Injectable (ADMELOG) 9 Unit(s) SubCutaneous every 6 hours  multivitamin 1 Tablet(s) Oral daily  pantoprazole    Tablet 40 milliGRAM(s) Oral before breakfast  polyethylene glycol 3350 17 Gram(s) Oral daily  senna 2 Tablet(s) Oral at bedtime  triamcinolone 0.1% Cream 1 Application(s) Topical every 12 hours    PRN MEDICATIONS  albuterol/ipratropium for Nebulization 3 milliLiter(s) Nebulizer every 6 hours PRN  ALPRAZolam 0.5 milliGRAM(s) Oral every 12 hours PRN  dextrose Oral Gel 15 Gram(s) Oral once PRN  HYDROmorphone  Injectable 0.5 milliGRAM(s) IV Push every 8 hours PRN    VITALS:   T(C): 36.6 (06-29-22 @ 20:37), Max: 36.7 (06-29-22 @ 13:20)  T(F): 97.8 (06-29-22 @ 20:37), Max: 98 (06-29-22 @ 13:20)  HR: 98 (06-29-22 @ 20:37) (98 - 99)  BP: 127/48 (06-29-22 @ 20:37) (103/51 - 127/48)  BP(mean): --  ABP: --  ABP(mean): --  RR: 18 (06-29-22 @ 20:37) (18 - 20)  SpO2: 99% (06-29-22 @ 20:37) (99% - 100%)  LABS:                        8.5    11.21 )-----------( 181      ( 30 Jun 2022 06:20 )             25.0     06-30    133<L>  |  86<L>  |  17  ----------------------------<  160<H>  4.0   |  x   |  <0.5<L>    Ca    9.0      30 Jun 2022 06:20  Mg     1.9     06-29    TPro  5.9<L>  /  Alb  3.2<L>  /  TBili  0.8  /  DBili  x   /  AST  21  /  ALT  20  /  AlkPhos  139<H>  06-30        I&O's Detail    29 Jun 2022 07:01  -  30 Jun 2022 07:00  --------------------------------------------------------  IN:    Enteral Tube Flush: 120 mL    Glucerna: 600 mL  Total IN: 720 mL    OUT:  Total OUT: 0 mL    Total NET: 720 mL                Culture - Sputum (collected 28 Jun 2022 10:20)  Source: Trach Asp Tracheal Aspirate  Gram Stain (28 Jun 2022 23:39):    Numerous polymorphonuclear leukocytes per low power field    Few Squamous epithelial cells per low power field    Few Gram positive cocci in pairs per oil power field    Moderate Gram Negative Coccobacilli per oil power field  Preliminary Report (29 Jun 2022 18:41):    Rare Pseudomonas aeruginosa    Normal Respiratory Chante present    Culture - Blood (collected 28 Jun 2022 06:02)  Source: .Blood Blood-Peripheral  Gram Stain (29 Jun 2022 01:14):    Growth in aerobic bottle: Gram Negative Rods    Growth in anaerobic bottle: Gram Negative Rods  Preliminary Report (29 Jun 2022 20:36):    Growth in aerobic and anaerobic bottles: Serratia marcescens    ***Blood Panel PCR results on this specimen are available    approximately 3 hours after the Gram stain result.***    Gram stain, PCR, and/or culture results may not always    correspond due to difference in methodologies.    ************************************************************    This PCR assay was performed by multiplex PCR. This    Assay tests for 66 bacterial and resistance gene targets.    Please refer to the [facility] test directory    at [email or web address] for details.  Organism: Blood Culture PCR (29 Jun 2022 06:28)  Organism: Blood Culture PCR (29 Jun 2022 06:28)          RADIOLOGY:  EKG  12 Lead ECG:   Ventricular Rate 92 BPM    Atrial Rate 92 BPM    P-R Interval 168 ms    QRS Duration 98 ms    Q-T Interval 370 ms    QTC Calculation(Bazett) 457 ms    P Axis 84 degrees    R Axis 103 degrees    T Axis 89 degrees    Diagnosis Line Sinus rhythm with Premature supraventricular complexes  Rightward axis  Low voltage QRS  Incomplete right bundle branch block  Cannot rule out Anterior infarct , age undetermined  T wave abnormality, consider lateral ischemia  Abnormal ECG    Confirmed by Joe Person ([id number]) on 6/30/2022 7:36:49 AM (06-30-22 @ 04:43)  12 Lead ECG:   Ventricular Rate 89 BPM    Atrial Rate 89 BPM    P-R Interval 162 ms    QRS Duration 98 ms    Q-T Interval 372 ms    QTC Calculation(Bazett) 452 ms    P Axis 90 degrees    R Axis 102 degrees    T Axis 90 degrees    Diagnosis Line Normal sinus rhythm  Rightward axis  Incomplete right bundle branch block  Anterior infarct , age undetermined  ST & T wave abnormality, consider lateral ischemia  Abnormal ECG    Confirmed by Joe Person ([id number]) on 6/30/2022 7:36:41 AM (06-30-22 @ 04:42)    Xray Chest 1 View- PORTABLE-Routine:   ACC: [id number] EXAM:  XR CHEST PORTABLE ROUTINE 1V                          PROCEDURE DATE:  06/26/2022          INTERPRETATION:  Clinical History / Reason for exam: Chest tightness    Comparison : Chest radiograph 6/19/2022.    Technique/Positioning: Single AP chest radiograph.    Findings:    Support devices: Stable tracheostomy tube.    Cardiac/mediastinum/hilum: Calcified thoracic aorta.    Lung parenchyma/Pleura: Left basilar opacity. No pneumothorax.    Skeleton/soft tissues: Resolution offree intraperitoneal air.    Impression:    Stable left basilar opacity.        --- End of Report ---            CARISSA RIVERA MD; Attending Radiologist  This document has been electronically signed. Jun 27 2022  9:00AM (06-26-22 @ 20:55)    Physical Exam:  General: no acute distress, lying in bed  Head: normocephalic and atraumatic  Neck: supple, trach intact and clean  Heart: regular rate and rhythm, S1 and S2 normal, no murmurs, rubs or gallops noted on exam  Lungs: Symmetric chest expansion bilaterally, decreased lung sounds bilaterally, no wheezes rhonchi or crackles heard  Abdomen: Bowel sounds present, non tender on light and deep palpation  Extremities: No edema, no clubbing or cyanosis notes, positive peripheral pulses, skin on the lower extremities is pigmented

## 2022-07-01 LAB
ALBUMIN SERPL ELPH-MCNC: 3.2 G/DL — LOW (ref 3.5–5.2)
ALP SERPL-CCNC: 126 U/L — HIGH (ref 30–115)
ALT FLD-CCNC: 16 U/L — SIGNIFICANT CHANGE UP (ref 0–41)
ANION GAP SERPL CALC-SCNC: 9 MMOL/L — SIGNIFICANT CHANGE UP (ref 7–14)
AST SERPL-CCNC: 18 U/L — SIGNIFICANT CHANGE UP (ref 0–41)
BASOPHILS # BLD AUTO: 0.03 K/UL — SIGNIFICANT CHANGE UP (ref 0–0.2)
BASOPHILS NFR BLD AUTO: 0.3 % — SIGNIFICANT CHANGE UP (ref 0–1)
BILIRUB SERPL-MCNC: 0.4 MG/DL — SIGNIFICANT CHANGE UP (ref 0.2–1.2)
BUN SERPL-MCNC: 11 MG/DL — SIGNIFICANT CHANGE UP (ref 10–20)
CALCIUM SERPL-MCNC: 9.4 MG/DL — SIGNIFICANT CHANGE UP (ref 8.5–10.1)
CHLORIDE SERPL-SCNC: 87 MMOL/L — LOW (ref 98–110)
CO2 SERPL-SCNC: 37 MMOL/L — HIGH (ref 17–32)
CREAT SERPL-MCNC: <0.5 MG/DL — LOW (ref 0.7–1.5)
EGFR: 124 ML/MIN/1.73M2 — SIGNIFICANT CHANGE UP
EOSINOPHIL # BLD AUTO: 0.25 K/UL — SIGNIFICANT CHANGE UP (ref 0–0.7)
EOSINOPHIL NFR BLD AUTO: 2.7 % — SIGNIFICANT CHANGE UP (ref 0–8)
GLUCOSE BLDC GLUCOMTR-MCNC: 105 MG/DL — HIGH (ref 70–99)
GLUCOSE BLDC GLUCOMTR-MCNC: 110 MG/DL — HIGH (ref 70–99)
GLUCOSE BLDC GLUCOMTR-MCNC: 96 MG/DL — SIGNIFICANT CHANGE UP (ref 70–99)
GLUCOSE BLDC GLUCOMTR-MCNC: 96 MG/DL — SIGNIFICANT CHANGE UP (ref 70–99)
GLUCOSE SERPL-MCNC: 89 MG/DL — SIGNIFICANT CHANGE UP (ref 70–99)
HCT VFR BLD CALC: 23.8 % — LOW (ref 37–47)
HGB BLD-MCNC: 8.2 G/DL — LOW (ref 12–16)
IMM GRANULOCYTES NFR BLD AUTO: 0.5 % — HIGH (ref 0.1–0.3)
LYMPHOCYTES # BLD AUTO: 1.08 K/UL — LOW (ref 1.2–3.4)
LYMPHOCYTES # BLD AUTO: 11.9 % — LOW (ref 20.5–51.1)
MCHC RBC-ENTMCNC: 33.6 PG — HIGH (ref 27–31)
MCHC RBC-ENTMCNC: 34.5 G/DL — SIGNIFICANT CHANGE UP (ref 32–37)
MCV RBC AUTO: 97.5 FL — SIGNIFICANT CHANGE UP (ref 81–99)
MONOCYTES # BLD AUTO: 0.48 K/UL — SIGNIFICANT CHANGE UP (ref 0.1–0.6)
MONOCYTES NFR BLD AUTO: 5.3 % — SIGNIFICANT CHANGE UP (ref 1.7–9.3)
NEUTROPHILS # BLD AUTO: 7.22 K/UL — HIGH (ref 1.4–6.5)
NEUTROPHILS NFR BLD AUTO: 79.3 % — HIGH (ref 42.2–75.2)
NRBC # BLD: 0 /100 WBCS — SIGNIFICANT CHANGE UP (ref 0–0)
PLATELET # BLD AUTO: 170 K/UL — SIGNIFICANT CHANGE UP (ref 130–400)
POTASSIUM SERPL-MCNC: 4.3 MMOL/L — SIGNIFICANT CHANGE UP (ref 3.5–5)
POTASSIUM SERPL-SCNC: 4.3 MMOL/L — SIGNIFICANT CHANGE UP (ref 3.5–5)
PROT SERPL-MCNC: 6 G/DL — SIGNIFICANT CHANGE UP (ref 6–8)
RBC # BLD: 2.44 M/UL — LOW (ref 4.2–5.4)
RBC # FLD: 13.5 % — SIGNIFICANT CHANGE UP (ref 11.5–14.5)
SODIUM SERPL-SCNC: 133 MMOL/L — LOW (ref 135–146)
WBC # BLD: 9.11 K/UL — SIGNIFICANT CHANGE UP (ref 4.8–10.8)
WBC # FLD AUTO: 9.11 K/UL — SIGNIFICANT CHANGE UP (ref 4.8–10.8)

## 2022-07-01 PROCEDURE — 99233 SBSQ HOSP IP/OBS HIGH 50: CPT

## 2022-07-01 PROCEDURE — 74177 CT ABD & PELVIS W/CONTRAST: CPT | Mod: 26

## 2022-07-01 RX ADMIN — APIXABAN 5 MILLIGRAM(S): 2.5 TABLET, FILM COATED ORAL at 17:01

## 2022-07-01 RX ADMIN — Medication 9 UNIT(S): at 00:04

## 2022-07-01 RX ADMIN — HYDROMORPHONE HYDROCHLORIDE 1 MILLIGRAM(S): 2 INJECTION INTRAMUSCULAR; INTRAVENOUS; SUBCUTANEOUS at 12:34

## 2022-07-01 RX ADMIN — INSULIN GLARGINE 20 UNIT(S): 100 INJECTION, SOLUTION SUBCUTANEOUS at 22:04

## 2022-07-01 RX ADMIN — Medication 650 MILLIGRAM(S): at 05:44

## 2022-07-01 RX ADMIN — Medication 1 APPLICATION(S): at 05:43

## 2022-07-01 RX ADMIN — APIXABAN 5 MILLIGRAM(S): 2.5 TABLET, FILM COATED ORAL at 05:44

## 2022-07-01 RX ADMIN — PANTOPRAZOLE SODIUM 40 MILLIGRAM(S): 20 TABLET, DELAYED RELEASE ORAL at 05:43

## 2022-07-01 RX ADMIN — HYDROMORPHONE HYDROCHLORIDE 1 MILLIGRAM(S): 2 INJECTION INTRAMUSCULAR; INTRAVENOUS; SUBCUTANEOUS at 06:08

## 2022-07-01 RX ADMIN — SENNA PLUS 2 TABLET(S): 8.6 TABLET ORAL at 22:04

## 2022-07-01 RX ADMIN — HYDROMORPHONE HYDROCHLORIDE 1 MILLIGRAM(S): 2 INJECTION INTRAMUSCULAR; INTRAVENOUS; SUBCUTANEOUS at 19:29

## 2022-07-01 RX ADMIN — POLYETHYLENE GLYCOL 3350 17 GRAM(S): 17 POWDER, FOR SOLUTION ORAL at 12:34

## 2022-07-01 RX ADMIN — Medication 1 APPLICATION(S): at 17:01

## 2022-07-01 RX ADMIN — Medication 9 UNIT(S): at 06:04

## 2022-07-01 RX ADMIN — HYDROMORPHONE HYDROCHLORIDE 1 MILLIGRAM(S): 2 INJECTION INTRAMUSCULAR; INTRAVENOUS; SUBCUTANEOUS at 05:48

## 2022-07-01 RX ADMIN — Medication 650 MILLIGRAM(S): at 14:27

## 2022-07-01 RX ADMIN — HYDROMORPHONE HYDROCHLORIDE 1 MILLIGRAM(S): 2 INJECTION INTRAMUSCULAR; INTRAVENOUS; SUBCUTANEOUS at 13:02

## 2022-07-01 RX ADMIN — GABAPENTIN 600 MILLIGRAM(S): 400 CAPSULE ORAL at 05:43

## 2022-07-01 RX ADMIN — GABAPENTIN 600 MILLIGRAM(S): 400 CAPSULE ORAL at 13:32

## 2022-07-01 RX ADMIN — Medication 0.5 MILLIGRAM(S): at 17:00

## 2022-07-01 RX ADMIN — Medication 650 MILLIGRAM(S): at 22:33

## 2022-07-01 RX ADMIN — ATORVASTATIN CALCIUM 20 MILLIGRAM(S): 80 TABLET, FILM COATED ORAL at 22:04

## 2022-07-01 RX ADMIN — GABAPENTIN 600 MILLIGRAM(S): 400 CAPSULE ORAL at 22:03

## 2022-07-01 RX ADMIN — Medication 650 MILLIGRAM(S): at 13:33

## 2022-07-01 RX ADMIN — Medication 1 TABLET(S): at 12:34

## 2022-07-01 RX ADMIN — Medication 650 MILLIGRAM(S): at 06:04

## 2022-07-01 RX ADMIN — Medication 650 MILLIGRAM(S): at 22:03

## 2022-07-01 NOTE — PROGRESS NOTE ADULT - SUBJECTIVE AND OBJECTIVE BOX
SUBJECTIVE:  Patient is a 56y old Female who presents with a chief complaint of Hypoxic respiratory failure (30 Jun 2022 08:57)   Acute respiratory failure with hypoxia     Today is hospital day 35d. There are no new issues or overnight events. Patient continues to feel severe pain in her bilateral legs and feet, will consult burn today     HPI  HPI:  55 yo f with PMHx of DVT on Eliquis, COPD,  trach collar, obesity, IDDM, UTI, sent by [facility] for hypoxia. History provided by daughter at bedside, she reports the current illness going back to April 4th when pt was intubated on admission at [facility] ICU for hypoxic respiratory failure diagnosed with copd exacerbation and superimposed pneumonia, of note she also appears to have been in CHF exacerbation with severe b/l LE swelling treated with IV bumex. Remained intubated 37 days per daughter, diagnosed with fever of unknown origin (up to 106F), treated with empiric broad spectrum antibiotics and once 2 weeks s/p tracheostomy placement and 48 hours afebrile she was was discharged to nursing home for PT. Of note, patient's daughter and pt herself say the wiseman has not been replaced for over 3 weeks. She also reports a developing sacral pressure ulcer. Was at [facility] only 2 days when was noted to be hypoxic (saturating low 70's) and EMS called. While awaiting EMS, floor nurse on the unit suctioned the patient, and managed to bring up a very large mucus plug, with pt's saturation immediately improving afterwards to 80's. Nonetheless pt was sent to [facility] ED. Prior to all this pt was independent and ambulatory. Pt denies sob, cp, abd pain, n/v/d, fever or chills.     On arrival to ED pt was saturating 97%, 15L O2 via tracheostomy collar (baseline 8 L at [facility]) VS:    /59  T 99.9F RR 20  Admission VBG pH 7.37 pCO2 60 pO2 49. Pt not wheezing, not coughing. CXR suspicious for L-sided PNA, inconclusive. CTA neg for PE. Pt received x1 IV Levoquin and Cefepime in ED, admitted to medicine for acute hypoxic hypercapnic respiratory failure secondary to acute mucus plug (now resolved vs superimposed pneumonia  hospital-acquired.(less likely), Records request sent to [facility] medical records  [phone]) and patient will be continued on empiric antibiotics pending procal (27 May 2022 14:02)      PAST MEDICAL & SURGICAL HISTORY  COPD (chronic obstructive pulmonary disease)    CHF (congestive heart failure)    DM (diabetes mellitus)    H/O tracheostomy      ALLERGIES:  penicillin (Swelling)    MEDICATIONS:  HOME MEDICATIONS  albuterol sulfate 2.5 mg/3 mL (0.083 %) solution for nebulization:   ALPRAZolam 0.5 mg oral tablet: 1 tab(s) orally every 12 hours, As needed, anxiety via PEG tube  amLODIPine 5 mg oral tablet: 1 tab(s) orally once a day via PEG tube  apixaban 5 mg oral tablet: 1 tab(s) orally 2 times a day via PEG tube  atorvastatin 20 mg oral tablet: 1 tab(s) orally once a day (at bedtime) via PEG tube  bumetanide 2 mg oral tablet: 1 tab(s) orally once a day via PEG tube  gabapentin 600 mg oral tablet: 1 tab(s) orally 3 times a day via PEG tube  HYDROmorphone 4 mg oral tablet: 1 tab(s) orally every 8 hours, As needed, for severe pain  insulin glargine 100 units/mL subcutaneous solution: 20 unit(s) subcutaneous once a day (at bedtime)  insulin lispro 100 units/mL injectable solution: 9 unit(s) injectable 3 times a day (before meals)  losartan 100 mg oral tablet: 1 tab(s) orally once a day via PEG tube  Multiple Vitamins oral tablet: 1 tab(s) orally once a day via PEG tube  pantoprazole 40 mg oral delayed release tablet: 1 tab(s) orally once a day (before a meal) via PEG tube  polyethylene glycol 3350 oral powder for reconstitution: 17 gram(s) orally once a day via PEG tube  senna leaf extract oral tablet: 2 tab(s) orally once a day (at bedtime) via PEG tube  Trelegy Ellipta 100 mcg-62.5 mcg-25 mcg powder for inhalation:     STANDING MEDICATIONS  acetaminophen     Tablet .. 650 milliGRAM(s) Oral every 8 hours  ammonium lactate 12% Lotion 1 Application(s) Topical every 12 hours  apixaban 5 milliGRAM(s) Oral two times a day  atorvastatin 20 milliGRAM(s) Oral at bedtime  cefTRIAXone   IVPB 2000 milliGRAM(s) IV Intermittent every 24 hours  dextrose 5%. 1000 milliLiter(s) IV Continuous <Continuous>  dextrose 5%. 1000 milliLiter(s) IV Continuous <Continuous>  dextrose 50% Injectable 25 Gram(s) IV Push once  dextrose 50% Injectable 12.5 Gram(s) IV Push once  dextrose 50% Injectable 25 Gram(s) IV Push once  diatrizoate meglumine/diatrizoate sodium. 30 milliLiter(s) Oral once  gabapentin 600 milliGRAM(s) Oral three times a day  glucagon  Injectable 1 milliGRAM(s) IntraMuscular once  influenza   Vaccine 0.5 milliLiter(s) IntraMuscular once  insulin glargine Injectable (LANTUS) 20 Unit(s) SubCutaneous at bedtime  insulin lispro (ADMELOG) corrective regimen sliding scale   SubCutaneous three times a day before meals  insulin lispro Injectable (ADMELOG) 9 Unit(s) SubCutaneous every 6 hours  multivitamin 1 Tablet(s) Oral daily  pantoprazole    Tablet 40 milliGRAM(s) Oral before breakfast  polyethylene glycol 3350 17 Gram(s) Oral daily  senna 2 Tablet(s) Oral at bedtime  triamcinolone 0.1% Cream 1 Application(s) Topical every 12 hours    PRN MEDICATIONS  albuterol/ipratropium for Nebulization 3 milliLiter(s) Nebulizer every 6 hours PRN  ALPRAZolam 0.5 milliGRAM(s) Oral every 12 hours PRN  dextrose Oral Gel 15 Gram(s) Oral once PRN  HYDROmorphone  Injectable 1 milliGRAM(s) IV Push every 6 hours PRN    VITALS:   T(C): 36.8 (07-01-22 @ 04:58), Max: 36.8 (07-01-22 @ 04:58)  T(F): 98.3 (07-01-22 @ 04:58), Max: 98.3 (07-01-22 @ 04:58)  HR: 82 (07-01-22 @ 04:58) (78 - 82)  BP: 143/63 (07-01-22 @ 04:58) (123/45 - 143/63)  BP(mean): --  ABP: --  ABP(mean): --  RR: 18 (07-01-22 @ 04:58) (18 - 18)  SpO2: 100% (06-30-22 @ 20:46) (100% - 100%)  LABS:                        8.2    9.11  )-----------( 170      ( 01 Jul 2022 06:28 )             23.8     07-01    133<L>  |  87<L>  |  11  ----------------------------<  89  4.3   |  37<H>  |  <0.5<L>    Ca    9.4      01 Jul 2022 06:28    TPro  6.0  /  Alb  3.2<L>  /  TBili  0.4  /  DBili  x   /  AST  18  /  ALT  16  /  AlkPhos  126<H>  07-01        I&O's Detail    30 Jun 2022 07:01  -  01 Jul 2022 07:00  --------------------------------------------------------  IN:    Enteral Tube Flush: 60 mL    Glucerna: 600 mL  Total IN: 660 mL    OUT:  Total OUT: 0 mL    Total NET: 660 mL          Troponin T, Serum: <0.01 ng/mL (06-30-22 @ 12:40)        Culture - Urine (collected 29 Jun 2022 05:55)  Source: Clean Catch Clean Catch (Midstream)  Final Report (30 Jun 2022 21:38):    <10,000 CFU/mL Normal Urogenital Chante    Culture - Sputum (collected 28 Jun 2022 10:20)  Source: Trach Asp Tracheal Aspirate  Gram Stain (28 Jun 2022 23:39):    Numerous polymorphonuclear leukocytes per low power field    Few Squamous epithelial cells per low power field    Few Gram positive cocci in pairs per oil power field    Moderate Gram Negative Coccobacilli per oil power field  Final Report (30 Jun 2022 17:34):    Rare Pseudomonas aeruginosa (Carbapenem Resistant)    Normal Respiratory Chante present  Organism: Pseudomonas aeruginosa (Carbapenem Resistant) (30 Jun 2022 17:34)  Organism: Pseudomonas aeruginosa (Carbapenem Resistant) (30 Jun 2022 17:34)      CARDIAC MARKERS ( 30 Jun 2022 12:40 )  x     / <0.01 ng/mL / x     / x     / x          RADIOLOGY:  EKG  12 Lead ECG:   Ventricular Rate 92 BPM    Atrial Rate 92 BPM    P-R Interval 168 ms    QRS Duration 98 ms    Q-T Interval 370 ms    QTC Calculation(Bazett) 457 ms    P Axis 84 degrees    R Axis 103 degrees    T Axis 89 degrees    Diagnosis Line Sinus rhythm with Premature supraventricular complexes  Rightward axis  Low voltage QRS  Incomplete right bundle branch block  Cannot rule out Anterior infarct , age undetermined  T wave abnormality, consider lateral ischemia  Abnormal ECG    Confirmed by Joe Person ([id number]) on 6/30/2022 7:36:49 AM (06-30-22 @ 04:43)  12 Lead ECG:   Ventricular Rate 89 BPM    Atrial Rate 89 BPM    P-R Interval 162 ms    QRS Duration 98 ms    Q-T Interval 372 ms    QTC Calculation(Bazett) 452 ms    P Axis 90 degrees    R Axis 102 degrees    T Axis 90 degrees    Diagnosis Line Normal sinus rhythm  Rightward axis  Incomplete right bundle branch block  Anterior infarct , age undetermined  ST & T wave abnormality, consider lateral ischemia  Abnormal ECG    Confirmed by Joe Person ([id number]) on 6/30/2022 7:36:41 AM (06-30-22 @ 04:42)    Xray Chest 1 View- PORTABLE-Routine:   ACC: [id number] EXAM:  XR CHEST PORTABLE ROUTINE 1V                          PROCEDURE DATE:  06/26/2022          INTERPRETATION:  Clinical History / Reason for exam: Chest tightness    Comparison : Chest radiograph 6/19/2022.    Technique/Positioning: Single AP chest radiograph.    Findings:    Support devices: Stable tracheostomy tube.    Cardiac/mediastinum/hilum: Calcified thoracic aorta.    Lung parenchyma/Pleura: Left basilar opacity. No pneumothorax.    Skeleton/soft tissues: Resolution offree intraperitoneal air.    Impression:    Stable left basilar opacity.        --- End of Report ---            CARISSA RIVERA MD; Attending Radiologist  This document has been electronically signed. Jun 27 2022  9:00AM (06-26-22 @ 20:55)    Physical Exam:  General: no acute distress, lying in bed  Head: normocephalic and atraumatic  Neck: supple  Heart: regular rate and rhythm, S1 and S2 normal, no murmurs, rubs or gallops noted on exam  Lungs: Symmetric chest expansion bilaterally, clear lungs bilaterally, no wheezes rhonchi or crackles heard  Abdomen: Bowel sounds present, non tender on light and deep palpation  Extremities: No edema, no clubbing or cyanosis notes, positive peripheral pulses SUBJECTIVE:  Patient is a 56y old Female who presents with a chief complaint of Hypoxic respiratory failure (30 Jun 2022 08:57)   Acute respiratory failure with hypoxia     Today is hospital day 35d. There are no new issues or overnight events. Patient continues to feel severe pain in her bilateral legs and feet, will consult burn today to assess for possible biopsy.    HPI  HPI:  57 yo f with PMHx of DVT on Eliquis, COPD,  trach collar, obesity, IDDM, UTI, sent by [facility] for hypoxia. History provided by daughter at bedside, she reports the current illness going back to April 4th when pt was intubated on admission at [facility] ICU for hypoxic respiratory failure diagnosed with copd exacerbation and superimposed pneumonia, of note she also appears to have been in CHF exacerbation with severe b/l LE swelling treated with IV bumex. Remained intubated 37 days per daughter, diagnosed with fever of unknown origin (up to 106F), treated with empiric broad spectrum antibiotics and once 2 weeks s/p tracheostomy placement and 48 hours afebrile she was was discharged to nursing home for PT. Of note, patient's daughter and pt herself say the wiseman has not been replaced for over 3 weeks. She also reports a developing sacral pressure ulcer. Was at [facility] only 2 days when was noted to be hypoxic (saturating low 70's) and EMS called. While awaiting EMS, floor nurse on the unit suctioned the patient, and managed to bring up a very large mucus plug, with pt's saturation immediately improving afterwards to 80's. Nonetheless pt was sent to [facility] ED. Prior to all this pt was independent and ambulatory. Pt denies sob, cp, abd pain, n/v/d, fever or chills.     On arrival to ED pt was saturating 97%, 15L O2 via tracheostomy collar (baseline 8 L at [facility]) VS:    /59  T 99.9F RR 20  Admission VBG pH 7.37 pCO2 60 pO2 49. Pt not wheezing, not coughing. CXR suspicious for L-sided PNA, inconclusive. CTA neg for PE. Pt received x1 IV Levoquin and Cefepime in ED, admitted to medicine for acute hypoxic hypercapnic respiratory failure secondary to acute mucus plug (now resolved vs superimposed pneumonia  hospital-acquired.(less likely), Records request sent to [facility] medical records  [phone]) and patient will be continued on empiric antibiotics pending procal (27 May 2022 14:02)      PAST MEDICAL & SURGICAL HISTORY  COPD (chronic obstructive pulmonary disease)    CHF (congestive heart failure)    DM (diabetes mellitus)    H/O tracheostomy      ALLERGIES:  penicillin (Swelling)    MEDICATIONS:  HOME MEDICATIONS  albuterol sulfate 2.5 mg/3 mL (0.083 %) solution for nebulization:   ALPRAZolam 0.5 mg oral tablet: 1 tab(s) orally every 12 hours, As needed, anxiety via PEG tube  amLODIPine 5 mg oral tablet: 1 tab(s) orally once a day via PEG tube  apixaban 5 mg oral tablet: 1 tab(s) orally 2 times a day via PEG tube  atorvastatin 20 mg oral tablet: 1 tab(s) orally once a day (at bedtime) via PEG tube  bumetanide 2 mg oral tablet: 1 tab(s) orally once a day via PEG tube  gabapentin 600 mg oral tablet: 1 tab(s) orally 3 times a day via PEG tube  HYDROmorphone 4 mg oral tablet: 1 tab(s) orally every 8 hours, As needed, for severe pain  insulin glargine 100 units/mL subcutaneous solution: 20 unit(s) subcutaneous once a day (at bedtime)  insulin lispro 100 units/mL injectable solution: 9 unit(s) injectable 3 times a day (before meals)  losartan 100 mg oral tablet: 1 tab(s) orally once a day via PEG tube  Multiple Vitamins oral tablet: 1 tab(s) orally once a day via PEG tube  pantoprazole 40 mg oral delayed release tablet: 1 tab(s) orally once a day (before a meal) via PEG tube  polyethylene glycol 3350 oral powder for reconstitution: 17 gram(s) orally once a day via PEG tube  senna leaf extract oral tablet: 2 tab(s) orally once a day (at bedtime) via PEG tube  Trelegy Ellipta 100 mcg-62.5 mcg-25 mcg powder for inhalation:     STANDING MEDICATIONS  acetaminophen     Tablet .. 650 milliGRAM(s) Oral every 8 hours  ammonium lactate 12% Lotion 1 Application(s) Topical every 12 hours  apixaban 5 milliGRAM(s) Oral two times a day  atorvastatin 20 milliGRAM(s) Oral at bedtime  cefTRIAXone   IVPB 2000 milliGRAM(s) IV Intermittent every 24 hours  dextrose 5%. 1000 milliLiter(s) IV Continuous <Continuous>  dextrose 5%. 1000 milliLiter(s) IV Continuous <Continuous>  dextrose 50% Injectable 25 Gram(s) IV Push once  dextrose 50% Injectable 12.5 Gram(s) IV Push once  dextrose 50% Injectable 25 Gram(s) IV Push once  diatrizoate meglumine/diatrizoate sodium. 30 milliLiter(s) Oral once  gabapentin 600 milliGRAM(s) Oral three times a day  glucagon  Injectable 1 milliGRAM(s) IntraMuscular once  influenza   Vaccine 0.5 milliLiter(s) IntraMuscular once  insulin glargine Injectable (LANTUS) 20 Unit(s) SubCutaneous at bedtime  insulin lispro (ADMELOG) corrective regimen sliding scale   SubCutaneous three times a day before meals  insulin lispro Injectable (ADMELOG) 9 Unit(s) SubCutaneous every 6 hours  multivitamin 1 Tablet(s) Oral daily  pantoprazole    Tablet 40 milliGRAM(s) Oral before breakfast  polyethylene glycol 3350 17 Gram(s) Oral daily  senna 2 Tablet(s) Oral at bedtime  triamcinolone 0.1% Cream 1 Application(s) Topical every 12 hours    PRN MEDICATIONS  albuterol/ipratropium for Nebulization 3 milliLiter(s) Nebulizer every 6 hours PRN  ALPRAZolam 0.5 milliGRAM(s) Oral every 12 hours PRN  dextrose Oral Gel 15 Gram(s) Oral once PRN  HYDROmorphone  Injectable 1 milliGRAM(s) IV Push every 6 hours PRN    VITALS:   T(C): 36.8 (07-01-22 @ 04:58), Max: 36.8 (07-01-22 @ 04:58)  T(F): 98.3 (07-01-22 @ 04:58), Max: 98.3 (07-01-22 @ 04:58)  HR: 82 (07-01-22 @ 04:58) (78 - 82)  BP: 143/63 (07-01-22 @ 04:58) (123/45 - 143/63)  BP(mean): --  ABP: --  ABP(mean): --  RR: 18 (07-01-22 @ 04:58) (18 - 18)  SpO2: 100% (06-30-22 @ 20:46) (100% - 100%)  LABS:                        8.2    9.11  )-----------( 170      ( 01 Jul 2022 06:28 )             23.8     07-01    133<L>  |  87<L>  |  11  ----------------------------<  89  4.3   |  37<H>  |  <0.5<L>    Ca    9.4      01 Jul 2022 06:28    TPro  6.0  /  Alb  3.2<L>  /  TBili  0.4  /  DBili  x   /  AST  18  /  ALT  16  /  AlkPhos  126<H>  07-01        I&O's Detail    30 Jun 2022 07:01  -  01 Jul 2022 07:00  --------------------------------------------------------  IN:    Enteral Tube Flush: 60 mL    Glucerna: 600 mL  Total IN: 660 mL    OUT:  Total OUT: 0 mL    Total NET: 660 mL          Troponin T, Serum: <0.01 ng/mL (06-30-22 @ 12:40)        Culture - Urine (collected 29 Jun 2022 05:55)  Source: Clean Catch Clean Catch (Midstream)  Final Report (30 Jun 2022 21:38):    <10,000 CFU/mL Normal Urogenital Chante    Culture - Sputum (collected 28 Jun 2022 10:20)  Source: Trach Asp Tracheal Aspirate  Gram Stain (28 Jun 2022 23:39):    Numerous polymorphonuclear leukocytes per low power field    Few Squamous epithelial cells per low power field    Few Gram positive cocci in pairs per oil power field    Moderate Gram Negative Coccobacilli per oil power field  Final Report (30 Jun 2022 17:34):    Rare Pseudomonas aeruginosa (Carbapenem Resistant)    Normal Respiratory Chante present  Organism: Pseudomonas aeruginosa (Carbapenem Resistant) (30 Jun 2022 17:34)  Organism: Pseudomonas aeruginosa (Carbapenem Resistant) (30 Jun 2022 17:34)      CARDIAC MARKERS ( 30 Jun 2022 12:40 )  x     / <0.01 ng/mL / x     / x     / x          RADIOLOGY:  EKG  12 Lead ECG:   Ventricular Rate 92 BPM    Atrial Rate 92 BPM    P-R Interval 168 ms    QRS Duration 98 ms    Q-T Interval 370 ms    QTC Calculation(Bazett) 457 ms    P Axis 84 degrees    R Axis 103 degrees    T Axis 89 degrees    Diagnosis Line Sinus rhythm with Premature supraventricular complexes  Rightward axis  Low voltage QRS  Incomplete right bundle branch block  Cannot rule out Anterior infarct , age undetermined  T wave abnormality, consider lateral ischemia  Abnormal ECG    Confirmed by Joe Person ([id number]) on 6/30/2022 7:36:49 AM (06-30-22 @ 04:43)  12 Lead ECG:   Ventricular Rate 89 BPM    Atrial Rate 89 BPM    P-R Interval 162 ms    QRS Duration 98 ms    Q-T Interval 372 ms    QTC Calculation(Bazett) 452 ms    P Axis 90 degrees    R Axis 102 degrees    T Axis 90 degrees    Diagnosis Line Normal sinus rhythm  Rightward axis  Incomplete right bundle branch block  Anterior infarct , age undetermined  ST & T wave abnormality, consider lateral ischemia  Abnormal ECG    Confirmed by Joe Person ([id number]) on 6/30/2022 7:36:41 AM (06-30-22 @ 04:42)    Xray Chest 1 View- PORTABLE-Routine:   ACC: [id number] EXAM:  XR CHEST PORTABLE ROUTINE 1V                          PROCEDURE DATE:  06/26/2022          INTERPRETATION:  Clinical History / Reason for exam: Chest tightness    Comparison : Chest radiograph 6/19/2022.    Technique/Positioning: Single AP chest radiograph.    Findings:    Support devices: Stable tracheostomy tube.    Cardiac/mediastinum/hilum: Calcified thoracic aorta.    Lung parenchyma/Pleura: Left basilar opacity. No pneumothorax.    Skeleton/soft tissues: Resolution offree intraperitoneal air.    Impression:    Stable left basilar opacity.        --- End of Report ---            CARISSA RIVERA MD; Attending Radiologist  This document has been electronically signed. Jun 27 2022  9:00AM (06-26-22 @ 20:55)    Physical Exam:  General: no acute distress, lying in bed  Head: normocephalic and atraumatic  Neck: supple, trach intact and clean  Heart: regular rate and rhythm, S1 and S2 normal, no murmurs, rubs or gallops noted on exam  Lungs: Symmetric chest expansion bilaterally, clear lungs bilaterally, no wheezes rhonchi or crackles heard  Abdomen: Bowel sounds present, non tender on light and deep palpation  Extremities: No edema, no clubbing or cyanosis notes, positive peripheral pulses

## 2022-07-01 NOTE — PROGRESS NOTE ADULT - ASSESSMENT
57 yo female with PMHx of DVT on Eliquis, COPD,  trach collar, obesity, IDDM, UTI, sent [place] [facility] for hypoxia. Current illness going back to April 4th when pt was intubated on admission at [facility] ICU for hypoxic respiratory failure diagnosed with copd exacerbation and superimposed pneumonia, and remained intubated for 37 days requiring tracheostomy. Patient stayed at [facility] for 2 days until noted to be hypoxic (saturating low 70's) pt's saturation immediately improving afterwards to 80's after large mucus plug removed and was subsequently sent to [facility]  On arrival to ED pt was saturating 97%, 15L O2 via tracheostomy collar (baseline 8 L at [facility])CXR suspicious for L-sided PNA, inconclusive. CTA neg for PE. Pt received x1 IV Levaquin and Cefepime in ED, admitted to MICU for acute hypoxic hypercapnic respiratory failure secondary to acute mucus plug now resolved vs superimposed pneumonia hospital-acquired.   General Surgery Consulted emergently when patient found to be hypoxic to low 80s after CCU and Pulmonary team found large granuloma in trachea along tracheostomy tube. Patient currently had a size 8 trach. Surgery assisted Pulmonary and CCU with trach exchanged with ET tube guidance to a size 7 XLT. Saturations improved to high 90s. Large granuloma was removed. Patient was no longer in distress after exchange.    #sepsis  -6/28 Patient was noted to be tachy O/N, hypotensive, febrile to 102.8, FiO2 requirements were increased and the patient was desaturating to 82-86%  -blood cultures 6/29 positive for gram negative rods, serratia bacteremia  -on Ceftriaxone  -ID following  -F/u UA, trach aspirate and blood cultures  -6/30 WBC 11.21 (6/29 was 13.92 and 6/28 was 19.03)  -7/1 ID rec CT a/p    #Hypercarbic respiratory failure  -Maintaining sats 88%/decrease O2 titration  -f/u on pulm recs     #Acute/ chronic COPD with exacerbation (Resolved)  #mucous plug removed  #pneumonia vs atelectasis L base  - On 5/28, Pulmonary team found large granuloma in trachea along tracheostomy tube. Patient had a size 8 trach on admission. Surgery assisted Pulmonary and CCU with trach exchanged with ET tube guidance to a size 7 XLT. Saturations improved to high 90s.  - trach functioning well s/p exchange   - 6/11 Trach changed to Fenestrated 8 by surgery      #s/p PEG (6/15):   KUB showing free intraperitoneal air (6/19).  CT Abdo confirmed that this was just due to PEG tube adjustment.   Repeat G tube study per Surgery (6/21) -normal, restarted on peg feedings      #Dysphagia:  Strict NPO as of now per 's FEES eval from 6/13/22.   [facility] will sign out to  at [facility] for further plans with swallow rehab at [facility]     #LE neuropathic pain:  PAtient has apparently been bed bound since April 4th (her [facility] admission for hypoxic failure where she ended up being intubated)  Apparently has L > R foot drop which may be due to chronic contractures of Calf muscle/Achilles tendon. Can have PT evaluate her for that.  But options are limited at this stage.   Given the asymmetrical foot drop, Neurology wants to rule out Any focal neuropathy.  -6/23 s/p  MRI Lumbar spine w/ & w/o Contrast, mild degenerative changes  - chronic DJD no acute changes.  -continue pain management: currently on 1mg Dilaudid po q6 prn      #B/l LE Hyperpigmentation changes:   unclear etiology  Present since 1 year.   May be related to CHF related stasis dermatitis / hemosiderin deposition  vs deposition diseases versus Eosinophilic Dermatitis vs follicular hemorrhage from eliquis   Dermatology thinks it may be Eczema and advised Triamcinolone BId.   Add multivitamin to PEG QD.   -7/1 will consult burn for further recs    -On Senna    #Suspected epiglottis edema:   s/p DExa Taper (ended 6/18).   Outpatient ENT f/u.     #DM:   steroids ended 6/18.  6/19- Increased lantus from 17 to 20.    #Chronic Indwelling Singh  - 5/28 Ucx - Contaminated   - 5/28 Ucx- Normal perri   - 5/27 Ucx- Klebsiella (CRE) and pseudomonas   - s/p cefepime  - Currently afebrile w/ no urinary complaints   - episode of Hematouria 6/4 -> improving -> h/h Stable    #Hx of DVT  - on home Eliquis  - restarted Eliquis    Activity been bed bound. PT eval when able.   GI PPX  PPI  DVT PPX  ELIQUIS  Dispo: Acute  Pending:  d/c planning to [facility]

## 2022-07-01 NOTE — PROGRESS NOTE ADULT - ATTENDING COMMENTS
57 yo f with PMHx of DVT on Eliquis, COPD,  trach collar, obesity, IDDM, UTI, sent by [facility] for hypoxia. Current illness going back to April 4th when pt was intubated on admission at [facility] ICU for hypoxic respiratory failure diagnosed with copd exacerbation and superimposed pneumonia, and remained intubated for 37 days requiring tracheostomy. Patient stayed at [facility] for 2 days until noted to be hypoxic (saturating low 70's) pt's saturation immediately improving afterwards to 80's after large mucus plug removed and was subsequently sent to [facility]  On arrival to ED pt was saturating 97%, 15L O2 via tracheostomy collar (baseline 8 L at [facility])CXR suspicious for L-sided PNA, inconclusive. CTA neg for PE. Pt received x1 IV Levaquin and Cefepime in ED, admitted to MICU for acute hypoxic hypercapnic respiratory failure secondary to acute mucus plug now resolved vs superimposed pneumonia hospital-acquired.   General Surgery Consulted emergently when patient found to be hypoxic to low 80s after CCU and Pulmonary team found large granuloma in trachea along tracheostomy tube. Patient currently had a size 8 trach. Surgery assisted Pulmonary and CCU with trach exchanged with ET tube guidance to a size 7 XLT. Saturations improved to high 90s. Large granuloma was removed. Patient was no longer in distress after exchange.    #VDRF:  Vent management per Pulm.  s/p trach exchange  s/p mucus plugs removal this admission  Monitor oxygenation. Lately been saturating ok on T piece.    #sepsis (developed again on 6/27/22):   -6/28: hypotensive, febrile to 102.8  -UA +; blood cultures (6/28)= Serratia. Sens. noted  -Started on Cefepime and Vanc (6/28) > changed to CTX 2gm QD per ID (6/29) > f/u ID on duration.   - 6/30 blood culture NGTD, 6/29 urine culture NGTD  CT Abdo per ID to r/o PEG site cellulitis as source.   Hemodynamically stable lately    #s/p PEG (6/15):     #Dysphagia:  Strict NPO as of now per ST's FEES eval from 6/13/22.   [facility] ST will sign out to  at [facility] for further plans with swallow rehab at SNF     #LE neuropathic pain:  PAtient has apparently been bed bound since April 4th (her [facility] admission for hypoxic failure where she ended up being intubated)  Apparently has L > R foot drop which may be due to chronic contractures of Calf muscle/Achilles tendon.   Can have PT evaluate her for that. But options are limited at this stage.   Given the asymmetrical foot drop, Neurology ruled out Any focal neuropathy with a MRI Lumbar spine WNL.     Neuropathy is likely Diabetic?   But given the concomitant blackish discoloration from shin to dorsum of feet, Neurology is also suspecting deposition diseases versus Eosinophilic Dermatitis which could also explain the neuropathy?    #B/l LE Hyperpigmentation changes:   unclear etiology  Present since 1 year.   May be related to CHF related stasis dermatitis / hemosiderin deposition  vs deposition diseases versus Eosinophilic Dermatitis   Dermatology thinks it may be Eczema and advised Triamcinolone BId.   Reconsult Burn for biopsy since no improvement with Steroid cream.*    #Lower extremity Pain management*** :   earlier this admission was on Gabapentin to 600 TID, dilaudid  Currently just IV dilaudid 1 Q6 PRN    Note: On 6/21, we discontinued Cymbalta 60 QD since as per family patient may be having bizarre thoughts/hallucinations. Patient was texting them bizarre messages.   Note: Patient has h/o suicidal thoughts on Pregabalin.     Xanax 0.25 Q12 PRN for anxiety.   On Senna, Miralax.     #Suspected epiglottis edema:   s/p DExa Taper (ended 6/18).   Outpatient ENT f/u.     #DM:   steroids ended 6/18.  6/19- Increased lantus from 17 to 20.    #Hyponatremia- monitor  ?SiADH      #Chronic Indwelling Singh  - 5/28 Ucx - Contaminated   - 5/28 Ucx- Normal perri   - 5/27 Ucx- Klebsiella (CRE) and pseudomonas   - s/p cefepime  - send repeat U. Cx (6/29)    #Hx of DVT  - on home Eliquis  - restarted Eliquis    #Misc:  Activity been bed bound. PT eval when able.   GI PPX  PPI  DVT PPX  ELIQUIS  Dispo: Acute  Pending: sepsis treatment .

## 2022-07-02 LAB
ALBUMIN SERPL ELPH-MCNC: 3.4 G/DL — LOW (ref 3.5–5.2)
ALP SERPL-CCNC: 126 U/L — HIGH (ref 30–115)
ALT FLD-CCNC: 16 U/L — SIGNIFICANT CHANGE UP (ref 0–41)
ANION GAP SERPL CALC-SCNC: 11 MMOL/L — SIGNIFICANT CHANGE UP (ref 7–14)
AST SERPL-CCNC: 18 U/L — SIGNIFICANT CHANGE UP (ref 0–41)
BASOPHILS # BLD AUTO: 0.03 K/UL — SIGNIFICANT CHANGE UP (ref 0–0.2)
BASOPHILS NFR BLD AUTO: 0.3 % — SIGNIFICANT CHANGE UP (ref 0–1)
BILIRUB SERPL-MCNC: 0.4 MG/DL — SIGNIFICANT CHANGE UP (ref 0.2–1.2)
BUN SERPL-MCNC: 9 MG/DL — LOW (ref 10–20)
CALCIUM SERPL-MCNC: 9.6 MG/DL — SIGNIFICANT CHANGE UP (ref 8.5–10.1)
CHLORIDE SERPL-SCNC: 86 MMOL/L — LOW (ref 98–110)
CO2 SERPL-SCNC: 36 MMOL/L — HIGH (ref 17–32)
CREAT SERPL-MCNC: <0.5 MG/DL — LOW (ref 0.7–1.5)
EGFR: 124 ML/MIN/1.73M2 — SIGNIFICANT CHANGE UP
EOSINOPHIL # BLD AUTO: 0.08 K/UL — SIGNIFICANT CHANGE UP (ref 0–0.7)
EOSINOPHIL NFR BLD AUTO: 0.9 % — SIGNIFICANT CHANGE UP (ref 0–8)
GLUCOSE BLDC GLUCOMTR-MCNC: 118 MG/DL — HIGH (ref 70–99)
GLUCOSE BLDC GLUCOMTR-MCNC: 126 MG/DL — HIGH (ref 70–99)
GLUCOSE BLDC GLUCOMTR-MCNC: 133 MG/DL — HIGH (ref 70–99)
GLUCOSE BLDC GLUCOMTR-MCNC: 96 MG/DL — SIGNIFICANT CHANGE UP (ref 70–99)
GLUCOSE SERPL-MCNC: 131 MG/DL — HIGH (ref 70–99)
HCT VFR BLD CALC: 25.3 % — LOW (ref 37–47)
HGB BLD-MCNC: 8.7 G/DL — LOW (ref 12–16)
IMM GRANULOCYTES NFR BLD AUTO: 0.9 % — HIGH (ref 0.1–0.3)
LYMPHOCYTES # BLD AUTO: 1.35 K/UL — SIGNIFICANT CHANGE UP (ref 1.2–3.4)
LYMPHOCYTES # BLD AUTO: 15 % — LOW (ref 20.5–51.1)
MCHC RBC-ENTMCNC: 33.1 PG — HIGH (ref 27–31)
MCHC RBC-ENTMCNC: 34.4 G/DL — SIGNIFICANT CHANGE UP (ref 32–37)
MCV RBC AUTO: 96.2 FL — SIGNIFICANT CHANGE UP (ref 81–99)
MONOCYTES # BLD AUTO: 0.46 K/UL — SIGNIFICANT CHANGE UP (ref 0.1–0.6)
MONOCYTES NFR BLD AUTO: 5.1 % — SIGNIFICANT CHANGE UP (ref 1.7–9.3)
NEUTROPHILS # BLD AUTO: 6.98 K/UL — HIGH (ref 1.4–6.5)
NEUTROPHILS NFR BLD AUTO: 77.8 % — HIGH (ref 42.2–75.2)
NRBC # BLD: 0 /100 WBCS — SIGNIFICANT CHANGE UP (ref 0–0)
PLATELET # BLD AUTO: 195 K/UL — SIGNIFICANT CHANGE UP (ref 130–400)
POTASSIUM SERPL-MCNC: 4.6 MMOL/L — SIGNIFICANT CHANGE UP (ref 3.5–5)
POTASSIUM SERPL-SCNC: 4.6 MMOL/L — SIGNIFICANT CHANGE UP (ref 3.5–5)
PROT SERPL-MCNC: 6.3 G/DL — SIGNIFICANT CHANGE UP (ref 6–8)
RBC # BLD: 2.63 M/UL — LOW (ref 4.2–5.4)
RBC # FLD: 13.2 % — SIGNIFICANT CHANGE UP (ref 11.5–14.5)
SODIUM SERPL-SCNC: 133 MMOL/L — LOW (ref 135–146)
WBC # BLD: 8.98 K/UL — SIGNIFICANT CHANGE UP (ref 4.8–10.8)
WBC # FLD AUTO: 8.98 K/UL — SIGNIFICANT CHANGE UP (ref 4.8–10.8)

## 2022-07-02 PROCEDURE — 99232 SBSQ HOSP IP/OBS MODERATE 35: CPT

## 2022-07-02 PROCEDURE — 99233 SBSQ HOSP IP/OBS HIGH 50: CPT

## 2022-07-02 RX ORDER — HYDROMORPHONE HYDROCHLORIDE 2 MG/ML
2 INJECTION INTRAMUSCULAR; INTRAVENOUS; SUBCUTANEOUS EVERY 12 HOURS
Refills: 0 | Status: DISCONTINUED | OUTPATIENT
Start: 2022-07-02 | End: 2022-07-02

## 2022-07-02 RX ORDER — HYDROMORPHONE HYDROCHLORIDE 2 MG/ML
0.5 INJECTION INTRAMUSCULAR; INTRAVENOUS; SUBCUTANEOUS EVERY 8 HOURS
Refills: 0 | Status: DISCONTINUED | OUTPATIENT
Start: 2022-07-02 | End: 2022-07-04

## 2022-07-02 RX ORDER — HYDROMORPHONE HYDROCHLORIDE 2 MG/ML
2 INJECTION INTRAMUSCULAR; INTRAVENOUS; SUBCUTANEOUS EVERY 12 HOURS
Refills: 0 | Status: DISCONTINUED | OUTPATIENT
Start: 2022-07-02 | End: 2022-07-03

## 2022-07-02 RX ADMIN — Medication 9 UNIT(S): at 06:19

## 2022-07-02 RX ADMIN — APIXABAN 5 MILLIGRAM(S): 2.5 TABLET, FILM COATED ORAL at 17:10

## 2022-07-02 RX ADMIN — Medication 1 APPLICATION(S): at 17:10

## 2022-07-02 RX ADMIN — HYDROMORPHONE HYDROCHLORIDE 2 MILLIGRAM(S): 2 INJECTION INTRAMUSCULAR; INTRAVENOUS; SUBCUTANEOUS at 15:55

## 2022-07-02 RX ADMIN — Medication 1 APPLICATION(S): at 05:48

## 2022-07-02 RX ADMIN — Medication 650 MILLIGRAM(S): at 21:44

## 2022-07-02 RX ADMIN — POLYETHYLENE GLYCOL 3350 17 GRAM(S): 17 POWDER, FOR SOLUTION ORAL at 13:12

## 2022-07-02 RX ADMIN — Medication 650 MILLIGRAM(S): at 05:46

## 2022-07-02 RX ADMIN — Medication 1 APPLICATION(S): at 05:47

## 2022-07-02 RX ADMIN — Medication 3 MILLILITER(S): at 21:29

## 2022-07-02 RX ADMIN — HYDROMORPHONE HYDROCHLORIDE 1 MILLIGRAM(S): 2 INJECTION INTRAMUSCULAR; INTRAVENOUS; SUBCUTANEOUS at 22:11

## 2022-07-02 RX ADMIN — HYDROMORPHONE HYDROCHLORIDE 1 MILLIGRAM(S): 2 INJECTION INTRAMUSCULAR; INTRAVENOUS; SUBCUTANEOUS at 07:53

## 2022-07-02 RX ADMIN — GABAPENTIN 600 MILLIGRAM(S): 400 CAPSULE ORAL at 05:46

## 2022-07-02 RX ADMIN — Medication 9 UNIT(S): at 23:22

## 2022-07-02 RX ADMIN — CEFTRIAXONE 100 MILLIGRAM(S): 500 INJECTION, POWDER, FOR SOLUTION INTRAMUSCULAR; INTRAVENOUS at 21:45

## 2022-07-02 RX ADMIN — HYDROMORPHONE HYDROCHLORIDE 1 MILLIGRAM(S): 2 INJECTION INTRAMUSCULAR; INTRAVENOUS; SUBCUTANEOUS at 22:26

## 2022-07-02 RX ADMIN — Medication 650 MILLIGRAM(S): at 14:55

## 2022-07-02 RX ADMIN — HYDROMORPHONE HYDROCHLORIDE 2 MILLIGRAM(S): 2 INJECTION INTRAMUSCULAR; INTRAVENOUS; SUBCUTANEOUS at 15:43

## 2022-07-02 RX ADMIN — Medication 0.5 MILLIGRAM(S): at 17:08

## 2022-07-02 RX ADMIN — Medication 0.5 MILLIGRAM(S): at 05:56

## 2022-07-02 RX ADMIN — ATORVASTATIN CALCIUM 20 MILLIGRAM(S): 80 TABLET, FILM COATED ORAL at 21:45

## 2022-07-02 RX ADMIN — Medication 1 TABLET(S): at 13:08

## 2022-07-02 RX ADMIN — Medication 650 MILLIGRAM(S): at 13:09

## 2022-07-02 RX ADMIN — HYDROMORPHONE HYDROCHLORIDE 1 MILLIGRAM(S): 2 INJECTION INTRAMUSCULAR; INTRAVENOUS; SUBCUTANEOUS at 01:34

## 2022-07-02 RX ADMIN — SENNA PLUS 2 TABLET(S): 8.6 TABLET ORAL at 21:48

## 2022-07-02 RX ADMIN — Medication 650 MILLIGRAM(S): at 22:15

## 2022-07-02 RX ADMIN — Medication 650 MILLIGRAM(S): at 06:16

## 2022-07-02 RX ADMIN — PANTOPRAZOLE SODIUM 40 MILLIGRAM(S): 20 TABLET, DELAYED RELEASE ORAL at 05:48

## 2022-07-02 RX ADMIN — GABAPENTIN 600 MILLIGRAM(S): 400 CAPSULE ORAL at 13:08

## 2022-07-02 RX ADMIN — APIXABAN 5 MILLIGRAM(S): 2.5 TABLET, FILM COATED ORAL at 05:46

## 2022-07-02 RX ADMIN — GABAPENTIN 600 MILLIGRAM(S): 400 CAPSULE ORAL at 21:45

## 2022-07-02 RX ADMIN — INSULIN GLARGINE 20 UNIT(S): 100 INJECTION, SOLUTION SUBCUTANEOUS at 23:21

## 2022-07-02 NOTE — CONSULT NOTE ADULT - CONSULT REQUESTED DATE/TIME
20-Jun-2022 10:42
02-Jul-2022 10:00
14-Jun-2022 12:33
16-Jun-2022 16:45
17-Jun-2022 12:42
21-Jun-2022 14:10
28-May-2022 11:30
29-Jun-2022 12:58
13-Jun-2022 13:16
02-Jun-2022 15:00
20-Jun-2022 12:26
28-May-2022 07:01
02-Jun-2022 16:25

## 2022-07-02 NOTE — PROGRESS NOTE ADULT - ASSESSMENT
55 yo f with PMHx of DVT on Eliquis, COPD,  trach collar, obesity, IDDM, UTI, sent by [facility] for hypoxia. Current illness going back to April 4th when pt was intubated on admission at [facility] ICU for hypoxic respiratory failure diagnosed with copd exacerbation and superimposed pneumonia, and remained intubated for 37 days requiring tracheostomy. Patient stayed at [facility] for 2 days until noted to be hypoxic (saturating low 70's) pt's saturation immediately improving afterwards to 80's after large mucus plug removed and was subsequently sent to [facility]  On arrival to ED pt was saturating 97%, 15L O2 via tracheostomy collar (baseline 8 L at [facility])CXR suspicious for L-sided PNA, inconclusive. CTA neg for PE. Pt received x1 IV Levaquin and Cefepime in ED, admitted to MICU for acute hypoxic hypercapnic respiratory failure secondary to acute mucus plug now resolved vs superimposed pneumonia hospital-acquired.   General Surgery Consulted emergently when patient found to be hypoxic to low 80s after CCU and Pulmonary team found large granuloma in trachea along tracheostomy tube. Patient currently had a size 8 trach. Surgery assisted Pulmonary and CCU with trach exchanged with ET tube guidance to a size 7 XLT. Saturations improved to high 90s. Large granuloma was removed. Patient was no longer in distress after exchange.    #VDRF:  Vent management per Pulm.  s/p trach exchange  s/p mucus plugs removal this admission  Monitor oxygenation. Lately been saturating ok on T piece.    #sepsis (developed again on 6/27/22):   -6/28: hypotensive, febrile to 102.8  -UA +; blood cultures (6/28)= Serratia. Sens. noted  -Started on Cefepime and Vanc (6/28) > changed to CTX 2gm QD per ID (6/29) > f/u ID on duration.   - 6/30 blood culture NGTD, 6/29 urine culture NGTD  CT Abdo per ID to r/o PEG site cellulitis as source.   Hemodynamically stable lately    #s/p PEG (6/15):     #Dysphagia:  Strict NPO as of now per ST's FEES eval from 6/13/22.   [facility] ST will sign out to  at [facility] for further plans with swallow rehab at SNF     #LE neuropathic pain:  PAtient has apparently been bed bound since April 4th (her [facility] admission for hypoxic failure where she ended up being intubated)  Apparently has L > R foot drop which may be due to chronic contractures of Calf muscle/Achilles tendon.   Can have PT evaluate her for that. But options are limited at this stage.   Given the asymmetrical foot drop, Neurology ruled out Any focal neuropathy with a MRI Lumbar spine WNL.     Neuropathy is likely Diabetic?   But given the concomitant blackish discoloration from shin to dorsum of feet, Neurology is also suspecting deposition diseases versus Eosinophilic Dermatitis which could also explain the neuropathy?    #B/l LE Hyperpigmentation changes:   unclear etiology  Present since 1 year.   May be related to CHF related stasis dermatitis / hemosiderin deposition  vs deposition diseases versus Eosinophilic Dermatitis   Dermatology thinks it may be Eczema and advised Triamcinolone BId> no improvement.   LWC with Betamethasone cream and Ammonium lactate dressings per Burn    #Lower extremity Pain management*** :   earlier this admission was on Gabapentin to 600 TID, dilaudid  Currently just IV dilaudid 1 Q6 PRN + additional 0.5 Q8 PRN for breakthroughs.    Note: On 6/21, we discontinued Cymbalta 60 QD since as per family patient may be having bizarre thoughts/hallucinations. Patient was texting them bizarre messages.   Note: Patient has h/o suicidal thoughts on Pregabalin.     Xanax 0.25 Q12 PRN for anxiety.   On Senna, Miralax.     #Suspected epiglottis edema:   s/p DExa Taper (ended 6/18).   Outpatient ENT f/u.     #DM:   steroids ended 6/18.  6/19- Increased lantus from 17 to 20.    #Hyponatremia- monitor  ?SiADH      #Chronic Indwelling Singh  - 5/28 Ucx - Contaminated   - 5/28 Ucx- Normal perri   - 5/27 Ucx- Klebsiella (CRE) and pseudomonas   - s/p cefepime  - send repeat U. Cx (6/29)    #Hx of DVT  - on home Eliquis  - restarted Eliquis    #Misc:  Activity been bed bound. PT eval when able.   GI PPX  PPI  DVT PPX  ELIQUIS  Dispo: Acute  Pending: sepsis treatment .   Start PT next week if pain controlled

## 2022-07-02 NOTE — CONSULT NOTE ADULT - TIME BILLING
wound eval and treat
I have personally seen and examined this patient.    I have reviewed all pertinent clinical information and reviewed all relevant imaging and diagnostic studies personally.   I counseled the patient about diagnostic testing and treatment plan. All questions were answered.   I discussed recommendations with the primary team.
Coordination of care

## 2022-07-02 NOTE — CONSULT NOTE ADULT - SUBJECTIVE AND OBJECTIVE BOX
56y  Female  HPI:  55 yo f with PMHx of DVT on Eliquis, COPD,  trach collar, obesity, IDDM, UTI, sent by [facility] for hypoxia. History provided by daughter at bedside, she reports the current illness going back to April 4th when pt was intubated on admission at [facility] ICU for hypoxic respiratory failure diagnosed with copd exacerbation and superimposed pneumonia, of note she also appears to have been in CHF exacerbation with severe b/l LE swelling treated with IV bumex. Remained intubated 37 days per daughter, diagnosed with fever of unknown origin (up to 106F), treated with empiric broad spectrum antibiotics and once 2 weeks s/p tracheostomy placement and 48 hours afebrile she was was discharged to nursing home for PT. Of note, patient's daughter and pt herself say the wiseman has not been replaced for over 3 weeks. She also reports a developing sacral pressure ulcer. Was at [facility] only 2 days when was noted to be hypoxic (saturating low 70's) and EMS called. While awaiting EMS, floor nurse on the unit suctioned the patient, and managed to bring up a very large mucus plug, with pt's saturation immediately improving afterwards to 80's. Nonetheless pt was sent to [facility] ED. Prior to all this pt was independent and ambulatory. Pt denies sob, cp, abd pain, n/v/d, fever or chills.     On arrival to ED pt was saturating 97%, 15L O2 via tracheostomy collar (baseline 8 L at [facility]) VS:    /59  T 99.9F RR 20  Admission VBG pH 7.37 pCO2 60 pO2 49. Pt not wheezing, not coughing. CXR suspicious for L-sided PNA, inconclusive. CTA neg for PE. Pt received x1 IV Levoquin and Cefepime in ED, admitted to medicine for acute hypoxic hypercapnic respiratory failure secondary to acute mucus plug (now resolved vs superimposed pneumonia  hospital-acquired.(less likely), Records request sent to [facility] medical records  [phone]) and patient will be continued on empiric antibiotics pending procal (27 May 2022 14:02)    Hospital course***  Allergies    penicillin (Swelling)    Intolerances      PAST MEDICAL & SURGICAL HISTORY:  COPD (chronic obstructive pulmonary disease)      CHF (congestive heart failure)      DM (diabetes mellitus)      H/O tracheostomy          Labs:                        8.7    8.98  )-----------( 195      ( 02 Jul 2022 06:21 )             25.3         PE:  PHYSICAL EXAM: AAO x 3  b/l LE skin hyperpigmentation, with rubor and tenderness and dry brawny skin to b/l feet

## 2022-07-02 NOTE — CONSULT NOTE ADULT - CONSULT REASON
Bilateral axilla hydradenitis
dyspnea
lower extremity hyperpigmentation
B/L LE hyperpigmentation, pain
Bilateral leg pain
Difficult Airway
Left foot drop
Serratia bacteremia
b/l LE stasis
epiglottis swelling on barium swallow
PEG tube
Pneumoperitoneum
leg pains

## 2022-07-02 NOTE — CONSULT NOTE ADULT - PROVIDER SPECIALTY LIST ADULT
Burn
Surgery
Vascular Surgery
Dermatology
Burn
Burn
Infectious Disease
Neurology
Thoracic Surgery
Gastroenterology
Pulmonology
ENT
Pain Medicine

## 2022-07-02 NOTE — CONSULT NOTE ADULT - CONSULT REQUESTED BY NAME
Dr. Guillory
MEDICINE
Dr. Guillory
hospitalist
CCU
Dr Blair
Dr Blair
Medicine
Medicine
Primary medical team
medicine
Gabo, Chrissie
Dr. Delgado

## 2022-07-02 NOTE — CONSULT NOTE ADULT - ASSESSMENT
ASSESSMENT:  skin changes s/p stasis dermatitis    RECOMMENDATION:  Wound care  Diprolene Cream then Lachydrin Cream them Xeroform/ Kerlex/ACE Wrap QD to b/l Legs  No Surgical debridement   Elevation

## 2022-07-02 NOTE — PROGRESS NOTE ADULT - SUBJECTIVE AND OBJECTIVE BOX
S: speaking ok with speaking valve.       All other pertinent ROS negative.      Vital Signs Last 24 Hrs  T(C): 36.3 (02 Jul 2022 14:00), Max: 36.5 (01 Jul 2022 20:43)  T(F): 97.3 (02 Jul 2022 14:00), Max: 97.7 (01 Jul 2022 20:43)  HR: 82 (02 Jul 2022 14:00) (82 - 88)  BP: 128/62 (02 Jul 2022 14:00) (124/57 - 128/62)  BP(mean): --  RR: 18 (02 Jul 2022 14:00) (18 - 18)  SpO2: 98% (02 Jul 2022 14:00) (97% - 100%)  PHYSICAL EXAM:    Constitutional: NAD, awake and alert,   HEENT: PERR, EOMI, Normal Hearing, MMM  Neck: Soft and supple, No LAD, No JVD  Respiratory: Breath sounds are clear bilaterally, No wheezing, rales or rhonchi  Cardiovascular: S1 and S2, regular rate and rhythm, no Murmurs, gallops or rubs  Gastrointestinal: Bowel Sounds present, soft, nontender, nondistended, no guarding, no rebound  Extremities: unchanged black discoloration with peeling of skin at soles.       MEDICATIONS:  MEDICATIONS  (STANDING):  acetaminophen     Tablet .. 650 milliGRAM(s) Oral every 8 hours  ammonium lactate 12% Lotion 1 Application(s) Topical every 12 hours  apixaban 5 milliGRAM(s) Oral two times a day  atorvastatin 20 milliGRAM(s) Oral at bedtime  betamethasone valerate 0.1% Cream 1 Application(s) Topical two times a day  cefTRIAXone   IVPB 2000 milliGRAM(s) IV Intermittent every 24 hours  dextrose 5%. 1000 milliLiter(s) (100 mL/Hr) IV Continuous <Continuous>  dextrose 5%. 1000 milliLiter(s) (50 mL/Hr) IV Continuous <Continuous>  dextrose 50% Injectable 25 Gram(s) IV Push once  dextrose 50% Injectable 12.5 Gram(s) IV Push once  dextrose 50% Injectable 25 Gram(s) IV Push once  diatrizoate meglumine/diatrizoate sodium. 30 milliLiter(s) Oral once  gabapentin 600 milliGRAM(s) Oral three times a day  glucagon  Injectable 1 milliGRAM(s) IntraMuscular once  HYDROmorphone   Tablet 2 milliGRAM(s) Oral every 12 hours  influenza   Vaccine 0.5 milliLiter(s) IntraMuscular once  insulin glargine Injectable (LANTUS) 20 Unit(s) SubCutaneous at bedtime  insulin lispro (ADMELOG) corrective regimen sliding scale   SubCutaneous three times a day before meals  insulin lispro Injectable (ADMELOG) 9 Unit(s) SubCutaneous every 6 hours  multivitamin 1 Tablet(s) Oral daily  pantoprazole    Tablet 40 milliGRAM(s) Oral before breakfast  polyethylene glycol 3350 17 Gram(s) Oral daily  senna 2 Tablet(s) Oral at bedtime  triamcinolone 0.1% Cream 1 Application(s) Topical every 12 hours      LABS: All Labs Reviewed:                        8.7    8.98  )-----------( 195      ( 02 Jul 2022 06:21 )             25.3     07-02    133<L>  |  86<L>  |  9<L>  ----------------------------<  131<H>  4.6   |  36<H>  |  <0.5<L>    Ca    9.6      02 Jul 2022 06:21    TPro  6.3  /  Alb  3.4<L>  /  TBili  0.4  /  DBili  x   /  AST  18  /  ALT  16  /  AlkPhos  126<H>  07-02          Blood Culture: 06-30 @ 06:20  Organism --  Gram Stain Blood -- Gram Stain --  Specimen Source .Blood None  Culture-Blood --    06-29 @ 05:55  Organism --  Gram Stain Blood -- Gram Stain --  Specimen Source Clean Catch Clean Catch (Midstream)  Culture-Blood --    06-28 @ 10:20  Organism Pseudomonas aeruginosa (Carbapenem Resistant)  Gram Stain Blood -- Gram Stain   Numerous polymorphonuclear leukocytes per low power field  Few Squamous epithelial cells per low power field  Few Gram positive cocci in pairs per oil power field  Moderate Gram Negative Coccobacilli per oil power field  Specimen Source Trach Asp Tracheal Aspirate  Culture-Blood --    06-28 @ 06:02  Organism Blood Culture PCR  Gram Stain Blood -- Gram Stain   Growth in aerobic bottle: Gram Negative Rods  Growth in anaerobic bottle: Gram Negative Rods  Specimen Source .Blood Blood-Peripheral  Culture-Blood --        Radiology: reviewed

## 2022-07-03 LAB
ALBUMIN SERPL ELPH-MCNC: 3.3 G/DL — LOW (ref 3.5–5.2)
ALP SERPL-CCNC: 124 U/L — HIGH (ref 30–115)
ALT FLD-CCNC: 17 U/L — SIGNIFICANT CHANGE UP (ref 0–41)
ANION GAP SERPL CALC-SCNC: 9 MMOL/L — SIGNIFICANT CHANGE UP (ref 7–14)
AST SERPL-CCNC: 22 U/L — SIGNIFICANT CHANGE UP (ref 0–41)
BASOPHILS # BLD AUTO: 0.03 K/UL — SIGNIFICANT CHANGE UP (ref 0–0.2)
BASOPHILS NFR BLD AUTO: 0.3 % — SIGNIFICANT CHANGE UP (ref 0–1)
BILIRUB SERPL-MCNC: 0.3 MG/DL — SIGNIFICANT CHANGE UP (ref 0.2–1.2)
BUN SERPL-MCNC: 9 MG/DL — LOW (ref 10–20)
CALCIUM SERPL-MCNC: 9.2 MG/DL — SIGNIFICANT CHANGE UP (ref 8.5–10.1)
CHLORIDE SERPL-SCNC: 91 MMOL/L — LOW (ref 98–110)
CO2 SERPL-SCNC: 36 MMOL/L — HIGH (ref 17–32)
CREAT SERPL-MCNC: <0.5 MG/DL — LOW (ref 0.7–1.5)
EGFR: 124 ML/MIN/1.73M2 — SIGNIFICANT CHANGE UP
EOSINOPHIL # BLD AUTO: 0.2 K/UL — SIGNIFICANT CHANGE UP (ref 0–0.7)
EOSINOPHIL NFR BLD AUTO: 2.1 % — SIGNIFICANT CHANGE UP (ref 0–8)
GLUCOSE BLDC GLUCOMTR-MCNC: 103 MG/DL — HIGH (ref 70–99)
GLUCOSE BLDC GLUCOMTR-MCNC: 103 MG/DL — HIGH (ref 70–99)
GLUCOSE BLDC GLUCOMTR-MCNC: 115 MG/DL — HIGH (ref 70–99)
GLUCOSE BLDC GLUCOMTR-MCNC: 90 MG/DL — SIGNIFICANT CHANGE UP (ref 70–99)
GLUCOSE SERPL-MCNC: 81 MG/DL — SIGNIFICANT CHANGE UP (ref 70–99)
HCT VFR BLD CALC: 26.9 % — LOW (ref 37–47)
HGB BLD-MCNC: 9.2 G/DL — LOW (ref 12–16)
IMM GRANULOCYTES NFR BLD AUTO: 1.1 % — HIGH (ref 0.1–0.3)
LYMPHOCYTES # BLD AUTO: 1.49 K/UL — SIGNIFICANT CHANGE UP (ref 1.2–3.4)
LYMPHOCYTES # BLD AUTO: 15.4 % — LOW (ref 20.5–51.1)
MCHC RBC-ENTMCNC: 33.1 PG — HIGH (ref 27–31)
MCHC RBC-ENTMCNC: 34.2 G/DL — SIGNIFICANT CHANGE UP (ref 32–37)
MCV RBC AUTO: 96.8 FL — SIGNIFICANT CHANGE UP (ref 81–99)
MONOCYTES # BLD AUTO: 0.6 K/UL — SIGNIFICANT CHANGE UP (ref 0.1–0.6)
MONOCYTES NFR BLD AUTO: 6.2 % — SIGNIFICANT CHANGE UP (ref 1.7–9.3)
NEUTROPHILS # BLD AUTO: 7.24 K/UL — HIGH (ref 1.4–6.5)
NEUTROPHILS NFR BLD AUTO: 74.9 % — SIGNIFICANT CHANGE UP (ref 42.2–75.2)
NRBC # BLD: 0 /100 WBCS — SIGNIFICANT CHANGE UP (ref 0–0)
PLATELET # BLD AUTO: 185 K/UL — SIGNIFICANT CHANGE UP (ref 130–400)
POTASSIUM SERPL-MCNC: 4.1 MMOL/L — SIGNIFICANT CHANGE UP (ref 3.5–5)
POTASSIUM SERPL-SCNC: 4.1 MMOL/L — SIGNIFICANT CHANGE UP (ref 3.5–5)
PROT SERPL-MCNC: 6 G/DL — SIGNIFICANT CHANGE UP (ref 6–8)
RBC # BLD: 2.78 M/UL — LOW (ref 4.2–5.4)
RBC # FLD: 13.5 % — SIGNIFICANT CHANGE UP (ref 11.5–14.5)
SODIUM SERPL-SCNC: 136 MMOL/L — SIGNIFICANT CHANGE UP (ref 135–146)
WBC # BLD: 9.67 K/UL — SIGNIFICANT CHANGE UP (ref 4.8–10.8)
WBC # FLD AUTO: 9.67 K/UL — SIGNIFICANT CHANGE UP (ref 4.8–10.8)

## 2022-07-03 PROCEDURE — 99232 SBSQ HOSP IP/OBS MODERATE 35: CPT

## 2022-07-03 RX ADMIN — INSULIN GLARGINE 20 UNIT(S): 100 INJECTION, SOLUTION SUBCUTANEOUS at 22:54

## 2022-07-03 RX ADMIN — GABAPENTIN 600 MILLIGRAM(S): 400 CAPSULE ORAL at 14:02

## 2022-07-03 RX ADMIN — Medication 0.5 MILLIGRAM(S): at 15:23

## 2022-07-03 RX ADMIN — Medication 650 MILLIGRAM(S): at 22:03

## 2022-07-03 RX ADMIN — Medication 650 MILLIGRAM(S): at 05:34

## 2022-07-03 RX ADMIN — ATORVASTATIN CALCIUM 20 MILLIGRAM(S): 80 TABLET, FILM COATED ORAL at 22:05

## 2022-07-03 RX ADMIN — HYDROMORPHONE HYDROCHLORIDE 1 MILLIGRAM(S): 2 INJECTION INTRAMUSCULAR; INTRAVENOUS; SUBCUTANEOUS at 06:20

## 2022-07-03 RX ADMIN — HYDROMORPHONE HYDROCHLORIDE 1 MILLIGRAM(S): 2 INJECTION INTRAMUSCULAR; INTRAVENOUS; SUBCUTANEOUS at 15:40

## 2022-07-03 RX ADMIN — HYDROMORPHONE HYDROCHLORIDE 0.5 MILLIGRAM(S): 2 INJECTION INTRAMUSCULAR; INTRAVENOUS; SUBCUTANEOUS at 14:32

## 2022-07-03 RX ADMIN — Medication 650 MILLIGRAM(S): at 14:02

## 2022-07-03 RX ADMIN — Medication 1 APPLICATION(S): at 05:38

## 2022-07-03 RX ADMIN — GABAPENTIN 600 MILLIGRAM(S): 400 CAPSULE ORAL at 05:34

## 2022-07-03 RX ADMIN — POLYETHYLENE GLYCOL 3350 17 GRAM(S): 17 POWDER, FOR SOLUTION ORAL at 14:02

## 2022-07-03 RX ADMIN — Medication 650 MILLIGRAM(S): at 22:33

## 2022-07-03 RX ADMIN — HYDROMORPHONE HYDROCHLORIDE 1 MILLIGRAM(S): 2 INJECTION INTRAMUSCULAR; INTRAVENOUS; SUBCUTANEOUS at 19:22

## 2022-07-03 RX ADMIN — APIXABAN 5 MILLIGRAM(S): 2.5 TABLET, FILM COATED ORAL at 05:34

## 2022-07-03 RX ADMIN — HYDROMORPHONE HYDROCHLORIDE 0.5 MILLIGRAM(S): 2 INJECTION INTRAMUSCULAR; INTRAVENOUS; SUBCUTANEOUS at 15:40

## 2022-07-03 RX ADMIN — Medication 650 MILLIGRAM(S): at 15:15

## 2022-07-03 RX ADMIN — Medication 9 UNIT(S): at 06:16

## 2022-07-03 RX ADMIN — Medication 1 APPLICATION(S): at 17:09

## 2022-07-03 RX ADMIN — HYDROMORPHONE HYDROCHLORIDE 1 MILLIGRAM(S): 2 INJECTION INTRAMUSCULAR; INTRAVENOUS; SUBCUTANEOUS at 19:07

## 2022-07-03 RX ADMIN — APIXABAN 5 MILLIGRAM(S): 2.5 TABLET, FILM COATED ORAL at 17:47

## 2022-07-03 RX ADMIN — Medication 1 APPLICATION(S): at 05:39

## 2022-07-03 RX ADMIN — Medication 1 TABLET(S): at 14:03

## 2022-07-03 RX ADMIN — HYDROMORPHONE HYDROCHLORIDE 2 MILLIGRAM(S): 2 INJECTION INTRAMUSCULAR; INTRAVENOUS; SUBCUTANEOUS at 05:41

## 2022-07-03 RX ADMIN — GABAPENTIN 600 MILLIGRAM(S): 400 CAPSULE ORAL at 22:04

## 2022-07-03 RX ADMIN — HYDROMORPHONE HYDROCHLORIDE 1 MILLIGRAM(S): 2 INJECTION INTRAMUSCULAR; INTRAVENOUS; SUBCUTANEOUS at 06:35

## 2022-07-03 RX ADMIN — CEFTRIAXONE 100 MILLIGRAM(S): 500 INJECTION, POWDER, FOR SOLUTION INTRAMUSCULAR; INTRAVENOUS at 17:09

## 2022-07-03 RX ADMIN — HYDROMORPHONE HYDROCHLORIDE 1 MILLIGRAM(S): 2 INJECTION INTRAMUSCULAR; INTRAVENOUS; SUBCUTANEOUS at 13:09

## 2022-07-03 RX ADMIN — HYDROMORPHONE HYDROCHLORIDE 2 MILLIGRAM(S): 2 INJECTION INTRAMUSCULAR; INTRAVENOUS; SUBCUTANEOUS at 06:11

## 2022-07-03 RX ADMIN — SENNA PLUS 2 TABLET(S): 8.6 TABLET ORAL at 22:04

## 2022-07-03 RX ADMIN — Medication 650 MILLIGRAM(S): at 06:04

## 2022-07-03 RX ADMIN — PANTOPRAZOLE SODIUM 40 MILLIGRAM(S): 20 TABLET, DELAYED RELEASE ORAL at 05:33

## 2022-07-03 RX ADMIN — HYDROMORPHONE HYDROCHLORIDE 0.5 MILLIGRAM(S): 2 INJECTION INTRAMUSCULAR; INTRAVENOUS; SUBCUTANEOUS at 03:33

## 2022-07-03 RX ADMIN — Medication 9 UNIT(S): at 23:19

## 2022-07-03 NOTE — PROGRESS NOTE ADULT - ASSESSMENT
57 yo f with PMHx of DVT on Eliquis, COPD,  trach collar, obesity, IDDM, UTI, sent by [facility] for hypoxia. Current illness going back to April 4th when pt was intubated on admission at [facility] ICU for hypoxic respiratory failure diagnosed with copd exacerbation and superimposed pneumonia, and remained intubated for 37 days requiring tracheostomy. Patient stayed at [facility] for 2 days until noted to be hypoxic (saturating low 70's) pt's saturation immediately improving afterwards to 80's after large mucus plug removed and was subsequently sent to [facility]  On arrival to ED pt was saturating 97%, 15L O2 via tracheostomy collar (baseline 8 L at [facility])CXR suspicious for L-sided PNA, inconclusive. CTA neg for PE. Pt received x1 IV Levaquin and Cefepime in ED, admitted to MICU for acute hypoxic hypercapnic respiratory failure secondary to acute mucus plug now resolved vs superimposed pneumonia hospital-acquired.   General Surgery Consulted emergently when patient found to be hypoxic to low 80s after CCU and Pulmonary team found large granuloma in trachea along tracheostomy tube. Patient currently had a size 8 trach. Surgery assisted Pulmonary and CCU with trach exchanged with ET tube guidance to a size 7 XLT. Saturations improved to high 90s. Large granuloma was removed. Patient was no longer in distress after exchange.    #VDRF:  Vent management per Pulm.  s/p trach exchange  s/p mucus plugs removal this admission  Monitor oxygenation. Lately been saturating ok on T piece.    #sepsis (developed again on 6/27/22):   -6/28: hypotensive, febrile to 102.8  -UA +; blood cultures (6/28)= Serratia. Sens. noted  -Started on Cefepime and Vanc (6/28) > changed to CTX 2gm QD per ID (6/29) > f/u ID on duration.   - 6/30 blood culture NGTD, 6/29 urine culture NGTD  CT Abdo per ID: only showed some cecities  Hemodynamically stable lately    #s/p PEG (6/15):     #Dysphagia:  Strict NPO as of now per ST's FEES eval from 6/13/22.   [facility] ST will sign out to  at SNF for further plans with swallow rehab at SNF     #LE neuropathic pain:  PAtient has apparently been bed bound since April 4th (her [facility] admission for hypoxic failure where she ended up being intubated)  Apparently has L > R foot drop which may be due to chronic contractures of Calf muscle/Achilles tendon.   Can have PT evaluate her for that. But options are limited at this stage.   Given the asymmetrical foot drop, Neurology ruled out Any focal neuropathy with a MRI Lumbar spine WNL.     Neuropathy is likely Diabetic?   But given the concomitant blackish discoloration from shin to dorsum of feet, Neurology is also suspecting deposition diseases versus Eosinophilic Dermatitis which could also explain the neuropathy?    #B/l LE Hyperpigmentation changes:   unclear etiology  Present since 1 year.   May be related to CHF related stasis dermatitis / hemosiderin deposition  vs deposition diseases versus Eosinophilic Dermatitis   Dermatology thinks it may be Eczema and advised Triamcinolone BId> no improvement.   LWC with Betamethasone cream and Ammonium lactate dressings per Burn    #Lower extremity Pain management*** :   earlier this admission was on Gabapentin to 600 TID, dilaudid  Currently just IV dilaudid 1 Q6 PRN + additional 0.5 Q8 PRN for breakthroughs.    Note: On 6/21, we discontinued Cymbalta 60 QD since as per family patient may be having bizarre thoughts/hallucinations. Patient was texting them bizarre messages.   Note: Patient has h/o suicidal thoughts on Pregabalin.     Xanax 0.25 Q12 PRN for anxiety.   On Senna, Miralax.     #Suspected epiglottis edema:   s/p DExa Taper (ended 6/18).   Outpatient ENT f/u.     #DM:   steroids ended 6/18.  6/19- Increased lantus from 17 to 20.    #Hyponatremia- monitor  ?SiADH      #Chronic Indwelling Singh  - 5/28 Ucx - Contaminated   - 5/28 Ucx- Normal perri   - 5/27 Ucx- Klebsiella (CRE) and pseudomonas   - s/p cefepime  - send repeat U. Cx (6/29)    #Hx of DVT  - on home Eliquis  - restarted Eliquis    #Misc:  Activity been bed bound. PT eval when able.   GI PPX  PPI  DVT PPX  ELIQUIS  Dispo: Acute  Pending: sepsis treatment .   Start PT next week if pain controlled     55 yo f with PMHx of DVT on Eliquis, COPD,  trach collar, obesity, IDDM, UTI, sent by [facility] for hypoxia. Current illness going back to April 4th when pt was intubated on admission at [facility] ICU for hypoxic respiratory failure diagnosed with copd exacerbation and superimposed pneumonia, and remained intubated for 37 days requiring tracheostomy. Patient stayed at [facility] for 2 days until noted to be hypoxic (saturating low 70's) pt's saturation immediately improving afterwards to 80's after large mucus plug removed and was subsequently sent to [facility]  On arrival to ED pt was saturating 97%, 15L O2 via tracheostomy collar (baseline 8 L at [facility])CXR suspicious for L-sided PNA, inconclusive. CTA neg for PE. Pt received x1 IV Levaquin and Cefepime in ED, admitted to MICU for acute hypoxic hypercapnic respiratory failure secondary to acute mucus plug now resolved vs superimposed pneumonia hospital-acquired.   General Surgery Consulted emergently when patient found to be hypoxic to low 80s after CCU and Pulmonary team found large granuloma in trachea along tracheostomy tube. Patient currently had a size 8 trach. Surgery assisted Pulmonary and CCU with trach exchanged with ET tube guidance to a size 7 XLT. Saturations improved to high 90s. Large granuloma was removed. Patient was no longer in distress after exchange.    #VDRF:  Vent management per Pulm.  s/p trach exchange  s/p mucus plugs removal this admission  Monitor oxygenation. Lately been saturating ok on T piece.    #sepsis (developed again on 6/27/22):   -6/28: hypotensive, febrile to 102.8  -UA +; blood cultures (6/28)= Serratia. Sens. noted  -Started on Cefepime and Vanc (6/28) > changed to CTX 2gm QD per ID (6/29) > f/u ID on duration.   - 6/30 blood culture NGTD, 6/29 urine culture NGTD  CT Abdo per ID: only showed some cecities  Hemodynamically stable lately    #s/p PEG (6/15):     #Dysphagia:  Strict NPO as of now per ST's FEES eval from 6/13/22.   [facility] ST will sign out to  at SNF for further plans with swallow rehab at SNF     #LE neuropathic pain:  PAtient has apparently been bed bound since April 4th (her [facility] admission for hypoxic failure where she ended up being intubated)  Apparently has L > R foot drop which may be due to chronic contractures of Calf muscle/Achilles tendon.   Can have PT evaluate her for that. But options are limited at this stage.   Given the asymmetrical foot drop, Neurology ruled out Any focal neuropathy with a MRI Lumbar spine WNL.     Neuropathy is likely Diabetic?   But given the concomitant blackish discoloration from shin to dorsum of feet, Neurology is also suspecting deposition diseases versus Eosinophilic Dermatitis which could also explain the neuropathy?    #B/l LE Hyperpigmentation changes:   unclear etiology  Present since 1 year.   May be related to CHF related stasis dermatitis / hemosiderin deposition  vs deposition diseases versus Eosinophilic Dermatitis   Dermatology thinks it may be Eczema and advised Triamcinolone BId> no improvement.   LWC with Betamethasone cream and Ammonium lactate dressings per Burn    #Lower extremity Pain management*** :   earlier this admission was on Gabapentin to 600 TID, dilaudid  Currently just IV dilaudid 1 Q6 PRN + additional 0.5 Q8 PRN for breakthroughs.    Note: On 6/21, we discontinued Cymbalta 60 QD since as per family patient may be having bizarre thoughts/hallucinations. Patient was texting them bizarre messages.   Note: Patient has h/o suicidal thoughts on Pregabalin.     Xanax 0.25 Q12 PRN for anxiety.   On Senna, Miralax.     #Suspected epiglottis edema:   s/p DExa Taper (ended 6/18).   Outpatient ENT f/u.     #DM:   steroids ended 6/18.  6/19- Increased lantus from 17 to 20.    #Hyponatremia- monitor  ?SiADH      #Chronic Indwelling Singh  - 5/28 Ucx - Contaminated   - 5/28 Ucx- Normal perri   - 5/27 Ucx- Klebsiella (CRE) and pseudomonas   - s/p cefepime  - send repeat U. Cx (6/29)    #Hx of DVT  - on home Eliquis  - restarted Eliquis    #Misc:  Activity been bed bound. PT eval when able.   GI PPX  PPI  DVT PPX  ELIQUIS  Dispo: Acute  Pending: sepsis treatment .   Start PT next week if pain controlled  Labs Q48 or PRN now.

## 2022-07-03 NOTE — PROGRESS NOTE ADULT - SUBJECTIVE AND OBJECTIVE BOX
S: LE pain controlled  No SOB/n/v/abdo pain  Last BM?     All other pertinent ROS negative.      Vital Signs Last 24 Hrs  T(C): 35.8 (03 Jul 2022 12:13), Max: 36.2 (02 Jul 2022 20:24)  T(F): 96.4 (03 Jul 2022 12:13), Max: 97.1 (02 Jul 2022 20:24)  HR: 77 (03 Jul 2022 12:13) (77 - 88)  BP: 138/62 (03 Jul 2022 12:13) (138/62 - 179/78)  BP(mean): --  RR: 19 (03 Jul 2022 12:13) (18 - 19)  SpO2: 100% (03 Jul 2022 05:05) (98% - 100%)  PHYSICAL EXAM:    Constitutional: NAD, awake and alert,   HEENT: PERR, EOMI, Normal Hearing, MMM  Neck: Soft and supple, No LAD, No JVD  Respiratory: Breath sounds are clear bilaterally, No wheezing, rales or rhonchi  Cardiovascular: S1 and S2, regular rate and rhythm, no Murmurs, gallops or rubs  Gastrointestinal: Bowel Sounds present, soft, nontender, nondistended, no guarding, no rebound  Extremities: unchanged discoloration, neuropathic pain, peeling.      MEDICATIONS:  MEDICATIONS  (STANDING):  acetaminophen     Tablet .. 650 milliGRAM(s) Oral every 8 hours  ammonium lactate 12% Lotion 1 Application(s) Topical every 12 hours  apixaban 5 milliGRAM(s) Oral two times a day  atorvastatin 20 milliGRAM(s) Oral at bedtime  betamethasone valerate 0.1% Cream 1 Application(s) Topical two times a day  cefTRIAXone   IVPB 2000 milliGRAM(s) IV Intermittent every 24 hours  dextrose 5%. 1000 milliLiter(s) (50 mL/Hr) IV Continuous <Continuous>  dextrose 5%. 1000 milliLiter(s) (100 mL/Hr) IV Continuous <Continuous>  dextrose 50% Injectable 25 Gram(s) IV Push once  dextrose 50% Injectable 12.5 Gram(s) IV Push once  dextrose 50% Injectable 25 Gram(s) IV Push once  diatrizoate meglumine/diatrizoate sodium. 30 milliLiter(s) Oral once  gabapentin 600 milliGRAM(s) Oral three times a day  glucagon  Injectable 1 milliGRAM(s) IntraMuscular once  HYDROmorphone   Tablet 2 milliGRAM(s) Oral every 12 hours  influenza   Vaccine 0.5 milliLiter(s) IntraMuscular once  insulin glargine Injectable (LANTUS) 20 Unit(s) SubCutaneous at bedtime  insulin lispro (ADMELOG) corrective regimen sliding scale   SubCutaneous three times a day before meals  insulin lispro Injectable (ADMELOG) 9 Unit(s) SubCutaneous every 6 hours  multivitamin 1 Tablet(s) Oral daily  pantoprazole    Tablet 40 milliGRAM(s) Oral before breakfast  polyethylene glycol 3350 17 Gram(s) Oral daily  senna 2 Tablet(s) Oral at bedtime      LABS: All Labs Reviewed:                        9.2    9.67  )-----------( 185      ( 03 Jul 2022 08:03 )             26.9     07-03    136  |  91<L>  |  9<L>  ----------------------------<  81  4.1   |  36<H>  |  <0.5<L>    Ca    9.2      03 Jul 2022 08:03    TPro  6.0  /  Alb  3.3<L>  /  TBili  0.3  /  DBili  x   /  AST  22  /  ALT  17  /  AlkPhos  124<H>  07-03          Blood Culture: 06-30 @ 06:20  Organism --  Gram Stain Blood -- Gram Stain --  Specimen Source .Blood None  Culture-Blood --    06-29 @ 05:55  Organism --  Gram Stain Blood -- Gram Stain --  Specimen Source Clean Catch Clean Catch (Midstream)  Culture-Blood --        Radiology: reviewed

## 2022-07-04 LAB
GLUCOSE BLDC GLUCOMTR-MCNC: 113 MG/DL — HIGH (ref 70–99)
GLUCOSE BLDC GLUCOMTR-MCNC: 116 MG/DL — HIGH (ref 70–99)
GLUCOSE BLDC GLUCOMTR-MCNC: 127 MG/DL — HIGH (ref 70–99)
GLUCOSE BLDC GLUCOMTR-MCNC: 153 MG/DL — HIGH (ref 70–99)

## 2022-07-04 PROCEDURE — 99233 SBSQ HOSP IP/OBS HIGH 50: CPT

## 2022-07-04 RX ORDER — HYDROMORPHONE HYDROCHLORIDE 2 MG/ML
1 INJECTION INTRAMUSCULAR; INTRAVENOUS; SUBCUTANEOUS EVERY 4 HOURS
Refills: 0 | Status: DISCONTINUED | OUTPATIENT
Start: 2022-07-04 | End: 2022-07-11

## 2022-07-04 RX ORDER — HYDROMORPHONE HYDROCHLORIDE 2 MG/ML
0.5 INJECTION INTRAMUSCULAR; INTRAVENOUS; SUBCUTANEOUS EVERY 6 HOURS
Refills: 0 | Status: DISCONTINUED | OUTPATIENT
Start: 2022-07-04 | End: 2022-07-05

## 2022-07-04 RX ADMIN — Medication 1 APPLICATION(S): at 05:56

## 2022-07-04 RX ADMIN — HYDROMORPHONE HYDROCHLORIDE 1 MILLIGRAM(S): 2 INJECTION INTRAMUSCULAR; INTRAVENOUS; SUBCUTANEOUS at 05:59

## 2022-07-04 RX ADMIN — Medication 1 APPLICATION(S): at 05:55

## 2022-07-04 RX ADMIN — GABAPENTIN 600 MILLIGRAM(S): 400 CAPSULE ORAL at 05:56

## 2022-07-04 RX ADMIN — POLYETHYLENE GLYCOL 3350 17 GRAM(S): 17 POWDER, FOR SOLUTION ORAL at 11:44

## 2022-07-04 RX ADMIN — HYDROMORPHONE HYDROCHLORIDE 1 MILLIGRAM(S): 2 INJECTION INTRAMUSCULAR; INTRAVENOUS; SUBCUTANEOUS at 12:04

## 2022-07-04 RX ADMIN — Medication 650 MILLIGRAM(S): at 13:01

## 2022-07-04 RX ADMIN — Medication 2: at 11:40

## 2022-07-04 RX ADMIN — Medication 650 MILLIGRAM(S): at 14:17

## 2022-07-04 RX ADMIN — Medication 1 TABLET(S): at 11:43

## 2022-07-04 RX ADMIN — GABAPENTIN 600 MILLIGRAM(S): 400 CAPSULE ORAL at 21:57

## 2022-07-04 RX ADMIN — CEFTRIAXONE 100 MILLIGRAM(S): 500 INJECTION, POWDER, FOR SOLUTION INTRAMUSCULAR; INTRAVENOUS at 17:20

## 2022-07-04 RX ADMIN — HYDROMORPHONE HYDROCHLORIDE 0.5 MILLIGRAM(S): 2 INJECTION INTRAMUSCULAR; INTRAVENOUS; SUBCUTANEOUS at 15:33

## 2022-07-04 RX ADMIN — HYDROMORPHONE HYDROCHLORIDE 0.5 MILLIGRAM(S): 2 INJECTION INTRAMUSCULAR; INTRAVENOUS; SUBCUTANEOUS at 15:10

## 2022-07-04 RX ADMIN — HYDROMORPHONE HYDROCHLORIDE 1 MILLIGRAM(S): 2 INJECTION INTRAMUSCULAR; INTRAVENOUS; SUBCUTANEOUS at 17:14

## 2022-07-04 RX ADMIN — APIXABAN 5 MILLIGRAM(S): 2.5 TABLET, FILM COATED ORAL at 05:56

## 2022-07-04 RX ADMIN — HYDROMORPHONE HYDROCHLORIDE 1 MILLIGRAM(S): 2 INJECTION INTRAMUSCULAR; INTRAVENOUS; SUBCUTANEOUS at 21:49

## 2022-07-04 RX ADMIN — HYDROMORPHONE HYDROCHLORIDE 1 MILLIGRAM(S): 2 INJECTION INTRAMUSCULAR; INTRAVENOUS; SUBCUTANEOUS at 06:14

## 2022-07-04 RX ADMIN — HYDROMORPHONE HYDROCHLORIDE 0.5 MILLIGRAM(S): 2 INJECTION INTRAMUSCULAR; INTRAVENOUS; SUBCUTANEOUS at 04:16

## 2022-07-04 RX ADMIN — HYDROMORPHONE HYDROCHLORIDE 1 MILLIGRAM(S): 2 INJECTION INTRAMUSCULAR; INTRAVENOUS; SUBCUTANEOUS at 21:19

## 2022-07-04 RX ADMIN — SENNA PLUS 2 TABLET(S): 8.6 TABLET ORAL at 21:57

## 2022-07-04 RX ADMIN — GABAPENTIN 600 MILLIGRAM(S): 400 CAPSULE ORAL at 14:17

## 2022-07-04 RX ADMIN — PANTOPRAZOLE SODIUM 40 MILLIGRAM(S): 20 TABLET, DELAYED RELEASE ORAL at 05:56

## 2022-07-04 RX ADMIN — HYDROMORPHONE HYDROCHLORIDE 1 MILLIGRAM(S): 2 INJECTION INTRAMUSCULAR; INTRAVENOUS; SUBCUTANEOUS at 13:38

## 2022-07-04 RX ADMIN — Medication 1 APPLICATION(S): at 17:05

## 2022-07-04 RX ADMIN — Medication 650 MILLIGRAM(S): at 05:56

## 2022-07-04 RX ADMIN — Medication 650 MILLIGRAM(S): at 06:26

## 2022-07-04 RX ADMIN — Medication 650 MILLIGRAM(S): at 21:57

## 2022-07-04 RX ADMIN — ATORVASTATIN CALCIUM 20 MILLIGRAM(S): 80 TABLET, FILM COATED ORAL at 21:57

## 2022-07-04 RX ADMIN — HYDROMORPHONE HYDROCHLORIDE 0.5 MILLIGRAM(S): 2 INJECTION INTRAMUSCULAR; INTRAVENOUS; SUBCUTANEOUS at 23:57

## 2022-07-04 RX ADMIN — Medication 0.5 MILLIGRAM(S): at 17:14

## 2022-07-04 RX ADMIN — HYDROMORPHONE HYDROCHLORIDE 0.5 MILLIGRAM(S): 2 INJECTION INTRAMUSCULAR; INTRAVENOUS; SUBCUTANEOUS at 04:01

## 2022-07-04 RX ADMIN — Medication 650 MILLIGRAM(S): at 22:27

## 2022-07-04 RX ADMIN — APIXABAN 5 MILLIGRAM(S): 2.5 TABLET, FILM COATED ORAL at 17:15

## 2022-07-04 RX ADMIN — INSULIN GLARGINE 20 UNIT(S): 100 INJECTION, SOLUTION SUBCUTANEOUS at 22:04

## 2022-07-04 NOTE — CHART NOTE - NSCHARTNOTEFT_GEN_A_CORE
Registered Dietitian Follow-Up     Patient Profile Reviewed                           Yes [x]   No []     Nutrition History Previously Obtained        Yes [x]  No []       Pertinent Subjective Information:  Per RN patient patient tolerating feeds. No signs/symptoms of GI distress.      Pertinent Medical Interventions:  #sepsis  #hypercarbic respiratory failure  -maintaining sats 88%/decrease O2 titration  #Acute/Chronic COPD with exacerbation (resolved)  #s/p PEG (6/15)  KUB showing free intraperitoneal air (6/19)  CT abdo confirmed that this was just due to PEG adjustment  Repeat G tube study per surgery (6/21) - normal, restarted on PEG feedings  #Dysphagia  -strict NPO as of now per 's FEES eval from 6/13/22  -[facility] will sign out to  at [facility] for further plans with swallow rehab at [facility]  #B/L LE Hyperpigmentation changes:  -Add multivitamin to PEG QD  #suspected epiglottis edema  -s/p Dex Taper (ended 6/18)  Outpatient ENT f/u  #DM  steroids ended 6/18 6/19 - increased lantus from 17 to 20     Pending d/c  planning to [facility]     Diet order:   Diet, NPO with Tube Feed:   Tube Feeding Modality: Gastrostomy  Glucerna 1.2 Martin  Total Volume for 24 Hours (mL): 1200  Bolus  Total Volume of Bolus (mL):  300  Tube Feed Frequency: Every 6 hours   Tube Feed Start Time: 06:00  Bolus Feed Rate (mL per Hour): 300   Bolus Feed Duration (in Hours): 1  Bolus   Total Volume per Flush (mL): 30   Frequency: Every 6 Hours  Free Water Flush Instructions:  give 30mL POST feeds  No Carb Prosource (1pkg = 15gms Protein)     Qty per Day:  1x (06-21-22 @ 16:30) [Active]    Anthropometrics:  Height: 154.9 cm   Weight: 97.4 kg   BMI: 40.6  IBW:       MEDICATIONS  (STANDING):  acetaminophen     Tablet .. 650 milliGRAM(s) Oral every 8 hours  ammonium lactate 12% Lotion 1 Application(s) Topical every 12 hours  apixaban 5 milliGRAM(s) Oral two times a day  atorvastatin 20 milliGRAM(s) Oral at bedtime  betamethasone valerate 0.1% Cream 1 Application(s) Topical two times a day  cefTRIAXone   IVPB 2000 milliGRAM(s) IV Intermittent every 24 hours  dextrose 5%. 1000 milliLiter(s) (100 mL/Hr) IV Continuous <Continuous>  dextrose 5%. 1000 milliLiter(s) (50 mL/Hr) IV Continuous <Continuous>  dextrose 50% Injectable 25 Gram(s) IV Push once  dextrose 50% Injectable 12.5 Gram(s) IV Push once  dextrose 50% Injectable 25 Gram(s) IV Push once  diatrizoate meglumine/diatrizoate sodium. 30 milliLiter(s) Oral once  gabapentin 600 milliGRAM(s) Oral three times a day  glucagon  Injectable 1 milliGRAM(s) IntraMuscular once  influenza   Vaccine 0.5 milliLiter(s) IntraMuscular once  insulin glargine Injectable (LANTUS) 20 Unit(s) SubCutaneous at bedtime  insulin lispro (ADMELOG) corrective regimen sliding scale   SubCutaneous three times a day before meals  insulin lispro Injectable (ADMELOG) 9 Unit(s) SubCutaneous every 6 hours  multivitamin 1 Tablet(s) Oral daily  pantoprazole    Tablet 40 milliGRAM(s) Oral before breakfast  polyethylene glycol 3350 17 Gram(s) Oral daily  senna 2 Tablet(s) Oral at bedtime    MEDICATIONS  (PRN):  albuterol/ipratropium for Nebulization 3 milliLiter(s) Nebulizer every 6 hours PRN Shortness of Breath and/or Wheezing  ALPRAZolam 0.5 milliGRAM(s) Oral every 12 hours PRN anxiety  dextrose Oral Gel 15 Gram(s) Oral once PRN Blood Glucose LESS THAN 70 milliGRAM(s)/deciliter  HYDROmorphone  Injectable 1 milliGRAM(s) IV Push every 4 hours PRN Moderate Pain (4 - 6)  HYDROmorphone  Injectable 0.5 milliGRAM(s) IV Push every 6 hours PRN Moderate Pain (4 - 6)    Pertinent Labs:   7/4: H/H 9.2/26.9 (L), Chloride 91 (L), BUN 9 (L), Cr <0.5 (L), Alb 3.3 (L), Alk Phos 124 (H)    Finger Sticks:  POCT Blood Glucose.: 153 mg/dL (07-04 @ 11:22)  POCT Blood Glucose.: 113 mg/dL (07-04 @ 06:32)  POCT Blood Glucose.: 115 mg/dL (07-03 @ 22:50)  POCT Blood Glucose.: 103 mg/dL (07-03 @ 17:39)    Physical Findings:  - Appearance: alert/oriented per flowsheet, trach  - GI function: 7/2 last bowel movement per flowsheet  - Tubes: +PEG  - Oral/Mouth cavity:NPO w/TF  - Skin: ecchymosis no P/U per flowsheet; 1+edema - left and right arm; 2+ edema left and right leg per flowsheet     Nutrition Requirements:   Weight Used: ABW 94.3kg + IBW 48kg with consideration for sepsis     Estimated Energy Needs     1469-1762kcal/day (MSJ x1.0-1.2) Continue [x]      Estimated Protein Needs  86-95g/day (1.8-2.0g/kg of Ibw) Continue [x]    Estimated Fluid Needs 1mL/kcal       Continue [x]       Nutrient Intake: Per RN, patient tolerating current tubefeed regimen well likely meeting 95 - 105% of estimated needs     [x] Previous Nutrition Diagnosis:  Altered nutrition related labs  Inadequate oral intkae            [x] Ongoing          [] Resolved     Goal/Expected Outcome:   Patient to continue to meet 85 - 105% of estimated needs    Indicator/Monitoring:   RD to monitor energy intake, tf tolerance, weight, labs, skin status, NFPF    Recommendation:  1) Continue current tf regimen as it is adequate to meet estimated needs and tolerated well by patient   2) Obtain weights when possible    Patient is at moderate nutrition risk, RD to f/u in 4-6 days or PRN if patient not discharged Registered Dietitian Follow-Up     Patient Profile Reviewed                           Yes [x]   No []     Nutrition History Previously Obtained        Yes [x]  No []       Pertinent Subjective Information:  Per RN patient patient tolerating feeds. No signs/symptoms of GI distress. Patient with no c/o n/v/d/c either.      Pertinent Medical Interventions:  #sepsis  #hypercarbic respiratory failure  -maintaining sats 88%/decrease O2 titration  #Acute/Chronic COPD with exacerbation (resolved)  #s/p PEG (6/15)  KUB showing free intraperitoneal air (6/19)  CT abdo confirmed that this was just due to PEG adjustment  Repeat G tube study per surgery (6/21) - normal, restarted on PEG feedings  #Dysphagia  -strict NPO as of now per 's FEES eval from 6/13/22  -[facility] will sign out to  at [facility] for further plans with swallow rehab at [facility]  #B/L LE Hyperpigmentation changes:  -Add multivitamin to PEG QD  #suspected epiglottis edema  -s/p Dex Taper (ended 6/18)  Outpatient ENT f/u  #DM  steroids ended 6/18 6/19 - increased lantus from 17 to 20     Pending d/c  planning to [facility]     Diet order:   Diet, NPO with Tube Feed:   Tube Feeding Modality: Gastrostomy  Glucerna 1.2 Martin  Total Volume for 24 Hours (mL): 1200  Bolus  Total Volume of Bolus (mL):  300  Tube Feed Frequency: Every 6 hours   Tube Feed Start Time: 06:00  Bolus Feed Rate (mL per Hour): 300   Bolus Feed Duration (in Hours): 1  Bolus   Total Volume per Flush (mL): 30   Frequency: Every 6 Hours  Free Water Flush Instructions:  give 30mL POST feeds  No Carb Prosource (1pkg = 15gms Protein)     Qty per Day:  1x (06-21-22 @ 16:30) [Active]    Anthropometrics:  Height: 154.9 cm   Weight: 97.4 kg   BMI: 40.6  IBW:       MEDICATIONS  (STANDING):  acetaminophen     Tablet .. 650 milliGRAM(s) Oral every 8 hours  ammonium lactate 12% Lotion 1 Application(s) Topical every 12 hours  apixaban 5 milliGRAM(s) Oral two times a day  atorvastatin 20 milliGRAM(s) Oral at bedtime  betamethasone valerate 0.1% Cream 1 Application(s) Topical two times a day  cefTRIAXone   IVPB 2000 milliGRAM(s) IV Intermittent every 24 hours  dextrose 5%. 1000 milliLiter(s) (100 mL/Hr) IV Continuous <Continuous>  dextrose 5%. 1000 milliLiter(s) (50 mL/Hr) IV Continuous <Continuous>  dextrose 50% Injectable 25 Gram(s) IV Push once  dextrose 50% Injectable 12.5 Gram(s) IV Push once  dextrose 50% Injectable 25 Gram(s) IV Push once  diatrizoate meglumine/diatrizoate sodium. 30 milliLiter(s) Oral once  gabapentin 600 milliGRAM(s) Oral three times a day  glucagon  Injectable 1 milliGRAM(s) IntraMuscular once  influenza   Vaccine 0.5 milliLiter(s) IntraMuscular once  insulin glargine Injectable (LANTUS) 20 Unit(s) SubCutaneous at bedtime  insulin lispro (ADMELOG) corrective regimen sliding scale   SubCutaneous three times a day before meals  insulin lispro Injectable (ADMELOG) 9 Unit(s) SubCutaneous every 6 hours  multivitamin 1 Tablet(s) Oral daily  pantoprazole    Tablet 40 milliGRAM(s) Oral before breakfast  polyethylene glycol 3350 17 Gram(s) Oral daily  senna 2 Tablet(s) Oral at bedtime    MEDICATIONS  (PRN):  albuterol/ipratropium for Nebulization 3 milliLiter(s) Nebulizer every 6 hours PRN Shortness of Breath and/or Wheezing  ALPRAZolam 0.5 milliGRAM(s) Oral every 12 hours PRN anxiety  dextrose Oral Gel 15 Gram(s) Oral once PRN Blood Glucose LESS THAN 70 milliGRAM(s)/deciliter  HYDROmorphone  Injectable 1 milliGRAM(s) IV Push every 4 hours PRN Moderate Pain (4 - 6)  HYDROmorphone  Injectable 0.5 milliGRAM(s) IV Push every 6 hours PRN Moderate Pain (4 - 6)    Pertinent Labs:   7/4: H/H 9.2/26.9 (L), Chloride 91 (L), BUN 9 (L), Cr <0.5 (L), Alb 3.3 (L), Alk Phos 124 (H)    Finger Sticks:  POCT Blood Glucose.: 153 mg/dL (07-04 @ 11:22)  POCT Blood Glucose.: 113 mg/dL (07-04 @ 06:32)  POCT Blood Glucose.: 115 mg/dL (07-03 @ 22:50)  POCT Blood Glucose.: 103 mg/dL (07-03 @ 17:39)    Physical Findings:  - Appearance: alert/oriented per flowsheet, trach  - GI function: 7/2 last bowel movement per flowsheet  - Tubes: +PEG  - Oral/Mouth cavity:NPO w/TF  - Skin: ecchymosis no P/U per flowsheet; 1+edema - left and right arm; 2+ edema left and right leg per flowsheet     Nutrition Requirements:   Weight Used: ABW 94.3kg + IBW 48kg with consideration for sepsis     Estimated Energy Needs     1469-1762kcal/day (MSJ x1.0-1.2) Continue [x]      Estimated Protein Needs  86-95g/day (1.8-2.0g/kg of Ibw) Continue [x]    Estimated Fluid Needs 1mL/kcal       Continue [x]       Nutrient Intake: Per RN, patient tolerating current tubefeed regimen well likely meeting 95 - 105% of estimated needs     [x] Previous Nutrition Diagnosis:  Altered nutrition related labs  Inadequate oral intkae            [x] Ongoing          [] Resolved     Goal/Expected Outcome:   Patient to continue to meet 85 - 105% of estimated needs    Indicator/Monitoring:   RD to monitor energy intake, tf tolerance, weight, labs, skin status, NFPF    Recommendation:  1) Continue current tf regimen as it is adequate to meet estimated needs and tolerated well by patient   2) Obtain weights when possible    Patient is at moderate nutrition risk, RD to f/u in 4-6 days or PRN if patient not discharged

## 2022-07-04 NOTE — PROGRESS NOTE ADULT - SUBJECTIVE AND OBJECTIVE BOX
SUBJECTIVE:  Patient is a 56y old Female who presents with a chief complaint of Hypoxia (01 Jul 2022 09:01)   Acute respiratory failure with hypoxia     Today is hospital day 38d. There are no new issues or overnight events.     HPI  HPI:  57 yo f with PMHx of DVT on Eliquis, COPD,  trach collar, obesity, IDDM, UTI, sent by [facility] for hypoxia. History provided by daughter at bedside, she reports the current illness going back to April 4th when pt was intubated on admission at [facility] ICU for hypoxic respiratory failure diagnosed with copd exacerbation and superimposed pneumonia, of note she also appears to have been in CHF exacerbation with severe b/l LE swelling treated with IV bumex. Remained intubated 37 days per daughter, diagnosed with fever of unknown origin (up to 106F), treated with empiric broad spectrum antibiotics and once 2 weeks s/p tracheostomy placement and 48 hours afebrile she was was discharged to nursing home for PT. Of note, patient's daughter and pt herself say the wiseman has not been replaced for over 3 weeks. She also reports a developing sacral pressure ulcer. Was at [facility] only 2 days when was noted to be hypoxic (saturating low 70's) and EMS called. While awaiting EMS, floor nurse on the unit suctioned the patient, and managed to bring up a very large mucus plug, with pt's saturation immediately improving afterwards to 80's. Nonetheless pt was sent to [facility] ED. Prior to all this pt was independent and ambulatory. Pt denies sob, cp, abd pain, n/v/d, fever or chills.     On arrival to ED pt was saturating 97%, 15L O2 via tracheostomy collar (baseline 8 L at [facility]) VS:    /59  T 99.9F RR 20  Admission VBG pH 7.37 pCO2 60 pO2 49. Pt not wheezing, not coughing. CXR suspicious for L-sided PNA, inconclusive. CTA neg for PE. Pt received x1 IV Levoquin and Cefepime in ED, admitted to medicine for acute hypoxic hypercapnic respiratory failure secondary to acute mucus plug (now resolved vs superimposed pneumonia  hospital-acquired.(less likely), Records request sent to [facility] medical records  [phone]) and patient will be continued on empiric antibiotics pending procal (27 May 2022 14:02)      PAST MEDICAL & SURGICAL HISTORY  COPD (chronic obstructive pulmonary disease)    CHF (congestive heart failure)    DM (diabetes mellitus)    H/O tracheostomy      ALLERGIES:  penicillin (Swelling)    MEDICATIONS:  HOME MEDICATIONS  albuterol sulfate 2.5 mg/3 mL (0.083 %) solution for nebulization:   ALPRAZolam 0.5 mg oral tablet: 1 tab(s) orally every 12 hours, As needed, anxiety via PEG tube  amLODIPine 5 mg oral tablet: 1 tab(s) orally once a day via PEG tube  apixaban 5 mg oral tablet: 1 tab(s) orally 2 times a day via PEG tube  atorvastatin 20 mg oral tablet: 1 tab(s) orally once a day (at bedtime) via PEG tube  bumetanide 2 mg oral tablet: 1 tab(s) orally once a day via PEG tube  gabapentin 600 mg oral tablet: 1 tab(s) orally 3 times a day via PEG tube  HYDROmorphone 4 mg oral tablet: 1 tab(s) orally every 8 hours, As needed, for severe pain  insulin glargine 100 units/mL subcutaneous solution: 20 unit(s) subcutaneous once a day (at bedtime)  insulin lispro 100 units/mL injectable solution: 9 unit(s) injectable 3 times a day (before meals)  losartan 100 mg oral tablet: 1 tab(s) orally once a day via PEG tube  Multiple Vitamins oral tablet: 1 tab(s) orally once a day via PEG tube  pantoprazole 40 mg oral delayed release tablet: 1 tab(s) orally once a day (before a meal) via PEG tube  polyethylene glycol 3350 oral powder for reconstitution: 17 gram(s) orally once a day via PEG tube  senna leaf extract oral tablet: 2 tab(s) orally once a day (at bedtime) via PEG tube  Trelegy Ellipta 100 mcg-62.5 mcg-25 mcg powder for inhalation:     STANDING MEDICATIONS  acetaminophen     Tablet .. 650 milliGRAM(s) Oral every 8 hours  ammonium lactate 12% Lotion 1 Application(s) Topical every 12 hours  apixaban 5 milliGRAM(s) Oral two times a day  atorvastatin 20 milliGRAM(s) Oral at bedtime  betamethasone valerate 0.1% Cream 1 Application(s) Topical two times a day  cefTRIAXone   IVPB 2000 milliGRAM(s) IV Intermittent every 24 hours  dextrose 5%. 1000 milliLiter(s) IV Continuous <Continuous>  dextrose 5%. 1000 milliLiter(s) IV Continuous <Continuous>  dextrose 50% Injectable 25 Gram(s) IV Push once  dextrose 50% Injectable 12.5 Gram(s) IV Push once  dextrose 50% Injectable 25 Gram(s) IV Push once  diatrizoate meglumine/diatrizoate sodium. 30 milliLiter(s) Oral once  gabapentin 600 milliGRAM(s) Oral three times a day  glucagon  Injectable 1 milliGRAM(s) IntraMuscular once  influenza   Vaccine 0.5 milliLiter(s) IntraMuscular once  insulin glargine Injectable (LANTUS) 20 Unit(s) SubCutaneous at bedtime  insulin lispro (ADMELOG) corrective regimen sliding scale   SubCutaneous three times a day before meals  insulin lispro Injectable (ADMELOG) 9 Unit(s) SubCutaneous every 6 hours  multivitamin 1 Tablet(s) Oral daily  pantoprazole    Tablet 40 milliGRAM(s) Oral before breakfast  polyethylene glycol 3350 17 Gram(s) Oral daily  senna 2 Tablet(s) Oral at bedtime    PRN MEDICATIONS  albuterol/ipratropium for Nebulization 3 milliLiter(s) Nebulizer every 6 hours PRN  ALPRAZolam 0.5 milliGRAM(s) Oral every 12 hours PRN  dextrose Oral Gel 15 Gram(s) Oral once PRN  HYDROmorphone  Injectable 0.5 milliGRAM(s) IV Push every 8 hours PRN  HYDROmorphone  Injectable 1 milliGRAM(s) IV Push every 6 hours PRN    VITALS:   T(C): 36.6 (07-04-22 @ 05:00), Max: 36.8 (07-03-22 @ 21:23)  T(F): 97.8 (07-04-22 @ 05:00), Max: 98.3 (07-03-22 @ 21:23)  HR: 88 (07-04-22 @ 05:00) (77 - 110)  BP: 130/60 (07-04-22 @ 05:00) (130/60 - 146/66)  BP(mean): --  ABP: --  ABP(mean): --  RR: 18 (07-04-22 @ 05:00) (18 - 19)  SpO2: 100% (07-04-22 @ 05:00) (97% - 100%)  LABS:                        9.2    9.67  )-----------( 185      ( 03 Jul 2022 08:03 )             26.9     07-03    136  |  91<L>  |  9<L>  ----------------------------<  81  4.1   |  36<H>  |  <0.5<L>    Ca    9.2      03 Jul 2022 08:03    TPro  6.0  /  Alb  3.3<L>  /  TBili  0.3  /  DBili  x   /  AST  22  /  ALT  17  /  AlkPhos  124<H>  07-03        I&O's Detail                RADIOLOGY:  EKG  12 Lead ECG:   Ventricular Rate 92 BPM    Atrial Rate 92 BPM    P-R Interval 168 ms    QRS Duration 98 ms    Q-T Interval 370 ms    QTC Calculation(Bazett) 457 ms    P Axis 84 degrees    R Axis 103 degrees    T Axis 89 degrees    Diagnosis Line Sinus rhythm with Premature supraventricular complexes  Rightward axis  Low voltage QRS  Incomplete right bundle branch block  Cannot rule out Anterior infarct , age undetermined  T wave abnormality, consider lateral ischemia  Abnormal ECG    Confirmed by Joe Person ([id number]) on 6/30/2022 7:36:49 AM (06-30-22 @ 04:43)  12 Lead ECG:   Ventricular Rate 89 BPM    Atrial Rate 89 BPM    P-R Interval 162 ms    QRS Duration 98 ms    Q-T Interval 372 ms    QTC Calculation(Bazett) 452 ms    P Axis 90 degrees    R Axis 102 degrees    T Axis 90 degrees    Diagnosis Line Normal sinus rhythm  Rightward axis  Incomplete right bundle branch block  Anterior infarct , age undetermined  ST & T wave abnormality, consider lateral ischemia  Abnormal ECG    Confirmed by Joe Person ([id number]) on 6/30/2022 7:36:41 AM (06-30-22 @ 04:42)      Physical Exam:  General: no acute distress, lying in bed  Head: normocephalic and atraumatic  Neck: supple, trach clean and intact  Heart: regular rate and rhythm, S1 and S2 normal, no murmurs, rubs or gallops noted on exam  Lungs: Symmetric chest expansion bilaterally, clear lungs bilaterally, no wheezes rhonchi or crackles heard  Abdomen: Bowel sounds present, non tender on light and deep palpation  Extremities: No edema, no clubbing or cyanosis notes, positive peripheral pulses, brown discoloration of bilateral lower extremities- unchanged from prior exams

## 2022-07-04 NOTE — PROGRESS NOTE ADULT - ATTENDING COMMENTS
55 yo f with PMHx of DVT on Eliquis, COPD,  trach collar, obesity, IDDM, UTI, sent by [facility] for hypoxia. Current illness going back to April 4th when pt was intubated on admission at [facility] ICU for hypoxic respiratory failure diagnosed with copd exacerbation and superimposed pneumonia, and remained intubated for 37 days requiring tracheostomy. Patient stayed at [facility] for 2 days until noted to be hypoxic (saturating low 70's) pt's saturation immediately improving afterwards to 80's after large mucus plug removed and was subsequently sent to [facility]  On arrival to ED pt was saturating 97%, 15L O2 via tracheostomy collar (baseline 8 L at [facility])CXR suspicious for L-sided PNA, inconclusive. CTA neg for PE. Pt received x1 IV Levaquin and Cefepime in ED, admitted to MICU for acute hypoxic hypercapnic respiratory failure secondary to acute mucus plug now resolved vs superimposed pneumonia hospital-acquired.   General Surgery Consulted emergently when patient found to be hypoxic to low 80s after CCU and Pulmonary team found large granuloma in trachea along tracheostomy tube. Patient currently had a size 8 trach. Surgery assisted Pulmonary and CCU with trach exchanged with ET tube guidance to a size 7 XLT. Saturations improved to high 90s. Large granuloma was removed. Patient was no longer in distress after exchange.    #VDRF:  Vent management per Pulm.  s/p trach exchange  s/p mucus plugs removal this admission  Monitor oxygenation. Lately been saturating ok on T piece.    #sepsis (developed again on 6/27/22):   -6/28: hypotensive, febrile to 102.8  -UA +; blood cultures (6/28)= Serratia. Sens. noted  -Started on Cefepime and Vanc (6/28) > changed to CTX 2gm QD per ID (6/29) > f/u ID on duration.   - 6/30 blood culture NGTD, 6/29 urine culture NGTD  CT Abdo per ID: only showed some cecities  Hemodynamically stable lately    #s/p PEG (6/15)  #Dysphagia:  Strict NPO as of now per ST's FEES eval from 6/13/22.   [facility] ST will sign out to  at SNF for further plans with swallow rehab at SNF     #LE neuropathic pain:  PAtient has apparently been bed bound since April 4th (her [facility] admission for hypoxic failure where she ended up being intubated)  Apparently has L > R foot drop which may be due to chronic contractures of Calf muscle/Achilles tendon.   Can have PT evaluate her for that. But options are limited at this stage.   Given the asymmetrical foot drop, Neurology ruled out Any focal neuropathy with a MRI Lumbar spine WNL.     Neuropathy is likely Diabetic?   But given the concomitant blackish discoloration from shin to dorsum of feet, Neurology is also suspecting deposition diseases versus Eosinophilic Dermatitis which could also explain the neuropathy?    #B/l LE Hyperpigmentation changes:   unclear etiology  Present since 1 year.   May be related to CHF related stasis dermatitis / hemosiderin deposition  vs deposition diseases versus Eosinophilic Dermatitis   Dermatology thinks it may be Eczema and advised Triamcinolone BId> no improvement.   LWC with Betamethasone cream and Ammonium lactate dressings per Burn    #Lower extremity Pain management*** :   earlier this admission was on Gabapentin to 600 TID, dilaudid  Currently IV dilaudid 1 Q4 PRN     Note: On 6/21, we discontinued Cymbalta 60 QD since as per family patient may be having bizarre thoughts/hallucinations. Patient was texting them bizarre messages.   Note: Patient has h/o suicidal thoughts on Pregabalin.     Xanax 0.25 Q12 PRN for anxiety.   On Senna, Miralax.     #Suspected epiglottis edema:   s/p DExa Taper (ended 6/18).   Outpatient ENT f/u.     #DM:   steroids ended 6/18.  6/19- Increased lantus from 17 to 20.    #Hyponatremia- monitor  ?SiADH      #Chronic Indwelling Singh  - 5/28 Ucx - Contaminated   - 5/28 Ucx- Normal perri   - 5/27 Ucx- Klebsiella (CRE) and pseudomonas   - s/p cefepime  - send repeat U. Cx (6/29)    #Hx of DVT  - on home Eliquis  - restarted Eliquis    #Misc:  Activity been bed bound. PT eval when able.   GI PPX  PPI  DVT PPX  ELIQUIS  Dispo: Acute  Pending: sepsis treatment .   Start PT next week if pain controlled  Labs Q48 or PRN now.

## 2022-07-04 NOTE — PROGRESS NOTE ADULT - ASSESSMENT
55 yo female with PMHx of DVT on Eliquis, COPD,  trach collar, obesity, IDDM, UTI, sent by [facility] [place] for hypoxia. Current illness going back to April 4th when pt was intubated on admission at [facility] ICU for hypoxic respiratory failure diagnosed with copd exacerbation and superimposed pneumonia, and remained intubated for 37 days requiring tracheostomy. Patient stayed at [facility] for 2 days until noted to be hypoxic (saturating low 70's) pt's saturation immediately improving afterwards to 80's after large mucus plug removed and was subsequently sent to [facility]  On arrival to ED pt was saturating 97%, 15L O2 via tracheostomy collar (baseline 8 L at [facility])CXR suspicious for L-sided PNA, inconclusive. CTA neg for PE. Pt received x1 IV Levaquin and Cefepime in ED, admitted to MICU for acute hypoxic hypercapnic respiratory failure secondary to acute mucus plug now resolved vs superimposed pneumonia hospital-acquired.   General Surgery Consulted emergently when patient found to be hypoxic to low 80s after CCU and Pulmonary team found large granuloma in trachea along tracheostomy tube. Patient currently had a size 8 trach. Surgery assisted Pulmonary and CCU with trach exchanged with ET tube guidance to a size 7 XLT. Saturations improved to high 90s. Large granuloma was removed. Patient was no longer in distress after exchange.    #sepsis  -6/28 Patient was noted to be tachy O/N, hypotensive, febrile to 102.8, FiO2 requirements were increased and the patient was desaturating to 82-86%  -blood cultures 6/29 positive for gram negative rods, serratia bacteremia  -on Ceftriaxone  -ID following  -F/u UA, trach aspirate and blood cultures  -6/30 WBC 11.21 (6/29 was 13.92 and 6/28 was 19.03)  -7/1 ID rec CT a/p- demonstrated cecitis, no abscess     #Hypercarbic respiratory failure  -Maintaining sats 88%/decrease O2 titration  -f/u on pulm recs     #Acute/ chronic COPD with exacerbation (Resolved)  #mucous plug removed  #pneumonia vs atelectasis L base  - On 5/28, Pulmonary team found large granuloma in trachea along tracheostomy tube. Patient had a size 8 trach on admission. Surgery assisted Pulmonary and CCU with trach exchanged with ET tube guidance to a size 7 XLT. Saturations improved to high 90s.  - trach functioning well s/p exchange   - 6/11 Trach changed to Fenestrated 8 by surgery    #s/p PEG (6/15):   KUB showing free intraperitoneal air (6/19).  CT Abdo confirmed that this was just due to PEG tube adjustment.   Repeat G tube study per Surgery (6/21) -normal, restarted on peg feedings      #Dysphagia:  Strict NPO as of now per 's FEES eval from 6/13/22.   [facility] will sign out to  at [facility] for further plans with swallow rehab at SNF     #LE neuropathic pain:  PAtient has apparently been bed bound since April 4th (her [facility] admission for hypoxic failure where she ended up being intubated)  Apparently has L > R foot drop which may be due to chronic contractures of Calf muscle/Achilles tendon. Can have PT evaluate her for that.  But options are limited at this stage.   Given the asymmetrical foot drop, Neurology wants to rule out Any focal neuropathy.  -6/23 s/p  MRI Lumbar spine w/ & w/o Contrast, mild degenerative changes  -chronic DJD no acute changes.  -continue pain management: currently on 1mg Dilaudid po q6 prn    #B/l LE Hyperpigmentation changes:   unclear etiology  Present since 1 year.   May be related to CHF related stasis dermatitis / hemosiderin deposition  vs deposition diseases versus Eosinophilic Dermatitis vs follicular hemorrhage from eliquis   Dermatology thinks it may be Eczema and advised Triamcinolone BId.   Add multivitamin to PEG QD.   -7/1 Betamethasone cream and Ammonium lactate dressings per Burn, patient's legs feel mildly better    -On Senna    #Suspected epiglottis edema:   s/p DExa Taper (ended 6/18).   Outpatient ENT f/u.     #DM:   steroids ended 6/18.  6/19- Increased lantus from 17 to 20.    #Chronic Indwelling Singh  - 5/28 Ucx - Contaminated   - 5/28 Ucx- Normal perri   - 5/27 Ucx- Klebsiella (CRE) and pseudomonas   - s/p cefepime  - Currently afebrile w/ no urinary complaints   - episode of Hematouria 6/4 -> improving -> h/h Stable    #Hx of DVT  - on home Eliquis  - restarted Eliquis    Activity been bed bound. PT eval when able.   GI PPX  PPI  DVT PPX  ELIQUIS  Dispo: Acute  Pending:  d/c planning to [facility]

## 2022-07-05 LAB
ALBUMIN SERPL ELPH-MCNC: 3.5 G/DL — SIGNIFICANT CHANGE UP (ref 3.5–5.2)
ALP SERPL-CCNC: 116 U/L — HIGH (ref 30–115)
ALT FLD-CCNC: 16 U/L — SIGNIFICANT CHANGE UP (ref 0–41)
ANION GAP SERPL CALC-SCNC: 8 MMOL/L — SIGNIFICANT CHANGE UP (ref 7–14)
AST SERPL-CCNC: 21 U/L — SIGNIFICANT CHANGE UP (ref 0–41)
BASOPHILS # BLD AUTO: 0.03 K/UL — SIGNIFICANT CHANGE UP (ref 0–0.2)
BASOPHILS NFR BLD AUTO: 0.3 % — SIGNIFICANT CHANGE UP (ref 0–1)
BILIRUB SERPL-MCNC: 0.3 MG/DL — SIGNIFICANT CHANGE UP (ref 0.2–1.2)
BUN SERPL-MCNC: 10 MG/DL — SIGNIFICANT CHANGE UP (ref 10–20)
CALCIUM SERPL-MCNC: 9.5 MG/DL — SIGNIFICANT CHANGE UP (ref 8.5–10.1)
CHLORIDE SERPL-SCNC: 93 MMOL/L — LOW (ref 98–110)
CO2 SERPL-SCNC: 34 MMOL/L — HIGH (ref 17–32)
CREAT SERPL-MCNC: <0.5 MG/DL — LOW (ref 0.7–1.5)
CULTURE RESULTS: SIGNIFICANT CHANGE UP
EGFR: 115 ML/MIN/1.73M2 — SIGNIFICANT CHANGE UP
EOSINOPHIL # BLD AUTO: 0.29 K/UL — SIGNIFICANT CHANGE UP (ref 0–0.7)
EOSINOPHIL NFR BLD AUTO: 3.2 % — SIGNIFICANT CHANGE UP (ref 0–8)
GLUCOSE BLDC GLUCOMTR-MCNC: 102 MG/DL — HIGH (ref 70–99)
GLUCOSE BLDC GLUCOMTR-MCNC: 111 MG/DL — HIGH (ref 70–99)
GLUCOSE BLDC GLUCOMTR-MCNC: 114 MG/DL — HIGH (ref 70–99)
GLUCOSE BLDC GLUCOMTR-MCNC: 127 MG/DL — HIGH (ref 70–99)
GLUCOSE BLDC GLUCOMTR-MCNC: 155 MG/DL — HIGH (ref 70–99)
GLUCOSE BLDC GLUCOMTR-MCNC: 88 MG/DL — SIGNIFICANT CHANGE UP (ref 70–99)
GLUCOSE SERPL-MCNC: 149 MG/DL — HIGH (ref 70–99)
HCT VFR BLD CALC: 27.7 % — LOW (ref 37–47)
HGB BLD-MCNC: 9.3 G/DL — LOW (ref 12–16)
IMM GRANULOCYTES NFR BLD AUTO: 1.3 % — HIGH (ref 0.1–0.3)
LYMPHOCYTES # BLD AUTO: 1.3 K/UL — SIGNIFICANT CHANGE UP (ref 1.2–3.4)
LYMPHOCYTES # BLD AUTO: 14.6 % — LOW (ref 20.5–51.1)
MCHC RBC-ENTMCNC: 33.1 PG — HIGH (ref 27–31)
MCHC RBC-ENTMCNC: 33.6 G/DL — SIGNIFICANT CHANGE UP (ref 32–37)
MCV RBC AUTO: 98.6 FL — SIGNIFICANT CHANGE UP (ref 81–99)
MONOCYTES # BLD AUTO: 0.45 K/UL — SIGNIFICANT CHANGE UP (ref 0.1–0.6)
MONOCYTES NFR BLD AUTO: 5 % — SIGNIFICANT CHANGE UP (ref 1.7–9.3)
NEUTROPHILS # BLD AUTO: 6.74 K/UL — HIGH (ref 1.4–6.5)
NEUTROPHILS NFR BLD AUTO: 75.6 % — HIGH (ref 42.2–75.2)
NRBC # BLD: 0 /100 WBCS — SIGNIFICANT CHANGE UP (ref 0–0)
PLATELET # BLD AUTO: 241 K/UL — SIGNIFICANT CHANGE UP (ref 130–400)
POTASSIUM SERPL-MCNC: 4.6 MMOL/L — SIGNIFICANT CHANGE UP (ref 3.5–5)
POTASSIUM SERPL-SCNC: 4.6 MMOL/L — SIGNIFICANT CHANGE UP (ref 3.5–5)
PROT SERPL-MCNC: 6.3 G/DL — SIGNIFICANT CHANGE UP (ref 6–8)
RBC # BLD: 2.81 M/UL — LOW (ref 4.2–5.4)
RBC # FLD: 13.8 % — SIGNIFICANT CHANGE UP (ref 11.5–14.5)
SARS-COV-2 RNA SPEC QL NAA+PROBE: SIGNIFICANT CHANGE UP
SODIUM SERPL-SCNC: 135 MMOL/L — SIGNIFICANT CHANGE UP (ref 135–146)
SPECIMEN SOURCE: SIGNIFICANT CHANGE UP
WBC # BLD: 8.93 K/UL — SIGNIFICANT CHANGE UP (ref 4.8–10.8)
WBC # FLD AUTO: 8.93 K/UL — SIGNIFICANT CHANGE UP (ref 4.8–10.8)

## 2022-07-05 PROCEDURE — 99232 SBSQ HOSP IP/OBS MODERATE 35: CPT

## 2022-07-05 PROCEDURE — 99233 SBSQ HOSP IP/OBS HIGH 50: CPT

## 2022-07-05 RX ADMIN — HYDROMORPHONE HYDROCHLORIDE 1 MILLIGRAM(S): 2 INJECTION INTRAMUSCULAR; INTRAVENOUS; SUBCUTANEOUS at 14:25

## 2022-07-05 RX ADMIN — Medication 650 MILLIGRAM(S): at 05:18

## 2022-07-05 RX ADMIN — HYDROMORPHONE HYDROCHLORIDE 1 MILLIGRAM(S): 2 INJECTION INTRAMUSCULAR; INTRAVENOUS; SUBCUTANEOUS at 22:53

## 2022-07-05 RX ADMIN — Medication 9 UNIT(S): at 00:19

## 2022-07-05 RX ADMIN — GABAPENTIN 600 MILLIGRAM(S): 400 CAPSULE ORAL at 13:27

## 2022-07-05 RX ADMIN — Medication 1 TABLET(S): at 11:53

## 2022-07-05 RX ADMIN — Medication 1 APPLICATION(S): at 17:13

## 2022-07-05 RX ADMIN — Medication 9 UNIT(S): at 11:44

## 2022-07-05 RX ADMIN — Medication 650 MILLIGRAM(S): at 13:57

## 2022-07-05 RX ADMIN — Medication 1 APPLICATION(S): at 05:19

## 2022-07-05 RX ADMIN — HYDROMORPHONE HYDROCHLORIDE 1 MILLIGRAM(S): 2 INJECTION INTRAMUSCULAR; INTRAVENOUS; SUBCUTANEOUS at 22:38

## 2022-07-05 RX ADMIN — Medication 1 APPLICATION(S): at 05:18

## 2022-07-05 RX ADMIN — Medication 650 MILLIGRAM(S): at 23:01

## 2022-07-05 RX ADMIN — APIXABAN 5 MILLIGRAM(S): 2.5 TABLET, FILM COATED ORAL at 05:21

## 2022-07-05 RX ADMIN — ATORVASTATIN CALCIUM 20 MILLIGRAM(S): 80 TABLET, FILM COATED ORAL at 22:31

## 2022-07-05 RX ADMIN — Medication 650 MILLIGRAM(S): at 22:31

## 2022-07-05 RX ADMIN — POLYETHYLENE GLYCOL 3350 17 GRAM(S): 17 POWDER, FOR SOLUTION ORAL at 11:53

## 2022-07-05 RX ADMIN — HYDROMORPHONE HYDROCHLORIDE 1 MILLIGRAM(S): 2 INJECTION INTRAMUSCULAR; INTRAVENOUS; SUBCUTANEOUS at 04:23

## 2022-07-05 RX ADMIN — HYDROMORPHONE HYDROCHLORIDE 1 MILLIGRAM(S): 2 INJECTION INTRAMUSCULAR; INTRAVENOUS; SUBCUTANEOUS at 18:51

## 2022-07-05 RX ADMIN — Medication 0.5 MILLIGRAM(S): at 15:58

## 2022-07-05 RX ADMIN — HYDROMORPHONE HYDROCHLORIDE 1 MILLIGRAM(S): 2 INJECTION INTRAMUSCULAR; INTRAVENOUS; SUBCUTANEOUS at 14:40

## 2022-07-05 RX ADMIN — Medication 2: at 06:28

## 2022-07-05 RX ADMIN — Medication 9 UNIT(S): at 23:51

## 2022-07-05 RX ADMIN — GABAPENTIN 600 MILLIGRAM(S): 400 CAPSULE ORAL at 05:17

## 2022-07-05 RX ADMIN — HYDROMORPHONE HYDROCHLORIDE 1 MILLIGRAM(S): 2 INJECTION INTRAMUSCULAR; INTRAVENOUS; SUBCUTANEOUS at 10:35

## 2022-07-05 RX ADMIN — HYDROMORPHONE HYDROCHLORIDE 1 MILLIGRAM(S): 2 INJECTION INTRAMUSCULAR; INTRAVENOUS; SUBCUTANEOUS at 10:19

## 2022-07-05 RX ADMIN — PANTOPRAZOLE SODIUM 40 MILLIGRAM(S): 20 TABLET, DELAYED RELEASE ORAL at 05:20

## 2022-07-05 RX ADMIN — Medication 650 MILLIGRAM(S): at 05:48

## 2022-07-05 RX ADMIN — Medication 9 UNIT(S): at 06:27

## 2022-07-05 RX ADMIN — HYDROMORPHONE HYDROCHLORIDE 0.5 MILLIGRAM(S): 2 INJECTION INTRAMUSCULAR; INTRAVENOUS; SUBCUTANEOUS at 06:07

## 2022-07-05 RX ADMIN — INSULIN GLARGINE 20 UNIT(S): 100 INJECTION, SOLUTION SUBCUTANEOUS at 21:59

## 2022-07-05 RX ADMIN — APIXABAN 5 MILLIGRAM(S): 2.5 TABLET, FILM COATED ORAL at 17:22

## 2022-07-05 RX ADMIN — HYDROMORPHONE HYDROCHLORIDE 0.5 MILLIGRAM(S): 2 INJECTION INTRAMUSCULAR; INTRAVENOUS; SUBCUTANEOUS at 06:22

## 2022-07-05 RX ADMIN — CEFTRIAXONE 100 MILLIGRAM(S): 500 INJECTION, POWDER, FOR SOLUTION INTRAMUSCULAR; INTRAVENOUS at 17:19

## 2022-07-05 RX ADMIN — HYDROMORPHONE HYDROCHLORIDE 1 MILLIGRAM(S): 2 INJECTION INTRAMUSCULAR; INTRAVENOUS; SUBCUTANEOUS at 18:36

## 2022-07-05 RX ADMIN — GABAPENTIN 600 MILLIGRAM(S): 400 CAPSULE ORAL at 22:31

## 2022-07-05 RX ADMIN — HYDROMORPHONE HYDROCHLORIDE 0.5 MILLIGRAM(S): 2 INJECTION INTRAMUSCULAR; INTRAVENOUS; SUBCUTANEOUS at 00:12

## 2022-07-05 RX ADMIN — HYDROMORPHONE HYDROCHLORIDE 1 MILLIGRAM(S): 2 INJECTION INTRAMUSCULAR; INTRAVENOUS; SUBCUTANEOUS at 04:08

## 2022-07-05 RX ADMIN — Medication 9 UNIT(S): at 17:20

## 2022-07-05 RX ADMIN — Medication 650 MILLIGRAM(S): at 13:27

## 2022-07-05 NOTE — PROGRESS NOTE ADULT - SUBJECTIVE AND OBJECTIVE BOX
CRUZ DUMONT  56y, Female  Allergy: penicillin (Swelling)      LOS  39d    CHIEF COMPLAINT: Hypoxia (05 Jul 2022 08:20)      INTERVAL EVENTS/HPI  - No acute events overnight  - T(F): , Max: 98.4 (07-04-22 @ 12:00)  - Denies any worsening symptoms  - Tolerating medication  - WBC Count: 8.93 (07-05-22 @ 07:09)  WBC Count: 9.67 (07-03-22 @ 08:03)     - Creatinine, Serum: <0.5 (07-05-22 @ 07:09)       ROS  General: Denies rigors, nightsweats  HEENT: Denies headache, rhinorrhea, sore throat, eye pain  CV: Denies CP, palpitations  PULM: Denies wheezing, hemoptysis  GI: Denies hematemesis, hematochezia, melena  : Denies discharge, hematuria  MSK: Denies arthralgias, myalgias  SKIN: Denies rash, lesions  NEURO: Denies paresthesias, weakness  PSYCH: Denies depression, anxiety    VITALS:  T(F): 98, Max: 98.4 (07-04-22 @ 12:00)  HR: 74  BP: 151/65  RR: 18Vital Signs Last 24 Hrs  T(C): 36.7 (05 Jul 2022 05:15), Max: 36.9 (04 Jul 2022 12:00)  T(F): 98 (05 Jul 2022 05:15), Max: 98.4 (04 Jul 2022 12:00)  HR: 74 (05 Jul 2022 05:15) (71 - 74)  BP: 151/65 (05 Jul 2022 05:15) (151/65 - 163/73)  BP(mean): --  RR: 18 (05 Jul 2022 05:15) (18 - 18)  SpO2: 100% (05 Jul 2022 09:13) (100% - 100%)    PHYSICAL EXAM:  Gen: NAD, resting in bed  HEENT: Normocephalic, atraumatic  Neck: supple, no lymphadenopathy  CV: Regular rate & regular rhythm  Lungs: decreased BS at bases, no fremitus  Abdomen: Soft, BS present  Ext: Warm, well perfused  Neuro: non focal, awake  Skin: no rash, no erythema  Lines: no phlebitis    FH: Non-contributory  Social Hx: Non-contributory    TESTS & MEASUREMENTS:                        9.3    8.93  )-----------( 241      ( 05 Jul 2022 07:09 )             27.7     07-05    135  |  93<L>  |  10  ----------------------------<  149<H>  4.6   |  34<H>  |  <0.5<L>    Ca    9.5      05 Jul 2022 07:09    TPro  6.3  /  Alb  3.5  /  TBili  0.3  /  DBili  x   /  AST  21  /  ALT  16  /  AlkPhos  116<H>  07-05      LIVER FUNCTIONS - ( 05 Jul 2022 07:09 )  Alb: 3.5 g/dL / Pro: 6.3 g/dL / ALK PHOS: 116 U/L / ALT: 16 U/L / AST: 21 U/L / GGT: x               Culture - Blood (collected 06-30-22 @ 06:20)  Source: .Blood None  Preliminary Report (07-01-22 @ 14:01):    No growth to date.    Culture - Urine (collected 06-29-22 @ 05:55)  Source: Clean Catch Clean Catch (Midstream)  Final Report (06-30-22 @ 21:38):    <10,000 CFU/mL Normal Urogenital Chante    Culture - Sputum (collected 06-28-22 @ 10:20)  Source: Trach Asp Tracheal Aspirate  Gram Stain (06-28-22 @ 23:39):    Numerous polymorphonuclear leukocytes per low power field    Few Squamous epithelial cells per low power field    Few Gram positive cocci in pairs per oil power field    Moderate Gram Negative Coccobacilli per oil power field  Final Report (06-30-22 @ 17:34):    Rare Pseudomonas aeruginosa (Carbapenem Resistant)    Normal Respiratory Chante present  Organism: Pseudomonas aeruginosa (Carbapenem Resistant) (06-30-22 @ 17:34)  Organism: Pseudomonas aeruginosa (Carbapenem Resistant) (06-30-22 @ 17:34)      -  Amikacin: S <=16      -  Aztreonam: R >16      -  Cefepime: S 4      -  Ceftazidime: S 4      -  Ceftazidime/Avibactam: S <=4      -  Ceftolozane/tazobactam: S <=2      -  Ciprofloxacin: R >2      -  Gentamicin: R >8      -  Imipenem: R >8      -  Levofloxacin: R >4      -  Meropenem: R 8      -  Piperacillin/Tazobactam: S <=8      -  Tobramycin: R >8      Method Type: ROMY    Culture - Blood (collected 06-28-22 @ 06:02)  Source: .Blood Blood-Peripheral  Gram Stain (06-29-22 @ 01:14):    Growth in aerobic bottle: Gram Negative Rods    Growth in anaerobic bottle: Gram Negative Rods  Final Report (06-30-22 @ 13:56):    Growth in aerobic and anaerobic bottles: Serratia marcescens    ***Blood Panel PCR results on this specimen are available    approximately 3 hours after the Gram stain result.***    Gram stain, PCR, and/or culture results may not always    correspond due to difference in methodologies.    ************************************************************    This PCR assay was performed by multiplex PCR. This    Assay tests for 66 bacterial and resistance gene targets.    Please refer to the [facility] test directory    at [email or web address] for details.  Organism: Blood Culture PCR  Serratia marcescens (06-30-22 @ 13:56)  Organism: Serratia marcescens (06-30-22 @ 13:56)      -  Amikacin: S <=16      -  Ampicillin: R >16 These ampicillin results predict results for amoxicillin      -  Ampicillin/Sulbactam: R >16/8 Enterobacter, Klebsiella aerogenes, Citrobacter, and Serratia may develop resistance during prolonged therapy (3-4 days)      -  Aztreonam: S <=4      -  Cefazolin: R >16 Enterobacter, Klebsiella aerogenes, Citrobacter, and Serratia may develop resistance during prolonged therapy (3-4 days)      -  Cefepime: S <=2      -  Cefoxitin: R 16      -  Ceftriaxone: S <=1 Enterobacter, Klebsiella aerogenes, Citrobacter, and Serratia may develop resistance during prolonged therapy      -  Ciprofloxacin: S <=0.25      -  Ertapenem: S <=0.5      -  Gentamicin: S <=2      -  Levofloxacin: S <=0.5      -  Meropenem: S <=1      -  Piperacillin/Tazobactam: S <=8      -  Tobramycin: S <=2      -  Trimethoprim/Sulfamethoxazole: S <=0.5/9.5      Method Type: ROMY  Organism: Blood Culture PCR (06-30-22 @ 13:56)      -  Serratia marcescens: Detec      Method Type: PCR    Culture - Urine (collected 06-21-22 @ 00:40)  Source: Catheterized Catheterized  Final Report (06-22-22 @ 07:05):    <10,000 CFU/mL Normal Urogenital Chante            INFECTIOUS DISEASES TESTING  Procalcitonin, Serum: 19.40 (06-28-22 @ 06:02)  COVID-19 PCR: NotDetec (06-23-22 @ 15:36)  COVID-19 PCR: NotDetec (06-22-22 @ 07:11)  COVID-19 PCR: NotDetec (06-14-22 @ 11:49)  COVID-19 PCR: NotDetec (05-31-22 @ 17:00)  Procalcitonin, Serum: 0.22 (05-29-22 @ 12:33)  MRSA PCR Result.: Negative (05-28-22 @ 10:06)  Procalcitonin, Serum: 0.18 (05-27-22 @ 16:51)  Rapid RVP Result: NotDetec (05-27-22 @ 10:30)      INFLAMMATORY MARKERS      RADIOLOGY & ADDITIONAL TESTS:  I have personally reviewed the last available Chest xray  CXR      CT      CARDIOLOGY TESTING  12 Lead ECG:   Ventricular Rate 92 BPM    Atrial Rate 92 BPM    P-R Interval 168 ms    QRS Duration 98 ms    Q-T Interval 370 ms    QTC Calculation(Bazett) 457 ms    P Axis 84 degrees    R Axis 103 degrees    T Axis 89 degrees    Diagnosis Line Sinus rhythm with Premature supraventricular complexes  Rightward axis  Low voltage QRS  Incomplete right bundle branch block  Cannot rule out Anterior infarct , age undetermined  T wave abnormality, consider lateral ischemia  Abnormal ECG    Confirmed by Joe Person ([id number]) on 6/30/2022 7:36:49 AM (06-30-22 @ 04:43)  12 Lead ECG:   Ventricular Rate 89 BPM    Atrial Rate 89 BPM    P-R Interval 162 ms    QRS Duration 98 ms    Q-T Interval 372 ms    QTC Calculation(Bazett) 452 ms    P Axis 90 degrees    R Axis 102 degrees    T Axis 90 degrees    Diagnosis Line Normal sinus rhythm  Rightward axis  Incomplete right bundle branch block  Anterior infarct , age undetermined  ST & T wave abnormality, consider lateral ischemia  Abnormal ECG    Confirmed by Joe Person ([id number]) on 6/30/2022 7:36:41 AM (06-30-22 @ 04:42)      MEDICATIONS  acetaminophen     Tablet .. 650 Oral every 8 hours  ammonium lactate 12% Lotion 1 Topical every 12 hours  apixaban 5 Oral two times a day  atorvastatin 20 Oral at bedtime  betamethasone valerate 0.1% Cream 1 Topical two times a day  cefTRIAXone   IVPB 2000 IV Intermittent every 24 hours  dextrose 5%. 1000 IV Continuous <Continuous>  dextrose 5%. 1000 IV Continuous <Continuous>  dextrose 50% Injectable 25 IV Push once  dextrose 50% Injectable 12.5 IV Push once  dextrose 50% Injectable 25 IV Push once  diatrizoate meglumine/diatrizoate sodium. 30 Oral once  gabapentin 600 Oral three times a day  glucagon  Injectable 1 IntraMuscular once  influenza   Vaccine 0.5 IntraMuscular once  insulin glargine Injectable (LANTUS) 20 SubCutaneous at bedtime  insulin lispro (ADMELOG) corrective regimen sliding scale  SubCutaneous three times a day before meals  insulin lispro Injectable (ADMELOG) 9 SubCutaneous every 6 hours  multivitamin 1 Oral daily  pantoprazole    Tablet 40 Oral before breakfast  polyethylene glycol 3350 17 Oral daily  senna 2 Oral at bedtime      WEIGHT  Weight (kg): 97.4 (06-23-22 @ 10:18)  Creatinine, Serum: <0.5 mg/dL (07-05-22 @ 07:09)      ANTIBIOTICS:  cefTRIAXone   IVPB 2000 milliGRAM(s) IV Intermittent every 24 hours      All available historical records have been reviewed

## 2022-07-05 NOTE — PROGRESS NOTE ADULT - ASSESSMENT
57 yo f with PMHx of DVT on Eliquis, COPD,  trach collar, obesity, IDDM, UTI, sent by [facility] for hypoxia. Current illness going back to April 4th when pt was intubated on admission at [facility] ICU for hypoxic respiratory failure diagnosed with copd exacerbation and superimposed pneumonia, and remained intubated for 37 days requiring tracheostomy. Patient stayed at [facility] for 2 days until noted to be hypoxic (saturating low 70's) pt's saturation immediately improving afterwards to 80's after large mucus plug removed and was subsequently sent to [facility]  On arrival to ED pt was saturating 97%, 15L O2 via tracheostomy collar (baseline 8 L at [facility])CXR suspicious for L-sided PNA, inconclusive. CTA neg for PE. Pt received x1 IV Levaquin and Cefepime in ED, admitted to MICU for acute hypoxic hypercapnic respiratory failure secondary to acute mucus plug now resolved vs superimposed pneumonia hospital-acquired.   General Surgery Consulted emergently when patient found to be hypoxic to low 80s after CCU and Pulmonary team found large granuloma in trachea along tracheostomy tube. Patient currently had a size 8 trach. Surgery assisted Pulmonary and CCU with trach exchanged with ET tube guidance to a size 7 XLT. Saturations improved to high 90s. Large granuloma was removed. Patient was no longer in distress after exchange.    #VDRF:  Vent management per Pulm.  s/p trach exchange  s/p mucus plugs removal this admission  Monitor oxygenation. Lately been saturating ok on T piece.    #sepsis (developed again on 6/27/22):   -6/28: hypotensive, febrile to 102.8  -UA +; blood cultures (6/28)= Serratia. Sens. noted  -Started on Cefepime and Vanc (6/28) > changed to CTX 2gm QD per ID (6/29) > f/u ID on duration.   - 6/30 blood culture NGTD, 6/29 urine culture NGTD  CT Abdo per ID: only showed some cecitis  Hemodynamically stable lately    #s/p PEG (6/15)  #Dysphagia:  Strict NPO as of now per ST's FEES eval from 6/13/22.   [facility] ST will sign out to  at [facility] for further plans with swallow rehab at [facility]  ST planning repeat FEES on 7/6.      #LE neuropathic pain:  PAtient has apparently been bed bound since April 4th (her [facility] admission for hypoxic failure where she ended up being intubated)  Apparently has L > R foot drop which may be due to chronic contractures of Calf muscle/Achilles tendon.   Can have PT evaluate her for that. But options are limited at this stage.   Given the asymmetrical foot drop, Neurology ruled out Any focal neuropathy with a MRI Lumbar spine WNL.     Neuropathy is likely Diabetic?   But given the concomitant blackish discoloration from shin to dorsum of feet, Neurology is also suspecting deposition diseases versus Eosinophilic Dermatitis which could also explain the neuropathy?    #B/l LE Hyperpigmentation changes:   unclear etiology  Present since 1 year.   May be related to CHF related stasis dermatitis / hemosiderin deposition  vs deposition diseases versus Eosinophilic Dermatitis   Dermatology thinks it may be Eczema and advised Triamcinolone BId> no improvement.   LWC with Betamethasone cream and Ammonium lactate dressings per Burn.   If no improvement, can consider Biopsy as outpatient?     #Lower extremity Pain management*** :   Gabapentin to 600 TID,   Currently IV dilaudid 1 Q4 PRN   Plan to change to PO.  Start PT.     Note: On 6/21, we discontinued Cymbalta 60 QD since as per family patient may be having bizarre thoughts/hallucinations. Patient was texting them bizarre messages.   Note: Patient has h/o suicidal thoughts on Pregabalin.     Xanax 0.25 Q12 PRN for anxiety.   On Senna, Miralax.     #Suspected epiglottis edema:   s/p DExa Taper (ended 6/18).   Outpatient ENT f/u.     #DM:   steroids ended 6/18.  6/19- Increased lantus from 17 to 20.    #Hyponatremia- monitor  ?SiADH      #Chronic Indwelling Singh  - 5/28 Ucx - Contaminated   - 5/28 Ucx- Normal perri   - 5/27 Ucx- Klebsiella (CRE) and pseudomonas   - s/p cefepime  - repeat U. Cx (6/29) WNL    #Hx of DVT  - on home Eliquis  - restarted Eliquis    #Misc:  Activity been bed bound. PT eval when able.   GI PPX  PPI  DVT PPX  ELIQUIS  Dispo: Acute  Labs Q48 or PRN now.    HANDOFF: f/u FEES on 7/6  f/u ID on Abx duration.    Change pain meds to PO.   Start PT  if pain controlled  Likely [facility] in 48 hours. Let CM know on 7/6 if anticipated for 7/7 since she will need auth.

## 2022-07-05 NOTE — PROGRESS NOTE ADULT - ASSESSMENT
ASSESSMENT  57 yo f with PMHx of DVT on Eliquis, COPD,  trach collar, obesity, IDDM, UTI, sent by [facility] for hypoxia.    IMPRESSION  #Acute on Chronic COPD with exacerbation  #Dysphagia s/p PEG  #Serratia bacteremia  - blood Cx 6/28 +  -  CT Abdomen and Pelvis w/ IV Cont (07.01.22 @ 20:58): a of fat stranding surrounding the cecum and ascending colon, new  since CT abdomen pelvis performed on June 20, 2022. Findings suggestive  of cecitis. No discrete surrounding fluid collection/abscess. Partially visualized left lower lobe consolidative opacity. Consider  follow-up with radiographs until resolution. Interval resolution of postsurgical changes related to gastrostomy tube  placement including perigastric fluid and intraperitoneal free air.    #DM2  #Obesity BMI (kg/m2): 40.6  #Abx allergy: penicillin (Swelling)      RECOMMENDATIONS  - continue antibiotics until 7/7 for 10 day course  - can switch to PO cipro 500 mg BID to complete course   - noted sputum cx results -- suspect colonizers      This is a preliminary incomplete pended note, all final recommendations to follow after interview and examination of the patient.    Please call or message on [employer] Teams if with any questions.  Spectra [phone]   ASSESSMENT  55 yo f with PMHx of DVT on Eliquis, COPD,  trach collar, obesity, IDDM, UTI, sent by [facility] for hypoxia.    IMPRESSION  #Acute on Chronic COPD with exacerbation  #Dysphagia s/p PEG  #Serratia bacteremia  - blood Cx 6/28 +  -  CT Abdomen and Pelvis w/ IV Cont (07.01.22 @ 20:58): a of fat stranding surrounding the cecum and ascending colon, new  since CT abdomen pelvis performed on June 20, 2022. Findings suggestive  of cecitis. No discrete surrounding fluid collection/abscess. Partially visualized left lower lobe consolidative opacity. Consider  follow-up with radiographs until resolution. Interval resolution of postsurgical changes related to gastrostomy tube  placement including perigastric fluid and intraperitoneal free air.    #DM2  #Obesity BMI (kg/m2): 40.6  #Abx allergy: penicillin (Swelling)      RECOMMENDATIONS  - continue antibiotics until 7/7 for 10 day course  - can switch to PO cipro 500 mg BID to complete course   - noted sputum cx results -- suspect colonizers     Please call or message on [employer] Teams if with any questions.  Spectra [phone]

## 2022-07-05 NOTE — SWALLOW BEDSIDE ASSESSMENT ADULT - SWALLOW EVAL: RECOMMENDED DIET
NPO w/ non-oral means of nutrition/hydration
continue NPO w/ PEG tube feeds, allow sips/chips of water
continue NPO w/ NGT; allow ice chips
NPO, ice chips
continue NPO w/ NGT, allow ice chips PRN
continue NPO w/ NGT; allow ice chips
continue NPO w/ NGT; allow ice chips
continue NPO w/ NGT

## 2022-07-05 NOTE — SWALLOW BEDSIDE ASSESSMENT ADULT - MODE OF PRESENTATION
cup/self fed
spoon/fed by clinician
~8 oz./spoon/fed by clinician

## 2022-07-05 NOTE — PROGRESS NOTE ADULT - SUBJECTIVE AND OBJECTIVE BOX
S: Pain controlled  able to speak with speaking valve  in bed all the time      All other pertinent ROS negative.      07-05-22 @ 07:01  -  07-05-22 @ 16:34  --------------------------------------------------------  IN: 300 mL / OUT: 0 mL / NET: 300 mL      Vital Signs Last 24 Hrs  T(C): 36.7 (05 Jul 2022 12:27), Max: 36.7 (05 Jul 2022 05:15)  T(F): 98 (05 Jul 2022 12:27), Max: 98 (05 Jul 2022 05:15)  HR: 72 (05 Jul 2022 12:27) (72 - 74)  BP: 146/72 (05 Jul 2022 12:27) (146/72 - 151/65)  BP(mean): 97 (05 Jul 2022 12:27) (97 - 97)  RR: 19 (05 Jul 2022 12:27) (18 - 19)  SpO2: 100% (05 Jul 2022 09:13) (100% - 100%)  PHYSICAL EXAM:    Constitutional: trach/PEG  HEENT: PERR, EOMI, Normal Hearing, MMM  Neck: Soft and supple, No LAD, No JVD  Respiratory: Breath sounds are clear bilaterally, No wheezing, rales or rhonchi  Cardiovascular: S1 and S2, regular rate and rhythm, no Murmurs, gallops or rubs  Gastrointestinal: Bowel Sounds present, soft, nontender, nondistended, no guarding, no rebound  Extremities: unchanged LE blackish discoloration,. TTP      MEDICATIONS:  MEDICATIONS  (STANDING):  acetaminophen     Tablet .. 650 milliGRAM(s) Oral every 8 hours  ammonium lactate 12% Lotion 1 Application(s) Topical every 12 hours  apixaban 5 milliGRAM(s) Oral two times a day  atorvastatin 20 milliGRAM(s) Oral at bedtime  betamethasone valerate 0.1% Cream 1 Application(s) Topical two times a day  cefTRIAXone   IVPB 2000 milliGRAM(s) IV Intermittent every 24 hours  dextrose 5%. 1000 milliLiter(s) (100 mL/Hr) IV Continuous <Continuous>  dextrose 5%. 1000 milliLiter(s) (50 mL/Hr) IV Continuous <Continuous>  dextrose 50% Injectable 25 Gram(s) IV Push once  dextrose 50% Injectable 12.5 Gram(s) IV Push once  dextrose 50% Injectable 25 Gram(s) IV Push once  diatrizoate meglumine/diatrizoate sodium. 30 milliLiter(s) Oral once  gabapentin 600 milliGRAM(s) Oral three times a day  glucagon  Injectable 1 milliGRAM(s) IntraMuscular once  influenza   Vaccine 0.5 milliLiter(s) IntraMuscular once  insulin glargine Injectable (LANTUS) 20 Unit(s) SubCutaneous at bedtime  insulin lispro (ADMELOG) corrective regimen sliding scale   SubCutaneous three times a day before meals  insulin lispro Injectable (ADMELOG) 9 Unit(s) SubCutaneous every 6 hours  multivitamin 1 Tablet(s) Oral daily  pantoprazole    Tablet 40 milliGRAM(s) Oral before breakfast  polyethylene glycol 3350 17 Gram(s) Oral daily  senna 2 Tablet(s) Oral at bedtime      LABS: All Labs Reviewed:                        9.3    8.93  )-----------( 241      ( 05 Jul 2022 07:09 )             27.7     07-05    135  |  93<L>  |  10  ----------------------------<  149<H>  4.6   |  34<H>  |  <0.5<L>    Ca    9.5      05 Jul 2022 07:09    TPro  6.3  /  Alb  3.5  /  TBili  0.3  /  DBili  x   /  AST  21  /  ALT  16  /  AlkPhos  116<H>  07-05          Blood Culture:     Radiology: reviewed

## 2022-07-05 NOTE — SWALLOW BEDSIDE ASSESSMENT ADULT - SLP PERTINENT HISTORY OF CURRENT PROBLEM
57 yo f with PMHx of DVT on Eliquis, COPD,  trach collar, obesity, IDDM, UTI, sent by [facility] for hypoxia. History provided by daughter at bedside, she reports the current illness going back to April 4th when pt was intubated on admission at [facility] [place] ICU for hypoxic respiratory failure diagnosed with copd exacerbation and superimposed pneumonia. Remained intubated 37 days. Pt admitted to medicine for acute hypoxic hypercapnic respiratory failure secondary to acute mucus plug. Course c/b large granuloma in trachea along tracheostomy tube (since removed) w/ sepsis, hypotension, LE neuropathic pain, B/L LE hyperpigmentation changes- unclear etiology. Pt s/p multiple instrumental swallow studies, most recently 6/13 w/ recs for npo; s/p peg 6/15. 55 y/o F w/ PMHx: DVT on Eliquis, COPD,  trach collar, obesity, IDDM, UTI, sent by [facility] for hypoxia. History provided by daughter at bedside, she reports the current illness going back to April 4th when pt was intubated on admission at [facility] ICU for hypoxic respiratory failure diagnosed with copd exacerbation and superimposed pneumonia. Remained intubated 37 days. Pt admitted to medicine for acute hypoxic hypercapnic respiratory failure secondary to acute mucus plug. Course c/b large granuloma in trachea along tracheostomy tube (since removed) w/ sepsis, hypotension, LE neuropathic pain, B/L LE hyperpigmentation changes- unclear etiology. Pt s/p multiple instrumental swallow studies, most recently 6/13 w/ recs for npo; s/p peg 6/15.

## 2022-07-05 NOTE — PROGRESS NOTE ADULT - SUBJECTIVE AND OBJECTIVE BOX
SUBJECTIVE:  Patient is a 56y old Female who presents with a chief complaint of Hypoxia (04 Jul 2022 07:54)   Acute respiratory failure with hypoxia     Today is hospital day 39d. There are no new issues or overnight events. Patient states that the steroid cream is somewhat helping her bilateral leg pain and otherwise has no complaints.    HPI  HPI:  57 yo f with PMHx of DVT on Eliquis, COPD,  trach collar, obesity, IDDM, UTI, sent by [facility] for hypoxia. History provided by daughter at bedside, she reports the current illness going back to April 4th when pt was intubated on admission at [facility] ICU for hypoxic respiratory failure diagnosed with copd exacerbation and superimposed pneumonia, of note she also appears to have been in CHF exacerbation with severe b/l LE swelling treated with IV bumex. Remained intubated 37 days per daughter, diagnosed with fever of unknown origin (up to 106F), treated with empiric broad spectrum antibiotics and once 2 weeks s/p tracheostomy placement and 48 hours afebrile she was was discharged to nursing home for PT. Of note, patient's daughter and pt herself say the wiseman has not been replaced for over 3 weeks. She also reports a developing sacral pressure ulcer. Was at [facility] only 2 days when was noted to be hypoxic (saturating low 70's) and EMS called. While awaiting EMS, floor nurse on the unit suctioned the patient, and managed to bring up a very large mucus plug, with pt's saturation immediately improving afterwards to 80's. Nonetheless pt was sent to [facility] ED. Prior to all this pt was independent and ambulatory. Pt denies sob, cp, abd pain, n/v/d, fever or chills.     On arrival to ED pt was saturating 97%, 15L O2 via tracheostomy collar (baseline 8 L at [facility]) VS:    /59  T 99.9F RR 20  Admission VBG pH 7.37 pCO2 60 pO2 49. Pt not wheezing, not coughing. CXR suspicious for L-sided PNA, inconclusive. CTA neg for PE. Pt received x1 IV Levoquin and Cefepime in ED, admitted to medicine for acute hypoxic hypercapnic respiratory failure secondary to acute mucus plug (now resolved vs superimposed pneumonia  hospital-acquired.(less likely), Records request sent to [facility] medical records  [phone]) and patient will be continued on empiric antibiotics pending procal (27 May 2022 14:02)      PAST MEDICAL & SURGICAL HISTORY  COPD (chronic obstructive pulmonary disease)    CHF (congestive heart failure)    DM (diabetes mellitus)    H/O tracheostomy      ALLERGIES:  penicillin (Swelling)    MEDICATIONS:  HOME MEDICATIONS  albuterol sulfate 2.5 mg/3 mL (0.083 %) solution for nebulization:   ALPRAZolam 0.5 mg oral tablet: 1 tab(s) orally every 12 hours, As needed, anxiety via PEG tube  amLODIPine 5 mg oral tablet: 1 tab(s) orally once a day via PEG tube  apixaban 5 mg oral tablet: 1 tab(s) orally 2 times a day via PEG tube  atorvastatin 20 mg oral tablet: 1 tab(s) orally once a day (at bedtime) via PEG tube  bumetanide 2 mg oral tablet: 1 tab(s) orally once a day via PEG tube  gabapentin 600 mg oral tablet: 1 tab(s) orally 3 times a day via PEG tube  HYDROmorphone 4 mg oral tablet: 1 tab(s) orally every 8 hours, As needed, for severe pain  insulin glargine 100 units/mL subcutaneous solution: 20 unit(s) subcutaneous once a day (at bedtime)  insulin lispro 100 units/mL injectable solution: 9 unit(s) injectable 3 times a day (before meals)  losartan 100 mg oral tablet: 1 tab(s) orally once a day via PEG tube  Multiple Vitamins oral tablet: 1 tab(s) orally once a day via PEG tube  pantoprazole 40 mg oral delayed release tablet: 1 tab(s) orally once a day (before a meal) via PEG tube  polyethylene glycol 3350 oral powder for reconstitution: 17 gram(s) orally once a day via PEG tube  senna leaf extract oral tablet: 2 tab(s) orally once a day (at bedtime) via PEG tube  Trelegy Ellipta 100 mcg-62.5 mcg-25 mcg powder for inhalation:     STANDING MEDICATIONS  acetaminophen     Tablet .. 650 milliGRAM(s) Oral every 8 hours  ammonium lactate 12% Lotion 1 Application(s) Topical every 12 hours  apixaban 5 milliGRAM(s) Oral two times a day  atorvastatin 20 milliGRAM(s) Oral at bedtime  betamethasone valerate 0.1% Cream 1 Application(s) Topical two times a day  cefTRIAXone   IVPB 2000 milliGRAM(s) IV Intermittent every 24 hours  dextrose 5%. 1000 milliLiter(s) IV Continuous <Continuous>  dextrose 5%. 1000 milliLiter(s) IV Continuous <Continuous>  dextrose 50% Injectable 25 Gram(s) IV Push once  dextrose 50% Injectable 12.5 Gram(s) IV Push once  dextrose 50% Injectable 25 Gram(s) IV Push once  diatrizoate meglumine/diatrizoate sodium. 30 milliLiter(s) Oral once  gabapentin 600 milliGRAM(s) Oral three times a day  glucagon  Injectable 1 milliGRAM(s) IntraMuscular once  influenza   Vaccine 0.5 milliLiter(s) IntraMuscular once  insulin glargine Injectable (LANTUS) 20 Unit(s) SubCutaneous at bedtime  insulin lispro (ADMELOG) corrective regimen sliding scale   SubCutaneous three times a day before meals  insulin lispro Injectable (ADMELOG) 9 Unit(s) SubCutaneous every 6 hours  multivitamin 1 Tablet(s) Oral daily  pantoprazole    Tablet 40 milliGRAM(s) Oral before breakfast  polyethylene glycol 3350 17 Gram(s) Oral daily  senna 2 Tablet(s) Oral at bedtime    PRN MEDICATIONS  albuterol/ipratropium for Nebulization 3 milliLiter(s) Nebulizer every 6 hours PRN  ALPRAZolam 0.5 milliGRAM(s) Oral every 12 hours PRN  dextrose Oral Gel 15 Gram(s) Oral once PRN  HYDROmorphone  Injectable 1 milliGRAM(s) IV Push every 4 hours PRN  HYDROmorphone  Injectable 0.5 milliGRAM(s) IV Push every 6 hours PRN    VITALS:   T(C): 36.7 (07-05-22 @ 05:15), Max: 36.9 (07-04-22 @ 12:00)  T(F): 98 (07-05-22 @ 05:15), Max: 98.4 (07-04-22 @ 12:00)  HR: 74 (07-05-22 @ 05:15) (71 - 74)  BP: 151/65 (07-05-22 @ 05:15) (151/65 - 163/73)  BP(mean): --  ABP: --  ABP(mean): --  RR: 18 (07-05-22 @ 05:15) (18 - 18)  SpO2: 100% (07-05-22 @ 05:15) (100% - 100%)  LABS:                        9.3    8.93  )-----------( 241      ( 05 Jul 2022 07:09 )             27.7               I&O's Detail                RADIOLOGY:  EKG  12 Lead ECG:   Ventricular Rate 92 BPM    Atrial Rate 92 BPM    P-R Interval 168 ms    QRS Duration 98 ms    Q-T Interval 370 ms    QTC Calculation(Bazett) 457 ms    P Axis 84 degrees    R Axis 103 degrees    T Axis 89 degrees    Diagnosis Line Sinus rhythm with Premature supraventricular complexes  Rightward axis  Low voltage QRS  Incomplete right bundle branch block  Cannot rule out Anterior infarct , age undetermined  T wave abnormality, consider lateral ischemia  Abnormal ECG    Confirmed by Joe Person ([id number]) on 6/30/2022 7:36:49 AM (06-30-22 @ 04:43)  12 Lead ECG:   Ventricular Rate 89 BPM    Atrial Rate 89 BPM    P-R Interval 162 ms    QRS Duration 98 ms    Q-T Interval 372 ms    QTC Calculation(Bazett) 452 ms    P Axis 90 degrees    R Axis 102 degrees    T Axis 90 degrees    Diagnosis Line Normal sinus rhythm  Rightward axis  Incomplete right bundle branch block  Anterior infarct , age undetermined  ST & T wave abnormality, consider lateral ischemia  Abnormal ECG    Confirmed by Joe Person ([id number]) on 6/30/2022 7:36:41 AM (06-30-22 @ 04:42)      Physical Exam:  General: no acute distress, lying in bed  Head: normocephalic and atraumatic  Neck: supple, trach intact and clean  Heart: regular rate and rhythm, S1 and S2 normal, no murmurs, rubs or gallops noted on exam  Lungs: Symmetric chest expansion bilaterally, decreased breath sounds bilaterally, no wheezes rhonchi or crackles heard  Abdomen: Bowel sounds present, non tender on light and deep palpation  Extremities: No edema, no clubbing or cyanosis notes, positive peripheral pulses, brown discoloration by bilateral lower extremities is unchanged from prior exams, mildly less tender to palpation

## 2022-07-05 NOTE — SWALLOW BEDSIDE ASSESSMENT ADULT - SLP GENERAL OBSERVATIONS
Pt received in bed awake alert w/o c/o pain. +humidified O1 via t-piece +tracheal suctioning provided, +scant blood noted in o2 tubing. MD made aware. +PMSV placed, VSS, +voicing

## 2022-07-05 NOTE — SWALLOW BEDSIDE ASSESSMENT ADULT - POSITIONING
upright (90 degrees)
upright (45 degrees)
upright (45 degrees)
upright (90 degrees)
upright (45 degrees)

## 2022-07-05 NOTE — SWALLOW BEDSIDE ASSESSMENT ADULT - NS ASR SWALLOW FINDINGS DISCUS
Physician/Nursing/Patient
Physician
Physician/Nursing/Patient
RT present/Nursing/Patient/Family
Physician/Nursing/Patient

## 2022-07-05 NOTE — SWALLOW BEDSIDE ASSESSMENT ADULT - DATE
17-Jun-2022
08-Jun-2022
03-Jun-2022
05-Jul-2022
28-May-2022
10-Rigoberto-2022
09-Jun-2022
06-Jun-2022

## 2022-07-05 NOTE — SWALLOW BEDSIDE ASSESSMENT ADULT - SWALLOW EVAL: CURRENT DIET
NPO w/ NGT X ice chips
NPO w/ NGT, allow ice chips
npo w/ peg tube feeds
NPO w/ NGT
NPO w/ NGT X ice chips
PEG feeds, ice chips
regular w/ thin liquids per MD orders
NPO w/ NGT X ice chips

## 2022-07-05 NOTE — SWALLOW BEDSIDE ASSESSMENT ADULT - ASR SWALLOW RECOMMEND DIAG
repeat VFSS to assess candidacy for PO diet/VFSS/MBS
assess candidacy for instrumental swallow study/FEES
further investigate pharyngeal swallow/VFSS/MBS
further investigate pharyngeal swallow integrity/FEES
repeat VFSS to assess candidacy for PO diet/VFSS/MBS

## 2022-07-05 NOTE — SWALLOW BEDSIDE ASSESSMENT ADULT - SWALLOW EVAL: RECOMMENDED FEEDING/EATING TECHNIQUES
oral hygiene/position upright (90 degrees)
oral hygiene
oral hygiene
oral hygiene/position upright (90 degrees)
oral hygiene/position upright (90 degrees)

## 2022-07-05 NOTE — PROGRESS NOTE ADULT - ASSESSMENT
55 yo female with PMHx of DVT on Eliquis, COPD,  trach collar, obesity, IDDM, UTI, sent by [facility] for hypoxia. Current illness going back to April 4th when pt was intubated on admission at [facility] ICU for hypoxic respiratory failure diagnosed with copd exacerbation and superimposed pneumonia, and remained intubated for 37 days requiring tracheostomy. Patient stayed at [facility] for 2 days until noted to be hypoxic (saturating low 70's) pt's saturation immediately improving afterwards to 80's after large mucus plug removed and was subsequently sent to [facility]  On arrival to ED pt was saturating 97%, 15L O2 via tracheostomy collar (baseline 8 L at [facility])CXR suspicious for L-sided PNA, inconclusive. CTA neg for PE. Pt received x1 IV Levaquin and Cefepime in ED, admitted to MICU for acute hypoxic hypercapnic respiratory failure secondary to acute mucus plug now resolved vs superimposed pneumonia hospital-acquired.   General Surgery Consulted emergently when patient found to be hypoxic to low 80s after CCU and Pulmonary team found large granuloma in trachea along tracheostomy tube. Patient currently had a size 8 trach. Surgery assisted Pulmonary and CCU with trach exchanged with ET tube guidance to a size 7 XLT. Saturations improved to high 90s. Large granuloma was removed. Patient was no longer in distress after exchange.    #sepsis  -6/28 Patient was noted to be tachy O/N, hypotensive, febrile to 102.8, FiO2 requirements were increased and the patient was desaturating to 82-86%  -blood cultures 6/29 positive for gram negative rods, serratia bacteremia  -on Ceftriaxone  -ID following  -F/u UA, trach aspirate and blood cultures  -6/30 WBC 11.21 (6/29 was 13.92 and 6/28 was 19.03)  -7/1 ID rec CT a/p- demonstrated cecitis, no abscess   -7/5 will f/u with ID regarding antibiotics regimen    #Hypercarbic respiratory failure  -Maintaining sats 88%/decrease O2 titration  -f/u on pulm recs     #Acute/ chronic COPD with exacerbation (Resolved)  #mucous plug removed  #pneumonia vs atelectasis L base  - On 5/28, Pulmonary team found large granuloma in trachea along tracheostomy tube. Patient had a size 8 trach on admission. Surgery assisted Pulmonary and CCU with trach exchanged with ET tube guidance to a size 7 XLT. Saturations improved to high 90s.  - trach functioning well s/p exchange   - 6/11 Trach changed to Fenestrated 8 by surgery    #s/p PEG (6/15):   KUB showing free intraperitoneal air (6/19).  CT Abdo confirmed that this was just due to PEG tube adjustment.   Repeat G tube study per Surgery (6/21) -normal, restarted on peg feedings      #Dysphagia:  Strict NPO as of now per 's FEES eval from 6/13/22.   [facility] will sign out to  at [facility] for further plans with swallow rehab at [facility]     #LE neuropathic pain:  PAtient has apparently been bed bound since April 4th (her [facility] admission for hypoxic failure where she ended up being intubated)  Apparently has L > R foot drop which may be due to chronic contractures of Calf muscle/Achilles tendon. Can have PT evaluate her for that.  But options are limited at this stage.   Given the asymmetrical foot drop, Neurology wants to rule out Any focal neuropathy.  -6/23 s/p  MRI Lumbar spine w/ & w/o Contrast, mild degenerative changes  -chronic DJD no acute changes.  -continue pain management: currently on 1mg Dilaudid po q6 prn    #B/l LE Hyperpigmentation changes:   unclear etiology  Present since 1 year.   May be related to CHF related stasis dermatitis / hemosiderin deposition  vs deposition diseases versus Eosinophilic Dermatitis vs follicular hemorrhage from eliquis   Dermatology thinks it may be Eczema and advised Triamcinolone BId.   Add multivitamin to PEG QD.   -7/1 Betamethasone cream and Ammonium lactate dressings per Burn, patient's legs feel mildly better  -7/5 bilateral lower extremities pain mildly improved    -On Senna    #Suspected epiglottis edema:   s/p DExa Taper (ended 6/18).   Outpatient ENT f/u.     #DM:   steroids ended 6/18.  6/19- Increased lantus from 17 to 20.    #Chronic Indwelling Singh  - 5/28 Ucx - Contaminated   - 5/28 Ucx- Normal perri   - 5/27 Ucx- Klebsiella (CRE) and pseudomonas   - s/p cefepime  - Currently afebrile w/ no urinary complaints   - episode of Hematouria 6/4 -> improving -> h/h Stable    #Hx of DVT  - on home Eliquis  - restarted Eliquis    Activity been bed bound. PT eval when able.   GI PPX  PPI  DVT PPX  ELIQUIS  Dispo: Acute  Pending:  d/c planning to [facility]

## 2022-07-06 LAB
ALBUMIN SERPL ELPH-MCNC: 3.4 G/DL — LOW (ref 3.5–5.2)
ALP SERPL-CCNC: 103 U/L — SIGNIFICANT CHANGE UP (ref 30–115)
ALT FLD-CCNC: 14 U/L — SIGNIFICANT CHANGE UP (ref 0–41)
ANION GAP SERPL CALC-SCNC: 8 MMOL/L — SIGNIFICANT CHANGE UP (ref 7–14)
AST SERPL-CCNC: 17 U/L — SIGNIFICANT CHANGE UP (ref 0–41)
BASOPHILS # BLD AUTO: 0.02 K/UL — SIGNIFICANT CHANGE UP (ref 0–0.2)
BASOPHILS NFR BLD AUTO: 0.3 % — SIGNIFICANT CHANGE UP (ref 0–1)
BILIRUB SERPL-MCNC: 0.2 MG/DL — SIGNIFICANT CHANGE UP (ref 0.2–1.2)
BUN SERPL-MCNC: 11 MG/DL — SIGNIFICANT CHANGE UP (ref 10–20)
CALCIUM SERPL-MCNC: 9.5 MG/DL — SIGNIFICANT CHANGE UP (ref 8.5–10.1)
CHLORIDE SERPL-SCNC: 94 MMOL/L — LOW (ref 98–110)
CO2 SERPL-SCNC: 34 MMOL/L — HIGH (ref 17–32)
CREAT SERPL-MCNC: <0.5 MG/DL — LOW (ref 0.7–1.5)
EGFR: 124 ML/MIN/1.73M2 — SIGNIFICANT CHANGE UP
EOSINOPHIL # BLD AUTO: 0.32 K/UL — SIGNIFICANT CHANGE UP (ref 0–0.7)
EOSINOPHIL NFR BLD AUTO: 4.1 % — SIGNIFICANT CHANGE UP (ref 0–8)
GLUCOSE BLDC GLUCOMTR-MCNC: 102 MG/DL — HIGH (ref 70–99)
GLUCOSE BLDC GLUCOMTR-MCNC: 107 MG/DL — HIGH (ref 70–99)
GLUCOSE BLDC GLUCOMTR-MCNC: 86 MG/DL — SIGNIFICANT CHANGE UP (ref 70–99)
GLUCOSE SERPL-MCNC: 97 MG/DL — SIGNIFICANT CHANGE UP (ref 70–99)
HCT VFR BLD CALC: 27.4 % — LOW (ref 37–47)
HGB BLD-MCNC: 9.1 G/DL — LOW (ref 12–16)
IMM GRANULOCYTES NFR BLD AUTO: 1.2 % — HIGH (ref 0.1–0.3)
LYMPHOCYTES # BLD AUTO: 1.26 K/UL — SIGNIFICANT CHANGE UP (ref 1.2–3.4)
LYMPHOCYTES # BLD AUTO: 16.1 % — LOW (ref 20.5–51.1)
MCHC RBC-ENTMCNC: 32.7 PG — HIGH (ref 27–31)
MCHC RBC-ENTMCNC: 33.2 G/DL — SIGNIFICANT CHANGE UP (ref 32–37)
MCV RBC AUTO: 98.6 FL — SIGNIFICANT CHANGE UP (ref 81–99)
MONOCYTES # BLD AUTO: 0.55 K/UL — SIGNIFICANT CHANGE UP (ref 0.1–0.6)
MONOCYTES NFR BLD AUTO: 7 % — SIGNIFICANT CHANGE UP (ref 1.7–9.3)
NEUTROPHILS # BLD AUTO: 5.58 K/UL — SIGNIFICANT CHANGE UP (ref 1.4–6.5)
NEUTROPHILS NFR BLD AUTO: 71.3 % — SIGNIFICANT CHANGE UP (ref 42.2–75.2)
NRBC # BLD: 0 /100 WBCS — SIGNIFICANT CHANGE UP (ref 0–0)
PLATELET # BLD AUTO: 246 K/UL — SIGNIFICANT CHANGE UP (ref 130–400)
POTASSIUM SERPL-MCNC: 4.5 MMOL/L — SIGNIFICANT CHANGE UP (ref 3.5–5)
POTASSIUM SERPL-SCNC: 4.5 MMOL/L — SIGNIFICANT CHANGE UP (ref 3.5–5)
PROT SERPL-MCNC: 5.9 G/DL — LOW (ref 6–8)
RBC # BLD: 2.78 M/UL — LOW (ref 4.2–5.4)
RBC # FLD: 13.9 % — SIGNIFICANT CHANGE UP (ref 11.5–14.5)
SODIUM SERPL-SCNC: 136 MMOL/L — SIGNIFICANT CHANGE UP (ref 135–146)
WBC # BLD: 7.82 K/UL — SIGNIFICANT CHANGE UP (ref 4.8–10.8)
WBC # FLD AUTO: 7.82 K/UL — SIGNIFICANT CHANGE UP (ref 4.8–10.8)

## 2022-07-06 PROCEDURE — 99233 SBSQ HOSP IP/OBS HIGH 50: CPT

## 2022-07-06 RX ORDER — CIPROFLOXACIN LACTATE 400MG/40ML
500 VIAL (ML) INTRAVENOUS EVERY 12 HOURS
Refills: 0 | Status: COMPLETED | OUTPATIENT
Start: 2022-07-06 | End: 2022-07-08

## 2022-07-06 RX ADMIN — GABAPENTIN 600 MILLIGRAM(S): 400 CAPSULE ORAL at 05:30

## 2022-07-06 RX ADMIN — Medication 1 APPLICATION(S): at 18:13

## 2022-07-06 RX ADMIN — Medication 650 MILLIGRAM(S): at 13:38

## 2022-07-06 RX ADMIN — HYDROMORPHONE HYDROCHLORIDE 1 MILLIGRAM(S): 2 INJECTION INTRAMUSCULAR; INTRAVENOUS; SUBCUTANEOUS at 21:52

## 2022-07-06 RX ADMIN — APIXABAN 5 MILLIGRAM(S): 2.5 TABLET, FILM COATED ORAL at 05:30

## 2022-07-06 RX ADMIN — Medication 3 MILLILITER(S): at 14:54

## 2022-07-06 RX ADMIN — Medication 9 UNIT(S): at 18:41

## 2022-07-06 RX ADMIN — ATORVASTATIN CALCIUM 20 MILLIGRAM(S): 80 TABLET, FILM COATED ORAL at 21:30

## 2022-07-06 RX ADMIN — HYDROMORPHONE HYDROCHLORIDE 1 MILLIGRAM(S): 2 INJECTION INTRAMUSCULAR; INTRAVENOUS; SUBCUTANEOUS at 17:52

## 2022-07-06 RX ADMIN — Medication 9 UNIT(S): at 05:30

## 2022-07-06 RX ADMIN — HYDROMORPHONE HYDROCHLORIDE 1 MILLIGRAM(S): 2 INJECTION INTRAMUSCULAR; INTRAVENOUS; SUBCUTANEOUS at 17:37

## 2022-07-06 RX ADMIN — Medication 650 MILLIGRAM(S): at 13:08

## 2022-07-06 RX ADMIN — Medication 1 TABLET(S): at 11:58

## 2022-07-06 RX ADMIN — APIXABAN 5 MILLIGRAM(S): 2.5 TABLET, FILM COATED ORAL at 17:36

## 2022-07-06 RX ADMIN — Medication 650 MILLIGRAM(S): at 22:01

## 2022-07-06 RX ADMIN — GABAPENTIN 600 MILLIGRAM(S): 400 CAPSULE ORAL at 21:30

## 2022-07-06 RX ADMIN — Medication 1 APPLICATION(S): at 05:31

## 2022-07-06 RX ADMIN — Medication 650 MILLIGRAM(S): at 21:31

## 2022-07-06 RX ADMIN — Medication 9 UNIT(S): at 11:59

## 2022-07-06 RX ADMIN — Medication 500 MILLIGRAM(S): at 17:36

## 2022-07-06 RX ADMIN — HYDROMORPHONE HYDROCHLORIDE 1 MILLIGRAM(S): 2 INJECTION INTRAMUSCULAR; INTRAVENOUS; SUBCUTANEOUS at 13:20

## 2022-07-06 RX ADMIN — Medication 0.5 MILLIGRAM(S): at 15:02

## 2022-07-06 RX ADMIN — Medication 650 MILLIGRAM(S): at 05:30

## 2022-07-06 RX ADMIN — HYDROMORPHONE HYDROCHLORIDE 1 MILLIGRAM(S): 2 INJECTION INTRAMUSCULAR; INTRAVENOUS; SUBCUTANEOUS at 13:05

## 2022-07-06 RX ADMIN — Medication 650 MILLIGRAM(S): at 06:01

## 2022-07-06 RX ADMIN — HYDROMORPHONE HYDROCHLORIDE 1 MILLIGRAM(S): 2 INJECTION INTRAMUSCULAR; INTRAVENOUS; SUBCUTANEOUS at 21:37

## 2022-07-06 RX ADMIN — HYDROMORPHONE HYDROCHLORIDE 1 MILLIGRAM(S): 2 INJECTION INTRAMUSCULAR; INTRAVENOUS; SUBCUTANEOUS at 04:33

## 2022-07-06 RX ADMIN — HYDROMORPHONE HYDROCHLORIDE 1 MILLIGRAM(S): 2 INJECTION INTRAMUSCULAR; INTRAVENOUS; SUBCUTANEOUS at 09:18

## 2022-07-06 RX ADMIN — HYDROMORPHONE HYDROCHLORIDE 1 MILLIGRAM(S): 2 INJECTION INTRAMUSCULAR; INTRAVENOUS; SUBCUTANEOUS at 05:03

## 2022-07-06 RX ADMIN — GABAPENTIN 600 MILLIGRAM(S): 400 CAPSULE ORAL at 13:07

## 2022-07-06 RX ADMIN — HYDROMORPHONE HYDROCHLORIDE 1 MILLIGRAM(S): 2 INJECTION INTRAMUSCULAR; INTRAVENOUS; SUBCUTANEOUS at 09:03

## 2022-07-06 NOTE — SWALLOW FEES ASSESSMENT ADULT - SLP GENERAL OBSERVATIONS
pt received in bed awake alert w/o c/o pain. +humidified O2 via t-piece, speaking valve placed, +voicing
pt received in bed awake alert w/o c/o pain. +humidified O2 via trach collar +NGT in place

## 2022-07-06 NOTE — PROGRESS NOTE ADULT - ATTENDING COMMENTS
55 yo f with PMHx of DVT on Eliquis, COPD,  trach collar, obesity, IDDM, UTI, sent by [facility] for hypoxia. Current illness going back to April 4th when pt was intubated on admission at [facility] ICU for hypoxic respiratory failure diagnosed with copd exacerbation and superimposed pneumonia, and remained intubated for 37 days requiring tracheostomy. Patient stayed at [facility] for 2 days until noted to be hypoxic (saturating low 70's) pt's saturation immediately improving afterwards to 80's after large mucus plug removed and was subsequently sent to [facility]  On arrival to ED pt was saturating 97%, 15L O2 via tracheostomy collar (baseline 8 L at [facility])CXR suspicious for L-sided PNA, inconclusive. CTA neg for PE. Pt received x1 IV Levaquin and Cefepime in ED, admitted to MICU for acute hypoxic hypercapnic respiratory failure secondary to acute mucus plug now resolved vs superimposed pneumonia hospital-acquired.   General Surgery Consulted emergently when patient found to be hypoxic to low 80s after CCU and Pulmonary team found large granuloma in trachea along tracheostomy tube. Patient currently had a size 8 trach. Surgery assisted Pulmonary and CCU with trach exchanged with ET tube guidance to a size 7 XLT. Saturations improved to high 90s. Large granuloma was removed. Patient was no longer in distress after exchange.    #VDRF:  Vent management per Pulm.  s/p trach exchange/s/p mucus plugs removal this admission  Monitor oxygenation. Lately been saturating ok on T piece.    #sepsis (developed again on 6/27/22): resolved   -UA +; blood cultures (6/28)= Serratia. Sens. noted  -Started on Cefepime and Vanc (6/28) > changed to CTX 2gm QD per ID (6/29) till 7/7  - 6/30 blood culture NGTD, 6/29 urine culture NGTD  CT Abdo per ID: only showed some cecitis      #s/p PEG (6/15)  #Dysphagia:  Strict NPO as of now per ST's FEES eval from 6/13/22.   [facility] ST will sign out to ST at [facility] for further plans with swallow rehab at [facility]  ST planning repeat FEES on 7/6- poor visualization  - planned barium swallow study on 7/7     #LE neuropathic pain:  PAtient has apparently been bed bound since April 4th (her [facility] admission for hypoxic failure where she ended up being intubated)  Apparently has L > R foot drop which may be due to chronic contractures of Calf muscle/Achilles tendon.   Can have PT evaluate her for that. But options are limited at this stage.   Given the asymmetrical foot drop, Neurology ruled out Any focal neuropathy with a MRI Lumbar spine WNL.     Neuropathy is likely Diabetic?   But given the concomitant blackish discoloration from shin to dorsum of feet, Neurology is also suspecting deposition diseases versus Eosinophilic Dermatitis which could also explain the neuropathy?    #B/l LE Hyperpigmentation changes:   unclear etiology  Present since 1 year.   May be related to CHF related stasis dermatitis / hemosiderin deposition  vs deposition diseases versus Eosinophilic Dermatitis   Dermatology thinks it may be Eczema and advised Triamcinolone BId> no improvement.   LWC with Betamethasone cream and Ammonium lactate dressings per Burn.   If no improvement, can consider Biopsy as outpatient?     #Lower extremity Pain management*** :   Gabapentin to 600 TID,   Currently IV dilaudid 1 Q4 PRN   Plan to change to PO.  Start PT.     Note: On 6/21, we discontinued Cymbalta 60 QD since as per family patient may be having bizarre thoughts/hallucinations. Patient was texting them bizarre messages.   Note: Patient has h/o suicidal thoughts on Pregabalin.     Xanax 0.25 Q12 PRN for anxiety.   On Senna, Miralax.     #Suspected epiglottis edema:   s/p DExa Taper (ended 6/18).   Outpatient ENT f/u.     #DM:   steroids ended 6/18.  6/19- Increased lantus from 17 to 20.    #Hyponatremia- monitor  ?SiADH      #Chronic Indwelling Singh  - repeat U. Cx (6/29) WNL    #Hx of DVT- restarted Eliquis    GI PPX  PPI  DVT PPX  ELIQUIS        HANDOFF: f/up barium swallow study on 7/7, than d/c to SNF  d/c plan: [facility] ,  need auth. possibly tomorrow     Total time spent to complete patient's bedside assessment, review medical chart, discuss medical plan of care with covering medical team was more than 35 minutes

## 2022-07-06 NOTE — SWALLOW FEES ASSESSMENT ADULT - SLP PERTINENT HISTORY OF CURRENT PROBLEM
55 y/o F w/ PMHx: DVT on Eliquis, COPD, trach collar, obesity, IDDM, UTI, sent by [facility] for hypoxia. History provided by daughter at bedside, she reports the current illness going back to April 4th when pt was intubated on admission at [facility] ICU for hypoxic respiratory failure diagnosed with copd exacerbation and superimposed pneumonia. Remained intubated 37 days. Pt admitted to medicine for acute hypoxic hypercapnic respiratory failure secondary to acute mucus plug. Course c/b large granuloma in trachea along tracheostomy tube (since removed) w/ sepsis, hypotension, LE neuropathic pain, B/L LE hyperpigmentation changes- unclear etiology. Pt s/p multiple instrumental swallow studies, most recently 6/13 w/ recs for npo; s/p peg 6/15.
pt is a 55 y/o F w/ PMHx: DVT, COPD, resp failure s/p recent trach at [facility] (at baseline on 8L via trach collar), obesity, IDDM, UTI, sent by [facility] for hypoxia. History provided by daughter, reports the current illness goes back to April 4th when pt was intubated on admission at [facility] for hypoxic respiratory failure diagnosed w/ COPD exacerbation and superimposed PNA. Pt w/ prolonged intubation, s/p trach ~2 weeks ago and then d/c'ed to NH. Pt admitted for AHRF 2' mucus plug (now resolved s/p suctioning) vs. possible aspiration PNA. Pt w/ suspected chronic HF, course c/b complete whiteout of the left lung with complete atelectasis, pt upgraded to CCU. General Sx c/s emergently when pt found to be hypoxic d/t large granuloma in trachea along tracheostomy tube. Pt previously had a size 8 trach, exchanged w/ size 7 XLT. Large granuloma removed and pt placed on vent. Pt underwent bronchoscopy on 6/1, good visualization and no tracheal debridement required.

## 2022-07-06 NOTE — SWALLOW FEES ASSESSMENT ADULT - RECOMMENDED CONSISTENCY
recommend continue NPO w/ PEG tube, allow sips/chips of water w/ VFSS to further assess swallow function
continue NPO w/ NGT feeds, allow ice chips

## 2022-07-06 NOTE — PROGRESS NOTE ADULT - SUBJECTIVE AND OBJECTIVE BOX
SUBJECTIVE:  Patient is a 56y old Female who presents with a chief complaint of Hypoxia (05 Jul 2022 08:20)   Acute respiratory failure with hypoxia     Today is hospital day 40d. There are no new issues or overnight events.     HPI  HPI:  57 yo f with PMHx of DVT on Eliquis, COPD,  trach collar, obesity, IDDM, UTI, sent by [facility] for hypoxia. History provided by daughter at bedside, she reports the current illness going back to April 4th when pt was intubated on admission at [facility] ICU for hypoxic respiratory failure diagnosed with copd exacerbation and superimposed pneumonia, of note she also appears to have been in CHF exacerbation with severe b/l LE swelling treated with IV bumex. Remained intubated 37 days per daughter, diagnosed with fever of unknown origin (up to 106F), treated with empiric broad spectrum antibiotics and once 2 weeks s/p tracheostomy placement and 48 hours afebrile she was was discharged to nursing home for PT. Of note, patient's daughter and pt herself say the wiseman has not been replaced for over 3 weeks. She also reports a developing sacral pressure ulcer. Was at [facility] only 2 days when was noted to be hypoxic (saturating low 70's) and EMS called. While awaiting EMS, floor nurse on the unit suctioned the patient, and managed to bring up a very large mucus plug, with pt's saturation immediately improving afterwards to 80's. Nonetheless pt was sent to [facility] ED. Prior to all this pt was independent and ambulatory. Pt denies sob, cp, abd pain, n/v/d, fever or chills.     On arrival to ED pt was saturating 97%, 15L O2 via tracheostomy collar (baseline 8 L at [facility]) VS:    /59  T 99.9F RR 20  Admission VBG pH 7.37 pCO2 60 pO2 49. Pt not wheezing, not coughing. CXR suspicious for L-sided PNA, inconclusive. CTA neg for PE. Pt received x1 IV Levoquin and Cefepime in ED, admitted to medicine for acute hypoxic hypercapnic respiratory failure secondary to acute mucus plug (now resolved vs superimposed pneumonia  hospital-acquired.(less likely), Records request sent to [facility] medical records  [phone]) and patient will be continued on empiric antibiotics pending procal (27 May 2022 14:02)      PAST MEDICAL & SURGICAL HISTORY  COPD (chronic obstructive pulmonary disease)    CHF (congestive heart failure)    DM (diabetes mellitus)    H/O tracheostomy      ALLERGIES:  penicillin (Swelling)    MEDICATIONS:  HOME MEDICATIONS  albuterol sulfate 2.5 mg/3 mL (0.083 %) solution for nebulization:   ALPRAZolam 0.5 mg oral tablet: 1 tab(s) orally every 12 hours, As needed, anxiety via PEG tube  amLODIPine 5 mg oral tablet: 1 tab(s) orally once a day via PEG tube  apixaban 5 mg oral tablet: 1 tab(s) orally 2 times a day via PEG tube  atorvastatin 20 mg oral tablet: 1 tab(s) orally once a day (at bedtime) via PEG tube  bumetanide 2 mg oral tablet: 1 tab(s) orally once a day via PEG tube  gabapentin 600 mg oral tablet: 1 tab(s) orally 3 times a day via PEG tube  HYDROmorphone 4 mg oral tablet: 1 tab(s) orally every 8 hours, As needed, for severe pain  insulin glargine 100 units/mL subcutaneous solution: 20 unit(s) subcutaneous once a day (at bedtime)  insulin lispro 100 units/mL injectable solution: 9 unit(s) injectable 3 times a day (before meals)  losartan 100 mg oral tablet: 1 tab(s) orally once a day via PEG tube  Multiple Vitamins oral tablet: 1 tab(s) orally once a day via PEG tube  pantoprazole 40 mg oral delayed release tablet: 1 tab(s) orally once a day (before a meal) via PEG tube  polyethylene glycol 3350 oral powder for reconstitution: 17 gram(s) orally once a day via PEG tube  senna leaf extract oral tablet: 2 tab(s) orally once a day (at bedtime) via PEG tube  Trelegy Ellipta 100 mcg-62.5 mcg-25 mcg powder for inhalation:     STANDING MEDICATIONS  acetaminophen     Tablet .. 650 milliGRAM(s) Oral every 8 hours  ammonium lactate 12% Lotion 1 Application(s) Topical every 12 hours  apixaban 5 milliGRAM(s) Oral two times a day  atorvastatin 20 milliGRAM(s) Oral at bedtime  betamethasone valerate 0.1% Cream 1 Application(s) Topical two times a day  cefTRIAXone   IVPB 2000 milliGRAM(s) IV Intermittent every 24 hours  dextrose 5%. 1000 milliLiter(s) IV Continuous <Continuous>  dextrose 5%. 1000 milliLiter(s) IV Continuous <Continuous>  dextrose 50% Injectable 25 Gram(s) IV Push once  dextrose 50% Injectable 12.5 Gram(s) IV Push once  dextrose 50% Injectable 25 Gram(s) IV Push once  diatrizoate meglumine/diatrizoate sodium. 30 milliLiter(s) Oral once  gabapentin 600 milliGRAM(s) Oral three times a day  glucagon  Injectable 1 milliGRAM(s) IntraMuscular once  influenza   Vaccine 0.5 milliLiter(s) IntraMuscular once  insulin glargine Injectable (LANTUS) 20 Unit(s) SubCutaneous at bedtime  insulin lispro (ADMELOG) corrective regimen sliding scale   SubCutaneous three times a day before meals  insulin lispro Injectable (ADMELOG) 9 Unit(s) SubCutaneous every 6 hours  multivitamin 1 Tablet(s) Oral daily  pantoprazole    Tablet 40 milliGRAM(s) Oral before breakfast  polyethylene glycol 3350 17 Gram(s) Oral daily  senna 2 Tablet(s) Oral at bedtime    PRN MEDICATIONS  albuterol/ipratropium for Nebulization 3 milliLiter(s) Nebulizer every 6 hours PRN  ALPRAZolam 0.5 milliGRAM(s) Oral every 12 hours PRN  dextrose Oral Gel 15 Gram(s) Oral once PRN  HYDROmorphone  Injectable 1 milliGRAM(s) IV Push every 4 hours PRN    VITALS:   T(C): 37.1 (07-06-22 @ 05:08), Max: 37.1 (07-06-22 @ 05:08)  T(F): 98.8 (07-06-22 @ 05:08), Max: 98.8 (07-06-22 @ 05:08)  HR: 88 (07-06-22 @ 05:08) (72 - 88)  BP: 125/57 (07-06-22 @ 05:08) (125/57 - 146/72)  BP(mean): 97 (07-05-22 @ 12:27) (97 - 97)  ABP: --  ABP(mean): --  RR: 20 (07-06-22 @ 05:08) (18 - 20)  SpO2: 99% (07-06-22 @ 05:08) (99% - 100%)  LABS:                        9.3    8.93  )-----------( 241      ( 05 Jul 2022 07:09 )             27.7     07-05    135  |  93<L>  |  10  ----------------------------<  149<H>  4.6   |  34<H>  |  <0.5<L>    Ca    9.5      05 Jul 2022 07:09    TPro  6.3  /  Alb  3.5  /  TBili  0.3  /  DBili  x   /  AST  21  /  ALT  16  /  AlkPhos  116<H>  07-05        I&O's Detail    05 Jul 2022 07:01  -  06 Jul 2022 07:00  --------------------------------------------------------  IN:    Enteral Tube Flush: 60 mL    Glucerna: 900 mL  Total IN: 960 mL    OUT:  Total OUT: 0 mL    Total NET: 960 mL                    RADIOLOGY:  EKG  12 Lead ECG:   Ventricular Rate 92 BPM    Atrial Rate 92 BPM    P-R Interval 168 ms    QRS Duration 98 ms    Q-T Interval 370 ms    QTC Calculation(Bazett) 457 ms    P Axis 84 degrees    R Axis 103 degrees    T Axis 89 degrees    Diagnosis Line Sinus rhythm with Premature supraventricular complexes  Rightward axis  Low voltage QRS  Incomplete right bundle branch block  Cannot rule out Anterior infarct , age undetermined  T wave abnormality, consider lateral ischemia  Abnormal ECG    Confirmed by Joe Person ([id number]) on 6/30/2022 7:36:49 AM (06-30-22 @ 04:43)  12 Lead ECG:   Ventricular Rate 89 BPM    Atrial Rate 89 BPM    P-R Interval 162 ms    QRS Duration 98 ms    Q-T Interval 372 ms    QTC Calculation(Bazett) 452 ms    P Axis 90 degrees    R Axis 102 degrees    T Axis 90 degrees    Diagnosis Line Normal sinus rhythm  Rightward axis  Incomplete right bundle branch block  Anterior infarct , age undetermined  ST & T wave abnormality, consider lateral ischemia  Abnormal ECG    Confirmed by Joe Person ([id number]) on 6/30/2022 7:36:41 AM (06-30-22 @ 04:42)      Physical Exam:  General: no acute distress, lying in bed  Head: normocephalic and atraumatic  Neck: supple  Heart: regular rate and rhythm, S1 and S2 normal, no murmurs, rubs or gallops noted on exam  Lungs: Symmetric chest expansion bilaterally, clear lungs bilaterally, mild crackles appreciated diffusely, no wheezes or rhonchi heard  Abdomen: Bowel sounds present, non tender on light and deep palpation  Extremities: No edema, no clubbing or cyanosis notes, positive peripheral pulses, brown discoloration of lower extremities- unchanged from prior exams SUBJECTIVE:  Patient is a 56y old Female who presents with a chief complaint of Hypoxia (05 Jul 2022 08:20)   Acute respiratory failure with hypoxia     Today is hospital day 40d. There are no new issues or overnight events.     HPI  HPI:  55 yo f with PMHx of DVT on Eliquis, COPD,  trach collar, obesity, IDDM, UTI, sent by [facility] for hypoxia. History provided by daughter at bedside, she reports the current illness going back to April 4th when pt was intubated on admission at [facility] ICU for hypoxic respiratory failure diagnosed with copd exacerbation and superimposed pneumonia, of note she also appears to have been in CHF exacerbation with severe b/l LE swelling treated with IV bumex. Remained intubated 37 days per daughter, diagnosed with fever of unknown origin (up to 106F), treated with empiric broad spectrum antibiotics and once 2 weeks s/p tracheostomy placement and 48 hours afebrile she was was discharged to nursing home for PT. Of note, patient's daughter and pt herself say the wiseman has not been replaced for over 3 weeks. She also reports a developing sacral pressure ulcer. Was at [facility] only 2 days when was noted to be hypoxic (saturating low 70's) and EMS called. While awaiting EMS, floor nurse on the unit suctioned the patient, and managed to bring up a very large mucus plug, with pt's saturation immediately improving afterwards to 80's. Nonetheless pt was sent to [facility] ED. Prior to all this pt was independent and ambulatory. Pt denies sob, cp, abd pain, n/v/d, fever or chills.     On arrival to ED pt was saturating 97%, 15L O2 via tracheostomy collar (baseline 8 L at [facility]) VS:    /59  T 99.9F RR 20  Admission VBG pH 7.37 pCO2 60 pO2 49. Pt not wheezing, not coughing. CXR suspicious for L-sided PNA, inconclusive. CTA neg for PE. Pt received x1 IV Levoquin and Cefepime in ED, admitted to medicine for acute hypoxic hypercapnic respiratory failure secondary to acute mucus plug (now resolved vs superimposed pneumonia  hospital-acquired.(less likely), Records request sent to [facility] medical records  [phone]) and patient will be continued on empiric antibiotics pending procal (27 May 2022 14:02)      PAST MEDICAL & SURGICAL HISTORY  COPD (chronic obstructive pulmonary disease)    CHF (congestive heart failure)    DM (diabetes mellitus)    H/O tracheostomy      ALLERGIES:  penicillin (Swelling)    MEDICATIONS:  HOME MEDICATIONS  albuterol sulfate 2.5 mg/3 mL (0.083 %) solution for nebulization:   ALPRAZolam 0.5 mg oral tablet: 1 tab(s) orally every 12 hours, As needed, anxiety via PEG tube  amLODIPine 5 mg oral tablet: 1 tab(s) orally once a day via PEG tube  apixaban 5 mg oral tablet: 1 tab(s) orally 2 times a day via PEG tube  atorvastatin 20 mg oral tablet: 1 tab(s) orally once a day (at bedtime) via PEG tube  bumetanide 2 mg oral tablet: 1 tab(s) orally once a day via PEG tube  gabapentin 600 mg oral tablet: 1 tab(s) orally 3 times a day via PEG tube  HYDROmorphone 4 mg oral tablet: 1 tab(s) orally every 8 hours, As needed, for severe pain  insulin glargine 100 units/mL subcutaneous solution: 20 unit(s) subcutaneous once a day (at bedtime)  insulin lispro 100 units/mL injectable solution: 9 unit(s) injectable 3 times a day (before meals)  losartan 100 mg oral tablet: 1 tab(s) orally once a day via PEG tube  Multiple Vitamins oral tablet: 1 tab(s) orally once a day via PEG tube  pantoprazole 40 mg oral delayed release tablet: 1 tab(s) orally once a day (before a meal) via PEG tube  polyethylene glycol 3350 oral powder for reconstitution: 17 gram(s) orally once a day via PEG tube  senna leaf extract oral tablet: 2 tab(s) orally once a day (at bedtime) via PEG tube  Trelegy Ellipta 100 mcg-62.5 mcg-25 mcg powder for inhalation:     STANDING MEDICATIONS  acetaminophen     Tablet .. 650 milliGRAM(s) Oral every 8 hours  ammonium lactate 12% Lotion 1 Application(s) Topical every 12 hours  apixaban 5 milliGRAM(s) Oral two times a day  atorvastatin 20 milliGRAM(s) Oral at bedtime  betamethasone valerate 0.1% Cream 1 Application(s) Topical two times a day  cefTRIAXone   IVPB 2000 milliGRAM(s) IV Intermittent every 24 hours  dextrose 5%. 1000 milliLiter(s) IV Continuous <Continuous>  dextrose 5%. 1000 milliLiter(s) IV Continuous <Continuous>  dextrose 50% Injectable 25 Gram(s) IV Push once  dextrose 50% Injectable 12.5 Gram(s) IV Push once  dextrose 50% Injectable 25 Gram(s) IV Push once  diatrizoate meglumine/diatrizoate sodium. 30 milliLiter(s) Oral once  gabapentin 600 milliGRAM(s) Oral three times a day  glucagon  Injectable 1 milliGRAM(s) IntraMuscular once  influenza   Vaccine 0.5 milliLiter(s) IntraMuscular once  insulin glargine Injectable (LANTUS) 20 Unit(s) SubCutaneous at bedtime  insulin lispro (ADMELOG) corrective regimen sliding scale   SubCutaneous three times a day before meals  insulin lispro Injectable (ADMELOG) 9 Unit(s) SubCutaneous every 6 hours  multivitamin 1 Tablet(s) Oral daily  pantoprazole    Tablet 40 milliGRAM(s) Oral before breakfast  polyethylene glycol 3350 17 Gram(s) Oral daily  senna 2 Tablet(s) Oral at bedtime    PRN MEDICATIONS  albuterol/ipratropium for Nebulization 3 milliLiter(s) Nebulizer every 6 hours PRN  ALPRAZolam 0.5 milliGRAM(s) Oral every 12 hours PRN  dextrose Oral Gel 15 Gram(s) Oral once PRN  HYDROmorphone  Injectable 1 milliGRAM(s) IV Push every 4 hours PRN    VITALS:   T(C): 37.1 (07-06-22 @ 05:08), Max: 37.1 (07-06-22 @ 05:08)  T(F): 98.8 (07-06-22 @ 05:08), Max: 98.8 (07-06-22 @ 05:08)  HR: 88 (07-06-22 @ 05:08) (72 - 88)  BP: 125/57 (07-06-22 @ 05:08) (125/57 - 146/72)  BP(mean): 97 (07-05-22 @ 12:27) (97 - 97)  ABP: --  ABP(mean): --  RR: 20 (07-06-22 @ 05:08) (18 - 20)  SpO2: 99% (07-06-22 @ 05:08) (99% - 100%)  LABS:                        9.3    8.93  )-----------( 241      ( 05 Jul 2022 07:09 )             27.7     07-05    135  |  93<L>  |  10  ----------------------------<  149<H>  4.6   |  34<H>  |  <0.5<L>    Ca    9.5      05 Jul 2022 07:09    TPro  6.3  /  Alb  3.5  /  TBili  0.3  /  DBili  x   /  AST  21  /  ALT  16  /  AlkPhos  116<H>  07-05        I&O's Detail    05 Jul 2022 07:01  -  06 Jul 2022 07:00  --------------------------------------------------------  IN:    Enteral Tube Flush: 60 mL    Glucerna: 900 mL  Total IN: 960 mL    OUT:  Total OUT: 0 mL    Total NET: 960 mL                    RADIOLOGY:  EKG  12 Lead ECG:   Ventricular Rate 92 BPM    Atrial Rate 92 BPM    P-R Interval 168 ms    QRS Duration 98 ms    Q-T Interval 370 ms    QTC Calculation(Bazett) 457 ms    P Axis 84 degrees    R Axis 103 degrees    T Axis 89 degrees    Diagnosis Line Sinus rhythm with Premature supraventricular complexes  Rightward axis  Low voltage QRS  Incomplete right bundle branch block  Cannot rule out Anterior infarct , age undetermined  T wave abnormality, consider lateral ischemia  Abnormal ECG    Confirmed by Joe Person ([id number]) on 6/30/2022 7:36:49 AM (06-30-22 @ 04:43)  12 Lead ECG:   Ventricular Rate 89 BPM    Atrial Rate 89 BPM    P-R Interval 162 ms    QRS Duration 98 ms    Q-T Interval 372 ms    QTC Calculation(Bazett) 452 ms    P Axis 90 degrees    R Axis 102 degrees    T Axis 90 degrees    Diagnosis Line Normal sinus rhythm  Rightward axis  Incomplete right bundle branch block  Anterior infarct , age undetermined  ST & T wave abnormality, consider lateral ischemia  Abnormal ECG    Confirmed by Joe Person ([id number]) on 6/30/2022 7:36:41 AM (06-30-22 @ 04:42)      Physical Exam:  General: no acute distress, lying in bed  Head: normocephalic and atraumatic  Neck: supple, trach clean and intact  Heart: regular rate and rhythm, S1 and S2 normal, no murmurs, rubs or gallops noted on exam  Lungs: Symmetric chest expansion bilaterally, clear lungs bilaterally, mild crackles appreciated diffusely, no wheezes or rhonchi heard  Abdomen: Bowel sounds present, non tender on light and deep palpation  Extremities: No edema, no clubbing or cyanosis notes, positive peripheral pulses, brown discoloration of lower extremities- unchanged from prior exams

## 2022-07-06 NOTE — SWALLOW FEES ASSESSMENT ADULT - PHARYNGEAL PHASE COMMENTS
Partially obstructed view of laryngeal vestibule and subglottic region 2' laryngeal/pharyngeal edema, unable to definitively r/o aspiration
Obstructed view of TVC 2' laryngeal/pharyngeal edema, unable to definitively r/o aspiration. Increased pharyngeal residue w/ heavier viscosities.

## 2022-07-06 NOTE — PROGRESS NOTE ADULT - ASSESSMENT
57 yo female with PMHx of DVT on Eliquis, COPD,  trach collar, obesity, IDDM, UTI, sent by [facility] for hypoxia. Current illness going back to April 4th when pt was intubated on admission at [facility] ICU for hypoxic respiratory failure diagnosed with copd exacerbation and superimposed pneumonia, and remained intubated for 37 days requiring tracheostomy. Patient stayed at [facility] for 2 days until noted to be hypoxic (saturating low 70's) pt's saturation immediately improving afterwards to 80's after large mucus plug removed and was subsequently sent to [facility]  On arrival to ED pt was saturating 97%, 15L O2 via tracheostomy collar (baseline 8 L at [facility])CXR suspicious for L-sided PNA, inconclusive. CTA neg for PE. Pt received x1 IV Levaquin and Cefepime in ED, admitted to MICU for acute hypoxic hypercapnic respiratory failure secondary to acute mucus plug now resolved vs superimposed pneumonia hospital-acquired.   General Surgery Consulted emergently when patient found to be hypoxic to low 80s after CCU and Pulmonary team found large granuloma in trachea along tracheostomy tube. Patient currently had a size 8 trach. Surgery assisted Pulmonary and CCU with trach exchanged with ET tube guidance to a size 7 XLT. Saturations improved to high 90s. Large granuloma was removed. Patient was no longer in distress after exchange.    #sepsis  -6/28 Patient was noted to be tachy O/N, hypotensive, febrile to 102.8, FiO2 requirements were increased and the patient was desaturating to 82-86%  -blood cultures 6/29 positive for gram negative rods, serratia bacteremia  -on Ceftriaxone  -ID following  -F/u UA, trach aspirate and blood cultures  -6/30 WBC 11.21 (6/29 was 13.92 and 6/28 was 19.03)  -7/1 ID rec CT a/p- demonstrated cecitis, no abscess   -7/5 will f/u with ID regarding antibiotics regimen    #Hypercarbic respiratory failure  -Maintaining sats 88%/decrease O2 titration  -f/u on pulm recs     #Acute/ chronic COPD with exacerbation (Resolved)  #mucous plug removed  #pneumonia vs atelectasis L base  - On 5/28, Pulmonary team found large granuloma in trachea along tracheostomy tube. Patient had a size 8 trach on admission. Surgery assisted Pulmonary and CCU with trach exchanged with ET tube guidance to a size 7 XLT. Saturations improved to high 90s.  - trach functioning well s/p exchange   - 6/11 Trach changed to Fenestrated 8 by surgery    #s/p PEG (6/15):   KUB showing free intraperitoneal air (6/19).  CT Abdo confirmed that this was just due to PEG tube adjustment.   Repeat G tube study per Surgery (6/21) -normal, restarted on peg feedings      #Dysphagia:  Strict NPO as of now per 's FEES eval from 6/13/22.   [facility] will sign out to  at [facility] for further plans with swallow rehab at [facility]  -7/6 fees study     #LE neuropathic pain:  PAtient has apparently been bed bound since April 4th (her [facility] admission for hypoxic failure where she ended up being intubated)  Apparently has L > R foot drop which may be due to chronic contractures of Calf muscle/Achilles tendon. Can have PT evaluate her for that.  But options are limited at this stage.   Given the asymmetrical foot drop, Neurology wants to rule out Any focal neuropathy.  -6/23 s/p  MRI Lumbar spine w/ & w/o Contrast, mild degenerative changes  -chronic DJD no acute changes.  -continue pain management: currently on 1mg Dilaudid po q6 prn  -7/5 PT working with patient    #B/l LE Hyperpigmentation changes:   unclear etiology  Present since 1 year.   May be related to CHF related stasis dermatitis / hemosiderin deposition  vs deposition diseases versus Eosinophilic Dermatitis vs follicular hemorrhage from eliquis   Dermatology thinks it may be Eczema and advised Triamcinolone BId.   Add multivitamin to PEG QD.   -7/1 Betamethasone cream and Ammonium lactate dressings per Burn, patient's legs feel mildly better  -7/5 bilateral lower extremities pain mildly improved    -On Senna    #Suspected epiglottis edema:   s/p DExa Taper (ended 6/18).   Outpatient ENT f/u.     #DM:   steroids ended 6/18.  6/19- Increased lantus from 17 to 20.    #Chronic Indwelling Singh  - 5/28 Ucx - Contaminated   - 5/28 Ucx- Normal perri   - 5/27 Ucx- Klebsiella (CRE) and pseudomonas   - s/p cefepime  - Currently afebrile w/ no urinary complaints   - episode of Hematouria 6/4 -> improving -> h/h Stable    #Hx of DVT  - on home Eliquis  - restarted Eliquis    Activity been bed bound. PT eval when able.   GI PPX  PPI  DVT PPX  ELIQUIS  Dispo: Acute  Pending:  [facility]

## 2022-07-06 NOTE — SWALLOW FEES ASSESSMENT ADULT - PRELIMINARY ENDOSCOPIC EXAMINATIONS
Interarytenoid/Arytenoid edema/Baseline pooling of secretions/Baseline penetration of secretions/Vocal fold adduction/abduction

## 2022-07-06 NOTE — SWALLOW FEES ASSESSMENT ADULT - DIAGNOSTIC IMPRESSIONS
Partially obstructed view of laryngeal vestibule and subglottic region 2' laryngeal/pharyngeal edema, unable to definitively r/o aspiration

## 2022-07-07 LAB
ALBUMIN SERPL ELPH-MCNC: 3.4 G/DL — LOW (ref 3.5–5.2)
ALP SERPL-CCNC: 104 U/L — SIGNIFICANT CHANGE UP (ref 30–115)
ALT FLD-CCNC: 13 U/L — SIGNIFICANT CHANGE UP (ref 0–41)
ANION GAP SERPL CALC-SCNC: 7 MMOL/L — SIGNIFICANT CHANGE UP (ref 7–14)
AST SERPL-CCNC: 17 U/L — SIGNIFICANT CHANGE UP (ref 0–41)
BASOPHILS # BLD AUTO: 0.03 K/UL — SIGNIFICANT CHANGE UP (ref 0–0.2)
BASOPHILS NFR BLD AUTO: 0.5 % — SIGNIFICANT CHANGE UP (ref 0–1)
BILIRUB SERPL-MCNC: 0.3 MG/DL — SIGNIFICANT CHANGE UP (ref 0.2–1.2)
BUN SERPL-MCNC: 14 MG/DL — SIGNIFICANT CHANGE UP (ref 10–20)
CALCIUM SERPL-MCNC: 9.5 MG/DL — SIGNIFICANT CHANGE UP (ref 8.5–10.1)
CHLORIDE SERPL-SCNC: 94 MMOL/L — LOW (ref 98–110)
CO2 SERPL-SCNC: 34 MMOL/L — HIGH (ref 17–32)
CREAT SERPL-MCNC: <0.5 MG/DL — LOW (ref 0.7–1.5)
EGFR: 124 ML/MIN/1.73M2 — SIGNIFICANT CHANGE UP
EOSINOPHIL # BLD AUTO: 0.28 K/UL — SIGNIFICANT CHANGE UP (ref 0–0.7)
EOSINOPHIL NFR BLD AUTO: 4.3 % — SIGNIFICANT CHANGE UP (ref 0–8)
GLUCOSE BLDC GLUCOMTR-MCNC: 101 MG/DL — HIGH (ref 70–99)
GLUCOSE BLDC GLUCOMTR-MCNC: 104 MG/DL — HIGH (ref 70–99)
GLUCOSE BLDC GLUCOMTR-MCNC: 107 MG/DL — HIGH (ref 70–99)
GLUCOSE BLDC GLUCOMTR-MCNC: 108 MG/DL — HIGH (ref 70–99)
GLUCOSE BLDC GLUCOMTR-MCNC: 124 MG/DL — HIGH (ref 70–99)
GLUCOSE BLDC GLUCOMTR-MCNC: 146 MG/DL — HIGH (ref 70–99)
GLUCOSE SERPL-MCNC: 101 MG/DL — HIGH (ref 70–99)
HCT VFR BLD CALC: 27.2 % — LOW (ref 37–47)
HGB BLD-MCNC: 9.1 G/DL — LOW (ref 12–16)
IMM GRANULOCYTES NFR BLD AUTO: 1.1 % — HIGH (ref 0.1–0.3)
LYMPHOCYTES # BLD AUTO: 1.33 K/UL — SIGNIFICANT CHANGE UP (ref 1.2–3.4)
LYMPHOCYTES # BLD AUTO: 20.7 % — SIGNIFICANT CHANGE UP (ref 20.5–51.1)
MCHC RBC-ENTMCNC: 32.9 PG — HIGH (ref 27–31)
MCHC RBC-ENTMCNC: 33.5 G/DL — SIGNIFICANT CHANGE UP (ref 32–37)
MCV RBC AUTO: 98.2 FL — SIGNIFICANT CHANGE UP (ref 81–99)
MONOCYTES # BLD AUTO: 0.56 K/UL — SIGNIFICANT CHANGE UP (ref 0.1–0.6)
MONOCYTES NFR BLD AUTO: 8.7 % — SIGNIFICANT CHANGE UP (ref 1.7–9.3)
NEUTROPHILS # BLD AUTO: 4.17 K/UL — SIGNIFICANT CHANGE UP (ref 1.4–6.5)
NEUTROPHILS NFR BLD AUTO: 64.7 % — SIGNIFICANT CHANGE UP (ref 42.2–75.2)
NRBC # BLD: 0 /100 WBCS — SIGNIFICANT CHANGE UP (ref 0–0)
PLATELET # BLD AUTO: 224 K/UL — SIGNIFICANT CHANGE UP (ref 130–400)
POTASSIUM SERPL-MCNC: 4.4 MMOL/L — SIGNIFICANT CHANGE UP (ref 3.5–5)
POTASSIUM SERPL-SCNC: 4.4 MMOL/L — SIGNIFICANT CHANGE UP (ref 3.5–5)
PROT SERPL-MCNC: 6.1 G/DL — SIGNIFICANT CHANGE UP (ref 6–8)
RBC # BLD: 2.77 M/UL — LOW (ref 4.2–5.4)
RBC # FLD: 13.7 % — SIGNIFICANT CHANGE UP (ref 11.5–14.5)
SODIUM SERPL-SCNC: 135 MMOL/L — SIGNIFICANT CHANGE UP (ref 135–146)
WBC # BLD: 6.44 K/UL — SIGNIFICANT CHANGE UP (ref 4.8–10.8)
WBC # FLD AUTO: 6.44 K/UL — SIGNIFICANT CHANGE UP (ref 4.8–10.8)

## 2022-07-07 PROCEDURE — 99233 SBSQ HOSP IP/OBS HIGH 50: CPT

## 2022-07-07 PROCEDURE — 74230 X-RAY XM SWLNG FUNCJ C+: CPT | Mod: 26

## 2022-07-07 RX ORDER — OXYCODONE AND ACETAMINOPHEN 5; 325 MG/1; MG/1
2 TABLET ORAL EVERY 6 HOURS
Refills: 0 | Status: DISCONTINUED | OUTPATIENT
Start: 2022-07-07 | End: 2022-07-14

## 2022-07-07 RX ORDER — OXYCODONE AND ACETAMINOPHEN 5; 325 MG/1; MG/1
2 TABLET ORAL
Qty: 0 | Refills: 0 | DISCHARGE
Start: 2022-07-07

## 2022-07-07 RX ORDER — ALPRAZOLAM 0.25 MG
1 TABLET ORAL
Refills: 0 | Status: DISCONTINUED | OUTPATIENT
Start: 2022-07-07 | End: 2022-07-08

## 2022-07-07 RX ORDER — NYSTATIN CREAM 100000 [USP'U]/G
1 CREAM TOPICAL
Refills: 0 | Status: DISCONTINUED | OUTPATIENT
Start: 2022-07-07 | End: 2022-07-22

## 2022-07-07 RX ADMIN — Medication 1 APPLICATION(S): at 05:28

## 2022-07-07 RX ADMIN — Medication 650 MILLIGRAM(S): at 13:36

## 2022-07-07 RX ADMIN — SENNA PLUS 2 TABLET(S): 8.6 TABLET ORAL at 21:49

## 2022-07-07 RX ADMIN — NYSTATIN CREAM 1 APPLICATION(S): 100000 CREAM TOPICAL at 17:30

## 2022-07-07 RX ADMIN — Medication 650 MILLIGRAM(S): at 05:57

## 2022-07-07 RX ADMIN — Medication 9 UNIT(S): at 06:16

## 2022-07-07 RX ADMIN — Medication 500 MILLIGRAM(S): at 17:31

## 2022-07-07 RX ADMIN — Medication 1 APPLICATION(S): at 17:30

## 2022-07-07 RX ADMIN — Medication 9 UNIT(S): at 17:51

## 2022-07-07 RX ADMIN — HYDROMORPHONE HYDROCHLORIDE 1 MILLIGRAM(S): 2 INJECTION INTRAMUSCULAR; INTRAVENOUS; SUBCUTANEOUS at 13:52

## 2022-07-07 RX ADMIN — APIXABAN 5 MILLIGRAM(S): 2.5 TABLET, FILM COATED ORAL at 05:27

## 2022-07-07 RX ADMIN — INSULIN GLARGINE 20 UNIT(S): 100 INJECTION, SOLUTION SUBCUTANEOUS at 21:48

## 2022-07-07 RX ADMIN — Medication 9 UNIT(S): at 00:07

## 2022-07-07 RX ADMIN — Medication 9 UNIT(S): at 12:05

## 2022-07-07 RX ADMIN — HYDROMORPHONE HYDROCHLORIDE 1 MILLIGRAM(S): 2 INJECTION INTRAMUSCULAR; INTRAVENOUS; SUBCUTANEOUS at 09:43

## 2022-07-07 RX ADMIN — Medication 3 MILLILITER(S): at 20:17

## 2022-07-07 RX ADMIN — HYDROMORPHONE HYDROCHLORIDE 1 MILLIGRAM(S): 2 INJECTION INTRAMUSCULAR; INTRAVENOUS; SUBCUTANEOUS at 09:28

## 2022-07-07 RX ADMIN — Medication 650 MILLIGRAM(S): at 22:18

## 2022-07-07 RX ADMIN — GABAPENTIN 600 MILLIGRAM(S): 400 CAPSULE ORAL at 13:06

## 2022-07-07 RX ADMIN — GABAPENTIN 600 MILLIGRAM(S): 400 CAPSULE ORAL at 05:27

## 2022-07-07 RX ADMIN — Medication 650 MILLIGRAM(S): at 21:48

## 2022-07-07 RX ADMIN — HYDROMORPHONE HYDROCHLORIDE 1 MILLIGRAM(S): 2 INJECTION INTRAMUSCULAR; INTRAVENOUS; SUBCUTANEOUS at 17:52

## 2022-07-07 RX ADMIN — Medication 650 MILLIGRAM(S): at 13:06

## 2022-07-07 RX ADMIN — Medication 650 MILLIGRAM(S): at 05:27

## 2022-07-07 RX ADMIN — Medication 1 MILLIGRAM(S): at 19:31

## 2022-07-07 RX ADMIN — GABAPENTIN 600 MILLIGRAM(S): 400 CAPSULE ORAL at 21:48

## 2022-07-07 RX ADMIN — OXYCODONE AND ACETAMINOPHEN 2 TABLET(S): 5; 325 TABLET ORAL at 12:04

## 2022-07-07 RX ADMIN — HYDROMORPHONE HYDROCHLORIDE 1 MILLIGRAM(S): 2 INJECTION INTRAMUSCULAR; INTRAVENOUS; SUBCUTANEOUS at 01:37

## 2022-07-07 RX ADMIN — OXYCODONE AND ACETAMINOPHEN 2 TABLET(S): 5; 325 TABLET ORAL at 12:34

## 2022-07-07 RX ADMIN — HYDROMORPHONE HYDROCHLORIDE 1 MILLIGRAM(S): 2 INJECTION INTRAMUSCULAR; INTRAVENOUS; SUBCUTANEOUS at 05:43

## 2022-07-07 RX ADMIN — Medication 1 APPLICATION(S): at 17:42

## 2022-07-07 RX ADMIN — Medication 500 MILLIGRAM(S): at 05:27

## 2022-07-07 RX ADMIN — HYDROMORPHONE HYDROCHLORIDE 1 MILLIGRAM(S): 2 INJECTION INTRAMUSCULAR; INTRAVENOUS; SUBCUTANEOUS at 21:49

## 2022-07-07 RX ADMIN — HYDROMORPHONE HYDROCHLORIDE 1 MILLIGRAM(S): 2 INJECTION INTRAMUSCULAR; INTRAVENOUS; SUBCUTANEOUS at 22:19

## 2022-07-07 RX ADMIN — INSULIN GLARGINE 20 UNIT(S): 100 INJECTION, SOLUTION SUBCUTANEOUS at 00:07

## 2022-07-07 RX ADMIN — HYDROMORPHONE HYDROCHLORIDE 1 MILLIGRAM(S): 2 INJECTION INTRAMUSCULAR; INTRAVENOUS; SUBCUTANEOUS at 01:52

## 2022-07-07 RX ADMIN — HYDROMORPHONE HYDROCHLORIDE 1 MILLIGRAM(S): 2 INJECTION INTRAMUSCULAR; INTRAVENOUS; SUBCUTANEOUS at 05:27

## 2022-07-07 RX ADMIN — HYDROMORPHONE HYDROCHLORIDE 1 MILLIGRAM(S): 2 INJECTION INTRAMUSCULAR; INTRAVENOUS; SUBCUTANEOUS at 13:37

## 2022-07-07 RX ADMIN — HYDROMORPHONE HYDROCHLORIDE 1 MILLIGRAM(S): 2 INJECTION INTRAMUSCULAR; INTRAVENOUS; SUBCUTANEOUS at 18:12

## 2022-07-07 RX ADMIN — ATORVASTATIN CALCIUM 20 MILLIGRAM(S): 80 TABLET, FILM COATED ORAL at 21:48

## 2022-07-07 RX ADMIN — Medication 1 TABLET(S): at 12:04

## 2022-07-07 RX ADMIN — OXYCODONE AND ACETAMINOPHEN 2 TABLET(S): 5; 325 TABLET ORAL at 19:30

## 2022-07-07 RX ADMIN — APIXABAN 5 MILLIGRAM(S): 2.5 TABLET, FILM COATED ORAL at 17:31

## 2022-07-07 RX ADMIN — Medication 1 APPLICATION(S): at 06:31

## 2022-07-07 NOTE — SWALLOW VFSS/MBS ASSESSMENT ADULT - ADDITIONAL RECOMMENDATIONS
Initiate therapeutic po trials of easy to chew solids and thin liquids w/ SLP only,   Will use gustatory stim strategies to help facilitate a consecutive swallow.

## 2022-07-07 NOTE — SWALLOW VFSS/MBS ASSESSMENT ADULT - DIAGNOSTIC IMPRESSIONS
Consistent penetration of all consistencies during the swallow to the level of the TVC, which mostly clears w/ a consecutive swallow, however pt w/ difficulty initiating a consecutive swallow consistently
severe pharyngeal dysphagia
moderate-severe pharyngeal dysphagia persists

## 2022-07-07 NOTE — PROGRESS NOTE ADULT - SUBJECTIVE AND OBJECTIVE BOX
SUBJECTIVE:  Patient is a 56y old Female who presents with a chief complaint of Hypoxia (06 Jul 2022 08:07)   Acute respiratory failure with hypoxia     Today is hospital day 41d. There are no new issues or overnight events. Barium swallow today, pending auth for discharge.    HPI  HPI:  55 yo f with PMHx of DVT on Eliquis, COPD,  trach collar, obesity, IDDM, UTI, sent by [facility] for hypoxia. History provided by daughter at bedside, she reports the current illness going back to April 4th when pt was intubated on admission at [facility] ICU for hypoxic respiratory failure diagnosed with copd exacerbation and superimposed pneumonia, of note she also appears to have been in CHF exacerbation with severe b/l LE swelling treated with IV bumex. Remained intubated 37 days per daughter, diagnosed with fever of unknown origin (up to 106F), treated with empiric broad spectrum antibiotics and once 2 weeks s/p tracheostomy placement and 48 hours afebrile she was was discharged to nursing home for PT. Of note, patient's daughter and pt herself say the wiseman has not been replaced for over 3 weeks. She also reports a developing sacral pressure ulcer. Was at [facility] only 2 days when was noted to be hypoxic (saturating low 70's) and EMS called. While awaiting EMS, floor nurse on the unit suctioned the patient, and managed to bring up a very large mucus plug, with pt's saturation immediately improving afterwards to 80's. Nonetheless pt was sent to [facility] ED. Prior to all this pt was independent and ambulatory. Pt denies sob, cp, abd pain, n/v/d, fever or chills.     On arrival to ED pt was saturating 97%, 15L O2 via tracheostomy collar (baseline 8 L at [facility]) VS:    /59  T 99.9F RR 20  Admission VBG pH 7.37 pCO2 60 pO2 49. Pt not wheezing, not coughing. CXR suspicious for L-sided PNA, inconclusive. CTA neg for PE. Pt received x1 IV Levoquin and Cefepime in ED, admitted to medicine for acute hypoxic hypercapnic respiratory failure secondary to acute mucus plug (now resolved vs superimposed pneumonia  hospital-acquired.(less likely), Records request sent to [facility] medical records  [phone]) and patient will be continued on empiric antibiotics pending procal (27 May 2022 14:02)      PAST MEDICAL & SURGICAL HISTORY  COPD (chronic obstructive pulmonary disease)    CHF (congestive heart failure)    DM (diabetes mellitus)    H/O tracheostomy      ALLERGIES:  penicillin (Swelling)    MEDICATIONS:  HOME MEDICATIONS  albuterol sulfate 2.5 mg/3 mL (0.083 %) solution for nebulization:   ALPRAZolam 0.5 mg oral tablet: 1 tab(s) orally every 12 hours, As needed, anxiety via PEG tube  amLODIPine 5 mg oral tablet: 1 tab(s) orally once a day via PEG tube  apixaban 5 mg oral tablet: 1 tab(s) orally 2 times a day via PEG tube  atorvastatin 20 mg oral tablet: 1 tab(s) orally once a day (at bedtime) via PEG tube  bumetanide 2 mg oral tablet: 1 tab(s) orally once a day via PEG tube  gabapentin 600 mg oral tablet: 1 tab(s) orally 3 times a day via PEG tube  HYDROmorphone 4 mg oral tablet: 1 tab(s) orally every 8 hours, As needed, for severe pain  insulin glargine 100 units/mL subcutaneous solution: 20 unit(s) subcutaneous once a day (at bedtime)  insulin lispro 100 units/mL injectable solution: 9 unit(s) injectable 3 times a day (before meals)  losartan 100 mg oral tablet: 1 tab(s) orally once a day via PEG tube  Multiple Vitamins oral tablet: 1 tab(s) orally once a day via PEG tube  pantoprazole 40 mg oral delayed release tablet: 1 tab(s) orally once a day (before a meal) via PEG tube  polyethylene glycol 3350 oral powder for reconstitution: 17 gram(s) orally once a day via PEG tube  senna leaf extract oral tablet: 2 tab(s) orally once a day (at bedtime) via PEG tube  Trelegy Ellipta 100 mcg-62.5 mcg-25 mcg powder for inhalation:     STANDING MEDICATIONS  acetaminophen     Tablet .. 650 milliGRAM(s) Oral every 8 hours  ammonium lactate 12% Lotion 1 Application(s) Topical every 12 hours  apixaban 5 milliGRAM(s) Oral two times a day  atorvastatin 20 milliGRAM(s) Oral at bedtime  betamethasone valerate 0.1% Cream 1 Application(s) Topical two times a day  ciprofloxacin     Tablet 500 milliGRAM(s) Oral every 12 hours  dextrose 5%. 1000 milliLiter(s) IV Continuous <Continuous>  dextrose 5%. 1000 milliLiter(s) IV Continuous <Continuous>  dextrose 50% Injectable 25 Gram(s) IV Push once  dextrose 50% Injectable 12.5 Gram(s) IV Push once  dextrose 50% Injectable 25 Gram(s) IV Push once  diatrizoate meglumine/diatrizoate sodium. 30 milliLiter(s) Oral once  gabapentin 600 milliGRAM(s) Oral three times a day  glucagon  Injectable 1 milliGRAM(s) IntraMuscular once  influenza   Vaccine 0.5 milliLiter(s) IntraMuscular once  insulin glargine Injectable (LANTUS) 20 Unit(s) SubCutaneous at bedtime  insulin lispro (ADMELOG) corrective regimen sliding scale   SubCutaneous three times a day before meals  insulin lispro Injectable (ADMELOG) 9 Unit(s) SubCutaneous every 6 hours  multivitamin 1 Tablet(s) Oral daily  pantoprazole    Tablet 40 milliGRAM(s) Oral before breakfast  polyethylene glycol 3350 17 Gram(s) Oral daily  senna 2 Tablet(s) Oral at bedtime    PRN MEDICATIONS  albuterol/ipratropium for Nebulization 3 milliLiter(s) Nebulizer every 6 hours PRN  dextrose Oral Gel 15 Gram(s) Oral once PRN  HYDROmorphone  Injectable 1 milliGRAM(s) IV Push every 4 hours PRN    VITALS:   T(C): 37.1 (07-07-22 @ 05:13), Max: 37.1 (07-07-22 @ 05:13)  T(F): 98.7 (07-07-22 @ 05:13), Max: 98.7 (07-07-22 @ 05:13)  HR: 86 (07-07-22 @ 05:13) (83 - 86)  BP: 127/56 (07-07-22 @ 05:13) (126/56 - 129/59)  BP(mean): --  ABP: --  ABP(mean): --  RR: 18 (07-07-22 @ 05:13) (18 - 19)  SpO2: 98% (07-07-22 @ 05:13) (98% - 100%)  LABS:                        9.1    6.44  )-----------( 224      ( 07 Jul 2022 06:13 )             27.2     07-07    135  |  94<L>  |  14  ----------------------------<  101<H>  4.4   |  34<H>  |  <0.5<L>    Ca    9.5      07 Jul 2022 06:13    TPro  6.1  /  Alb  3.4<L>  /  TBili  0.3  /  DBili  x   /  AST  17  /  ALT  13  /  AlkPhos  104  07-07        I&O's Detail    06 Jul 2022 07:01  -  07 Jul 2022 07:00  --------------------------------------------------------  IN:    Enteral Tube Flush: 210 mL    Glucerna: 900 mL  Total IN: 1110 mL    OUT:  Total OUT: 0 mL    Total NET: 1110 mL                    RADIOLOGY:  EKG  12 Lead ECG:   Ventricular Rate 92 BPM    Atrial Rate 92 BPM    P-R Interval 168 ms    QRS Duration 98 ms    Q-T Interval 370 ms    QTC Calculation(Bazett) 457 ms    P Axis 84 degrees    R Axis 103 degrees    T Axis 89 degrees    Diagnosis Line Sinus rhythm with Premature supraventricular complexes  Rightward axis  Low voltage QRS  Incomplete right bundle branch block  Cannot rule out Anterior infarct , age undetermined  T wave abnormality, consider lateral ischemia  Abnormal ECG    Confirmed by Joe Person ([id number]) on 6/30/2022 7:36:49 AM (06-30-22 @ 04:43)  12 Lead ECG:   Ventricular Rate 89 BPM    Atrial Rate 89 BPM    P-R Interval 162 ms    QRS Duration 98 ms    Q-T Interval 372 ms    QTC Calculation(Bazett) 452 ms    P Axis 90 degrees    R Axis 102 degrees    T Axis 90 degrees    Diagnosis Line Normal sinus rhythm  Rightward axis  Incomplete right bundle branch block  Anterior infarct , age undetermined  ST & T wave abnormality, consider lateral ischemia  Abnormal ECG    Confirmed by Joe Person ([id number]) on 6/30/2022 7:36:41 AM (06-30-22 @ 04:42)      Physical Exam:  General: no acute distress, lying in bed  Head: normocephalic and atraumatic  Neck: supple, trach clean and intact  Heart: regular rate and rhythm, S1 and S2 normal, no murmurs, rubs or gallops noted on exam  Lungs: Symmetric chest expansion bilaterally, clear lungs bilaterally, no wheezes rhonchi or crackles heard  Abdomen: Bowel sounds present, non tender on light and deep palpation  Extremities: No edema, no clubbing or cyanosis notes, positive peripheral pulses, discoloration of bilateral lower extremities, unchanged from previous exams

## 2022-07-07 NOTE — SWALLOW VFSS/MBS ASSESSMENT ADULT - ORAL PHASE
within functional limits
Uncontrolled bolus / spillover in bar-pharynx/Uncontrolled bolus / spillover in hypopharynx
within functional limits

## 2022-07-07 NOTE — SWALLOW VFSS/MBS ASSESSMENT ADULT - SPECIFY REASON(S)
further investigate pharyngeal swallow function
further investigate pharyngeal swallow integrity
VFSS completed in RAD, PMSV in place t/o exam

## 2022-07-07 NOTE — SWALLOW VFSS/MBS ASSESSMENT ADULT - SLP PERTINENT HISTORY OF CURRENT PROBLEM
pt is a 57 y/o F w/ PMHx: DVT, COPD, resp failure s/p recent trach at [facility] (at baseline on 8L via trach collar), obesity, IDDM, UTI, sent by [facility] for hypoxia. History provided by daughter, reports the current illness goes back to April 4th when pt was intubated on admission at [facility] for hypoxic respiratory failure diagnosed w/ COPD exacerbation and superimposed PNA. Pt w/ prolonged intubation, s/p trach ~2 weeks ago and then d/c'ed to NH. Pt admitted for AHRF 2' mucus plug (now resolved s/p suctioning) vs. possible aspiration PNA. Pt w/ suspected chronic HF, course c/b complete whiteout of the left lung with complete atelectasis, pt upgraded to CCU. General Sx c/s emergently when pt found to be hypoxic d/t large granuloma in trachea along tracheostomy tube. Pt previously had a size 8 trach, exchanged w/ size 7 XLT. Large granuloma removed and pt placed on vent. Pt underwent bronchoscopy on 6/1, good visualization and no tracheal debridement required.
pt is a 57 y/o F w/ PMHx: DVT, COPD, resp failure s/p recent trach at [facility] (at baseline on 8L via trach collar), obesity, IDDM, UTI, sent by [facility] for hypoxia. History provided by daughter, reports the current illness goes back to April 4th when pt was intubated on admission at [facility] for hypoxic respiratory failure diagnosed w/ COPD exacerbation and superimposed PNA. Pt w/ prolonged intubation, s/p trach ~2 weeks ago and then d/c'ed to NH. Pt admitted for AHRF 2' mucus plug (now resolved s/p suctioning) vs. possible aspiration PNA. Pt w/ suspected chronic HF, course c/b complete whiteout of the left lung with complete atelectasis, pt upgraded to CCU. General Sx c/s emergently when pt found to be hypoxic d/t large granuloma in trachea along tracheostomy tube. Pt previously had a size 8 trach, exchanged w/ size 7 XLT. Large granuloma removed and pt placed on vent. Pt underwent bronchoscopy on 6/1, good visualization and no tracheal debridement required.
55 y/o F w/ PMHx: DVT on Eliquis, COPD, trach collar, obesity, IDDM, UTI, sent by [facility] for hypoxia. History provided by daughter at bedside, she reports the current illness going back to April 4th when pt was intubated on admission at [facility] ICU for hypoxic respiratory failure diagnosed with copd exacerbation and superimposed pneumonia. Remained intubated 37 days. Pt admitted to medicine for acute hypoxic hypercapnic respiratory failure secondary to acute mucus plug. Course c/b large granuloma in trachea along tracheostomy tube (since removed) w/ sepsis, hypotension, LE neuropathic pain, B/L LE hyperpigmentation changes- unclear etiology. Pt s/p multiple instrumental swallow studies, most recently 6/13 w/ recs for npo; s/p peg 6/15.

## 2022-07-07 NOTE — SWALLOW VFSS/MBS ASSESSMENT ADULT - PHARYNGEAL PHASE COMMENTS
Consistent penetration of all consistencies during the swallow which mostly clears w/ a consecutive swallows, however pt w/ difficulty initiating a consecutive swallow consistently

## 2022-07-07 NOTE — PROGRESS NOTE ADULT - ASSESSMENT
57 yo female with PMHx of DVT on Eliquis, COPD,  trach collar, obesity, IDDM, UTI, sent by [facility] for hypoxia. Current illness going back to April 4th when pt was intubated on admission at [facility] ICU for hypoxic respiratory failure diagnosed with copd exacerbation and superimposed pneumonia, and remained intubated for 37 days requiring tracheostomy. Patient stayed at [facility] for 2 days until noted to be hypoxic (saturating low 70's) pt's saturation immediately improving afterwards to 80's after large mucus plug removed and was subsequently sent to [facility]  On arrival to ED pt was saturating 97%, 15L O2 via tracheostomy collar (baseline 8 L at [facility])CXR suspicious for L-sided PNA, inconclusive. CTA neg for PE. Pt received x1 IV Levaquin and Cefepime in ED, admitted to MICU for acute hypoxic hypercapnic respiratory failure secondary to acute mucus plug now resolved vs superimposed pneumonia hospital-acquired.   General Surgery Consulted emergently when patient found to be hypoxic to low 80s after CCU and Pulmonary team found large granuloma in trachea along tracheostomy tube. Patient currently had a size 8 trach. Surgery assisted Pulmonary and CCU with trach exchanged with ET tube guidance to a size 7 XLT. Saturations improved to high 90s. Large granuloma was removed. Patient was no longer in distress after exchange.    #sepsis  -6/28 Patient was noted to be tachy O/N, hypotensive, febrile to 102.8, FiO2 requirements were increased and the patient was desaturating to 82-86%  -blood cultures 6/29 positive for gram negative rods, serratia bacteremia  -on Ceftriaxone  -ID following  -F/u UA, trach aspirate and blood cultures  -6/30 WBC 11.21 (6/29 was 13.92 and 6/28 was 19.03)  -7/1 ID rec CT a/p- demonstrated cecitis, no abscess   -7/5 will f/u with ID regarding antibiotics regimen    #Hypercarbic respiratory failure  -Maintaining sats 88%/decrease O2 titration  -f/u on pulm recs     #Acute/ chronic COPD with exacerbation (Resolved)  #mucous plug removed  #pneumonia vs atelectasis L base  - On 5/28, Pulmonary team found large granuloma in trachea along tracheostomy tube. Patient had a size 8 trach on admission. Surgery assisted Pulmonary and CCU with trach exchanged with ET tube guidance to a size 7 XLT. Saturations improved to high 90s.  - trach functioning well s/p exchange   - 6/11 Trach changed to Fenestrated 8 by surgery    #s/p PEG (6/15):   KUB showing free intraperitoneal air (6/19).  CT Abdo confirmed that this was just due to PEG tube adjustment.   Repeat G tube study per Surgery (6/21) -normal, restarted on peg feedings      #Dysphagia:  Strict NPO as of now per 's FEES eval from 6/13/22.   [facility] will sign out to  at [facility] for further plans with swallow rehab at [facility]  -7/6 fees study  -7/7 barium swallow      #LE neuropathic pain:  PAtient has apparently been bed bound since April 4th (her [facility] admission for hypoxic failure where she ended up being intubated)  Apparently has L > R foot drop which may be due to chronic contractures of Calf muscle/Achilles tendon. Can have PT evaluate her for that.  But options are limited at this stage.   Given the asymmetrical foot drop, Neurology wants to rule out Any focal neuropathy.  -6/23 s/p  MRI Lumbar spine w/ & w/o Contrast, mild degenerative changes  -chronic DJD no acute changes.  -continue pain management: currently on 1mg Dilaudid po q6 prn  -7/5 PT working with patient    #B/l LE Hyperpigmentation changes:   unclear etiology  Present since 1 year.   May be related to CHF related stasis dermatitis / hemosiderin deposition  vs deposition diseases versus Eosinophilic Dermatitis vs follicular hemorrhage from eliquis   Dermatology thinks it may be Eczema and advised Triamcinolone BId.   Add multivitamin to PEG QD.   -7/1 Betamethasone cream and Ammonium lactate dressings per Burn, patient's legs feel mildly better  -7/5 bilateral lower extremities pain mildly improved    -On Senna    #Suspected epiglottis edema:   s/p DExa Taper (ended 6/18).   Outpatient ENT f/u.     #DM:   steroids ended 6/18.  6/19- Increased lantus from 17 to 20.    #Chronic Indwelling Singh  - 5/28 Ucx - Contaminated   - 5/28 Ucx- Normal perri   - 5/27 Ucx- Klebsiella (CRE) and pseudomonas   - s/p cefepime  - Currently afebrile w/ no urinary complaints   - episode of Hematouria 6/4 -> improving -> h/h Stable    #Hx of DVT  - on home Eliquis  - restarted Eliquis    Activity been bed bound. PT eval when able.   GI PPX  PPI  DVT PPX  ELIQUIS  Dispo: Acute  Pending:  d/c planning to [facility]

## 2022-07-07 NOTE — SWALLOW VFSS/MBS ASSESSMENT ADULT - SUCCESSFUL STRATEGIES TRIALED DURING PROCEDURE
consecutive swallow
throat clear and consecutive swallow somewhat effective in clearing pharyngeal residue

## 2022-07-07 NOTE — SWALLOW VFSS/MBS ASSESSMENT ADULT - RECOMMENDED CONSISTENCY
Maintain PEG feeds
NPO w/ NGT; SLP to f/u w/ dysphagia tx and plan to repeat swallow study; continue ice chips
NPO w/ non-oral means of nutrition/hydration, pt would benefit from PEG tube for primary means of nutrition/hydration in conjunction w/ dysphagia tx; Continue ice chips

## 2022-07-07 NOTE — SWALLOW VFSS/MBS ASSESSMENT ADULT - ROSENBEK'S PENETRATION ASPIRATION SCALE
which mostly clears w/ a consecutive swallow/(5) contrast contacts vocal cords, visible residue remains (penetration)

## 2022-07-07 NOTE — SWALLOW VFSS/MBS ASSESSMENT ADULT - SLP GENERAL OBSERVATIONS
pt received in radiology suite awake alert w/o c/o pain. +humidified O2 via t-piece, +NGT in place
Pt received in RAD awake and alert on O2 trach collar
pt received in radiology suite awake alert w/o c/o pain. +humidified O2 via t-piece

## 2022-07-07 NOTE — PROGRESS NOTE ADULT - ATTENDING COMMENTS
Patient is a 57 yo female  with PMHx of DVT on Eliquis, COPD,  trach collar, obesity, IDDM, UTI, sent by [facility] for hypoxia. Current illness going back to April 4th when pt was intubated on admission at [facility] ICU for hypoxic respiratory failure diagnosed with copd exacerbation and superimposed pneumonia, and remained intubated for 37 days requiring tracheostomy. Patient stayed at [facility] for 2 days until noted to be hypoxic (saturating low 70's) pt's saturation immediately improving afterwards to 80's after large mucus plug removed and was subsequently sent to [facility]  On arrival to ED pt was saturating 97%, 15L O2 via tracheostomy collar (baseline 8 L at [facility])CXR suspicious for L-sided PNA, inconclusive. CTA neg for PE. Pt received x1 IV Levaquin and Cefepime in ED, admitted to MICU for acute hypoxic hypercapnic respiratory failure secondary to acute mucus plug now resolved vs superimposed pneumonia hospital-acquired.   General Surgery Consulted emergently when patient found to be hypoxic to low 80s after CCU and Pulmonary team found large granuloma in trachea along tracheostomy tube. Patient currently had a size 8 trach. Surgery assisted Pulmonary and CCU with trach exchanged with ET tube guidance to a size 7 XLT. Saturations improved to high 90s. Large granuloma was removed. Patient was no longer in distress after exchange.    #VDRF:  Vent management per Pulm.  s/p trach exchange/s/p mucus plugs removal this admission  Monitor oxygenation. Lately been saturating ok on T piece.    #sepsis (developed again on 6/27/22): resolved   -UA +; blood cultures (6/28)= Serratia. Sens. noted  -Started on Cefepime and Vanc (6/28) > changed to CTX 2gm QD per ID (6/29) till 7/7  - 6/30 blood culture NGTD, 6/29 urine culture NGTD  CT Abdo per ID: only showed some cecitis      #s/p PEG (6/15)  #Dysphagia:  Strict NPO as of now per ST's FEES eval from 6/13/22.   [facility] ST will sign out to ST at [facility] for further plans with swallow rehab at [facility]  ST planning repeat FEES on 7/6- poor visualization  - planned barium swallow study on 7/7     #LE neuropathic pain:  PAtient has apparently been bed bound since April 4th (her [facility] admission for hypoxic failure where she ended up being intubated)  Apparently has L > R foot drop which may be due to chronic contractures of Calf muscle/Achilles tendon.   Can have PT evaluate her for that. But options are limited at this stage.   Given the asymmetrical foot drop, Neurology ruled out Any focal neuropathy with a MRI Lumbar spine WNL.     Neuropathy is likely Diabetic?   But given the concomitant blackish discoloration from shin to dorsum of feet, Neurology is also suspecting deposition diseases versus Eosinophilic Dermatitis which could also explain the neuropathy?    #B/l LE Hyperpigmentation changes:   unclear etiology  Present since 1 year.   May be related to CHF related stasis dermatitis / hemosiderin deposition  vs deposition diseases versus Eosinophilic Dermatitis   Dermatology thinks it may be Eczema and advised Triamcinolone BId> no improvement.   LWC with Betamethasone cream and Ammonium lactate dressings per Burn.   If no improvement, can consider Biopsy as outpatient?     #Lower extremity Pain management*** :   Gabapentin to 600 TID,   Currently IV dilaudid 1 Q4 PRN - will transition to peg tx. and add percocet prn for breakthrough pain.        Note: On 6/21, we discontinued Cymbalta 60 QD since as per family patient may be having bizarre thoughts/hallucinations. Patient was texting them bizarre messages.   Note: Patient has h/o suicidal thoughts on Pregabalin.     Xanax 0.25 Q12 PRN for anxiety.   On Senna, Miralax.     #Suspected epiglottis edema:   s/p DExa Taper (ended 6/18).   Outpatient ENT f/u.     #DM:   steroids ended 6/18.  6/19- Increased lantus from 17 to 20.    #Hyponatremia- monitor  ?SiADH      #Chronic Indwelling Singh  - repeat U. Cx (6/29) WNL    #Hx of DVT- restarted Eliquis    GI PPX  PPI  DVT PPX  ELIQUIS        HANDOFF: f/up barium swallow study on 7/7, than d/c to SNF  d/c plan: [facility] ,  need auth.     Total time spent to complete patient's bedside assessment, review medical chart, discuss medical plan of care with covering medical team was more than 35 minutes .

## 2022-07-08 LAB
ALBUMIN SERPL ELPH-MCNC: 3.2 G/DL — LOW (ref 3.5–5.2)
ALP SERPL-CCNC: 89 U/L — SIGNIFICANT CHANGE UP (ref 30–115)
ALT FLD-CCNC: 13 U/L — SIGNIFICANT CHANGE UP (ref 0–41)
ANION GAP SERPL CALC-SCNC: 7 MMOL/L — SIGNIFICANT CHANGE UP (ref 7–14)
AST SERPL-CCNC: 18 U/L — SIGNIFICANT CHANGE UP (ref 0–41)
BASOPHILS # BLD AUTO: 0.04 K/UL — SIGNIFICANT CHANGE UP (ref 0–0.2)
BASOPHILS NFR BLD AUTO: 0.6 % — SIGNIFICANT CHANGE UP (ref 0–1)
BILIRUB SERPL-MCNC: <0.2 MG/DL — SIGNIFICANT CHANGE UP (ref 0.2–1.2)
BUN SERPL-MCNC: 14 MG/DL — SIGNIFICANT CHANGE UP (ref 10–20)
CALCIUM SERPL-MCNC: 9.2 MG/DL — SIGNIFICANT CHANGE UP (ref 8.5–10.1)
CHLORIDE SERPL-SCNC: 95 MMOL/L — LOW (ref 98–110)
CO2 SERPL-SCNC: 34 MMOL/L — HIGH (ref 17–32)
CREAT SERPL-MCNC: <0.5 MG/DL — LOW (ref 0.7–1.5)
EGFR: 124 ML/MIN/1.73M2 — SIGNIFICANT CHANGE UP
EOSINOPHIL # BLD AUTO: 0.3 K/UL — SIGNIFICANT CHANGE UP (ref 0–0.7)
EOSINOPHIL NFR BLD AUTO: 4.4 % — SIGNIFICANT CHANGE UP (ref 0–8)
GLUCOSE BLDC GLUCOMTR-MCNC: 101 MG/DL — HIGH (ref 70–99)
GLUCOSE BLDC GLUCOMTR-MCNC: 104 MG/DL — HIGH (ref 70–99)
GLUCOSE BLDC GLUCOMTR-MCNC: 112 MG/DL — HIGH (ref 70–99)
GLUCOSE BLDC GLUCOMTR-MCNC: 141 MG/DL — HIGH (ref 70–99)
GLUCOSE BLDC GLUCOMTR-MCNC: 85 MG/DL — SIGNIFICANT CHANGE UP (ref 70–99)
GLUCOSE SERPL-MCNC: 92 MG/DL — SIGNIFICANT CHANGE UP (ref 70–99)
HCT VFR BLD CALC: 27.3 % — LOW (ref 37–47)
HGB BLD-MCNC: 9.2 G/DL — LOW (ref 12–16)
IMM GRANULOCYTES NFR BLD AUTO: 0.4 % — HIGH (ref 0.1–0.3)
LYMPHOCYTES # BLD AUTO: 1.48 K/UL — SIGNIFICANT CHANGE UP (ref 1.2–3.4)
LYMPHOCYTES # BLD AUTO: 21.9 % — SIGNIFICANT CHANGE UP (ref 20.5–51.1)
MCHC RBC-ENTMCNC: 33.6 PG — HIGH (ref 27–31)
MCHC RBC-ENTMCNC: 33.7 G/DL — SIGNIFICANT CHANGE UP (ref 32–37)
MCV RBC AUTO: 99.6 FL — HIGH (ref 81–99)
MONOCYTES # BLD AUTO: 0.5 K/UL — SIGNIFICANT CHANGE UP (ref 0.1–0.6)
MONOCYTES NFR BLD AUTO: 7.4 % — SIGNIFICANT CHANGE UP (ref 1.7–9.3)
NEUTROPHILS # BLD AUTO: 4.4 K/UL — SIGNIFICANT CHANGE UP (ref 1.4–6.5)
NEUTROPHILS NFR BLD AUTO: 65.3 % — SIGNIFICANT CHANGE UP (ref 42.2–75.2)
NRBC # BLD: 0 /100 WBCS — SIGNIFICANT CHANGE UP (ref 0–0)
PLATELET # BLD AUTO: 208 K/UL — SIGNIFICANT CHANGE UP (ref 130–400)
POTASSIUM SERPL-MCNC: 4.2 MMOL/L — SIGNIFICANT CHANGE UP (ref 3.5–5)
POTASSIUM SERPL-SCNC: 4.2 MMOL/L — SIGNIFICANT CHANGE UP (ref 3.5–5)
PROT SERPL-MCNC: 5.6 G/DL — LOW (ref 6–8)
RBC # BLD: 2.74 M/UL — LOW (ref 4.2–5.4)
RBC # FLD: 14 % — SIGNIFICANT CHANGE UP (ref 11.5–14.5)
SODIUM SERPL-SCNC: 136 MMOL/L — SIGNIFICANT CHANGE UP (ref 135–146)
WBC # BLD: 6.75 K/UL — SIGNIFICANT CHANGE UP (ref 4.8–10.8)
WBC # FLD AUTO: 6.75 K/UL — SIGNIFICANT CHANGE UP (ref 4.8–10.8)

## 2022-07-08 PROCEDURE — 99232 SBSQ HOSP IP/OBS MODERATE 35: CPT

## 2022-07-08 RX ORDER — ALPRAZOLAM 0.25 MG
1 TABLET ORAL
Refills: 0 | Status: DISCONTINUED | OUTPATIENT
Start: 2022-07-08 | End: 2022-07-10

## 2022-07-08 RX ADMIN — Medication 1 APPLICATION(S): at 05:15

## 2022-07-08 RX ADMIN — INSULIN GLARGINE 20 UNIT(S): 100 INJECTION, SOLUTION SUBCUTANEOUS at 21:02

## 2022-07-08 RX ADMIN — APIXABAN 5 MILLIGRAM(S): 2.5 TABLET, FILM COATED ORAL at 18:35

## 2022-07-08 RX ADMIN — HYDROMORPHONE HYDROCHLORIDE 1 MILLIGRAM(S): 2 INJECTION INTRAMUSCULAR; INTRAVENOUS; SUBCUTANEOUS at 21:02

## 2022-07-08 RX ADMIN — Medication 650 MILLIGRAM(S): at 21:03

## 2022-07-08 RX ADMIN — Medication 650 MILLIGRAM(S): at 05:10

## 2022-07-08 RX ADMIN — HYDROMORPHONE HYDROCHLORIDE 1 MILLIGRAM(S): 2 INJECTION INTRAMUSCULAR; INTRAVENOUS; SUBCUTANEOUS at 07:22

## 2022-07-08 RX ADMIN — Medication 500 MILLIGRAM(S): at 05:10

## 2022-07-08 RX ADMIN — OXYCODONE AND ACETAMINOPHEN 2 TABLET(S): 5; 325 TABLET ORAL at 09:24

## 2022-07-08 RX ADMIN — HYDROMORPHONE HYDROCHLORIDE 1 MILLIGRAM(S): 2 INJECTION INTRAMUSCULAR; INTRAVENOUS; SUBCUTANEOUS at 20:10

## 2022-07-08 RX ADMIN — HYDROMORPHONE HYDROCHLORIDE 1 MILLIGRAM(S): 2 INJECTION INTRAMUSCULAR; INTRAVENOUS; SUBCUTANEOUS at 03:15

## 2022-07-08 RX ADMIN — HYDROMORPHONE HYDROCHLORIDE 1 MILLIGRAM(S): 2 INJECTION INTRAMUSCULAR; INTRAVENOUS; SUBCUTANEOUS at 16:03

## 2022-07-08 RX ADMIN — Medication 650 MILLIGRAM(S): at 21:33

## 2022-07-08 RX ADMIN — Medication 1 APPLICATION(S): at 18:33

## 2022-07-08 RX ADMIN — OXYCODONE AND ACETAMINOPHEN 2 TABLET(S): 5; 325 TABLET ORAL at 18:58

## 2022-07-08 RX ADMIN — Medication 1 MILLIGRAM(S): at 15:32

## 2022-07-08 RX ADMIN — Medication 9 UNIT(S): at 17:16

## 2022-07-08 RX ADMIN — ATORVASTATIN CALCIUM 20 MILLIGRAM(S): 80 TABLET, FILM COATED ORAL at 21:03

## 2022-07-08 RX ADMIN — Medication 1 MILLIGRAM(S): at 05:12

## 2022-07-08 RX ADMIN — HYDROMORPHONE HYDROCHLORIDE 1 MILLIGRAM(S): 2 INJECTION INTRAMUSCULAR; INTRAVENOUS; SUBCUTANEOUS at 16:18

## 2022-07-08 RX ADMIN — POLYETHYLENE GLYCOL 3350 17 GRAM(S): 17 POWDER, FOR SOLUTION ORAL at 11:59

## 2022-07-08 RX ADMIN — HYDROMORPHONE HYDROCHLORIDE 1 MILLIGRAM(S): 2 INJECTION INTRAMUSCULAR; INTRAVENOUS; SUBCUTANEOUS at 12:16

## 2022-07-08 RX ADMIN — Medication 1 APPLICATION(S): at 05:14

## 2022-07-08 RX ADMIN — Medication 650 MILLIGRAM(S): at 05:40

## 2022-07-08 RX ADMIN — Medication 1 MILLIGRAM(S): at 21:02

## 2022-07-08 RX ADMIN — Medication 9 UNIT(S): at 11:59

## 2022-07-08 RX ADMIN — GABAPENTIN 600 MILLIGRAM(S): 400 CAPSULE ORAL at 14:11

## 2022-07-08 RX ADMIN — APIXABAN 5 MILLIGRAM(S): 2.5 TABLET, FILM COATED ORAL at 05:10

## 2022-07-08 RX ADMIN — SENNA PLUS 2 TABLET(S): 8.6 TABLET ORAL at 21:04

## 2022-07-08 RX ADMIN — Medication 650 MILLIGRAM(S): at 14:41

## 2022-07-08 RX ADMIN — Medication 1 TABLET(S): at 11:58

## 2022-07-08 RX ADMIN — HYDROMORPHONE HYDROCHLORIDE 1 MILLIGRAM(S): 2 INJECTION INTRAMUSCULAR; INTRAVENOUS; SUBCUTANEOUS at 02:45

## 2022-07-08 RX ADMIN — OXYCODONE AND ACETAMINOPHEN 2 TABLET(S): 5; 325 TABLET ORAL at 09:54

## 2022-07-08 RX ADMIN — Medication 9 UNIT(S): at 05:13

## 2022-07-08 RX ADMIN — HYDROMORPHONE HYDROCHLORIDE 1 MILLIGRAM(S): 2 INJECTION INTRAMUSCULAR; INTRAVENOUS; SUBCUTANEOUS at 07:37

## 2022-07-08 RX ADMIN — PANTOPRAZOLE SODIUM 40 MILLIGRAM(S): 20 TABLET, DELAYED RELEASE ORAL at 05:11

## 2022-07-08 RX ADMIN — NYSTATIN CREAM 1 APPLICATION(S): 100000 CREAM TOPICAL at 05:15

## 2022-07-08 RX ADMIN — GABAPENTIN 600 MILLIGRAM(S): 400 CAPSULE ORAL at 21:03

## 2022-07-08 RX ADMIN — Medication 9 UNIT(S): at 00:14

## 2022-07-08 RX ADMIN — Medication 650 MILLIGRAM(S): at 14:11

## 2022-07-08 RX ADMIN — OXYCODONE AND ACETAMINOPHEN 2 TABLET(S): 5; 325 TABLET ORAL at 19:30

## 2022-07-08 RX ADMIN — GABAPENTIN 600 MILLIGRAM(S): 400 CAPSULE ORAL at 05:10

## 2022-07-08 RX ADMIN — HYDROMORPHONE HYDROCHLORIDE 1 MILLIGRAM(S): 2 INJECTION INTRAMUSCULAR; INTRAVENOUS; SUBCUTANEOUS at 12:01

## 2022-07-08 RX ADMIN — NYSTATIN CREAM 1 APPLICATION(S): 100000 CREAM TOPICAL at 18:35

## 2022-07-08 NOTE — PROGRESS NOTE ADULT - ATTENDING COMMENTS
Patient is a 55 yo female  with PMHx of DVT on Eliquis, COPD,  trach collar, obesity, IDDM, UTI, sent by [facility] for hypoxia. Current illness going back to April 4th when pt was intubated on admission at [facility] ICU for hypoxic respiratory failure diagnosed with copd exacerbation and superimposed pneumonia, and remained intubated for 37 days requiring tracheostomy. Patient stayed at [facility] for 2 days until noted to be hypoxic (saturating low 70's) pt's saturation immediately improving afterwards to 80's after large mucus plug removed and was subsequently sent to [facility]  On arrival to ED pt was saturating 97%, 15L O2 via tracheostomy collar (baseline 8 L at [facility])CXR suspicious for L-sided PNA, inconclusive. CTA neg for PE. Pt received x1 IV Levaquin and Cefepime in ED, admitted to MICU for acute hypoxic hypercapnic respiratory failure secondary to acute mucus plug now resolved vs superimposed pneumonia hospital-acquired.   General Surgery Consulted emergently when patient found to be hypoxic to low 80s after CCU and Pulmonary team found large granuloma in trachea along tracheostomy tube. Patient currently had a size 8 trach. Surgery assisted Pulmonary and CCU with trach exchanged with ET tube guidance to a size 7 XLT. Saturations improved to high 90s. Large granuloma was removed. Patient was no longer in distress after exchange.    #VDRF:  Vent management per Pulm.  s/p trach exchange/s/p mucus plugs removal this admission  Monitor oxygenation. Lately been saturating ok on T piece.    #sepsis (developed again on 6/27/22): resolved   -UA +; blood cultures (6/28)= Serratia. Sens. noted  -Started on Cefepime and Vanc (6/28) > changed to CTX 2gm QD per ID (6/29) till 7/7  - 6/30 blood culture NGTD, 6/29 urine culture NGTD  CT Abdo per ID: only showed some cecitis      #s/p PEG (6/15)  #Dysphagia:  Strict NPO as of now per ST's FEES eval from 6/13/22.   ST planning repeat FEES on 7/6- poor visualization  - s/p barium swallow study on 7/7- still unable to take PO intake  -Patient will need close outpatient f/up at  SNF for further plans with swallow studies and tx.     #LE neuropathic pain:  Patient has apparently been bed bound since April 4th (her [facility] admission for hypoxic failure where she ended up being intubated)  Apparently has L > R foot drop which may be due to chronic contractures of Calf muscle/Achilles tendon.   Can have PT evaluate her for that. But options are limited at this stage.   Given the asymmetrical foot drop, Neurology ruled out Any focal neuropathy with a MRI Lumbar spine WNL.     #Chronic atropic B/l LE Hyperpigmentation changes:   May be related to CHF related stasis dermatitis / hemosiderin deposition  vs deposition diseases versus Eosinophilic Dermatitis   Dermatology thinks it may be Eczema and advised Triamcinolone BId> no improvement.   LWC with Betamethasone cream and Ammonium lactate dressings per Burn.       #Lower extremity Pain management*** :   Gabapentin to 600 TID,   Currently IV dilaudid 1 Q4 PRN - will transition to peg tx. and add percocet prn for breakthrough pain.        Note: On 6/21, we discontinued Cymbalta 60 QD since as per family patient may be having bizarre thoughts/hallucinations. Patient was texting them bizarre messages.   Note: Patient has h/o suicidal thoughts on Pregabalin.     Xanax 1 mg Q12 PRN for anxiety.   On Senna, Miralax.     #Suspected epiglottis edema:   s/p DExa Taper (ended 6/18).   Outpatient ENT f/u.     #DM:   steroids ended 6/18.  6/19- Increased lantus from 17 to 20.    #Hyponatremia- resolved     #Chronic Indwelling Singh  - repeat U. Cx (6/29) WNL    #Hx of DVT- restarted Eliquis    GI PPX  PPI  DVT PPX  ELIQUIS        HANDOFF: patient was medically optimized ,stable for d/c to SNF once bed is available  d/c plan: [facility] ,  need auth.     Total time spent to complete patient's bedside assessment, review medical chart, discuss medical plan of care with covering medical team was more than 35 minutes .

## 2022-07-08 NOTE — PROGRESS NOTE ADULT - SUBJECTIVE AND OBJECTIVE BOX
SUBJECTIVE:  Patient is a 56y old Female who presents with a chief complaint of Hypoxia (07 Jul 2022 08:02)   Acute respiratory failure with hypoxia     Today is hospital day 42d. There are no new issues or overnight events.     HPI  HPI:  55 yo f with PMHx of DVT on Eliquis, COPD,  trach collar, obesity, IDDM, UTI, sent by [facility] for hypoxia. History provided by daughter at bedside, she reports the current illness going back to April 4th when pt was intubated on admission at [facility] ICU for hypoxic respiratory failure diagnosed with copd exacerbation and superimposed pneumonia, of note she also appears to have been in CHF exacerbation with severe b/l LE swelling treated with IV bumex. Remained intubated 37 days per daughter, diagnosed with fever of unknown origin (up to 106F), treated with empiric broad spectrum antibiotics and once 2 weeks s/p tracheostomy placement and 48 hours afebrile she was was discharged to nursing home for PT. Of note, patient's daughter and pt herself say the wiseman has not been replaced for over 3 weeks. She also reports a developing sacral pressure ulcer. Was at [facility] only 2 days when was noted to be hypoxic (saturating low 70's) and EMS called. While awaiting EMS, floor nurse on the unit suctioned the patient, and managed to bring up a very large mucus plug, with pt's saturation immediately improving afterwards to 80's. Nonetheless pt was sent to [facility] ED. Prior to all this pt was independent and ambulatory. Pt denies sob, cp, abd pain, n/v/d, fever or chills.     On arrival to ED pt was saturating 97%, 15L O2 via tracheostomy collar (baseline 8 L at [facility]) VS:    /59  T 99.9F RR 20  Admission VBG pH 7.37 pCO2 60 pO2 49. Pt not wheezing, not coughing. CXR suspicious for L-sided PNA, inconclusive. CTA neg for PE. Pt received x1 IV Levoquin and Cefepime in ED, admitted to medicine for acute hypoxic hypercapnic respiratory failure secondary to acute mucus plug (now resolved vs superimposed pneumonia  hospital-acquired.(less likely), Records request sent to [facility] medical records  [phone]) and patient will be continued on empiric antibiotics pending procal (27 May 2022 14:02)      PAST MEDICAL & SURGICAL HISTORY  COPD (chronic obstructive pulmonary disease)    CHF (congestive heart failure)    DM (diabetes mellitus)    H/O tracheostomy      ALLERGIES:  penicillin (Swelling)    MEDICATIONS:  HOME MEDICATIONS  albuterol sulfate 2.5 mg/3 mL (0.083 %) solution for nebulization:   ALPRAZolam 0.5 mg oral tablet: 1 tab(s) orally every 12 hours, As needed, anxiety via PEG tube  apixaban 5 mg oral tablet: 1 tab(s) orally 2 times a day via PEG tube  atorvastatin 20 mg oral tablet: 1 tab(s) orally once a day (at bedtime) via PEG tube  gabapentin 600 mg oral tablet: 1 tab(s) orally 3 times a day via PEG tube  HYDROmorphone 4 mg oral tablet: 1 tab(s) orally every 8 hours, As needed, for severe pain  insulin glargine 100 units/mL subcutaneous solution: 20 unit(s) subcutaneous once a day (at bedtime)  insulin lispro 100 units/mL injectable solution: 9 unit(s) injectable 3 times a day (before meals)  losartan 100 mg oral tablet: 1 tab(s) orally once a day via PEG tube  Multiple Vitamins oral tablet: 1 tab(s) orally once a day via PEG tube  oxycodone-acetaminophen 5 mg-325 mg oral tablet: 2 tab(s) orally every 6 hours, As needed, Moderate Pain (4 - 6)  pantoprazole 40 mg oral delayed release tablet: 1 tab(s) orally once a day (before a meal) via PEG tube  polyethylene glycol 3350 oral powder for reconstitution: 17 gram(s) orally once a day via PEG tube  senna leaf extract oral tablet: 2 tab(s) orally once a day (at bedtime) via PEG tube  Trelegy Ellipta 100 mcg-62.5 mcg-25 mcg powder for inhalation:     STANDING MEDICATIONS  acetaminophen     Tablet .. 650 milliGRAM(s) Oral every 8 hours  ALPRAZolam 1 milliGRAM(s) Oral two times a day  ammonium lactate 12% Lotion 1 Application(s) Topical every 12 hours  apixaban 5 milliGRAM(s) Oral two times a day  atorvastatin 20 milliGRAM(s) Oral at bedtime  betamethasone valerate 0.1% Cream 1 Application(s) Topical two times a day  dextrose 5%. 1000 milliLiter(s) IV Continuous <Continuous>  dextrose 5%. 1000 milliLiter(s) IV Continuous <Continuous>  dextrose 50% Injectable 25 Gram(s) IV Push once  dextrose 50% Injectable 12.5 Gram(s) IV Push once  dextrose 50% Injectable 25 Gram(s) IV Push once  diatrizoate meglumine/diatrizoate sodium. 30 milliLiter(s) Oral once  gabapentin 600 milliGRAM(s) Oral three times a day  glucagon  Injectable 1 milliGRAM(s) IntraMuscular once  influenza   Vaccine 0.5 milliLiter(s) IntraMuscular once  insulin glargine Injectable (LANTUS) 20 Unit(s) SubCutaneous at bedtime  insulin lispro (ADMELOG) corrective regimen sliding scale   SubCutaneous three times a day before meals  insulin lispro Injectable (ADMELOG) 9 Unit(s) SubCutaneous every 6 hours  multivitamin 1 Tablet(s) Oral daily  nystatin Powder 1 Application(s) Topical two times a day  pantoprazole    Tablet 40 milliGRAM(s) Oral before breakfast  polyethylene glycol 3350 17 Gram(s) Oral daily  senna 2 Tablet(s) Oral at bedtime    PRN MEDICATIONS  albuterol/ipratropium for Nebulization 3 milliLiter(s) Nebulizer every 6 hours PRN  dextrose Oral Gel 15 Gram(s) Oral once PRN  HYDROmorphone  Injectable 1 milliGRAM(s) IV Push every 4 hours PRN  oxycodone    5 mG/acetaminophen 325 mG 2 Tablet(s) Oral/Enteral Tube every 6 hours PRN    VITALS:   T(C): 36 (07-08-22 @ 05:17), Max: 36.9 (07-07-22 @ 12:00)  T(F): 96.8 (07-08-22 @ 05:17), Max: 98.5 (07-07-22 @ 12:00)  HR: 102 (07-08-22 @ 07:25) (75 - 102)  BP: 130/57 (07-08-22 @ 05:17) (123/61 - 130/57)  BP(mean): --  ABP: --  ABP(mean): --  RR: 18 (07-08-22 @ 07:25) (18 - 19)  SpO2: 100% (07-08-22 @ 07:25) (100% - 100%)  LABS:                        9.1    6.44  )-----------( 224      ( 07 Jul 2022 06:13 )             27.2     07-07    135  |  94<L>  |  14  ----------------------------<  101<H>  4.4   |  34<H>  |  <0.5<L>    Ca    9.5      07 Jul 2022 06:13    TPro  6.1  /  Alb  3.4<L>  /  TBili  0.3  /  DBili  x   /  AST  17  /  ALT  13  /  AlkPhos  104  07-07        I&O's Detail    07 Jul 2022 07:01  -  08 Jul 2022 07:00  --------------------------------------------------------  IN:    Enteral Tube Flush: 360 mL    Glucerna: 1200 mL  Total IN: 1560 mL    OUT:  Total OUT: 0 mL    Total NET: 1560 mL                    RADIOLOGY:  EKG  12 Lead ECG:   Ventricular Rate 92 BPM    Atrial Rate 92 BPM    P-R Interval 168 ms    QRS Duration 98 ms    Q-T Interval 370 ms    QTC Calculation(Bazett) 457 ms    P Axis 84 degrees    R Axis 103 degrees    T Axis 89 degrees    Diagnosis Line Sinus rhythm with Premature supraventricular complexes  Rightward axis  Low voltage QRS  Incomplete right bundle branch block  Cannot rule out Anterior infarct , age undetermined  T wave abnormality, consider lateral ischemia  Abnormal ECG    Confirmed by Joe Person ([id number]) on 6/30/2022 7:36:49 AM (06-30-22 @ 04:43)  12 Lead ECG:   Ventricular Rate 89 BPM    Atrial Rate 89 BPM    P-R Interval 162 ms    QRS Duration 98 ms    Q-T Interval 372 ms    QTC Calculation(Bazett) 452 ms    P Axis 90 degrees    R Axis 102 degrees    T Axis 90 degrees    Diagnosis Line Normal sinus rhythm  Rightward axis  Incomplete right bundle branch block  Anterior infarct , age undetermined  ST & T wave abnormality, consider lateral ischemia  Abnormal ECG    Confirmed by Joe Person ([id number]) on 6/30/2022 7:36:41 AM (06-30-22 @ 04:42)      Physical Exam:  General: no acute distress, lying in bed  Head: normocephalic and atraumatic  Neck: supple, trach clean and intact  Heart: regular rate and rhythm, S1 and S2 normal, no murmurs, rubs or gallops noted on exam  Lungs: Symmetric chest expansion bilaterally, decreased lung sounds bilaterally, mild diffuse crackles heard, no rhonchi or wheezes heard  Abdomen: Bowel sounds present, non tender on light and deep palpation  Extremities: No edema, no clubbing or cyanosis notes, positive peripheral pulses, brown discoloration of lower extremities- unchanged from previous exams

## 2022-07-08 NOTE — PROGRESS NOTE ADULT - ASSESSMENT
57 yo female with PMHx of DVT on Eliquis, COPD,  trach collar, obesity, IDDM, UTI, sent by [facility] for hypoxia. Current illness going back to April 4th when pt was intubated on admission at [facility] ICU for hypoxic respiratory failure diagnosed with copd exacerbation and superimposed pneumonia, and remained intubated for 37 days requiring tracheostomy. Patient stayed at [facility] for 2 days until noted to be hypoxic (saturating low 70's) pt's saturation immediately improving afterwards to 80's after large mucus plug removed and was subsequently sent to [facility]  On arrival to ED pt was saturating 97%, 15L O2 via tracheostomy collar (baseline 8 L at [facility])CXR suspicious for L-sided PNA, inconclusive. CTA neg for PE. Pt received x1 IV Levaquin and Cefepime in ED, admitted to MICU for acute hypoxic hypercapnic respiratory failure secondary to acute mucus plug now resolved vs superimposed pneumonia hospital-acquired.   General Surgery Consulted emergently when patient found to be hypoxic to low 80s after CCU and Pulmonary team found large granuloma in trachea along tracheostomy tube. Patient currently had a size 8 trach. Surgery assisted Pulmonary and CCU with trach exchanged with ET tube guidance to a size 7 XLT. Saturations improved to high 90s. Large granuloma was removed. Patient was no longer in distress after exchange.    #sepsis  -6/28 Patient was noted to be tachy O/N, hypotensive, febrile to 102.8, FiO2 requirements were increased and the patient was desaturating to 82-86%  -blood cultures 6/29 positive for gram negative rods, serratia bacteremia  -on Ceftriaxone  -ID following  -F/u UA, trach aspirate and blood cultures  -6/30 WBC 11.21 (6/29 was 13.92 and 6/28 was 19.03)  -7/1 ID rec CT a/p- demonstrated cecitis, no abscess   -7/5 will f/u with ID regarding antibiotics regimen  -7/8 off antibiotics    #Hypercarbic respiratory failure  -Maintaining sats 88%/decrease O2 titration  -f/u on pulm recs     #Acute/ chronic COPD with exacerbation (Resolved)  #mucous plug removed  #pneumonia vs atelectasis L base  - On 5/28, Pulmonary team found large granuloma in trachea along tracheostomy tube. Patient had a size 8 trach on admission. Surgery assisted Pulmonary and CCU with trach exchanged with ET tube guidance to a size 7 XLT. Saturations improved to high 90s.  - trach functioning well s/p exchange   - 6/11 Trach changed to Fenestrated 8 by surgery    #s/p PEG (6/15):   KUB showing free intraperitoneal air (6/19).  CT Abdo confirmed that this was just due to PEG tube adjustment.   Repeat G tube study per Surgery (6/21) -normal, restarted on peg feedings      #Dysphagia:  Strict NPO as of now per 's FEES eval from 6/13/22.   [facility] will sign out to  at  for further plans with swallow rehab at SNF  -7/6 fees study  -7/7 barium swallow   -7/8 failed barium swallow, continue peg feeds     #LE neuropathic pain:  PAtient has apparently been bed bound since April 4th (her [facility] admission for hypoxic failure where she ended up being intubated)  Apparently has L > R foot drop which may be due to chronic contractures of Calf muscle/Achilles tendon. Can have PT evaluate her for that.  But options are limited at this stage.   Given the asymmetrical foot drop, Neurology wants to rule out Any focal neuropathy.  -6/23 s/p  MRI Lumbar spine w/ & w/o Contrast, mild degenerative changes  -chronic DJD no acute changes.  -continue pain management: currently on 1mg Dilaudid po q6 prn  -7/5 PT working with patient    #B/l LE Hyperpigmentation changes:   unclear etiology  Present since 1 year.   May be related to CHF related stasis dermatitis / hemosiderin deposition  vs deposition diseases versus Eosinophilic Dermatitis vs follicular hemorrhage from eliquis   Dermatology thinks it may be Eczema and advised Triamcinolone BId.   Add multivitamin to PEG QD.   -7/1 Betamethasone cream and Ammonium lactate dressings per Burn, patient's legs feel mildly better  -7/5 bilateral lower extremities pain mildly improved    -On Senna    #Suspected epiglottis edema:   s/p DExa Taper (ended 6/18).   Outpatient ENT f/u.     #DM:   steroids ended 6/18.  6/19- Increased lantus from 17 to 20.    #Chronic Indwelling Singh  - 5/28 Ucx - Contaminated   - 5/28 Ucx- Normal perri   - 5/27 Ucx- Klebsiella (CRE) and pseudomonas   - s/p cefepime  - Currently afebrile w/ no urinary complaints   - episode of Hematouria 6/4 -> improving -> h/h Stable    #Hx of DVT  - on home Eliquis  - restarted Eliquis    GI PPX  PPI  DVT PPX  ELIQUIS  Dispo: Acute  Pending:  d/c planning to [facility]

## 2022-07-09 LAB
GLUCOSE BLDC GLUCOMTR-MCNC: 114 MG/DL — HIGH (ref 70–99)
GLUCOSE BLDC GLUCOMTR-MCNC: 115 MG/DL — HIGH (ref 70–99)
GLUCOSE BLDC GLUCOMTR-MCNC: 148 MG/DL — HIGH (ref 70–99)
GLUCOSE BLDC GLUCOMTR-MCNC: 82 MG/DL — SIGNIFICANT CHANGE UP (ref 70–99)
GLUCOSE BLDC GLUCOMTR-MCNC: 95 MG/DL — SIGNIFICANT CHANGE UP (ref 70–99)

## 2022-07-09 PROCEDURE — 99233 SBSQ HOSP IP/OBS HIGH 50: CPT

## 2022-07-09 RX ORDER — HYDROMORPHONE HYDROCHLORIDE 2 MG/ML
0.5 INJECTION INTRAMUSCULAR; INTRAVENOUS; SUBCUTANEOUS ONCE
Refills: 0 | Status: DISCONTINUED | OUTPATIENT
Start: 2022-07-09 | End: 2022-07-09

## 2022-07-09 RX ADMIN — OXYCODONE AND ACETAMINOPHEN 2 TABLET(S): 5; 325 TABLET ORAL at 03:07

## 2022-07-09 RX ADMIN — Medication 1 APPLICATION(S): at 18:22

## 2022-07-09 RX ADMIN — HYDROMORPHONE HYDROCHLORIDE 1 MILLIGRAM(S): 2 INJECTION INTRAMUSCULAR; INTRAVENOUS; SUBCUTANEOUS at 06:04

## 2022-07-09 RX ADMIN — NYSTATIN CREAM 1 APPLICATION(S): 100000 CREAM TOPICAL at 06:01

## 2022-07-09 RX ADMIN — HYDROMORPHONE HYDROCHLORIDE 1 MILLIGRAM(S): 2 INJECTION INTRAMUSCULAR; INTRAVENOUS; SUBCUTANEOUS at 18:21

## 2022-07-09 RX ADMIN — Medication 650 MILLIGRAM(S): at 23:32

## 2022-07-09 RX ADMIN — PANTOPRAZOLE SODIUM 40 MILLIGRAM(S): 20 TABLET, DELAYED RELEASE ORAL at 06:00

## 2022-07-09 RX ADMIN — Medication 1 APPLICATION(S): at 06:00

## 2022-07-09 RX ADMIN — Medication 1 APPLICATION(S): at 06:01

## 2022-07-09 RX ADMIN — GABAPENTIN 600 MILLIGRAM(S): 400 CAPSULE ORAL at 14:01

## 2022-07-09 RX ADMIN — Medication 1 TABLET(S): at 12:13

## 2022-07-09 RX ADMIN — HYDROMORPHONE HYDROCHLORIDE 1 MILLIGRAM(S): 2 INJECTION INTRAMUSCULAR; INTRAVENOUS; SUBCUTANEOUS at 22:36

## 2022-07-09 RX ADMIN — HYDROMORPHONE HYDROCHLORIDE 1 MILLIGRAM(S): 2 INJECTION INTRAMUSCULAR; INTRAVENOUS; SUBCUTANEOUS at 02:03

## 2022-07-09 RX ADMIN — POLYETHYLENE GLYCOL 3350 17 GRAM(S): 17 POWDER, FOR SOLUTION ORAL at 12:13

## 2022-07-09 RX ADMIN — HYDROMORPHONE HYDROCHLORIDE 1 MILLIGRAM(S): 2 INJECTION INTRAMUSCULAR; INTRAVENOUS; SUBCUTANEOUS at 18:36

## 2022-07-09 RX ADMIN — APIXABAN 5 MILLIGRAM(S): 2.5 TABLET, FILM COATED ORAL at 06:00

## 2022-07-09 RX ADMIN — Medication 650 MILLIGRAM(S): at 06:29

## 2022-07-09 RX ADMIN — HYDROMORPHONE HYDROCHLORIDE 1 MILLIGRAM(S): 2 INJECTION INTRAMUSCULAR; INTRAVENOUS; SUBCUTANEOUS at 22:45

## 2022-07-09 RX ADMIN — INSULIN GLARGINE 20 UNIT(S): 100 INJECTION, SOLUTION SUBCUTANEOUS at 22:06

## 2022-07-09 RX ADMIN — HYDROMORPHONE HYDROCHLORIDE 1 MILLIGRAM(S): 2 INJECTION INTRAMUSCULAR; INTRAVENOUS; SUBCUTANEOUS at 01:33

## 2022-07-09 RX ADMIN — OXYCODONE AND ACETAMINOPHEN 2 TABLET(S): 5; 325 TABLET ORAL at 12:13

## 2022-07-09 RX ADMIN — OXYCODONE AND ACETAMINOPHEN 2 TABLET(S): 5; 325 TABLET ORAL at 19:58

## 2022-07-09 RX ADMIN — GABAPENTIN 600 MILLIGRAM(S): 400 CAPSULE ORAL at 06:00

## 2022-07-09 RX ADMIN — Medication 650 MILLIGRAM(S): at 23:53

## 2022-07-09 RX ADMIN — HYDROMORPHONE HYDROCHLORIDE 1 MILLIGRAM(S): 2 INJECTION INTRAMUSCULAR; INTRAVENOUS; SUBCUTANEOUS at 10:07

## 2022-07-09 RX ADMIN — ATORVASTATIN CALCIUM 20 MILLIGRAM(S): 80 TABLET, FILM COATED ORAL at 23:33

## 2022-07-09 RX ADMIN — GABAPENTIN 600 MILLIGRAM(S): 400 CAPSULE ORAL at 23:33

## 2022-07-09 RX ADMIN — Medication 650 MILLIGRAM(S): at 05:59

## 2022-07-09 RX ADMIN — HYDROMORPHONE HYDROCHLORIDE 1 MILLIGRAM(S): 2 INJECTION INTRAMUSCULAR; INTRAVENOUS; SUBCUTANEOUS at 06:34

## 2022-07-09 RX ADMIN — OXYCODONE AND ACETAMINOPHEN 2 TABLET(S): 5; 325 TABLET ORAL at 02:37

## 2022-07-09 RX ADMIN — Medication 1 APPLICATION(S): at 18:23

## 2022-07-09 RX ADMIN — NYSTATIN CREAM 1 APPLICATION(S): 100000 CREAM TOPICAL at 18:24

## 2022-07-09 RX ADMIN — OXYCODONE AND ACETAMINOPHEN 2 TABLET(S): 5; 325 TABLET ORAL at 20:30

## 2022-07-09 RX ADMIN — APIXABAN 5 MILLIGRAM(S): 2.5 TABLET, FILM COATED ORAL at 18:22

## 2022-07-09 RX ADMIN — HYDROMORPHONE HYDROCHLORIDE 1 MILLIGRAM(S): 2 INJECTION INTRAMUSCULAR; INTRAVENOUS; SUBCUTANEOUS at 10:22

## 2022-07-09 RX ADMIN — Medication 650 MILLIGRAM(S): at 14:01

## 2022-07-09 RX ADMIN — OXYCODONE AND ACETAMINOPHEN 2 TABLET(S): 5; 325 TABLET ORAL at 12:43

## 2022-07-09 RX ADMIN — Medication 3 MILLILITER(S): at 20:20

## 2022-07-09 RX ADMIN — Medication 9 UNIT(S): at 06:01

## 2022-07-09 RX ADMIN — HYDROMORPHONE HYDROCHLORIDE 0.5 MILLIGRAM(S): 2 INJECTION INTRAMUSCULAR; INTRAVENOUS; SUBCUTANEOUS at 16:21

## 2022-07-09 RX ADMIN — HYDROMORPHONE HYDROCHLORIDE 1 MILLIGRAM(S): 2 INJECTION INTRAMUSCULAR; INTRAVENOUS; SUBCUTANEOUS at 14:01

## 2022-07-09 RX ADMIN — HYDROMORPHONE HYDROCHLORIDE 1 MILLIGRAM(S): 2 INJECTION INTRAMUSCULAR; INTRAVENOUS; SUBCUTANEOUS at 14:16

## 2022-07-09 NOTE — CHART NOTE - NSCHARTNOTEFT_GEN_A_CORE
I called insurance co 1 - [phone], case # [id number], d/w case with representative, was informed that no peer to peer discussion was offered by insurance yet, that they are waiting for updated physician's note and updated PT note. Sunita was informed to assist with faxing all documentation to insurance company for further assistance of d/c insurance approval process completion.

## 2022-07-09 NOTE — PROGRESS NOTE ADULT - SUBJECTIVE AND OBJECTIVE BOX
DUMONT CRUZ  [facility] ([facility])      Patient was evaluated and examined  by bedside, no active events over night        REVIEW OF SYSTEMS:  please see pertinent positives mentioned above, all other 12 ROS negative      T(C): , Max: 36.7 (07-08-22 @ 20:45)  HR: 84 (07-09-22 @ 05:06)  BP: 133/61 (07-09-22 @ 05:06)  RR: 18 (07-09-22 @ 05:06)  SpO2: 98% (07-09-22 @ 05:06)  CAPILLARY BLOOD GLUCOSE      POCT Blood Glucose.: 82 mg/dL (09 Jul 2022 11:48)  POCT Blood Glucose.: 95 mg/dL (09 Jul 2022 05:41)  POCT Blood Glucose.: 101 mg/dL (08 Jul 2022 20:53)  POCT Blood Glucose.: 104 mg/dL (08 Jul 2022 16:23)      PHYSICAL EXAM:  GENERAL: NAD, AAOX3, patient is laying comfortably in bed  HEENT: AT, NC, PERRLA, SUPPLE, NO JVD, NO CB, trach present  LUNG: mild decreased breath sounds  B/L  CVS: normal S1, S2, RRR, NO M/G/R  ABDOMEN: soft, bowel sounds present, normoactive in all 4 quadrants, non-tender, non-distended, peg present  EXT: no E/C/C, positive PP b/l extremities  NEURO: unable to ambulate, b/l lower ext. motor weakness, hypersensitivity on lower ext.  SKIN: no rash, no ecchymosis, chronic atrophic brown skin changes         LAB  CBC  Date: 07-08-22 @ 08:44  Mean cell Qcosciongw45.6  Mean cell Hemoglobin Conc33.7  Mean cell Volum 99.6  Platelet count-Automate 208  RBC Count 2.74  Red Cell Distrib Width14.0  WBC Count6.75  % Albumin, Urine--  Hematocrit 27.3  Hemoglobin 9.2  CBC  Date: 07-07-22 @ 06:13  Mean cell Yeaevdtlnb73.9  Mean cell Hemoglobin Conc33.5  Mean cell Volum 98.2  Platelet count-Automate 224  RBC Count 2.77  Red Cell Distrib Width13.7  WBC Count6.44  % Albumin, Urine--  Hematocrit 27.2  Hemoglobin 9.1  CBC  Date: 07-06-22 @ 08:25  Mean cell Ocmcacakix05.7  Mean cell Hemoglobin Conc33.2  Mean cell Volum 98.6  Platelet count-Automate 246  RBC Count 2.78  Red Cell Distrib Width13.9  WBC Count7.82  % Albumin, Urine--  Hematocrit 27.4  Hemoglobin 9.1  CBC  Date: 07-05-22 @ 07:09  Mean cell Tzvcbwjtps72.1  Mean cell Hemoglobin Conc33.6  Mean cell Volum 98.6  Platelet count-Automate 241  RBC Count 2.81  Red Cell Distrib Width13.8  WBC Count8.93  % Albumin, Urine--  Hematocrit 27.7  Hemoglobin 9.3  CBC  Date: 07-03-22 @ 08:03  Mean cell Agvydrkozc11.1  Mean cell Hemoglobin Conc34.2  Mean cell Volum 96.8  Platelet count-Automate 185  RBC Count 2.78  Red Cell Distrib Width13.5  WBC Count9.67  % Albumin, Urine--  Hematocrit 26.9  Hemoglobin 9.2    [facility]  07-08-22 @ 08:44  Blood Gas Arterial-Calcium,Ionized--  Blood Urea Nitrogen, Serum 14 mg/dL [10 - 20]  Carbon Dioxide, Serum34 mmol/L<H> [17 - 32]  Chloride, Serum95 mmol/L<L> [98 - 110]  Creatinie, Serum<0.5 mg/dL<L> [0.7 - 1.5]  Glucose, Serum92 mg/dL [70 - 99]  Potassium, Serum4.2 mmol/L [3.5 - 5.0]  Sodium, Serum 136 mmol/L [135 - 146]  [facility]  07-07-22 @ 06:13  Blood Gas Arterial-Calcium,Ionized--  Blood Urea Nitrogen, Serum 14 mg/dL [10 - 20]  Carbon Dioxide, Serum34 mmol/L<H> [17 - 32]  Chloride, Serum94 mmol/L<L> [98 - 110]  Creatinie, Serum<0.5 mg/dL<L> [0.7 - 1.5]  Glucose, Wnejf588 mg/dL<H> [70 - 99]  Potassium, Serum4.4 mmol/L [3.5 - 5.0]  Sodium, Serum 135 mmol/L [135 - 146]  [facility]  07-06-22 @ 08:25  Blood Gas Arterial-Calcium,Ionized--  Blood Urea Nitrogen, Serum 11 mg/dL [10 - 20]  Carbon Dioxide, Serum34 mmol/L<H> [17 - 32]  Chloride, Serum94 mmol/L<L> [98 - 110]  Creatinie, Serum<0.5 mg/dL<L> [0.7 - 1.5]  Glucose, Serum97 mg/dL [70 - 99]  Potassium, Serum4.5 mmol/L [3.5 - 5.0]  Sodium, Serum 136 mmol/L [135 - 146]  BMP  07-05-22 @ 07:09  Blood Gas Arterial-Calcium,Ionized--  Blood Urea Nitrogen, Serum 10 mg/dL [10 - 20]  Carbon Dioxide, Serum34 mmol/L<H> [17 - 32]  Chloride, Serum93 mmol/L<L> [98 - 110]  Creatinie, Serum<0.5 mg/dL<L> [0.7 - 1.5]  Glucose, Qrczt750 mg/dL<H> [70 - 99]  Potassium, Serum4.6 mmol/L [3.5 - 5.0]  Sodium, Serum 135 mmol/L [135 - 146]              Microbiology:    Culture - Blood (collected 06-30-22 @ 06:20)  Source: .Blood None  Final Report (07-05-22 @ 14:00):    No Growth Final    Culture - Urine (collected 06-29-22 @ 05:55)  Source: Clean Catch Clean Catch (Midstream)  Final Report (06-30-22 @ 21:38):    <10,000 CFU/mL Normal Urogenital Chante    Culture - Sputum (collected 06-28-22 @ 10:20)  Source: Trach Asp Tracheal Aspirate  Gram Stain (06-28-22 @ 23:39):    Numerous polymorphonuclear leukocytes per low power field    Few Squamous epithelial cells per low power field    Few Gram positive cocci in pairs per oil power field    Moderate Gram Negative Coccobacilli per oil power field  Final Report (06-30-22 @ 17:34):    Rare Pseudomonas aeruginosa (Carbapenem Resistant)    Normal Respiratory Chante present  Organism: Pseudomonas aeruginosa (Carbapenem Resistant) (06-30-22 @ 17:34)  Organism: Pseudomonas aeruginosa (Carbapenem Resistant) (06-30-22 @ 17:34)      -  Amikacin: S <=16      -  Aztreonam: R >16      -  Cefepime: S 4      -  Ceftazidime: S 4      -  Ceftazidime/Avibactam: S <=4      -  Ceftolozane/tazobactam: S <=2      -  Ciprofloxacin: R >2      -  Gentamicin: R >8      -  Imipenem: R >8      -  Levofloxacin: R >4      -  Meropenem: R 8      -  Piperacillin/Tazobactam: S <=8      -  Tobramycin: R >8      Method Type: ROMY    Culture - Blood (collected 06-28-22 @ 06:02)  Source: .Blood Blood-Peripheral  Gram Stain (06-29-22 @ 01:14):    Growth in aerobic bottle: Gram Negative Rods    Growth in anaerobic bottle: Gram Negative Rods  Final Report (06-30-22 @ 13:56):    Growth in aerobic and anaerobic bottles: Serratia marcescens    ***Blood Panel PCR results on this specimen are available    approximately 3 hours after the Gram stain result.***    Gram stain, PCR, and/or culture results may not always    correspond due to difference in methodologies.    ************************************************************    This PCR assay was performed by multiplex PCR. This    Assay tests for 66 bacterial and resistance gene targets.    Please refer to the [facility] test directory    at [email or web address] for details.  Organism: Blood Culture PCR  Serratia marcescens (06-30-22 @ 13:56)  Organism: Serratia marcescens (06-30-22 @ 13:56)      -  Amikacin: S <=16      -  Ampicillin: R >16 These ampicillin results predict results for amoxicillin      -  Ampicillin/Sulbactam: R >16/8 Enterobacter, Klebsiella aerogenes, Citrobacter, and Serratia may develop resistance during prolonged therapy (3-4 days)      -  Aztreonam: S <=4      -  Cefazolin: R >16 Enterobacter, Klebsiella aerogenes, Citrobacter, and Serratia may develop resistance during prolonged therapy (3-4 days)      -  Cefepime: S <=2      -  Cefoxitin: R 16      -  Ceftriaxone: S <=1 Enterobacter, Klebsiella aerogenes, Citrobacter, and Serratia may develop resistance during prolonged therapy      -  Ciprofloxacin: S <=0.25      -  Ertapenem: S <=0.5      -  Gentamicin: S <=2      -  Levofloxacin: S <=0.5      -  Meropenem: S <=1      -  Piperacillin/Tazobactam: S <=8      -  Tobramycin: S <=2      -  Trimethoprim/Sulfamethoxazole: S <=0.5/9.5      Method Type: ROMY  Organism: Blood Culture PCR (06-30-22 @ 13:56)      -  Serratia marcescens: Detec      Method Type: PCR    Culture - Urine (collected 06-21-22 @ 00:40)  Source: Catheterized Catheterized  Final Report (06-22-22 @ 07:05):    <10,000 CFU/mL Normal Urogenital Chante      Medications:  acetaminophen     Tablet .. 650 milliGRAM(s) Oral every 8 hours  albuterol/ipratropium for Nebulization 3 milliLiter(s) Nebulizer every 6 hours PRN  ALPRAZolam 1 milliGRAM(s) Oral two times a day PRN  ammonium lactate 12% Lotion 1 Application(s) Topical every 12 hours  apixaban 5 milliGRAM(s) Oral two times a day  atorvastatin 20 milliGRAM(s) Oral at bedtime  betamethasone valerate 0.1% Cream 1 Application(s) Topical two times a day  dextrose 5%. 1000 milliLiter(s) IV Continuous <Continuous>  dextrose 5%. 1000 milliLiter(s) IV Continuous <Continuous>  dextrose 50% Injectable 25 Gram(s) IV Push once  dextrose 50% Injectable 12.5 Gram(s) IV Push once  dextrose 50% Injectable 25 Gram(s) IV Push once  dextrose Oral Gel 15 Gram(s) Oral once PRN  diatrizoate meglumine/diatrizoate sodium. 30 milliLiter(s) Oral once  gabapentin 600 milliGRAM(s) Oral three times a day  glucagon  Injectable 1 milliGRAM(s) IntraMuscular once  HYDROmorphone  Injectable 1 milliGRAM(s) IV Push every 4 hours PRN  influenza   Vaccine 0.5 milliLiter(s) IntraMuscular once  insulin glargine Injectable (LANTUS) 20 Unit(s) SubCutaneous at bedtime  insulin lispro (ADMELOG) corrective regimen sliding scale   SubCutaneous three times a day before meals  insulin lispro Injectable (ADMELOG) 9 Unit(s) SubCutaneous every 6 hours  multivitamin 1 Tablet(s) Oral daily  nystatin Powder 1 Application(s) Topical two times a day  oxycodone    5 mG/acetaminophen 325 mG 2 Tablet(s) Oral/Enteral Tube every 6 hours PRN  pantoprazole    Tablet 40 milliGRAM(s) Oral before breakfast  polyethylene glycol 3350 17 Gram(s) Oral daily  senna 2 Tablet(s) Oral at bedtime        Assessment and Plan:  Patient is a 55 yo female  with PMHx of DVT on Eliquis, COPD,  trach collar, obesity, IDDM, UTI, sent by [facility] for hypoxia. Current illness going back to April 4th when pt was intubated on admission at [facility] ICU for hypoxic respiratory failure diagnosed with copd exacerbation and superimposed pneumonia, and remained intubated for 37 days requiring tracheostomy. Patient stayed at [facility] for 2 days until noted to be hypoxic (saturating low 70's) pt's saturation immediately improving afterwards to 80's after large mucus plug removed and was subsequently sent to [facility]  On arrival to ED pt was saturating 97%, 15L O2 via tracheostomy collar (baseline 8 L at [facility])CXR suspicious for L-sided PNA, inconclusive. CTA neg for PE. Pt received x1 IV Levaquin and Cefepime in ED, admitted to MICU for acute hypoxic hypercapnic respiratory failure secondary to acute mucus plug now resolved vs superimposed pneumonia hospital-acquired.   General Surgery Consulted emergently when patient found to be hypoxic to low 80s after CCU and Pulmonary team found large granuloma in trachea along tracheostomy tube. Patient currently had a size 8 trach. Surgery assisted Pulmonary and CCU with trach exchanged with ET tube guidance to a size 7 XLT. Saturations improved to high 90s. Large granuloma was removed. Patient was no longer in distress after exchange.    #VDRF:  Vent management per Pulm.  s/p trach exchange/s/p mucus plugs removal this admission  Monitor oxygenation. Lately been saturating ok on T piece.    #sepsis (developed again on 6/27/22): resolved   -UA +; blood cultures (6/28)= Serratia. Sens. noted  -Started on Cefepime and Vanc (6/28) > changed to CTX 2gm QD per ID (6/29) till 7/7  - 6/30 blood culture NGTD, 6/29 urine culture NGTD  CT Abdo per ID: only showed some cecitis      #s/p PEG (6/15)  #Dysphagia:  Strict NPO as of now per ST's FEES eval from 6/13/22.    planning repeat FEES on 7/6- poor visualization  - s/p barium swallow study on 7/7- still unable to take PO intake  -Patient will need close outpatient f/up at  SNF for further plans with swallow studies and tx.     #LE neuropathic pain:  Patient has apparently been bed bound since April 4th (her [facility] admission for hypoxic failure where she ended up being intubated)  Apparently has L > R foot drop which may be due to chronic contractures of Calf muscle/Achilles tendon.   Can have PT evaluate her for that. But options are limited at this stage.   Given the asymmetrical foot drop, Neurology ruled out Any focal neuropathy with a MRI Lumbar spine WNL.     #Chronic atropic B/l LE Hyperpigmentation changes:   May be related to CHF related stasis dermatitis / hemosiderin deposition  vs deposition diseases versus Eosinophilic Dermatitis   Dermatology thinks it may be Eczema and advised Triamcinolone BId> no improvement.   LWC with Betamethasone cream and Ammonium lactate dressings per Burn.       #Lower extremity Pain management*** :   Gabapentin to 600 TID,   Currently IV dilaudid 1 Q4 PRN - will transition to peg tx. and add percocet prn for breakthrough pain.        Note: On 6/21, we discontinued Cymbalta 60 QD since as per family patient may be having bizarre thoughts/hallucinations. Patient was texting them bizarre messages.   Note: Patient has h/o suicidal thoughts on Pregabalin.     Xanax 1 mg Q12 PRN for anxiety.   On Senna, Miralax.     #Suspected epiglottis edema:   s/p DExa Taper (ended 6/18).   Outpatient ENT f/u.     #DM:   steroids ended 6/18.  6/19- Increased lantus from 17 to 20.    #Hyponatremia- resolved     #Chronic Indwelling Singh  - repeat U. Cx (6/29) WNL    #Hx of DVT- restarted Eliquis    GI PPX  PPI  DVT PPX  ELIQUIS        HANDOFF: patient was medically optimized ,stable for d/c to SNF once bed is available, patient was denied for STR due to refusal to participate with PT session, will need long term placement to SNF, f/up with case management to assist with placement.   d/c plan: [facility]     Total time spent to complete patient's bedside assessment, review medical chart, discuss medical plan of care with covering medical team was more than 35 minutes .          DUMONT CRUZ  [facility] T[facility]      Patient was evaluated and examined  by bedside, no active events over night        REVIEW OF SYSTEMS:  please see pertinent positives mentioned above, all other 12 ROS negative      T(C): , Max: 36.7 (07-08-22 @ 20:45)  HR: 84 (07-09-22 @ 05:06)  BP: 133/61 (07-09-22 @ 05:06)  RR: 18 (07-09-22 @ 05:06)  SpO2: 98% (07-09-22 @ 05:06)  CAPILLARY BLOOD GLUCOSE      POCT Blood Glucose.: 82 mg/dL (09 Jul 2022 11:48)  POCT Blood Glucose.: 95 mg/dL (09 Jul 2022 05:41)  POCT Blood Glucose.: 101 mg/dL (08 Jul 2022 20:53)  POCT Blood Glucose.: 104 mg/dL (08 Jul 2022 16:23)      PHYSICAL EXAM:  GENERAL: NAD, AAOX3, patient is laying comfortably in bed  HEENT: AT, NC, PERRLA, SUPPLE, NO JVD, NO CB, trach present  LUNG: mild decreased breath sounds  B/L  CVS: normal S1, S2, RRR, NO M/G/R  ABDOMEN: soft, bowel sounds present, normoactive in all 4 quadrants, non-tender, non-distended, peg present  EXT: no E/C/C, positive PP b/l extremities  NEURO: unable to ambulate, b/l lower ext. motor weakness, hypersensitivity on lower ext.  SKIN: no rash, no ecchymosis, chronic atrophic brown skin changes         LAB  CBC  Date: 07-08-22 @ 08:44  Mean cell Yzjtyrvcky79.6  Mean cell Hemoglobin Conc33.7  Mean cell Volum 99.6  Platelet count-Automate 208  RBC Count 2.74  Red Cell Distrib Width14.0  WBC Count6.75  % Albumin, Urine--  Hematocrit 27.3  Hemoglobin 9.2  CBC  Date: 07-07-22 @ 06:13  Mean cell Yyolhmdfkd93.9  Mean cell Hemoglobin Conc33.5  Mean cell Volum 98.2  Platelet count-Automate 224  RBC Count 2.77  Red Cell Distrib Width13.7  WBC Count6.44  % Albumin, Urine--  Hematocrit 27.2  Hemoglobin 9.1  CBC  Date: 07-06-22 @ 08:25  Mean cell Hviysicvrs04.7  Mean cell Hemoglobin Conc33.2  Mean cell Volum 98.6  Platelet count-Automate 246  RBC Count 2.78  Red Cell Distrib Width13.9  WBC Count7.82  % Albumin, Urine--  Hematocrit 27.4  Hemoglobin 9.1  CBC  Date: 07-05-22 @ 07:09  Mean cell Nadpvecerg22.1  Mean cell Hemoglobin Conc33.6  Mean cell Volum 98.6  Platelet count-Automate 241  RBC Count 2.81  Red Cell Distrib Width13.8  WBC Count8.93  % Albumin, Urine--  Hematocrit 27.7  Hemoglobin 9.3  CBC  Date: 07-03-22 @ 08:03  Mean cell Azalmgzdad12.1  Mean cell Hemoglobin Conc34.2  Mean cell Volum 96.8  Platelet count-Automate 185  RBC Count 2.78  Red Cell Distrib Width13.5  WBC Count9.67  % Albumin, Urine--  Hematocrit 26.9  Hemoglobin 9.2    [facility]  07-08-22 @ 08:44  Blood Gas Arterial-Calcium,Ionized--  Blood Urea Nitrogen, Serum 14 mg/dL [10 - 20]  Carbon Dioxide, Serum34 mmol/L<H> [17 - 32]  Chloride, Serum95 mmol/L<L> [98 - 110]  Creatinie, Serum<0.5 mg/dL<L> [0.7 - 1.5]  Glucose, Serum92 mg/dL [70 - 99]  Potassium, Serum4.2 mmol/L [3.5 - 5.0]  Sodium, Serum 136 mmol/L [135 - 146]  [facility]  07-07-22 @ 06:13  Blood Gas Arterial-Calcium,Ionized--  Blood Urea Nitrogen, Serum 14 mg/dL [10 - 20]  Carbon Dioxide, Serum34 mmol/L<H> [17 - 32]  Chloride, Serum94 mmol/L<L> [98 - 110]  Creatinie, Serum<0.5 mg/dL<L> [0.7 - 1.5]  Glucose, Lrcpl113 mg/dL<H> [70 - 99]  Potassium, Serum4.4 mmol/L [3.5 - 5.0]  Sodium, Serum 135 mmol/L [135 - 146]  [facility]  07-06-22 @ 08:25  Blood Gas Arterial-Calcium,Ionized--  Blood Urea Nitrogen, Serum 11 mg/dL [10 - 20]  Carbon Dioxide, Serum34 mmol/L<H> [17 - 32]  Chloride, Serum94 mmol/L<L> [98 - 110]  Creatinie, Serum<0.5 mg/dL<L> [0.7 - 1.5]  Glucose, Serum97 mg/dL [70 - 99]  Potassium, Serum4.5 mmol/L [3.5 - 5.0]  Sodium, Serum 136 mmol/L [135 - 146]  BMP  07-05-22 @ 07:09  Blood Gas Arterial-Calcium,Ionized--  Blood Urea Nitrogen, Serum 10 mg/dL [10 - 20]  Carbon Dioxide, Serum34 mmol/L<H> [17 - 32]  Chloride, Serum93 mmol/L<L> [98 - 110]  Creatinie, Serum<0.5 mg/dL<L> [0.7 - 1.5]  Glucose, Tjlaw317 mg/dL<H> [70 - 99]  Potassium, Serum4.6 mmol/L [3.5 - 5.0]  Sodium, Serum 135 mmol/L [135 - 146]              Microbiology:    Culture - Blood (collected 06-30-22 @ 06:20)  Source: .Blood None  Final Report (07-05-22 @ 14:00):    No Growth Final    Culture - Urine (collected 06-29-22 @ 05:55)  Source: Clean Catch Clean Catch (Midstream)  Final Report (06-30-22 @ 21:38):    <10,000 CFU/mL Normal Urogenital Chante    Culture - Sputum (collected 06-28-22 @ 10:20)  Source: Trach Asp Tracheal Aspirate  Gram Stain (06-28-22 @ 23:39):    Numerous polymorphonuclear leukocytes per low power field    Few Squamous epithelial cells per low power field    Few Gram positive cocci in pairs per oil power field    Moderate Gram Negative Coccobacilli per oil power field  Final Report (06-30-22 @ 17:34):    Rare Pseudomonas aeruginosa (Carbapenem Resistant)    Normal Respiratory Chante present  Organism: Pseudomonas aeruginosa (Carbapenem Resistant) (06-30-22 @ 17:34)  Organism: Pseudomonas aeruginosa (Carbapenem Resistant) (06-30-22 @ 17:34)      -  Amikacin: S <=16      -  Aztreonam: R >16      -  Cefepime: S 4      -  Ceftazidime: S 4      -  Ceftazidime/Avibactam: S <=4      -  Ceftolozane/tazobactam: S <=2      -  Ciprofloxacin: R >2      -  Gentamicin: R >8      -  Imipenem: R >8      -  Levofloxacin: R >4      -  Meropenem: R 8      -  Piperacillin/Tazobactam: S <=8      -  Tobramycin: R >8      Method Type: ROMY    Culture - Blood (collected 06-28-22 @ 06:02)  Source: .Blood Blood-Peripheral  Gram Stain (06-29-22 @ 01:14):    Growth in aerobic bottle: Gram Negative Rods    Growth in anaerobic bottle: Gram Negative Rods  Final Report (06-30-22 @ 13:56):    Growth in aerobic and anaerobic bottles: Serratia marcescens    ***Blood Panel PCR results on this specimen are available    approximately 3 hours after the Gram stain result.***    Gram stain, PCR, and/or culture results may not always    correspond due to difference in methodologies.    ************************************************************    This PCR assay was performed by multiplex PCR. This    Assay tests for 66 bacterial and resistance gene targets.    Please refer to the [facility] test directory    at [email or web address] for details.  Organism: Blood Culture PCR  Serratia marcescens (06-30-22 @ 13:56)  Organism: Serratia marcescens (06-30-22 @ 13:56)      -  Amikacin: S <=16      -  Ampicillin: R >16 These ampicillin results predict results for amoxicillin      -  Ampicillin/Sulbactam: R >16/8 Enterobacter, Klebsiella aerogenes, Citrobacter, and Serratia may develop resistance during prolonged therapy (3-4 days)      -  Aztreonam: S <=4      -  Cefazolin: R >16 Enterobacter, Klebsiella aerogenes, Citrobacter, and Serratia may develop resistance during prolonged therapy (3-4 days)      -  Cefepime: S <=2      -  Cefoxitin: R 16      -  Ceftriaxone: S <=1 Enterobacter, Klebsiella aerogenes, Citrobacter, and Serratia may develop resistance during prolonged therapy      -  Ciprofloxacin: S <=0.25      -  Ertapenem: S <=0.5      -  Gentamicin: S <=2      -  Levofloxacin: S <=0.5      -  Meropenem: S <=1      -  Piperacillin/Tazobactam: S <=8      -  Tobramycin: S <=2      -  Trimethoprim/Sulfamethoxazole: S <=0.5/9.5      Method Type: ROMY  Organism: Blood Culture PCR (06-30-22 @ 13:56)      -  Serratia marcescens: Detec      Method Type: PCR    Culture - Urine (collected 06-21-22 @ 00:40)  Source: Catheterized Catheterized  Final Report (06-22-22 @ 07:05):    <10,000 CFU/mL Normal Urogenital Chnate      Medications:  acetaminophen     Tablet .. 650 milliGRAM(s) Oral every 8 hours  albuterol/ipratropium for Nebulization 3 milliLiter(s) Nebulizer every 6 hours PRN  ALPRAZolam 1 milliGRAM(s) Oral two times a day PRN  ammonium lactate 12% Lotion 1 Application(s) Topical every 12 hours  apixaban 5 milliGRAM(s) Oral two times a day  atorvastatin 20 milliGRAM(s) Oral at bedtime  betamethasone valerate 0.1% Cream 1 Application(s) Topical two times a day  dextrose 5%. 1000 milliLiter(s) IV Continuous <Continuous>  dextrose 5%. 1000 milliLiter(s) IV Continuous <Continuous>  dextrose 50% Injectable 25 Gram(s) IV Push once  dextrose 50% Injectable 12.5 Gram(s) IV Push once  dextrose 50% Injectable 25 Gram(s) IV Push once  dextrose Oral Gel 15 Gram(s) Oral once PRN  diatrizoate meglumine/diatrizoate sodium. 30 milliLiter(s) Oral once  gabapentin 600 milliGRAM(s) Oral three times a day  glucagon  Injectable 1 milliGRAM(s) IntraMuscular once  HYDROmorphone  Injectable 1 milliGRAM(s) IV Push every 4 hours PRN  influenza   Vaccine 0.5 milliLiter(s) IntraMuscular once  insulin glargine Injectable (LANTUS) 20 Unit(s) SubCutaneous at bedtime  insulin lispro (ADMELOG) corrective regimen sliding scale   SubCutaneous three times a day before meals  insulin lispro Injectable (ADMELOG) 9 Unit(s) SubCutaneous every 6 hours  multivitamin 1 Tablet(s) Oral daily  nystatin Powder 1 Application(s) Topical two times a day  oxycodone    5 mG/acetaminophen 325 mG 2 Tablet(s) Oral/Enteral Tube every 6 hours PRN  pantoprazole    Tablet 40 milliGRAM(s) Oral before breakfast  polyethylene glycol 3350 17 Gram(s) Oral daily  senna 2 Tablet(s) Oral at bedtime        Assessment and Plan:  Patient is a 55 yo female  with PMHx of DVT on Eliquis, COPD,  trach collar, obesity, IDDM, UTI, sent by [facility] for hypoxia. Current illness going back to April 4th when pt was intubated on admission at [facility] ICU for hypoxic respiratory failure diagnosed with copd exacerbation and superimposed pneumonia, and remained intubated for 37 days requiring tracheostomy. Patient stayed at [facility] for 2 days until noted to be hypoxic (saturating low 70's) pt's saturation immediately improving afterwards to 80's after large mucus plug removed and was subsequently sent to [facility]  On arrival to ED pt was saturating 97%, 15L O2 via tracheostomy collar (baseline 8 L at [facility])CXR suspicious for L-sided PNA, inconclusive. CTA neg for PE. Pt received x1 IV Levaquin and Cefepime in ED, admitted to MICU for acute hypoxic hypercapnic respiratory failure secondary to acute mucus plug now resolved vs superimposed pneumonia hospital-acquired.   General Surgery Consulted emergently when patient found to be hypoxic to low 80s after CCU and Pulmonary team found large granuloma in trachea along tracheostomy tube. Patient currently had a size 8 trach. Surgery assisted Pulmonary and CCU with trach exchanged with ET tube guidance to a size 7 XLT. Saturations improved to high 90s. Large granuloma was removed. Patient was no longer in distress after exchange.    #VDRF:  Vent management per Pulm.  s/p trach exchange/s/p mucus plugs removal this admission  Monitor oxygenation. Lately been saturating ok on T piece.  -7/9- patient remains on 40% FIO2 supplement trach to collar, continue daily wean off trials as tolerated by pt.    #sepsis (developed again on 6/27/22): resolved   -UA +; blood cultures (6/28)= Serratia. Sens. noted  -Started on Cefepime and Vanc (6/28) > changed to CTX 2gm QD per ID (6/29) till 7/7  - 6/30 blood culture NGTD, 6/29 urine culture NGTD  CT Abdo per ID: only showed some cecitis      #s/p PEG (6/15)  #Persistent Dysphagia:  Strict NPO as of now per ST's FEES eval from 6/13/22.    planning repeat FEES on 7/6- poor visualization  - s/p barium swallow study on 7/7- still unable to take PO intake  -Patient will need close outpatient f/up at  SNF for further plans with swallow studies and tx.  -continue peg feedings with pre/post flushes      #LE neuropathic pain:  Patient has apparently been bed bound since April 4th (her [facility] admission for hypoxic failure where she ended up being intubated)  Apparently has L > R foot drop which may be due to chronic contractures of Calf muscle/Achilles tendon.   Can have PT evaluate her for that. But options are limited at this stage.   Given the asymmetrical foot drop, Neurology ruled out Any focal neuropathy with a MRI Lumbar spine WNL.     #Chronic atropic B/l LE Hyperpigmentation changes:   May be related to CHF related stasis dermatitis / hemosiderin deposition  vs deposition diseases versus Eosinophilic Dermatitis   Dermatology thinks it may be Eczema and advised Triamcinolone BId> no improvement.   LWC with Betamethasone cream and Ammonium lactate dressings per Burn.       #Lower extremity Pain management*** :   Gabapentin to 600 TID,   Currently IV dilaudid 1 Q4 PRN - will transition to peg tx. and add percocet prn for breakthrough pain.        Note: On 6/21, we discontinued Cymbalta 60 QD since as per family patient may be having bizarre thoughts/hallucinations. Patient was texting them bizarre messages.   Note: Patient has h/o suicidal thoughts on Pregabalin.     Xanax 1 mg Q12 PRN for anxiety.   On Senna, Miralax.     #Suspected epiglottis edema:   s/p DExa Taper (ended 6/18).   Outpatient ENT f/u.     #DM:   steroids ended 6/18.  6/19- Increased lantus from 17 to 20.    #Hyponatremia- resolved     #Chronic Indwelling Singh  - repeat U. Cx (6/29) WNL    #Hx of DVT- restarted Eliquis    # Severe Physical deconditioning : patient unable to walk due to severe legs pain, on pain management, continue to encourage PT tx. daily as tolerated     GI PPX  PPI  DVT PPX  ELIQUIS        HANDOFF: patient was medically optimized ,stable for d/c to SNF once bed is available, patient has new peg and needs peg feedings with pre/post feedings, close outpatient Speech tx. and another swallow study to be repeated shortly after d/c , needs aggressive PT tx. and encouragement to participate with tx., needs to continue wean off FIO2 40% via trach with daily Respiratory therapist f/up. f/up insurance company of approval to SNF d/c.    d/c plan: [facility] once bed is available     Total time spent to complete patient's bedside assessment, review medical chart, discuss medical plan of care with covering medical team was more than 35 minutes .

## 2022-07-10 LAB
GLUCOSE BLDC GLUCOMTR-MCNC: 104 MG/DL — HIGH (ref 70–99)
GLUCOSE BLDC GLUCOMTR-MCNC: 113 MG/DL — HIGH (ref 70–99)
GLUCOSE BLDC GLUCOMTR-MCNC: 126 MG/DL — HIGH (ref 70–99)
GLUCOSE BLDC GLUCOMTR-MCNC: 131 MG/DL — HIGH (ref 70–99)
GLUCOSE BLDC GLUCOMTR-MCNC: 150 MG/DL — HIGH (ref 70–99)
GLUCOSE BLDC GLUCOMTR-MCNC: 165 MG/DL — HIGH (ref 70–99)

## 2022-07-10 PROCEDURE — 99232 SBSQ HOSP IP/OBS MODERATE 35: CPT

## 2022-07-10 RX ORDER — SENNA PLUS 8.6 MG/1
2 TABLET ORAL AT BEDTIME
Refills: 0 | Status: DISCONTINUED | OUTPATIENT
Start: 2022-07-10 | End: 2022-07-22

## 2022-07-10 RX ORDER — APIXABAN 2.5 MG/1
5 TABLET, FILM COATED ORAL
Refills: 0 | Status: DISCONTINUED | OUTPATIENT
Start: 2022-07-10 | End: 2022-07-22

## 2022-07-10 RX ORDER — ALPRAZOLAM 0.25 MG
1 TABLET ORAL
Refills: 0 | Status: DISCONTINUED | OUTPATIENT
Start: 2022-07-10 | End: 2022-07-17

## 2022-07-10 RX ORDER — PANTOPRAZOLE SODIUM 20 MG/1
40 TABLET, DELAYED RELEASE ORAL
Refills: 0 | Status: DISCONTINUED | OUTPATIENT
Start: 2022-07-10 | End: 2022-07-12

## 2022-07-10 RX ORDER — ATORVASTATIN CALCIUM 80 MG/1
20 TABLET, FILM COATED ORAL AT BEDTIME
Refills: 0 | Status: DISCONTINUED | OUTPATIENT
Start: 2022-07-10 | End: 2022-07-22

## 2022-07-10 RX ORDER — POLYETHYLENE GLYCOL 3350 17 G/17G
17 POWDER, FOR SOLUTION ORAL DAILY
Refills: 0 | Status: DISCONTINUED | OUTPATIENT
Start: 2022-07-10 | End: 2022-07-22

## 2022-07-10 RX ORDER — GABAPENTIN 400 MG/1
600 CAPSULE ORAL THREE TIMES A DAY
Refills: 0 | Status: DISCONTINUED | OUTPATIENT
Start: 2022-07-10 | End: 2022-07-22

## 2022-07-10 RX ADMIN — OXYCODONE AND ACETAMINOPHEN 2 TABLET(S): 5; 325 TABLET ORAL at 21:33

## 2022-07-10 RX ADMIN — HYDROMORPHONE HYDROCHLORIDE 1 MILLIGRAM(S): 2 INJECTION INTRAMUSCULAR; INTRAVENOUS; SUBCUTANEOUS at 18:03

## 2022-07-10 RX ADMIN — NYSTATIN CREAM 1 APPLICATION(S): 100000 CREAM TOPICAL at 06:10

## 2022-07-10 RX ADMIN — Medication 650 MILLIGRAM(S): at 22:13

## 2022-07-10 RX ADMIN — HYDROMORPHONE HYDROCHLORIDE 1 MILLIGRAM(S): 2 INJECTION INTRAMUSCULAR; INTRAVENOUS; SUBCUTANEOUS at 13:42

## 2022-07-10 RX ADMIN — HYDROMORPHONE HYDROCHLORIDE 1 MILLIGRAM(S): 2 INJECTION INTRAMUSCULAR; INTRAVENOUS; SUBCUTANEOUS at 14:57

## 2022-07-10 RX ADMIN — Medication 1 APPLICATION(S): at 06:00

## 2022-07-10 RX ADMIN — OXYCODONE AND ACETAMINOPHEN 2 TABLET(S): 5; 325 TABLET ORAL at 07:53

## 2022-07-10 RX ADMIN — HYDROMORPHONE HYDROCHLORIDE 1 MILLIGRAM(S): 2 INJECTION INTRAMUSCULAR; INTRAVENOUS; SUBCUTANEOUS at 07:07

## 2022-07-10 RX ADMIN — HYDROMORPHONE HYDROCHLORIDE 1 MILLIGRAM(S): 2 INJECTION INTRAMUSCULAR; INTRAVENOUS; SUBCUTANEOUS at 06:40

## 2022-07-10 RX ADMIN — OXYCODONE AND ACETAMINOPHEN 2 TABLET(S): 5; 325 TABLET ORAL at 15:38

## 2022-07-10 RX ADMIN — OXYCODONE AND ACETAMINOPHEN 2 TABLET(S): 5; 325 TABLET ORAL at 21:03

## 2022-07-10 RX ADMIN — GABAPENTIN 600 MILLIGRAM(S): 400 CAPSULE ORAL at 14:09

## 2022-07-10 RX ADMIN — NYSTATIN CREAM 1 APPLICATION(S): 100000 CREAM TOPICAL at 17:59

## 2022-07-10 RX ADMIN — ATORVASTATIN CALCIUM 20 MILLIGRAM(S): 80 TABLET, FILM COATED ORAL at 22:13

## 2022-07-10 RX ADMIN — OXYCODONE AND ACETAMINOPHEN 2 TABLET(S): 5; 325 TABLET ORAL at 01:18

## 2022-07-10 RX ADMIN — HYDROMORPHONE HYDROCHLORIDE 1 MILLIGRAM(S): 2 INJECTION INTRAMUSCULAR; INTRAVENOUS; SUBCUTANEOUS at 22:34

## 2022-07-10 RX ADMIN — APIXABAN 5 MILLIGRAM(S): 2.5 TABLET, FILM COATED ORAL at 19:10

## 2022-07-10 RX ADMIN — Medication 650 MILLIGRAM(S): at 05:59

## 2022-07-10 RX ADMIN — Medication 650 MILLIGRAM(S): at 06:44

## 2022-07-10 RX ADMIN — Medication 1 APPLICATION(S): at 06:08

## 2022-07-10 RX ADMIN — GABAPENTIN 600 MILLIGRAM(S): 400 CAPSULE ORAL at 22:15

## 2022-07-10 RX ADMIN — Medication 9 UNIT(S): at 00:03

## 2022-07-10 RX ADMIN — HYDROMORPHONE HYDROCHLORIDE 1 MILLIGRAM(S): 2 INJECTION INTRAMUSCULAR; INTRAVENOUS; SUBCUTANEOUS at 02:34

## 2022-07-10 RX ADMIN — OXYCODONE AND ACETAMINOPHEN 2 TABLET(S): 5; 325 TABLET ORAL at 15:23

## 2022-07-10 RX ADMIN — Medication 650 MILLIGRAM(S): at 22:45

## 2022-07-10 RX ADMIN — GABAPENTIN 600 MILLIGRAM(S): 400 CAPSULE ORAL at 06:00

## 2022-07-10 RX ADMIN — Medication 650 MILLIGRAM(S): at 13:42

## 2022-07-10 RX ADMIN — HYDROMORPHONE HYDROCHLORIDE 1 MILLIGRAM(S): 2 INJECTION INTRAMUSCULAR; INTRAVENOUS; SUBCUTANEOUS at 22:50

## 2022-07-10 RX ADMIN — PANTOPRAZOLE SODIUM 40 MILLIGRAM(S): 20 TABLET, DELAYED RELEASE ORAL at 06:05

## 2022-07-10 RX ADMIN — Medication 1 TABLET(S): at 12:42

## 2022-07-10 RX ADMIN — INSULIN GLARGINE 20 UNIT(S): 100 INJECTION, SOLUTION SUBCUTANEOUS at 22:13

## 2022-07-10 RX ADMIN — Medication 1 MILLIGRAM(S): at 10:00

## 2022-07-10 RX ADMIN — APIXABAN 5 MILLIGRAM(S): 2.5 TABLET, FILM COATED ORAL at 05:59

## 2022-07-10 RX ADMIN — HYDROMORPHONE HYDROCHLORIDE 1 MILLIGRAM(S): 2 INJECTION INTRAMUSCULAR; INTRAVENOUS; SUBCUTANEOUS at 18:18

## 2022-07-10 NOTE — PROGRESS NOTE ADULT - SUBJECTIVE AND OBJECTIVE BOX
SUBJECTIVE:  Patient is a 56y old Female who presents with a chief complaint of trach dysfunction (09 Jul 2022 13:43)   Acute respiratory failure with hypoxia     Today is hospital day 44d. There are no new issues or overnight events.     HPI  HPI:  57 yo f with PMHx of DVT on Eliquis, COPD,  trach collar, obesity, IDDM, UTI, sent by [facility] for hypoxia. History provided by daughter at bedside, she reports the current illness going back to April 4th when pt was intubated on admission at [facility] ICU for hypoxic respiratory failure diagnosed with copd exacerbation and superimposed pneumonia, of note she also appears to have been in CHF exacerbation with severe b/l LE swelling treated with IV bumex. Remained intubated 37 days per daughter, diagnosed with fever of unknown origin (up to 106F), treated with empiric broad spectrum antibiotics and once 2 weeks s/p tracheostomy placement and 48 hours afebrile she was was discharged to nursing home for PT. Of note, patient's daughter and pt herself say the wiseman has not been replaced for over 3 weeks. She also reports a developing sacral pressure ulcer. Was at [facility] only 2 days when was noted to be hypoxic (saturating low 70's) and EMS called. While awaiting EMS, floor nurse on the unit suctioned the patient, and managed to bring up a very large mucus plug, with pt's saturation immediately improving afterwards to 80's. Nonetheless pt was sent to [facility] ED. Prior to all this pt was independent and ambulatory. Pt denies sob, cp, abd pain, n/v/d, fever or chills.     On arrival to ED pt was saturating 97%, 15L O2 via tracheostomy collar (baseline 8 L at [facility]) VS:    /59  T 99.9F RR 20  Admission VBG pH 7.37 pCO2 60 pO2 49. Pt not wheezing, not coughing. CXR suspicious for L-sided PNA, inconclusive. CTA neg for PE. Pt received x1 IV Levoquin and Cefepime in ED, admitted to medicine for acute hypoxic hypercapnic respiratory failure secondary to acute mucus plug (now resolved vs superimposed pneumonia  hospital-acquired.(less likely), Records request sent to [facility] medical records  [phone]) and patient will be continued on empiric antibiotics pending procal (27 May 2022 14:02)      PAST MEDICAL & SURGICAL HISTORY  COPD (chronic obstructive pulmonary disease)    CHF (congestive heart failure)    DM (diabetes mellitus)    H/O tracheostomy      ALLERGIES:  penicillin (Swelling)    MEDICATIONS:  HOME MEDICATIONS  albuterol sulfate 2.5 mg/3 mL (0.083 %) solution for nebulization:   ALPRAZolam 0.5 mg oral tablet: 1 tab(s) orally every 12 hours, As needed, anxiety via PEG tube  apixaban 5 mg oral tablet: 1 tab(s) orally 2 times a day via PEG tube  atorvastatin 20 mg oral tablet: 1 tab(s) orally once a day (at bedtime) via PEG tube  gabapentin 600 mg oral tablet: 1 tab(s) orally 3 times a day via PEG tube  HYDROmorphone 4 mg oral tablet: 1 tab(s) orally every 8 hours, As needed, for severe pain  insulin glargine 100 units/mL subcutaneous solution: 20 unit(s) subcutaneous once a day (at bedtime)  insulin lispro 100 units/mL injectable solution: 9 unit(s) injectable 3 times a day (before meals)  losartan 100 mg oral tablet: 1 tab(s) orally once a day via PEG tube  Multiple Vitamins oral tablet: 1 tab(s) orally once a day via PEG tube  oxycodone-acetaminophen 5 mg-325 mg oral tablet: 2 tab(s) orally every 6 hours, As needed, Moderate Pain (4 - 6)  pantoprazole 40 mg oral delayed release tablet: 1 tab(s) orally once a day (before a meal) via PEG tube  polyethylene glycol 3350 oral powder for reconstitution: 17 gram(s) orally once a day via PEG tube  senna leaf extract oral tablet: 2 tab(s) orally once a day (at bedtime) via PEG tube  Trelegy Ellipta 100 mcg-62.5 mcg-25 mcg powder for inhalation:     STANDING MEDICATIONS  acetaminophen     Tablet .. 650 milliGRAM(s) Oral every 8 hours  ammonium lactate 12% Lotion 1 Application(s) Topical every 12 hours  apixaban 5 milliGRAM(s) Oral two times a day  atorvastatin 20 milliGRAM(s) Oral at bedtime  betamethasone valerate 0.1% Cream 1 Application(s) Topical two times a day  dextrose 5%. 1000 milliLiter(s) IV Continuous <Continuous>  dextrose 5%. 1000 milliLiter(s) IV Continuous <Continuous>  dextrose 50% Injectable 25 Gram(s) IV Push once  dextrose 50% Injectable 12.5 Gram(s) IV Push once  dextrose 50% Injectable 25 Gram(s) IV Push once  diatrizoate meglumine/diatrizoate sodium. 30 milliLiter(s) Oral once  gabapentin 600 milliGRAM(s) Oral three times a day  glucagon  Injectable 1 milliGRAM(s) IntraMuscular once  influenza   Vaccine 0.5 milliLiter(s) IntraMuscular once  insulin glargine Injectable (LANTUS) 20 Unit(s) SubCutaneous at bedtime  insulin lispro (ADMELOG) corrective regimen sliding scale   SubCutaneous three times a day before meals  insulin lispro Injectable (ADMELOG) 9 Unit(s) SubCutaneous every 6 hours  multivitamin 1 Tablet(s) Oral daily  nystatin Powder 1 Application(s) Topical two times a day  pantoprazole    Tablet 40 milliGRAM(s) Oral before breakfast  polyethylene glycol 3350 17 Gram(s) Oral daily  senna 2 Tablet(s) Oral at bedtime    PRN MEDICATIONS  albuterol/ipratropium for Nebulization 3 milliLiter(s) Nebulizer every 6 hours PRN  ALPRAZolam 1 milliGRAM(s) Oral two times a day PRN  dextrose Oral Gel 15 Gram(s) Oral once PRN  HYDROmorphone  Injectable 1 milliGRAM(s) IV Push every 4 hours PRN  oxycodone    5 mG/acetaminophen 325 mG 2 Tablet(s) Oral/Enteral Tube every 6 hours PRN    VITALS:   T(C): 36.1 (07-10-22 @ 05:14), Max: 36.8 (07-09-22 @ 14:54)  T(F): 97 (07-10-22 @ 05:14), Max: 98.3 (07-09-22 @ 20:00)  HR: 93 (07-10-22 @ 05:14) (93 - 100)  BP: 119/58 (07-10-22 @ 05:14) (116/56 - 165/55)  BP(mean): --  ABP: --  ABP(mean): --  RR: 18 (07-10-22 @ 05:14) (18 - 18)  SpO2: 98% (07-10-22 @ 07:35) (97% - 98%)  LABS:              I&O's Detail    09 Jul 2022 07:01  -  10 Jul 2022 07:00  --------------------------------------------------------  IN:    Enteral Tube Flush: 120 mL    Glucerna: 600 mL  Total IN: 720 mL    OUT:  Total OUT: 0 mL    Total NET: 720 mL                    RADIOLOGY:  EKG  12 Lead ECG:   Ventricular Rate 92 BPM    Atrial Rate 92 BPM    P-R Interval 168 ms    QRS Duration 98 ms    Q-T Interval 370 ms    QTC Calculation(Bazett) 457 ms    P Axis 84 degrees    R Axis 103 degrees    T Axis 89 degrees    Diagnosis Line Sinus rhythm with Premature supraventricular complexes  Rightward axis  Low voltage QRS  Incomplete right bundle branch block  Cannot rule out Anterior infarct , age undetermined  T wave abnormality, consider lateral ischemia  Abnormal ECG    Confirmed by Joe Person ([id number]) on 6/30/2022 7:36:49 AM (06-30-22 @ 04:43)  12 Lead ECG:   Ventricular Rate 89 BPM    Atrial Rate 89 BPM    P-R Interval 162 ms    QRS Duration 98 ms    Q-T Interval 372 ms    QTC Calculation(Bazett) 452 ms    P Axis 90 degrees    R Axis 102 degrees    T Axis 90 degrees    Diagnosis Line Normal sinus rhythm  Rightward axis  Incomplete right bundle branch block  Anterior infarct , age undetermined  ST & T wave abnormality, consider lateral ischemia  Abnormal ECG    Confirmed by Joe Person ([id number]) on 6/30/2022 7:36:41 AM (06-30-22 @ 04:42)      Physical Exam:  General: no acute distress, lying in bed  Head: normocephalic and atraumatic  Neck: supple, trach clean and intact  Heart: regular rate and rhythm, S1 and S2 normal, no murmurs, rubs or gallops noted on exam  Lungs: Symmetric chest expansion bilaterally, clear lungs bilaterally, no wheezes rhonchi or crackles heard  Abdomen: Bowel sounds present, non tender on light and deep palpation  Extremities: No edema, no clubbing or cyanosis notes, positive peripheral pulses, brown discoloration of bilateral lower extremities- unchanged from previous exams

## 2022-07-10 NOTE — PROGRESS NOTE ADULT - ASSESSMENT
55 yo female with PMHx of DVT on Eliquis, COPD,  trach collar, obesity, IDDM, UTI, sent by [facility] for hypoxia. Current illness going back to April 4th when pt was intubated on admission at [facility] ICU for hypoxic respiratory failure diagnosed with copd exacerbation and superimposed pneumonia, and remained intubated for 37 days requiring tracheostomy. Patient stayed at [facility] for 2 days until noted to be hypoxic (saturating low 70's) pt's saturation immediately improving afterwards to 80's after large mucus plug removed and was subsequently sent to [facility]  On arrival to ED pt was saturating 97%, 15L O2 via tracheostomy collar (baseline 8 L at [facility])CXR suspicious for L-sided PNA, inconclusive. CTA neg for PE. Pt received x1 IV Levaquin and Cefepime in ED, admitted to MICU for acute hypoxic hypercapnic respiratory failure secondary to acute mucus plug now resolved vs superimposed pneumonia hospital-acquired.   General Surgery Consulted emergently when patient found to be hypoxic to low 80s after CCU and Pulmonary team found large granuloma in trachea along tracheostomy tube. Patient currently had a size 8 trach. Surgery assisted Pulmonary and CCU with trach exchanged with ET tube guidance to a size 7 XLT. Saturations improved to high 90s. Large granuloma was removed. Patient was no longer in distress after exchange.    #sepsis  -6/28 Patient was noted to be tachy O/N, hypotensive, febrile to 102.8, FiO2 requirements were increased and the patient was desaturating to 82-86%  -blood cultures 6/29 positive for gram negative rods, serratia bacteremia  -on Ceftriaxone  -ID following  -F/u UA, trach aspirate and blood cultures  -6/30 WBC 11.21 (6/29 was 13.92 and 6/28 was 19.03)  -7/1 ID rec CT a/p- demonstrated cecitis, no abscess   -7/5 will f/u with ID regarding antibiotics regimen  -7/8 off antibiotics    #Hypercarbic respiratory failure  -Maintaining sats 88%/decrease O2 titration  -f/u on pulm recs     #Acute/ chronic COPD with exacerbation (Resolved)  #mucous plug removed  #pneumonia vs atelectasis L base  - On 5/28, Pulmonary team found large granuloma in trachea along tracheostomy tube. Patient had a size 8 trach on admission. Surgery assisted Pulmonary and CCU with trach exchanged with ET tube guidance to a size 7 XLT. Saturations improved to high 90s.  - trach functioning well s/p exchange   - 6/11 Trach changed to Fenestrated 8 by surgery    #s/p PEG (6/15):   KUB showing free intraperitoneal air (6/19).  CT Abdo confirmed that this was just due to PEG tube adjustment.   Repeat G tube study per Surgery (6/21) -normal, restarted on peg feedings      #Dysphagia:  Strict NPO as of now per 's FEES eval from 6/13/22.   [facility] will sign out to  at [facility] for further plans with swallow rehab at SNF  -7/6 fees study  -7/7 barium swallow   -7/8 failed barium swallow, continue peg feeds     #LE neuropathic pain:  PAtient has apparently been bed bound since April 4th (her [facility] admission for hypoxic failure where she ended up being intubated)  Apparently has L > R foot drop which may be due to chronic contractures of Calf muscle/Achilles tendon. Can have PT evaluate her for that.  But options are limited at this stage.   Given the asymmetrical foot drop, Neurology wants to rule out Any focal neuropathy.  -6/23 s/p  MRI Lumbar spine w/ & w/o Contrast, mild degenerative changes  -chronic DJD no acute changes.  -continue pain management: currently on 1mg Dilaudid po q6 prn  -7/5 PT working with patient    #B/l LE Hyperpigmentation changes:   unclear etiology  Present since 1 year.   May be related to CHF related stasis dermatitis / hemosiderin deposition  vs deposition diseases versus Eosinophilic Dermatitis vs follicular hemorrhage from eliquis   Dermatology thinks it may be Eczema and advised Triamcinolone BId.   Add multivitamin to PEG QD.   -7/1 Betamethasone cream and Ammonium lactate dressings per Burn, patient's legs feel mildly better  -7/5 bilateral lower extremities pain mildly improved    -On Senna    #Suspected epiglottis edema:   s/p DExa Taper (ended 6/18).   Outpatient ENT f/u.     #DM:   steroids ended 6/18.  6/19- Increased lantus from 17 to 20.    #Chronic Indwelling Singh  - 5/28 Ucx - Contaminated   - 5/28 Ucx- Normal perri   - 5/27 Ucx- Klebsiella (CRE) and pseudomonas   - s/p cefepime  - Currently afebrile w/ no urinary complaints   - episode of Hematouria 6/4 -> improving -> h/h Stable    #Hx of DVT  - on home Eliquis  - restarted Eliquis    GI PPX  PPI  DVT PPX  ELIQUIS  Dispo: Acute  Pending:  d/c planning to [facility]

## 2022-07-10 NOTE — PROGRESS NOTE ADULT - ATTENDING COMMENTS
Patient is a 57 yo female  with PMHx of DVT on Eliquis, COPD,  trach collar, obesity, IDDM, UTI, sent by [facility] for hypoxia. Current illness going back to April 4th when pt was intubated on admission at [facility] ICU for hypoxic respiratory failure diagnosed with copd exacerbation and superimposed pneumonia, and remained intubated for 37 days requiring tracheostomy. Patient stayed at [facility] for 2 days until noted to be hypoxic (saturating low 70's) pt's saturation immediately improving afterwards to 80's after large mucus plug removed and was subsequently sent to [facility]  On arrival to ED pt was saturating 97%, 15L O2 via tracheostomy collar (baseline 8 L at [facility])CXR suspicious for L-sided PNA, inconclusive. CTA neg for PE. Pt received x1 IV Levaquin and Cefepime in ED, admitted to MICU for acute hypoxic hypercapnic respiratory failure secondary to acute mucus plug now resolved vs superimposed pneumonia hospital-acquired.   General Surgery Consulted emergently when patient found to be hypoxic to low 80s after CCU and Pulmonary team found large granuloma in trachea along tracheostomy tube. Patient currently had a size 8 trach. Surgery assisted Pulmonary and CCU with trach exchanged with ET tube guidance to a size 7 XLT. Saturations improved to high 90s. Large granuloma was removed. Patient was no longer in distress after exchange.    #VDRF: Vent management per Pulm.  s/p trach exchange/s/p mucus plugs removal this admission  Monitor oxygenation. Lately been saturating ok on T piece.  -7/10- patient remains on 40% FIO2 supplement trach to collar, continue daily wean off trials as tolerated by pt.    #sepsis (developed again on 6/27/22): resolved   -UA +; blood cultures (6/28)= Serratia. Sens. noted  -s/p Cefepime and Vanc (6/28) > changed to CTX 2gm QD per ID (6/29) till 7/7  - 6/30 blood culture NGTD, 6/29 urine culture NGTD  CT Abdo per ID: only showed some cecitis      #s/p PEG (6/15)  #Persistent Dysphagia:  ST planning repeat FEES on 7/6- poor visualization  - s/p barium swallow study on 7/7- still unable to take PO intake  -Patient will need close outpatient f/up at  SNF for further plans with swallow studies and tx.  -continue peg feedings with pre/post flushes   -as per speech tx- patient is allowed to have ice chips and sips of water po     #LE neuropathic pain:  Patient has apparently been bed bound since April 4th (her [facility] admission for hypoxic failure where she ended up being intubated)  Apparently has L > R foot drop which may be due to chronic contractures of Calf muscle/Achilles tendon.   Can have PT evaluate her for that. But options are limited at this stage.   Given the asymmetrical foot drop, Neurology ruled out Any focal neuropathy with a MRI Lumbar spine WNL.     #Chronic atropic B/l LE Hyperpigmentation changes: LWC with Betamethasone cream and Ammonium lactate dressings per Burn.       #Lower extremity Pain management*** :   Gabapentin to 600 TID,   Currently IV dilaudid 1 Q4 PRN - will transition to peg tx. and add percocet prn for breakthrough pain.        Note: On 6/21, we discontinued Cymbalta 60 QD since as per family patient may be having bizarre thoughts/hallucinations. Patient was texting them bizarre messages.   Note: Patient has h/o suicidal thoughts on Pregabalin.     Xanax 1 mg Q12 PRN for anxiety.   On Senna, Miralax.     #Suspected epiglottis edema:   s/p DExa Taper (ended 6/18).   Outpatient ENT f/u.     #DM:   steroids ended 6/18.  6/19- Increased lantus from 17 to 20.    #Hyponatremia- resolved     #Chronic Indwelling Singh  - repeat U. Cx (6/29) WNL    #Hx of DVT- restarted Eliquis    # Severe Physical deconditioning : patient unable to walk due to severe legs pain, on pain management, continue to encourage PT tx. daily as tolerated     GI PPX  PPI  DVT PPX  ELIQUIS        HANDOFF: patient was medically optimized ,stable for d/c to SNF once bed is available, patient has new peg and needs peg feedings with pre/post feedings, close outpatient Speech tx. and another swallow study to be repeated shortly after d/c , needs aggressive PT tx. and encouragement to participate with tx., needs to continue wean off FIO2 40% via trach with daily Respiratory therapist f/up. f/up insurance company of approval to SNF d/c.    d/c plan: [facility] once bed is available     Total time spent to complete patient's bedside assessment, review medical chart, discuss medical plan of care with covering medical team was more than 35 minutes .

## 2022-07-11 LAB
ALBUMIN SERPL ELPH-MCNC: 3.4 G/DL — LOW (ref 3.5–5.2)
ALP SERPL-CCNC: 103 U/L — SIGNIFICANT CHANGE UP (ref 30–115)
ALT FLD-CCNC: 11 U/L — SIGNIFICANT CHANGE UP (ref 0–41)
ANION GAP SERPL CALC-SCNC: 6 MMOL/L — LOW (ref 7–14)
AST SERPL-CCNC: 16 U/L — SIGNIFICANT CHANGE UP (ref 0–41)
BILIRUB SERPL-MCNC: 0.2 MG/DL — SIGNIFICANT CHANGE UP (ref 0.2–1.2)
BUN SERPL-MCNC: 12 MG/DL — SIGNIFICANT CHANGE UP (ref 10–20)
CALCIUM SERPL-MCNC: 9.9 MG/DL — SIGNIFICANT CHANGE UP (ref 8.5–10.1)
CHLORIDE SERPL-SCNC: 95 MMOL/L — LOW (ref 98–110)
CO2 SERPL-SCNC: 34 MMOL/L — HIGH (ref 17–32)
CREAT SERPL-MCNC: <0.5 MG/DL — LOW (ref 0.7–1.5)
EGFR: 115 ML/MIN/1.73M2 — SIGNIFICANT CHANGE UP
GLUCOSE BLDC GLUCOMTR-MCNC: 111 MG/DL — HIGH (ref 70–99)
GLUCOSE BLDC GLUCOMTR-MCNC: 117 MG/DL — HIGH (ref 70–99)
GLUCOSE BLDC GLUCOMTR-MCNC: 123 MG/DL — HIGH (ref 70–99)
GLUCOSE BLDC GLUCOMTR-MCNC: 133 MG/DL — HIGH (ref 70–99)
GLUCOSE BLDC GLUCOMTR-MCNC: 171 MG/DL — HIGH (ref 70–99)
GLUCOSE SERPL-MCNC: 102 MG/DL — HIGH (ref 70–99)
HCT VFR BLD CALC: 28.2 % — LOW (ref 37–47)
HGB BLD-MCNC: 9.5 G/DL — LOW (ref 12–16)
MAGNESIUM SERPL-MCNC: 1.8 MG/DL — SIGNIFICANT CHANGE UP (ref 1.8–2.4)
MCHC RBC-ENTMCNC: 33.1 PG — HIGH (ref 27–31)
MCHC RBC-ENTMCNC: 33.7 G/DL — SIGNIFICANT CHANGE UP (ref 32–37)
MCV RBC AUTO: 98.3 FL — SIGNIFICANT CHANGE UP (ref 81–99)
NRBC # BLD: 0 /100 WBCS — SIGNIFICANT CHANGE UP (ref 0–0)
PLATELET # BLD AUTO: 178 K/UL — SIGNIFICANT CHANGE UP (ref 130–400)
POTASSIUM SERPL-MCNC: 4.4 MMOL/L — SIGNIFICANT CHANGE UP (ref 3.5–5)
POTASSIUM SERPL-SCNC: 4.4 MMOL/L — SIGNIFICANT CHANGE UP (ref 3.5–5)
PROT SERPL-MCNC: 6.1 G/DL — SIGNIFICANT CHANGE UP (ref 6–8)
RBC # BLD: 2.87 M/UL — LOW (ref 4.2–5.4)
RBC # FLD: 13.7 % — SIGNIFICANT CHANGE UP (ref 11.5–14.5)
SODIUM SERPL-SCNC: 135 MMOL/L — SIGNIFICANT CHANGE UP (ref 135–146)
WBC # BLD: 9.3 K/UL — SIGNIFICANT CHANGE UP (ref 4.8–10.8)
WBC # FLD AUTO: 9.3 K/UL — SIGNIFICANT CHANGE UP (ref 4.8–10.8)

## 2022-07-11 PROCEDURE — 99232 SBSQ HOSP IP/OBS MODERATE 35: CPT

## 2022-07-11 RX ADMIN — OXYCODONE AND ACETAMINOPHEN 2 TABLET(S): 5; 325 TABLET ORAL at 06:13

## 2022-07-11 RX ADMIN — Medication 650 MILLIGRAM(S): at 06:09

## 2022-07-11 RX ADMIN — GABAPENTIN 600 MILLIGRAM(S): 400 CAPSULE ORAL at 13:50

## 2022-07-11 RX ADMIN — SENNA PLUS 2 TABLET(S): 8.6 TABLET ORAL at 22:36

## 2022-07-11 RX ADMIN — ATORVASTATIN CALCIUM 20 MILLIGRAM(S): 80 TABLET, FILM COATED ORAL at 22:35

## 2022-07-11 RX ADMIN — HYDROMORPHONE HYDROCHLORIDE 1 MILLIGRAM(S): 2 INJECTION INTRAMUSCULAR; INTRAVENOUS; SUBCUTANEOUS at 16:54

## 2022-07-11 RX ADMIN — OXYCODONE AND ACETAMINOPHEN 2 TABLET(S): 5; 325 TABLET ORAL at 15:53

## 2022-07-11 RX ADMIN — Medication 1 MILLIGRAM(S): at 11:21

## 2022-07-11 RX ADMIN — Medication 650 MILLIGRAM(S): at 06:35

## 2022-07-11 RX ADMIN — Medication 9 UNIT(S): at 12:21

## 2022-07-11 RX ADMIN — OXYCODONE AND ACETAMINOPHEN 2 TABLET(S): 5; 325 TABLET ORAL at 23:20

## 2022-07-11 RX ADMIN — HYDROMORPHONE HYDROCHLORIDE 1 MILLIGRAM(S): 2 INJECTION INTRAMUSCULAR; INTRAVENOUS; SUBCUTANEOUS at 12:36

## 2022-07-11 RX ADMIN — GABAPENTIN 600 MILLIGRAM(S): 400 CAPSULE ORAL at 06:09

## 2022-07-11 RX ADMIN — HYDROMORPHONE HYDROCHLORIDE 1 MILLIGRAM(S): 2 INJECTION INTRAMUSCULAR; INTRAVENOUS; SUBCUTANEOUS at 07:30

## 2022-07-11 RX ADMIN — NYSTATIN CREAM 1 APPLICATION(S): 100000 CREAM TOPICAL at 17:39

## 2022-07-11 RX ADMIN — NYSTATIN CREAM 1 APPLICATION(S): 100000 CREAM TOPICAL at 06:16

## 2022-07-11 RX ADMIN — Medication 1 MILLIGRAM(S): at 22:36

## 2022-07-11 RX ADMIN — APIXABAN 5 MILLIGRAM(S): 2.5 TABLET, FILM COATED ORAL at 06:08

## 2022-07-11 RX ADMIN — HYDROMORPHONE HYDROCHLORIDE 1 MILLIGRAM(S): 2 INJECTION INTRAMUSCULAR; INTRAVENOUS; SUBCUTANEOUS at 02:36

## 2022-07-11 RX ADMIN — Medication 1 APPLICATION(S): at 17:38

## 2022-07-11 RX ADMIN — Medication 1 APPLICATION(S): at 06:32

## 2022-07-11 RX ADMIN — HYDROMORPHONE HYDROCHLORIDE 1 MILLIGRAM(S): 2 INJECTION INTRAMUSCULAR; INTRAVENOUS; SUBCUTANEOUS at 20:54

## 2022-07-11 RX ADMIN — Medication 9 UNIT(S): at 06:35

## 2022-07-11 RX ADMIN — Medication 650 MILLIGRAM(S): at 23:05

## 2022-07-11 RX ADMIN — Medication 1 TABLET(S): at 11:21

## 2022-07-11 RX ADMIN — Medication 650 MILLIGRAM(S): at 22:35

## 2022-07-11 RX ADMIN — PANTOPRAZOLE SODIUM 40 MILLIGRAM(S): 20 TABLET, DELAYED RELEASE ORAL at 06:35

## 2022-07-11 RX ADMIN — Medication 650 MILLIGRAM(S): at 13:50

## 2022-07-11 RX ADMIN — HYDROMORPHONE HYDROCHLORIDE 1 MILLIGRAM(S): 2 INJECTION INTRAMUSCULAR; INTRAVENOUS; SUBCUTANEOUS at 12:21

## 2022-07-11 RX ADMIN — HYDROMORPHONE HYDROCHLORIDE 1 MILLIGRAM(S): 2 INJECTION INTRAMUSCULAR; INTRAVENOUS; SUBCUTANEOUS at 21:30

## 2022-07-11 RX ADMIN — Medication 1 APPLICATION(S): at 06:16

## 2022-07-11 RX ADMIN — HYDROMORPHONE HYDROCHLORIDE 1 MILLIGRAM(S): 2 INJECTION INTRAMUSCULAR; INTRAVENOUS; SUBCUTANEOUS at 17:11

## 2022-07-11 RX ADMIN — OXYCODONE AND ACETAMINOPHEN 2 TABLET(S): 5; 325 TABLET ORAL at 22:46

## 2022-07-11 RX ADMIN — APIXABAN 5 MILLIGRAM(S): 2.5 TABLET, FILM COATED ORAL at 17:41

## 2022-07-11 RX ADMIN — INSULIN GLARGINE 20 UNIT(S): 100 INJECTION, SOLUTION SUBCUTANEOUS at 22:36

## 2022-07-11 RX ADMIN — GABAPENTIN 600 MILLIGRAM(S): 400 CAPSULE ORAL at 22:35

## 2022-07-11 NOTE — PROGRESS NOTE ADULT - ATTENDING COMMENTS
Patient is a 55 yo female  with PMHx of DVT on Eliquis, COPD,  trach collar, obesity, IDDM, UTI, sent by [facility] for hypoxia. Current illness going back to April 4th when pt was intubated on admission at [facility] ICU for hypoxic respiratory failure diagnosed with copd exacerbation and superimposed pneumonia, and remained intubated for 37 days requiring tracheostomy. Patient stayed at [facility] for 2 days until noted to be hypoxic (saturating low 70's) pt's saturation immediately improving afterwards to 80's after large mucus plug removed and was subsequently sent to [facility]  On arrival to ED pt was saturating 97%, 15L O2 via tracheostomy collar (baseline 8 L at [facility])CXR suspicious for L-sided PNA, inconclusive. CTA neg for PE. Pt received x1 IV Levaquin and Cefepime in ED, admitted to MICU for acute hypoxic hypercapnic respiratory failure secondary to acute mucus plug now resolved vs superimposed pneumonia hospital-acquired.   General Surgery Consulted emergently when patient found to be hypoxic to low 80s after CCU and Pulmonary team found large granuloma in trachea along tracheostomy tube. Patient currently had a size 8 trach. Surgery assisted Pulmonary and CCU with trach exchanged with ET tube guidance to a size 7 XLT. Saturations improved to high 90s. Large granuloma was removed. Patient was no longer in distress after exchange.    #VDRF: Vent management per Pulm.  s/p trach exchange/s/p mucus plugs removal this admission  Monitor oxygenation. Lately been saturating ok on T piece.  -7/10- patient remains on 40% FIO2 supplement trach to collar, continue daily wean off trials as tolerated by pt.    #sepsis (developed again on 6/27/22): resolved   -UA +; blood cultures (6/28)= Serratia. Sens. noted  -s/p Cefepime and Vanc (6/28) > changed to CTX 2gm QD per ID (6/29) till 7/7  - 6/30 blood culture NGTD, 6/29 urine culture NGTD  CT Abdo per ID: only showed some cecitis      #s/p PEG (6/15)  #Persistent Dysphagia:  ST planning repeat FEES on 7/6- poor visualization  - s/p barium swallow study on 7/7- still unable to take PO intake  -Patient will need close outpatient f/up at  SNF for further plans with swallow studies and tx.  -continue peg feedings with pre/post flushes   -as per speech tx- patient is allowed to have ice chips and sips of water po     #LE neuropathic pain:  Patient has apparently been bed bound since April 4th (her [facility] admission for hypoxic failure where she ended up being intubated)  Apparently has L > R foot drop which may be due to chronic contractures of Calf muscle/Achilles tendon.   Can have PT evaluate her for that. But options are limited at this stage.   Given the asymmetrical foot drop, Neurology ruled out Any focal neuropathy with a MRI Lumbar spine WNL.     #Chronic atropic B/l LE Hyperpigmentation changes: LWC with Betamethasone cream and Ammonium lactate dressings per Burn.       #Lower extremity Pain management*** :   Gabapentin to 600 TID,   Currently IV dilaudid 1 Q4 PRN - will transition to peg tx. and add percocet prn for breakthrough pain.        Note: On 6/21, we discontinued Cymbalta 60 QD since as per family patient may be having bizarre thoughts/hallucinations. Patient was texting them bizarre messages.   Note: Patient has h/o suicidal thoughts on Pregabalin.     Xanax 1 mg Q12 PRN for anxiety.   On Senna, Miralax.     #Suspected epiglottis edema:   s/p DExa Taper (ended 6/18).   Outpatient ENT f/u.     #DM:   steroids ended 6/18.  6/19- Increased lantus from 17 to 20.    #Hyponatremia- resolved     #Chronic Indwelling Singh  - repeat U. Cx (6/29) WNL    #Hx of DVT- restarted Eliquis    # Severe Physical deconditioning : patient unable to walk due to severe legs pain, on pain management, continue to encourage PT tx. daily as tolerated     GI PPX  PPI  DVT PPX  ELIQUIS      #Progress Note Handoff: Pending d/c to SNF once bed is available  Family discussion: medical plan of care d/w pt. by bedside Disposition: SNF once bed is available    Total time spent to complete patient's bedside assessment, review medical chart, discuss medical plan of care with covering medical team was more than 25 minutes

## 2022-07-11 NOTE — PROGRESS NOTE ADULT - SUBJECTIVE AND OBJECTIVE BOX
SUBJECTIVE:  Patient is a 56y old Female who presents with a chief complaint of trach dysfunction (09 Jul 2022 13:43)  Acute respiratory failure with hypoxia    Today is hospital day 45d. There are no new issues or overnight events.     HPI  HPI:  55 yo f with PMHx of DVT on Eliquis, COPD,  trach collar, obesity, IDDM, UTI, sent by [facility] for hypoxia. History provided by daughter at bedside, she reports the current illness going back to April 4th when pt was intubated on admission at [facility] ICU for hypoxic respiratory failure diagnosed with copd exacerbation and superimposed pneumonia, of note she also appears to have been in CHF exacerbation with severe b/l LE swelling treated with IV bumex. Remained intubated 37 days per daughter, diagnosed with fever of unknown origin (up to 106F), treated with empiric broad spectrum antibiotics and once 2 weeks s/p tracheostomy placement and 48 hours afebrile she was was discharged to nursing home for PT. Of note, patient's daughter and pt herself say the wiseman has not been replaced for over 3 weeks. She also reports a developing sacral pressure ulcer. Was at [facility] only 2 days when was noted to be hypoxic (saturating low 70's) and EMS called. While awaiting EMS, floor nurse on the unit suctioned the patient, and managed to bring up a very large mucus plug, with pt's saturation immediately improving afterwards to 80's. Nonetheless pt was sent to [facility] ED. Prior to all this pt was independent and ambulatory. Pt denies sob, cp, abd pain, n/v/d, fever or chills.     On arrival to ED pt was saturating 97%, 15L O2 via tracheostomy collar (baseline 8 L at [facility]) VS:    /59  T 99.9F RR 20  Admission VBG pH 7.37 pCO2 60 pO2 49. Pt not wheezing, not coughing. CXR suspicious for L-sided PNA, inconclusive. CTA neg for PE. Pt received x1 IV Levoquin and Cefepime in ED, admitted to medicine for acute hypoxic hypercapnic respiratory failure secondary to acute mucus plug (now resolved vs superimposed pneumonia  hospital-acquired.(less likely), Records request sent to [facility] medical records  [phone]) and patient will be continued on empiric antibiotics pending procal (27 May 2022 14:02)      PAST MEDICAL & SURGICAL HISTORY  COPD (chronic obstructive pulmonary disease)    CHF (congestive heart failure)    DM (diabetes mellitus)    H/O tracheostomy      ALLERGIES:  penicillin (Swelling)    MEDICATIONS:  HOME MEDICATIONS  albuterol sulfate 2.5 mg/3 mL (0.083 %) solution for nebulization:   ALPRAZolam 0.5 mg oral tablet: 1 tab(s) orally every 12 hours, As needed, anxiety via PEG tube  apixaban 5 mg oral tablet: 1 tab(s) orally 2 times a day via PEG tube  atorvastatin 20 mg oral tablet: 1 tab(s) orally once a day (at bedtime) via PEG tube  gabapentin 600 mg oral tablet: 1 tab(s) orally 3 times a day via PEG tube  HYDROmorphone 4 mg oral tablet: 1 tab(s) orally every 8 hours, As needed, for severe pain  insulin glargine 100 units/mL subcutaneous solution: 20 unit(s) subcutaneous once a day (at bedtime)  insulin lispro 100 units/mL injectable solution: 9 unit(s) injectable 3 times a day (before meals)  losartan 100 mg oral tablet: 1 tab(s) orally once a day via PEG tube  Multiple Vitamins oral tablet: 1 tab(s) orally once a day via PEG tube  oxycodone-acetaminophen 5 mg-325 mg oral tablet: 2 tab(s) orally every 6 hours, As needed, Moderate Pain (4 - 6)  pantoprazole 40 mg oral delayed release tablet: 1 tab(s) orally once a day (before a meal) via PEG tube  polyethylene glycol 3350 oral powder for reconstitution: 17 gram(s) orally once a day via PEG tube  senna leaf extract oral tablet: 2 tab(s) orally once a day (at bedtime) via PEG tube  Trelegy Ellipta 100 mcg-62.5 mcg-25 mcg powder for inhalation:     STANDING MEDICATIONS  acetaminophen     Tablet .. 650 milliGRAM(s) Oral every 8 hours  ammonium lactate 12% Lotion 1 Application(s) Topical every 12 hours  apixaban 5 milliGRAM(s) Oral two times a day  atorvastatin 20 milliGRAM(s) Oral at bedtime  betamethasone valerate 0.1% Cream 1 Application(s) Topical two times a day  dextrose 5%. 1000 milliLiter(s) IV Continuous <Continuous>  dextrose 5%. 1000 milliLiter(s) IV Continuous <Continuous>  dextrose 50% Injectable 25 Gram(s) IV Push once  dextrose 50% Injectable 12.5 Gram(s) IV Push once  dextrose 50% Injectable 25 Gram(s) IV Push once  diatrizoate meglumine/diatrizoate sodium. 30 milliLiter(s) Oral once  gabapentin 600 milliGRAM(s) Oral three times a day  glucagon  Injectable 1 milliGRAM(s) IntraMuscular once  influenza   Vaccine 0.5 milliLiter(s) IntraMuscular once  insulin glargine Injectable (LANTUS) 20 Unit(s) SubCutaneous at bedtime  insulin lispro (ADMELOG) corrective regimen sliding scale   SubCutaneous three times a day before meals  insulin lispro Injectable (ADMELOG) 9 Unit(s) SubCutaneous every 6 hours  multivitamin 1 Tablet(s) Oral daily  nystatin Powder 1 Application(s) Topical two times a day  pantoprazole    Tablet 40 milliGRAM(s) Oral before breakfast  polyethylene glycol 3350 17 Gram(s) Oral daily  senna 2 Tablet(s) Oral at bedtime    PRN MEDICATIONS  albuterol/ipratropium for Nebulization 3 milliLiter(s) Nebulizer every 6 hours PRN  ALPRAZolam 1 milliGRAM(s) Oral two times a day PRN  dextrose Oral Gel 15 Gram(s) Oral once PRN  HYDROmorphone  Injectable 1 milliGRAM(s) IV Push every 4 hours PRN  oxycodone    5 mG/acetaminophen 325 mG 2 Tablet(s) Oral/Enteral Tube every 6 hours PRN    VITALS:   Vital Signs Last 24 Hrs  T(C): 36.7 (11 Jul 2022 05:00), Max: 36.7 (11 Jul 2022 05:00)  T(F): 98.1 (11 Jul 2022 05:00), Max: 98.1 (11 Jul 2022 05:00)  HR: 88 (11 Jul 2022 05:00) (88 - 105)  BP: 138/65 (11 Jul 2022 05:00) (123/57 - 138/65)  BP(mean): --  RR: 18 (11 Jul 2022 05:00) (18 - 18)  SpO2: 97% (11 Jul 2022 07:30) (96% - 98%)    Parameters below as of 11 Jul 2022 07:30  Patient On (Oxygen Delivery Method): T-piece    O2 Concentration (%): 40    LABS:    LABS:        I&O's Detail    09 Jul 2022 07:01  -  10 Jul 2022 07:00  --------------------------------------------------------  IN:    Enteral Tube Flush: 120 mL    Glucerna: 600 mL  Total IN: 720 mL    OUT:  Total OUT: 0 mL    Total NET: 720 mL                    RADIOLOGY:  EKG  12 Lead ECG:   Ventricular Rate 92 BPM    Atrial Rate 92 BPM    P-R Interval 168 ms    QRS Duration 98 ms    Q-T Interval 370 ms    QTC Calculation(Bazett) 457 ms    P Axis 84 degrees    R Axis 103 degrees    T Axis 89 degrees    Diagnosis Line Sinus rhythm with Premature supraventricular complexes  Rightward axis  Low voltage QRS  Incomplete right bundle branch block  Cannot rule out Anterior infarct , age undetermined  T wave abnormality, consider lateral ischemia  Abnormal ECG    Confirmed by Joe Person ([id number]) on 6/30/2022 7:36:49 AM (06-30-22 @ 04:43)  12 Lead ECG:   Ventricular Rate 89 BPM    Atrial Rate 89 BPM    P-R Interval 162 ms    QRS Duration 98 ms    Q-T Interval 372 ms    QTC Calculation(Bazett) 452 ms    P Axis 90 degrees    R Axis 102 degrees    T Axis 90 degrees    Diagnosis Line Normal sinus rhythm  Rightward axis  Incomplete right bundle branch block  Anterior infarct , age undetermined  ST & T wave abnormality, consider lateral ischemia  Abnormal ECG    Confirmed by Joe Person ([id number]) on 6/30/2022 7:36:41 AM (06-30-22 @ 04:42)      Physical Exam:  General: no acute distress, lying in bed  Head: normocephalic and atraumatic  Neck: supple, trach clean and intact  Heart: regular rate and rhythm, S1 and S2 normal, no murmurs, rubs or gallops noted on exam  Lungs: Symmetric chest expansion bilaterally, clear lungs bilaterally, no wheezes rhonchi or crackles heard  Abdomen: Bowel sounds present, non tender on light and deep palpation  Extremities: No edema, no clubbing or cyanosis notes, positive peripheral pulses, brown discoloration of bilateral lower extremities- unchanged from previous exams

## 2022-07-11 NOTE — PROGRESS NOTE ADULT - ASSESSMENT
57 yo female with PMHx of DVT on Eliquis, COPD,  trach collar, obesity, IDDM, UTI, sent by [facility] for hypoxia. Current illness going back to April 4th when pt was intubated on admission at [facility] ICU for hypoxic respiratory failure diagnosed with copd exacerbation and superimposed pneumonia, and remained intubated for 37 days requiring tracheostomy. Patient stayed at [facility] for 2 days until noted to be hypoxic (saturating low 70's) pt's saturation immediately improving afterwards to 80's after large mucus plug removed and was subsequently sent to [facility]  On arrival to ED pt was saturating 97%, 15L O2 via tracheostomy collar (baseline 8 L at [facility])CXR suspicious for L-sided PNA, inconclusive. CTA neg for PE. Pt received x1 IV Levaquin and Cefepime in ED, admitted to MICU for acute hypoxic hypercapnic respiratory failure secondary to acute mucus plug now resolved vs superimposed pneumonia hospital-acquired.   General Surgery Consulted emergently when patient found to be hypoxic to low 80s after CCU and Pulmonary team found large granuloma in trachea along tracheostomy tube. Patient currently had a size 8 trach. Surgery assisted Pulmonary and CCU with trach exchanged with ET tube guidance to a size 7 XLT. Saturations improved to high 90s. Large granuloma was removed. Patient was no longer in distress after exchange.    #sepsis  -6/28 Patient was noted to be tachy O/N, hypotensive, febrile to 102.8, FiO2 requirements were increased and the patient was desaturating to 82-86%  -blood cultures 6/29 positive for gram negative rods, serratia bacteremia  -on Ceftriaxone  -ID following  -F/u UA, trach aspirate and blood cultures  -6/30 WBC 11.21 (6/29 was 13.92 and 6/28 was 19.03)  - 6/30 blood culture NGTD, 6/29 urine culture NGTD  -7/1 ID rec CT a/p- demonstrated cecitis, no abscess   -7/5 will f/u with ID regarding antibiotics regimen  -7/8 off antibiotics  - f/u PM labs    #Hypercarbic respiratory failure  -Maintaining sats 88%/decrease O2 titration  -f/u on pulm recs   - 40% FIO2   -continue daily wean off trials as tolerated      #Acute/ chronic COPD with exacerbation (Resolved)  #mucous plug removed  #pneumonia vs atelectasis L base  - On 5/28, Pulmonary team found large granuloma in trachea along tracheostomy tube. Patient had a size 8 trach on admission. Surgery assisted Pulmonary and CCU with trach exchanged with ET tube guidance to a size 7 XLT. Saturations improved to high 90s.  - trach functioning well s/p exchange   - 6/11 Trach changed to Fenestrated 8 by surgery    #s/p PEG (6/15):   KUB showing free intraperitoneal air (6/19).  CT Abdo confirmed that this was just due to PEG tube adjustment.   Repeat G tube study per Surgery (6/21) -normal, restarted on peg feedings      #Dysphagia:  Strict NPO as of now per 's FEES eval from 6/13/22.   [facility] will sign out to  at [facility] for further plans with swallow rehab at [facility]  -7/6 fees study  -7/7 barium swallow   -7/8 failed barium swallow, continue peg feeds  - F/u S+S eval     #LE neuropathic pain:  PAtient has apparently been bed bound since April 4th (her [facility] admission for hypoxic failure where she ended up being intubated)  Apparently has L > R foot drop which may be due to chronic contractures of Calf muscle/Achilles tendon. Can have PT evaluate her for that.  But options are limited at this stage.   Given the asymmetrical foot drop, Neurology wants to rule out Any focal neuropathy.  -6/23 s/p  MRI Lumbar spine w/ & w/o Contrast, mild degenerative changes  -chronic DJD no acute changes.  -continue pain management: currently on 1mg Dilaudid po q6 prn  -7/5 PT working with patient    #B/l LE Hyperpigmentation changes:   unclear etiology  Present since 1 year.   May be related to CHF related stasis dermatitis / hemosiderin deposition  vs deposition diseases versus Eosinophilic Dermatitis vs follicular hemorrhage from eliquis   Dermatology thinks it may be Eczema and advised Triamcinolone BId.   Add multivitamin to PEG QD.   -7/1 Betamethasone cream and Ammonium lactate dressings per Burn, patient's legs feel mildly better  -7/5 bilateral lower extremities pain mildly improved    -On Senna    #Suspected epiglottis edema:   s/p DExa Taper (ended 6/18).   Outpatient ENT f/u.     #DM:   steroids ended 6/18.  6/19- Increased lantus from 17 to 20.    #Chronic Indwelling Singh  - 5/28 Ucx - Contaminated   - 5/28 Ucx- Normal perri   - 5/27 Ucx- Klebsiella (CRE) and pseudomonas   - s/p cefepime  - Currently afebrile w/ no urinary complaints   - episode of Hematouria 6/4 -> improving -> h/h Stable    #Hx of DVT  - on home Eliquis  - restarted Eliquis    GI PPX: PPI  DVT PPX: ELIQUIS  Dispo: Acute  Pending: d/c planning to [facility]

## 2022-07-12 LAB
GLUCOSE BLDC GLUCOMTR-MCNC: 101 MG/DL — HIGH (ref 70–99)
GLUCOSE BLDC GLUCOMTR-MCNC: 106 MG/DL — HIGH (ref 70–99)
GLUCOSE BLDC GLUCOMTR-MCNC: 115 MG/DL — HIGH (ref 70–99)
GLUCOSE BLDC GLUCOMTR-MCNC: 126 MG/DL — HIGH (ref 70–99)
GLUCOSE BLDC GLUCOMTR-MCNC: 160 MG/DL — HIGH (ref 70–99)
GLUCOSE BLDC GLUCOMTR-MCNC: 88 MG/DL — SIGNIFICANT CHANGE UP (ref 70–99)
GLUCOSE BLDC GLUCOMTR-MCNC: 97 MG/DL — SIGNIFICANT CHANGE UP (ref 70–99)

## 2022-07-12 PROCEDURE — 99232 SBSQ HOSP IP/OBS MODERATE 35: CPT

## 2022-07-12 RX ORDER — HYDROMORPHONE HYDROCHLORIDE 2 MG/ML
1 INJECTION INTRAMUSCULAR; INTRAVENOUS; SUBCUTANEOUS EVERY 4 HOURS
Refills: 0 | Status: DISCONTINUED | OUTPATIENT
Start: 2022-07-12 | End: 2022-07-14

## 2022-07-12 RX ORDER — HYDROMORPHONE HYDROCHLORIDE 2 MG/ML
0.5 INJECTION INTRAMUSCULAR; INTRAVENOUS; SUBCUTANEOUS ONCE
Refills: 0 | Status: DISCONTINUED | OUTPATIENT
Start: 2022-07-12 | End: 2022-07-12

## 2022-07-12 RX ORDER — PANTOPRAZOLE SODIUM 20 MG/1
40 TABLET, DELAYED RELEASE ORAL DAILY
Refills: 0 | Status: DISCONTINUED | OUTPATIENT
Start: 2022-07-12 | End: 2022-07-22

## 2022-07-12 RX ADMIN — OXYCODONE AND ACETAMINOPHEN 2 TABLET(S): 5; 325 TABLET ORAL at 11:16

## 2022-07-12 RX ADMIN — OXYCODONE AND ACETAMINOPHEN 2 TABLET(S): 5; 325 TABLET ORAL at 20:10

## 2022-07-12 RX ADMIN — Medication 1 APPLICATION(S): at 06:35

## 2022-07-12 RX ADMIN — Medication 1 APPLICATION(S): at 17:24

## 2022-07-12 RX ADMIN — Medication 1 APPLICATION(S): at 06:34

## 2022-07-12 RX ADMIN — POLYETHYLENE GLYCOL 3350 17 GRAM(S): 17 POWDER, FOR SOLUTION ORAL at 13:00

## 2022-07-12 RX ADMIN — SENNA PLUS 2 TABLET(S): 8.6 TABLET ORAL at 21:49

## 2022-07-12 RX ADMIN — APIXABAN 5 MILLIGRAM(S): 2.5 TABLET, FILM COATED ORAL at 17:22

## 2022-07-12 RX ADMIN — GABAPENTIN 600 MILLIGRAM(S): 400 CAPSULE ORAL at 05:07

## 2022-07-12 RX ADMIN — Medication 9 UNIT(S): at 06:18

## 2022-07-12 RX ADMIN — Medication 650 MILLIGRAM(S): at 05:06

## 2022-07-12 RX ADMIN — Medication 650 MILLIGRAM(S): at 05:40

## 2022-07-12 RX ADMIN — Medication 1 APPLICATION(S): at 17:23

## 2022-07-12 RX ADMIN — HYDROMORPHONE HYDROCHLORIDE 1 MILLIGRAM(S): 2 INJECTION INTRAMUSCULAR; INTRAVENOUS; SUBCUTANEOUS at 21:48

## 2022-07-12 RX ADMIN — Medication 9 UNIT(S): at 17:20

## 2022-07-12 RX ADMIN — PANTOPRAZOLE SODIUM 40 MILLIGRAM(S): 20 TABLET, DELAYED RELEASE ORAL at 13:01

## 2022-07-12 RX ADMIN — GABAPENTIN 600 MILLIGRAM(S): 400 CAPSULE ORAL at 13:01

## 2022-07-12 RX ADMIN — ATORVASTATIN CALCIUM 20 MILLIGRAM(S): 80 TABLET, FILM COATED ORAL at 21:49

## 2022-07-12 RX ADMIN — OXYCODONE AND ACETAMINOPHEN 2 TABLET(S): 5; 325 TABLET ORAL at 12:00

## 2022-07-12 RX ADMIN — Medication 9 UNIT(S): at 12:24

## 2022-07-12 RX ADMIN — HYDROMORPHONE HYDROCHLORIDE 1 MILLIGRAM(S): 2 INJECTION INTRAMUSCULAR; INTRAVENOUS; SUBCUTANEOUS at 17:47

## 2022-07-12 RX ADMIN — HYDROMORPHONE HYDROCHLORIDE 1 MILLIGRAM(S): 2 INJECTION INTRAMUSCULAR; INTRAVENOUS; SUBCUTANEOUS at 10:00

## 2022-07-12 RX ADMIN — GABAPENTIN 600 MILLIGRAM(S): 400 CAPSULE ORAL at 21:49

## 2022-07-12 RX ADMIN — OXYCODONE AND ACETAMINOPHEN 2 TABLET(S): 5; 325 TABLET ORAL at 19:40

## 2022-07-12 RX ADMIN — Medication 9 UNIT(S): at 00:21

## 2022-07-12 RX ADMIN — Medication 1 TABLET(S): at 13:01

## 2022-07-12 RX ADMIN — HYDROMORPHONE HYDROCHLORIDE 1 MILLIGRAM(S): 2 INJECTION INTRAMUSCULAR; INTRAVENOUS; SUBCUTANEOUS at 09:42

## 2022-07-12 RX ADMIN — OXYCODONE AND ACETAMINOPHEN 2 TABLET(S): 5; 325 TABLET ORAL at 06:45

## 2022-07-12 RX ADMIN — Medication 1 MILLIGRAM(S): at 21:49

## 2022-07-12 RX ADMIN — Medication 2: at 08:00

## 2022-07-12 RX ADMIN — HYDROMORPHONE HYDROCHLORIDE 1 MILLIGRAM(S): 2 INJECTION INTRAMUSCULAR; INTRAVENOUS; SUBCUTANEOUS at 13:48

## 2022-07-12 RX ADMIN — HYDROMORPHONE HYDROCHLORIDE 0.5 MILLIGRAM(S): 2 INJECTION INTRAMUSCULAR; INTRAVENOUS; SUBCUTANEOUS at 08:00

## 2022-07-12 RX ADMIN — APIXABAN 5 MILLIGRAM(S): 2.5 TABLET, FILM COATED ORAL at 05:06

## 2022-07-12 RX ADMIN — OXYCODONE AND ACETAMINOPHEN 2 TABLET(S): 5; 325 TABLET ORAL at 06:12

## 2022-07-12 RX ADMIN — NYSTATIN CREAM 1 APPLICATION(S): 100000 CREAM TOPICAL at 17:24

## 2022-07-12 RX ADMIN — HYDROMORPHONE HYDROCHLORIDE 1 MILLIGRAM(S): 2 INJECTION INTRAMUSCULAR; INTRAVENOUS; SUBCUTANEOUS at 14:10

## 2022-07-12 RX ADMIN — HYDROMORPHONE HYDROCHLORIDE 1 MILLIGRAM(S): 2 INJECTION INTRAMUSCULAR; INTRAVENOUS; SUBCUTANEOUS at 22:18

## 2022-07-12 NOTE — PROGRESS NOTE ADULT - SUBJECTIVE AND OBJECTIVE BOX
TIM CRUZ  [facility] A ([facility] T2-3[facility])      Patient was evaluated and examined  by bedside, no active complains       REVIEW OF SYSTEMS:  please see pertinent positives mentioned above, all other 12 ROS negative      T(C): , Max: 37.3 (07-12-22 @ 05:17)  HR: 95 (07-12-22 @ 05:17)  BP: 142/58 (07-12-22 @ 05:17)  RR: 18 (07-12-22 @ 05:17)  SpO2: 98% (07-12-22 @ 05:19)  CAPILLARY BLOOD GLUCOSE      POCT Blood Glucose.: 97 mg/dL (12 Jul 2022 12:12)  POCT Blood Glucose.: 160 mg/dL (12 Jul 2022 07:44)  POCT Blood Glucose.: 101 mg/dL (12 Jul 2022 05:55)  POCT Blood Glucose.: 115 mg/dL (12 Jul 2022 00:15)  POCT Blood Glucose.: 133 mg/dL (11 Jul 2022 22:25)  POCT Blood Glucose.: 111 mg/dL (11 Jul 2022 16:46)      PHYSICAL EXAM:  GENERAL: NAD, AAOX3, patient is laying comfortably in bed  HEENT: AT, NC, PERRLA, SUPPLE, NO JVD, NO CB, trach present  LUNG: mild decreased breath sounds  B/L  CVS: normal S1, S2, RRR, NO M/G/R  ABDOMEN: soft, bowel sounds present, normoactive in all 4 quadrants, non-tender, non-distended, peg present  EXT: no E/C/C, positive PP b/l extremities  NEURO: unable to ambulate, b/l lower ext. motor weakness, hypersensitivity on lower ext.  SKIN: no rash, no ecchymosis, chronic atrophic brown skin changes         LAB  CBC  Date: 07-11-22 @ 18:08  Mean cell Vvnqcjjclo02.1  Mean cell Hemoglobin Conc33.7  Mean cell Volum 98.3  Platelet count-Automate 178  RBC Count 2.87  Red Cell Distrib Width13.7  WBC Count9.30  % Albumin, Urine--  Hematocrit 28.2  Hemoglobin 9.5  CBC  Date: 07-08-22 @ 08:44  Mean cell Cyrrgtrzzp40.6  Mean cell Hemoglobin Conc33.7  Mean cell Volum 99.6  Platelet count-Automate 208  RBC Count 2.74  Red Cell Distrib Width14.0  WBC Count6.75  % Albumin, Urine--  Hematocrit 27.3  Hemoglobin 9.2  CBC  Date: 07-07-22 @ 06:13  Mean cell Gjmbmdvbcm97.9  Mean cell Hemoglobin Conc33.5  Mean cell Volum 98.2  Platelet count-Automate 224  RBC Count 2.77  Red Cell Distrib Width13.7  WBC Count6.44  % Albumin, Urine--  Hematocrit 27.2  Hemoglobin 9.1  CBC  Date: 07-06-22 @ 08:25  Mean cell Ezmcgexagb06.7  Mean cell Hemoglobin Conc33.2  Mean cell Volum 98.6  Platelet count-Automate 246  RBC Count 2.78  Red Cell Distrib Width13.9  WBC Count7.82  % Albumin, Urine--  Hematocrit 27.4  Hemoglobin 9.1    BMP  07-11-22 @ 18:08  Blood Gas Arterial-Calcium,Ionized--  Blood Urea Nitrogen, Serum 12 mg/dL [10 - 20]  Carbon Dioxide, Serum34 mmol/L<H> [17 - 32]  Chloride, Serum95 mmol/L<L> [98 - 110]  Creatinie, Serum<0.5 mg/dL<L> [0.7 - 1.5]  Glucose, Yolmj021 mg/dL<H> [70 - 99]  Potassium, Serum4.4 mmol/L [3.5 - 5.0]  Sodium, Serum 135 mmol/L [135 - 146]  BMP  07-08-22 @ 08:44  Blood Gas Arterial-Calcium,Ionized--  Blood Urea Nitrogen, Serum 14 mg/dL [10 - 20]  Carbon Dioxide, Serum34 mmol/L<H> [17 - 32]  Chloride, Serum95 mmol/L<L> [98 - 110]  Creatinie, Serum<0.5 mg/dL<L> [0.7 - 1.5]  Glucose, Serum92 mg/dL [70 - 99]  Potassium, Serum4.2 mmol/L [3.5 - 5.0]  Sodium, Serum 136 mmol/L [135 - 146]              Microbiology:    Culture - Blood (collected 06-30-22 @ 06:20)  Source: .Blood None  Final Report (07-05-22 @ 14:00):    No Growth Final    Culture - Urine (collected 06-29-22 @ 05:55)  Source: Clean Catch Clean Catch (Midstream)  Final Report (06-30-22 @ 21:38):    <10,000 CFU/mL Normal Urogenital Chante    Culture - Sputum (collected 06-28-22 @ 10:20)  Source: Trach Asp Tracheal Aspirate  Gram Stain (06-28-22 @ 23:39):    Numerous polymorphonuclear leukocytes per low power field    Few Squamous epithelial cells per low power field    Few Gram positive cocci in pairs per oil power field    Moderate Gram Negative Coccobacilli per oil power field  Final Report (06-30-22 @ 17:34):    Rare Pseudomonas aeruginosa (Carbapenem Resistant)    Normal Respiratory Chante present  Organism: Pseudomonas aeruginosa (Carbapenem Resistant) (06-30-22 @ 17:34)  Organism: Pseudomonas aeruginosa (Carbapenem Resistant) (06-30-22 @ 17:34)      -  Amikacin: S <=16      -  Aztreonam: R >16      -  Cefepime: S 4      -  Ceftazidime: S 4      -  Ceftazidime/Avibactam: S <=4      -  Ceftolozane/tazobactam: S <=2      -  Ciprofloxacin: R >2      -  Gentamicin: R >8      -  Imipenem: R >8      -  Levofloxacin: R >4      -  Meropenem: R 8      -  Piperacillin/Tazobactam: S <=8      -  Tobramycin: R >8      Method Type: ROMY    Culture - Blood (collected 06-28-22 @ 06:02)  Source: .Blood Blood-Peripheral  Gram Stain (06-29-22 @ 01:14):    Growth in aerobic bottle: Gram Negative Rods    Growth in anaerobic bottle: Gram Negative Rods  Final Report (06-30-22 @ 13:56):    Growth in aerobic and anaerobic bottles: Serratia marcescens    ***Blood Panel PCR results on this specimen are available    approximately 3 hours after the Gram stain result.***    Gram stain, PCR, and/or culture results may not always    correspond due to difference in methodologies.    ************************************************************    This PCR assay was performed by multiplex PCR. This    Assay tests for 66 bacterial and resistance gene targets.    Please refer to the [facility] test directory    at [email or web address] for details.  Organism: Blood Culture PCR  Serratia marcescens (06-30-22 @ 13:56)  Organism: Serratia marcescens (06-30-22 @ 13:56)      -  Amikacin: S <=16      -  Ampicillin: R >16 These ampicillin results predict results for amoxicillin      -  Ampicillin/Sulbactam: R >16/8 Enterobacter, Klebsiella aerogenes, Citrobacter, and Serratia may develop resistance during prolonged therapy (3-4 days)      -  Aztreonam: S <=4      -  Cefazolin: R >16 Enterobacter, Klebsiella aerogenes, Citrobacter, and Serratia may develop resistance during prolonged therapy (3-4 days)      -  Cefepime: S <=2      -  Cefoxitin: R 16      -  Ceftriaxone: S <=1 Enterobacter, Klebsiella aerogenes, Citrobacter, and Serratia may develop resistance during prolonged therapy      -  Ciprofloxacin: S <=0.25      -  Ertapenem: S <=0.5      -  Gentamicin: S <=2      -  Levofloxacin: S <=0.5      -  Meropenem: S <=1      -  Piperacillin/Tazobactam: S <=8      -  Tobramycin: S <=2      -  Trimethoprim/Sulfamethoxazole: S <=0.5/9.5      Method Type: ROMY  Organism: Blood Culture PCR (06-30-22 @ 13:56)      -  Serratia marcescens: Detec      Method Type: PCR    Culture - Urine (collected 06-21-22 @ 00:40)  Source: Catheterized Catheterized  Final Report (06-22-22 @ 07:05):    <10,000 CFU/mL Normal Urogenital Chante        Medications:  albuterol/ipratropium for Nebulization 3 milliLiter(s) Nebulizer every 6 hours PRN  ALPRAZolam 1 milliGRAM(s) Oral two times a day PRN  ammonium lactate 12% Lotion 1 Application(s) Topical every 12 hours  apixaban 5 milliGRAM(s) Enteral Tube two times a day  atorvastatin 20 milliGRAM(s) Oral at bedtime  betamethasone valerate 0.1% Cream 1 Application(s) Topical two times a day  dextrose 5%. 1000 milliLiter(s) IV Continuous <Continuous>  dextrose 5%. 1000 milliLiter(s) IV Continuous <Continuous>  dextrose 50% Injectable 25 Gram(s) IV Push once  dextrose 50% Injectable 12.5 Gram(s) IV Push once  dextrose 50% Injectable 25 Gram(s) IV Push once  dextrose Oral Gel 15 Gram(s) Oral once PRN  diatrizoate meglumine/diatrizoate sodium. 30 milliLiter(s) Oral once  gabapentin 600 milliGRAM(s) Oral three times a day  glucagon  Injectable 1 milliGRAM(s) IntraMuscular once  HYDROmorphone  Injectable 1 milliGRAM(s) IV Push every 4 hours PRN  influenza   Vaccine 0.5 milliLiter(s) IntraMuscular once  insulin glargine Injectable (LANTUS) 20 Unit(s) SubCutaneous at bedtime  insulin lispro (ADMELOG) corrective regimen sliding scale   SubCutaneous three times a day before meals  insulin lispro Injectable (ADMELOG) 9 Unit(s) SubCutaneous every 6 hours  multivitamin 1 Tablet(s) Oral daily  nystatin Powder 1 Application(s) Topical two times a day  oxycodone    5 mG/acetaminophen 325 mG 2 Tablet(s) Oral/Enteral Tube every 6 hours PRN  pantoprazole   Suspension 40 milliGRAM(s) Enteral Tube daily  polyethylene glycol 3350 17 Gram(s) Oral daily  senna 2 Tablet(s) Oral at bedtime        Assessment and Plan:  Patient is a 55 yo female  with PMHx of DVT on Eliquis, COPD,  trach collar, obesity, IDDM, UTI, [facility] hypoxia. Current illness going back to April 4th when pt was intubated on admission at [facility] ICU for hypoxic respiratory failure diagnosed with copd exacerbation and superimposed pneumonia, and remained intubated for 37 days requiring tracheostomy. Patient stayed at [facility] for 2 days until noted to be hypoxic (saturating low 70's) pt's saturation immediately improving afterwards to 80's after large mucus plug removed and was subsequently sent to [facility]  On arrival to ED pt was saturating 97%, 15L O2 via tracheostomy collar (baseline 8 L at [facility])CXR suspicious for L-sided PNA, inconclusive. CTA neg for PE. Pt received x1 IV Levaquin and Cefepime in ED, admitted to MICU for acute hypoxic hypercapnic respiratory failure secondary to acute mucus plug now resolved vs superimposed pneumonia hospital-acquired.   General Surgery Consulted emergently when patient found to be hypoxic to low 80s after CCU and Pulmonary team found large granuloma in trachea along tracheostomy tube. Patient currently had a size 8 trach. Surgery assisted Pulmonary and CCU with trach exchanged with ET tube guidance to a size 7 XLT. Saturations improved to high 90s. Large granuloma was removed. Patient was no longer in distress after exchange.    #VDRF: Vent management per Pulm.  s/p trach exchange/s/p mucus plugs removal this admission  Monitor oxygenation. Lately been saturating ok on T piece.  -7/10- patient remains on 40% FIO2 supplement trach to collar, continue daily wean off trials as tolerated by pt.    #sepsis (developed again on 6/27/22): resolved   -UA +; blood cultures (6/28)= Serratia. Sens. noted  -s/p Cefepime and Vanc (6/28) > changed to CTX 2gm QD per ID (6/29) till 7/7  - 6/30 blood culture NGTD, 6/29 urine culture NGTD  CT Abdo per ID: only showed some cecitis      #s/p PEG (6/15)  #Persistent Dysphagia:  ST planning repeat FEES on 7/6- poor visualization  - s/p barium swallow study on 7/7- still unable to take PO intake  -Patient will need close outpatient f/up at  SNF for further plans with swallow studies and tx.  -continue peg feedings with pre/post flushes   -as per speech tx- patient is allowed to have ice chips and sips of water po     #LE neuropathic pain:  Patient has apparently been bed bound since April 4th (her [facility] admission for hypoxic failure where she ended up being intubated)  Apparently has L > R foot drop which may be due to chronic contractures of Calf muscle/Achilles tendon.   Can have PT evaluate her for that. But options are limited at this stage.   Given the asymmetrical foot drop, Neurology ruled out Any focal neuropathy with a MRI Lumbar spine WNL.     #Chronic atropic B/l LE Hyperpigmentation changes: LWC with Betamethasone cream and Ammonium lactate dressings per Burn.       #Lower extremity Pain management*** :   Gabapentin to 600 TID,   Currently IV dilaudid 1 Q4 PRN - will transition to peg tx. and add percocet prn for breakthrough pain.        Note: On 6/21, we discontinued Cymbalta 60 QD since as per family patient may be having bizarre thoughts/hallucinations. Patient was texting them bizarre messages.   Note: Patient has h/o suicidal thoughts on Pregabalin.     Xanax 1 mg Q12 PRN for anxiety.   On Senna, Miralax.     #Suspected epiglottis edema:   s/p DExa Taper (ended 6/18).   Outpatient ENT f/u.     #DM:   steroids ended 6/18.  6/19- Increased lantus from 17 to 20.    #Hyponatremia- resolved     #Chronic Indwelling Singh  - repeat U. Cx (6/29) WNL    #Hx of DVT- restarted Eliquis    # Severe Physical deconditioning : patient unable to walk due to severe legs pain, on pain management, continue to encourage PT tx. daily as tolerated     GI PPX  PPI  DVT PPX  ELIQUIS      #Progress Note Handoff: Pending d/c to SNF once bed is available, waiting for insurance auth.  Family discussion: medical plan of care d/w pt. by bedside Disposition: SNF once bed is available    Total time spent to complete patient's bedside assessment, review medical chart, discuss medical plan of care with covering medical team was more than 25 minutes .

## 2022-07-13 LAB
GLUCOSE BLDC GLUCOMTR-MCNC: 107 MG/DL — HIGH (ref 70–99)
GLUCOSE BLDC GLUCOMTR-MCNC: 112 MG/DL — HIGH (ref 70–99)
GLUCOSE BLDC GLUCOMTR-MCNC: 123 MG/DL — HIGH (ref 70–99)
GLUCOSE BLDC GLUCOMTR-MCNC: 126 MG/DL — HIGH (ref 70–99)
GLUCOSE BLDC GLUCOMTR-MCNC: 131 MG/DL — HIGH (ref 70–99)

## 2022-07-13 PROCEDURE — 99232 SBSQ HOSP IP/OBS MODERATE 35: CPT

## 2022-07-13 RX ADMIN — OXYCODONE AND ACETAMINOPHEN 2 TABLET(S): 5; 325 TABLET ORAL at 13:18

## 2022-07-13 RX ADMIN — HYDROMORPHONE HYDROCHLORIDE 1 MILLIGRAM(S): 2 INJECTION INTRAMUSCULAR; INTRAVENOUS; SUBCUTANEOUS at 10:39

## 2022-07-13 RX ADMIN — Medication 1 APPLICATION(S): at 18:03

## 2022-07-13 RX ADMIN — OXYCODONE AND ACETAMINOPHEN 2 TABLET(S): 5; 325 TABLET ORAL at 21:08

## 2022-07-13 RX ADMIN — Medication 9 UNIT(S): at 05:18

## 2022-07-13 RX ADMIN — Medication 9 UNIT(S): at 18:04

## 2022-07-13 RX ADMIN — GABAPENTIN 600 MILLIGRAM(S): 400 CAPSULE ORAL at 13:19

## 2022-07-13 RX ADMIN — NYSTATIN CREAM 1 APPLICATION(S): 100000 CREAM TOPICAL at 18:05

## 2022-07-13 RX ADMIN — Medication 1 APPLICATION(S): at 05:16

## 2022-07-13 RX ADMIN — HYDROMORPHONE HYDROCHLORIDE 1 MILLIGRAM(S): 2 INJECTION INTRAMUSCULAR; INTRAVENOUS; SUBCUTANEOUS at 02:47

## 2022-07-13 RX ADMIN — POLYETHYLENE GLYCOL 3350 17 GRAM(S): 17 POWDER, FOR SOLUTION ORAL at 11:19

## 2022-07-13 RX ADMIN — HYDROMORPHONE HYDROCHLORIDE 1 MILLIGRAM(S): 2 INJECTION INTRAMUSCULAR; INTRAVENOUS; SUBCUTANEOUS at 06:18

## 2022-07-13 RX ADMIN — HYDROMORPHONE HYDROCHLORIDE 1 MILLIGRAM(S): 2 INJECTION INTRAMUSCULAR; INTRAVENOUS; SUBCUTANEOUS at 14:40

## 2022-07-13 RX ADMIN — HYDROMORPHONE HYDROCHLORIDE 1 MILLIGRAM(S): 2 INJECTION INTRAMUSCULAR; INTRAVENOUS; SUBCUTANEOUS at 23:31

## 2022-07-13 RX ADMIN — APIXABAN 5 MILLIGRAM(S): 2.5 TABLET, FILM COATED ORAL at 05:16

## 2022-07-13 RX ADMIN — ATORVASTATIN CALCIUM 20 MILLIGRAM(S): 80 TABLET, FILM COATED ORAL at 21:05

## 2022-07-13 RX ADMIN — SENNA PLUS 2 TABLET(S): 8.6 TABLET ORAL at 21:04

## 2022-07-13 RX ADMIN — Medication 9 UNIT(S): at 00:01

## 2022-07-13 RX ADMIN — HYDROMORPHONE HYDROCHLORIDE 1 MILLIGRAM(S): 2 INJECTION INTRAMUSCULAR; INTRAVENOUS; SUBCUTANEOUS at 19:16

## 2022-07-13 RX ADMIN — OXYCODONE AND ACETAMINOPHEN 2 TABLET(S): 5; 325 TABLET ORAL at 21:38

## 2022-07-13 RX ADMIN — HYDROMORPHONE HYDROCHLORIDE 1 MILLIGRAM(S): 2 INJECTION INTRAMUSCULAR; INTRAVENOUS; SUBCUTANEOUS at 19:01

## 2022-07-13 RX ADMIN — Medication 1 APPLICATION(S): at 05:17

## 2022-07-13 RX ADMIN — HYDROMORPHONE HYDROCHLORIDE 1 MILLIGRAM(S): 2 INJECTION INTRAMUSCULAR; INTRAVENOUS; SUBCUTANEOUS at 23:46

## 2022-07-13 RX ADMIN — PANTOPRAZOLE SODIUM 40 MILLIGRAM(S): 20 TABLET, DELAYED RELEASE ORAL at 11:19

## 2022-07-13 RX ADMIN — APIXABAN 5 MILLIGRAM(S): 2.5 TABLET, FILM COATED ORAL at 18:04

## 2022-07-13 RX ADMIN — GABAPENTIN 600 MILLIGRAM(S): 400 CAPSULE ORAL at 05:16

## 2022-07-13 RX ADMIN — Medication 1 APPLICATION(S): at 18:02

## 2022-07-13 RX ADMIN — Medication 1 TABLET(S): at 11:19

## 2022-07-13 RX ADMIN — NYSTATIN CREAM 1 APPLICATION(S): 100000 CREAM TOPICAL at 05:26

## 2022-07-13 RX ADMIN — OXYCODONE AND ACETAMINOPHEN 2 TABLET(S): 5; 325 TABLET ORAL at 13:48

## 2022-07-13 RX ADMIN — GABAPENTIN 600 MILLIGRAM(S): 400 CAPSULE ORAL at 21:05

## 2022-07-13 RX ADMIN — INSULIN GLARGINE 20 UNIT(S): 100 INJECTION, SOLUTION SUBCUTANEOUS at 21:17

## 2022-07-13 RX ADMIN — Medication 1 MILLIGRAM(S): at 20:43

## 2022-07-13 RX ADMIN — OXYCODONE AND ACETAMINOPHEN 2 TABLET(S): 5; 325 TABLET ORAL at 05:16

## 2022-07-13 RX ADMIN — Medication 9 UNIT(S): at 23:25

## 2022-07-13 RX ADMIN — HYDROMORPHONE HYDROCHLORIDE 1 MILLIGRAM(S): 2 INJECTION INTRAMUSCULAR; INTRAVENOUS; SUBCUTANEOUS at 10:54

## 2022-07-13 RX ADMIN — HYDROMORPHONE HYDROCHLORIDE 1 MILLIGRAM(S): 2 INJECTION INTRAMUSCULAR; INTRAVENOUS; SUBCUTANEOUS at 14:55

## 2022-07-13 RX ADMIN — Medication 9 UNIT(S): at 12:03

## 2022-07-13 RX ADMIN — HYDROMORPHONE HYDROCHLORIDE 1 MILLIGRAM(S): 2 INJECTION INTRAMUSCULAR; INTRAVENOUS; SUBCUTANEOUS at 06:48

## 2022-07-13 RX ADMIN — OXYCODONE AND ACETAMINOPHEN 2 TABLET(S): 5; 325 TABLET ORAL at 05:46

## 2022-07-13 RX ADMIN — HYDROMORPHONE HYDROCHLORIDE 1 MILLIGRAM(S): 2 INJECTION INTRAMUSCULAR; INTRAVENOUS; SUBCUTANEOUS at 02:17

## 2022-07-13 NOTE — PROGRESS NOTE ADULT - ATTENDING COMMENTS
Patient is a 55 yo female  with PMHx of DVT on Eliquis, COPD,  trach collar, obesity, IDDM, UTI, [facility]. Current illness going back to April 4th when pt was intubated on admission at [facility] ICU for hypoxic respiratory failure diagnosed with copd exacerbation and superimposed pneumonia, and remained intubated for 37 days requiring tracheostomy. Patient stayed at [facility] for 2 days until noted to be hypoxic (saturating low 70's) pt's saturation immediately improving afterwards to 80's after large mucus plug removed and was subsequently sent to [facility]  On arrival to ED pt was saturating 97%, 15L O2 via tracheostomy collar (baseline 8 L at [facility])CXR suspicious for L-sided PNA, inconclusive. CTA neg for PE. Pt received x1 IV Levaquin and Cefepime in ED, admitted to MICU for acute hypoxic hypercapnic respiratory failure secondary to acute mucus plug now resolved vs superimposed pneumonia hospital-acquired.   General Surgery Consulted emergently when patient found to be hypoxic to low 80s after CCU and Pulmonary team found large granuloma in trachea along tracheostomy tube. Patient currently had a size 8 trach. Surgery assisted Pulmonary and CCU with trach exchanged with ET tube guidance to a size 7 XLT. Saturations improved to high 90s. Large granuloma was removed. Patient was no longer in distress after exchange.    #VDRF: Vent management per Pulm.  s/p trach exchange/s/p mucus plugs removal this admission  Monitor oxygenation. Lately been saturating ok on T piece.  -7/10- patient remains on 40% FIO2 supplement trach to collar, continue daily wean off trials as tolerated by pt.    #sepsis (developed again on 6/27/22): resolved   -UA +; blood cultures (6/28)= Serratia. Sens. noted  -s/p Cefepime and Vanc (6/28) > changed to CTX 2gm QD per ID (6/29) till 7/7  - 6/30 blood culture NGTD, 6/29 urine culture NGTD  CT Abdo per ID: only showed some cecitis      #s/p PEG (6/15)  #Persistent Dysphagia:  ST planning repeat FEES on 7/6- poor visualization  - s/p barium swallow study on 7/7- still unable to take PO intake  -Patient will need close outpatient f/up at  SNF for further plans with swallow studies and tx.  -continue peg feedings with pre/post flushes   -as per speech tx- patient is allowed to have ice chips and sips of water po  -daily speech tx.      #LE neuropathic pain:  Patient has apparently been bed bound since April 4th (her [facility] admission for hypoxic failure where she ended up being intubated)  Apparently has L > R foot drop which may be due to chronic contractures of Calf muscle/Achilles tendon.   Can have PT evaluate her for that. But options are limited at this stage.   Given the asymmetrical foot drop, Neurology ruled out Any focal neuropathy with a MRI Lumbar spine WNL.     #Chronic atropic B/l LE Hyperpigmentation changes: LWC with Betamethasone cream and Ammonium lactate dressings per Burn.       #Lower extremity Pain management*** :   Gabapentin to 600 TID,   Currently IV dilaudid 1 Q4 PRN - will transition to peg tx. and add percocet prn for breakthrough pain.        Note: On 6/21, we discontinued Cymbalta 60 QD since as per family patient may be having bizarre thoughts/hallucinations. Patient was texting them bizarre messages.   Note: Patient has h/o suicidal thoughts on Pregabalin.     Xanax 1 mg Q12 PRN for anxiety.   On Senna, Miralax.     #Suspected epiglottis edema:   s/p DExa Taper (ended 6/18).   Outpatient ENT f/u.     #DM:   steroids ended 6/18.  6/19-  lantus  20.    #Hyponatremia- resolved     #Chronic Indwelling Singh  - repeat U. Cx (6/29) WNL    #Hx of DVT- restarted Eliquis    # Severe Physical deconditioning : patient unable to walk due to severe legs pain, on pain management, continue to encourage PT tx. daily as tolerated     GI PPX  PPI  DVT PPX  ELIQUIS      #Progress Note Handoff: Pending d/c to SNF once bed is available, waiting for insurance approval  Family discussion: medical plan of care d/w pt. by bedside Disposition: SNF once bed is available    Total time spent to complete patient's bedside assessment, review medical chart, discuss medical plan of care with covering medical team was more than 25 minutes .

## 2022-07-13 NOTE — PROGRESS NOTE ADULT - ASSESSMENT
57 yo female with PMHx of DVT on Eliquis, COPD,  trach collar, obesity, IDDM, UTI, sent by [facility] for hypoxia. Current illness going back to April 4th when pt was intubated on admission at [facility] ICU for hypoxic respiratory failure diagnosed with copd exacerbation and superimposed pneumonia, and remained intubated for 37 days requiring tracheostomy. Patient stayed at [facility] for 2 days until noted to be hypoxic (saturating low 70's) pt's saturation immediately improving afterwards to 80's after large mucus plug removed and was subsequently sent to [facility]  On arrival to ED pt was saturating 97%, 15L O2 via tracheostomy collar (baseline 8 L at [facility])CXR suspicious for L-sided PNA, inconclusive. CTA neg for PE. Pt received x1 IV Levaquin and Cefepime in ED, admitted to MICU for acute hypoxic hypercapnic respiratory failure secondary to acute mucus plug now resolved vs superimposed pneumonia hospital-acquired.   General Surgery Consulted emergently when patient found to be hypoxic to low 80s after CCU and Pulmonary team found large granuloma in trachea along tracheostomy tube. Patient currently had a size 8 trach. Surgery assisted Pulmonary and CCU with trach exchanged with ET tube guidance to a size 7 XLT. Saturations improved to high 90s. Large granuloma was removed. Patient was no longer in distress after exchange.    #sepsis  -6/28 Patient was noted to be tachy O/N, hypotensive, febrile to 102.8, FiO2 requirements were increased and the patient was desaturating to 82-86%  -blood cultures 6/29 positive for gram negative rods, serratia bacteremia  -on Ceftriaxone  -ID following  -F/u UA, trach aspirate and blood cultures  -6/30 WBC 11.21 (6/29 was 13.92 and 6/28 was 19.03)  - 6/30 blood culture NGTD, 6/29 urine culture NGTD  -7/1 ID rec CT a/p- demonstrated cecitis, no abscess   -7/5 will f/u with ID regarding antibiotics regimen  -7/8 off antibiotics    #Hypercarbic respiratory failure  -Maintaining sats 88%/decrease O2 titration  -f/u on pulm recs   - 40% FIO2   -continue daily wean off trials as tolerated      #Acute/ chronic COPD with exacerbation (Resolved)  #mucous plug removed  #pneumonia vs atelectasis L base  - On 5/28, Pulmonary team found large granuloma in trachea along tracheostomy tube. Patient had a size 8 trach on admission. Surgery assisted Pulmonary and CCU with trach exchanged with ET tube guidance to a size 7 XLT. Saturations improved to high 90s.  - trach functioning well s/p exchange   - 6/11 Trach changed to Fenestrated 8 by surgery    #s/p PEG (6/15):   KUB showing free intraperitoneal air (6/19).  CT Abdo confirmed that this was just due to PEG tube adjustment.   Repeat G tube study per Surgery (6/21) -normal, restarted on peg feedings      #Dysphagia:  Strict NPO as of now per 's FEES eval from 6/13/22.   [facility] will sign out to  at [facility] for further plans with swallow rehab at [facility]  -7/6 fees study  -7/7 barium swallow   -7/8 failed barium swallow, continue peg feeds  - F/u S+S eval     #LE neuropathic pain:  PAtient has apparently been bed bound since April 4th (her [facility] admission for hypoxic failure where she ended up being intubated)  Apparently has L > R foot drop which may be due to chronic contractures of Calf muscle/Achilles tendon. Can have PT evaluate her for that.  But options are limited at this stage.   Given the asymmetrical foot drop, Neurology wants to rule out Any focal neuropathy.  -6/23 s/p  MRI Lumbar spine w/ & w/o Contrast, mild degenerative changes  -chronic DJD no acute changes.  -continue pain management: currently on 1mg Dilaudid po q6 prn  -7/5 PT working with patient    #B/l LE Hyperpigmentation changes:   unclear etiology  Present since 1 year.   May be related to CHF related stasis dermatitis / hemosiderin deposition  vs deposition diseases versus Eosinophilic Dermatitis vs follicular hemorrhage from eliquis   Dermatology thinks it may be Eczema and advised Triamcinolone BId.   Add multivitamin to PEG QD.   -7/1 Betamethasone cream and Ammonium lactate dressings per Burn, patient's legs feel mildly better  -7/5 bilateral lower extremities pain mildly improved    -On Senna    #Suspected epiglottis edema:   s/p DExa Taper (ended 6/18).   Outpatient ENT f/u.     #DM:   steroids ended 6/18.  6/19- Increased lantus from 17 to 20.    #Chronic Indwelling Singh  - 5/28 Ucx - Contaminated   - 5/28 Ucx- Normal perri   - 5/27 Ucx- Klebsiella (CRE) and pseudomonas   - s/p cefepime  - Currently afebrile w/ no urinary complaints   - episode of Hematouria 6/4 -> improving -> h/h Stable    #Hx of DVT  - on home Eliquis  - restarted Eliquis    GI PPX: PPI  DVT PPX: ELIQUIS  Dispo: Acute  Pending: authorization for [employer]

## 2022-07-13 NOTE — PROGRESS NOTE ADULT - SUBJECTIVE AND OBJECTIVE BOX
SUBJECTIVE:  Patient is a 56y old Female who presents with a chief complaint of trach dysfunction (09 Jul 2022 13:43)  Acute respiratory failure with hypoxia    Today is hospital day 47d. There are no new issues or overnight events.     HPI  HPI:  55 yo f with PMHx of DVT on Eliquis, COPD,  trach collar, obesity, IDDM, UTI, sent by [facility] for hypoxia. History provided by daughter at bedside, she reports the current illness going back to April 4th when pt was intubated on admission at [facility] ICU for hypoxic respiratory failure diagnosed with copd exacerbation and superimposed pneumonia, of note she also appears to have been in CHF exacerbation with severe b/l LE swelling treated with IV bumex. Remained intubated 37 days per daughter, diagnosed with fever of unknown origin (up to 106F), treated with empiric broad spectrum antibiotics and once 2 weeks s/p tracheostomy placement and 48 hours afebrile she was was discharged to nursing home for PT. Of note, patient's daughter and pt herself say the wiseman has not been replaced for over 3 weeks. She also reports a developing sacral pressure ulcer. Was at [facility] only 2 days when was noted to be hypoxic (saturating low 70's) and EMS called. While awaiting EMS, floor nurse on the unit suctioned the patient, and managed to bring up a very large mucus plug, with pt's saturation immediately improving afterwards to 80's. Nonetheless pt was sent to [facility] ED. Prior to all this pt was independent and ambulatory. Pt denies sob, cp, abd pain, n/v/d, fever or chills.     On arrival to ED pt was saturating 97%, 15L O2 via tracheostomy collar (baseline 8 L at [facility]) VS:    /59  T 99.9F RR 20  Admission VBG pH 7.37 pCO2 60 pO2 49. Pt not wheezing, not coughing. CXR suspicious for L-sided PNA, inconclusive. CTA neg for PE. Pt received x1 IV Levoquin and Cefepime in ED, admitted to medicine for acute hypoxic hypercapnic respiratory failure secondary to acute mucus plug (now resolved vs superimposed pneumonia  hospital-acquired.(less likely), Records request sent to [facility] medical records  [phone]) and patient will be continued on empiric antibiotics pending procal (27 May 2022 14:02)      PAST MEDICAL & SURGICAL HISTORY  COPD (chronic obstructive pulmonary disease)    CHF (congestive heart failure)    DM (diabetes mellitus)    H/O tracheostomy      ALLERGIES:  penicillin (Swelling)    MEDICATIONS:  HOME MEDICATIONS  albuterol sulfate 2.5 mg/3 mL (0.083 %) solution for nebulization:   ALPRAZolam 0.5 mg oral tablet: 1 tab(s) orally every 12 hours, As needed, anxiety via PEG tube  apixaban 5 mg oral tablet: 1 tab(s) orally 2 times a day via PEG tube  atorvastatin 20 mg oral tablet: 1 tab(s) orally once a day (at bedtime) via PEG tube  gabapentin 600 mg oral tablet: 1 tab(s) orally 3 times a day via PEG tube  HYDROmorphone 4 mg oral tablet: 1 tab(s) orally every 8 hours, As needed, for severe pain  insulin glargine 100 units/mL subcutaneous solution: 20 unit(s) subcutaneous once a day (at bedtime)  insulin lispro 100 units/mL injectable solution: 9 unit(s) injectable 3 times a day (before meals)  losartan 100 mg oral tablet: 1 tab(s) orally once a day via PEG tube  Multiple Vitamins oral tablet: 1 tab(s) orally once a day via PEG tube  oxycodone-acetaminophen 5 mg-325 mg oral tablet: 2 tab(s) orally every 6 hours, As needed, Moderate Pain (4 - 6)  pantoprazole 40 mg oral delayed release tablet: 1 tab(s) orally once a day (before a meal) via PEG tube  polyethylene glycol 3350 oral powder for reconstitution: 17 gram(s) orally once a day via PEG tube  senna leaf extract oral tablet: 2 tab(s) orally once a day (at bedtime) via PEG tube  Trelegy Ellipta 100 mcg-62.5 mcg-25 mcg powder for inhalation:     STANDING MEDICATIONS  acetaminophen     Tablet .. 650 milliGRAM(s) Oral every 8 hours  ammonium lactate 12% Lotion 1 Application(s) Topical every 12 hours  apixaban 5 milliGRAM(s) Oral two times a day  atorvastatin 20 milliGRAM(s) Oral at bedtime  betamethasone valerate 0.1% Cream 1 Application(s) Topical two times a day  dextrose 5%. 1000 milliLiter(s) IV Continuous <Continuous>  dextrose 5%. 1000 milliLiter(s) IV Continuous <Continuous>  dextrose 50% Injectable 25 Gram(s) IV Push once  dextrose 50% Injectable 12.5 Gram(s) IV Push once  dextrose 50% Injectable 25 Gram(s) IV Push once  diatrizoate meglumine/diatrizoate sodium. 30 milliLiter(s) Oral once  gabapentin 600 milliGRAM(s) Oral three times a day  glucagon  Injectable 1 milliGRAM(s) IntraMuscular once  influenza   Vaccine 0.5 milliLiter(s) IntraMuscular once  insulin glargine Injectable (LANTUS) 20 Unit(s) SubCutaneous at bedtime  insulin lispro (ADMELOG) corrective regimen sliding scale   SubCutaneous three times a day before meals  insulin lispro Injectable (ADMELOG) 9 Unit(s) SubCutaneous every 6 hours  multivitamin 1 Tablet(s) Oral daily  nystatin Powder 1 Application(s) Topical two times a day  pantoprazole    Tablet 40 milliGRAM(s) Oral before breakfast  polyethylene glycol 3350 17 Gram(s) Oral daily  senna 2 Tablet(s) Oral at bedtime    PRN MEDICATIONS  albuterol/ipratropium for Nebulization 3 milliLiter(s) Nebulizer every 6 hours PRN  ALPRAZolam 1 milliGRAM(s) Oral two times a day PRN  dextrose Oral Gel 15 Gram(s) Oral once PRN  HYDROmorphone  Injectable 1 milliGRAM(s) IV Push every 4 hours PRN  oxycodone    5 mG/acetaminophen 325 mG 2 Tablet(s) Oral/Enteral Tube every 6 hours PRN    VITALS:   Vital Signs Last 24 Hrs  T(C): 36.7 (13 Jul 2022 05:15), Max: 36.9 (12 Jul 2022 12:00)  T(F): 98 (13 Jul 2022 05:15), Max: 98.5 (12 Jul 2022 12:00)  HR: 90 (13 Jul 2022 05:15) (82 - 90)  BP: 140/53 (13 Jul 2022 05:15) (127/60 - 140/53)  BP(mean): --  RR: 18 (13 Jul 2022 05:15) (18 - 18)  SpO2: --        LABS:    LABS:        I&O's Detail    09 Jul 2022 07:01  -  10 Jul 2022 07:00  --------------------------------------------------------  IN:    Enteral Tube Flush: 120 mL    Glucerna: 600 mL  Total IN: 720 mL    OUT:  Total OUT: 0 mL    Total NET: 720 mL                    RADIOLOGY:  EKG  12 Lead ECG:   Ventricular Rate 92 BPM    Atrial Rate 92 BPM    P-R Interval 168 ms    QRS Duration 98 ms    Q-T Interval 370 ms    QTC Calculation(Bazett) 457 ms    P Axis 84 degrees    R Axis 103 degrees    T Axis 89 degrees    Diagnosis Line Sinus rhythm with Premature supraventricular complexes  Rightward axis  Low voltage QRS  Incomplete right bundle branch block  Cannot rule out Anterior infarct , age undetermined  T wave abnormality, consider lateral ischemia  Abnormal ECG    Confirmed by Joe Person ([id number]) on 6/30/2022 7:36:49 AM (06-30-22 @ 04:43)  12 Lead ECG:   Ventricular Rate 89 BPM    Atrial Rate 89 BPM    P-R Interval 162 ms    QRS Duration 98 ms    Q-T Interval 372 ms    QTC Calculation(Bazett) 452 ms    P Axis 90 degrees    R Axis 102 degrees    T Axis 90 degrees    Diagnosis Line Normal sinus rhythm  Rightward axis  Incomplete right bundle branch block  Anterior infarct , age undetermined  ST & T wave abnormality, consider lateral ischemia  Abnormal ECG    Confirmed by Joe Person ([id number]) on 6/30/2022 7:36:41 AM (06-30-22 @ 04:42)      Physical Exam:  General: no acute distress, lying in bed  Head: normocephalic and atraumatic  Neck: supple, trach clean and intact  Heart: regular rate and rhythm, S1 and S2 normal, no murmurs, rubs or gallops noted on exam  Lungs: Symmetric chest expansion bilaterally, clear lungs bilaterally, no wheezes rhonchi or crackles heard  Abdomen: Bowel sounds present, non tender on light and deep palpation  Extremities: No edema, no clubbing or cyanosis notes, positive peripheral pulses, brown discoloration of bilateral lower extremities- unchanged from previous exams

## 2022-07-14 LAB
GLUCOSE BLDC GLUCOMTR-MCNC: 106 MG/DL — HIGH (ref 70–99)
GLUCOSE BLDC GLUCOMTR-MCNC: 114 MG/DL — HIGH (ref 70–99)
GLUCOSE BLDC GLUCOMTR-MCNC: 128 MG/DL — HIGH (ref 70–99)
GLUCOSE BLDC GLUCOMTR-MCNC: 142 MG/DL — HIGH (ref 70–99)
GLUCOSE BLDC GLUCOMTR-MCNC: 83 MG/DL — SIGNIFICANT CHANGE UP (ref 70–99)

## 2022-07-14 PROCEDURE — 99233 SBSQ HOSP IP/OBS HIGH 50: CPT

## 2022-07-14 PROCEDURE — 93010 ELECTROCARDIOGRAM REPORT: CPT

## 2022-07-14 RX ORDER — ACETAMINOPHEN 500 MG
650 TABLET ORAL EVERY 6 HOURS
Refills: 0 | Status: DISCONTINUED | OUTPATIENT
Start: 2022-07-14 | End: 2022-07-22

## 2022-07-14 RX ORDER — HYDROMORPHONE HYDROCHLORIDE 2 MG/ML
4 INJECTION INTRAMUSCULAR; INTRAVENOUS; SUBCUTANEOUS EVERY 4 HOURS
Refills: 0 | Status: DISCONTINUED | OUTPATIENT
Start: 2022-07-14 | End: 2022-07-21

## 2022-07-14 RX ORDER — HYDROMORPHONE HYDROCHLORIDE 2 MG/ML
1 INJECTION INTRAMUSCULAR; INTRAVENOUS; SUBCUTANEOUS ONCE
Refills: 0 | Status: DISCONTINUED | OUTPATIENT
Start: 2022-07-14 | End: 2022-07-14

## 2022-07-14 RX ADMIN — Medication 1 APPLICATION(S): at 05:08

## 2022-07-14 RX ADMIN — APIXABAN 5 MILLIGRAM(S): 2.5 TABLET, FILM COATED ORAL at 17:58

## 2022-07-14 RX ADMIN — HYDROMORPHONE HYDROCHLORIDE 4 MILLIGRAM(S): 2 INJECTION INTRAMUSCULAR; INTRAVENOUS; SUBCUTANEOUS at 11:58

## 2022-07-14 RX ADMIN — NYSTATIN CREAM 1 APPLICATION(S): 100000 CREAM TOPICAL at 17:56

## 2022-07-14 RX ADMIN — APIXABAN 5 MILLIGRAM(S): 2.5 TABLET, FILM COATED ORAL at 05:10

## 2022-07-14 RX ADMIN — HYDROMORPHONE HYDROCHLORIDE 1 MILLIGRAM(S): 2 INJECTION INTRAMUSCULAR; INTRAVENOUS; SUBCUTANEOUS at 08:21

## 2022-07-14 RX ADMIN — Medication 1 APPLICATION(S): at 17:56

## 2022-07-14 RX ADMIN — OXYCODONE AND ACETAMINOPHEN 2 TABLET(S): 5; 325 TABLET ORAL at 06:01

## 2022-07-14 RX ADMIN — GABAPENTIN 600 MILLIGRAM(S): 400 CAPSULE ORAL at 13:44

## 2022-07-14 RX ADMIN — OXYCODONE AND ACETAMINOPHEN 2 TABLET(S): 5; 325 TABLET ORAL at 05:31

## 2022-07-14 RX ADMIN — SENNA PLUS 2 TABLET(S): 8.6 TABLET ORAL at 21:24

## 2022-07-14 RX ADMIN — NYSTATIN CREAM 1 APPLICATION(S): 100000 CREAM TOPICAL at 05:08

## 2022-07-14 RX ADMIN — HYDROMORPHONE HYDROCHLORIDE 1 MILLIGRAM(S): 2 INJECTION INTRAMUSCULAR; INTRAVENOUS; SUBCUTANEOUS at 03:46

## 2022-07-14 RX ADMIN — Medication 9 UNIT(S): at 06:33

## 2022-07-14 RX ADMIN — Medication 9 UNIT(S): at 17:57

## 2022-07-14 RX ADMIN — HYDROMORPHONE HYDROCHLORIDE 1 MILLIGRAM(S): 2 INJECTION INTRAMUSCULAR; INTRAVENOUS; SUBCUTANEOUS at 08:06

## 2022-07-14 RX ADMIN — HYDROMORPHONE HYDROCHLORIDE 1 MILLIGRAM(S): 2 INJECTION INTRAMUSCULAR; INTRAVENOUS; SUBCUTANEOUS at 20:37

## 2022-07-14 RX ADMIN — PANTOPRAZOLE SODIUM 40 MILLIGRAM(S): 20 TABLET, DELAYED RELEASE ORAL at 11:58

## 2022-07-14 RX ADMIN — HYDROMORPHONE HYDROCHLORIDE 4 MILLIGRAM(S): 2 INJECTION INTRAMUSCULAR; INTRAVENOUS; SUBCUTANEOUS at 12:28

## 2022-07-14 RX ADMIN — GABAPENTIN 600 MILLIGRAM(S): 400 CAPSULE ORAL at 05:09

## 2022-07-14 RX ADMIN — Medication 1 APPLICATION(S): at 17:55

## 2022-07-14 RX ADMIN — HYDROMORPHONE HYDROCHLORIDE 1 MILLIGRAM(S): 2 INJECTION INTRAMUSCULAR; INTRAVENOUS; SUBCUTANEOUS at 04:06

## 2022-07-14 RX ADMIN — GABAPENTIN 600 MILLIGRAM(S): 400 CAPSULE ORAL at 21:26

## 2022-07-14 RX ADMIN — Medication 9 UNIT(S): at 12:00

## 2022-07-14 RX ADMIN — Medication 650 MILLIGRAM(S): at 22:12

## 2022-07-14 RX ADMIN — Medication 650 MILLIGRAM(S): at 21:42

## 2022-07-14 RX ADMIN — POLYETHYLENE GLYCOL 3350 17 GRAM(S): 17 POWDER, FOR SOLUTION ORAL at 11:59

## 2022-07-14 RX ADMIN — HYDROMORPHONE HYDROCHLORIDE 1 MILLIGRAM(S): 2 INJECTION INTRAMUSCULAR; INTRAVENOUS; SUBCUTANEOUS at 20:22

## 2022-07-14 RX ADMIN — ATORVASTATIN CALCIUM 20 MILLIGRAM(S): 80 TABLET, FILM COATED ORAL at 21:24

## 2022-07-14 RX ADMIN — Medication 1 MILLIGRAM(S): at 13:05

## 2022-07-14 RX ADMIN — Medication 1 MILLIGRAM(S): at 16:11

## 2022-07-14 RX ADMIN — HYDROMORPHONE HYDROCHLORIDE 4 MILLIGRAM(S): 2 INJECTION INTRAMUSCULAR; INTRAVENOUS; SUBCUTANEOUS at 16:12

## 2022-07-14 RX ADMIN — Medication 1 TABLET(S): at 12:00

## 2022-07-14 RX ADMIN — INSULIN GLARGINE 20 UNIT(S): 100 INJECTION, SOLUTION SUBCUTANEOUS at 21:30

## 2022-07-14 RX ADMIN — HYDROMORPHONE HYDROCHLORIDE 4 MILLIGRAM(S): 2 INJECTION INTRAMUSCULAR; INTRAVENOUS; SUBCUTANEOUS at 16:42

## 2022-07-14 NOTE — CHART NOTE - NSCHARTNOTEFT_GEN_A_CORE
Registered Dietitian Follow-Up     Patient Profile Reviewed                           Yes []   No []     Nutrition History Previously Obtained        Yes []  No []       Pertinent Information: HOSP DAY 48  Dx include Sepsis- off antibiotic since 7/8  Respiratory failure- continue daily weaning trials  Acute/chronic COPD w/exacerbation (resolved)- ET tube  PEG 6/15  Dysphagia: mostly recently failed barium swallow 7/8- to follow-up @ SNF  DM  Chronic wiseman  Hx of DVT    D/C pending SNF authorization.     Per RN patient tolerating feeds. No signs/symptoms of GI distress. Patient with no c/o n/v/d/c either.     Diet order:   Diet, NPO with Tube Feed:   Tube Feeding Modality: Gastrostomy  Glucerna 1.2 Martin  Total Volume for 24 Hours (mL): 1200  Bolus  Total Volume of Bolus (mL):  300  Tube Feed Frequency: Every 6 hours   Tube Feed Start Time: 06:00  Bolus Feed Rate (mL per Hour): 300   Bolus Feed Duration (in Hours): 1  Bolus   Total Volume per Flush (mL): 30   Frequency: Every 6 Hours  Free Water Flush Instructions:  give 30mL POST feeds  No Carb Prosource (1pkg = 15gms Protein)     Qty per Day:  1x (06-21-22 @ 16:30) [Active]      Anthropometrics:  Ht: 154.9 cm, 61 in  IBW: 105 lbs, 47.7 kg  Most recent weight 97.4 kg, 214.3 lbs  BMI 40.6    MEDICATIONS  (STANDING):  apixaban 5 milliGRAM(s) Enteral Tube two times a day  atorvastatin 20 milliGRAM(s) Oral at bedtime  diatrizoate meglumine/diatrizoate sodium. 30 milliLiter(s) Oral once  gabapentin 600 milliGRAM(s) Oral three times a day  insulin lispro (ADMELOG) corrective regimen sliding scale   SubCutaneous three times a day before meals  insulin lispro Injectable (ADMELOG) 9 Unit(s) SubCutaneous every 6 hours  multivitamin 1 Tablet(s) Oral daily  nystatin Powder 1 Application(s) Topical two times a day  pantoprazole   Suspension 40 milliGRAM(s) Enteral Tube daily  polyethylene glycol 3350 17 Gram(s) Oral daily  senna 2 Tablet(s) Oral at bedtime    MEDICATIONS  (PRN):  albuterol/ipratropium for Nebulization 3 milliLiter(s) Nebulizer every 6 hours PRN Shortness of Breath and/or Wheezing  ALPRAZolam 1 milliGRAM(s) Oral two times a day PRN anxiety  dextrose Oral Gel 15 Gram(s) Oral once PRN Blood Glucose LESS THAN 70 milliGRAM(s)/deciliter  HYDROmorphone   Tablet 4 milliGRAM(s) Oral every 4 hours PRN Severe Pain (7 - 10)    Pertinent Labs:   Finger Sticks:  POCT Blood Glucose.: 106 mg/dL (07-14 @ 11:45)  POCT Blood Glucose.: 128 mg/dL (07-14 @ 07:39)  POCT Blood Glucose.: 83 mg/dL (07-14 @ 05:50)  POCT Blood Glucose.: 126 mg/dL (07-13 @ 23:23)  POCT Blood Glucose.: 131 mg/dL (07-13 @ 21:14)  POCT Blood Glucose.: 107 mg/dL (07-13 @ 17:57)    Physical Findings:  - Appearance: alert, oriented x 4, 1+ generalized edema  - GI function: +fecal incontinence, LBM 7/10  - Tubes: + ET, + GT  - Oral/Mouth cavity:  - Skin: no pressure injuries     Nutrition Requirements:   Weight Used: ABW 94.3kg + IBW 48kg with consideration for sepsis     Estimated Energy Needs     4391-5761 kcal/day (MSJ x1.0-1.2) Continue [x]      Estimated Protein Needs  86-95 g/day (1.8-2.0 g/kg of Ibw) Continue [x]    Estimated Fluid Needs 1mL/kcal       Continue [x]       Nutrient Intake: tube feeding provides 1440 kcal, 72 g pro and 966 mL free fluid. Additional  60 kcal, 15 g pro from ProSource.      [x] Previous Nutrition Diagnosis:  Altered nutrition related labs  Inadequate oral intkae            [x] Ongoing          [] Resolved     Goal/Expected Outcome:   Patient to continue to meet 85 - 105% of estimated needs    Indicator/Monitoring:   RD to monitor energy intake, tf tolerance, weight, labs, skin status, NFPF    Recommendation:  1) Continue current tf regimen as it is adequate to meet estimated needs and tolerated well by patient   2) Obtain weights when possible    Patient is at low nutrition risk, RD to f/u in 7-10 days or PRN if patient not discharged.

## 2022-07-14 NOTE — PROGRESS NOTE ADULT - SUBJECTIVE AND OBJECTIVE BOX
SUBJECTIVE:  Patient is a 56y old Female who presents with a chief complaint of trach dysfunction (09 Jul 2022 13:43)  Acute respiratory failure with hypoxia    Today is hospital day 48d. There are no new issues or overnight events.     HPI  HPI:  57 yo f with PMHx of DVT on Eliquis, COPD,  trach collar, obesity, IDDM, UTI, sent by [facility] for hypoxia. History provided by daughter at bedside, she reports the current illness going back to April 4th when pt was intubated on admission at [facility] ICU for hypoxic respiratory failure diagnosed with copd exacerbation and superimposed pneumonia, of note she also appears to have been in CHF exacerbation with severe b/l LE swelling treated with IV bumex. Remained intubated 37 days per daughter, diagnosed with fever of unknown origin (up to 106F), treated with empiric broad spectrum antibiotics and once 2 weeks s/p tracheostomy placement and 48 hours afebrile she was was discharged to nursing home for PT. Of note, patient's daughter and pt herself say the wisemna has not been replaced for over 3 weeks. She also reports a developing sacral pressure ulcer. Was at [facility] only 2 days when was noted to be hypoxic (saturating low 70's) and EMS called. While awaiting EMS, floor nurse on the unit suctioned the patient, and managed to bring up a very large mucus plug, with pt's saturation immediately improving afterwards to 80's. Nonetheless pt was sent to [facility] ED. Prior to all this pt was independent and ambulatory. Pt denies sob, cp, abd pain, n/v/d, fever or chills.     On arrival to ED pt was saturating 97%, 15L O2 via tracheostomy collar (baseline 8 L at [facility]) VS:    /59  T 99.9F RR 20  Admission VBG pH 7.37 pCO2 60 pO2 49. Pt not wheezing, not coughing. CXR suspicious for L-sided PNA, inconclusive. CTA neg for PE. Pt received x1 IV Levoquin and Cefepime in ED, admitted to medicine for acute hypoxic hypercapnic respiratory failure secondary to acute mucus plug (now resolved vs superimposed pneumonia  hospital-acquired.(less likely), Records request sent to [facility] medical records  [phone]) and patient will be continued on empiric antibiotics pending procal (27 May 2022 14:02)      PAST MEDICAL & SURGICAL HISTORY  COPD (chronic obstructive pulmonary disease)    CHF (congestive heart failure)    DM (diabetes mellitus)    H/O tracheostomy      ALLERGIES:  penicillin (Swelling)    MEDICATIONS:  HOME MEDICATIONS  albuterol sulfate 2.5 mg/3 mL (0.083 %) solution for nebulization:   ALPRAZolam 0.5 mg oral tablet: 1 tab(s) orally every 12 hours, As needed, anxiety via PEG tube  apixaban 5 mg oral tablet: 1 tab(s) orally 2 times a day via PEG tube  atorvastatin 20 mg oral tablet: 1 tab(s) orally once a day (at bedtime) via PEG tube  gabapentin 600 mg oral tablet: 1 tab(s) orally 3 times a day via PEG tube  HYDROmorphone 4 mg oral tablet: 1 tab(s) orally every 8 hours, As needed, for severe pain  insulin glargine 100 units/mL subcutaneous solution: 20 unit(s) subcutaneous once a day (at bedtime)  insulin lispro 100 units/mL injectable solution: 9 unit(s) injectable 3 times a day (before meals)  losartan 100 mg oral tablet: 1 tab(s) orally once a day via PEG tube  Multiple Vitamins oral tablet: 1 tab(s) orally once a day via PEG tube  oxycodone-acetaminophen 5 mg-325 mg oral tablet: 2 tab(s) orally every 6 hours, As needed, Moderate Pain (4 - 6)  pantoprazole 40 mg oral delayed release tablet: 1 tab(s) orally once a day (before a meal) via PEG tube  polyethylene glycol 3350 oral powder for reconstitution: 17 gram(s) orally once a day via PEG tube  senna leaf extract oral tablet: 2 tab(s) orally once a day (at bedtime) via PEG tube  Trelegy Ellipta 100 mcg-62.5 mcg-25 mcg powder for inhalation:     STANDING MEDICATIONS  acetaminophen     Tablet .. 650 milliGRAM(s) Oral every 8 hours  ammonium lactate 12% Lotion 1 Application(s) Topical every 12 hours  apixaban 5 milliGRAM(s) Oral two times a day  atorvastatin 20 milliGRAM(s) Oral at bedtime  betamethasone valerate 0.1% Cream 1 Application(s) Topical two times a day  dextrose 5%. 1000 milliLiter(s) IV Continuous <Continuous>  dextrose 5%. 1000 milliLiter(s) IV Continuous <Continuous>  dextrose 50% Injectable 25 Gram(s) IV Push once  dextrose 50% Injectable 12.5 Gram(s) IV Push once  dextrose 50% Injectable 25 Gram(s) IV Push once  diatrizoate meglumine/diatrizoate sodium. 30 milliLiter(s) Oral once  gabapentin 600 milliGRAM(s) Oral three times a day  glucagon  Injectable 1 milliGRAM(s) IntraMuscular once  influenza   Vaccine 0.5 milliLiter(s) IntraMuscular once  insulin glargine Injectable (LANTUS) 20 Unit(s) SubCutaneous at bedtime  insulin lispro (ADMELOG) corrective regimen sliding scale   SubCutaneous three times a day before meals  insulin lispro Injectable (ADMELOG) 9 Unit(s) SubCutaneous every 6 hours  multivitamin 1 Tablet(s) Oral daily  nystatin Powder 1 Application(s) Topical two times a day  pantoprazole    Tablet 40 milliGRAM(s) Oral before breakfast  polyethylene glycol 3350 17 Gram(s) Oral daily  senna 2 Tablet(s) Oral at bedtime    PRN MEDICATIONS  albuterol/ipratropium for Nebulization 3 milliLiter(s) Nebulizer every 6 hours PRN  ALPRAZolam 1 milliGRAM(s) Oral two times a day PRN  dextrose Oral Gel 15 Gram(s) Oral once PRN  HYDROmorphone  Injectable 1 milliGRAM(s) IV Push every 4 hours PRN  oxycodone    5 mG/acetaminophen 325 mG 2 Tablet(s) Oral/Enteral Tube every 6 hours PRN    VITALS:   Vital Signs Last 24 Hrs  T(C): 36.5 (14 Jul 2022 04:00), Max: 37.1 (13 Jul 2022 14:00)  T(F): 97.7 (14 Jul 2022 04:00), Max: 98.8 (13 Jul 2022 14:00)  HR: 81 (14 Jul 2022 04:00) (81 - 91)  BP: 131/61 (14 Jul 2022 04:00) (131/61 - 142/65)  BP(mean): --  RR: 18 (14 Jul 2022 04:00) (18 - 20)  SpO2: 96% (14 Jul 2022 04:00) (96% - 96%)    Parameters below as of 14 Jul 2022 04:00  Patient On (Oxygen Delivery Method): mask, nonrebreather        LABS:    LABS:        I&O's Detail    09 Jul 2022 07:01  -  10 Jul 2022 07:00  --------------------------------------------------------  IN:    Enteral Tube Flush: 120 mL    Glucerna: 600 mL  Total IN: 720 mL    OUT:  Total OUT: 0 mL    Total NET: 720 mL                    RADIOLOGY:  EKG  12 Lead ECG:   Ventricular Rate 92 BPM    Atrial Rate 92 BPM    P-R Interval 168 ms    QRS Duration 98 ms    Q-T Interval 370 ms    QTC Calculation(Bazett) 457 ms    P Axis 84 degrees    R Axis 103 degrees    T Axis 89 degrees    Diagnosis Line Sinus rhythm with Premature supraventricular complexes  Rightward axis  Low voltage QRS  Incomplete right bundle branch block  Cannot rule out Anterior infarct , age undetermined  T wave abnormality, consider lateral ischemia  Abnormal ECG    Confirmed by Joe Person ([id number]) on 6/30/2022 7:36:49 AM (06-30-22 @ 04:43)  12 Lead ECG:   Ventricular Rate 89 BPM    Atrial Rate 89 BPM    P-R Interval 162 ms    QRS Duration 98 ms    Q-T Interval 372 ms    QTC Calculation(Bazett) 452 ms    P Axis 90 degrees    R Axis 102 degrees    T Axis 90 degrees    Diagnosis Line Normal sinus rhythm  Rightward axis  Incomplete right bundle branch block  Anterior infarct , age undetermined  ST & T wave abnormality, consider lateral ischemia  Abnormal ECG    Confirmed by Joe Person ([id number]) on 6/30/2022 7:36:41 AM (06-30-22 @ 04:42)      Physical Exam:  General: no acute distress, lying in bed  Head: normocephalic and atraumatic  Neck: supple, trach clean and intact  Heart: regular rate and rhythm, S1 and S2 normal, no murmurs, rubs or gallops noted on exam  Lungs: Symmetric chest expansion bilaterally, clear lungs bilaterally, no wheezes rhonchi or crackles heard  Abdomen: Bowel sounds present, non tender on light and deep palpation  Extremities: No edema, no clubbing or cyanosis notes, positive peripheral pulses, brown discoloration of bilateral lower extremities- unchanged from previous exams

## 2022-07-14 NOTE — PROGRESS NOTE ADULT - ATTENDING COMMENTS
55 yo female  with PMHx of DVT on Eliquis, COPD,  trach collar, obesity, IDDM, UTI, sent by [facility] for hypoxia. Current illness going back to April 4th when pt was intubated on admission at [facility] ICU for hypoxic respiratory failure diagnosed with copd exacerbation and superimposed pneumonia, and remained intubated for 37 days requiring tracheostomy. Patient stayed at [facility] for 2 days until noted to be hypoxic (saturating low 70's) pt's saturation immediately improving afterwards to 80's after large mucus plug removed and was subsequently sent to [facility]  On arrival to ED pt was saturating 97%, 15L O2 via tracheostomy collar (baseline 8 L at [facility])CXR suspicious for L-sided PNA, inconclusive. CTA neg for PE. Pt received x1 IV Levaquin and Cefepime in ED, admitted to MICU for acute hypoxic hypercapnic respiratory failure secondary to acute mucus plug now resolved vs superimposed pneumonia hospital-acquired.   General Surgery Consulted emergently when patient found to be hypoxic to low 80s after CCU and Pulmonary team found large granuloma in trachea along tracheostomy tube. Patient currently had a size 8 trach. Surgery assisted Pulmonary and CCU with trach exchanged with ET tube guidance to a size 7 XLT. Saturations improved to high 90s. Large granuloma was removed. Patient was no longer in distress after exchange.    #VDRF: Vent management per Pulm.  s/p trach exchange/s/p mucus plugs removal this admission  Monitor oxygenation. Lately been saturating ok on T piece.  -7/10- patient remains on 40% FIO2 supplement trach to collar, continue daily wean off trials as tolerated by pt.    #sepsis (developed again on 6/27/22): resolved   -UA +; blood cultures (6/28)= Serratia. Sens. noted  -s/p Cefepime and Vanc (6/28) > changed to CTX 2gm QD per ID (6/29) till 7/7  - 6/30 blood culture NGTD, 6/29 urine culture NGTD  CT Abdo per ID: only showed some cecitis      #s/p PEG (6/15)  #Persistent Dysphagia:  ST planning repeat FEES on 7/6- poor visualization  - s/p barium swallow study on 7/7- still unable to take PO intake  -Patient will need close outpatient f/up at  SNF for further plans with swallow studies and tx.  -continue peg feedings with pre/post flushes   -as per speech tx- patient is allowed to have ice chips and sips of water po  -daily speech tx.      #LE neuropathic pain:  Patient has apparently been bed bound since April 4th (her [facility] admission for hypoxic failure where she ended up being intubated)  Apparently has L > R foot drop which may be due to chronic contractures of Calf muscle/Achilles tendon.   Can have PT evaluate her for that. But options are limited at this stage.   Given the asymmetrical foot drop, Neurology ruled out Any focal neuropathy with a MRI Lumbar spine WNL.     #Chronic atropic B/l LE Hyperpigmentation changes: LWC with Betamethasone cream and Ammonium lactate dressings per Burn has not helped over the past 3 weeks.   Recall Burn for Skin biopsy. Can re discuss with Derm too.    #Lower extremity Pain management*** :   Gabapentin to 600 TID,   Currently IV dilaudid 1 Q4 PRN > On 7/14, changing to PO dilaudid 4mg Q4 PRN. Titrate PRN.    May use percocet prn for breakthrough pain?    Note: On 6/21, we discontinued Cymbalta 60 QD since as per family patient may be having bizarre thoughts/hallucinations. Patient was texting them bizarre messages.   Note: Patient has h/o suicidal thoughts on Pregabalin.     Xanax 1 mg Q12 PRN for anxiety.   On Senna, Miralax.     #Suspected epiglottis edema:   s/p DExa Taper (ended 6/18).   Outpatient ENT f/u.     #DM:   steroids ended 6/18.  6/19-  lantus  20.    #Hyponatremia- resolved     #Chronic Indwelling Singh  - repeat U. Cx (6/29) WNL    #Hx of DVT- restarted Eliquis    # Severe Physical deconditioning : patient unable to walk due to severe legs pain, on pain management, continue to encourage PT tx. daily as tolerated     GI PPX  PPI  DVT PPX  ELIQUIS      #Progress Note Handoff: Pending d/c to SNF once bed is available, waiting for insurance approval  Family discussion: medical plan of care d/w pt. by bedside Disposition: SNF once bed is available

## 2022-07-14 NOTE — PROGRESS NOTE ADULT - ASSESSMENT
55 yo female with PMHx of DVT on Eliquis, COPD,  trach collar, obesity, IDDM, UTI, sent by [facility] for hypoxia. Current illness going back to April 4th when pt was intubated on admission at [facility] ICU for hypoxic respiratory failure diagnosed with copd exacerbation and superimposed pneumonia, and remained intubated for 37 days requiring tracheostomy. Patient stayed at [facility] for 2 days until noted to be hypoxic (saturating low 70's) pt's saturation immediately improving afterwards to 80's after large mucus plug removed and was subsequently sent to [facility]  On arrival to ED pt was saturating 97%, 15L O2 via tracheostomy collar (baseline 8 L at [facility])CXR suspicious for L-sided PNA, inconclusive. CTA neg for PE. Pt received x1 IV Levaquin and Cefepime in ED, admitted to MICU for acute hypoxic hypercapnic respiratory failure secondary to acute mucus plug now resolved vs superimposed pneumonia hospital-acquired.   General Surgery Consulted emergently when patient found to be hypoxic to low 80s after CCU and Pulmonary team found large granuloma in trachea along tracheostomy tube. Patient currently had a size 8 trach. Surgery assisted Pulmonary and CCU with trach exchanged with ET tube guidance to a size 7 XLT. Saturations improved to high 90s. Large granuloma was removed. Patient was no longer in distress after exchange.    #sepsis  -6/28 Patient was noted to be tachy O/N, hypotensive, febrile to 102.8, FiO2 requirements were increased and the patient was desaturating to 82-86%  -blood cultures 6/29 positive for gram negative rods, serratia bacteremia  -on Ceftriaxone  -ID following  -F/u UA, trach aspirate and blood cultures  -6/30 WBC 11.21 (6/29 was 13.92 and 6/28 was 19.03)  - 6/30 blood culture NGTD, 6/29 urine culture NGTD  -7/1 ID rec CT a/p- demonstrated cecitis, no abscess   -7/5 will f/u with ID regarding antibiotics regimen  -7/8 off antibiotics    #Hypercarbic respiratory failure  -Maintaining sats 88%/decrease O2 titration  -f/u on pulm recs   - 40% FIO2   -continue daily wean off trials as tolerated      #Acute/ chronic COPD with exacerbation (Resolved)  #mucous plug removed  #pneumonia vs atelectasis L base  - On 5/28, Pulmonary team found large granuloma in trachea along tracheostomy tube. Patient had a size 8 trach on admission. Surgery assisted Pulmonary and CCU with trach exchanged with ET tube guidance to a size 7 XLT. Saturations improved to high 90s.  - trach functioning well s/p exchange   - 6/11 Trach changed to Fenestrated 8 by surgery    #s/p PEG (6/15):   KUB showing free intraperitoneal air (6/19).  CT Abdo confirmed that this was just due to PEG tube adjustment.   Repeat G tube study per Surgery (6/21) -normal, restarted on peg feedings      #Dysphagia:  Strict NPO as of now per 's FEES eval from 6/13/22.   [facility] will sign out to  at [facility] for further plans with swallow rehab at [facility]  -7/6 fees study  -7/7 barium swallow   -7/8 failed barium swallow, continue peg feeds  - F/u S+S eval     #LE neuropathic pain:  PAtient has apparently been bed bound since April 4th (her [facility] admission for hypoxic failure where she ended up being intubated)  Apparently has L > R foot drop which may be due to chronic contractures of Calf muscle/Achilles tendon. Can have PT evaluate her for that.  But options are limited at this stage.   Given the asymmetrical foot drop, Neurology wants to rule out Any focal neuropathy.  -6/23 s/p  MRI Lumbar spine w/ & w/o Contrast, mild degenerative changes  -chronic DJD no acute changes.  - 7/14: d/c'ed IV Dilaudid and Percocet   - start Dilaudid PO 4mg q4 PRN    #B/l LE Hyperpigmentation changes:   unclear etiology  Present since 1 year.   May be related to CHF related stasis dermatitis / hemosiderin deposition  vs deposition diseases versus Eosinophilic Dermatitis vs follicular hemorrhage from eliquis   Dermatology thinks it may be Eczema and advised Triamcinolone BId.   Add multivitamin to PEG QD.   -7/1 Betamethasone cream and Ammonium lactate dressings per Burn, patient's legs feel mildly better  -7/5 bilateral lower extremities pain mildly improved  - 7/14 Burn consulted for possible bx    -On Senna    #Suspected epiglottis edema:   s/p DExa Taper (ended 6/18).   Outpatient ENT f/u.     #DM:   steroids ended 6/18.  6/19- Increased lantus from 17 to 20.    #Chronic Indwelling Singh  - 5/28 Ucx - Contaminated   - 5/28 Ucx- Normal perri   - 5/27 Ucx- Klebsiella (CRE) and pseudomonas   - s/p cefepime  - Currently afebrile w/ no urinary complaints   - episode of Hematouria 6/4 -> improving -> h/h Stable    #Hx of DVT  - on home Eliquis  - restarted Eliquis    GI PPX: PPI  DVT PPX: ELIQUIS  Dispo: Acute  Pending: authorization for [facility]

## 2022-07-15 ENCOUNTER — RESULT REVIEW (OUTPATIENT)
Age: 57
End: 2022-07-15

## 2022-07-15 LAB
GLUCOSE BLDC GLUCOMTR-MCNC: 101 MG/DL — HIGH (ref 70–99)
GLUCOSE BLDC GLUCOMTR-MCNC: 104 MG/DL — HIGH (ref 70–99)
GLUCOSE BLDC GLUCOMTR-MCNC: 132 MG/DL — HIGH (ref 70–99)
GLUCOSE BLDC GLUCOMTR-MCNC: 137 MG/DL — HIGH (ref 70–99)
GLUCOSE BLDC GLUCOMTR-MCNC: 141 MG/DL — HIGH (ref 70–99)
SARS-COV-2 RNA SPEC QL NAA+PROBE: SIGNIFICANT CHANGE UP

## 2022-07-15 PROCEDURE — 88350 IMFLUOR EA ADDL 1ANTB STN PX: CPT | Mod: 26

## 2022-07-15 PROCEDURE — 88342 IMHCHEM/IMCYTCHM 1ST ANTB: CPT | Mod: 26

## 2022-07-15 PROCEDURE — 99232 SBSQ HOSP IP/OBS MODERATE 35: CPT

## 2022-07-15 PROCEDURE — 11106 INCAL BX SKN SINGLE LES: CPT

## 2022-07-15 PROCEDURE — 88312 SPECIAL STAINS GROUP 1: CPT | Mod: 26

## 2022-07-15 PROCEDURE — 88305 TISSUE EXAM BY PATHOLOGIST: CPT | Mod: 26

## 2022-07-15 PROCEDURE — 88346 IMFLUOR 1ST 1ANTB STAIN PX: CPT | Mod: 26

## 2022-07-15 RX ORDER — SOD,AMMONIUM,POTASSIUM LACTATE
1 CREAM (GRAM) TOPICAL
Refills: 0 | Status: DISCONTINUED | OUTPATIENT
Start: 2022-07-15 | End: 2022-07-22

## 2022-07-15 RX ADMIN — HYDROMORPHONE HYDROCHLORIDE 4 MILLIGRAM(S): 2 INJECTION INTRAMUSCULAR; INTRAVENOUS; SUBCUTANEOUS at 01:00

## 2022-07-15 RX ADMIN — ATORVASTATIN CALCIUM 20 MILLIGRAM(S): 80 TABLET, FILM COATED ORAL at 21:10

## 2022-07-15 RX ADMIN — GABAPENTIN 600 MILLIGRAM(S): 400 CAPSULE ORAL at 21:10

## 2022-07-15 RX ADMIN — Medication 1 MILLIGRAM(S): at 01:31

## 2022-07-15 RX ADMIN — Medication 1 TABLET(S): at 11:33

## 2022-07-15 RX ADMIN — NYSTATIN CREAM 1 APPLICATION(S): 100000 CREAM TOPICAL at 18:14

## 2022-07-15 RX ADMIN — Medication 1 APPLICATION(S): at 22:35

## 2022-07-15 RX ADMIN — NYSTATIN CREAM 1 APPLICATION(S): 100000 CREAM TOPICAL at 05:57

## 2022-07-15 RX ADMIN — HYDROMORPHONE HYDROCHLORIDE 4 MILLIGRAM(S): 2 INJECTION INTRAMUSCULAR; INTRAVENOUS; SUBCUTANEOUS at 00:30

## 2022-07-15 RX ADMIN — HYDROMORPHONE HYDROCHLORIDE 4 MILLIGRAM(S): 2 INJECTION INTRAMUSCULAR; INTRAVENOUS; SUBCUTANEOUS at 21:10

## 2022-07-15 RX ADMIN — GABAPENTIN 600 MILLIGRAM(S): 400 CAPSULE ORAL at 13:16

## 2022-07-15 RX ADMIN — INSULIN GLARGINE 20 UNIT(S): 100 INJECTION, SOLUTION SUBCUTANEOUS at 22:33

## 2022-07-15 RX ADMIN — POLYETHYLENE GLYCOL 3350 17 GRAM(S): 17 POWDER, FOR SOLUTION ORAL at 11:49

## 2022-07-15 RX ADMIN — Medication 1 MILLIGRAM(S): at 20:02

## 2022-07-15 RX ADMIN — Medication 650 MILLIGRAM(S): at 22:43

## 2022-07-15 RX ADMIN — GABAPENTIN 600 MILLIGRAM(S): 400 CAPSULE ORAL at 05:58

## 2022-07-15 RX ADMIN — HYDROMORPHONE HYDROCHLORIDE 4 MILLIGRAM(S): 2 INJECTION INTRAMUSCULAR; INTRAVENOUS; SUBCUTANEOUS at 17:14

## 2022-07-15 RX ADMIN — Medication 9 UNIT(S): at 00:26

## 2022-07-15 RX ADMIN — Medication 1 APPLICATION(S): at 05:57

## 2022-07-15 RX ADMIN — HYDROMORPHONE HYDROCHLORIDE 4 MILLIGRAM(S): 2 INJECTION INTRAMUSCULAR; INTRAVENOUS; SUBCUTANEOUS at 21:40

## 2022-07-15 RX ADMIN — Medication 1 APPLICATION(S): at 18:15

## 2022-07-15 RX ADMIN — Medication 650 MILLIGRAM(S): at 23:13

## 2022-07-15 RX ADMIN — APIXABAN 5 MILLIGRAM(S): 2.5 TABLET, FILM COATED ORAL at 18:15

## 2022-07-15 RX ADMIN — PANTOPRAZOLE SODIUM 40 MILLIGRAM(S): 20 TABLET, DELAYED RELEASE ORAL at 11:33

## 2022-07-15 RX ADMIN — Medication 9 UNIT(S): at 06:19

## 2022-07-15 RX ADMIN — HYDROMORPHONE HYDROCHLORIDE 4 MILLIGRAM(S): 2 INJECTION INTRAMUSCULAR; INTRAVENOUS; SUBCUTANEOUS at 08:22

## 2022-07-15 RX ADMIN — Medication 9 UNIT(S): at 11:48

## 2022-07-15 RX ADMIN — HYDROMORPHONE HYDROCHLORIDE 4 MILLIGRAM(S): 2 INJECTION INTRAMUSCULAR; INTRAVENOUS; SUBCUTANEOUS at 16:24

## 2022-07-15 RX ADMIN — APIXABAN 5 MILLIGRAM(S): 2.5 TABLET, FILM COATED ORAL at 05:58

## 2022-07-15 RX ADMIN — HYDROMORPHONE HYDROCHLORIDE 4 MILLIGRAM(S): 2 INJECTION INTRAMUSCULAR; INTRAVENOUS; SUBCUTANEOUS at 07:55

## 2022-07-15 NOTE — PROCEDURE NOTE - GENERAL PROCEDURE DETAILS
Skin biopsy to right thigh, 3 chromic sutures in place, covered with steri-strips Skin biopsy to right leg , 3 chromic sutures in place, covered with steri-strips

## 2022-07-15 NOTE — PROGRESS NOTE ADULT - SUBJECTIVE AND OBJECTIVE BOX
SUBJECTIVE:  Patient is a 56y old Female who presents with a chief complaint of trach dysfunction (09 Jul 2022 13:43)  Acute respiratory failure with hypoxia    Today is hospital day 49d. There are no new issues or overnight events. Today patient is complaining of worsened pain since yesterday in the b/l lower extremeties    HPI  HPI:  55 yo f with PMHx of DVT on Eliquis, COPD,  trach collar, obesity, IDDM, UTI, sent by [facility] for hypoxia. History provided by daughter at bedside, she reports the current illness going back to April 4th when pt was intubated on admission at [facility] ICU for hypoxic respiratory failure diagnosed with copd exacerbation and superimposed pneumonia, of note she also appears to have been in CHF exacerbation with severe b/l LE swelling treated with IV bumex. Remained intubated 37 days per daughter, diagnosed with fever of unknown origin (up to 106F), treated with empiric broad spectrum antibiotics and once 2 weeks s/p tracheostomy placement and 48 hours afebrile she was was discharged to nursing home for PT. Of note, patient's daughter and pt herself say the wiseman has not been replaced for over 3 weeks. She also reports a developing sacral pressure ulcer. Was at [facility] only 2 days when was noted to be hypoxic (saturating low 70's) and EMS called. While awaiting EMS, floor nurse on the unit suctioned the patient, and managed to bring up a very large mucus plug, with pt's saturation immediately improving afterwards to 80's. Nonetheless pt was sent to [facility] ED. Prior to all this pt was independent and ambulatory. Pt denies sob, cp, abd pain, n/v/d, fever or chills.     On arrival to ED pt was saturating 97%, 15L O2 via tracheostomy collar (baseline 8 L at [facility]) VS:    /59  T 99.9F RR 20  Admission VBG pH 7.37 pCO2 60 pO2 49. Pt not wheezing, not coughing. CXR suspicious for L-sided PNA, inconclusive. CTA neg for PE. Pt received x1 IV Levoquin and Cefepime in ED, admitted to medicine for acute hypoxic hypercapnic respiratory failure secondary to acute mucus plug (now resolved vs superimposed pneumonia  hospital-acquired.(less likely), Records request sent to [facility] medical records  [phone]) and patient will be continued on empiric antibiotics pending procal (27 May 2022 14:02)      PAST MEDICAL & SURGICAL HISTORY  COPD (chronic obstructive pulmonary disease)    CHF (congestive heart failure)    DM (diabetes mellitus)    H/O tracheostomy      ALLERGIES:  penicillin (Swelling)    MEDICATIONS:  HOME MEDICATIONS  albuterol sulfate 2.5 mg/3 mL (0.083 %) solution for nebulization:   ALPRAZolam 0.5 mg oral tablet: 1 tab(s) orally every 12 hours, As needed, anxiety via PEG tube  apixaban 5 mg oral tablet: 1 tab(s) orally 2 times a day via PEG tube  atorvastatin 20 mg oral tablet: 1 tab(s) orally once a day (at bedtime) via PEG tube  gabapentin 600 mg oral tablet: 1 tab(s) orally 3 times a day via PEG tube  HYDROmorphone 4 mg oral tablet: 1 tab(s) orally every 8 hours, As needed, for severe pain  insulin glargine 100 units/mL subcutaneous solution: 20 unit(s) subcutaneous once a day (at bedtime)  insulin lispro 100 units/mL injectable solution: 9 unit(s) injectable 3 times a day (before meals)  losartan 100 mg oral tablet: 1 tab(s) orally once a day via PEG tube  Multiple Vitamins oral tablet: 1 tab(s) orally once a day via PEG tube  oxycodone-acetaminophen 5 mg-325 mg oral tablet: 2 tab(s) orally every 6 hours, As needed, Moderate Pain (4 - 6)  pantoprazole 40 mg oral delayed release tablet: 1 tab(s) orally once a day (before a meal) via PEG tube  polyethylene glycol 3350 oral powder for reconstitution: 17 gram(s) orally once a day via PEG tube  senna leaf extract oral tablet: 2 tab(s) orally once a day (at bedtime) via PEG tube  Trelegy Ellipta 100 mcg-62.5 mcg-25 mcg powder for inhalation:     STANDING MEDICATIONS  acetaminophen     Tablet .. 650 milliGRAM(s) Oral every 8 hours  ammonium lactate 12% Lotion 1 Application(s) Topical every 12 hours  apixaban 5 milliGRAM(s) Oral two times a day  atorvastatin 20 milliGRAM(s) Oral at bedtime  betamethasone valerate 0.1% Cream 1 Application(s) Topical two times a day  dextrose 5%. 1000 milliLiter(s) IV Continuous <Continuous>  dextrose 5%. 1000 milliLiter(s) IV Continuous <Continuous>  dextrose 50% Injectable 25 Gram(s) IV Push once  dextrose 50% Injectable 12.5 Gram(s) IV Push once  dextrose 50% Injectable 25 Gram(s) IV Push once  diatrizoate meglumine/diatrizoate sodium. 30 milliLiter(s) Oral once  gabapentin 600 milliGRAM(s) Oral three times a day  glucagon  Injectable 1 milliGRAM(s) IntraMuscular once  influenza   Vaccine 0.5 milliLiter(s) IntraMuscular once  insulin glargine Injectable (LANTUS) 20 Unit(s) SubCutaneous at bedtime  insulin lispro (ADMELOG) corrective regimen sliding scale   SubCutaneous three times a day before meals  insulin lispro Injectable (ADMELOG) 9 Unit(s) SubCutaneous every 6 hours  multivitamin 1 Tablet(s) Oral daily  nystatin Powder 1 Application(s) Topical two times a day  pantoprazole    Tablet 40 milliGRAM(s) Oral before breakfast  polyethylene glycol 3350 17 Gram(s) Oral daily  senna 2 Tablet(s) Oral at bedtime    PRN MEDICATIONS  albuterol/ipratropium for Nebulization 3 milliLiter(s) Nebulizer every 6 hours PRN  ALPRAZolam 1 milliGRAM(s) Oral two times a day PRN  dextrose Oral Gel 15 Gram(s) Oral once PRN  HYDROmorphone  Injectable 1 milliGRAM(s) IV Push every 4 hours PRN  oxycodone    5 mG/acetaminophen 325 mG 2 Tablet(s) Oral/Enteral Tube every 6 hours PRN    VITALS:   Vital Signs Last 24 Hrs  T(C): 36.5 (14 Jul 2022 04:00), Max: 37.1 (13 Jul 2022 14:00)  T(F): 97.7 (14 Jul 2022 04:00), Max: 98.8 (13 Jul 2022 14:00)  HR: 81 (14 Jul 2022 04:00) (81 - 91)  BP: 131/61 (14 Jul 2022 04:00) (131/61 - 142/65)  BP(mean): --  RR: 18 (14 Jul 2022 04:00) (18 - 20)  SpO2: 96% (14 Jul 2022 04:00) (96% - 96%)    Parameters below as of 14 Jul 2022 04:00  Patient On (Oxygen Delivery Method): mask, nonrebreather        LABS:    LABS:        I&O's Detail    09 Jul 2022 07:01  -  10 Jul 2022 07:00  --------------------------------------------------------  IN:    Enteral Tube Flush: 120 mL    Glucerna: 600 mL  Total IN: 720 mL    OUT:  Total OUT: 0 mL    Total NET: 720 mL                    RADIOLOGY:  EKG  12 Lead ECG:   Ventricular Rate 92 BPM    Atrial Rate 92 BPM    P-R Interval 168 ms    QRS Duration 98 ms    Q-T Interval 370 ms    QTC Calculation(Bazett) 457 ms    P Axis 84 degrees    R Axis 103 degrees    T Axis 89 degrees    Diagnosis Line Sinus rhythm with Premature supraventricular complexes  Rightward axis  Low voltage QRS  Incomplete right bundle branch block  Cannot rule out Anterior infarct , age undetermined  T wave abnormality, consider lateral ischemia  Abnormal ECG    Confirmed by Joe Person ([id number]) on 6/30/2022 7:36:49 AM (06-30-22 @ 04:43)  12 Lead ECG:   Ventricular Rate 89 BPM    Atrial Rate 89 BPM    P-R Interval 162 ms    QRS Duration 98 ms    Q-T Interval 372 ms    QTC Calculation(Bazett) 452 ms    P Axis 90 degrees    R Axis 102 degrees    T Axis 90 degrees    Diagnosis Line Normal sinus rhythm  Rightward axis  Incomplete right bundle branch block  Anterior infarct , age undetermined  ST & T wave abnormality, consider lateral ischemia  Abnormal ECG    Confirmed by oJe Person ([id number]) on 6/30/2022 7:36:41 AM (06-30-22 @ 04:42)      Physical Exam:  General: no acute distress, lying in bed  Head: normocephalic and atraumatic  Neck: supple, trach clean and intact  Heart: regular rate and rhythm, S1 and S2 normal, no murmurs, rubs or gallops noted on exam  Lungs: Symmetric chest expansion bilaterally, clear lungs bilaterally, no wheezes rhonchi or crackles heard  Abdomen: Bowel sounds present, non tender on light and deep palpation  Extremities: No edema, no clubbing or cyanosis notes, positive peripheral pulses, brown discoloration of bilateral lower extremities- unchanged from previous exams

## 2022-07-15 NOTE — PROGRESS NOTE ADULT - ASSESSMENT
55 yo female with PMHx of DVT on Eliquis, COPD,  trach collar, obesity, IDDM, UTI, sent by [facility] for hypoxia. Current illness going back to April 4th when pt was intubated on admission at [facility] ICU for hypoxic respiratory failure diagnosed with copd exacerbation and superimposed pneumonia, and remained intubated for 37 days requiring tracheostomy. Patient stayed at [facility] for 2 days until noted to be hypoxic (saturating low 70's) pt's saturation immediately improving afterwards to 80's after large mucus plug removed and was subsequently sent to [facility]  On arrival to ED pt was saturating 97%, 15L O2 via tracheostomy collar (baseline 8 L at [facility])CXR suspicious for L-sided PNA, inconclusive. CTA neg for PE. Pt received x1 IV Levaquin and Cefepime in ED, admitted to MICU for acute hypoxic hypercapnic respiratory failure secondary to acute mucus plug now resolved vs superimposed pneumonia hospital-acquired.   General Surgery Consulted emergently when patient found to be hypoxic to low 80s after CCU and Pulmonary team found large granuloma in trachea along tracheostomy tube. Patient currently had a size 8 trach. Surgery assisted Pulmonary and CCU with trach exchanged with ET tube guidance to a size 7 XLT. Saturations improved to high 90s. Large granuloma was removed. Patient was no longer in distress after exchange.    #sepsis  -6/28 Patient was noted to be tachy O/N, hypotensive, febrile to 102.8, FiO2 requirements were increased and the patient was desaturating to 82-86%  -blood cultures 6/29 positive for gram negative rods, serratia bacteremia  -6/30 WBC 11.21 (6/29 was 13.92 and 6/28 was 19.03)  - 6/30 blood culture NGTD, 6/29 urine culture NGTD  -7/1 ID rec CT a/p- demonstrated cecitis, no abscess   -7/5 will f/u with ID regarding antibiotics regimen  -7/8 off antibiotics    #Hypercarbic respiratory failure  -Maintaining sats 88%/decrease O2 titration  -f/u on pulm recs   - 40% FIO2   -continue daily wean off trials as tolerated    #Acute/ chronic COPD with exacerbation (Resolved)  #mucous plug removed  #pneumonia vs atelectasis L base  - On 5/28, Pulmonary team found large granuloma in trachea along tracheostomy tube. Patient had a size 8 trach on admission. Surgery assisted Pulmonary and CCU with trach exchanged with ET tube guidance to a size 7 XLT. Saturations improved to high 90s.  - trach functioning well s/p exchange   - 6/11 Trach changed to Fenestrated 8 by surgery    #s/p PEG (6/15):   KUB showing free intraperitoneal air (6/19).  CT Abdo confirmed that this was just due to PEG tube adjustment.   Repeat G tube study per Surgery (6/21) -normal, restarted on peg feedings      #Dysphagia:  Strict NPO as of now per 's FEES eval from 6/13/22.   [facility] will sign out to  at [facility] for further plans with swallow rehab at [facility]  -7/6 fees study  -7/7 barium swallow   -7/8 failed barium swallow, continue peg feeds  - S&S following; continue NPO and peg feeds     #LE neuropathic pain:  PAtient has apparently been bed bound since April 4th (her [facility] admission for hypoxic failure where she ended up being intubated)  Apparently has L > R foot drop which may be due to chronic contractures of Calf muscle/Achilles tendon. Can have PT evaluate her for that.  But options are limited at this stage.   Given the asymmetrical foot drop, Neurology wants to rule out Any focal neuropathy.  -6/23 s/p  MRI Lumbar spine w/ & w/o Contrast, mild degenerative changes  -chronic DJD no acute changes.  - 7/14: d/c'ed IV Dilaudid and Percocet   - start Dilaudid PO 4mg q4 PRN  - f/u pain management consult     #B/l LE Hyperpigmentation changes:   unclear etiology  Present since 1 year.   May be related to CHF related stasis dermatitis / hemosiderin deposition  vs deposition diseases versus Eosinophilic Dermatitis vs follicular hemorrhage from eliquis   Dermatology thinks it may be Eczema and advised Triamcinolone BId.   Add multivitamin to PEG QD.   -7/1 Betamethasone cream and Ammonium lactate dressings per Burn, patient's legs feel mildly better  -7/5 bilateral lower extremities pain mildly improved  - 7/14 Burn consulted for bx    -On Senna    #Suspected epiglottis edema:   s/p DExa Taper (ended 6/18).   Outpatient ENT f/u.     #DM:   steroids ended 6/18.  6/19- Increased lantus from 17 to 20.    #Chronic Indwelling Singh  - 5/28 Ucx - Contaminated   - 5/28 Ucx- Normal perri   - 5/27 Ucx- Klebsiella (CRE) and pseudomonas   - s/p cefepime  - Currently afebrile w/ no urinary complaints   - episode of Hematouria 6/4 -> improving -> h/h Stable    #Hx of DVT  - on home Eliquis  - restarted Eliquis    GI PPX: PPI  DVT PPX: ELIQUIS  Dispo: Acute  Pending: authorization for [facility]

## 2022-07-15 NOTE — PROGRESS NOTE ADULT - ATTENDING COMMENTS
55 yo female  with PMHx of DVT on Eliquis, COPD,  trach collar, obesity, IDDM, UTI, sent by [facility] for hypoxia. Current illness going back to April 4th when pt was intubated on admission at [facility] ICU for hypoxic respiratory failure diagnosed with copd exacerbation and superimposed pneumonia, and remained intubated for 37 days requiring tracheostomy. Patient stayed at [facility] for 2 days until noted to be hypoxic (saturating low 70's) pt's saturation immediately improving afterwards to 80's after large mucus plug removed and was subsequently sent to [facility]  On arrival to ED pt was saturating 97%, 15L O2 via tracheostomy collar (baseline 8 L at [facility])CXR suspicious for L-sided PNA, inconclusive. CTA neg for PE. Pt received x1 IV Levaquin and Cefepime in ED, admitted to MICU for acute hypoxic hypercapnic respiratory failure secondary to acute mucus plug now resolved vs superimposed pneumonia hospital-acquired.   General Surgery Consulted emergently when patient found to be hypoxic to low 80s after CCU and Pulmonary team found large granuloma in trachea along tracheostomy tube. Patient currently had a size 8 trach. Surgery assisted Pulmonary and CCU with trach exchanged with ET tube guidance to a size 7 XLT. Saturations improved to high 90s. Large granuloma was removed. Patient was no longer in distress after exchange.    #VDRF: Vent management per Pulm.  s/p trach exchange/s/p mucus plugs removal this admission  Monitor oxygenation. Lately been saturating ok on T piece.  -7/10- patient remains on 40% FIO2 supplement trach to collar, continue daily wean off trials as tolerated by pt.    #sepsis (developed again on 6/27/22): resolved   -UA +; blood cultures (6/28)= Serratia. Sens. noted  -s/p Cefepime and Vanc (6/28) > changed to CTX 2gm QD per ID (6/29) till 7/7  - 6/30 blood culture NGTD, 6/29 urine culture NGTD  CT Abdo per ID: only showed some cecitis      #s/p PEG (6/15)  #Persistent Dysphagia:  ST planning repeat FEES on 7/6- poor visualization  - s/p barium swallow study on 7/7- still unable to take PO intake  -Patient will need close outpatient f/up at  SNF for further plans with swallow studies and tx.  -continue peg feedings with pre/post flushes   -as per speech tx- patient is allowed to have ice chips and sips of water po  -daily speech tx.      #LE neuropathic pain:  Patient has apparently been bed bound since April 4th (her [facility] admission for hypoxic failure where she ended up being intubated)  Apparently has L > R foot drop which may be due to chronic contractures of Calf muscle/Achilles tendon.   Can have PT evaluate her for that. But options are limited at this stage.   Given the asymmetrical foot drop, Neurology ruled out Any focal neuropathy with a MRI Lumbar spine WNL.     #Chronic atropic B/l LE Hyperpigmentation changes: LWC with Betamethasone cream and Ammonium lactate dressings per Burn has not helped over the past 3 weeks.   Recall Burn for Skin biopsy - OP vs IP? Can re discuss with Derm too.    #Lower extremity Pain management*** :   Gabapentin to 600 TID,   Currently IV dilaudid 1 Q4 PRN > On 7/14, changing to PO dilaudid 4mg Q4 PRN. Titrate PRN.    May use percocet prn for breakthrough pain? Pending Pain Management consult.    Note: On 6/21, we discontinued Cymbalta 60 QD since as per family patient may be having bizarre thoughts/hallucinations. Patient was texting them bizarre messages.   Note: Patient has h/o suicidal thoughts on Pregabalin.     Xanax 1 mg Q12 PRN for anxiety.   On Senna, Miralax.     #Suspected epiglottis edema:   s/p DExa Taper (ended 6/18).   Outpatient ENT f/u.     #DM:   steroids ended 6/18.  6/19-  lantus  20.    #Hyponatremia- resolved     #Chronic Indwelling Singh  - repeat U. Cx (6/29) WNL    #Hx of DVT- restarted Eliquis    # Severe Physical deconditioning : patient unable to walk due to severe legs pain, on pain management, continue to encourage PT tx. daily as tolerated     GI PPX  PPI  DVT PPX  ELIQUIS      #Progress Note Handoff: Pending d/c to SNF once bed is available, got insurance approval. pending pain management, Burn f/u. likely d/c on saturday.   Family discussion: medical plan of care d/w pt. by bedside Disposition: SNF once bed is available .

## 2022-07-15 NOTE — PROCEDURE NOTE - NSICDXPROCEDURE_GEN_ALL_CORE_FT
PROCEDURES:  Incisional biopsy of lesion of skin 15-Jul-2022 21:16:52  Liberty Guillory   PROCEDURES:  Incisional biopsy of lesion of skin 15-Jul-2022 21:16:52 right leg - full thickness skin excision Liberty Guillory

## 2022-07-16 LAB
ALBUMIN SERPL ELPH-MCNC: 3.5 G/DL — SIGNIFICANT CHANGE UP (ref 3.5–5.2)
ALP SERPL-CCNC: 109 U/L — SIGNIFICANT CHANGE UP (ref 30–115)
ALT FLD-CCNC: 12 U/L — SIGNIFICANT CHANGE UP (ref 0–41)
ANION GAP SERPL CALC-SCNC: 12 MMOL/L — SIGNIFICANT CHANGE UP (ref 7–14)
AST SERPL-CCNC: 22 U/L — SIGNIFICANT CHANGE UP (ref 0–41)
BASOPHILS # BLD AUTO: 0.04 K/UL — SIGNIFICANT CHANGE UP (ref 0–0.2)
BASOPHILS NFR BLD AUTO: 0.3 % — SIGNIFICANT CHANGE UP (ref 0–1)
BILIRUB SERPL-MCNC: 0.3 MG/DL — SIGNIFICANT CHANGE UP (ref 0.2–1.2)
BUN SERPL-MCNC: 8 MG/DL — LOW (ref 10–20)
CALCIUM SERPL-MCNC: 9.7 MG/DL — SIGNIFICANT CHANGE UP (ref 8.5–10.1)
CHLORIDE SERPL-SCNC: 93 MMOL/L — LOW (ref 98–110)
CK SERPL-CCNC: 18 U/L — SIGNIFICANT CHANGE UP (ref 0–225)
CO2 SERPL-SCNC: 30 MMOL/L — SIGNIFICANT CHANGE UP (ref 17–32)
CREAT SERPL-MCNC: <0.5 MG/DL — LOW (ref 0.7–1.5)
EGFR: 115 ML/MIN/1.73M2 — SIGNIFICANT CHANGE UP
EOSINOPHIL # BLD AUTO: 0.03 K/UL — SIGNIFICANT CHANGE UP (ref 0–0.7)
EOSINOPHIL NFR BLD AUTO: 0.3 % — SIGNIFICANT CHANGE UP (ref 0–8)
GLUCOSE BLDC GLUCOMTR-MCNC: 115 MG/DL — HIGH (ref 70–99)
GLUCOSE BLDC GLUCOMTR-MCNC: 124 MG/DL — HIGH (ref 70–99)
GLUCOSE BLDC GLUCOMTR-MCNC: 124 MG/DL — HIGH (ref 70–99)
GLUCOSE BLDC GLUCOMTR-MCNC: 130 MG/DL — HIGH (ref 70–99)
GLUCOSE BLDC GLUCOMTR-MCNC: 145 MG/DL — HIGH (ref 70–99)
GLUCOSE SERPL-MCNC: 142 MG/DL — HIGH (ref 70–99)
HCT VFR BLD CALC: 33.4 % — LOW (ref 37–47)
HGB BLD-MCNC: 10.9 G/DL — LOW (ref 12–16)
IMM GRANULOCYTES NFR BLD AUTO: 0.3 % — SIGNIFICANT CHANGE UP (ref 0.1–0.3)
LACTATE SERPL-SCNC: 0.9 MMOL/L — SIGNIFICANT CHANGE UP (ref 0.7–2)
LYMPHOCYTES # BLD AUTO: 1.08 K/UL — LOW (ref 1.2–3.4)
LYMPHOCYTES # BLD AUTO: 9.4 % — LOW (ref 20.5–51.1)
MAGNESIUM SERPL-MCNC: 1.6 MG/DL — LOW (ref 1.8–2.4)
MCHC RBC-ENTMCNC: 32.4 PG — HIGH (ref 27–31)
MCHC RBC-ENTMCNC: 32.6 G/DL — SIGNIFICANT CHANGE UP (ref 32–37)
MCV RBC AUTO: 99.4 FL — HIGH (ref 81–99)
MONOCYTES # BLD AUTO: 0.9 K/UL — HIGH (ref 0.1–0.6)
MONOCYTES NFR BLD AUTO: 7.8 % — SIGNIFICANT CHANGE UP (ref 1.7–9.3)
NEUTROPHILS # BLD AUTO: 9.39 K/UL — HIGH (ref 1.4–6.5)
NEUTROPHILS NFR BLD AUTO: 81.9 % — HIGH (ref 42.2–75.2)
NRBC # BLD: 0 /100 WBCS — SIGNIFICANT CHANGE UP (ref 0–0)
PHOSPHATE SERPL-MCNC: 4.8 MG/DL — SIGNIFICANT CHANGE UP (ref 2.1–4.9)
PLATELET # BLD AUTO: 162 K/UL — SIGNIFICANT CHANGE UP (ref 130–400)
POTASSIUM SERPL-MCNC: 4.8 MMOL/L — SIGNIFICANT CHANGE UP (ref 3.5–5)
POTASSIUM SERPL-SCNC: 4.8 MMOL/L — SIGNIFICANT CHANGE UP (ref 3.5–5)
PROT SERPL-MCNC: 6.5 G/DL — SIGNIFICANT CHANGE UP (ref 6–8)
RBC # BLD: 3.36 M/UL — LOW (ref 4.2–5.4)
RBC # FLD: 13.7 % — SIGNIFICANT CHANGE UP (ref 11.5–14.5)
SODIUM SERPL-SCNC: 135 MMOL/L — SIGNIFICANT CHANGE UP (ref 135–146)
TROPONIN T SERPL-MCNC: <0.01 NG/ML — SIGNIFICANT CHANGE UP
WBC # BLD: 11.48 K/UL — HIGH (ref 4.8–10.8)
WBC # FLD AUTO: 11.48 K/UL — HIGH (ref 4.8–10.8)

## 2022-07-16 PROCEDURE — 74018 RADEX ABDOMEN 1 VIEW: CPT | Mod: 26

## 2022-07-16 PROCEDURE — 99233 SBSQ HOSP IP/OBS HIGH 50: CPT

## 2022-07-16 PROCEDURE — 71045 X-RAY EXAM CHEST 1 VIEW: CPT | Mod: 26

## 2022-07-16 PROCEDURE — 93010 ELECTROCARDIOGRAM REPORT: CPT

## 2022-07-16 RX ORDER — CEFTRIAXONE 500 MG/1
INJECTION, POWDER, FOR SOLUTION INTRAMUSCULAR; INTRAVENOUS
Refills: 0 | Status: DISCONTINUED | OUTPATIENT
Start: 2022-07-16 | End: 2022-07-18

## 2022-07-16 RX ORDER — CEFTRIAXONE 500 MG/1
2000 INJECTION, POWDER, FOR SOLUTION INTRAMUSCULAR; INTRAVENOUS EVERY 24 HOURS
Refills: 0 | Status: DISCONTINUED | OUTPATIENT
Start: 2022-07-17 | End: 2022-07-18

## 2022-07-16 RX ORDER — MAGNESIUM SULFATE 500 MG/ML
2 VIAL (ML) INJECTION ONCE
Refills: 0 | Status: COMPLETED | OUTPATIENT
Start: 2022-07-16 | End: 2022-07-16

## 2022-07-16 RX ORDER — CEFTRIAXONE 500 MG/1
2000 INJECTION, POWDER, FOR SOLUTION INTRAMUSCULAR; INTRAVENOUS ONCE
Refills: 0 | Status: COMPLETED | OUTPATIENT
Start: 2022-07-16 | End: 2022-07-16

## 2022-07-16 RX ADMIN — APIXABAN 5 MILLIGRAM(S): 2.5 TABLET, FILM COATED ORAL at 06:02

## 2022-07-16 RX ADMIN — GABAPENTIN 600 MILLIGRAM(S): 400 CAPSULE ORAL at 22:10

## 2022-07-16 RX ADMIN — HYDROMORPHONE HYDROCHLORIDE 4 MILLIGRAM(S): 2 INJECTION INTRAMUSCULAR; INTRAVENOUS; SUBCUTANEOUS at 18:30

## 2022-07-16 RX ADMIN — Medication 1 MILLIGRAM(S): at 00:05

## 2022-07-16 RX ADMIN — ATORVASTATIN CALCIUM 20 MILLIGRAM(S): 80 TABLET, FILM COATED ORAL at 22:10

## 2022-07-16 RX ADMIN — HYDROMORPHONE HYDROCHLORIDE 4 MILLIGRAM(S): 2 INJECTION INTRAMUSCULAR; INTRAVENOUS; SUBCUTANEOUS at 17:52

## 2022-07-16 RX ADMIN — Medication 1 APPLICATION(S): at 17:08

## 2022-07-16 RX ADMIN — INSULIN GLARGINE 20 UNIT(S): 100 INJECTION, SOLUTION SUBCUTANEOUS at 22:56

## 2022-07-16 RX ADMIN — HYDROMORPHONE HYDROCHLORIDE 4 MILLIGRAM(S): 2 INJECTION INTRAMUSCULAR; INTRAVENOUS; SUBCUTANEOUS at 22:47

## 2022-07-16 RX ADMIN — CEFTRIAXONE 100 MILLIGRAM(S): 500 INJECTION, POWDER, FOR SOLUTION INTRAMUSCULAR; INTRAVENOUS at 17:08

## 2022-07-16 RX ADMIN — Medication 9 UNIT(S): at 07:03

## 2022-07-16 RX ADMIN — Medication 1 TABLET(S): at 12:30

## 2022-07-16 RX ADMIN — Medication 9 UNIT(S): at 12:23

## 2022-07-16 RX ADMIN — Medication 9 UNIT(S): at 00:59

## 2022-07-16 RX ADMIN — GABAPENTIN 600 MILLIGRAM(S): 400 CAPSULE ORAL at 17:08

## 2022-07-16 RX ADMIN — HYDROMORPHONE HYDROCHLORIDE 4 MILLIGRAM(S): 2 INJECTION INTRAMUSCULAR; INTRAVENOUS; SUBCUTANEOUS at 06:40

## 2022-07-16 RX ADMIN — GABAPENTIN 600 MILLIGRAM(S): 400 CAPSULE ORAL at 06:02

## 2022-07-16 RX ADMIN — SENNA PLUS 2 TABLET(S): 8.6 TABLET ORAL at 22:10

## 2022-07-16 RX ADMIN — NYSTATIN CREAM 1 APPLICATION(S): 100000 CREAM TOPICAL at 17:09

## 2022-07-16 RX ADMIN — APIXABAN 5 MILLIGRAM(S): 2.5 TABLET, FILM COATED ORAL at 17:09

## 2022-07-16 RX ADMIN — POLYETHYLENE GLYCOL 3350 17 GRAM(S): 17 POWDER, FOR SOLUTION ORAL at 12:35

## 2022-07-16 RX ADMIN — HYDROMORPHONE HYDROCHLORIDE 4 MILLIGRAM(S): 2 INJECTION INTRAMUSCULAR; INTRAVENOUS; SUBCUTANEOUS at 22:17

## 2022-07-16 RX ADMIN — HYDROMORPHONE HYDROCHLORIDE 4 MILLIGRAM(S): 2 INJECTION INTRAMUSCULAR; INTRAVENOUS; SUBCUTANEOUS at 06:10

## 2022-07-16 RX ADMIN — HYDROMORPHONE HYDROCHLORIDE 4 MILLIGRAM(S): 2 INJECTION INTRAMUSCULAR; INTRAVENOUS; SUBCUTANEOUS at 01:56

## 2022-07-16 RX ADMIN — PANTOPRAZOLE SODIUM 40 MILLIGRAM(S): 20 TABLET, DELAYED RELEASE ORAL at 12:30

## 2022-07-16 RX ADMIN — Medication 1 APPLICATION(S): at 06:02

## 2022-07-16 RX ADMIN — NYSTATIN CREAM 1 APPLICATION(S): 100000 CREAM TOPICAL at 06:03

## 2022-07-16 RX ADMIN — Medication 25 GRAM(S): at 22:18

## 2022-07-16 RX ADMIN — Medication 9 UNIT(S): at 23:00

## 2022-07-16 RX ADMIN — HYDROMORPHONE HYDROCHLORIDE 4 MILLIGRAM(S): 2 INJECTION INTRAMUSCULAR; INTRAVENOUS; SUBCUTANEOUS at 01:26

## 2022-07-16 NOTE — PROGRESS NOTE ADULT - ASSESSMENT
55 yo female with PMHx of DVT on Eliquis, COPD,  trach collar, obesity, IDDM, UTI, sent by [facility] for hypoxia. Current illness going back to April 4th when pt was intubated on admission at [facility] ICU for hypoxic respiratory failure diagnosed with copd exacerbation and superimposed pneumonia, and remained intubated for 37 days requiring tracheostomy. Patient stayed at [facility] for 2 days until noted to be hypoxic (saturating low 70's) pt's saturation immediately improving afterwards to 80's after large mucus plug removed and was subsequently sent to [facility]  On arrival to ED pt was saturating 97%, 15L O2 via tracheostomy collar (baseline 8 L at [facility])CXR suspicious for L-sided PNA, inconclusive. CTA neg for PE. Pt received x1 IV Levaquin and Cefepime in ED, admitted to MICU for acute hypoxic hypercapnic respiratory failure secondary to acute mucus plug now resolved vs superimposed pneumonia hospital-acquired.   General Surgery Consulted emergently when patient found to be hypoxic to low 80s after CCU and Pulmonary team found large granuloma in trachea along tracheostomy tube. Patient currently had a size 8 trach. Surgery assisted Pulmonary and CCU with trach exchanged with ET tube guidance to a size 7 XLT. Saturations improved to high 90s. Large granuloma was removed. Patient was no longer in distress after exchange.    #sepsis  -6/28 Patient was noted to be tachy O/N, hypotensive, febrile to 102.8, FiO2 requirements were increased and the patient was desaturating to 82-86%  -blood cultures 6/29 positive for gram negative rods, serratia bacteremia  -6/30 WBC 11.21 (6/29 was 13.92 and 6/28 was 19.03)  - 6/30 blood culture NGTD, 6/29 urine culture NGTD  -7/1 ID rec CT a/p- demonstrated cecitis, no abscess   -7/5 will f/u with ID regarding antibiotics regimen  -7/8 off antibiotics    #Hypercarbic respiratory failure  -Maintaining sats 88%/decrease O2 titration  -f/u on pulm recs   - 40% FIO2   -continue daily wean off trials as tolerated    #Acute/ chronic COPD with exacerbation (Resolved)  #mucous plug removed  #pneumonia vs atelectasis L base  - On 5/28, Pulmonary team found large granuloma in trachea along tracheostomy tube. Patient had a size 8 trach on admission. Surgery assisted Pulmonary and CCU with trach exchanged with ET tube guidance to a size 7 XLT. Saturations improved to high 90s.  - trach functioning well s/p exchange   - 6/11 Trach changed to Fenestrated 8 by surgery    #s/p PEG (6/15):   KUB showing free intraperitoneal air (6/19).  CT Abdo confirmed that this was just due to PEG tube adjustment.   Repeat G tube study per Surgery (6/21) -normal, restarted on peg feedings      #Dysphagia:  Strict NPO as of now per 's FEES eval from 6/13/22.   [facility] will sign out to  at [facility] for further plans with swallow rehab at [facility]  -7/6 fees study  -7/7 barium swallow   -7/8 failed barium swallow, continue peg feeds  - S&S following; continue NPO and peg feeds     #LE neuropathic pain:  PAtient has apparently been bed bound since April 4th (her [facility] admission for hypoxic failure where she ended up being intubated)  Apparently has L > R foot drop which may be due to chronic contractures of Calf muscle/Achilles tendon. Can have PT evaluate her for that.  But options are limited at this stage.   Given the asymmetrical foot drop, Neurology wants to rule out Any focal neuropathy.  -6/23 s/p  MRI Lumbar spine w/ & w/o Contrast, mild degenerative changes  -chronic DJD no acute changes.  - 7/14: d/c'ed IV Dilaudid and Percocet   - start Dilaudid PO 4mg q4 PRN  - f/u pain management consult     #B/l LE Hyperpigmentation changes:   unclear etiology  Present since 1 year.   May be related to CHF related stasis dermatitis / hemosiderin deposition  vs deposition diseases versus Eosinophilic Dermatitis vs follicular hemorrhage from eliquis   Dermatology thinks it may be Eczema and advised Triamcinolone BId.   Add multivitamin to PEG QD.   -7/1 Betamethasone cream and Ammonium lactate dressings per Burn, patient's legs feel mildly better  -7/5 bilateral lower extremities pain mildly improved  -7/15: Burn > skin bx    -On Senna    #Suspected epiglottis edema:   s/p DExa Taper (ended 6/18).   Outpatient ENT f/u.     #DM:   steroids ended 6/18.  6/19- Increased lantus from 17 to 20.    #Chronic Indwelling Singh  - 5/28 Ucx - Contaminated   - 5/28 Ucx- Normal perri   - 5/27 Ucx- Klebsiella (CRE) and pseudomonas   - s/p cefepime  - Currently afebrile w/ no urinary complaints   - episode of Hematouria 6/4 -> improving -> h/h Stable    #Hx of DVT  - on home Eliquis  - restarted Eliquis    GI PPX: PPI  DVT PPX: ELIQUIS  Dispo: Acute  [facility], f/u pain management    57 yo female with PMHx of DVT on Eliquis, COPD,  trach collar, obesity, IDDM, UTI, sent by [facility] for hypoxia. Current illness going back to April 4th when pt was intubated on admission at [facility] ICU for hypoxic respiratory failure diagnosed with copd exacerbation and superimposed pneumonia, and remained intubated for 37 days requiring tracheostomy. Patient stayed at [facility] for 2 days until noted to be hypoxic (saturating low 70's) pt's saturation immediately improving afterwards to 80's after large mucus plug removed and was subsequently sent to [facility]  On arrival to ED pt was saturating 97%, 15L O2 via tracheostomy collar (baseline 8 L at [facility])CXR suspicious for L-sided PNA, inconclusive. CTA neg for PE. Pt received x1 IV Levaquin and Cefepime in ED, admitted to MICU for acute hypoxic hypercapnic respiratory failure secondary to acute mucus plug now resolved vs superimposed pneumonia hospital-acquired.   General Surgery Consulted emergently when patient found to be hypoxic to low 80s after CCU and Pulmonary team found large granuloma in trachea along tracheostomy tube. Patient currently had a size 8 trach. Surgery assisted Pulmonary and CCU with trach exchanged with ET tube guidance to a size 7 XLT. Saturations improved to high 90s. Large granuloma was removed. Patient was no longer in distress after exchange.    #sepsis  -6/28 Patient was noted to be tachy O/N, hypotensive, febrile to 102.8, FiO2 requirements were increased and the patient was desaturating to 82-86%  -blood cultures 6/29 positive for gram negative rods, serratia bacteremia  -6/30 WBC 11.21 (6/29 was 13.92 and 6/28 was 19.03)  - 6/30 blood culture NGTD, 6/29 urine culture NGTD  -7/1 ID rec CT a/p- demonstrated cecitis, no abscess   -7/5 will f/u with ID regarding antibiotics regimen  -7/8 off antibiotics  - 7/16: RR called; pt less responsive and non-verbal  - f/u U/A, CXR, blood cx  - 7/16: started on Ceftriaxone     #Hypercarbic respiratory failure  -Maintaining sats 88%/decrease O2 titration  -f/u on pulm recs   - 40% FIO2   -continue daily wean off trials as tolerated    #Acute/ chronic COPD with exacerbation (Resolved)  #mucous plug removed  #pneumonia vs atelectasis L base  - On 5/28, Pulmonary team found large granuloma in trachea along tracheostomy tube. Patient had a size 8 trach on admission. Surgery assisted Pulmonary and CCU with trach exchanged with ET tube guidance to a size 7 XLT. Saturations improved to high 90s.  - trach functioning well s/p exchange   - 6/11 Trach changed to Fenestrated 8 by surgery    #s/p PEG (6/15):   KUB showing free intraperitoneal air (6/19).  CT Abdo confirmed that this was just due to PEG tube adjustment.   Repeat G tube study per Surgery (6/21) -normal, restarted on peg feedings      #Dysphagia:  Strict NPO as of now per 's FEES eval from 6/13/22.   [facility] will sign out to  at [facility] for further plans with swallow rehab at [facility]  -7/6 fees study  -7/7 barium swallow   -7/8 failed barium swallow, continue peg feeds  - S&S following; continue NPO and peg feeds     #LE neuropathic pain:  PAtient has apparently been bed bound since April 4th (her [facility] admission for hypoxic failure where she ended up being intubated)  Apparently has L > R foot drop which may be due to chronic contractures of Calf muscle/Achilles tendon. Can have PT evaluate her for that.  But options are limited at this stage.   Given the asymmetrical foot drop, Neurology wants to rule out Any focal neuropathy.  -6/23 s/p  MRI Lumbar spine w/ & w/o Contrast, mild degenerative changes  -chronic DJD no acute changes.  - 7/14: d/c'ed IV Dilaudid and Percocet   - start Dilaudid PO 4mg q4 PRN  - f/u pain management consult     #B/l LE Hyperpigmentation changes:   unclear etiology  Present since 1 year.   May be related to CHF related stasis dermatitis / hemosiderin deposition  vs deposition diseases versus Eosinophilic Dermatitis vs follicular hemorrhage from eliquis   Dermatology thinks it may be Eczema and advised Triamcinolone BId.   Add multivitamin to PEG QD.   -7/1 Betamethasone cream and Ammonium lactate dressings per Burn, patient's legs feel mildly better  -7/5 bilateral lower extremities pain mildly improved  -7/15: Burn > skin bx    -On Senna    #Suspected epiglottis edema:   s/p DExa Taper (ended 6/18).   Outpatient ENT f/u.     #DM:   steroids ended 6/18.  6/19- Increased lantus from 17 to 20.    #Chronic Indwelling Singh  - 5/28 Ucx - Contaminated   - 5/28 Ucx- Normal perri   - 5/27 Ucx- Klebsiella (CRE) and pseudomonas   - s/p cefepime  - Currently afebrile w/ no urinary complaints   - episode of Hematouria 6/4 -> improving -> h/h Stable    #Hx of DVT  - on home Eliquis  - restarted Eliquis    GI PPX: PPI  DVT PPX: ELIQUIS  Dispo: Acute  [facility], f/u pain management

## 2022-07-16 NOTE — RAPID RESPONSE TEAM SUMMARY - NSSITUATIONBACKGROUNDRRT_GEN_ALL_CORE
55 yo f with PMHx of DVT on Eliquis, COPD,  trach collar, obesity, IDDM, UTI, sent by [facility] for hypoxia. History provided by daughter at bedside, she reports the current illness going back to April 4th when pt was intubated on admission at [facility] ICU for hypoxic respiratory failure diagnosed with copd exacerbation and superimposed pneumonia, of note she also appears to have been in CHF exacerbation with severe b/l LE swelling treated with IV bumex. Remained intubated 37 days per daughter, diagnosed with fever of unknown origin (up to 106F), treated with empiric broad spectrum antibiotics and once 2 weeks s/p tracheostomy placement and 48 hours afebrile she was was discharged to nursing home for PT. Of note, patient's daughter and pt herself say the wiseman has not been replaced for over 3 weeks. She also reports a developing sacral pressure ulcer. Was at [facility] only 2 days when was noted to be hypoxic (saturating low 70's) and EMS called. While awaiting EMS, floor nurse on the unit suctioned the patient, and managed to bring up a very large mucus plug, with pt's saturation immediately improving afterwards to 80's. Nonetheless pt was sent to [facility] ED. Prior to all this pt was independent and ambulatory. Pt denies sob, cp, abd pain, n/v/d, fever or chills.

## 2022-07-16 NOTE — RAPID RESPONSE TEAM SUMMARY - NSMEDICATIONSRRT_GEN_ALL_CORE
albuterol sulfate 2.5 mg/3 mL (0.083 %) solution for nebulization:   ALPRAZolam 0.5 mg oral tablet: 1 tab(s) orally every 12 hours, As needed, anxiety via PEG tube  apixaban 5 mg oral tablet: 1 tab(s) orally 2 times a day via PEG tube  atorvastatin 20 mg oral tablet: 1 tab(s) orally once a day (at bedtime) via PEG tube  gabapentin 600 mg oral tablet: 1 tab(s) orally 3 times a day via PEG tube  HYDROmorphone 4 mg oral tablet: 1 tab(s) orally every 8 hours, As needed, for severe pain  insulin glargine 100 units/mL subcutaneous solution: 20 unit(s) subcutaneous once a day (at bedtime)  insulin lispro 100 units/mL injectable solution: 9 unit(s) injectable 3 times a day (before meals)  losartan 100 mg oral tablet: 1 tab(s) orally once a day via PEG tube  Multiple Vitamins oral tablet: 1 tab(s) orally once a day via PEG tube  oxycodone-acetaminophen 5 mg-325 mg oral tablet: 2 tab(s) orally every 6 hours, As needed, Moderate Pain (4 - 6)  pantoprazole 40 mg oral delayed release tablet: 1 tab(s) orally once a day (before a meal) via PEG tube  polyethylene glycol 3350 oral powder for reconstitution: 17 gram(s) orally once a day via PEG tube  senna leaf extract oral tablet: 2 tab(s) orally once a day (at bedtime) via PEG tube  Trelegy Ellipta 100 mcg-62.5 mcg-25 mcg powder for inhalation:

## 2022-07-16 NOTE — RAPID RESPONSE TEAM SUMMARY - NSADDTLFINDINGSRRT_GEN_ALL_CORE
Patient was less responsive this morning. 2 hours she was verbal and following commands. Now she is non-verbal and less responsive to commands. She was able to raise her left arm and right leg on command. She was able to blink on command and indicated she was in pain through blinking.

## 2022-07-16 NOTE — PROGRESS NOTE ADULT - SUBJECTIVE AND OBJECTIVE BOX
SUBJECTIVE:  Patient is a 56y old Female who presents with a chief complaint of trach dysfunction (09 Jul 2022 13:43)  Acute respiratory failure with hypoxia    Today is hospital day 50. Patient seen and examined at bedside. RR called this morning as patient was less responsive and non-verbal, tachy to 121,  indicated she was in pain via blinking. Patient is more responsive this AM and able to follow commands although still non-verbal    HPI  HPI:  55 yo f with PMHx of DVT on Eliquis, COPD,  trach collar, obesity, IDDM, UTI, sent by [facility] for hypoxia. History provided by daughter at bedside, she reports the current illness going back to April 4th when pt was intubated on admission at [facility] ICU for hypoxic respiratory failure diagnosed with copd exacerbation and superimposed pneumonia, of note she also appears to have been in CHF exacerbation with severe b/l LE swelling treated with IV bumex. Remained intubated 37 days per daughter, diagnosed with fever of unknown origin (up to 106F), treated with empiric broad spectrum antibiotics and once 2 weeks s/p tracheostomy placement and 48 hours afebrile she was was discharged to nursing home for PT. Of note, patient's daughter and pt herself say the wiseman has not been replaced for over 3 weeks. She also reports a developing sacral pressure ulcer. Was at [facility] only 2 days when was noted to be hypoxic (saturating low 70's) and EMS called. While awaiting EMS, floor nurse on the unit suctioned the patient, and managed to bring up a very large mucus plug, with pt's saturation immediately improving afterwards to 80's. Nonetheless pt was sent to [facility] ED. Prior to all this pt was independent and ambulatory. Pt denies sob, cp, abd pain, n/v/d, fever or chills.     On arrival to ED pt was saturating 97%, 15L O2 via tracheostomy collar (baseline 8 L at [facility] [place]) VS:    /59  T 99.9F RR 20  Admission VBG pH 7.37 pCO2 60 pO2 49. Pt not wheezing, not coughing. CXR suspicious for L-sided PNA, inconclusive. CTA neg for PE. Pt received x1 IV Levoquin and Cefepime in ED, admitted to medicine for acute hypoxic hypercapnic respiratory failure secondary to acute mucus plug (now resolved vs superimposed pneumonia  hospital-acquired.(less likely), Records request sent to [facility] medical records  [phone]) and patient will be continued on empiric antibiotics pending procal (27 May 2022 14:02)      PAST MEDICAL & SURGICAL HISTORY  COPD (chronic obstructive pulmonary disease)    CHF (congestive heart failure)    DM (diabetes mellitus)    H/O tracheostomy      ALLERGIES:  penicillin (Swelling)    MEDICATIONS:  HOME MEDICATIONS  albuterol sulfate 2.5 mg/3 mL (0.083 %) solution for nebulization:   ALPRAZolam 0.5 mg oral tablet: 1 tab(s) orally every 12 hours, As needed, anxiety via PEG tube  apixaban 5 mg oral tablet: 1 tab(s) orally 2 times a day via PEG tube  atorvastatin 20 mg oral tablet: 1 tab(s) orally once a day (at bedtime) via PEG tube  gabapentin 600 mg oral tablet: 1 tab(s) orally 3 times a day via PEG tube  HYDROmorphone 4 mg oral tablet: 1 tab(s) orally every 8 hours, As needed, for severe pain  insulin glargine 100 units/mL subcutaneous solution: 20 unit(s) subcutaneous once a day (at bedtime)  insulin lispro 100 units/mL injectable solution: 9 unit(s) injectable 3 times a day (before meals)  losartan 100 mg oral tablet: 1 tab(s) orally once a day via PEG tube  Multiple Vitamins oral tablet: 1 tab(s) orally once a day via PEG tube  oxycodone-acetaminophen 5 mg-325 mg oral tablet: 2 tab(s) orally every 6 hours, As needed, Moderate Pain (4 - 6)  pantoprazole 40 mg oral delayed release tablet: 1 tab(s) orally once a day (before a meal) via PEG tube  polyethylene glycol 3350 oral powder for reconstitution: 17 gram(s) orally once a day via PEG tube  senna leaf extract oral tablet: 2 tab(s) orally once a day (at bedtime) via PEG tube  Trelegy Ellipta 100 mcg-62.5 mcg-25 mcg powder for inhalation:     STANDING MEDICATIONS  acetaminophen     Tablet .. 650 milliGRAM(s) Oral every 8 hours  ammonium lactate 12% Lotion 1 Application(s) Topical every 12 hours  apixaban 5 milliGRAM(s) Oral two times a day  atorvastatin 20 milliGRAM(s) Oral at bedtime  betamethasone valerate 0.1% Cream 1 Application(s) Topical two times a day  dextrose 5%. 1000 milliLiter(s) IV Continuous <Continuous>  dextrose 5%. 1000 milliLiter(s) IV Continuous <Continuous>  dextrose 50% Injectable 25 Gram(s) IV Push once  dextrose 50% Injectable 12.5 Gram(s) IV Push once  dextrose 50% Injectable 25 Gram(s) IV Push once  diatrizoate meglumine/diatrizoate sodium. 30 milliLiter(s) Oral once  gabapentin 600 milliGRAM(s) Oral three times a day  glucagon  Injectable 1 milliGRAM(s) IntraMuscular once  influenza   Vaccine 0.5 milliLiter(s) IntraMuscular once  insulin glargine Injectable (LANTUS) 20 Unit(s) SubCutaneous at bedtime  insulin lispro (ADMELOG) corrective regimen sliding scale   SubCutaneous three times a day before meals  insulin lispro Injectable (ADMELOG) 9 Unit(s) SubCutaneous every 6 hours  multivitamin 1 Tablet(s) Oral daily  nystatin Powder 1 Application(s) Topical two times a day  pantoprazole    Tablet 40 milliGRAM(s) Oral before breakfast  polyethylene glycol 3350 17 Gram(s) Oral daily  senna 2 Tablet(s) Oral at bedtime    PRN MEDICATIONS  albuterol/ipratropium for Nebulization 3 milliLiter(s) Nebulizer every 6 hours PRN  ALPRAZolam 1 milliGRAM(s) Oral two times a day PRN  dextrose Oral Gel 15 Gram(s) Oral once PRN  HYDROmorphone  Injectable 1 milliGRAM(s) IV Push every 4 hours PRN  oxycodone    5 mG/acetaminophen 325 mG 2 Tablet(s) Oral/Enteral Tube every 6 hours PRN    VITALS:   Vital Signs Last 24 Hrs  T(C): 36.4 (16 Jul 2022 06:00), Max: 37.2 (15 Jul 2022 19:59)  T(F): 97.5 (16 Jul 2022 06:00), Max: 98.9 (15 Jul 2022 19:59)  HR: 121 (16 Jul 2022 06:00) (100 - 121)  BP: 167/76 (16 Jul 2022 06:00) (137/60 - 167/76)  BP(mean): --  RR: 17 (16 Jul 2022 06:00) (17 - 19)  SpO2: 98% (16 Jul 2022 06:00) (98% - 99%)        LABS:      LABS:                        10.9   11.48 )-----------( 162      ( 16 Jul 2022 06:56 )             33.4     07-16    135  |  93<L>  |  8<L>  ----------------------------<  142<H>  4.8   |  30  |  <0.5<L>    Ca    9.7      16 Jul 2022 06:56  Phos  4.8     07-16  Mg     1.6     07-16    TPro  6.5  /  Alb  3.5  /  TBili  0.3  /  DBili  x   /  AST  22  /  ALT  12  /  AlkPhos  109  07-16          RADIOLOGY:  EKG  12 Lead ECG:   Ventricular Rate 92 BPM    Atrial Rate 92 BPM    P-R Interval 168 ms    QRS Duration 98 ms    Q-T Interval 370 ms    QTC Calculation(Bazett) 457 ms    P Axis 84 degrees    R Axis 103 degrees    T Axis 89 degrees    Diagnosis Line Sinus rhythm with Premature supraventricular complexes  Rightward axis  Low voltage QRS  Incomplete right bundle branch block  Cannot rule out Anterior infarct , age undetermined  T wave abnormality, consider lateral ischemia  Abnormal ECG    Confirmed by Joe Person ([id number]) on 6/30/2022 7:36:49 AM (06-30-22 @ 04:43)  12 Lead ECG:   Ventricular Rate 89 BPM    Atrial Rate 89 BPM    P-R Interval 162 ms    QRS Duration 98 ms    Q-T Interval 372 ms    QTC Calculation(Bazett) 452 ms    P Axis 90 degrees    R Axis 102 degrees    T Axis 90 degrees    Diagnosis Line Normal sinus rhythm  Rightward axis  Incomplete right bundle branch block  Anterior infarct , age undetermined  ST & T wave abnormality, consider lateral ischemia  Abnormal ECG    Confirmed by Joe Person ([id number]) on 6/30/2022 7:36:41 AM (06-30-22 @ 04:42)      Physical Exam:  General: no acute distress, lying in bed  Head: normocephalic and atraumatic  Neck: supple, trach clean and intact  Heart: regular rate and rhythm, S1 and S2 normal, no murmurs, rubs or gallops noted on exam  Lungs: Symmetric chest expansion bilaterally, clear lungs bilaterally, no wheezes rhonchi or crackles heard  Abdomen: Bowel sounds present, non tender on light and deep palpation  Extremities: No edema, no clubbing or cyanosis notes, positive peripheral pulses, brown discoloration of bilateral lower extremities- unchanged from previous exams

## 2022-07-16 NOTE — PROGRESS NOTE ADULT - ATTENDING COMMENTS
57 yo female  with PMHx of DVT on Eliquis, COPD,  trach collar, obesity, IDDM, UTI, [facility] [place] for hypoxia. Current illness going back to April 4th when pt was intubated on admission at [facility] ICU for hypoxic respiratory failure diagnosed with copd exacerbation and superimposed pneumonia, and remained intubated for 37 days requiring tracheostomy. Patient stayed at [facility] for 2 days until noted to be hypoxic (saturating low 70's) pt's saturation immediately improving afterwards to 80's after large mucus plug removed and was subsequently sent to [facility]  On arrival to ED pt was saturating 97%, 15L O2 via tracheostomy collar (baseline 8 L at [facility])CXR suspicious for L-sided PNA, inconclusive. CTA neg for PE. Pt received x1 IV Levaquin and Cefepime in ED, admitted to MICU for acute hypoxic hypercapnic respiratory failure secondary to acute mucus plug now resolved vs superimposed pneumonia hospital-acquired.   General Surgery Consulted emergently when patient found to be hypoxic to low 80s after CCU and Pulmonary team found large granuloma in trachea along tracheostomy tube. Patient currently had a size 8 trach. Surgery assisted Pulmonary and CCU with trach exchanged with ET tube guidance to a size 7 XLT. Saturations improved to high 90s. Large granuloma was removed. Patient was no longer in distress after exchange.    #VDRF: Vent management per Pulm.  s/p trach exchange/s/p mucus plugs removal this admission  Monitor oxygenation. Lately been saturating ok on T piece.  -7/10- patient remains on 40% FIO2 supplement trach to collar, continue daily wean off trials as tolerated by pt.    #sepsis (developed again on 6/27/22): resolved   -UA +; blood cultures (6/28)= Serratia. Sens. noted  -s/p Cefepime and Vanc (6/28) > changed to CTX 2gm QD per ID (6/29) till 7/7  - 6/30 blood culture NGTD, 6/29 urine culture NGTD  CT Abdo per ID: only showed some cecitis    #TME(again on 7/16 AM): Drowsy but arousable. Follows some commands after much stimulation  Hold dilaudid until mental status improves  Resume CTX again (leukocytosis again with tachycardia). Check Blood culture, CXR, UA, ID reconsult      #s/p PEG (6/15)  #Persistent Dysphagia:  ST planning repeat FEES on 7/6- poor visualization  - s/p barium swallow study on 7/7- still unable to take PO intake  -Patient will need close outpatient f/up at  SNF for further plans with swallow studies and tx.  -continue peg feedings with pre/post flushes   -as per speech tx- patient is allowed to have ice chips and sips of water po  -daily speech tx.      #LE neuropathic pain:  Patient has apparently been bed bound since April 4th (her [facility] admission for hypoxic failure where she ended up being intubated)  Apparently has L > R foot drop which may be due to chronic contractures of Calf muscle/Achilles tendon.   Can have PT evaluate her for that. But options are limited at this stage.   Given the asymmetrical foot drop, Neurology ruled out Any focal neuropathy with a MRI Lumbar spine WNL.     #Chronic atropic B/l LE Hyperpigmentation changes: LWC with Betamethasone cream and Ammonium lactate dressings per Burn has not helped over the past 3 weeks.   Recall Burn for Skin biopsy - OP vs IP? Can re discuss with Derm too.    #Lower extremity Pain management*** :   Gabapentin to 600 TID,   Currently IV dilaudid 1 Q4 PRN > On 7/14, changing to PO dilaudid 4mg Q4 PRN. Titrate PRN.    May use percocet prn for breakthrough pain? Pending Pain Management consult.    Note: On 6/21, we discontinued Cymbalta 60 QD since as per family patient may be having bizarre thoughts/hallucinations. Patient was texting them bizarre messages.   Note: Patient has h/o suicidal thoughts on Pregabalin.     Xanax 1 mg Q12 PRN for anxiety.   On Senna, Miralax.     #Suspected epiglottis edema:   s/p DExa Taper (ended 6/18).   Outpatient ENT f/u.     #DM:   steroids ended 6/18.  6/19-  lantus  20.    #Hyponatremia- resolved     #Chronic Indwelling Singh  - repeat U. Cx (6/29) WNL    #Hx of DVT- restarted Eliquis    # Severe Physical deconditioning : patient unable to walk due to severe legs pain, on pain management, continue to encourage PT tx. daily as tolerated     GI PPX  PPI  DVT PPX  ELIQUIS      #Progress Note Handoff: Pending d/c to SNF once bed is available, got insurance approval. pending pain management, Burn f/u. likely d/c on saturday.   Family discussion: medical plan of care d/w pt. by bedside Disposition: SNF once bed is available

## 2022-07-17 LAB
ALBUMIN SERPL ELPH-MCNC: 3.3 G/DL — LOW (ref 3.5–5.2)
ALP SERPL-CCNC: 98 U/L — SIGNIFICANT CHANGE UP (ref 30–115)
ALT FLD-CCNC: 9 U/L — SIGNIFICANT CHANGE UP (ref 0–41)
ANION GAP SERPL CALC-SCNC: 5 MMOL/L — LOW (ref 7–14)
AST SERPL-CCNC: 13 U/L — SIGNIFICANT CHANGE UP (ref 0–41)
BASOPHILS # BLD AUTO: 0.03 K/UL — SIGNIFICANT CHANGE UP (ref 0–0.2)
BASOPHILS NFR BLD AUTO: 0.4 % — SIGNIFICANT CHANGE UP (ref 0–1)
BILIRUB SERPL-MCNC: 0.3 MG/DL — SIGNIFICANT CHANGE UP (ref 0.2–1.2)
BUN SERPL-MCNC: 8 MG/DL — LOW (ref 10–20)
CALCIUM SERPL-MCNC: 9.3 MG/DL — SIGNIFICANT CHANGE UP (ref 8.5–10.1)
CHLORIDE SERPL-SCNC: 92 MMOL/L — LOW (ref 98–110)
CO2 SERPL-SCNC: 35 MMOL/L — HIGH (ref 17–32)
CREAT SERPL-MCNC: <0.5 MG/DL — LOW (ref 0.7–1.5)
EGFR: 115 ML/MIN/1.73M2 — SIGNIFICANT CHANGE UP
EOSINOPHIL # BLD AUTO: 0.15 K/UL — SIGNIFICANT CHANGE UP (ref 0–0.7)
EOSINOPHIL NFR BLD AUTO: 1.9 % — SIGNIFICANT CHANGE UP (ref 0–8)
GLUCOSE BLDC GLUCOMTR-MCNC: 107 MG/DL — HIGH (ref 70–99)
GLUCOSE BLDC GLUCOMTR-MCNC: 112 MG/DL — HIGH (ref 70–99)
GLUCOSE BLDC GLUCOMTR-MCNC: 84 MG/DL — SIGNIFICANT CHANGE UP (ref 70–99)
GLUCOSE BLDC GLUCOMTR-MCNC: 87 MG/DL — SIGNIFICANT CHANGE UP (ref 70–99)
GLUCOSE SERPL-MCNC: 85 MG/DL — SIGNIFICANT CHANGE UP (ref 70–99)
HCT VFR BLD CALC: 27.8 % — LOW (ref 37–47)
HGB BLD-MCNC: 9.4 G/DL — LOW (ref 12–16)
IMM GRANULOCYTES NFR BLD AUTO: 0.3 % — SIGNIFICANT CHANGE UP (ref 0.1–0.3)
LYMPHOCYTES # BLD AUTO: 0.9 K/UL — LOW (ref 1.2–3.4)
LYMPHOCYTES # BLD AUTO: 11.5 % — LOW (ref 20.5–51.1)
MAGNESIUM SERPL-MCNC: 1.8 MG/DL — SIGNIFICANT CHANGE UP (ref 1.8–2.4)
MCHC RBC-ENTMCNC: 33.3 PG — HIGH (ref 27–31)
MCHC RBC-ENTMCNC: 33.8 G/DL — SIGNIFICANT CHANGE UP (ref 32–37)
MCV RBC AUTO: 98.6 FL — SIGNIFICANT CHANGE UP (ref 81–99)
MONOCYTES # BLD AUTO: 0.76 K/UL — HIGH (ref 0.1–0.6)
MONOCYTES NFR BLD AUTO: 9.7 % — HIGH (ref 1.7–9.3)
NEUTROPHILS # BLD AUTO: 5.94 K/UL — SIGNIFICANT CHANGE UP (ref 1.4–6.5)
NEUTROPHILS NFR BLD AUTO: 76.2 % — HIGH (ref 42.2–75.2)
NRBC # BLD: 0 /100 WBCS — SIGNIFICANT CHANGE UP (ref 0–0)
PLATELET # BLD AUTO: 158 K/UL — SIGNIFICANT CHANGE UP (ref 130–400)
POTASSIUM SERPL-MCNC: 4.5 MMOL/L — SIGNIFICANT CHANGE UP (ref 3.5–5)
POTASSIUM SERPL-SCNC: 4.5 MMOL/L — SIGNIFICANT CHANGE UP (ref 3.5–5)
PROT SERPL-MCNC: 5.8 G/DL — LOW (ref 6–8)
RBC # BLD: 2.82 M/UL — LOW (ref 4.2–5.4)
RBC # FLD: 13.4 % — SIGNIFICANT CHANGE UP (ref 11.5–14.5)
SODIUM SERPL-SCNC: 132 MMOL/L — LOW (ref 135–146)
WBC # BLD: 7.8 K/UL — SIGNIFICANT CHANGE UP (ref 4.8–10.8)
WBC # FLD AUTO: 7.8 K/UL — SIGNIFICANT CHANGE UP (ref 4.8–10.8)

## 2022-07-17 PROCEDURE — 99233 SBSQ HOSP IP/OBS HIGH 50: CPT

## 2022-07-17 RX ADMIN — HYDROMORPHONE HYDROCHLORIDE 4 MILLIGRAM(S): 2 INJECTION INTRAMUSCULAR; INTRAVENOUS; SUBCUTANEOUS at 12:05

## 2022-07-17 RX ADMIN — GABAPENTIN 600 MILLIGRAM(S): 400 CAPSULE ORAL at 21:52

## 2022-07-17 RX ADMIN — Medication 1 APPLICATION(S): at 05:38

## 2022-07-17 RX ADMIN — CEFTRIAXONE 100 MILLIGRAM(S): 500 INJECTION, POWDER, FOR SOLUTION INTRAMUSCULAR; INTRAVENOUS at 12:07

## 2022-07-17 RX ADMIN — HYDROMORPHONE HYDROCHLORIDE 4 MILLIGRAM(S): 2 INJECTION INTRAMUSCULAR; INTRAVENOUS; SUBCUTANEOUS at 20:55

## 2022-07-17 RX ADMIN — GABAPENTIN 600 MILLIGRAM(S): 400 CAPSULE ORAL at 05:38

## 2022-07-17 RX ADMIN — HYDROMORPHONE HYDROCHLORIDE 4 MILLIGRAM(S): 2 INJECTION INTRAMUSCULAR; INTRAVENOUS; SUBCUTANEOUS at 20:25

## 2022-07-17 RX ADMIN — INSULIN GLARGINE 20 UNIT(S): 100 INJECTION, SOLUTION SUBCUTANEOUS at 22:49

## 2022-07-17 RX ADMIN — POLYETHYLENE GLYCOL 3350 17 GRAM(S): 17 POWDER, FOR SOLUTION ORAL at 11:55

## 2022-07-17 RX ADMIN — HYDROMORPHONE HYDROCHLORIDE 4 MILLIGRAM(S): 2 INJECTION INTRAMUSCULAR; INTRAVENOUS; SUBCUTANEOUS at 07:50

## 2022-07-17 RX ADMIN — GABAPENTIN 600 MILLIGRAM(S): 400 CAPSULE ORAL at 13:49

## 2022-07-17 RX ADMIN — Medication 1 APPLICATION(S): at 17:31

## 2022-07-17 RX ADMIN — Medication 1 MILLIGRAM(S): at 10:08

## 2022-07-17 RX ADMIN — Medication 1 APPLICATION(S): at 05:39

## 2022-07-17 RX ADMIN — PANTOPRAZOLE SODIUM 40 MILLIGRAM(S): 20 TABLET, DELAYED RELEASE ORAL at 11:54

## 2022-07-17 RX ADMIN — HYDROMORPHONE HYDROCHLORIDE 4 MILLIGRAM(S): 2 INJECTION INTRAMUSCULAR; INTRAVENOUS; SUBCUTANEOUS at 17:29

## 2022-07-17 RX ADMIN — HYDROMORPHONE HYDROCHLORIDE 4 MILLIGRAM(S): 2 INJECTION INTRAMUSCULAR; INTRAVENOUS; SUBCUTANEOUS at 16:00

## 2022-07-17 RX ADMIN — NYSTATIN CREAM 1 APPLICATION(S): 100000 CREAM TOPICAL at 05:40

## 2022-07-17 RX ADMIN — HYDROMORPHONE HYDROCHLORIDE 4 MILLIGRAM(S): 2 INJECTION INTRAMUSCULAR; INTRAVENOUS; SUBCUTANEOUS at 08:30

## 2022-07-17 RX ADMIN — APIXABAN 5 MILLIGRAM(S): 2.5 TABLET, FILM COATED ORAL at 05:38

## 2022-07-17 RX ADMIN — HYDROMORPHONE HYDROCHLORIDE 4 MILLIGRAM(S): 2 INJECTION INTRAMUSCULAR; INTRAVENOUS; SUBCUTANEOUS at 12:30

## 2022-07-17 RX ADMIN — Medication 1 TABLET(S): at 11:54

## 2022-07-17 RX ADMIN — APIXABAN 5 MILLIGRAM(S): 2.5 TABLET, FILM COATED ORAL at 17:30

## 2022-07-17 RX ADMIN — SENNA PLUS 2 TABLET(S): 8.6 TABLET ORAL at 21:52

## 2022-07-17 RX ADMIN — ATORVASTATIN CALCIUM 20 MILLIGRAM(S): 80 TABLET, FILM COATED ORAL at 21:49

## 2022-07-17 RX ADMIN — NYSTATIN CREAM 1 APPLICATION(S): 100000 CREAM TOPICAL at 17:25

## 2022-07-17 RX ADMIN — Medication 1 MILLIGRAM(S): at 21:50

## 2022-07-17 NOTE — PROGRESS NOTE ADULT - ASSESSMENT
55 yo female  with PMHx of DVT on Eliquis, COPD,  trach collar, obesity, IDDM, UTI, sent by [facility] for hypoxia. Current illness going back to April 4th when pt was intubated on admission at [facility] ICU for hypoxic respiratory failure diagnosed with copd exacerbation and superimposed pneumonia, and remained intubated for 37 days requiring tracheostomy. Patient stayed at [facility] for 2 days until noted to be hypoxic (saturating low 70's) pt's saturation immediately improving afterwards to 80's after large mucus plug removed and was subsequently sent to [facility]  On arrival to ED pt was saturating 97%, 15L O2 via tracheostomy collar (baseline 8 L at [facility])CXR suspicious for L-sided PNA, inconclusive. CTA neg for PE. Pt received x1 IV Levaquin and Cefepime in ED, admitted to MICU for acute hypoxic hypercapnic respiratory failure secondary to acute mucus plug now resolved vs superimposed pneumonia hospital-acquired.   General Surgery Consulted emergently when patient found to be hypoxic to low 80s after CCU and Pulmonary team found large granuloma in trachea along tracheostomy tube. Patient currently had a size 8 trach. Surgery assisted Pulmonary and CCU with trach exchanged with ET tube guidance to a size 7 XLT. Saturations improved to high 90s. Large granuloma was removed. Patient was no longer in distress after exchange.    #VDRF: Vent management per Pulm.  s/p trach exchange/s/p mucus plugs removal this admission  Monitor oxygenation. Lately been saturating ok on T piece.  -7/10- patient remains on 40% FIO2 supplement trach to collar, continue daily wean off trials as tolerated by pt.    #sepsis (developed again on 6/27/22): resolved   -UA +; blood cultures (6/28)= Serratia. Sens. noted  -s/p Cefepime and Vanc (6/28) > changed to CTX 2gm QD per ID (6/29) till 7/7  - 6/30 blood culture NGTD, 6/29 urine culture NGTD  CT Abdo per ID: only showed some cecitis    #TME(again on 7/16 AM): Drowsy but arousable. Follows some commands after much stimulation  Held dilaudid until mental status improved  Resumed CTX again (leukocytosis again with tachycardia). Check Blood culture, CXR, UA, ID reconsult  If repeat cultures negative, may d/c Abx. [place] improved as of 7/17.    #s/p PEG (6/15)  #Persistent Dysphagia:  ST planning repeat FEES on 7/6- poor visualization  - s/p barium swallow study on 7/7- still unable to take PO intake  -Patient will need close outpatient f/up at  SNF for further plans with swallow studies and tx.  -continue peg feedings with pre/post flushes   -as per speech tx- patient is allowed to have ice chips and sips of water po  -daily speech tx.      #LE neuropathic pain:  Patient has apparently been bed bound since April 4th (her [facility] admission for hypoxic failure where she ended up being intubated)  Apparently has L > R foot drop which may be due to chronic contractures of Calf muscle/Achilles tendon.   Can have PT evaluate her for that. But options are limited at this stage.   Given the asymmetrical foot drop, Neurology ruled out Any focal neuropathy with a MRI Lumbar spine WNL.     #Chronic atropic B/l LE Hyperpigmentation changes: LWC with Betamethasone cream and Ammonium lactate dressings per Burn has not helped over the past 3 weeks.   Recall Burn for Skin biopsy - OP vs IP? Can re discuss with Derm too.    #Lower extremity Pain management*** :   Gabapentin to 600 TID,   Currently IV dilaudid 1 Q4 PRN > On 7/14, changing to PO dilaudid 4mg Q4 PRN. Titrate PRN.    May use percocet prn for breakthrough pain? Pending Pain Management consult.    Note: On 6/21, we discontinued Cymbalta 60 QD since as per family patient may be having bizarre thoughts/hallucinations. Patient was texting them bizarre messages.   Note: Patient has h/o suicidal thoughts on Pregabalin.     Xanax 1 mg Q12 PRN for anxiety.   On Senna, Miralax.     #Suspected epiglottis edema:   s/p DExa Taper (ended 6/18).   Outpatient ENT f/u.     #DM:   steroids ended 6/18.  6/19-  lantus  20.    #Hyponatremia- resolved     #Chronic Indwelling Singh  - repeat U. Cx (6/29) WNL    #Hx of DVT- restarted Eliquis    # Severe Physical deconditioning : patient unable to walk due to severe legs pain, on pain management, continue to encourage PT tx. daily as tolerated     GI PPX  PPI  DVT PPX  ELIQUIS      #Progress Note Handoff: Pending d/c to SNF once bed is available, got insurance approval. pending pain management, Burn f/u. likely d/c on tuesday.   Family discussion: medical plan of care d/w pt. by bedside Disposition: SNF once bed is available .

## 2022-07-17 NOTE — PROGRESS NOTE ADULT - SUBJECTIVE AND OBJECTIVE BOX
S: more alert today  back to asking for pain meds      All other pertinent ROS negative.      Vital Signs Last 24 Hrs  T(C): 36.1 (16 Jul 2022 20:35), Max: 36.1 (16 Jul 2022 20:35)  T(F): 97 (16 Jul 2022 20:35), Max: 97 (16 Jul 2022 20:35)  HR: 108 (16 Jul 2022 20:35) (108 - 108)  BP: 122/58 (16 Jul 2022 20:35) (122/58 - 122/58)  BP(mean): --  RR: 21 (16 Jul 2022 19:50) (21 - 21)  SpO2: 97% (16 Jul 2022 20:35) (97% - 98%)    Parameters below as of 16 Jul 2022 19:50  Patient On (Oxygen Delivery Method): T-piece    O2 Concentration (%): 40  PHYSICAL EXAM:    Constitutional: trach/peg  HEENT: PERR, EOMI, Normal Hearing, MMM  Neck: Soft and supple, No LAD, No JVD  Respiratory: Breath sounds are clear bilaterally, No wheezing, rales or rhonchi  Cardiovascular: S1 and S2, regular rate and rhythm, no Murmurs, gallops or rubs  Gastrointestinal: Bowel Sounds present, soft, nontender, nondistended, no guarding, no rebound  Extremities: unchanged black discoloration. TTP      MEDICATIONS:  MEDICATIONS  (STANDING):  ammonium lactate 12% Lotion 1 Application(s) Topical two times a day  apixaban 5 milliGRAM(s) Enteral Tube two times a day  atorvastatin 20 milliGRAM(s) Oral at bedtime  betamethasone valerate 0.1% Cream 1 Application(s) Topical two times a day  cefTRIAXone   IVPB      cefTRIAXone   IVPB 2000 milliGRAM(s) IV Intermittent every 24 hours  dextrose 5%. 1000 milliLiter(s) (50 mL/Hr) IV Continuous <Continuous>  dextrose 5%. 1000 milliLiter(s) (100 mL/Hr) IV Continuous <Continuous>  dextrose 50% Injectable 25 Gram(s) IV Push once  dextrose 50% Injectable 12.5 Gram(s) IV Push once  dextrose 50% Injectable 25 Gram(s) IV Push once  gabapentin 600 milliGRAM(s) Oral three times a day  glucagon  Injectable 1 milliGRAM(s) IntraMuscular once  influenza   Vaccine 0.5 milliLiter(s) IntraMuscular once  insulin glargine Injectable (LANTUS) 20 Unit(s) SubCutaneous at bedtime  insulin lispro (ADMELOG) corrective regimen sliding scale   SubCutaneous three times a day before meals  insulin lispro Injectable (ADMELOG) 9 Unit(s) SubCutaneous every 6 hours  multivitamin 1 Tablet(s) Oral daily  nystatin Powder 1 Application(s) Topical two times a day  pantoprazole   Suspension 40 milliGRAM(s) Enteral Tube daily  polyethylene glycol 3350 17 Gram(s) Oral daily  senna 2 Tablet(s) Oral at bedtime      LABS: All Labs Reviewed:                        9.4    7.80  )-----------( 158      ( 17 Jul 2022 06:28 )             27.8     07-17    132<L>  |  92<L>  |  8<L>  ----------------------------<  85  4.5   |  35<H>  |  <0.5<L>    Ca    9.3      17 Jul 2022 06:28  Phos  4.8     07-16  Mg     1.8     07-17    TPro  5.8<L>  /  Alb  3.3<L>  /  TBili  0.3  /  DBili  x   /  AST  13  /  ALT  9   /  AlkPhos  98  07-17      CARDIAC MARKERS ( 16 Jul 2022 06:56 )  x     / <0.01 ng/mL / 18 U/L / x     / x          Blood Culture:     Radiology: reviewed

## 2022-07-18 LAB
ALBUMIN SERPL ELPH-MCNC: 3 G/DL — LOW (ref 3.5–5.2)
ALP SERPL-CCNC: 86 U/L — SIGNIFICANT CHANGE UP (ref 30–115)
ALT FLD-CCNC: 8 U/L — SIGNIFICANT CHANGE UP (ref 0–41)
ANION GAP SERPL CALC-SCNC: 10 MMOL/L — SIGNIFICANT CHANGE UP (ref 7–14)
AST SERPL-CCNC: 12 U/L — SIGNIFICANT CHANGE UP (ref 0–41)
BASOPHILS # BLD AUTO: 0.03 K/UL — SIGNIFICANT CHANGE UP (ref 0–0.2)
BASOPHILS NFR BLD AUTO: 0.5 % — SIGNIFICANT CHANGE UP (ref 0–1)
BILIRUB SERPL-MCNC: 0.3 MG/DL — SIGNIFICANT CHANGE UP (ref 0.2–1.2)
BUN SERPL-MCNC: 7 MG/DL — LOW (ref 10–20)
CALCIUM SERPL-MCNC: 9.2 MG/DL — SIGNIFICANT CHANGE UP (ref 8.5–10.1)
CHLORIDE SERPL-SCNC: 90 MMOL/L — LOW (ref 98–110)
CO2 SERPL-SCNC: 33 MMOL/L — HIGH (ref 17–32)
CREAT SERPL-MCNC: <0.5 MG/DL — LOW (ref 0.7–1.5)
EGFR: 124 ML/MIN/1.73M2 — SIGNIFICANT CHANGE UP
EOSINOPHIL # BLD AUTO: 0.27 K/UL — SIGNIFICANT CHANGE UP (ref 0–0.7)
EOSINOPHIL NFR BLD AUTO: 4.9 % — SIGNIFICANT CHANGE UP (ref 0–8)
GLUCOSE BLDC GLUCOMTR-MCNC: 103 MG/DL — HIGH (ref 70–99)
GLUCOSE BLDC GLUCOMTR-MCNC: 155 MG/DL — HIGH (ref 70–99)
GLUCOSE BLDC GLUCOMTR-MCNC: 155 MG/DL — HIGH (ref 70–99)
GLUCOSE BLDC GLUCOMTR-MCNC: 159 MG/DL — HIGH (ref 70–99)
GLUCOSE BLDC GLUCOMTR-MCNC: 58 MG/DL — LOW (ref 70–99)
GLUCOSE SERPL-MCNC: 102 MG/DL — HIGH (ref 70–99)
HCT VFR BLD CALC: 26.6 % — LOW (ref 37–47)
HGB BLD-MCNC: 8.9 G/DL — LOW (ref 12–16)
IMM GRANULOCYTES NFR BLD AUTO: 0.4 % — HIGH (ref 0.1–0.3)
LYMPHOCYTES # BLD AUTO: 0.87 K/UL — LOW (ref 1.2–3.4)
LYMPHOCYTES # BLD AUTO: 15.8 % — LOW (ref 20.5–51.1)
MAGNESIUM SERPL-MCNC: 1.6 MG/DL — LOW (ref 1.8–2.4)
MCHC RBC-ENTMCNC: 32.7 PG — HIGH (ref 27–31)
MCHC RBC-ENTMCNC: 33.5 G/DL — SIGNIFICANT CHANGE UP (ref 32–37)
MCV RBC AUTO: 97.8 FL — SIGNIFICANT CHANGE UP (ref 81–99)
MONOCYTES # BLD AUTO: 0.65 K/UL — HIGH (ref 0.1–0.6)
MONOCYTES NFR BLD AUTO: 11.8 % — HIGH (ref 1.7–9.3)
NEUTROPHILS # BLD AUTO: 3.67 K/UL — SIGNIFICANT CHANGE UP (ref 1.4–6.5)
NEUTROPHILS NFR BLD AUTO: 66.6 % — SIGNIFICANT CHANGE UP (ref 42.2–75.2)
NRBC # BLD: 0 /100 WBCS — SIGNIFICANT CHANGE UP (ref 0–0)
PLATELET # BLD AUTO: 159 K/UL — SIGNIFICANT CHANGE UP (ref 130–400)
POTASSIUM SERPL-MCNC: 4.3 MMOL/L — SIGNIFICANT CHANGE UP (ref 3.5–5)
POTASSIUM SERPL-SCNC: 4.3 MMOL/L — SIGNIFICANT CHANGE UP (ref 3.5–5)
PROT SERPL-MCNC: 5.6 G/DL — LOW (ref 6–8)
RBC # BLD: 2.72 M/UL — LOW (ref 4.2–5.4)
RBC # FLD: 13.2 % — SIGNIFICANT CHANGE UP (ref 11.5–14.5)
SODIUM SERPL-SCNC: 133 MMOL/L — LOW (ref 135–146)
WBC # BLD: 5.51 K/UL — SIGNIFICANT CHANGE UP (ref 4.8–10.8)
WBC # FLD AUTO: 5.51 K/UL — SIGNIFICANT CHANGE UP (ref 4.8–10.8)

## 2022-07-18 PROCEDURE — 99231 SBSQ HOSP IP/OBS SF/LOW 25: CPT

## 2022-07-18 PROCEDURE — 99233 SBSQ HOSP IP/OBS HIGH 50: CPT

## 2022-07-18 RX ORDER — ALPRAZOLAM 0.25 MG
1 TABLET ORAL
Refills: 0 | Status: DISCONTINUED | OUTPATIENT
Start: 2022-07-18 | End: 2022-07-22

## 2022-07-18 RX ORDER — MAGNESIUM SULFATE 500 MG/ML
2 VIAL (ML) INJECTION ONCE
Refills: 0 | Status: DISCONTINUED | OUTPATIENT
Start: 2022-07-18 | End: 2022-07-22

## 2022-07-18 RX ADMIN — HYDROMORPHONE HYDROCHLORIDE 4 MILLIGRAM(S): 2 INJECTION INTRAMUSCULAR; INTRAVENOUS; SUBCUTANEOUS at 22:36

## 2022-07-18 RX ADMIN — NYSTATIN CREAM 1 APPLICATION(S): 100000 CREAM TOPICAL at 18:04

## 2022-07-18 RX ADMIN — GABAPENTIN 600 MILLIGRAM(S): 400 CAPSULE ORAL at 22:07

## 2022-07-18 RX ADMIN — POLYETHYLENE GLYCOL 3350 17 GRAM(S): 17 POWDER, FOR SOLUTION ORAL at 11:53

## 2022-07-18 RX ADMIN — Medication 1 APPLICATION(S): at 05:49

## 2022-07-18 RX ADMIN — Medication 9 UNIT(S): at 11:52

## 2022-07-18 RX ADMIN — HYDROMORPHONE HYDROCHLORIDE 4 MILLIGRAM(S): 2 INJECTION INTRAMUSCULAR; INTRAVENOUS; SUBCUTANEOUS at 03:12

## 2022-07-18 RX ADMIN — HYDROMORPHONE HYDROCHLORIDE 4 MILLIGRAM(S): 2 INJECTION INTRAMUSCULAR; INTRAVENOUS; SUBCUTANEOUS at 18:06

## 2022-07-18 RX ADMIN — NYSTATIN CREAM 1 APPLICATION(S): 100000 CREAM TOPICAL at 05:49

## 2022-07-18 RX ADMIN — HYDROMORPHONE HYDROCHLORIDE 4 MILLIGRAM(S): 2 INJECTION INTRAMUSCULAR; INTRAVENOUS; SUBCUTANEOUS at 07:46

## 2022-07-18 RX ADMIN — PANTOPRAZOLE SODIUM 40 MILLIGRAM(S): 20 TABLET, DELAYED RELEASE ORAL at 11:33

## 2022-07-18 RX ADMIN — Medication 1 MILLIGRAM(S): at 14:36

## 2022-07-18 RX ADMIN — GABAPENTIN 600 MILLIGRAM(S): 400 CAPSULE ORAL at 13:35

## 2022-07-18 RX ADMIN — INSULIN GLARGINE 20 UNIT(S): 100 INJECTION, SOLUTION SUBCUTANEOUS at 22:09

## 2022-07-18 RX ADMIN — HYDROMORPHONE HYDROCHLORIDE 4 MILLIGRAM(S): 2 INJECTION INTRAMUSCULAR; INTRAVENOUS; SUBCUTANEOUS at 22:06

## 2022-07-18 RX ADMIN — Medication 2: at 11:52

## 2022-07-18 RX ADMIN — GABAPENTIN 600 MILLIGRAM(S): 400 CAPSULE ORAL at 05:48

## 2022-07-18 RX ADMIN — APIXABAN 5 MILLIGRAM(S): 2.5 TABLET, FILM COATED ORAL at 18:06

## 2022-07-18 RX ADMIN — Medication 1 TABLET(S): at 11:32

## 2022-07-18 RX ADMIN — HYDROMORPHONE HYDROCHLORIDE 4 MILLIGRAM(S): 2 INJECTION INTRAMUSCULAR; INTRAVENOUS; SUBCUTANEOUS at 08:29

## 2022-07-18 RX ADMIN — HYDROMORPHONE HYDROCHLORIDE 4 MILLIGRAM(S): 2 INJECTION INTRAMUSCULAR; INTRAVENOUS; SUBCUTANEOUS at 12:35

## 2022-07-18 RX ADMIN — APIXABAN 5 MILLIGRAM(S): 2.5 TABLET, FILM COATED ORAL at 05:49

## 2022-07-18 RX ADMIN — ATORVASTATIN CALCIUM 20 MILLIGRAM(S): 80 TABLET, FILM COATED ORAL at 22:06

## 2022-07-18 RX ADMIN — HYDROMORPHONE HYDROCHLORIDE 4 MILLIGRAM(S): 2 INJECTION INTRAMUSCULAR; INTRAVENOUS; SUBCUTANEOUS at 03:42

## 2022-07-18 RX ADMIN — Medication 1 APPLICATION(S): at 05:50

## 2022-07-18 RX ADMIN — Medication 1 MILLIGRAM(S): at 22:38

## 2022-07-18 RX ADMIN — HYDROMORPHONE HYDROCHLORIDE 4 MILLIGRAM(S): 2 INJECTION INTRAMUSCULAR; INTRAVENOUS; SUBCUTANEOUS at 11:41

## 2022-07-18 RX ADMIN — SENNA PLUS 2 TABLET(S): 8.6 TABLET ORAL at 22:07

## 2022-07-18 RX ADMIN — Medication 1 APPLICATION(S): at 18:03

## 2022-07-18 NOTE — PROGRESS NOTE ADULT - NS ATTEND AMEND GEN_ALL_CORE FT
The patient is a 56-year-old lady we were asked to assist with emergently to establish an airway during a bronchoscopy procedure couple of days ago.    We managed to emergently insert the new tracheostomy tube to control the airway but the patient was found to have significant granulation tissue in the trachea adjacent to the tracheostomy site which was causing an obstruction to the previously placed tube.    Since the procedure the patient has been doing much better  No airway issues  Ventilating adequately  Continues to be mechanically ventilated    Improvement in the chest x-ray with aeration once again of the left lung    Will plan a bronchoscopy in the coming days    Further management as per the ICU team  Care plan discussed in detail with the team in the ICU    Patient was seen and examined on morning rounds with the residents.
56 y.o. F with COPD, obesity and DM with tracheostomy recently placed for episode of COPD exacerbation. Developed respiratory distress, tracheal soft tissue mass (granulation tissue?) partially obstructing trachea was found at bedside bronchoscopy, longer cannula placed. Plan for bronchoscopy and tracheal debridement of likely granulation tissue.    Stable, low ventilatory settings  Plan: schedule for bronchoscopy and tracheal debridement
As above. Daily rounds   pt - no complaint s  states legs feel better when lotion is first applied     EXAM   awake alert with appropriate verbal responses   right leg bx site - dry steri strips loosening slightly ; no bleeding  or drainage   softening skin plaques     A/ Rec   f/u bx results   daily washing of legs with gentle exfoliation and continued application of lotion

## 2022-07-18 NOTE — PROGRESS NOTE ADULT - SUBJECTIVE AND OBJECTIVE BOX
NEPHROLOGY OFFICE FOLLOW UP  Bree Sidhu 75 y o  male MRN: [id number]    Encounter: [id number] 9/21/2020    REASON FOR VISIT: Cyndee Purdy is a 75 y o  male who is here on 9/21/2020 for Chronic Kidney Disease and Follow-up    HPI:    Bree came in today for follow-up of stage III CKD  He is doing quite well  Has some back pain but no other acute problem    He also problem with tinnitus and decreased hearing  He had seen multiple ENT specialist    Overall his stable    No chest pain no palpitation no nausea no vomiting  Denies taking nonsteroidal pain killer      REVIEW OF SYSTEMS:    Review of Systems   Constitutional: Negative for activity change and fatigue  HENT: Positive for hearing loss and tinnitus  Negative for congestion and ear discharge  Eyes: Negative for photophobia and pain  Respiratory: Negative for apnea and choking  Cardiovascular: Negative for chest pain and palpitations  Gastrointestinal: Negative for abdominal distention and blood in stool  Endocrine: Negative for heat intolerance and polyphagia  Genitourinary: Negative for flank pain and urgency  Musculoskeletal: Negative for neck pain and neck stiffness  Skin: Negative for color change and wound  Allergic/Immunologic: Negative for food allergies and immunocompromised state  Neurological: Negative for seizures and facial asymmetry  Hematological: Negative for adenopathy  Does not bruise/bleed easily  Psychiatric/Behavioral: Negative for self-injury and suicidal ideas           PAST MEDICAL HISTORY:  Past Medical History:   Diagnosis Date    Asthma     last assessed: April 1, 2015    Balanitis     last assessed: July 9, 2014    Cataract     Chronic kidney disease     Chronic kidney disease, stage 3 ([facility] )     last assessed: Jan 17, 2018    Diabetes mellitus [facility])     DM type 2 causing renal disease ([facility] )     last assessed: Jan 17, 2018    Hypercholesterolemia     Hypertension     Pneumonia     last Patient is a 56y old  Female who presents with a chief complaint of Hypoxia (16 Jul 2022 11:08)      AM Rounds with attending   Patient seen today, afebrile.      Vital Signs Last 24 Hrs  T(C): 36.7 (18 Jul 2022 14:08), Max: 37.1 (18 Jul 2022 05:00)  T(F): 98.1 (18 Jul 2022 14:08), Max: 98.8 (18 Jul 2022 05:00)  HR: 87 (18 Jul 2022 14:08) (82 - 95)  BP: 122/63 (18 Jul 2022 14:08) (122/63 - 125/58)  RR: 18 (18 Jul 2022 14:08) (18 - 18)  SpO2: 100% (18 Jul 2022 14:08) (98% - 100%)  O2 Parameters below as of 17 Jul 2022 19:45  Patient On (Oxygen Delivery Method): T-piece  O2 Concentration (%): 30      Allergies  penicillin (Swelling)      Lab Results:                        8.9    5.51  )-----------( 159      ( 18 Jul 2022 06:48 )             26.6                   CAPILLARY BLOOD GLUCOSE  POCT Blood Glucose.: 58 mg/dL (18 Jul 2022 17:10)  POCT Blood Glucose.: 159 mg/dL (18 Jul 2022 11:35)  POCT Blood Glucose.: 103 mg/dL (18 Jul 2022 05:55)  POCT Blood Glucose.: 112 mg/dL (17 Jul 2022 22:26)      MICROBIOLOGY:  07-16 @ 18:16  Culture-urine --  Culture results   No growth to date.  method type --  Organism --  Organism Identification --  Specimen source .Blood None      07-16 @ 18:16  Culture blood --  Culture results   No growth to date.  Gram stain --  Gram stain blood --  Method type --  Organism --  Organism identification --  Specimen source .Blood None        MEDICATIONS  (STANDING):  ammonium lactate 12% Lotion 1 Application(s) Topical two times a day  apixaban 5 milliGRAM(s) Enteral Tube two times a day  atorvastatin 20 milliGRAM(s) Oral at bedtime  betamethasone valerate 0.1% Cream 1 Application(s) Topical two times a day  dextrose 5%. 1000 milliLiter(s) (50 mL/Hr) IV Continuous <Continuous>  dextrose 5%. 1000 milliLiter(s) (100 mL/Hr) IV Continuous <Continuous>  dextrose 50% Injectable 25 Gram(s) IV Push once  dextrose 50% Injectable 12.5 Gram(s) IV Push once  dextrose 50% Injectable 25 Gram(s) IV Push once  gabapentin 600 milliGRAM(s) Oral three times a day  glucagon  Injectable 1 milliGRAM(s) IntraMuscular once  influenza   Vaccine 0.5 milliLiter(s) IntraMuscular once  insulin glargine Injectable (LANTUS) 20 Unit(s) SubCutaneous at bedtime  insulin lispro (ADMELOG) corrective regimen sliding scale   SubCutaneous three times a day before meals  insulin lispro Injectable (ADMELOG) 9 Unit(s) SubCutaneous every 6 hours  magnesium sulfate  IVPB 2 Gram(s) IV Intermittent once  multivitamin 1 Tablet(s) Oral daily  nystatin Powder 1 Application(s) Topical two times a day  pantoprazole   Suspension 40 milliGRAM(s) Enteral Tube daily  polyethylene glycol 3350 17 Gram(s) Oral daily  senna 2 Tablet(s) Oral at bedtime    MEDICATIONS  (PRN):  acetaminophen     Tablet .. 650 milliGRAM(s) Oral every 6 hours PRN Temp greater or equal to 38C (100.4F), Mild Pain (1 - 3)  albuterol/ipratropium for Nebulization 3 milliLiter(s) Nebulizer every 6 hours PRN Shortness of Breath and/or Wheezing  ALPRAZolam 1 milliGRAM(s) Oral two times a day PRN anxiety  dextrose Oral Gel 15 Gram(s) Oral once PRN Blood Glucose LESS THAN 70 milliGRAM(s)/deciliter  HYDROmorphone   Tablet 4 milliGRAM(s) Oral every 4 hours PRN Severe Pain (7 - 10)      PHYSICAL EXAM:  GENERAL:   HEAD:    Wound:            assessed: 2013       PAST SURGICAL HISTORY:  Past Surgical History:   Procedure Laterality Date    ABDOMINAL SURGERY      APPENDECTOMY  1965    CATARACT EXTRACTION      HERNIA REPAIR      HERNIA REPAIR  2007    x2    OTHER SURGICAL HISTORY  1965    Perforated duodenol ulcer surgery        SOCIAL HISTORY:  Social History     Substance and Sexual Activity   Alcohol Use Yes    Alcohol/week: 1 0 standard drinks    Types: 1 Standard drinks or equivalent per week    Frequency: Monthly or less    Drinks per session: 1 or 2    Binge frequency: Never    Comment: rare  socially     Social History     Substance and Sexual Activity   Drug Use No     Social History     Tobacco Use   Smoking Status Former Smoker    Packs/day: 2 00    Years: 10 00    Pack years: 20 00    Types: Cigarettes    Last attempt to quit: Ivory Parra Years since quittin 7   Smokeless Tobacco Former User    Quit date:    Tobacco Comment    former smoker noted in "allscripts" Quit in  - never used chewing tobacco        FAMILY HISTORY:  Family History   Problem Relation Age of Onset    Pancreatic cancer Mother     Colon cancer Father     Diabetes Father         mellitus     No Known Problems Sister     Diabetes Sister     Nephrolithiasis Sister        MEDICATIONS:    Current Outpatient Medications:     albuterol (PROVENTIL HFA,VENTOLIN HFA) 90 mcg/act inhaler, inhale 2 puffs by mouth daily if needed for shortness of breath, Disp: , Rfl:     ERYN MICROLET LANCETS lancets, Test twice a day, Disp: 100 each, Rfl: 3    glucose blood test strip, TEST twice a day, Disp: 100 each, Rfl: 5    glyBURIDE (DIABETA) 5 mg tablet, Take 2 tablets (10 mg total) by mouth 2 (two) times a day, Disp: 360 tablet, Rfl: 1    linaGLIPtin (Tradjenta) 5 MG TABS, Take 5 mg by mouth daily, Disp: 90 tablet, Rfl: 1    lisinopril-hydrochlorothiazide (PRINZIDE,ZESTORETIC) 20-25 MG per tablet, Take 1 tablet by mouth daily, Disp: 90 tablet, Rfl: 1    PHYSICAL EXAM:  Vitals:    09/21/20 1003   BP: 110/70   BP Location: Right arm   Patient Position: Sitting   Pulse: 78   Resp: 16   Temp: (!) 97 3 °F (36 3 °C)   TempSrc: Temporal   Weight: 85 4 kg (188 lb 3 2 oz)   Height: 5' 10 5" (1 791 m)     Body mass index is 26 62 kg/m²  Physical Exam  Constitutional:       General: He is not in acute distress  Appearance: He is well-developed  HENT:      Head: Normocephalic  Eyes:      General: No scleral icterus  Conjunctiva/sclera: Conjunctivae normal    Neck:      Musculoskeletal: Neck supple  Vascular: No JVD  Cardiovascular:      Rate and Rhythm: Normal rate  Heart sounds: Normal heart sounds  Pulmonary:      Effort: Pulmonary effort is normal       Breath sounds: No wheezing  Abdominal:      Palpations: Abdomen is soft  Tenderness: There is no abdominal tenderness  Musculoskeletal: Normal range of motion  Skin:     General: Skin is warm  Findings: No rash  Neurological:      Mental Status: He is alert and oriented to person, place, and time     Psychiatric:         Behavior: Behavior normal          LAB RESULTS:  Results for orders placed or performed in visit on 05/21/28   Basic metabolic panel   Result Value Ref Range    Sodium 137 136 - 145 mmol/L    Potassium 4 8 3 5 - 5 3 mmol/L    Chloride 105 100 - 108 mmol/L    CO2 23 21 - 32 mmol/L    ANION GAP 9 4 - 13 mmol/L    BUN 35 (H) 5 - 25 mg/dL    Creatinine 1 86 (H) 0 60 - 1 30 mg/dL    Glucose, Fasting 158 (H) 65 - 99 mg/dL    Calcium 8 6 8 3 - 10 1 mg/dL    eGFR 35 ml/min/1 73sq m   CBC and differential   Result Value Ref Range    WBC 6 55 4 31 - 10 16 Thousand/uL    RBC 4 74 3 88 - 5 62 Million/uL    Hemoglobin 14 5 12 0 - 17 0 g/dL    Hematocrit 44 1 36 5 - 49 3 %    MCV 93 82 - 98 fL    MCH 30 6 26 8 - 34 3 pg    MCHC 32 9 31 4 - 37 4 g/dL    RDW 13 0 11 6 - 15 1 %    MPV 11 3 8 9 - 12 7 fL    Platelets 855 531 - 050 Thousands/uL    nRBC 0 /100 WBCs Neutrophils Relative 60 43 - 75 %    Immat GRANS % 0 0 - 2 %    Lymphocytes Relative 24 14 - 44 %    Monocytes Relative 10 4 - 12 %    Eosinophils Relative 5 0 - 6 %    Basophils Relative 1 0 - 1 %    Neutrophils Absolute 3 98 1 85 - 7 62 Thousands/µL    Immature Grans Absolute 0 02 0 00 - 0 20 Thousand/uL    Lymphocytes Absolute 1 58 0 60 - 4 47 Thousands/µL    Monocytes Absolute 0 62 0 17 - 1 22 Thousand/µL    Eosinophils Absolute 0 30 0 00 - 0 61 Thousand/µL    Basophils Absolute 0 05 0 00 - 0 10 Thousands/µL   Phosphorus   Result Value Ref Range    Phosphorus 3 5 2 3 - 4 1 mg/dL   Protein / creatinine ratio, urine   Result Value Ref Range    Creatinine, Ur 108 0 mg/dL    Protein Urine Random 13 mg/dL    Prot/Creat Ratio, Ur 0 12 (H) 0 00 - 0 10   PTH, intact   Result Value Ref Range     4 (H) 18 4 - 80 1 pg/mL   UA (URINE) with reflex to Scope   Result Value Ref Range    Color, UA Yellow     Clarity, UA Clear     Specific Gravity, UA 1 017 1 003 - 1 030    pH, UA 6 0 4 5, 5 0, 5 5, 6 0, 6 5, 7 0, 7 5, 8 0    Leukocytes, UA Negative Negative    Nitrite, UA Negative Negative    Protein, UA Negative Negative mg/dl    Glucose, UA Negative Negative mg/dl    Ketones, UA Negative Negative mg/dl    Urobilinogen, UA 0 2 0 2, 1 0 E U /dl E U /dl    Bilirubin, UA Negative Negative    Blood, UA Negative Negative   Vitamin D 25 hydroxy   Result Value Ref Range    Vit D, 25-Hydroxy 28 2 (L) 30 0 - 100 0 ng/mL       ASSESSMENT and PLAN:      Chronic kidney disease, stage 3  Renal function is fluctuating  I thing because he is not drinking enough water and he himself says the same thing    Importance of hydration and avoidance of nephrotoxic medicine discussed with him    Chronic kidney disease-mineral and bone disorder  PTH and phosphorus along with vitamin-D are within acceptable range and will continue to monitor    DM type 2 causing renal disease ([facility] )    Lab Results   Component Value Date    HGBA1C 7 3 (H) Patient is a 56y old  Female who presents with a chief complaint of Hypoxia (16 Jul 2022 11:08)      AM Rounds with attending   Patient seen today, afebrile.      Vital Signs Last 24 Hrs  T(C): 36.7 (18 Jul 2022 14:08), Max: 37.1 (18 Jul 2022 05:00)  T(F): 98.1 (18 Jul 2022 14:08), Max: 98.8 (18 Jul 2022 05:00)  HR: 87 (18 Jul 2022 14:08) (82 - 95)  BP: 122/63 (18 Jul 2022 14:08) (122/63 - 125/58)  RR: 18 (18 Jul 2022 14:08) (18 - 18)  SpO2: 100% (18 Jul 2022 14:08) (98% - 100%)  O2 Parameters below as of 17 Jul 2022 19:45  Patient On (Oxygen Delivery Method): T-piece  O2 Concentration (%): 30      Allergies  penicillin (Swelling)      Lab Results:                        8.9    5.51  )-----------( 159      ( 18 Jul 2022 06:48 )             26.6              MEDICATIONS  (STANDING):  ammonium lactate 12% Lotion 1 Application(s) Topical two times a day  apixaban 5 milliGRAM(s) Enteral Tube two times a day  atorvastatin 20 milliGRAM(s) Oral at bedtime  betamethasone valerate 0.1% Cream 1 Application(s) Topical two times a day  dextrose 5%. 1000 milliLiter(s) (50 mL/Hr) IV Continuous <Continuous>  dextrose 5%. 1000 milliLiter(s) (100 mL/Hr) IV Continuous <Continuous>  dextrose 50% Injectable 25 Gram(s) IV Push once  dextrose 50% Injectable 12.5 Gram(s) IV Push once  dextrose 50% Injectable 25 Gram(s) IV Push once  gabapentin 600 milliGRAM(s) Oral three times a day  glucagon  Injectable 1 milliGRAM(s) IntraMuscular once  influenza   Vaccine 0.5 milliLiter(s) IntraMuscular once  insulin glargine Injectable (LANTUS) 20 Unit(s) SubCutaneous at bedtime  insulin lispro (ADMELOG) corrective regimen sliding scale   SubCutaneous three times a day before meals  insulin lispro Injectable (ADMELOG) 9 Unit(s) SubCutaneous every 6 hours  magnesium sulfate  IVPB 2 Gram(s) IV Intermittent once  multivitamin 1 Tablet(s) Oral daily  nystatin Powder 1 Application(s) Topical two times a day  pantoprazole   Suspension 40 milliGRAM(s) Enteral Tube daily  polyethylene glycol 3350 17 Gram(s) Oral daily  senna 2 Tablet(s) Oral at bedtime    MEDICATIONS  (PRN):  acetaminophen     Tablet .. 650 milliGRAM(s) Oral every 6 hours PRN Temp greater or equal to 38C (100.4F), Mild Pain (1 - 3)  albuterol/ipratropium for Nebulization 3 milliLiter(s) Nebulizer every 6 hours PRN Shortness of Breath and/or Wheezing  ALPRAZolam 1 milliGRAM(s) Oral two times a day PRN anxiety  dextrose Oral Gel 15 Gram(s) Oral once PRN Blood Glucose LESS THAN 70 milliGRAM(s)/deciliter  HYDROmorphone   Tablet 4 milliGRAM(s) Oral every 4 hours PRN Severe Pain (7 - 10)      PHYSICAL EXAM:  GENERAL: Patient laying in bed, vented, in NAD. cooperative.  Wound: B/L lower extremities: Dry brawny pigmentation localized to below the knee with overlaying hyperpigmented plaques mostly to dorsum of b/l feet. No edema. Tender to palpation. No open wounds, active drainage, or bleeding.   RLE: anterior leg s/p skin biopsy with intact steri-strips, slightly saturated with old sanguinous drainage. No surrounding erythema, ecchymosis, or active drainage from site.           02/24/2020    Diabetes not well controlled  Importance of diabetic control and kidney disease discussed with him    Benign essential hypertension  Blood pressure is very well control and patient is on ACE-inhibitor which I will continue      Discussed everything the patient  I will see him back in 6 months and will get blood and urine test before that visit        Portions of the record may have been created with voice recognition software  Occasional wrong word or "sound a like" substitutions may have occurred due to the inherent limitations of voice recognition software  Read the chart carefully and recognize, using context, where substitutions have occurred  If you have any questions, please contact the dictating provider

## 2022-07-18 NOTE — PROGRESS NOTE ADULT - ATTENDING COMMENTS
55 yo female  with PMHx of DVT on Eliquis, COPD,  trach collar, obesity, IDDM, UTI, sent by [facility] [place] for hypoxia. Current illness going back to April 4th when pt was intubated on admission at [facility] ICU for hypoxic respiratory failure diagnosed with copd exacerbation and superimposed pneumonia, and remained intubated for 37 days requiring tracheostomy. Patient stayed at [facility] for 2 days until noted to be hypoxic (saturating low 70's) pt's saturation immediately improving afterwards to 80's after large mucus plug removed and was subsequently sent to [facility]  On arrival to ED pt was saturating 97%, 15L O2 via tracheostomy collar (baseline 8 L at [facility])CXR suspicious for L-sided PNA, inconclusive. CTA neg for PE. Pt received x1 IV Levaquin and Cefepime in ED, admitted to MICU for acute hypoxic hypercapnic respiratory failure secondary to acute mucus plug now resolved vs superimposed pneumonia hospital-acquired.   General Surgery Consulted emergently when patient found to be hypoxic to low 80s after CCU and Pulmonary team found large granuloma in trachea along tracheostomy tube. Patient currently had a size 8 trach. Surgery assisted Pulmonary and CCU with trach exchanged with ET tube guidance to a size 7 XLT. Saturations improved to high 90s. Large granuloma was removed. Patient was no longer in distress after exchange.    #VDRF: Vent management per Pulm.  s/p trach exchange/s/p mucus plugs removal this admission  Monitor oxygenation. Lately been saturating ok on T piece.  -7/10- patient remains on 40% FIO2 supplement trach to collar, continue daily wean off trials as tolerated by pt.    #sepsis (developed again on 6/27/22): resolved   -UA +; blood cultures (6/28)= Serratia. Sens. noted  -s/p Cefepime and Vanc (6/28) > changed to CTX 2gm QD per ID (6/29) till 7/7  - 6/30 blood culture NGTD, 6/29 urine culture NGTD  CT Abdo per ID: only showed some cecitis    #TME(again on 7/16 AM): Drowsy but arousable. Follows some commands after much stimulation  Held dilaudid until mental status improved  Resumed CTX again (leukocytosis again with tachycardia).  CXR neg, Repeat B Cx neg  d/ha CTX 7/18 (Mental status improved as of 7/17.)  If labs remain stable into 7/19, D/c to NH.     #s/p PEG (6/15)  #Persistent Dysphagia:  ST planning repeat FEES on 7/6- poor visualization  - s/p barium swallow study on 7/7- still unable to take PO intake  -Patient will need close outpatient f/up at  SNF for further plans with swallow studies and tx.  -continue peg feedings with pre/post flushes   -as per speech tx- patient is allowed to have ice chips and sips of water po  -daily speech tx.      #LE neuropathic pain:  Patient has apparently been bed bound since April 4th (her [facility] admission for hypoxic failure where she ended up being intubated)  Apparently has L > R foot drop which may be due to chronic contractures of Calf muscle/Achilles tendon.   Can have PT evaluate her for that. But options are limited at this stage.   Given the asymmetrical foot drop, Neurology ruled out Any focal neuropathy with a MRI Lumbar spine WNL.     #Chronic atropic B/l LE Hyperpigmentation changes: LWC with Betamethasone cream and Ammonium lactate dressings per Burn has not helped over the past 3 weeks.   Recall Burn for Skin biopsy - OP vs IP? Can re discuss with Derm too.    #Lower extremity Pain management*** :   Gabapentin to 600 TID,   Currently IV dilaudid 1 Q4 PRN > On 7/14, changing to PO dilaudid 4mg Q4 PRN. Titrate PRN.    May use percocet prn for breakthrough pain? Pending Pain Management consult.    Note: On 6/21, we discontinued Cymbalta 60 QD since as per family patient may be having bizarre thoughts/hallucinations. Patient was texting them bizarre messages.   Note: Patient has h/o suicidal thoughts on Pregabalin.     Xanax 1 mg Q12 PRN for anxiety.   On Senna, Miralax.     #Suspected epiglottis edema:   s/p DExa Taper (ended 6/18).   Outpatient ENT f/u.     #DM:   steroids ended 6/18.  6/19-  lantus  20.    #Hyponatremia- resolved     #Chronic Indwelling Singh  - repeat U. Cx (6/29) WNL    #Hx of DVT- restarted Eliquis    # Severe Physical deconditioning : patient unable to walk due to severe legs pain, on pain management, continue to encourage PT tx. daily as tolerated     GI PPX  PPI  DVT PPX  ELIQUIS      #Progress Note Handoff: watching off Abx. If labs remain stable into 7/19, D/c to NH.

## 2022-07-18 NOTE — PROGRESS NOTE ADULT - TIME BILLING
direct pt care and interdisciplinary rounds
direct pt care and interdisciplinary rounds
I have personally seen and examined this patient.    I have reviewed all pertinent clinical information and reviewed all relevant imaging and diagnostic studies personally.   I counseled the patient about diagnostic testing and treatment plan. All questions were answered.   I discussed recommendations with the primary team.
Coordination of care
Discussed plan with daughter Ivana
I have personally seen and examined this patient.    I have reviewed all pertinent clinical information and reviewed all relevant imaging and diagnostic studies personally.   I counseled the patient about diagnostic testing and treatment plan. All questions were answered.   I discussed recommendations with the primary team.
Coordination of care

## 2022-07-18 NOTE — PROGRESS NOTE ADULT - SUBJECTIVE AND OBJECTIVE BOX
CRUZ DUMONT  56y, Female  Allergy: penicillin (Swelling)      LOS  52d    CHIEF COMPLAINT: Hypoxia (16 Jul 2022 11:08)      INTERVAL EVENTS/HPI  - No acute events overnight  - T(F): , Max: 98.8 (07-18-22 @ 05:00)    - WBC Count: 7.80 (07-17-22 @ 06:28)  WBC Count: 11.48 (07-16-22 @ 06:56)     - Creatinine, Serum: <0.5 (07-17-22 @ 06:28)       ROS  General: Denies rigors, nightsweats  HEENT: Denies headache, rhinorrhea, sore throat, eye pain  CV: Denies CP, palpitations  PULM: Denies wheezing, hemoptysis  GI: Denies hematemesis, hematochezia, melena  : Denies discharge, hematuria  MSK: Denies arthralgias, myalgias  SKIN: Denies rash, lesions  NEURO: Denies paresthesias, weakness  PSYCH: Denies depression, anxiety    VITALS:  T(F): 98.8, Max: 98.8 (07-18-22 @ 05:00)  HR: 82  BP: 123/57  RR: 18Vital Signs Last 24 Hrs  T(C): 37.1 (18 Jul 2022 05:00), Max: 37.1 (18 Jul 2022 05:00)  T(F): 98.8 (18 Jul 2022 05:00), Max: 98.8 (18 Jul 2022 05:00)  HR: 82 (18 Jul 2022 05:00) (82 - 95)  BP: 123/57 (18 Jul 2022 05:00) (123/57 - 125/58)  BP(mean): --  RR: 18 (18 Jul 2022 05:00) (18 - 18)  SpO2: 98% (18 Jul 2022 05:00) (98% - 100%)    Parameters below as of 17 Jul 2022 19:45  Patient On (Oxygen Delivery Method): T-piece    O2 Concentration (%): 30    PHYSICAL EXAM:  Gen: NAD, resting in bed  HEENT: Normocephalic, atraumatic  Neck: supple, no lymphadenopathy  CV: Regular rate & regular rhythm  Lungs: decreased BS at bases, no fremitus  Abdomen: Soft, BS present  Ext: Warm, well perfused  Neuro: non focal, awake  Skin: no rash, no erythema  Lines: no phlebitis    FH: Non-contributory  Social Hx: Non-contributory    TESTS & MEASUREMENTS:                        9.4    7.80  )-----------( 158      ( 17 Jul 2022 06:28 )             27.8     07-17    132<L>  |  92<L>  |  8<L>  ----------------------------<  85  4.5   |  35<H>  |  <0.5<L>    Ca    9.3      17 Jul 2022 06:28  Mg     1.8     07-17    TPro  5.8<L>  /  Alb  3.3<L>  /  TBili  0.3  /  DBili  x   /  AST  13  /  ALT  9   /  AlkPhos  98  07-17      LIVER FUNCTIONS - ( 17 Jul 2022 06:28 )  Alb: 3.3 g/dL / Pro: 5.8 g/dL / ALK PHOS: 98 U/L / ALT: 9 U/L / AST: 13 U/L / GGT: x               Culture - Blood (collected 07-16-22 @ 18:16)  Source: .Blood None  Preliminary Report (07-18-22 @ 01:02):    No growth to date.    Culture - Blood (collected 06-30-22 @ 06:20)  Source: .Blood None  Final Report (07-05-22 @ 14:00):    No Growth Final    Culture - Urine (collected 06-29-22 @ 05:55)  Source: Clean Catch Clean Catch (Midstream)  Final Report (06-30-22 @ 21:38):    <10,000 CFU/mL Normal Urogenital Chante    Culture - Sputum (collected 06-28-22 @ 10:20)  Source: Trach Asp Tracheal Aspirate  Gram Stain (06-28-22 @ 23:39):    Numerous polymorphonuclear leukocytes per low power field    Few Squamous epithelial cells per low power field    Few Gram positive cocci in pairs per oil power field    Moderate Gram Negative Coccobacilli per oil power field  Final Report (06-30-22 @ 17:34):    Rare Pseudomonas aeruginosa (Carbapenem Resistant)    Normal Respiratory Chante present  Organism: Pseudomonas aeruginosa (Carbapenem Resistant) (06-30-22 @ 17:34)  Organism: Pseudomonas aeruginosa (Carbapenem Resistant) (06-30-22 @ 17:34)      -  Amikacin: S <=16      -  Aztreonam: R >16      -  Cefepime: S 4      -  Ceftazidime: S 4      -  Ceftazidime/Avibactam: S <=4      -  Ceftolozane/tazobactam: S <=2      -  Ciprofloxacin: R >2      -  Gentamicin: R >8      -  Imipenem: R >8      -  Levofloxacin: R >4      -  Meropenem: R 8      -  Piperacillin/Tazobactam: S <=8      -  Tobramycin: R >8      Method Type: ROMY    Culture - Blood (collected 06-28-22 @ 06:02)  Source: .Blood Blood-Peripheral  Gram Stain (06-29-22 @ 01:14):    Growth in aerobic bottle: Gram Negative Rods    Growth in anaerobic bottle: Gram Negative Rods  Final Report (06-30-22 @ 13:56):    Growth in aerobic and anaerobic bottles: Serratia marcescens    ***Blood Panel PCR results on this specimen are available    approximately 3 hours after the Gram stain result.***    Gram stain, PCR, and/or culture results may not always    correspond due to difference in methodologies.    ************************************************************    This PCR assay was performed by multiplex PCR. This    Assay tests for 66 bacterial and resistance gene targets.    Please refer to the [facility] test directory    at [email or web address] for details.  Organism: Blood Culture PCR  Serratia marcescens (06-30-22 @ 13:56)  Organism: Serratia marcescens (06-30-22 @ 13:56)      -  Amikacin: S <=16      -  Ampicillin: R >16 These ampicillin results predict results for amoxicillin      -  Ampicillin/Sulbactam: R >16/8 Enterobacter, Klebsiella aerogenes, Citrobacter, and Serratia may develop resistance during prolonged therapy (3-4 days)      -  Aztreonam: S <=4      -  Cefazolin: R >16 Enterobacter, Klebsiella aerogenes, Citrobacter, and Serratia may develop resistance during prolonged therapy (3-4 days)      -  Cefepime: S <=2      -  Cefoxitin: R 16      -  Ceftriaxone: S <=1 Enterobacter, Klebsiella aerogenes, Citrobacter, and Serratia may develop resistance during prolonged therapy      -  Ciprofloxacin: S <=0.25      -  Ertapenem: S <=0.5      -  Gentamicin: S <=2      -  Levofloxacin: S <=0.5      -  Meropenem: S <=1      -  Piperacillin/Tazobactam: S <=8      -  Tobramycin: S <=2      -  Trimethoprim/Sulfamethoxazole: S <=0.5/9.5      Method Type: ROMY  Organism: Blood Culture PCR (06-30-22 @ 13:56)      -  Serratia marcescens: Detec      Method Type: PCR    Culture - Urine (collected 06-21-22 @ 00:40)  Source: Catheterized Catheterized  Final Report (06-22-22 @ 07:05):    <10,000 CFU/mL Normal Urogenital Chante        Lactate, Blood: 0.9 mmol/L (07-16-22 @ 06:56)      INFECTIOUS DISEASES TESTING  COVID-19 PCR: NotDetec (07-15-22 @ 02:25)  COVID-19 PCR: NotDetec (07-05-22 @ 11:50)  Procalcitonin, Serum: 19.40 (06-28-22 @ 06:02)  COVID-19 PCR: NotDetec (06-23-22 @ 15:36)  COVID-19 PCR: NotDetec (06-22-22 @ 07:11)  COVID-19 PCR: NotDetec (06-14-22 @ 11:49)  COVID-19 PCR: NotDetec (05-31-22 @ 17:00)  Procalcitonin, Serum: 0.22 (05-29-22 @ 12:33)  MRSA PCR Result.: Negative (05-28-22 @ 10:06)  Procalcitonin, Serum: 0.18 (05-27-22 @ 16:51)  Rapid RVP Result: NotDetec (05-27-22 @ 10:30)      INFLAMMATORY MARKERS      RADIOLOGY & ADDITIONAL TESTS:  I have personally reviewed the last available Chest xray  CXR  Xray Chest 1 View AP/PA:   ACC: [id number] EXAM:  XR CHEST 1 VIEW                          PROCEDURE DATE:  07/16/2022          INTERPRETATION:  Clinical History / Reason for exam: Tachycardia.    Comparison : Chest radiograph June 28, 2022.    Technique/Positioning: Low lung volume.    Findings:    Support devices: Tracheostomy tube which is midline.    Cardiac/mediastinum/hilum: Magnified, unchanged.    Lung parenchyma/Pleura: Bibasilar opacities, slightly improved. No   pneumothorax is seen.    Skeleton/soft tissues: Stable.    Impression:    Low lung volume.    Bibasilar opacities, slightly improved.    Tracheostomy tube.    --- End of Report ---            BEAU CHAMBERS MD; Attending Radiologist  This document has been electronically signed. Jul 16 2022  4:28PM (07-16-22 @ 16:19)      CT      CARDIOLOGY TESTING  12 Lead ECG:   Ventricular Rate 116 BPM    Atrial Rate 116 BPM    P-R Interval 174 ms    QRS Duration 90 ms    Q-T Interval 304 ms    QTC Calculation(Bazett) 422 ms    P Axis 69 degrees    R Axis 115 degrees    T Axis 68 degrees    Diagnosis Line Sinus tachycardia  Right axis deviation  Incomplete right bundle branch block  T wave abnormality, consider anterolateral ischemia  Abnormal ECG    Confirmed by ZULEMA CAMPOS MD ([id number]) on 7/16/2022 11:52:48 AM (07-16-22 @ 10:04)  12 Lead ECG:   Ventricular Rate 129 BPM    Atrial Rate 129 BPM    P-R Interval 184 ms    QRS Duration 86 ms    Q-T Interval 270 ms    QTC Calculation(Bazett) 395 ms    P Axis 84 degrees    R Axis 106 degrees    T Axis 2 degrees    Diagnosis Line Sinus tachycardia  Right ventricular hypertrophy with repolarization abnormality  T wave abnormality, consider inferolateral ischemia  Abnormal ECG    Confirmed by JARAD CHASE MD ([id number]) on 7/14/2022 11:23:09 PM (07-14-22 @ 20:18)      MEDICATIONS  ammonium lactate 12% Lotion 1 Topical two times a day  apixaban 5 Enteral Tube two times a day  atorvastatin 20 Oral at bedtime  betamethasone valerate 0.1% Cream 1 Topical two times a day  cefTRIAXone   IVPB     cefTRIAXone   IVPB 2000 IV Intermittent every 24 hours  dextrose 5%. 1000 IV Continuous <Continuous>  dextrose 5%. 1000 IV Continuous <Continuous>  dextrose 50% Injectable 25 IV Push once  dextrose 50% Injectable 12.5 IV Push once  dextrose 50% Injectable 25 IV Push once  gabapentin 600 Oral three times a day  glucagon  Injectable 1 IntraMuscular once  influenza   Vaccine 0.5 IntraMuscular once  insulin glargine Injectable (LANTUS) 20 SubCutaneous at bedtime  insulin lispro (ADMELOG) corrective regimen sliding scale  SubCutaneous three times a day before meals  insulin lispro Injectable (ADMELOG) 9 SubCutaneous every 6 hours  multivitamin 1 Oral daily  nystatin Powder 1 Topical two times a day  pantoprazole   Suspension 40 Enteral Tube daily  polyethylene glycol 3350 17 Oral daily  senna 2 Oral at bedtime      WEIGHT  Weight (kg): 97.4 (06-23-22 @ 10:18)      ANTIBIOTICS:  cefTRIAXone   IVPB      cefTRIAXone   IVPB 2000 milliGRAM(s) IV Intermittent every 24 hours      All available historical records have been reviewed       CRUZ DUMONT  56y, Female  Allergy: penicillin (Swelling)      LOS  52d    CHIEF COMPLAINT: Hypoxia (16 Jul 2022 11:08)      INTERVAL EVENTS/HPI  - No acute events overnight  - T(F): , Max: 98.8 (07-18-22 @ 05:00)  - reconsulted for increased somnolence, worsening WBC   - Today, WBC improved; Denies any abdominal pain, nausea, vomiting; appears to be at baseline  - WBC Count: 7.80 (07-17-22 @ 06:28)  WBC Count: 11.48 (07-16-22 @ 06:56)     - Creatinine, Serum: <0.5 (07-17-22 @ 06:28)       ROS  General: Denies rigors, nightsweats  HEENT: Denies headache, rhinorrhea, sore throat, eye pain  CV: Denies CP, palpitations  PULM: Denies wheezing, hemoptysis  GI: Denies hematemesis, hematochezia, melena  : Denies discharge, hematuria  MSK: Denies arthralgias, myalgias  SKIN: Denies rash, lesions  NEURO: Denies paresthesias, weakness  PSYCH: Denies depression, anxiety    VITALS:  T(F): 98.8, Max: 98.8 (07-18-22 @ 05:00)  HR: 82  BP: 123/57  RR: 18Vital Signs Last 24 Hrs  T(C): 37.1 (18 Jul 2022 05:00), Max: 37.1 (18 Jul 2022 05:00)  T(F): 98.8 (18 Jul 2022 05:00), Max: 98.8 (18 Jul 2022 05:00)  HR: 82 (18 Jul 2022 05:00) (82 - 95)  BP: 123/57 (18 Jul 2022 05:00) (123/57 - 125/58)  BP(mean): --  RR: 18 (18 Jul 2022 05:00) (18 - 18)  SpO2: 98% (18 Jul 2022 05:00) (98% - 100%)    Parameters below as of 17 Jul 2022 19:45  Patient On (Oxygen Delivery Method): T-piece    O2 Concentration (%): 30    PHYSICAL EXAM:  Gen: NAD, resting in bed  HEENT: Normocephalic, atraumatic; vent/trach   Neck: supple, no lymphadenopathy  CV: Regular rate & regular rhythm  Lungs: decreased BS at bases, no fremitus  Abdomen: Soft, BS present; PEG site c/d/i   Ext: Warm, well perfused  Neuro: non focal, awake  Skin: no rash, no erythema  Lines: no phlebitis    FH: Non-contributory  Social Hx: Non-contributory    TESTS & MEASUREMENTS:                        9.4    7.80  )-----------( 158      ( 17 Jul 2022 06:28 )             27.8     07-17    132<L>  |  92<L>  |  8<L>  ----------------------------<  85  4.5   |  35<H>  |  <0.5<L>    Ca    9.3      17 Jul 2022 06:28  Mg     1.8     07-17    TPro  5.8<L>  /  Alb  3.3<L>  /  TBili  0.3  /  DBili  x   /  AST  13  /  ALT  9   /  AlkPhos  98  07-17      LIVER FUNCTIONS - ( 17 Jul 2022 06:28 )  Alb: 3.3 g/dL / Pro: 5.8 g/dL / ALK PHOS: 98 U/L / ALT: 9 U/L / AST: 13 U/L / GGT: x               Culture - Blood (collected 07-16-22 @ 18:16)  Source: .Blood None  Preliminary Report (07-18-22 @ 01:02):    No growth to date.    Culture - Blood (collected 06-30-22 @ 06:20)  Source: .Blood None  Final Report (07-05-22 @ 14:00):    No Growth Final    Culture - Urine (collected 06-29-22 @ 05:55)  Source: Clean Catch Clean Catch (Midstream)  Final Report (06-30-22 @ 21:38):    <10,000 CFU/mL Normal Urogenital Chante    Culture - Sputum (collected 06-28-22 @ 10:20)  Source: Trach Asp Tracheal Aspirate  Gram Stain (06-28-22 @ 23:39):    Numerous polymorphonuclear leukocytes per low power field    Few Squamous epithelial cells per low power field    Few Gram positive cocci in pairs per oil power field    Moderate Gram Negative Coccobacilli per oil power field  Final Report (06-30-22 @ 17:34):    Rare Pseudomonas aeruginosa (Carbapenem Resistant)    Normal Respiratory Chante present  Organism: Pseudomonas aeruginosa (Carbapenem Resistant) (06-30-22 @ 17:34)  Organism: Pseudomonas aeruginosa (Carbapenem Resistant) (06-30-22 @ 17:34)      -  Amikacin: S <=16      -  Aztreonam: R >16      -  Cefepime: S 4      -  Ceftazidime: S 4      -  Ceftazidime/Avibactam: S <=4      -  Ceftolozane/tazobactam: S <=2      -  Ciprofloxacin: R >2      -  Gentamicin: R >8      -  Imipenem: R >8      -  Levofloxacin: R >4      -  Meropenem: R 8      -  Piperacillin/Tazobactam: S <=8      -  Tobramycin: R >8      Method Type: ROMY    Culture - Blood (collected 06-28-22 @ 06:02)  Source: .Blood Blood-Peripheral  Gram Stain (06-29-22 @ 01:14):    Growth in aerobic bottle: Gram Negative Rods    Growth in anaerobic bottle: Gram Negative Rods  Final Report (06-30-22 @ 13:56):    Growth in aerobic and anaerobic bottles: Serratia marcescens    ***Blood Panel PCR results on this specimen are available    approximately 3 hours after the Gram stain result.***    Gram stain, PCR, and/or culture results may not always    correspond due to difference in methodologies.    ************************************************************    This PCR assay was performed by multiplex PCR. This    Assay tests for 66 bacterial and resistance gene targets.    Please refer to the [facility] test directory    at [email or web address] for details.  Organism: Blood Culture PCR  Serratia marcescens (06-30-22 @ 13:56)  Organism: Serratia marcescens (06-30-22 @ 13:56)      -  Amikacin: S <=16      -  Ampicillin: R >16 These ampicillin results predict results for amoxicillin      -  Ampicillin/Sulbactam: R >16/8 Enterobacter, Klebsiella aerogenes, Citrobacter, and Serratia may develop resistance during prolonged therapy (3-4 days)      -  Aztreonam: S <=4      -  Cefazolin: R >16 Enterobacter, Klebsiella aerogenes, Citrobacter, and Serratia may develop resistance during prolonged therapy (3-4 days)      -  Cefepime: S <=2      -  Cefoxitin: R 16      -  Ceftriaxone: S <=1 Enterobacter, Klebsiella aerogenes, Citrobacter, and Serratia may develop resistance during prolonged therapy      -  Ciprofloxacin: S <=0.25      -  Ertapenem: S <=0.5      -  Gentamicin: S <=2      -  Levofloxacin: S <=0.5      -  Meropenem: S <=1      -  Piperacillin/Tazobactam: S <=8      -  Tobramycin: S <=2      -  Trimethoprim/Sulfamethoxazole: S <=0.5/9.5      Method Type: ROMY  Organism: Blood Culture PCR (06-30-22 @ 13:56)      -  Serratia marcescens: Detec      Method Type: PCR    Culture - Urine (collected 06-21-22 @ 00:40)  Source: Catheterized Catheterized  Final Report (06-22-22 @ 07:05):    <10,000 CFU/mL Normal Urogenital Chante        Lactate, Blood: 0.9 mmol/L (07-16-22 @ 06:56)      INFECTIOUS DISEASES TESTING  COVID-19 PCR: NotDetec (07-15-22 @ 02:25)  COVID-19 PCR: NotDetec (07-05-22 @ 11:50)  Procalcitonin, Serum: 19.40 (06-28-22 @ 06:02)  COVID-19 PCR: NotDetec (06-23-22 @ 15:36)  COVID-19 PCR: NotDetec (06-22-22 @ 07:11)  COVID-19 PCR: NotDetec (06-14-22 @ 11:49)  COVID-19 PCR: NotDetec (05-31-22 @ 17:00)  Procalcitonin, Serum: 0.22 (05-29-22 @ 12:33)  MRSA PCR Result.: Negative (05-28-22 @ 10:06)  Procalcitonin, Serum: 0.18 (05-27-22 @ 16:51)  Rapid RVP Result: NotDetec (05-27-22 @ 10:30)      INFLAMMATORY MARKERS      RADIOLOGY & ADDITIONAL TESTS:  I have personally reviewed the last available Chest xray  CXR  Xray Chest 1 View AP/PA:   ACC: [id number] EXAM:  XR CHEST 1 VIEW                          PROCEDURE DATE:  07/16/2022          INTERPRETATION:  Clinical History / Reason for exam: Tachycardia.    Comparison : Chest radiograph June 28, 2022.    Technique/Positioning: Low lung volume.    Findings:    Support devices: Tracheostomy tube which is midline.    Cardiac/mediastinum/hilum: Magnified, unchanged.    Lung parenchyma/Pleura: Bibasilar opacities, slightly improved. No   pneumothorax is seen.    Skeleton/soft tissues: Stable.    Impression:    Low lung volume.    Bibasilar opacities, slightly improved.    Tracheostomy tube.    --- End of Report ---            BEAU CHAMBERS MD; Attending Radiologist  This document has been electronically signed. Jul 16 2022  4:28PM (07-16-22 @ 16:19)      CT      CARDIOLOGY TESTING  12 Lead ECG:   Ventricular Rate 116 BPM    Atrial Rate 116 BPM    P-R Interval 174 ms    QRS Duration 90 ms    Q-T Interval 304 ms    QTC Calculation(Bazett) 422 ms    P Axis 69 degrees    R Axis 115 degrees    T Axis 68 degrees    Diagnosis Line Sinus tachycardia  Right axis deviation  Incomplete right bundle branch block  T wave abnormality, consider anterolateral ischemia  Abnormal ECG    Confirmed by ZULEMA CAMPOS MD ([id number]) on 7/16/2022 11:52:48 AM (07-16-22 @ 10:04)  12 Lead ECG:   Ventricular Rate 129 BPM    Atrial Rate 129 BPM    P-R Interval 184 ms    QRS Duration 86 ms    Q-T Interval 270 ms    QTC Calculation(Bazett) 395 ms    P Axis 84 degrees    R Axis 106 degrees    T Axis 2 degrees    Diagnosis Line Sinus tachycardia  Right ventricular hypertrophy with repolarization abnormality  T wave abnormality, consider inferolateral ischemia  Abnormal ECG    Confirmed by JARAD CHASE MD ([id number]) on 7/14/2022 11:23:09 PM (07-14-22 @ 20:18)      MEDICATIONS  ammonium lactate 12% Lotion 1 Topical two times a day  apixaban 5 Enteral Tube two times a day  atorvastatin 20 Oral at bedtime  betamethasone valerate 0.1% Cream 1 Topical two times a day  cefTRIAXone   IVPB     cefTRIAXone   IVPB 2000 IV Intermittent every 24 hours  dextrose 5%. 1000 IV Continuous <Continuous>  dextrose 5%. 1000 IV Continuous <Continuous>  dextrose 50% Injectable 25 IV Push once  dextrose 50% Injectable 12.5 IV Push once  dextrose 50% Injectable 25 IV Push once  gabapentin 600 Oral three times a day  glucagon  Injectable 1 IntraMuscular once  influenza   Vaccine 0.5 IntraMuscular once  insulin glargine Injectable (LANTUS) 20 SubCutaneous at bedtime  insulin lispro (ADMELOG) corrective regimen sliding scale  SubCutaneous three times a day before meals  insulin lispro Injectable (ADMELOG) 9 SubCutaneous every 6 hours  multivitamin 1 Oral daily  nystatin Powder 1 Topical two times a day  pantoprazole   Suspension 40 Enteral Tube daily  polyethylene glycol 3350 17 Oral daily  senna 2 Oral at bedtime      WEIGHT  Weight (kg): 97.4 (06-23-22 @ 10:18)      ANTIBIOTICS:  cefTRIAXone   IVPB      cefTRIAXone   IVPB 2000 milliGRAM(s) IV Intermittent every 24 hours      All available historical records have been reviewed

## 2022-07-18 NOTE — PROGRESS NOTE ADULT - SUBJECTIVE AND OBJECTIVE BOX
CRUZ DUMONT 56y Female  MRN#: [id number]   Hospital Day: 52d    SUBJECTIVE  Patient is a 56y old Female who presents with a chief complaint of Hypoxia (16 Jul 2022 11:08)  Currently admitted to medicine with the primary diagnosis of Acute respiratory failure with hypoxia      INTERVAL HPI AND OVERNIGHT EVENTS:  Patient was examined and seen at bedside. This morning she is resting comfortably in bed.     OBJECTIVE  PAST MEDICAL & SURGICAL HISTORY  COPD (chronic obstructive pulmonary disease)    CHF (congestive heart failure)    DM (diabetes mellitus)    H/O tracheostomy      ALLERGIES:  penicillin (Swelling)    MEDICATIONS:  STANDING MEDICATIONS  ammonium lactate 12% Lotion 1 Application(s) Topical two times a day  apixaban 5 milliGRAM(s) Enteral Tube two times a day  atorvastatin 20 milliGRAM(s) Oral at bedtime  betamethasone valerate 0.1% Cream 1 Application(s) Topical two times a day  dextrose 5%. 1000 milliLiter(s) IV Continuous <Continuous>  dextrose 5%. 1000 milliLiter(s) IV Continuous <Continuous>  dextrose 50% Injectable 25 Gram(s) IV Push once  dextrose 50% Injectable 12.5 Gram(s) IV Push once  dextrose 50% Injectable 25 Gram(s) IV Push once  gabapentin 600 milliGRAM(s) Oral three times a day  glucagon  Injectable 1 milliGRAM(s) IntraMuscular once  influenza   Vaccine 0.5 milliLiter(s) IntraMuscular once  insulin glargine Injectable (LANTUS) 20 Unit(s) SubCutaneous at bedtime  insulin lispro (ADMELOG) corrective regimen sliding scale   SubCutaneous three times a day before meals  insulin lispro Injectable (ADMELOG) 9 Unit(s) SubCutaneous every 6 hours  magnesium sulfate  IVPB 2 Gram(s) IV Intermittent once  multivitamin 1 Tablet(s) Oral daily  nystatin Powder 1 Application(s) Topical two times a day  pantoprazole   Suspension 40 milliGRAM(s) Enteral Tube daily  polyethylene glycol 3350 17 Gram(s) Oral daily  senna 2 Tablet(s) Oral at bedtime    PRN MEDICATIONS  acetaminophen     Tablet .. 650 milliGRAM(s) Oral every 6 hours PRN  albuterol/ipratropium for Nebulization 3 milliLiter(s) Nebulizer every 6 hours PRN  dextrose Oral Gel 15 Gram(s) Oral once PRN  HYDROmorphone   Tablet 4 milliGRAM(s) Oral every 4 hours PRN      VITAL SIGNS: Last 24 Hours  T(C): 37.1 (18 Jul 2022 05:00), Max: 37.1 (18 Jul 2022 05:00)  T(F): 98.8 (18 Jul 2022 05:00), Max: 98.8 (18 Jul 2022 05:00)  HR: 82 (18 Jul 2022 05:00) (82 - 95)  BP: 123/57 (18 Jul 2022 05:00) (123/57 - 125/58)  BP(mean): --  RR: 18 (18 Jul 2022 05:00) (18 - 18)  SpO2: 98% (18 Jul 2022 05:00) (98% - 100%)    LABS:                        8.9    5.51  )-----------( 159      ( 18 Jul 2022 06:48 )             26.6     07-18    133<L>  |  90<L>  |  7<L>  ----------------------------<  102<H>  4.3   |  33<H>  |  <0.5<L>    Ca    9.2      18 Jul 2022 06:48  Mg     1.6     07-18    TPro  5.6<L>  /  Alb  3.0<L>  /  TBili  0.3  /  DBili  x   /  AST  12  /  ALT  8   /  AlkPhos  86  07-18              Culture - Blood (collected 16 Jul 2022 18:16)  Source: .Blood None  Preliminary Report (18 Jul 2022 01:02):    No growth to date.          RADIOLOGY:      PHYSICAL EXAM:  General: no acute distress, lying in bed  Head: normocephalic and atraumatic  Neck: supple, trach clean and intact  Heart: regular rate and rhythm, S1 and S2 normal, no murmurs, rubs or gallops noted on exam  Lungs: Symmetric chest expansion bilaterally, clear lungs bilaterally, scattered rhonchi heard  Abdomen: Bowel sounds present, soft non distended  Extremities: trace LLE, positive peripheral pulses, brown discoloration of bilateral lower extremities- unchanged from previous exams     CRUZ DUMONT 56y Female  MRN#: [id number]   Hospital Day: 52d    SUBJECTIVE  Patient is a 56y old Female who presents with a chief complaint of Hypoxia (16 Jul 2022 11:08)  Currently admitted to medicine with the primary diagnosis of Acute respiratory failure with hypoxia      INTERVAL HPI AND OVERNIGHT EVENTS:  Patient was examined and seen at bedside. This morning she is resting comfortably in bed. Overnight, she had en episode of desaturation that resolved upon suctioning.    OBJECTIVE  PAST MEDICAL & SURGICAL HISTORY  COPD (chronic obstructive pulmonary disease)    CHF (congestive heart failure)    DM (diabetes mellitus)    H/O tracheostomy      ALLERGIES:  penicillin (Swelling)    MEDICATIONS:  STANDING MEDICATIONS  ammonium lactate 12% Lotion 1 Application(s) Topical two times a day  apixaban 5 milliGRAM(s) Enteral Tube two times a day  atorvastatin 20 milliGRAM(s) Oral at bedtime  betamethasone valerate 0.1% Cream 1 Application(s) Topical two times a day  dextrose 5%. 1000 milliLiter(s) IV Continuous <Continuous>  dextrose 5%. 1000 milliLiter(s) IV Continuous <Continuous>  dextrose 50% Injectable 25 Gram(s) IV Push once  dextrose 50% Injectable 12.5 Gram(s) IV Push once  dextrose 50% Injectable 25 Gram(s) IV Push once  gabapentin 600 milliGRAM(s) Oral three times a day  glucagon  Injectable 1 milliGRAM(s) IntraMuscular once  influenza   Vaccine 0.5 milliLiter(s) IntraMuscular once  insulin glargine Injectable (LANTUS) 20 Unit(s) SubCutaneous at bedtime  insulin lispro (ADMELOG) corrective regimen sliding scale   SubCutaneous three times a day before meals  insulin lispro Injectable (ADMELOG) 9 Unit(s) SubCutaneous every 6 hours  magnesium sulfate  IVPB 2 Gram(s) IV Intermittent once  multivitamin 1 Tablet(s) Oral daily  nystatin Powder 1 Application(s) Topical two times a day  pantoprazole   Suspension 40 milliGRAM(s) Enteral Tube daily  polyethylene glycol 3350 17 Gram(s) Oral daily  senna 2 Tablet(s) Oral at bedtime    PRN MEDICATIONS  acetaminophen     Tablet .. 650 milliGRAM(s) Oral every 6 hours PRN  albuterol/ipratropium for Nebulization 3 milliLiter(s) Nebulizer every 6 hours PRN  dextrose Oral Gel 15 Gram(s) Oral once PRN  HYDROmorphone   Tablet 4 milliGRAM(s) Oral every 4 hours PRN      VITAL SIGNS: Last 24 Hours  T(C): 37.1 (18 Jul 2022 05:00), Max: 37.1 (18 Jul 2022 05:00)  T(F): 98.8 (18 Jul 2022 05:00), Max: 98.8 (18 Jul 2022 05:00)  HR: 82 (18 Jul 2022 05:00) (82 - 95)  BP: 123/57 (18 Jul 2022 05:00) (123/57 - 125/58)  BP(mean): --  RR: 18 (18 Jul 2022 05:00) (18 - 18)  SpO2: 98% (18 Jul 2022 05:00) (98% - 100%)    LABS:                        8.9    5.51  )-----------( 159      ( 18 Jul 2022 06:48 )             26.6     07-18    133<L>  |  90<L>  |  7<L>  ----------------------------<  102<H>  4.3   |  33<H>  |  <0.5<L>    Ca    9.2      18 Jul 2022 06:48  Mg     1.6     07-18    TPro  5.6<L>  /  Alb  3.0<L>  /  TBili  0.3  /  DBili  x   /  AST  12  /  ALT  8   /  AlkPhos  86  07-18              Culture - Blood (collected 16 Jul 2022 18:16)  Source: .Blood None  Preliminary Report (18 Jul 2022 01:02):    No growth to date.          RADIOLOGY:      PHYSICAL EXAM:  General: no acute distress, lying in bed  Head: normocephalic and atraumatic  Neck: supple, trach clean and intact  Heart: regular rate and rhythm, S1 and S2 normal, no murmurs, rubs or gallops noted on exam  Lungs: Symmetric chest expansion bilaterally, clear lungs bilaterally, scattered rhonchi heard  Abdomen: Bowel sounds present, soft non distended  Extremities: trace LLE, positive peripheral pulses, brown discoloration of bilateral lower extremities- unchanged from previous exams

## 2022-07-18 NOTE — PROGRESS NOTE ADULT - ASSESSMENT
Assessment: 55 yo f with PMHx of DVT on Eliquis, COPD,  trach collar, obesity, IDDM, UTI, sustains b/l lower extremity hyperpigmentation. Patient is s/p skin biopsy on 7/15    Plan:   - Continue LWC: Diprolene Cream then Lachydrin Cream to b/l lower extremities two times daily. Can benefit from thorough cleaning to lift plaques before dressing change.   - s/p skin biopsy 7/15.   - Remainder of care per primary care team.

## 2022-07-18 NOTE — PROGRESS NOTE ADULT - ASSESSMENT
ASSESSMENT  55 yo f with PMHx of DVT on Eliquis, COPD,  trach collar, obesity, IDDM, UTI, sent by [employer] for hypoxia.    IMPRESSION  #Acute on Chronic COPD with exacerbation    #Leukocytosis     #Dysphagia s/p PEG  #Serratia bacteremia  - blood Cx 6/28 +  -  CT Abdomen and Pelvis w/ IV Cont (07.01.22 @ 20:58): a of fat stranding surrounding the cecum and ascending colon, new  since CT abdomen pelvis performed on June 20, 2022. Findings suggestive  of cecitis. No discrete surrounding fluid collection/abscess. Partially visualized left lower lobe consolidative opacity. Consider  follow-up with radiographs until resolution. Interval resolution of postsurgical changes related to gastrostomy tube  placement including perigastric fluid and intraperitoneal free air.  - completed course of ceftriaxone/cipro until 7/7    #DM2  #Obesity BMI (kg/m2): 40.6  #Abx allergy: penicillin (Swelling)      RECOMMENDATIONS  This is a preliminary incomplete pended note, all final recommendations to follow after interview and examination of the patient.    Please call or message on [employer] Teams if with any questions.  Spectra [phone]   ASSESSMENT  57 yo f with PMHx of DVT on Eliquis, COPD,  trach collar, obesity, IDDM, UTI, sent by [employer] for hypoxia.    IMPRESSION  #Acute on Chronic COPD with exacerbation    #Leukocytosis     #Dysphagia s/p PEG  #Serratia bacteremia  - blood Cx 6/28 +  -  CT Abdomen and Pelvis w/ IV Cont (07.01.22 @ 20:58): a of fat stranding surrounding the cecum and ascending colon, new  since CT abdomen pelvis performed on June 20, 2022. Findings suggestive  of cecitis. No discrete surrounding fluid collection/abscess. Partially visualized left lower lobe consolidative opacity. Consider  follow-up with radiographs until resolution. Interval resolution of postsurgical changes related to gastrostomy tube  placement including perigastric fluid and intraperitoneal free air.  - completed course of ceftriaxone/cipro until 7/7    #DM2  #Obesity BMI (kg/m2): 40.6  #Abx allergy: penicillin (Swelling)      RECOMMENDATIONS  - no clear evidence of new bacterial infection  - as blood cx are negative, can stop antibiotics and monitor     Please call or message on [employer] Teams if with any questions.  Spectra [phone]

## 2022-07-18 NOTE — PROGRESS NOTE ADULT - ASSESSMENT
57 yo female with PMHx of DVT on Eliquis, COPD,  trach collar, obesity, IDDM, UTI, sent by [facility] for hypoxia.   Current illness going back to April 4th when pt was intubated on admission at [facility] ICU for hypoxic respiratory failure diagnosed with copd exacerbation and superimposed pneumonia, and remained intubated for 37 days requiring tracheostomy.   Patient stayed at [facility] for 2 days until noted to be hypoxic (saturating low 70's) s/p large mucus plug removed and was subsequently sent to [facility]  Admitted to MICU for acute hypoxic hypercapnic respiratory failure secondary to acute mucus plug now resolved vs superimposed pneumonia hospital-acquired.   s/p Large granuloma removal. Patient was no longer in distress after exchange.    #Sepsis on admission -resolving  -6/28 Patient was noted to be tachy O/N, hypotensive, febrile to 102.8, FiO2 requirements were increased and the patient was desaturating to 82-86%  -blood cultures 6/29 positive for gram negative rods, serratia bacteremia  -6/30 WBC 11.21 (6/29 was 13.92 and 6/28 was 19.03)  -6/30 blood culture NGTD, 6/29 urine culture NGTD  -7/1 ID rec CT a/p- demonstrated cecitis, no abscess   -7/5 will f/u with ID regarding antibiotics regimen  -7/8 off antibiotics  -7/16: RR called; pt less responsive and non-verbal + leukocytosis and tachycardia => started on Rocephin and workup taken   - 07/18:    #VDRF- s/p trach  f/u on pulm recs for settings    #Acute/ chronic COPD with exacerbation (Resolved)  #mucous plug removed  #pneumonia vs atelectasis L base  - On 5/28, Pulmonary team found large granuloma in trachea along tracheostomy tube. Patient had a size 8 trach on admission. Surgery assisted Pulmonary and CCU with trach exchanged with ET tube guidance to a size 7 XLT. Saturations improved to high 90s.  - trach functioning well s/p exchange   - 6/11 Trach changed to Fenestrated 8 by surgery    #s/p PEG (6/15):   - Patient allowed sips of water as per Speech       #LE neuropathic pain:  Patient has apparently been bed bound since April 4th (her [facility] admission for hypoxic failure where she ended up being intubated)  Apparently has L > R foot drop which may be due to chronic contractures of Calf muscle/Achilles tendon. Can have PT evaluate her for that.  But options are limited at this stage.   Given the asymmetrical foot drop, Neurology wants to rule out Any focal neuropathy.  -6/23 s/p  MRI Lumbar spine w/ & w/o Contrast, mild degenerative changes  -chronic DJD no acute changes.  - 7/14: d/c'ed IV Dilaudid and Percocet   -Restart Dilaudid PO 4mg q4 PRN  - f/u pain management consult     #B/l LE Hyperpigmentation changes:   unclear etiology  Present since 1 year.   May be related to CHF related stasis dermatitis / hemosiderin deposition  vs deposition diseases versus Eosinophilic Dermatitis vs follicular hemorrhage from eliquis   Dermatology thinks it may be Eczema and advised Triamcinolone BId.   Add multivitamin to PEG QD.   -7/1 Betamethasone cream and Ammonium lactate dressings per Burn, patient's legs feel mildly better  -7/5 bilateral lower extremities pain mildly improved  -7/15: Burn > skin bx    #Constipation  -On Senna    #Suspected epiglottis edema:   s/p Dexa Taper (ended 6/18).   Outpatient ENT f/u.     #DM:   steroids ended 6/18.  6/19- Increased lantus from 17 to 20.    #Chronic Indwelling Singh  - 5/28 Ucx - Contaminated   - 5/28 Ucx- Normal perri   - 5/27 Ucx- Klebsiella (CRE) and pseudomonas   - s/p cefepime  - Currently afebrile w/ no urinary complaints   - episode of Hematouria 6/4 -> improving -> h/h Stable    #Hx of DVT  - on home Eliquis  - restarted Eliquis    GI PPX: PPI  DVT PPX: ELIQUIS  Dispo: Acute  Pending: [facility], f/u pain management    55 yo female with PMHx of DVT on Eliquis, COPD,  trach collar, obesity, IDDM, UTI, sent by [facility] for hypoxia.   Current illness going back to April 4th when pt was intubated on admission at [facility] ICU for hypoxic respiratory failure diagnosed with copd exacerbation and superimposed pneumonia, and remained intubated for 37 days requiring tracheostomy.   Patient stayed at [facility] for 2 days until noted to be hypoxic (saturating low 70's) s/p large mucus plug removed and was subsequently sent to [facility]  Admitted to MICU for acute hypoxic hypercapnic respiratory failure secondary to acute mucus plug now resolved vs superimposed pneumonia hospital-acquired.   s/p Large granuloma removal. Patient was no longer in distress after exchange.    #Sepsis on admission -resolving  -6/28 Patient was noted to be tachy O/N, hypotensive, febrile to 102.8, FiO2 requirements were increased and the patient was desaturating to 82-86%  -blood cultures 6/29 positive for gram negative rods, serratia bacteremia  -6/30 WBC 11.21 (6/29 was 13.92 and 6/28 was 19.03)  -6/30 blood culture NGTD, 6/29 urine culture NGTD  -7/1 ID rec CT a/p- demonstrated cecitis, no abscess   -7/5 will f/u with ID regarding antibiotics regimen  -7/8 off antibiotics  -7/16: RR called; pt less responsive and non-verbal + leukocytosis and tachycardia => started on Rocephin and workup taken   - 07/18: Rocephin stopped (blood Cx negative to date), monitor vitals     #VDRF- s/p trach  f/u on pulm recs for settings    #Acute/ chronic COPD with exacerbation (Resolved)  #mucous plug removed  #pneumonia vs atelectasis L base  - On 5/28, Pulmonary team found large granuloma in trachea along tracheostomy tube. Patient had a size 8 trach on admission. Surgery assisted Pulmonary and CCU with trach exchanged with ET tube guidance to a size 7 XLT. Saturations improved to high 90s.  - trach functioning well s/p exchange   - 6/11 Trach changed to Fenestrated 8 by surgery    #s/p PEG (6/15):   - Patient allowed sips of water as per Speech       #LE neuropathic pain:  Patient has apparently been bed bound since April 4th (her [facility] admission for hypoxic failure where she ended up being intubated)  Apparently has L > R foot drop which may be due to chronic contractures of Calf muscle/Achilles tendon. Can have PT evaluate her for that.  But options are limited at this stage.   Given the asymmetrical foot drop, Neurology wants to rule out Any focal neuropathy.  -6/23 s/p  MRI Lumbar spine w/ & w/o Contrast, mild degenerative changes  -chronic DJD no acute changes.  - 7/14: d/c'ed IV Dilaudid and Percocet   -Restart Dilaudid PO 4mg q4 PRN  - f/u pain management consult     #B/l LE Hyperpigmentation changes:   unclear etiology  Present since 1 year.   May be related to CHF related stasis dermatitis / hemosiderin deposition  vs deposition diseases versus Eosinophilic Dermatitis vs follicular hemorrhage from eliquis   Dermatology thinks it may be Eczema and advised Triamcinolone BId.   Add multivitamin to PEG QD.   -7/1 Betamethasone cream and Ammonium lactate dressings per Burn, patient's legs feel mildly better  -7/5 bilateral lower extremities pain mildly improved  -7/15: Burn > skin bx    #Constipation  -On Senna    #Suspected epiglottis edema:   s/p Dexa Taper (ended 6/18).   Outpatient ENT f/u.     #DM:   steroids ended 6/18.  6/19- Increased lantus from 17 to 20.    #Chronic Indwelling Singh  - 5/28 Ucx - Contaminated   - 5/28 Ucx- Normal perri   - 5/27 Ucx- Klebsiella (CRE) and pseudomonas   - s/p cefepime  - Currently afebrile w/ no urinary complaints   - episode of Hematouria 6/4 -> improving -> h/h Stable    #Hx of DVT  - on home Eliquis  - restarted Eliquis    GI PPX: PPI  DVT PPX: ELIQUIS  Dispo: Acute  Pending: authorization for [facility], f/u pain management    57 yo female with PMHx of DVT on Eliquis, COPD,  trach collar, obesity, IDDM, UTI, sent by [facility] for hypoxia.   Current illness going back to April 4th when pt was intubated on admission at [facility] ICU for hypoxic respiratory failure diagnosed with copd exacerbation and superimposed pneumonia, and remained intubated for 37 days requiring tracheostomy.   Patient stayed at [facility] for 2 days until noted to be hypoxic (saturating low 70's) s/p large mucus plug removed and was subsequently sent to [facility]  Admitted to MICU for acute hypoxic hypercapnic respiratory failure secondary to acute mucus plug now resolved vs superimposed pneumonia hospital-acquired.   s/p Large granuloma removal. Patient was no longer in distress after exchange.    #Sepsis on admission -resolving  -6/28 Patient was noted to be tachy O/N, hypotensive, febrile to 102.8, FiO2 requirements were increased and the patient was desaturating to 82-86%  -blood cultures 6/29 positive for gram negative rods, serratia bacteremia  -6/30 WBC 11.21 (6/29 was 13.92 and 6/28 was 19.03)  -6/30 blood culture NGTD, 6/29 urine culture NGTD  -7/1 ID rec CT a/p- demonstrated cecitis, no abscess   -7/5 will f/u with ID regarding antibiotics regimen  -7/8 off antibiotics  -7/16: RR called; pt less responsive and non-verbal + leukocytosis and tachycardia => started on Rocephin and workup taken   - 07/18: Rocephin stopped (blood Cx negative to date), monitor vitals     #VDRF- s/p trach  - Following pulm recs for settings    #Acute/ chronic COPD with exacerbation (Resolved)  #mucous plug removed  #pneumonia vs atelectasis L base  - On 5/28, Pulmonary team found large granuloma in trachea along tracheostomy tube. Patient had a size 8 trach on admission. Surgery assisted Pulmonary and CCU with trach exchanged with ET tube guidance to a size 7 XLT. Saturations improved to high 90s.  - trach functioning well s/p exchange   - 6/11 Trach changed to Fenestrated 8 by surgery    #s/p PEG (6/15):   - Patient allowed sips of water as per Speech       #LE neuropathic pain:  Patient has apparently been bed bound since April 4th (her [facility] admission for hypoxic failure where she ended up being intubated)  Apparently has L > R foot drop which may be due to chronic contractures of Calf muscle/Achilles tendon. Can have PT evaluate her for that.  But options are limited at this stage.   Given the asymmetrical foot drop, Neurology wants to rule out Any focal neuropathy.  -6/23 s/p  MRI Lumbar spine w/ & w/o Contrast, mild degenerative changes  -chronic DJD no acute changes.  - 7/14: d/c'ed IV Dilaudid and Percocet   -Restart Dilaudid PO 4mg q4 PRN  - f/u pain management consult     #B/l LE Hyperpigmentation changes:   unclear etiology  Present since 1 year.   May be related to CHF related stasis dermatitis / hemosiderin deposition  vs deposition diseases versus Eosinophilic Dermatitis vs follicular hemorrhage from eliquis   Dermatology thinks it may be Eczema and advised Triamcinolone BId.   Add multivitamin to PEG QD.   -7/1 Betamethasone cream and Ammonium lactate dressings per Burn, patient's legs feel mildly better  -7/5 bilateral lower extremities pain mildly improved  -7/15: Burn > skin bx    #Constipation  -On Senna    #Suspected epiglottis edema:   s/p Dexa Taper (ended 6/18).   Outpatient ENT f/u.     #DM:   steroids ended 6/18.  6/19- Increased lantus from 17 to 20.    #Chronic Indwelling Singh  - 5/28 Ucx - Contaminated   - 5/28 Ucx- Normal perri   - 5/27 Ucx- Klebsiella (CRE) and pseudomonas   - s/p cefepime  - Currently afebrile w/ no urinary complaints   - episode of Hematouria 6/4 -> improving -> h/h Stable    #Hx of DVT  - on home Eliquis  - restarted Eliquis    GI PPX: PPI  DVT PPX: ELIQUIS  Dispo: Acute  Pending: authorization for [facility], f/u pain management    55 yo female with PMHx of DVT on Eliquis, COPD,  trach collar, obesity, IDDM, UTI, [facility].   Current illness going back to April 4th when pt was intubated on admission at [facility] ICU for hypoxic respiratory failure diagnosed with copd exacerbation and superimposed pneumonia, and remained intubated for 37 days requiring tracheostomy.   Patient stayed at [facility] for 2 days until noted to be hypoxic (saturating low 70's) s/p large mucus plug removed and was subsequently sent to [facility]  Admitted to MICU for acute hypoxic hypercapnic respiratory failure secondary to acute mucus plug now resolved vs superimposed pneumonia hospital-acquired.   s/p Large granuloma removal. Patient was no longer in distress after exchange.    #Sepsis on admission -resolving  -6/28 Patient was noted to be tachy O/N, hypotensive, febrile to 102.8, FiO2 requirements were increased and the patient was desaturating to 82-86%  -blood cultures 6/29 positive for gram negative rods, serratia bacteremia  -6/30 WBC 11.21 (6/29 was 13.92 and 6/28 was 19.03)  -6/30 blood culture NGTD, 6/29 urine culture NGTD  -7/1 ID rec CT a/p- demonstrated cecitis, no abscess   -7/5 will f/u with ID regarding antibiotics regimen  -7/8 off antibiotics  -7/16: RR called; pt less responsive and non-verbal + leukocytosis and tachycardia => started on Rocephin and workup taken   - 07/18: Rocephin stopped (blood Cx negative to date), monitor vitals (approved by ID)    #VDRF- s/p trach  - Following pulm recs for settings    #Acute/ chronic COPD with exacerbation (Resolved)  #mucous plug removed  #pneumonia vs atelectasis L base  - On 5/28, Pulmonary team found large granuloma in trachea along tracheostomy tube. Patient had a size 8 trach on admission. Surgery assisted Pulmonary and CCU with trach exchanged with ET tube guidance to a size 7 XLT. Saturations improved to high 90s.  - trach functioning well s/p exchange   - 6/11 Trach changed to Fenestrated 8 by surgery    #s/p PEG (6/15):   - Patient allowed sips of water as per Speech       #LE neuropathic pain:  Patient has apparently been bed bound since April 4th (her [facility] admission for hypoxic failure where she ended up being intubated)  Apparently has L > R foot drop which may be due to chronic contractures of Calf muscle/Achilles tendon. Can have PT evaluate her for that.  But options are limited at this stage.   Given the asymmetrical foot drop, Neurology wants to rule out Any focal neuropathy.  -6/23 s/p  MRI Lumbar spine w/ & w/o Contrast, mild degenerative changes  -chronic DJD no acute changes.  - 7/14: d/c'ed IV Dilaudid and Percocet   -Restart Dilaudid PO 4mg q4 PRN  - f/u pain management consult     #B/l LE Hyperpigmentation changes:   unclear etiology  Present since 1 year.   May be related to CHF related stasis dermatitis / hemosiderin deposition  vs deposition diseases versus Eosinophilic Dermatitis vs follicular hemorrhage from eliquis   Dermatology thinks it may be Eczema and advised Triamcinolone BId.   Add multivitamin to PEG QD.   -7/1 Betamethasone cream and Ammonium lactate dressings per Burn, patient's legs feel mildly better  -7/5 bilateral lower extremities pain mildly improved  -7/15: Burn > skin bx    #Constipation  -On Senna    #Suspected epiglottis edema:   s/p Dexa Taper (ended 6/18).   Outpatient ENT f/u.     #DM:   steroids ended 6/18.  6/19- Increased lantus from 17 to 20.    #Chronic Indwelling Singh  - 5/28 Ucx - Contaminated   - 5/28 Ucx- Normal perri   - 5/27 Ucx- Klebsiella (CRE) and pseudomonas   - s/p cefepime  - Currently afebrile w/ no urinary complaints   - episode of Hematouria 6/4 -> improving -> h/h Stable    #Hx of DVT  - on home Eliquis  - restarted Eliquis    GI PPX: PPI  DVT PPX: ELIQUIS  Dispo: Acute  Pending: authorization for [facility] [place], f/u pain management

## 2022-07-19 ENCOUNTER — TRANSCRIPTION ENCOUNTER (OUTPATIENT)
Age: 57
End: 2022-07-19

## 2022-07-19 LAB
ALBUMIN SERPL ELPH-MCNC: 3.3 G/DL — LOW (ref 3.5–5.2)
ALP SERPL-CCNC: 88 U/L — SIGNIFICANT CHANGE UP (ref 30–115)
ALT FLD-CCNC: 9 U/L — SIGNIFICANT CHANGE UP (ref 0–41)
ANION GAP SERPL CALC-SCNC: 6 MMOL/L — LOW (ref 7–14)
AST SERPL-CCNC: 14 U/L — SIGNIFICANT CHANGE UP (ref 0–41)
BASOPHILS # BLD AUTO: 0.03 K/UL — SIGNIFICANT CHANGE UP (ref 0–0.2)
BASOPHILS # BLD AUTO: 0.03 K/UL — SIGNIFICANT CHANGE UP (ref 0–0.2)
BASOPHILS NFR BLD AUTO: 0.4 % — SIGNIFICANT CHANGE UP (ref 0–1)
BASOPHILS NFR BLD AUTO: 0.5 % — SIGNIFICANT CHANGE UP (ref 0–1)
BILIRUB SERPL-MCNC: 0.3 MG/DL — SIGNIFICANT CHANGE UP (ref 0.2–1.2)
BUN SERPL-MCNC: 8 MG/DL — LOW (ref 10–20)
CALCIUM SERPL-MCNC: 9.7 MG/DL — SIGNIFICANT CHANGE UP (ref 8.5–10.1)
CHLORIDE SERPL-SCNC: 89 MMOL/L — LOW (ref 98–110)
CO2 SERPL-SCNC: 38 MMOL/L — HIGH (ref 17–32)
CREAT SERPL-MCNC: <0.5 MG/DL — LOW (ref 0.7–1.5)
EGFR: 124 ML/MIN/1.73M2 — SIGNIFICANT CHANGE UP
EOSINOPHIL # BLD AUTO: 0.3 K/UL — SIGNIFICANT CHANGE UP (ref 0–0.7)
EOSINOPHIL # BLD AUTO: 0.31 K/UL — SIGNIFICANT CHANGE UP (ref 0–0.7)
EOSINOPHIL NFR BLD AUTO: 4.5 % — SIGNIFICANT CHANGE UP (ref 0–8)
EOSINOPHIL NFR BLD AUTO: 4.8 % — SIGNIFICANT CHANGE UP (ref 0–8)
GLUCOSE BLDC GLUCOMTR-MCNC: 110 MG/DL — HIGH (ref 70–99)
GLUCOSE BLDC GLUCOMTR-MCNC: 111 MG/DL — HIGH (ref 70–99)
GLUCOSE BLDC GLUCOMTR-MCNC: 119 MG/DL — HIGH (ref 70–99)
GLUCOSE BLDC GLUCOMTR-MCNC: 120 MG/DL — HIGH (ref 70–99)
GLUCOSE SERPL-MCNC: 88 MG/DL — SIGNIFICANT CHANGE UP (ref 70–99)
HCT VFR BLD CALC: 28.8 % — LOW (ref 37–47)
HCT VFR BLD CALC: 29.6 % — LOW (ref 37–47)
HGB BLD-MCNC: 10.3 G/DL — LOW (ref 12–16)
HGB BLD-MCNC: 9.8 G/DL — LOW (ref 12–16)
IMM GRANULOCYTES NFR BLD AUTO: 0.1 % — SIGNIFICANT CHANGE UP (ref 0.1–0.3)
IMM GRANULOCYTES NFR BLD AUTO: 0.2 % — SIGNIFICANT CHANGE UP (ref 0.1–0.3)
LYMPHOCYTES # BLD AUTO: 0.94 K/UL — LOW (ref 1.2–3.4)
LYMPHOCYTES # BLD AUTO: 1.28 K/UL — SIGNIFICANT CHANGE UP (ref 1.2–3.4)
LYMPHOCYTES # BLD AUTO: 14.9 % — LOW (ref 20.5–51.1)
LYMPHOCYTES # BLD AUTO: 18.8 % — LOW (ref 20.5–51.1)
MAGNESIUM SERPL-MCNC: 1.7 MG/DL — LOW (ref 1.8–2.4)
MCHC RBC-ENTMCNC: 32.9 PG — HIGH (ref 27–31)
MCHC RBC-ENTMCNC: 33.2 PG — HIGH (ref 27–31)
MCHC RBC-ENTMCNC: 34 G/DL — SIGNIFICANT CHANGE UP (ref 32–37)
MCHC RBC-ENTMCNC: 34.8 G/DL — SIGNIFICANT CHANGE UP (ref 32–37)
MCV RBC AUTO: 95.5 FL — SIGNIFICANT CHANGE UP (ref 81–99)
MCV RBC AUTO: 96.6 FL — SIGNIFICANT CHANGE UP (ref 81–99)
MONOCYTES # BLD AUTO: 0.62 K/UL — HIGH (ref 0.1–0.6)
MONOCYTES # BLD AUTO: 0.7 K/UL — HIGH (ref 0.1–0.6)
MONOCYTES NFR BLD AUTO: 10.3 % — HIGH (ref 1.7–9.3)
MONOCYTES NFR BLD AUTO: 9.9 % — HIGH (ref 1.7–9.3)
NEUTROPHILS # BLD AUTO: 4.39 K/UL — SIGNIFICANT CHANGE UP (ref 1.4–6.5)
NEUTROPHILS # BLD AUTO: 4.49 K/UL — SIGNIFICANT CHANGE UP (ref 1.4–6.5)
NEUTROPHILS NFR BLD AUTO: 65.9 % — SIGNIFICANT CHANGE UP (ref 42.2–75.2)
NEUTROPHILS NFR BLD AUTO: 69.7 % — SIGNIFICANT CHANGE UP (ref 42.2–75.2)
NRBC # BLD: 0 /100 WBCS — SIGNIFICANT CHANGE UP (ref 0–0)
NRBC # BLD: 0 /100 WBCS — SIGNIFICANT CHANGE UP (ref 0–0)
PLATELET # BLD AUTO: 171 K/UL — SIGNIFICANT CHANGE UP (ref 130–400)
PLATELET # BLD AUTO: 173 K/UL — SIGNIFICANT CHANGE UP (ref 130–400)
POTASSIUM SERPL-MCNC: 4.4 MMOL/L — SIGNIFICANT CHANGE UP (ref 3.5–5)
POTASSIUM SERPL-SCNC: 4.4 MMOL/L — SIGNIFICANT CHANGE UP (ref 3.5–5)
PROT SERPL-MCNC: 5.8 G/DL — LOW (ref 6–8)
RBC # BLD: 2.98 M/UL — LOW (ref 4.2–5.4)
RBC # BLD: 3.1 M/UL — LOW (ref 4.2–5.4)
RBC # FLD: 13.1 % — SIGNIFICANT CHANGE UP (ref 11.5–14.5)
RBC # FLD: 13.2 % — SIGNIFICANT CHANGE UP (ref 11.5–14.5)
SODIUM SERPL-SCNC: 133 MMOL/L — LOW (ref 135–146)
WBC # BLD: 6.29 K/UL — SIGNIFICANT CHANGE UP (ref 4.8–10.8)
WBC # BLD: 6.82 K/UL — SIGNIFICANT CHANGE UP (ref 4.8–10.8)
WBC # FLD AUTO: 6.29 K/UL — SIGNIFICANT CHANGE UP (ref 4.8–10.8)
WBC # FLD AUTO: 6.82 K/UL — SIGNIFICANT CHANGE UP (ref 4.8–10.8)

## 2022-07-19 PROCEDURE — 99239 HOSP IP/OBS DSCHRG MGMT >30: CPT

## 2022-07-19 PROCEDURE — 99233 SBSQ HOSP IP/OBS HIGH 50: CPT

## 2022-07-19 RX ORDER — MAGNESIUM SULFATE 500 MG/ML
2 VIAL (ML) INJECTION ONCE
Refills: 0 | Status: COMPLETED | OUTPATIENT
Start: 2022-07-19 | End: 2022-07-19

## 2022-07-19 RX ORDER — SOD,AMMONIUM,POTASSIUM LACTATE
1 CREAM (GRAM) TOPICAL
Qty: 0 | Refills: 0 | DISCHARGE
Start: 2022-07-19

## 2022-07-19 RX ORDER — CLOPIDOGREL BISULFATE 75 MG/1
1 TABLET, FILM COATED ORAL
Qty: 0 | Refills: 0 | DISCHARGE
Start: 2022-07-19

## 2022-07-19 RX ORDER — INSULIN LISPRO 100/ML
6 VIAL (ML) SUBCUTANEOUS EVERY 6 HOURS
Refills: 0 | Status: DISCONTINUED | OUTPATIENT
Start: 2022-07-19 | End: 2022-07-22

## 2022-07-19 RX ORDER — INSULIN LISPRO 100/ML
6 VIAL (ML) SUBCUTANEOUS
Qty: 0 | Refills: 0 | DISCHARGE
Start: 2022-07-19

## 2022-07-19 RX ADMIN — HYDROMORPHONE HYDROCHLORIDE 4 MILLIGRAM(S): 2 INJECTION INTRAMUSCULAR; INTRAVENOUS; SUBCUTANEOUS at 03:12

## 2022-07-19 RX ADMIN — Medication 9 UNIT(S): at 00:28

## 2022-07-19 RX ADMIN — GABAPENTIN 600 MILLIGRAM(S): 400 CAPSULE ORAL at 21:24

## 2022-07-19 RX ADMIN — Medication 25 GRAM(S): at 13:11

## 2022-07-19 RX ADMIN — HYDROMORPHONE HYDROCHLORIDE 4 MILLIGRAM(S): 2 INJECTION INTRAMUSCULAR; INTRAVENOUS; SUBCUTANEOUS at 12:01

## 2022-07-19 RX ADMIN — NYSTATIN CREAM 1 APPLICATION(S): 100000 CREAM TOPICAL at 05:48

## 2022-07-19 RX ADMIN — HYDROMORPHONE HYDROCHLORIDE 4 MILLIGRAM(S): 2 INJECTION INTRAMUSCULAR; INTRAVENOUS; SUBCUTANEOUS at 12:31

## 2022-07-19 RX ADMIN — GABAPENTIN 600 MILLIGRAM(S): 400 CAPSULE ORAL at 05:49

## 2022-07-19 RX ADMIN — HYDROMORPHONE HYDROCHLORIDE 4 MILLIGRAM(S): 2 INJECTION INTRAMUSCULAR; INTRAVENOUS; SUBCUTANEOUS at 16:20

## 2022-07-19 RX ADMIN — Medication 1 APPLICATION(S): at 05:48

## 2022-07-19 RX ADMIN — Medication 1 TABLET(S): at 12:01

## 2022-07-19 RX ADMIN — HYDROMORPHONE HYDROCHLORIDE 4 MILLIGRAM(S): 2 INJECTION INTRAMUSCULAR; INTRAVENOUS; SUBCUTANEOUS at 04:43

## 2022-07-19 RX ADMIN — NYSTATIN CREAM 1 APPLICATION(S): 100000 CREAM TOPICAL at 17:32

## 2022-07-19 RX ADMIN — APIXABAN 5 MILLIGRAM(S): 2.5 TABLET, FILM COATED ORAL at 05:49

## 2022-07-19 RX ADMIN — Medication 1 APPLICATION(S): at 17:30

## 2022-07-19 RX ADMIN — Medication 1 APPLICATION(S): at 05:51

## 2022-07-19 RX ADMIN — Medication 1 MILLIGRAM(S): at 17:30

## 2022-07-19 RX ADMIN — Medication 6 UNIT(S): at 17:30

## 2022-07-19 RX ADMIN — HYDROMORPHONE HYDROCHLORIDE 4 MILLIGRAM(S): 2 INJECTION INTRAMUSCULAR; INTRAVENOUS; SUBCUTANEOUS at 08:02

## 2022-07-19 RX ADMIN — Medication 9 UNIT(S): at 05:49

## 2022-07-19 RX ADMIN — HYDROMORPHONE HYDROCHLORIDE 4 MILLIGRAM(S): 2 INJECTION INTRAMUSCULAR; INTRAVENOUS; SUBCUTANEOUS at 21:22

## 2022-07-19 RX ADMIN — HYDROMORPHONE HYDROCHLORIDE 4 MILLIGRAM(S): 2 INJECTION INTRAMUSCULAR; INTRAVENOUS; SUBCUTANEOUS at 21:55

## 2022-07-19 RX ADMIN — Medication 1 MILLIGRAM(S): at 14:44

## 2022-07-19 RX ADMIN — GABAPENTIN 600 MILLIGRAM(S): 400 CAPSULE ORAL at 13:11

## 2022-07-19 RX ADMIN — HYDROMORPHONE HYDROCHLORIDE 4 MILLIGRAM(S): 2 INJECTION INTRAMUSCULAR; INTRAVENOUS; SUBCUTANEOUS at 08:32

## 2022-07-19 RX ADMIN — Medication 6 UNIT(S): at 12:02

## 2022-07-19 RX ADMIN — PANTOPRAZOLE SODIUM 40 MILLIGRAM(S): 20 TABLET, DELAYED RELEASE ORAL at 12:02

## 2022-07-19 RX ADMIN — Medication 1 APPLICATION(S): at 17:32

## 2022-07-19 RX ADMIN — INSULIN GLARGINE 20 UNIT(S): 100 INJECTION, SOLUTION SUBCUTANEOUS at 21:41

## 2022-07-19 RX ADMIN — ATORVASTATIN CALCIUM 20 MILLIGRAM(S): 80 TABLET, FILM COATED ORAL at 21:23

## 2022-07-19 RX ADMIN — APIXABAN 5 MILLIGRAM(S): 2.5 TABLET, FILM COATED ORAL at 17:30

## 2022-07-19 NOTE — DISCHARGE NOTE NURSING/CASE MANAGEMENT/SOCIAL WORK - NSDCPEFALRISK_GEN_ALL_CORE
For information on Fall & Injury Prevention, visit: [email or web address] OR  [email or web address] OR  https://www.cdc.gov/steadi/patient.html

## 2022-07-19 NOTE — DISCHARGE NOTE NURSING/CASE MANAGEMENT/SOCIAL WORK - PATIENT PORTAL LINK FT
You can access the FollowMyHealth Patient Portal offered by [facility] by registering at the following website: [email or web address]. By joining [facility]’s FollowMyHealth portal, you will also be able to view your health information using other applications (apps) compatible with our system.

## 2022-07-20 LAB
ALBUMIN SERPL ELPH-MCNC: 3.2 G/DL — LOW (ref 3.5–5.2)
ALP SERPL-CCNC: 94 U/L — SIGNIFICANT CHANGE UP (ref 30–115)
ALT FLD-CCNC: 11 U/L — SIGNIFICANT CHANGE UP (ref 0–41)
ANION GAP SERPL CALC-SCNC: 6 MMOL/L — LOW (ref 7–14)
AST SERPL-CCNC: 16 U/L — SIGNIFICANT CHANGE UP (ref 0–41)
BILIRUB SERPL-MCNC: 0.2 MG/DL — SIGNIFICANT CHANGE UP (ref 0.2–1.2)
BUN SERPL-MCNC: 7 MG/DL — LOW (ref 10–20)
CALCIUM SERPL-MCNC: 9.3 MG/DL — SIGNIFICANT CHANGE UP (ref 8.5–10.1)
CHLORIDE SERPL-SCNC: 93 MMOL/L — LOW (ref 98–110)
CO2 SERPL-SCNC: 37 MMOL/L — HIGH (ref 17–32)
CREAT SERPL-MCNC: <0.5 MG/DL — LOW (ref 0.7–1.5)
EGFR: 124 ML/MIN/1.73M2 — SIGNIFICANT CHANGE UP
GLUCOSE BLDC GLUCOMTR-MCNC: 113 MG/DL — HIGH (ref 70–99)
GLUCOSE BLDC GLUCOMTR-MCNC: 113 MG/DL — HIGH (ref 70–99)
GLUCOSE BLDC GLUCOMTR-MCNC: 116 MG/DL — HIGH (ref 70–99)
GLUCOSE BLDC GLUCOMTR-MCNC: 123 MG/DL — HIGH (ref 70–99)
GLUCOSE BLDC GLUCOMTR-MCNC: 149 MG/DL — HIGH (ref 70–99)
GLUCOSE BLDC GLUCOMTR-MCNC: 178 MG/DL — HIGH (ref 70–99)
GLUCOSE SERPL-MCNC: 126 MG/DL — HIGH (ref 70–99)
HCT VFR BLD CALC: 29.1 % — LOW (ref 37–47)
HGB BLD-MCNC: 9.8 G/DL — LOW (ref 12–16)
MAGNESIUM SERPL-MCNC: 1.7 MG/DL — LOW (ref 1.8–2.4)
MCHC RBC-ENTMCNC: 32.9 PG — HIGH (ref 27–31)
MCHC RBC-ENTMCNC: 33.7 G/DL — SIGNIFICANT CHANGE UP (ref 32–37)
MCV RBC AUTO: 97.7 FL — SIGNIFICANT CHANGE UP (ref 81–99)
NRBC # BLD: 0 /100 WBCS — SIGNIFICANT CHANGE UP (ref 0–0)
PLATELET # BLD AUTO: 172 K/UL — SIGNIFICANT CHANGE UP (ref 130–400)
POTASSIUM SERPL-MCNC: 4.4 MMOL/L — SIGNIFICANT CHANGE UP (ref 3.5–5)
POTASSIUM SERPL-SCNC: 4.4 MMOL/L — SIGNIFICANT CHANGE UP (ref 3.5–5)
PROT SERPL-MCNC: 5.6 G/DL — LOW (ref 6–8)
RBC # BLD: 2.98 M/UL — LOW (ref 4.2–5.4)
RBC # FLD: 13.1 % — SIGNIFICANT CHANGE UP (ref 11.5–14.5)
SODIUM SERPL-SCNC: 136 MMOL/L — SIGNIFICANT CHANGE UP (ref 135–146)
WBC # BLD: 6.51 K/UL — SIGNIFICANT CHANGE UP (ref 4.8–10.8)
WBC # FLD AUTO: 6.51 K/UL — SIGNIFICANT CHANGE UP (ref 4.8–10.8)

## 2022-07-20 PROCEDURE — 71045 X-RAY EXAM CHEST 1 VIEW: CPT | Mod: 26,77

## 2022-07-20 PROCEDURE — 71045 X-RAY EXAM CHEST 1 VIEW: CPT | Mod: 26

## 2022-07-20 PROCEDURE — 99233 SBSQ HOSP IP/OBS HIGH 50: CPT

## 2022-07-20 RX ORDER — MAGNESIUM OXIDE 400 MG ORAL TABLET 241.3 MG
400 TABLET ORAL EVERY 4 HOURS
Refills: 0 | Status: COMPLETED | OUTPATIENT
Start: 2022-07-20 | End: 2022-07-20

## 2022-07-20 RX ADMIN — Medication 1 APPLICATION(S): at 05:22

## 2022-07-20 RX ADMIN — PANTOPRAZOLE SODIUM 40 MILLIGRAM(S): 20 TABLET, DELAYED RELEASE ORAL at 11:52

## 2022-07-20 RX ADMIN — Medication 6 UNIT(S): at 00:31

## 2022-07-20 RX ADMIN — HYDROMORPHONE HYDROCHLORIDE 4 MILLIGRAM(S): 2 INJECTION INTRAMUSCULAR; INTRAVENOUS; SUBCUTANEOUS at 21:59

## 2022-07-20 RX ADMIN — MAGNESIUM OXIDE 400 MG ORAL TABLET 400 MILLIGRAM(S): 241.3 TABLET ORAL at 22:02

## 2022-07-20 RX ADMIN — Medication 6 UNIT(S): at 05:25

## 2022-07-20 RX ADMIN — HYDROMORPHONE HYDROCHLORIDE 4 MILLIGRAM(S): 2 INJECTION INTRAMUSCULAR; INTRAVENOUS; SUBCUTANEOUS at 22:29

## 2022-07-20 RX ADMIN — Medication 1 APPLICATION(S): at 05:24

## 2022-07-20 RX ADMIN — HYDROMORPHONE HYDROCHLORIDE 4 MILLIGRAM(S): 2 INJECTION INTRAMUSCULAR; INTRAVENOUS; SUBCUTANEOUS at 03:57

## 2022-07-20 RX ADMIN — Medication 1 APPLICATION(S): at 17:30

## 2022-07-20 RX ADMIN — INSULIN GLARGINE 20 UNIT(S): 100 INJECTION, SOLUTION SUBCUTANEOUS at 21:59

## 2022-07-20 RX ADMIN — HYDROMORPHONE HYDROCHLORIDE 4 MILLIGRAM(S): 2 INJECTION INTRAMUSCULAR; INTRAVENOUS; SUBCUTANEOUS at 04:32

## 2022-07-20 RX ADMIN — Medication 1 APPLICATION(S): at 17:35

## 2022-07-20 RX ADMIN — MAGNESIUM OXIDE 400 MG ORAL TABLET 400 MILLIGRAM(S): 241.3 TABLET ORAL at 17:30

## 2022-07-20 RX ADMIN — Medication 1 TABLET(S): at 11:51

## 2022-07-20 RX ADMIN — HYDROMORPHONE HYDROCHLORIDE 4 MILLIGRAM(S): 2 INJECTION INTRAMUSCULAR; INTRAVENOUS; SUBCUTANEOUS at 12:30

## 2022-07-20 RX ADMIN — Medication 6 UNIT(S): at 11:52

## 2022-07-20 RX ADMIN — HYDROMORPHONE HYDROCHLORIDE 4 MILLIGRAM(S): 2 INJECTION INTRAMUSCULAR; INTRAVENOUS; SUBCUTANEOUS at 09:01

## 2022-07-20 RX ADMIN — GABAPENTIN 600 MILLIGRAM(S): 400 CAPSULE ORAL at 05:21

## 2022-07-20 RX ADMIN — POLYETHYLENE GLYCOL 3350 17 GRAM(S): 17 POWDER, FOR SOLUTION ORAL at 11:51

## 2022-07-20 RX ADMIN — HYDROMORPHONE HYDROCHLORIDE 4 MILLIGRAM(S): 2 INJECTION INTRAMUSCULAR; INTRAVENOUS; SUBCUTANEOUS at 18:01

## 2022-07-20 RX ADMIN — ATORVASTATIN CALCIUM 20 MILLIGRAM(S): 80 TABLET, FILM COATED ORAL at 22:00

## 2022-07-20 RX ADMIN — NYSTATIN CREAM 1 APPLICATION(S): 100000 CREAM TOPICAL at 17:38

## 2022-07-20 RX ADMIN — SENNA PLUS 2 TABLET(S): 8.6 TABLET ORAL at 21:59

## 2022-07-20 RX ADMIN — APIXABAN 5 MILLIGRAM(S): 2.5 TABLET, FILM COATED ORAL at 17:31

## 2022-07-20 RX ADMIN — HYDROMORPHONE HYDROCHLORIDE 4 MILLIGRAM(S): 2 INJECTION INTRAMUSCULAR; INTRAVENOUS; SUBCUTANEOUS at 08:31

## 2022-07-20 RX ADMIN — Medication 1 MILLIGRAM(S): at 11:51

## 2022-07-20 RX ADMIN — HYDROMORPHONE HYDROCHLORIDE 4 MILLIGRAM(S): 2 INJECTION INTRAMUSCULAR; INTRAVENOUS; SUBCUTANEOUS at 13:00

## 2022-07-20 RX ADMIN — Medication 6 UNIT(S): at 17:00

## 2022-07-20 RX ADMIN — APIXABAN 5 MILLIGRAM(S): 2.5 TABLET, FILM COATED ORAL at 05:20

## 2022-07-20 RX ADMIN — GABAPENTIN 600 MILLIGRAM(S): 400 CAPSULE ORAL at 21:59

## 2022-07-20 RX ADMIN — NYSTATIN CREAM 1 APPLICATION(S): 100000 CREAM TOPICAL at 05:25

## 2022-07-20 RX ADMIN — GABAPENTIN 600 MILLIGRAM(S): 400 CAPSULE ORAL at 13:12

## 2022-07-20 RX ADMIN — HYDROMORPHONE HYDROCHLORIDE 4 MILLIGRAM(S): 2 INJECTION INTRAMUSCULAR; INTRAVENOUS; SUBCUTANEOUS at 17:30

## 2022-07-20 RX ADMIN — Medication 1 MILLIGRAM(S): at 05:18

## 2022-07-20 NOTE — PROGRESS NOTE ADULT - SUBJECTIVE AND OBJECTIVE BOX
CRUZ DUMONT 56y Female  MRN#: [id number]   Hospital Day: 54d    SUBJECTIVE  Patient is a 56y old Female who presents with a chief complaint of Hypoxia (16 Jul 2022 11:08)  Currently admitted to medicine with the primary diagnosis of Acute respiratory failure with hypoxia      INTERVAL HPI AND OVERNIGHT EVENTS:  Patient was examined and seen at bedside. She was supposed to be discharged early morning but reports shortness of breath.    OBJECTIVE  PAST MEDICAL & SURGICAL HISTORY  COPD (chronic obstructive pulmonary disease)    CHF (congestive heart failure)    DM (diabetes mellitus)    H/O tracheostomy      ALLERGIES:  penicillin (Swelling)    MEDICATIONS:  STANDING MEDICATIONS  ammonium lactate 12% Lotion 1 Application(s) Topical two times a day  apixaban 5 milliGRAM(s) Enteral Tube two times a day  atorvastatin 20 milliGRAM(s) Oral at bedtime  betamethasone valerate 0.1% Cream 1 Application(s) Topical two times a day  dextrose 5%. 1000 milliLiter(s) IV Continuous <Continuous>  dextrose 5%. 1000 milliLiter(s) IV Continuous <Continuous>  dextrose 50% Injectable 25 Gram(s) IV Push once  dextrose 50% Injectable 12.5 Gram(s) IV Push once  dextrose 50% Injectable 25 Gram(s) IV Push once  gabapentin 600 milliGRAM(s) Oral three times a day  glucagon  Injectable 1 milliGRAM(s) IntraMuscular once  influenza   Vaccine 0.5 milliLiter(s) IntraMuscular once  insulin glargine Injectable (LANTUS) 20 Unit(s) SubCutaneous at bedtime  insulin lispro (ADMELOG) corrective regimen sliding scale   SubCutaneous three times a day before meals  insulin lispro Injectable (ADMELOG) 6 Unit(s) SubCutaneous every 6 hours  magnesium sulfate  IVPB 2 Gram(s) IV Intermittent once  multivitamin 1 Tablet(s) Oral daily  nystatin Powder 1 Application(s) Topical two times a day  pantoprazole   Suspension 40 milliGRAM(s) Enteral Tube daily  polyethylene glycol 3350 17 Gram(s) Oral daily  senna 2 Tablet(s) Oral at bedtime    PRN MEDICATIONS  acetaminophen     Tablet .. 650 milliGRAM(s) Oral every 6 hours PRN  albuterol/ipratropium for Nebulization 3 milliLiter(s) Nebulizer every 6 hours PRN  ALPRAZolam 1 milliGRAM(s) Oral two times a day PRN  dextrose Oral Gel 15 Gram(s) Oral once PRN  HYDROmorphone   Tablet 4 milliGRAM(s) Oral every 4 hours PRN      VITAL SIGNS: Last 24 Hours  T(C): 36.8 (20 Jul 2022 13:25), Max: 37 (20 Jul 2022 10:00)  T(F): 98.3 (20 Jul 2022 13:25), Max: 98.6 (20 Jul 2022 10:00)  HR: 94 (20 Jul 2022 13:25) (71 - 113)  BP: 131/59 (20 Jul 2022 13:25) (101/51 - 131/59)  BP(mean): --  RR: 19 (20 Jul 2022 13:25) (18 - 20)  SpO2: 99% (20 Jul 2022 13:25) (90% - 99%)    LABS:                        9.8    6.51  )-----------( 172      ( 20 Jul 2022 13:40 )             29.1     07-20    136  |  93<L>  |  7<L>  ----------------------------<  126<H>  4.4   |  37<H>  |  <0.5<L>    Ca    9.3      20 Jul 2022 13:40  Mg     1.7     07-20    TPro  5.6<L>  /  Alb  3.2<L>  /  TBili  0.2  /  DBili  x   /  AST  16  /  ALT  11  /  AlkPhos  94  07-20                  RADIOLOGY:      PHYSICAL EXAM:  CONSTITUTIONAL: No acute distress, HEAD: Atraumatic, normocephalic  PULMONARY: decreased air entry on left side with bilateral rhonchi  CARDIOVASCULAR: Regular rate and rhythm; no murmurs, rubs, or gallops  GASTROINTESTINAL: Soft, non-tender, non-distended; bowel sounds present  MUSCULOSKELETAL: 2+ peripheral pulses; +1 LLE

## 2022-07-20 NOTE — CHART NOTE - NSCHARTNOTESELECT_GEN_ALL_CORE
Event Note
ENT/Event Note
Event Note
Pre-Op
RD Follow Up/Event Note
RD Follow-up/Event Note
Speech Pathology/Event Note
Surgery
Transfer Note
trach exchange/Event Note

## 2022-07-20 NOTE — PROGRESS NOTE ADULT - ASSESSMENT
55 yo female with PMHx of DVT on Eliquis, COPD,  trach collar, obesity, IDDM, UTI, sent by [facility] for hypoxia.   Current illness going back to April 4th when pt was intubated on admission at [facility] ICU for hypoxic respiratory failure diagnosed with copd exacerbation and superimposed pneumonia, and remained intubated for 37 days requiring tracheostomy.   Patient stayed at [facility] for 2 days until noted to be hypoxic (saturating low 70's) s/p large mucus plug removed and was subsequently sent to [facility]  Admitted to MICU for acute hypoxic hypercapnic respiratory failure secondary to acute mucus plug now resolved vs superimposed pneumonia hospital-acquired.   s/p Large granuloma removal. Patient was no longer in distress after exchange.    #Sepsis on admission -resolving  -6/28 Patient was noted to be tachy O/N, hypotensive, febrile to 102.8, FiO2 requirements were increased and the patient was desaturating to 82-86%  -blood cultures 6/29 positive for gram negative rods, serratia bacteremia  -6/30 WBC 11.21 (6/29 was 13.92 and 6/28 was 19.03)  -6/30 blood culture NGTD, 6/29 urine culture NGTD  -7/1 ID rec CT a/p- demonstrated cecitis, no abscess   -7/5 will f/u with ID regarding antibiotics regimen  -7/8 off antibiotics  -7/16: RR called; pt less responsive and non-verbal + leukocytosis and tachycardia => started on Rocephin and workup taken   - 07/18: Rocephin stopped (blood Cx negative to date), monitor vitals (approved by ID)  - 07/20: CXR suggestive of left sided mucous plugging given her history, suctioning done, CXR to be repeated and if improving possible Dc tomorrow     #VDRF- s/p trach  - Following pulm recs for settings    #Acute/ chronic COPD with exacerbation (Resolved)  #mucous plug removed  #pneumonia vs atelectasis L base  - On 5/28, Pulmonary team found large granuloma in trachea along tracheostomy tube. Patient had a size 8 trach on admission. Surgery assisted Pulmonary and CCU with trach exchanged with ET tube guidance to a size 7 XLT. Saturations improved to high 90s.  - trach functioning well s/p exchange   - 6/11 Trach changed to Fenestrated 8 by surgery    #s/p PEG (6/15):   - Patient allowed sips of water as per Speech       #LE neuropathic pain:  Patient has apparently been bed bound since April 4th (her [facility] admission for hypoxic failure where she ended up being intubated)  Apparently has L > R foot drop which may be due to chronic contractures of Calf muscle/Achilles tendon. Can have PT evaluate her for that.  But options are limited at this stage.   Given the asymmetrical foot drop, Neurology wants to rule out Any focal neuropathy.  -6/23 s/p  MRI Lumbar spine w/ & w/o Contrast, mild degenerative changes  -chronic DJD no acute changes.  - 7/14: d/c'ed IV Dilaudid and Percocet   -Restart Dilaudid PO 4mg q4 PRN      #B/l LE Hyperpigmentation changes:   unclear etiology  Present since 1 year.   May be related to CHF related stasis dermatitis / hemosiderin deposition  vs deposition diseases versus Eosinophilic Dermatitis vs follicular hemorrhage from eliquis   Dermatology thinks it may be Eczema and advised Triamcinolone BId.   Add multivitamin to PEG QD.   -7/1 Betamethasone cream and Ammonium lactate dressings per Burn, patient's legs feel mildly better  -7/5 bilateral lower extremities pain mildly improved  -7/15: Burn > skin bx    #Constipation  -On Senna    #Suspected epiglottis edema:   s/p Dexa Taper (ended 6/18).   Outpatient ENT f/u.     #DM:   steroids ended 6/18.  6/19- Increased lantus from 17 to 20.    #Chronic Indwelling Singh  - 5/28 Ucx - Contaminated   - 5/28 Ucx- Normal perri   - 5/27 Ucx- Klebsiella (CRE) and pseudomonas   - s/p cefepime  - Currently afebrile w/ no urinary complaints   - episode of Hematouria 6/4 -> improving -> h/h Stable    #Hx of DVT  - on home Eliquis  - restarted Eliquis    GI PPX: PPI  DVT PPX: ELIQUIS  Dispo: Acute  Pending: [facility], f/u pain management

## 2022-07-20 NOTE — CHART NOTE - NSCHARTNOTEFT_GEN_A_CORE
CXR showing worsening signs of left lung atelectasis, patient not in distress and hemodynamically stable, pulm/icu fellow contacted, will pass by and examine the patient

## 2022-07-21 LAB
ALBUMIN SERPL ELPH-MCNC: 3.2 G/DL — LOW (ref 3.5–5.2)
ALP SERPL-CCNC: 91 U/L — SIGNIFICANT CHANGE UP (ref 30–115)
ALT FLD-CCNC: 11 U/L — SIGNIFICANT CHANGE UP (ref 0–41)
ANION GAP SERPL CALC-SCNC: 5 MMOL/L — LOW (ref 7–14)
AST SERPL-CCNC: 14 U/L — SIGNIFICANT CHANGE UP (ref 0–41)
BASOPHILS # BLD AUTO: 0.04 K/UL — SIGNIFICANT CHANGE UP (ref 0–0.2)
BASOPHILS NFR BLD AUTO: 0.6 % — SIGNIFICANT CHANGE UP (ref 0–1)
BILIRUB SERPL-MCNC: 0.2 MG/DL — SIGNIFICANT CHANGE UP (ref 0.2–1.2)
BUN SERPL-MCNC: 10 MG/DL — SIGNIFICANT CHANGE UP (ref 10–20)
CALCIUM SERPL-MCNC: 9.5 MG/DL — SIGNIFICANT CHANGE UP (ref 8.5–10.1)
CHLORIDE SERPL-SCNC: 93 MMOL/L — LOW (ref 98–110)
CO2 SERPL-SCNC: 38 MMOL/L — HIGH (ref 17–32)
CREAT SERPL-MCNC: <0.5 MG/DL — LOW (ref 0.7–1.5)
EGFR: 124 ML/MIN/1.73M2 — SIGNIFICANT CHANGE UP
EOSINOPHIL # BLD AUTO: 0.27 K/UL — SIGNIFICANT CHANGE UP (ref 0–0.7)
EOSINOPHIL NFR BLD AUTO: 3.8 % — SIGNIFICANT CHANGE UP (ref 0–8)
GLUCOSE BLDC GLUCOMTR-MCNC: 109 MG/DL — HIGH (ref 70–99)
GLUCOSE BLDC GLUCOMTR-MCNC: 118 MG/DL — HIGH (ref 70–99)
GLUCOSE BLDC GLUCOMTR-MCNC: 126 MG/DL — HIGH (ref 70–99)
GLUCOSE BLDC GLUCOMTR-MCNC: 140 MG/DL — HIGH (ref 70–99)
GLUCOSE BLDC GLUCOMTR-MCNC: 151 MG/DL — HIGH (ref 70–99)
GLUCOSE SERPL-MCNC: 131 MG/DL — HIGH (ref 70–99)
GRAM STN FLD: SIGNIFICANT CHANGE UP
HCT VFR BLD CALC: 28.7 % — LOW (ref 37–47)
HGB BLD-MCNC: 9.6 G/DL — LOW (ref 12–16)
IMM GRANULOCYTES NFR BLD AUTO: 0.3 % — SIGNIFICANT CHANGE UP (ref 0.1–0.3)
LACTATE SERPL-SCNC: 0.9 MMOL/L — SIGNIFICANT CHANGE UP (ref 0.7–2)
LYMPHOCYTES # BLD AUTO: 1.18 K/UL — LOW (ref 1.2–3.4)
LYMPHOCYTES # BLD AUTO: 16.6 % — LOW (ref 20.5–51.1)
MAGNESIUM SERPL-MCNC: 1.8 MG/DL — SIGNIFICANT CHANGE UP (ref 1.8–2.4)
MCHC RBC-ENTMCNC: 33.4 G/DL — SIGNIFICANT CHANGE UP (ref 32–37)
MCHC RBC-ENTMCNC: 33.6 PG — HIGH (ref 27–31)
MCV RBC AUTO: 100.3 FL — HIGH (ref 81–99)
MONOCYTES # BLD AUTO: 0.59 K/UL — SIGNIFICANT CHANGE UP (ref 0.1–0.6)
MONOCYTES NFR BLD AUTO: 8.3 % — SIGNIFICANT CHANGE UP (ref 1.7–9.3)
NEUTROPHILS # BLD AUTO: 5.02 K/UL — SIGNIFICANT CHANGE UP (ref 1.4–6.5)
NEUTROPHILS NFR BLD AUTO: 70.4 % — SIGNIFICANT CHANGE UP (ref 42.2–75.2)
NRBC # BLD: 0 /100 WBCS — SIGNIFICANT CHANGE UP (ref 0–0)
PLATELET # BLD AUTO: 202 K/UL — SIGNIFICANT CHANGE UP (ref 130–400)
POTASSIUM SERPL-MCNC: 4.5 MMOL/L — SIGNIFICANT CHANGE UP (ref 3.5–5)
POTASSIUM SERPL-SCNC: 4.5 MMOL/L — SIGNIFICANT CHANGE UP (ref 3.5–5)
PROT SERPL-MCNC: 5.6 G/DL — LOW (ref 6–8)
RBC # BLD: 2.86 M/UL — LOW (ref 4.2–5.4)
RBC # FLD: 13.2 % — SIGNIFICANT CHANGE UP (ref 11.5–14.5)
SARS-COV-2 RNA SPEC QL NAA+PROBE: SIGNIFICANT CHANGE UP
SODIUM SERPL-SCNC: 136 MMOL/L — SIGNIFICANT CHANGE UP (ref 135–146)
SPECIMEN SOURCE: SIGNIFICANT CHANGE UP
WBC # BLD: 7.12 K/UL — SIGNIFICANT CHANGE UP (ref 4.8–10.8)
WBC # FLD AUTO: 7.12 K/UL — SIGNIFICANT CHANGE UP (ref 4.8–10.8)

## 2022-07-21 PROCEDURE — 71045 X-RAY EXAM CHEST 1 VIEW: CPT | Mod: 26

## 2022-07-21 PROCEDURE — 99233 SBSQ HOSP IP/OBS HIGH 50: CPT

## 2022-07-21 RX ORDER — SCOPALAMINE 1 MG/3D
1 PATCH, EXTENDED RELEASE TRANSDERMAL ONCE
Refills: 0 | Status: COMPLETED | OUTPATIENT
Start: 2022-07-21 | End: 2022-07-21

## 2022-07-21 RX ORDER — DIPHENHYDRAMINE HCL 50 MG
25 CAPSULE ORAL ONCE
Refills: 0 | Status: COMPLETED | OUTPATIENT
Start: 2022-07-21 | End: 2022-07-21

## 2022-07-21 RX ORDER — ACETYLCYSTEINE 200 MG/ML
4 VIAL (ML) MISCELLANEOUS
Refills: 0 | Status: DISCONTINUED | OUTPATIENT
Start: 2022-07-21 | End: 2022-07-22

## 2022-07-21 RX ADMIN — HYDROMORPHONE HYDROCHLORIDE 4 MILLIGRAM(S): 2 INJECTION INTRAMUSCULAR; INTRAVENOUS; SUBCUTANEOUS at 06:01

## 2022-07-21 RX ADMIN — Medication 6 UNIT(S): at 18:03

## 2022-07-21 RX ADMIN — APIXABAN 5 MILLIGRAM(S): 2.5 TABLET, FILM COATED ORAL at 05:31

## 2022-07-21 RX ADMIN — HYDROMORPHONE HYDROCHLORIDE 4 MILLIGRAM(S): 2 INJECTION INTRAMUSCULAR; INTRAVENOUS; SUBCUTANEOUS at 15:36

## 2022-07-21 RX ADMIN — GABAPENTIN 600 MILLIGRAM(S): 400 CAPSULE ORAL at 05:31

## 2022-07-21 RX ADMIN — Medication 6 UNIT(S): at 05:39

## 2022-07-21 RX ADMIN — Medication 1 TABLET(S): at 11:37

## 2022-07-21 RX ADMIN — HYDROMORPHONE HYDROCHLORIDE 4 MILLIGRAM(S): 2 INJECTION INTRAMUSCULAR; INTRAVENOUS; SUBCUTANEOUS at 05:31

## 2022-07-21 RX ADMIN — INSULIN GLARGINE 20 UNIT(S): 100 INJECTION, SOLUTION SUBCUTANEOUS at 21:04

## 2022-07-21 RX ADMIN — Medication 1 APPLICATION(S): at 17:59

## 2022-07-21 RX ADMIN — Medication 1 MILLIGRAM(S): at 21:03

## 2022-07-21 RX ADMIN — HYDROMORPHONE HYDROCHLORIDE 4 MILLIGRAM(S): 2 INJECTION INTRAMUSCULAR; INTRAVENOUS; SUBCUTANEOUS at 11:36

## 2022-07-21 RX ADMIN — Medication 1 APPLICATION(S): at 05:32

## 2022-07-21 RX ADMIN — GABAPENTIN 600 MILLIGRAM(S): 400 CAPSULE ORAL at 14:10

## 2022-07-21 RX ADMIN — ATORVASTATIN CALCIUM 20 MILLIGRAM(S): 80 TABLET, FILM COATED ORAL at 21:04

## 2022-07-21 RX ADMIN — SCOPALAMINE 1 PATCH: 1 PATCH, EXTENDED RELEASE TRANSDERMAL at 22:02

## 2022-07-21 RX ADMIN — Medication 650 MILLIGRAM(S): at 13:59

## 2022-07-21 RX ADMIN — NYSTATIN CREAM 1 APPLICATION(S): 100000 CREAM TOPICAL at 17:59

## 2022-07-21 RX ADMIN — NYSTATIN CREAM 1 APPLICATION(S): 100000 CREAM TOPICAL at 05:32

## 2022-07-21 RX ADMIN — HYDROMORPHONE HYDROCHLORIDE 4 MILLIGRAM(S): 2 INJECTION INTRAMUSCULAR; INTRAVENOUS; SUBCUTANEOUS at 19:46

## 2022-07-21 RX ADMIN — SENNA PLUS 2 TABLET(S): 8.6 TABLET ORAL at 21:04

## 2022-07-21 RX ADMIN — Medication 6 UNIT(S): at 00:01

## 2022-07-21 RX ADMIN — HYDROMORPHONE HYDROCHLORIDE 4 MILLIGRAM(S): 2 INJECTION INTRAMUSCULAR; INTRAVENOUS; SUBCUTANEOUS at 20:16

## 2022-07-21 RX ADMIN — Medication 25 MILLIGRAM(S): at 22:02

## 2022-07-21 RX ADMIN — Medication 1 MILLIGRAM(S): at 00:01

## 2022-07-21 RX ADMIN — GABAPENTIN 600 MILLIGRAM(S): 400 CAPSULE ORAL at 21:04

## 2022-07-21 RX ADMIN — Medication 1 APPLICATION(S): at 18:05

## 2022-07-21 RX ADMIN — PANTOPRAZOLE SODIUM 40 MILLIGRAM(S): 20 TABLET, DELAYED RELEASE ORAL at 14:30

## 2022-07-21 RX ADMIN — HYDROMORPHONE HYDROCHLORIDE 4 MILLIGRAM(S): 2 INJECTION INTRAMUSCULAR; INTRAVENOUS; SUBCUTANEOUS at 23:53

## 2022-07-21 RX ADMIN — APIXABAN 5 MILLIGRAM(S): 2.5 TABLET, FILM COATED ORAL at 17:59

## 2022-07-21 RX ADMIN — Medication 6 UNIT(S): at 12:10

## 2022-07-21 RX ADMIN — POLYETHYLENE GLYCOL 3350 17 GRAM(S): 17 POWDER, FOR SOLUTION ORAL at 11:37

## 2022-07-21 NOTE — PROGRESS NOTE ADULT - ASSESSMENT
57 yo female with PMHx of DVT on Eliquis, COPD,  trach collar, obesity, IDDM, UTI, sent [facility] for hypoxia.   Current illness going back to April 4th when pt was intubated on admission at [facility] ICU for hypoxic respiratory failure diagnosed with copd exacerbation and superimposed pneumonia, and remained intubated for 37 days requiring tracheostomy.   Patient stayed at [facility] for 2 days until noted to be hypoxic (saturating low 70's) s/p large mucus plug removed and was subsequently sent to [facility]  Admitted to MICU for acute hypoxic hypercapnic respiratory failure secondary to acute mucus plug now resolved vs superimposed pneumonia hospital-acquired.   s/p Large granuloma removal. Patient was no longer in distress after exchange.    #Sepsis on admission -resolving  -6/28 Patient was noted to be tachy O/N, hypotensive, febrile to 102.8, FiO2 requirements were increased and the patient was desaturating to 82-86%  -blood cultures 6/29 positive for gram negative rods, serratia bacteremia  -6/30 WBC 11.21 (6/29 was 13.92 and 6/28 was 19.03)  -6/30 blood culture NGTD, 6/29 urine culture NGTD  -7/1 ID rec CT a/p- demonstrated cecitis, no abscess   -7/5 will f/u with ID regarding antibiotics regimen  -7/8 off antibiotics  -7/16: RR called; pt less responsive and non-verbal + leukocytosis and tachycardia => started on Rocephin and workup taken   - 07/18: Rocephin stopped (blood Cx negative to date), monitor vitals (approved by ID)  - 07/20: CXR suggestive of left sided mucous plugging given her history, suctioning done, CXR to be repeated and if improving possible Dc tomorrow  - 07/21: CXR worsened overnight but improved today, back to baseline, stable, no complaints    #VDRF- s/p trach  - Following pulm recs for settings  - 07/21: mucomyst started     #Acute/ chronic COPD with exacerbation (Resolved)  #mucous plug removed  #pneumonia vs atelectasis L base  - On 5/28, Pulmonary team found large granuloma in trachea along tracheostomy tube. Patient had a size 8 trach on admission. Surgery assisted Pulmonary and CCU with trach exchanged with ET tube guidance to a size 7 XLT. Saturations improved to high 90s.  - trach functioning well s/p exchange   - 6/11 Trach changed to Fenestrated 8 by surgery    #s/p PEG (6/15):   - Patient allowed sips of water as per Speech       #LE neuropathic pain:  Patient has apparently been bed bound since April 4th (her [facility] admission for hypoxic failure where she ended up being intubated)  Apparently has L > R foot drop which may be due to chronic contractures of Calf muscle/Achilles tendon. Can have PT evaluate her for that.  But options are limited at this stage.   Given the asymmetrical foot drop, Neurology wants to rule out Any focal neuropathy.  -6/23 s/p  MRI Lumbar spine w/ & w/o Contrast, mild degenerative changes  -chronic DJD no acute changes.  - 7/14: d/c'ed IV Dilaudid and Percocet   -Restart Dilaudid PO 4mg q4 PRN      #B/l LE Hyperpigmentation changes:   unclear etiology  Present since 1 year.   May be related to CHF related stasis dermatitis / hemosiderin deposition  vs deposition diseases versus Eosinophilic Dermatitis vs follicular hemorrhage from eliquis   Dermatology thinks it may be Eczema and advised Triamcinolone BId.   Add multivitamin to PEG QD.   -7/1 Betamethasone cream and Ammonium lactate dressings per Burn, patient's legs feel mildly better  -7/5 bilateral lower extremities pain mildly improved  -7/15: Burn > skin bx    #Constipation  -On Senna    #Suspected epiglottis edema:   s/p Dexa Taper (ended 6/18).   Outpatient ENT f/u.     #DM:   steroids ended 6/18.  6/19- Increased lantus from 17 to 20.    #Chronic Indwelling Singh  - 5/28 Ucx - Contaminated   - 5/28 Ucx- Normal perri   - 5/27 Ucx- Klebsiella (CRE) and pseudomonas   - s/p cefepime  - Currently afebrile w/ no urinary complaints   - episode of Hematouria 6/4 -> improving -> h/h Stable    #Hx of DVT  - on home Eliquis  - restarted Eliquis    GI PPX: PPI  DVT PPX: ELIQUIS  Dispo: Acute  Pending: authorization for [facility] [place], f/u pain management

## 2022-07-21 NOTE — PROGRESS NOTE ADULT - ATTENDING COMMENTS
Patient is a 55 y/o female  with PMHx of DVT on Eliquis, COPD,  trach collar, obesity, IDDM, UTI, sent by [facility] [place] for hypoxia. Current illness going back to April 4th when pt was intubated on admission at [facility] ICU for hypoxic respiratory failure diagnosed with copd exacerbation and superimposed pneumonia, and remained intubated for 37 days requiring tracheostomy. Patient stayed at [facility] for 2 days until noted to be hypoxic (saturating low 70's) pt's saturation immediately improving afterwards to 80's after large mucus plug removed and was subsequently sent to [facility]    # Recurrent left lung collapse due to mucus plug.  #VDRF: Vent management per Pulm.  s/p trach exchange/s/p mucus plugs removal this admission  Monitor oxygenation. Lately been saturating ok on T piece.  -7/21- patient remains on 40% FIO2 supplement trach to collar, repeat CXR in am    #s/p PEG (6/15)  #Persistent Dysphagia:  ST planning repeat FEES on 7/6- poor visualization  - s/p barium swallow study on 7/7- still unable to take PO intake  -Patient will need close outpatient f/up at  SNF for further plans with swallow studies and tx.  -continue peg feedings with pre/post flushes   -as per speech tx- patient is allowed to have ice chips and sips of water po  -daily speech tx.      #LE neuropathic pain:  Given the asymmetrical foot drop, Neurology ruled out Any focal neuropathy with a MRI Lumbar spine WNL.     #Chronic atropic B/l LE Hyperpigmentation changes: LWC with Betamethasone cream and Ammonium lactate dressings per Burn has not helped over the past 3 weeks.   Recall Burn for Skin biopsy - OP vs IP? Can re discuss with Derm too.    #Lower extremity Pain management :   -Gabapentin to 600 TID,   - PO dilaudid 4mg Q4 PRN. Titrate PRN.       # Anixety : Xanax 1 mg Q12 PRN for anxiety.   On Senna, Miralax.       #DM: bsfs with insulin co.     lantus  20 units subc once daily , lispro 6 units subc. every 6 hours during feeding       #Chronic Indwelling Singh  - repeat U. Cx (6/29) WNL    #Hx of DVT- restarted Eliquis    # Severe Physical deconditioning : patient unable to walk due to severe legs pain, on pain management, continue to encourage PT tx. daily as tolerated     GI PPX  PPI  DVT PPX  ELIQUIS    #Progress Note Handoff: start d/c planning if CXR - improved aeration of left lung.   Family discussion: yes, medical team Disposition: SNF possibly tomorrow, [facility]    Total time spent to complete patient's bedside assessment, review medical chart, discuss medical plan of care with covering medical team was more than 35 minutes

## 2022-07-21 NOTE — PROGRESS NOTE ADULT - SUBJECTIVE AND OBJECTIVE BOX
CRUZ DUMONT 56y Female  MRN#: [id number]   Hospital Day: 55d    SUBJECTIVE  Patient is a 56y old Female who presents with a chief complaint of Hypoxia (16 Jul 2022 11:08)  Currently admitted to medicine with the primary diagnosis of Acute respiratory failure with hypoxia      INTERVAL HPI AND OVERNIGHT EVENTS:  Patient was examined and seen at bedside. This morning she is resting comfortably in bed and reports no issues or overnight events.    OBJECTIVE  PAST MEDICAL & SURGICAL HISTORY  COPD (chronic obstructive pulmonary disease)    CHF (congestive heart failure)    DM (diabetes mellitus)    H/O tracheostomy      ALLERGIES:  penicillin (Swelling)    MEDICATIONS:  STANDING MEDICATIONS  acetylcysteine 20%  Inhalation 4 milliLiter(s) Inhalation four times a day  ammonium lactate 12% Lotion 1 Application(s) Topical two times a day  apixaban 5 milliGRAM(s) Enteral Tube two times a day  atorvastatin 20 milliGRAM(s) Oral at bedtime  betamethasone valerate 0.1% Cream 1 Application(s) Topical two times a day  dextrose 5%. 1000 milliLiter(s) IV Continuous <Continuous>  dextrose 5%. 1000 milliLiter(s) IV Continuous <Continuous>  dextrose 50% Injectable 25 Gram(s) IV Push once  dextrose 50% Injectable 12.5 Gram(s) IV Push once  dextrose 50% Injectable 25 Gram(s) IV Push once  gabapentin 600 milliGRAM(s) Oral three times a day  glucagon  Injectable 1 milliGRAM(s) IntraMuscular once  influenza   Vaccine 0.5 milliLiter(s) IntraMuscular once  insulin glargine Injectable (LANTUS) 20 Unit(s) SubCutaneous at bedtime  insulin lispro (ADMELOG) corrective regimen sliding scale   SubCutaneous three times a day before meals  insulin lispro Injectable (ADMELOG) 6 Unit(s) SubCutaneous every 6 hours  magnesium sulfate  IVPB 2 Gram(s) IV Intermittent once  multivitamin 1 Tablet(s) Oral daily  nystatin Powder 1 Application(s) Topical two times a day  pantoprazole   Suspension 40 milliGRAM(s) Enteral Tube daily  polyethylene glycol 3350 17 Gram(s) Oral daily  senna 2 Tablet(s) Oral at bedtime    PRN MEDICATIONS  acetaminophen     Tablet .. 650 milliGRAM(s) Oral every 6 hours PRN  albuterol/ipratropium for Nebulization 3 milliLiter(s) Nebulizer every 6 hours PRN  ALPRAZolam 1 milliGRAM(s) Oral two times a day PRN  dextrose Oral Gel 15 Gram(s) Oral once PRN  HYDROmorphone   Tablet 4 milliGRAM(s) Oral every 4 hours PRN      VITAL SIGNS: Last 24 Hours  T(C): 37.2 (21 Jul 2022 05:22), Max: 37.2 (21 Jul 2022 05:22)  T(F): 99 (21 Jul 2022 05:22), Max: 99 (21 Jul 2022 05:22)  HR: 107 (21 Jul 2022 05:22) (82 - 107)  BP: 146/64 (21 Jul 2022 05:22) (110/53 - 146/64)  BP(mean): --  RR: 20 (21 Jul 2022 05:22) (18 - 20)  SpO2: 98% (21 Jul 2022 05:22) (98% - 99%)    LABS:                        9.6    7.12  )-----------( 202      ( 21 Jul 2022 09:17 )             28.7     07-21    136  |  93<L>  |  10  ----------------------------<  131<H>  4.5   |  38<H>  |  <0.5<L>    Ca    9.5      21 Jul 2022 09:17  Mg     1.8     07-21    TPro  5.6<L>  /  Alb  3.2<L>  /  TBili  0.2  /  DBili  x   /  AST  14  /  ALT  11  /  AlkPhos  91  07-21          Lactate, Blood: 0.9 mmol/L (07-21-22 @ 09:17)      Culture - Sputum (collected 20 Jul 2022 17:16)  Source: Trach Asp Tracheal Aspirate  Gram Stain (21 Jul 2022 10:16):    Moderate polymorphonuclear leukocytes seen per low power field    Few Squamous epithelial cells seen per low power field    Few Gram Negative Rods seen per oil power field    Rare Gram Positive Rods seen per oil power field          RADIOLOGY:      PHYSICAL EXAM:  CONSTITUTIONAL: No acute distress,  PULMONARY: Clear to auscultation bilaterally; bilateral rhonchi more on left  CARDIOVASCULAR: Regular rate and rhythm; no murmurs, rubs, or gallops  GASTROINTESTINAL: Soft, non-tender, non-distended; bowel sounds present  MUSCULOSKELETAL: 2+ peripheral pulses; +2 LL edema  NEUROLOGY: non-focal  SKIN: lower limb hyperpigmentation

## 2022-07-21 NOTE — PROGRESS NOTE ADULT - ATTENDING SUPERVISION STATEMENT
Resident
Fellow
Fellow
Resident
Fellow
Resident
Fellow
Resident

## 2022-07-22 VITALS
OXYGEN SATURATION: 98 % | TEMPERATURE: 98 F | HEART RATE: 88 BPM | RESPIRATION RATE: 20 BRPM | DIASTOLIC BLOOD PRESSURE: 83 MMHG | SYSTOLIC BLOOD PRESSURE: 135 MMHG

## 2022-07-22 LAB
CULTURE RESULTS: SIGNIFICANT CHANGE UP
GLUCOSE BLDC GLUCOMTR-MCNC: 104 MG/DL — HIGH (ref 70–99)
GLUCOSE BLDC GLUCOMTR-MCNC: 112 MG/DL — HIGH (ref 70–99)
GLUCOSE BLDC GLUCOMTR-MCNC: 82 MG/DL — SIGNIFICANT CHANGE UP (ref 70–99)
SPECIMEN SOURCE: SIGNIFICANT CHANGE UP

## 2022-07-22 PROCEDURE — 99239 HOSP IP/OBS DSCHRG MGMT >30: CPT

## 2022-07-22 PROCEDURE — 71045 X-RAY EXAM CHEST 1 VIEW: CPT | Mod: 26

## 2022-07-22 RX ORDER — HYDROMORPHONE HYDROCHLORIDE 2 MG/ML
4 INJECTION INTRAMUSCULAR; INTRAVENOUS; SUBCUTANEOUS ONCE
Refills: 0 | Status: DISCONTINUED | OUTPATIENT
Start: 2022-07-22 | End: 2022-07-22

## 2022-07-22 RX ORDER — ACETYLCYSTEINE 200 MG/ML
4 VIAL (ML) MISCELLANEOUS
Qty: 0 | Refills: 0 | DISCHARGE
Start: 2022-07-22

## 2022-07-22 RX ADMIN — Medication 1 APPLICATION(S): at 05:13

## 2022-07-22 RX ADMIN — HYDROMORPHONE HYDROCHLORIDE 4 MILLIGRAM(S): 2 INJECTION INTRAMUSCULAR; INTRAVENOUS; SUBCUTANEOUS at 00:23

## 2022-07-22 RX ADMIN — APIXABAN 5 MILLIGRAM(S): 2.5 TABLET, FILM COATED ORAL at 05:12

## 2022-07-22 RX ADMIN — Medication 6 UNIT(S): at 05:13

## 2022-07-22 RX ADMIN — PANTOPRAZOLE SODIUM 40 MILLIGRAM(S): 20 TABLET, DELAYED RELEASE ORAL at 11:03

## 2022-07-22 RX ADMIN — Medication 650 MILLIGRAM(S): at 11:02

## 2022-07-22 RX ADMIN — Medication 1 MILLIGRAM(S): at 11:03

## 2022-07-22 RX ADMIN — POLYETHYLENE GLYCOL 3350 17 GRAM(S): 17 POWDER, FOR SOLUTION ORAL at 11:04

## 2022-07-22 RX ADMIN — GABAPENTIN 600 MILLIGRAM(S): 400 CAPSULE ORAL at 05:12

## 2022-07-22 RX ADMIN — Medication 6 UNIT(S): at 00:06

## 2022-07-22 RX ADMIN — Medication 1 TABLET(S): at 11:03

## 2022-07-22 RX ADMIN — NYSTATIN CREAM 1 APPLICATION(S): 100000 CREAM TOPICAL at 05:13

## 2022-07-22 RX ADMIN — GABAPENTIN 600 MILLIGRAM(S): 400 CAPSULE ORAL at 13:07

## 2022-07-22 RX ADMIN — HYDROMORPHONE HYDROCHLORIDE 4 MILLIGRAM(S): 2 INJECTION INTRAMUSCULAR; INTRAVENOUS; SUBCUTANEOUS at 13:56

## 2022-07-22 RX ADMIN — Medication 1 APPLICATION(S): at 05:14

## 2022-07-22 RX ADMIN — SCOPALAMINE 1 PATCH: 1 PATCH, EXTENDED RELEASE TRANSDERMAL at 07:00

## 2022-07-22 NOTE — PROGRESS NOTE ADULT - REASON FOR ADMISSION
Hypoxia
SOB
SOB
dysphagia, trach dysfunction
mucous plug
trach malfunction
trach malfunction
Hypoxia
SOB
SOB
pneumoperitoneum
trach malfunction
Hypoxia
SOB/hypoxia
dysphagia/ trach malfunction
mucus plug
trach dysfunction
trach dysfunction
Hypoxic respiratory failure
trach malfunction
readmission due to collapsed left lung
dysphagia
trach malfunction

## 2022-07-22 NOTE — PROGRESS NOTE ADULT - SUBJECTIVE AND OBJECTIVE BOX
DUMONT CRUZ  [facility] T2-3A 023 A ([facility] T2-3A)      Patient was evaluated and examined  by bedside, no active events over night time         REVIEW OF SYSTEMS:  please see pertinent positives mentioned above, all other 12 ROS negative      T(C): , Max: 36.9 (07-21-22 @ 20:39)  HR: 88 (07-22-22 @ 05:24)  BP: 135/83 (07-22-22 @ 05:24)  RR: 20 (07-22-22 @ 05:24)  SpO2: 98% (07-22-22 @ 05:24)  CAPILLARY BLOOD GLUCOSE      POCT Blood Glucose.: 82 mg/dL (22 Jul 2022 12:08)  POCT Blood Glucose.: 104 mg/dL (22 Jul 2022 05:55)  POCT Blood Glucose.: 112 mg/dL (22 Jul 2022 00:04)  POCT Blood Glucose.: 151 mg/dL (21 Jul 2022 20:52)  POCT Blood Glucose.: 109 mg/dL (21 Jul 2022 17:59)      PHYSICAL EXAM:  GENERAL: NAD, AAOX3, patient is laying comfortably in bed  HEENT: AT, NC, PERRLA, SUPPLE, NO JVD, NO CB, trach present  LUNG: good breath sounds  B/L  CVS: normal S1, S2, RRR, NO M/G/R  ABDOMEN: soft, bowel sounds present, normoactive in all 4 quadrants, non-tender, non-distended, peg present  EXT: no E/C/C, positive PP b/l extremities  NEURO: b/l lower ext. weakness , hypersensitivity of b/l lower ext.  SKIN: chronic atrophic skin changes on b/l lower ext.        LAB  CBC  Date: 07-21-22 @ 09:17  Mean cell Rtcqzrmhza57.6  Mean cell Hemoglobin Conc33.4  Mean cell Volum 100.3  Platelet count-Automate 202  RBC Count 2.86  Red Cell Distrib Width13.2  WBC Count7.12  % Albumin, Urine--  Hematocrit 28.7  Hemoglobin 9.6  CBC  Date: 07-20-22 @ 13:40  Mean cell Vlmihhygib68.9  Mean cell Hemoglobin Conc33.7  Mean cell Volum 97.7  Platelet count-Automate 172  RBC Count 2.98  Red Cell Distrib Width13.1  WBC Count6.51  % Albumin, Urine--  Hematocrit 29.1  Hemoglobin 9.8  CBC  Date: 07-19-22 @ 12:44  Mean cell Vvrwrjfjna39.2  Mean cell Hemoglobin Conc34.8  Mean cell Volum 95.5  Platelet count-Automate 171  RBC Count 3.10  Red Cell Distrib Width13.2  WBC Count6.82  % Albumin, Urine--  Hematocrit 29.6  Hemoglobin 10.3    [facility]  07-21-22 @ 09:17  Blood Gas Arterial-Calcium,Ionized--  Blood Urea Nitrogen, Serum 10 mg/dL [10 - 20]  Carbon Dioxide, Serum38 mmol/L<H> [17 - 32]  Chloride, Serum93 mmol/L<L> [98 - 110]  Creatinie, Serum<0.5 mg/dL<L> [0.7 - 1.5]  Glucose, Tpbqf682 mg/dL<H> [70 - 99]  Potassium, Serum4.5 mmol/L [3.5 - 5.0]  Sodium, Serum 136 mmol/L [135 - 146]  [facility]  07-20-22 @ 13:40  Blood Gas Arterial-Calcium,Ionized--  Blood Urea Nitrogen, Serum 7 mg/dL<L> [10 - 20]  Carbon Dioxide, Serum37 mmol/L<H> [17 - 32]  Chloride, Serum93 mmol/L<L> [98 - 110]  Creatinie, Serum<0.5 mg/dL<L> [0.7 - 1.5]  Glucose, Sxsny250 mg/dL<H> [70 - 99]  Potassium, Serum4.4 mmol/L [3.5 - 5.0]  Sodium, Serum 136 mmol/L [135 - 146]        Microbiology:    Culture - Sputum (collected 07-20-22 @ 17:16)  Source: Trach Asp Tracheal Aspirate  Gram Stain (07-21-22 @ 10:16):    Moderate polymorphonuclear leukocytes seen per low power field    Few Squamous epithelial cells seen per low power field    Few Gram Negative Rods seen per oil power field    Rare Gram Positive Rods seen per oil power field    Culture - Blood (collected 07-16-22 @ 18:16)  Source: .Blood None  Final Report (07-22-22 @ 01:01):    No Growth Final    Culture - Blood (collected 06-30-22 @ 06:20)  Source: .Blood None  Final Report (07-05-22 @ 14:00):    No Growth Final    Culture - Urine (collected 06-29-22 @ 05:55)  Source: Clean Catch Clean Catch (Midstream)  Final Report (06-30-22 @ 21:38):    <10,000 CFU/mL Normal Urogenital Chante    Culture - Sputum (collected 06-28-22 @ 10:20)  Source: Trach Asp Tracheal Aspirate  Gram Stain (06-28-22 @ 23:39):    Numerous polymorphonuclear leukocytes per low power field    Few Squamous epithelial cells per low power field    Few Gram positive cocci in pairs per oil power field    Moderate Gram Negative Coccobacilli per oil power field  Final Report (06-30-22 @ 17:34):    Rare Pseudomonas aeruginosa (Carbapenem Resistant)    Normal Respiratory Chante present  Organism: Pseudomonas aeruginosa (Carbapenem Resistant) (06-30-22 @ 17:34)  Organism: Pseudomonas aeruginosa (Carbapenem Resistant) (06-30-22 @ 17:34)      -  Amikacin: S <=16      -  Aztreonam: R >16      -  Cefepime: S 4      -  Ceftazidime: S 4      -  Ceftazidime/Avibactam: S <=4      -  Ceftolozane/tazobactam: S <=2      -  Ciprofloxacin: R >2      -  Gentamicin: R >8      -  Imipenem: R >8      -  Levofloxacin: R >4      -  Meropenem: R 8      -  Piperacillin/Tazobactam: S <=8      -  Tobramycin: R >8      Method Type: ROMY    Culture - Blood (collected 06-28-22 @ 06:02)  Source: .Blood Blood-Peripheral  Gram Stain (06-29-22 @ 01:14):    Growth in aerobic bottle: Gram Negative Rods    Growth in anaerobic bottle: Gram Negative Rods  Final Report (06-30-22 @ 13:56):    Growth in aerobic and anaerobic bottles: Serratia marcescens    ***Blood Panel PCR results on this specimen are available    approximately 3 hours after the Gram stain result.***    Gram stain, PCR, and/or culture results may not always    correspond due to difference in methodologies.    ************************************************************    This PCR assay was performed by multiplex PCR. This    Assay tests for 66 bacterial and resistance gene targets.    Please refer to the [facility] test directory    at [email or web address] for details.  Organism: Blood Culture PCR  Serratia marcescens (06-30-22 @ 13:56)  Organism: Serratia marcescens (06-30-22 @ 13:56)      -  Amikacin: S <=16      -  Ampicillin: R >16 These ampicillin results predict results for amoxicillin      -  Ampicillin/Sulbactam: R >16/8 Enterobacter, Klebsiella aerogenes, Citrobacter, and Serratia may develop resistance during prolonged therapy (3-4 days)      -  Aztreonam: S <=4      -  Cefazolin: R >16 Enterobacter, Klebsiella aerogenes, Citrobacter, and Serratia may develop resistance during prolonged therapy (3-4 days)      -  Cefepime: S <=2      -  Cefoxitin: R 16      -  Ceftriaxone: S <=1 Enterobacter, Klebsiella aerogenes, Citrobacter, and Serratia may develop resistance during prolonged therapy      -  Ciprofloxacin: S <=0.25      -  Ertapenem: S <=0.5      -  Gentamicin: S <=2      -  Levofloxacin: S <=0.5      -  Meropenem: S <=1      -  Piperacillin/Tazobactam: S <=8      -  Tobramycin: S <=2      -  Trimethoprim/Sulfamethoxazole: S <=0.5/9.5      Method Type: ROMY  Organism: Blood Culture PCR (06-30-22 @ 13:56)      -  Serratia marcescens: Detec      Method Type: PCR        Medications:  acetaminophen     Tablet .. 650 milliGRAM(s) Oral every 6 hours PRN  acetylcysteine 20%  Inhalation 4 milliLiter(s) Inhalation four times a day  albuterol/ipratropium for Nebulization 3 milliLiter(s) Nebulizer every 6 hours PRN  ALPRAZolam 1 milliGRAM(s) Oral two times a day PRN  ammonium lactate 12% Lotion 1 Application(s) Topical two times a day  apixaban 5 milliGRAM(s) Enteral Tube two times a day  atorvastatin 20 milliGRAM(s) Oral at bedtime  betamethasone valerate 0.1% Cream 1 Application(s) Topical two times a day  dextrose 5%. 1000 milliLiter(s) IV Continuous <Continuous>  dextrose 5%. 1000 milliLiter(s) IV Continuous <Continuous>  dextrose 50% Injectable 25 Gram(s) IV Push once  dextrose 50% Injectable 12.5 Gram(s) IV Push once  dextrose 50% Injectable 25 Gram(s) IV Push once  dextrose Oral Gel 15 Gram(s) Oral once PRN  gabapentin 600 milliGRAM(s) Oral three times a day  glucagon  Injectable 1 milliGRAM(s) IntraMuscular once  HYDROmorphone   Tablet 4 milliGRAM(s) Oral once  influenza   Vaccine 0.5 milliLiter(s) IntraMuscular once  insulin glargine Injectable (LANTUS) 20 Unit(s) SubCutaneous at bedtime  insulin lispro (ADMELOG) corrective regimen sliding scale   SubCutaneous three times a day before meals  insulin lispro Injectable (ADMELOG) 6 Unit(s) SubCutaneous every 6 hours  magnesium sulfate  IVPB 2 Gram(s) IV Intermittent once  multivitamin 1 Tablet(s) Oral daily  nystatin Powder 1 Application(s) Topical two times a day  pantoprazole   Suspension 40 milliGRAM(s) Enteral Tube daily  polyethylene glycol 3350 17 Gram(s) Oral daily  senna 2 Tablet(s) Oral at bedtime        Assessment and Plan:  Patient is a 55 y/o female  with PMHx of DVT on Eliquis, COPD,  trach collar, obesity, IDDM, UTI, sent by [facility] for hypoxia. Current illness going back to April 4th when pt was intubated on admission at [facility] ICU for hypoxic respiratory failure diagnosed with copd exacerbation and superimposed pneumonia, and remained intubated for 37 days requiring tracheostomy. Patient stayed at [facility] for 2 days until noted to be hypoxic (saturating low 70's) pt's saturation immediately improving afterwards to 80's after large mucus plug removed and was subsequently sent to [facility]    # Recurrent left lung collapse due to mucus plug.  #VDRF: Vent management per Pulm.  s/p trach exchange/s/p mucus plugs removal this admission  Monitor oxygenation. Lately been saturating ok on T piece.  -7/21- patient remains on 40% FIO2 supplement trach to collar, repeated CXR - reopened left lung.    #s/p PEG (6/15)  #Persistent Dysphagia:  ST planning repeat FEES on 7/6- poor visualization  - s/p barium swallow study on 7/7- still unable to take PO intake  -Patient will need close outpatient f/up at  SNF for further plans with swallow studies and tx.  -continue peg feedings with pre/post flushes   -as per speech tx- patient is allowed to have ice chips and sips of water po  -daily speech tx.      #LE neuropathic pain:  Given the asymmetrical foot drop, Neurology ruled out Any focal neuropathy with a MRI Lumbar spine WNL.     #Chronic atropic B/l LE Hyperpigmentation changes: LWC with Betamethasone cream and Ammonium lactate dressings per Burn has not helped over the past 3 weeks.   Recall Burn for Skin biopsy - OP vs IP? Can re discuss with Derm too.    #Lower extremity Pain management :   -Gabapentin to 600 TID,   - PO dilaudid 4mg Q4 PRN. Titrate PRN.       # Anixety : Xanax 1 mg Q12 PRN for anxiety.   On Senna, Miralax.       #DM: bsfs with insulin co.     lantus  20 units subc once daily , lispro 6 units subc. every 6 hours during feeding       #Chronic Indwelling Singh  - repeat U. Cx (6/29) WNL    #Hx of DVT- restarted Eliquis    # Severe Physical deconditioning : patient unable to walk due to severe legs pain, on pain management, continue to encourage PT tx. daily as tolerated     GI PPX  PPI  DVT PPX  ELIQUIS    #Progress Note Handoff: d/c planning to SNF today   Family discussion:  Patient verbalized good understanding of discharge instructions and agreed with discharge plan.  Disposition: SNF today,  [facility]    Total time spent to complete patient's bedside assessment, review medical chart, discuss discharge  plan of care with covering medical team was more than 35 minutes

## 2022-07-22 NOTE — PROGRESS NOTE ADULT - PROVIDER SPECIALTY LIST ADULT
Burn
Critical Care
Critical Care
Gastroenterology
Hospitalist
Internal Medicine
Pulmonology
Pulmonology
Thoracic Surgery
CT Surgery
CT Surgery
Critical Care
Gastroenterology
Hospitalist
Infectious Disease
Infectious Disease
Internal Medicine
Pulmonology
Thoracic Surgery
Vascular Surgery
Critical Care
Critical Care
Hospitalist
Internal Medicine
Pulmonology
Surgery
Thoracic Surgery
Gastroenterology
Internal Medicine
Surgery
Pulmonology

## 2022-07-23 LAB
-  AMIKACIN: SIGNIFICANT CHANGE UP
-  AZTREONAM: SIGNIFICANT CHANGE UP
-  CEFEPIME: SIGNIFICANT CHANGE UP
-  CEFTAZIDIME: SIGNIFICANT CHANGE UP
-  CIPROFLOXACIN: SIGNIFICANT CHANGE UP
-  GENTAMICIN: SIGNIFICANT CHANGE UP
-  IMIPENEM: SIGNIFICANT CHANGE UP
-  LEVOFLOXACIN: SIGNIFICANT CHANGE UP
-  MEROPENEM: SIGNIFICANT CHANGE UP
-  PIPERACILLIN/TAZOBACTAM: SIGNIFICANT CHANGE UP
-  TOBRAMYCIN: SIGNIFICANT CHANGE UP
CULTURE RESULTS: SIGNIFICANT CHANGE UP
METHOD TYPE: SIGNIFICANT CHANGE UP
ORGANISM # SPEC MICROSCOPIC CNT: SIGNIFICANT CHANGE UP
ORGANISM # SPEC MICROSCOPIC CNT: SIGNIFICANT CHANGE UP
SPECIMEN SOURCE: SIGNIFICANT CHANGE UP

## 2022-07-25 LAB — GLUCOSE BLDC GLUCOMTR-MCNC: 121 MG/DL — HIGH (ref 70–99)

## 2022-07-26 LAB — SURGICAL PATHOLOGY STUDY: SIGNIFICANT CHANGE UP

## 2022-07-29 DIAGNOSIS — K66.8 OTHER SPECIFIED DISORDERS OF PERITONEUM: ICD-10-CM

## 2022-07-29 DIAGNOSIS — T17.490A OTHER FOREIGN OBJECT IN TRACHEA CAUSING ASPHYXIATION, INITIAL ENCOUNTER: ICD-10-CM

## 2022-07-29 DIAGNOSIS — D64.9 ANEMIA, UNSPECIFIED: ICD-10-CM

## 2022-07-29 DIAGNOSIS — Z79.4 LONG TERM (CURRENT) USE OF INSULIN: ICD-10-CM

## 2022-07-29 DIAGNOSIS — I50.9 HEART FAILURE, UNSPECIFIED: ICD-10-CM

## 2022-07-29 DIAGNOSIS — L73.2 HIDRADENITIS SUPPURATIVA: ICD-10-CM

## 2022-07-29 DIAGNOSIS — L30.9 DERMATITIS, UNSPECIFIED: ICD-10-CM

## 2022-07-29 DIAGNOSIS — F17.210 NICOTINE DEPENDENCE, CIGARETTES, UNCOMPLICATED: ICD-10-CM

## 2022-07-29 DIAGNOSIS — Z86.718 PERSONAL HISTORY OF OTHER VENOUS THROMBOSIS AND EMBOLISM: ICD-10-CM

## 2022-07-29 DIAGNOSIS — R13.13 DYSPHAGIA, PHARYNGEAL PHASE: ICD-10-CM

## 2022-07-29 DIAGNOSIS — N39.0 URINARY TRACT INFECTION, SITE NOT SPECIFIED: ICD-10-CM

## 2022-07-29 DIAGNOSIS — F41.9 ANXIETY DISORDER, UNSPECIFIED: ICD-10-CM

## 2022-07-29 DIAGNOSIS — Z79.52 LONG TERM (CURRENT) USE OF SYSTEMIC STEROIDS: ICD-10-CM

## 2022-07-29 DIAGNOSIS — J96.22 ACUTE AND CHRONIC RESPIRATORY FAILURE WITH HYPERCAPNIA: ICD-10-CM

## 2022-07-29 DIAGNOSIS — Z93.0 TRACHEOSTOMY STATUS: ICD-10-CM

## 2022-07-29 DIAGNOSIS — E87.1 HYPO-OSMOLALITY AND HYPONATREMIA: ICD-10-CM

## 2022-07-29 DIAGNOSIS — J15.9 UNSPECIFIED BACTERIAL PNEUMONIA: ICD-10-CM

## 2022-07-29 DIAGNOSIS — A41.53 SEPSIS DUE TO SERRATIA: ICD-10-CM

## 2022-07-29 DIAGNOSIS — J44.1 CHRONIC OBSTRUCTIVE PULMONARY DISEASE WITH (ACUTE) EXACERBATION: ICD-10-CM

## 2022-07-29 DIAGNOSIS — J96.21 ACUTE AND CHRONIC RESPIRATORY FAILURE WITH HYPOXIA: ICD-10-CM

## 2022-07-29 DIAGNOSIS — J98.11 ATELECTASIS: ICD-10-CM

## 2022-07-29 DIAGNOSIS — T83.511A INFECTION AND INFLAMMATORY REACTION DUE TO INDWELLING URETHRAL CATHETER, INITIAL ENCOUNTER: ICD-10-CM

## 2022-07-29 DIAGNOSIS — M21.372 FOOT DROP, LEFT FOOT: ICD-10-CM

## 2022-07-29 DIAGNOSIS — J38.4 EDEMA OF LARYNX: ICD-10-CM

## 2022-07-29 DIAGNOSIS — E11.40 TYPE 2 DIABETES MELLITUS WITH DIABETIC NEUROPATHY, UNSPECIFIED: ICD-10-CM

## 2022-07-29 DIAGNOSIS — Z16.19 RESISTANCE TO OTHER SPECIFIED BETA LACTAM ANTIBIOTICS: ICD-10-CM

## 2022-07-29 DIAGNOSIS — K56.7 ILEUS, UNSPECIFIED: ICD-10-CM

## 2022-07-29 DIAGNOSIS — J44.0 CHRONIC OBSTRUCTIVE PULMONARY DISEASE WITH (ACUTE) LOWER RESPIRATORY INFECTION: ICD-10-CM

## 2022-07-29 DIAGNOSIS — I87.2 VENOUS INSUFFICIENCY (CHRONIC) (PERIPHERAL): ICD-10-CM

## 2022-08-03 ENCOUNTER — INPATIENT (INPATIENT)
Facility: HOSPITAL | Age: 57
LOS: 22 days | Discharge: NURSING, REHAB OR LONG-TERM CARE | End: 2022-08-26
Attending: INTERNAL MEDICINE | Admitting: INTERNAL MEDICINE
Payer: MEDICARE

## 2022-08-03 VITALS
HEART RATE: 120 BPM | RESPIRATION RATE: 21 BRPM | OXYGEN SATURATION: 93 % | WEIGHT: 190.92 LBS | HEIGHT: 61 IN | DIASTOLIC BLOOD PRESSURE: 39 MMHG | TEMPERATURE: 99 F | SYSTOLIC BLOOD PRESSURE: 85 MMHG

## 2022-08-03 DIAGNOSIS — Z98.890 OTHER SPECIFIED POSTPROCEDURAL STATES: Chronic | ICD-10-CM

## 2022-08-03 LAB
ALBUMIN SERPL ELPH-MCNC: 3.5 G/DL — SIGNIFICANT CHANGE UP (ref 3.5–5.2)
ALP SERPL-CCNC: 88 U/L — SIGNIFICANT CHANGE UP (ref 30–115)
ALT FLD-CCNC: 19 U/L — SIGNIFICANT CHANGE UP (ref 0–41)
ANION GAP SERPL CALC-SCNC: 9 MMOL/L — SIGNIFICANT CHANGE UP (ref 7–14)
APPEARANCE UR: ABNORMAL
APTT BLD: 32.2 SEC — SIGNIFICANT CHANGE UP (ref 27–39.2)
AST SERPL-CCNC: 18 U/L — SIGNIFICANT CHANGE UP (ref 0–41)
BACTERIA # UR AUTO: ABNORMAL
BASE EXCESS BLDV CALC-SCNC: 4.1 MMOL/L — HIGH (ref -2–3)
BASOPHILS # BLD AUTO: 0.04 K/UL — SIGNIFICANT CHANGE UP (ref 0–0.2)
BASOPHILS NFR BLD AUTO: 0.4 % — SIGNIFICANT CHANGE UP (ref 0–1)
BILIRUB SERPL-MCNC: 0.3 MG/DL — SIGNIFICANT CHANGE UP (ref 0.2–1.2)
BILIRUB UR-MCNC: NEGATIVE — SIGNIFICANT CHANGE UP
BUN SERPL-MCNC: 7 MG/DL — LOW (ref 10–20)
CA-I SERPL-SCNC: 1.28 MMOL/L — SIGNIFICANT CHANGE UP (ref 1.15–1.33)
CALCIUM SERPL-MCNC: 9.8 MG/DL — SIGNIFICANT CHANGE UP (ref 8.5–10.1)
CHLORIDE SERPL-SCNC: 96 MMOL/L — LOW (ref 98–110)
CO2 SERPL-SCNC: 28 MMOL/L — SIGNIFICANT CHANGE UP (ref 17–32)
COLOR SPEC: YELLOW — SIGNIFICANT CHANGE UP
COMMENT - URINE: SIGNIFICANT CHANGE UP
CREAT SERPL-MCNC: <0.5 MG/DL — LOW (ref 0.7–1.5)
D DIMER BLD IA.RAPID-MCNC: 225 NG/ML DDU — SIGNIFICANT CHANGE UP (ref 0–230)
DIFF PNL FLD: ABNORMAL
EGFR: 115 ML/MIN/1.73M2 — SIGNIFICANT CHANGE UP
EOSINOPHIL # BLD AUTO: 0.1 K/UL — SIGNIFICANT CHANGE UP (ref 0–0.7)
EOSINOPHIL NFR BLD AUTO: 0.9 % — SIGNIFICANT CHANGE UP (ref 0–8)
EPI CELLS # UR: 1 /HPF — SIGNIFICANT CHANGE UP (ref 0–5)
FLUAV AG NPH QL: SIGNIFICANT CHANGE UP
FLUBV AG NPH QL: SIGNIFICANT CHANGE UP
GAS PNL BLDV: 129 MMOL/L — LOW (ref 136–145)
GAS PNL BLDV: SIGNIFICANT CHANGE UP
GLUCOSE BLDC GLUCOMTR-MCNC: 242 MG/DL — HIGH (ref 70–99)
GLUCOSE SERPL-MCNC: 184 MG/DL — HIGH (ref 70–99)
GLUCOSE UR QL: NEGATIVE — SIGNIFICANT CHANGE UP
HCO3 BLDV-SCNC: 32 MMOL/L — HIGH (ref 22–29)
HCT VFR BLD CALC: 31.2 % — LOW (ref 37–47)
HCT VFR BLDA CALC: 53 % — HIGH (ref 39–51)
HGB BLD CALC-MCNC: 17.7 G/DL — HIGH (ref 12.6–17.4)
HGB BLD-MCNC: 10.5 G/DL — LOW (ref 12–16)
HYALINE CASTS # UR AUTO: 11 /LPF — HIGH (ref 0–7)
IMM GRANULOCYTES NFR BLD AUTO: 0.4 % — HIGH (ref 0.1–0.3)
INR BLD: 1.02 RATIO — SIGNIFICANT CHANGE UP (ref 0.65–1.3)
KETONES UR-MCNC: NEGATIVE — SIGNIFICANT CHANGE UP
LACTATE BLDV-MCNC: 1.4 MMOL/L — SIGNIFICANT CHANGE UP (ref 0.5–2)
LEUKOCYTE ESTERASE UR-ACNC: ABNORMAL
LYMPHOCYTES # BLD AUTO: 1.23 K/UL — SIGNIFICANT CHANGE UP (ref 1.2–3.4)
LYMPHOCYTES # BLD AUTO: 11.1 % — LOW (ref 20.5–51.1)
MCHC RBC-ENTMCNC: 32 PG — HIGH (ref 27–31)
MCHC RBC-ENTMCNC: 33.7 G/DL — SIGNIFICANT CHANGE UP (ref 32–37)
MCV RBC AUTO: 95.1 FL — SIGNIFICANT CHANGE UP (ref 81–99)
MONOCYTES # BLD AUTO: 0.7 K/UL — HIGH (ref 0.1–0.6)
MONOCYTES NFR BLD AUTO: 6.3 % — SIGNIFICANT CHANGE UP (ref 1.7–9.3)
NEUTROPHILS # BLD AUTO: 8.98 K/UL — HIGH (ref 1.4–6.5)
NEUTROPHILS NFR BLD AUTO: 80.9 % — HIGH (ref 42.2–75.2)
NITRITE UR-MCNC: POSITIVE
NRBC # BLD: 0 /100 WBCS — SIGNIFICANT CHANGE UP (ref 0–0)
NT-PROBNP SERPL-SCNC: 444 PG/ML — HIGH (ref 0–300)
PCO2 BLDV: 56 MMHG — HIGH (ref 39–42)
PH BLDV: 7.36 — SIGNIFICANT CHANGE UP (ref 7.32–7.43)
PH UR: 6 — SIGNIFICANT CHANGE UP (ref 5–8)
PLATELET # BLD AUTO: 170 K/UL — SIGNIFICANT CHANGE UP (ref 130–400)
PO2 BLDV: 48 MMHG — SIGNIFICANT CHANGE UP
POTASSIUM BLDV-SCNC: 4.1 MMOL/L — SIGNIFICANT CHANGE UP (ref 3.5–5.1)
POTASSIUM SERPL-MCNC: 4.5 MMOL/L — SIGNIFICANT CHANGE UP (ref 3.5–5)
POTASSIUM SERPL-SCNC: 4.5 MMOL/L — SIGNIFICANT CHANGE UP (ref 3.5–5)
PROT SERPL-MCNC: 6.1 G/DL — SIGNIFICANT CHANGE UP (ref 6–8)
PROT UR-MCNC: ABNORMAL
PROTHROM AB SERPL-ACNC: 11.7 SEC — SIGNIFICANT CHANGE UP (ref 9.95–12.87)
RAPID RVP RESULT: SIGNIFICANT CHANGE UP
RBC # BLD: 3.28 M/UL — LOW (ref 4.2–5.4)
RBC # FLD: 12.5 % — SIGNIFICANT CHANGE UP (ref 11.5–14.5)
RBC CASTS # UR COMP ASSIST: 2 /HPF — SIGNIFICANT CHANGE UP (ref 0–4)
RSV RNA NPH QL NAA+NON-PROBE: SIGNIFICANT CHANGE UP
SAO2 % BLDV: 83.4 % — SIGNIFICANT CHANGE UP
SARS-COV-2 RNA SPEC QL NAA+PROBE: SIGNIFICANT CHANGE UP
SARS-COV-2 RNA SPEC QL NAA+PROBE: SIGNIFICANT CHANGE UP
SODIUM SERPL-SCNC: 133 MMOL/L — LOW (ref 135–146)
SP GR SPEC: 1.02 — SIGNIFICANT CHANGE UP (ref 1.01–1.03)
TROPONIN T SERPL-MCNC: <0.01 NG/ML — SIGNIFICANT CHANGE UP
UROBILINOGEN FLD QL: SIGNIFICANT CHANGE UP
WBC # BLD: 11.09 K/UL — HIGH (ref 4.8–10.8)
WBC # FLD AUTO: 11.09 K/UL — HIGH (ref 4.8–10.8)
WBC UR QL: 161 /HPF — HIGH (ref 0–5)

## 2022-08-03 PROCEDURE — 71045 X-RAY EXAM CHEST 1 VIEW: CPT | Mod: 26

## 2022-08-03 PROCEDURE — 99291 CRITICAL CARE FIRST HOUR: CPT | Mod: CS

## 2022-08-03 PROCEDURE — 99233 SBSQ HOSP IP/OBS HIGH 50: CPT

## 2022-08-03 PROCEDURE — 93010 ELECTROCARDIOGRAM REPORT: CPT

## 2022-08-03 PROCEDURE — 99223 1ST HOSP IP/OBS HIGH 75: CPT

## 2022-08-03 RX ORDER — DEXTROSE 50 % IN WATER 50 %
25 SYRINGE (ML) INTRAVENOUS ONCE
Refills: 0 | Status: DISCONTINUED | OUTPATIENT
Start: 2022-08-03 | End: 2022-08-19

## 2022-08-03 RX ORDER — ACETYLCYSTEINE 200 MG/ML
3 VIAL (ML) MISCELLANEOUS EVERY 6 HOURS
Refills: 0 | Status: DISCONTINUED | OUTPATIENT
Start: 2022-08-03 | End: 2022-08-19

## 2022-08-03 RX ORDER — GLUCAGON INJECTION, SOLUTION 0.5 MG/.1ML
1 INJECTION, SOLUTION SUBCUTANEOUS ONCE
Refills: 0 | Status: DISCONTINUED | OUTPATIENT
Start: 2022-08-03 | End: 2022-08-19

## 2022-08-03 RX ORDER — CEFEPIME 1 G/1
2000 INJECTION, POWDER, FOR SOLUTION INTRAMUSCULAR; INTRAVENOUS EVERY 8 HOURS
Refills: 0 | Status: DISCONTINUED | OUTPATIENT
Start: 2022-08-03 | End: 2022-08-05

## 2022-08-03 RX ORDER — INSULIN LISPRO 100/ML
6 VIAL (ML) SUBCUTANEOUS
Refills: 0 | Status: DISCONTINUED | OUTPATIENT
Start: 2022-08-03 | End: 2022-08-19

## 2022-08-03 RX ORDER — SOD,AMMONIUM,POTASSIUM LACTATE
1 CREAM (GRAM) TOPICAL
Refills: 0 | Status: DISCONTINUED | OUTPATIENT
Start: 2022-08-03 | End: 2022-08-19

## 2022-08-03 RX ORDER — CHLORHEXIDINE GLUCONATE 213 G/1000ML
15 SOLUTION TOPICAL EVERY 12 HOURS
Refills: 0 | Status: DISCONTINUED | OUTPATIENT
Start: 2022-08-03 | End: 2022-08-17

## 2022-08-03 RX ORDER — INSULIN LISPRO 100/ML
VIAL (ML) SUBCUTANEOUS
Refills: 0 | Status: DISCONTINUED | OUTPATIENT
Start: 2022-08-03 | End: 2022-08-19

## 2022-08-03 RX ORDER — ALBUTEROL 90 UG/1
2 AEROSOL, METERED ORAL EVERY 6 HOURS
Refills: 0 | Status: DISCONTINUED | OUTPATIENT
Start: 2022-08-03 | End: 2022-08-19

## 2022-08-03 RX ORDER — VANCOMYCIN HCL 1 G
1250 VIAL (EA) INTRAVENOUS ONCE
Refills: 0 | Status: COMPLETED | OUTPATIENT
Start: 2022-08-03 | End: 2022-08-03

## 2022-08-03 RX ORDER — VANCOMYCIN HCL 1 G
1250 VIAL (EA) INTRAVENOUS EVERY 12 HOURS
Refills: 0 | Status: DISCONTINUED | OUTPATIENT
Start: 2022-08-03 | End: 2022-08-03

## 2022-08-03 RX ORDER — PANTOPRAZOLE SODIUM 20 MG/1
40 TABLET, DELAYED RELEASE ORAL DAILY
Refills: 0 | Status: DISCONTINUED | OUTPATIENT
Start: 2022-08-03 | End: 2022-08-19

## 2022-08-03 RX ORDER — IPRATROPIUM/ALBUTEROL SULFATE 18-103MCG
3 AEROSOL WITH ADAPTER (GRAM) INHALATION EVERY 4 HOURS
Refills: 0 | Status: DISCONTINUED | OUTPATIENT
Start: 2022-08-03 | End: 2022-08-04

## 2022-08-03 RX ORDER — IPRATROPIUM/ALBUTEROL SULFATE 18-103MCG
3 AEROSOL WITH ADAPTER (GRAM) INHALATION EVERY 4 HOURS
Refills: 0 | Status: DISCONTINUED | OUTPATIENT
Start: 2022-08-03 | End: 2022-08-03

## 2022-08-03 RX ORDER — FUROSEMIDE 40 MG
40 TABLET ORAL ONCE
Refills: 0 | Status: COMPLETED | OUTPATIENT
Start: 2022-08-03 | End: 2022-08-03

## 2022-08-03 RX ORDER — ALPRAZOLAM 0.25 MG
0.5 TABLET ORAL THREE TIMES A DAY
Refills: 0 | Status: DISCONTINUED | OUTPATIENT
Start: 2022-08-03 | End: 2022-08-04

## 2022-08-03 RX ORDER — HYDROMORPHONE HYDROCHLORIDE 2 MG/ML
0.5 INJECTION INTRAMUSCULAR; INTRAVENOUS; SUBCUTANEOUS ONCE
Refills: 0 | Status: DISCONTINUED | OUTPATIENT
Start: 2022-08-03 | End: 2022-08-03

## 2022-08-03 RX ORDER — HYDROMORPHONE HYDROCHLORIDE 2 MG/ML
2 INJECTION INTRAMUSCULAR; INTRAVENOUS; SUBCUTANEOUS ONCE
Refills: 0 | Status: DISCONTINUED | OUTPATIENT
Start: 2022-08-03 | End: 2022-08-03

## 2022-08-03 RX ORDER — DEXTROSE 50 % IN WATER 50 %
15 SYRINGE (ML) INTRAVENOUS ONCE
Refills: 0 | Status: DISCONTINUED | OUTPATIENT
Start: 2022-08-03 | End: 2022-08-19

## 2022-08-03 RX ORDER — HYDROMORPHONE HYDROCHLORIDE 2 MG/ML
4 INJECTION INTRAMUSCULAR; INTRAVENOUS; SUBCUTANEOUS ONCE
Refills: 0 | Status: DISCONTINUED | OUTPATIENT
Start: 2022-08-03 | End: 2022-08-03

## 2022-08-03 RX ORDER — DIAZEPAM 5 MG
5 TABLET ORAL ONCE
Refills: 0 | Status: DISCONTINUED | OUTPATIENT
Start: 2022-08-03 | End: 2022-08-03

## 2022-08-03 RX ORDER — SODIUM CHLORIDE 9 MG/ML
1000 INJECTION, SOLUTION INTRAVENOUS
Refills: 0 | Status: DISCONTINUED | OUTPATIENT
Start: 2022-08-03 | End: 2022-08-19

## 2022-08-03 RX ORDER — ACETAMINOPHEN 500 MG
650 TABLET ORAL ONCE
Refills: 0 | Status: COMPLETED | OUTPATIENT
Start: 2022-08-03 | End: 2022-08-03

## 2022-08-03 RX ORDER — HEPARIN SODIUM 5000 [USP'U]/ML
5000 INJECTION INTRAVENOUS; SUBCUTANEOUS EVERY 8 HOURS
Refills: 0 | Status: DISCONTINUED | OUTPATIENT
Start: 2022-08-03 | End: 2022-08-04

## 2022-08-03 RX ORDER — GABAPENTIN 400 MG/1
600 CAPSULE ORAL THREE TIMES A DAY
Refills: 0 | Status: DISCONTINUED | OUTPATIENT
Start: 2022-08-03 | End: 2022-08-19

## 2022-08-03 RX ORDER — INSULIN GLARGINE 100 [IU]/ML
20 INJECTION, SOLUTION SUBCUTANEOUS AT BEDTIME
Refills: 0 | Status: DISCONTINUED | OUTPATIENT
Start: 2022-08-03 | End: 2022-08-19

## 2022-08-03 RX ORDER — ACETAMINOPHEN 500 MG
650 TABLET ORAL EVERY 6 HOURS
Refills: 0 | Status: DISCONTINUED | OUTPATIENT
Start: 2022-08-03 | End: 2022-08-05

## 2022-08-03 RX ORDER — MORPHINE SULFATE 50 MG/1
6 CAPSULE, EXTENDED RELEASE ORAL ONCE
Refills: 0 | Status: DISCONTINUED | OUTPATIENT
Start: 2022-08-03 | End: 2022-08-03

## 2022-08-03 RX ORDER — DEXTROSE 50 % IN WATER 50 %
12.5 SYRINGE (ML) INTRAVENOUS ONCE
Refills: 0 | Status: DISCONTINUED | OUTPATIENT
Start: 2022-08-03 | End: 2022-08-19

## 2022-08-03 RX ORDER — SODIUM CHLORIDE 9 MG/ML
2700 INJECTION INTRAMUSCULAR; INTRAVENOUS; SUBCUTANEOUS ONCE
Refills: 0 | Status: COMPLETED | OUTPATIENT
Start: 2022-08-03 | End: 2022-08-03

## 2022-08-03 RX ORDER — IPRATROPIUM/ALBUTEROL SULFATE 18-103MCG
3 AEROSOL WITH ADAPTER (GRAM) INHALATION ONCE
Refills: 0 | Status: COMPLETED | OUTPATIENT
Start: 2022-08-03 | End: 2022-08-03

## 2022-08-03 RX ORDER — HYDROMORPHONE HYDROCHLORIDE 2 MG/ML
4 INJECTION INTRAMUSCULAR; INTRAVENOUS; SUBCUTANEOUS EVERY 4 HOURS
Refills: 0 | Status: DISCONTINUED | OUTPATIENT
Start: 2022-08-03 | End: 2022-08-04

## 2022-08-03 RX ADMIN — Medication 60 MILLIGRAM(S): at 21:22

## 2022-08-03 RX ADMIN — GABAPENTIN 600 MILLIGRAM(S): 400 CAPSULE ORAL at 21:21

## 2022-08-03 RX ADMIN — HEPARIN SODIUM 5000 UNIT(S): 5000 INJECTION INTRAVENOUS; SUBCUTANEOUS at 21:21

## 2022-08-03 RX ADMIN — SODIUM CHLORIDE 2700 MILLILITER(S): 9 INJECTION INTRAMUSCULAR; INTRAVENOUS; SUBCUTANEOUS at 06:30

## 2022-08-03 RX ADMIN — ALBUTEROL 2 PUFF(S): 90 AEROSOL, METERED ORAL at 16:19

## 2022-08-03 RX ADMIN — HYDROMORPHONE HYDROCHLORIDE 4 MILLIGRAM(S): 2 INJECTION INTRAMUSCULAR; INTRAVENOUS; SUBCUTANEOUS at 20:30

## 2022-08-03 RX ADMIN — Medication 5 MILLIGRAM(S): at 08:22

## 2022-08-03 RX ADMIN — Medication 166.67 MILLIGRAM(S): at 09:24

## 2022-08-03 RX ADMIN — Medication 40 MILLIGRAM(S): at 10:12

## 2022-08-03 RX ADMIN — Medication 0.5 MILLIGRAM(S): at 22:38

## 2022-08-03 RX ADMIN — HYDROMORPHONE HYDROCHLORIDE 4 MILLIGRAM(S): 2 INJECTION INTRAMUSCULAR; INTRAVENOUS; SUBCUTANEOUS at 12:01

## 2022-08-03 RX ADMIN — Medication 1 APPLICATION(S): at 20:30

## 2022-08-03 RX ADMIN — MORPHINE SULFATE 6 MILLIGRAM(S): 50 CAPSULE, EXTENDED RELEASE ORAL at 09:24

## 2022-08-03 RX ADMIN — Medication 3 MILLILITER(S): at 07:06

## 2022-08-03 RX ADMIN — CHLORHEXIDINE GLUCONATE 15 MILLILITER(S): 213 SOLUTION TOPICAL at 20:30

## 2022-08-03 RX ADMIN — Medication 125 MILLIGRAM(S): at 07:05

## 2022-08-03 RX ADMIN — Medication 6 UNIT(S): at 21:22

## 2022-08-03 RX ADMIN — INSULIN GLARGINE 20 UNIT(S): 100 INJECTION, SOLUTION SUBCUTANEOUS at 21:22

## 2022-08-03 RX ADMIN — GABAPENTIN 600 MILLIGRAM(S): 400 CAPSULE ORAL at 16:00

## 2022-08-03 RX ADMIN — CEFEPIME 100 MILLIGRAM(S): 1 INJECTION, POWDER, FOR SOLUTION INTRAMUSCULAR; INTRAVENOUS at 16:20

## 2022-08-03 RX ADMIN — Medication 650 MILLIGRAM(S): at 07:05

## 2022-08-03 RX ADMIN — HYDROMORPHONE HYDROCHLORIDE 0.5 MILLIGRAM(S): 2 INJECTION INTRAMUSCULAR; INTRAVENOUS; SUBCUTANEOUS at 14:55

## 2022-08-03 RX ADMIN — Medication 0.5 MILLIGRAM(S): at 18:19

## 2022-08-03 RX ADMIN — Medication 3: at 21:21

## 2022-08-03 RX ADMIN — CEFEPIME 100 MILLIGRAM(S): 1 INJECTION, POWDER, FOR SOLUTION INTRAMUSCULAR; INTRAVENOUS at 21:31

## 2022-08-03 NOTE — H&P ADULT - ASSESSMENT
57 year old female with a past medical history of IDDM, GERD, HTN, COPD, Chronic hypoxemic and hypercapnic respiratory failure SP Tracheostomy (not chronically vented) & PEG at [facility], DVT on Eliquis, Anxiety, CHF, Recurrent mucous plugs SP bronchoscopies, presenting from [facility] [place] for chest tightness and shortness of breath currently being treated for copd exacerbation.     #Acute on Chronic Hypoxemic Respiratory Failure  #Chronic Hypercapnic Respiratory Failure  #SP Trach 4/2022 in [facility] for failure to extubate after COPD exacerbation  #HO PEG placed 6/15/2022  #likely mucus plug vs pna due to Aspiration  #COPD exacerbation  #HO recurrent mucous plugs SP multiple bronchoscopies  > recently started on PO diet  > recent attempts to wean off vent  > xray on admission showed bilat opacities  > Sirs +ve but low suspicion for infectious reason  > recently admitted and discharged in July for AHRF requiring MICU  > s/p 2.7 L bolus, Vancomycin 1250mg, Levofloxacin 750mg, Solumedrol 125mg in the ED    - c/w Frequent aggressive suctioning and chest PT  - c/w cefepime for now  - fup procal, BCx, and nasal MRSA  - fup D-Dimer  - c/w albuterol, Duoneb q4h and prn  - c/w solumedrol 60 bid  - pulm following, fup recs    #HFpEF  - holding lasix for now.  - strict Is and Os  - avoid overload, target MAP > 65  - trop neg, bnp 444    #HO DVT on Eliquis  - c/w with AC  - Fup D-Dimer    #IDDM  - A1c in May 2022 was 7.2  - monitor FS  - basal, bolus insulin  - c/w with PEG feed    #HTN  - hold BP meds, keep Map > 65    DVT ppx: on eliquis  GI ppx: PPI  Dispo: Step down  Code status: Full code   57 year old female with a past medical history of IDDM, GERD, HTN, COPD, Chronic hypoxemic and hypercapnic respiratory failure SP Tracheostomy (not chronically vented) & PEG at [facility], DVT on Eliquis, Anxiety, CHF, Recurrent mucous plugs SP bronchoscopies, presenting from [facility] for chest tightness and shortness of breath currently being treated for copd exacerbation.     #Acute on Chronic Hypoxemic Respiratory Failure  #Chronic Hypercapnic Respiratory Failure  #SP Trach 4/2022 in [facility] for failure to extubate after COPD exacerbation  #HO PEG placed 6/15/2022  #likely mucus plug vs pna due to Aspiration  #COPD exacerbation  #HO recurrent mucous plugs SP multiple bronchoscopies  > recently started on PO diet  > recent attempts to wean off vent  > xray on admission showed bilat opacities  > Sirs +ve but low suspicion for infectious reason  > recently admitted and discharged in July for AHRF requiring MICU  > s/p 2.7 L bolus, Vancomycin 1250mg, Levofloxacin 750mg, Solumedrol 125mg in the ED    - c/w Frequent aggressive suctioning and chest PT  - c/w cefepime for now  - fup procal, BCx, and nasal MRSA  - fup D-Dimer  - c/w albuterol, Duoneb q4h and prn  - c/w solumedrol 60 bid  - pulm following, fup recs    #HFpEF  - holding lasix for now.  - strict Is and Os  - avoid overload, target MAP > 65  - trop neg, bnp 444    #HO DVT on Eliquis  #Venous stasis dermatitis  - No longer taking AC per [facility] med rec  - on dvt ppx  - fup LE duplex  - Fup D-Dimer  - previous biopsy of LE in 07/2022: Dermal edema, with focal dermal fibrosis, scattered interstitial fibrohsitocytes, and superficial perivascular lymphocytic infiltrate with extravasated erythrocytes and hemosiderin deposition    #IDDM  - A1c in May 2022 was 7.2  - monitor FS  - basal, bolus insulin  - c/w with PEG feed    #HTN  - hold BP meds, keep Map > 65    DVT ppx: heparin 5000 units q8, please confirm AC   GI ppx: PPI  Dispo: Step down  Code status: Full code   57 year old female with a past medical history of IDDM, GERD, HTN, COPD, Chronic hypoxemic and hypercapnic respiratory failure SP Tracheostomy (not chronically vented) & PEG at [facility], DVT on Eliquis, Anxiety, CHF, Recurrent mucous plugs SP bronchoscopies, presenting from [facility] for chest tightness and shortness of breath currently being treated for copd exacerbation.     #Acute on Chronic Hypoxemic Respiratory Failure  #Chronic Hypercapnic Respiratory Failure  #SP Trach 4/2022 in [facility] for failure to extubate after COPD exacerbation  #HO PEG placed 6/15/2022  #likely mucus plug vs pna due to Aspiration  #COPD exacerbation  #HO recurrent mucous plugs SP multiple bronchoscopies  > recently started on PO diet  > recent attempts to wean off vent  > xray on admission showed bilat opacities  > Sirs +ve but low suspicion for infectious reason  > recently admitted and discharged in July for AHRF requiring MICU  > s/p 2.7 L bolus, Vancomycin 1250mg, Levofloxacin 750mg, Solumedrol 125mg in the ED    - c/w Frequent aggressive suctioning and chest PT  - c/w cefepime for now  - fup procal, BCx, and nasal MRSA  - fup D-Dimer  - c/w albuterol, Duoneb q4h and prn  - c/w solumedrol 60 bid  - pulm following, fup recs    #HFpEF  - holding lasix for now.  - strict Is and Os  - avoid overload, target MAP > 65  - trop neg, bnp 444    #HO DVT on Eliquis  #Venous stasis dermatitis  - No longer taking AC per [facility] med rec  - on dvt ppx  - fup LE duplex  - Fup D-Dimer  - previous biopsy of LE in 07/2022: Dermal edema, with focal dermal fibrosis, scattered interstitial fibrohsitocytes, and superficial perivascular lymphocytic infiltrate with extravasated erythrocytes and hemosiderin deposition    #IDDM  - A1c in May 2022 was 7.2  - monitor FS  - basal, bolus insulin  - c/w with PEG feed    #HTN  - hold BP meds, keep Map > 65    DVT ppx: heparin 5000 units q8, please confirm AC   GI ppx: PPI  Dispo: Step down  Diet: Peg tube feeds  Code status: Full code   57 year old female with a past medical history of IDDM, GERD, HTN, COPD, Chronic hypoxemic and hypercapnic respiratory failure SP Tracheostomy (not chronically vented) & PEG at [facility], DVT on Eliquis, Anxiety, CHF, Recurrent mucous plugs SP bronchoscopies, presenting from [facility] for chest tightness and shortness of breath currently being treated for copd exacerbation.     #Acute on Chronic Hypoxemic Respiratory Failure  #Chronic Hypercapnic Respiratory Failure  #SP Trach 4/2022 in [facility] for failure to extubate after COPD exacerbation  #HO PEG placed 6/15/2022  #likely mucus plug vs pna due to Aspiration  #COPD exacerbation  #HO recurrent mucous plugs SP multiple bronchoscopies  > recently started on PO diet  > recent attempts to wean off vent  > xray on admission showed bilat opacities  > Sirs +ve but low suspicion for infectious reason  > recently admitted and discharged in July for AHRF requiring MICU  > s/p 2.7 L bolus, Vancomycin 1250mg, Levofloxacin 750mg, Solumedrol 125mg in the ED    - c/w Frequent aggressive suctioning and chest PT  - c/w cefepime for now  - fup procal, BCx, and nasal MRSA  - fup D-Dimer  - c/w albuterol, Duoneb q4h and prn  - c/w solumedrol 60 bid  - pulm following, fup recs    #HFpEF  - holding lasix for now.  - strict Is and Os  - avoid overload, target MAP > 65  - trop neg, bnp 444    #HO DVT was on Eliquis  #Venous stasis dermatitis  - No longer taking AC per [facility] med rec  - on dvt ppx  - fup LE duplex  - Fup D-Dimer  - previous biopsy of LE in 07/2022: Dermal edema, with focal dermal fibrosis, scattered interstitial fibrohsitocytes, and superficial perivascular lymphocytic infiltrate with extravasated erythrocytes and hemosiderin deposition    #IDDM  - A1c in May 2022 was 7.2  - monitor FS  - basal, bolus insulin  - c/w with PEG feed    #HTN  - hold BP meds, keep Map > 65    DVT ppx: heparin 5000 units q8, please confirm AC   GI ppx: PPI  Dispo: Step down  Diet: Peg tube feeds  Code status: Full code

## 2022-08-03 NOTE — ED ADULT NURSE REASSESSMENT NOTE - NS ED NURSE REASSESS COMMENT FT1
Patient began to desaturate into the 80s, suctioning was preformed, MD and RT notified, BVM preformed at bedside, Patient reports feeling relief.

## 2022-08-03 NOTE — ED PROVIDER NOTE - NS ED ATTENDING STATEMENT MOD
This was a shared visit with the CHRISTEL. I reviewed and verified the documentation and independently performed the documented: I have personally provided the amount of critical care time documented below excluding time spent on separate procedures.

## 2022-08-03 NOTE — CONSULT NOTE ADULT - SUBJECTIVE AND OBJECTIVE BOX
Patient is a 57y old  Female who presents with a chief complaint of shortness of breath.    HPI:  57 year old female with a past medical history of IDDM, GERD, HTN, COPD, Chronic hypoxemic and hypercapnic respiratory failure SP Tracheostomy (not chronically vented) & PEG at [facility], DVT on Eliquis, Anxiety, CHF, Recurrent mucous plugs SP bronchoscopies, presenting from [facility] for chest tightness and shortness of breath over the past few days.  Per EMS patient was hypotensive and hypoxic at the facility today, around 60%.  Patient was recently started on PO trial last week.  Patient reports increased secretions requiring more suctioning, still unable to clear them.  She also reports increased cough and wheezing.  Denies fevers, chills, lightheadedness, dizziness, blurry vision, double vision, headache, extremity weakness or deficits, chest pain, palpitations, abdominal pain, nausea, vomiting, diarrhea, hematochezia, melena, dysuria, hematuria, flank pain, lower extremity swelling/tenderness/erythema.    ED: NS 2.7L, Vancomycin 1250mg, Levofloxacin 750mg, Solumedrol 125mg, Morphine, Diazepam, Albuterol, Placed on pressure support.    PAST MEDICAL & SURGICAL HISTORY:  COPD (chronic obstructive pulmonary disease)  Recurrent mucous plugs  Chronic respiratory failure  CHF (congestive heart failure)  DM (diabetes mellitus)  H/O tracheostomy  HO PEG  DVT on Eliquis  UTI  Obesity    SOCIAL HX:   Former smoker          No EtOH abuse         No illicit drug use    FAMILY HISTORY:  No pertinent family history in first degree relatives    Review Of Systems:   All ROS are negative except per HPI     Allergies  penicillin (Swelling)    Intolerances    PHYSICAL EXAM  ICU Vital Signs Last 24 Hrs  T(C): 37.8 (03 Aug 2022 06:46), Max: 37.8 (03 Aug 2022 06:46)  T(F): 100.1 (03 Aug 2022 06:46), Max: 100.1 (03 Aug 2022 06:46)  HR: 101 (03 Aug 2022 08:25) (100 - 120)  BP: 160/73 (03 Aug 2022 08:25) (85/39 - 160/73)  BP(mean): 90 (03 Aug 2022 07:04) (90 - 90)  RR: 21 (03 Aug 2022 06:02) (21 - 21)  SpO2: 93% (03 Aug 2022 06:02) (93% - 93%)    O2 Parameters below as of 03 Aug 2022 06:02  Patient On (Oxygen Delivery Method): tracheostomy collar  O2 Flow (L/min): 10    CONSTITUTIONAL:  Chronically ill appearing  In NAD    ENT:   Airway patent,   Mouth with normal mucosa.   No thrush  + Trach    EYES:   pupils equal,   round and reactive to light.    CARDIAC:   Normal rate,   Regular rhythm.    Heart sounds S1, S2.   No edema    RESPIRATORY:   Bilateral wheezes  Bilateral basilar rhonchi  Normal chest expansion  No use of accessory muscles    GASTROINTESTINAL:  Abdomen soft   Non-tender,   No guarding,   + BS  + PEG    MUSCULOSKELETAL:   Range of motion is not limited,  No clubbing, cyanosis    NEUROLOGICAL:   Alert and oriented   No motor or sensory deficits.    SKIN:   Skin normal color for race,   No evidence of rash.    PSYCHIATRIC:   Normal mood and affect.   No apparent risk to self or others.        LABS:               10.5   11.09 )-----------( 170      ( 03 Aug 2022 06:28 )             31.2     133<L>  |  96<L>  |  7<L>  ----------------------------<  184<H>  4.5   |  28  |  <0.5<L>    Ca    9.8      03 Aug 2022 06:28    TPro  6.1  /  Alb  3.5  /  TBili  0.3  /  DBili  x   /  AST  18  /  ALT  19  /  AlkPhos  88  08-03    PT/INR - ( 03 Aug 2022 06:28 )   PT: 11.70 sec;   INR: 1.02 ratio    PTT - ( 03 Aug 2022 06:28 )  PTT:32.2 sec                                           CARDIAC MARKERS ( 03 Aug 2022 06:28 )  x     / <0.01 ng/mL / x     / x     / x        LIVER FUNCTIONS - ( 03 Aug 2022 06:28 )  Alb: 3.5 g/dL / Pro: 6.1 g/dL / ALK PHOS: 88 U/L / ALT: 19 U/L / AST: 18 U/L / GGT: x                                              Mode: CPAP with PS  FiO2: 40  PEEP: 5  PS: 10  MAP: 9  PIP: 16                                          CXR reviewed      Home Medications:  acetylcysteine 20% inhalation solution: 4 milliliter(s) inhaled 4 times a day (22 Jul 2022 11:21)  albuterol sulfate 2.5 mg/3 mL (0.083 %) solution for nebulization: milliliter(s) inhaled 3 times a day, As Needed (19 Jul 2022 10:06)  ALPRAZolam 0.5 mg oral tablet: 1 tab(s) orally every 12 hours, As needed, anxiety via PEG tube (23 Jun 2022 16:45)  ammonium lactate 12% topical lotion: 1 application topically 2 times a day (19 Jul 2022 10:06)  apixaban 5 mg oral tablet: 1 tab(s) orally 2 times a day via PEG tube (23 Jun 2022 16:45)  atorvastatin 20 mg oral tablet: 1 tab(s) orally once a day (at bedtime) via PEG tube (23 Jun 2022 16:45)  betamethasone valerate 0.1% topical cream: 1 application topically 2 times a day (19 Jul 2022 10:06)  gabapentin 600 mg oral tablet: 1 tab(s) orally 3 times a day via PEG tube (23 Jun 2022 16:45)  HYDROmorphone 4 mg oral tablet: 1 tab(s) orally every 4 hours, As Needed (19 Jul 2022 10:06)  insulin glargine 100 units/mL subcutaneous solution: 20 unit(s) subcutaneous once a day (at bedtime) (23 Jun 2022 16:45)  insulin lispro 100 units/mL injectable solution: 6  injectable 4 times a day (before feeds Q6H) (19 Jul 2022 15:19)  losartan 100 mg oral tablet: 1 tab(s) orally once a day via PEG tube (23 Jun 2022 16:45)  Multiple Vitamins oral tablet: 1 tab(s) orally once a day via PEG tube (23 Jun 2022 16:45)  pantoprazole 40 mg oral delayed release tablet: 1 tab(s) orally once a day (before a meal) via PEG tube (23 Jun 2022 16:45)  Plavix 75 mg oral tablet: 1 tab(s) orally once a day (19 Jul 2022 15:19)  polyethylene glycol 3350 oral powder for reconstitution: 17 gram(s) orally once a day via PEG tube (23 Jun 2022 16:45)  senna leaf extract oral tablet: 2 tab(s) orally once a day (at bedtime) via PEG tube (23 Jun 2022 16:45)  Barb Baeza 100 mcg-62.5 mcg-25 mcg powder for inhalation:  (23 Jun 2022 16:33) Patient is a 57y old  Female who presents with a chief complaint of shortness of breath.    HPI:  57 year old female with a past medical history of IDDM, GERD, HTN, COPD, Chronic hypoxemic and hypercapnic respiratory failure SP Tracheostomy (not chronically vented) & PEG at [facility], DVT on Eliquis, Anxiety, CHF, Recurrent mucous plugs SP bronchoscopies, presenting from [facility] for chest tightness and shortness of breath over the past few days.  Per EMS patient was hypotensive and hypoxic at the facility today, around 60%.  Patient was recently started on PO trial last week.  Patient reports increased secretions requiring more suctioning, still unable to clear them.  She also reports increased cough and wheezing.  Denies fevers, chills, lightheadedness, dizziness, blurry vision, double vision, headache, extremity weakness or deficits, chest pain, palpitations, abdominal pain, nausea, vomiting, diarrhea, hematochezia, melena, dysuria, hematuria, flank pain, lower extremity swelling/tenderness/erythema.    ED: NS 2.7L, Vancomycin 1250mg, Levofloxacin 750mg, Solumedrol 125mg, Morphine, Diazepam, Albuterol, Placed on pressure support.    PAST MEDICAL & SURGICAL HISTORY:  COPD (chronic obstructive pulmonary disease)  Recurrent mucous plugs  Chronic respiratory failure  CHF (congestive heart failure)  DM (diabetes mellitus)  H/O tracheostomy  HO PEG  DVT on Eliquis  UTI  Obesity    SOCIAL HX:   Former smoker          No EtOH abuse         No illicit drug use    FAMILY HISTORY:  No pertinent family history in first degree relatives    Review Of Systems:   All ROS are negative except per HPI     Allergies  penicillin (Swelling)    Intolerances    PHYSICAL EXAM  ICU Vital Signs Last 24 Hrs  T(C): 37.8 (03 Aug 2022 06:46), Max: 37.8 (03 Aug 2022 06:46)  T(F): 100.1 (03 Aug 2022 06:46), Max: 100.1 (03 Aug 2022 06:46)  HR: 101 (03 Aug 2022 08:25) (100 - 120)  BP: 160/73 (03 Aug 2022 08:25) (85/39 - 160/73)  BP(mean): 90 (03 Aug 2022 07:04) (90 - 90)  RR: 21 (03 Aug 2022 06:02) (21 - 21)  SpO2: 93% (03 Aug 2022 06:02) (93% - 93%)    O2 Parameters below as of 03 Aug 2022 06:02  Patient On (Oxygen Delivery Method): tracheostomy collar  O2 Flow (L/min): 10    CONSTITUTIONAL:  In NAD    ENT:   Airway patent,   Mouth with normal mucosa.   No thrush  + Trach    EYES:   pupils equal,   round and reactive to light.    CARDIAC:   Normal rate,   Regular rhythm.    Heart sounds S1, S2.   No edema    RESPIRATORY:   Bilateral wheezes  Bilateral basilar rhonchi  Normal chest expansion  No use of accessory muscles    GASTROINTESTINAL:  Abdomen soft   Non-tender,   No guarding,   + BS  + PEG    MUSCULOSKELETAL:   Range of motion is not limited,  No clubbing, cyanosis    NEUROLOGICAL:   Alert and oriented   No motor or sensory deficits.    SKIN:   Skin normal color for race,   No evidence of rash.    PSYCHIATRIC:   Normal mood and affect.   No apparent risk to self or others.        LABS:               10.5   11.09 )-----------( 170      ( 03 Aug 2022 06:28 )             31.2     133<L>  |  96<L>  |  7<L>  ----------------------------<  184<H>  4.5   |  28  |  <0.5<L>    Ca    9.8      03 Aug 2022 06:28    TPro  6.1  /  Alb  3.5  /  TBili  0.3  /  DBili  x   /  AST  18  /  ALT  19  /  AlkPhos  88  08-03    PT/INR - ( 03 Aug 2022 06:28 )   PT: 11.70 sec;   INR: 1.02 ratio    PTT - ( 03 Aug 2022 06:28 )  PTT:32.2 sec                                           CARDIAC MARKERS ( 03 Aug 2022 06:28 )  x     / <0.01 ng/mL / x     / x     / x        LIVER FUNCTIONS - ( 03 Aug 2022 06:28 )  Alb: 3.5 g/dL / Pro: 6.1 g/dL / ALK PHOS: 88 U/L / ALT: 19 U/L / AST: 18 U/L / GGT: x                                              Mode: CPAP with PS  FiO2: 40  PEEP: 5  PS: 10  MAP: 9  PIP: 16                                          CXR reviewed      Home Medications:  acetylcysteine 20% inhalation solution: 4 milliliter(s) inhaled 4 times a day (22 Jul 2022 11:21)  albuterol sulfate 2.5 mg/3 mL (0.083 %) solution for nebulization: milliliter(s) inhaled 3 times a day, As Needed (19 Jul 2022 10:06)  ALPRAZolam 0.5 mg oral tablet: 1 tab(s) orally every 12 hours, As needed, anxiety via PEG tube (23 Jun 2022 16:45)  ammonium lactate 12% topical lotion: 1 application topically 2 times a day (19 Jul 2022 10:06)  apixaban 5 mg oral tablet: 1 tab(s) orally 2 times a day via PEG tube (23 Jun 2022 16:45)  atorvastatin 20 mg oral tablet: 1 tab(s) orally once a day (at bedtime) via PEG tube (23 Jun 2022 16:45)  betamethasone valerate 0.1% topical cream: 1 application topically 2 times a day (19 Jul 2022 10:06)  gabapentin 600 mg oral tablet: 1 tab(s) orally 3 times a day via PEG tube (23 Jun 2022 16:45)  HYDROmorphone 4 mg oral tablet: 1 tab(s) orally every 4 hours, As Needed (19 Jul 2022 10:06)  insulin glargine 100 units/mL subcutaneous solution: 20 unit(s) subcutaneous once a day (at bedtime) (23 Jun 2022 16:45)  insulin lispro 100 units/mL injectable solution: 6  injectable 4 times a day (before feeds Q6H) (19 Jul 2022 15:19)  losartan 100 mg oral tablet: 1 tab(s) orally once a day via PEG tube (23 Jun 2022 16:45)  Multiple Vitamins oral tablet: 1 tab(s) orally once a day via PEG tube (23 Jun 2022 16:45)  pantoprazole 40 mg oral delayed release tablet: 1 tab(s) orally once a day (before a meal) via PEG tube (23 Jun 2022 16:45)  Plavix 75 mg oral tablet: 1 tab(s) orally once a day (19 Jul 2022 15:19)  polyethylene glycol 3350 oral powder for reconstitution: 17 gram(s) orally once a day via PEG tube (23 Jun 2022 16:45)  senna leaf extract oral tablet: 2 tab(s) orally once a day (at bedtime) via PEG tube (23 Jun 2022 16:45)  Trelegy Ellipta 100 mcg-62.5 mcg-25 mcg powder for inhalation:  (23 Jun 2022 16:33)

## 2022-08-03 NOTE — ED ADULT NURSE NOTE - NS ED NOTE ABUSE RESPONSE YN
Double O-Z Flap Text: The defect edges were debeveled with a #15 scalpel blade.  Given the location of the defect, shape of the defect and the proximity to free margins a Double O-Z flap was deemed most appropriate.  Using a sterile surgical marker, an appropriate transposition flap was drawn incorporating the defect and placing the expected incisions within the relaxed skin tension lines where possible. The area thus outlined was incised deep to adipose tissue with a #15 scalpel blade.  The skin margins were undermined to an appropriate distance in all directions utilizing iris scissors. Yes

## 2022-08-03 NOTE — ED PROVIDER NOTE - CLINICAL SUMMARY MEDICAL DECISION MAKING FREE TEXT BOX
57 y woman with trach secondary to respiratory failure secondary to her underlying COPD.  Now with worsening SOB and hypoxemia at SNF.  Appears likely pneumonia and intermittent mucous plugging due to excess secreations.  Patient at one point require bagging of trach and mechanical ventialtion.  Surgery replaced trach to full cuffed.  IV antibiotics and admission to step down after critical care consult.

## 2022-08-03 NOTE — ED ADULT TRIAGE NOTE - CHIEF COMPLAINT QUOTE
Pt BIBA from [facility] for sepsis r/o has 2 decubiti to buttocks   hypotensive, tachycardic and SOB

## 2022-08-03 NOTE — CONSULT NOTE ADULT - ASSESSMENT
Continue Regimen: Terbinafine 250 mag take 1 po qd \\nKetoconazole 2% cream qd Initiate Treatment: Doxycycline 200 mg take 1 po bid\\nMupirocin ointment apply qd- has RX at home Detail Level: Zone IMPRESSION:    Sepsis present on admission  Acute on Chronic Hypoxemic Respiratory Failure, on Pressure support  Chronic Hypercapnic Respiratory Failure  SP Tracheostomy (not chronically vented)  COPD exacerbation  Probable Aspiration PNA  Volume overload  HO recurrent mucous plugs SP multiple bronchoscopies  SP PEG  HO DVT on Eliquis  HFpEF      PLAN:    CNS:  No depressants.    HEENT: Oral care.  Trach care.  Send DTA.    PULMONARY:  HOB @ 45 degrees.  Aspiration precautions.  Frequent aggressive suctioning.  Chest PT.   PS during the day.  Vent in PM.  Solumedrol 60mg q8h.  Nebz q6h & q4h PRN.    CARDIOVASCULAR:   Lasix 40mg IV QD.  Avoid volume overload.  CE x 1.  Target MAP > 65.  Strict I & O.    GI: GI prophylaxis.  PEG feeds.  Speech & swallow eval.  Bowel regimen     RENAL:  Follow up lytes.  Correct as needed.  Monitory UO.  Bladder US qshift.    INFECTIOUS DISEASE:   Vancomycin & Cefepime.  MRSA nares, DC Vanc if negative.  Full RVP negative.  Urine Strep & Legionella Ag.  Procalcitonin.  Follow up cultures    HEMATOLOGICAL:  Dimer.  DVT prophylaxis on Eliquis.    ENDOCRINE:  Follow up FS.  Insulin protocol if needed.    MUSCULOSKELETAL:  OOBTC, PT/OT    Guarded prognosis  Monitor in SDU Render In Strict Bullet Format?: No IMPRESSION:    Acute on Chronic Hypoxemic Respiratory Failure  Chronic Hypercapnic Respiratory Failure  SP Tracheostomy and PEG ( not chronically vented )  COPD exacerbation  HO recurrent mucous plugs SP multiple bronchoscopies  HO DVT on Eliquis  HFpEF      PLAN:    CNS:  No depressants.  Pain control     HEENT: Oral care.  Trach care.  Send DTA.    PULMONARY:  HOB @ 45 degrees.  Aspiration precautions.  Frequent aggressive suctioning.  Chest PT. AC for now.   Solumedrol 60mg q 12.  Nebs q4h & PRN.    CARDIOVASCULAR:  Avoid volume overload.  CE x 1.  Target MAP > 65.  Strict I & O.    GI: GI prophylaxis.  PEG feeds.  Bowel regimen     RENAL:  Follow up lytes.  Correct as needed.  Monitory UO.  Voiding trial     INFECTIOUS DISEASE:   Cefepime for now.  Full RVP negative.  Procalcitonin.  Follow up cultures.  Nasal MRSA     HEMATOLOGICAL:  Dimer.  DVT therapoy on Eliquis.    ENDOCRINE:  Follow up FS.  Insulin protocol if needed.    MUSCULOSKELETAL:  OOBTC, PT/OT    Guarded prognosis    VEnt Unit

## 2022-08-03 NOTE — ED ADULT NURSE NOTE - PAIN: PRESENCE, MLM
"Subjective   79-year-old female presents complaining of lower extremity swelling.  Patient states that she looked down her legs tonight and felt that the left leg was bigger than her right leg and that it \"felt hard.\"  She denies that she has any pain.  Patient states she does have some baseline lower extremity edema since increasing her dose of nifedipine but she was warned about this by the pharmacist and is not concerned about that.  Her reason for coming tonight is that she felt the one side was bigger than the other.  Patient is otherwise felt well.  She denies chest pain, shortness of breath, cough, congestion.  She does state that she gets short of breath when walking up stairs but otherwise not with ambulation.  This is not new for patient and is unchanged over many weeks.  No fevers or chills.  No abdominal pain, nausea, vomiting, diarrhea.  Patient does states she has some chronic low back pain and occasionally has some shooting sciatic pain but does not have this currently.          Review of Systems   All other systems reviewed and are negative.      Past Medical History:   Diagnosis Date   • Anxiety    • Arthritis of back    • At risk for sleep apnea    • Cataract     BILAT-SCHEDULED FOR THIS AND HAD TO CANCEL D/T SEPSIS   • Chronic pain disorder     ALL OVER PAIN   • Chronic UTI    • Closed displaced fracture of surgical neck of left humerus 6/25/2021   • Closed fracture of left proximal humerus 6/15/2021   • Closed fracture of right distal radius 6/15/2021   • COVID-19 vaccine series completed     2ND DOSE 3/23/21   • DDD (degenerative disc disease), lumbar    • Elevated cholesterol    • Fracture of wrist    • Fracture, humerus     LEFT-USING IMMOBILIZER/SLING   • GERD (gastroesophageal reflux disease)    • History of recent fall     JUNE 6-9-21   • History of sepsis     MOST RECENT JULY 2021-R/T KIDNEY STONE, UTI.  STATES THIS IS HER 3RD SEPSIS DIAGNOSIS   • History of UTI    • HTN (hypertension)  " denies pain/discomfort   • Hyperlipidemia    • Kidney stones     LEFT   • Macular degeneration, bilateral     WORSE ON RIGHT-ONLY PERIPHERAL VISION   • Mixed hyperlipidemia 9/16/2016   • Mixed incontinence    • Osteoarthritis    • Osteoporosis    • Panic disorder    • Personal history of urinary calculi    • PONV (postoperative nausea and vomiting)    • Scoliosis    • Tachycardia, unspecified    • Wrist fracture     RIGHT-CURRENTLY NOT WEARING BRACE FOR THIS       Allergies   Allergen Reactions   • Bactrim [Sulfamethoxazole-Trimethoprim] Nausea Only and Other (See Comments)     UNKNOWN   • Ciprofloxacin Other (See Comments)     UNKNOWN   • Levaquin [Levofloxacin] Other (See Comments)     SEVERE HEADACHE AND N/V   • Macrobid [Nitrofurantoin] Other (See Comments)     UNKNOWN; PT CAN TAKE IN SMALL DOSES- FLU LIKE SYMPTOMS   • Nitrofurantoin Macrocrystal Other (See Comments)     Can take in small doses   • Cortisone      Pt states had headache with IM injection states has been ok with epidural steroid injections       Past Surgical History:   Procedure Laterality Date   • BREAST BIOPSY Left     benign   • BREAST LUMPECTOMY Left     benign   • BUNIONECTOMY Right    • COLONOSCOPY N/A 11/30/2016    Procedure: COLONOSCOPY polypectomy;  Surgeon: Adali Willard MD;  Location: [facility] OR;  Service:    • CYSTOSCOPY BOTOX INJECTION OF BLADDER N/A 7/21/2017    Procedure: CYSTOSCOPY COAPTITE INJECTION;  Surgeon: Kevon Clark MD;  Location: [facility] OR;  Service:    • CYSTOSCOPY W/ LITHOLAPAXY / EHL     • CYSTOSCOPY W/ URETERAL STENT PLACEMENT Left 9/12/2016    Procedure: CYSTOSCOPY URETERAL STENT INSERTION;  Surgeon: Kevon Clark MD;  Location: [facility] OR;  Service:    • CYSTOSCOPY W/ URETERAL STENT PLACEMENT Left 8/1/2019    Procedure: CYSTOSCOPY URETERAL CATHETER/STENT INSERTION;  Surgeon: Kevon Clark MD;  Location: [facility] OR;  Service: Urology   • CYSTOSCOPY W/ URETERAL STENT PLACEMENT Left 3/25/2021    Procedure: CYSTOSCOPY  URETERAL CATHETER/STENT INSERTION;  Surgeon: Kevon Clark MD;  Location: [facility] OR;  Service: Urology;  Laterality: Left;   • CYSTOSCOPY W/ URETERAL STENT PLACEMENT Left 2021    Procedure: CYSTOSCOPY URETERAL CATHETER/STENT INSERTION;  Surgeon: Lul Guzman MD;  Location: [facility] OR;  Service: Urology;  Laterality: Left;  CYSTOSCOPY LEFT URETERAL CATHETER/ STENT PLACEMENT   • JOINT REPLACEMENT     • KNEE ARTHROSCOPY Right    • LUMBAR EPIDURAL INJECTION     • TONSILLECTOMY AND ADENOIDECTOMY     • URETEROSCOPY LASER LITHOTRIPSY WITH STENT INSERTION Left 10/5/2016    Procedure: LT URETEROSCOPY LASER LITHOTRIPSY STONE BASKET EXTRACTION AND STENT ;  Surgeon: Kevon Clark MD;  Location: [facility] OR;  Service:    • URETEROSCOPY LASER LITHOTRIPSY WITH STENT INSERTION Left 2021    Procedure: LEFT URETEROSCOPY STONE MANIPULATION STENT EXCHANGE, LASER LITHOTRIPSY, CYSTOSCOPY, STONE BASKET EXTRACTION;  Surgeon: Kevon Clark MD;  Location: [facility] OR;  Service: Urology;  Laterality: Left;       Family History   Problem Relation Age of Onset   • Heart defect Mother    • Heart attack Mother 70   • Sudden death Mother    • Heart defect Father    • Heart attack Father 49   • Hypertension Father    • Sudden death Father    • Valvular heart disease Brother    • Malig Hyperthermia Neg Hx    • Breast cancer Neg Hx        Social History     Socioeconomic History   • Marital status:      Spouse name: Not on file   • Number of children: Not on file   • Years of education: Not on file   • Highest education level: Not on file   Tobacco Use   • Smoking status: Former Smoker     Packs/day: 1.00     Years: 15.00     Pack years: 15.00     Types: Cigarettes     Start date: 1956     Quit date: 1980     Years since quittin.7   • Smokeless tobacco: Never Used   • Tobacco comment: quit    Vaping Use   • Vaping Use: Never used   Substance and Sexual Activity   • Alcohol use: No   •  Drug use: Yes     Types: Hydrocodone   • Sexual activity: Not Currently     Partners: Male     Comment: Frequent UTIs           Objective   Physical Exam  Constitutional:       General: She is not in acute distress.     Appearance: She is obese. She is not ill-appearing, toxic-appearing or diaphoretic.   HENT:      Head: Normocephalic and atraumatic.   Eyes:      Extraocular Movements: Extraocular movements intact.      Conjunctiva/sclera: Conjunctivae normal.      Pupils: Pupils are equal, round, and reactive to light.   Cardiovascular:      Rate and Rhythm: Normal rate and regular rhythm.      Pulses: Normal pulses.      Heart sounds: Normal heart sounds.   Pulmonary:      Effort: Pulmonary effort is normal. No respiratory distress.      Breath sounds: Normal breath sounds.   Abdominal:      General: There is no distension.      Palpations: Abdomen is soft.      Tenderness: There is no abdominal tenderness.   Musculoskeletal:         General: No tenderness, deformity or signs of injury. Normal range of motion.      Right lower leg: Edema present.      Left lower leg: Edema present.      Comments: 1+ bilateral lower extremity edema to around mid calf.  No overlying erythema or skin changes.  Calves are soft, nontender.  No appreciable difference in size between the 2 extremities.  Feet are well-perfused.   Skin:     General: Skin is warm and dry.      Capillary Refill: Capillary refill takes less than 2 seconds.   Neurological:      General: No focal deficit present.      Mental Status: She is alert and oriented to person, place, and time.   Psychiatric:         Mood and Affect: Mood normal.         Behavior: Behavior normal.         Thought Content: Thought content normal.         Judgment: Judgment normal.         Procedures           ED Course  ED Course as of Sep 23 0008   u Sep 23, 2021   0006 Upon my evaluation, patient asked me if I felt like her feet legs were different size or not, and I told her that  they do not appear to be different sizes to me.  Discussed with patient that the main concern to rule out the emergency department tonight would be congestive heart failure with DVT also being a consideration, but less likely given the bilateral and equal nature of her swelling.  Patient stated that she did not want to have any further work-up here if her legs were the same size.  She stated she would follow-up with her primary care doctor for any other work-up.  Patient does seem to understand well but congestive heart failure and DVTs are, I feel that she is making informed decision by declining any further work-up.  We did discuss return precautions and patient stated she would come back to the emergency department if she developed any of those other symptoms.    [TD]      ED Course User Index  [TD] Orestes Isaacs MD                                           [facility]    Final diagnoses:   Bilateral lower extremity edema       ED Disposition  ED Disposition     ED Disposition Condition Comment    Discharge Stable Patient discharged home in NAD.          Margot Chavez MD  [address]  [facility] [address]  [phone]    Schedule an appointment as soon as possible for a visit in 2 days           Medication List      No changes were made to your prescriptions during this visit.          Orestes Isaacs MD  09/23/21 0008

## 2022-08-03 NOTE — ED PROVIDER NOTE - PHYSICAL EXAMINATION
VITAL SIGNS: I have reviewed nursing notes and confirm.  CONSTITUTIONAL: in no acute distress. pt laying on stretcher pt is trached/ has wiseman/ and peg tube  SKIN: Skin exam is warm and dry, no acute rash. skin changes to sacrum no pressure ulcers  HEAD: Normocephalic; atraumatic.  EYES:  EOM intact; conjunctiva and sclera clear.  ENT: No nasal discharge; airway clear.   NECK: Supple; non tender.  CARD: S1, S2 normal; no murmurs, gallops, or rubs. tachy rate and rhythm.  RESP: b/l  wheezes, rales or rhonchi. Speaking in full sentences.   ABD:  soft; non-distended; non-tender; No rebound or guarding. No CVA tenderness.  EXT: decreased ROM. No clubbing, cyanosis or edema, venous stasis changes to LE bilaterally

## 2022-08-03 NOTE — CHART NOTE - NSCHARTNOTEFT_GEN_A_CORE
Called by ED Surinder #[id number] for assistance with trach exchange.   Patient is known to surgical service.  Patient presents to hospital from NH with SOB, fever and hypotension.   Patient prior to today was on trach collar using 6LNC.   Per ED, pt desaturating in the ED and it was necessary to bag patient and put on ventilator to get O2 sat back up to 100%  Pt with Shiley size 6 uncuffed fenestrated trach. Trach was exchange with chief surgical resident, RN, and RT at bedside to a Shiley Adult Flexible Tracheostomy Tube with TaperGuard Cuff 6CN75H. 6 cuffed.   Patient trach was secured and left patient in stable condition. O2 sat at 100%. Vent was 50/5 when I left the room.    Vital Signs Last 24 Hrs  T(C): 37.8 (03 Aug 2022 06:46), Max: 37.8 (03 Aug 2022 06:46)  T(F): 100.1 (03 Aug 2022 06:46), Max: 100.1 (03 Aug 2022 06:46)  HR: 101 (03 Aug 2022 08:25) (100 - 120)  BP: 160/73 (03 Aug 2022 08:25) (85/39 - 160/73)  BP(mean): 90 (03 Aug 2022 07:04) (90 - 90)  RR: 21 (03 Aug 2022 06:02) (21 - 21)  SpO2: 100% (03 Aug 2022 09:10) (93% - 100%)    Please call [phone] with any questions or concerns.

## 2022-08-03 NOTE — H&P ADULT - ATTENDING COMMENTS
Patient seen and examined at bedside independently of house staffs. I read the resident's note and agree with the plan with the additions and corrections as noted below. Plan of care discussed with the house staffs.       REVIEW OF SYSTEMS:    CONSTITUTIONAL: No weakness, fevers or chills  EYES/ENT: No visual changes;  No vertigo or throat pain   NECK: No pain or stiffness  RESPIRATORY: No cough, wheezing, hemoptysis; No shortness of breath  CARDIOVASCULAR: No chest pain or palpitations  GASTROINTESTINAL: No abdominal or epigastric pain. No nausea, vomiting, or hematemesis; No diarrhea or constipation. No melena or hematochezia.  GENITOURINARY: No dysuria, frequency or hematuria  NEUROLOGICAL: No numbness or weakness  SKIN: No itching, rashes    PMH:     FHx:   Reviewed. Not relevent.     Physical Exam:  GEN: No acute distress. A & O x 3.   HEENT: PEERLA. No sclera icterus. EOMI.   LUNGS: Clear to auscultation bilaterally   HEART: Normal. S1/S2 present. RRR. No murmur/gallops.   ABD: Soft, non-tender, non-distended. Bowel sounds present  EXT: No pitting edema.   NEURO: AAOX3. Strength: 5/5 b/l ULE. Sensory intact b/l ULE. Finger nose test wnl.       Vital Signs Last 24 Hrs  T(C): 36.1 (03 Aug 2022 16:42), Max: 37.8 (03 Aug 2022 06:46)  T(F): 97 (03 Aug 2022 16:42), Max: 100.1 (03 Aug 2022 06:46)  HR: 80 (03 Aug 2022 16:42) (80 - 120)  BP: 169/74 (03 Aug 2022 16:42) (85/39 - 169/74)  BP(mean): 90 (03 Aug 2022 07:04) (90 - 90)  RR: 20 (03 Aug 2022 16:42) (20 - 21)  SpO2: 100% (03 Aug 2022 17:05) (93% - 100%)    Parameters below as of 03 Aug 2022 16:42  Patient On (Oxygen Delivery Method): ventilator        I&O's Summary    03 Aug 2022 07:01  -  03 Aug 2022 23:30  --------------------------------------------------------  IN: 300 mL / OUT: 0 mL / NET: 300 mL          Please see the above notes for Labs and radialogy.     Assessment and Plan: Patient seen and examined at bedside independently of house staffs. I read the resident's note and agree with the plan with the additions and corrections as noted below. Plan of care discussed with the house staffs.       REVIEW OF SYSTEMS:  CONSTITUTIONAL: No weakness, fevers or chills  EYES/ENT: No visual changes;  No vertigo or throat pain   NECK: No pain or stiffness  RESPIRATORY: No cough, hemoptysis; Positive SOB and wheezing.   CARDIOVASCULAR: No chest pain or palpitations  GASTROINTESTINAL: No abdominal or epigastric pain. No nausea, vomiting, or hematemesis; No diarrhea or constipation. No melena or hematochezia.  GENITOURINARY: No dysuria, frequency or hematuria  NEUROLOGICAL: No numbness or weakness  SKIN: No itching, rashes    PMH: IDDM, GERD, HTN, COPD, Chronic hypoxemic and hypercapnic respiratory failure SP Tracheostomy (not chronically vented) & PEG at [facility], DVT on Eliquis, Anxiety, CHF, Recurrent mucous plugs SP bronchoscopies.    FHx:   Reviewed. Not relevant.     Physical Exam:  GEN: No acute distress. A & O x 3.   HEENT: PEERLA. No sclera icterus. EOMI.   LUNGS: Decreased breath sound. No wheeze/crackles/rales.   HEART: Normal. S1/S2 present. RRR. No murmur/gallops.   ABD: Soft, non-tender, non-distended. Bowel sounds present  EXT: No pitting edema. Chronic skin changes b/l LE.   NEURO: AAOX3. Moving extremities.       Vital Signs Last 24 Hrs  T(C): 36.1 (03 Aug 2022 16:42), Max: 37.8 (03 Aug 2022 06:46)  T(F): 97 (03 Aug 2022 16:42), Max: 100.1 (03 Aug 2022 06:46)  HR: 80 (03 Aug 2022 16:42) (80 - 120)  BP: 169/74 (03 Aug 2022 16:42) (85/39 - 169/74)  BP(mean): 90 (03 Aug 2022 07:04) (90 - 90)  RR: 20 (03 Aug 2022 16:42) (20 - 21)  SpO2: 100% (03 Aug 2022 17:05) (93% - 100%)    Parameters below as of 03 Aug 2022 16:42  Patient On (Oxygen Delivery Method): ventilator    Assessment and Plan:    AHRF s/p trach   - Ddx: COPD exacerbation vs. PNA  - chest PT and frequent suction secretion.   - solumedrol 60 BID and albuterol prn   - c/w cefepime and doxy   - f/u Pulmonary     Hx of HFpEF?  - last 2d Echo shows LVEF 50-55%. proBNP ~ 444.   - Low Na diet and daily weight.   - monitor I & O    Hx of DVT   - not on Eliquis anymore?   - check venous duplex b/l LE    DVT ppx: heparin SC   GI ppx: PPI   Diet: cardiac diet  Activity: as tolerated. Patient seen and examined at bedside independently of house staffs. I read the resident's note and agree with the plan with the additions and corrections as noted below.    REVIEW OF SYSTEMS:  CONSTITUTIONAL: No weakness, fevers or chills  EYES/ENT: No visual changes;  No vertigo or throat pain   NECK: No pain or stiffness  RESPIRATORY: No cough, hemoptysis; Positive SOB and wheezing.   CARDIOVASCULAR: No chest pain or palpitations  GASTROINTESTINAL: No abdominal or epigastric pain. No nausea, vomiting, or hematemesis; No diarrhea or constipation. No melena or hematochezia.  GENITOURINARY: No dysuria, frequency or hematuria  NEUROLOGICAL: No numbness or weakness  SKIN: No itching, rashes    PMH: IDDM, GERD, HTN, COPD, Chronic hypoxemic and hypercapnic respiratory failure SP Tracheostomy (not chronically vented) & PEG at [facility], DVT on Eliquis, Anxiety, CHF, Recurrent mucous plugs SP bronchoscopies.    FHx: Reviewed. Not relevant.     Physical Exam:  GEN: No acute distress. A & O x 3.   HEENT: PEERLA. No sclera icterus. EOMI.   LUNGS: Decreased breath sound. No wheeze/crackles/rales.   HEART: Normal. S1/S2 present. RRR. No murmur/gallops.   ABD: Soft, non-tender, non-distended. Bowel sounds present  EXT: No pitting edema. Chronic skin changes b/l LE.   NEURO: AAOX3. Moving extremities.     Vital Signs Last 24 Hrs  T(C): 36.1 (03 Aug 2022 16:42), Max: 37.8 (03 Aug 2022 06:46)  T(F): 97 (03 Aug 2022 16:42), Max: 100.1 (03 Aug 2022 06:46)  HR: 80 (03 Aug 2022 16:42) (80 - 120)  BP: 169/74 (03 Aug 2022 16:42) (85/39 - 169/74)  BP(mean): 90 (03 Aug 2022 07:04) (90 - 90)  RR: 20 (03 Aug 2022 16:42) (20 - 21)  SpO2: 100% (03 Aug 2022 17:05) (93% - 100%)  Parameters below as of 03 Aug 2022 16:42  Patient On (Oxygen Delivery Method): ventilator    Assessment and Plan:    Acute on chronic respiratory failure   - s/p recent trach   - Ddx: COPD exacerbation vs. PNA  - chest PT and frequent suction secretion.   - solumedrol 60 BID and albuterol prn   - c/w cefepime and doxy   - f/u Pulmonary     Hx of HFpEF?  - last 2d Echo shows LVEF 50-55%. proBNP ~ 444.   - Low Na diet and daily weight.   - monitor I & O    Hx of DVT   - not on Eliquis anymore?   - check venous duplex b/l LE    DVT ppx: heparin SC   GI ppx: PPI   Diet: cardiac diet  Activity: as tolerated.    Date seen by the attendin2022.

## 2022-08-03 NOTE — ED PROVIDER NOTE - NS ED ROS FT
Review of Systems  Constitutional:  No fever, chills, malaise, generalized weakness.  Eyes:  No visual changes, eye pain, or discharge.  ENMT:  No hearing changes, pain, or discharge. No nasal congestion, discharge, or bleeding. No throat pain, +  difficulty swallowing.  Cardiac:  + chest tightness, - palpitations, - syncope, - edema.  Respiratory:  + dyspnea, orthopnea, stridor, wheezing, or cough. No hemoptysis.  GI:  No nausea, vomiting, diarrhea, or abdominal pain. No melena or hematochezia.  :  No dysuria, hematuria, frequency, or burning. Normal urine output.   MS:  No myalgia, or back pain.  Skin:  No skin rash or lesions.  Neuro:  No headache, dizziness

## 2022-08-03 NOTE — ED ADULT NURSE NOTE - OBJECTIVE STATEMENT
56 y/o female pt came c/o SOB, hypoxia 80% on trach with oxygen and hypotension at NH, pt was just treated for pneumonia and was doing better and even switched to nasal cannula but for the past 2 days she was feeling SOB and chest tightness. Pt has a PEG but started eating by mouth.

## 2022-08-03 NOTE — H&P ADULT - NSHPLABSRESULTS_GEN_ALL_CORE
10.5   11.09 )-----------( 170      ( 03 Aug 2022 06:28 )             31.2       08-03    133<L>  |  96<L>  |  7<L>  ----------------------------<  184<H>  4.5   |  28  |  <0.5<L>    Ca    9.8      03 Aug 2022 06:28    TPro  6.1  /  Alb  3.5  /  TBili  0.3  /  DBili  x   /  AST  18  /  ALT  19  /  AlkPhos  88  08              Urinalysis Basic - ( 03 Aug 2022 10:40 )    Color: Yellow / Appearance: Slightly Turbid / S.016 / pH: x  Gluc: x / Ketone: Negative  / Bili: Negative / Urobili: <2 mg/dL   Blood: x / Protein: 100 mg/dL / Nitrite: Positive   Leuk Esterase: Large / RBC: 2 /HPF /  /HPF   Sq Epi: x / Non Sq Epi: 1 /HPF / Bacteria: Many        PT/INR - ( 03 Aug 2022 06:28 )   PT: 11.70 sec;   INR: 1.02 ratio         PTT - ( 03 Aug 2022 06:28 )  PTT:32.2 sec    Lactate Trend      CARDIAC MARKERS ( 03 Aug 2022 06:28 )  x     / <0.01 ng/mL / x     / x     / x            CAPILLARY BLOOD GLUCOSE            Culture Results:   Moderate Mixed gram negative rods including  Moderate Pseudomonas aeruginosa  Normal Respiratory Chante absent ( @ 17:16)  Culture Results:   No Growth Final ( @ 18:16)

## 2022-08-03 NOTE — ED PROVIDER NOTE - OBJECTIVE STATEMENT
57 F hx GERD, DMII, HTN, COPD, Anxiety dz presents to ED BIBA for HoTN and chest tightness. pts coming from [facility] states she has had the chest tightness for th elast few days with associated feeling of anxiety. EMS states that they were called because pt was Hypotensive and hypoxic today at facility. pt has trach from previous intubation at [facility] and peg tube placed. she was started on PO trial last week to see if she could tolerate. pt denies fever, chills , N,V,D, abdominal pain, HA, blurry vision, cough, back pain.

## 2022-08-03 NOTE — ED ADULT NURSE NOTE - NSINTERVENTIONOPT_GEN_ALL_ED
Addended by: ARABELLA CANALES on: 6/23/2017 03:51 PM     Modules accepted: Orders     Stretcher Alarms/Hourly Rounding

## 2022-08-03 NOTE — H&P ADULT - NSHPPHYSICALEXAM_GEN_ALL_CORE
GENERAL: NAD, trached and pegged  HEAD:  Atraumatic, Normocephalic  ENT: Moist mucous membranes  NECK: Supple, No JVD  CHEST/LUNG: Clear to auscultation bilaterally; No rales, rhonchi, wheezing, or rubs. Unlabored respirations  HEART: Regular rate and rhythm; No murmurs, rubs, or gallops  ABDOMEN: BSx4; Soft, nontender, nondistended, peg tube In place  SKIN: No rashes or lesions GENERAL: NAD, trached and pegged  HEAD:  Atraumatic, Normocephalic  ENT: Moist mucous membranes  NECK: Supple, No JVD  CHEST/LUNG: Clear to auscultation bilaterally; No rales, rhonchi, wheezing, or rubs. Unlabored respirations  HEART: Regular rate and rhythm; No murmurs, rubs, or gallops  ABDOMEN: BSx4; Soft, nontender, nondistended, peg tube In place  SKIN: LE dermatitis bilat

## 2022-08-03 NOTE — ED ADULT NURSE NOTE - CHIEF COMPLAINT QUOTE
Pt BIBA from [facility] for sepsis r/o has 2 decubiti to buttocks   hypotensive, tachycardic and SOB

## 2022-08-03 NOTE — H&P ADULT - NSHPREVIEWOFSYSTEMS_GEN_ALL_CORE
CONSTITUTIONAL: No weakness, fevers or chills  EYES/ENT: No visual changes;  No vertigo or throat pain   RESPIRATORY: shortness of breath, improved since admission  CARDIOVASCULAR: No chest pain or palpitations  GASTROINTESTINAL: No abdominal or epigastric pain. No nausea, vomiting, or hematemesis; No diarrhea or constipation. No melena or hematochezia.  GENITOURINARY: No dysuria, frequency or hematuria

## 2022-08-03 NOTE — H&P ADULT - HISTORY OF PRESENT ILLNESS
57 year old female with a past medical history of IDDM, GERD, HTN, COPD, Chronic hypoxemic and hypercapnic respiratory failure SP Tracheostomy (not chronically vented) & PEG at [facility], DVT on Eliquis, Anxiety, CHF, Recurrent mucous plugs SP bronchoscopies, presenting from [facility] for chest tightness and shortness of breath over the past few days.  Per EMS patient was hypotensive and hypoxic at the facility today, around 60%.  Patient was recently started on PO trial last week.  Patient reports increased secretions requiring more suctioning, still unable to clear them.  She also reports increased cough and wheezing.  Denies fevers, chills, lightheadedness, dizziness, blurry vision, double vision, headache, extremity weakness or deficits, chest pain, palpitations, abdominal pain, nausea, vomiting, diarrhea, hematochezia, melena, dysuria, hematuria, flank pain, lower extremity swelling/tenderness/erythema.    ED: NS 2.7L, Vancomycin 1250mg, Levofloxacin 750mg, Solumedrol 125mg, Morphine, Diazepam, Albuterol, Placed on pressure support.    Xray on admission showed bilat opacities.   EKG showed Normal sinus rhythm with Right axis deviation

## 2022-08-04 LAB
A1C WITH ESTIMATED AVERAGE GLUCOSE RESULT: 5.3 % — SIGNIFICANT CHANGE UP (ref 4–5.6)
ALBUMIN SERPL ELPH-MCNC: 3.5 G/DL — SIGNIFICANT CHANGE UP (ref 3.5–5.2)
ALP SERPL-CCNC: 94 U/L — SIGNIFICANT CHANGE UP (ref 30–115)
ALT FLD-CCNC: 29 U/L — SIGNIFICANT CHANGE UP (ref 0–41)
ANION GAP SERPL CALC-SCNC: 12 MMOL/L — SIGNIFICANT CHANGE UP (ref 7–14)
AST SERPL-CCNC: 21 U/L — SIGNIFICANT CHANGE UP (ref 0–41)
BASOPHILS # BLD AUTO: 0 K/UL — SIGNIFICANT CHANGE UP (ref 0–0.2)
BASOPHILS NFR BLD AUTO: 0 % — SIGNIFICANT CHANGE UP (ref 0–1)
BILIRUB SERPL-MCNC: 0.3 MG/DL — SIGNIFICANT CHANGE UP (ref 0.2–1.2)
BUN SERPL-MCNC: 10 MG/DL — SIGNIFICANT CHANGE UP (ref 10–20)
CALCIUM SERPL-MCNC: 9.9 MG/DL — SIGNIFICANT CHANGE UP (ref 8.5–10.1)
CHLORIDE SERPL-SCNC: 97 MMOL/L — LOW (ref 98–110)
CHOLEST SERPL-MCNC: 250 MG/DL — HIGH
CO2 SERPL-SCNC: 27 MMOL/L — SIGNIFICANT CHANGE UP (ref 17–32)
CREAT SERPL-MCNC: <0.5 MG/DL — LOW (ref 0.7–1.5)
EGFR: 115 ML/MIN/1.73M2 — SIGNIFICANT CHANGE UP
EOSINOPHIL # BLD AUTO: 0 K/UL — SIGNIFICANT CHANGE UP (ref 0–0.7)
EOSINOPHIL NFR BLD AUTO: 0 % — SIGNIFICANT CHANGE UP (ref 0–8)
ESTIMATED AVERAGE GLUCOSE: 105 MG/DL — SIGNIFICANT CHANGE UP (ref 68–114)
GLUCOSE BLDC GLUCOMTR-MCNC: 151 MG/DL — HIGH (ref 70–99)
GLUCOSE BLDC GLUCOMTR-MCNC: 170 MG/DL — HIGH (ref 70–99)
GLUCOSE BLDC GLUCOMTR-MCNC: 218 MG/DL — HIGH (ref 70–99)
GLUCOSE BLDC GLUCOMTR-MCNC: 278 MG/DL — HIGH (ref 70–99)
GLUCOSE SERPL-MCNC: 270 MG/DL — HIGH (ref 70–99)
HCT VFR BLD CALC: 30.7 % — LOW (ref 37–47)
HDLC SERPL-MCNC: 54 MG/DL — SIGNIFICANT CHANGE UP
HGB BLD-MCNC: 10.9 G/DL — LOW (ref 12–16)
IMM GRANULOCYTES NFR BLD AUTO: 0.5 % — HIGH (ref 0.1–0.3)
LEGIONELLA AG UR QL: NEGATIVE — SIGNIFICANT CHANGE UP
LIPID PNL WITH DIRECT LDL SERPL: 167 MG/DL — HIGH
LYMPHOCYTES # BLD AUTO: 0.42 K/UL — LOW (ref 1.2–3.4)
LYMPHOCYTES # BLD AUTO: 10.7 % — LOW (ref 20.5–51.1)
MAGNESIUM SERPL-MCNC: 1.6 MG/DL — LOW (ref 1.8–2.4)
MCHC RBC-ENTMCNC: 32.9 PG — HIGH (ref 27–31)
MCHC RBC-ENTMCNC: 35.5 G/DL — SIGNIFICANT CHANGE UP (ref 32–37)
MCV RBC AUTO: 92.7 FL — SIGNIFICANT CHANGE UP (ref 81–99)
MONOCYTES # BLD AUTO: 0.12 K/UL — SIGNIFICANT CHANGE UP (ref 0.1–0.6)
MONOCYTES NFR BLD AUTO: 3.1 % — SIGNIFICANT CHANGE UP (ref 1.7–9.3)
NEUTROPHILS # BLD AUTO: 3.35 K/UL — SIGNIFICANT CHANGE UP (ref 1.4–6.5)
NEUTROPHILS NFR BLD AUTO: 85.7 % — HIGH (ref 42.2–75.2)
NON HDL CHOLESTEROL: 196 MG/DL — HIGH
NRBC # BLD: 0 /100 WBCS — SIGNIFICANT CHANGE UP (ref 0–0)
PLATELET # BLD AUTO: 163 K/UL — SIGNIFICANT CHANGE UP (ref 130–400)
POTASSIUM SERPL-MCNC: 3.9 MMOL/L — SIGNIFICANT CHANGE UP (ref 3.5–5)
POTASSIUM SERPL-SCNC: 3.9 MMOL/L — SIGNIFICANT CHANGE UP (ref 3.5–5)
PROCALCITONIN SERPL-MCNC: 0.62 NG/ML — HIGH (ref 0.02–0.1)
PROCALCITONIN SERPL-MCNC: 0.66 NG/ML — HIGH (ref 0.02–0.1)
PROT SERPL-MCNC: 6.4 G/DL — SIGNIFICANT CHANGE UP (ref 6–8)
RBC # BLD: 3.31 M/UL — LOW (ref 4.2–5.4)
RBC # FLD: 12.2 % — SIGNIFICANT CHANGE UP (ref 11.5–14.5)
S PNEUM AG UR QL: NEGATIVE — SIGNIFICANT CHANGE UP
SODIUM SERPL-SCNC: 136 MMOL/L — SIGNIFICANT CHANGE UP (ref 135–146)
TRIGL SERPL-MCNC: 145 MG/DL — SIGNIFICANT CHANGE UP
TSH SERPL-MCNC: 0.95 UIU/ML — SIGNIFICANT CHANGE UP (ref 0.27–4.2)
WBC # BLD: 3.91 K/UL — LOW (ref 4.8–10.8)
WBC # FLD AUTO: 3.91 K/UL — LOW (ref 4.8–10.8)

## 2022-08-04 PROCEDURE — 71045 X-RAY EXAM CHEST 1 VIEW: CPT | Mod: 26

## 2022-08-04 PROCEDURE — 93970 EXTREMITY STUDY: CPT | Mod: 26

## 2022-08-04 PROCEDURE — 99233 SBSQ HOSP IP/OBS HIGH 50: CPT

## 2022-08-04 RX ORDER — HYDROMORPHONE HYDROCHLORIDE 2 MG/ML
0.5 INJECTION INTRAMUSCULAR; INTRAVENOUS; SUBCUTANEOUS EVERY 4 HOURS
Refills: 0 | Status: DISCONTINUED | OUTPATIENT
Start: 2022-08-04 | End: 2022-08-05

## 2022-08-04 RX ORDER — HYDROMORPHONE HYDROCHLORIDE 2 MG/ML
1 INJECTION INTRAMUSCULAR; INTRAVENOUS; SUBCUTANEOUS ONCE
Refills: 0 | Status: DISCONTINUED | OUTPATIENT
Start: 2022-08-04 | End: 2022-08-04

## 2022-08-04 RX ORDER — ALBUTEROL 90 UG/1
2.5 AEROSOL, METERED ORAL EVERY 6 HOURS
Refills: 0 | Status: DISCONTINUED | OUTPATIENT
Start: 2022-08-04 | End: 2022-08-19

## 2022-08-04 RX ORDER — ALPRAZOLAM 0.25 MG
0.5 TABLET ORAL EVERY 8 HOURS
Refills: 0 | Status: DISCONTINUED | OUTPATIENT
Start: 2022-08-04 | End: 2022-08-05

## 2022-08-04 RX ORDER — APIXABAN 2.5 MG/1
5 TABLET, FILM COATED ORAL
Refills: 0 | Status: DISCONTINUED | OUTPATIENT
Start: 2022-08-04 | End: 2022-08-16

## 2022-08-04 RX ORDER — MAGNESIUM SULFATE 500 MG/ML
2 VIAL (ML) INJECTION ONCE
Refills: 0 | Status: COMPLETED | OUTPATIENT
Start: 2022-08-04 | End: 2022-08-04

## 2022-08-04 RX ADMIN — Medication 2: at 12:07

## 2022-08-04 RX ADMIN — Medication 1 TABLET(S): at 11:27

## 2022-08-04 RX ADMIN — Medication 3: at 08:32

## 2022-08-04 RX ADMIN — CHLORHEXIDINE GLUCONATE 15 MILLILITER(S): 213 SOLUTION TOPICAL at 05:23

## 2022-08-04 RX ADMIN — Medication 0.5 MILLIGRAM(S): at 09:07

## 2022-08-04 RX ADMIN — CHLORHEXIDINE GLUCONATE 15 MILLILITER(S): 213 SOLUTION TOPICAL at 18:07

## 2022-08-04 RX ADMIN — Medication 110 MILLIGRAM(S): at 01:11

## 2022-08-04 RX ADMIN — APIXABAN 5 MILLIGRAM(S): 2.5 TABLET, FILM COATED ORAL at 18:08

## 2022-08-04 RX ADMIN — Medication 60 MILLIGRAM(S): at 05:23

## 2022-08-04 RX ADMIN — PANTOPRAZOLE SODIUM 40 MILLIGRAM(S): 20 TABLET, DELAYED RELEASE ORAL at 11:28

## 2022-08-04 RX ADMIN — Medication 60 MILLIGRAM(S): at 18:06

## 2022-08-04 RX ADMIN — Medication 6 UNIT(S): at 08:29

## 2022-08-04 RX ADMIN — HEPARIN SODIUM 5000 UNIT(S): 5000 INJECTION INTRAVENOUS; SUBCUTANEOUS at 05:23

## 2022-08-04 RX ADMIN — HYDROMORPHONE HYDROCHLORIDE 0.5 MILLIGRAM(S): 2 INJECTION INTRAMUSCULAR; INTRAVENOUS; SUBCUTANEOUS at 21:11

## 2022-08-04 RX ADMIN — CEFEPIME 100 MILLIGRAM(S): 1 INJECTION, POWDER, FOR SOLUTION INTRAMUSCULAR; INTRAVENOUS at 13:12

## 2022-08-04 RX ADMIN — ALBUTEROL 2 PUFF(S): 90 AEROSOL, METERED ORAL at 08:37

## 2022-08-04 RX ADMIN — GABAPENTIN 600 MILLIGRAM(S): 400 CAPSULE ORAL at 21:11

## 2022-08-04 RX ADMIN — ALBUTEROL 2 PUFF(S): 90 AEROSOL, METERED ORAL at 14:27

## 2022-08-04 RX ADMIN — INSULIN GLARGINE 20 UNIT(S): 100 INJECTION, SOLUTION SUBCUTANEOUS at 21:12

## 2022-08-04 RX ADMIN — HYDROMORPHONE HYDROCHLORIDE 4 MILLIGRAM(S): 2 INJECTION INTRAMUSCULAR; INTRAVENOUS; SUBCUTANEOUS at 05:22

## 2022-08-04 RX ADMIN — Medication 25 GRAM(S): at 12:09

## 2022-08-04 RX ADMIN — CEFEPIME 100 MILLIGRAM(S): 1 INJECTION, POWDER, FOR SOLUTION INTRAMUSCULAR; INTRAVENOUS at 05:24

## 2022-08-04 RX ADMIN — Medication 1 APPLICATION(S): at 20:00

## 2022-08-04 RX ADMIN — Medication 6 UNIT(S): at 18:07

## 2022-08-04 RX ADMIN — Medication 1 APPLICATION(S): at 05:23

## 2022-08-04 RX ADMIN — Medication 0.5 MILLIGRAM(S): at 15:49

## 2022-08-04 RX ADMIN — HYDROMORPHONE HYDROCHLORIDE 1 MILLIGRAM(S): 2 INJECTION INTRAMUSCULAR; INTRAVENOUS; SUBCUTANEOUS at 15:43

## 2022-08-04 RX ADMIN — GABAPENTIN 600 MILLIGRAM(S): 400 CAPSULE ORAL at 05:23

## 2022-08-04 RX ADMIN — ALBUTEROL 2.5 MILLIGRAM(S): 90 AEROSOL, METERED ORAL at 09:18

## 2022-08-04 RX ADMIN — Medication 6 UNIT(S): at 12:08

## 2022-08-04 RX ADMIN — Medication 110 MILLIGRAM(S): at 18:07

## 2022-08-04 RX ADMIN — Medication 3 MILLILITER(S): at 14:27

## 2022-08-04 RX ADMIN — CEFEPIME 100 MILLIGRAM(S): 1 INJECTION, POWDER, FOR SOLUTION INTRAMUSCULAR; INTRAVENOUS at 22:07

## 2022-08-04 RX ADMIN — Medication 3 MILLILITER(S): at 09:18

## 2022-08-04 NOTE — PROGRESS NOTE ADULT - ASSESSMENT
57 year old female with a past medical history of IDDM, GERD, HTN, COPD, Chronic hypoxemic and hypercapnic respiratory failure SP Tracheostomy (not chronically vented) & PEG at [facility], DVT on Eliquis, Anxiety, CHF, Recurrent mucous plugs SP bronchoscopies, presenting from [facility] for chest tightness and shortness of breath currently being treated for copd exacerbation.     #Acute on chronic Hypoxic respiratory failure   #s/p trach/PEG at [facility] 04/2022 after being intubated for >1month  #COPD Exacerbation  #Suspected superimposed bacterial PNA  CXR showing bilateral interstitial opacities  Pt is not vent dependent at baseline. Currently on pressure support saturating 100%  - Continue cefepime and doxycycline  - Cont solumedrol 60 BID and albuterol prn   - f/u BCx/UCx, procalcitonin  - ID eval requested  - Wean off vent per pulm    #Chronic HFpEF  2d Echo 05/2022: LVEF 50-55%. proBNP ~ 444  Pt does not appear volume overloaded  - Low Na diet and daily weight.   - monitor I & O    #Hx of DVT   - Cont Eliquis  - check venous duplex b/l LE    #DM2  - Cont lantus/lispro 20-6-6-6  - ISS    #Chronic atropic B/l LE Hyperpigmentation changes  On previous admission, LWC with Betamethasone cream and Ammonium lactate dressings per Burn has not helped over the past 3 weeks.   - Plan was for outpatient burn and dermatology f/u    #Chronic Lower extremity Pain management   - Gabapentin to 600 TID,   - PO dilaudid 4mg Q4 PRN    DVT ppx: Eliquis    #Progress Note Handoff  Pending (specify): Cont IV steroids and IV abx, wean off vent as tolerated, ID Eval  Family discussion: d/w pt regarding tx for COPD exacerbation and PNA  Disposition: Home

## 2022-08-04 NOTE — PROGRESS NOTE ADULT - SUBJECTIVE AND OBJECTIVE BOX
CRUZ DUMONT  57y, Female  Allergy: penicillin (Swelling)    Hospital Day: 1d    Patient seen and examined. No acute events overnight    PMH/PSH:  PAST MEDICAL & SURGICAL HISTORY:  COPD (chronic obstructive pulmonary disease)      CHF (congestive heart failure)      DM (diabetes mellitus)      H/O tracheostomy          VITALS:  T(F): 97.7 (22 @ 08:40), Max: 97.7 (22 @ 08:40)  HR: 64 (22 @ 09:28)  BP: 186/77 (22 @ 08:40) (169/74 - 186/77)  RR: 20 (22 @ 08:40)  SpO2: 100% (22 @ 09:28)    TESTS & MEASUREMENTS:  Weight (Kg): 86.6 (22 @ 06:02)  BMI (kg/m2): 36.1 ()    22 @ 07:01  -  22 @ 07:00  --------------------------------------------------------  IN: 300 mL / OUT: 0 mL / NET: 300 mL                            10.9   3.91  )-----------( 163      ( 04 Aug 2022 09:08 )             30.7     PT/INR - ( 03 Aug 2022 06:28 )   PT: 11.70 sec;   INR: 1.02 ratio         PTT - ( 03 Aug 2022 06:28 )  PTT:32.2 sec      136  |  97<L>  |  10  ----------------------------<  270<H>  3.9   |  27  |  <0.5<L>    Ca    9.9      04 Aug 2022 09:08  Mg     1.6         TPro  6.4  /  Alb  3.5  /  TBili  0.3  /  DBili  x   /  AST  21  /  ALT  29  /  AlkPhos  94  08-    LIVER FUNCTIONS - ( 04 Aug 2022 09:08 )  Alb: 3.5 g/dL / Pro: 6.4 g/dL / ALK PHOS: 94 U/L / ALT: 29 U/L / AST: 21 U/L / GGT: x           CARDIAC MARKERS ( 03 Aug 2022 06:28 )  x     / <0.01 ng/mL / x     / x     / x            Urinalysis Basic - ( 03 Aug 2022 10:40 )    Color: Yellow / Appearance: Slightly Turbid / S.016 / pH: x  Gluc: x / Ketone: Negative  / Bili: Negative / Urobili: <2 mg/dL   Blood: x / Protein: 100 mg/dL / Nitrite: Positive   Leuk Esterase: Large / RBC: 2 /HPF /  /HPF   Sq Epi: x / Non Sq Epi: 1 /HPF / Bacteria: Many        RADIOLOGY & ADDITIONAL TESTS:    RECENT DIAGNOSTIC ORDERS:  Mycoplasma Pneumoniae IgM Antibody: AM Sched. Collection: 04-Aug-2022 04:30 (22 @ 21:21)  Diet, NPO with Tube Feed:   Tube Feeding Modality: Gastrostomy  Glucerna 1.2 Martin  Total Volume for 24 Hours (mL): 1200  Bolus  Total Volume of Bolus (mL):  300  Total # of Feeds: 4  Tube Feed Frequency: Every 6 hours   Tube Feed Start Time: 14:00  Bolus Feed Rate (mL per Hour): 300   Bolus Feed Duration (in Hours): 1  Bolus   Total Volume per Flush (mL): 30   Frequency: Every 6 Hours  Free Water Flush Instructions:  post feed (22 @ 14:14)  Thyroid Stimulating Hormone, Serum: AM Sched. Collection: 04-Aug-2022 04:30 (22 @ 14:08)  A1C with Estimated Average Glucose: AM Sched. Collection: 04-Aug-2022 04:30 (22 @ 14:08)  VA Duplex Lower Ext Vein Scan, Bilat: Routine   Indication: HO of DVT  Transport: Stretcher-Crib  Exam in Progress (22 @ 14:02)      MEDICATIONS:  MEDICATIONS  (STANDING):  acetylcysteine 20% for Nebulization - Peds 3 milliLiter(s) Nebulizer every 6 hours  ALBUTerol    0.083% 2.5 milliGRAM(s) Nebulizer every 6 hours  ammonium lactate 12% Lotion 1 Application(s) Topical two times a day  cefepime   IVPB 2000 milliGRAM(s) IV Intermittent every 8 hours  chlorhexidine 0.12% Liquid 15 milliLiter(s) Oral Mucosa every 12 hours  dextrose 5%. 1000 milliLiter(s) (100 mL/Hr) IV Continuous <Continuous>  dextrose 5%. 1000 milliLiter(s) (50 mL/Hr) IV Continuous <Continuous>  dextrose 50% Injectable 25 Gram(s) IV Push once  dextrose 50% Injectable 12.5 Gram(s) IV Push once  dextrose 50% Injectable 25 Gram(s) IV Push once  doxycycline IVPB      doxycycline IVPB 100 milliGRAM(s) IV Intermittent every 12 hours  gabapentin 600 milliGRAM(s) Oral three times a day  glucagon  Injectable 1 milliGRAM(s) IntraMuscular once  heparin   Injectable 5000 Unit(s) SubCutaneous every 8 hours  HYDROmorphone  Injectable 1 milliGRAM(s) IV Push once  insulin glargine Injectable (LANTUS) 20 Unit(s) SubCutaneous at bedtime  insulin lispro (ADMELOG) corrective regimen sliding scale   SubCutaneous three times a day before meals  insulin lispro Injectable (ADMELOG) 6 Unit(s) SubCutaneous three times a day before meals  methylPREDNISolone sodium succinate Injectable 60 milliGRAM(s) IV Push two times a day  multivitamin 1 Tablet(s) Oral daily  pantoprazole   Suspension 40 milliGRAM(s) Oral daily    MEDICATIONS  (PRN):  acetaminophen     Tablet .. 650 milliGRAM(s) Oral every 6 hours PRN Temp greater or equal to 38C (100.4F), Mild Pain (1 - 3)  ALBUTerol    90 MICROgram(s) HFA Inhaler 2 Puff(s) Inhalation every 6 hours PRN Shortness of Breath and/or Wheezing  ALPRAZolam 0.5 milliGRAM(s) Oral three times a day PRN anxiety  dextrose Oral Gel 15 Gram(s) Oral once PRN Blood Glucose LESS THAN 70 milliGRAM(s)/deciliter  HYDROmorphone   Tablet 4 milliGRAM(s) Oral every 4 hours PRN Severe Pain (7 - 10)      HOME MEDICATIONS:  acetylcysteine 20% inhalation solution ()  albuterol sulfate 2.5 mg/3 mL (0.083 %) solution for nebulization ()  ALPRAZolam 0.5 mg oral tablet ()  ammonium lactate 12% topical lotion ()  betamethasone valerate 0.1% topical cream ()  gabapentin 600 mg oral tablet ()  HYDROmorphone 4 mg oral tablet ()  insulin glargine 100 units/mL subcutaneous solution ()  insulin lispro 100 units/mL injectable solution ()  losartan 100 mg oral tablet ()  Multiple Vitamins oral tablet ()  pantoprazole 40 mg oral delayed release tablet ()  polyethylene glycol 3350 oral powder for reconstitution ()  senna leaf extract oral tablet ()  Trelegy Ellipta 100 mcg-62.5 mcg-25 mcg powder for inhalation ()      REVIEW OF SYSTEMS:  All other review of systems is negative unless indicated above.     PHYSICAL EXAM:  PHYSICAL EXAM:  GENERAL: NAD, well-developed  HEAD:  Atraumatic, Normocephalic  NECK: Supple, No JVD  CHEST/LUNG: Clear to auscultation bilaterally; No wheeze  HEART: Regular rate and rhythm; No murmurs, rubs, or gallops  ABDOMEN: Soft, Nontender, Nondistended; Bowel sounds present  EXTREMITIES:  2+ Peripheral Pulses, No clubbing, cyanosis, or edema  SKIN: No rashes or lesions         CRUZ DUMONT  57y, Female  Allergy: penicillin (Swelling)    Hospital Day: 1d    Patient seen and examined. No acute events overnight    PMH/PSH:  PAST MEDICAL & SURGICAL HISTORY:  COPD (chronic obstructive pulmonary disease)      CHF (congestive heart failure)      DM (diabetes mellitus)      H/O tracheostomy          VITALS:  T(F): 97.7 (22 @ 08:40), Max: 97.7 (22 @ 08:40)  HR: 64 (22 @ 09:28)  BP: 186/77 (22 @ 08:40) (169/74 - 186/77)  RR: 20 (22 @ 08:40)  SpO2: 100% (22 @ 09:28)    TESTS & MEASUREMENTS:  Weight (Kg): 86.6 (22 @ 06:02)  BMI (kg/m2): 36.1 ()    22 @ 07:01  -  22 @ 07:00  --------------------------------------------------------  IN: 300 mL / OUT: 0 mL / NET: 300 mL                            10.9   3.91  )-----------( 163      ( 04 Aug 2022 09:08 )             30.7     PT/INR - ( 03 Aug 2022 06:28 )   PT: 11.70 sec;   INR: 1.02 ratio         PTT - ( 03 Aug 2022 06:28 )  PTT:32.2 sec      136  |  97<L>  |  10  ----------------------------<  270<H>  3.9   |  27  |  <0.5<L>    Ca    9.9      04 Aug 2022 09:08  Mg     1.6         TPro  6.4  /  Alb  3.5  /  TBili  0.3  /  DBili  x   /  AST  21  /  ALT  29  /  AlkPhos  94  08-    LIVER FUNCTIONS - ( 04 Aug 2022 09:08 )  Alb: 3.5 g/dL / Pro: 6.4 g/dL / ALK PHOS: 94 U/L / ALT: 29 U/L / AST: 21 U/L / GGT: x           CARDIAC MARKERS ( 03 Aug 2022 06:28 )  x     / <0.01 ng/mL / x     / x     / x            Urinalysis Basic - ( 03 Aug 2022 10:40 )    Color: Yellow / Appearance: Slightly Turbid / S.016 / pH: x  Gluc: x / Ketone: Negative  / Bili: Negative / Urobili: <2 mg/dL   Blood: x / Protein: 100 mg/dL / Nitrite: Positive   Leuk Esterase: Large / RBC: 2 /HPF /  /HPF   Sq Epi: x / Non Sq Epi: 1 /HPF / Bacteria: Many        RADIOLOGY & ADDITIONAL TESTS:    RECENT DIAGNOSTIC ORDERS:  Mycoplasma Pneumoniae IgM Antibody: AM Sched. Collection: 04-Aug-2022 04:30 (22 @ 21:21)  Diet, NPO with Tube Feed:   Tube Feeding Modality: Gastrostomy  Glucerna 1.2 Martin  Total Volume for 24 Hours (mL): 1200  Bolus  Total Volume of Bolus (mL):  300  Total # of Feeds: 4  Tube Feed Frequency: Every 6 hours   Tube Feed Start Time: 14:00  Bolus Feed Rate (mL per Hour): 300   Bolus Feed Duration (in Hours): 1  Bolus   Total Volume per Flush (mL): 30   Frequency: Every 6 Hours  Free Water Flush Instructions:  post feed (22 @ 14:14)  Thyroid Stimulating Hormone, Serum: AM Sched. Collection: 04-Aug-2022 04:30 (22 @ 14:08)  A1C with Estimated Average Glucose: AM Sched. Collection: 04-Aug-2022 04:30 (22 @ 14:08)  VA Duplex Lower Ext Vein Scan, Bilat: Routine   Indication: HO of DVT  Transport: Stretcher-Crib  Exam in Progress (22 @ 14:02)      MEDICATIONS:  MEDICATIONS  (STANDING):  acetylcysteine 20% for Nebulization - Peds 3 milliLiter(s) Nebulizer every 6 hours  ALBUTerol    0.083% 2.5 milliGRAM(s) Nebulizer every 6 hours  ammonium lactate 12% Lotion 1 Application(s) Topical two times a day  cefepime   IVPB 2000 milliGRAM(s) IV Intermittent every 8 hours  chlorhexidine 0.12% Liquid 15 milliLiter(s) Oral Mucosa every 12 hours  dextrose 5%. 1000 milliLiter(s) (100 mL/Hr) IV Continuous <Continuous>  dextrose 5%. 1000 milliLiter(s) (50 mL/Hr) IV Continuous <Continuous>  dextrose 50% Injectable 25 Gram(s) IV Push once  dextrose 50% Injectable 12.5 Gram(s) IV Push once  dextrose 50% Injectable 25 Gram(s) IV Push once  doxycycline IVPB      doxycycline IVPB 100 milliGRAM(s) IV Intermittent every 12 hours  gabapentin 600 milliGRAM(s) Oral three times a day  glucagon  Injectable 1 milliGRAM(s) IntraMuscular once  heparin   Injectable 5000 Unit(s) SubCutaneous every 8 hours  HYDROmorphone  Injectable 1 milliGRAM(s) IV Push once  insulin glargine Injectable (LANTUS) 20 Unit(s) SubCutaneous at bedtime  insulin lispro (ADMELOG) corrective regimen sliding scale   SubCutaneous three times a day before meals  insulin lispro Injectable (ADMELOG) 6 Unit(s) SubCutaneous three times a day before meals  methylPREDNISolone sodium succinate Injectable 60 milliGRAM(s) IV Push two times a day  multivitamin 1 Tablet(s) Oral daily  pantoprazole   Suspension 40 milliGRAM(s) Oral daily    MEDICATIONS  (PRN):  acetaminophen     Tablet .. 650 milliGRAM(s) Oral every 6 hours PRN Temp greater or equal to 38C (100.4F), Mild Pain (1 - 3)  ALBUTerol    90 MICROgram(s) HFA Inhaler 2 Puff(s) Inhalation every 6 hours PRN Shortness of Breath and/or Wheezing  ALPRAZolam 0.5 milliGRAM(s) Oral three times a day PRN anxiety  dextrose Oral Gel 15 Gram(s) Oral once PRN Blood Glucose LESS THAN 70 milliGRAM(s)/deciliter  HYDROmorphone   Tablet 4 milliGRAM(s) Oral every 4 hours PRN Severe Pain (7 - 10)      HOME MEDICATIONS:  acetylcysteine 20% inhalation solution ()  albuterol sulfate 2.5 mg/3 mL (0.083 %) solution for nebulization ()  ALPRAZolam 0.5 mg oral tablet ()  ammonium lactate 12% topical lotion ()  betamethasone valerate 0.1% topical cream ()  gabapentin 600 mg oral tablet ()  HYDROmorphone 4 mg oral tablet ()  insulin glargine 100 units/mL subcutaneous solution ()  insulin lispro 100 units/mL injectable solution ()  losartan 100 mg oral tablet ()  Multiple Vitamins oral tablet ()  pantoprazole 40 mg oral delayed release tablet ()  polyethylene glycol 3350 oral powder for reconstitution ()  senna leaf extract oral tablet ()  Trelegy Ellipta 100 mcg-62.5 mcg-25 mcg powder for inhalation ()      REVIEW OF SYSTEMS:  All other review of systems is negative unless indicated above.     PHYSICAL EXAM:  PHYSICAL EXAM:  GENERAL: NAD, well-developed; Trach to vent  HEAD:  Atraumatic, Normocephalic  NECK: Supple, No JVD  CHEST/LUNG: b/l expiratory wheeze  HEART: Regular rate and rhythm; No murmurs, rubs, or gallops  ABDOMEN: Soft, Nontender, Nondistended; Bowel sounds present  EXTREMITIES:  2+ Peripheral Pulses, No clubbing, cyanosis, or edema  SKIN: No rashes or lesions; b/l LE chronic venous stasis changes

## 2022-08-05 DIAGNOSIS — E11.40 TYPE 2 DIABETES MELLITUS WITH DIABETIC NEUROPATHY, UNSPECIFIED: ICD-10-CM

## 2022-08-05 LAB
ALBUMIN SERPL ELPH-MCNC: 3.3 G/DL — LOW (ref 3.5–5.2)
ALP SERPL-CCNC: 77 U/L — SIGNIFICANT CHANGE UP (ref 30–115)
ALT FLD-CCNC: 28 U/L — SIGNIFICANT CHANGE UP (ref 0–41)
ANION GAP SERPL CALC-SCNC: 7 MMOL/L — SIGNIFICANT CHANGE UP (ref 7–14)
AST SERPL-CCNC: 20 U/L — SIGNIFICANT CHANGE UP (ref 0–41)
BASOPHILS # BLD AUTO: 0 K/UL — SIGNIFICANT CHANGE UP (ref 0–0.2)
BASOPHILS NFR BLD AUTO: 0 % — SIGNIFICANT CHANGE UP (ref 0–1)
BILIRUB SERPL-MCNC: 0.3 MG/DL — SIGNIFICANT CHANGE UP (ref 0.2–1.2)
BUN SERPL-MCNC: 16 MG/DL — SIGNIFICANT CHANGE UP (ref 10–20)
CALCIUM SERPL-MCNC: 9.7 MG/DL — SIGNIFICANT CHANGE UP (ref 8.5–10.1)
CHLORIDE SERPL-SCNC: 97 MMOL/L — LOW (ref 98–110)
CO2 SERPL-SCNC: 32 MMOL/L — SIGNIFICANT CHANGE UP (ref 17–32)
CREAT SERPL-MCNC: 0.5 MG/DL — LOW (ref 0.7–1.5)
EGFR: 109 ML/MIN/1.73M2 — SIGNIFICANT CHANGE UP
EOSINOPHIL # BLD AUTO: 0 K/UL — SIGNIFICANT CHANGE UP (ref 0–0.7)
EOSINOPHIL NFR BLD AUTO: 0 % — SIGNIFICANT CHANGE UP (ref 0–8)
GLUCOSE BLDC GLUCOMTR-MCNC: 108 MG/DL — HIGH (ref 70–99)
GLUCOSE BLDC GLUCOMTR-MCNC: 159 MG/DL — HIGH (ref 70–99)
GLUCOSE BLDC GLUCOMTR-MCNC: 175 MG/DL — HIGH (ref 70–99)
GLUCOSE BLDC GLUCOMTR-MCNC: 256 MG/DL — HIGH (ref 70–99)
GLUCOSE SERPL-MCNC: 206 MG/DL — HIGH (ref 70–99)
HCT VFR BLD CALC: 29.2 % — LOW (ref 37–47)
HGB BLD-MCNC: 10.1 G/DL — LOW (ref 12–16)
IMM GRANULOCYTES NFR BLD AUTO: 0.2 % — SIGNIFICANT CHANGE UP (ref 0.1–0.3)
LYMPHOCYTES # BLD AUTO: 0.79 K/UL — LOW (ref 1.2–3.4)
LYMPHOCYTES # BLD AUTO: 13.3 % — LOW (ref 20.5–51.1)
M PNEUMO IGM SER-ACNC: 0.7 INDEX — SIGNIFICANT CHANGE UP (ref 0–0.9)
MAGNESIUM SERPL-MCNC: 1.9 MG/DL — SIGNIFICANT CHANGE UP (ref 1.8–2.4)
MCHC RBC-ENTMCNC: 32.7 PG — HIGH (ref 27–31)
MCHC RBC-ENTMCNC: 34.6 G/DL — SIGNIFICANT CHANGE UP (ref 32–37)
MCV RBC AUTO: 94.5 FL — SIGNIFICANT CHANGE UP (ref 81–99)
MONOCYTES # BLD AUTO: 0.29 K/UL — SIGNIFICANT CHANGE UP (ref 0.1–0.6)
MONOCYTES NFR BLD AUTO: 4.9 % — SIGNIFICANT CHANGE UP (ref 1.7–9.3)
MYCOPLASMA AG SPEC QL: NEGATIVE — SIGNIFICANT CHANGE UP
NEUTROPHILS # BLD AUTO: 4.87 K/UL — SIGNIFICANT CHANGE UP (ref 1.4–6.5)
NEUTROPHILS NFR BLD AUTO: 81.6 % — HIGH (ref 42.2–75.2)
NRBC # BLD: 0 /100 WBCS — SIGNIFICANT CHANGE UP (ref 0–0)
PLATELET # BLD AUTO: 175 K/UL — SIGNIFICANT CHANGE UP (ref 130–400)
POTASSIUM SERPL-MCNC: 4.4 MMOL/L — SIGNIFICANT CHANGE UP (ref 3.5–5)
POTASSIUM SERPL-SCNC: 4.4 MMOL/L — SIGNIFICANT CHANGE UP (ref 3.5–5)
PROCALCITONIN SERPL-MCNC: 0.34 NG/ML — HIGH (ref 0.02–0.1)
PROT SERPL-MCNC: 5.9 G/DL — LOW (ref 6–8)
RBC # BLD: 3.09 M/UL — LOW (ref 4.2–5.4)
RBC # FLD: 12.1 % — SIGNIFICANT CHANGE UP (ref 11.5–14.5)
SODIUM SERPL-SCNC: 136 MMOL/L — SIGNIFICANT CHANGE UP (ref 135–146)
TROPONIN T SERPL-MCNC: <0.01 NG/ML — SIGNIFICANT CHANGE UP
WBC # BLD: 5.96 K/UL — SIGNIFICANT CHANGE UP (ref 4.8–10.8)
WBC # FLD AUTO: 5.96 K/UL — SIGNIFICANT CHANGE UP (ref 4.8–10.8)

## 2022-08-05 PROCEDURE — 99284 EMERGENCY DEPT VISIT MOD MDM: CPT | Mod: 1L

## 2022-08-05 PROCEDURE — 99233 SBSQ HOSP IP/OBS HIGH 50: CPT

## 2022-08-05 RX ORDER — HYDROMORPHONE HYDROCHLORIDE 2 MG/ML
1 INJECTION INTRAMUSCULAR; INTRAVENOUS; SUBCUTANEOUS EVERY 4 HOURS
Refills: 0 | Status: DISCONTINUED | OUTPATIENT
Start: 2022-08-05 | End: 2022-08-05

## 2022-08-05 RX ORDER — POLYETHYLENE GLYCOL 3350 17 G/17G
17 POWDER, FOR SOLUTION ORAL
Refills: 0 | Status: DISCONTINUED | OUTPATIENT
Start: 2022-08-05 | End: 2022-08-19

## 2022-08-05 RX ORDER — ALPRAZOLAM 0.25 MG
0.5 TABLET ORAL EVERY 6 HOURS
Refills: 0 | Status: DISCONTINUED | OUTPATIENT
Start: 2022-08-05 | End: 2022-08-12

## 2022-08-05 RX ORDER — HYDROMORPHONE HYDROCHLORIDE 2 MG/ML
1 INJECTION INTRAMUSCULAR; INTRAVENOUS; SUBCUTANEOUS EVERY 8 HOURS
Refills: 0 | Status: DISCONTINUED | OUTPATIENT
Start: 2022-08-05 | End: 2022-08-12

## 2022-08-05 RX ORDER — HYDROMORPHONE HYDROCHLORIDE 2 MG/ML
1 INJECTION INTRAMUSCULAR; INTRAVENOUS; SUBCUTANEOUS EVERY 8 HOURS
Refills: 0 | Status: DISCONTINUED | OUTPATIENT
Start: 2022-08-05 | End: 2022-08-05

## 2022-08-05 RX ORDER — ACETAMINOPHEN 500 MG
650 TABLET ORAL EVERY 6 HOURS
Refills: 0 | Status: DISCONTINUED | OUTPATIENT
Start: 2022-08-05 | End: 2022-08-13

## 2022-08-05 RX ORDER — SENNA PLUS 8.6 MG/1
2 TABLET ORAL AT BEDTIME
Refills: 0 | Status: DISCONTINUED | OUTPATIENT
Start: 2022-08-05 | End: 2022-08-19

## 2022-08-05 RX ORDER — HYDROMORPHONE HYDROCHLORIDE 2 MG/ML
4 INJECTION INTRAMUSCULAR; INTRAVENOUS; SUBCUTANEOUS EVERY 4 HOURS
Refills: 0 | Status: DISCONTINUED | OUTPATIENT
Start: 2022-08-05 | End: 2022-08-12

## 2022-08-05 RX ADMIN — HYDROMORPHONE HYDROCHLORIDE 1 MILLIGRAM(S): 2 INJECTION INTRAMUSCULAR; INTRAVENOUS; SUBCUTANEOUS at 11:34

## 2022-08-05 RX ADMIN — HYDROMORPHONE HYDROCHLORIDE 4 MILLIGRAM(S): 2 INJECTION INTRAMUSCULAR; INTRAVENOUS; SUBCUTANEOUS at 19:08

## 2022-08-05 RX ADMIN — Medication 60 MILLIGRAM(S): at 06:16

## 2022-08-05 RX ADMIN — HYDROMORPHONE HYDROCHLORIDE 0.5 MILLIGRAM(S): 2 INJECTION INTRAMUSCULAR; INTRAVENOUS; SUBCUTANEOUS at 01:14

## 2022-08-05 RX ADMIN — Medication 0.5 MILLIGRAM(S): at 22:32

## 2022-08-05 RX ADMIN — HYDROMORPHONE HYDROCHLORIDE 0.5 MILLIGRAM(S): 2 INJECTION INTRAMUSCULAR; INTRAVENOUS; SUBCUTANEOUS at 06:10

## 2022-08-05 RX ADMIN — HYDROMORPHONE HYDROCHLORIDE 1 MILLIGRAM(S): 2 INJECTION INTRAMUSCULAR; INTRAVENOUS; SUBCUTANEOUS at 13:17

## 2022-08-05 RX ADMIN — HYDROMORPHONE HYDROCHLORIDE 4 MILLIGRAM(S): 2 INJECTION INTRAMUSCULAR; INTRAVENOUS; SUBCUTANEOUS at 18:47

## 2022-08-05 RX ADMIN — Medication 6 UNIT(S): at 17:55

## 2022-08-05 RX ADMIN — Medication 1 APPLICATION(S): at 06:17

## 2022-08-05 RX ADMIN — Medication 0.5 MILLIGRAM(S): at 14:09

## 2022-08-05 RX ADMIN — ALBUTEROL 2 PUFF(S): 90 AEROSOL, METERED ORAL at 21:49

## 2022-08-05 RX ADMIN — HYDROMORPHONE HYDROCHLORIDE 1 MILLIGRAM(S): 2 INJECTION INTRAMUSCULAR; INTRAVENOUS; SUBCUTANEOUS at 14:09

## 2022-08-05 RX ADMIN — Medication 3: at 07:56

## 2022-08-05 RX ADMIN — CHLORHEXIDINE GLUCONATE 15 MILLILITER(S): 213 SOLUTION TOPICAL at 06:17

## 2022-08-05 RX ADMIN — Medication 650 MILLIGRAM(S): at 18:46

## 2022-08-05 RX ADMIN — Medication 1: at 13:08

## 2022-08-05 RX ADMIN — Medication 650 MILLIGRAM(S): at 19:07

## 2022-08-05 RX ADMIN — Medication 0.5 MILLIGRAM(S): at 00:21

## 2022-08-05 RX ADMIN — Medication 100 MILLIGRAM(S): at 18:46

## 2022-08-05 RX ADMIN — Medication 1: at 17:54

## 2022-08-05 RX ADMIN — Medication 6 UNIT(S): at 07:56

## 2022-08-05 RX ADMIN — GABAPENTIN 600 MILLIGRAM(S): 400 CAPSULE ORAL at 22:32

## 2022-08-05 RX ADMIN — CEFEPIME 100 MILLIGRAM(S): 1 INJECTION, POWDER, FOR SOLUTION INTRAMUSCULAR; INTRAVENOUS at 13:14

## 2022-08-05 RX ADMIN — POLYETHYLENE GLYCOL 3350 17 GRAM(S): 17 POWDER, FOR SOLUTION ORAL at 18:46

## 2022-08-05 RX ADMIN — Medication 110 MILLIGRAM(S): at 06:41

## 2022-08-05 RX ADMIN — ALBUTEROL 2.5 MILLIGRAM(S): 90 AEROSOL, METERED ORAL at 14:08

## 2022-08-05 RX ADMIN — HYDROMORPHONE HYDROCHLORIDE 4 MILLIGRAM(S): 2 INJECTION INTRAMUSCULAR; INTRAVENOUS; SUBCUTANEOUS at 22:31

## 2022-08-05 RX ADMIN — Medication 1 TABLET(S): at 13:09

## 2022-08-05 RX ADMIN — SENNA PLUS 2 TABLET(S): 8.6 TABLET ORAL at 22:32

## 2022-08-05 RX ADMIN — APIXABAN 5 MILLIGRAM(S): 2.5 TABLET, FILM COATED ORAL at 18:46

## 2022-08-05 RX ADMIN — CHLORHEXIDINE GLUCONATE 15 MILLILITER(S): 213 SOLUTION TOPICAL at 17:54

## 2022-08-05 RX ADMIN — CEFEPIME 100 MILLIGRAM(S): 1 INJECTION, POWDER, FOR SOLUTION INTRAMUSCULAR; INTRAVENOUS at 06:16

## 2022-08-05 RX ADMIN — Medication 0.5 MILLIGRAM(S): at 08:15

## 2022-08-05 RX ADMIN — ALBUTEROL 2 PUFF(S): 90 AEROSOL, METERED ORAL at 08:20

## 2022-08-05 RX ADMIN — HYDROMORPHONE HYDROCHLORIDE 1 MILLIGRAM(S): 2 INJECTION INTRAMUSCULAR; INTRAVENOUS; SUBCUTANEOUS at 09:36

## 2022-08-05 RX ADMIN — Medication 60 MILLIGRAM(S): at 18:56

## 2022-08-05 RX ADMIN — APIXABAN 5 MILLIGRAM(S): 2.5 TABLET, FILM COATED ORAL at 06:16

## 2022-08-05 RX ADMIN — GABAPENTIN 600 MILLIGRAM(S): 400 CAPSULE ORAL at 06:15

## 2022-08-05 RX ADMIN — Medication 1 APPLICATION(S): at 17:54

## 2022-08-05 RX ADMIN — Medication 6 UNIT(S): at 13:09

## 2022-08-05 RX ADMIN — PANTOPRAZOLE SODIUM 40 MILLIGRAM(S): 20 TABLET, DELAYED RELEASE ORAL at 13:09

## 2022-08-05 RX ADMIN — GABAPENTIN 600 MILLIGRAM(S): 400 CAPSULE ORAL at 13:14

## 2022-08-05 NOTE — PROGRESS NOTE ADULT - SUBJECTIVE AND OBJECTIVE BOX
CRUZ DUMONT 56yo  Female sent to the ER from [facility] where she is on  care for c/o inc SOB, chest tightness, diff breathing, wheezing i.e. acute on chr hypoxic RF.  Of note the pt has a trach and has had freq readmissions for mucous plugging, RF and has had several bronchoscopies.   EMS noted the pt to be hypotensive and hypoxic.  The pt was vigorously suctioned, received Neb Tx, IV steroids,  BP revived with IV fluids.  The pt was cultured and placed on ABx.  She is v well known to the Pul SVc and to ID.  The pt is ad with acute on ch hypoxic RF and ongoing Bacterial PNA.  The PMHx includes:  HTN, ASHD, HFpEF, DLD, DM II, Chronic RF, COPD, LUCIANA, ASp PNA, sp intubation, failure to extubate, chronic trach ([facility] 4/22), recurrent mucus plugging of airways, SDVT, AC with Eliquis, lower ext venous stasis dermatitis, bedbound state, GERD, diverticulosis, sp PEG, OA, DDD, DJD, chronic pain syn, depression anxiety.    INTERVAL HPI/OVERNIGHT EVENTS: pt's res status improving cont to require freq suctioning, remians on Neb tx, steroids, doxycycline, was evaluated by PM for lower ext pain    MEDICATIONS  (STANDING):  acetaminophen     Tablet .. 650 milliGRAM(s) Oral every 6 hours  acetylcysteine 20% for Nebulization - Peds 3 milliLiter(s) Nebulizer every 6 hours  ALBUTerol    0.083% 2.5 milliGRAM(s) Nebulizer every 6 hours  ammonium lactate 12% Lotion 1 Application(s) Topical two times a day  apixaban 5 milliGRAM(s) Enteral Tube two times a day  chlorhexidine 0.12% Liquid 15 milliLiter(s) Oral Mucosa every 12 hours  dextrose 5%. 1000 milliLiter(s) (100 mL/Hr) IV Continuous <Continuous>  dextrose 5%. 1000 milliLiter(s) (50 mL/Hr) IV Continuous <Continuous>  dextrose 50% Injectable 25 Gram(s) IV Push once  dextrose 50% Injectable 12.5 Gram(s) IV Push once  dextrose 50% Injectable 25 Gram(s) IV Push once  doxycycline monohydrate Capsule 100 milliGRAM(s) Oral every 12 hours  gabapentin 600 milliGRAM(s) Oral three times a day  glucagon  Injectable 1 milliGRAM(s) IntraMuscular once  insulin glargine Injectable (LANTUS) 20 Unit(s) SubCutaneous at bedtime  insulin lispro (ADMELOG) corrective regimen sliding scale   SubCutaneous three times a day before meals  insulin lispro Injectable (ADMELOG) 6 Unit(s) SubCutaneous three times a day before meals  methylPREDNISolone sodium succinate Injectable 60 milliGRAM(s) IV Push two times a day  multivitamin 1 Tablet(s) Oral daily  pantoprazole   Suspension 40 milliGRAM(s) Oral daily  polyethylene glycol 3350 17 Gram(s) Oral two times a day  senna 2 Tablet(s) Oral at bedtime    MEDICATIONS  (PRN):  ALBUTerol    90 MICROgram(s) HFA Inhaler 2 Puff(s) Inhalation every 6 hours PRN Shortness of Breath and/or Wheezing  ALPRAZolam 0.5 milliGRAM(s) Oral every 6 hours PRN anxiety  dextrose Oral Gel 15 Gram(s) Oral once PRN Blood Glucose LESS THAN 70 milliGRAM(s)/deciliter  HYDROmorphone   Tablet 4 milliGRAM(s) Oral every 4 hours PRN Severe Pain (7 - 10)  HYDROmorphone  Injectable 1 milliGRAM(s) IV Push every 8 hours PRN Moderate Pain (4 - 6)      Allergies    penicillin (Swelling)      Vital Signs Last 24 Hrs  T(C): 36.4 (05 Aug 2022 20:14), Max: 36.9 (05 Aug 2022 08:19)  T(F): 97.5 (05 Aug 2022 20:14), Max: 98.5 (05 Aug 2022 08:19)  HR: 48 (05 Aug 2022 20:14) (48 - 82)  BP: 140/63 (05 Aug 2022 20:14) (140/63 - 191/84)  BP(mean): --  RR: 18 (05 Aug 2022 20:14) (16 - 20)  SpO2: 100% (05 Aug 2022 20:14) (100% - 100%)    Parameters below as of 05 Aug 2022 20:14  Patient On (Oxygen Delivery Method): ventilator        PHYSICAL EXAM:      Constitutional: pt alert, bedboud chr and acutely ill looking    Eyes: nonicteric    ENMT: dry oral mucosa, dental defects,     Neck: + trach tube supple, no stridor    Respiratory: shallow resp, diminished harsh bronchial  BS, scattered rhonchi, no wheezing    Cardiovascular: S1S2 reg    Gastrointestinal: globular, soft and benign, + OPEG, + BS    Genitourinary:    Extremities: moves all ext, dec motor strength lower ext > upper    Vascular: dec pedal pulses    Neurological: nonfocal    Skin: lower ext erythema, scaliness    Lymph Nodes: not enlarged    Musculoskeletal: dec mm mass and tone    Psychiatric: anxious, depressed but stable        LABS:                        10.1   5.96  )-----------( 175      ( 05 Aug 2022 06:50 )             29.2     08-05    136  |  97<L>  |  16  ----------------------------<  206<H>  4.4   |  32  |  0.5<L>    Ca    9.7      05 Aug 2022 06:50  Mg     1.9     08-05  trop <0.01  TPro  5.9<L>  /  Alb  3.3<L>  /  TBili  0.3  /  DBili  x   /  AST  20  /  ALT  28  /  AlkPhos  77  08-05          RADIOLOGY & ADDITIONAL TESTS:  EKG:  NSR R axis, low voltage, nonspecific ST-T changes  CXR:  interstitial prominence, L plbased opacity unchanged

## 2022-08-05 NOTE — CONSULT NOTE ADULT - PROBLEM SELECTOR RECOMMENDATION 9
+recent opioid therapy, likely to be low level opioid tolerance. Low risk for opioid abuse per ORT.  1) Start hydromorphone PO 4mg Q4h prn  2) Decrease hydromorphone IV 1mg to Q8h prn  3) Change acetaminophen to 650mg Q6h standing  4) Continue gabapentin 600mg TID  5) Start miralax, senna for opioid induced constipation

## 2022-08-05 NOTE — CONSULT NOTE ADULT - SUBJECTIVE AND OBJECTIVE BOX
Patient is a 57y old  Female who presents with a chief complaint of Hypoxia (04 Aug 2022 13:16)    57 year old female with a past medical history of IDDM, GERD, HTN, COPD, Chronic hypoxemic and hypercapnic respiratory failure SP Tracheostomy (not chronically vented) & PEG at [facility], DVT on Eliquis, Anxiety, CHF, Recurrent mucous plugs SP bronchoscopies, presenting from [facility] for chest tightness and shortness of breath over the past few days.  Per EMS patient was hypotensive and hypoxic at the facility today, around 60%.  Patient was recently started on PO ABX last week.  Patient reports increased secretions requiring more suctioning, still unable to clear them.  She also reports increased cough and wheezing.  Denies fevers, chills, lightheadedness, dizziness, blurry vision, double vision, headache, extremity weakness or deficits, chest pain, palpitations, abdominal pain, nausea, vomiting, diarrhea, hematochezia, melena, dysuria, hematuria, flank pain, lower extremity swelling/tenderness/erythema.    ED: NS 2.7L, Vancomycin 1250mg, Levofloxacin 750mg, Solumedrol 125mg, Morphine, Diazepam, Albuterol, Placed on vent    Xray on admission showed bilat opacities.   EKG showed Normal sinus rhythm with Right axis deviation  admitted to SDU, called to evaluate      PAST MEDICAL & SURGICAL HISTORY:  COPD (chronic obstructive pulmonary disease)      CHF (congestive heart failure)      DM (diabetes mellitus)      H/O tracheostomy          SOCIAL HX:   Smoking -    FAMILY HISTORY:  No pertinent family history in first degree relatives        REVIEW OF SYSTEMS see hpi    Allergies    penicillin (Swelling)    Intolerances        acetaminophen     Tablet .. 650 milliGRAM(s) Oral every 6 hours PRN  acetylcysteine 20% for Nebulization - Peds 3 milliLiter(s) Nebulizer every 6 hours  ALBUTerol    0.083% 2.5 milliGRAM(s) Nebulizer every 6 hours  ALBUTerol    90 MICROgram(s) HFA Inhaler 2 Puff(s) Inhalation every 6 hours PRN  ALPRAZolam 0.5 milliGRAM(s) Oral every 8 hours PRN  ammonium lactate 12% Lotion 1 Application(s) Topical two times a day  apixaban 5 milliGRAM(s) Enteral Tube two times a day  cefepime   IVPB 2000 milliGRAM(s) IV Intermittent every 8 hours  chlorhexidine 0.12% Liquid 15 milliLiter(s) Oral Mucosa every 12 hours  dextrose 5%. 1000 milliLiter(s) IV Continuous <Continuous>  dextrose 5%. 1000 milliLiter(s) IV Continuous <Continuous>  dextrose 50% Injectable 25 Gram(s) IV Push once  dextrose 50% Injectable 12.5 Gram(s) IV Push once  dextrose 50% Injectable 25 Gram(s) IV Push once  dextrose Oral Gel 15 Gram(s) Oral once PRN  doxycycline IVPB      doxycycline IVPB 100 milliGRAM(s) IV Intermittent every 12 hours  gabapentin 600 milliGRAM(s) Oral three times a day  glucagon  Injectable 1 milliGRAM(s) IntraMuscular once  HYDROmorphone  Injectable 0.5 milliGRAM(s) IV Push every 4 hours PRN  insulin glargine Injectable (LANTUS) 20 Unit(s) SubCutaneous at bedtime  insulin lispro (ADMELOG) corrective regimen sliding scale   SubCutaneous three times a day before meals  insulin lispro Injectable (ADMELOG) 6 Unit(s) SubCutaneous three times a day before meals  methylPREDNISolone sodium succinate Injectable 60 milliGRAM(s) IV Push two times a day  multivitamin 1 Tablet(s) Oral daily  pantoprazole   Suspension 40 milliGRAM(s) Oral daily  : Home Meds:      PHYSICAL EXAM    ICU Vital Signs Last 24 Hrs  T(C): 36.7 (05 Aug 2022 01:01), Max: 36.7 (05 Aug 2022 01:01)  T(F): 98.1 (05 Aug 2022 01:01), Max: 98.1 (05 Aug 2022 01:01)  HR: 68 (05 Aug 2022 02:53) (62 - 82)  BP: 191/84 (05 Aug 2022 01:01) (134/62 - 191/84)  RR: 20 (04 Aug 2022 21:44) (20 - 20)  SpO2: 100% (05 Aug 2022 02:53) (100% - 100%)    O2 Parameters below as of 05 Aug 2022 01:01  Patient On (Oxygen Delivery Method): ventilator            General: comfortable on vent  HEENT:  trach             Lungs: Bilateral rhocnhi  Cardiovascular: Regular  Abdomen: Soft, Positive BS  Extremities: No clubbing  Neurological: follows commands      22 @ 07:01  -  22 @ 07:00  --------------------------------------------------------  IN:    Glucerna: 300 mL  Total IN: 300 mL    OUT:  Total OUT: 0 mL    Total NET: 300 mL      22 @ 07:01  -  22 @ 06:34  --------------------------------------------------------  IN:  Total IN: 0 mL    OUT:    Intermittent Catheterization - Urethral (mL): 1100 mL  Total OUT: 1100 mL    Total NET: -1100 mL          LABS:                          10.9   3.91  )-----------( 163      ( 04 Aug 2022 09:08 )             30.7                                               08-04    136  |  97<L>  |  10  ----------------------------<  270<H>  3.9   |  27  |  <0.5<L>    Ca    9.9      04 Aug 2022 09:08  Mg     1.6     08-04    TPro  6.4  /  Alb  3.5  /  TBili  0.3  /  DBili  x   /  AST  21  /  ALT  29  /  AlkPhos  94  08-04                                             Urinalysis Basic - ( 03 Aug 2022 10:40 )    Color: Yellow / Appearance: Slightly Turbid / S.016 / pH: x  Gluc: x / Ketone: Negative  / Bili: Negative / Urobili: <2 mg/dL   Blood: x / Protein: 100 mg/dL / Nitrite: Positive   Leuk Esterase: Large / RBC: 2 /HPF /  /HPF   Sq Epi: x / Non Sq Epi: 1 /HPF / Bacteria: Many                                                  LIVER FUNCTIONS - ( 04 Aug 2022 09:08 )  Alb: 3.5 g/dL / Pro: 6.4 g/dL / ALK PHOS: 94 U/L / ALT: 29 U/L / AST: 21 U/L / GGT: x                                                  Culture - Urine (collected 03 Aug 2022 10:40)  Source: Clean Catch Clean Catch (Midstream)  Preliminary Report (05 Aug 2022 00:37):    >100,000 CFU/ml Gram Negative Rods    Culture - Blood (collected 03 Aug 2022 06:26)  Source: .Blood Blood-Peripheral  Preliminary Report (04 Aug 2022 22:02):    No growth to date.    Culture - Blood (collected 03 Aug 2022 06:26)  Source: .Blood Blood-Peripheral  Preliminary Report (04 Aug 2022 22:02):    No growth to date.                                                   Mode: AC/ CMV (Assist Control/ Continuous Mandatory Ventilation)  RR (machine): 12  TV (machine): 350  FiO2: 40  PEEP: 6.2  ITime: 1  MAP: 10  PIP: 21                                          X-Rays                                                                                     ECHO    MEDICATIONS  (STANDING):  acetylcysteine 20% for Nebulization - Peds 3 milliLiter(s) Nebulizer every 6 hours  ALBUTerol    0.083% 2.5 milliGRAM(s) Nebulizer every 6 hours  ammonium lactate 12% Lotion 1 Application(s) Topical two times a day  apixaban 5 milliGRAM(s) Enteral Tube two times a day  cefepime   IVPB 2000 milliGRAM(s) IV Intermittent every 8 hours  chlorhexidine 0.12% Liquid 15 milliLiter(s) Oral Mucosa every 12 hours  dextrose 5%. 1000 milliLiter(s) (100 mL/Hr) IV Continuous <Continuous>  dextrose 5%. 1000 milliLiter(s) (50 mL/Hr) IV Continuous <Continuous>  dextrose 50% Injectable 25 Gram(s) IV Push once  dextrose 50% Injectable 12.5 Gram(s) IV Push once  dextrose 50% Injectable 25 Gram(s) IV Push once  doxycycline IVPB      doxycycline IVPB 100 milliGRAM(s) IV Intermittent every 12 hours  gabapentin 600 milliGRAM(s) Oral three times a day  glucagon  Injectable 1 milliGRAM(s) IntraMuscular once  insulin glargine Injectable (LANTUS) 20 Unit(s) SubCutaneous at bedtime  insulin lispro (ADMELOG) corrective regimen sliding scale   SubCutaneous three times a day before meals  insulin lispro Injectable (ADMELOG) 6 Unit(s) SubCutaneous three times a day before meals  methylPREDNISolone sodium succinate Injectable 60 milliGRAM(s) IV Push two times a day  multivitamin 1 Tablet(s) Oral daily  pantoprazole   Suspension 40 milliGRAM(s) Oral daily    MEDICATIONS  (PRN):  acetaminophen     Tablet .. 650 milliGRAM(s) Oral every 6 hours PRN Temp greater or equal to 38C (100.4F), Mild Pain (1 - 3)  ALBUTerol    90 MICROgram(s) HFA Inhaler 2 Puff(s) Inhalation every 6 hours PRN Shortness of Breath and/or Wheezing  ALPRAZolam 0.5 milliGRAM(s) Oral every 8 hours PRN anxiety  dextrose Oral Gel 15 Gram(s) Oral once PRN Blood Glucose LESS THAN 70 milliGRAM(s)/deciliter  HYDROmorphone  Injectable 0.5 milliGRAM(s) IV Push every 4 hours PRN Severe Pain (7 - 10)

## 2022-08-05 NOTE — DIETITIAN INITIAL EVALUATION ADULT - NS FNS DIET ORDER
Diet, NPO with Tube Feed:   Tube Feeding Modality: Gastrostomy  Glucerna 1.2 Martin  Total Volume for 24 Hours (mL): 1200  Bolus  Total Volume of Bolus (mL):  300  Total # of Feeds: 4  Tube Feed Frequency: Every 6 hours   Tube Feed Start Time: 14:00  Bolus Feed Rate (mL per Hour): 300   Bolus Feed Duration (in Hours): 1  Bolus   Total Volume per Flush (mL): 30   Frequency: Every 6 Hours  Free Water Flush Instructions:  post feed (08-03-22 @ 14:14) [Active]

## 2022-08-05 NOTE — PROGRESS NOTE ADULT - ASSESSMENT
Unfortunate 56 YO F with acute on chr hypoxic RF read for hypoxia and hypotension and ongoing Bacterial PNA. Hx of ASP PNA, COPD exacerbation and intubation with failure to extubate resulting in trach at [facility] .  The pt is under chronic  at [facility].      Acute on Chr hypoxic RF, mucus plugging  Bacterial PNA  Bedbound state  Clinical Debility  JHx of HTN, ASHD HFpEF  Hx of DLD  Hx of COPD, MO, LUCIANA, sp intubation, failure to extubate, chr trach since /[facility]  Hx of DM II  Hx of Ch anemia of ch dis  Hx of GERD, HH, Diverticulosis, Ch constipation  Hx of OA, DJD, DDD, chr pain syn  Hx of lower ext venous stasis dermatitis  Hx of DVT, AC/Eliquis      Hx of Anxiety, Depression  Unfortunate 58 YO F with acute on chr hypoxic RF read for hypoxia and hypotension and ongoing Bacterial PNA. Hx of ASP PNA, COPD exacerbation and intubation with failure to extubate resulting in trach at [facility] .  The pt is under chronic  at [facility].      Acute on Chr hypoxic RF, mucus plugging  Bacterial PNA  Bedbound state  Clinical Debility  JHx of HTN, ASHD HFpEF  Hx of DLD  Hx of COPD, MO, LUCIANA, sp intubation, failure to extubate, chr trach since /[facility]  Hx of DM II  Hx of Ch anemia of ch dis  Hx of GERD, HH, Diverticulosis, Ch constipation  Hx of OA, DJD, DDD, chr pain syn  Hx of lower ext venous stasis dermatitis  Hx of DVT, AC/Eliquis  Hx of Anxiety, Depression     On ad pt given IV fluids 2L for low BP  pt was suctioned, placed on Vent support, neb tx, IV steroids  CXR reviewed:  interstitial opacities unchanged, L pl based opacity  pt was cultured and placed on IV Abx for ongoing bacterial PNA  Venous duplex is neg for DVT  Pul-critical  consult  pt req aggressive suctioning to prevent mucus plugging  cont all meds  PEG feed nutrition  monitor CBC, electrolytes  cont all previous meds  GI prophylaxis  att to bowel regimen    lower ext skin care  pt full code  guarded state

## 2022-08-05 NOTE — DIETITIAN INITIAL EVALUATION ADULT - ADD RECOMMEND
as medically able, recommend SLP evaluation for patient to determine safest diet texture for advancement (patient was on PO diet prior to admission)  patient has good appetite at baseline may not need PEG feeds if PO intake is safe per SLP and intake is adequate though will continue to monitor   HIGH risk follow up within 4days  [employer] #[id number] or via teams

## 2022-08-05 NOTE — DIETITIAN INITIAL EVALUATION ADULT - ENTERAL
Continue NPO with alternate means of nutrition/hydration via **Gastrostomy tube**  regimen: Glucerna 1.2 at 300mL Q6hrs, provides (1440kcal, 72gm protein, 972mL free H2O) + No Carb Prosource modular 1x/daily (+60kcal, +15 g pro --> total with en 1500kcal, 87g pro)  fluids per medical team   Continue NPO with alternate means of nutrition/hydration via **Gastrostomy tube**  regimen: Glucerna 1.2 at 300mL Q6hrs, provides (1440kcal, 72gm protein, 972mL free H2O) + RECOMMEND adding No Carb Prosource modular 1x/daily (+60kcal, +15 g pro --> total with en 1500kcal, 87g pro)  fluids per medical team

## 2022-08-05 NOTE — DIETITIAN INITIAL EVALUATION ADULT - NUTRITION DIAGNOSITC TERMINOLOGY #1
[FreeTextEntry1] : s/p l radiocephalic avf 12/14 [de-identified] : no pain or weakness of the hand, no pain at the surgical site. \par \par no new complaints since last visit Inadequate Oral Intake

## 2022-08-05 NOTE — CONSULT NOTE ADULT - ASSESSMENT
Patient is a 56 y/o woman with history of IDDM, diabetic neuropathy, COPD, GERD, HTN, DVT, CHF, and VDRF requiring tracheostomy who was admitted on 8/3/2022 with acute hypoxemic respiratory failure and COPD exacerbation.

## 2022-08-05 NOTE — DIETITIAN INITIAL EVALUATION ADULT - OTHER CALCULATIONS
ABW 86.6kg & IBW 47.7kg **with consideration for COPD, weight loss**  ENERGY: 1387-1803kcal/day (MSJ x1.0-1.3)  PROTEIN: 81-95g/day (1.7-2.0g/kg of IBW)  FLUIDS: 1mL/kcal

## 2022-08-05 NOTE — CONSULT NOTE ADULT - SUBJECTIVE AND OBJECTIVE BOX
Pain Medicine Consult Note    History of Present Illness  Patient is a 58 y/o woman with history of IDDM, diabetic neuropathy, COPD, GERD, HTN, DVT, CHF, and VDRF requiring tracheostomy who was admitted on 8/3/2022 with acute hypoxemic respiratory failure and COPD exacerbation. The patient states that she has been having ongoing pain in the bilateral legs and feet. She states that she had diabetic neuropathy in the feet and legs for approximately 2 years but that it got acutely worse in 2022. She states that, at the time, she had a prolonged hospitalization for respiratory failure that required tracheostomy. During that admission, her diabetic neuropathy pain got significantly worse. She describes a burning, pins and needles sensation over this area. She states that she has been on chronic hydromorphone PO over the ~4 months since. She denies any previous history of chronic opioids or chronic pain. At this time, she states that pain is improved with hydromorphone PO or IV. She endorses having constipation that is well controlled with miralax. She denies any other side effects with this medication.     Current Inpatient Medication Regimen:  acetaminophen     Tablet .. 650 milliGRAM(s) Oral every 6 hours PRN  acetylcysteine 20% for Nebulization - Peds 3 milliLiter(s) Nebulizer every 6 hours  ALBUTerol    0.083% 2.5 milliGRAM(s) Nebulizer every 6 hours  ALBUTerol    90 MICROgram(s) HFA Inhaler 2 Puff(s) Inhalation every 6 hours PRN  ALPRAZolam 0.5 milliGRAM(s) Oral every 6 hours PRN  ammonium lactate 12% Lotion 1 Application(s) Topical two times a day  apixaban 5 milliGRAM(s) Enteral Tube two times a day  chlorhexidine 0.12% Liquid 15 milliLiter(s) Oral Mucosa every 12 hours  dextrose 5%. 1000 milliLiter(s) IV Continuous <Continuous>  dextrose 5%. 1000 milliLiter(s) IV Continuous <Continuous>  dextrose 50% Injectable 25 Gram(s) IV Push once  dextrose 50% Injectable 12.5 Gram(s) IV Push once  dextrose 50% Injectable 25 Gram(s) IV Push once  dextrose Oral Gel 15 Gram(s) Oral once PRN  gabapentin 600 milliGRAM(s) Oral three times a day  glucagon  Injectable 1 milliGRAM(s) IntraMuscular once  HYDROmorphone  Injectable 1 milliGRAM(s) IV Push every 4 hours PRN  insulin glargine Injectable (LANTUS) 20 Unit(s) SubCutaneous at bedtime  insulin lispro (ADMELOG) corrective regimen sliding scale   SubCutaneous three times a day before meals  insulin lispro Injectable (ADMELOG) 6 Unit(s) SubCutaneous three times a day before meals  methylPREDNISolone sodium succinate Injectable 60 milliGRAM(s) IV Push two times a day  multivitamin 1 Tablet(s) Oral daily  pantoprazole   Suspension 40 milliGRAM(s) Oral daily      Home Analgesic Regimen:  hydromorphone 4mg po prn    Allergies:  penicillin (Swelling)      Past Medical History:  COPD (chronic obstructive pulmonary disease)  CHF (congestive heart failure)  DM (diabetes mellitus)  GERD  HTN  DVT    Past Surgical History:  Tracheostomy      Family History:  DM (grandfather)    Social History:  Tobacco - quit smoking   EtOH - occasional  Drugs - denies      Review of Systems:  General: no fevers or chills  Eyes: no diplopia or blurred vision  ENT: no rhinorrhea  CV: no chest pain  Resp: +dyspnea/respiratory failure  GI: +constipation  : no urinary incontinence or dysuria  Neuro: +mild numbness over both feet  Psych: no depression or anxiety    Physical Exam:  T(C): 36.9 (22 @ 08:19), Max: 36.9 (22 @ 08:19)  HR: 60 (22 @ 08:19) (60 - 82)  BP: 173/74 (22 @ 08:19) (134/62 - 191/84)  RR: 16 (22 @ 08:19) (16 - 20)  SpO2: 100% (22 @ 08:19) (100% - 100%)  Gen: NAD  Eyes: no glasses or scleral icterus  Head: Normocephalic / Atraumatic  CV: no JVD  Lungs: +tracheostomy, ventilator assisted breathing  Abdomen: nondistended, soft  Neuro: AOx3, Cranial nerves intact  Extremities: +significantly darkened skin over the bilateral feet and legs up to the knees  Psych: normal affect      Labs:  CBC  5.96 K/uL [4.80 - 10.80] > 10.1 g/dL<L> [12.0 - 16.0] / 29.2 %<L> [37.0 - 47.0] < 175 K/uL [130 - 400]      BMP  136 mmol/L [135 - 146] | 97 mmol/L<L> [98 - 110] | 16 mg/dL [10 - 20]  4.4 mmol/L [3.5 - 5.0] | 32 mmol/L [17 - 32] | 0.5 mg/dL<L> [0.7 - 1.5]    206 mg/dL<H> [70 - 99]        Imaging Studies:  Venous duplex (2022)  FINDINGS:    RIGHT:  Normal compressibility of the RIGHT common femoral, femoral and popliteal   veins.  Doppler examination shows normal spontaneous and phasic flow.  No RIGHT calf vein thrombosis is detected.    LEFT:  Normal compressibility of the LEFT common femoral, femoral and popliteal   veins.  Doppler examination shows normal spontaneous and phasic flow.  No LEFT calf vein thrombosis is detected.    IMPRESSION:  No evidence of deep venous thrombosis in either lower extremity.  Difficult study due to patient habitus.  No sign of previous posterior tibial vein branch thrombosis in left lower   extremity.      Opioid Risk Assessment Tool                                                                         Female       Male  Family History  Alcohol                                                              1                3  Illegal drugs                                                       2                3  Rx drugs                                                            4                4    Personal History   Alcohol                                                              3                3  Illegal drugs                                                       4                4  Rx drugs                                                            5                5    Age between 16—45 years                                1                1  History of preadolescent sexual abuse               3                0    Psychological disease  ADD, OCD, bipolar, schizophrenia                      2                2  Depression                                                       1                1    Total Score                                                      0              __    0 - 3 = low risk for future opioid abuse  4 - 7 = moderate risk for future opioid abuse  8+ = high risk for future opioid abuse

## 2022-08-05 NOTE — DIETITIAN INITIAL EVALUATION ADULT - ENERGY INTAKE
**Receiving EN regimen via PEG: Glucerna 1.2 at 300mL Q6hrs, provides (1440kcal, 72gm protein, 972mL free H2O) with enteral nutrition via PEG/NPO **Receiving EN regimen via PEG: Glucerna 1.2 at 300mL Q6hrs, provides (1440kcal, 72gm protein, 972mL free H2O)  + FWF 30mL post feeds Q6hr-- total with EN 1092mL water with enteral nutrition via PEG    prior to admission was eating adequately likely meeting % of energy needs/NPO **NPO + receiving EN regimen via PEG: Glucerna 1.2 at 300mL Q6hrs, provides (1440kcal, 72gm protein, 972mL free H2O)**  + FWF 30mL post feeds Q6hr-- total with EN 1092mL water

## 2022-08-05 NOTE — CONSULT NOTE ADULT - ASSESSMENT
57 year old female with a past medical history of IDDM, GERD, HTN, COPD, Chronic hypoxemic and hypercapnic respiratory failure SP Tracheostomy (not chronically vented) & PEG at [facility], DVT on Eliquis, Anxiety, CHF, Recurrent mucous plugs SP bronchoscopies, presenting from [facility] for chest tightness and shortness of breath over the past few days.  Per EMS patient was hypotensive and hypoxic at the facility today, around 60%.  Patient was recently started on PO trial last week.  Patient reports increased secretions requiring more suctioning, still unable to clear them.  She also reports increased cough and wheezing.  Denies fevers, chills, lightheadedness, dizziness, blurry vision, double vision, headache, extremity weakness or deficits, chest pain, palpitations, abdominal pain, nausea, vomiting, diarrhea, hematochezia, melena, dysuria, hematuria, flank pain, lower extremity swelling/tenderness/erythema    IMPRESSION;   No ongoing bacterial PNA or tracheitis  Mucous plugs with transient hypoxemia  LEs chronic changes > not infected    RECOMMENDATIONS;  Po Doxycycline 100 mg q12h fr 7 more days  Vascular Sx evaluation of [place]  Please do not hesitate to recall ID if any questions arise either through [employer] or through [employer] teams

## 2022-08-05 NOTE — CHART NOTE - NSCHARTNOTEFT_GEN_A_CORE
Transfer from stepdown to floors.    HPI:  57 year old female with a past medical history of IDDM, GERD, HTN, COPD, Chronic hypoxemic and hypercapnic respiratory failure SP Tracheostomy (not chronically vented) & PEG at [facility], DVT on Eliquis, Anxiety, CHF, Recurrent mucous plugs SP bronchoscopies, presenting from [facility] for chest tightness and shortness of breath over the past few days.  Per EMS patient was hypotensive and hypoxic at the facility, around 60%.  Patient was recently started on PO trial last week.  Patient reports increased secretions requiring more suctioning, still unable to clear them.  She also reports increased cough and wheezing.  Denies fevers, chills, lightheadedness, dizziness, blurry vision, double vision, headache, extremity weakness or deficits, chest pain, palpitations, abdominal pain, nausea, vomiting, diarrhea, hematochezia, melena, dysuria, hematuria, flank pain, lower extremity swelling/tenderness/erythema.    Hospital course:  In the ED, patient was given NS 2.7L, Vancomycin 1250mg, Levofloxacin 750mg, Solumedrol 125mg, Morphine, Diazepam, Albuterol, and placed on pressure support. X-ray on admission showed bilat opacities. EKG showed Normal sinus rhythm with right axis deviation. Patient was started on cefepime + doxycycline for suspected superimposed bacterial PNA. Per pulm, patient started on solumedrol 60mg q12, nebs q4h & PRN, chest PT, and aggressive suctioning. Vent on AC.    A/P:  57 year old female with a past medical history of IDDM, GERD, HTN, COPD, Chronic hypoxemic and hypercapnic respiratory failure SP Tracheostomy (not chronically vented) & PEG at [facility], DVT on Eliquis, Anxiety, CHF, Recurrent mucous plugs SP bronchoscopies, presenting from [facility] for chest tightness and shortness of breath currently being treated for copd exacerbation.     #Acute on chronic Hypoxic respiratory failure   #s/p trach/PEG at [facility] 04/2022 after being intubated for >1month  #COPD Exacerbation  #Suspected superimposed bacterial PNA  CXR showing bilateral interstitial opacities  Pt is not vent dependent at baseline. Currently on pressure support saturating 100%  - Continue cefepime and doxycycline  - Cont solumedrol 60 BID and albuterol prn   - f/u BCx/UCx, procalcitonin  - ID eval requested  - Wean off vent per pulm    #Chronic HFpEF  2d Echo 05/2022: LVEF 50-55%. proBNP ~ 444  Pt does not appear volume overloaded  - Low Na diet and daily weight.   - monitor I & O    #Hx of DVT   - Cont Eliquis  - check venous duplex b/l LE    #DM2  - Cont lantus/lispro 20-6-6-6  - ISS    #Chronic atropic B/l LE Hyperpigmentation changes  On previous admission, LWC with Betamethasone cream and Ammonium lactate dressings per Burn has not helped over the past 3 weeks.   - Plan was for outpatient burn and dermatology f/u    #Chronic Lower extremity Pain management   - Gabapentin to 600 TID,   - PO dilaudid 4mg Q4 PRN    DVT ppx: Eliquis    #Progress Note Handoff  Pending (specify): Cont IV steroids and IV abx, wean off vent as tolerated, ID Eval  Family discussion: d/w pt regarding tx for COPD exacerbation and PNA  Disposition: Home Transfer from stepdown to floors.    HPI:  57 year old female with a past medical history of IDDM, GERD, HTN, COPD, Chronic hypoxemic and hypercapnic respiratory failure SP Tracheostomy (not chronically vented) & PEG at [facility], DVT on Eliquis, Anxiety, CHF, Recurrent mucous plugs SP bronchoscopies, presenting from [facility] for chest tightness and shortness of breath over the past few days.  Per EMS patient was hypotensive and hypoxic at the facility, around 60%.  Patient was recently started on PO trial last week.  Patient reports increased secretions requiring more suctioning, still unable to clear them.  She also reports increased cough and wheezing.  Denies fevers, chills, lightheadedness, dizziness, blurry vision, double vision, headache, extremity weakness or deficits, chest pain, palpitations, abdominal pain, nausea, vomiting, diarrhea, hematochezia, melena, dysuria, hematuria, flank pain, lower extremity swelling/tenderness/erythema.    Hospital course:  In the ED, patient was given NS 2.7L, Vancomycin 1250mg, Levofloxacin 750mg, Solumedrol 125mg, Morphine, Diazepam, Albuterol, and placed on pressure support. X-ray on admission showed bilat opacities. EKG showed Normal sinus rhythm with right axis deviation. Patient was started on cefepime + doxycycline for suspected superimposed bacterial PNA. Per pulm, patient started on solumedrol 60mg q12, nebs q4h & PRN, chest PT, and aggressive suctioning. Vent on AC.    A/P:  57 year old female with a past medical history of IDDM, GERD, HTN, COPD, Chronic hypoxemic and hypercapnic respiratory failure SP Tracheostomy (not chronically vented) & PEG at [facility], DVT on Eliquis, Anxiety, CHF, Recurrent mucous plugs SP bronchoscopies, presenting from [facility] for chest tightness and shortness of breath currently being treated for copd exacerbation.     #Acute on chronic Hypoxic respiratory failure   #s/p trach/PEG at [facility] 04/2022 after being intubated for >1month  #COPD Exacerbation  #Suspected superimposed bacterial PNA  CXR showing bilateral interstitial opacities  Pt is not vent dependent at baseline. Currently on pressure support saturating 100%  - S/p IV cefepime and doxycycline, now on PO doxy 100mg q12 x7 days per ID  - Cont solumedrol 60 BID and albuterol prn   - f/u BCx/UCx, procalcitonin  - Wean off vent per pulm    #Chronic HFpEF  2d Echo 05/2022: LVEF 50-55%. proBNP ~ 444  Pt does not appear volume overloaded  - Low Na diet and daily weight.   - monitor I & O    #Hx of DVT   - Cont Eliquis  - b/l LE duplex negative    #DM2  - Cont lantus/lispro 20-6-6-6  - ISS    #Chronic atropic B/l LE Hyperpigmentation changes  On previous admission, LWC with Betamethasone cream and Ammonium lactate dressings per Burn has not helped over the past 3 weeks.   - Plan was for outpatient burn and dermatology f/u    #Chronic Lower extremity Pain management   - Gabapentin to 600 TID,   - IV dilaudid 1mg Q4 PRN    DVT ppx: Eliquis    #Progress Note Handoff  Pending (specify): Cont IV steroids and PO abx, wean off vent as tolerated  Family discussion: d/w pt regarding tx for COPD exacerbation and PNA  Disposition: Home

## 2022-08-05 NOTE — DIETITIAN INITIAL EVALUATION ADULT - NS FNS WEIGHT CHANGE REASON
weights in emr: Daily Weight in k.3 (-), Weight in k.4 (), Weight in k.3 (), Weight in k () weights in emr: Daily Weight in k.3 (-), Weight in k.4 (-), Weight in k.3 (), Weight in k ()    current wt 86.6kg indicating 8.2% wt loss from dry weight 94.3 --> current wt within 2 months    IBW:47.7kg weights in emr: Daily Weight in k.3 (-), Weight in k.4 (-), Weight in k.3 (-), Weight in k (-)    Weight at NH was 82.3kg though gained weight to current admission wt with adequate PO intake 86.6kg indicating overall 8.2% wt loss from dry weight 94.3 --> current wt within 2 months     IBW: 47.7kg

## 2022-08-05 NOTE — DIETITIAN INITIAL EVALUATION ADULT - PERTINENT LABORATORY DATA
08-05    136  |  97<L>  |  16  ----------------------------<  206<H>  4.4   |  32  |  0.5<L>    Ca    9.7      05 Aug 2022 06:50  Mg     1.9     08-05    TPro  5.9<L>  /  Alb  3.3<L>  /  TBili  0.3  /  DBili  x   /  AST  20  /  ALT  28  /  AlkPhos  77  08-05  POCT Blood Glucose.: 256 mg/dL (08-05-22 @ 08:04)  A1C with Estimated Average Glucose Result: 5.3 % (08-04-22 @ 09:08)  A1C with Estimated Average Glucose Result: 7.2 % (05-28-22 @ 05:59)

## 2022-08-05 NOTE — CONSULT NOTE ADULT - ASSESSMENT
IMPRESSION:    Acute on Chronic Hypoxemic Respiratory Failure on vent  Chronic Hypercapnic Respiratory Failure  SP Tracheostomy and PEG ( not chronically vented )  COPD exacerbation  UTI  HO recurrent mucous plugs SP multiple bronchoscopies  HO DVT on Eliquis  HFpEF      PLAN:    CNS:  No depressants.  Pain control     HEENT: Oral care.  Trach care.     PULMONARY:  HOB @ 45 degrees.  Aspiration precautions.  Frequent aggressive suctioning.  Chest PT. AC for now.   Solumedrol 60mg q 12.  Nebs q4h & PRN.    CARDIOVASCULAR:  Avoid volume overload.      GI: GI prophylaxis.  PEG feeds.  Bowel regimen     RENAL:  Follow up lytes.  Correct as needed.     INFECTIOUS DISEASE:   Cefepime for now.     HEMATOLOGICAL:  DVT therapoy on Eliquis.    ENDOCRINE:  Follow up FS.  Insulin protocol if needed.    MUSCULOSKELETAL:  OOBTC, PT/OT    Guarded prognosis    VEnt Unit

## 2022-08-05 NOTE — DIETITIAN INITIAL EVALUATION ADULT - OTHER INFO
medical:  57 year old female with a past medical history of IDDM, GERD, HTN, COPD, Chronic hypoxemic and hypercapnic respiratory failure SP Tracheostomy (not chronically vented) & PEG at [facility], DVT on Eliquis, Anxiety, CHF, Recurrent mucous plugs SP bronchoscopies, presenting from [facility] for chest tightness and shortness of breath over the past few days.

## 2022-08-05 NOTE — DIETITIAN INITIAL EVALUATION ADULT - ORAL INTAKE PTA/DIET HISTORY
had PEG placed at previous admission in June 2022, was receiving enteral nutrition and d/c to NH had PEG placed at previous admission in June 2022, was receiving enteral nutrition and d/c to NH where initially was receiving feeds overnight of Glucerna 1.2 --> Advanced to PO diet per SLP evaluation at NH  had PEG placed at previous admission in June 2022, was receiving enteral nutrition and d/c to NH where initially was receiving feeds overnight of Glucerna 1.2 --> Advanced to PO diet of ground texture + thin liquids per SLP evaluation at NH and was not receiving any enteral nutrition via PEG. While patient was having PO diet her appetite was very good and intake was adequate.

## 2022-08-05 NOTE — DIETITIAN INITIAL EVALUATION ADULT - NSFNSNUTRCHEWSWALLOWFT_GEN_A_CORE
s/p FEES evaluation at previous admission 6/3 recommending NPO with EN s/p FEES evaluation at previous admission 6/3 recommending NPO with EN  at NH patient was upgraded to ground diet

## 2022-08-05 NOTE — CONSULT NOTE ADULT - SUBJECTIVE AND OBJECTIVE BOX
CRUZ DUMONT  57y, Female  Allergy: penicillin (Swelling)      All historical available data reviewed.    HPI:  57 year old female with a past medical history of IDDM, GERD, HTN, COPD, Chronic hypoxemic and hypercapnic respiratory failure SP Tracheostomy (not chronically vented) & PEG at [facility], DVT on Eliquis, Anxiety, CHF, Recurrent mucous plugs SP bronchoscopies, presenting from [facility] for chest tightness and shortness of breath over the past few days.  Per EMS patient was hypotensive and hypoxic at the facility today, around 60%.  Patient was recently started on PO trial last week.  Patient reports increased secretions requiring more suctioning, still unable to clear them.  She also reports increased cough and wheezing.  Denies fevers, chills, lightheadedness, dizziness, blurry vision, double vision, headache, extremity weakness or deficits, chest pain, palpitations, abdominal pain, nausea, vomiting, diarrhea, hematochezia, melena, dysuria, hematuria, flank pain, lower extremity swelling/tenderness/erythema.    ED: NS 2.7L, Vancomycin 1250mg, Levofloxacin 750mg, Solumedrol 125mg, Morphine, Diazepam, Albuterol, Placed on pressure support.    Xray on admission showed bilat opacities.   EKG showed Normal sinus rhythm with Right axis deviation     (03 Aug 2022 12:37)    FAMILY HISTORY:  No pertinent family history in first degree relatives      PAST MEDICAL & SURGICAL HISTORY:  COPD (chronic obstructive pulmonary disease)      CHF (congestive heart failure)      DM (diabetes mellitus)      H/O tracheostomy            VITALS:  T(F): 98.5, Max: 98.5 (22 @ 08:19)  HR: 60  BP: 173/74  RR: 16Vital Signs Last 24 Hrs  T(C): 36.9 (05 Aug 2022 08:19), Max: 36.9 (05 Aug 2022 08:19)  T(F): 98.5 (05 Aug 2022 08:19), Max: 98.5 (05 Aug 2022 08:19)  HR: 60 (05 Aug 2022 08:19) (60 - 82)  BP: 173/74 (05 Aug 2022 08:19) (134/62 - 191/84)  BP(mean): --  RR: 16 (05 Aug 2022 08:19) (16 - 20)  SpO2: 100% (05 Aug 2022 08:19) (100% - 100%)    Parameters below as of 05 Aug 2022 08:19  Patient On (Oxygen Delivery Method): ventilator        TESTS & MEASUREMENTS:                        10.1   5.96  )-----------( 175      ( 05 Aug 2022 06:50 )             29.2     08-04    136  |  97<L>  |  10  ----------------------------<  270<H>  3.9   |  27  |  <0.5<L>    Ca    9.9      04 Aug 2022 09:08  Mg     1.6     08-04    TPro  6.4  /  Alb  3.5  /  TBili  0.3  /  DBili  x   /  AST  21  /  ALT  29  /  AlkPhos  94  08-04    LIVER FUNCTIONS - ( 04 Aug 2022 09:08 )  Alb: 3.5 g/dL / Pro: 6.4 g/dL / ALK PHOS: 94 U/L / ALT: 29 U/L / AST: 21 U/L / GGT: x             Culture - Urine (collected 22 @ 10:40)  Source: Clean Catch Clean Catch (Midstream)  Preliminary Report (22 @ 00:37):    >100,000 CFU/ml Gram Negative Rods    Culture - Blood (collected 22 @ 06:26)  Source: .Blood Blood-Peripheral  Preliminary Report (22 @ 22:02):    No growth to date.    Culture - Blood (collected 22 @ 06:26)  Source: .Blood Blood-Peripheral  Preliminary Report (22 @ 22:02):    No growth to date.      Urinalysis Basic - ( 03 Aug 2022 10:40 )    Color: Yellow / Appearance: Slightly Turbid / S.016 / pH: x  Gluc: x / Ketone: Negative  / Bili: Negative / Urobili: <2 mg/dL   Blood: x / Protein: 100 mg/dL / Nitrite: Positive   Leuk Esterase: Large / RBC: 2 /HPF /  /HPF   Sq Epi: x / Non Sq Epi: 1 /HPF / Bacteria: Many          RADIOLOGY & ADDITIONAL TESTS:  Personal review of radiological diagnostics performed  Echo and EKG results noted when applicable.     MEDICATIONS:  acetaminophen     Tablet .. 650 milliGRAM(s) Oral every 6 hours PRN  acetylcysteine 20% for Nebulization - Peds 3 milliLiter(s) Nebulizer every 6 hours  ALBUTerol    0.083% 2.5 milliGRAM(s) Nebulizer every 6 hours  ALBUTerol    90 MICROgram(s) HFA Inhaler 2 Puff(s) Inhalation every 6 hours PRN  ALPRAZolam 0.5 milliGRAM(s) Oral every 6 hours PRN  ammonium lactate 12% Lotion 1 Application(s) Topical two times a day  apixaban 5 milliGRAM(s) Enteral Tube two times a day  cefepime   IVPB 2000 milliGRAM(s) IV Intermittent every 8 hours  chlorhexidine 0.12% Liquid 15 milliLiter(s) Oral Mucosa every 12 hours  dextrose 5%. 1000 milliLiter(s) IV Continuous <Continuous>  dextrose 5%. 1000 milliLiter(s) IV Continuous <Continuous>  dextrose 50% Injectable 25 Gram(s) IV Push once  dextrose 50% Injectable 12.5 Gram(s) IV Push once  dextrose 50% Injectable 25 Gram(s) IV Push once  dextrose Oral Gel 15 Gram(s) Oral once PRN  doxycycline IVPB      doxycycline IVPB 100 milliGRAM(s) IV Intermittent every 12 hours  gabapentin 600 milliGRAM(s) Oral three times a day  glucagon  Injectable 1 milliGRAM(s) IntraMuscular once  HYDROmorphone  Injectable 1 milliGRAM(s) IV Push every 4 hours PRN  insulin glargine Injectable (LANTUS) 20 Unit(s) SubCutaneous at bedtime  insulin lispro (ADMELOG) corrective regimen sliding scale   SubCutaneous three times a day before meals  insulin lispro Injectable (ADMELOG) 6 Unit(s) SubCutaneous three times a day before meals  methylPREDNISolone sodium succinate Injectable 60 milliGRAM(s) IV Push two times a day  multivitamin 1 Tablet(s) Oral daily  pantoprazole   Suspension 40 milliGRAM(s) Oral daily      ANTIBIOTICS:  cefepime   IVPB 2000 milliGRAM(s) IV Intermittent every 8 hours  doxycycline IVPB      doxycycline IVPB 100 milliGRAM(s) IV Intermittent every 12 hours

## 2022-08-06 LAB
GLUCOSE BLDC GLUCOMTR-MCNC: 126 MG/DL — HIGH (ref 70–99)
GLUCOSE BLDC GLUCOMTR-MCNC: 165 MG/DL — HIGH (ref 70–99)
GLUCOSE BLDC GLUCOMTR-MCNC: 166 MG/DL — HIGH (ref 70–99)
GLUCOSE BLDC GLUCOMTR-MCNC: 179 MG/DL — HIGH (ref 70–99)
GLUCOSE BLDC GLUCOMTR-MCNC: 192 MG/DL — HIGH (ref 70–99)

## 2022-08-06 PROCEDURE — 99233 SBSQ HOSP IP/OBS HIGH 50: CPT

## 2022-08-06 RX ORDER — LOSARTAN POTASSIUM 100 MG/1
100 TABLET, FILM COATED ORAL DAILY
Refills: 0 | Status: DISCONTINUED | OUTPATIENT
Start: 2022-08-06 | End: 2022-08-19

## 2022-08-06 RX ORDER — OXYCODONE AND ACETAMINOPHEN 5; 325 MG/1; MG/1
1 TABLET ORAL ONCE
Refills: 0 | Status: DISCONTINUED | OUTPATIENT
Start: 2022-08-06 | End: 2022-08-06

## 2022-08-06 RX ORDER — LOSARTAN POTASSIUM 100 MG/1
100 TABLET, FILM COATED ORAL DAILY
Refills: 0 | Status: DISCONTINUED | OUTPATIENT
Start: 2022-08-06 | End: 2022-08-06

## 2022-08-06 RX ADMIN — HYDROMORPHONE HYDROCHLORIDE 1 MILLIGRAM(S): 2 INJECTION INTRAMUSCULAR; INTRAVENOUS; SUBCUTANEOUS at 11:45

## 2022-08-06 RX ADMIN — OXYCODONE AND ACETAMINOPHEN 1 TABLET(S): 5; 325 TABLET ORAL at 23:13

## 2022-08-06 RX ADMIN — ALBUTEROL 2.5 MILLIGRAM(S): 90 AEROSOL, METERED ORAL at 21:06

## 2022-08-06 RX ADMIN — PANTOPRAZOLE SODIUM 40 MILLIGRAM(S): 20 TABLET, DELAYED RELEASE ORAL at 12:56

## 2022-08-06 RX ADMIN — Medication 60 MILLIGRAM(S): at 06:26

## 2022-08-06 RX ADMIN — Medication 1: at 17:19

## 2022-08-06 RX ADMIN — Medication 0.5 MILLIGRAM(S): at 04:36

## 2022-08-06 RX ADMIN — Medication 650 MILLIGRAM(S): at 23:22

## 2022-08-06 RX ADMIN — INSULIN GLARGINE 20 UNIT(S): 100 INJECTION, SOLUTION SUBCUTANEOUS at 21:23

## 2022-08-06 RX ADMIN — APIXABAN 5 MILLIGRAM(S): 2.5 TABLET, FILM COATED ORAL at 17:13

## 2022-08-06 RX ADMIN — HYDROMORPHONE HYDROCHLORIDE 4 MILLIGRAM(S): 2 INJECTION INTRAMUSCULAR; INTRAVENOUS; SUBCUTANEOUS at 23:13

## 2022-08-06 RX ADMIN — APIXABAN 5 MILLIGRAM(S): 2.5 TABLET, FILM COATED ORAL at 06:27

## 2022-08-06 RX ADMIN — Medication 0.5 MILLIGRAM(S): at 17:13

## 2022-08-06 RX ADMIN — Medication 1 APPLICATION(S): at 17:15

## 2022-08-06 RX ADMIN — HYDROMORPHONE HYDROCHLORIDE 1 MILLIGRAM(S): 2 INJECTION INTRAMUSCULAR; INTRAVENOUS; SUBCUTANEOUS at 11:04

## 2022-08-06 RX ADMIN — Medication 650 MILLIGRAM(S): at 17:13

## 2022-08-06 RX ADMIN — Medication 6 UNIT(S): at 09:10

## 2022-08-06 RX ADMIN — GABAPENTIN 600 MILLIGRAM(S): 400 CAPSULE ORAL at 13:21

## 2022-08-06 RX ADMIN — LOSARTAN POTASSIUM 100 MILLIGRAM(S): 100 TABLET, FILM COATED ORAL at 06:26

## 2022-08-06 RX ADMIN — Medication 650 MILLIGRAM(S): at 06:27

## 2022-08-06 RX ADMIN — Medication 6 UNIT(S): at 12:56

## 2022-08-06 RX ADMIN — ALBUTEROL 2 PUFF(S): 90 AEROSOL, METERED ORAL at 16:41

## 2022-08-06 RX ADMIN — Medication 1: at 12:56

## 2022-08-06 RX ADMIN — HYDROMORPHONE HYDROCHLORIDE 4 MILLIGRAM(S): 2 INJECTION INTRAMUSCULAR; INTRAVENOUS; SUBCUTANEOUS at 16:00

## 2022-08-06 RX ADMIN — Medication 6 UNIT(S): at 17:16

## 2022-08-06 RX ADMIN — CHLORHEXIDINE GLUCONATE 15 MILLILITER(S): 213 SOLUTION TOPICAL at 06:26

## 2022-08-06 RX ADMIN — Medication 0.5 MILLIGRAM(S): at 11:05

## 2022-08-06 RX ADMIN — Medication 100 MILLIGRAM(S): at 17:14

## 2022-08-06 RX ADMIN — Medication 1: at 09:11

## 2022-08-06 RX ADMIN — HYDROMORPHONE HYDROCHLORIDE 1 MILLIGRAM(S): 2 INJECTION INTRAMUSCULAR; INTRAVENOUS; SUBCUTANEOUS at 19:46

## 2022-08-06 RX ADMIN — GABAPENTIN 600 MILLIGRAM(S): 400 CAPSULE ORAL at 06:26

## 2022-08-06 RX ADMIN — HYDROMORPHONE HYDROCHLORIDE 1 MILLIGRAM(S): 2 INJECTION INTRAMUSCULAR; INTRAVENOUS; SUBCUTANEOUS at 03:33

## 2022-08-06 RX ADMIN — HYDROMORPHONE HYDROCHLORIDE 4 MILLIGRAM(S): 2 INJECTION INTRAMUSCULAR; INTRAVENOUS; SUBCUTANEOUS at 15:00

## 2022-08-06 RX ADMIN — POLYETHYLENE GLYCOL 3350 17 GRAM(S): 17 POWDER, FOR SOLUTION ORAL at 06:26

## 2022-08-06 RX ADMIN — Medication 1 APPLICATION(S): at 06:25

## 2022-08-06 RX ADMIN — Medication 0.5 MILLIGRAM(S): at 23:13

## 2022-08-06 RX ADMIN — HYDROMORPHONE HYDROCHLORIDE 4 MILLIGRAM(S): 2 INJECTION INTRAMUSCULAR; INTRAVENOUS; SUBCUTANEOUS at 11:30

## 2022-08-06 RX ADMIN — HYDROMORPHONE HYDROCHLORIDE 4 MILLIGRAM(S): 2 INJECTION INTRAMUSCULAR; INTRAVENOUS; SUBCUTANEOUS at 10:09

## 2022-08-06 RX ADMIN — Medication 650 MILLIGRAM(S): at 12:55

## 2022-08-06 RX ADMIN — Medication 650 MILLIGRAM(S): at 14:00

## 2022-08-06 RX ADMIN — Medication 100 MILLIGRAM(S): at 06:27

## 2022-08-06 RX ADMIN — Medication 60 MILLIGRAM(S): at 17:14

## 2022-08-06 RX ADMIN — CHLORHEXIDINE GLUCONATE 15 MILLILITER(S): 213 SOLUTION TOPICAL at 17:14

## 2022-08-06 RX ADMIN — POLYETHYLENE GLYCOL 3350 17 GRAM(S): 17 POWDER, FOR SOLUTION ORAL at 17:14

## 2022-08-06 RX ADMIN — Medication 1 TABLET(S): at 12:55

## 2022-08-06 RX ADMIN — GABAPENTIN 600 MILLIGRAM(S): 400 CAPSULE ORAL at 21:22

## 2022-08-06 NOTE — CHART NOTE - NSCHARTNOTEFT_GEN_A_CORE
Called by RT and RN bedside to evaluate airleak on tracheostomy. Upon bedside examination, patient saturating % on 40/5. Patient resting comfortably, not in respiratory distress with TV ranging ~250cc. Balloon appears inflated, however patient able to speak and audible airleak noticed.     Plan  - as patient is hemodynamically stable not in respiratory distress will not rush to exchange tracheostomy at this time  - need f/u with pulm regarding necessity of ventilator as patient was not vent dependent prior to this admission  - attempt CPAP trial  - will address with day team  - communicated with RT and bedside RN

## 2022-08-06 NOTE — PROGRESS NOTE ADULT - SUBJECTIVE AND OBJECTIVE BOX
Patient is a 57y old  Female who presents with a chief complaint of Hypoxia (06 Aug 2022 10:35)        Over Night Events:  Feels and looks better.  On MV.  Off pressors         ROS:     All ROS are negative except HPI         PHYSICAL EXAM    ICU Vital Signs Last 24 Hrs  T(C): 36.4 (06 Aug 2022 09:02), Max: 36.4 (05 Aug 2022 20:14)  T(F): 97.6 (06 Aug 2022 09:02), Max: 97.6 (06 Aug 2022 09:02)  HR: 43 (06 Aug 2022 09:02) (43 - 76)  BP: 184/76 (06 Aug 2022 09:02) (140/63 - 184/76)  BP(mean): --  ABP: --  ABP(mean): --  RR: 18 (06 Aug 2022 09:02) (18 - 18)  SpO2: 100% (06 Aug 2022 09:02) (100% - 100%)    O2 Parameters below as of 06 Aug 2022 09:02  Patient On (Oxygen Delivery Method): ventilator            CONSTITUTIONAL:  Well nourished.  In NAD    ENT:   Airway patent,   Mouth with normal mucosa.   Trach     EYES:   Pupils equal,   Round and reactive to light.    CARDIAC:   Normal rate,   Regular rhythm.        RESPIRATORY:   No wheezing  \Exp ronchi.  Improving   Bilateral BS  Normal chest expansion  Not tachypneic,  No use of accessory muscles    GASTROINTESTINAL:  Abdomen soft,   Non-tender,   No guarding,   + BS    MUSCULOSKELETAL:   Range of motion is not limited,  No clubbing, cyanosis    NEUROLOGICAL:   Alert and oriented   No motor  deficits.    SKIN:   Skin normal color for race,   No evidence of rash.    PSYCHIATRIC:   No apparent risk to self or others.          LABS:                            10.1   5.96  )-----------( 175      ( 05 Aug 2022 06:50 )             29.2                                               08-05    136  |  97<L>  |  16  ----------------------------<  206<H>  4.4   |  32  |  0.5<L>    Ca    9.7      05 Aug 2022 06:50  Mg     1.9     08-05    TPro  5.9<L>  /  Alb  3.3<L>  /  TBili  0.3  /  DBili  x   /  AST  20  /  ALT  28  /  AlkPhos  77  08-05                                                 CARDIAC MARKERS ( 05 Aug 2022 06:50 )  x     / <0.01 ng/mL / x     / x     / x                                                LIVER FUNCTIONS - ( 05 Aug 2022 06:50 )  Alb: 3.3 g/dL / Pro: 5.9 g/dL / ALK PHOS: 77 U/L / ALT: 28 U/L / AST: 20 U/L / GGT: x                                                                                               Mode: AC/ CMV (Assist Control/ Continuous Mandatory Ventilation)  RR (machine): 12  TV (machine): 350  FiO2: 50  PEEP: 5  ITime: 1  MAP: 9  PIP: 22                                          MEDICATIONS  (STANDING):  acetaminophen     Tablet .. 650 milliGRAM(s) Oral every 6 hours  acetylcysteine 20% for Nebulization - Peds 3 milliLiter(s) Nebulizer every 6 hours  ALBUTerol    0.083% 2.5 milliGRAM(s) Nebulizer every 6 hours  ammonium lactate 12% Lotion 1 Application(s) Topical two times a day  apixaban 5 milliGRAM(s) Enteral Tube two times a day  chlorhexidine 0.12% Liquid 15 milliLiter(s) Oral Mucosa every 12 hours  dextrose 5%. 1000 milliLiter(s) (100 mL/Hr) IV Continuous <Continuous>  dextrose 5%. 1000 milliLiter(s) (50 mL/Hr) IV Continuous <Continuous>  dextrose 50% Injectable 25 Gram(s) IV Push once  dextrose 50% Injectable 12.5 Gram(s) IV Push once  dextrose 50% Injectable 25 Gram(s) IV Push once  doxycycline monohydrate Capsule 100 milliGRAM(s) Oral every 12 hours  gabapentin 600 milliGRAM(s) Oral three times a day  glucagon  Injectable 1 milliGRAM(s) IntraMuscular once  insulin glargine Injectable (LANTUS) 20 Unit(s) SubCutaneous at bedtime  insulin lispro (ADMELOG) corrective regimen sliding scale   SubCutaneous three times a day before meals  insulin lispro Injectable (ADMELOG) 6 Unit(s) SubCutaneous three times a day before meals  losartan 100 milliGRAM(s) Oral daily  methylPREDNISolone sodium succinate Injectable 60 milliGRAM(s) IV Push two times a day  multivitamin 1 Tablet(s) Oral daily  pantoprazole   Suspension 40 milliGRAM(s) Oral daily  polyethylene glycol 3350 17 Gram(s) Oral two times a day  senna 2 Tablet(s) Oral at bedtime    MEDICATIONS  (PRN):  ALBUTerol    90 MICROgram(s) HFA Inhaler 2 Puff(s) Inhalation every 6 hours PRN Shortness of Breath and/or Wheezing  ALPRAZolam 0.5 milliGRAM(s) Oral every 6 hours PRN anxiety  dextrose Oral Gel 15 Gram(s) Oral once PRN Blood Glucose LESS THAN 70 milliGRAM(s)/deciliter  HYDROmorphone   Tablet 4 milliGRAM(s) Oral every 4 hours PRN Severe Pain (7 - 10)  HYDROmorphone  Injectable 1 milliGRAM(s) IV Push every 8 hours PRN Moderate Pain (4 - 6)      New X-rays reviewed:                                                                                  ECHO

## 2022-08-06 NOTE — ED ADULT NURSE REASSESSMENT NOTE - NS ED NURSE REASSESS COMMENT FT1
Pt remains in stable condition, pain meds given, pt suctioned, safety maintained, will continue to monitor.

## 2022-08-06 NOTE — ED ADULT NURSE REASSESSMENT NOTE - NS ED NURSE REASSESS COMMENT FT1
Pt received alert and oriented X4, pt c/o pain 8/10, pain medication given, pt suctioned, no respiratory distress noted, vent on standby, family present at bedside, safety in place, will continue to monitor.

## 2022-08-06 NOTE — PROGRESS NOTE ADULT - ASSESSMENT
IMPRESSION:    Acute on Chronic Hypoxemic Respiratory Failure improving   Chronic Hypercapnic Respiratory Failure  SP Tracheostomy and PEG ( not chronically vented )  COPD exacerbation improving   UTI  HO recurrent mucous plugs SP multiple bronchoscopies  HO DVT on Eliquis  HFpEF      PLAN:    CNS:  No depressants.  Pain control     HEENT: Oral care.  Trach care.     PULMONARY:  HOB @ 45 degrees.  Aspiration precautions.  Frequent aggressive suctioning.  Solumedrol 60mg q 12.  Nebs q4h & PRN.  PS wean     CARDIOVASCULAR:  Avoid volume overload.      GI: GI prophylaxis.  PEG feeds.  Bowel regimen     RENAL:  Follow up lytes.  Correct as needed.     INFECTIOUS DISEASE:   Finish ABX course     HEMATOLOGICAL:  DVT therapy on Eliquis.    ENDOCRINE:  Follow up FS.  Insulin protocol if needed.    MUSCULOSKELETAL:  PT/OT    Guarded prognosis    VEnt Unit

## 2022-08-06 NOTE — CONSULT NOTE ADULT - ASSESSMENT
Assessment  57 year old female with a past medical history of IDDM, GERD, HTN, COPD, Chronic hypoxemic and hypercapnic respiratory failure SP Tracheostomy (not chronically vented) & PEG at [facility], DVT on Eliquis, Anxiety, CHF, Recurrent mucous plugs SP bronchoscopies, presenting from [facility] for chest tightness and shortness of breath admitted with recurrent pneumonia, switched to cuffed tracheostomy tube due to need for mechanical ventilation, now with persistent airleak    Plan  - Pulmonology follow up regarding need for mechanical ventilation  - patient is hemodynamically stable, no respiratory distress  - no acute need for tracheostomy exchange  - please follow up with ENT vs pulm for exchange of tracheostomy given it is chronic in nature  - d/w Dr. Loving

## 2022-08-06 NOTE — PROGRESS NOTE ADULT - ASSESSMENT
Unfortunate 58 YO F with acute on chr hypoxic RF read for hypoxia and hypotension and ongoing Bacterial PNA. Hx of ASP PNA, COPD exacerbation and intubation with failure to extubate resulting in trach at [facility] .  The pt is under chronic  at [facility].      Acute on Chr hypoxic RF, mucus plugging  Bacterial PNA  Bedbound state  Clinical Debility  JHx of HTN, ASHD HFpEF  Hx of DLD  Hx of COPD, MO, LUCIANA, sp intubation, failure to extubate, chr trach since /[facility]  Hx of DM II  Hx of Ch anemia of ch dis  Hx of GERD, HH, Diverticulosis, Ch constipation  Hx of OA, DJD, DDD, chr pain syn  Hx of lower ext venous stasis dermatitis  Hx of DVT, AC/Eliquis  Hx of Anxiety, Depression     on ad pt suctioned and placed on Vent support, IV steroids and neb tx  pt was give 2 L of IV fluids with improvement of BP  CXR reviewed and shows unchanged interstitial opacities, L pl based opacity  Venous doppler ne for DVT  today pt alert issue with audible trach air leak, pt resp wise stable on the vent support  Pul-critical care consult and ff up appreciated, may start to wean  CTS consult for trach air leak, pt not in distress and oxygenating well no need for emergent trach change, may not need change if goal is to start weaning process  cont all previous meds  GI prophylaxis  att to bowel regimen  PM consult for c/o limb pain

## 2022-08-06 NOTE — CONSULT NOTE ADULT - SUBJECTIVE AND OBJECTIVE BOX
CRUZ DUMONT [id number]  57y Female  3d    HPI:  57 year old female with a past medical history of IDDM, GERD, HTN, COPD, Chronic hypoxemic and hypercapnic respiratory failure SP Tracheostomy (not chronically vented) & PEG at [facility], DVT on Eliquis, Anxiety, CHF, Recurrent mucous plugs SP bronchoscopies, presenting from [facility] for chest tightness and shortness of breath over the past few days.  Per EMS patient was hypotensive and hypoxic at the facility today, around 60%.  Patient was recently started on PO trial last week.  Patient reports increased secretions requiring more suctioning, still unable to clear them.  She also reports increased cough and wheezing.  Denies fevers, chills, lightheadedness, dizziness, blurry vision, double vision, headache, extremity weakness or deficits, chest pain, palpitations, abdominal pain, nausea, vomiting, diarrhea, hematochezia, melena, dysuria, hematuria, flank pain, lower extremity swelling/tenderness/erythema.    Surgical consult:  Thoracic surgery consult placed for tracheostomy exchange due to persistent airleak. Called by RT and RN bedside to evaluate airleak on tracheostomy. Upon bedside examination, patient saturating % on 40/5. Patient resting comfortably, not in respiratory distress with TV ranging ~250cc. Balloon appears inflated, however patient able to speak and audible airleak noticed. Of note, patient had tracheostomy exchanged to 6 cuffed tube from 6 cuffless on presentation.    PAST MEDICAL & SURGICAL HISTORY:  COPD (chronic obstructive pulmonary disease)  CHF (congestive heart failure)  DM (diabetes mellitus)  H/O tracheostomy    MEDICATIONS  (STANDING):  acetaminophen     Tablet .. 650 milliGRAM(s) Oral every 6 hours  acetylcysteine 20% for Nebulization - Peds 3 milliLiter(s) Nebulizer every 6 hours  ALBUTerol    0.083% 2.5 milliGRAM(s) Nebulizer every 6 hours  ammonium lactate 12% Lotion 1 Application(s) Topical two times a day  apixaban 5 milliGRAM(s) Enteral Tube two times a day  chlorhexidine 0.12% Liquid 15 milliLiter(s) Oral Mucosa every 12 hours  dextrose 5%. 1000 milliLiter(s) (100 mL/Hr) IV Continuous <Continuous>  dextrose 5%. 1000 milliLiter(s) (50 mL/Hr) IV Continuous <Continuous>  dextrose 50% Injectable 25 Gram(s) IV Push once  dextrose 50% Injectable 12.5 Gram(s) IV Push once  dextrose 50% Injectable 25 Gram(s) IV Push once  doxycycline monohydrate Capsule 100 milliGRAM(s) Oral every 12 hours  gabapentin 600 milliGRAM(s) Oral three times a day  glucagon  Injectable 1 milliGRAM(s) IntraMuscular once  insulin glargine Injectable (LANTUS) 20 Unit(s) SubCutaneous at bedtime  insulin lispro (ADMELOG) corrective regimen sliding scale   SubCutaneous three times a day before meals  insulin lispro Injectable (ADMELOG) 6 Unit(s) SubCutaneous three times a day before meals  losartan 100 milliGRAM(s) Oral daily  methylPREDNISolone sodium succinate Injectable 60 milliGRAM(s) IV Push two times a day  multivitamin 1 Tablet(s) Oral daily  pantoprazole   Suspension 40 milliGRAM(s) Oral daily  polyethylene glycol 3350 17 Gram(s) Oral two times a day  senna 2 Tablet(s) Oral at bedtime    MEDICATIONS  (PRN):  ALBUTerol    90 MICROgram(s) HFA Inhaler 2 Puff(s) Inhalation every 6 hours PRN Shortness of Breath and/or Wheezing  ALPRAZolam 0.5 milliGRAM(s) Oral every 6 hours PRN anxiety  dextrose Oral Gel 15 Gram(s) Oral once PRN Blood Glucose LESS THAN 70 milliGRAM(s)/deciliter  HYDROmorphone   Tablet 4 milliGRAM(s) Oral every 4 hours PRN Severe Pain (7 - 10)  HYDROmorphone  Injectable 1 milliGRAM(s) IV Push every 8 hours PRN Moderate Pain (4 - 6)    Allergies  penicillin (Swelling)  Intolerances    REVIEW OF SYSTEMS  [x ] A ten-point review of systems was otherwise negative except as noted.  [ ] Due to altered mental status/intubation, subjective information were not able to be obtained from the patient. History was obtained, to the extent possible, from review of the chart and collateral sources of information.    Vital Signs Last 24 Hrs  T(C): 36.4 (06 Aug 2022 09:02), Max: 36.4 (05 Aug 2022 20:14)  T(F): 97.6 (06 Aug 2022 09:02), Max: 97.6 (06 Aug 2022 09:02)  HR: 43 (06 Aug 2022 09:02) (43 - 76)  BP: 184/76 (06 Aug 2022 09:02) (140/63 - 184/76)  RR: 18 (06 Aug 2022 09:02) (18 - 18)  SpO2: 100% (06 Aug 2022 09:02) (100% - 100%)    Parameters below as of 06 Aug 2022 09:02  Patient On (Oxygen Delivery Method): ventilator    PHYSICAL EXAM:  GENERAL: NAD, well-appearing  CHEST/LUNG: Clear to auscultation bilaterally, Audible airleak from tracheostomy,   HEART: Regular rate and rhythm  ABDOMEN: Soft, Nontender, Nondistended;   EXTREMITIES:  No clubbing, cyanosis, or edema    Labs:  CAPILLARY BLOOD GLUCOSE      POCT Blood Glucose.: 165 mg/dL (06 Aug 2022 09:08)  POCT Blood Glucose.: 166 mg/dL (06 Aug 2022 07:38)  POCT Blood Glucose.: 108 mg/dL (05 Aug 2022 21:16)  POCT Blood Glucose.: 159 mg/dL (05 Aug 2022 17:30)  POCT Blood Glucose.: 175 mg/dL (05 Aug 2022 12:39)                          10.1   5.96  )-----------( 175      ( 05 Aug 2022 06:50 )             29.2         08-05    136  |  97<L>  |  16  ----------------------------<  206<H>  4.4   |  32  |  0.5<L>          LFTs:             5.9  | 0.3  | 20       ------------------[77      ( 05 Aug 2022 06:50 )  3.3  | x    | 28          Lipase:x      Amylase:x          Coags:    CARDIAC MARKERS ( 05 Aug 2022 06:50 )  x     / <0.01 ng/mL / x     / x     / x          Serum Pro-Brain Natriuretic Peptide: 444 pg/mL (08-03-22 @ 06:28)    Culture - Urine (collected 03 Aug 2022 10:40)  Source: Clean Catch Clean Catch (Midstream)  Preliminary Report (06 Aug 2022 07:19):    >100,000 CFU/ml Klebsiella pneumoniae Susceptibility to follow.    >100,000 CFU/ml Gram positive organisms Identification and susceptibility    to follow.

## 2022-08-07 LAB
-  AMPICILLIN: SIGNIFICANT CHANGE UP
-  CIPROFLOXACIN: SIGNIFICANT CHANGE UP
-  DAPTOMYCIN: SIGNIFICANT CHANGE UP
-  LEVOFLOXACIN: SIGNIFICANT CHANGE UP
-  LINEZOLID: SIGNIFICANT CHANGE UP
-  NITROFURANTOIN: SIGNIFICANT CHANGE UP
-  TETRACYCLINE: SIGNIFICANT CHANGE UP
-  VANCOMYCIN: SIGNIFICANT CHANGE UP
ALBUMIN SERPL ELPH-MCNC: 3.5 G/DL — SIGNIFICANT CHANGE UP (ref 3.5–5.2)
ALP SERPL-CCNC: 61 U/L — SIGNIFICANT CHANGE UP (ref 30–115)
ALT FLD-CCNC: 24 U/L — SIGNIFICANT CHANGE UP (ref 0–41)
ANION GAP SERPL CALC-SCNC: 6 MMOL/L — LOW (ref 7–14)
AST SERPL-CCNC: 16 U/L — SIGNIFICANT CHANGE UP (ref 0–41)
BASOPHILS # BLD AUTO: 0 K/UL — SIGNIFICANT CHANGE UP (ref 0–0.2)
BASOPHILS NFR BLD AUTO: 0 % — SIGNIFICANT CHANGE UP (ref 0–1)
BILIRUB SERPL-MCNC: 0.3 MG/DL — SIGNIFICANT CHANGE UP (ref 0.2–1.2)
BUN SERPL-MCNC: 16 MG/DL — SIGNIFICANT CHANGE UP (ref 10–20)
CALCIUM SERPL-MCNC: 9.8 MG/DL — SIGNIFICANT CHANGE UP (ref 8.5–10.1)
CHLORIDE SERPL-SCNC: 97 MMOL/L — LOW (ref 98–110)
CO2 SERPL-SCNC: 32 MMOL/L — SIGNIFICANT CHANGE UP (ref 17–32)
CREAT SERPL-MCNC: <0.5 MG/DL — LOW (ref 0.7–1.5)
EGFR: 115 ML/MIN/1.73M2 — SIGNIFICANT CHANGE UP
EOSINOPHIL # BLD AUTO: 0 K/UL — SIGNIFICANT CHANGE UP (ref 0–0.7)
EOSINOPHIL NFR BLD AUTO: 0 % — SIGNIFICANT CHANGE UP (ref 0–8)
GLUCOSE BLDC GLUCOMTR-MCNC: 110 MG/DL — HIGH (ref 70–99)
GLUCOSE BLDC GLUCOMTR-MCNC: 116 MG/DL — HIGH (ref 70–99)
GLUCOSE BLDC GLUCOMTR-MCNC: 240 MG/DL — HIGH (ref 70–99)
GLUCOSE SERPL-MCNC: 138 MG/DL — HIGH (ref 70–99)
HCT VFR BLD CALC: 32.9 % — LOW (ref 37–47)
HGB BLD-MCNC: 11.6 G/DL — LOW (ref 12–16)
IMM GRANULOCYTES NFR BLD AUTO: 0.3 % — SIGNIFICANT CHANGE UP (ref 0.1–0.3)
LYMPHOCYTES # BLD AUTO: 0.82 K/UL — LOW (ref 1.2–3.4)
LYMPHOCYTES # BLD AUTO: 13.1 % — LOW (ref 20.5–51.1)
MAGNESIUM SERPL-MCNC: 1.8 MG/DL — SIGNIFICANT CHANGE UP (ref 1.8–2.4)
MCHC RBC-ENTMCNC: 32.4 PG — HIGH (ref 27–31)
MCHC RBC-ENTMCNC: 35.3 G/DL — SIGNIFICANT CHANGE UP (ref 32–37)
MCV RBC AUTO: 91.9 FL — SIGNIFICANT CHANGE UP (ref 81–99)
METHOD TYPE: SIGNIFICANT CHANGE UP
MONOCYTES # BLD AUTO: 0.29 K/UL — SIGNIFICANT CHANGE UP (ref 0.1–0.6)
MONOCYTES NFR BLD AUTO: 4.6 % — SIGNIFICANT CHANGE UP (ref 1.7–9.3)
NEUTROPHILS # BLD AUTO: 5.12 K/UL — SIGNIFICANT CHANGE UP (ref 1.4–6.5)
NEUTROPHILS NFR BLD AUTO: 82 % — HIGH (ref 42.2–75.2)
NRBC # BLD: 0 /100 WBCS — SIGNIFICANT CHANGE UP (ref 0–0)
PLATELET # BLD AUTO: 152 K/UL — SIGNIFICANT CHANGE UP (ref 130–400)
POTASSIUM SERPL-MCNC: 4.2 MMOL/L — SIGNIFICANT CHANGE UP (ref 3.5–5)
POTASSIUM SERPL-SCNC: 4.2 MMOL/L — SIGNIFICANT CHANGE UP (ref 3.5–5)
PROT SERPL-MCNC: 5.9 G/DL — LOW (ref 6–8)
RBC # BLD: 3.58 M/UL — LOW (ref 4.2–5.4)
RBC # FLD: 11.9 % — SIGNIFICANT CHANGE UP (ref 11.5–14.5)
SODIUM SERPL-SCNC: 135 MMOL/L — SIGNIFICANT CHANGE UP (ref 135–146)
WBC # BLD: 6.25 K/UL — SIGNIFICANT CHANGE UP (ref 4.8–10.8)
WBC # FLD AUTO: 6.25 K/UL — SIGNIFICANT CHANGE UP (ref 4.8–10.8)

## 2022-08-07 PROCEDURE — 93010 ELECTROCARDIOGRAM REPORT: CPT

## 2022-08-07 RX ORDER — HYDROMORPHONE HYDROCHLORIDE 2 MG/ML
0.5 INJECTION INTRAMUSCULAR; INTRAVENOUS; SUBCUTANEOUS ONCE
Refills: 0 | Status: DISCONTINUED | OUTPATIENT
Start: 2022-08-07 | End: 2022-08-07

## 2022-08-07 RX ADMIN — Medication 60 MILLIGRAM(S): at 18:04

## 2022-08-07 RX ADMIN — Medication 1 TABLET(S): at 12:32

## 2022-08-07 RX ADMIN — Medication 2: at 12:33

## 2022-08-07 RX ADMIN — Medication 0.5 MILLIGRAM(S): at 16:01

## 2022-08-07 RX ADMIN — ALBUTEROL 2.5 MILLIGRAM(S): 90 AEROSOL, METERED ORAL at 20:33

## 2022-08-07 RX ADMIN — LOSARTAN POTASSIUM 100 MILLIGRAM(S): 100 TABLET, FILM COATED ORAL at 06:07

## 2022-08-07 RX ADMIN — Medication 650 MILLIGRAM(S): at 13:36

## 2022-08-07 RX ADMIN — Medication 0.5 MILLIGRAM(S): at 22:10

## 2022-08-07 RX ADMIN — CHLORHEXIDINE GLUCONATE 15 MILLILITER(S): 213 SOLUTION TOPICAL at 18:05

## 2022-08-07 RX ADMIN — GABAPENTIN 600 MILLIGRAM(S): 400 CAPSULE ORAL at 15:06

## 2022-08-07 RX ADMIN — POLYETHYLENE GLYCOL 3350 17 GRAM(S): 17 POWDER, FOR SOLUTION ORAL at 18:05

## 2022-08-07 RX ADMIN — HYDROMORPHONE HYDROCHLORIDE 1 MILLIGRAM(S): 2 INJECTION INTRAMUSCULAR; INTRAVENOUS; SUBCUTANEOUS at 10:11

## 2022-08-07 RX ADMIN — Medication 100 MILLIGRAM(S): at 18:04

## 2022-08-07 RX ADMIN — HYDROMORPHONE HYDROCHLORIDE 4 MILLIGRAM(S): 2 INJECTION INTRAMUSCULAR; INTRAVENOUS; SUBCUTANEOUS at 21:00

## 2022-08-07 RX ADMIN — HYDROMORPHONE HYDROCHLORIDE 0.5 MILLIGRAM(S): 2 INJECTION INTRAMUSCULAR; INTRAVENOUS; SUBCUTANEOUS at 13:26

## 2022-08-07 RX ADMIN — Medication 6 UNIT(S): at 17:52

## 2022-08-07 RX ADMIN — PANTOPRAZOLE SODIUM 40 MILLIGRAM(S): 20 TABLET, DELAYED RELEASE ORAL at 12:15

## 2022-08-07 RX ADMIN — Medication 650 MILLIGRAM(S): at 06:07

## 2022-08-07 RX ADMIN — Medication 0.5 MILLIGRAM(S): at 09:41

## 2022-08-07 RX ADMIN — HYDROMORPHONE HYDROCHLORIDE 0.5 MILLIGRAM(S): 2 INJECTION INTRAMUSCULAR; INTRAVENOUS; SUBCUTANEOUS at 15:05

## 2022-08-07 RX ADMIN — HYDROMORPHONE HYDROCHLORIDE 1 MILLIGRAM(S): 2 INJECTION INTRAMUSCULAR; INTRAVENOUS; SUBCUTANEOUS at 18:04

## 2022-08-07 RX ADMIN — Medication 650 MILLIGRAM(S): at 18:04

## 2022-08-07 RX ADMIN — GABAPENTIN 600 MILLIGRAM(S): 400 CAPSULE ORAL at 06:07

## 2022-08-07 RX ADMIN — APIXABAN 5 MILLIGRAM(S): 2.5 TABLET, FILM COATED ORAL at 18:04

## 2022-08-07 RX ADMIN — APIXABAN 5 MILLIGRAM(S): 2.5 TABLET, FILM COATED ORAL at 06:08

## 2022-08-07 RX ADMIN — Medication 1 APPLICATION(S): at 18:22

## 2022-08-07 RX ADMIN — Medication 6 UNIT(S): at 12:32

## 2022-08-07 RX ADMIN — HYDROMORPHONE HYDROCHLORIDE 4 MILLIGRAM(S): 2 INJECTION INTRAMUSCULAR; INTRAVENOUS; SUBCUTANEOUS at 15:07

## 2022-08-07 RX ADMIN — Medication 6 UNIT(S): at 07:34

## 2022-08-07 RX ADMIN — ALBUTEROL 2.5 MILLIGRAM(S): 90 AEROSOL, METERED ORAL at 09:37

## 2022-08-07 RX ADMIN — Medication 650 MILLIGRAM(S): at 12:32

## 2022-08-07 RX ADMIN — Medication 100 MILLIGRAM(S): at 06:07

## 2022-08-07 RX ADMIN — Medication 60 MILLIGRAM(S): at 06:05

## 2022-08-07 RX ADMIN — HYDROMORPHONE HYDROCHLORIDE 1 MILLIGRAM(S): 2 INJECTION INTRAMUSCULAR; INTRAVENOUS; SUBCUTANEOUS at 09:41

## 2022-08-07 NOTE — ED ADULT NURSE REASSESSMENT NOTE - NS ED NURSE REASSESS COMMENT FT1
Pt remains alert and oriented X4, denies pain, no distress noted, vent on standby, resting comfortable in bed, wiseman cath in place, peg-tube patent, safety maintained, will endorse to oncoming shift.

## 2022-08-07 NOTE — PROGRESS NOTE ADULT - SUBJECTIVE AND OBJECTIVE BOX
CRUZ DUMONT 56yo  Female sent to the ER from [facility] where she is on  care for c/o inc SOB, chest tightness, diff breathing, wheezing i.e. acute on chr hypoxic RF.  Of note the pt has a trach and has had freq readmissions for mucous plugging, RF and has had several bronchoscopies.   EMS noted the pt to be hypotensive and hypoxic.  The pt was vigorously suctioned, received Neb Tx, IV steroids,  BP revived with IV fluids.  The pt was cultured and placed on ABx.  She is v well known to the [facility] and to ID.  The pt is ad with acute on ch hypoxic RF and ongoing Bacterial PNA.  The PMHx includes:  HTN, ASHD, HFpEF, DLD, DM II, Chronic RF, COPD, LUCIANA, ASp PNA, sp intubation, failure to extubate, chronic trach ([facility] 4/22), recurrent mucus plugging of airways, SDVT, AC with Eliquis, lower ext venous stasis dermatitis, bedbound state, GERD, diverticulosis, sp PEG, OA, DDD, DJD, chronic pain syn, depression anxiety.    INTERVAL HPI/OVERNIGHT EVENTS: pt is rhea and as been on and off, nonsymtomatic, pt started weaning process on T piece, would place back on resp at HS    MEDICATIONS  (STANDING):  acetaminophen     Tablet .. 650 milliGRAM(s) Oral every 6 hours  acetylcysteine 20% for Nebulization - Peds 3 milliLiter(s) Nebulizer every 6 hours  ALBUTerol    0.083% 2.5 milliGRAM(s) Nebulizer every 6 hours  ammonium lactate 12% Lotion 1 Application(s) Topical two times a day  apixaban 5 milliGRAM(s) Enteral Tube two times a day  chlorhexidine 0.12% Liquid 15 milliLiter(s) Oral Mucosa every 12 hours  dextrose 5%. 1000 milliLiter(s) (100 mL/Hr) IV Continuous <Continuous>  dextrose 5%. 1000 milliLiter(s) (50 mL/Hr) IV Continuous <Continuous>  dextrose 50% Injectable 25 Gram(s) IV Push once  dextrose 50% Injectable 12.5 Gram(s) IV Push once  dextrose 50% Injectable 25 Gram(s) IV Push once  doxycycline monohydrate Capsule 100 milliGRAM(s) Oral every 12 hours  gabapentin 600 milliGRAM(s) Oral three times a day  glucagon  Injectable 1 milliGRAM(s) IntraMuscular once  insulin glargine Injectable (LANTUS) 20 Unit(s) SubCutaneous at bedtime  insulin lispro (ADMELOG) corrective regimen sliding scale   SubCutaneous three times a day before meals  insulin lispro Injectable (ADMELOG) 6 Unit(s) SubCutaneous three times a day before meals  methylPREDNISolone sodium succinate Injectable 60 milliGRAM(s) IV Push two times a day  multivitamin 1 Tablet(s) Oral daily  pantoprazole   Suspension 40 milliGRAM(s) Oral daily  polyethylene glycol 3350 17 Gram(s) Oral two times a day  senna 2 Tablet(s) Oral at bedtime    MEDICATIONS  (PRN):  ALBUTerol    90 MICROgram(s) HFA Inhaler 2 Puff(s) Inhalation every 6 hours PRN Shortness of Breath and/or Wheezing  ALPRAZolam 0.5 milliGRAM(s) Oral every 6 hours PRN anxiety  dextrose Oral Gel 15 Gram(s) Oral once PRN Blood Glucose LESS THAN 70 milliGRAM(s)/deciliter  HYDROmorphone   Tablet 4 milliGRAM(s) Oral every 4 hours PRN Severe Pain (7 - 10)  HYDROmorphone  Injectable 1 milliGRAM(s) IV Push every 8 hours PRN Moderate Pain (4 - 6)      Allergies    penicillin (Swelling)      Vital Signs Last 24 Hrs    T(F): 97.6  HR:  49  BP: 175/78, 168/78    RR: 18   SpO2:  100% T piece     PHYSICAL EXAM:      Constitutional: pt alert, bedbound chr ill looking but in NAD    Eyes: nonicteric    ENMT: dry oral mucosa, dental defects,     Neck: + trach tube supple, no stridor    Respiratory: shallow resp, diminished harsh bronchial  BS, scattered rhonchi, no wheezing    Cardiovascular: S1S2 reg    Gastrointestinal: globular, soft and benign, + OPEG, + BS    Genitourinary:    Extremities: moves all ext, dec motor strength lower ext > upper    Vascular: dec pedal pulses    Neurological: nonfocal    Skin: lower ext erythema, scaliness    Lymph Nodes: not enlarged    Musculoskeletal: dec mm mass and tone    Psychiatric: anxious, depressed but stable        LABS:                        11.6  6  )-----------( 152             32.9       135  |  97  |  16  ----------------------------<  138  4.2   |  32  |  0.5    , 115  Ca    9.7      05 Aug 2022 06:50  Mg     1.9, 1.8  trop <0.01  TPro  5.9<L>  /  Alb  3.3<L>  /  TBili  0.3  /  DBili  x   /  AST  20  /  ALT  28  /  AlkPhos  77  08-05          RADIOLOGY & ADDITIONAL TESTS:  EKG:  NSR R axis, low voltage, nonspecific ST-T changes  CXR:  interstitial prominence, L plbased opacity unchanged     CRUZ DUMONT 56yo  Female sent to the ER from [facility] where she is on  care for c/o inc SOB, chest tightness, diff breathing, wheezing i.e. acute on chr hypoxic RF.  Of note the pt has a trach and has had freq readmissions for mucous plugging, RF and has had several bronchoscopies.   EMS noted the pt to be hypotensive and hypoxic.  The pt was vigorously suctioned, received Neb Tx, IV steroids,  BP revived with IV fluids.  The pt was cultured and placed on ABx.  She is v well known to the [facility] and to ID.  The pt is ad with acute on ch hypoxic RF and ongoing Bacterial PNA.  The PMHx includes:  HTN, ASHD, HFpEF, DLD, DM II, Chronic RF, COPD, LUCIANA, ASp PNA, sp intubation, failure to extubate, chronic trach ([facility] 4/22), recurrent mucus plugging of airways, SDVT, AC with Eliquis, lower ext venous stasis dermatitis, bedbound state, GERD, diverticulosis, sp PEG, OA, DDD, DJD, chronic pain syn, depression anxiety.    INTERVAL HPI/OVERNIGHT EVENTS: pt is rhea 40-50s, nonsymtomatic, A&), verbal,  pt started weaning process on T piece, c/o pain of lower ext    MEDICATIONS  (STANDING):  acetaminophen     Tablet .. 650 milliGRAM(s) Oral every 6 hours  acetylcysteine 20% for Nebulization - Peds 3 milliLiter(s) Nebulizer every 6 hours  ALBUTerol    0.083% 2.5 milliGRAM(s) Nebulizer every 6 hours  ammonium lactate 12% Lotion 1 Application(s) Topical two times a day  apixaban 5 milliGRAM(s) Enteral Tube two times a day  chlorhexidine 0.12% Liquid 15 milliLiter(s) Oral Mucosa every 12 hours  dextrose 5%. 1000 milliLiter(s) (100 mL/Hr) IV Continuous <Continuous>  dextrose 5%. 1000 milliLiter(s) (50 mL/Hr) IV Continuous <Continuous>  dextrose 50% Injectable 25 Gram(s) IV Push once  dextrose 50% Injectable 12.5 Gram(s) IV Push once  dextrose 50% Injectable 25 Gram(s) IV Push once  doxycycline monohydrate Capsule 100 milliGRAM(s) Oral every 12 hours  gabapentin 600 milliGRAM(s) Oral three times a day  glucagon  Injectable 1 milliGRAM(s) IntraMuscular once  insulin glargine Injectable (LANTUS) 20 Unit(s) SubCutaneous at bedtime  insulin lispro (ADMELOG) corrective regimen sliding scale   SubCutaneous three times a day before meals  insulin lispro Injectable (ADMELOG) 6 Unit(s) SubCutaneous three times a day before meals  methylPREDNISolone sodium succinate Injectable 60 milliGRAM(s) IV Push two times a day  multivitamin 1 Tablet(s) Oral daily  pantoprazole   Suspension 40 milliGRAM(s) Oral daily  polyethylene glycol 3350 17 Gram(s) Oral two times a day  senna 2 Tablet(s) Oral at bedtime    MEDICATIONS  (PRN):  ALBUTerol    90 MICROgram(s) HFA Inhaler 2 Puff(s) Inhalation every 6 hours PRN Shortness of Breath and/or Wheezing  ALPRAZolam 0.5 milliGRAM(s) Oral every 6 hours PRN anxiety  dextrose Oral Gel 15 Gram(s) Oral once PRN Blood Glucose LESS THAN 70 milliGRAM(s)/deciliter  HYDROmorphone   Tablet 4 milliGRAM(s) Oral every 4 hours PRN Severe Pain (7 - 10)  HYDROmorphone  Injectable 1 milliGRAM(s) IV Push every 8 hours PRN Moderate Pain (4 - 6)      Allergies    penicillin (Swelling)      Vital Signs Last 24 Hrs    T(F): 97.6  HR:  49  BP: 175/78, 168/78    RR: 18   SpO2:  100% T piece     PHYSICAL EXAM:      Constitutional: pt alert, oriented  verbal,  bedbound chr ill looking but in NAD    Eyes: nonicteric    ENMT: dry oral mucosa, dental defects,     Neck: + trach tube supple, no stridor, + trach, , + T tube    Respiratory: shallow resp, diminished harsh bronchial  BS, scattered rhonchi, no wheezing    Cardiovascular: S1S2 reg    Gastrointestinal: globular, soft and benign, + PEG, + BS    Genitourinary:    Extremities: moves all ext, dec motor strength of lower ext, + BL mm atrophy, + BL foot drop    Vascular: dec pedal pulses    Neurological: nonfocal    Skin: lower ext dark almost black hyperpigmentation of lower ext    Lymph Nodes: not enlarged    Musculoskeletal: dec mm mass and tone    Psychiatric: anxious, depressed but stable        LABS:                        11.6  6  )-----------( 152             32.9       135  |  97  |  16  ----------------------------<  138  4.2   |  32  |  0.5    , 115  Ca    9.7      05 Aug 2022 06:50  Mg     1.9, 1.8  trop <0.01  TPro  5.9<L>  /  Alb  3.3<L>  /  TBili  0.3  /  DBili  x   /  AST  20  /  ALT  28  /  AlkPhos  77  08-05          RADIOLOGY & ADDITIONAL TESTS:  EKG:  NSR R axis, low voltage, nonspecific ST-T changes  CXR:  interstitial prominence, L plbased opacity unchanged

## 2022-08-07 NOTE — PROGRESS NOTE ADULT - ASSESSMENT
· Assessment    57 year old female with a past medical history of IDDM, GERD, HTN, COPD, Chronic hypoxemic and hypercapnic respiratory failure SP Tracheostomy (not chronically vented) & PEG at [facility], DVT on Eliquis, Anxiety, CHF, Recurrent mucous plugs SP bronchoscopies, presenting from [facility] for chest tightness and shortness of breath over the past few days.  Per EMS patient was hypotensive and hypoxic at the facility today, around 60%.  Patient was recently started on PO trial last week.  Patient reports increased secretions requiring more suctioning, still unable to clear them.  She also reports increased cough and wheezing.  Denies fevers, chills, lightheadedness, dizziness, blurry vision, double vision, headache, extremity weakness or deficits, chest pain, palpitations, abdominal pain, nausea, vomiting, diarrhea, hematochezia, melena, dysuria, hematuria, flank pain, lower extremity swelling/tenderness/erythema.    Surgical consult:  Thoracic surgery consult placed for tracheostomy exchange due to persistent airleak. Called by RT and RN bedside to evaluate airleak on tracheostomy. Upon bedside examination, patient saturating % on 40/5. Patient resting comfortably, not in respiratory distress with TV ranging ~250cc. Balloon appears inflated, however patient able to speak and audible airleak noticed. Of note, patient had tracheostomy exchanged to 6 cuffed tube from 6 cuffless on presentation.    Plan:  - Pulmonology follow up regarding need for mechanical ventilation  - patient is hemodynamically stable, no respiratory distress  - no acute need for tracheostomy exchange  - please follow up with ENT vs pulm for exchange of tracheostomy given it is chronic in nature    [id number]   · Assessment    57 year old female with a past medical history of IDDM, GERD, HTN, COPD, Chronic hypoxemic and hypercapnic respiratory failure SP Tracheostomy (not chronically vented) & PEG at [facility], DVT on Eliquis, Anxiety, CHF, Recurrent mucous plugs SP bronchoscopies, presenting from [facility] for chest tightness and shortness of breath over the past few days.  Per EMS patient was hypotensive and hypoxic at the facility today, around 60%.  Patient was recently started on PO trial last week.  Patient reports increased secretions requiring more suctioning, still unable to clear them.  She also reports increased cough and wheezing.  Denies fevers, chills, lightheadedness, dizziness, blurry vision, double vision, headache, extremity weakness or deficits, chest pain, palpitations, abdominal pain, nausea, vomiting, diarrhea, hematochezia, melena, dysuria, hematuria, flank pain, lower extremity swelling/tenderness/erythema.    Surgical consult:  Thoracic surgery consult placed for tracheostomy exchange due to persistent airleak. Called by RT and RN bedside to evaluate airleak on tracheostomy. Upon bedside examination, patient saturating % on 40/5. Patient resting comfortably, not in respiratory distress with TV ranging ~250cc. Balloon appears inflated, however patient able to speak and audible airleak noticed. Of note, patient had tracheostomy exchanged to 6 cuffed tube from 6 cuffless on presentation.    Plan:  - Pulmonology follow up for vent weaning   - patient is hemodynamically stable, no respiratory distress  - no current indication for emergency surgical tracheostomy exchange  - please follow up with ENT vs pulm for exchange of tracheostomy given it is chronic in nature  - recall surgery as needed

## 2022-08-07 NOTE — PROGRESS NOTE ADULT - ASSESSMENT
Unfortunate 56 YO F with acute on chr hypoxic RF read for hypoxia and hypotension and ongoing Bacterial PNA. Hx of ASP PNA, COPD exacerbation and intubation with failure to extubate resulting in trach at [facility] .  The pt is under chronic  at [facility].      Acute on Chr hypoxic RF, mucus plugging  Bacterial PNA  Clinical Debility  JHx of HTN, ASHD HFpEF  Hx of DLD  Hx of COPD, MO, LUCIANA, sp intubation, failure to extubate, chr trach since /[facility]  Hx of DM II  Hx of Ch anemia of ch dis  Hx of GERD, HH, Diverticulosis, Ch constipation  Hx of OA, DJD, DDD, chr pain syn  Hx of lower ext venous stasis dermatitis  Hx of DVT, AC/Eliquis  Hx of Anxiety, Depression     today pt stable but noted to be rhea i the 40s-50s, non symptomatic  issue withtrach air leak, does not compromise resp status and pt started weaning process, on T piece  on ad pt suctioned and placed on Vent support, IV steroids and neb tx  pt was give 2 L of IV fluids with improvement of BP  CXR reviewed and shows unchanged interstitial opacities, L pl based opacity  Venous doppler neg for DVT    Pul-critical care consult and ff up appreciated, may start to wean  CTS consult for trach air leak, pt not in distress and oxygenating well no need for emergent trach change, may not need change if goal is to start weaning process  cont all previous meds  GI prophylaxis  att to bowel regimen  nutrition via PEG/glucerna  PM consult for c/o limb pain   Unfortunate 56 YO F with acute on chr hypoxic RF read for hypoxia and hypotension and ongoing Bacterial PNA. Hx of ASP PNA, COPD exacerbation and intubation with failure to extubate resulting in trach at [facility] .  The pt is under chronic  at [facility].      Acute on Chr hypoxic RF, mucus plugging  Bacterial PNA  Clinical Debility  Bedbound state  BL foot drop  JHx of HTN, ASHD HFpEF  Hx of DLD  Hx of COPD, MO, LUCIANA, sp intubation, failure to extubate, chr trach since /[facility]  Hx of DM II  Hx of Ch anemia of ch dis  Hx of GERD, HH, Diverticulosis, Ch constipation  Hx of OA, DJD, DDD, chr pain syn  Hx of lower ext venous stasis dermatitis  Hx of DVT, AC/Eliquis  Hx of Anxiety, Depression     today pt stable, A&O, verba   pt noted to be rhea in the 40s-50s, non symptomatic  issue with trach air leak, does not compromise resp status and pt started weaning process, on T piece  on ad pt suctioned and placed on Vent support, IV steroids and neb tx  pt was give 2 L of IV fluids with improvement of BP  CXR reviewed and shows unchanged interstitial opacities, L pl based opacity  Venous doppler neg for DVT    Pul-critical care consult and ff up appreciated, may start to wean  CTS consult for trach air leak, pt not in distress and oxygenating well no need for emergent trach change, may not need change if goal is to start weaning process  cont all previous meds  GI prophylaxis  att to bowel regimen  nutrition via PEG/glucerna  PM: hydromorphone 4mg po q 4 and 1mg IV q 8 for breakthrough pain, gabapentin 600mg q 8, tylenol 650mg QID  Rehab consult for bedside PT, can we get pt OOB to chair

## 2022-08-07 NOTE — PROGRESS NOTE ADULT - SUBJECTIVE AND OBJECTIVE BOX
SURGERY PROGRESS NOTE     Patient: CRUZ DUMONT , 57y ([birth year])Female   MRN: [id number]  Location: [facility] 024 A  Visit: 08-03-22 Inpatient  Date: 08-07-22 @ 01:03    Events of past 24 hours: No acute events    PAST MEDICAL & SURGICAL HISTORY:  COPD (chronic obstructive pulmonary disease)  CHF (congestive heart failure)  DM (diabetes mellitus)  H/O tracheostomy     Vitals:   T(F): 98.9 (08-06-22 @ 23:59), Max: 98.9 (08-06-22 @ 23:59)  HR: 54 (08-06-22 @ 23:59)  BP: 187/72 (08-06-22 @ 23:59)  RR: 18 (08-06-22 @ 23:59)  SpO2: 100% (08-06-22 @ 23:59)  Mode: standby, FiO2: 40    Diet, NPO with Tube Feed:   Tube Feeding Modality: Gastrostomy  Glucerna 1.2 Martin  Total Volume for 24 Hours (mL): 1200  Bolus  Total Volume of Bolus (mL):  300  Total # of Feeds: 4  Tube Feed Frequency: Every 6 hours   Tube Feed Start Time: 14:00  Bolus Feed Rate (mL per Hour): 300   Bolus Feed Duration (in Hours): 1  Bolus   Total Volume per Flush (mL): 30   Frequency: Every 6 Hours  Free Water Flush Instructions:  post feed     PHYSICAL EXAM:   GENERAL: NAD, well-appearing  CHEST/LUNG: Clear to auscultation bilaterally, Audible airleak from tracheostomy,   HEART: Regular rate and rhythm  ABDOMEN: Soft, Nontender, Nondistended;   EXTREMITIES:  No clubbing, cyanosis, or edema      MEDICATIONS  (STANDING):  acetaminophen     Tablet .. 650 milliGRAM(s) Oral every 6 hours  acetylcysteine 20% for Nebulization - Peds 3 milliLiter(s) Nebulizer every 6 hours  ALBUTerol    0.083% 2.5 milliGRAM(s) Nebulizer every 6 hours  ammonium lactate 12% Lotion 1 Application(s) Topical two times a day  apixaban 5 milliGRAM(s) Enteral Tube two times a day  chlorhexidine 0.12% Liquid 15 milliLiter(s) Oral Mucosa every 12 hours  dextrose 5%. 1000 milliLiter(s) (100 mL/Hr) IV Continuous <Continuous>  dextrose 5%. 1000 milliLiter(s) (50 mL/Hr) IV Continuous <Continuous>  dextrose 50% Injectable 25 Gram(s) IV Push once  dextrose 50% Injectable 12.5 Gram(s) IV Push once  dextrose 50% Injectable 25 Gram(s) IV Push once  doxycycline monohydrate Capsule 100 milliGRAM(s) Oral every 12 hours  gabapentin 600 milliGRAM(s) Oral three times a day  glucagon  Injectable 1 milliGRAM(s) IntraMuscular once  insulin glargine Injectable (LANTUS) 20 Unit(s) SubCutaneous at bedtime  insulin lispro (ADMELOG) corrective regimen sliding scale   SubCutaneous three times a day before meals  insulin lispro Injectable (ADMELOG) 6 Unit(s) SubCutaneous three times a day before meals  losartan 100 milliGRAM(s) Oral daily  methylPREDNISolone sodium succinate Injectable 60 milliGRAM(s) IV Push two times a day  multivitamin 1 Tablet(s) Oral daily  pantoprazole   Suspension 40 milliGRAM(s) Oral daily  polyethylene glycol 3350 17 Gram(s) Oral two times a day  senna 2 Tablet(s) Oral at bedtime    MEDICATIONS  (PRN):  ALBUTerol    90 MICROgram(s) HFA Inhaler 2 Puff(s) Inhalation every 6 hours PRN Shortness of Breath and/or Wheezing  ALPRAZolam 0.5 milliGRAM(s) Oral every 6 hours PRN anxiety  dextrose Oral Gel 15 Gram(s) Oral once PRN Blood Glucose LESS THAN 70 milliGRAM(s)/deciliter  HYDROmorphone   Tablet 4 milliGRAM(s) Oral every 4 hours PRN Severe Pain (7 - 10)  HYDROmorphone  Injectable 1 milliGRAM(s) IV Push every 8 hours PRN Moderate Pain (4 - 6)    DVT PROPHYLAXIS:   GI PROPHYLAXIS: pantoprazole   Suspension 40 milliGRAM(s) Oral daily    ANTICOAGULATION:   ANTIBIOTICS:  doxycycline monohydrate Capsule 100 milliGRAM(s)    LAB/STUDIES:  Labs:  CAPILLARY BLOOD GLUCOSE    POCT Blood Glucose.: 126 mg/dL (06 Aug 2022 21:04)  POCT Blood Glucose.: 192 mg/dL (06 Aug 2022 16:55)  POCT Blood Glucose.: 179 mg/dL (06 Aug 2022 12:19)  POCT Blood Glucose.: 165 mg/dL (06 Aug 2022 09:08)  POCT Blood Glucose.: 166 mg/dL (06 Aug 2022 07:38)                          10.1   5.96  )-----------( 175      ( 05 Aug 2022 06:50 )             29.2       08-05    136  |  97<L>  |  16  ----------------------------<  206<H>  4.4   |  32  |  0.5<L>    LFTs:             5.9  | 0.3  | 20       ------------------[77      ( 05 Aug 2022 06:50 )  3.3  | x    | 28          Lipase:x      Amylase:x          Coags:    CARDIAC MARKERS ( 05 Aug 2022 06:50 )  x     / <0.01 ng/mL / x     / x     / x          Serum Pro-Brain Natriuretic Peptide: 444 pg/mL (08-03-22 @ 06:28)       IMAGING:   < from: [facility] Duplex Lower Ext Vein Scan, Bilat (08.04.22 @ 13:34) >  IMPRESSION:  No evidence of deep venous thrombosis in either lower extremity.  Difficult study due to patient habitus.  No sign of previous posterior tibial vein branch thrombosis in left lower   extremity.    --- End of Report ---    < end of copied text >  < from: Xray Chest 1 View- PORTABLE-Routine (Xray Chest 1 View- PORTABLE-Routine in AM.) (08.04.22 @ 05:01) >  Impression:    Slightly decreased interstitial prominence/opacities. Unchanged left   basal pleural-parenchymal opacity. No pneumothorax.      --- End of Report ---    < end of copied text >

## 2022-08-07 NOTE — PATIENT PROFILE ADULT - FALL HARM RISK - RISK INTERVENTIONS

## 2022-08-08 LAB
-  AMIKACIN: SIGNIFICANT CHANGE UP
-  AMOXICILLIN/CLAVULANIC ACID: SIGNIFICANT CHANGE UP
-  AMPICILLIN/SULBACTAM: SIGNIFICANT CHANGE UP
-  AMPICILLIN: SIGNIFICANT CHANGE UP
-  AZTREONAM: SIGNIFICANT CHANGE UP
-  CEFAZOLIN: SIGNIFICANT CHANGE UP
-  CEFEPIME: SIGNIFICANT CHANGE UP
-  CEFOXITIN: SIGNIFICANT CHANGE UP
-  CEFTRIAXONE: SIGNIFICANT CHANGE UP
-  CIPROFLOXACIN: SIGNIFICANT CHANGE UP
-  ERTAPENEM: SIGNIFICANT CHANGE UP
-  GENTAMICIN: SIGNIFICANT CHANGE UP
-  IMIPENEM: SIGNIFICANT CHANGE UP
-  LEVOFLOXACIN: SIGNIFICANT CHANGE UP
-  MEROPENEM: SIGNIFICANT CHANGE UP
-  NITROFURANTOIN: SIGNIFICANT CHANGE UP
-  PIPERACILLIN/TAZOBACTAM: SIGNIFICANT CHANGE UP
-  TIGECYCLINE: SIGNIFICANT CHANGE UP
-  TOBRAMYCIN: SIGNIFICANT CHANGE UP
-  TRIMETHOPRIM/SULFAMETHOXAZOLE: SIGNIFICANT CHANGE UP
24R-OH-CALCIDIOL SERPL-MCNC: 18 NG/ML — LOW (ref 30–80)
ALBUMIN SERPL ELPH-MCNC: 3.5 G/DL — SIGNIFICANT CHANGE UP (ref 3.5–5.2)
ALP SERPL-CCNC: 58 U/L — SIGNIFICANT CHANGE UP (ref 30–115)
ALT FLD-CCNC: 27 U/L — SIGNIFICANT CHANGE UP (ref 0–41)
ANION GAP SERPL CALC-SCNC: 10 MMOL/L — SIGNIFICANT CHANGE UP (ref 7–14)
AST SERPL-CCNC: 16 U/L — SIGNIFICANT CHANGE UP (ref 0–41)
BASOPHILS # BLD AUTO: 0 K/UL — SIGNIFICANT CHANGE UP (ref 0–0.2)
BASOPHILS NFR BLD AUTO: 0 % — SIGNIFICANT CHANGE UP (ref 0–1)
BILIRUB SERPL-MCNC: 0.3 MG/DL — SIGNIFICANT CHANGE UP (ref 0.2–1.2)
BUN SERPL-MCNC: 20 MG/DL — SIGNIFICANT CHANGE UP (ref 10–20)
CALCIUM SERPL-MCNC: 9.3 MG/DL — SIGNIFICANT CHANGE UP (ref 8.5–10.1)
CHLORIDE SERPL-SCNC: 96 MMOL/L — LOW (ref 98–110)
CO2 SERPL-SCNC: 28 MMOL/L — SIGNIFICANT CHANGE UP (ref 17–32)
CREAT SERPL-MCNC: <0.5 MG/DL — LOW (ref 0.7–1.5)
CULTURE RESULTS: SIGNIFICANT CHANGE UP
EGFR: 115 ML/MIN/1.73M2 — SIGNIFICANT CHANGE UP
EOSINOPHIL # BLD AUTO: 0 K/UL — SIGNIFICANT CHANGE UP (ref 0–0.7)
EOSINOPHIL NFR BLD AUTO: 0 % — SIGNIFICANT CHANGE UP (ref 0–8)
GLUCOSE BLDC GLUCOMTR-MCNC: 127 MG/DL — HIGH (ref 70–99)
GLUCOSE BLDC GLUCOMTR-MCNC: 196 MG/DL — HIGH (ref 70–99)
GLUCOSE BLDC GLUCOMTR-MCNC: 205 MG/DL — HIGH (ref 70–99)
GLUCOSE BLDC GLUCOMTR-MCNC: 240 MG/DL — HIGH (ref 70–99)
GLUCOSE BLDC GLUCOMTR-MCNC: 243 MG/DL — HIGH (ref 70–99)
GLUCOSE BLDC GLUCOMTR-MCNC: 354 MG/DL — HIGH (ref 70–99)
GLUCOSE SERPL-MCNC: 201 MG/DL — HIGH (ref 70–99)
HCT VFR BLD CALC: 32.1 % — LOW (ref 37–47)
HGB BLD-MCNC: 11.2 G/DL — LOW (ref 12–16)
IMM GRANULOCYTES NFR BLD AUTO: 0.3 % — SIGNIFICANT CHANGE UP (ref 0.1–0.3)
LYMPHOCYTES # BLD AUTO: 1.14 K/UL — LOW (ref 1.2–3.4)
LYMPHOCYTES # BLD AUTO: 16.9 % — LOW (ref 20.5–51.1)
MAGNESIUM SERPL-MCNC: 1.6 MG/DL — LOW (ref 1.8–2.4)
MCHC RBC-ENTMCNC: 31.6 PG — HIGH (ref 27–31)
MCHC RBC-ENTMCNC: 34.9 G/DL — SIGNIFICANT CHANGE UP (ref 32–37)
MCV RBC AUTO: 90.7 FL — SIGNIFICANT CHANGE UP (ref 81–99)
METHOD TYPE: SIGNIFICANT CHANGE UP
MONOCYTES # BLD AUTO: 0.51 K/UL — SIGNIFICANT CHANGE UP (ref 0.1–0.6)
MONOCYTES NFR BLD AUTO: 7.6 % — SIGNIFICANT CHANGE UP (ref 1.7–9.3)
NEUTROPHILS # BLD AUTO: 5.06 K/UL — SIGNIFICANT CHANGE UP (ref 1.4–6.5)
NEUTROPHILS NFR BLD AUTO: 75.2 % — SIGNIFICANT CHANGE UP (ref 42.2–75.2)
NRBC # BLD: 0 /100 WBCS — SIGNIFICANT CHANGE UP (ref 0–0)
ORGANISM # SPEC MICROSCOPIC CNT: SIGNIFICANT CHANGE UP
PLATELET # BLD AUTO: 162 K/UL — SIGNIFICANT CHANGE UP (ref 130–400)
POTASSIUM SERPL-MCNC: 3.9 MMOL/L — SIGNIFICANT CHANGE UP (ref 3.5–5)
POTASSIUM SERPL-SCNC: 3.9 MMOL/L — SIGNIFICANT CHANGE UP (ref 3.5–5)
PROT SERPL-MCNC: 5.7 G/DL — LOW (ref 6–8)
RBC # BLD: 3.54 M/UL — LOW (ref 4.2–5.4)
RBC # FLD: 11.7 % — SIGNIFICANT CHANGE UP (ref 11.5–14.5)
SODIUM SERPL-SCNC: 134 MMOL/L — LOW (ref 135–146)
SPECIMEN SOURCE: SIGNIFICANT CHANGE UP
TSH SERPL-MCNC: 0.8 UIU/ML — SIGNIFICANT CHANGE UP (ref 0.27–4.2)
WBC # BLD: 6.73 K/UL — SIGNIFICANT CHANGE UP (ref 4.8–10.8)
WBC # FLD AUTO: 6.73 K/UL — SIGNIFICANT CHANGE UP (ref 4.8–10.8)

## 2022-08-08 PROCEDURE — 99233 SBSQ HOSP IP/OBS HIGH 50: CPT

## 2022-08-08 RX ORDER — MAGNESIUM SULFATE 500 MG/ML
2 VIAL (ML) INJECTION EVERY 12 HOURS
Refills: 0 | Status: DISCONTINUED | OUTPATIENT
Start: 2022-08-08 | End: 2022-08-08

## 2022-08-08 RX ORDER — MAGNESIUM SULFATE 500 MG/ML
2 VIAL (ML) INJECTION
Refills: 0 | Status: COMPLETED | OUTPATIENT
Start: 2022-08-08 | End: 2022-08-08

## 2022-08-08 RX ORDER — INSULIN LISPRO 100/ML
4 VIAL (ML) SUBCUTANEOUS ONCE
Refills: 0 | Status: COMPLETED | OUTPATIENT
Start: 2022-08-08 | End: 2022-08-08

## 2022-08-08 RX ADMIN — Medication 1 APPLICATION(S): at 06:56

## 2022-08-08 RX ADMIN — HYDROMORPHONE HYDROCHLORIDE 4 MILLIGRAM(S): 2 INJECTION INTRAMUSCULAR; INTRAVENOUS; SUBCUTANEOUS at 01:53

## 2022-08-08 RX ADMIN — HYDROMORPHONE HYDROCHLORIDE 1 MILLIGRAM(S): 2 INJECTION INTRAMUSCULAR; INTRAVENOUS; SUBCUTANEOUS at 06:12

## 2022-08-08 RX ADMIN — Medication 6 UNIT(S): at 18:10

## 2022-08-08 RX ADMIN — INSULIN GLARGINE 20 UNIT(S): 100 INJECTION, SOLUTION SUBCUTANEOUS at 21:22

## 2022-08-08 RX ADMIN — GABAPENTIN 600 MILLIGRAM(S): 400 CAPSULE ORAL at 13:32

## 2022-08-08 RX ADMIN — Medication 1 TABLET(S): at 12:01

## 2022-08-08 RX ADMIN — ALBUTEROL 2.5 MILLIGRAM(S): 90 AEROSOL, METERED ORAL at 08:32

## 2022-08-08 RX ADMIN — HYDROMORPHONE HYDROCHLORIDE 1 MILLIGRAM(S): 2 INJECTION INTRAMUSCULAR; INTRAVENOUS; SUBCUTANEOUS at 05:57

## 2022-08-08 RX ADMIN — HYDROMORPHONE HYDROCHLORIDE 4 MILLIGRAM(S): 2 INJECTION INTRAMUSCULAR; INTRAVENOUS; SUBCUTANEOUS at 21:52

## 2022-08-08 RX ADMIN — LOSARTAN POTASSIUM 100 MILLIGRAM(S): 100 TABLET, FILM COATED ORAL at 05:58

## 2022-08-08 RX ADMIN — PANTOPRAZOLE SODIUM 40 MILLIGRAM(S): 20 TABLET, DELAYED RELEASE ORAL at 11:56

## 2022-08-08 RX ADMIN — Medication 25 GRAM(S): at 13:33

## 2022-08-08 RX ADMIN — HYDROMORPHONE HYDROCHLORIDE 4 MILLIGRAM(S): 2 INJECTION INTRAMUSCULAR; INTRAVENOUS; SUBCUTANEOUS at 10:58

## 2022-08-08 RX ADMIN — Medication 25 GRAM(S): at 17:55

## 2022-08-08 RX ADMIN — Medication 1: at 18:10

## 2022-08-08 RX ADMIN — CHLORHEXIDINE GLUCONATE 15 MILLILITER(S): 213 SOLUTION TOPICAL at 17:58

## 2022-08-08 RX ADMIN — Medication 1 APPLICATION(S): at 17:57

## 2022-08-08 RX ADMIN — HYDROMORPHONE HYDROCHLORIDE 1 MILLIGRAM(S): 2 INJECTION INTRAMUSCULAR; INTRAVENOUS; SUBCUTANEOUS at 15:02

## 2022-08-08 RX ADMIN — Medication 650 MILLIGRAM(S): at 03:34

## 2022-08-08 RX ADMIN — Medication 650 MILLIGRAM(S): at 12:33

## 2022-08-08 RX ADMIN — Medication 650 MILLIGRAM(S): at 18:32

## 2022-08-08 RX ADMIN — CHLORHEXIDINE GLUCONATE 15 MILLILITER(S): 213 SOLUTION TOPICAL at 05:58

## 2022-08-08 RX ADMIN — Medication 6 UNIT(S): at 05:59

## 2022-08-08 RX ADMIN — APIXABAN 5 MILLIGRAM(S): 2.5 TABLET, FILM COATED ORAL at 17:59

## 2022-08-08 RX ADMIN — HYDROMORPHONE HYDROCHLORIDE 4 MILLIGRAM(S): 2 INJECTION INTRAMUSCULAR; INTRAVENOUS; SUBCUTANEOUS at 21:22

## 2022-08-08 RX ADMIN — Medication 4 UNIT(S): at 00:43

## 2022-08-08 RX ADMIN — Medication 650 MILLIGRAM(S): at 00:29

## 2022-08-08 RX ADMIN — HYDROMORPHONE HYDROCHLORIDE 1 MILLIGRAM(S): 2 INJECTION INTRAMUSCULAR; INTRAVENOUS; SUBCUTANEOUS at 15:17

## 2022-08-08 RX ADMIN — Medication 100 MILLIGRAM(S): at 17:58

## 2022-08-08 RX ADMIN — Medication 650 MILLIGRAM(S): at 05:58

## 2022-08-08 RX ADMIN — POLYETHYLENE GLYCOL 3350 17 GRAM(S): 17 POWDER, FOR SOLUTION ORAL at 06:21

## 2022-08-08 RX ADMIN — Medication 60 MILLIGRAM(S): at 18:00

## 2022-08-08 RX ADMIN — GABAPENTIN 600 MILLIGRAM(S): 400 CAPSULE ORAL at 00:29

## 2022-08-08 RX ADMIN — Medication 0.5 MILLIGRAM(S): at 08:55

## 2022-08-08 RX ADMIN — Medication 650 MILLIGRAM(S): at 12:03

## 2022-08-08 RX ADMIN — Medication 0.5 MILLIGRAM(S): at 02:08

## 2022-08-08 RX ADMIN — ALBUTEROL 2.5 MILLIGRAM(S): 90 AEROSOL, METERED ORAL at 14:30

## 2022-08-08 RX ADMIN — SENNA PLUS 2 TABLET(S): 8.6 TABLET ORAL at 21:23

## 2022-08-08 RX ADMIN — Medication 100 MILLIGRAM(S): at 05:57

## 2022-08-08 RX ADMIN — Medication 650 MILLIGRAM(S): at 18:02

## 2022-08-08 RX ADMIN — Medication 2: at 11:51

## 2022-08-08 RX ADMIN — Medication 60 MILLIGRAM(S): at 05:57

## 2022-08-08 RX ADMIN — Medication 2: at 05:59

## 2022-08-08 RX ADMIN — HYDROMORPHONE HYDROCHLORIDE 4 MILLIGRAM(S): 2 INJECTION INTRAMUSCULAR; INTRAVENOUS; SUBCUTANEOUS at 10:28

## 2022-08-08 RX ADMIN — GABAPENTIN 600 MILLIGRAM(S): 400 CAPSULE ORAL at 21:23

## 2022-08-08 RX ADMIN — Medication 0.5 MILLIGRAM(S): at 21:22

## 2022-08-08 RX ADMIN — APIXABAN 5 MILLIGRAM(S): 2.5 TABLET, FILM COATED ORAL at 05:58

## 2022-08-08 RX ADMIN — Medication 0.5 MILLIGRAM(S): at 15:02

## 2022-08-08 RX ADMIN — HYDROMORPHONE HYDROCHLORIDE 4 MILLIGRAM(S): 2 INJECTION INTRAMUSCULAR; INTRAVENOUS; SUBCUTANEOUS at 01:23

## 2022-08-08 RX ADMIN — GABAPENTIN 600 MILLIGRAM(S): 400 CAPSULE ORAL at 05:58

## 2022-08-08 RX ADMIN — Medication 6 UNIT(S): at 11:51

## 2022-08-08 NOTE — PROGRESS NOTE ADULT - SUBJECTIVE AND OBJECTIVE BOX
CRUZ DUMONT 56yo  Female sent to the ER from [facility] where she is on  care for c/o inc SOB, chest tightness, diff breathing, wheezing i.e. acute on chr hypoxic RF.  Of note the pt has a trach and has had freq readmissions for mucous plugging, RF and has had several bronchoscopies.   EMS noted the pt to be hypotensive and hypoxic.  The pt was vigorously suctioned, received Neb Tx, IV steroids,  BP revived with IV fluids.  The pt was cultured and placed on ABx.  She is v well known to the [facility] and to ID.  The pt is ad with acute on ch hypoxic RF and ongoing Bacterial PNA.  The PMHx includes:  HTN, ASHD, HFpEF, DLD, DM II, Chronic RF, COPD, LUCIANA, ASp PNA, sp intubation, failure to extubate, chronic trach ([facility] 4/22), recurrent mucus plugging of airways, SDVT, AC with Eliquis, lower ext venous stasis dermatitis, bedbound state, GERD, diverticulosis, sp PEG, OA, DDD, DJD, chronic pain syn, depression anxiety.    INTERVAL HPI/OVERNIGHT EVENTS: pt A&O, cont process of weaning, evaluated by Rehab, assessed as high risk to fall, req Rehab to do bedside PT and get pt to sit oOB in chair which will help facilitate the weaning process, req for Vasc consult to assess aa blood flow in lower ext, cont on Doxy, cont need for suction to prevent mucus plugging    MEDICATIONS  (STANDING):  acetaminophen     Tablet .. 650 milliGRAM(s) Oral every 6 hours  acetylcysteine 20% for Nebulization - Peds 3 milliLiter(s) Nebulizer every 6 hours  ALBUTerol    0.083% 2.5 milliGRAM(s) Nebulizer every 6 hours  ammonium lactate 12% Lotion 1 Application(s) Topical two times a day  apixaban 5 milliGRAM(s) Enteral Tube two times a day  chlorhexidine 0.12% Liquid 15 milliLiter(s) Oral Mucosa every 12 hours  dextrose 5%. 1000 milliLiter(s) (100 mL/Hr) IV Continuous <Continuous>  dextrose 5%. 1000 milliLiter(s) (50 mL/Hr) IV Continuous <Continuous>  dextrose 50% Injectable 25 Gram(s) IV Push once  dextrose 50% Injectable 12.5 Gram(s) IV Push once  dextrose 50% Injectable 25 Gram(s) IV Push once  doxycycline monohydrate Capsule 100 milliGRAM(s) Oral every 12 hours  gabapentin 600 milliGRAM(s) Oral three times a day  glucagon  Injectable 1 milliGRAM(s) IntraMuscular once  insulin glargine Injectable (LANTUS) 20 Unit(s) SubCutaneous at bedtime  insulin lispro (ADMELOG) corrective regimen sliding scale   SubCutaneous three times a day before meals  insulin lispro Injectable (ADMELOG) 6 Unit(s) SubCutaneous three times a day before meals  methylPREDNISolone sodium succinate Injectable 60 milliGRAM(s) IV Push two times a day  multivitamin 1 Tablet(s) Oral daily  pantoprazole   Suspension 40 milliGRAM(s) Oral daily  polyethylene glycol 3350 17 Gram(s) Oral two times a day  senna 2 Tablet(s) Oral at bedtime    MEDICATIONS  (PRN):  ALBUTerol    90 MICROgram(s) HFA Inhaler 2 Puff(s) Inhalation every 6 hours PRN Shortness of Breath and/or Wheezing  ALPRAZolam 0.5 milliGRAM(s) Oral every 6 hours PRN anxiety  dextrose Oral Gel 15 Gram(s) Oral once PRN Blood Glucose LESS THAN 70 milliGRAM(s)/deciliter  HYDROmorphone   Tablet 4 milliGRAM(s) Oral every 4 hours PRN Severe Pain (7 - 10)  HYDROmorphone  Injectable 1 milliGRAM(s) IV Push every 8 hours PRN Moderate Pain (4 - 6)      Allergies    penicillin (Swelling)      Vital Signs Last 24 Hrs    T(F): 98.3  HR:  64  BP:  155/70    RR: 18   SpO2:  96% T piece     PHYSICAL EXAM:      Constitutional: pt alert, oriented  verbal,  bedbound chr ill looking but in NAD    Eyes: nonicteric    ENMT: dry oral mucosa, dental defects,     Neck: + trach tube supple, no stridor, + trach, , + T tube    Respiratory: shallow resp, diminished harsh bronchial  BS, scattered rhonchi, no wheezing    Cardiovascular: S1S2 reg    Gastrointestinal: globular, soft and benign, + PEG, + BS    Genitourinary:    Extremities: moves all ext, dec motor strength of lower ext, + BL mm atrophy, + BL foot drop    Vascular: dec pedal pulses    Neurological: nonfocal    Skin: lower ext dark almost black hyperpigmentation of lower ext    Lymph Nodes: not enlarged    Musculoskeletal: dec mm mass and tone    Psychiatric: anxious, depressed but stable        LABS:                        11.2  6.7  )-----------( 162             32      134  |  96  |  20  ----------------------------<  201  3.9   |  28  |  0.5    , 115  Ca    9.7      05 Aug 2022 06:50  Mg     1.9, 1.8  trop <0.01  TPro  5.9<L>  /  Alb  3.3<L>  /  TBili  0.3  /  DBili  x   /  AST  20  /  ALT  28  /  AlkPhos  77  08-05          RADIOLOGY & ADDITIONAL TESTS:  EKG:  NSR R axis, low voltage, nonspecific ST-T changes  CXR:  interstitial prominence, L plbased opacity unchanged

## 2022-08-08 NOTE — PHYSICAL THERAPY INITIAL EVALUATION ADULT - GENERAL OBSERVATIONS, REHAB EVAL
08:55-9:35. Pt encountered semifowler in bed in NAD, + nolberto , + ivone, reported she didn't sleep well at night and was tired, however as agreeable to PT Eval and to sit at EOB.

## 2022-08-08 NOTE — CONSULT NOTE ADULT - ASSESSMENT
57y F with PMHx of ASP PNA, COPD exacerbation and intubation with failure to extubate resulting in trach at [facility] 4/22 presents with acute on chr hypoxic RF read for hypoxia and hypotension and ongoing Bacterial PNA. The pt is under chronic care at [facility].     PLAN:  - Recommend arterial duplex le b/l  - Patient has palpable DP pulses bilaterally and likely has pain due to neuropathy - recommend neuropathic pain management and increase in gabapentin   - Call vascular surgery as needed.    SPECTRA [phone]

## 2022-08-08 NOTE — PROGRESS NOTE ADULT - ASSESSMENT
Acute on chronic Hypoxic respiratory failure   #s/p trach/PEG at [facility] 04/2022 after being intubated for >1month  #COPD Exacerbation  #Suspected superimposed bacterial PNA     - Continue doxycycline  - Cont solumedrol 60 BID and Nebs q4h & prn   -CXR reviewed and shows unchanged interstitial opacities      #Chronic HFpEF  2d Echo 05/2022: LVEF 50-55%. proBNP ~ 444  Pt does not appear volume overloaded  - Low Na diet and daily weight.   - monitor I & O    #Hx of DVT   - Cont Eliquis  - Venous duplex shows no DVT    #DM2  - Cont lantus/lispro 20-6-6-6  - ISS    #Chronic atropic B/l LE Hyperpigmentation changes  On previous admission, LWC with Betamethasone cream and Ammonium lactate dressings per Burn has not helped over the past 3 weeks.   - Plan was for outpatient burn and dermatology f/u    #Chronic Lower extremity Pain management   - Gabapentin to 600 TID,   - PO dilaudid 4mg Q4 PRN  -Vascular consulted for possible PVD.   -Arterial doppler ordered    DVT ppx: Eliquis

## 2022-08-08 NOTE — SWALLOW BEDSIDE ASSESSMENT ADULT - SLP GENERAL OBSERVATIONS
pt received in bed awake alert w/o c/o pain. +humidified O2 via t-piece +size 6 cuffed trach, pt w/ voicing pt received in bed awake alert w/o c/o pain. +humidified O2 via t-piece +size 6 cuffed trach, pt w/ voicing; Pt reports she was capped at NH for ~12 hours a day for about a week prior to this admission. + at bedside

## 2022-08-08 NOTE — PROGRESS NOTE ADULT - SUBJECTIVE AND OBJECTIVE BOX
OVERNIGHT EVENTS:   TODAY: Patient was seen today at bedside. Review of systems is otherwise negative.      VITAL SIGNS: Last 24 Hours  T(C): 36.4 (08 Aug 2022 12:19), Max: 36.8 (08 Aug 2022 05:16)  T(F): 97.6 (08 Aug 2022 12:19), Max: 98.3 (08 Aug 2022 05:16)  HR: 62 (08 Aug 2022 12:19) (50 - 82)  BP: 146/67 (08 Aug 2022 12:19) (146/67 - 187/78)  BP(mean): --  RR: 18 (08 Aug 2022 12:19) (18 - 18)  SpO2: 96% (07 Aug 2022 23:35) (93% - 100%)    08-07-22 @ 07:01  -  08-08-22 @ 07:00  --------------------------------------------------------  IN: 630 mL / OUT: 1200 mL / NET: -570 mL      PHYSICAL EXAM:  GENERAL:   Awake, alert; NAD.  HEENT:  Head NC/AT; Conjunctivae pink, Sclera anicteric; Oral mucosa moist.  + trach tube supple, no stridor, + trach, , + T tube  CARDIO:   Regular rate; Regular rhythm  RESP:   shallow resp, diminished harsh bronchial  BS, scattered rhonchi, no wheezing  GI:   Soft; NT/ND; BS; No guarding; No rebound tenderness.  EXT:   lower ext dark almost black hyperpigmentation of lower ext  SKIN:   Intact.    LAB RESULTS:                        11.2   6.73  )-----------( 162      ( 08 Aug 2022 07:23 )             32.1     08-08    134<L>  |  96<L>  |  20  ----------------------------<  201<H>  3.9   |  28  |  <0.5<L>    Ca    9.3      08 Aug 2022 07:23  Mg     1.6     08-08    TPro  5.7<L>  /  Alb  3.5  /  TBili  0.3  /  DBili  x   /  AST  16  /  ALT  27  /  AlkPhos  58  08-08                MICROBIOLOGY:    RADIOLOGY:    ALLERGIES:  penicillin (Swelling)

## 2022-08-08 NOTE — PHYSICAL THERAPY INITIAL EVALUATION ADULT - RANGE OF MOTION EXAMINATION, REHAB EVAL
B UE's grossly WFL .  B LE's grossly WFL except for B/L ankle + contractures in ankles, + discoloration noted in B LE's and feet

## 2022-08-08 NOTE — SWALLOW BEDSIDE ASSESSMENT ADULT - SWALLOW EVAL: DIAGNOSIS
+toleration for small amount of easy to chew, thin liquids w/o overt s/s aspiration/penetration +delayed cough w/ trials of easy to chew consistency and thin liquids

## 2022-08-08 NOTE — CONSULT NOTE ADULT - SUBJECTIVE AND OBJECTIVE BOX
VASCULAR SURGERY CONSULT NOTE      HPI:  57 year old female with a past medical history of IDDM, GERD, HTN, COPD, Chronic hypoxemic and hypercapnic respiratory failure SP Tracheostomy (not chronically vented) & PEG at [facility], DVT on Eliquis, Anxiety, CHF, Recurrent mucous plugs SP bronchoscopies, presenting from [facility] for chest tightness and shortness of breath over the past few days.  Per EMS patient was hypotensive and hypoxic at the facility today, around 60%.  Patient was recently started on PO trial last week.  Patient reports increased secretions requiring more suctioning, still unable to clear them.  She also reports increased cough and wheezing.  Denies fevers, chills, lightheadedness, dizziness, blurry vision, double vision, headache, extremity weakness or deficits, chest pain, palpitations, abdominal pain, nausea, vomiting, diarrhea, hematochezia, melena, dysuria, hematuria, flank pain, lower extremity swelling/tenderness/erythema.    Vascular surgery consulted to R/O PVD. History noted as above. Patient states she has had pain in her legs bilaterally since April. Pain is constant from the knees to the feet and has become worse since being in the hospital. Patient rates the pain as 10/10 and states pain medications help with the pain. States the left leg pain is worse than the right. Reports the skin changes and brownish discoloration of her legs began last year and has gradually worsened. Patient states she hasn't been walking since being in [facility]. Tried to walk today, but unable to because the pain was unbearable. Denies cold extremities, swelling, redness, numbness.         PAST MEDICAL & SURGICAL HISTORY:  COPD (chronic obstructive pulmonary disease)      CHF (congestive heart failure)      DM (diabetes mellitus)      H/O tracheostomy        penicillin (Swelling)    Home Medications:  acetylcysteine 20% inhalation solution: 3 milliliter(s) inhaled every 6 hours (03 Aug 2022 13:51)  albuterol sulfate 2.5 mg/3 mL (0.083 %) solution for nebulization: milliliter(s) inhaled 3 times a day, As Needed (19 Jul 2022 10:06)  ALPRAZolam 0.5 mg oral tablet: 1 tab(s) orally 3 times a day (after meals), As Needed (03 Aug 2022 13:58)  ammonium lactate 12% topical lotion: 1 application topically 2 times a day (19 Jul 2022 10:06)  betamethasone valerate 0.1% topical cream: 1 application topically 2 times a day (19 Jul 2022 10:06)  gabapentin 600 mg oral tablet: 1 tab(s) orally 3 times a day via PEG tube (23 Jun 2022 16:45)  HYDROmorphone 4 mg oral tablet: 1 tab(s) orally every 4 hours, As Needed (19 Jul 2022 10:06)  insulin glargine 100 units/mL subcutaneous solution: 20 unit(s) subcutaneous once a day (at bedtime) (23 Jun 2022 16:45)  insulin lispro 100 units/mL injectable solution: 6  injectable 4 times a day (before feeds Q6H) (19 Jul 2022 15:19)  losartan 100 mg oral tablet: 1 tab(s) orally once a day via PEG tube (23 Jun 2022 16:45)  Multiple Vitamins oral tablet: 1 tab(s) orally once a day via PEG tube (23 Jun 2022 16:45)  pantoprazole 40 mg oral delayed release tablet: 1 tab(s) orally once a day (before a meal) via PEG tube (23 Jun 2022 16:45)  polyethylene glycol 3350 oral powder for reconstitution: 17 gram(s) orally once a day via PEG tube (23 Jun 2022 16:45)  senna leaf extract oral tablet: 2 tab(s) orally once a day (at bedtime) via PEG tube (23 Jun 2022 16:45)  Trelegy Ellipta 100 mcg-62.5 mcg-25 mcg powder for inhalation:  (23 Jun 2022 16:33)    No permtinent family history of PVD    REVIEW OF SYSTEMS:  GENERAL:                                         negative  SKIN:                                                 negative  OPTHALMOLOGIC:                          negative  ENMT:                                               negative  RESPIRATORY AND THORAX:        negative  CARDIOVASCULAR:                         negative  GASTROINTESTINAL:                       negative  NEPHROLOGY:                                  negative  MUSCULOSKELETAL:                       negative  NEUROLOGIC:                                   negative  PSYCHIATRIC:                                    negative  HEMATOLOGY/LYMPHATICS:         negative  ENDOCRINE:                                     negative  ALLERGIC/IMMUNOLOGIC:            negative    12 point ROS otherwise normal except as stated in HPI  FHx: none  SHX:  [ ]  smoking     [ ] alcohol use    PHYSICAL EXAM  Vital Signs Last 24 Hrs  T(C): 36.4 (08 Aug 2022 12:19), Max: 36.8 (08 Aug 2022 05:16)  T(F): 97.6 (08 Aug 2022 12:19), Max: 98.3 (08 Aug 2022 05:16)  HR: 62 (08 Aug 2022 12:19) (50 - 82)  BP: 146/67 (08 Aug 2022 12:19) (146/67 - 187/78)  BP(mean): --  RR: 18 (08 Aug 2022 12:19) (18 - 18)  SpO2: 96% (07 Aug 2022 23:35) (93% - 100%)    Parameters below as of 07 Aug 2022 23:35  Patient On (Oxygen Delivery Method): T-piece      Appearance: Normal	  HEENT:   Normal oral mucosa, PERRL, EOMI	  Neck: Supple, - JVD  Cardiovascular: Normal S1 S2, No JVD, No murmurs,   Respiratory: Lungs clear to auscultation, No Rales, Rhonchi, Wheezing	  Gastrointestinal:  Soft, Non-tender, positive BS	  Skin: No rashes, No ecchymoses, No cyanosis  Extremities: Normal range of motion, No clubbing, cyanosis or edema  Vascular: Peripheral pulses palpable 2+ bilaterally  Neurologic: Non-focal  Psychiatry: A & O x 3, Mood & affect appropriate      PULSES:  Femoral:  Popliteal:  Dorsal Pedal: palpable bilaterally  Posterior Tibial:  Capillary:    MEDICATIONS:   MEDICATIONS  (STANDING):  acetaminophen     Tablet .. 650 milliGRAM(s) Oral every 6 hours  acetylcysteine 20% for Nebulization - Peds 3 milliLiter(s) Nebulizer every 6 hours  ALBUTerol    0.083% 2.5 milliGRAM(s) Nebulizer every 6 hours  ammonium lactate 12% Lotion 1 Application(s) Topical two times a day  apixaban 5 milliGRAM(s) Enteral Tube two times a day  chlorhexidine 0.12% Liquid 15 milliLiter(s) Oral Mucosa every 12 hours  dextrose 5%. 1000 milliLiter(s) (50 mL/Hr) IV Continuous <Continuous>  dextrose 5%. 1000 milliLiter(s) (100 mL/Hr) IV Continuous <Continuous>  dextrose 50% Injectable 25 Gram(s) IV Push once  dextrose 50% Injectable 12.5 Gram(s) IV Push once  dextrose 50% Injectable 25 Gram(s) IV Push once  doxycycline monohydrate Capsule 100 milliGRAM(s) Oral every 12 hours  gabapentin 600 milliGRAM(s) Oral three times a day  glucagon  Injectable 1 milliGRAM(s) IntraMuscular once  insulin glargine Injectable (LANTUS) 20 Unit(s) SubCutaneous at bedtime  insulin lispro (ADMELOG) corrective regimen sliding scale   SubCutaneous three times a day before meals  insulin lispro Injectable (ADMELOG) 6 Unit(s) SubCutaneous three times a day before meals  losartan 100 milliGRAM(s) Oral daily  magnesium sulfate  IVPB 2 Gram(s) IV Intermittent every 2 hours  methylPREDNISolone sodium succinate Injectable 60 milliGRAM(s) IV Push two times a day  multivitamin 1 Tablet(s) Oral daily  pantoprazole   Suspension 40 milliGRAM(s) Oral daily  polyethylene glycol 3350 17 Gram(s) Oral two times a day  senna 2 Tablet(s) Oral at bedtime    MEDICATIONS  (PRN):  ALBUTerol    90 MICROgram(s) HFA Inhaler 2 Puff(s) Inhalation every 6 hours PRN Shortness of Breath and/or Wheezing  ALPRAZolam 0.5 milliGRAM(s) Oral every 6 hours PRN anxiety  dextrose Oral Gel 15 Gram(s) Oral once PRN Blood Glucose LESS THAN 70 milliGRAM(s)/deciliter  HYDROmorphone   Tablet 4 milliGRAM(s) Oral every 4 hours PRN Severe Pain (7 - 10)  HYDROmorphone  Injectable 1 milliGRAM(s) IV Push every 8 hours PRN Moderate Pain (4 - 6)      LAB/STUDIES:                        11.2   6.73  )-----------( 162      ( 08 Aug 2022 07:23 )             32.1     08-08    134<L>  |  96<L>  |  20  ----------------------------<  201<H>  3.9   |  28  |  <0.5<L>    Ca    9.3      08 Aug 2022 07:23  Mg     1.6     08-08    TPro  5.7<L>  /  Alb  3.5  /  TBili  0.3  /  DBili  x   /  AST  16  /  ALT  27  /  AlkPhos  58  08-08      LIVER FUNCTIONS - ( 08 Aug 2022 07:23 )  Alb: 3.5 g/dL / Pro: 5.7 g/dL / ALK PHOS: 58 U/L / ALT: 27 U/L / AST: 16 U/L / GGT: x                             IMAGING:       VASCULAR SURGERY CONSULT NOTE      HPI:  57 year old female with a past medical history of IDDM, GERD, HTN, COPD, Chronic hypoxemic and hypercapnic respiratory failure SP Tracheostomy (not chronically vented) & PEG at [facility], DVT on Eliquis, Anxiety, CHF, Recurrent mucous plugs SP bronchoscopies, presenting from [facility] for chest tightness and shortness of breath over the past few days.  Per EMS patient was hypotensive and hypoxic at the facility today, around 60%.  Patient was recently started on PO trial last week.  Patient reports increased secretions requiring more suctioning, still unable to clear them.  She also reports increased cough and wheezing.  Denies fevers, chills, lightheadedness, dizziness, blurry vision, double vision, headache, extremity weakness or deficits, chest pain, palpitations, abdominal pain, nausea, vomiting, diarrhea, hematochezia, melena, dysuria, hematuria, flank pain, lower extremity swelling/tenderness/erythema.    Vascular surgery consulted to R/O PVD. History noted as above. Patient states she has had pain in her legs bilaterally since April. Pain is constant from the knees to the feet and has become worse since being in the hospital. Patient rates the pain as 10/10 and states pain medications help with the pain. States the left leg pain is worse than the right. Reports the skin changes and brownish discoloration of her legs began last year and has gradually worsened. Patient states she hasn't been walking since being in [facility]. Tried to walk today, but unable to because the pain was unbearable. Denies cold extremities, swelling, redness, numbness.         PAST MEDICAL & SURGICAL HISTORY:  COPD (chronic obstructive pulmonary disease)      CHF (congestive heart failure)      DM (diabetes mellitus)      H/O tracheostomy        penicillin (Swelling)    Home Medications:  acetylcysteine 20% inhalation solution: 3 milliliter(s) inhaled every 6 hours (03 Aug 2022 13:51)  albuterol sulfate 2.5 mg/3 mL (0.083 %) solution for nebulization: milliliter(s) inhaled 3 times a day, As Needed (19 Jul 2022 10:06)  ALPRAZolam 0.5 mg oral tablet: 1 tab(s) orally 3 times a day (after meals), As Needed (03 Aug 2022 13:58)  ammonium lactate 12% topical lotion: 1 application topically 2 times a day (19 Jul 2022 10:06)  betamethasone valerate 0.1% topical cream: 1 application topically 2 times a day (19 Jul 2022 10:06)  gabapentin 600 mg oral tablet: 1 tab(s) orally 3 times a day via PEG tube (23 Jun 2022 16:45)  HYDROmorphone 4 mg oral tablet: 1 tab(s) orally every 4 hours, As Needed (19 Jul 2022 10:06)  insulin glargine 100 units/mL subcutaneous solution: 20 unit(s) subcutaneous once a day (at bedtime) (23 Jun 2022 16:45)  insulin lispro 100 units/mL injectable solution: 6  injectable 4 times a day (before feeds Q6H) (19 Jul 2022 15:19)  losartan 100 mg oral tablet: 1 tab(s) orally once a day via PEG tube (23 Jun 2022 16:45)  Multiple Vitamins oral tablet: 1 tab(s) orally once a day via PEG tube (23 Jun 2022 16:45)  pantoprazole 40 mg oral delayed release tablet: 1 tab(s) orally once a day (before a meal) via PEG tube (23 Jun 2022 16:45)  polyethylene glycol 3350 oral powder for reconstitution: 17 gram(s) orally once a day via PEG tube (23 Jun 2022 16:45)  senna leaf extract oral tablet: 2 tab(s) orally once a day (at bedtime) via PEG tube (23 Jun 2022 16:45)  Trelegy Ellipta 100 mcg-62.5 mcg-25 mcg powder for inhalation:  (23 Jun 2022 16:33)    No permtinent family history of PVD    REVIEW OF SYSTEMS:  GENERAL:                                         negative  SKIN:                                                 SEE HPI  OPTHALMOLOGIC:                          negative  ENMT:                                               negative  RESPIRATORY AND THORAX:        negative  CARDIOVASCULAR:                         negative  GASTROINTESTINAL:                       negative  NEPHROLOGY:                                  negative  MUSCULOSKELETAL:                       negative  NEUROLOGIC:                                   negative  PSYCHIATRIC:                                    negative  HEMATOLOGY/LYMPHATICS:         negative  ENDOCRINE:                                     negative  ALLERGIC/IMMUNOLOGIC:            negative    12 point ROS otherwise normal except as stated in HPI  FHx: none  SHX:  [ ]  smoking     [ ] alcohol use    PHYSICAL EXAM  Vital Signs Last 24 Hrs  T(C): 36.4 (08 Aug 2022 12:19), Max: 36.8 (08 Aug 2022 05:16)  T(F): 97.6 (08 Aug 2022 12:19), Max: 98.3 (08 Aug 2022 05:16)  HR: 62 (08 Aug 2022 12:19) (50 - 82)  BP: 146/67 (08 Aug 2022 12:19) (146/67 - 187/78)  BP(mean): --  RR: 18 (08 Aug 2022 12:19) (18 - 18)  SpO2: 96% (07 Aug 2022 23:35) (93% - 100%)    Parameters below as of 07 Aug 2022 23:35  Patient On (Oxygen Delivery Method): T-piece      Appearance: Normal	  HEENT:   Normal oral mucosa, PERRL, EOMI	  Neck: Supple, - JVD  Cardiovascular: Normal S1 S2, No JVD, No murmurs,   Respiratory: Normal chest expansion, normal respiratory effort  Gastrointestinal:  Soft, Non-tender, non-distended  Skin: No rashes, No ecchymoses, No cyanosis  Extremities: Normal range of motion, tenderness to palpation b/l, hyperpigmentation b/l, sensation intact  Neurologic: Non-focal  Psychiatry: A & O x 3, Mood & affect appropriate      PULSES:  Femoral:  Popliteal:  Dorsal Pedal: palpable bilaterally  Posterior Tibial:  Capillary:    MEDICATIONS:   MEDICATIONS  (STANDING):  acetaminophen     Tablet .. 650 milliGRAM(s) Oral every 6 hours  acetylcysteine 20% for Nebulization - Peds 3 milliLiter(s) Nebulizer every 6 hours  ALBUTerol    0.083% 2.5 milliGRAM(s) Nebulizer every 6 hours  ammonium lactate 12% Lotion 1 Application(s) Topical two times a day  apixaban 5 milliGRAM(s) Enteral Tube two times a day  chlorhexidine 0.12% Liquid 15 milliLiter(s) Oral Mucosa every 12 hours  dextrose 5%. 1000 milliLiter(s) (50 mL/Hr) IV Continuous <Continuous>  dextrose 5%. 1000 milliLiter(s) (100 mL/Hr) IV Continuous <Continuous>  dextrose 50% Injectable 25 Gram(s) IV Push once  dextrose 50% Injectable 12.5 Gram(s) IV Push once  dextrose 50% Injectable 25 Gram(s) IV Push once  doxycycline monohydrate Capsule 100 milliGRAM(s) Oral every 12 hours  gabapentin 600 milliGRAM(s) Oral three times a day  glucagon  Injectable 1 milliGRAM(s) IntraMuscular once  insulin glargine Injectable (LANTUS) 20 Unit(s) SubCutaneous at bedtime  insulin lispro (ADMELOG) corrective regimen sliding scale   SubCutaneous three times a day before meals  insulin lispro Injectable (ADMELOG) 6 Unit(s) SubCutaneous three times a day before meals  losartan 100 milliGRAM(s) Oral daily  magnesium sulfate  IVPB 2 Gram(s) IV Intermittent every 2 hours  methylPREDNISolone sodium succinate Injectable 60 milliGRAM(s) IV Push two times a day  multivitamin 1 Tablet(s) Oral daily  pantoprazole   Suspension 40 milliGRAM(s) Oral daily  polyethylene glycol 3350 17 Gram(s) Oral two times a day  senna 2 Tablet(s) Oral at bedtime    MEDICATIONS  (PRN):  ALBUTerol    90 MICROgram(s) HFA Inhaler 2 Puff(s) Inhalation every 6 hours PRN Shortness of Breath and/or Wheezing  ALPRAZolam 0.5 milliGRAM(s) Oral every 6 hours PRN anxiety  dextrose Oral Gel 15 Gram(s) Oral once PRN Blood Glucose LESS THAN 70 milliGRAM(s)/deciliter  HYDROmorphone   Tablet 4 milliGRAM(s) Oral every 4 hours PRN Severe Pain (7 - 10)  HYDROmorphone  Injectable 1 milliGRAM(s) IV Push every 8 hours PRN Moderate Pain (4 - 6)      LAB/STUDIES:                        11.2   6.73  )-----------( 162      ( 08 Aug 2022 07:23 )             32.1     08-08    134<L>  |  96<L>  |  20  ----------------------------<  201<H>  3.9   |  28  |  <0.5<L>    Ca    9.3      08 Aug 2022 07:23  Mg     1.6     08-08    TPro  5.7<L>  /  Alb  3.5  /  TBili  0.3  /  DBili  x   /  AST  16  /  ALT  27  /  AlkPhos  58  08-08      LIVER FUNCTIONS - ( 08 Aug 2022 07:23 )  Alb: 3.5 g/dL / Pro: 5.7 g/dL / ALK PHOS: 58 U/L / ALT: 27 U/L / AST: 16 U/L / GGT: x               IMAGING:  < from: [facility] Duplex Lower Ext Vein Scan, Bilat (08.04.22 @ 13:34) >  IMPRESSION:  No evidence of deep venous thrombosis in either lower extremity.  Difficult study due to patient habitus.  No sign of previous posterior tibial vein branch thrombosis in left lower   extremity.

## 2022-08-08 NOTE — PHYSICAL THERAPY INITIAL EVALUATION ADULT - PERTINENT HX OF CURRENT PROBLEM, REHAB EVAL
57 year old female with a past medical history of IDDM, GERD, HTN, COPD, Chronic hypoxemic and hypercapnic respiratory failure SP Tracheostomy (not chronically vented) & PEG at [facility], DVT on Eliquis, Anxiety, CHF, Recurrent mucous plugs SP bronchoscopies, presenting from [facility] for chest tightness and shortness of breath over the past few days.  Per EMS patient was hypotensive and hypoxic at the facility today, around 60%.  Patient was recently started on PO trial last week.  Patient reports increased secretions requiring more suctioning, still unable to clear them.  She also reports increased cough and wheezing.

## 2022-08-08 NOTE — PROGRESS NOTE ADULT - ASSESSMENT
Unfortunate 56 YO F with acute on chr hypoxic RF read for hypoxia and hypotension and ongoing Bacterial PNA. Hx of ASP PNA, COPD exacerbation and intubation with failure to extubate resulting in trach at [facility] .  The pt is under chronic  at [facility].      Acute on Chr hypoxic RF, mucus plugging  Bacterial PNA  Clinical Debility  Bedbound state  BL foot drop  JHx of HTN, ASHD HFpEF  Hx of DLD  Hx of COPD, MO, LUCIANA, sp intubation, failure to extubate, chr trach since /[facility]  Hx of DM II  Hx of Ch anemia of ch dis  Hx of GERD, HH, Diverticulosis, Ch constipation  Hx of OA, DJD, DDD, chr pain syn  Hx of lower ext venous stasis dermatitis  Hx of DVT, AC/Eliquis  Hx of Anxiety, Depression     today pt stable, A&O, verbal  pt cont the weaning process  pt needs freq suctioning, con Neb tx and IV steroids  on ad pt suctioned and placed on Vent support, IV steroids and neb tx  pt was give 2 L of IV fluids with improvement of BP  CXR reviewed and shows unchanged interstitial opacities, L pl based opacity  Venous doppler neg for DVT  Vasc consult given severe derm changes, mm atrophy concern over lower ext aa occ dis/PAD  place order for aa doppler of lower ext  Pul-critical care consult and ff up appreciated, may start to wean  CTS consult for trach air leak, pt not in distress and oxygenating well no need for emergent trach change, may not need change if goal is to start weaning process  cont all previous meds  GI prophylaxis  att to bowel regimen  nutrition via PEG/glucerna  PM: hydromorphone 4mg po q 4 and 1mg IV q 8 for breakthrough pain, gabapentin 600mg q 8, tylenol 650mg QID  Rehab consult for bedside PT, can we get pt OOB to chair, rehab states pt high risk for fall but bedside Pt, OOB to chair may be done  pt full code  goal is to leila pt, stabilize clinically as much as possible and return to SNF

## 2022-08-09 LAB
ALBUMIN SERPL ELPH-MCNC: 3.4 G/DL — LOW (ref 3.5–5.2)
ALP SERPL-CCNC: 50 U/L — SIGNIFICANT CHANGE UP (ref 30–115)
ALT FLD-CCNC: 23 U/L — SIGNIFICANT CHANGE UP (ref 0–41)
ANION GAP SERPL CALC-SCNC: 7 MMOL/L — SIGNIFICANT CHANGE UP (ref 7–14)
AST SERPL-CCNC: 14 U/L — SIGNIFICANT CHANGE UP (ref 0–41)
BASOPHILS # BLD AUTO: 0.01 K/UL — SIGNIFICANT CHANGE UP (ref 0–0.2)
BASOPHILS NFR BLD AUTO: 0.2 % — SIGNIFICANT CHANGE UP (ref 0–1)
BILIRUB SERPL-MCNC: 0.4 MG/DL — SIGNIFICANT CHANGE UP (ref 0.2–1.2)
BUN SERPL-MCNC: 14 MG/DL — SIGNIFICANT CHANGE UP (ref 10–20)
CALCIUM SERPL-MCNC: 9.1 MG/DL — SIGNIFICANT CHANGE UP (ref 8.5–10.1)
CHLORIDE SERPL-SCNC: 102 MMOL/L — SIGNIFICANT CHANGE UP (ref 98–110)
CO2 SERPL-SCNC: 30 MMOL/L — SIGNIFICANT CHANGE UP (ref 17–32)
CREAT SERPL-MCNC: <0.5 MG/DL — LOW (ref 0.7–1.5)
EGFR: 115 ML/MIN/1.73M2 — SIGNIFICANT CHANGE UP
EOSINOPHIL # BLD AUTO: 0 K/UL — SIGNIFICANT CHANGE UP (ref 0–0.7)
EOSINOPHIL NFR BLD AUTO: 0 % — SIGNIFICANT CHANGE UP (ref 0–8)
GLUCOSE BLDC GLUCOMTR-MCNC: 130 MG/DL — HIGH (ref 70–99)
GLUCOSE BLDC GLUCOMTR-MCNC: 184 MG/DL — HIGH (ref 70–99)
GLUCOSE BLDC GLUCOMTR-MCNC: 208 MG/DL — HIGH (ref 70–99)
GLUCOSE BLDC GLUCOMTR-MCNC: 232 MG/DL — HIGH (ref 70–99)
GLUCOSE BLDC GLUCOMTR-MCNC: 236 MG/DL — HIGH (ref 70–99)
GLUCOSE SERPL-MCNC: 181 MG/DL — HIGH (ref 70–99)
HCT VFR BLD CALC: 31.7 % — LOW (ref 37–47)
HGB BLD-MCNC: 11.2 G/DL — LOW (ref 12–16)
IMM GRANULOCYTES NFR BLD AUTO: 0.3 % — SIGNIFICANT CHANGE UP (ref 0.1–0.3)
LYMPHOCYTES # BLD AUTO: 1.66 K/UL — SIGNIFICANT CHANGE UP (ref 1.2–3.4)
LYMPHOCYTES # BLD AUTO: 25 % — SIGNIFICANT CHANGE UP (ref 20.5–51.1)
MAGNESIUM SERPL-MCNC: 2 MG/DL — SIGNIFICANT CHANGE UP (ref 1.8–2.4)
MCHC RBC-ENTMCNC: 31.8 PG — HIGH (ref 27–31)
MCHC RBC-ENTMCNC: 35.3 G/DL — SIGNIFICANT CHANGE UP (ref 32–37)
MCV RBC AUTO: 90.1 FL — SIGNIFICANT CHANGE UP (ref 81–99)
MONOCYTES # BLD AUTO: 0.54 K/UL — SIGNIFICANT CHANGE UP (ref 0.1–0.6)
MONOCYTES NFR BLD AUTO: 8.1 % — SIGNIFICANT CHANGE UP (ref 1.7–9.3)
NEUTROPHILS # BLD AUTO: 4.41 K/UL — SIGNIFICANT CHANGE UP (ref 1.4–6.5)
NEUTROPHILS NFR BLD AUTO: 66.4 % — SIGNIFICANT CHANGE UP (ref 42.2–75.2)
NRBC # BLD: 0 /100 WBCS — SIGNIFICANT CHANGE UP (ref 0–0)
PLATELET # BLD AUTO: 154 K/UL — SIGNIFICANT CHANGE UP (ref 130–400)
POTASSIUM SERPL-MCNC: 4 MMOL/L — SIGNIFICANT CHANGE UP (ref 3.5–5)
POTASSIUM SERPL-SCNC: 4 MMOL/L — SIGNIFICANT CHANGE UP (ref 3.5–5)
PROT SERPL-MCNC: 5.5 G/DL — LOW (ref 6–8)
RBC # BLD: 3.52 M/UL — LOW (ref 4.2–5.4)
RBC # FLD: 11.7 % — SIGNIFICANT CHANGE UP (ref 11.5–14.5)
SODIUM SERPL-SCNC: 139 MMOL/L — SIGNIFICANT CHANGE UP (ref 135–146)
WBC # BLD: 6.64 K/UL — SIGNIFICANT CHANGE UP (ref 4.8–10.8)
WBC # FLD AUTO: 6.64 K/UL — SIGNIFICANT CHANGE UP (ref 4.8–10.8)

## 2022-08-09 PROCEDURE — 93010 ELECTROCARDIOGRAM REPORT: CPT

## 2022-08-09 PROCEDURE — 99233 SBSQ HOSP IP/OBS HIGH 50: CPT

## 2022-08-09 RX ORDER — AMLODIPINE BESYLATE 2.5 MG/1
5 TABLET ORAL DAILY
Refills: 0 | Status: DISCONTINUED | OUTPATIENT
Start: 2022-08-09 | End: 2022-08-12

## 2022-08-09 RX ORDER — HYDRALAZINE HCL 50 MG
10 TABLET ORAL ONCE
Refills: 0 | Status: DISCONTINUED | OUTPATIENT
Start: 2022-08-09 | End: 2022-08-09

## 2022-08-09 RX ORDER — HYDRALAZINE HCL 50 MG
5 TABLET ORAL ONCE
Refills: 0 | Status: COMPLETED | OUTPATIENT
Start: 2022-08-09 | End: 2022-08-09

## 2022-08-09 RX ADMIN — Medication 6 UNIT(S): at 16:40

## 2022-08-09 RX ADMIN — POLYETHYLENE GLYCOL 3350 17 GRAM(S): 17 POWDER, FOR SOLUTION ORAL at 05:41

## 2022-08-09 RX ADMIN — Medication 1: at 16:41

## 2022-08-09 RX ADMIN — PANTOPRAZOLE SODIUM 40 MILLIGRAM(S): 20 TABLET, DELAYED RELEASE ORAL at 12:11

## 2022-08-09 RX ADMIN — Medication 1 TABLET(S): at 12:10

## 2022-08-09 RX ADMIN — GABAPENTIN 600 MILLIGRAM(S): 400 CAPSULE ORAL at 05:41

## 2022-08-09 RX ADMIN — GABAPENTIN 600 MILLIGRAM(S): 400 CAPSULE ORAL at 21:35

## 2022-08-09 RX ADMIN — HYDROMORPHONE HYDROCHLORIDE 4 MILLIGRAM(S): 2 INJECTION INTRAMUSCULAR; INTRAVENOUS; SUBCUTANEOUS at 11:16

## 2022-08-09 RX ADMIN — Medication 650 MILLIGRAM(S): at 01:21

## 2022-08-09 RX ADMIN — Medication 6 UNIT(S): at 08:59

## 2022-08-09 RX ADMIN — Medication 650 MILLIGRAM(S): at 12:10

## 2022-08-09 RX ADMIN — HYDROMORPHONE HYDROCHLORIDE 1 MILLIGRAM(S): 2 INJECTION INTRAMUSCULAR; INTRAVENOUS; SUBCUTANEOUS at 06:15

## 2022-08-09 RX ADMIN — Medication 650 MILLIGRAM(S): at 17:39

## 2022-08-09 RX ADMIN — HYDROMORPHONE HYDROCHLORIDE 1 MILLIGRAM(S): 2 INJECTION INTRAMUSCULAR; INTRAVENOUS; SUBCUTANEOUS at 06:00

## 2022-08-09 RX ADMIN — LOSARTAN POTASSIUM 100 MILLIGRAM(S): 100 TABLET, FILM COATED ORAL at 05:40

## 2022-08-09 RX ADMIN — APIXABAN 5 MILLIGRAM(S): 2.5 TABLET, FILM COATED ORAL at 17:10

## 2022-08-09 RX ADMIN — Medication 650 MILLIGRAM(S): at 12:40

## 2022-08-09 RX ADMIN — Medication 100 MILLIGRAM(S): at 05:39

## 2022-08-09 RX ADMIN — Medication 650 MILLIGRAM(S): at 06:09

## 2022-08-09 RX ADMIN — INSULIN GLARGINE 20 UNIT(S): 100 INJECTION, SOLUTION SUBCUTANEOUS at 21:34

## 2022-08-09 RX ADMIN — CHLORHEXIDINE GLUCONATE 15 MILLILITER(S): 213 SOLUTION TOPICAL at 05:41

## 2022-08-09 RX ADMIN — Medication 1 APPLICATION(S): at 17:09

## 2022-08-09 RX ADMIN — Medication 5 MILLIGRAM(S): at 23:49

## 2022-08-09 RX ADMIN — Medication 0.5 MILLIGRAM(S): at 12:12

## 2022-08-09 RX ADMIN — HYDROMORPHONE HYDROCHLORIDE 4 MILLIGRAM(S): 2 INJECTION INTRAMUSCULAR; INTRAVENOUS; SUBCUTANEOUS at 21:11

## 2022-08-09 RX ADMIN — Medication 2: at 12:10

## 2022-08-09 RX ADMIN — Medication 6 UNIT(S): at 12:09

## 2022-08-09 RX ADMIN — CHLORHEXIDINE GLUCONATE 15 MILLILITER(S): 213 SOLUTION TOPICAL at 17:10

## 2022-08-09 RX ADMIN — Medication 2: at 08:58

## 2022-08-09 RX ADMIN — HYDROMORPHONE HYDROCHLORIDE 1 MILLIGRAM(S): 2 INJECTION INTRAMUSCULAR; INTRAVENOUS; SUBCUTANEOUS at 16:07

## 2022-08-09 RX ADMIN — Medication 650 MILLIGRAM(S): at 17:09

## 2022-08-09 RX ADMIN — HYDROMORPHONE HYDROCHLORIDE 4 MILLIGRAM(S): 2 INJECTION INTRAMUSCULAR; INTRAVENOUS; SUBCUTANEOUS at 20:05

## 2022-08-09 RX ADMIN — Medication 100 MILLIGRAM(S): at 17:10

## 2022-08-09 RX ADMIN — Medication 0.5 MILLIGRAM(S): at 20:05

## 2022-08-09 RX ADMIN — Medication 1 APPLICATION(S): at 06:38

## 2022-08-09 RX ADMIN — Medication 650 MILLIGRAM(S): at 00:51

## 2022-08-09 RX ADMIN — Medication 60 MILLIGRAM(S): at 05:40

## 2022-08-09 RX ADMIN — APIXABAN 5 MILLIGRAM(S): 2.5 TABLET, FILM COATED ORAL at 05:40

## 2022-08-09 RX ADMIN — GABAPENTIN 600 MILLIGRAM(S): 400 CAPSULE ORAL at 15:52

## 2022-08-09 RX ADMIN — HYDROMORPHONE HYDROCHLORIDE 4 MILLIGRAM(S): 2 INJECTION INTRAMUSCULAR; INTRAVENOUS; SUBCUTANEOUS at 10:46

## 2022-08-09 RX ADMIN — Medication 650 MILLIGRAM(S): at 05:39

## 2022-08-09 RX ADMIN — HYDROMORPHONE HYDROCHLORIDE 1 MILLIGRAM(S): 2 INJECTION INTRAMUSCULAR; INTRAVENOUS; SUBCUTANEOUS at 15:52

## 2022-08-09 NOTE — CHART NOTE - NSCHARTNOTEFT_GEN_A_CORE
Registered Dietitian Follow-Up     Patient Profile Reviewed                           Yes [x]   No []  Nutrition History Previously Obtained        Yes [x]  No []      Pertinent Medical Interventions:  56 y/o F w/ PMHx: IDDM, GERD, HTN, COPD, chronic hypoxemic and hypercapnic respiratory failure s/p tracheostomy (not chronically vented) & PEG at [facility], DVT, anxiety, CHF, recurrent mucous plugs s/p bronchoscopies, presenting from [facility] for chest tightness and SOB. Pt reports increased secretions requiring more suctioning, still unable to clear them. Tracheostomy exchanged to 6 cuffed tube from 6 cuffless on presentation. Per CT sx, no current indication for emergency surgical tracheostomy exchange, "f/u w/ ENT vs. pulm for exchange of tracheostomy given it is chronic in nature"; Pt is being treated for acute on chronic hypoxic RF, mucus plugging, bacterial PNA. CXR -> Slightly decreased interstitial prominence/opacities. Unchanged left basal pleural-parenchymal opacity.    Nutrition Interval History:   SLP recommends continue NPO w/ PEG tube, allow ice chips, sips of water  pt would benefit from repeat instrumental swallow study when medically optimized  **NPO + receiving EN regimen via PEG: Glucerna 1.2 at 300mL Q6hrs, provides (1440kcal, 72gm protein, 972mL free H2O)**  + FWF 30mL post feeds Q6hr-- total with EN 1092mL water  **Patient meeting >85% of estimated energy needs in-house     Diet order:   Diet, NPO with Tube Feed:   Tube Feeding Modality: Gastrostomy  Glucerna 1.2 Martin  Total Volume for 24 Hours (mL): 1200  Bolus  Total Volume of Bolus (mL):  300  Total # of Feeds: 4  Tube Feed Frequency: Every 6 hours   Tube Feed Start Time: 14:00  Bolus Feed Rate (mL per Hour): 300   Bolus Feed Duration (in Hours): 1  Bolus   Total Volume per Flush (mL): 30   Frequency: Every 6 Hours  Free Water Flush Instructions:  post feed (22 @ 14:14) [Active]    Anthropometrics:  Height (cm): 154.9 (22 @ 06:02)  Weight (kg): 86.6 (22 @ 06:02)  BMI (kg/m2): 36.1 (-22 @ 06:02)    OTHER WEIGHTS:   weights in emr: Daily Weight in k.3 (-), Weight in k.4 (), Weight in k.3 (), Weight in k ()  Weight at NH was 82.3kg though gained weight to current admission wt with adequate PO intake 86.6kg indicating overall 8.2% wt loss from dry weight 94.3 --> current wt within 2 months   IBW: 47.7kg    MEDICATIONS  (STANDING):  acetaminophen     Tablet .. 650 milliGRAM(s) Oral every 6 hours  acetylcysteine 20% for Nebulization - Peds 3 milliLiter(s) Nebulizer every 6 hours  ALBUTerol    0.083% 2.5 milliGRAM(s) Nebulizer every 6 hours  ammonium lactate 12% Lotion 1 Application(s) Topical two times a day  apixaban 5 milliGRAM(s) Enteral Tube two times a day  chlorhexidine 0.12% Liquid 15 milliLiter(s) Oral Mucosa every 12 hours  dextrose 5%. 1000 milliLiter(s) (100 mL/Hr) IV Continuous <Continuous>  dextrose 5%. 1000 milliLiter(s) (50 mL/Hr) IV Continuous <Continuous>  dextrose 50% Injectable 25 Gram(s) IV Push once  dextrose 50% Injectable 12.5 Gram(s) IV Push once  dextrose 50% Injectable 25 Gram(s) IV Push once  doxycycline monohydrate Capsule 100 milliGRAM(s) Oral every 12 hours  gabapentin 600 milliGRAM(s) Oral three times a day  glucagon  Injectable 1 milliGRAM(s) IntraMuscular once  insulin glargine Injectable (LANTUS) 20 Unit(s) SubCutaneous at bedtime  insulin lispro (ADMELOG) corrective regimen sliding scale   SubCutaneous three times a day before meals  insulin lispro Injectable (ADMELOG) 6 Unit(s) SubCutaneous three times a day before meals  losartan 100 milliGRAM(s) Oral daily  multivitamin 1 Tablet(s) Oral daily  pantoprazole   Suspension 40 milliGRAM(s) Oral daily  polyethylene glycol 3350 17 Gram(s) Oral two times a day  senna 2 Tablet(s) Oral at bedtime    MEDICATIONS  (PRN):  ALBUTerol    90 MICROgram(s) HFA Inhaler 2 Puff(s) Inhalation every 6 hours PRN Shortness of Breath and/or Wheezing  ALPRAZolam 0.5 milliGRAM(s) Oral every 6 hours PRN anxiety  dextrose Oral Gel 15 Gram(s) Oral once PRN Blood Glucose LESS THAN 70 milliGRAM(s)/deciliter  HYDROmorphone   Tablet 4 milliGRAM(s) Oral every 4 hours PRN Severe Pain (7 - 10)  HYDROmorphone  Injectable 1 milliGRAM(s) IV Push every 8 hours PRN Moderate Pain (4 - 6)    Pertinent Labs:  @ 06:52: Na 139, BUN 14, Cr <0.5<L>, <H>, K+ 4.0, Phos --, Mg 2.0, Alk Phos 50, ALT/SGPT 23, AST/SGOT 14, HbA1c --    Finger Sticks:  POCT Blood Glucose.: 236 mg/dL ( @ 11:19)  POCT Blood Glucose.: 232 mg/dL ( @ 08:50)  POCT Blood Glucose.: 208 mg/dL ( @ 05:38)  POCT Blood Glucose.: 127 mg/dL ( @ 21:10)  POCT Blood Glucose.: 196 mg/dL ( @ 17:27)    Physical Findings:  - Appearance: A&Ox4  - GI function: 8/9 x1 BM  - Tubes: + PEG  - Oral/Mouth cavity: NPO with EN  - Skin: Intact  - Edema: no edema     Nutrition Requirements:  ABW 86.6kg & IBW 47.7kg **with consideration for COPD, weight loss**  ENERGY: 1387-1803kcal/day (MSJ x1.0-1.3)  PROTEIN: 81-95g/day (1.7-2.0g/kg of IBW)  FLUIDS: 1mL/kcal     Estimated Energy Needs    Continue [x]  Adjust []  Estimated Protein Needs    Continue [x]  Adjust []  Estimated Fluid Needs        Continue [x]  Adjust []     [x] Previous Nutrition Diagnosis:            [x] Ongoing          [] Resolved  #1 Inadequate Oral Intake  Etiology patient NPO  Signs/Symptoms requiring EN via PEG for nutrition/hydration  Goal/Expected Outcome meet >85% est energy needs within 4days    Nutrition Intervention:   Enteral Nutrition, Nutrition Related Medication, Coordination of Care      Indicator/Monitoring:   Mykel Cisneros, #[id number] to monitor diet order, energy intake, body composition, weight    Recommendations:  1. Continue NPO with alternate means of nutrition/hydration via **Gastrostomy tube**  regimen: Glucerna 1.2 at 300mL Q6hrs, provides (1440kcal, 72gm protein, 972mL free H2O) + RECOMMEND adding No Carb Prosource modular 1x/daily (+60kcal, +15 g pro --> total with en 1500kcal, 87g pro)  fluids per medical team  2. as medically able, recommend SLP re-evaluation MBSS for patient to determine safest diet texture for advancement (patient was on PO diet prior to admission)  patient has good appetite at baseline may not need PEG feeds if PO intake is safe per SLP and intake is adequate though will continue to monitor   3. Continue multivitamin, bowel regimen    MODERATE risk follow up within 6days  Amrobbin Cisneros #[id number] or via teams Registered Dietitian Follow-Up     Patient Profile Reviewed                           Yes [x]   No []  Nutrition History Previously Obtained        Yes [x]  No []      Pertinent Medical Interventions:  58 y/o F w/ PMHx: IDDM, GERD, HTN, COPD, chronic hypoxemic and hypercapnic respiratory failure s/p tracheostomy (not chronically vented) & PEG at [facility], DVT, anxiety, CHF, recurrent mucous plugs s/p bronchoscopies, presenting from [facility] [place] for chest tightness and SOB. Pt reports increased secretions requiring more suctioning, still unable to clear them. Tracheostomy exchanged to 6 cuffed tube from 6 cuffless on presentation. Per CT sx, no current indication for emergency surgical tracheostomy exchange, "f/u w/ ENT vs. pulm for exchange of tracheostomy given it is chronic in nature"; Pt is being treated for acute on chronic hypoxic RF, mucus plugging, bacterial PNA. CXR -> Slightly decreased interstitial prominence/opacities. Unchanged left basal pleural-parenchymal opacity.    Nutrition Interval History:   SLP recommends continue NPO w/ PEG tube, allow ice chips, sips of water  pt would benefit from repeat instrumental swallow study when medically optimized  **NPO + receiving EN regimen via PEG: Glucerna 1.2 at 300mL Q6hrs, provides (1440kcal, 72gm protein, 972mL free H2O)**  + FWF 30mL post feeds Q6hr-- total with EN 1092mL water  Patient tolerating feeds well, no GI s/s per patient  **Patient meeting >85% of estimated energy needs in-house     Diet order:   Diet, NPO with Tube Feed:   Tube Feeding Modality: Gastrostomy  Glucerna 1.2 Martin  Total Volume for 24 Hours (mL): 1200  Bolus  Total Volume of Bolus (mL):  300  Total # of Feeds: 4  Tube Feed Frequency: Every 6 hours   Tube Feed Start Time: 14:00  Bolus Feed Rate (mL per Hour): 300   Bolus Feed Duration (in Hours): 1  Bolus   Total Volume per Flush (mL): 30   Frequency: Every 6 Hours  Free Water Flush Instructions:  post feed (22 @ 14:14) [Active]    Anthropometrics:  Height (cm): 154.9 (22 @ 06:02)  Weight (kg): 86.6 (22 @ 06:02)  BMI (kg/m2): 36.1 (22 @ 06:02)    OTHER WEIGHTS:   weights in emr: Daily Weight in k.3 (-), Weight in k.4 (), Weight in k.3 (), Weight in k ()  Weight at NH was 82.3kg though gained weight to current admission wt with adequate PO intake 86.6kg indicating overall 8.2% wt loss from dry weight 94.3 --> current wt within 2 months   IBW: 47.7kg    MEDICATIONS  (STANDING):  acetaminophen     Tablet .. 650 milliGRAM(s) Oral every 6 hours  acetylcysteine 20% for Nebulization - Peds 3 milliLiter(s) Nebulizer every 6 hours  ALBUTerol    0.083% 2.5 milliGRAM(s) Nebulizer every 6 hours  ammonium lactate 12% Lotion 1 Application(s) Topical two times a day  apixaban 5 milliGRAM(s) Enteral Tube two times a day  chlorhexidine 0.12% Liquid 15 milliLiter(s) Oral Mucosa every 12 hours  dextrose 5%. 1000 milliLiter(s) (100 mL/Hr) IV Continuous <Continuous>  dextrose 5%. 1000 milliLiter(s) (50 mL/Hr) IV Continuous <Continuous>  dextrose 50% Injectable 25 Gram(s) IV Push once  dextrose 50% Injectable 12.5 Gram(s) IV Push once  dextrose 50% Injectable 25 Gram(s) IV Push once  doxycycline monohydrate Capsule 100 milliGRAM(s) Oral every 12 hours  gabapentin 600 milliGRAM(s) Oral three times a day  glucagon  Injectable 1 milliGRAM(s) IntraMuscular once  insulin glargine Injectable (LANTUS) 20 Unit(s) SubCutaneous at bedtime  insulin lispro (ADMELOG) corrective regimen sliding scale   SubCutaneous three times a day before meals  insulin lispro Injectable (ADMELOG) 6 Unit(s) SubCutaneous three times a day before meals  losartan 100 milliGRAM(s) Oral daily  multivitamin 1 Tablet(s) Oral daily  pantoprazole   Suspension 40 milliGRAM(s) Oral daily  polyethylene glycol 3350 17 Gram(s) Oral two times a day  senna 2 Tablet(s) Oral at bedtime    MEDICATIONS  (PRN):  ALBUTerol    90 MICROgram(s) HFA Inhaler 2 Puff(s) Inhalation every 6 hours PRN Shortness of Breath and/or Wheezing  ALPRAZolam 0.5 milliGRAM(s) Oral every 6 hours PRN anxiety  dextrose Oral Gel 15 Gram(s) Oral once PRN Blood Glucose LESS THAN 70 milliGRAM(s)/deciliter  HYDROmorphone   Tablet 4 milliGRAM(s) Oral every 4 hours PRN Severe Pain (7 - 10)  HYDROmorphone  Injectable 1 milliGRAM(s) IV Push every 8 hours PRN Moderate Pain (4 - 6)    Pertinent Labs:  @ 06:52: Na 139, BUN 14, Cr <0.5<L>, <H>, K+ 4.0, Phos --, Mg 2.0, Alk Phos 50, ALT/SGPT 23, AST/SGOT 14, HbA1c --    Finger Sticks:  POCT Blood Glucose.: 236 mg/dL ( @ 11:19)  POCT Blood Glucose.: 232 mg/dL ( @ 08:50)  POCT Blood Glucose.: 208 mg/dL ( @ 05:38)  POCT Blood Glucose.: 127 mg/dL ( @ 21:10)  POCT Blood Glucose.: 196 mg/dL ( @ 17:27)    Physical Findings:  - Appearance: A&Ox4  - GI function: 8/9 x1 BM  - Tubes: + PEG  - Oral/Mouth cavity: NPO with EN  - Skin: Intact  - Edema: no edema     Nutrition Requirements:  ABW 86.6kg & IBW 47.7kg **with consideration for COPD, weight loss**  ENERGY: 1387-1803kcal/day (MSJ x1.0-1.3)  PROTEIN: 81-95g/day (1.7-2.0g/kg of IBW)  FLUIDS: 1mL/kcal     Estimated Energy Needs    Continue [x]  Adjust []  Estimated Protein Needs    Continue [x]  Adjust []  Estimated Fluid Needs        Continue [x]  Adjust []     [x] Previous Nutrition Diagnosis:            [x] Ongoing          [] Resolved  #1 Inadequate Oral Intake  Etiology patient NPO  Signs/Symptoms requiring EN via PEG for nutrition/hydration  Goal/Expected Outcome meet >85% est energy needs within 4days    Nutrition Intervention:   Enteral Nutrition, Nutrition Related Medication, Coordination of Care      Indicator/Monitoring:   Mykel Cisneros, #[id number] to monitor diet order, energy intake, body composition, weight    Recommendations:  1. Continue NPO with alternate means of nutrition/hydration via **Gastrostomy tube**  regimen: Glucerna 1.2 at 300mL Q6hrs, provides (1440kcal, 72gm protein, 972mL free H2O) + RECOMMEND adding No Carb Prosource modular 1x/daily (+60kcal, +15 g pro --> total with en 1500kcal, 87g pro)  fluids per medical team  2. as medically able, recommend SLP re-evaluation MBSS for patient to determine safest diet texture for advancement (patient was on PO diet prior to admission)  patient has good appetite at baseline may not need PEG feeds if PO intake is safe per SLP and intake is adequate though will continue to monitor   3. Continue multivitamin, bowel regimen    MODERATE risk follow up within 6days  Mykel Cisneros #[id number] or via teams Registered Dietitian Follow-Up     Patient Profile Reviewed                           Yes [x]   No []  Nutrition History Previously Obtained        Yes [x]  No []      Pertinent Medical Interventions:  56 y/o F w/ PMHx: IDDM, GERD, HTN, COPD, chronic hypoxemic and hypercapnic respiratory failure s/p tracheostomy (not chronically vented) & PEG at [facility], DVT, anxiety, CHF, recurrent mucous plugs s/p bronchoscopies, presenting from [facility] for chest tightness and SOB. Pt reports increased secretions requiring more suctioning, still unable to clear them. Tracheostomy exchanged to 6 cuffed tube from 6 cuffless on presentation. Per CT sx, no current indication for emergency surgical tracheostomy exchange, "f/u w/ ENT vs. pulm for exchange of tracheostomy given it is chronic in nature"; Pt is being treated for acute on chronic hypoxic RF, mucus plugging, bacterial PNA. CXR -> Slightly decreased interstitial prominence/opacities. Unchanged left basal pleural-parenchymal opacity.    Nutrition Interval History:   SLP recommends continue NPO w/ PEG tube, allow ice chips, sips of water  pt would benefit from repeat instrumental swallow study when medically optimized  **NPO + receiving EN regimen via PEG: Glucerna 1.2 at 300mL Q6hrs, provides (1440kcal, 72gm protein, 972mL free H2O)**  + FWF 30mL post feeds Q6hr-- total with EN 1092mL water  Patient tolerating feeds well, no GI s/s per patient  **Patient meeting >85% of estimated energy needs in-house     Diet order:   Diet, NPO with Tube Feed:   Tube Feeding Modality: Gastrostomy  Glucerna 1.2 Martin  Total Volume for 24 Hours (mL): 1200  Bolus  Total Volume of Bolus (mL):  300  Total # of Feeds: 4  Tube Feed Frequency: Every 6 hours   Tube Feed Start Time: 14:00  Bolus Feed Rate (mL per Hour): 300   Bolus Feed Duration (in Hours): 1  Bolus   Total Volume per Flush (mL): 30   Frequency: Every 6 Hours  Free Water Flush Instructions:  post feed (22 @ 14:14) [Active]    Anthropometrics:  Height (cm): 154.9 (22 @ 06:02)  Weight (kg): 86.6 (22 @ 06:02)  BMI (kg/m2): 36.1 (22 @ 06:02)    OTHER WEIGHTS:   weights in emr: Daily Weight in k.3 (-), Weight in k.4 (), Weight in k.3 (), Weight in k ()  Weight at NH was 82.3kg though gained weight to current admission wt with adequate PO intake 86.6kg indicating overall 8.2% wt loss from dry weight 94.3 --> current wt within 2 months   IBW: 47.7kg    MEDICATIONS  (STANDING):  acetaminophen     Tablet .. 650 milliGRAM(s) Oral every 6 hours  acetylcysteine 20% for Nebulization - Peds 3 milliLiter(s) Nebulizer every 6 hours  ALBUTerol    0.083% 2.5 milliGRAM(s) Nebulizer every 6 hours  ammonium lactate 12% Lotion 1 Application(s) Topical two times a day  apixaban 5 milliGRAM(s) Enteral Tube two times a day  chlorhexidine 0.12% Liquid 15 milliLiter(s) Oral Mucosa every 12 hours  dextrose 5%. 1000 milliLiter(s) (100 mL/Hr) IV Continuous <Continuous>  dextrose 5%. 1000 milliLiter(s) (50 mL/Hr) IV Continuous <Continuous>  dextrose 50% Injectable 25 Gram(s) IV Push once  dextrose 50% Injectable 12.5 Gram(s) IV Push once  dextrose 50% Injectable 25 Gram(s) IV Push once  doxycycline monohydrate Capsule 100 milliGRAM(s) Oral every 12 hours  gabapentin 600 milliGRAM(s) Oral three times a day  glucagon  Injectable 1 milliGRAM(s) IntraMuscular once  insulin glargine Injectable (LANTUS) 20 Unit(s) SubCutaneous at bedtime  insulin lispro (ADMELOG) corrective regimen sliding scale   SubCutaneous three times a day before meals  insulin lispro Injectable (ADMELOG) 6 Unit(s) SubCutaneous three times a day before meals  losartan 100 milliGRAM(s) Oral daily  multivitamin 1 Tablet(s) Oral daily  pantoprazole   Suspension 40 milliGRAM(s) Oral daily  polyethylene glycol 3350 17 Gram(s) Oral two times a day  senna 2 Tablet(s) Oral at bedtime    MEDICATIONS  (PRN):  ALBUTerol    90 MICROgram(s) HFA Inhaler 2 Puff(s) Inhalation every 6 hours PRN Shortness of Breath and/or Wheezing  ALPRAZolam 0.5 milliGRAM(s) Oral every 6 hours PRN anxiety  dextrose Oral Gel 15 Gram(s) Oral once PRN Blood Glucose LESS THAN 70 milliGRAM(s)/deciliter  HYDROmorphone   Tablet 4 milliGRAM(s) Oral every 4 hours PRN Severe Pain (7 - 10)  HYDROmorphone  Injectable 1 milliGRAM(s) IV Push every 8 hours PRN Moderate Pain (4 - 6)    Pertinent Labs:  @ 06:52: Na 139, BUN 14, Cr <0.5<L>, <H>, K+ 4.0, Phos --, Mg 2.0, Alk Phos 50, ALT/SGPT 23, AST/SGOT 14, HbA1c --    Finger Sticks:  POCT Blood Glucose.: 236 mg/dL ( @ 11:19)  POCT Blood Glucose.: 232 mg/dL ( @ 08:50)  POCT Blood Glucose.: 208 mg/dL ( @ 05:38)  POCT Blood Glucose.: 127 mg/dL ( @ 21:10)  POCT Blood Glucose.: 196 mg/dL ( @ 17:27)    Physical Findings:  - Appearance: A&Ox4  - GI function: 8/9 x1 BM  - Tubes: + PEG  - Oral/Mouth cavity: NPO with EN  - Skin: Intact  - Edema: no edema     Nutrition Requirements:  ABW 86.6kg & IBW 47.7kg **with consideration for COPD, weight loss**  ENERGY: 1387-1803kcal/day (MSJ x1.0-1.3)  PROTEIN: 81-95g/day (1.7-2.0g/kg of IBW)  FLUIDS: 1mL/kcal     Estimated Energy Needs    Continue [x]  Adjust []  Estimated Protein Needs    Continue [x]  Adjust []  Estimated Fluid Needs        Continue [x]  Adjust []     [x] Previous Nutrition Diagnosis:            [x] Ongoing          [] Resolved  #1 Inadequate Oral Intake  Etiology patient NPO  Signs/Symptoms requiring EN via PEG for nutrition/hydration  Goal/Expected Outcome meet >85% est energy needs within 4days    Nutrition Intervention:   Enteral Nutrition, Nutrition Related Medication, Coordination of Care      Indicator/Monitoring:   Mykel Cisneros, #[id number] to monitor diet order, energy intake, body composition, weight    Recommendations:  1. Continue NPO with alternate means of nutrition/hydration via **Gastrostomy tube**  regimen: Glucerna 1.2 at 300mL Q6hrs, provides (1440kcal, 72gm protein, 972mL free H2O) + RECOMMEND adding No Carb Prosource modular 1x/daily (+60kcal, +15 g pro --> total with en 1500kcal, 87g pro)  fluids per medical team  2. as medically able, recommend SLP re-evaluation MBSS for patient to determine safest diet texture for advancement (patient was on PO diet prior to admission)  patient has good appetite at baseline may not need PEG feeds if PO intake is safe per SLP and intake is adequate though will continue to monitor   3. Continue multivitamin, bowel regimen    Pending MBSS  MODERATE risk follow up within 6days  Mykel Cisneros #[id number] or via teams

## 2022-08-09 NOTE — SWALLOW BEDSIDE ASSESSMENT ADULT - ASR SWALLOW RECOMMEND DIAG
pt would benefit from repeat instrumental swallow study when respiratory status optimized: ?downsizing trach vs. decannulation, pulm to f/u pt would benefit from repeat instrumental swallow study when respiratory status optimized: ?downsizing trach, pulm to f/u

## 2022-08-09 NOTE — PROGRESS NOTE ADULT - ASSESSMENT
Unfortunate 56 YO F with acute on chr hypoxic RF read for hypoxia and hypotension and ongoing Bacterial PNA. Hx of ASP PNA, COPD exacerbation and intubation with failure to extubate resulting in trach at [facility] .  The pt is under chronic  at [facility].      Acute on Chr hypoxic RF, mucus plugging  Bacterial PNA  Clinical Debility  Bedbound state  BL foot drop  JHx of HTN, ASHD HFpEF  Hx of DLD  Hx of COPD, MO, LUCIANA, sp intubation, failure to extubate, chr trach since /[facility]  Hx of DM II  Hx of Ch anemia of ch dis  Hx of GERD, HH, Diverticulosis, Ch constipation  Hx of OA, DJD, DDD, chr pain syn  Hx of lower ext venous stasis dermatitis  Hx of DVT, AC/Eliquis  Hx of Anxiety, Depression     today pt stable, A&O, verbal  pt cont the weaning process, pt on T tube,  need to clarify our resp goal of care i.e. wean  to permanent trach vs get pt to the pt of removing trach all together  pt needs freq suctioning, con Neb tx and IV steroids may be transitioned to po, slow taper  on ad pt was plkacet on vent support  pt was give 2 L of IV fluids with improvement of BP  CXR reviewed and shows unchanged interstitial opacities, L pl based opacity  Venous doppler neg for DVT  Vasc consult given severe derm changes, mm atrophy concern over lower ext aa occ dis/PAD  place order for aa doppler of lower ext  Pul-critical care consult and ff up appreciated, help with weaning process, Our goal? permanent trach vs complete removal of trach  CTS consult for trach air leak, pt not in distress and oxygenating well no need for emergent trach change, may not need change if goal is to start weaning process  cont all previous meds  GI prophylaxis  att to bowel regimen  nutrition via PEG/glucerna  PM: hydromorphone 4mg po q 4 and 1mg IV q 8 for breakthrough pain, gabapentin 600mg q 8, tylenol 650mg QID    Rehab consult for bedside PT, can we get pt OOB to chair, rehab states pt high risk for fall but bedside and  OOB to chair daily, pt highly motivated    pt full code  Social SVC, pt not ready for D/C, weaning in progress

## 2022-08-09 NOTE — PROGRESS NOTE ADULT - ASSESSMENT
IMPRESSION:    Acute on Chronic Hypoxemic Respiratory Failure improving   Chronic Hypercapnic Respiratory Failure  SP Tracheostomy and PEG ( not chronically vented )  COPD exacerbation improving   UTI  HO recurrent mucous plugs SP multiple bronchoscopies  HO DVT on Eliquis  HFpEF      PLAN:    CNS:  No depressants.  Pain control     HEENT: Oral care.  Trach care.     PULMONARY:  HOB @ 45 degrees.  Aspiration precautions.  Continue pulmonary toilet.  Nebs with Atrovent and Albuterol  q4h & PRN.  With her recurrent exacerbations, chronic hypercapnia, and HO multiple bronchoscopies for mucous plugging, would keep the trach for now and follow up as OP in 6-8 weeks.       CARDIOVASCULAR:  Avoid volume overload.      GI: GI prophylaxis.  PEG feeds.  Bowel regimen     RENAL:  Follow up lytes.  Correct as needed.     INFECTIOUS DISEASE:   Finish ABX course     HEMATOLOGICAL:  DVT therapy on Eliquis.    ENDOCRINE:  Follow up FS.  Insulin protocol if needed.    MUSCULOSKELETAL:  PT/OT    Guarded prognosis    DC planing

## 2022-08-09 NOTE — PROGRESS NOTE ADULT - SUBJECTIVE AND OBJECTIVE BOX
CRUZ DMUONT 56yo  Female sent to the ER from [facility], [place], operated by [facility] where she is on  care for c/o inc SOB, chest tightness, diff breathing, wheezing i.e. acute on chr hypoxic RF.  Of note the pt has a trach and has had freq readmissions for mucous plugging, RF and has had several bronchoscopies.   EMS noted the pt to be hypotensive and hypoxic.  The pt was vigorously suctioned, received Neb Tx, IV steroids,  BP revived with IV fluids.  The pt was cultured and placed on ABx.  She is v well known to the Pul [facility] and to ID.  The pt is ad with acute on ch hypoxic RF and ongoing Bacterial PNA.  The PMHx includes:  HTN, ASHD, HFpEF, DLD, DM II, Chronic RF, COPD, LUCIANA, ASp PNA, sp intubation, failure to extubate, chronic trach ([facility] 4/22), recurrent mucus plugging of airways, SDVT, AC with Eliquis, lower ext venous stasis dermatitis, bedbound state, GERD, diverticulosis, sp PEG, OA, DDD, DJD, chronic pain syn, depression anxiety.    INTERVAL HPI/OVERNIGHT EVENTS: pt v A&O, cont on T piece, O2 99 %, transition steroids to po, cont nebulizer tx and meticulous suctioning, need to mobilize pt OOB to chair,     MEDICATIONS  (STANDING):  acetaminophen     Tablet .. 650 milliGRAM(s) Oral every 6 hours  acetylcysteine 20% for Nebulization - Peds 3 milliLiter(s) Nebulizer every 6 hours  ALBUTerol    0.083% 2.5 milliGRAM(s) Nebulizer every 6 hours  ammonium lactate 12% Lotion 1 Application(s) Topical two times a day  apixaban 5 milliGRAM(s) Enteral Tube two times a day  chlorhexidine 0.12% Liquid 15 milliLiter(s) Oral Mucosa every 12 hours  dextrose 5%. 1000 milliLiter(s) (100 mL/Hr) IV Continuous <Continuous>  dextrose 5%. 1000 milliLiter(s) (50 mL/Hr) IV Continuous <Continuous>  dextrose 50% Injectable 25 Gram(s) IV Push once  dextrose 50% Injectable 12.5 Gram(s) IV Push once  dextrose 50% Injectable 25 Gram(s) IV Push once  doxycycline monohydrate Capsule 100 milliGRAM(s) Oral every 12 hours  gabapentin 600 milliGRAM(s) Oral three times a day  glucagon  Injectable 1 milliGRAM(s) IntraMuscular once  insulin glargine Injectable (LANTUS) 20 Unit(s) SubCutaneous at bedtime  insulin lispro (ADMELOG) corrective regimen sliding scale   SubCutaneous three times a day before meals  insulin lispro Injectable (ADMELOG) 6 Unit(s) SubCutaneous three times a day before meals  methylPREDNISolone sodium succinate Injectable 60 milliGRAM(s) IV Push two times a day  multivitamin 1 Tablet(s) Oral daily  pantoprazole   Suspension 40 milliGRAM(s) Oral daily  polyethylene glycol 3350 17 Gram(s) Oral two times a day  senna 2 Tablet(s) Oral at bedtime    MEDICATIONS  (PRN):  ALBUTerol    90 MICROgram(s) HFA Inhaler 2 Puff(s) Inhalation every 6 hours PRN Shortness of Breath and/or Wheezing  ALPRAZolam 0.5 milliGRAM(s) Oral every 6 hours PRN anxiety  dextrose Oral Gel 15 Gram(s) Oral once PRN Blood Glucose LESS THAN 70 milliGRAM(s)/deciliter  HYDROmorphone   Tablet 4 milliGRAM(s) Oral every 4 hours PRN Severe Pain (7 - 10)  HYDROmorphone  Injectable 1 milliGRAM(s) IV Push every 8 hours PRN Moderate Pain (4 - 6)      Allergies    penicillin (Swelling)      Vital Signs Last 24 Hrs    T(F): 98  HR:  55  BP:  154/72    RR: 18   SpO2:  99% T piece     PHYSICAL EXAM:      Constitutional: pt alert, oriented  verbal,  bedbound chr ill looking but in NAD    Eyes: nonicteric    ENMT: dry oral mucosa, dental defects,     Neck: + trach tube supple, no stridor, + trach, , + T tube    Respiratory: shallow resp, diminished harsh bronchial  BS, scattered rhonchi, no wheezing    Cardiovascular: S1S2 reg    Gastrointestinal: globular, soft and benign, + PEG, + BS    Genitourinary:    Extremities: moves all ext, dec motor strength of lower ext, + BL mm atrophy, + BL foot drop    Vascular: dec pedal pulses    Neurological: nonfocal    Skin: lower ext dark almost black hyperpigmentation of lower ext    Lymph Nodes: not enlarged    Musculoskeletal: dec mm mass and tone    Psychiatric: anxious, depressed but stable        LABS:                        11.2  6.6  )-----------( 154             31.7      139  |  102  |  14  ----------------------------<  181  4.0   |  30  |  0.5    , 115  Ca    9.7      05 Aug 2022 06:50  Mg     1.9, 1.8, 2.0  trop <0.01  TPro  5.9, 5.5  /  Alb  3.3, 3.4  /  TBili  0.3  /  DBili  x   /  AST  20  /  ALT  28  /  AlkPhos  77  08-05          RADIOLOGY & ADDITIONAL TESTS:  EKG:  NSR R axis, low voltage, nonspecific ST-T changes  CXR:  interstitial prominence, L plbased opacity unchanged

## 2022-08-09 NOTE — PROGRESS NOTE ADULT - ASSESSMENT
Acute on chronic Hypoxic respiratory failure   #s/p trach/PEG at [facility] 04/2022 after being intubated for >1month  #COPD Exacerbation  #Suspected superimposed bacterial PNA     - Continue doxycycline  - Taper steroids- Start prednisone 40 OD and Nebs q4h & prn   -CXR reviewed and shows unchanged interstitial opacities      #Chronic HFpEF  2d Echo 05/2022: LVEF 50-55%. proBNP ~ 444  Pt does not appear volume overloaded  - Low Na diet and daily weight.   - monitor I & O    #Hx of DVT   - Cont Eliquis  - Venous duplex shows no DVT    #DM2  - Cont lantus/lispro 20-6-6-6  - ISS    #Chronic atropic B/l LE Hyperpigmentation changes  On previous admission, LWC with Betamethasone cream and Ammonium lactate dressings per Burn has not helped over the past 3 weeks.   - Plan was for outpatient burn and dermatology f/u    #Chronic Lower extremity Pain management and Hyperpigmentation   - Gabapentin 600 TID,   - PO dilaudid 4mg Q4 PRN  -Vascular consulted for possible PVD.   -Arterial doppler ordered  -PT/OT as tolerated  - Stitches from biopsy removed    #Hypomagnesia  -Repleted    DVT ppx: Eliquis     Acute on chronic Hypoxic respiratory failure   #s/p trach/PEG at [facility] 04/2022 after being intubated for >1month  #COPD Exacerbation  #Suspected superimposed bacterial PNA     - Continue doxycycline  - Taper steroids- Start prednisone 40 OD tomorrow and cont Nebs q4h & prn   -CXR reviewed and shows unchanged interstitial opacities      #Chronic HFpEF  2d Echo 05/2022: LVEF 50-55%. proBNP ~ 444  Pt does not appear volume overloaded  - Low Na diet and daily weight.   - monitor I & O    #Hx of DVT   - Cont Eliquis  - Venous duplex shows no DVT    #DM2  - Cont lantus/lispro 20-6-6-6  - ISS    #Chronic atropic B/l LE Hyperpigmentation changes  On previous admission, LWC with Betamethasone cream and Ammonium lactate dressings per Burn has not helped over the past 3 weeks.   - Plan was for outpatient burn and dermatology f/u    #Chronic Lower extremity Pain management and Hyperpigmentation   - Gabapentin 600 TID,   - PO dilaudid 4mg Q4 PRN  -Vascular consulted for possible PVD.   -Arterial doppler ordered  -PT/OT as tolerated  - Stitches from biopsy removed    #Hypomagnesia  -Repleted    DVT ppx: Eliquis

## 2022-08-09 NOTE — PROGRESS NOTE ADULT - SUBJECTIVE AND OBJECTIVE BOX
56yo  Female sent to the ER from [facility] where she is on  care for c/o inc SOB, chest tightness, diff breathing, wheezing i.e. acute on chr hypoxic RF.  Of note the pt has a trach and has had freq readmissions for mucous plugging, RF and has had several bronchoscopies.   EMS noted the pt to be hypotensive and hypoxic.  The pt was vigorously suctioned, received Neb Tx, IV steroids,  BP revived with IV fluids.  The pt was cultured and placed on ABx.  She is v well known to the Pul [facility] and to ID.  The pt is ad with acute on ch hypoxic RF and  Bacterial PNA which has now resolved.      OVERNIGHT EVENTS:   TODAY: Patient was seen today at bedside. Resting comfortably. Complaining of B/l LE pain. Review of systems is otherwise negative.      VITAL SIGNS: Last 24 Hours  T(C): 36.7 (09 Aug 2022 05:00), Max: 36.8 (08 Aug 2022 20:33)  T(F): 98 (09 Aug 2022 05:00), Max: 98.3 (08 Aug 2022 20:33)  HR: 55 (09 Aug 2022 07:30) (55 - 62)  BP: 154/72 (09 Aug 2022 05:00) (146/67 - 158/67)  BP(mean): --  RR: 18 (09 Aug 2022 07:30) (18 - 18)  SpO2: 99% (09 Aug 2022 07:30) (98% - 99%)    08-08-22 @ 07:01  -  08-09-22 @ 07:00  --------------------------------------------------------  IN: 960 mL / OUT: 1400 mL / NET: -440 mL    PHYSICAL EXAM:  GENERAL:   Awake, alert; NAD.  HEENT:  Head NC/AT; Conjunctivae pink, Sclera anicteric; Oral mucosa moist.  + trach tube supple, no stridor, + trach, , + T tube  CARDIO:   Regular rate; Regular rhythm  RESP:   shallow resp, diminished harsh bronchial  BS, scattered rhonchi, no wheezing  GI:   Soft; NT/ND; BS; No guarding; No rebound tenderness.  EXT:   lower ext dark almost black hyperpigmentation of lower ext  SKIN:   Intact.      LAB RESULTS:                        11.2   6.64  )-----------( 154      ( 09 Aug 2022 06:52 )             31.7     08-09    139  |  102  |  14  ----------------------------<  181<H>  4.0   |  30  |  <0.5<L>    Ca    9.1      09 Aug 2022 06:52  Mg     2.0     08-09    TPro  5.5<L>  /  Alb  3.4<L>  /  TBili  0.4  /  DBili  x   /  AST  14  /  ALT  23  /  AlkPhos  50  08-09                MICROBIOLOGY:    RADIOLOGY:    ALLERGIES:  penicillin (Swelling)

## 2022-08-09 NOTE — PROGRESS NOTE ADULT - SUBJECTIVE AND OBJECTIVE BOX
Patient is a 57y old  Female who presents with a chief complaint of Hypoxia, acute on chr RF, + trach (09 Aug 2022 11:45)        Over Night Events:  Tolerated 48 hours off ventilator.         ROS:     All ROS are negative except HPI         PHYSICAL EXAM    ICU Vital Signs Last 24 Hrs  T(C): 36.6 (09 Aug 2022 12:45), Max: 36.8 (08 Aug 2022 20:33)  T(F): 97.9 (09 Aug 2022 12:45), Max: 98.3 (08 Aug 2022 20:33)  HR: 57 (09 Aug 2022 13:25) (55 - 62)  BP: 172/77 (09 Aug 2022 13:25) (154/72 - 177/73)  BP(mean): --  ABP: --  ABP(mean): --  RR: 16 (09 Aug 2022 12:45) (16 - 18)  SpO2: 99% (09 Aug 2022 12:45) (98% - 99%)    O2 Parameters below as of 09 Aug 2022 07:30  Patient On (Oxygen Delivery Method): T-piece            CONSTITUTIONAL:  IN   NAD    ENT:   Airway patent,   Mouth with normal mucosa.   Trach     EYES:   Pupils equal,   Round and reactive to light.    CARDIAC:   Normal rate,   Regular rhythm.      RESPIRATORY:   No wheezing  Bilateral BS  Normal chest expansion  Not tachypneic,  No use of accessory muscles    GASTROINTESTINAL:  Abdomen soft,   Non-tender,   No guarding,   + BS    MUSCULOSKELETAL:   Range of motion is not limited,  No clubbing, cyanosis    NEUROLOGICAL:   Alert and oriented   No motor  deficits.    SKIN:   Skin normal color for race,   No evidence of rash.    PSYCHIATRIC:   No apparent risk to self or others.        08-08-22 @ 07:01  -  08-09-22 @ 07:00  --------------------------------------------------------  IN:    Enteral Tube Flush: 300 mL    Glucerna: 660 mL  Total IN: 960 mL    OUT:    Ureteral Catheter (mL): 1400 mL  Total OUT: 1400 mL    Total NET: -440 mL      08-09-22 @ 07:01  -  08-09-22 @ 17:17  --------------------------------------------------------  IN:  Total IN: 0 mL    OUT:    Intermittent Catheterization - Urethral (mL): 550 mL  Total OUT: 550 mL    Total NET: -550 mL          LABS:                            11.2   6.64  )-----------( 154      ( 09 Aug 2022 06:52 )             31.7                                               08-09    139  |  102  |  14  ----------------------------<  181<H>  4.0   |  30  |  <0.5<L>    Ca    9.1      09 Aug 2022 06:52  Mg     2.0     08-09    TPro  5.5<L>  /  Alb  3.4<L>  /  TBili  0.4  /  DBili  x   /  AST  14  /  ALT  23  /  AlkPhos  50  08-09                                                                                           LIVER FUNCTIONS - ( 09 Aug 2022 06:52 )  Alb: 3.4 g/dL / Pro: 5.5 g/dL / ALK PHOS: 50 U/L / ALT: 23 U/L / AST: 14 U/L / GGT: x                                                                                               Mode: standby  FiO2: 40                                          MEDICATIONS  (STANDING):  acetaminophen     Tablet .. 650 milliGRAM(s) Oral every 6 hours  acetylcysteine 20% for Nebulization - Peds 3 milliLiter(s) Nebulizer every 6 hours  ALBUTerol    0.083% 2.5 milliGRAM(s) Nebulizer every 6 hours  ammonium lactate 12% Lotion 1 Application(s) Topical two times a day  apixaban 5 milliGRAM(s) Enteral Tube two times a day  chlorhexidine 0.12% Liquid 15 milliLiter(s) Oral Mucosa every 12 hours  dextrose 5%. 1000 milliLiter(s) (100 mL/Hr) IV Continuous <Continuous>  dextrose 5%. 1000 milliLiter(s) (50 mL/Hr) IV Continuous <Continuous>  dextrose 50% Injectable 25 Gram(s) IV Push once  dextrose 50% Injectable 12.5 Gram(s) IV Push once  dextrose 50% Injectable 25 Gram(s) IV Push once  doxycycline monohydrate Capsule 100 milliGRAM(s) Oral every 12 hours  gabapentin 600 milliGRAM(s) Oral three times a day  glucagon  Injectable 1 milliGRAM(s) IntraMuscular once  insulin glargine Injectable (LANTUS) 20 Unit(s) SubCutaneous at bedtime  insulin lispro (ADMELOG) corrective regimen sliding scale   SubCutaneous three times a day before meals  insulin lispro Injectable (ADMELOG) 6 Unit(s) SubCutaneous three times a day before meals  losartan 100 milliGRAM(s) Oral daily  multivitamin 1 Tablet(s) Oral daily  pantoprazole   Suspension 40 milliGRAM(s) Oral daily  polyethylene glycol 3350 17 Gram(s) Oral two times a day  senna 2 Tablet(s) Oral at bedtime    MEDICATIONS  (PRN):  ALBUTerol    90 MICROgram(s) HFA Inhaler 2 Puff(s) Inhalation every 6 hours PRN Shortness of Breath and/or Wheezing  ALPRAZolam 0.5 milliGRAM(s) Oral every 6 hours PRN anxiety  dextrose Oral Gel 15 Gram(s) Oral once PRN Blood Glucose LESS THAN 70 milliGRAM(s)/deciliter  HYDROmorphone   Tablet 4 milliGRAM(s) Oral every 4 hours PRN Severe Pain (7 - 10)  HYDROmorphone  Injectable 1 milliGRAM(s) IV Push every 8 hours PRN Moderate Pain (4 - 6)      New X-rays reviewed:                                                                                  ECHO    CXR interpreted by me:

## 2022-08-09 NOTE — SWALLOW BEDSIDE ASSESSMENT ADULT - SLP GENERAL OBSERVATIONS
pt received in bed awake alert w/o c/o pain. +PMSV placed in line w/ t-piece +pt w/ adequate oxygenation while wearing speaking valve, +voicing

## 2022-08-10 LAB
ALBUMIN SERPL ELPH-MCNC: 3.6 G/DL — SIGNIFICANT CHANGE UP (ref 3.5–5.2)
ALP SERPL-CCNC: 53 U/L — SIGNIFICANT CHANGE UP (ref 30–115)
ALT FLD-CCNC: 26 U/L — SIGNIFICANT CHANGE UP (ref 0–41)
ANION GAP SERPL CALC-SCNC: 8 MMOL/L — SIGNIFICANT CHANGE UP (ref 7–14)
AST SERPL-CCNC: 16 U/L — SIGNIFICANT CHANGE UP (ref 0–41)
BILIRUB SERPL-MCNC: 0.3 MG/DL — SIGNIFICANT CHANGE UP (ref 0.2–1.2)
BUN SERPL-MCNC: 14 MG/DL — SIGNIFICANT CHANGE UP (ref 10–20)
CALCIUM SERPL-MCNC: 9.5 MG/DL — SIGNIFICANT CHANGE UP (ref 8.5–10.1)
CHLORIDE SERPL-SCNC: 98 MMOL/L — SIGNIFICANT CHANGE UP (ref 98–110)
CO2 SERPL-SCNC: 32 MMOL/L — SIGNIFICANT CHANGE UP (ref 17–32)
CREAT SERPL-MCNC: <0.5 MG/DL — LOW (ref 0.7–1.5)
EGFR: 115 ML/MIN/1.73M2 — SIGNIFICANT CHANGE UP
GLUCOSE BLDC GLUCOMTR-MCNC: 101 MG/DL — HIGH (ref 70–99)
GLUCOSE BLDC GLUCOMTR-MCNC: 134 MG/DL — HIGH (ref 70–99)
GLUCOSE BLDC GLUCOMTR-MCNC: 144 MG/DL — HIGH (ref 70–99)
GLUCOSE SERPL-MCNC: 110 MG/DL — HIGH (ref 70–99)
HCT VFR BLD CALC: 34.2 % — LOW (ref 37–47)
HGB BLD-MCNC: 11.8 G/DL — LOW (ref 12–16)
MAGNESIUM SERPL-MCNC: 1.7 MG/DL — LOW (ref 1.8–2.4)
MCHC RBC-ENTMCNC: 31.6 PG — HIGH (ref 27–31)
MCHC RBC-ENTMCNC: 34.5 G/DL — SIGNIFICANT CHANGE UP (ref 32–37)
MCV RBC AUTO: 91.7 FL — SIGNIFICANT CHANGE UP (ref 81–99)
NRBC # BLD: 0 /100 WBCS — SIGNIFICANT CHANGE UP (ref 0–0)
PLATELET # BLD AUTO: 170 K/UL — SIGNIFICANT CHANGE UP (ref 130–400)
POTASSIUM SERPL-MCNC: 4 MMOL/L — SIGNIFICANT CHANGE UP (ref 3.5–5)
POTASSIUM SERPL-SCNC: 4 MMOL/L — SIGNIFICANT CHANGE UP (ref 3.5–5)
PROT SERPL-MCNC: 5.9 G/DL — LOW (ref 6–8)
RBC # BLD: 3.73 M/UL — LOW (ref 4.2–5.4)
RBC # FLD: 11.7 % — SIGNIFICANT CHANGE UP (ref 11.5–14.5)
SODIUM SERPL-SCNC: 138 MMOL/L — SIGNIFICANT CHANGE UP (ref 135–146)
TROPONIN T SERPL-MCNC: <0.01 NG/ML — SIGNIFICANT CHANGE UP
WBC # BLD: 8.69 K/UL — SIGNIFICANT CHANGE UP (ref 4.8–10.8)
WBC # FLD AUTO: 8.69 K/UL — SIGNIFICANT CHANGE UP (ref 4.8–10.8)

## 2022-08-10 PROCEDURE — 93925 LOWER EXTREMITY STUDY: CPT | Mod: 26

## 2022-08-10 RX ADMIN — HYDROMORPHONE HYDROCHLORIDE 4 MILLIGRAM(S): 2 INJECTION INTRAMUSCULAR; INTRAVENOUS; SUBCUTANEOUS at 05:52

## 2022-08-10 RX ADMIN — HYDROMORPHONE HYDROCHLORIDE 1 MILLIGRAM(S): 2 INJECTION INTRAMUSCULAR; INTRAVENOUS; SUBCUTANEOUS at 20:07

## 2022-08-10 RX ADMIN — Medication 100 MILLIGRAM(S): at 17:01

## 2022-08-10 RX ADMIN — CHLORHEXIDINE GLUCONATE 15 MILLILITER(S): 213 SOLUTION TOPICAL at 06:05

## 2022-08-10 RX ADMIN — GABAPENTIN 600 MILLIGRAM(S): 400 CAPSULE ORAL at 14:22

## 2022-08-10 RX ADMIN — Medication 0.5 MILLIGRAM(S): at 09:02

## 2022-08-10 RX ADMIN — HYDROMORPHONE HYDROCHLORIDE 4 MILLIGRAM(S): 2 INJECTION INTRAMUSCULAR; INTRAVENOUS; SUBCUTANEOUS at 15:08

## 2022-08-10 RX ADMIN — Medication 650 MILLIGRAM(S): at 17:01

## 2022-08-10 RX ADMIN — POLYETHYLENE GLYCOL 3350 17 GRAM(S): 17 POWDER, FOR SOLUTION ORAL at 17:01

## 2022-08-10 RX ADMIN — PANTOPRAZOLE SODIUM 40 MILLIGRAM(S): 20 TABLET, DELAYED RELEASE ORAL at 12:07

## 2022-08-10 RX ADMIN — HYDROMORPHONE HYDROCHLORIDE 4 MILLIGRAM(S): 2 INJECTION INTRAMUSCULAR; INTRAVENOUS; SUBCUTANEOUS at 01:38

## 2022-08-10 RX ADMIN — ALBUTEROL 2.5 MILLIGRAM(S): 90 AEROSOL, METERED ORAL at 08:44

## 2022-08-10 RX ADMIN — HYDROMORPHONE HYDROCHLORIDE 4 MILLIGRAM(S): 2 INJECTION INTRAMUSCULAR; INTRAVENOUS; SUBCUTANEOUS at 06:34

## 2022-08-10 RX ADMIN — POLYETHYLENE GLYCOL 3350 17 GRAM(S): 17 POWDER, FOR SOLUTION ORAL at 05:52

## 2022-08-10 RX ADMIN — CHLORHEXIDINE GLUCONATE 15 MILLILITER(S): 213 SOLUTION TOPICAL at 17:01

## 2022-08-10 RX ADMIN — Medication 100 MILLIGRAM(S): at 05:52

## 2022-08-10 RX ADMIN — APIXABAN 5 MILLIGRAM(S): 2.5 TABLET, FILM COATED ORAL at 05:53

## 2022-08-10 RX ADMIN — Medication 650 MILLIGRAM(S): at 01:38

## 2022-08-10 RX ADMIN — HYDROMORPHONE HYDROCHLORIDE 1 MILLIGRAM(S): 2 INJECTION INTRAMUSCULAR; INTRAVENOUS; SUBCUTANEOUS at 20:38

## 2022-08-10 RX ADMIN — Medication 1 APPLICATION(S): at 19:00

## 2022-08-10 RX ADMIN — HYDROMORPHONE HYDROCHLORIDE 1 MILLIGRAM(S): 2 INJECTION INTRAMUSCULAR; INTRAVENOUS; SUBCUTANEOUS at 12:07

## 2022-08-10 RX ADMIN — GABAPENTIN 600 MILLIGRAM(S): 400 CAPSULE ORAL at 05:52

## 2022-08-10 RX ADMIN — Medication 0.5 MILLIGRAM(S): at 19:35

## 2022-08-10 RX ADMIN — Medication 650 MILLIGRAM(S): at 18:06

## 2022-08-10 RX ADMIN — GABAPENTIN 600 MILLIGRAM(S): 400 CAPSULE ORAL at 21:48

## 2022-08-10 RX ADMIN — Medication 650 MILLIGRAM(S): at 05:52

## 2022-08-10 RX ADMIN — Medication 6 UNIT(S): at 17:16

## 2022-08-10 RX ADMIN — HYDROMORPHONE HYDROCHLORIDE 4 MILLIGRAM(S): 2 INJECTION INTRAMUSCULAR; INTRAVENOUS; SUBCUTANEOUS at 11:44

## 2022-08-10 RX ADMIN — Medication 650 MILLIGRAM(S): at 06:34

## 2022-08-10 RX ADMIN — LOSARTAN POTASSIUM 100 MILLIGRAM(S): 100 TABLET, FILM COATED ORAL at 05:52

## 2022-08-10 RX ADMIN — Medication 3 MILLILITER(S): at 14:53

## 2022-08-10 RX ADMIN — HYDROMORPHONE HYDROCHLORIDE 4 MILLIGRAM(S): 2 INJECTION INTRAMUSCULAR; INTRAVENOUS; SUBCUTANEOUS at 19:34

## 2022-08-10 RX ADMIN — Medication 650 MILLIGRAM(S): at 00:07

## 2022-08-10 RX ADMIN — HYDROMORPHONE HYDROCHLORIDE 4 MILLIGRAM(S): 2 INJECTION INTRAMUSCULAR; INTRAVENOUS; SUBCUTANEOUS at 00:06

## 2022-08-10 RX ADMIN — Medication 650 MILLIGRAM(S): at 13:05

## 2022-08-10 RX ADMIN — ALBUTEROL 2.5 MILLIGRAM(S): 90 AEROSOL, METERED ORAL at 14:19

## 2022-08-10 RX ADMIN — Medication 1 APPLICATION(S): at 06:04

## 2022-08-10 RX ADMIN — Medication 1 TABLET(S): at 12:07

## 2022-08-10 RX ADMIN — INSULIN GLARGINE 20 UNIT(S): 100 INJECTION, SOLUTION SUBCUTANEOUS at 21:48

## 2022-08-10 RX ADMIN — HYDROMORPHONE HYDROCHLORIDE 4 MILLIGRAM(S): 2 INJECTION INTRAMUSCULAR; INTRAVENOUS; SUBCUTANEOUS at 16:15

## 2022-08-10 RX ADMIN — HYDROMORPHONE HYDROCHLORIDE 1 MILLIGRAM(S): 2 INJECTION INTRAMUSCULAR; INTRAVENOUS; SUBCUTANEOUS at 13:04

## 2022-08-10 RX ADMIN — Medication 3 MILLILITER(S): at 08:32

## 2022-08-10 RX ADMIN — AMLODIPINE BESYLATE 5 MILLIGRAM(S): 2.5 TABLET ORAL at 06:09

## 2022-08-10 RX ADMIN — Medication 650 MILLIGRAM(S): at 12:05

## 2022-08-10 RX ADMIN — Medication 0.5 MILLIGRAM(S): at 02:21

## 2022-08-10 RX ADMIN — Medication 6 UNIT(S): at 11:25

## 2022-08-10 RX ADMIN — HYDROMORPHONE HYDROCHLORIDE 4 MILLIGRAM(S): 2 INJECTION INTRAMUSCULAR; INTRAVENOUS; SUBCUTANEOUS at 10:34

## 2022-08-10 RX ADMIN — APIXABAN 5 MILLIGRAM(S): 2.5 TABLET, FILM COATED ORAL at 17:01

## 2022-08-10 RX ADMIN — Medication 650 MILLIGRAM(S): at 23:57

## 2022-08-10 NOTE — SWALLOW BEDSIDE ASSESSMENT ADULT - ASR SWALLOW RECOMMEND DIAG
pt would benefit from repeat instrumental swallow study when respiratory status optimized: ?downsizing trach, pulm to f/u

## 2022-08-10 NOTE — PROGRESS NOTE ADULT - ASSESSMENT
Unfortunate 56 YO F with acute on chr hypoxic RF read for hypoxia and hypotension and ongoing Bacterial PNA. Hx of ASP PNA, COPD exacerbation and intubation with failure to extubate resulting in trach at [facility] .  The pt is under chronic  at [facility].      Acute on Chr hypoxic RF, mucus plugging  Chronic Tracheostomy  Bacterial PNA  Clinical Debility  Bedbound state  BL foot drop, limb weakness, prolonged immobility  Hx of HTN, ASHD HFpEF  Hx of DLD  Hx of COPD, MO, LUCIANA, sp intubation, failure to extubate, chr trach since /[facility]  Hx of DM II  Hx of Ch anemia of ch dis  Hx of GERD, HH, Diverticulosis, Ch constipation  Hx of OA, DJD, DDD, chr pain syn  Hx of lower ext venous stasis dermatitis  Hx of DVT, AC/Eliquis  Hx of Anxiety, Depression     today pt stable, A&O, verbal  pt cont the weaning process, pt on T tube down to 30%  need to clarify our resp goal of care i.e. wean  to permanent trach vs get pt to the pt of removing trach all together  pt needs freq suctioning, con Neb tx  ATC q 6, and IV steroids may be transitioned to po, slow taper  nutrition via PEG  cont S&S therapy  on ad pt was placed on vent support  pt was give 2 L of IV fluids with improvement of BP  CXR reviewed and shows unchanged interstitial opacities, L pl based opacity  Venous doppler neg for DVT  Vasc consult given severe derm changes, mm atrophy concern over lower ext aa occ dis/PAD  place order for aa doppler of lower ext  Pul-critical care consult and ff up appreciated, help with weaning process, Our goal? permanent trach vs complete removal of trach  CTS consult for trach air leak, pt not in distress and oxygenating well no need for emergent trach change, may not need change if goal is to start weaning process  cont all previous meds  GI prophylaxis  att to bowel regimen  nutrition via PEG/glucerna  PM: hydromorphone 4mg po q 4 and 1mg IV q 8 for breakthrough pain, gabapentin 600mg q 8, tylenol 650mg QID    Rehab consult for bedside PT, can we get pt OOB to chair, rehab states pt high risk for fall but bedside and  OOB to chair daily, pt highly motivated    pt full code  Social SVC, pt not ready for D/C, weaning in progress

## 2022-08-10 NOTE — SWALLOW BEDSIDE ASSESSMENT ADULT - DIET PRIOR TO ADMI
chopped, thin liquids per NH documentation

## 2022-08-11 LAB
ALBUMIN SERPL ELPH-MCNC: 3.4 G/DL — LOW (ref 3.5–5.2)
ALP SERPL-CCNC: 52 U/L — SIGNIFICANT CHANGE UP (ref 30–115)
ALT FLD-CCNC: 24 U/L — SIGNIFICANT CHANGE UP (ref 0–41)
ANION GAP SERPL CALC-SCNC: 8 MMOL/L — SIGNIFICANT CHANGE UP (ref 7–14)
AST SERPL-CCNC: 19 U/L — SIGNIFICANT CHANGE UP (ref 0–41)
BILIRUB SERPL-MCNC: 0.3 MG/DL — SIGNIFICANT CHANGE UP (ref 0.2–1.2)
BUN SERPL-MCNC: 12 MG/DL — SIGNIFICANT CHANGE UP (ref 10–20)
CALCIUM SERPL-MCNC: 9.3 MG/DL — SIGNIFICANT CHANGE UP (ref 8.5–10.1)
CHLORIDE SERPL-SCNC: 97 MMOL/L — LOW (ref 98–110)
CO2 SERPL-SCNC: 30 MMOL/L — SIGNIFICANT CHANGE UP (ref 17–32)
CREAT SERPL-MCNC: <0.5 MG/DL — LOW (ref 0.7–1.5)
EGFR: 115 ML/MIN/1.73M2 — SIGNIFICANT CHANGE UP
GLUCOSE BLDC GLUCOMTR-MCNC: 103 MG/DL — HIGH (ref 70–99)
GLUCOSE BLDC GLUCOMTR-MCNC: 104 MG/DL — HIGH (ref 70–99)
GLUCOSE BLDC GLUCOMTR-MCNC: 106 MG/DL — HIGH (ref 70–99)
GLUCOSE BLDC GLUCOMTR-MCNC: 113 MG/DL — HIGH (ref 70–99)
GLUCOSE BLDC GLUCOMTR-MCNC: 119 MG/DL — HIGH (ref 70–99)
GLUCOSE BLDC GLUCOMTR-MCNC: 119 MG/DL — HIGH (ref 70–99)
GLUCOSE SERPL-MCNC: 117 MG/DL — HIGH (ref 70–99)
HCT VFR BLD CALC: 33 % — LOW (ref 37–47)
HGB BLD-MCNC: 11.3 G/DL — LOW (ref 12–16)
MAGNESIUM SERPL-MCNC: 1.6 MG/DL — LOW (ref 1.8–2.4)
MCHC RBC-ENTMCNC: 32.5 PG — HIGH (ref 27–31)
MCHC RBC-ENTMCNC: 34.2 G/DL — SIGNIFICANT CHANGE UP (ref 32–37)
MCV RBC AUTO: 94.8 FL — SIGNIFICANT CHANGE UP (ref 81–99)
NRBC # BLD: 0 /100 WBCS — SIGNIFICANT CHANGE UP (ref 0–0)
PLATELET # BLD AUTO: 136 K/UL — SIGNIFICANT CHANGE UP (ref 130–400)
POTASSIUM SERPL-MCNC: 4.1 MMOL/L — SIGNIFICANT CHANGE UP (ref 3.5–5)
POTASSIUM SERPL-SCNC: 4.1 MMOL/L — SIGNIFICANT CHANGE UP (ref 3.5–5)
PROT SERPL-MCNC: 5.3 G/DL — LOW (ref 6–8)
RBC # BLD: 3.48 M/UL — LOW (ref 4.2–5.4)
RBC # FLD: 12.1 % — SIGNIFICANT CHANGE UP (ref 11.5–14.5)
SODIUM SERPL-SCNC: 135 MMOL/L — SIGNIFICANT CHANGE UP (ref 135–146)
WBC # BLD: 10.29 K/UL — SIGNIFICANT CHANGE UP (ref 4.8–10.8)
WBC # FLD AUTO: 10.29 K/UL — SIGNIFICANT CHANGE UP (ref 4.8–10.8)

## 2022-08-11 PROCEDURE — 99233 SBSQ HOSP IP/OBS HIGH 50: CPT

## 2022-08-11 RX ORDER — MAGNESIUM SULFATE 500 MG/ML
2 VIAL (ML) INJECTION ONCE
Refills: 0 | Status: COMPLETED | OUTPATIENT
Start: 2022-08-11 | End: 2022-08-12

## 2022-08-11 RX ORDER — MUPIROCIN 20 MG/G
1 OINTMENT TOPICAL
Refills: 0 | Status: DISCONTINUED | OUTPATIENT
Start: 2022-08-11 | End: 2022-08-19

## 2022-08-11 RX ADMIN — Medication 3 MILLILITER(S): at 08:38

## 2022-08-11 RX ADMIN — Medication 3 MILLILITER(S): at 14:57

## 2022-08-11 RX ADMIN — HYDROMORPHONE HYDROCHLORIDE 4 MILLIGRAM(S): 2 INJECTION INTRAMUSCULAR; INTRAVENOUS; SUBCUTANEOUS at 15:45

## 2022-08-11 RX ADMIN — ALBUTEROL 2.5 MILLIGRAM(S): 90 AEROSOL, METERED ORAL at 20:03

## 2022-08-11 RX ADMIN — CHLORHEXIDINE GLUCONATE 15 MILLILITER(S): 213 SOLUTION TOPICAL at 05:23

## 2022-08-11 RX ADMIN — Medication 6 UNIT(S): at 05:21

## 2022-08-11 RX ADMIN — ALBUTEROL 2.5 MILLIGRAM(S): 90 AEROSOL, METERED ORAL at 08:38

## 2022-08-11 RX ADMIN — Medication 3 MILLILITER(S): at 20:04

## 2022-08-11 RX ADMIN — APIXABAN 5 MILLIGRAM(S): 2.5 TABLET, FILM COATED ORAL at 05:19

## 2022-08-11 RX ADMIN — HYDROMORPHONE HYDROCHLORIDE 4 MILLIGRAM(S): 2 INJECTION INTRAMUSCULAR; INTRAVENOUS; SUBCUTANEOUS at 01:15

## 2022-08-11 RX ADMIN — Medication 650 MILLIGRAM(S): at 05:18

## 2022-08-11 RX ADMIN — Medication 6 UNIT(S): at 18:30

## 2022-08-11 RX ADMIN — GABAPENTIN 600 MILLIGRAM(S): 400 CAPSULE ORAL at 21:15

## 2022-08-11 RX ADMIN — HYDROMORPHONE HYDROCHLORIDE 1 MILLIGRAM(S): 2 INJECTION INTRAMUSCULAR; INTRAVENOUS; SUBCUTANEOUS at 12:58

## 2022-08-11 RX ADMIN — HYDROMORPHONE HYDROCHLORIDE 1 MILLIGRAM(S): 2 INJECTION INTRAMUSCULAR; INTRAVENOUS; SUBCUTANEOUS at 04:33

## 2022-08-11 RX ADMIN — HYDROMORPHONE HYDROCHLORIDE 4 MILLIGRAM(S): 2 INJECTION INTRAMUSCULAR; INTRAVENOUS; SUBCUTANEOUS at 09:41

## 2022-08-11 RX ADMIN — HYDROMORPHONE HYDROCHLORIDE 1 MILLIGRAM(S): 2 INJECTION INTRAMUSCULAR; INTRAVENOUS; SUBCUTANEOUS at 05:00

## 2022-08-11 RX ADMIN — Medication 1 TABLET(S): at 12:59

## 2022-08-11 RX ADMIN — ALBUTEROL 2.5 MILLIGRAM(S): 90 AEROSOL, METERED ORAL at 14:57

## 2022-08-11 RX ADMIN — HYDROMORPHONE HYDROCHLORIDE 4 MILLIGRAM(S): 2 INJECTION INTRAMUSCULAR; INTRAVENOUS; SUBCUTANEOUS at 05:38

## 2022-08-11 RX ADMIN — HYDROMORPHONE HYDROCHLORIDE 1 MILLIGRAM(S): 2 INJECTION INTRAMUSCULAR; INTRAVENOUS; SUBCUTANEOUS at 21:31

## 2022-08-11 RX ADMIN — LOSARTAN POTASSIUM 100 MILLIGRAM(S): 100 TABLET, FILM COATED ORAL at 05:19

## 2022-08-11 RX ADMIN — Medication 1 APPLICATION(S): at 05:22

## 2022-08-11 RX ADMIN — Medication 650 MILLIGRAM(S): at 12:57

## 2022-08-11 RX ADMIN — GABAPENTIN 600 MILLIGRAM(S): 400 CAPSULE ORAL at 05:17

## 2022-08-11 RX ADMIN — Medication 650 MILLIGRAM(S): at 00:27

## 2022-08-11 RX ADMIN — HYDROMORPHONE HYDROCHLORIDE 1 MILLIGRAM(S): 2 INJECTION INTRAMUSCULAR; INTRAVENOUS; SUBCUTANEOUS at 21:01

## 2022-08-11 RX ADMIN — Medication 650 MILLIGRAM(S): at 05:48

## 2022-08-11 RX ADMIN — Medication 0.5 MILLIGRAM(S): at 12:57

## 2022-08-11 RX ADMIN — PANTOPRAZOLE SODIUM 40 MILLIGRAM(S): 20 TABLET, DELAYED RELEASE ORAL at 12:59

## 2022-08-11 RX ADMIN — GABAPENTIN 600 MILLIGRAM(S): 400 CAPSULE ORAL at 15:17

## 2022-08-11 RX ADMIN — HYDROMORPHONE HYDROCHLORIDE 4 MILLIGRAM(S): 2 INJECTION INTRAMUSCULAR; INTRAVENOUS; SUBCUTANEOUS at 06:08

## 2022-08-11 RX ADMIN — Medication 0.5 MILLIGRAM(S): at 19:52

## 2022-08-11 RX ADMIN — Medication 1 APPLICATION(S): at 18:32

## 2022-08-11 RX ADMIN — HYDROMORPHONE HYDROCHLORIDE 4 MILLIGRAM(S): 2 INJECTION INTRAMUSCULAR; INTRAVENOUS; SUBCUTANEOUS at 19:53

## 2022-08-11 RX ADMIN — Medication 100 MILLIGRAM(S): at 05:17

## 2022-08-11 RX ADMIN — Medication 650 MILLIGRAM(S): at 18:31

## 2022-08-11 RX ADMIN — CHLORHEXIDINE GLUCONATE 15 MILLILITER(S): 213 SOLUTION TOPICAL at 18:32

## 2022-08-11 RX ADMIN — Medication 0.5 MILLIGRAM(S): at 05:38

## 2022-08-11 RX ADMIN — HYDROMORPHONE HYDROCHLORIDE 4 MILLIGRAM(S): 2 INJECTION INTRAMUSCULAR; INTRAVENOUS; SUBCUTANEOUS at 00:45

## 2022-08-11 RX ADMIN — Medication 6 UNIT(S): at 12:57

## 2022-08-11 RX ADMIN — HYDROMORPHONE HYDROCHLORIDE 4 MILLIGRAM(S): 2 INJECTION INTRAMUSCULAR; INTRAVENOUS; SUBCUTANEOUS at 20:23

## 2022-08-11 RX ADMIN — POLYETHYLENE GLYCOL 3350 17 GRAM(S): 17 POWDER, FOR SOLUTION ORAL at 18:32

## 2022-08-11 RX ADMIN — APIXABAN 5 MILLIGRAM(S): 2.5 TABLET, FILM COATED ORAL at 18:31

## 2022-08-11 RX ADMIN — INSULIN GLARGINE 20 UNIT(S): 100 INJECTION, SOLUTION SUBCUTANEOUS at 21:15

## 2022-08-11 NOTE — PROGRESS NOTE ADULT - SUBJECTIVE AND OBJECTIVE BOX
CRUZ DUMONT 58yo  Female sent to the ER from [facility] where she is on  care for c/o inc SOB, chest tightness, diff breathing, wheezing i.e. acute on chr hypoxic RF.  Of note the pt has a trach and has had freq readmissions for mucous plugging, RF and has had several bronchoscopies.   EMS noted the pt to be hypotensive and hypoxic.  The pt was vigorously suctioned, received Neb Tx, IV steroids,  BP revived with IV fluids.  The pt was cultured and placed on ABx.  She is v well known to the Pul SVc and to ID.  The pt is ad with acute on ch hypoxic RF and ongoing Bacterial PNA.  The PMHx includes:  HTN, ASHD, HFpEF, DLD, DM II, Chronic RF, COPD, LUCIANA, ASp PNA, sp intubation, failure to extubate, chronic trach ([facility] 4/22), recurrent mucus plugging of airways, SDVT, AC with Eliquis, lower ext venous stasis dermatitis, bedbound state, GERD, diverticulosis, sp PEG, OA, DDD, DJD, chronic pain syn, depression anxiety.    INTERVAL HPI/OVERNIGHT EVENTS: pt stable, Surgery changed trach to fenestrated trach, the outer cannula is fenestrated but the inner cannula was changed back to unfenestrated as pt could not be suctioned with the fenestrated cannula in place, suction tube caught in the fenestrations, pt on T tube, goal is for trach collar, Mg low, being repleted, pt needs bedside PT, pt ff by pu-critical care    MEDICATIONS  (STANDING):  acetaminophen     Tablet .. 650 milliGRAM(s) Oral every 6 hours  acetylcysteine 20% for Nebulization - Peds 3 milliLiter(s) Nebulizer every 6 hours  ALBUTerol    0.083% 2.5 milliGRAM(s) Nebulizer every 6 hours  ammonium lactate 12% Lotion 1 Application(s) Topical two times a day  apixaban 5 milliGRAM(s) Enteral Tube two times a day  chlorhexidine 0.12% Liquid 15 milliLiter(s) Oral Mucosa every 12 hours  dextrose 5%. 1000 milliLiter(s) (100 mL/Hr) IV Continuous <Continuous>  dextrose 5%. 1000 milliLiter(s) (50 mL/Hr) IV Continuous <Continuous>  dextrose 50% Injectable 25 Gram(s) IV Push once  dextrose 50% Injectable 12.5 Gram(s) IV Push once  dextrose 50% Injectable 25 Gram(s) IV Push once  doxycycline monohydrate Capsule 100 milliGRAM(s) Oral every 12 hours  gabapentin 600 milliGRAM(s) Oral three times a day  glucagon  Injectable 1 milliGRAM(s) IntraMuscular once  insulin glargine Injectable (LANTUS) 20 Unit(s) SubCutaneous at bedtime  insulin lispro (ADMELOG) corrective regimen sliding scale   SubCutaneous three times a day before meals  insulin lispro Injectable (ADMELOG) 6 Unit(s) SubCutaneous three times a day before meals  methylPREDNISolone sodium succinate Injectable 60 milliGRAM(s) IV Push two times a day  multivitamin 1 Tablet(s) Oral daily  pantoprazole   Suspension 40 milliGRAM(s) Oral daily  polyethylene glycol 3350 17 Gram(s) Oral two times a day  senna 2 Tablet(s) Oral at bedtime    MEDICATIONS  (PRN):  ALBUTerol    90 MICROgram(s) HFA Inhaler 2 Puff(s) Inhalation every 6 hours PRN Shortness of Breath and/or Wheezing  ALPRAZolam 0.5 milliGRAM(s) Oral every 6 hours PRN anxiety  dextrose Oral Gel 15 Gram(s) Oral once PRN Blood Glucose LESS THAN 70 milliGRAM(s)/deciliter  HYDROmorphone   Tablet 4 milliGRAM(s) Oral every 4 hours PRN Severe Pain (7 - 10)  HYDROmorphone  Injectable 1 milliGRAM(s) IV Push every 8 hours PRN Moderate Pain (4 - 6)      Allergies    penicillin (Swelling)      Vital Signs Last 24 Hrs    T(F): 98  HR:  67  BP:  115/54    RR: 16  SpO2:  98% T piece 30    PHYSICAL EXAM:      Constitutional: pt alert, oriented  verbal,  bedbound chr ill looking but in NAD on T piece    Eyes: nonicteric    ENMT: dry oral mucosa, dental defects,     Neck: + trach tube supple, no stridor, + trach, , + T tube    Respiratory: shallow resp, diminished harsh bronchial  BS, scattered rhonchi, no wheezing    Cardiovascular: S1S2 reg    Gastrointestinal: globular, soft and benign, + PEG, + BS    Genitourinary:    Extremities: moves all ext, dec motor strength of lower ext, + BL mm atrophy, + BL foot drop    Vascular: dec pedal pulses    Neurological: nonfocal    Skin: lower ext dark almost black hyperpigmentation of lower ext    Lymph Nodes: not enlarged    Musculoskeletal: dec mm mass and tone    Psychiatric: anxious, depressed but stable        LABS:                        11  10 )-----------( 170             33      135  |  97  |  12  ----------------------------<  117  4.1   |  30  |  0.5    , 115  Ca    9.7      05 Aug 2022 06:50  Mg     1.9, 1.8, 2.0, 1.6  trop <0.01  TPro  5.9, 5.5  /  Alb  3.3, 3.4  /  TBili  0.3  /  DBili  x   /  AST  20  /  ALT  28  /  AlkPhos  77  08-05          RADIOLOGY & ADDITIONAL TESTS:  EKG:  NSR R axis, low voltage, nonspecific ST-T changes  CXR:  interstitial prominence, L plbased opacity unchanged

## 2022-08-11 NOTE — PROGRESS NOTE ADULT - ASSESSMENT
IMPRESSION:    Acute on Chronic Hypoxemic Respiratory Failure improving   Chronic Hypercapnic Respiratory Failure  SP Tracheostomy and PEG ( not chronically vented )  COPD exacerbation improving   UTI  HO recurrent mucous plugs SP multiple bronchoscopies  HO DVT on Eliquis  HFpEF      PLAN:    CNS:  No depressants.  Pain control     HEENT: Oral care.  Trach care.     PULMONARY:  HOB @ 45 degrees.  Aspiration precautions.  Continue pulmonary toilet.  Nebs with Atrovent and Albuterol  q4h & PRN.        CARDIOVASCULAR:  Avoid volume overload.      GI: GI prophylaxis.  PEG feeds.  Bowel regimen     RENAL:  Follow up lytes.  Correct as needed.     INFECTIOUS DISEASE:  ABX per ID>  NO need for more ABX from pulmonary stand point     HEMATOLOGICAL:  DVT therapy on Eliquis.    ENDOCRINE:  Follow up FS.  Insulin protocol if needed.    MUSCULOSKELETAL:  PT/OT    DC planing  IMPRESSION:    Acute on Chronic Hypoxemic Respiratory Failure improving   Chronic Hypercapnic Respiratory Failure  SP Tracheostomy and PEG ( not chronically vented )  COPD exacerbation improving   UTI  HO recurrent mucous plugs SP multiple bronchoscopies  HO DVT on Eliquis  HFpEF      PLAN:    CNS:  No depressants.  Pain control     HEENT: Oral care.  Trach care.     PULMONARY:  HOB @ 45 degrees.  Aspiration precautions.  Continue pulmonary toilet.  Nebs with Atrovent and Albuterol  q4h & PRN.  Finish Prednisone course     CARDIOVASCULAR:  Avoid volume overload.      GI: GI prophylaxis.  PEG feeds.  Bowel regimen     RENAL:  Follow up lytes.  Correct as needed.     INFECTIOUS DISEASE:  ABX per ID>  NO need for more ABX from pulmonary stand point     HEMATOLOGICAL:  DVT therapy on Eliquis.    ENDOCRINE:  Follow up FS.  Insulin protocol if needed.    MUSCULOSKELETAL:  PT/OT    DC planing  IMPRESSION:    Acute on Chronic Hypoxemic Respiratory Failure improving   Chronic Hypercapnic Respiratory Failure  SP Tracheostomy and PEG ( not chronically vented )  COPD exacerbation improving   UTI  HO recurrent mucous plugs SP multiple bronchoscopies  HO DVT on Eliquis  HFpEF      PLAN:    CNS:  No depressants.  Pain control     HEENT: Oral care.  Trach care.     PULMONARY:  HOB @ 45 degrees.  Aspiration precautions.  Continue pulmonary toilet.  Nebs with Atrovent and Albuterol  q4h & PRN.  Finish Prednisone course. Can switch to fenestrated trach     CARDIOVASCULAR:  Avoid volume overload.      GI: GI prophylaxis.  PEG feeds.  Bowel regimen     RENAL:  Follow up lytes.  Correct as needed.     INFECTIOUS DISEASE:  ABX per ID>  NO need for more ABX from pulmonary stand point     HEMATOLOGICAL:  DVT therapy on Eliquis.    ENDOCRINE:  Follow up FS.  Insulin protocol if needed.    MUSCULOSKELETAL:  PT/OT    DC planing

## 2022-08-11 NOTE — PROGRESS NOTE ADULT - ASSESSMENT
Unfortunate 58 YO F with acute on chr hypoxic RF read for hypoxia and hypotension and ongoing Bacterial PNA. Hx of ASP PNA, COPD exacerbation and intubation with failure to extubate resulting in trach at [facility] .  The pt is under chronic  at [facility], [place], operated by [facility].      Acute on Chr hypoxic RF, mucus plugging  Chronic Tracheostomy  Bacterial PNA  Clinical Debility  Bedbound state  BL foot drop, limb weakness, prolonged immobility  Hx of HTN, ASHD HFpEF  Hx of DLD  Hx of COPD, MO, LUCIANA, sp intubation, failure to extubate, chr trach since /[facility]  Hx of DM II  Hx of Ch anemia of ch dis  Hx of GERD, HH, Diverticulosis, Ch constipation  Hx of OA, DJD, DDD, chr pain syn  Hx of lower ext venous stasis dermatitis  Hx of DVT, AC/Eliquis  Hx of Anxiety, Depression     today pt stable, A&O, verbal  pt cont the weaning process, pt on T tube down to 30%  Surgery attempted to replace trach to fenestrated tube, the outer tube is fenestrated, the inner is not as suction tube caught in the  fenestrations ( feenestrated inner tube may be placed when pt req less suctioning/fenestrated inner tube is at the trach box at bedside)  nutrition via PEG  cont S&S therapy  on ad pt was placed on vent support  pt was give 2 L of IV fluids with improvement of BP  CXR reviewed and shows unchanged interstitial opacities, L pl based opacity  Venous doppler neg for DVT  Vasc consult given severe derm changes, mm atrophy concern over lower ext aa occ dis/PAD  place order for aa doppler of lower ext  Pul-critical care consult and ff up appeciated    cont all previous meds  GI prophylaxis  att to bowel regimen  nutrition via PEG/glucerna  PM: hydromorphone 4mg po q 4 and 1mg IV q 8 for breakthrough pain, gabapentin 600mg q 8, tylenol 650mg QID    Rehab consult for bedside PT, can we get pt OOB to chair, rehab states pt high risk for fall but bedside and  OOB to chair daily, pt highly motivated    pt full code  Social SVC, pt not ready for D/C, weaning in progress

## 2022-08-11 NOTE — PROGRESS NOTE ADULT - SUBJECTIVE AND OBJECTIVE BOX
Patient is a 57y old  Female who presents with a chief complaint of Hypoxia, acute on chr RF (10 Aug 2022 21:23)        Over Night Events:  Remains on T piece.  Feels better.          ROS:     All ROS are negative except HPI         PHYSICAL EXAM    ICU Vital Signs Last 24 Hrs  T(C): 36.8 (11 Aug 2022 04:41), Max: 36.8 (10 Aug 2022 20:25)  T(F): 98.2 (11 Aug 2022 04:41), Max: 98.3 (10 Aug 2022 20:25)  HR: 60 (11 Aug 2022 04:41) (60 - 65)  BP: 121/58 (11 Aug 2022 04:41) (120/59 - 145/65)  BP(mean): 84 (11 Aug 2022 04:41) (84 - 84)  ABP: --  ABP(mean): --  RR: 18 (11 Aug 2022 04:41) (18 - 18)  SpO2: 96% (11 Aug 2022 04:41) (96% - 100%)    O2 Parameters below as of 11 Aug 2022 04:41  Patient On (Oxygen Delivery Method): T-piece            CONSTITUTIONAL:  In NAD    ENT:   Airway patent,   Mouth with normal mucosa.   Trach     EYES:   Pupils equal,   Round and reactive to light.    CARDIAC:   Normal rate,   Regular rhythm.        RESPIRATORY:   No wheezing  Bilateral BS  Normal chest expansion  Not tachypneic,  No use of accessory muscles    GASTROINTESTINAL:  Abdomen soft,   Non-tender,   No guarding,   + BS    MUSCULOSKELETAL:   Range of motion is not limited,  No clubbing, cyanosis    NEUROLOGICAL:   Alert and oriented       SKIN:   Skin normal color for race,   No evidence of rash.    PSYCHIATRIC:   Normal mood and affect.   No apparent risk to self or others.        08-10-22 @ 07:01  -  08-11-22 @ 07:00  --------------------------------------------------------  IN:    Enteral Tube Flush: 120 mL    Glucerna: 600 mL  Total IN: 720 mL    OUT:    Intermittent Catheterization - Urethral (mL): 550 mL    Ureteral Catheter (mL): 1700 mL  Total OUT: 2250 mL    Total NET: -1530 mL          LABS:                            11.8   8.69  )-----------( 170      ( 10 Aug 2022 06:16 )             34.2                                               08-10    138  |  98  |  14  ----------------------------<  110<H>  4.0   |  32  |  <0.5<L>    Ca    9.5      10 Aug 2022 06:16  Mg     1.7     08-10    TPro  5.9<L>  /  Alb  3.6  /  TBili  0.3  /  DBili  x   /  AST  16  /  ALT  26  /  AlkPhos  53  08-10                                                 CARDIAC MARKERS ( 10 Aug 2022 01:56 )  x     / <0.01 ng/mL / x     / x     / x                                                LIVER FUNCTIONS - ( 10 Aug 2022 06:16 )  Alb: 3.6 g/dL / Pro: 5.9 g/dL / ALK PHOS: 53 U/L / ALT: 26 U/L / AST: 16 U/L / GGT: x                                                                                               Mode: standby  FiO2: 30                                          MEDICATIONS  (STANDING):  acetaminophen     Tablet .. 650 milliGRAM(s) Oral every 6 hours  acetylcysteine 20% for Nebulization - Peds 3 milliLiter(s) Nebulizer every 6 hours  ALBUTerol    0.083% 2.5 milliGRAM(s) Nebulizer every 6 hours  amLODIPine   Tablet 5 milliGRAM(s) Oral daily  ammonium lactate 12% Lotion 1 Application(s) Topical two times a day  apixaban 5 milliGRAM(s) Enteral Tube two times a day  chlorhexidine 0.12% Liquid 15 milliLiter(s) Oral Mucosa every 12 hours  dextrose 5%. 1000 milliLiter(s) (100 mL/Hr) IV Continuous <Continuous>  dextrose 5%. 1000 milliLiter(s) (50 mL/Hr) IV Continuous <Continuous>  dextrose 50% Injectable 25 Gram(s) IV Push once  dextrose 50% Injectable 12.5 Gram(s) IV Push once  dextrose 50% Injectable 25 Gram(s) IV Push once  doxycycline monohydrate Capsule 100 milliGRAM(s) Oral every 12 hours  gabapentin 600 milliGRAM(s) Oral three times a day  glucagon  Injectable 1 milliGRAM(s) IntraMuscular once  insulin glargine Injectable (LANTUS) 20 Unit(s) SubCutaneous at bedtime  insulin lispro (ADMELOG) corrective regimen sliding scale   SubCutaneous three times a day before meals  insulin lispro Injectable (ADMELOG) 6 Unit(s) SubCutaneous three times a day before meals  losartan 100 milliGRAM(s) Oral daily  magnesium sulfate  IVPB 2 Gram(s) IV Intermittent once  multivitamin 1 Tablet(s) Oral daily  pantoprazole   Suspension 40 milliGRAM(s) Oral daily  polyethylene glycol 3350 17 Gram(s) Oral two times a day  predniSONE   Tablet 40 milliGRAM(s) Oral once  senna 2 Tablet(s) Oral at bedtime    MEDICATIONS  (PRN):  ALBUTerol    90 MICROgram(s) HFA Inhaler 2 Puff(s) Inhalation every 6 hours PRN Shortness of Breath and/or Wheezing  ALPRAZolam 0.5 milliGRAM(s) Oral every 6 hours PRN anxiety  dextrose Oral Gel 15 Gram(s) Oral once PRN Blood Glucose LESS THAN 70 milliGRAM(s)/deciliter  HYDROmorphone   Tablet 4 milliGRAM(s) Oral every 4 hours PRN Severe Pain (7 - 10)  HYDROmorphone  Injectable 1 milliGRAM(s) IV Push every 8 hours PRN Moderate Pain (4 - 6)      New X-rays reviewed:                                                                                  ECHO

## 2022-08-11 NOTE — PROGRESS NOTE ADULT - SUBJECTIVE AND OBJECTIVE BOX
56yo  Female sent to the ER from [facility] where she is on  care for c/o inc SOB, chest tightness, diff breathing, wheezing i.e. acute on chr hypoxic RF.  Of note the pt has a trach and has had freq readmissions for mucous plugging, RF and has had several bronchoscopies.   EMS noted the pt to be hypotensive and hypoxic.  The pt was vigorously suctioned, received Neb Tx, IV steroids,  BP revived with IV fluids.  The pt was cultured and placed on ABx.  She is v well known to the Pul SVc and to ID.  The pt is ad with acute on ch hypoxic RF and  Bacterial PNA which has now resolved.    Hospital Day: 8d     |:::::::::::::::::::::::::::| SUBJECTIVE |:::::::::::::::::::::::::::|    OVERNIGHT EVENTS:   TODAY: Patient was seen today at bedside. Review of systems is otherwise negative.    |:::::::::::::::::::::::::::| OBJECTIVE |:::::::::::::::::::::::::::|    VITAL SIGNS: Last 24 Hours  T(C): 36.8 (11 Aug 2022 04:41), Max: 36.8 (10 Aug 2022 20:25)  T(F): 98.2 (11 Aug 2022 04:41), Max: 98.3 (10 Aug 2022 20:25)  HR: 65 (11 Aug 2022 10:10) (60 - 65)  BP: 121/58 (11 Aug 2022 04:41) (121/58 - 145/65)  BP(mean): 84 (11 Aug 2022 04:41) (84 - 84)  RR: 18 (11 Aug 2022 04:41) (18 - 18)  SpO2: 98% (11 Aug 2022 10:10) (96% - 100%)    08-10-22 @ 07:01  -  08-11-22 @ 07:00  --------------------------------------------------------  IN: 720 mL / OUT: 2250 mL / NET: -1530 mL      PHYSICAL EXAM:  GENERAL:   Awake, alert; NAD.  HEENT:  Head NC/AT; Conjunctivae pink, Sclera anicteric; Oral mucosa moist.  + trach tube supple, no stridor, + trach, , + T tube  CARDIO:   Regular rate; Regular rhythm  RESP:   shallow resp, diminished harsh bronchial  BS, scattered rhonchi, no wheezing  GI:   Soft; NT/ND; BS; No guarding; No rebound tenderness.  EXT:   lower ext dark almost black hyperpigmentation of lower ext  SKIN:   Intact.    LAB RESULTS:                        11.8   8.69  )-----------( 170      ( 10 Aug 2022 06:16 )             34.2     08-10    138  |  98  |  14  ----------------------------<  110<H>  4.0   |  32  |  <0.5<L>    Ca    9.5      10 Aug 2022 06:16  Mg     1.7     08-10    TPro  5.9<L>  /  Alb  3.6  /  TBili  0.3  /  DBili  x   /  AST  16  /  ALT  26  /  AlkPhos  53  08-10            CARDIAC MARKERS ( 10 Aug 2022 01:56 )  x     / <0.01 ng/mL / x     / x     / x          MICROBIOLOGY:    RADIOLOGY:    ALLERGIES:  penicillin (Swelling)      ===========================================================       58yo  Female sent to the ER from [facility] where she is on  care for c/o inc SOB, chest tightness, diff breathing, wheezing i.e. acute on chr hypoxic RF.  Of note the pt has a trach and has had freq readmissions for mucous plugging, RF and has had several bronchoscopies.   EMS noted the pt to be hypotensive and hypoxic.  The pt was vigorously suctioned, received Neb Tx, IV steroids,  BP revived with IV fluids.  The pt was cultured and placed on ABx.  She is v well known to the Pul SVc and to ID.  The pt is ad with acute on ch hypoxic RF and  Bacterial PNA which has now resolved.    Hospital Day: 8d     |:::::::::::::::::::::::::::| SUBJECTIVE |:::::::::::::::::::::::::::|    OVERNIGHT EVENTS:   TODAY: Patient was seen today at bedside. Review of systems is otherwise negative.    |:::::::::::::::::::::::::::| OBJECTIVE |:::::::::::::::::::::::::::|    VITAL SIGNS: Last 24 Hours  T(C): 36.8 (11 Aug 2022 04:41), Max: 36.8 (10 Aug 2022 20:25)  T(F): 98.2 (11 Aug 2022 04:41), Max: 98.3 (10 Aug 2022 20:25)  HR: 65 (11 Aug 2022 10:10) (60 - 65)  BP: 121/58 (11 Aug 2022 04:41) (121/58 - 145/65)  BP(mean): 84 (11 Aug 2022 04:41) (84 - 84)  RR: 18 (11 Aug 2022 04:41) (18 - 18)  SpO2: 98% (11 Aug 2022 10:10) (96% - 100%) on 7L 40%    08-10-22 @ 07:01  -  08-11-22 @ 07:00  --------------------------------------------------------  IN: 720 mL / OUT: 2250 mL / NET: -1530 mL      PHYSICAL EXAM:  GENERAL:   Awake, alert; NAD.  HEENT:  Head NC/AT; Conjunctivae pink, Sclera anicteric; Oral mucosa moist.  + trach tube supple, no stridor, + trach, , + T tube  CARDIO:   Regular rate; Regular rhythm  RESP:   shallow resp, diminished harsh bronchial  BS, scattered rhonchi, no wheezing  GI:   Soft; NT/ND; BS; No guarding; No rebound tenderness.  EXT:   lower ext dark almost black hyperpigmentation of lower ext  SKIN:   Intact.    LAB RESULTS:                        11.8   8.69  )-----------( 170      ( 10 Aug 2022 06:16 )             34.2     08-10    138  |  98  |  14  ----------------------------<  110<H>  4.0   |  32  |  <0.5<L>    Ca    9.5      10 Aug 2022 06:16  Mg     1.7     08-10    TPro  5.9<L>  /  Alb  3.6  /  TBili  0.3  /  DBili  x   /  AST  16  /  ALT  26  /  AlkPhos  53  08-10            CARDIAC MARKERS ( 10 Aug 2022 01:56 )  x     / <0.01 ng/mL / x     / x     / x          MICROBIOLOGY:    RADIOLOGY:    ALLERGIES:  penicillin (Swelling)

## 2022-08-11 NOTE — PROGRESS NOTE ADULT - ASSESSMENT
Acute on chronic Hypoxic respiratory failure   #s/p trach/PEG at [facility] 04/2022 after being intubated for >1month  #COPD Exacerbation  #Suspected superimposed bacterial PNA     - D/c doxycycline WBC wnl, vitals wnl,  - Taper steroids- c/w prednisone 40 OD cont Nebs q4h & prn   -CXR reviewed and shows unchanged interstitial opacities  - Switch to fenestrated uncuffed trach, if tolerated consider downsize as per pulm recs.       #Chronic HFpEF  2d Echo 05/2022: LVEF 50-55%. proBNP ~ 444  Pt does not appear volume overloaded  - Low Na diet and daily weight.   - monitor I & O    #Hx of DVT   - Cont Eliquis  - Venous duplex shows no DVT    #DM2  - Cont lantus/lispro 20-6-6-6  - ISS    #Chronic atropic B/l LE Hyperpigmentation changes  On previous admission, LWC with Betamethasone cream and Ammonium lactate dressings per Burn has not helped over the past 3 weeks.   - Plan was for outpatient burn and dermatology f/u    #Chronic Lower extremity Pain management and Hyperpigmentation   - Gabapentin 600 TID,   - PO dilaudid 4mg Q4 PRN  -Vascular consulted for possible PVD.   -Arterial doppler - No vascular disease  -PT/OT as tolerated  - Stitches from biopsy removed    #Hypomagnesia  -Repleted    DVT ppx: Eliquis

## 2022-08-11 NOTE — CHART NOTE - NSCHARTNOTEFT_GEN_A_CORE
Called by medical resident Olu #[id number]  Patient initially presented on 8/3.    Patient is known to surgical service. Patient presented on 8/3 to hospital from [facility] with SOB, fever and hypotension.   Patient prior to 8/3 was on trach collar using 6LNC. Per ED, pt desaturating in the ED and it was necessary to bag patient and put on ventilator to get O2 sat back up to 100%     Pt with Shiley size 6 uncuffed fenestrated trach. Trach was exchange with chief surgical resident, RN, and RT at bedside to a Shiley Adult Flexible Tracheostomy Tube with TaperGuard Cuff [id number]. 6 cuffed.     8/11 TODAY: recalled to exchange trach back to fenestrated uncuffed trach. Patient is doing well and on t-piece. Trach was exchanged at bedside to a Shiley 6 uncuffed fenestrated. Unable to suction patient after exchange due to suction being caught in hole. inner cannula was exchanged back to unfenestrated inner cannula (outer cannula is still fenestrated) and was able to suction patient. Once patient requires less frequent suctioning and is stable the inner cannula may be exchanged back to fenestrated.     Patient was left in stable condition with an o2 sat of 99% on 30% O2 T-piece.     The fenestrated inner cannula is in the trach box at bedside.    Please recall with any questions or concerns.   #[phone] x1

## 2022-08-12 LAB
ALBUMIN SERPL ELPH-MCNC: 3.4 G/DL — LOW (ref 3.5–5.2)
ALP SERPL-CCNC: 56 U/L — SIGNIFICANT CHANGE UP (ref 30–115)
ALT FLD-CCNC: 22 U/L — SIGNIFICANT CHANGE UP (ref 0–41)
ANION GAP SERPL CALC-SCNC: 7 MMOL/L — SIGNIFICANT CHANGE UP (ref 7–14)
AST SERPL-CCNC: 18 U/L — SIGNIFICANT CHANGE UP (ref 0–41)
BILIRUB SERPL-MCNC: 0.3 MG/DL — SIGNIFICANT CHANGE UP (ref 0.2–1.2)
BUN SERPL-MCNC: 10 MG/DL — SIGNIFICANT CHANGE UP (ref 10–20)
CALCIUM SERPL-MCNC: 9.5 MG/DL — SIGNIFICANT CHANGE UP (ref 8.5–10.1)
CHLORIDE SERPL-SCNC: 99 MMOL/L — SIGNIFICANT CHANGE UP (ref 98–110)
CO2 SERPL-SCNC: 30 MMOL/L — SIGNIFICANT CHANGE UP (ref 17–32)
CREAT SERPL-MCNC: <0.5 MG/DL — LOW (ref 0.7–1.5)
EGFR: 123 ML/MIN/1.73M2 — SIGNIFICANT CHANGE UP
GLUCOSE BLDC GLUCOMTR-MCNC: 108 MG/DL — HIGH (ref 70–99)
GLUCOSE BLDC GLUCOMTR-MCNC: 113 MG/DL — HIGH (ref 70–99)
GLUCOSE BLDC GLUCOMTR-MCNC: 114 MG/DL — HIGH (ref 70–99)
GLUCOSE BLDC GLUCOMTR-MCNC: 121 MG/DL — HIGH (ref 70–99)
GLUCOSE BLDC GLUCOMTR-MCNC: 126 MG/DL — HIGH (ref 70–99)
GLUCOSE BLDC GLUCOMTR-MCNC: 143 MG/DL — HIGH (ref 70–99)
GLUCOSE SERPL-MCNC: 108 MG/DL — HIGH (ref 70–99)
HCT VFR BLD CALC: 34.5 % — LOW (ref 37–47)
HGB BLD-MCNC: 11.5 G/DL — LOW (ref 12–16)
MAGNESIUM SERPL-MCNC: 1.7 MG/DL — LOW (ref 1.8–2.4)
MCHC RBC-ENTMCNC: 32 PG — HIGH (ref 27–31)
MCHC RBC-ENTMCNC: 33.3 G/DL — SIGNIFICANT CHANGE UP (ref 32–37)
MCV RBC AUTO: 96.1 FL — SIGNIFICANT CHANGE UP (ref 81–99)
NRBC # BLD: 0 /100 WBCS — SIGNIFICANT CHANGE UP (ref 0–0)
PLATELET # BLD AUTO: 145 K/UL — SIGNIFICANT CHANGE UP (ref 130–400)
POTASSIUM SERPL-MCNC: 4.7 MMOL/L — SIGNIFICANT CHANGE UP (ref 3.5–5)
POTASSIUM SERPL-SCNC: 4.7 MMOL/L — SIGNIFICANT CHANGE UP (ref 3.5–5)
PROT SERPL-MCNC: 5.6 G/DL — LOW (ref 6–8)
RBC # BLD: 3.59 M/UL — LOW (ref 4.2–5.4)
RBC # FLD: 12.1 % — SIGNIFICANT CHANGE UP (ref 11.5–14.5)
SODIUM SERPL-SCNC: 136 MMOL/L — SIGNIFICANT CHANGE UP (ref 135–146)
WBC # BLD: 9.06 K/UL — SIGNIFICANT CHANGE UP (ref 4.8–10.8)
WBC # FLD AUTO: 9.06 K/UL — SIGNIFICANT CHANGE UP (ref 4.8–10.8)

## 2022-08-12 PROCEDURE — 74230 X-RAY XM SWLNG FUNCJ C+: CPT | Mod: 26

## 2022-08-12 RX ORDER — MAGNESIUM SULFATE 500 MG/ML
2 VIAL (ML) INJECTION ONCE
Refills: 0 | Status: DISCONTINUED | OUTPATIENT
Start: 2022-08-12 | End: 2022-08-19

## 2022-08-12 RX ADMIN — HYDROMORPHONE HYDROCHLORIDE 4 MILLIGRAM(S): 2 INJECTION INTRAMUSCULAR; INTRAVENOUS; SUBCUTANEOUS at 15:15

## 2022-08-12 RX ADMIN — HYDROMORPHONE HYDROCHLORIDE 4 MILLIGRAM(S): 2 INJECTION INTRAMUSCULAR; INTRAVENOUS; SUBCUTANEOUS at 19:03

## 2022-08-12 RX ADMIN — Medication 650 MILLIGRAM(S): at 14:14

## 2022-08-12 RX ADMIN — GABAPENTIN 600 MILLIGRAM(S): 400 CAPSULE ORAL at 05:04

## 2022-08-12 RX ADMIN — HYDROMORPHONE HYDROCHLORIDE 1 MILLIGRAM(S): 2 INJECTION INTRAMUSCULAR; INTRAVENOUS; SUBCUTANEOUS at 05:02

## 2022-08-12 RX ADMIN — ALBUTEROL 2.5 MILLIGRAM(S): 90 AEROSOL, METERED ORAL at 08:13

## 2022-08-12 RX ADMIN — CHLORHEXIDINE GLUCONATE 15 MILLILITER(S): 213 SOLUTION TOPICAL at 18:27

## 2022-08-12 RX ADMIN — Medication 650 MILLIGRAM(S): at 18:27

## 2022-08-12 RX ADMIN — HYDROMORPHONE HYDROCHLORIDE 4 MILLIGRAM(S): 2 INJECTION INTRAMUSCULAR; INTRAVENOUS; SUBCUTANEOUS at 18:26

## 2022-08-12 RX ADMIN — LOSARTAN POTASSIUM 100 MILLIGRAM(S): 100 TABLET, FILM COATED ORAL at 05:16

## 2022-08-12 RX ADMIN — POLYETHYLENE GLYCOL 3350 17 GRAM(S): 17 POWDER, FOR SOLUTION ORAL at 18:27

## 2022-08-12 RX ADMIN — ALBUTEROL 2.5 MILLIGRAM(S): 90 AEROSOL, METERED ORAL at 14:16

## 2022-08-12 RX ADMIN — APIXABAN 5 MILLIGRAM(S): 2.5 TABLET, FILM COATED ORAL at 18:30

## 2022-08-12 RX ADMIN — ALBUTEROL 2.5 MILLIGRAM(S): 90 AEROSOL, METERED ORAL at 20:29

## 2022-08-12 RX ADMIN — SENNA PLUS 2 TABLET(S): 8.6 TABLET ORAL at 22:54

## 2022-08-12 RX ADMIN — PANTOPRAZOLE SODIUM 40 MILLIGRAM(S): 20 TABLET, DELAYED RELEASE ORAL at 13:08

## 2022-08-12 RX ADMIN — HYDROMORPHONE HYDROCHLORIDE 1 MILLIGRAM(S): 2 INJECTION INTRAMUSCULAR; INTRAVENOUS; SUBCUTANEOUS at 13:04

## 2022-08-12 RX ADMIN — Medication 650 MILLIGRAM(S): at 01:14

## 2022-08-12 RX ADMIN — MUPIROCIN 1 APPLICATION(S): 20 OINTMENT TOPICAL at 13:07

## 2022-08-12 RX ADMIN — HYDROMORPHONE HYDROCHLORIDE 4 MILLIGRAM(S): 2 INJECTION INTRAMUSCULAR; INTRAVENOUS; SUBCUTANEOUS at 01:14

## 2022-08-12 RX ADMIN — HYDROMORPHONE HYDROCHLORIDE 1 MILLIGRAM(S): 2 INJECTION INTRAMUSCULAR; INTRAVENOUS; SUBCUTANEOUS at 05:32

## 2022-08-12 RX ADMIN — INSULIN GLARGINE 20 UNIT(S): 100 INJECTION, SOLUTION SUBCUTANEOUS at 22:53

## 2022-08-12 RX ADMIN — Medication 650 MILLIGRAM(S): at 05:33

## 2022-08-12 RX ADMIN — GABAPENTIN 600 MILLIGRAM(S): 400 CAPSULE ORAL at 22:54

## 2022-08-12 RX ADMIN — HYDROMORPHONE HYDROCHLORIDE 4 MILLIGRAM(S): 2 INJECTION INTRAMUSCULAR; INTRAVENOUS; SUBCUTANEOUS at 04:55

## 2022-08-12 RX ADMIN — GABAPENTIN 600 MILLIGRAM(S): 400 CAPSULE ORAL at 13:07

## 2022-08-12 RX ADMIN — Medication 650 MILLIGRAM(S): at 13:06

## 2022-08-12 RX ADMIN — Medication 25 GRAM(S): at 09:46

## 2022-08-12 RX ADMIN — Medication 650 MILLIGRAM(S): at 00:44

## 2022-08-12 RX ADMIN — Medication 3 MILLILITER(S): at 08:13

## 2022-08-12 RX ADMIN — HYDROMORPHONE HYDROCHLORIDE 4 MILLIGRAM(S): 2 INJECTION INTRAMUSCULAR; INTRAVENOUS; SUBCUTANEOUS at 11:16

## 2022-08-12 RX ADMIN — Medication 0.5 MILLIGRAM(S): at 19:20

## 2022-08-12 RX ADMIN — HYDROMORPHONE HYDROCHLORIDE 1 MILLIGRAM(S): 2 INJECTION INTRAMUSCULAR; INTRAVENOUS; SUBCUTANEOUS at 14:05

## 2022-08-12 RX ADMIN — Medication 1 APPLICATION(S): at 18:26

## 2022-08-12 RX ADMIN — MUPIROCIN 1 APPLICATION(S): 20 OINTMENT TOPICAL at 18:28

## 2022-08-12 RX ADMIN — Medication 3 MILLILITER(S): at 14:16

## 2022-08-12 RX ADMIN — Medication 650 MILLIGRAM(S): at 19:03

## 2022-08-12 RX ADMIN — HYDROMORPHONE HYDROCHLORIDE 4 MILLIGRAM(S): 2 INJECTION INTRAMUSCULAR; INTRAVENOUS; SUBCUTANEOUS at 22:54

## 2022-08-12 RX ADMIN — HYDROMORPHONE HYDROCHLORIDE 4 MILLIGRAM(S): 2 INJECTION INTRAMUSCULAR; INTRAVENOUS; SUBCUTANEOUS at 14:28

## 2022-08-12 RX ADMIN — Medication 3 MILLILITER(S): at 20:29

## 2022-08-12 RX ADMIN — Medication 6 UNIT(S): at 05:22

## 2022-08-12 RX ADMIN — Medication 1 TABLET(S): at 13:07

## 2022-08-12 RX ADMIN — HYDROMORPHONE HYDROCHLORIDE 4 MILLIGRAM(S): 2 INJECTION INTRAMUSCULAR; INTRAVENOUS; SUBCUTANEOUS at 05:25

## 2022-08-12 RX ADMIN — HYDROMORPHONE HYDROCHLORIDE 1 MILLIGRAM(S): 2 INJECTION INTRAMUSCULAR; INTRAVENOUS; SUBCUTANEOUS at 21:12

## 2022-08-12 RX ADMIN — Medication 0.5 MILLIGRAM(S): at 04:55

## 2022-08-12 RX ADMIN — Medication 1 APPLICATION(S): at 05:05

## 2022-08-12 RX ADMIN — APIXABAN 5 MILLIGRAM(S): 2.5 TABLET, FILM COATED ORAL at 05:03

## 2022-08-12 RX ADMIN — HYDROMORPHONE HYDROCHLORIDE 4 MILLIGRAM(S): 2 INJECTION INTRAMUSCULAR; INTRAVENOUS; SUBCUTANEOUS at 10:45

## 2022-08-12 RX ADMIN — HYDROMORPHONE HYDROCHLORIDE 4 MILLIGRAM(S): 2 INJECTION INTRAMUSCULAR; INTRAVENOUS; SUBCUTANEOUS at 00:44

## 2022-08-12 RX ADMIN — Medication 650 MILLIGRAM(S): at 05:03

## 2022-08-12 RX ADMIN — CHLORHEXIDINE GLUCONATE 15 MILLILITER(S): 213 SOLUTION TOPICAL at 05:04

## 2022-08-12 NOTE — SWALLOW VFSS/MBS ASSESSMENT ADULT - COMMENTS
pt well known to SLP dept from previous admission w/ hx of multiple instrumental swallow studies. VFSS most recently completed on 7/7 w/ recs for therapeutic po trials of easy to chew solids and thin liquids via consecutive swallows. Pt tolerating PMSV and on trach collar during exam.

## 2022-08-12 NOTE — SWALLOW VFSS/MBS ASSESSMENT ADULT - SLP PERTINENT HISTORY OF CURRENT PROBLEM
pt is a 58 y/o F w/ PMHx: IDDM, GERD, HTN, COPD, chronic hypoxemic and hypercapnic respiratory failure s/p tracheostomy (not chronically vented) & PEG at [facility], DVT, anxiety, CHF, recurrent mucous plugs s/p bronchoscopies, presenting from [facility] for chest tightness and SOB. Pt reports increased secretions requiring more suctioning, still unable to clear them. Tracheostomy exchanged to 6 cuffed tube from 6 cuffless on presentation. Per CT sx, no current indication for emergency surgical tracheostomy exchange, "f/u w/ ENT vs. pulm for exchange of tracheostomy given it is chronic in nature"; Pt is being treated for acute on chronic hypoxic RF, mucus plugging, bacterial PNA. Pt s/p trach exchange to Shiley 6 uncuffed fenestrated on 8/11. Surgery recommend leaving non-fenestrated inner cannula in place until pt can require less frequent suctioning. CXR 8/4-> Slightly decreased interstitial prominence/opacities. Unchanged left basal pleural-parenchymal opacity.

## 2022-08-12 NOTE — SWALLOW FEES ASSESSMENT ADULT - RECOMMENDED CONSISTENCY
continue NPO w/ PEG tube feeds; SLP to f/u w/ therapeutic PO feeds of puree and moderately thick liquids

## 2022-08-12 NOTE — SWALLOW FEES ASSESSMENT ADULT - SLP PERTINENT HISTORY OF CURRENT PROBLEM
pt is a 56 y/o F w/ PMHx: IDDM, GERD, HTN, COPD, chronic hypoxemic and hypercapnic respiratory failure s/p tracheostomy (not chronically vented) & PEG at [facility], DVT, anxiety, CHF, recurrent mucous plugs s/p bronchoscopies, presenting from [facility] for chest tightness and SOB. Pt reports increased secretions requiring more suctioning, still unable to clear them. Tracheostomy exchanged to 6 cuffed tube from 6 cuffless on presentation. Per CT sx, no current indication for emergency surgical tracheostomy exchange, "f/u w/ ENT vs. pulm for exchange of tracheostomy given it is chronic in nature"; Pt is being treated for acute on chronic hypoxic RF, mucus plugging, bacterial PNA. Pt s/p trach exchange to Shiley 6 uncuffed fenestrated on 8/11. Surgery recommend leaving non-fenestrated inner cannula in place until pt can require less frequent suctioning. CXR 8/4-> Slightly decreased interstitial prominence/opacities. Unchanged left basal pleural-parenchymal opacity.

## 2022-08-12 NOTE — SWALLOW FEES ASSESSMENT ADULT - ROSENBEK'S PENETRATION ASPIRATION SCALE
(8) material passes glottis, visible subglottic residue remains, absent patient response (aspiration) (5) material contacts vocal cords, visible residue remains (penetration)

## 2022-08-12 NOTE — SWALLOW VFSS/MBS ASSESSMENT ADULT - DIAGNOSTIC IMPRESSIONS
severe pharyngeal dysphagia for thin and mildly thick liquids; moderate pharyngeal dysphagia for puree and soft and bite sized consistencies

## 2022-08-12 NOTE — SWALLOW VFSS/MBS ASSESSMENT ADULT - ROSENBEK'S PENETRATION ASPIRATION SCALE
(8) contrast passes glottis, visible subglottic residue remains, absent patient response (aspiration) (3) contrast remains above the vocal cords, visible residue remains (penetration) (5) contrast contacts vocal cords, visible residue remains (penetration)

## 2022-08-12 NOTE — SWALLOW FEES ASSESSMENT ADULT - DIAGNOSTIC IMPRESSIONS
severe pharyngeal dysphagia for thin and mildly thick liquids; moderate pharyngeal dysphagia for moderately thick and pureed consistencies; PO diet not recommended at this time 2' compensatory swallow strategies of volitional throat clear/cough contraindicated d/t extensive pharyngeal edema.

## 2022-08-12 NOTE — PROGRESS NOTE ADULT - ASSESSMENT
Unfortunate 58 YO F with acute on chr hypoxic RF read for hypoxia and hypotension and ongoing Bacterial PNA. Hx of ASP PNA, COPD exacerbation and intubation with failure to extubate resulting in trach at [facility] .  The pt is under chronic  at [facility].      Acute on Chr hypoxic RF, mucus plugging  Chronic Tracheostomy  Bacterial PNA  Clinical Debility  Bedbound state  BL foot drop, limb weakness, prolonged immobility  Hx of HTN, ASHD HFpEF  Hx of DLD  Hx of COPD, MO, LUCIANA, sp intubation, failure to extubate, chr trach since /[facility]  Hx of DM II  Hx of Ch anemia of ch dis  Hx of GERD, HH, Diverticulosis, Ch constipation  Hx of OA, DJD, DDD, chr pain syn  Hx of lower ext venous stasis dermatitis  Hx of DVT, AC/Eliquis  Hx of Anxiety, Depression     today pt stable, A&O, verbal    pt cont the weaning process, pt on T tube down to 30%  Surgery attempted to replace trach to fenestrated tube, the outer tube is fenestrated, the inner is not as suction tube caught in the  fenestrations ( fenestrated inner tube may be placed when pt req less suctioning)  nutrition via PEG  cont S&S therapy  on ad pt was placed on vent support  pt was given 2 L of IV fluids with improvement of BP  CXR reviewed and shows unchanged interstitial opacities, L pl based opacity  Venous doppler neg for DVT  Vasc consult   place order for aa doppler of lower ext  Pul-critical care consult and ff up appeciated    cont all previous meds  GI prophylaxis  att to bowel regimen  nutrition via PEG/glucerna  PM: hydromorphone 4mg po q 4 and 1mg IV q 8 for breakthrough pain, gabapentin 600mg q 8, tylenol 650mg QID    Rehab consult for bedside PT, can we get pt OOB to chair, rehab states pt high risk for fall but bedside and  OOB to chair daily, pt highly motivated    pt full code  Social SVC, pt not ready for D/C, weaning in progress

## 2022-08-12 NOTE — SWALLOW VFSS/MBS ASSESSMENT ADULT - SLP GENERAL OBSERVATIONS
pt received in radiology suite awake alert w/o c/o pain. +40% humidified O2 via t-piece, +PMSV in place w/ voicing

## 2022-08-12 NOTE — PROGRESS NOTE ADULT - SUBJECTIVE AND OBJECTIVE BOX
CRUZ DUMONT 58yo  Female sent to the ER from [facility] where she is on  care for c/o inc SOB, chest tightness, diff breathing, wheezing i.e. acute on chr hypoxic RF.  Of note the pt has a trach and has had freq readmissions for mucous plugging, RF and has had several bronchoscopies.   EMS noted the pt to be hypotensive and hypoxic.  The pt was vigorously suctioned, received Neb Tx, IV steroids,  BP revived with IV fluids.  The pt was cultured and placed on ABx.  She is v well known to the Pul SVc and to ID.  The pt is ad with acute on ch hypoxic RF and ongoing Bacterial PNA.  The PMHx includes:  HTN, ASHD, HFpEF, DLD, DM II, Chronic RF, COPD, LUCIANA, ASp PNA, sp intubation, failure to extubate, chronic trach ([facility] 4/22), recurrent mucus plugging of airways, SDVT, AC with Eliquis, lower ext venous stasis dermatitis, bedbound state, GERD, diverticulosis, sp PEG, OA, DDD, DJD, chronic pain syn, depression anxiety.    INTERVAL HPI/OVERNIGHT EVENTS: pt stable, Surgery changed trach to fenestrated trach outer tube and nonfenestrated inner, S&S working with pt, nutrition via PEG, bedside PT, Pul ff    MEDICATIONS  (STANDING):  acetaminophen     Tablet .. 650 milliGRAM(s) Oral every 6 hours  acetylcysteine 20% for Nebulization - Peds 3 milliLiter(s) Nebulizer every 6 hours  ALBUTerol    0.083% 2.5 milliGRAM(s) Nebulizer every 6 hours  ammonium lactate 12% Lotion 1 Application(s) Topical two times a day  apixaban 5 milliGRAM(s) Enteral Tube two times a day  chlorhexidine 0.12% Liquid 15 milliLiter(s) Oral Mucosa every 12 hours  dextrose 5%. 1000 milliLiter(s) (100 mL/Hr) IV Continuous <Continuous>  dextrose 5%. 1000 milliLiter(s) (50 mL/Hr) IV Continuous <Continuous>  dextrose 50% Injectable 25 Gram(s) IV Push once  dextrose 50% Injectable 12.5 Gram(s) IV Push once  dextrose 50% Injectable 25 Gram(s) IV Push once  doxycycline monohydrate Capsule 100 milliGRAM(s) Oral every 12 hours  gabapentin 600 milliGRAM(s) Oral three times a day  glucagon  Injectable 1 milliGRAM(s) IntraMuscular once  insulin glargine Injectable (LANTUS) 20 Unit(s) SubCutaneous at bedtime  insulin lispro (ADMELOG) corrective regimen sliding scale   SubCutaneous three times a day before meals  insulin lispro Injectable (ADMELOG) 6 Unit(s) SubCutaneous three times a day before meals  methylPREDNISolone sodium succinate Injectable 60 milliGRAM(s) IV Push two times a day  multivitamin 1 Tablet(s) Oral daily  pantoprazole   Suspension 40 milliGRAM(s) Oral daily  polyethylene glycol 3350 17 Gram(s) Oral two times a day  senna 2 Tablet(s) Oral at bedtime    MEDICATIONS  (PRN):  ALBUTerol    90 MICROgram(s) HFA Inhaler 2 Puff(s) Inhalation every 6 hours PRN Shortness of Breath and/or Wheezing  ALPRAZolam 0.5 milliGRAM(s) Oral every 6 hours PRN anxiety  dextrose Oral Gel 15 Gram(s) Oral once PRN Blood Glucose LESS THAN 70 milliGRAM(s)/deciliter  HYDROmorphone   Tablet 4 milliGRAM(s) Oral every 4 hours PRN Severe Pain (7 - 10)  HYDROmorphone  Injectable 1 milliGRAM(s) IV Push every 8 hours PRN Moderate Pain (4 - 6)      Allergies    penicillin (Swelling)      Vital Signs Last 24 Hrs    T(F): 98.3  HR:  89  BP:  117/57    RR: 16  SpO2:  100% T piece 30    PHYSICAL EXAM:      Constitutional: pt alert, oriented  verbal,  bedbound chr ill looking but in NAD on T piece    Eyes: nonicteric    ENMT: dry oral mucosa, dental defects,     Neck: + trach tube supple, no stridor, + trach, , + T tube    Respiratory: shallow resp, diminished harsh bronchial  BS, scattered rhonchi, no wheezing    Cardiovascular: S1S2 reg    Gastrointestinal: globular, soft and benign, + PEG, + BS    Genitourinary:    Extremities: moves all ext, dec motor strength of lower ext, + BL mm atrophy, + BL foot drop    Vascular: dec pedal pulses    Neurological: nonfocal    Skin: lower ext dark almost black hyperpigmentation of lower ext    Lymph Nodes: not enlarged    Musculoskeletal: dec mm mass and tone    Psychiatric: anxious, depressed but stable        LABS:                        11.5  9 )-----------( 170             34.5      136  |  99  |  10  ----------------------------<  108  4.7   |  30  |  0.5    , 115, 123  Ca    9.7      05 Aug 2022 06:50  Mg     1.9, 1.8, 2.0, 1.6, 1.7  trop <0.01  TPro  5.9, 5.5  /  Alb  3.3, 3.4  /  TBili  0.3  /  DBili  x   /  AST  20  /  ALT  28  /  AlkPhos  77  08-05          RADIOLOGY & ADDITIONAL TESTS:  EKG:  NSR R axis, low voltage, nonspecific ST-T changes  CXR:  interstitial prominence, L plbased opacity unchanged

## 2022-08-12 NOTE — SWALLOW FEES ASSESSMENT ADULT - PRELIMINARY ENDOSCOPIC EXAMINATIONS
+diffuse pharyngeal edema, globular shaped arytenoids/Interarytenoid/post-commissure edema/Interarytenoid/Arytenoid edema/Interarytenoid/Arytenoid erythema/Baseline pooling of secretions/Baseline penetration of secretions

## 2022-08-12 NOTE — SWALLOW FEES ASSESSMENT ADULT - COMMENTS
residual barium noted within laryngeal vestibule from VFSS study completed this morning pt well known to SLP dept from previous admission w/ hx of multiple instrumental swallow studies. VFSS most recently completed on 7/7 w/ recs for therapeutic po trials of easy to chew solids and thin liquids via consecutive swallows. Pt tolerating PMSV and on trach collar during exam.

## 2022-08-13 LAB
GLUCOSE BLDC GLUCOMTR-MCNC: 116 MG/DL — HIGH (ref 70–99)
GLUCOSE BLDC GLUCOMTR-MCNC: 120 MG/DL — HIGH (ref 70–99)
GLUCOSE BLDC GLUCOMTR-MCNC: 122 MG/DL — HIGH (ref 70–99)
GLUCOSE BLDC GLUCOMTR-MCNC: 122 MG/DL — HIGH (ref 70–99)
GLUCOSE BLDC GLUCOMTR-MCNC: 126 MG/DL — HIGH (ref 70–99)
GLUCOSE BLDC GLUCOMTR-MCNC: 142 MG/DL — HIGH (ref 70–99)
GLUCOSE BLDC GLUCOMTR-MCNC: 89 MG/DL — SIGNIFICANT CHANGE UP (ref 70–99)

## 2022-08-13 RX ORDER — ALPRAZOLAM 0.25 MG
0.5 TABLET ORAL ONCE
Refills: 0 | Status: DISCONTINUED | OUTPATIENT
Start: 2022-08-13 | End: 2022-08-13

## 2022-08-13 RX ORDER — ALPRAZOLAM 0.25 MG
0.5 TABLET ORAL EVERY 6 HOURS
Refills: 0 | Status: DISCONTINUED | OUTPATIENT
Start: 2022-08-13 | End: 2022-08-19

## 2022-08-13 RX ORDER — IPRATROPIUM BROMIDE 0.2 MG/ML
500 SOLUTION, NON-ORAL INHALATION EVERY 6 HOURS
Refills: 0 | Status: DISCONTINUED | OUTPATIENT
Start: 2022-08-13 | End: 2022-08-19

## 2022-08-13 RX ORDER — HYDROMORPHONE HYDROCHLORIDE 2 MG/ML
0.5 INJECTION INTRAMUSCULAR; INTRAVENOUS; SUBCUTANEOUS
Refills: 0 | Status: DISCONTINUED | OUTPATIENT
Start: 2022-08-13 | End: 2022-08-19

## 2022-08-13 RX ORDER — ALPRAZOLAM 0.25 MG
0.5 TABLET ORAL EVERY 8 HOURS
Refills: 0 | Status: DISCONTINUED | OUTPATIENT
Start: 2022-08-13 | End: 2022-08-13

## 2022-08-13 RX ORDER — HYDROMORPHONE HYDROCHLORIDE 2 MG/ML
0.5 INJECTION INTRAMUSCULAR; INTRAVENOUS; SUBCUTANEOUS ONCE
Refills: 0 | Status: DISCONTINUED | OUTPATIENT
Start: 2022-08-13 | End: 2022-08-13

## 2022-08-13 RX ORDER — ACETYLCYSTEINE 200 MG/ML
4 VIAL (ML) MISCELLANEOUS
Refills: 0 | Status: DISCONTINUED | OUTPATIENT
Start: 2022-08-13 | End: 2022-08-19

## 2022-08-13 RX ORDER — ACETAMINOPHEN 500 MG
1000 TABLET ORAL EVERY 8 HOURS
Refills: 0 | Status: DISCONTINUED | OUTPATIENT
Start: 2022-08-13 | End: 2022-08-19

## 2022-08-13 RX ORDER — HYDROMORPHONE HYDROCHLORIDE 2 MG/ML
4 INJECTION INTRAMUSCULAR; INTRAVENOUS; SUBCUTANEOUS EVERY 4 HOURS
Refills: 0 | Status: DISCONTINUED | OUTPATIENT
Start: 2022-08-13 | End: 2022-08-19

## 2022-08-13 RX ORDER — SCOPALAMINE 1 MG/3D
1 PATCH, EXTENDED RELEASE TRANSDERMAL ONCE
Refills: 0 | Status: COMPLETED | OUTPATIENT
Start: 2022-08-13 | End: 2022-08-13

## 2022-08-13 RX ADMIN — APIXABAN 5 MILLIGRAM(S): 2.5 TABLET, FILM COATED ORAL at 18:07

## 2022-08-13 RX ADMIN — HYDROMORPHONE HYDROCHLORIDE 4 MILLIGRAM(S): 2 INJECTION INTRAMUSCULAR; INTRAVENOUS; SUBCUTANEOUS at 14:08

## 2022-08-13 RX ADMIN — Medication 3 MILLILITER(S): at 14:18

## 2022-08-13 RX ADMIN — Medication 6 UNIT(S): at 08:32

## 2022-08-13 RX ADMIN — APIXABAN 5 MILLIGRAM(S): 2.5 TABLET, FILM COATED ORAL at 06:54

## 2022-08-13 RX ADMIN — INSULIN GLARGINE 20 UNIT(S): 100 INJECTION, SOLUTION SUBCUTANEOUS at 21:44

## 2022-08-13 RX ADMIN — Medication 3 MILLILITER(S): at 08:42

## 2022-08-13 RX ADMIN — Medication 500 MICROGRAM(S): at 20:00

## 2022-08-13 RX ADMIN — Medication 0.5 MILLIGRAM(S): at 03:36

## 2022-08-13 RX ADMIN — GABAPENTIN 600 MILLIGRAM(S): 400 CAPSULE ORAL at 12:25

## 2022-08-13 RX ADMIN — HYDROMORPHONE HYDROCHLORIDE 0.5 MILLIGRAM(S): 2 INJECTION INTRAMUSCULAR; INTRAVENOUS; SUBCUTANEOUS at 12:34

## 2022-08-13 RX ADMIN — Medication 1 TABLET(S): at 12:27

## 2022-08-13 RX ADMIN — HYDROMORPHONE HYDROCHLORIDE 0.5 MILLIGRAM(S): 2 INJECTION INTRAMUSCULAR; INTRAVENOUS; SUBCUTANEOUS at 16:38

## 2022-08-13 RX ADMIN — Medication 1000 MILLIGRAM(S): at 22:38

## 2022-08-13 RX ADMIN — GABAPENTIN 600 MILLIGRAM(S): 400 CAPSULE ORAL at 22:39

## 2022-08-13 RX ADMIN — SCOPALAMINE 1 PATCH: 1 PATCH, EXTENDED RELEASE TRANSDERMAL at 12:24

## 2022-08-13 RX ADMIN — Medication 650 MILLIGRAM(S): at 06:54

## 2022-08-13 RX ADMIN — Medication 1 APPLICATION(S): at 18:03

## 2022-08-13 RX ADMIN — HYDROMORPHONE HYDROCHLORIDE 4 MILLIGRAM(S): 2 INJECTION INTRAMUSCULAR; INTRAVENOUS; SUBCUTANEOUS at 18:13

## 2022-08-13 RX ADMIN — HYDROMORPHONE HYDROCHLORIDE 0.5 MILLIGRAM(S): 2 INJECTION INTRAMUSCULAR; INTRAVENOUS; SUBCUTANEOUS at 20:23

## 2022-08-13 RX ADMIN — ALBUTEROL 2.5 MILLIGRAM(S): 90 AEROSOL, METERED ORAL at 08:42

## 2022-08-13 RX ADMIN — Medication 1 APPLICATION(S): at 06:55

## 2022-08-13 RX ADMIN — SENNA PLUS 2 TABLET(S): 8.6 TABLET ORAL at 22:38

## 2022-08-13 RX ADMIN — GABAPENTIN 600 MILLIGRAM(S): 400 CAPSULE ORAL at 06:54

## 2022-08-13 RX ADMIN — POLYETHYLENE GLYCOL 3350 17 GRAM(S): 17 POWDER, FOR SOLUTION ORAL at 06:50

## 2022-08-13 RX ADMIN — Medication 0.5 MILLIGRAM(S): at 19:26

## 2022-08-13 RX ADMIN — Medication 1000 MILLIGRAM(S): at 12:23

## 2022-08-13 RX ADMIN — Medication 650 MILLIGRAM(S): at 00:31

## 2022-08-13 RX ADMIN — HYDROMORPHONE HYDROCHLORIDE 4 MILLIGRAM(S): 2 INJECTION INTRAMUSCULAR; INTRAVENOUS; SUBCUTANEOUS at 22:39

## 2022-08-13 RX ADMIN — HYDROMORPHONE HYDROCHLORIDE 0.5 MILLIGRAM(S): 2 INJECTION INTRAMUSCULAR; INTRAVENOUS; SUBCUTANEOUS at 23:30

## 2022-08-13 RX ADMIN — MUPIROCIN 1 APPLICATION(S): 20 OINTMENT TOPICAL at 06:55

## 2022-08-13 RX ADMIN — CHLORHEXIDINE GLUCONATE 15 MILLILITER(S): 213 SOLUTION TOPICAL at 06:54

## 2022-08-13 RX ADMIN — ALBUTEROL 2.5 MILLIGRAM(S): 90 AEROSOL, METERED ORAL at 14:25

## 2022-08-13 RX ADMIN — Medication 1200 MILLIGRAM(S): at 18:08

## 2022-08-13 RX ADMIN — HYDROMORPHONE HYDROCHLORIDE 0.5 MILLIGRAM(S): 2 INJECTION INTRAMUSCULAR; INTRAVENOUS; SUBCUTANEOUS at 08:32

## 2022-08-13 RX ADMIN — HYDROMORPHONE HYDROCHLORIDE 0.5 MILLIGRAM(S): 2 INJECTION INTRAMUSCULAR; INTRAVENOUS; SUBCUTANEOUS at 03:36

## 2022-08-13 RX ADMIN — Medication 6 UNIT(S): at 18:09

## 2022-08-13 RX ADMIN — Medication 4 MILLILITER(S): at 20:00

## 2022-08-13 RX ADMIN — HYDROMORPHONE HYDROCHLORIDE 4 MILLIGRAM(S): 2 INJECTION INTRAMUSCULAR; INTRAVENOUS; SUBCUTANEOUS at 09:42

## 2022-08-13 RX ADMIN — MUPIROCIN 1 APPLICATION(S): 20 OINTMENT TOPICAL at 18:03

## 2022-08-13 RX ADMIN — Medication 0.5 MILLIGRAM(S): at 08:32

## 2022-08-13 RX ADMIN — LOSARTAN POTASSIUM 100 MILLIGRAM(S): 100 TABLET, FILM COATED ORAL at 06:54

## 2022-08-13 RX ADMIN — Medication 6 UNIT(S): at 12:22

## 2022-08-13 RX ADMIN — ALBUTEROL 2.5 MILLIGRAM(S): 90 AEROSOL, METERED ORAL at 20:00

## 2022-08-13 RX ADMIN — PANTOPRAZOLE SODIUM 40 MILLIGRAM(S): 20 TABLET, DELAYED RELEASE ORAL at 12:25

## 2022-08-13 NOTE — PROGRESS NOTE ADULT - SUBJECTIVE AND OBJECTIVE BOX
CRUZ DUMONT 56yo  Female sent to the ER from [facility] where she is on  care for c/o inc SOB, chest tightness, diff breathing, wheezing i.e. acute on chr hypoxic RF.  Of note the pt has a trach and has had freq readmissions for mucous plugging, RF and has had several bronchoscopies.   EMS noted the pt to be hypotensive and hypoxic.  The pt was vigorously suctioned, received Neb Tx, IV steroids,  BP revived with IV fluids.  The pt was cultured and placed on ABx.  She is v well known to the Pul SVc and to ID.  The pt is ad with acute on ch hypoxic RF and ongoing Bacterial PNA.  The PMHx includes:  HTN, ASHD, HFpEF, DLD, DM II, Chronic RF, COPD, LUCIANA, ASp PNA, sp intubation, failure to extubate, chronic [facility]), recurrent mucus plugging of airways, SDVT, AC with Eliquis, lower ext venous stasis dermatitis, bedbound state, GERD, diverticulosis, sp PEG, OA, DDD, DJD, chronic pain syn, depression anxiety.    INTERVAL HPI/OVERNIGHT EVENTS: pt stable, oxygenateing well on T piece 30% at 100%,  Surgery changed trach to fenestrated trach outer tube and nonfenestrated inner, cont to suction,  S&S working with pt, ENT to evaluate for persistent pharyngeal/laryngeal edema, nutrition via PEG, bedside PT, Pul ff    MEDICATIONS  (STANDING):  acetaminophen     Tablet .. 650 milliGRAM(s) Oral every 6 hours  acetylcysteine 20% for Nebulization - Peds 3 milliLiter(s) Nebulizer every 6 hours  ALBUTerol    0.083% 2.5 milliGRAM(s) Nebulizer every 6 hours  ammonium lactate 12% Lotion 1 Application(s) Topical two times a day  apixaban 5 milliGRAM(s) Enteral Tube two times a day  chlorhexidine 0.12% Liquid 15 milliLiter(s) Oral Mucosa every 12 hours  dextrose 5%. 1000 milliLiter(s) (100 mL/Hr) IV Continuous <Continuous>  dextrose 5%. 1000 milliLiter(s) (50 mL/Hr) IV Continuous <Continuous>  dextrose 50% Injectable 25 Gram(s) IV Push once  dextrose 50% Injectable 12.5 Gram(s) IV Push once  dextrose 50% Injectable 25 Gram(s) IV Push once  doxycycline monohydrate Capsule 100 milliGRAM(s) Oral every 12 hours  gabapentin 600 milliGRAM(s) Oral three times a day  glucagon  Injectable 1 milliGRAM(s) IntraMuscular once  insulin glargine Injectable (LANTUS) 20 Unit(s) SubCutaneous at bedtime  insulin lispro (ADMELOG) corrective regimen sliding scale   SubCutaneous three times a day before meals  insulin lispro Injectable (ADMELOG) 6 Unit(s) SubCutaneous three times a day before meals  methylPREDNISolone sodium succinate Injectable 60 milliGRAM(s) IV Push two times a day  multivitamin 1 Tablet(s) Oral daily  pantoprazole   Suspension 40 milliGRAM(s) Oral daily  polyethylene glycol 3350 17 Gram(s) Oral two times a day  senna 2 Tablet(s) Oral at bedtime    MEDICATIONS  (PRN):  ALBUTerol    90 MICROgram(s) HFA Inhaler 2 Puff(s) Inhalation every 6 hours PRN Shortness of Breath and/or Wheezing  ALPRAZolam 0.5 milliGRAM(s) Oral every 6 hours PRN anxiety  dextrose Oral Gel 15 Gram(s) Oral once PRN Blood Glucose LESS THAN 70 milliGRAM(s)/deciliter  HYDROmorphone   Tablet 4 milliGRAM(s) Oral every 4 hours PRN Severe Pain (7 - 10)  HYDROmorphone  Injectable 1 milliGRAM(s) IV Push every 8 hours PRN Moderate Pain (4 - 6)      Allergies    penicillin (Swelling)      Vital Signs Last 24 Hrs    T(F): 98  HR:  75  BP:  137/80    RR: 18  SpO2:  100% T piece 30    PHYSICAL EXAM:      Constitutional: pt alert, oriented  verbal,  bedbound chr ill looking but in NAD on T piece    Eyes: nonicteric    ENMT: dry oral mucosa, dental defects,     Neck: + trach tube supple, no stridor, + trach, , + T tube    Respiratory: shallow resp, diminished harsh bronchial  BS, scattered rhonchi, no wheezing    Cardiovascular: S1S2 reg    Gastrointestinal: globular, soft and benign, + PEG, + BS    Genitourinary:    Extremities: moves all ext, dec motor strength of lower ext, + BL mm atrophy, + BL foot drop    Vascular: dec pedal pulses    Neurological: nonfocal    Skin: lower ext dark almost black hyperpigmentation of lower ext    Lymph Nodes: not enlarged    Musculoskeletal: dec mm mass and tone    Psychiatric: anxious, depressed but stable        LABS:        8/12                11.5  9 )-----------( 170             34.5      136  |  99  |  10  ----------------------------<  108  4.7   |  30  |  0.5    , 115, 123  Ca    9.7      05 Aug 2022 06:50  Mg     1.9, 1.8, 2.0, 1.6, 1.7  trop <0.01  TPro  5.9, 5.5  /  Alb  3.3, 3.4  /  TBili  0.3  /  DBili  x   /  AST  20  /  ALT  28  /  AlkPhos  77  08-05          RADIOLOGY & ADDITIONAL TESTS:    EKG:  NSR R axis, low voltage, nonspecific ST-T changes  CXR:  interstitial prominence, L pl based opacity unchanged  AA doppler:  normal arterial blood flow

## 2022-08-13 NOTE — PROGRESS NOTE ADULT - ASSESSMENT
Unfortunate 56 YO F with acute on chr hypoxic RF read for hypoxia and hypotension and ongoing Bacterial PNA. Hx of ASP PNA, COPD exacerbation and intubation with failure to extubate resulting in trach at [facility] .  The pt is under chronic  at [facility].      Acute on Chr hypoxic RF, mucus plugging  Chronic Tracheostomy  Bacterial PNA  Clinical Debility  Bedbound state  Lower extremity venous insufficiency  BL foot drop, limb weakness, prolonged immobility  Hx of HTN, ASHD HFpEF  Hx of DLD  Hx of COPD, MO, LUCIANA, sp intubation, failure to extubate, chr trach since /[facility]  Hx of DM II  Hx of Ch anemia of ch dis  Hx of GERD, HH, Diverticulosis, Ch constipation  Hx of OA, DJD, DDD, chr pain syn  Hx of lower ext venous stasis dermatitis  Hx of DVT, AC/Eliquis  Hx of Anxiety, Depression     pt stable, A&O, verbal  pt saturating well at 100% / T piece  30%, cont weaning process    Surgery attempted to replace trach to fenestrated tube, the outer tube is fenestrated, the inner is not as suction tube caught in the  fenestrations ( fenestrated inner tube may be placed when pt req less suctioning)  nutrition via PEG for now, allow sm amts of ice chips  cont S&S therapy  ENT to scope pt to assess persistent pharyngeal/laryngeal edema  Venous doppler neg for DVT  AA doppler shows normal blood flow  Vasc consult     Pul-critical care ff    cont all previous meds  GI prophylaxis  att to bowel regimen  nutrition via PEG/glucerna  PM: hydromorphone 4mg po q 4 and 1mg IV q 8 for breakthrough pain, gabapentin 600mg q 8, tylenol 650mg QID    Rehab consult for bedside PT, can we get pt OOB to chair, rehab states pt high risk for fall but bedside and  OOB to chair daily, pt highly motivated    pt full code  Social SVC, pt not ready for D/C, weaning in progress   Unfortunate 56 YO F with acute on chr hypoxic RF read for hypoxia and hypotension and ongoing Bacterial PNA. Hx of ASP PNA, COPD exacerbation and intubation with failure to extubate resulting in trach at [facility] .  The pt is under chronic  at [facility].      Acute on Chr hypoxic RF, mucus plugging  Chronic Tracheostomy  Bacterial PNA  Clinical Debility  Bedbound state  Lower extremity venous insufficiency  BL foot drop, limb weakness, prolonged immobility  Hx of HTN, ASHD HFpEF  Hx of DLD  Hx of COPD, MO, LUCIANA, sp intubation, failure to extubate, chr trach since /[facility]  Hx of DM II  Hx of Ch anemia of ch dis  Hx of GERD, HH, Diverticulosis, Ch constipation  Hx of OA, DJD, DDD, chr pain syn  Hx of lower ext venous stasis dermatitis  Hx of DVT, AC/Eliquis  Hx of Anxiety, Depression     pt stable, A&O, verbal  pt saturating well at 100% / T piece  30%, cont weaning process    Surgery attempted to replace trach to fenestrated tube, the outer tube is fenestrated, the inner is not as suction tube caught in the  fenestrations ( fenestrated inner tube may be placed when pt req less suctioning)  nutrition via PEG for now, allow sm amts of ice chips  cont S&S therapy  ENT to scope pt to assess persistent pharyngeal/laryngeal edema  Venous doppler neg for DVT  AA doppler shows normal blood flow  Vasc consult     Pul-critical care ff    cont all previous meds  GI prophylaxis  att to bowel regimen  nutrition via PEG/glucerna  PM: hydromorphone 4mg po q 4 and 1mg IV q 8 for breakthrough pain, gabapentin 600mg q 8, tylenol 650mg QID    Rehab consult for bedside PT, can we get pt OOB to chair, rehab states pt high risk for fall but bedside and  OOB to chair daily, pt highly motivated  BL heel off loading boots, pt with BL foot drop  pt full code  Social SVC, pt not ready for D/C, weaning in progress

## 2022-08-13 NOTE — SWALLOW BEDSIDE ASSESSMENT ADULT - SLP GENERAL OBSERVATIONS
pt received in bed awake alert w/o c/o pain; +humidified O2 via t-piece, +PMSV placed in line, +voicing

## 2022-08-13 NOTE — PROGRESS NOTE ADULT - SUBJECTIVE AND OBJECTIVE BOX
Location: [address] ([facility] T33B)  Patient Name: CRUZ DUMONT  Age: 57y  Gender: Female    Past Medical and Surgical History:  COPD (chronic obstructive pulmonary disease)  CHF (congestive heart failure)  DM (diabetes mellitus)  H/O tracheostomy  H/O PEG      Social History:  Social History: Live at nursing home      Allergies:  penicillin (Swelling)      Patient is a 57y old Female who presents with a chief complaint of Hypoxia (13 Aug 2022 07:21)  Primary diagnosis of SOB;LUNG INFILTRATE    SOB;LUNG INFILTRATE.......        Progress Note  This morning patient was seen and examined at bedside.    Today is hospital day 10d.  Mrs. Dumont is complaining from severe leg pain and from increasing secretion from the trach.  Getting Ice Chips PO and feeding per PEG, and denies nausea or vomiting. Denies any abdominal pain, diarrhea, or constipation.   Ambulating and denies any shortness of breath, chest pain, palpitations, or light headedness.  Voiding freely and denies urinary symptoms (dysuria, urgency, frequency, intermittence).      Vital Signs in the last 24 hours   Vitals Summary T(C): 36.8 (08-13-22 @ 05:11), Max: 36.8 (08-12-22 @ 13:00)  HR: 80 (08-13-22 @ 05:11) (80 - 89)  BP: 132/59 (08-13-22 @ 05:11) (117/57 - 132/59)  RR: 20 (08-13-22 @ 05:11) (18 - 20)  SpO2: --  Vent Data   Intake/ Output   08-12-22 @ 07:01  -  08-13-22 @ 07:00  --------------------------------------------------------  IN: 330 mL / OUT: 2300 mL / NET: -1970 mL    08-13-22 @ 07:01  -  08-13-22 @ 12:16  --------------------------------------------------------  IN: 300 mL / OUT: 0 mL / NET: 300 mL          Physical Exam  * General Appearance: Alert, cooperative, interactive, anxious, tearful, oriented to time, place, and person, in no acute distress  * Lungs: Respirations unlabored, Fair bilateral air entry, Diffuse ronchi and coarse crackles  * Chest Wall: No tenderness or deformity  * Heart: Regular Rate and Rhythm, normal S1 and S2, no audible murmur, rub, or gallop  * Abdomen: Symmetric, soft, non-tender, bowel sounds active all four quadrants  * Extremities: Extremities normal, atraumatic, no cyanosis, no lower extremity pitting edema bilaterally, ichthyosis      Investigations   Laboratory Workup  - CBC:                        11.5   9.06  )-----------( 145      ( 12 Aug 2022 06:12 )             34.5     - Chemistry:  08-12    136  |  99  |  10  ----------------------------<  108<H>  4.7   |  30  |  <0.5<L>    Ca    9.5      12 Aug 2022 06:12  Mg     1.7     08-12    TPro  5.6<L>  /  Alb  3.4<L>  /  TBili  0.3  /  DBili  x   /  AST  18  /  ALT  22  /  AlkPhos  56  08-12    Current Medications  Standing Medications  acetaminophen     Tablet .. 1000 milliGRAM(s) Oral every 8 hours  acetylcysteine 10%  Inhalation 4 milliLiter(s) Inhalation two times a day  acetylcysteine 20% for Nebulization - Peds 3 milliLiter(s) Nebulizer every 6 hours  ALBUTerol    0.083% 2.5 milliGRAM(s) Nebulizer every 6 hours  ammonium lactate 12% Lotion 1 Application(s) Topical two times a day  apixaban 5 milliGRAM(s) Enteral Tube two times a day  chlorhexidine 0.12% Liquid 15 milliLiter(s) Oral Mucosa every 12 hours  clobetasol 0.05% Ointment 1 Application(s) Topical two times a day  dextrose 5%. 1000 milliLiter(s) (50 mL/Hr) IV Continuous <Continuous>  dextrose 5%. 1000 milliLiter(s) (100 mL/Hr) IV Continuous <Continuous>  dextrose 50% Injectable 25 Gram(s) IV Push once  dextrose 50% Injectable 12.5 Gram(s) IV Push once  dextrose 50% Injectable 25 Gram(s) IV Push once  gabapentin 600 milliGRAM(s) Oral three times a day  glucagon  Injectable 1 milliGRAM(s) IntraMuscular once  guaiFENesin ER 1200 milliGRAM(s) Oral every 12 hours  insulin glargine Injectable (LANTUS) 20 Unit(s) SubCutaneous at bedtime  insulin lispro (ADMELOG) corrective regimen sliding scale   SubCutaneous three times a day before meals  insulin lispro Injectable (ADMELOG) 6 Unit(s) SubCutaneous three times a day before meals  ipratropium    for Nebulization 500 MICROGram(s) Nebulizer every 6 hours  losartan 100 milliGRAM(s) Oral daily  magnesium sulfate  IVPB 2 Gram(s) IV Intermittent once  multivitamin 1 Tablet(s) Oral daily  mupirocin 2% Ointment 1 Application(s) Topical two times a day  pantoprazole   Suspension 40 milliGRAM(s) Oral daily  polyethylene glycol 3350 17 Gram(s) Oral two times a day  scopolamine 1 mG/72 Hr(s) Patch 1 Patch Transdermal once  senna 2 Tablet(s) Oral at bedtime    PRN Medications  ALBUTerol    90 MICROgram(s) HFA Inhaler 2 Puff(s) Inhalation every 6 hours PRN Shortness of Breath and/or Wheezing  ALPRAZolam 0.5 milliGRAM(s) Oral every 6 hours PRN anxiety  dextrose Oral Gel 15 Gram(s) Oral once PRN Blood Glucose LESS THAN 70 milliGRAM(s)/deciliter  HYDROmorphone   Tablet 4 milliGRAM(s) Enteral Tube every 4 hours PRN Severe Pain (7 - 10)  HYDROmorphone  Injectable 0.5 milliGRAM(s) IV Push every 3 hours PRN Moderate Pain (4 - 6)    Singles Doses Administered  (ADM OVERRIDE) 1 each &lt;see task&gt; GiveOnce  (ADM OVERRIDE) 2 each &lt;see task&gt; GiveOnce  (ADM OVERRIDE) 1 each &lt;see task&gt; GiveOnce  (ADM OVERRIDE) 1 each &lt;see task&gt; GiveOnce  (ADM OVERRIDE) 1 each &lt;see task&gt; GiveOnce  (ADM OVERRIDE) 1 each &lt;see task&gt; GiveOnce  (ADM OVERRIDE) 1 each &lt;see task&gt; GiveOnce  (ADM OVERRIDE) 1 each &lt;see task&gt; GiveOnce  (ADM OVERRIDE) 1 each &lt;see task&gt; GiveOnce  (Floorstock) 2 each &lt;see task&gt; GiveOnce  (Floorstock) 1 each &lt;see task&gt; GiveOnce  (Floorstock) 1 each &lt;see task&gt; GiveOnce  (Floorstock) 1 each &lt;see task&gt; GiveOnce  (Floorstock) 1 each &lt;see task&gt; GiveOnce  (Floorstock) 1 each &lt;see task&gt; GiveOnce  (Floorstock) 1 each &lt;see task&gt; GiveOnce  (Floorstock) 1 each &lt;see task&gt; GiveOnce  (Floorstock) 1 each &lt;see task&gt; GiveOnce  (Floorstock) 1 each &lt;see task&gt; GiveOnce  (Floorstock) 1 each &lt;see task&gt; GiveOnce  (Floorstock) 1 each &lt;see task&gt; GiveOnce  (Floorstock) 2 each &lt;see task&gt; GiveOnce  (Floorstock) 1 each &lt;see task&gt; GiveOnce  (Floorstock) 1 each &lt;see task&gt; GiveOnce  (Floorstock) 1 each &lt;see task&gt; GiveOnce  (Floorstock) 1 each &lt;see task&gt; GiveOnce  (Floorstock) 1 each &lt;see task&gt; GiveOnce  (Floorstock) 1 each &lt;see task&gt; GiveOnce  (Floorstock) 1 each &lt;see task&gt; GiveOnce  (Floorstock) 1 each &lt;see task&gt; GiveOnce  (Floorstock) 1 each &lt;see task&gt; GiveOnce  (Floorstock) 1 each &lt;see task&gt; GiveOnce  (Floorstock) 1 each &lt;see task&gt; GiveOnce  (Floorstock) 1 each &lt;see task&gt; GiveOnce  (Floorstock) 1 each &lt;see task&gt; GiveOnce  (Floorstock) 1 each &lt;see task&gt; GiveOnce  (Floorstock) 2 each &lt;see task&gt; GiveOnce  (Floorstock) 1 each &lt;see task&gt; GiveOnce  (Floorstock) 1 each &lt;see task&gt; GiveOnce  (Floorstock) 1 each &lt;see task&gt; GiveOnce  (Floorstock) 1 each &lt;see task&gt; GiveOnce  (Floorstock) 1 each &lt;see task&gt; GiveOnce  (Floorstock) 1 each &lt;see task&gt; GiveOnce  (Floorstock) 1 each &lt;see task&gt; GiveOnce  (Floorstock) 2 each &lt;see task&gt; GiveOnce  (Floorstock) 2 each &lt;see task&gt; GiveOnce  (Floorstock) 2 each &lt;see task&gt; GiveOnce  (Floorstock) 1 each &lt;see task&gt; GiveOnce  (Floorstock) 1 each &lt;see task&gt; GiveOnce  (Floorstock) 1 each &lt;see task&gt; GiveOnce  (Floorstock) 1 each &lt;see task&gt; GiveOnce  (Floorstock) 1 each &lt;see task&gt; GiveOnce  (Floorstock) 1 each &lt;see task&gt; GiveOnce  (Floorstock) 1 each &lt;see task&gt; GiveOnce  (Floorstock) 1 each &lt;see task&gt; GiveOnce  (Floorstock) 1 each &lt;see task&gt; GiveOnce  (Floorstock) 1 each &lt;see task&gt; GiveOnce  acetaminophen     Tablet .. 650 milliGRAM(s) Oral once  albuterol/ipratropium for Nebulization. 3 milliLiter(s) Nebulizer once  ALPRAZolam 0.5 milliGRAM(s) Oral once  ALPRAZolam 0.5 milliGRAM(s) Oral every 6 hours PRN  diazepam  Injectable 5 milliGRAM(s) IV Push Once  doxycycline IVPB 100 milliGRAM(s) IV Intermittent once  furosemide   Injectable 40 milliGRAM(s) IV Push Once  hydrALAZINE Injectable 5 milliGRAM(s) IV Push once  HYDROmorphone   Tablet 4 milliGRAM(s) Oral every 4 hours PRN  HYDROmorphone   Tablet 4 milliGRAM(s) Oral once  HYDROmorphone  Injectable 0.5 milliGRAM(s) IV Push once  HYDROmorphone  Injectable 1 milliGRAM(s) IV Push once  HYDROmorphone  Injectable 0.5 milliGRAM(s) IV Push once  HYDROmorphone  Injectable 1 milliGRAM(s) IV Push every 8 hours PRN  HYDROmorphone  Injectable 0.5 milliGRAM(s) IV Push once  insulin lispro Injectable (ADMELOG). 4 Unit(s) SubCutaneous once  levoFLOXacin IVPB 750 milliGRAM(s) IV Intermittent once  magnesium sulfate  IVPB 2 Gram(s) IV Intermittent every 2 hours  magnesium sulfate  IVPB 2 Gram(s) IV Intermittent once  magnesium sulfate  IVPB 2 Gram(s) IV Intermittent once  methylPREDNISolone sodium succinate Injectable 125 milliGRAM(s) IV Push Once  morphine  - Injectable 6 milliGRAM(s) IV Push Once  oxycodone    5 mG/acetaminophen 325 mG 1 Tablet(s) Oral once  sodium chloride 0.9% Bolus 2700 milliLiter(s) IV Bolus once  vancomycin  IVPB 1250 milliGRAM(s) IV Intermittent Once

## 2022-08-13 NOTE — PROGRESS NOTE ADULT - ASSESSMENT
57 year old female with a past medical history of IDDM, GERD, HTN, COPD, Chronic hypoxemic and hypercapnic respiratory failure SP Tracheostomy (not chronically vented) & PEG at [facility], DVT on Eliquis, Anxiety, CHF, Recurrent mucous plugs SP bronchoscopies, [facility] chest tightness and shortness of breath over the past few days. Was found to have aspiration pneumonia causing COPD exacerbation    Acute on chronic Hypoxic respiratory failure   #s/p trach/PEG at [facility] 04/2022 after being intubated for >1month  #COPD Exacerbation  #Suspected superimposed bacterial PNA  - Completed a course of doxycycline WBC wnl, vitals wnl,  - Taper steroids- c/w prednisone 40 OD cont Nebs q4h & prn   - CXR reviewed and shows unchanged interstitial opacities  - Switch to fenestrated uncuffed trach, if tolerated consider downsize as per pulm recs.   - Suction Q8h  - Inhaled NAC and PO Mucinex started    #Dysphagia  - F/U Speech and Swallow and ENT recs    #Chronic HFpEF  2d Echo 05/2022: LVEF 50-55%. proBNP ~ 444  Pt does not appear volume overloaded  - Low Na diet and daily weight.   - monitor I & O    #Hx of DVT   - Cont Eliquis  - Venous duplex shows no DVT    #DM2  - Cont lantus/lispro 20-6-6-6  - ISS        #Chronic Lower extremity Pain management and Hyperpigmentation   #Chronic atropic B/l LE Hyperpigmentation changes  - Plan was for outpatient burn and dermatology f/u  - Start topical clobetasone ointment  - Gabapentin 600 TID,   - PO dilaudid 4mg Q4 PRN  -Vascular consulted for possible PVD.   -Arterial doppler - No vascular disease  -PT/OT as tolerated  - Stitches from biopsy removed    #Hypomagnesia  -Repleted    Dispo: Back To [facility] [place], operated by [facility] when stable. Likely next week

## 2022-08-13 NOTE — CONSULT NOTE ADULT - ASSESSMENT
Pt is a 58yo Female who presents with hypoxia, h/o tracheostomy - called to assess for persistent laryngeal edema, failing SLP.     ·	reviewed FEES video with SLP, structures were obscured from residual barium  ·	will plan to FFL at bedside with attng to assess larynx - pt aware and agreeable  ·	cont trach care, cont PMSV/t piece as tolerated  ·	w/d with attng, will follow

## 2022-08-13 NOTE — CONSULT NOTE ADULT - SUBJECTIVE AND OBJECTIVE BOX
Pt is a 58yo Female who presents with hypoxia, h/o tracheostomy - called to assess for persistent laryngeal edema, failing SLP. As per the chart, pt's history dates back to April when patient was in [facility] for respiratory failure, COPD exacerbation with superimposed PNA, remained intubated for 37 days and then subsequently had tracheostomy placed. Pt has been admitted to [facility] multiple times for pulmonary issues/mucus plugging. Pt was seen by ENT previously in June, pt had copious secretions within the larynx, unable to visualize much on FFL. Pt had been treated with steroids and was scheduled to f/u with Dr Turner as outpatient on June 23rd, no show to appt. PT readmitted now with hypoxia, SLP performed FEES which is still showing significant laryngeal edema, on steroids again this admission, being weaned. PT seen and examined at bedside.       PAST MEDICAL & SURGICAL HISTORY:  COPD (chronic obstructive pulmonary disease)  CHF (congestive heart failure)  DM (diabetes mellitus)  H/O tracheostomy      MEDICATIONS  (STANDING):  acetaminophen     Tablet .. 650 milliGRAM(s) Oral every 6 hours  acetylcysteine 20% for Nebulization - Peds 3 milliLiter(s) Nebulizer every 6 hours  ALBUTerol    0.083% 2.5 milliGRAM(s) Nebulizer every 6 hours  ammonium lactate 12% Lotion 1 Application(s) Topical two times a day  apixaban 5 milliGRAM(s) Enteral Tube two times a day  chlorhexidine 0.12% Liquid 15 milliLiter(s) Oral Mucosa every 12 hours  dextrose 5%. 1000 milliLiter(s) (50 mL/Hr) IV Continuous <Continuous>  dextrose 5%. 1000 milliLiter(s) (100 mL/Hr) IV Continuous <Continuous>  dextrose 50% Injectable 25 Gram(s) IV Push once  dextrose 50% Injectable 12.5 Gram(s) IV Push once  dextrose 50% Injectable 25 Gram(s) IV Push once  gabapentin 600 milliGRAM(s) Oral three times a day  glucagon  Injectable 1 milliGRAM(s) IntraMuscular once  insulin glargine Injectable (LANTUS) 20 Unit(s) SubCutaneous at bedtime  insulin lispro (ADMELOG) corrective regimen sliding scale   SubCutaneous three times a day before meals  insulin lispro Injectable (ADMELOG) 6 Unit(s) SubCutaneous three times a day before meals  losartan 100 milliGRAM(s) Oral daily  magnesium sulfate  IVPB 2 Gram(s) IV Intermittent once  multivitamin 1 Tablet(s) Oral daily  mupirocin 2% Ointment 1 Application(s) Topical two times a day  pantoprazole   Suspension 40 milliGRAM(s) Oral daily  polyethylene glycol 3350 17 Gram(s) Oral two times a day  senna 2 Tablet(s) Oral at bedtime    MEDICATIONS  (PRN):  ALBUTerol    90 MICROgram(s) HFA Inhaler 2 Puff(s) Inhalation every 6 hours PRN Shortness of Breath and/or Wheezing  dextrose Oral Gel 15 Gram(s) Oral once PRN Blood Glucose LESS THAN 70 milliGRAM(s)/deciliter      Allergies    penicillin (Swelling)    Intolerances      FAMILY HISTORY:  No pertinent family history in first degree relatives    REVIEW OF SYSTEMS   [x] A ten-point review of systems was otherwise negative except as noted.    Vital Signs Last 24 Hrs  T(C): 36.8 (13 Aug 2022 05:11), Max: 36.8 (12 Aug 2022 13:00)  T(F): 98.3 (13 Aug 2022 05:11), Max: 98.3 (12 Aug 2022 13:00)  HR: 80 (13 Aug 2022 05:11) (80 - 89)  BP: 132/59 (13 Aug 2022 05:11) (117/57 - 132/59)  RR: 20 (13 Aug 2022 05:11) (18 - 20)      GEN: NAD, awake and alert. No drooling or pooling of secretions. No stridor or stertor. Good vocal quality, no hoarseness.   SKIN: Good color, non diaphoretic.  HEENT: Oral mucosa pink and moist. No erythema or edema noted to buccal mucosa, tongue, FOM, uvula or posterior oropharynx. Uvula midline.   NECK: Trachea midline, Neck supple, no TTP to B/L lateral neck, no cervical LAD.  RESP: No dyspnea, non-labored breathing. No use of accessory muscles.   CARDIO: +S1/S2  ABDO: Soft, NT.  EXT: JONAS x 4      LABS:                        11.5   9.06  )-----------( 145      ( 12 Aug 2022 06:12 )             34.5     08-12    136  |  99  |  10  ----------------------------<  108<H>  4.7   |  30  |  <0.5<L>    Ca    9.5      12 Aug 2022 06:12  Mg     1.7     08-12    TPro  5.6<L>  /  Alb  3.4<L>  /  TBili  0.3  /  DBili  x   /  AST  18  /  ALT  22  /  AlkPhos  56  08-12    RADIOLOGY & ADDITIONAL STUDIES:    No pertinent imaging.  Pt is a 56yo Female who presents with hypoxia, h/o tracheostomy - called to assess for persistent laryngeal edema, failing SLP. As per the chart, pt's history dates back to April when patient was in [facility] for respiratory failure, COPD exacerbation with superimposed PNA, remained intubated for 37 days and then subsequently had tracheostomy placed. Pt has been admitted to [facility] multiple times for pulmonary issues/mucus plugging. Pt was seen by ENT previously in June, pt had copious secretions within the larynx, unable to visualize much on FFL. Pt had been treated with steroids and was scheduled to f/u with Dr Turner as outpatient on June 23rd, no show to appt. PT readmitted now with hypoxia, SLP performed FEES which is still showing significant laryngeal edema, on steroids again this admission, being weaned. PT seen and examined at bedside. Pt states her voice is at baseline when using a speaking valve, no changes that shes noted. Pt otherwise feels well, no throat complaints.       PAST MEDICAL & SURGICAL HISTORY:  COPD (chronic obstructive pulmonary disease)  CHF (congestive heart failure)  DM (diabetes mellitus)  H/O tracheostomy      MEDICATIONS  (STANDING):  acetaminophen     Tablet .. 650 milliGRAM(s) Oral every 6 hours  acetylcysteine 20% for Nebulization - Peds 3 milliLiter(s) Nebulizer every 6 hours  ALBUTerol    0.083% 2.5 milliGRAM(s) Nebulizer every 6 hours  ammonium lactate 12% Lotion 1 Application(s) Topical two times a day  apixaban 5 milliGRAM(s) Enteral Tube two times a day  chlorhexidine 0.12% Liquid 15 milliLiter(s) Oral Mucosa every 12 hours  dextrose 5%. 1000 milliLiter(s) (50 mL/Hr) IV Continuous <Continuous>  dextrose 5%. 1000 milliLiter(s) (100 mL/Hr) IV Continuous <Continuous>  dextrose 50% Injectable 25 Gram(s) IV Push once  dextrose 50% Injectable 12.5 Gram(s) IV Push once  dextrose 50% Injectable 25 Gram(s) IV Push once  gabapentin 600 milliGRAM(s) Oral three times a day  glucagon  Injectable 1 milliGRAM(s) IntraMuscular once  insulin glargine Injectable (LANTUS) 20 Unit(s) SubCutaneous at bedtime  insulin lispro (ADMELOG) corrective regimen sliding scale   SubCutaneous three times a day before meals  insulin lispro Injectable (ADMELOG) 6 Unit(s) SubCutaneous three times a day before meals  losartan 100 milliGRAM(s) Oral daily  magnesium sulfate  IVPB 2 Gram(s) IV Intermittent once  multivitamin 1 Tablet(s) Oral daily  mupirocin 2% Ointment 1 Application(s) Topical two times a day  pantoprazole   Suspension 40 milliGRAM(s) Oral daily  polyethylene glycol 3350 17 Gram(s) Oral two times a day  senna 2 Tablet(s) Oral at bedtime    MEDICATIONS  (PRN):  ALBUTerol    90 MICROgram(s) HFA Inhaler 2 Puff(s) Inhalation every 6 hours PRN Shortness of Breath and/or Wheezing  dextrose Oral Gel 15 Gram(s) Oral once PRN Blood Glucose LESS THAN 70 milliGRAM(s)/deciliter    Allergies    penicillin (Swelling)    Intolerances      FAMILY HISTORY:  No pertinent family history in first degree relatives    REVIEW OF SYSTEMS   [x] A ten-point review of systems was otherwise negative except as noted.    Vital Signs Last 24 Hrs  T(C): 36.8 (13 Aug 2022 05:11), Max: 36.8 (12 Aug 2022 13:00)  T(F): 98.3 (13 Aug 2022 05:11), Max: 98.3 (12 Aug 2022 13:00)  HR: 80 (13 Aug 2022 05:11) (80 - 89)  BP: 132/59 (13 Aug 2022 05:11) (117/57 - 132/59)  RR: 20 (13 Aug 2022 05:11) (18 - 20)      GEN: NAD, awake and alert. No drooling or pooling of secretions. No stridor or stertor. Good vocal quality with PMSV.  SKIN: Good color, non diaphoretic.  HEENT: Oral mucosa pink and moist. No erythema or edema noted to buccal mucosa, tongue, FOM, uvula or posterior oropharynx. Uvula midline.   NECK: Trachea midline, + 8 CFN in place, on t-piece, surrounding skin intact. Neck supple, no TTP to B/L lateral neck, no cervical LAD.  RESP: No dyspnea, non-labored breathing. No use of accessory muscles.   CARDIO: +S1/S2  ABDO: Soft, NT. + Gtube  EXT: JONAS x 4      LABS:                        11.5   9.06  )-----------( 145      ( 12 Aug 2022 06:12 )             34.5     08-12    136  |  99  |  10  ----------------------------<  108<H>  4.7   |  30  |  <0.5<L>    Ca    9.5      12 Aug 2022 06:12  Mg     1.7     08-12    TPro  5.6<L>  /  Alb  3.4<L>  /  TBili  0.3  /  DBili  x   /  AST  18  /  ALT  22  /  AlkPhos  56  08-12    RADIOLOGY & ADDITIONAL STUDIES:    No pertinent imaging.

## 2022-08-14 LAB
24R-OH-CALCIDIOL SERPL-MCNC: 18 NG/ML — LOW (ref 30–80)
ALBUMIN SERPL ELPH-MCNC: 3.3 G/DL — LOW (ref 3.5–5.2)
ALP SERPL-CCNC: 69 U/L — SIGNIFICANT CHANGE UP (ref 30–115)
ALT FLD-CCNC: 26 U/L — SIGNIFICANT CHANGE UP (ref 0–41)
ANION GAP SERPL CALC-SCNC: 9 MMOL/L — SIGNIFICANT CHANGE UP (ref 7–14)
AST SERPL-CCNC: 23 U/L — SIGNIFICANT CHANGE UP (ref 0–41)
BILIRUB SERPL-MCNC: 0.4 MG/DL — SIGNIFICANT CHANGE UP (ref 0.2–1.2)
BUN SERPL-MCNC: 10 MG/DL — SIGNIFICANT CHANGE UP (ref 10–20)
CALCIUM SERPL-MCNC: 9.5 MG/DL — SIGNIFICANT CHANGE UP (ref 8.5–10.1)
CHLORIDE SERPL-SCNC: 100 MMOL/L — SIGNIFICANT CHANGE UP (ref 98–110)
CO2 SERPL-SCNC: 29 MMOL/L — SIGNIFICANT CHANGE UP (ref 17–32)
CREAT SERPL-MCNC: <0.5 MG/DL — LOW (ref 0.7–1.5)
EGFR: 123 ML/MIN/1.73M2 — SIGNIFICANT CHANGE UP
GLUCOSE BLDC GLUCOMTR-MCNC: 110 MG/DL — HIGH (ref 70–99)
GLUCOSE BLDC GLUCOMTR-MCNC: 118 MG/DL — HIGH (ref 70–99)
GLUCOSE BLDC GLUCOMTR-MCNC: 118 MG/DL — HIGH (ref 70–99)
GLUCOSE BLDC GLUCOMTR-MCNC: 166 MG/DL — HIGH (ref 70–99)
GLUCOSE BLDC GLUCOMTR-MCNC: 230 MG/DL — HIGH (ref 70–99)
GLUCOSE BLDC GLUCOMTR-MCNC: 92 MG/DL — SIGNIFICANT CHANGE UP (ref 70–99)
GLUCOSE SERPL-MCNC: 123 MG/DL — HIGH (ref 70–99)
HCT VFR BLD CALC: 31.5 % — LOW (ref 37–47)
HGB BLD-MCNC: 10.7 G/DL — LOW (ref 12–16)
IRON SATN MFR SERPL: 42 % — SIGNIFICANT CHANGE UP (ref 15–50)
IRON SATN MFR SERPL: 72 UG/DL — SIGNIFICANT CHANGE UP (ref 35–150)
MAGNESIUM SERPL-MCNC: 1.8 MG/DL — SIGNIFICANT CHANGE UP (ref 1.8–2.4)
MCHC RBC-ENTMCNC: 32.4 PG — HIGH (ref 27–31)
MCHC RBC-ENTMCNC: 34 G/DL — SIGNIFICANT CHANGE UP (ref 32–37)
MCV RBC AUTO: 95.5 FL — SIGNIFICANT CHANGE UP (ref 81–99)
NRBC # BLD: 0 /100 WBCS — SIGNIFICANT CHANGE UP (ref 0–0)
PLATELET # BLD AUTO: 126 K/UL — LOW (ref 130–400)
POTASSIUM SERPL-MCNC: 4.3 MMOL/L — SIGNIFICANT CHANGE UP (ref 3.5–5)
POTASSIUM SERPL-SCNC: 4.3 MMOL/L — SIGNIFICANT CHANGE UP (ref 3.5–5)
PROT SERPL-MCNC: 5.4 G/DL — LOW (ref 6–8)
RBC # BLD: 3.3 M/UL — LOW (ref 4.2–5.4)
RBC # FLD: 12.1 % — SIGNIFICANT CHANGE UP (ref 11.5–14.5)
SODIUM SERPL-SCNC: 138 MMOL/L — SIGNIFICANT CHANGE UP (ref 135–146)
TIBC SERPL-MCNC: 173 UG/DL — LOW (ref 220–430)
UIBC SERPL-MCNC: 101 UG/DL — LOW (ref 110–370)
WBC # BLD: 8.06 K/UL — SIGNIFICANT CHANGE UP (ref 4.8–10.8)
WBC # FLD AUTO: 8.06 K/UL — SIGNIFICANT CHANGE UP (ref 4.8–10.8)

## 2022-08-14 RX ORDER — ERGOCALCIFEROL 1.25 MG/1
50000 CAPSULE ORAL
Refills: 0 | Status: DISCONTINUED | OUTPATIENT
Start: 2022-08-14 | End: 2022-08-19

## 2022-08-14 RX ADMIN — HYDROMORPHONE HYDROCHLORIDE 0.5 MILLIGRAM(S): 2 INJECTION INTRAMUSCULAR; INTRAVENOUS; SUBCUTANEOUS at 20:09

## 2022-08-14 RX ADMIN — CHLORHEXIDINE GLUCONATE 15 MILLILITER(S): 213 SOLUTION TOPICAL at 16:47

## 2022-08-14 RX ADMIN — CHLORHEXIDINE GLUCONATE 15 MILLILITER(S): 213 SOLUTION TOPICAL at 05:12

## 2022-08-14 RX ADMIN — Medication 0.5 MILLIGRAM(S): at 18:35

## 2022-08-14 RX ADMIN — Medication 1 APPLICATION(S): at 07:21

## 2022-08-14 RX ADMIN — ALBUTEROL 2.5 MILLIGRAM(S): 90 AEROSOL, METERED ORAL at 15:47

## 2022-08-14 RX ADMIN — Medication 500 MICROGRAM(S): at 20:53

## 2022-08-14 RX ADMIN — HYDROMORPHONE HYDROCHLORIDE 0.5 MILLIGRAM(S): 2 INJECTION INTRAMUSCULAR; INTRAVENOUS; SUBCUTANEOUS at 07:21

## 2022-08-14 RX ADMIN — Medication 1200 MILLIGRAM(S): at 05:12

## 2022-08-14 RX ADMIN — Medication 1200 MILLIGRAM(S): at 16:48

## 2022-08-14 RX ADMIN — HYDROMORPHONE HYDROCHLORIDE 4 MILLIGRAM(S): 2 INJECTION INTRAMUSCULAR; INTRAVENOUS; SUBCUTANEOUS at 16:45

## 2022-08-14 RX ADMIN — PANTOPRAZOLE SODIUM 40 MILLIGRAM(S): 20 TABLET, DELAYED RELEASE ORAL at 12:05

## 2022-08-14 RX ADMIN — HYDROMORPHONE HYDROCHLORIDE 0.5 MILLIGRAM(S): 2 INJECTION INTRAMUSCULAR; INTRAVENOUS; SUBCUTANEOUS at 04:59

## 2022-08-14 RX ADMIN — GABAPENTIN 600 MILLIGRAM(S): 400 CAPSULE ORAL at 12:06

## 2022-08-14 RX ADMIN — Medication 1000 MILLIGRAM(S): at 16:46

## 2022-08-14 RX ADMIN — Medication 3 MILLILITER(S): at 08:41

## 2022-08-14 RX ADMIN — ALBUTEROL 2.5 MILLIGRAM(S): 90 AEROSOL, METERED ORAL at 20:52

## 2022-08-14 RX ADMIN — Medication 0.5 MILLIGRAM(S): at 01:17

## 2022-08-14 RX ADMIN — HYDROMORPHONE HYDROCHLORIDE 4 MILLIGRAM(S): 2 INJECTION INTRAMUSCULAR; INTRAVENOUS; SUBCUTANEOUS at 12:08

## 2022-08-14 RX ADMIN — Medication 0.5 MILLIGRAM(S): at 07:17

## 2022-08-14 RX ADMIN — HYDROMORPHONE HYDROCHLORIDE 0.5 MILLIGRAM(S): 2 INJECTION INTRAMUSCULAR; INTRAVENOUS; SUBCUTANEOUS at 16:01

## 2022-08-14 RX ADMIN — APIXABAN 5 MILLIGRAM(S): 2.5 TABLET, FILM COATED ORAL at 16:48

## 2022-08-14 RX ADMIN — HYDROMORPHONE HYDROCHLORIDE 0.5 MILLIGRAM(S): 2 INJECTION INTRAMUSCULAR; INTRAVENOUS; SUBCUTANEOUS at 13:01

## 2022-08-14 RX ADMIN — Medication 1 TABLET(S): at 12:06

## 2022-08-14 RX ADMIN — LOSARTAN POTASSIUM 100 MILLIGRAM(S): 100 TABLET, FILM COATED ORAL at 05:11

## 2022-08-14 RX ADMIN — HYDROMORPHONE HYDROCHLORIDE 4 MILLIGRAM(S): 2 INJECTION INTRAMUSCULAR; INTRAVENOUS; SUBCUTANEOUS at 05:11

## 2022-08-14 RX ADMIN — HYDROMORPHONE HYDROCHLORIDE 4 MILLIGRAM(S): 2 INJECTION INTRAMUSCULAR; INTRAVENOUS; SUBCUTANEOUS at 20:51

## 2022-08-14 RX ADMIN — Medication 3 MILLILITER(S): at 15:47

## 2022-08-14 RX ADMIN — HYDROMORPHONE HYDROCHLORIDE 0.5 MILLIGRAM(S): 2 INJECTION INTRAMUSCULAR; INTRAVENOUS; SUBCUTANEOUS at 23:10

## 2022-08-14 RX ADMIN — Medication 3 MILLILITER(S): at 20:52

## 2022-08-14 RX ADMIN — GABAPENTIN 600 MILLIGRAM(S): 400 CAPSULE ORAL at 05:11

## 2022-08-14 RX ADMIN — Medication 1 APPLICATION(S): at 18:56

## 2022-08-14 RX ADMIN — MUPIROCIN 1 APPLICATION(S): 20 OINTMENT TOPICAL at 07:22

## 2022-08-14 RX ADMIN — Medication 1000 MILLIGRAM(S): at 07:16

## 2022-08-14 RX ADMIN — GABAPENTIN 600 MILLIGRAM(S): 400 CAPSULE ORAL at 23:56

## 2022-08-14 RX ADMIN — Medication 6 UNIT(S): at 09:00

## 2022-08-14 RX ADMIN — Medication 6 UNIT(S): at 12:03

## 2022-08-14 RX ADMIN — Medication 1000 MILLIGRAM(S): at 23:57

## 2022-08-14 RX ADMIN — Medication 1 APPLICATION(S): at 19:22

## 2022-08-14 RX ADMIN — APIXABAN 5 MILLIGRAM(S): 2.5 TABLET, FILM COATED ORAL at 05:11

## 2022-08-14 RX ADMIN — ALBUTEROL 2.5 MILLIGRAM(S): 90 AEROSOL, METERED ORAL at 08:42

## 2022-08-14 RX ADMIN — MUPIROCIN 1 APPLICATION(S): 20 OINTMENT TOPICAL at 18:57

## 2022-08-14 RX ADMIN — INSULIN GLARGINE 20 UNIT(S): 100 INJECTION, SOLUTION SUBCUTANEOUS at 23:57

## 2022-08-14 NOTE — PROGRESS NOTE ADULT - SUBJECTIVE AND OBJECTIVE BOX
SUBJECTIVE:  Patient is a 57y old Female who presents with a chief complaint of Hypoxia, acute on ch RF, tracheostomy in situ, debilitated state (13 Aug 2022 14:12)   SOB (shortness of breath)     Today is hospital day 11d. There are no new issues or overnight events. Patient states she feels well this morning and has no complaints.    HPI  HPI:  57 year old female with a past medical history of IDDM, GERD, HTN, COPD, Chronic hypoxemic and hypercapnic respiratory failure SP Tracheostomy (not chronically vented) & PEG at [facility], DVT on Eliquis, Anxiety, CHF, Recurrent mucous plugs SP bronchoscopies, presenting from [facility] for chest tightness and shortness of breath over the past few days.  Per EMS patient was hypotensive and hypoxic at the facility today, around 60%.  Patient was recently started on PO trial last week.  Patient reports increased secretions requiring more suctioning, still unable to clear them.  She also reports increased cough and wheezing.  Denies fevers, chills, lightheadedness, dizziness, blurry vision, double vision, headache, extremity weakness or deficits, chest pain, palpitations, abdominal pain, nausea, vomiting, diarrhea, hematochezia, melena, dysuria, hematuria, flank pain, lower extremity swelling/tenderness/erythema.    ED: NS 2.7L, Vancomycin 1250mg, Levofloxacin 750mg, Solumedrol 125mg, Morphine, Diazepam, Albuterol, Placed on pressure support.    Xray on admission showed bilat opacities.   EKG showed Normal sinus rhythm with Right axis deviation     (03 Aug 2022 12:37)      PAST MEDICAL & SURGICAL HISTORY  COPD (chronic obstructive pulmonary disease)    CHF (congestive heart failure)    DM (diabetes mellitus)    H/O tracheostomy      ALLERGIES:  penicillin (Swelling)    MEDICATIONS:  HOME MEDICATIONS  acetylcysteine 20% inhalation solution: 3 milliliter(s) inhaled every 6 hours  albuterol sulfate 2.5 mg/3 mL (0.083 %) solution for nebulization: milliliter(s) inhaled 3 times a day, As Needed  ALPRAZolam 0.5 mg oral tablet: 1 tab(s) orally 3 times a day (after meals), As Needed  ammonium lactate 12% topical lotion: 1 application topically 2 times a day  betamethasone valerate 0.1% topical cream: 1 application topically 2 times a day  gabapentin 600 mg oral tablet: 1 tab(s) orally 3 times a day via PEG tube  HYDROmorphone 4 mg oral tablet: 1 tab(s) orally every 4 hours, As Needed  insulin glargine 100 units/mL subcutaneous solution: 20 unit(s) subcutaneous once a day (at bedtime)  insulin lispro 100 units/mL injectable solution: 6  injectable 4 times a day (before feeds Q6H)  losartan 100 mg oral tablet: 1 tab(s) orally once a day via PEG tube  Multiple Vitamins oral tablet: 1 tab(s) orally once a day via PEG tube  pantoprazole 40 mg oral delayed release tablet: 1 tab(s) orally once a day (before a meal) via PEG tube  polyethylene glycol 3350 oral powder for reconstitution: 17 gram(s) orally once a day via PEG tube  senna leaf extract oral tablet: 2 tab(s) orally once a day (at bedtime) via PEG tube  Trelegy Ellipta 100 mcg-62.5 mcg-25 mcg powder for inhalation:     STANDING MEDICATIONS  acetaminophen     Tablet .. 1000 milliGRAM(s) Oral every 8 hours  acetylcysteine 10%  Inhalation 4 milliLiter(s) Inhalation two times a day  acetylcysteine 20% for Nebulization - Peds 3 milliLiter(s) Nebulizer every 6 hours  ALBUTerol    0.083% 2.5 milliGRAM(s) Nebulizer every 6 hours  ammonium lactate 12% Lotion 1 Application(s) Topical two times a day  apixaban 5 milliGRAM(s) Enteral Tube two times a day  chlorhexidine 0.12% Liquid 15 milliLiter(s) Oral Mucosa every 12 hours  clobetasol 0.05% Ointment 1 Application(s) Topical two times a day  dextrose 5%. 1000 milliLiter(s) IV Continuous <Continuous>  dextrose 5%. 1000 milliLiter(s) IV Continuous <Continuous>  dextrose 50% Injectable 25 Gram(s) IV Push once  dextrose 50% Injectable 12.5 Gram(s) IV Push once  dextrose 50% Injectable 25 Gram(s) IV Push once  gabapentin 600 milliGRAM(s) Oral three times a day  glucagon  Injectable 1 milliGRAM(s) IntraMuscular once  guaiFENesin ER 1200 milliGRAM(s) Oral every 12 hours  insulin glargine Injectable (LANTUS) 20 Unit(s) SubCutaneous at bedtime  insulin lispro (ADMELOG) corrective regimen sliding scale   SubCutaneous three times a day before meals  insulin lispro Injectable (ADMELOG) 6 Unit(s) SubCutaneous three times a day before meals  ipratropium    for Nebulization 500 MICROGram(s) Nebulizer every 6 hours  losartan 100 milliGRAM(s) Oral daily  magnesium sulfate  IVPB 2 Gram(s) IV Intermittent once  multivitamin 1 Tablet(s) Oral daily  mupirocin 2% Ointment 1 Application(s) Topical two times a day  pantoprazole   Suspension 40 milliGRAM(s) Oral daily  polyethylene glycol 3350 17 Gram(s) Oral two times a day  senna 2 Tablet(s) Oral at bedtime    PRN MEDICATIONS  ALBUTerol    90 MICROgram(s) HFA Inhaler 2 Puff(s) Inhalation every 6 hours PRN  ALPRAZolam 0.5 milliGRAM(s) Oral every 6 hours PRN  dextrose Oral Gel 15 Gram(s) Oral once PRN  HYDROmorphone   Tablet 4 milliGRAM(s) Enteral Tube every 4 hours PRN  HYDROmorphone  Injectable 0.5 milliGRAM(s) IV Push every 3 hours PRN    VITALS:   T(C): 36.4 (08-14-22 @ 05:18), Max: 36.4 (08-14-22 @ 05:18)  T(F): 97.6 (08-14-22 @ 05:18), Max: 97.6 (08-14-22 @ 05:18)  HR: 83 (08-14-22 @ 05:18) (83 - 87)  BP: 139/64 (08-14-22 @ 05:18) (139/64 - 141/63)  BP(mean): --  ABP: --  ABP(mean): --  RR: 18 (08-14-22 @ 05:18) (18 - 18)  SpO2: --  LABS:                        10.7   8.06  )-----------( 126      ( 14 Aug 2022 08:40 )             31.5     08-14    138  |  100  |  10  ----------------------------<  123<H>  4.3   |  29  |  <0.5<L>    Ca    9.5      14 Aug 2022 08:40  Mg     1.8     08-14    TPro  5.4<L>  /  Alb  3.3<L>  /  TBili  0.4  /  DBili  x   /  AST  23  /  ALT  26  /  AlkPhos  69  08-14        I&O's Detail    13 Aug 2022 07:01  -  14 Aug 2022 07:00  --------------------------------------------------------  IN:    Glucerna: 300 mL  Total IN: 300 mL    OUT:    Ureteral Catheter (mL): 550 mL    Voided (mL): 1300 mL  Total OUT: 1850 mL    Total NET: -1550 mL                    RADIOLOGY:  EKG  12 Lead ECG:   Ventricular Rate 59 BPM    Atrial Rate 59 BPM    P-R Interval 170 ms    QRS Duration 100 ms    Q-T Interval 412 ms    QTC Calculation(Bazett) 407 ms    P Axis 24 degrees    R Axis 62 degrees    T Axis 67 degrees    Diagnosis Line Sinus bradycardia  Possible Left atrial enlargement  Incomplete right bundle branch block  Anterior infarct , age undetermined  Abnormal ECG    Confirmed by JARAD CHASE MD ([id number]) on 8/10/2022 6:27:09 AM (08-09-22 @ 22:45)  12 Lead ECG:   Ventricular Rate 48 BPM    Atrial Rate 48 BPM    P-R Interval 180 ms    QRS Duration 100 ms    Q-T Interval 468 ms    QTC Calculation(Bazett) 418 ms    P Axis 13 degrees    R Axis 107 degrees    T Axis 63 degrees    Diagnosis Line Sinus bradycardia  Possible Left atrial enlargement  Rightward axis  Low voltage QRS  Incomplete right bundle branch block  Nonspecific T wave abnormality  Abnormal ECG    Confirmed by AVE CALDERON MD [id number]) on 8/7/2022 7:17:45 AM (08-07-22 @ 02:14)      Physical Exam:  General: no acute distress, lying in bed  Head: normocephalic and atraumatic  Neck: trach intact  Heart: regular rate and rhythm, S1 and S2 normal, no murmurs, rubs or gallops noted on exam  Lungs: Symmetric chest expansion bilaterally, clear lungs bilaterally, no wheezes rhonchi or crackles heard  Abdomen: Bowel sounds present, non tender on light and deep palpation  Extremities: 1+ edema bilateral lower extremities, no clubbing or cyanosis notes, positive peripheral pulses

## 2022-08-14 NOTE — PROGRESS NOTE ADULT - ASSESSMENT
57 year old female with a past medical history of IDDM, GERD, HTN, COPD, Chronic hypoxemic and hypercapnic respiratory failure SP Tracheostomy (not chronically vented) & PEG at [facility], DVT on Eliquis, Anxiety, CHF, Recurrent mucous plugs SP bronchoscopies, presenting [facility] for chest tightness and shortness of breath over the past few days. Was found to have aspiration pneumonia causing COPD exacerbation    Acute on chronic Hypoxic respiratory failure   #s/p trach/PEG at [facility] 04/2022 after being intubated for >1month  #COPD Exacerbation  #Suspected superimposed bacterial PNA  - Completed a course of doxycycline WBC wnl, vitals wnl,  - Taper steroids- c/w prednisone 40 OD cont Nebs q4h & prn   - CXR reviewed and shows unchanged interstitial opacities  - Switch to fenestrated uncuffed trach, if tolerated consider downsize as per pulm recs.   - Suction Q8h  - Inhaled NAC and PO Mucinex started    #Dysphagia  - F/U Speech and Swallow and ENT recs    #Chronic HFpEF  2d Echo 05/2022: LVEF 50-55%. proBNP ~ 444  Pt does not appear volume overloaded  - Low Na diet and daily weight.   - monitor I & O    #Hx of DVT   - Cont Eliquis  - Venous duplex shows no DVT    #DM2  - Cont lantus/lispro 20-6-6-6  - ISS    #Chronic Lower extremity Pain management and Hyperpigmentation   #Chronic atropic B/l LE Hyperpigmentation changes  - Plan was for outpatient burn and dermatology f/u  - Start topical clobetasone ointment  - Gabapentin 600 TID,   - PO dilaudid 4mg Q4 PRN  -Vascular consulted for possible PVD.   -Arterial doppler - No vascular disease  -PT/OT as tolerated  - Stitches from biopsy removed    #Hypomagnesia  -Repleted    Dispo: Back To [facility] when stable.

## 2022-08-14 NOTE — PROGRESS NOTE ADULT - ASSESSMENT
Unfortunate 56 YO F with acute on chr hypoxic RF read for hypoxia and hypotension and ongoing Bacterial PNA. Hx of ASP PNA, COPD exacerbation and intubation with failure to extubate resulting in trach at [facility] .  The pt is under chronic  at [facility].      Acute on Chr hypoxic RF, mucus plugging  Chronic Tracheostomy  Bacterial PNA  Clinical Debility  Bedbound state  Lower extremity venous insufficiency  BL foot drop, limb weakness, prolonged immobility  Hx of HTN, ASHD HFpEF  Hx of DLD  Hx of COPD, MO, LUCIANA, sp intubation, failure to extubate, chr trach since /[facility]  Hx of DM II  Hx of Ch anemia of ch dis  Hx of GERD, HH, Diverticulosis, Ch constipation  Hx of OA, DJD, DDD, chr pain syn  Hx of lower ext venous stasis dermatitis  Hx of DVT, AC/Eliquis  Hx of Anxiety, Depression     pt stable, A&O, verbal  pt saturating well on T piece, cont to wean, goal is trach collar    Surgery attempted to replace trach to fenestrated tube, the outer tube is fenestrated, the inner is not as suction tube caught in the  fenestrations ( fenestrated inner tube may be placed when pt req less suctioning)  nutrition via PEG for now, allow sm amts of ice chips  cont S&S therapy  ENT to scope pt to assess persistent pharyngeal/laryngeal edema  low vit D start to replete 50,000 q wk  Venous doppler neg for DVT  AA doppler shows normal blood flow  Vasc consult     Pul-critical care ff    cont all previous meds  GI prophylaxis  att to bowel regimen  nutrition via PEG/glucerna  change PEG dressing daily    PM: hydromorphone 4mg po q 4 and 1mg IV q 8 for breakthrough pain, gabapentin 600mg q 8, tylenol 650mg QID    Rehab consult for bedside PT, can we get pt OOB to chair, rehab states pt high risk for fall but bedside and  OOB to chair daily, pt highly motivated  BL heel off loading boots, pt with BL foot drop    pt full code  Social SVC, pt not ready for D/C, weaning in progress

## 2022-08-14 NOTE — PROGRESS NOTE ADULT - SUBJECTIVE AND OBJECTIVE BOX
CRUZ DUMONT 56yo  Female sent to the ER from [facility] where she is on  care for c/o inc SOB, chest tightness, diff breathing, wheezing i.e. acute on chr hypoxic RF.  Of note the pt has a trach and has had freq readmissions for mucous plugging, RF and has had several bronchoscopies.   EMS noted the pt to be hypotensive and hypoxic.  The pt was vigorously suctioned, received Neb Tx, IV steroids,  BP revived with IV fluids.  The pt was cultured and placed on ABx.  She is v well known to the Pul [facility] and to ID.  The pt is ad with acute on ch hypoxic RF and ongoing Bacterial PNA.  The PMHx includes:  HTN, ASHD, HFpEF, DLD, DM II, Chronic RF, COPD, LUCIANA, ASp PNA, sp intubation, failure to extubate, chronic trach ([facility] 4/22), recurrent mucus plugging of airways, SDVT, AC with Eliquis, lower ext venous stasis dermatitis, bedbound state, GERD, diverticulosis, sp PEG, OA, DDD, DJD, chronic pain syn, depression anxiety.    INTERVAL HPI/OVERNIGHT EVENTS: pt stable on T piece, for ENT evaluation of pharyngeal/laryngeal edema, S&S working with pt, low Vit D, start replacement    MEDICATIONS  (STANDING):  acetaminophen     Tablet .. 650 milliGRAM(s) Oral every 6 hours  acetylcysteine 20% for Nebulization - Peds 3 milliLiter(s) Nebulizer every 6 hours  ALBUTerol    0.083% 2.5 milliGRAM(s) Nebulizer every 6 hours  ammonium lactate 12% Lotion 1 Application(s) Topical two times a day  apixaban 5 milliGRAM(s) Enteral Tube two times a day  chlorhexidine 0.12% Liquid 15 milliLiter(s) Oral Mucosa every 12 hours  dextrose 5%. 1000 milliLiter(s) (100 mL/Hr) IV Continuous <Continuous>  dextrose 5%. 1000 milliLiter(s) (50 mL/Hr) IV Continuous <Continuous>  dextrose 50% Injectable 25 Gram(s) IV Push once  dextrose 50% Injectable 12.5 Gram(s) IV Push once  dextrose 50% Injectable 25 Gram(s) IV Push once  doxycycline monohydrate Capsule 100 milliGRAM(s) Oral every 12 hours  gabapentin 600 milliGRAM(s) Oral three times a day  glucagon  Injectable 1 milliGRAM(s) IntraMuscular once  insulin glargine Injectable (LANTUS) 20 Unit(s) SubCutaneous at bedtime  insulin lispro (ADMELOG) corrective regimen sliding scale   SubCutaneous three times a day before meals  insulin lispro Injectable (ADMELOG) 6 Unit(s) SubCutaneous three times a day before meals  methylPREDNISolone sodium succinate Injectable 60 milliGRAM(s) IV Push two times a day  multivitamin 1 Tablet(s) Oral daily  pantoprazole   Suspension 40 milliGRAM(s) Oral daily  polyethylene glycol 3350 17 Gram(s) Oral two times a day  senna 2 Tablet(s) Oral at bedtime    MEDICATIONS  (PRN):  ALBUTerol    90 MICROgram(s) HFA Inhaler 2 Puff(s) Inhalation every 6 hours PRN Shortness of Breath and/or Wheezing  ALPRAZolam 0.5 milliGRAM(s) Oral every 6 hours PRN anxiety  dextrose Oral Gel 15 Gram(s) Oral once PRN Blood Glucose LESS THAN 70 milliGRAM(s)/deciliter  HYDROmorphone   Tablet 4 milliGRAM(s) Oral every 4 hours PRN Severe Pain (7 - 10)  HYDROmorphone  Injectable 1 milliGRAM(s) IV Push every 8 hours PRN Moderate Pain (4 - 6)      Allergies    penicillin (Swelling)      Vital Signs Last 24 Hrs    T(F): 97.6  HR:  83, 101  BP:  122/65    RR: 18  SpO2:  100% T piece 30    PHYSICAL EXAM:      Constitutional: pt alert, oriented  verbal,  bedbound chr ill looking but in NAD on T piece    Eyes: nonicteric    ENMT: dry oral mucosa, dental defects,     Neck: + trach tube supple, no stridor, + trach, , + T tube    Respiratory: shallow resp, diminished harsh bronchial  BS, scattered rhonchi, no wheezing    Cardiovascular: S1S2 reg    Gastrointestinal: globular, soft and benign, + PEG, + BS    Genitourinary:    Extremities: moves all ext, dec motor strength of lower ext, + BL mm atrophy, + BL foot drop    Vascular: dec pedal pulses    Neurological: nonfocal    Skin: lower ext dark almost black hyperpigmentation of lower ext    Lymph Nodes: not enlarged    Musculoskeletal: dec mm mass and tone    Psychiatric: anxious, depressed but stable        LABS:                   10  8)-----------( 126            31      138  |  100  |  10  ----------------------------<  123  4.3   |  29  |  0.5    , 115, 123  Ca    9.7      05 Aug 2022 06:50  Mg     1.9, 1.8, 2.0, 1.6, 1.7, 1.8  trop <0.01  Vit D 18    TPro  5.9, 5.5  /  Alb  3.3, 3.4  /  TBili  0.3  /  DBili  x   /  AST  20  /  ALT  28  /  AlkPhos  77  08-05    RADIOLOGY & ADDITIONAL TESTS:    EKG:  NSR R axis, low voltage, nonspecific ST-T changes  CXR:  interstitial prominence, L pl based opacity unchanged  AA doppler:  normal arterial blood flow

## 2022-08-15 LAB
ALBUMIN SERPL ELPH-MCNC: 3.2 G/DL — LOW (ref 3.5–5.2)
ALP SERPL-CCNC: 72 U/L — SIGNIFICANT CHANGE UP (ref 30–115)
ALT FLD-CCNC: 23 U/L — SIGNIFICANT CHANGE UP (ref 0–41)
ANION GAP SERPL CALC-SCNC: 8 MMOL/L — SIGNIFICANT CHANGE UP (ref 7–14)
AST SERPL-CCNC: 22 U/L — SIGNIFICANT CHANGE UP (ref 0–41)
BILIRUB SERPL-MCNC: 0.3 MG/DL — SIGNIFICANT CHANGE UP (ref 0.2–1.2)
BUN SERPL-MCNC: 9 MG/DL — LOW (ref 10–20)
CALCIUM SERPL-MCNC: 9.3 MG/DL — SIGNIFICANT CHANGE UP (ref 8.5–10.1)
CHLORIDE SERPL-SCNC: 98 MMOL/L — SIGNIFICANT CHANGE UP (ref 98–110)
CO2 SERPL-SCNC: 30 MMOL/L — SIGNIFICANT CHANGE UP (ref 17–32)
CREAT SERPL-MCNC: <0.5 MG/DL — LOW (ref 0.7–1.5)
EGFR: 115 ML/MIN/1.73M2 — SIGNIFICANT CHANGE UP
FERRITIN SERPL-MCNC: 786 NG/ML — HIGH (ref 15–150)
FOLATE SERPL-MCNC: 11.6 NG/ML — SIGNIFICANT CHANGE UP
GLUCOSE BLDC GLUCOMTR-MCNC: 129 MG/DL — HIGH (ref 70–99)
GLUCOSE BLDC GLUCOMTR-MCNC: 130 MG/DL — HIGH (ref 70–99)
GLUCOSE BLDC GLUCOMTR-MCNC: 136 MG/DL — HIGH (ref 70–99)
GLUCOSE BLDC GLUCOMTR-MCNC: 137 MG/DL — HIGH (ref 70–99)
GLUCOSE SERPL-MCNC: 140 MG/DL — HIGH (ref 70–99)
HCT VFR BLD CALC: 33 % — LOW (ref 37–47)
HGB BLD-MCNC: 10.9 G/DL — LOW (ref 12–16)
MAGNESIUM SERPL-MCNC: 1.7 MG/DL — LOW (ref 1.8–2.4)
MCHC RBC-ENTMCNC: 31.5 PG — HIGH (ref 27–31)
MCHC RBC-ENTMCNC: 33 G/DL — SIGNIFICANT CHANGE UP (ref 32–37)
MCV RBC AUTO: 95.4 FL — SIGNIFICANT CHANGE UP (ref 81–99)
NRBC # BLD: 0 /100 WBCS — SIGNIFICANT CHANGE UP (ref 0–0)
PLATELET # BLD AUTO: 123 K/UL — LOW (ref 130–400)
POTASSIUM SERPL-MCNC: 4.5 MMOL/L — SIGNIFICANT CHANGE UP (ref 3.5–5)
POTASSIUM SERPL-SCNC: 4.5 MMOL/L — SIGNIFICANT CHANGE UP (ref 3.5–5)
PROT SERPL-MCNC: 5.4 G/DL — LOW (ref 6–8)
RBC # BLD: 3.46 M/UL — LOW (ref 4.2–5.4)
RBC # FLD: 12.2 % — SIGNIFICANT CHANGE UP (ref 11.5–14.5)
SODIUM SERPL-SCNC: 136 MMOL/L — SIGNIFICANT CHANGE UP (ref 135–146)
WBC # BLD: 8.07 K/UL — SIGNIFICANT CHANGE UP (ref 4.8–10.8)
WBC # FLD AUTO: 8.07 K/UL — SIGNIFICANT CHANGE UP (ref 4.8–10.8)

## 2022-08-15 PROCEDURE — 99233 SBSQ HOSP IP/OBS HIGH 50: CPT | Mod: 25

## 2022-08-15 PROCEDURE — 31575 DIAGNOSTIC LARYNGOSCOPY: CPT

## 2022-08-15 RX ORDER — MAGNESIUM SULFATE 500 MG/ML
2 VIAL (ML) INJECTION EVERY 12 HOURS
Refills: 0 | Status: DISCONTINUED | OUTPATIENT
Start: 2022-08-15 | End: 2022-08-19

## 2022-08-15 RX ADMIN — Medication 1000 MILLIGRAM(S): at 22:17

## 2022-08-15 RX ADMIN — GABAPENTIN 600 MILLIGRAM(S): 400 CAPSULE ORAL at 06:21

## 2022-08-15 RX ADMIN — HYDROMORPHONE HYDROCHLORIDE 4 MILLIGRAM(S): 2 INJECTION INTRAMUSCULAR; INTRAVENOUS; SUBCUTANEOUS at 11:54

## 2022-08-15 RX ADMIN — HYDROMORPHONE HYDROCHLORIDE 0.5 MILLIGRAM(S): 2 INJECTION INTRAMUSCULAR; INTRAVENOUS; SUBCUTANEOUS at 16:09

## 2022-08-15 RX ADMIN — HYDROMORPHONE HYDROCHLORIDE 0.5 MILLIGRAM(S): 2 INJECTION INTRAMUSCULAR; INTRAVENOUS; SUBCUTANEOUS at 22:51

## 2022-08-15 RX ADMIN — Medication 500 MICROGRAM(S): at 20:15

## 2022-08-15 RX ADMIN — Medication 3 MILLILITER(S): at 08:41

## 2022-08-15 RX ADMIN — Medication 1 TABLET(S): at 11:52

## 2022-08-15 RX ADMIN — POLYETHYLENE GLYCOL 3350 17 GRAM(S): 17 POWDER, FOR SOLUTION ORAL at 16:01

## 2022-08-15 RX ADMIN — MUPIROCIN 1 APPLICATION(S): 20 OINTMENT TOPICAL at 17:37

## 2022-08-15 RX ADMIN — PANTOPRAZOLE SODIUM 40 MILLIGRAM(S): 20 TABLET, DELAYED RELEASE ORAL at 11:52

## 2022-08-15 RX ADMIN — CHLORHEXIDINE GLUCONATE 15 MILLILITER(S): 213 SOLUTION TOPICAL at 17:36

## 2022-08-15 RX ADMIN — Medication 25 GRAM(S): at 17:34

## 2022-08-15 RX ADMIN — ALBUTEROL 2.5 MILLIGRAM(S): 90 AEROSOL, METERED ORAL at 08:40

## 2022-08-15 RX ADMIN — Medication 0.5 MILLIGRAM(S): at 00:54

## 2022-08-15 RX ADMIN — Medication 0.5 MILLIGRAM(S): at 21:09

## 2022-08-15 RX ADMIN — CHLORHEXIDINE GLUCONATE 15 MILLILITER(S): 213 SOLUTION TOPICAL at 06:21

## 2022-08-15 RX ADMIN — Medication 1200 MILLIGRAM(S): at 17:35

## 2022-08-15 RX ADMIN — Medication 1000 MILLIGRAM(S): at 13:06

## 2022-08-15 RX ADMIN — Medication 1000 MILLIGRAM(S): at 23:10

## 2022-08-15 RX ADMIN — HYDROMORPHONE HYDROCHLORIDE 4 MILLIGRAM(S): 2 INJECTION INTRAMUSCULAR; INTRAVENOUS; SUBCUTANEOUS at 00:54

## 2022-08-15 RX ADMIN — HYDROMORPHONE HYDROCHLORIDE 4 MILLIGRAM(S): 2 INJECTION INTRAMUSCULAR; INTRAVENOUS; SUBCUTANEOUS at 13:02

## 2022-08-15 RX ADMIN — APIXABAN 5 MILLIGRAM(S): 2.5 TABLET, FILM COATED ORAL at 06:21

## 2022-08-15 RX ADMIN — Medication 1 APPLICATION(S): at 06:22

## 2022-08-15 RX ADMIN — HYDROMORPHONE HYDROCHLORIDE 0.5 MILLIGRAM(S): 2 INJECTION INTRAMUSCULAR; INTRAVENOUS; SUBCUTANEOUS at 13:07

## 2022-08-15 RX ADMIN — GABAPENTIN 600 MILLIGRAM(S): 400 CAPSULE ORAL at 16:02

## 2022-08-15 RX ADMIN — Medication 1200 MILLIGRAM(S): at 06:21

## 2022-08-15 RX ADMIN — Medication 6 UNIT(S): at 11:51

## 2022-08-15 RX ADMIN — HYDROMORPHONE HYDROCHLORIDE 0.5 MILLIGRAM(S): 2 INJECTION INTRAMUSCULAR; INTRAVENOUS; SUBCUTANEOUS at 04:13

## 2022-08-15 RX ADMIN — Medication 6 UNIT(S): at 17:33

## 2022-08-15 RX ADMIN — INSULIN GLARGINE 20 UNIT(S): 100 INJECTION, SOLUTION SUBCUTANEOUS at 22:23

## 2022-08-15 RX ADMIN — SCOPALAMINE 1 PATCH: 1 PATCH, EXTENDED RELEASE TRANSDERMAL at 19:39

## 2022-08-15 RX ADMIN — Medication 500 MICROGRAM(S): at 14:11

## 2022-08-15 RX ADMIN — HYDROMORPHONE HYDROCHLORIDE 4 MILLIGRAM(S): 2 INJECTION INTRAMUSCULAR; INTRAVENOUS; SUBCUTANEOUS at 22:20

## 2022-08-15 RX ADMIN — Medication 0.5 MILLIGRAM(S): at 11:55

## 2022-08-15 RX ADMIN — HYDROMORPHONE HYDROCHLORIDE 0.5 MILLIGRAM(S): 2 INJECTION INTRAMUSCULAR; INTRAVENOUS; SUBCUTANEOUS at 20:00

## 2022-08-15 RX ADMIN — Medication 1 APPLICATION(S): at 17:35

## 2022-08-15 RX ADMIN — Medication 500 MICROGRAM(S): at 08:40

## 2022-08-15 RX ADMIN — ALBUTEROL 2.5 MILLIGRAM(S): 90 AEROSOL, METERED ORAL at 20:13

## 2022-08-15 RX ADMIN — GABAPENTIN 600 MILLIGRAM(S): 400 CAPSULE ORAL at 22:17

## 2022-08-15 RX ADMIN — HYDROMORPHONE HYDROCHLORIDE 0.5 MILLIGRAM(S): 2 INJECTION INTRAMUSCULAR; INTRAVENOUS; SUBCUTANEOUS at 07:48

## 2022-08-15 RX ADMIN — ALBUTEROL 2.5 MILLIGRAM(S): 90 AEROSOL, METERED ORAL at 14:10

## 2022-08-15 RX ADMIN — Medication 3 MILLILITER(S): at 20:14

## 2022-08-15 RX ADMIN — LOSARTAN POTASSIUM 100 MILLIGRAM(S): 100 TABLET, FILM COATED ORAL at 06:21

## 2022-08-15 RX ADMIN — HYDROMORPHONE HYDROCHLORIDE 0.5 MILLIGRAM(S): 2 INJECTION INTRAMUSCULAR; INTRAVENOUS; SUBCUTANEOUS at 08:34

## 2022-08-15 RX ADMIN — HYDROMORPHONE HYDROCHLORIDE 4 MILLIGRAM(S): 2 INJECTION INTRAMUSCULAR; INTRAVENOUS; SUBCUTANEOUS at 22:45

## 2022-08-15 RX ADMIN — Medication 1 APPLICATION(S): at 17:37

## 2022-08-15 RX ADMIN — HYDROMORPHONE HYDROCHLORIDE 0.5 MILLIGRAM(S): 2 INJECTION INTRAMUSCULAR; INTRAVENOUS; SUBCUTANEOUS at 19:33

## 2022-08-15 RX ADMIN — APIXABAN 5 MILLIGRAM(S): 2.5 TABLET, FILM COATED ORAL at 17:35

## 2022-08-15 RX ADMIN — HYDROMORPHONE HYDROCHLORIDE 4 MILLIGRAM(S): 2 INJECTION INTRAMUSCULAR; INTRAVENOUS; SUBCUTANEOUS at 17:39

## 2022-08-15 RX ADMIN — Medication 6 UNIT(S): at 07:48

## 2022-08-15 RX ADMIN — MUPIROCIN 1 APPLICATION(S): 20 OINTMENT TOPICAL at 06:23

## 2022-08-15 RX ADMIN — Medication 3 MILLILITER(S): at 14:10

## 2022-08-15 RX ADMIN — Medication 1000 MILLIGRAM(S): at 06:20

## 2022-08-15 RX ADMIN — HYDROMORPHONE HYDROCHLORIDE 4 MILLIGRAM(S): 2 INJECTION INTRAMUSCULAR; INTRAVENOUS; SUBCUTANEOUS at 06:21

## 2022-08-15 NOTE — PROGRESS NOTE ADULT - NS ATTEND AMEND GEN_ALL_CORE FT
fiberoptic exam- left laryngeal mass and diffuse edema with obstruction  CT scan neck with/without contrast

## 2022-08-15 NOTE — SWALLOW BEDSIDE ASSESSMENT ADULT - CONSISTENCIES ADMINISTERED
thin liquid/easy to chew
moderately thick/pureed
moderately thick
thin liquid/easy to chew
thin liquid/easy to chew

## 2022-08-15 NOTE — PROGRESS NOTE ADULT - SUBJECTIVE AND OBJECTIVE BOX
ENT PROGRESS NOTE    Pt is a 57y F a/w hypoxia, currently with tracheostomy; ENT was called to assess for persistent laryngeal edema, failing SLP. Pt seen and examined at bedside. Pt states she is feeling well today.    REVIEW OF SYSTEMS   [x] A ten-point review of systems was otherwise negative except as noted.    Allergies  penicillin (Swelling)    MEDICATIONS:  acetaminophen     Tablet .. 1000 milliGRAM(s) Oral every 8 hours  acetylcysteine 10%  Inhalation 4 milliLiter(s) Inhalation two times a day  acetylcysteine 20% for Nebulization - Peds 3 milliLiter(s) Nebulizer every 6 hours  ALBUTerol    0.083% 2.5 milliGRAM(s) Nebulizer every 6 hours  ALBUTerol    90 MICROgram(s) HFA Inhaler 2 Puff(s) Inhalation every 6 hours PRN  ALPRAZolam 0.5 milliGRAM(s) Oral every 6 hours PRN  ammonium lactate 12% Lotion 1 Application(s) Topical two times a day  apixaban 5 milliGRAM(s) Enteral Tube two times a day  chlorhexidine 0.12% Liquid 15 milliLiter(s) Oral Mucosa every 12 hours  clobetasol 0.05% Ointment 1 Application(s) Topical two times a day  dextrose 5%. 1000 milliLiter(s) IV Continuous <Continuous>  dextrose 5%. 1000 milliLiter(s) IV Continuous <Continuous>  dextrose 50% Injectable 12.5 Gram(s) IV Push once  dextrose 50% Injectable 25 Gram(s) IV Push once  dextrose 50% Injectable 25 Gram(s) IV Push once  dextrose Oral Gel 15 Gram(s) Oral once PRN  ergocalciferol 84210 Unit(s) Oral every week  gabapentin 600 milliGRAM(s) Oral three times a day  glucagon  Injectable 1 milliGRAM(s) IntraMuscular once  guaiFENesin ER 1200 milliGRAM(s) Oral every 12 hours  HYDROmorphone   Tablet 4 milliGRAM(s) Enteral Tube every 4 hours PRN  HYDROmorphone  Injectable 0.5 milliGRAM(s) IV Push every 3 hours PRN  insulin glargine Injectable (LANTUS) 20 Unit(s) SubCutaneous at bedtime  insulin lispro (ADMELOG) corrective regimen sliding scale   SubCutaneous three times a day before meals  insulin lispro Injectable (ADMELOG) 6 Unit(s) SubCutaneous three times a day before meals  ipratropium    for Nebulization 500 MICROGram(s) Nebulizer every 6 hours  losartan 100 milliGRAM(s) Oral daily  magnesium sulfate  IVPB 2 Gram(s) IV Intermittent once  magnesium sulfate  IVPB 2 Gram(s) IV Intermittent every 12 hours  multivitamin 1 Tablet(s) Oral daily  mupirocin 2% Ointment 1 Application(s) Topical two times a day  pantoprazole   Suspension 40 milliGRAM(s) Oral daily  polyethylene glycol 3350 17 Gram(s) Oral two times a day  senna 2 Tablet(s) Oral at bedtime      Vital Signs Last 24 Hrs  T(C): 35.9 (15 Aug 2022 05:14), Max: 36.6 (14 Aug 2022 21:28)  T(F): 96.6 (15 Aug 2022 05:14), Max: 97.9 (14 Aug 2022 21:28)  HR: 79 (15 Aug 2022 05:14) (79 - 101)  BP: 123/58 (15 Aug 2022 05:14) (122/65 - 147/66)  RR: 18 (15 Aug 2022 05:14) (18 - 18)  SpO2: 98% (15 Aug 2022 08:47) (98% - 98%)    Parameters below as of 15 Aug 2022 08:47  Patient On (Oxygen Delivery Method): tracheostomy collar    O2 Concentration (%): 40      08-14 @ 07:01  -  08-15 @ 07:00  --------------------------------------------------------  IN:  Total IN: 0 mL    OUT:    Ureteral Catheter (mL): 1750 mL  Total OUT: 1750 mL    Total NET: -1750 mL      PHYSICAL EXAM:  GEN: NAD, awake and alert. No drooling or pooling of secretions  SKIN: Good color, non diaphoretic.  HEENT: Oral mucosa pink and moist  NECK: Trachea midline, +6 CFN in place, on t-piece.   RESP: +6 CFN in place, on t-piece.   CARDIO: +S1/S2  ABDO: Soft, NT; +Gtube  EXT: JONAS x 4      LABS:  CBC-                        10.9   8.07  )-----------( 123      ( 15 Aug 2022 09:12 )             33.0     BMP/CMP-  15 Aug 2022 09:12    136    |  98     |  9      ----------------------------<  140    4.5     |  30     |  <0.5     Ca    9.3        15 Aug 2022 09:12  Mg     1.7       15 Aug 2022 09:12    TPro  5.4    /  Alb  3.2    /  TBili  0.3    /  DBili  x      /  AST  22     /  ALT  23     /  AlkPhos  72     15 Aug 2022 09:12    Coagulation Studies-    Endocrine Panel-  Calcium, Total Serum: 9.3 mg/dL (08-15 @ 09:12)         ENT PROGRESS NOTE    Pt is a 57y F a/w hypoxia, currently with tracheostomy; ENT was called to assess for persistent laryngeal edema, failing SLP. Pt seen and examined at bedside. Pt states she is feeling well today.    REVIEW OF SYSTEMS   [x] A ten-point review of systems was otherwise negative except as noted.    Allergies  penicillin (Swelling)    MEDICATIONS:  acetaminophen     Tablet .. 1000 milliGRAM(s) Oral every 8 hours  acetylcysteine 10%  Inhalation 4 milliLiter(s) Inhalation two times a day  acetylcysteine 20% for Nebulization - Peds 3 milliLiter(s) Nebulizer every 6 hours  ALBUTerol    0.083% 2.5 milliGRAM(s) Nebulizer every 6 hours  ALBUTerol    90 MICROgram(s) HFA Inhaler 2 Puff(s) Inhalation every 6 hours PRN  ALPRAZolam 0.5 milliGRAM(s) Oral every 6 hours PRN  ammonium lactate 12% Lotion 1 Application(s) Topical two times a day  apixaban 5 milliGRAM(s) Enteral Tube two times a day  chlorhexidine 0.12% Liquid 15 milliLiter(s) Oral Mucosa every 12 hours  clobetasol 0.05% Ointment 1 Application(s) Topical two times a day  dextrose 5%. 1000 milliLiter(s) IV Continuous <Continuous>  dextrose 5%. 1000 milliLiter(s) IV Continuous <Continuous>  dextrose 50% Injectable 12.5 Gram(s) IV Push once  dextrose 50% Injectable 25 Gram(s) IV Push once  dextrose 50% Injectable 25 Gram(s) IV Push once  dextrose Oral Gel 15 Gram(s) Oral once PRN  ergocalciferol 30315 Unit(s) Oral every week  gabapentin 600 milliGRAM(s) Oral three times a day  glucagon  Injectable 1 milliGRAM(s) IntraMuscular once  guaiFENesin ER 1200 milliGRAM(s) Oral every 12 hours  HYDROmorphone   Tablet 4 milliGRAM(s) Enteral Tube every 4 hours PRN  HYDROmorphone  Injectable 0.5 milliGRAM(s) IV Push every 3 hours PRN  insulin glargine Injectable (LANTUS) 20 Unit(s) SubCutaneous at bedtime  insulin lispro (ADMELOG) corrective regimen sliding scale   SubCutaneous three times a day before meals  insulin lispro Injectable (ADMELOG) 6 Unit(s) SubCutaneous three times a day before meals  ipratropium    for Nebulization 500 MICROGram(s) Nebulizer every 6 hours  losartan 100 milliGRAM(s) Oral daily  magnesium sulfate  IVPB 2 Gram(s) IV Intermittent once  magnesium sulfate  IVPB 2 Gram(s) IV Intermittent every 12 hours  multivitamin 1 Tablet(s) Oral daily  mupirocin 2% Ointment 1 Application(s) Topical two times a day  pantoprazole   Suspension 40 milliGRAM(s) Oral daily  polyethylene glycol 3350 17 Gram(s) Oral two times a day  senna 2 Tablet(s) Oral at bedtime      Vital Signs Last 24 Hrs  T(C): 35.9 (15 Aug 2022 05:14), Max: 36.6 (14 Aug 2022 21:28)  T(F): 96.6 (15 Aug 2022 05:14), Max: 97.9 (14 Aug 2022 21:28)  HR: 79 (15 Aug 2022 05:14) (79 - 101)  BP: 123/58 (15 Aug 2022 05:14) (122/65 - 147/66)  RR: 18 (15 Aug 2022 05:14) (18 - 18)  SpO2: 98% (15 Aug 2022 08:47) (98% - 98%)    Parameters below as of 15 Aug 2022 08:47  Patient On (Oxygen Delivery Method): tracheostomy collar    O2 Concentration (%): 40      08-14 @ 07:01  -  08-15 @ 07:00  --------------------------------------------------------  IN:  Total IN: 0 mL    OUT:    Ureteral Catheter (mL): 1750 mL  Total OUT: 1750 mL    Total NET: -1750 mL      PHYSICAL EXAM:  GEN: NAD, awake and alert. No drooling or pooling of secretions  SKIN: Good color, non diaphoretic.  HEENT: +edema noted L posterior OP,   NECK: Trachea midline, +6 CFN in place, on t-piece.   RESP: +6 CFN in place, on t-piece.   CARDIO: +S1/S2  ABDO: Soft, NT; +Gtube  EXT: JONAS x 4    FFL by Dr. Turner: Diffuse laryngeal edema; patent airway     LABS:  CBC-                        10.9   8.07  )-----------( 123      ( 15 Aug 2022 09:12 )             33.0     BMP/CMP-  15 Aug 2022 09:12    136    |  98     |  9      ----------------------------<  140    4.5     |  30     |  <0.5     Ca    9.3        15 Aug 2022 09:12  Mg     1.7       15 Aug 2022 09:12    TPro  5.4    /  Alb  3.2    /  TBili  0.3    /  DBili  x      /  AST  22     /  ALT  23     /  AlkPhos  72     15 Aug 2022 09:12    Coagulation Studies-    Endocrine Panel-  Calcium, Total Serum: 9.3 mg/dL (08-15 @ 09:12)         ENT PROGRESS NOTE    Pt is a 57y F a/w hypoxia, currently with tracheostomy; ENT was called to assess for persistent laryngeal edema, failing SLP. Pt seen and examined at bedside. Pt states she is feeling well today.    REVIEW OF SYSTEMS   [x] A ten-point review of systems was otherwise negative except as noted.    Allergies  penicillin (Swelling)    MEDICATIONS:  acetaminophen     Tablet .. 1000 milliGRAM(s) Oral every 8 hours  acetylcysteine 10%  Inhalation 4 milliLiter(s) Inhalation two times a day  acetylcysteine 20% for Nebulization - Peds 3 milliLiter(s) Nebulizer every 6 hours  ALBUTerol    0.083% 2.5 milliGRAM(s) Nebulizer every 6 hours  ALBUTerol    90 MICROgram(s) HFA Inhaler 2 Puff(s) Inhalation every 6 hours PRN  ALPRAZolam 0.5 milliGRAM(s) Oral every 6 hours PRN  ammonium lactate 12% Lotion 1 Application(s) Topical two times a day  apixaban 5 milliGRAM(s) Enteral Tube two times a day  chlorhexidine 0.12% Liquid 15 milliLiter(s) Oral Mucosa every 12 hours  clobetasol 0.05% Ointment 1 Application(s) Topical two times a day  dextrose 5%. 1000 milliLiter(s) IV Continuous <Continuous>  dextrose 5%. 1000 milliLiter(s) IV Continuous <Continuous>  dextrose 50% Injectable 12.5 Gram(s) IV Push once  dextrose 50% Injectable 25 Gram(s) IV Push once  dextrose 50% Injectable 25 Gram(s) IV Push once  dextrose Oral Gel 15 Gram(s) Oral once PRN  ergocalciferol 30451 Unit(s) Oral every week  gabapentin 600 milliGRAM(s) Oral three times a day  glucagon  Injectable 1 milliGRAM(s) IntraMuscular once  guaiFENesin ER 1200 milliGRAM(s) Oral every 12 hours  HYDROmorphone   Tablet 4 milliGRAM(s) Enteral Tube every 4 hours PRN  HYDROmorphone  Injectable 0.5 milliGRAM(s) IV Push every 3 hours PRN  insulin glargine Injectable (LANTUS) 20 Unit(s) SubCutaneous at bedtime  insulin lispro (ADMELOG) corrective regimen sliding scale   SubCutaneous three times a day before meals  insulin lispro Injectable (ADMELOG) 6 Unit(s) SubCutaneous three times a day before meals  ipratropium    for Nebulization 500 MICROGram(s) Nebulizer every 6 hours  losartan 100 milliGRAM(s) Oral daily  magnesium sulfate  IVPB 2 Gram(s) IV Intermittent once  magnesium sulfate  IVPB 2 Gram(s) IV Intermittent every 12 hours  multivitamin 1 Tablet(s) Oral daily  mupirocin 2% Ointment 1 Application(s) Topical two times a day  pantoprazole   Suspension 40 milliGRAM(s) Oral daily  polyethylene glycol 3350 17 Gram(s) Oral two times a day  senna 2 Tablet(s) Oral at bedtime      Vital Signs Last 24 Hrs  T(C): 35.9 (15 Aug 2022 05:14), Max: 36.6 (14 Aug 2022 21:28)  T(F): 96.6 (15 Aug 2022 05:14), Max: 97.9 (14 Aug 2022 21:28)  HR: 79 (15 Aug 2022 05:14) (79 - 101)  BP: 123/58 (15 Aug 2022 05:14) (122/65 - 147/66)  RR: 18 (15 Aug 2022 05:14) (18 - 18)  SpO2: 98% (15 Aug 2022 08:47) (98% - 98%)    Parameters below as of 15 Aug 2022 08:47  Patient On (Oxygen Delivery Method): tracheostomy collar    O2 Concentration (%): 40      08-14 @ 07:01  -  08-15 @ 07:00  --------------------------------------------------------  IN:  Total IN: 0 mL    OUT:    Ureteral Catheter (mL): 1750 mL  Total OUT: 1750 mL    Total NET: -1750 mL      PHYSICAL EXAM:  GEN: NAD, awake and alert. No drooling or pooling of secretions  SKIN: Good color, non diaphoretic.  HEENT: +edema noted L posterior OP,   NECK: Trachea midline, +6 CFN in place, on t-piece.   RESP: +6 CFN in place, on t-piece.   CARDIO: +S1/S2  ABDO: Soft, NT; +Gtube  EXT: JONAS x 4    FFL by Dr. Turner: Diffuse laryngeal edema     LABS:  CBC-                        10.9   8.07  )-----------( 123      ( 15 Aug 2022 09:12 )             33.0     BMP/CMP-  15 Aug 2022 09:12    136    |  98     |  9      ----------------------------<  140    4.5     |  30     |  <0.5     Ca    9.3        15 Aug 2022 09:12  Mg     1.7       15 Aug 2022 09:12    TPro  5.4    /  Alb  3.2    /  TBili  0.3    /  DBili  x      /  AST  22     /  ALT  23     /  AlkPhos  72     15 Aug 2022 09:12    Coagulation Studies-    Endocrine Panel-  Calcium, Total Serum: 9.3 mg/dL (08-15 @ 09:12)

## 2022-08-15 NOTE — SWALLOW BEDSIDE ASSESSMENT ADULT - SLP GENERAL OBSERVATIONS
pt received in bed awake alert w/o c/o pain; +humidified O2 via t-piece, +PMSV placed in line, +voicing, however pt c/o feeling tightness in throat. Speaking valve removed, RN made aware

## 2022-08-15 NOTE — PROGRESS NOTE ADULT - SUBJECTIVE AND OBJECTIVE BOX
CRUZ DUMONT 58yo  Female sent to the ER from [facility], [place], operated by [facility] where she is on  care for c/o inc SOB, chest tightness, diff breathing, wheezing i.e. acute on chr hypoxic RF.  Of note the pt has a trach and has had freq readmissions for mucous plugging, RF and has had several bronchoscopies.   EMS noted the pt to be hypotensive and hypoxic.  The pt was vigorously suctioned, received Neb Tx, IV steroids,  BP revived with IV fluids.  The pt was cultured and placed on ABx.  She is v well known to the Pul SVc and to ID.  The pt is ad with acute on ch hypoxic RF and ongoing Bacterial PNA.  The PMHx includes:  HTN, ASHD, HFpEF, DLD, DM II, Chronic RF, COPD, LUCIANA, ASp PNA, sp intubation, failure to extubate, chronic trach ([facility] 4/22), recurrent mucus plugging of airways, SDVT, AC with Eliquis, lower ext venous stasis dermatitis, bedbound state, GERD, diverticulosis, sp PEG, OA, DDD, DJD, chronic pain syn, depression anxiety.    INTERVAL HPI/OVERNIGHT EVENTS: pt stable on trach collar 405, v alert, ENT evaluation of pharyngeal/laryngeal edema,  working with , S&S , uses foot drop boots at night, replete Mg    MEDICATIONS  (STANDING):  acetaminophen     Tablet .. 650 milliGRAM(s) Oral every 6 hours  acetylcysteine 20% for Nebulization - Peds 3 milliLiter(s) Nebulizer every 6 hours  ALBUTerol    0.083% 2.5 milliGRAM(s) Nebulizer every 6 hours  ammonium lactate 12% Lotion 1 Application(s) Topical two times a day  apixaban 5 milliGRAM(s) Enteral Tube two times a day  chlorhexidine 0.12% Liquid 15 milliLiter(s) Oral Mucosa every 12 hours  dextrose 5%. 1000 milliLiter(s) (100 mL/Hr) IV Continuous <Continuous>  dextrose 5%. 1000 milliLiter(s) (50 mL/Hr) IV Continuous <Continuous>  dextrose 50% Injectable 25 Gram(s) IV Push once  dextrose 50% Injectable 12.5 Gram(s) IV Push once  dextrose 50% Injectable 25 Gram(s) IV Push once  doxycycline monohydrate Capsule 100 milliGRAM(s) Oral every 12 hours  gabapentin 600 milliGRAM(s) Oral three times a day  glucagon  Injectable 1 milliGRAM(s) IntraMuscular once  insulin glargine Injectable (LANTUS) 20 Unit(s) SubCutaneous at bedtime  insulin lispro (ADMELOG) corrective regimen sliding scale   SubCutaneous three times a day before meals  insulin lispro Injectable (ADMELOG) 6 Unit(s) SubCutaneous three times a day before meals  methylPREDNISolone sodium succinate Injectable 60 milliGRAM(s) IV Push two times a day  multivitamin 1 Tablet(s) Oral daily  pantoprazole   Suspension 40 milliGRAM(s) Oral daily  polyethylene glycol 3350 17 Gram(s) Oral two times a day  senna 2 Tablet(s) Oral at bedtime    MEDICATIONS  (PRN):  ALBUTerol    90 MICROgram(s) HFA Inhaler 2 Puff(s) Inhalation every 6 hours PRN Shortness of Breath and/or Wheezing  ALPRAZolam 0.5 milliGRAM(s) Oral every 6 hours PRN anxiety  dextrose Oral Gel 15 Gram(s) Oral once PRN Blood Glucose LESS THAN 70 milliGRAM(s)/deciliter  HYDROmorphone   Tablet 4 milliGRAM(s) Oral every 4 hours PRN Severe Pain (7 - 10)  HYDROmorphone  Injectable 1 milliGRAM(s) IV Push every 8 hours PRN Moderate Pain (4 - 6)      Allergies    penicillin (Swelling)      Vital Signs Last 24 Hrs    T(F): 96.6  HR:  79  BP:  123/58    RR: 18  SpO2:  98%, trach collar 40%    PHYSICAL EXAM:      Constitutional: pt alert, oriented  verbal,  bedbound chr ill looking but in NAD on T piece    Eyes: nonicteric    ENMT: dry oral mucosa, dental defects,     Neck: + trach tube supple, no stridor, + trach, , + T tube    Respiratory: shallow resp, diminished harsh bronchial  BS, scattered rhonchi, no wheezing    Cardiovascular: S1S2 reg    Gastrointestinal: globular, soft and benign, + PEG, + BS    Genitourinary:    Extremities: moves all ext, dec motor strength of lower ext, + BL mm atrophy, + BL foot drop    Vascular: dec pedal pulses    Neurological: nonfocal    Skin: lower ext dark almost black hyperpigmentation of lower ext    Lymph Nodes: not enlarged    Musculoskeletal: dec mm mass and tone    Psychiatric: anxious, depressed but stable        LABS:                   10.9  8)-----------( 126            33      136  |  98  |  9  ----------------------------<  140  4.5   |  30  |  0.5    , 115, 123  Ca    9.7       Mg     1.9, 1.8, 2.0, 1.6, 1.7, 1.8  trop <0.01  Vit D 18    TPro  5.9, 5.5  /  Alb  3.3, 3.4  /  TBili  0.3  /  DBili  x   /  AST  20  /  ALT  28  /  AlkPhos  77  08-05    RADIOLOGY & ADDITIONAL TESTS:    EKG:  NSR R axis, low voltage, nonspecific ST-T changes  CXR:  interstitial prominence, L pl based opacity unchanged  AA doppler:  normal arterial blood flow

## 2022-08-15 NOTE — SWALLOW BEDSIDE ASSESSMENT ADULT - COMMENTS
Pt s/p VFSS and FEES studies yesterday 8/12 w/ recommendations to continue NPO w/ PEG tube feeds and therapeutic po w/ SLP only
pt well known to SLP dept from previous admission w/ hx of multiple instrumental swallow studies. VFSS most recently completed on 7/7 w/ recs for therapeutic po trials of easy to chew solids and thin liquids via consecutive swallows. Pt tolerating PMSV and on trach collar during exam.
pt well known to SLP dept from previous admission w/ hx of multiple instrumental swallow studies. VFSS most recently completed on 7/7 w/ recs for therapeutic po trials of easy to chew solids and thin liquids via consecutive swallows. Pt tolerating PMSV and on trach collar during exam.
Pt s/p VFSS and FEES studies 8/12 w/ recommendations to continue NPO w/ PEG tube feeds and therapeutic po w/ SLP only
pt well known to SLP dept from previous admission w/ hx of multiple instrumental swallow studies. VFSS most recently completed on 7/7 w/ recs for therapeutic po trials of easy to chew solids and thin liquids via consecutive swallows. Pt tolerating PMSV and on trach collar during exam.

## 2022-08-15 NOTE — PROGRESS NOTE ADULT - SUBJECTIVE AND OBJECTIVE BOX
Medicine Progress Note    Patient is a 57y old  Female who presents with a chief complaint of Hypoxia, she has a tracheostomy in situ, debilitated state      SUBJECTIVE / OVERNIGHT EVENTS: Seen at bedside. she says shes doing well with the new trach collar    ADDITIONAL REVIEW OF SYSTEMS:    MEDICATIONS  (STANDING):  acetaminophen     Tablet .. 1000 milliGRAM(s) Oral every 8 hours  acetylcysteine 10%  Inhalation 4 milliLiter(s) Inhalation two times a day  acetylcysteine 20% for Nebulization - Peds 3 milliLiter(s) Nebulizer every 6 hours  ALBUTerol    0.083% 2.5 milliGRAM(s) Nebulizer every 6 hours  ammonium lactate 12% Lotion 1 Application(s) Topical two times a day  apixaban 5 milliGRAM(s) Enteral Tube two times a day  chlorhexidine 0.12% Liquid 15 milliLiter(s) Oral Mucosa every 12 hours  clobetasol 0.05% Ointment 1 Application(s) Topical two times a day  dextrose 5%. 1000 milliLiter(s) (100 mL/Hr) IV Continuous <Continuous>  dextrose 5%. 1000 milliLiter(s) (50 mL/Hr) IV Continuous <Continuous>  dextrose 50% Injectable 25 Gram(s) IV Push once  dextrose 50% Injectable 12.5 Gram(s) IV Push once  dextrose 50% Injectable 25 Gram(s) IV Push once  ergocalciferol 73093 Unit(s) Oral every week  gabapentin 600 milliGRAM(s) Oral three times a day  glucagon  Injectable 1 milliGRAM(s) IntraMuscular once  guaiFENesin ER 1200 milliGRAM(s) Oral every 12 hours  insulin glargine Injectable (LANTUS) 20 Unit(s) SubCutaneous at bedtime  insulin lispro (ADMELOG) corrective regimen sliding scale   SubCutaneous three times a day before meals  insulin lispro Injectable (ADMELOG) 6 Unit(s) SubCutaneous three times a day before meals  ipratropium    for Nebulization 500 MICROGram(s) Nebulizer every 6 hours  losartan 100 milliGRAM(s) Oral daily  magnesium sulfate  IVPB 2 Gram(s) IV Intermittent once  magnesium sulfate  IVPB 2 Gram(s) IV Intermittent every 12 hours  multivitamin 1 Tablet(s) Oral daily  mupirocin 2% Ointment 1 Application(s) Topical two times a day  pantoprazole   Suspension 40 milliGRAM(s) Oral daily  polyethylene glycol 3350 17 Gram(s) Oral two times a day  senna 2 Tablet(s) Oral at bedtime    MEDICATIONS  (PRN):  ALBUTerol    90 MICROgram(s) HFA Inhaler 2 Puff(s) Inhalation every 6 hours PRN Shortness of Breath and/or Wheezing  ALPRAZolam 0.5 milliGRAM(s) Oral every 6 hours PRN anxiety  dextrose Oral Gel 15 Gram(s) Oral once PRN Blood Glucose LESS THAN 70 milliGRAM(s)/deciliter  HYDROmorphone   Tablet 4 milliGRAM(s) Enteral Tube every 4 hours PRN Severe Pain (7 - 10)  HYDROmorphone  Injectable 0.5 milliGRAM(s) IV Push every 3 hours PRN Moderate Pain (4 - 6)    CAPILLARY BLOOD GLUCOSE      POCT Blood Glucose.: 137 mg/dL (15 Aug 2022 11:25)  POCT Blood Glucose.: 136 mg/dL (15 Aug 2022 07:47)  POCT Blood Glucose.: 166 mg/dL (14 Aug 2022 23:51)  POCT Blood Glucose.: 230 mg/dL (14 Aug 2022 21:01)  POCT Blood Glucose.: 92 mg/dL (14 Aug 2022 16:54)    I&O's Summary    14 Aug 2022 07:01  -  15 Aug 2022 07:00  --------------------------------------------------------  IN: 0 mL / OUT: 1750 mL / NET: -1750 mL        PHYSICAL EXAM:  Vital Signs Last 24 Hrs  T(C): 35.8 (15 Aug 2022 14:08), Max: 36.6 (14 Aug 2022 21:28)  T(F): 96.4 (15 Aug 2022 14:08), Max: 97.9 (14 Aug 2022 21:28)  HR: 85 (15 Aug 2022 14:08) (79 - 91)  BP: 113/53 (15 Aug 2022 14:08) (113/53 - 147/66)  BP(mean): --  RR: 18 (15 Aug 2022 14:08) (18 - 18)  SpO2: 98% (15 Aug 2022 08:47) (98% - 98%)    Parameters below as of 15 Aug 2022 08:47  Patient On (Oxygen Delivery Method): tracheostomy collar    O2 Concentration (%): 40  PHYSICAL EXAM:  GEN: NAD, awake and alert. No drooling or pooling of secretions  SKIN: Good color, non diaphoretic.  HEENT: Oral mucosa pink and moist  NECK: Trachea midline, +6 CFN in place, on t-piece.   RESP: +6 CFN in place, on t-piece.   CARDIO: +S1/S2  ABDO: Soft, NT; +Gtube  EXT: JONAS x 4  LABS:                        10.9   8.07  )-----------( 123      ( 15 Aug 2022 09:12 )             33.0     08-15    136  |  98  |  9<L>  ----------------------------<  140<H>  4.5   |  30  |  <0.5<L>    Ca    9.3      15 Aug 2022 09:12  Mg     1.7     08-15    TPro  5.4<L>  /  Alb  3.2<L>  /  TBili  0.3  /  DBili  x   /  AST  22  /  ALT  23  /  AlkPhos  72  08-15              SARS-CoV-2: NotDetec (03 Aug 2022 07:14)  COVID-19 PCR: NotDetec (21 Jul 2022 05:50)  COVID-19 PCR: NotDetec (15 Jul 2022 02:25)  COVID-19 PCR: NotDetec (05 Jul 2022 11:50)  COVID-19 PCR: NotDetec (23 Jun 2022 15:36)  COVID-19 PCR: NotDetec (22 Jun 2022 07:11)  COVID-19 PCR: NotDetec (14 Jun 2022 11:49)  COVID-19 PCR: NotDetec (31 May 2022 17:00)  SARS-CoV-2: NotDetec (27 May 2022 10:30)      RADIOLOGY & ADDITIONAL TESTS:  Imaging from Last 24 Hours:    Electrocardiogram/QTc Interval:    COORDINATION OF CARE:  Care Discussed with Consultants/Other Providers:

## 2022-08-15 NOTE — SWALLOW BEDSIDE ASSESSMENT ADULT - SLP PERTINENT HISTORY OF CURRENT PROBLEM
pt is a 56 y/o F w/ PMHx: IDDM, GERD, HTN, COPD, chronic hypoxemic and hypercapnic respiratory failure s/p tracheostomy (not chronically vented) & PEG at [facility], DVT, anxiety, CHF, recurrent mucous plugs s/p bronchoscopies, presenting from [facility] for chest tightness and SOB. Pt reports increased secretions requiring more suctioning, still unable to clear them. Tracheostomy exchanged to 6 cuffed tube from 6 cuffless on presentation. Per CT sx, no current indication for emergency surgical tracheostomy exchange, "f/u w/ ENT vs. pulm for exchange of tracheostomy given it is chronic in nature"; Pt is being treated for acute on chronic hypoxic RF, mucus plugging, bacterial PNA. CXR 8/4-> Slightly decreased interstitial prominence/opacities. Unchanged left basal pleural-parenchymal opacity.
pt is a 56 y/o F w/ PMHx: IDDM, GERD, HTN, COPD, chronic hypoxemic and hypercapnic respiratory failure s/p tracheostomy (not chronically vented) & PEG at [facility], DVT, anxiety, CHF, recurrent mucous plugs s/p bronchoscopies, presenting from [facility] for chest tightness and SOB. Pt reports increased secretions requiring more suctioning, still unable to clear them. Tracheostomy exchanged to 6 cuffed tube from 6 cuffless on presentation. Per CT sx, no current indication for emergency surgical tracheostomy exchange, "f/u w/ ENT vs. pulm for exchange of tracheostomy given it is chronic in nature"; Pt is being treated for acute on chronic hypoxic RF, mucus plugging, bacterial PNA. Pt s/p trach exchange to Shiley 6 uncuffed fenestrated on 8/11. Surgery recommend leaving non-fenestrated inner cannula in place until pt can require less frequent suctioning. CXR 8/4-> Slightly decreased interstitial prominence/opacities. Unchanged left basal pleural-parenchymal opacity. ENT c/s.
pt is a 56 y/o F w/ PMHx: IDDM, GERD, HTN, COPD, chronic hypoxemic and hypercapnic respiratory failure s/p tracheostomy (not chronically vented) & PEG at [facility], DVT, anxiety, CHF, recurrent mucous plugs s/p bronchoscopies, presenting from [facility] for chest tightness and SOB. Pt reports increased secretions requiring more suctioning, still unable to clear them. Tracheostomy exchanged to 6 cuffed tube from 6 cuffless on presentation. Per CT sx, no current indication for emergency surgical tracheostomy exchange, "f/u w/ ENT vs. pulm for exchange of tracheostomy given it is chronic in nature"; Pt is being treated for acute on chronic hypoxic RF, mucus plugging, bacterial PNA. Pt s/p trach exchange to Shiley 6 uncuffed fenestrated on 8/11. Surgery recommend leaving non-fenestrated inner cannula in place until pt can require less frequent suctioning. CXR 8/4-> Slightly decreased interstitial prominence/opacities. Unchanged left basal pleural-parenchymal opacity. ENT c/s.
pt is a 56 y/o F w/ PMHx: IDDM, GERD, HTN, COPD, chronic hypoxemic and hypercapnic respiratory failure s/p tracheostomy (not chronically vented) & PEG at [facility], DVT, anxiety, CHF, recurrent mucous plugs s/p bronchoscopies, presenting from [facility] for chest tightness and SOB. Pt reports increased secretions requiring more suctioning, still unable to clear them. Tracheostomy exchanged to 6 cuffed tube from 6 cuffless on presentation. Per CT sx, no current indication for emergency surgical tracheostomy exchange, "f/u w/ ENT vs. pulm for exchange of tracheostomy given it is chronic in nature"; Pt is being treated for acute on chronic hypoxic RF, mucus plugging, bacterial PNA. CXR 8/4-> Slightly decreased interstitial prominence/opacities. Unchanged left basal pleural-parenchymal opacity.
pt is a 58 y/o F w/ PMHx: IDDM, GERD, HTN, COPD, chronic hypoxemic and hypercapnic respiratory failure s/p tracheostomy (not chronically vented) & PEG at [facility], DVT, anxiety, CHF, recurrent mucous plugs s/p bronchoscopies, presenting from [facility] for chest tightness and SOB. Pt reports increased secretions requiring more suctioning, still unable to clear them. Tracheostomy exchanged to 6 cuffed tube from 6 cuffless on presentation. Per CT sx, no current indication for emergency surgical tracheostomy exchange, "f/u w/ ENT vs. pulm for exchange of tracheostomy given it is chronic in nature"; Pt is being treated for acute on chronic hypoxic RF, mucus plugging, bacterial PNA. CXR 8/4-> Slightly decreased interstitial prominence/opacities. Unchanged left basal pleural-parenchymal opacity.

## 2022-08-15 NOTE — PROGRESS NOTE ADULT - ASSESSMENT
57 year old female with a past medical history of IDDM, GERD, HTN, COPD, Chronic hypoxemic and hypercapnic respiratory failure SP Tracheostomy (not chronically vented) & PEG at [facility], DVT on Eliquis, Anxiety, CHF, Recurrent mucous plugs SP bronchoscopies, presenting from [facility] for chest tightness and shortness of breath over the past few days. Was found to have aspiration pneumonia causing COPD exacerbation    Acute on chronic Hypoxic respiratory failure   #s/p trach/PEG at [facility] 04/2022 after being intubated for >1month  #COPD Exacerbation  #Suspected superimposed bacterial PNA  - Completed a course of doxycycline WBC wnl, vitals wnl,  - Taper steroids- c/w prednisone 40 OD cont Nebs q4h & prn   - CXR reviewed and shows unchanged interstitial opacities  - Switch to fenestrated uncuffed trach, if tolerated consider downsize as per pulm recs.   - Suction Q8h  - Inhaled NAC and PO Mucinex started    #Dysphagia  - F/U Speech and Swallow and ENT recs    #Chronic HFpEF  2d Echo 05/2022: LVEF 50-55%. proBNP ~ 444  Pt does not appear volume overloaded  - Low Na diet and daily weight.   - monitor I & O    #Hx of DVT   - Cont Eliquis  - Venous duplex shows no DVT    #DM2  - Cont lantus/lispro 20-6-6-6  - ISS    #Chronic Lower extremity Pain management and Hyperpigmentation   #Chronic atropic B/l LE Hyperpigmentation changes  - Plan was for outpatient burn and dermatology f/u  - Start topical clobetasone ointment  - Gabapentin 600 TID,   - PO dilaudid 4mg Q4 PRN  -Vascular consulted for possible PVD.   -Arterial doppler - No vascular disease  -PT/OT as tolerated  - Stitches from biopsy removed    #Hypomagnesia  -Repleted    Dispo: Back To [facility] when stable.   57 year old female with a past medical history of IDDM, GERD, HTN, COPD, Chronic hypoxemic and hypercapnic respiratory failure SP Tracheostomy (not chronically vented) & PEG at [facility], DVT on Eliquis, Anxiety, CHF, Recurrent mucous plugs SP bronchoscopies, presenting [facility] for chest tightness and shortness of breath over the past few days. Was found to have aspiration pneumonia causing COPD exacerbation    Acute on chronic Hypoxic respiratory failure   #s/p trach/PEG at [facility] 04/2022 after being intubated for >1month  #COPD Exacerbation  #Suspected superimposed bacterial PNA  - Completed a course of doxycycline WBC wnl, vitals wnl,  - Taper steroids- c/w prednisone 40 OD cont Nebs q4h & prn   - CXR reviewed and shows unchanged interstitial opacities  - Switch to fenestrated uncuffed trach, if tolerated consider downsize as per pulm recs.   - Suction Q8h  - Inhaled NAC and PO Mucinex started    #Dysphagia  - F/U Speech and Swallow and ENT recs  F/u ct neck     #Chronic HFpEF  2d Echo 05/2022: LVEF 50-55%. proBNP ~ 444  Pt does not appear volume overloaded  - Low Na diet and daily weight.   - monitor I & O    #Hx of DVT   - Cont Eliquis  - Venous duplex shows no DVT    #DM2  - Cont lantus/lispro 20-6-6-6  - ISS    #Chronic Lower extremity Pain management and Hyperpigmentation   #Chronic atropic B/l LE Hyperpigmentation changes  - Plan was for outpatient burn and dermatology f/u  - Start topical clobetasone ointment  - Gabapentin 600 TID,   - PO dilaudid 4mg Q4 PRN  -Vascular consulted for possible PVD.   -Arterial doppler - No vascular disease  -PT/OT as tolerated  - Stitches from biopsy removed    #Hypomagnesia  -Repleted    Dispo: Back To [facility] when stable.

## 2022-08-15 NOTE — SWALLOW BEDSIDE ASSESSMENT ADULT - SWALLOW EVAL: RECOMMENDED DIET
continue NPO w/ PEG tube, allow ice chips, sips of water
continue NPO w/ PEG tube feeds; allow small amount of ice chips upon pt request
continue NPO w/ PEG tube, allow ice chips, sips of water
continue NPO w/ PEG tube feeds; allow small amount of ice chips upon pt request
continue NPO w/ PEG tube, allow ice chips, sips of water

## 2022-08-15 NOTE — SWALLOW BEDSIDE ASSESSMENT ADULT - NS ASR SWALLOW FINDINGS DISCUS
Physician/Nursing/Patient
Nursing/Patient
Physician/Nursing/Patient

## 2022-08-15 NOTE — PROGRESS NOTE ADULT - PROVIDER SPECIALTY LIST ADULT
Acute respiratory failure with hypoxia  Reviewed CT scan chest; diffuse bilateral infiltrates  Likely secondary to aspiration pneumonia as well as pneumonitis from inhalation drug use  Antibiotics de-escalated    Continue to wean nasal cannula currently down to 1 L Internal Medicine

## 2022-08-15 NOTE — PROGRESS NOTE ADULT - ASSESSMENT
Unfortunate 56 YO F with acute on chr hypoxic RF read for hypoxia and hypotension and ongoing Bacterial PNA. Hx of ASP PNA, COPD exacerbation and intubation with failure to extubate resulting in trach at [facility] .  The pt is under chronic  at [facility].      Acute on Chr hypoxic RF, mucus plugging  Chronic Tracheostomy  Bacterial PNA  Clinical Debility  Bedbound state  Lower extremity venous insufficiency  BL foot drop, limb weakness, prolonged immobility  Hx of HTN, ASHD HFpEF  Hx of DLD  Hx of COPD, MO, LUCIANA, sp intubation, failure to extubate, chr trach since /[facility]  Hx of DM II  Hx of Ch anemia of ch dis  Hx of GERD, HH, Diverticulosis, Ch constipation  Hx of OA, DJD, DDD, chr pain syn  Hx of lower ext venous stasis dermatitis  Hx of DVT, AC/Eliquis  Hx of Anxiety, Depression       replete Mg  pt stable, A&O, verbal, wishes to return to  SNF to have more PT  pt on trach collar today 40 %, 98 % pulse Ox   ENT to scope pt, to assess persistent pharyngeal/laryngeal edema  Surgery attempted to replace trach to fenestrated tube, the outer tube is fenestrated, the inner is not as suction tube caught in the  fenestrations ( fenestrated inner tube may be placed when pt req less suctioning)  nutrition via PEG for now, allow sm amts of ice chips  cont S&S therapy    low vit D start to replete 50,000 q wk  Venous doppler neg for DVT  AA doppler shows normal blood flow  Vasc consult     Pul-critical care ff, can we D/C pt to [facility] on trach collar    cont all previous meds  GI prophylaxis  att to bowel regimen  nutrition via PEG/glucerna, add 2 packets of protein per day to feeds  change PEG dressing daily    PM: hydromorphone 4mg po q 4 and 1mg IV q 8 for breakthrough pain, gabapentin 600mg q 8, tylenol 650mg QID    Rehab consult for bedside PT, can we get pt OOB to chair, rehab states pt high risk for fall but bedside and  OOB to chair daily, pt highly motivated  BL heel off loading boots, pt with BL foot drop    pt full code  Social SVC pt down to trach collar and once she has ENT we can D/C pt to SNF 24-48 hrs

## 2022-08-15 NOTE — PROGRESS NOTE ADULT - ASSESSMENT
Pt is a 58yo Female who presents with hypoxia, h/o tracheostomy - called to assess for persistent laryngeal edema, failing SLP.     ·	Will plan to FFL at bedside with attng to assess larynx today with Dr. Turner   ·	cont trach care, cont PMSV/t piece as tolerated  Pt is a 56yo Female who presents with hypoxia, h/o tracheostomy - called to assess for persistent laryngeal edema, failing SLP.     ·	Will plan to FFL at bedside to assess larynx today with Dr. Turner   ·	cont trach care, cont PMSV/t piece as tolerated  Pt is a 58yo Female who presents with hypoxia, h/o tracheostomy - called to assess for persistent laryngeal edema, failing SLP.     ·	Will plan to FFL at bedside to assess larynx today with Dr. Turner  ·	CT Neck wwo con   ·	cont trach care, cont PMSV/t piece as tolerated

## 2022-08-16 LAB
ALBUMIN SERPL ELPH-MCNC: 3.5 G/DL — SIGNIFICANT CHANGE UP (ref 3.5–5.2)
ALP SERPL-CCNC: 89 U/L — SIGNIFICANT CHANGE UP (ref 30–115)
ALT FLD-CCNC: 21 U/L — SIGNIFICANT CHANGE UP (ref 0–41)
ANION GAP SERPL CALC-SCNC: 13 MMOL/L — SIGNIFICANT CHANGE UP (ref 7–14)
APTT BLD: 38.3 SEC — SIGNIFICANT CHANGE UP (ref 27–39.2)
AST SERPL-CCNC: 17 U/L — SIGNIFICANT CHANGE UP (ref 0–41)
BASOPHILS # BLD AUTO: 0.02 K/UL — SIGNIFICANT CHANGE UP (ref 0–0.2)
BASOPHILS NFR BLD AUTO: 0.2 % — SIGNIFICANT CHANGE UP (ref 0–1)
BILIRUB SERPL-MCNC: 0.4 MG/DL — SIGNIFICANT CHANGE UP (ref 0.2–1.2)
BUN SERPL-MCNC: 8 MG/DL — LOW (ref 10–20)
CALCIUM SERPL-MCNC: 9.6 MG/DL — SIGNIFICANT CHANGE UP (ref 8.5–10.1)
CHLORIDE SERPL-SCNC: 100 MMOL/L — SIGNIFICANT CHANGE UP (ref 98–110)
CO2 SERPL-SCNC: 28 MMOL/L — SIGNIFICANT CHANGE UP (ref 17–32)
CREAT SERPL-MCNC: <0.5 MG/DL — LOW (ref 0.7–1.5)
EGFR: 123 ML/MIN/1.73M2 — SIGNIFICANT CHANGE UP
EOSINOPHIL # BLD AUTO: 0.34 K/UL — SIGNIFICANT CHANGE UP (ref 0–0.7)
EOSINOPHIL NFR BLD AUTO: 3.4 % — SIGNIFICANT CHANGE UP (ref 0–8)
GLUCOSE BLDC GLUCOMTR-MCNC: 100 MG/DL — HIGH (ref 70–99)
GLUCOSE BLDC GLUCOMTR-MCNC: 112 MG/DL — HIGH (ref 70–99)
GLUCOSE BLDC GLUCOMTR-MCNC: 84 MG/DL — SIGNIFICANT CHANGE UP (ref 70–99)
GLUCOSE BLDC GLUCOMTR-MCNC: 99 MG/DL — SIGNIFICANT CHANGE UP (ref 70–99)
GLUCOSE SERPL-MCNC: 85 MG/DL — SIGNIFICANT CHANGE UP (ref 70–99)
HCT VFR BLD CALC: 33.9 % — LOW (ref 37–47)
HCT VFR BLD CALC: 34.3 % — LOW (ref 37–47)
HGB BLD-MCNC: 11.5 G/DL — LOW (ref 12–16)
HGB BLD-MCNC: 11.6 G/DL — LOW (ref 12–16)
IMM GRANULOCYTES NFR BLD AUTO: 0.5 % — HIGH (ref 0.1–0.3)
LYMPHOCYTES # BLD AUTO: 1.21 K/UL — SIGNIFICANT CHANGE UP (ref 1.2–3.4)
LYMPHOCYTES # BLD AUTO: 12.2 % — LOW (ref 20.5–51.1)
MAGNESIUM SERPL-MCNC: 2 MG/DL — SIGNIFICANT CHANGE UP (ref 1.8–2.4)
MCHC RBC-ENTMCNC: 31.7 PG — HIGH (ref 27–31)
MCHC RBC-ENTMCNC: 32 PG — HIGH (ref 27–31)
MCHC RBC-ENTMCNC: 33.8 G/DL — SIGNIFICANT CHANGE UP (ref 32–37)
MCHC RBC-ENTMCNC: 33.9 G/DL — SIGNIFICANT CHANGE UP (ref 32–37)
MCV RBC AUTO: 93.7 FL — SIGNIFICANT CHANGE UP (ref 81–99)
MCV RBC AUTO: 94.4 FL — SIGNIFICANT CHANGE UP (ref 81–99)
MONOCYTES # BLD AUTO: 0.73 K/UL — HIGH (ref 0.1–0.6)
MONOCYTES NFR BLD AUTO: 7.4 % — SIGNIFICANT CHANGE UP (ref 1.7–9.3)
NEUTROPHILS # BLD AUTO: 7.53 K/UL — HIGH (ref 1.4–6.5)
NEUTROPHILS NFR BLD AUTO: 76.3 % — HIGH (ref 42.2–75.2)
NRBC # BLD: 0 /100 WBCS — SIGNIFICANT CHANGE UP (ref 0–0)
NRBC # BLD: 0 /100 WBCS — SIGNIFICANT CHANGE UP (ref 0–0)
PLATELET # BLD AUTO: 136 K/UL — SIGNIFICANT CHANGE UP (ref 130–400)
PLATELET # BLD AUTO: 139 K/UL — SIGNIFICANT CHANGE UP (ref 130–400)
POTASSIUM SERPL-MCNC: 4.6 MMOL/L — SIGNIFICANT CHANGE UP (ref 3.5–5)
POTASSIUM SERPL-SCNC: 4.6 MMOL/L — SIGNIFICANT CHANGE UP (ref 3.5–5)
PROT SERPL-MCNC: 5.8 G/DL — LOW (ref 6–8)
RBC # BLD: 3.59 M/UL — LOW (ref 4.2–5.4)
RBC # BLD: 3.66 M/UL — LOW (ref 4.2–5.4)
RBC # BLD: 3.66 M/UL — LOW (ref 4.2–5.4)
RBC # FLD: 12.1 % — SIGNIFICANT CHANGE UP (ref 11.5–14.5)
RBC # FLD: 12.2 % — SIGNIFICANT CHANGE UP (ref 11.5–14.5)
RETICS #: 56.4 K/UL — SIGNIFICANT CHANGE UP (ref 25–125)
RETICS/RBC NFR: 1.5 % — SIGNIFICANT CHANGE UP (ref 0.5–1.5)
SODIUM SERPL-SCNC: 141 MMOL/L — SIGNIFICANT CHANGE UP (ref 135–146)
WBC # BLD: 9.83 K/UL — SIGNIFICANT CHANGE UP (ref 4.8–10.8)
WBC # BLD: 9.88 K/UL — SIGNIFICANT CHANGE UP (ref 4.8–10.8)
WBC # FLD AUTO: 9.83 K/UL — SIGNIFICANT CHANGE UP (ref 4.8–10.8)
WBC # FLD AUTO: 9.88 K/UL — SIGNIFICANT CHANGE UP (ref 4.8–10.8)

## 2022-08-16 PROCEDURE — 70491 CT SOFT TISSUE NECK W/DYE: CPT | Mod: 26

## 2022-08-16 RX ORDER — GUAIFENESIN/DEXTROMETHORPHAN 600MG-30MG
20 TABLET, EXTENDED RELEASE 12 HR ORAL EVERY 6 HOURS
Refills: 0 | Status: DISCONTINUED | OUTPATIENT
Start: 2022-08-16 | End: 2022-08-19

## 2022-08-16 RX ADMIN — HYDROMORPHONE HYDROCHLORIDE 0.5 MILLIGRAM(S): 2 INJECTION INTRAMUSCULAR; INTRAVENOUS; SUBCUTANEOUS at 22:00

## 2022-08-16 RX ADMIN — POLYETHYLENE GLYCOL 3350 17 GRAM(S): 17 POWDER, FOR SOLUTION ORAL at 07:01

## 2022-08-16 RX ADMIN — Medication 25 GRAM(S): at 06:52

## 2022-08-16 RX ADMIN — HYDROMORPHONE HYDROCHLORIDE 0.5 MILLIGRAM(S): 2 INJECTION INTRAMUSCULAR; INTRAVENOUS; SUBCUTANEOUS at 14:43

## 2022-08-16 RX ADMIN — GABAPENTIN 600 MILLIGRAM(S): 400 CAPSULE ORAL at 21:36

## 2022-08-16 RX ADMIN — GABAPENTIN 600 MILLIGRAM(S): 400 CAPSULE ORAL at 06:59

## 2022-08-16 RX ADMIN — HYDROMORPHONE HYDROCHLORIDE 0.5 MILLIGRAM(S): 2 INJECTION INTRAMUSCULAR; INTRAVENOUS; SUBCUTANEOUS at 03:37

## 2022-08-16 RX ADMIN — CHLORHEXIDINE GLUCONATE 15 MILLILITER(S): 213 SOLUTION TOPICAL at 06:58

## 2022-08-16 RX ADMIN — Medication 3 MILLILITER(S): at 14:37

## 2022-08-16 RX ADMIN — Medication 25 GRAM(S): at 17:12

## 2022-08-16 RX ADMIN — Medication 1 APPLICATION(S): at 17:08

## 2022-08-16 RX ADMIN — HYDROMORPHONE HYDROCHLORIDE 4 MILLIGRAM(S): 2 INJECTION INTRAMUSCULAR; INTRAVENOUS; SUBCUTANEOUS at 20:30

## 2022-08-16 RX ADMIN — Medication 1 APPLICATION(S): at 06:55

## 2022-08-16 RX ADMIN — Medication 1000 MILLIGRAM(S): at 13:27

## 2022-08-16 RX ADMIN — HYDROMORPHONE HYDROCHLORIDE 4 MILLIGRAM(S): 2 INJECTION INTRAMUSCULAR; INTRAVENOUS; SUBCUTANEOUS at 04:19

## 2022-08-16 RX ADMIN — INSULIN GLARGINE 20 UNIT(S): 100 INJECTION, SOLUTION SUBCUTANEOUS at 21:53

## 2022-08-16 RX ADMIN — HYDROMORPHONE HYDROCHLORIDE 0.5 MILLIGRAM(S): 2 INJECTION INTRAMUSCULAR; INTRAVENOUS; SUBCUTANEOUS at 11:34

## 2022-08-16 RX ADMIN — HYDROMORPHONE HYDROCHLORIDE 0.5 MILLIGRAM(S): 2 INJECTION INTRAMUSCULAR; INTRAVENOUS; SUBCUTANEOUS at 07:51

## 2022-08-16 RX ADMIN — Medication 20 MILLILITER(S): at 17:11

## 2022-08-16 RX ADMIN — Medication 1 TABLET(S): at 12:00

## 2022-08-16 RX ADMIN — MUPIROCIN 1 APPLICATION(S): 20 OINTMENT TOPICAL at 07:02

## 2022-08-16 RX ADMIN — GABAPENTIN 600 MILLIGRAM(S): 400 CAPSULE ORAL at 13:27

## 2022-08-16 RX ADMIN — Medication 1 APPLICATION(S): at 17:09

## 2022-08-16 RX ADMIN — HYDROMORPHONE HYDROCHLORIDE 4 MILLIGRAM(S): 2 INJECTION INTRAMUSCULAR; INTRAVENOUS; SUBCUTANEOUS at 05:09

## 2022-08-16 RX ADMIN — ALBUTEROL 2.5 MILLIGRAM(S): 90 AEROSOL, METERED ORAL at 14:37

## 2022-08-16 RX ADMIN — MUPIROCIN 1 APPLICATION(S): 20 OINTMENT TOPICAL at 17:13

## 2022-08-16 RX ADMIN — HYDROMORPHONE HYDROCHLORIDE 0.5 MILLIGRAM(S): 2 INJECTION INTRAMUSCULAR; INTRAVENOUS; SUBCUTANEOUS at 18:21

## 2022-08-16 RX ADMIN — APIXABAN 5 MILLIGRAM(S): 2.5 TABLET, FILM COATED ORAL at 06:57

## 2022-08-16 RX ADMIN — Medication 1000 MILLIGRAM(S): at 06:54

## 2022-08-16 RX ADMIN — HYDROMORPHONE HYDROCHLORIDE 0.5 MILLIGRAM(S): 2 INJECTION INTRAMUSCULAR; INTRAVENOUS; SUBCUTANEOUS at 05:11

## 2022-08-16 RX ADMIN — HYDROMORPHONE HYDROCHLORIDE 4 MILLIGRAM(S): 2 INJECTION INTRAMUSCULAR; INTRAVENOUS; SUBCUTANEOUS at 15:50

## 2022-08-16 RX ADMIN — Medication 6 UNIT(S): at 11:59

## 2022-08-16 RX ADMIN — ALBUTEROL 2.5 MILLIGRAM(S): 90 AEROSOL, METERED ORAL at 08:48

## 2022-08-16 RX ADMIN — HYDROMORPHONE HYDROCHLORIDE 0.5 MILLIGRAM(S): 2 INJECTION INTRAMUSCULAR; INTRAVENOUS; SUBCUTANEOUS at 21:30

## 2022-08-16 RX ADMIN — Medication 500 MICROGRAM(S): at 14:37

## 2022-08-16 RX ADMIN — PANTOPRAZOLE SODIUM 40 MILLIGRAM(S): 20 TABLET, DELAYED RELEASE ORAL at 12:00

## 2022-08-16 RX ADMIN — Medication 0.5 MILLIGRAM(S): at 18:21

## 2022-08-16 RX ADMIN — HYDROMORPHONE HYDROCHLORIDE 4 MILLIGRAM(S): 2 INJECTION INTRAMUSCULAR; INTRAVENOUS; SUBCUTANEOUS at 19:59

## 2022-08-16 RX ADMIN — Medication 0.5 MILLIGRAM(S): at 09:23

## 2022-08-16 RX ADMIN — Medication 3 MILLILITER(S): at 08:48

## 2022-08-16 RX ADMIN — CHLORHEXIDINE GLUCONATE 15 MILLILITER(S): 213 SOLUTION TOPICAL at 17:09

## 2022-08-16 RX ADMIN — Medication 1000 MILLIGRAM(S): at 21:36

## 2022-08-16 RX ADMIN — POLYETHYLENE GLYCOL 3350 17 GRAM(S): 17 POWDER, FOR SOLUTION ORAL at 17:13

## 2022-08-16 RX ADMIN — LOSARTAN POTASSIUM 100 MILLIGRAM(S): 100 TABLET, FILM COATED ORAL at 07:00

## 2022-08-16 RX ADMIN — Medication 500 MICROGRAM(S): at 08:48

## 2022-08-16 RX ADMIN — HYDROMORPHONE HYDROCHLORIDE 4 MILLIGRAM(S): 2 INJECTION INTRAMUSCULAR; INTRAVENOUS; SUBCUTANEOUS at 09:23

## 2022-08-16 RX ADMIN — Medication 1 APPLICATION(S): at 06:58

## 2022-08-16 NOTE — PROGRESS NOTE ADULT - SUBJECTIVE AND OBJECTIVE BOX
CRUZ DUMONT 58yo  Female sent to the ER from [facility] where she is on  care for c/o inc SOB, chest tightness, diff breathing, wheezing i.e. acute on chr hypoxic RF.  Of note the pt has a trach and has had freq readmissions for mucous plugging, RF and has had several bronchoscopies.   EMS noted the pt to be hypotensive and hypoxic.  The pt was vigorously suctioned, received Neb Tx, IV steroids,  BP revived with IV fluids.  The pt was cultured and placed on ABx.  She is v well known to the Pul SVc and to ID.  The pt is ad with acute on ch hypoxic RF and ongoing Bacterial PNA.  The PMHx includes:  HTN, ASHD, HFpEF, DLD, DM II, Chronic RF, COPD, LUICANA, ASp PNA, sp intubation, failure to extubate, chronic trach ([facility] 4/22), recurrent mucus plugging of airways, SDVT, AC with Eliquis, lower ext venous stasis dermatitis, bedbound state, GERD, diverticulosis, sp PEG, OA, DDD, DJD, chronic pain syn, depression anxiety.    INTERVAL HPI/OVERNIGHT EVENTS: pt stable on trach collar 40%, working with S&S and ENT, as per ENTsone tissue growth on endoscopy attempt, CT of soft tissue of neck done for further assessment, results P    MEDICATIONS  (STANDING):  acetaminophen     Tablet .. 650 milliGRAM(s) Oral every 6 hours  acetylcysteine 20% for Nebulization - Peds 3 milliLiter(s) Nebulizer every 6 hours  ALBUTerol    0.083% 2.5 milliGRAM(s) Nebulizer every 6 hours  ammonium lactate 12% Lotion 1 Application(s) Topical two times a day  apixaban 5 milliGRAM(s) Enteral Tube two times a day  chlorhexidine 0.12% Liquid 15 milliLiter(s) Oral Mucosa every 12 hours  dextrose 5%. 1000 milliLiter(s) (100 mL/Hr) IV Continuous <Continuous>  dextrose 5%. 1000 milliLiter(s) (50 mL/Hr) IV Continuous <Continuous>  dextrose 50% Injectable 25 Gram(s) IV Push once  dextrose 50% Injectable 12.5 Gram(s) IV Push once  dextrose 50% Injectable 25 Gram(s) IV Push once  doxycycline monohydrate Capsule 100 milliGRAM(s) Oral every 12 hours  gabapentin 600 milliGRAM(s) Oral three times a day  glucagon  Injectable 1 milliGRAM(s) IntraMuscular once  insulin glargine Injectable (LANTUS) 20 Unit(s) SubCutaneous at bedtime  insulin lispro (ADMELOG) corrective regimen sliding scale   SubCutaneous three times a day before meals  insulin lispro Injectable (ADMELOG) 6 Unit(s) SubCutaneous three times a day before meals  methylPREDNISolone sodium succinate Injectable 60 milliGRAM(s) IV Push two times a day  multivitamin 1 Tablet(s) Oral daily  pantoprazole   Suspension 40 milliGRAM(s) Oral daily  polyethylene glycol 3350 17 Gram(s) Oral two times a day  senna 2 Tablet(s) Oral at bedtime    MEDICATIONS  (PRN):  ALBUTerol    90 MICROgram(s) HFA Inhaler 2 Puff(s) Inhalation every 6 hours PRN Shortness of Breath and/or Wheezing  ALPRAZolam 0.5 milliGRAM(s) Oral every 6 hours PRN anxiety  dextrose Oral Gel 15 Gram(s) Oral once PRN Blood Glucose LESS THAN 70 milliGRAM(s)/deciliter  HYDROmorphone   Tablet 4 milliGRAM(s) Oral every 4 hours PRN Severe Pain (7 - 10)  HYDROmorphone  Injectable 1 milliGRAM(s) IV Push every 8 hours PRN Moderate Pain (4 - 6)      Allergies    penicillin (Swelling)      Vital Signs Last 24 Hrs    T(F): 97  HR:  89  BP:  130/70    RR: 18  SpO2:  96%, trach collar 40%    PHYSICAL EXAM:      Constitutional: pt alert, oriented  verbal,  bedbound chr ill looking but in NAD on T piece    Eyes: nonicteric    ENMT: dry oral mucosa, dental defects,     Neck: + trach tube supple, no stridor, + trach, , + T tube    Respiratory: shallow resp, diminished harsh bronchial  BS, scattered rhonchi, no wheezing    Cardiovascular: S1S2 reg    Gastrointestinal: globular, soft and benign, + PEG, + BS    Genitourinary:    Extremities: moves all ext, dec motor strength of lower ext, + BL mm atrophy, + BL foot drop    Vascular: dec pedal pulses    Neurological: nonfocal    Skin: lower ext dark almost black hyperpigmentation of lower ext    Lymph Nodes: not enlarged    Musculoskeletal: dec mm mass and tone    Psychiatric: anxious, depressed but stable        LABS:     8/15              10.9  8)-----------( 126            33      136  |  98  |  9  ----------------------------<  140  4.5   |  30  |  0.5    , 115, 123  Ca    9.7       Mg     1.9, 1.8, 2.0, 1.6, 1.7, 1.8  trop <0.01  Vit D 18    TPro  5.9, 5.5  /  Alb  3.3, 3.4  /  TBili  0.3  /  DBili  x   /  AST  20  /  ALT  28  /  AlkPhos  77  08-05    RADIOLOGY & ADDITIONAL TESTS:    EKG:  NSR R axis, low voltage, nonspecific ST-T changes  CXR:  interstitial prominence, L pl based opacity unchanged  AA doppler:  normal arterial blood flow

## 2022-08-16 NOTE — PROGRESS NOTE ADULT - NS ATTEND AMEND GEN_ALL_CORE FT
Seen and examined. CT neck reviewed with irregular mass involving tongue base and supraglottis. Flexible laryngoscopy performed with exophytic mass involving same appearing centered on petiole of epiglottis. Suspicion of malignancy. Plan for biopsy in OR on Friday.

## 2022-08-16 NOTE — PROGRESS NOTE ADULT - ASSESSMENT
Unfortunate 56 YO F with acute on chr hypoxic RF read for hypoxia and hypotension and ongoing Bacterial PNA. Hx of ASP PNA, COPD exacerbation and intubation with failure to extubate resulting in trach at [facility] .  The pt is under chronic  at [facility].      Acute on Chr hypoxic RF, mucus plugging  Chronic Tracheostomy  Bacterial PNA  Clinical Debility  Bedbound state  Lower extremity venous insufficiency  BL foot drop, limb weakness, prolonged immobility  Hx of HTN, ASHD HFpEF  Hx of DLD  Hx of COPD, MO, LUCIANA, sp intubation, failure to extubate, chr trach since /[facility]  Hx of DM II  Hx of Ch anemia of ch dis  Hx of GERD, HH, Diverticulosis, Ch constipation  Hx of OA, DJD, DDD, chr pain syn  Hx of lower ext venous stasis dermatitis  Hx of DVT, AC/Eliquis  Hx of Anxiety, Depression         pt stable, A&O, verbal  pt on trach collar today 40 %, 98 % pulse Ox   ENT soft tissue growth, CT of soft tissue of the neck   Surgery attempted to replace trach to fenestrated tube, the outer tube is fenestrated, the inner is not as suction tube caught in the  fenestrations ( fenestrated inner tube may be placed when pt req less suctioning)  nutrition via PEG for now, allow sm amts of ice chips  cont S&S therapy    low vit D start to replete 50,000 q wk  Venous doppler neg for DVT  AA doppler shows normal blood flow  Vasc consult     Pul-critical care ff, can we D/C pt to [facility] on trach collar    cont all previous meds  GI prophylaxis  att to bowel regimen  nutrition via PEG/glucerna, add 2 packets of protein per day to feeds  change PEG dressing daily    PM: hydromorphone 4mg po q 4 and 1mg IV q 8 for breakthrough pain, gabapentin 600mg q 8, tylenol 650mg QID    Rehab consult for bedside PT, can we get pt OOB to chair, rehab states pt high risk for fall but bedside and  OOB to chair daily, pt highly motivated  BL heel off loading boots, pt with BL foot drop    pt full code  Social SVC pt down to trach collar and once she has ENT we can D/C pt to SNF 24-48 hrs

## 2022-08-16 NOTE — PROGRESS NOTE ADULT - ASSESSMENT
Pt is a 57y Female w/ hypoxia, currently with tracheostomy, ENT was called to assess for persistent laryngeal edema, failing SLP. CT reveals large intralaryngeal mass.     ·	CT reviewed with Dr Landaverde, results given to patient at bedside.   ·	Pt aware of concern for malignancy & agreeable to biopsy  ·	pt booked for the OR for Friday, 8/19, for a direct laryngoscopy, biopsy, flex esophagoscopy, possible bronchoscopy.   ·	please hold Eliquis   ·	medical clearance, unsure if patient will need cardiology or pulmonary clearance for general anesthesia  ·	will change to a cuffed trach prior to procedure  ·	cont trach care/PMSV  ·	depending on results of biopsy, pt may need heme/onc, rad/onc consults as well as an OP PET scan  ·	Dr Landaverde at bedside  ·	I spoke with patients daughter over the phone to discuss CT and plan, will talk to the patients  at bedside when he arrives later in the evening  ·	will follow, s/w medical resident regarding plan

## 2022-08-16 NOTE — CHART NOTE - NSCHARTNOTEFT_GEN_A_CORE
Registered Dietitian Follow-Up     Patient Profile Reviewed                           Yes [x]   No []    Nutrition History Previously Obtained        Yes [x]  No []      Pertinent Medical Interventions:  56 y/o female w/ PMHx of IDDM, GERD, HTN, COPD, chronic hypoxemic and hypercapnic respiratory failure s/p tracheostomy (not chronically vented) & PEG at [facility], DVT, anxiety, CHF, recurrent mucous plugs s/p bronchoscopies, presenting from [facility] for chest tightness and SOB. Pt reports increased secretions requiring more suctioning, still unable to clear them. Tracheostomy exchanged to 6 cuffed tube from 6 cuffless on presentation. Per CT sx, no current indication for emergency surgical tracheostomy exchange, "f/u w/ ENT vs. pulm for exchange of tracheostomy given it is chronic in nature"; Pt is being treated for acute on chronic hypoxic RF, mucus plugging, bacterial PNA. CXR -> Slightly decreased interstitial prominence/opacities. Unchanged left basal pleural-parenchymal opacity.    Nutrition Interval History:   SLP note 8/15 - continue NPO w/ PEG tube feeds; allow small amount of ice chips upon pt request  Per RN, pt tolerating TF regimen; no holds.     Diet order:   Diet, NPO with Tube Feed:   Tube Feeding Modality: Gastrostomy  Glucerna 1.2 Martin  Total Volume for 24 Hours (mL): 1200  Bolus  Total Volume of Bolus (mL):  300  Total # of Feeds: 4  Tube Feed Frequency: Every 6 hours   Tube Feed Start Time: 14:00  Bolus Feed Rate (mL per Hour): 300   Bolus Feed Duration (in Hours): 1  Bolus   Total Volume per Flush (mL): 30   Frequency: Every 6 Hours  Free Water Flush Instructions:  post feed  No Carb Prosource (1pkg = 15gms Protein)     Qty per Day:  2x (08-15-22 @ 16:28) [Active]    Anthropometrics:  Height (cm): 154.9 (22 @ 06:02)  Weight (kg): 86.6 (22 @ 06:02)  BMI (kg/m2): 36.1 (22 @ 06:02)  IBW: 47.7 KG    OTHER WEIGHTS:   Per previous RD -- weights in EMR: Daily Weight in k.3 (), Weight in k.4 (06-05), Weight in k.3 (-04), Weight in k (-03)  Weight at NH was 82.3kg though gained weight to current admission wt with adequate PO intake 86.6kg indicating overall 8.2% wt loss from dry weight 94.3 --> current wt within 2 months     MEDICATIONS  (STANDING):  acetaminophen     Tablet .. 1000 milliGRAM(s) Oral every 8 hours  acetylcysteine 10%  Inhalation 4 milliLiter(s) Inhalation two times a day  acetylcysteine 20% for Nebulization - Peds 3 milliLiter(s) Nebulizer every 6 hours  ALBUTerol    0.083% 2.5 milliGRAM(s) Nebulizer every 6 hours  ammonium lactate 12% Lotion 1 Application(s) Topical two times a day  apixaban 5 milliGRAM(s) Enteral Tube two times a day  chlorhexidine 0.12% Liquid 15 milliLiter(s) Oral Mucosa every 12 hours  clobetasol 0.05% Ointment 1 Application(s) Topical two times a day  dextrose 5%. 1000 milliLiter(s) (50 mL/Hr) IV Continuous <Continuous>  dextrose 5%. 1000 milliLiter(s) (100 mL/Hr) IV Continuous <Continuous>  dextrose 50% Injectable 25 Gram(s) IV Push once  dextrose 50% Injectable 12.5 Gram(s) IV Push once  dextrose 50% Injectable 25 Gram(s) IV Push once  ergocalciferol 44183 Unit(s) Oral every week  gabapentin 600 milliGRAM(s) Oral three times a day  glucagon  Injectable 1 milliGRAM(s) IntraMuscular once  guaifenesin/dextromethorphan Oral Liquid 20 milliLiter(s) Oral every 6 hours  insulin glargine Injectable (LANTUS) 20 Unit(s) SubCutaneous at bedtime  insulin lispro (ADMELOG) corrective regimen sliding scale   SubCutaneous three times a day before meals  insulin lispro Injectable (ADMELOG) 6 Unit(s) SubCutaneous three times a day before meals  ipratropium    for Nebulization 500 MICROGram(s) Nebulizer every 6 hours  losartan 100 milliGRAM(s) Oral daily  magnesium sulfate  IVPB 2 Gram(s) IV Intermittent once  magnesium sulfate  IVPB 2 Gram(s) IV Intermittent every 12 hours  multivitamin 1 Tablet(s) Oral daily  mupirocin 2% Ointment 1 Application(s) Topical two times a day  pantoprazole   Suspension 40 milliGRAM(s) Oral daily  polyethylene glycol 3350 17 Gram(s) Oral two times a day  senna 2 Tablet(s) Oral at bedtime    MEDICATIONS  (PRN):  ALBUTerol    90 MICROgram(s) HFA Inhaler 2 Puff(s) Inhalation every 6 hours PRN Shortness of Breath and/or Wheezing  ALPRAZolam 0.5 milliGRAM(s) Oral every 6 hours PRN anxiety  dextrose Oral Gel 15 Gram(s) Oral once PRN Blood Glucose LESS THAN 70 milliGRAM(s)/deciliter  HYDROmorphone   Tablet 4 milliGRAM(s) Enteral Tube every 4 hours PRN Severe Pain (7 - 10)  HYDROmorphone  Injectable 0.5 milliGRAM(s) IV Push every 3 hours PRN Moderate Pain (4 - 6)    Pertinent Labs:   08/15 @ 09:12 - RBC 3.46; H/H 10.9/33.0; BUN 9; Cr <0.5; ; Mg 1.7    CAPILLARY BLOOD GLUCOSE:  POCT Blood Glucose.: 99 mg/dL (16 Aug 2022 07:36)  POCT Blood Glucose.: 129 mg/dL (15 Aug 2022 22:11)  POCT Blood Glucose.: 130 mg/dL (15 Aug 2022 16:39)  POCT Blood Glucose.: 137 mg/dL (15 Aug 2022 11:25)    Physical Findings:  - Appearance: AA&Ox4  - GI function: rounded, soft/nontender; last BM on  - 1x  - Tubes: PEG tube  - Oral/Mouth cavity: NPO with EN  - Skin: Intact  - Edema: generalized edema 1+ noted on      Nutrition Requirements:  Using ABW 86.6 KG & IBW 47.7 KG -- with consideration for COPD, weight loss    Estimated Energy Needs    Continue [x]  Adjust []  ENERGY: 5799-0021 kcal/day (MSJ 1.0-1.3 SF)    Estimated Protein Needs    Continue [x]  Adjust []  PROTEIN: 81-95 g/day (1.7-2.0 g/kg of IBW)    Estimated Fluid Needs        Continue [x]  Adjust []  FLUIDS: 1mL/1kcal     [x] Previous Nutrition Diagnosis: Inadequate Oral Intake              [x] Ongoing          [] Resolved    Nutrition Intervention: TF regimen      Indicator/Monitoring: diet order, weights, labs, NFPF, body composition, BM and tolerance to TF regimen    Recommendations:  1. Continue NPO with alternate means of nutrition/hydration via **Gastrostomy tube** per SLP recs  regimen: Glucerna 1.2 at 300mL Q6hrs, provides (1440kcal, 72gm protein, 966mL free H2O) + No Carb Prosource 2X/daily (+120kcal, +30 g pro --> total with EN 1560kcal, 102g pro) -- meeting over >81% of estimated energy needs  fluids per medical team  2. Continue with multivitamin, bowel regimen  3. Maintain all aspiration precautions    Pt is at low nutrition risk; will follow up within 7-10 days.    RD remains available: Beth Roldan [phone]

## 2022-08-16 NOTE — PROGRESS NOTE ADULT - SUBJECTIVE AND OBJECTIVE BOX
ENT DAILY PROGRESS NOTE    Pt is a 57y Female w/ hypoxia, currently with tracheostomy, ENT was called to assess for persistent laryngeal edema, failing SLP. Pt seen and examined at bedside with Dr Landaverde. No complaints today.       REVIEW OF SYSTEMS   [x] A ten-point review of systems was otherwise negative except as noted.    Allergies    penicillin (Swelling)    Intolerances    MEDICATIONS:  acetaminophen     Tablet .. 1000 milliGRAM(s) Oral every 8 hours  acetylcysteine 10%  Inhalation 4 milliLiter(s) Inhalation two times a day  acetylcysteine 20% for Nebulization - Peds 3 milliLiter(s) Nebulizer every 6 hours  ALBUTerol    0.083% 2.5 milliGRAM(s) Nebulizer every 6 hours  ALBUTerol    90 MICROgram(s) HFA Inhaler 2 Puff(s) Inhalation every 6 hours PRN  ALPRAZolam 0.5 milliGRAM(s) Oral every 6 hours PRN  ammonium lactate 12% Lotion 1 Application(s) Topical two times a day  chlorhexidine 0.12% Liquid 15 milliLiter(s) Oral Mucosa every 12 hours  clobetasol 0.05% Ointment 1 Application(s) Topical two times a day  dextrose 5%. 1000 milliLiter(s) IV Continuous <Continuous>  dextrose 5%. 1000 milliLiter(s) IV Continuous <Continuous>  dextrose 50% Injectable 25 Gram(s) IV Push once  dextrose 50% Injectable 12.5 Gram(s) IV Push once  dextrose 50% Injectable 25 Gram(s) IV Push once  dextrose Oral Gel 15 Gram(s) Oral once PRN  ergocalciferol 81293 Unit(s) Oral every week  gabapentin 600 milliGRAM(s) Oral three times a day  glucagon  Injectable 1 milliGRAM(s) IntraMuscular once  guaifenesin/dextromethorphan Oral Liquid 20 milliLiter(s) Oral every 6 hours  HYDROmorphone   Tablet 4 milliGRAM(s) Enteral Tube every 4 hours PRN  HYDROmorphone  Injectable 0.5 milliGRAM(s) IV Push every 3 hours PRN  insulin glargine Injectable (LANTUS) 20 Unit(s) SubCutaneous at bedtime  insulin lispro (ADMELOG) corrective regimen sliding scale   SubCutaneous three times a day before meals  insulin lispro Injectable (ADMELOG) 6 Unit(s) SubCutaneous three times a day before meals  ipratropium    for Nebulization 500 MICROGram(s) Nebulizer every 6 hours  losartan 100 milliGRAM(s) Oral daily  magnesium sulfate  IVPB 2 Gram(s) IV Intermittent once  magnesium sulfate  IVPB 2 Gram(s) IV Intermittent every 12 hours  multivitamin 1 Tablet(s) Oral daily  mupirocin 2% Ointment 1 Application(s) Topical two times a day  pantoprazole   Suspension 40 milliGRAM(s) Oral daily  polyethylene glycol 3350 17 Gram(s) Oral two times a day  senna 2 Tablet(s) Oral at bedtime      Vital Signs Last 24 Hrs  T(C): 36.7 (16 Aug 2022 14:27), Max: 36.7 (16 Aug 2022 14:27)  T(F): 98.1 (16 Aug 2022 14:27), Max: 98.1 (16 Aug 2022 14:27)  HR: 87 (16 Aug 2022 14:27) (87 - 104)  BP: 113/53 (16 Aug 2022 14:27) (113/53 - 142/61)  RR: 18 (16 Aug 2022 14:27) (18 - 18)  SpO2: 96% (16 Aug 2022 07:42) (96% - 97%)    Parameters below as of 16 Aug 2022 07:42  Patient On (Oxygen Delivery Method): tracheostomy collar    O2 Concentration (%): 40      08-15 @ 07:01  -  08-16 @ 07:00  --------------------------------------------------------  IN:    Enteral Tube Flush: 30 mL    Glucerna: 300 mL  Total IN: 330 mL    OUT:    Ureteral Catheter (mL): 1500 mL  Total OUT: 1500 mL    Total NET: -1170 mL      08-16 @ 07:01  -  08-16 @ 17:37  --------------------------------------------------------  IN:  Total IN: 0 mL    OUT:    Ureteral Catheter (mL): 700 mL  Total OUT: 700 mL    Total NET: -700 mL      PHYSICAL EXAM:    GEN: NAD, awake and alert. No drooling or pooling of secretions. No stridor or stertor. Good vocal quality with PMSV, no hoarseness.   SKIN: Good color, non diaphoretic  HEENT: Oral mucosa pink and moist. No erythema or edema noted to buccal mucosa, tongue, FOM, uvula or posterior oropharynx. Uvula midline  NECK:  Trachea midline. Neck supple, no TTP to B/L lateral neck, no cervical LAD. + 8CFN in place.   RESP: No dyspnea, non-labored breathing. No use of accessory muscles.  CARDIO: +S1/S2  ABDO: Soft, NT.  EXT: JONAS x 4    LABS:  CBC-                        11.6   9.83  )-----------( 139      ( 16 Aug 2022 11:31 )             34.3     BMP/CMP-  16 Aug 2022 11:31    141    |  100    |  8      ----------------------------<  85     4.6     |  28     |  <0.5     Ca    9.6        16 Aug 2022 11:31  Mg     2.0       16 Aug 2022 11:31    TPro  5.8    /  Alb  3.5    /  TBili  0.4    /  DBili  x      /  AST  17     /  ALT  21     /  AlkPhos  89     16 Aug 2022 11:31    Coagulation Studies-  PTT - ( 16 Aug 2022 16:38 )  PTT:38.3 sec  Endocrine Panel-  Calcium, Total Serum: 9.6 mg/dL (08-16 @ 11:31)      RADIOLOGY & ADDITIONAL STUDIES:    < from: CT Neck Soft Tissue w/ IV Cont (08.16.22 @ 09:07) >    ACC: [id number] EXAM:  CT NECK SOFT TISSUE IC                          PROCEDURE DATE:  08/16/2022          INTERPRETATION:  Clinical history: Laryngeal mass    Technique:  Multidetector axial CT images of the neck were obtained   following the intravenous administration of 100 cc of nonionic contrast.   Images were reformatted on multiple planes and reviewed in bone and   soft-tissue windows.    Comparison: None.    Findings:  Aerodigestive structures: The patient is status post tracheostomy   placement, satisfactory in position.    There are partially enhancing exophytic mucosal masses involving the   bilateral aryepiglottic folds and along the lateral glossoepiglottic   folds as well as in the laryngeal/lingual surfaces of the epiglottis. The   mass measures approximately 4.4 x 2 x 2.4 cm in maximal axial dimension   and results in severe airway stenosis at the level of the aryepiglottic   folds. The mass is superiorly contiguous with prominent lingual and   palatine tonsillar tissues (right more than left). There is questionable   thickening of the right lateral soft palate. There is partial effacement   of the preepiglottic fat.  The preepiglottic fat appears relatively   preserved.    Thyroid gland: Normal.  Parotid and submandibular glands:  Normal.    Lymph nodes: There is mildly enhancing right supraclavicular lymph node   measuring 1.1 cm.    Vascular structures: Normal.    Osseous structures: No fracture, dislocation or destructive lesion.    Paranasal sinuses: Mild inflammatory mucosal thickening the left   maxillary sinus.  Mastoid air cells:  Clear.    Partially visualized intracranial structures: Normal.    Partially visualized lung apices: Marked elevation of the left   hemidiaphragm with compressive atelectasis. There is opacification in the   partially visualized left right lower lobe. There is trace left pleural   effusion.      Impression:    Status post tracheostomy. 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 Partial effacement of   the preepiglottic fat.    Mildly enhancing 1.1 cm right supraclavicular lymph node, nonspecific.    Left hemidiaphragm elevation with compressive atelectasis in   opacification of the partial imaged left lower lobe which could be   infectiousor inflammatory. Small left pleural effusion. Further   evaluation with chest CT can be considered.    --- End of Report ---    LETICIA GABRIEL MD; Attending Radiologist  This document has been electronically signed. Aug 16 2022 12:39PM

## 2022-08-17 LAB
ALBUMIN SERPL ELPH-MCNC: 3.3 G/DL — LOW (ref 3.5–5.2)
ALP SERPL-CCNC: 95 U/L — SIGNIFICANT CHANGE UP (ref 30–115)
ALT FLD-CCNC: 18 U/L — SIGNIFICANT CHANGE UP (ref 0–41)
ANION GAP SERPL CALC-SCNC: 9 MMOL/L — SIGNIFICANT CHANGE UP (ref 7–14)
AST SERPL-CCNC: 15 U/L — SIGNIFICANT CHANGE UP (ref 0–41)
BILIRUB SERPL-MCNC: 0.3 MG/DL — SIGNIFICANT CHANGE UP (ref 0.2–1.2)
BUN SERPL-MCNC: 10 MG/DL — SIGNIFICANT CHANGE UP (ref 10–20)
CALCIUM SERPL-MCNC: 9.5 MG/DL — SIGNIFICANT CHANGE UP (ref 8.5–10.1)
CHLORIDE SERPL-SCNC: 96 MMOL/L — LOW (ref 98–110)
CO2 SERPL-SCNC: 30 MMOL/L — SIGNIFICANT CHANGE UP (ref 17–32)
CREAT SERPL-MCNC: 0.5 MG/DL — LOW (ref 0.7–1.5)
EGFR: 109 ML/MIN/1.73M2 — SIGNIFICANT CHANGE UP
FERRITIN SERPL-MCNC: 982 NG/ML — HIGH (ref 15–150)
FOLATE SERPL-MCNC: 9.7 NG/ML — SIGNIFICANT CHANGE UP
GLUCOSE BLDC GLUCOMTR-MCNC: 102 MG/DL — HIGH (ref 70–99)
GLUCOSE BLDC GLUCOMTR-MCNC: 116 MG/DL — HIGH (ref 70–99)
GLUCOSE BLDC GLUCOMTR-MCNC: 121 MG/DL — HIGH (ref 70–99)
GLUCOSE BLDC GLUCOMTR-MCNC: 133 MG/DL — HIGH (ref 70–99)
GLUCOSE SERPL-MCNC: 143 MG/DL — HIGH (ref 70–99)
HAPTOGLOB SERPL-MCNC: 126 MG/DL — SIGNIFICANT CHANGE UP (ref 34–200)
HCT VFR BLD CALC: 32 % — LOW (ref 37–47)
HGB BLD-MCNC: 10.8 G/DL — LOW (ref 12–16)
MAGNESIUM SERPL-MCNC: 2.4 MG/DL — SIGNIFICANT CHANGE UP (ref 1.8–2.4)
MCHC RBC-ENTMCNC: 32.2 PG — HIGH (ref 27–31)
MCHC RBC-ENTMCNC: 33.8 G/DL — SIGNIFICANT CHANGE UP (ref 32–37)
MCV RBC AUTO: 95.5 FL — SIGNIFICANT CHANGE UP (ref 81–99)
NRBC # BLD: 0 /100 WBCS — SIGNIFICANT CHANGE UP (ref 0–0)
PLATELET # BLD AUTO: 136 K/UL — SIGNIFICANT CHANGE UP (ref 130–400)
POTASSIUM SERPL-MCNC: 4.1 MMOL/L — SIGNIFICANT CHANGE UP (ref 3.5–5)
POTASSIUM SERPL-SCNC: 4.1 MMOL/L — SIGNIFICANT CHANGE UP (ref 3.5–5)
PROT SERPL-MCNC: 5.4 G/DL — LOW (ref 6–8)
RBC # BLD: 3.35 M/UL — LOW (ref 4.2–5.4)
RBC # FLD: 12.3 % — SIGNIFICANT CHANGE UP (ref 11.5–14.5)
SARS-COV-2 RNA SPEC QL NAA+PROBE: SIGNIFICANT CHANGE UP
SODIUM SERPL-SCNC: 135 MMOL/L — SIGNIFICANT CHANGE UP (ref 135–146)
WBC # BLD: 8.87 K/UL — SIGNIFICANT CHANGE UP (ref 4.8–10.8)
WBC # FLD AUTO: 8.87 K/UL — SIGNIFICANT CHANGE UP (ref 4.8–10.8)

## 2022-08-17 PROCEDURE — 99233 SBSQ HOSP IP/OBS HIGH 50: CPT

## 2022-08-17 PROCEDURE — 93010 ELECTROCARDIOGRAM REPORT: CPT

## 2022-08-17 RX ADMIN — Medication 500 MICROGRAM(S): at 15:13

## 2022-08-17 RX ADMIN — MUPIROCIN 1 APPLICATION(S): 20 OINTMENT TOPICAL at 06:06

## 2022-08-17 RX ADMIN — Medication 20 MILLILITER(S): at 00:18

## 2022-08-17 RX ADMIN — SENNA PLUS 2 TABLET(S): 8.6 TABLET ORAL at 21:36

## 2022-08-17 RX ADMIN — Medication 1000 MILLIGRAM(S): at 13:19

## 2022-08-17 RX ADMIN — ALBUTEROL 2.5 MILLIGRAM(S): 90 AEROSOL, METERED ORAL at 21:29

## 2022-08-17 RX ADMIN — Medication 1000 MILLIGRAM(S): at 05:54

## 2022-08-17 RX ADMIN — ALBUTEROL 2.5 MILLIGRAM(S): 90 AEROSOL, METERED ORAL at 08:37

## 2022-08-17 RX ADMIN — Medication 25 GRAM(S): at 16:52

## 2022-08-17 RX ADMIN — Medication 25 GRAM(S): at 06:05

## 2022-08-17 RX ADMIN — Medication 1000 MILLIGRAM(S): at 21:35

## 2022-08-17 RX ADMIN — Medication 6 UNIT(S): at 12:41

## 2022-08-17 RX ADMIN — POLYETHYLENE GLYCOL 3350 17 GRAM(S): 17 POWDER, FOR SOLUTION ORAL at 16:54

## 2022-08-17 RX ADMIN — Medication 0.5 MILLIGRAM(S): at 20:21

## 2022-08-17 RX ADMIN — HYDROMORPHONE HYDROCHLORIDE 0.5 MILLIGRAM(S): 2 INJECTION INTRAMUSCULAR; INTRAVENOUS; SUBCUTANEOUS at 00:35

## 2022-08-17 RX ADMIN — PANTOPRAZOLE SODIUM 40 MILLIGRAM(S): 20 TABLET, DELAYED RELEASE ORAL at 11:06

## 2022-08-17 RX ADMIN — Medication 1 APPLICATION(S): at 17:12

## 2022-08-17 RX ADMIN — HYDROMORPHONE HYDROCHLORIDE 0.5 MILLIGRAM(S): 2 INJECTION INTRAMUSCULAR; INTRAVENOUS; SUBCUTANEOUS at 23:22

## 2022-08-17 RX ADMIN — POLYETHYLENE GLYCOL 3350 17 GRAM(S): 17 POWDER, FOR SOLUTION ORAL at 05:54

## 2022-08-17 RX ADMIN — HYDROMORPHONE HYDROCHLORIDE 4 MILLIGRAM(S): 2 INJECTION INTRAMUSCULAR; INTRAVENOUS; SUBCUTANEOUS at 12:21

## 2022-08-17 RX ADMIN — HYDROMORPHONE HYDROCHLORIDE 0.5 MILLIGRAM(S): 2 INJECTION INTRAMUSCULAR; INTRAVENOUS; SUBCUTANEOUS at 20:22

## 2022-08-17 RX ADMIN — Medication 20 MILLILITER(S): at 23:21

## 2022-08-17 RX ADMIN — Medication 20 MILLILITER(S): at 17:12

## 2022-08-17 RX ADMIN — HYDROMORPHONE HYDROCHLORIDE 0.5 MILLIGRAM(S): 2 INJECTION INTRAMUSCULAR; INTRAVENOUS; SUBCUTANEOUS at 01:10

## 2022-08-17 RX ADMIN — Medication 1 APPLICATION(S): at 06:39

## 2022-08-17 RX ADMIN — HYDROMORPHONE HYDROCHLORIDE 4 MILLIGRAM(S): 2 INJECTION INTRAMUSCULAR; INTRAVENOUS; SUBCUTANEOUS at 06:23

## 2022-08-17 RX ADMIN — Medication 1000 MILLIGRAM(S): at 22:33

## 2022-08-17 RX ADMIN — Medication 500 MICROGRAM(S): at 08:33

## 2022-08-17 RX ADMIN — Medication 1000 MILLIGRAM(S): at 07:07

## 2022-08-17 RX ADMIN — HYDROMORPHONE HYDROCHLORIDE 0.5 MILLIGRAM(S): 2 INJECTION INTRAMUSCULAR; INTRAVENOUS; SUBCUTANEOUS at 07:39

## 2022-08-17 RX ADMIN — Medication 1 APPLICATION(S): at 07:08

## 2022-08-17 RX ADMIN — HYDROMORPHONE HYDROCHLORIDE 4 MILLIGRAM(S): 2 INJECTION INTRAMUSCULAR; INTRAVENOUS; SUBCUTANEOUS at 22:33

## 2022-08-17 RX ADMIN — LOSARTAN POTASSIUM 100 MILLIGRAM(S): 100 TABLET, FILM COATED ORAL at 06:03

## 2022-08-17 RX ADMIN — HYDROMORPHONE HYDROCHLORIDE 0.5 MILLIGRAM(S): 2 INJECTION INTRAMUSCULAR; INTRAVENOUS; SUBCUTANEOUS at 04:35

## 2022-08-17 RX ADMIN — Medication 20 MILLILITER(S): at 05:53

## 2022-08-17 RX ADMIN — GABAPENTIN 600 MILLIGRAM(S): 400 CAPSULE ORAL at 05:54

## 2022-08-17 RX ADMIN — HYDROMORPHONE HYDROCHLORIDE 0.5 MILLIGRAM(S): 2 INJECTION INTRAMUSCULAR; INTRAVENOUS; SUBCUTANEOUS at 17:25

## 2022-08-17 RX ADMIN — Medication 3 MILLILITER(S): at 21:33

## 2022-08-17 RX ADMIN — HYDROMORPHONE HYDROCHLORIDE 4 MILLIGRAM(S): 2 INJECTION INTRAMUSCULAR; INTRAVENOUS; SUBCUTANEOUS at 01:27

## 2022-08-17 RX ADMIN — GABAPENTIN 600 MILLIGRAM(S): 400 CAPSULE ORAL at 21:35

## 2022-08-17 RX ADMIN — Medication 1 TABLET(S): at 11:06

## 2022-08-17 RX ADMIN — HYDROMORPHONE HYDROCHLORIDE 4 MILLIGRAM(S): 2 INJECTION INTRAMUSCULAR; INTRAVENOUS; SUBCUTANEOUS at 05:53

## 2022-08-17 RX ADMIN — CHLORHEXIDINE GLUCONATE 15 MILLILITER(S): 213 SOLUTION TOPICAL at 16:51

## 2022-08-17 RX ADMIN — GABAPENTIN 600 MILLIGRAM(S): 400 CAPSULE ORAL at 13:18

## 2022-08-17 RX ADMIN — CHLORHEXIDINE GLUCONATE 15 MILLILITER(S): 213 SOLUTION TOPICAL at 05:53

## 2022-08-17 RX ADMIN — HYDROMORPHONE HYDROCHLORIDE 4 MILLIGRAM(S): 2 INJECTION INTRAMUSCULAR; INTRAVENOUS; SUBCUTANEOUS at 21:37

## 2022-08-17 RX ADMIN — INSULIN GLARGINE 20 UNIT(S): 100 INJECTION, SOLUTION SUBCUTANEOUS at 21:36

## 2022-08-17 RX ADMIN — HYDROMORPHONE HYDROCHLORIDE 0.5 MILLIGRAM(S): 2 INJECTION INTRAMUSCULAR; INTRAVENOUS; SUBCUTANEOUS at 21:39

## 2022-08-17 RX ADMIN — HYDROMORPHONE HYDROCHLORIDE 4 MILLIGRAM(S): 2 INJECTION INTRAMUSCULAR; INTRAVENOUS; SUBCUTANEOUS at 16:50

## 2022-08-17 RX ADMIN — Medication 0.5 MILLIGRAM(S): at 07:40

## 2022-08-17 RX ADMIN — Medication 20 MILLILITER(S): at 11:05

## 2022-08-17 RX ADMIN — Medication 6 UNIT(S): at 08:16

## 2022-08-17 RX ADMIN — HYDROMORPHONE HYDROCHLORIDE 0.5 MILLIGRAM(S): 2 INJECTION INTRAMUSCULAR; INTRAVENOUS; SUBCUTANEOUS at 04:05

## 2022-08-17 RX ADMIN — HYDROMORPHONE HYDROCHLORIDE 0.5 MILLIGRAM(S): 2 INJECTION INTRAMUSCULAR; INTRAVENOUS; SUBCUTANEOUS at 11:05

## 2022-08-17 RX ADMIN — HYDROMORPHONE HYDROCHLORIDE 0.5 MILLIGRAM(S): 2 INJECTION INTRAMUSCULAR; INTRAVENOUS; SUBCUTANEOUS at 14:24

## 2022-08-17 RX ADMIN — MUPIROCIN 1 APPLICATION(S): 20 OINTMENT TOPICAL at 16:51

## 2022-08-17 RX ADMIN — ALBUTEROL 2.5 MILLIGRAM(S): 90 AEROSOL, METERED ORAL at 15:13

## 2022-08-17 RX ADMIN — Medication 500 MICROGRAM(S): at 21:28

## 2022-08-17 NOTE — PROGRESS NOTE ADULT - ASSESSMENT
57 year old female with a past medical history of IDDM, GERD, HTN, COPD, Chronic hypoxemic and hypercapnic respiratory failure SP Tracheostomy (not chronically vented) & PEG at [facility], DVT on Eliquis, Anxiety, CHF, Recurrent mucous plugs SP bronchoscopies, [facility] chest tightness and shortness of breath over the past few days. Was found to have aspiration pneumonia causing COPD exacerbation    Acute on chronic Hypoxic respiratory failure   #s/p trach/PEG at [facility] 04/2022 after being intubated for >1month  #COPD Exacerbation  #Suspected superimposed bacterial PNA  - Completed a course of doxycycline WBC wnl, vitals wnl,  - Taper steroids- c/w prednisone 40 OD cont Nebs q4h & prn   - CXR reviewed and shows unchanged interstitial opacities  - Switch to fenestrated uncuffed trach, if tolerated consider downsize as per pulm recs.   - Suction Q8h  - Inhaled NAC and PO Mucinex started    #Dysphagia  - extensive pharyngeal edema found on modified speech and swallow. ct reccomended, ENt consulted  - Laryngeal mass found on CT neck  - Biopsy to be performed by ENT on 8/19  - AC held      #Chronic HFpEF  2d Echo 05/2022: LVEF 50-55%. proBNP ~ 444  Pt does not appear volume overloaded  - Low Na diet and daily weight.   - monitor I & O    #Hx of DVT   - Cont Eliquis  - Venous duplex shows no DVT    #DM2  - Cont lantus/lispro 20-6-6-6  - ISS    #Chronic Lower extremity Pain management and Hyperpigmentation   #Chronic atropic B/l LE Hyperpigmentation changes  - Plan was for outpatient burn and dermatology f/u  - Start topical clobetasone ointment  - Gabapentin 600 TID,   - PO dilaudid 4mg Q4 PRN  -Vascular consulted for possible PVD.   -Arterial doppler - No vascular disease  -PT/OT as tolerated  - Stitches from biopsy removed    #Hypomagnesia  -Repleted    Dispo: Back To [facility] when stable.

## 2022-08-17 NOTE — PROGRESS NOTE ADULT - SUBJECTIVE AND OBJECTIVE BOX
Medicine Progress Note    Patient is a 57y old  Female who presents with a chief complaint of Hypoxia (17 Aug 2022 13:06)      SUBJECTIVE / OVERNIGHT EVENTS:    ADDITIONAL REVIEW OF SYSTEMS:    MEDICATIONS  (STANDING):  acetaminophen     Tablet .. 1000 milliGRAM(s) Oral every 8 hours  acetylcysteine 10%  Inhalation 4 milliLiter(s) Inhalation two times a day  acetylcysteine 20% for Nebulization - Peds 3 milliLiter(s) Nebulizer every 6 hours  ALBUTerol    0.083% 2.5 milliGRAM(s) Nebulizer every 6 hours  ammonium lactate 12% Lotion 1 Application(s) Topical two times a day  chlorhexidine 0.12% Liquid 15 milliLiter(s) Oral Mucosa every 12 hours  clobetasol 0.05% Ointment 1 Application(s) Topical two times a day  dextrose 5%. 1000 milliLiter(s) (100 mL/Hr) IV Continuous <Continuous>  dextrose 5%. 1000 milliLiter(s) (50 mL/Hr) IV Continuous <Continuous>  dextrose 50% Injectable 25 Gram(s) IV Push once  dextrose 50% Injectable 12.5 Gram(s) IV Push once  dextrose 50% Injectable 25 Gram(s) IV Push once  ergocalciferol 27822 Unit(s) Oral every week  gabapentin 600 milliGRAM(s) Oral three times a day  glucagon  Injectable 1 milliGRAM(s) IntraMuscular once  guaifenesin/dextromethorphan Oral Liquid 20 milliLiter(s) Oral every 6 hours  insulin glargine Injectable (LANTUS) 20 Unit(s) SubCutaneous at bedtime  insulin lispro (ADMELOG) corrective regimen sliding scale   SubCutaneous three times a day before meals  insulin lispro Injectable (ADMELOG) 6 Unit(s) SubCutaneous three times a day before meals  ipratropium    for Nebulization 500 MICROGram(s) Nebulizer every 6 hours  losartan 100 milliGRAM(s) Oral daily  magnesium sulfate  IVPB 2 Gram(s) IV Intermittent once  magnesium sulfate  IVPB 2 Gram(s) IV Intermittent every 12 hours  multivitamin 1 Tablet(s) Oral daily  mupirocin 2% Ointment 1 Application(s) Topical two times a day  pantoprazole   Suspension 40 milliGRAM(s) Oral daily  polyethylene glycol 3350 17 Gram(s) Oral two times a day  senna 2 Tablet(s) Oral at bedtime    MEDICATIONS  (PRN):  ALBUTerol    90 MICROgram(s) HFA Inhaler 2 Puff(s) Inhalation every 6 hours PRN Shortness of Breath and/or Wheezing  ALPRAZolam 0.5 milliGRAM(s) Oral every 6 hours PRN anxiety  dextrose Oral Gel 15 Gram(s) Oral once PRN Blood Glucose LESS THAN 70 milliGRAM(s)/deciliter  HYDROmorphone   Tablet 4 milliGRAM(s) Enteral Tube every 4 hours PRN Severe Pain (7 - 10)  HYDROmorphone  Injectable 0.5 milliGRAM(s) IV Push every 3 hours PRN Moderate Pain (4 - 6)    CAPILLARY BLOOD GLUCOSE      POCT Blood Glucose.: 102 mg/dL (17 Aug 2022 16:38)  POCT Blood Glucose.: 116 mg/dL (17 Aug 2022 12:10)  POCT Blood Glucose.: 133 mg/dL (17 Aug 2022 07:48)  POCT Blood Glucose.: 112 mg/dL (16 Aug 2022 21:47)    I&O's Summary    16 Aug 2022 07:01  -  17 Aug 2022 07:00  --------------------------------------------------------  IN: 0 mL / OUT: 700 mL / NET: -700 mL    17 Aug 2022 07:01  -  17 Aug 2022 20:07  --------------------------------------------------------  IN: 0 mL / OUT: 900 mL / NET: -900 mL        PHYSICAL EXAM:  Vital Signs Last 24 Hrs  T(C): 36.4 (17 Aug 2022 20:03), Max: 37.3 (17 Aug 2022 05:00)  T(F): 97.5 (17 Aug 2022 20:03), Max: 99.2 (17 Aug 2022 05:00)  HR: 86 (17 Aug 2022 20:03) (74 - 86)  BP: 126/59 (17 Aug 2022 20:03) (97/57 - 126/59)  BP(mean): --  RR: 18 (17 Aug 2022 20:03) (18 - 20)  SpO2: 96% (17 Aug 2022 19:57) (96% - 96%)    Parameters below as of 17 Aug 2022 19:57  Patient On (Oxygen Delivery Method): tracheostomy collar    O2 Concentration (%): 40    GEN: NAD, awake and alert. No drooling or pooling of secretions  SKIN: Good color, non diaphoretic.  HEENT: Oral mucosa pink and moist  NECK: Trachea midline, +6 CFN in place, on t-piece.   RESP: +6 CFN in place, on t-piece.   CARDIO: +S1/S2  ABDO: Soft, NT; +Gtube  EXT: JONAS x 4    LABS:                        10.8   8.87  )-----------( 136      ( 17 Aug 2022 04:30 )             32.0     08-17    135  |  96<L>  |  10  ----------------------------<  143<H>  4.1   |  30  |  0.5<L>    Ca    9.5      17 Aug 2022 04:30  Mg     2.4     08-17    TPro  5.4<L>  /  Alb  3.3<L>  /  TBili  0.3  /  DBili  x   /  AST  15  /  ALT  18  /  AlkPhos  95  08-17    PTT - ( 16 Aug 2022 16:38 )  PTT:38.3 sec          COVID-19 PCR: NotDetec (17 Aug 2022 10:00)  SARS-CoV-2: NotDetec (03 Aug 2022 07:14)  COVID-19 PCR: NotDetec (21 Jul 2022 05:50)  COVID-19 PCR: NotDetec (15 Jul 2022 02:25)  COVID-19 PCR: NotDetec (05 Jul 2022 11:50)  COVID-19 PCR: NotDetec (23 Jun 2022 15:36)  COVID-19 PCR: NotDetec (22 Jun 2022 07:11)  COVID-19 PCR: NotDetec (14 Jun 2022 11:49)  COVID-19 PCR: NotDetec (31 May 2022 17:00)  SARS-CoV-2: NotDetec (27 May 2022 10:30)      RADIOLOGY & ADDITIONAL TESTS:  Imaging from Last 24 Hours:    Electrocardiogram/QTc Interval:    COORDINATION OF CARE:  Care Discussed with Consultants/Other Providers:

## 2022-08-17 NOTE — PROGRESS NOTE ADULT - ASSESSMENT
Unfortunate 56 YO F with acute on chr hypoxic RF read for hypoxia and hypotension and ongoing Bacterial PNA. Hx of ASP PNA, COPD exacerbation and intubation with failure to extubate resulting in trach at [facility] .  The pt is under chronic  at [facility].      Acute on Chr hypoxic RF, mucus plugging  Chronic Tracheostomy  Bacterial PNA  Clinical Debility  Bedbound state  Lower extremity venous insufficiency  BL foot drop, limb weakness, prolonged immobility  Hx of HTN, ASHD HFpEF  Hx of DLD  Hx of COPD, MO, LUCIANA, sp intubation, failure to extubate, chr trach since /[facility]  Hx of DM II  Hx of Ch anemia of ch dis  Hx of GERD, HH, Diverticulosis, Ch constipation  Hx of OA, DJD, DDD, chr pain syn  Hx of lower ext venous stasis dermatitis  Hx of DVT, AC/Eliquis  Hx of Anxiety, Depression         pt stable, A&O, verbal, anxious over upcoming direct laryngoscopy and possible neoplasm  pt on trach collar today 40 %, 98 % pulse Ox     ENT soft tissue growth, for OR , direct laryngoscopy and possible bronchoscopy  Eliquis on hold  CT of soft tissue of the neck shows  exp[hytic ex[ansile mucosal mass involving the BL aeyepiglottic folds/? supraglottic laryngeal neoplasm  Pul-critical care recall for pul OR clearance, ? pul for the bronchoscopy    Surgery attempted to replace trach to fenestrated tube, the outer tube is fenestrated, the inner is not as suction tube caught in the  fenestrations ( fenestrated inner tube may be placed when pt req less suctioning)  nutrition via PEG for now, allow sm amts of ice chips    cont S&S therapy    low vit D start to replete 50,000 q wk  Venous doppler neg for DVT  AA doppler shows normal blood flow  Vasc consult     ECHO    cont all previous meds  GI prophylaxis  att to bowel regimen  nutrition via PEG/glucerna, add 2 packets of protein per day to feeds  change PEG dressing daily    PM: hydromorphone 4mg po q 4 and 1mg IV q 8 for breakthrough pain, gabapentin 600mg q 8, tylenol 650mg QID    Rehab consult for bedside PT, can we get pt OOB to chair, rehab states pt high risk for fall but bedside and  OOB to chair daily, pt highly motivated  BL heel off loading boots, pt with BL foot drop    pt full code  Social SVC pt in cont active care with new fx on CT of neck

## 2022-08-17 NOTE — PROGRESS NOTE ADULT - ASSESSMENT
IMPRESSION:    Acute on Chronic Hypoxemic Respiratory Failure improving   Chronic Hypercapnic Respiratory Failure  Left sided compression atelectasis  SP Tracheostomy and PEG ( not chronically vented )  COPD exacerbation improving   UTI - finished anti biotics  HO recurrent mucous plugs SP multiple bronchoscopies  HO DVT on Eliquis  HFpEF  Bedbound  Former smoker >40 PYHx    PLAN:    CT reviewed. 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 Left sided compression atelectasis.  pt booked for the OR for Friday, 8/19, for a direct laryngoscopy, biopsy, flex esophagoscopy, possible bronchoscopy. Pt is in her best possible pulm condition. Not actively wheezing, requiring less frequent suctioning. Finished prednisone course.    No contraindications for procedure from pulm standpoint.   Continue pulmonary toilet.  Nebs with Atrovent and Albuterol  q4h & PRN.  Wean FiO2 to 30%  Keep sat between 88%-92%  HOB @ 45 degrees.  Aspiration precautions.       IMPRESSION:    Acute on Chronic Hypoxemic Respiratory Failure improving   Chronic Hypercapnic Respiratory Failure  Left sided compression atelectasis  SP Tracheostomy and PEG ( not chronically vented )  COPD exacerbation improving   UTI - finished anti biotics  HO recurrent mucous plugs SP multiple bronchoscopies  HO DVT on Eliquis  HFpEF  Bedbound  Former smoker >40 PYHx    PLAN:    CT reviewed. 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 Left sided compression atelectasis.  pt booked for the OR for Friday, 8/19, for a direct laryngoscopy, biopsy, flex esophagoscopy, possible bronchoscopy. Pt is in her best possible pulm condition. Not actively wheezing, requiring less frequent suctioning. Finished prednisone course.    No contraindications for procedure from pulm standpoint. General perioperative pulmonary care.  Continue pulmonary toilet.  Nebs with Atrovent and Albuterol  q4h & PRN.  Wean FiO2 to 30%  Keep sat between 88%-92%  HOB @ 45 degrees.  Aspiration precautions.  Was on Eliquis for Hx of DVT (recent Va duplex negative) held for procedure as per ENT. SCD for DVT prophylaxis.      IMPRESSION:    Acute on Chronic Hypoxemic Respiratory Failure improving   Chronic Hypercapnic Respiratory Failure  Left sided compression atelectasis  SP Tracheostomy and PEG ( not chronically vented )  COPD exacerbation improving   UTI - finished anti biotics  HO recurrent mucous plugs SP multiple bronchoscopies  HO DVT on Eliquis  HFpEF  Bedbound  Former smoker >40 PYHx  New finding of a supraglottic mass     PLAN:    CT reviewed. 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 Left sided compression atelectasis/consolidation.  pt booked for the OR for Friday, 8/19, for a direct laryngoscopy, biopsy, flex esophagoscopy, possible bronchoscopy. Pt is in her best possible pulm condition. Not actively wheezing, requiring less frequent suctioning. Finished prednisone course. She will be on the ventilator while sedated.   No contraindications for procedure from pulm standpoint. General perioperative pulmonary care.  Continue pulmonary toilet.  Nebs with Atrovent and Albuterol  q4h & PRN.  Wean FiO2 to 30%  Keep sat between 88%-92%  HOB @ 45 degrees.  Aspiration precautions.  Was on Eliquis for Hx of DVT (recent Va duplex negative) held for procedure as per ENT. SCD for DVT prophylaxis.      IMPRESSION:    Acute on Chronic Hypoxemic Respiratory Failure improving   Chronic Hypercapnic Respiratory Failure  Left sided compression atelectasis  SP Tracheostomy and PEG ( not chronically vented )  COPD exacerbation improving   UTI - finished anti biotics  HO recurrent mucous plugs SP multiple bronchoscopies  HO DVT on Eliquis  HFpEF  Bedbound  Former smoker >40 PYHx  New finding of a supraglottic mass     PLAN:    CT reviewed. 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 Left sided compression atelectasis/consolidation.   ENT following with plan for intervention/scope on Friday.    From a pulmonary standpoint she is stable on 40% FIo2. She has no shortness of breath.   No contraindications for procedure from pulm standpoint. General perioperative pulmonary care.   Continue pulmonary toilet.  Nebs with Atrovent and Albuterol  q4h & PRN.  Wean FiO2 to keep O2 saturation more than 92%.   HOB @ 45 degrees.  Aspiration precautions.  Was on Eliquis for Hx of DVT (recent Va duplex negative) held for procedure as per ENT. SCD for DVT prophylaxis.   We'll follow as needed.

## 2022-08-17 NOTE — PROGRESS NOTE ADULT - SUBJECTIVE AND OBJECTIVE BOX
CRUZ DUMONT 58yo  Female sent to the ER from [facility] where she is on  care for c/o inc SOB, chest tightness, diff breathing, wheezing i.e. acute on chr hypoxic RF.  Of note the pt has a trach and has had freq readmissions for mucous plugging, RF and has had several bronchoscopies.   EMS noted the pt to be hypotensive and hypoxic.  The pt was vigorously suctioned, received Neb Tx, IV steroids,  BP revived with IV fluids.  The pt was cultured and placed on ABx.  She is v well known to the Pul SVc and to ID.  The pt is ad with acute on ch hypoxic RF and ongoing Bacterial PNA.  The PMHx includes:  HTN, ASHD, HFpEF, DLD, DM II, Chronic RF, COPD, LUCIANA, ASp PNA, sp intubation, failure to extubate, chronic trach ([facility] 4/22), recurrent mucus plugging of airways, SDVT, AC with Eliquis, lower ext venous stasis dermatitis, bedbound state, GERD, diverticulosis, sp PEG, OA, DDD, DJD, chronic pain syn, depression anxiety.    INTERVAL HPI/OVERNIGHT EVENTS: pt stable on trach collar 40%, st at 96%, working with S&S and ENT, CT of soft tissue of the neck shows expansile mass in the supraglottic laryngeal area, for OR on 8/19 with EBNT for direct laryngoscopy and possible bronchoscopy, hold Eliquis, recall Pul for clearance     MEDICATIONS  (STANDING):  acetaminophen     Tablet .. 650 milliGRAM(s) Oral every 6 hours  acetylcysteine 20% for Nebulization - Peds 3 milliLiter(s) Nebulizer every 6 hours  ALBUTerol    0.083% 2.5 milliGRAM(s) Nebulizer every 6 hours  ammonium lactate 12% Lotion 1 Application(s) Topical two times a day  apixaban 5 milliGRAM(s) Enteral Tube two times a day  chlorhexidine 0.12% Liquid 15 milliLiter(s) Oral Mucosa every 12 hours  dextrose 5%. 1000 milliLiter(s) (100 mL/Hr) IV Continuous <Continuous>  dextrose 5%. 1000 milliLiter(s) (50 mL/Hr) IV Continuous <Continuous>  dextrose 50% Injectable 25 Gram(s) IV Push once  dextrose 50% Injectable 12.5 Gram(s) IV Push once  dextrose 50% Injectable 25 Gram(s) IV Push once  doxycycline monohydrate Capsule 100 milliGRAM(s) Oral every 12 hours  gabapentin 600 milliGRAM(s) Oral three times a day  glucagon  Injectable 1 milliGRAM(s) IntraMuscular once  insulin glargine Injectable (LANTUS) 20 Unit(s) SubCutaneous at bedtime  insulin lispro (ADMELOG) corrective regimen sliding scale   SubCutaneous three times a day before meals  insulin lispro Injectable (ADMELOG) 6 Unit(s) SubCutaneous three times a day before meals  methylPREDNISolone sodium succinate Injectable 60 milliGRAM(s) IV Push two times a day  multivitamin 1 Tablet(s) Oral daily  pantoprazole   Suspension 40 milliGRAM(s) Oral daily  polyethylene glycol 3350 17 Gram(s) Oral two times a day  senna 2 Tablet(s) Oral at bedtime    MEDICATIONS  (PRN):  ALBUTerol    90 MICROgram(s) HFA Inhaler 2 Puff(s) Inhalation every 6 hours PRN Shortness of Breath and/or Wheezing  ALPRAZolam 0.5 milliGRAM(s) Oral every 6 hours PRN anxiety  dextrose Oral Gel 15 Gram(s) Oral once PRN Blood Glucose LESS THAN 70 milliGRAM(s)/deciliter  HYDROmorphone   Tablet 4 milliGRAM(s) Oral every 4 hours PRN Severe Pain (7 - 10)  HYDROmorphone  Injectable 1 milliGRAM(s) IV Push every 8 hours PRN Moderate Pain (4 - 6)      Allergies    penicillin (Swelling)      Vital Signs Last 24 Hrs    T(F): 99.2  HR:  89  BP:  122/59    RR: 20  SpO2:  96%, trach collar 40%    PHYSICAL EXAM:      Constitutional: pt alert, oriented  verbal,  bedbound chr ill looking but in NAD on trach collar    Eyes: nonicteric    ENMT: dry oral mucosa, dental defects,     Neck: + trach tube supple, no stridor, + trach, , + T tube    Respiratory: shallow resp, diminished harsh bronchial  BS, scattered rhonchi, no wheezing    Cardiovascular: S1S2 reg    Gastrointestinal: globular, soft and benign, + PEG, + BS    Genitourinary:    Extremities: moves all ext, dec motor strength of lower ext, + BL mm atrophy, + BL foot drop    Vascular: dec pedal pulses    Neurological: nonfocal    Skin: lower ext dark almost black hyperpigmentation of lower ext    Lymph Nodes: not enlarged    Musculoskeletal: dec mm mass and tone    Psychiatric: anxious, depressed but stable        LABS:                10.8  8.8)-----------( 136            32      135  |  96  |  10  ----------------------------<  143  4.1   |  30  |  0.5    , 115, 123, 109  Ca    9.7       Mg     1.9, 1.8, 2.0, 1.6, 1.7, 1.8  trop <0.01  Vit D 18  folate 9.7  Haptos 126  ferritin 982  TPro  5.9, 5.5, 5.4  /  Alb  3.3, 3.4, 3.3  /  TBili  0.3  /  DBili  x   /  AST  20  /  ALT  28  /  AlkPhos  77  08-05    RADIOLOGY & ADDITIONAL TESTS:    EKG:  NSR R axis, low voltage, nonspecific ST-T changes  CXR:  interstitial prominence, L pl based opacity unchanged  AA doppler:  normal arterial blood flow

## 2022-08-17 NOTE — PROGRESS NOTE ADULT - ATTENDING COMMENTS
IMPRESSION:    Acute on Chronic Hypoxemic Respiratory Failure improving   Chronic Hypercapnic Respiratory Failure  Left sided compression atelectasis  SP Tracheostomy and PEG ( not chronically vented )  COPD exacerbation improving   UTI - finished anti biotics  HO recurrent mucous plugs SP multiple bronchoscopies  HO DVT on Eliquis  HFpEF  Bedbound  Former smoker >40 PYHx    Impression and plan are my own.

## 2022-08-17 NOTE — PROGRESS NOTE ADULT - SUBJECTIVE AND OBJECTIVE BOX
Patient is a 57y old  Female who presents with a chief complaint of Hypoxia, acute on ch RF (17 Aug 2022 11:34)        SUBJECTIVE:    On trach.  40% Fio2    REVIEW OF SYSTEMS:    All Pertinent ROS are negative except per HPI       PHYSICAL EXAM  Vital Signs Last 24 Hrs  T(C): 36 (17 Aug 2022 12:50), Max: 37.3 (17 Aug 2022 05:00)  T(F): 96.8 (17 Aug 2022 12:50), Max: 99.2 (17 Aug 2022 05:00)  HR: 74 (17 Aug 2022 12:50) (74 - 92)  BP: 97/57 (17 Aug 2022 12:50) (97/57 - 122/59)  BP(mean): --  RR: 20 (17 Aug 2022 12:50) (18 - 20)  SpO2: --        CONSTITUTIONAL:  NAD    ENT:   Airway patent,   No thrush    CARDIAC:   Normal rate,   regular rhythm.      RESPIRATORY:   b/l crackles  normal chest expansion  not tachypneic,  No use of accessory muscles    GASTROINTESTINAL:  Abdomen soft,   non-tender,   no guarding,   + BS      NEUROLOGICAL:   Alert and oriented   bed bound        08-16-22 @ 07:01  -  08-17-22 @ 07:00  --------------------------------------------------------  IN:  Total IN: 0 mL    OUT:    Ureteral Catheter (mL): 700 mL  Total OUT: 700 mL    Total NET: -700 mL      08-17-22 @ 07:01  -  08-17-22 @ 13:23  --------------------------------------------------------  IN:  Total IN: 0 mL    OUT:    Ureteral Catheter (mL): 900 mL  Total OUT: 900 mL    Total NET: -900 mL          LABS:                          10.8   8.87  )-----------( 136      ( 17 Aug 2022 04:30 )             32.0                                               08-17    135  |  96<L>  |  10  ----------------------------<  143<H>  4.1   |  30  |  0.5<L>    Ca    9.5      17 Aug 2022 04:30  Mg     2.4     08-17    TPro  5.4<L>  /  Alb  3.3<L>  /  TBili  0.3  /  DBili  x   /  AST  15  /  ALT  18  /  AlkPhos  95  08-17      PTT - ( 16 Aug 2022 16:38 )  PTT:38.3 sec                                                                                     LIVER FUNCTIONS - ( 17 Aug 2022 04:30 )  Alb: 3.3 g/dL / Pro: 5.4 g/dL / ALK PHOS: 95 U/L / ALT: 18 U/L / AST: 15 U/L / GGT: x                                                                                                MEDICATIONS  (STANDING):  acetaminophen     Tablet .. 1000 milliGRAM(s) Oral every 8 hours  acetylcysteine 10%  Inhalation 4 milliLiter(s) Inhalation two times a day  acetylcysteine 20% for Nebulization - Peds 3 milliLiter(s) Nebulizer every 6 hours  ALBUTerol    0.083% 2.5 milliGRAM(s) Nebulizer every 6 hours  ammonium lactate 12% Lotion 1 Application(s) Topical two times a day  chlorhexidine 0.12% Liquid 15 milliLiter(s) Oral Mucosa every 12 hours  clobetasol 0.05% Ointment 1 Application(s) Topical two times a day  dextrose 5%. 1000 milliLiter(s) (50 mL/Hr) IV Continuous <Continuous>  dextrose 5%. 1000 milliLiter(s) (100 mL/Hr) IV Continuous <Continuous>  dextrose 50% Injectable 25 Gram(s) IV Push once  dextrose 50% Injectable 12.5 Gram(s) IV Push once  dextrose 50% Injectable 25 Gram(s) IV Push once  ergocalciferol 18215 Unit(s) Oral every week  gabapentin 600 milliGRAM(s) Oral three times a day  glucagon  Injectable 1 milliGRAM(s) IntraMuscular once  guaifenesin/dextromethorphan Oral Liquid 20 milliLiter(s) Oral every 6 hours  insulin glargine Injectable (LANTUS) 20 Unit(s) SubCutaneous at bedtime  insulin lispro (ADMELOG) corrective regimen sliding scale   SubCutaneous three times a day before meals  insulin lispro Injectable (ADMELOG) 6 Unit(s) SubCutaneous three times a day before meals  ipratropium    for Nebulization 500 MICROGram(s) Nebulizer every 6 hours  losartan 100 milliGRAM(s) Oral daily  magnesium sulfate  IVPB 2 Gram(s) IV Intermittent once  magnesium sulfate  IVPB 2 Gram(s) IV Intermittent every 12 hours  multivitamin 1 Tablet(s) Oral daily  mupirocin 2% Ointment 1 Application(s) Topical two times a day  pantoprazole   Suspension 40 milliGRAM(s) Oral daily  polyethylene glycol 3350 17 Gram(s) Oral two times a day  senna 2 Tablet(s) Oral at bedtime    MEDICATIONS  (PRN):  ALBUTerol    90 MICROgram(s) HFA Inhaler 2 Puff(s) Inhalation every 6 hours PRN Shortness of Breath and/or Wheezing  ALPRAZolam 0.5 milliGRAM(s) Oral every 6 hours PRN anxiety  dextrose Oral Gel 15 Gram(s) Oral once PRN Blood Glucose LESS THAN 70 milliGRAM(s)/deciliter  HYDROmorphone   Tablet 4 milliGRAM(s) Enteral Tube every 4 hours PRN Severe Pain (7 - 10)  HYDROmorphone  Injectable 0.5 milliGRAM(s) IV Push every 3 hours PRN Moderate Pain (4 - 6)    < from: CT Neck Soft Tissue w/ IV Cont (08.16.22 @ 09:07) >    Impression:    Status post tracheostomy. 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 Partial effacement of   the preepiglottic fat.    Mildly enhancing 1.1 cm right supraclavicular lymph node, nonspecific.    Left hemidiaphragm elevation with compressive atelectasis in   opacification of the partial imaged left lower lobe which could be   infectiousor inflammatory. Small left pleural effusion. Further   evaluation with chest CT can be considered.    < end of copied text >

## 2022-08-17 NOTE — CHART NOTE - NSCHARTNOTEFT_GEN_A_CORE
COVID swab ordered & sent, will follow up results. As per Dr Chatman's note, rec pulm clearance for OR.     Pulm fellow called, aware of patient and will assess for clearance. Medical resident aware, [id number].

## 2022-08-18 LAB
ALBUMIN SERPL ELPH-MCNC: 3.1 G/DL — LOW (ref 3.5–5.2)
ALP SERPL-CCNC: 98 U/L — SIGNIFICANT CHANGE UP (ref 30–115)
ALT FLD-CCNC: 16 U/L — SIGNIFICANT CHANGE UP (ref 0–41)
ANION GAP SERPL CALC-SCNC: 11 MMOL/L — SIGNIFICANT CHANGE UP (ref 7–14)
AST SERPL-CCNC: 13 U/L — SIGNIFICANT CHANGE UP (ref 0–41)
BILIRUB SERPL-MCNC: 0.3 MG/DL — SIGNIFICANT CHANGE UP (ref 0.2–1.2)
BUN SERPL-MCNC: 11 MG/DL — SIGNIFICANT CHANGE UP (ref 10–20)
CALCIUM SERPL-MCNC: 9.5 MG/DL — SIGNIFICANT CHANGE UP (ref 8.5–10.1)
CHLORIDE SERPL-SCNC: 96 MMOL/L — LOW (ref 98–110)
CO2 SERPL-SCNC: 28 MMOL/L — SIGNIFICANT CHANGE UP (ref 17–32)
CREAT SERPL-MCNC: 0.5 MG/DL — LOW (ref 0.7–1.5)
EGFR: 109 ML/MIN/1.73M2 — SIGNIFICANT CHANGE UP
GLUCOSE BLDC GLUCOMTR-MCNC: 141 MG/DL — HIGH (ref 70–99)
GLUCOSE BLDC GLUCOMTR-MCNC: 153 MG/DL — HIGH (ref 70–99)
GLUCOSE BLDC GLUCOMTR-MCNC: 156 MG/DL — HIGH (ref 70–99)
GLUCOSE BLDC GLUCOMTR-MCNC: 90 MG/DL — SIGNIFICANT CHANGE UP (ref 70–99)
GLUCOSE SERPL-MCNC: 156 MG/DL — HIGH (ref 70–99)
HCT VFR BLD CALC: 30.1 % — LOW (ref 37–47)
HGB BLD-MCNC: 10.3 G/DL — LOW (ref 12–16)
MAGNESIUM SERPL-MCNC: 2.6 MG/DL — HIGH (ref 1.8–2.4)
MCHC RBC-ENTMCNC: 32.4 PG — HIGH (ref 27–31)
MCHC RBC-ENTMCNC: 34.2 G/DL — SIGNIFICANT CHANGE UP (ref 32–37)
MCV RBC AUTO: 94.7 FL — SIGNIFICANT CHANGE UP (ref 81–99)
NRBC # BLD: 0 /100 WBCS — SIGNIFICANT CHANGE UP (ref 0–0)
PLATELET # BLD AUTO: 135 K/UL — SIGNIFICANT CHANGE UP (ref 130–400)
POTASSIUM SERPL-MCNC: 4.2 MMOL/L — SIGNIFICANT CHANGE UP (ref 3.5–5)
POTASSIUM SERPL-SCNC: 4.2 MMOL/L — SIGNIFICANT CHANGE UP (ref 3.5–5)
PROT SERPL-MCNC: 5.3 G/DL — LOW (ref 6–8)
RBC # BLD: 3.18 M/UL — LOW (ref 4.2–5.4)
RBC # FLD: 12.3 % — SIGNIFICANT CHANGE UP (ref 11.5–14.5)
SODIUM SERPL-SCNC: 135 MMOL/L — SIGNIFICANT CHANGE UP (ref 135–146)
WBC # BLD: 7.18 K/UL — SIGNIFICANT CHANGE UP (ref 4.8–10.8)
WBC # FLD AUTO: 7.18 K/UL — SIGNIFICANT CHANGE UP (ref 4.8–10.8)

## 2022-08-18 PROCEDURE — 71045 X-RAY EXAM CHEST 1 VIEW: CPT | Mod: 26

## 2022-08-18 PROCEDURE — 93306 TTE W/DOPPLER COMPLETE: CPT | Mod: 26

## 2022-08-18 RX ADMIN — HYDROMORPHONE HYDROCHLORIDE 0.5 MILLIGRAM(S): 2 INJECTION INTRAMUSCULAR; INTRAVENOUS; SUBCUTANEOUS at 06:23

## 2022-08-18 RX ADMIN — GABAPENTIN 600 MILLIGRAM(S): 400 CAPSULE ORAL at 05:32

## 2022-08-18 RX ADMIN — HYDROMORPHONE HYDROCHLORIDE 4 MILLIGRAM(S): 2 INJECTION INTRAMUSCULAR; INTRAVENOUS; SUBCUTANEOUS at 06:24

## 2022-08-18 RX ADMIN — LOSARTAN POTASSIUM 100 MILLIGRAM(S): 100 TABLET, FILM COATED ORAL at 05:32

## 2022-08-18 RX ADMIN — Medication 1 APPLICATION(S): at 05:31

## 2022-08-18 RX ADMIN — HYDROMORPHONE HYDROCHLORIDE 0.5 MILLIGRAM(S): 2 INJECTION INTRAMUSCULAR; INTRAVENOUS; SUBCUTANEOUS at 22:42

## 2022-08-18 RX ADMIN — INSULIN GLARGINE 20 UNIT(S): 100 INJECTION, SOLUTION SUBCUTANEOUS at 22:06

## 2022-08-18 RX ADMIN — HYDROMORPHONE HYDROCHLORIDE 0.5 MILLIGRAM(S): 2 INJECTION INTRAMUSCULAR; INTRAVENOUS; SUBCUTANEOUS at 12:42

## 2022-08-18 RX ADMIN — GABAPENTIN 600 MILLIGRAM(S): 400 CAPSULE ORAL at 14:29

## 2022-08-18 RX ADMIN — Medication 0.5 MILLIGRAM(S): at 19:39

## 2022-08-18 RX ADMIN — HYDROMORPHONE HYDROCHLORIDE 0.5 MILLIGRAM(S): 2 INJECTION INTRAMUSCULAR; INTRAVENOUS; SUBCUTANEOUS at 00:10

## 2022-08-18 RX ADMIN — HYDROMORPHONE HYDROCHLORIDE 4 MILLIGRAM(S): 2 INJECTION INTRAMUSCULAR; INTRAVENOUS; SUBCUTANEOUS at 21:29

## 2022-08-18 RX ADMIN — Medication 1000 MILLIGRAM(S): at 05:32

## 2022-08-18 RX ADMIN — HYDROMORPHONE HYDROCHLORIDE 0.5 MILLIGRAM(S): 2 INJECTION INTRAMUSCULAR; INTRAVENOUS; SUBCUTANEOUS at 09:04

## 2022-08-18 RX ADMIN — POLYETHYLENE GLYCOL 3350 17 GRAM(S): 17 POWDER, FOR SOLUTION ORAL at 05:32

## 2022-08-18 RX ADMIN — Medication 0.5 MILLIGRAM(S): at 06:29

## 2022-08-18 RX ADMIN — Medication 20 MILLILITER(S): at 11:07

## 2022-08-18 RX ADMIN — HYDROMORPHONE HYDROCHLORIDE 0.5 MILLIGRAM(S): 2 INJECTION INTRAMUSCULAR; INTRAVENOUS; SUBCUTANEOUS at 19:31

## 2022-08-18 RX ADMIN — HYDROMORPHONE HYDROCHLORIDE 4 MILLIGRAM(S): 2 INJECTION INTRAMUSCULAR; INTRAVENOUS; SUBCUTANEOUS at 20:02

## 2022-08-18 RX ADMIN — Medication 500 MICROGRAM(S): at 08:52

## 2022-08-18 RX ADMIN — ALBUTEROL 2.5 MILLIGRAM(S): 90 AEROSOL, METERED ORAL at 20:38

## 2022-08-18 RX ADMIN — HYDROMORPHONE HYDROCHLORIDE 0.5 MILLIGRAM(S): 2 INJECTION INTRAMUSCULAR; INTRAVENOUS; SUBCUTANEOUS at 03:12

## 2022-08-18 RX ADMIN — ALBUTEROL 2.5 MILLIGRAM(S): 90 AEROSOL, METERED ORAL at 08:51

## 2022-08-18 RX ADMIN — Medication 1 APPLICATION(S): at 19:02

## 2022-08-18 RX ADMIN — Medication 1000 MILLIGRAM(S): at 06:23

## 2022-08-18 RX ADMIN — Medication 1000 MILLIGRAM(S): at 22:42

## 2022-08-18 RX ADMIN — Medication 6 UNIT(S): at 13:40

## 2022-08-18 RX ADMIN — Medication 25 GRAM(S): at 05:33

## 2022-08-18 RX ADMIN — Medication 1 TABLET(S): at 11:06

## 2022-08-18 RX ADMIN — HYDROMORPHONE HYDROCHLORIDE 4 MILLIGRAM(S): 2 INJECTION INTRAMUSCULAR; INTRAVENOUS; SUBCUTANEOUS at 07:31

## 2022-08-18 RX ADMIN — HYDROMORPHONE HYDROCHLORIDE 4 MILLIGRAM(S): 2 INJECTION INTRAMUSCULAR; INTRAVENOUS; SUBCUTANEOUS at 11:06

## 2022-08-18 RX ADMIN — Medication 1000 MILLIGRAM(S): at 22:07

## 2022-08-18 RX ADMIN — HYDROMORPHONE HYDROCHLORIDE 0.5 MILLIGRAM(S): 2 INJECTION INTRAMUSCULAR; INTRAVENOUS; SUBCUTANEOUS at 20:58

## 2022-08-18 RX ADMIN — HYDROMORPHONE HYDROCHLORIDE 0.5 MILLIGRAM(S): 2 INJECTION INTRAMUSCULAR; INTRAVENOUS; SUBCUTANEOUS at 05:47

## 2022-08-18 RX ADMIN — Medication 1000 MILLIGRAM(S): at 14:29

## 2022-08-18 RX ADMIN — HYDROMORPHONE HYDROCHLORIDE 4 MILLIGRAM(S): 2 INJECTION INTRAMUSCULAR; INTRAVENOUS; SUBCUTANEOUS at 15:14

## 2022-08-18 RX ADMIN — MUPIROCIN 1 APPLICATION(S): 20 OINTMENT TOPICAL at 05:33

## 2022-08-18 RX ADMIN — PANTOPRAZOLE SODIUM 40 MILLIGRAM(S): 20 TABLET, DELAYED RELEASE ORAL at 11:06

## 2022-08-18 RX ADMIN — HYDROMORPHONE HYDROCHLORIDE 0.5 MILLIGRAM(S): 2 INJECTION INTRAMUSCULAR; INTRAVENOUS; SUBCUTANEOUS at 02:47

## 2022-08-18 RX ADMIN — Medication 500 MICROGRAM(S): at 20:38

## 2022-08-18 RX ADMIN — Medication 20 MILLILITER(S): at 05:32

## 2022-08-18 RX ADMIN — HYDROMORPHONE HYDROCHLORIDE 0.5 MILLIGRAM(S): 2 INJECTION INTRAMUSCULAR; INTRAVENOUS; SUBCUTANEOUS at 15:42

## 2022-08-18 RX ADMIN — GABAPENTIN 600 MILLIGRAM(S): 400 CAPSULE ORAL at 22:07

## 2022-08-18 RX ADMIN — Medication 20 MILLILITER(S): at 19:31

## 2022-08-18 RX ADMIN — MUPIROCIN 1 APPLICATION(S): 20 OINTMENT TOPICAL at 19:02

## 2022-08-18 RX ADMIN — HYDROMORPHONE HYDROCHLORIDE 0.5 MILLIGRAM(S): 2 INJECTION INTRAMUSCULAR; INTRAVENOUS; SUBCUTANEOUS at 22:19

## 2022-08-18 RX ADMIN — Medication 3 MILLILITER(S): at 20:37

## 2022-08-18 NOTE — PROGRESS NOTE ADULT - SUBJECTIVE AND OBJECTIVE BOX
ENT DAILY PROGRESS NOTE    Pt is a 57y Female admitted with hypoxia, s/p trach at [facility] (May 2022). ENT following for persistent laryngeal edema, found to have laryngeal mass. Patient seen and examined at bedside this morning. No acute events noted overnight, patient without complaints this morning.    REVIEW OF SYSTEMS   [x] A ten-point review of systems was otherwise negative except as noted.    Allergies  penicillin (Swelling)    MEDICATIONS:  acetaminophen     Tablet .. 1000 milliGRAM(s) Oral every 8 hours  acetylcysteine 10%  Inhalation 4 milliLiter(s) Inhalation two times a day  acetylcysteine 20% for Nebulization - Peds 3 milliLiter(s) Nebulizer every 6 hours  ALBUTerol    0.083% 2.5 milliGRAM(s) Nebulizer every 6 hours  ALBUTerol    90 MICROgram(s) HFA Inhaler 2 Puff(s) Inhalation every 6 hours PRN  ALPRAZolam 0.5 milliGRAM(s) Oral every 6 hours PRN  ammonium lactate 12% Lotion 1 Application(s) Topical two times a day  clobetasol 0.05% Cream 1 Application(s) Topical two times a day  dextrose 5%. 1000 milliLiter(s) IV Continuous <Continuous>  dextrose 5%. 1000 milliLiter(s) IV Continuous <Continuous>  dextrose 50% Injectable 25 Gram(s) IV Push once  dextrose 50% Injectable 12.5 Gram(s) IV Push once  dextrose 50% Injectable 25 Gram(s) IV Push once  dextrose Oral Gel 15 Gram(s) Oral once PRN  ergocalciferol 25925 Unit(s) Oral every week  gabapentin 600 milliGRAM(s) Oral three times a day  glucagon  Injectable 1 milliGRAM(s) IntraMuscular once  guaifenesin/dextromethorphan Oral Liquid 20 milliLiter(s) Oral every 6 hours  HYDROmorphone   Tablet 4 milliGRAM(s) Enteral Tube every 4 hours PRN  HYDROmorphone  Injectable 0.5 milliGRAM(s) IV Push every 3 hours PRN  insulin glargine Injectable (LANTUS) 20 Unit(s) SubCutaneous at bedtime  insulin lispro (ADMELOG) corrective regimen sliding scale   SubCutaneous three times a day before meals  insulin lispro Injectable (ADMELOG) 6 Unit(s) SubCutaneous three times a day before meals  ipratropium    for Nebulization 500 MICROGram(s) Nebulizer every 6 hours  losartan 100 milliGRAM(s) Oral daily  magnesium sulfate  IVPB 2 Gram(s) IV Intermittent every 12 hours  magnesium sulfate  IVPB 2 Gram(s) IV Intermittent once  multivitamin 1 Tablet(s) Oral daily  mupirocin 2% Ointment 1 Application(s) Topical two times a day  pantoprazole   Suspension 40 milliGRAM(s) Oral daily  polyethylene glycol 3350 17 Gram(s) Oral two times a day  senna 2 Tablet(s) Oral at bedtime    Vital Signs Last 24 Hrs  T(C): 36.6 (18 Aug 2022 05:00), Max: 36.6 (18 Aug 2022 05:00)  T(F): 97.9 (18 Aug 2022 05:00), Max: 97.9 (18 Aug 2022 05:00)  HR: 75 (18 Aug 2022 05:00) (74 - 86)  BP: 116/55 (18 Aug 2022 05:00) (97/57 - 126/59)  RR: 18 (18 Aug 2022 05:00) (18 - 20)  SpO2: 96% (17 Aug 2022 19:57) (96% - 96%)    Parameters below as of 17 Aug 2022 19:57  Patient On (Oxygen Delivery Method): tracheostomy collar    O2 Concentration (%): 40    08-17 @ 07:01  -  08-18 @ 07:00  --------------------------------------------------------  IN:  Total IN: 0 mL    OUT:    Emesis (mL): 300 mL    Ureteral Catheter (mL): 900 mL    Voided (mL): 750 mL  Total OUT: 1950 mL    Total NET: -1950 mL    PHYSICAL EXAM:  GEN: No acute distress, awake and alert. No drooling or pooling of secretions. No stridor or stertor, soft vocal quality.   SKIN: Good color, non diaphoretic.  HEENT: Oral mucosa pink and moist. No erythema or edema noted to buccal mucosa, tongue, FOM, uvula or posterior oropharynx. Uvula midline.   NECK: Trachea midline, Neck supple, 8CFN in place.  RESP: No dyspnea, non-labored breathing. No use of accessory muscles.   CARDIO: +S1/S2  ABDO: Soft, NT.    LABS:  CBC-             10.3   7.18  )-----------( 135      ( 18 Aug 2022 08:00 )             30.1     BMP/CMP-17 Aug 2022 04:30  135    |  96     |  10     ----------------------------<  143    4.1     |  30     |  0.5      Ca    9.5        17 Aug 2022 04:30  Mg     2.4       17 Aug 2022 04:30    TPro  5.4    /  Alb  3.3    /  TBili  0.3    /  DBili  x      /  AST  15     /  ALT  18     /  AlkPhos  95     17 Aug 2022 04:30    RADIOLOGY & ADDITIONAL STUDIES:  None new resulted within 24hr to review

## 2022-08-18 NOTE — PROGRESS NOTE ADULT - ASSESSMENT
Pt is a 57y Female admitted with hypoxia, s/p trach at [facility] (May 2022). ENT following for persistent laryngeal edema, found to have laryngeal mass.    PLAN:  -Booked for OR tomorrow (8/19) for direct laryngoscopy, biopsy, flexible esophagoscopy, and possible bronchoscopy  -Will change to a cuffed trach tomorrow prior to procedure  -Pulm: no contraindications for planned procedure  -Medical risk stratification  -NPO @ MN, IVFs  -COVID (-) 8/17  -Pre-op labs reviewed, please obtain repeat CXR & ECG  -Holding Eliquis  -Repeat echo pending  -Remainder of care as per primary team  -Will discuss with attending

## 2022-08-18 NOTE — PRE-ANESTHESIA EVALUATION ADULT - NSRADCARDRESULTSFT_GEN_ALL_CORE
Chest X-Ray - Tracheostomy tube; Left basal pleural-parenchymal opacity; No pneumothorax    EKG - NSR; Incomplete RBBB; Cannot rule out Anterior infarct

## 2022-08-18 NOTE — PRE-ANESTHESIA EVALUATION ADULT - NSANTHPMHFT_GEN_ALL_CORE
S/P Tracheostomy 5/2022 s/p Hypoxia  Hx Persistent Laryngeal Edema, found to have a Laryngeal mass  Anemia    On Contact Isolation

## 2022-08-18 NOTE — PROGRESS NOTE ADULT - SUBJECTIVE AND OBJECTIVE BOX
CRUZ DUMONT 56yo  Female sent to the ER from [facility] where she is on  care for c/o inc SOB, chest tightness, diff breathing, wheezing i.e. acute on chr hypoxic RF.  Of note the pt has a trach and has had freq readmissions for mucous plugging, RF and has had several bronchoscopies.   EMS noted the pt to be hypotensive and hypoxic.  The pt was vigorously suctioned, received Neb Tx, IV steroids,  BP revived with IV fluids.  The pt was cultured and placed on ABx.  She is v well known to the Pul [facility] and to ID.  The pt is ad with acute on ch hypoxic RF and ongoing Bacterial PNA.  The PMHx includes:  HTN, ASHD, HFpEF, DLD, DM II, Chronic RF, COPD, LUCIANA, ASp PNA, sp intubation, failure to extubate, chronic trach ([facility] 4/22), recurrent mucus plugging of airways, SDVT, AC with Eliquis, lower ext venous stasis dermatitis, bedbound state, GERD, diverticulosis, sp PEG, OA, DDD, DJD, chronic pain syn, depression anxiety.    INTERVAL HPI/OVERNIGHT EVENTS: pt stable on trach collar 40%, st at 96%, for OR and direct laryngoscopy/bronchoscopy tomorrow, EKG WNL, ECHO EF 60 %, no sig valvular dis, labs acceptable and pt cleared for the procedure as a low risk, AVC on hold, NPO after midnight,     MEDICATIONS  (STANDING):  acetaminophen     Tablet .. 650 milliGRAM(s) Oral every 6 hours  acetylcysteine 20% for Nebulization - Peds 3 milliLiter(s) Nebulizer every 6 hours  ALBUTerol    0.083% 2.5 milliGRAM(s) Nebulizer every 6 hours  ammonium lactate 12% Lotion 1 Application(s) Topical two times a day  apixaban 5 milliGRAM(s) Enteral Tube two times a day  chlorhexidine 0.12% Liquid 15 milliLiter(s) Oral Mucosa every 12 hours  dextrose 5%. 1000 milliLiter(s) (100 mL/Hr) IV Continuous <Continuous>  dextrose 5%. 1000 milliLiter(s) (50 mL/Hr) IV Continuous <Continuous>  dextrose 50% Injectable 25 Gram(s) IV Push once  dextrose 50% Injectable 12.5 Gram(s) IV Push once  dextrose 50% Injectable 25 Gram(s) IV Push once  doxycycline monohydrate Capsule 100 milliGRAM(s) Oral every 12 hours  gabapentin 600 milliGRAM(s) Oral three times a day  glucagon  Injectable 1 milliGRAM(s) IntraMuscular once  insulin glargine Injectable (LANTUS) 20 Unit(s) SubCutaneous at bedtime  insulin lispro (ADMELOG) corrective regimen sliding scale   SubCutaneous three times a day before meals  insulin lispro Injectable (ADMELOG) 6 Unit(s) SubCutaneous three times a day before meals  methylPREDNISolone sodium succinate Injectable 60 milliGRAM(s) IV Push two times a day  multivitamin 1 Tablet(s) Oral daily  pantoprazole   Suspension 40 milliGRAM(s) Oral daily  polyethylene glycol 3350 17 Gram(s) Oral two times a day  senna 2 Tablet(s) Oral at bedtime    MEDICATIONS  (PRN):  ALBUTerol    90 MICROgram(s) HFA Inhaler 2 Puff(s) Inhalation every 6 hours PRN Shortness of Breath and/or Wheezing  ALPRAZolam 0.5 milliGRAM(s) Oral every 6 hours PRN anxiety  dextrose Oral Gel 15 Gram(s) Oral once PRN Blood Glucose LESS THAN 70 milliGRAM(s)/deciliter  HYDROmorphone   Tablet 4 milliGRAM(s) Oral every 4 hours PRN Severe Pain (7 - 10)  HYDROmorphone  Injectable 1 milliGRAM(s) IV Push every 8 hours PRN Moderate Pain (4 - 6)      Allergies    penicillin (Swelling)      Vital Signs Last 24 Hrs    T(F): 97.6  HR:  77  BP:  100/57    RR: 18  SpO2:  96%, trach collar 40%    PHYSICAL EXAM:      Constitutional: pt alert, oriented  verbal,  bedbound chr ill looking but in NAD on trach collar 40%    Eyes: nonicteric    ENMT: dry oral mucosa, dental defects,     Neck: + trach tube supple, no stridor, + trach,     Respiratory: shallow resp, diminished harsh bronchial  BS, scattered rhonchi, no wheezing    Cardiovascular: S1S2 reg    Gastrointestinal: globular, soft and benign, + PEG, + BS    Genitourinary:    Extremities: moves all ext, dec motor strength of lower ext, + BL mm atrophy, + BL foot drop    Vascular: dec pedal pulses    Neurological: nonfocal    Skin: lower ext dark almost black hyperpigmentation of lower ext    Lymph Nodes: not enlarged    Musculoskeletal: dec mm mass and tone    Psychiatric: anxious, depressed but stable        LABS:                10  7)-----------( 135            30      135  |  96  |  11  ----------------------------<  156  4.2   |  28  |  0.5    , 115, 123, 109  Ca    9.7       Mg     1.9, 1.8, 2.0, 1.6, 1.7, 1.8, 2.6  trop <0.01  Vit D 18  folate 9.7  Haptos 126  ferritin 982  TPro  5.9, 5.5, 5.4  /  Alb  3.3, 3.4, 3.3  /  TBili  0.3  /  DBili  x   /  AST  20  /  ALT  28  /  AlkPhos  77  08-05    RADIOLOGY & ADDITIONAL TESTS:    EKG:  NSR R axis, low voltage, nonspecific ST-T changed  EKG 8/17:  NSR 69/min, incomplete RBBB, no acute changes  ECHO:  nl sys function, EF 60%, GIDD, so sig valvular dis, borderline Pul HTN, sm pericardial eff  CXR:  interstitial prominence, L pl based opacity unchanged  AA doppler:  normal arterial blood flow  CT of soft tissue of the neck 8/16/22:  exophytic expansile mucosal mass 4.4x2x2.4cm involving the BL aryepiglotticfolds and along the BL glossoepiglottic folds as well as along the  laryngea/lingual surface of the epiglottis, 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

## 2022-08-18 NOTE — PROGRESS NOTE ADULT - ASSESSMENT
Unfortunate 56 YO F with acute on chr hypoxic RF read for hypoxia and hypotension and ongoing Bacterial PNA. Hx of ASP PNA, COPD exacerbation and intubation with failure to extubate resulting in trach at [facility] .  The pt is under chronic  at [facility].      Acute on Chr hypoxic RF, mucus plugging  Chronic Tracheostomy  Bacterial PNA  Clinical Debility  Bedbound state  Lower extremity venous insufficiency  BL foot drop, limb weakness, prolonged immobility  Hx of HTN, ASHD HFpEF  Hx of DLD  Hx of COPD, MO, LUCIANA, sp intubation, failure to extubate, chr trach since /[facility]  Hx of DM II  Hx of Ch anemia of ch dis  Hx of GERD, HH, Diverticulosis, Ch constipation  Hx of OA, DJD, DDD, chr pain syn  Hx of lower ext venous stasis dermatitis  Hx of DVT, AC/Eliquis  Hx of Anxiety, Depression         pt stable, A&O, verbal, anxious over upcoming OR  laryngoscopy to R/O laryngeal neoplasm  pt on trach collar today 40 %, 96% pulse Ox     ENT for OR , direct laryngoscopy and possible bronchoscopy  Eliquis on hold  NPO after midnight  IV: NS at 40cc/hr  lab work, EKG and ECHO reviewed and all acceptable and pt is stable and cleared as a low risk for the procedure  CT of soft tissue of the neck shows  expophytic expansile mucosal mass 4.4x2x2.4cm involving the BL aryepiglottic folds/? supraglottic laryngeal neoplasm  Pul-critical care recall for recommendation prior to OR    Surgery attempted to replace trach to fenestrated tube, the outer tube is fenestrated, the inner is not as suction tube caught in the  fenestrations ( fenestrated inner tube may be placed when pt req less suctioning)  nutrition via PEG for now, allow sm amts of ice chips    cont S&S therapy    low vit D start to replete 50,000 q wk  Venous doppler neg for DVT  AA doppler shows normal blood flow  Vasc consult     ECHO:  nl sys function, EF60%, GIDD, no sig valvular dis  EKG:  NSR 69/min, incomplete RBBB, no acute changes  cont all previous meds  GI prophylaxis  att to bowel regimen  nutrition via PEG/glucerna, add 2 packets of protein per day to feeds  change PEG dressing daily, apply mupirocin  BL inflatable boots to prevcent further foot drop, encourage pt to use them as much as possible    PM: hydromorphone 4mg po q 4 and 1mg IV q 8 for breakthrough pain, gabapentin 600mg q 8, tylenol 650mg QID    Rehab consult for bedside PT, can we get pt OOB to chair, rehab states pt high risk for fall but bedside and  OOB to chair daily, pt highly motivated  BL heel off loading boots, pt with BL foot drop    pt full code  Social SVC pt in cont active care with new fx on CT of neck

## 2022-08-19 ENCOUNTER — RESULT REVIEW (OUTPATIENT)
Age: 57
End: 2022-08-19

## 2022-08-19 LAB
ALBUMIN SERPL ELPH-MCNC: 3.1 G/DL — LOW (ref 3.5–5.2)
ALP SERPL-CCNC: 103 U/L — SIGNIFICANT CHANGE UP (ref 30–115)
ALT FLD-CCNC: 15 U/L — SIGNIFICANT CHANGE UP (ref 0–41)
ANION GAP SERPL CALC-SCNC: 10 MMOL/L — SIGNIFICANT CHANGE UP (ref 7–14)
AST SERPL-CCNC: 14 U/L — SIGNIFICANT CHANGE UP (ref 0–41)
BILIRUB SERPL-MCNC: 0.3 MG/DL — SIGNIFICANT CHANGE UP (ref 0.2–1.2)
BUN SERPL-MCNC: 9 MG/DL — LOW (ref 10–20)
CALCIUM SERPL-MCNC: 9.6 MG/DL — SIGNIFICANT CHANGE UP (ref 8.5–10.1)
CHLORIDE SERPL-SCNC: 105 MMOL/L — SIGNIFICANT CHANGE UP (ref 98–110)
CO2 SERPL-SCNC: 31 MMOL/L — SIGNIFICANT CHANGE UP (ref 17–32)
CREAT SERPL-MCNC: <0.5 MG/DL — LOW (ref 0.7–1.5)
EGFR: 115 ML/MIN/1.73M2 — SIGNIFICANT CHANGE UP
GLUCOSE BLDC GLUCOMTR-MCNC: 104 MG/DL — HIGH (ref 70–99)
GLUCOSE BLDC GLUCOMTR-MCNC: 192 MG/DL — HIGH (ref 70–99)
GLUCOSE BLDC GLUCOMTR-MCNC: 200 MG/DL — HIGH (ref 70–99)
GLUCOSE BLDC GLUCOMTR-MCNC: 84 MG/DL — SIGNIFICANT CHANGE UP (ref 70–99)
GLUCOSE SERPL-MCNC: 106 MG/DL — HIGH (ref 70–99)
HCT VFR BLD CALC: 31 % — LOW (ref 37–47)
HGB BLD-MCNC: 10.6 G/DL — LOW (ref 12–16)
MAGNESIUM SERPL-MCNC: 1.7 MG/DL — LOW (ref 1.8–2.4)
MCHC RBC-ENTMCNC: 32.6 PG — HIGH (ref 27–31)
MCHC RBC-ENTMCNC: 34.2 G/DL — SIGNIFICANT CHANGE UP (ref 32–37)
MCV RBC AUTO: 95.4 FL — SIGNIFICANT CHANGE UP (ref 81–99)
NRBC # BLD: 0 /100 WBCS — SIGNIFICANT CHANGE UP (ref 0–0)
PLATELET # BLD AUTO: 150 K/UL — SIGNIFICANT CHANGE UP (ref 130–400)
POTASSIUM SERPL-MCNC: 4.5 MMOL/L — SIGNIFICANT CHANGE UP (ref 3.5–5)
POTASSIUM SERPL-SCNC: 4.5 MMOL/L — SIGNIFICANT CHANGE UP (ref 3.5–5)
PROT SERPL-MCNC: 5.3 G/DL — LOW (ref 6–8)
RBC # BLD: 3.25 M/UL — LOW (ref 4.2–5.4)
RBC # FLD: 12.1 % — SIGNIFICANT CHANGE UP (ref 11.5–14.5)
SODIUM SERPL-SCNC: 146 MMOL/L — SIGNIFICANT CHANGE UP (ref 135–146)
WBC # BLD: 7.53 K/UL — SIGNIFICANT CHANGE UP (ref 4.8–10.8)
WBC # FLD AUTO: 7.53 K/UL — SIGNIFICANT CHANGE UP (ref 4.8–10.8)

## 2022-08-19 PROCEDURE — 31535 LARYNGOSCOPY W/BIOPSY: CPT

## 2022-08-19 PROCEDURE — 88342 IMHCHEM/IMCYTCHM 1ST ANTB: CPT | Mod: 26

## 2022-08-19 PROCEDURE — 99231 SBSQ HOSP IP/OBS SF/LOW 25: CPT

## 2022-08-19 PROCEDURE — 88305 TISSUE EXAM BY PATHOLOGIST: CPT | Mod: 26

## 2022-08-19 RX ORDER — APIXABAN 2.5 MG/1
5 TABLET, FILM COATED ORAL EVERY 12 HOURS
Refills: 0 | Status: DISCONTINUED | OUTPATIENT
Start: 2022-08-20 | End: 2022-08-26

## 2022-08-19 RX ORDER — MAGNESIUM SULFATE 500 MG/ML
2 VIAL (ML) INJECTION ONCE
Refills: 0 | Status: COMPLETED | OUTPATIENT
Start: 2022-08-19 | End: 2022-08-19

## 2022-08-19 RX ORDER — SOD,AMMONIUM,POTASSIUM LACTATE
1 CREAM (GRAM) TOPICAL
Refills: 0 | Status: DISCONTINUED | OUTPATIENT
Start: 2022-08-19 | End: 2022-08-26

## 2022-08-19 RX ORDER — PANTOPRAZOLE SODIUM 20 MG/1
40 TABLET, DELAYED RELEASE ORAL DAILY
Refills: 0 | Status: DISCONTINUED | OUTPATIENT
Start: 2022-08-19 | End: 2022-08-26

## 2022-08-19 RX ORDER — HEPARIN SODIUM 5000 [USP'U]/ML
5000 INJECTION INTRAVENOUS; SUBCUTANEOUS EVERY 8 HOURS
Refills: 0 | Status: DISCONTINUED | OUTPATIENT
Start: 2022-08-19 | End: 2022-08-19

## 2022-08-19 RX ORDER — ACETAMINOPHEN 500 MG
1000 TABLET ORAL EVERY 8 HOURS
Refills: 0 | Status: DISCONTINUED | OUTPATIENT
Start: 2022-08-19 | End: 2022-08-26

## 2022-08-19 RX ORDER — HEPARIN SODIUM 5000 [USP'U]/ML
5000 INJECTION INTRAVENOUS; SUBCUTANEOUS EVERY 8 HOURS
Refills: 0 | Status: COMPLETED | OUTPATIENT
Start: 2022-08-19 | End: 2022-08-20

## 2022-08-19 RX ORDER — GABAPENTIN 400 MG/1
600 CAPSULE ORAL THREE TIMES A DAY
Refills: 0 | Status: DISCONTINUED | OUTPATIENT
Start: 2022-08-19 | End: 2022-08-21

## 2022-08-19 RX ORDER — ALBUTEROL 90 UG/1
2 AEROSOL, METERED ORAL EVERY 6 HOURS
Refills: 0 | Status: DISCONTINUED | OUTPATIENT
Start: 2022-08-19 | End: 2022-08-26

## 2022-08-19 RX ORDER — MAGNESIUM SULFATE 500 MG/ML
2 VIAL (ML) INJECTION ONCE
Refills: 0 | Status: DISCONTINUED | OUTPATIENT
Start: 2022-08-19 | End: 2022-08-19

## 2022-08-19 RX ORDER — IPRATROPIUM BROMIDE 0.2 MG/ML
500 SOLUTION, NON-ORAL INHALATION EVERY 6 HOURS
Refills: 0 | Status: DISCONTINUED | OUTPATIENT
Start: 2022-08-19 | End: 2022-08-26

## 2022-08-19 RX ORDER — INSULIN LISPRO 100/ML
VIAL (ML) SUBCUTANEOUS
Refills: 0 | Status: DISCONTINUED | OUTPATIENT
Start: 2022-08-19 | End: 2022-08-26

## 2022-08-19 RX ORDER — ERGOCALCIFEROL 1.25 MG/1
50000 CAPSULE ORAL
Refills: 0 | Status: DISCONTINUED | OUTPATIENT
Start: 2022-08-19 | End: 2022-08-26

## 2022-08-19 RX ORDER — INSULIN LISPRO 100/ML
6 VIAL (ML) SUBCUTANEOUS
Refills: 0 | Status: DISCONTINUED | OUTPATIENT
Start: 2022-08-19 | End: 2022-08-26

## 2022-08-19 RX ORDER — SODIUM CHLORIDE 9 MG/ML
1000 INJECTION, SOLUTION INTRAVENOUS
Refills: 0 | Status: DISCONTINUED | OUTPATIENT
Start: 2022-08-19 | End: 2022-08-26

## 2022-08-19 RX ORDER — ALPRAZOLAM 0.25 MG
0.5 TABLET ORAL EVERY 6 HOURS
Refills: 0 | Status: DISCONTINUED | OUTPATIENT
Start: 2022-08-19 | End: 2022-08-26

## 2022-08-19 RX ORDER — MUPIROCIN 20 MG/G
1 OINTMENT TOPICAL
Refills: 0 | Status: DISCONTINUED | OUTPATIENT
Start: 2022-08-19 | End: 2022-08-26

## 2022-08-19 RX ORDER — HYDROMORPHONE HYDROCHLORIDE 2 MG/ML
0.5 INJECTION INTRAMUSCULAR; INTRAVENOUS; SUBCUTANEOUS
Refills: 0 | Status: DISCONTINUED | OUTPATIENT
Start: 2022-08-19 | End: 2022-08-26

## 2022-08-19 RX ORDER — POLYETHYLENE GLYCOL 3350 17 G/17G
17 POWDER, FOR SOLUTION ORAL
Refills: 0 | Status: DISCONTINUED | OUTPATIENT
Start: 2022-08-19 | End: 2022-08-23

## 2022-08-19 RX ORDER — LOSARTAN POTASSIUM 100 MG/1
100 TABLET, FILM COATED ORAL DAILY
Refills: 0 | Status: DISCONTINUED | OUTPATIENT
Start: 2022-08-19 | End: 2022-08-26

## 2022-08-19 RX ORDER — ACETYLCYSTEINE 200 MG/ML
3 VIAL (ML) MISCELLANEOUS EVERY 6 HOURS
Refills: 0 | Status: DISCONTINUED | OUTPATIENT
Start: 2022-08-19 | End: 2022-08-26

## 2022-08-19 RX ORDER — DEXTROSE 50 % IN WATER 50 %
25 SYRINGE (ML) INTRAVENOUS ONCE
Refills: 0 | Status: DISCONTINUED | OUTPATIENT
Start: 2022-08-19 | End: 2022-08-26

## 2022-08-19 RX ORDER — DEXTROSE 50 % IN WATER 50 %
12.5 SYRINGE (ML) INTRAVENOUS ONCE
Refills: 0 | Status: DISCONTINUED | OUTPATIENT
Start: 2022-08-19 | End: 2022-08-26

## 2022-08-19 RX ORDER — GUAIFENESIN/DEXTROMETHORPHAN 600MG-30MG
20 TABLET, EXTENDED RELEASE 12 HR ORAL EVERY 6 HOURS
Refills: 0 | Status: DISCONTINUED | OUTPATIENT
Start: 2022-08-19 | End: 2022-08-26

## 2022-08-19 RX ORDER — SENNA PLUS 8.6 MG/1
2 TABLET ORAL AT BEDTIME
Refills: 0 | Status: DISCONTINUED | OUTPATIENT
Start: 2022-08-19 | End: 2022-08-23

## 2022-08-19 RX ORDER — DEXTROSE 50 % IN WATER 50 %
15 SYRINGE (ML) INTRAVENOUS ONCE
Refills: 0 | Status: DISCONTINUED | OUTPATIENT
Start: 2022-08-19 | End: 2022-08-26

## 2022-08-19 RX ORDER — GLUCAGON INJECTION, SOLUTION 0.5 MG/.1ML
1 INJECTION, SOLUTION SUBCUTANEOUS ONCE
Refills: 0 | Status: DISCONTINUED | OUTPATIENT
Start: 2022-08-19 | End: 2022-08-26

## 2022-08-19 RX ORDER — ALBUTEROL 90 UG/1
2.5 AEROSOL, METERED ORAL EVERY 6 HOURS
Refills: 0 | Status: DISCONTINUED | OUTPATIENT
Start: 2022-08-19 | End: 2022-08-26

## 2022-08-19 RX ORDER — HYDROMORPHONE HYDROCHLORIDE 2 MG/ML
4 INJECTION INTRAMUSCULAR; INTRAVENOUS; SUBCUTANEOUS EVERY 4 HOURS
Refills: 0 | Status: DISCONTINUED | OUTPATIENT
Start: 2022-08-19 | End: 2022-08-26

## 2022-08-19 RX ORDER — INSULIN GLARGINE 100 [IU]/ML
20 INJECTION, SOLUTION SUBCUTANEOUS AT BEDTIME
Refills: 0 | Status: DISCONTINUED | OUTPATIENT
Start: 2022-08-19 | End: 2022-08-26

## 2022-08-19 RX ADMIN — Medication 20 MILLILITER(S): at 00:02

## 2022-08-19 RX ADMIN — Medication 1000 MILLIGRAM(S): at 15:26

## 2022-08-19 RX ADMIN — Medication 1 TABLET(S): at 14:41

## 2022-08-19 RX ADMIN — HYDROMORPHONE HYDROCHLORIDE 0.5 MILLIGRAM(S): 2 INJECTION INTRAMUSCULAR; INTRAVENOUS; SUBCUTANEOUS at 02:32

## 2022-08-19 RX ADMIN — HYDROMORPHONE HYDROCHLORIDE 4 MILLIGRAM(S): 2 INJECTION INTRAMUSCULAR; INTRAVENOUS; SUBCUTANEOUS at 22:46

## 2022-08-19 RX ADMIN — Medication 25 GRAM(S): at 13:02

## 2022-08-19 RX ADMIN — GABAPENTIN 600 MILLIGRAM(S): 400 CAPSULE ORAL at 05:13

## 2022-08-19 RX ADMIN — GABAPENTIN 600 MILLIGRAM(S): 400 CAPSULE ORAL at 22:11

## 2022-08-19 RX ADMIN — INSULIN GLARGINE 20 UNIT(S): 100 INJECTION, SOLUTION SUBCUTANEOUS at 22:10

## 2022-08-19 RX ADMIN — HYDROMORPHONE HYDROCHLORIDE 4 MILLIGRAM(S): 2 INJECTION INTRAMUSCULAR; INTRAVENOUS; SUBCUTANEOUS at 06:00

## 2022-08-19 RX ADMIN — Medication 0.5 MILLIGRAM(S): at 06:00

## 2022-08-19 RX ADMIN — Medication 500 MICROGRAM(S): at 14:37

## 2022-08-19 RX ADMIN — Medication 3 MILLILITER(S): at 21:00

## 2022-08-19 RX ADMIN — HYDROMORPHONE HYDROCHLORIDE 0.5 MILLIGRAM(S): 2 INJECTION INTRAMUSCULAR; INTRAVENOUS; SUBCUTANEOUS at 16:05

## 2022-08-19 RX ADMIN — HYDROMORPHONE HYDROCHLORIDE 0.5 MILLIGRAM(S): 2 INJECTION INTRAMUSCULAR; INTRAVENOUS; SUBCUTANEOUS at 05:12

## 2022-08-19 RX ADMIN — HYDROMORPHONE HYDROCHLORIDE 4 MILLIGRAM(S): 2 INJECTION INTRAMUSCULAR; INTRAVENOUS; SUBCUTANEOUS at 18:46

## 2022-08-19 RX ADMIN — MUPIROCIN 1 APPLICATION(S): 20 OINTMENT TOPICAL at 05:16

## 2022-08-19 RX ADMIN — Medication 1 APPLICATION(S): at 05:17

## 2022-08-19 RX ADMIN — HYDROMORPHONE HYDROCHLORIDE 4 MILLIGRAM(S): 2 INJECTION INTRAMUSCULAR; INTRAVENOUS; SUBCUTANEOUS at 15:40

## 2022-08-19 RX ADMIN — Medication 6 UNIT(S): at 17:38

## 2022-08-19 RX ADMIN — GABAPENTIN 600 MILLIGRAM(S): 400 CAPSULE ORAL at 14:41

## 2022-08-19 RX ADMIN — Medication 0.5 MILLIGRAM(S): at 19:10

## 2022-08-19 RX ADMIN — HEPARIN SODIUM 5000 UNIT(S): 5000 INJECTION INTRAVENOUS; SUBCUTANEOUS at 17:42

## 2022-08-19 RX ADMIN — Medication 1000 MILLIGRAM(S): at 05:13

## 2022-08-19 RX ADMIN — PANTOPRAZOLE SODIUM 40 MILLIGRAM(S): 20 TABLET, DELAYED RELEASE ORAL at 14:41

## 2022-08-19 RX ADMIN — HYDROMORPHONE HYDROCHLORIDE 0.5 MILLIGRAM(S): 2 INJECTION INTRAMUSCULAR; INTRAVENOUS; SUBCUTANEOUS at 01:21

## 2022-08-19 RX ADMIN — Medication 20 MILLILITER(S): at 15:27

## 2022-08-19 RX ADMIN — SENNA PLUS 2 TABLET(S): 8.6 TABLET ORAL at 22:11

## 2022-08-19 RX ADMIN — HYDROMORPHONE HYDROCHLORIDE 4 MILLIGRAM(S): 2 INJECTION INTRAMUSCULAR; INTRAVENOUS; SUBCUTANEOUS at 14:40

## 2022-08-19 RX ADMIN — Medication 1: at 17:38

## 2022-08-19 RX ADMIN — Medication 500 MICROGRAM(S): at 21:00

## 2022-08-19 RX ADMIN — MUPIROCIN 1 APPLICATION(S): 20 OINTMENT TOPICAL at 17:40

## 2022-08-19 RX ADMIN — HYDROMORPHONE HYDROCHLORIDE 0.5 MILLIGRAM(S): 2 INJECTION INTRAMUSCULAR; INTRAVENOUS; SUBCUTANEOUS at 22:10

## 2022-08-19 RX ADMIN — HYDROMORPHONE HYDROCHLORIDE 0.5 MILLIGRAM(S): 2 INJECTION INTRAMUSCULAR; INTRAVENOUS; SUBCUTANEOUS at 19:06

## 2022-08-19 RX ADMIN — Medication 1 APPLICATION(S): at 17:39

## 2022-08-19 RX ADMIN — Medication 1000 MILLIGRAM(S): at 16:20

## 2022-08-19 RX ADMIN — HYDROMORPHONE HYDROCHLORIDE 0.5 MILLIGRAM(S): 2 INJECTION INTRAMUSCULAR; INTRAVENOUS; SUBCUTANEOUS at 16:35

## 2022-08-19 RX ADMIN — LOSARTAN POTASSIUM 100 MILLIGRAM(S): 100 TABLET, FILM COATED ORAL at 15:28

## 2022-08-19 RX ADMIN — ALBUTEROL 2.5 MILLIGRAM(S): 90 AEROSOL, METERED ORAL at 21:00

## 2022-08-19 RX ADMIN — LOSARTAN POTASSIUM 100 MILLIGRAM(S): 100 TABLET, FILM COATED ORAL at 05:14

## 2022-08-19 RX ADMIN — Medication 20 MILLILITER(S): at 05:12

## 2022-08-19 RX ADMIN — Medication 1000 MILLIGRAM(S): at 22:11

## 2022-08-19 RX ADMIN — HYDROMORPHONE HYDROCHLORIDE 0.5 MILLIGRAM(S): 2 INJECTION INTRAMUSCULAR; INTRAVENOUS; SUBCUTANEOUS at 13:02

## 2022-08-19 RX ADMIN — ALBUTEROL 2.5 MILLIGRAM(S): 90 AEROSOL, METERED ORAL at 14:37

## 2022-08-19 NOTE — PROGRESS NOTE ADULT - SUBJECTIVE AND OBJECTIVE BOX
CRUZ DUMONT 58yo  Female sent to the ER from [facility], [place] where she is on  care for c/o inc SOB, chest tightness, diff breathing, wheezing i.e. acute on chr hypoxic RF.  Of note the pt has a trach and has had freq readmissions for mucous plugging, RF and has had several bronchoscopies.   EMS noted the pt to be hypotensive and hypoxic.  The pt was vigorously suctioned, received Neb Tx, IV steroids,  BP revived with IV fluids.  The pt was cultured and placed on ABx.  She is v well known to the [facility] and to ID.  The pt is ad with acute on ch hypoxic RF and ongoing Bacterial PNA.  The PMHx includes:  HTN, ASHD, HFpEF, DLD, DM II, Chronic RF, COPD, LUCIANA, ASp PNA, sp intubation, failure to extubate, chronic trach ([facility] 4/22), recurrent mucus plugging of airways, SDVT, AC with Eliquis, lower ext venous stasis dermatitis, bedbound state, GERD, diverticulosis, sp PEG, OA, DDD, DJD, chronic pain syn, depression anxiety.    INTERVAL HPI/OVERNIGHT EVENTS: pt stable for OR and direct laryngoscopy/bronchoscopy today, , EKG WNL, ECHO EF 60 %, no sig valvular dis, labs acceptable and pt cleared for the procedure as a low risk, AC on hold, NPO, Mg 1.7 replete,  IV NS at 40cc while NPO    MEDICATIONS  (STANDING):  acetaminophen     Tablet .. 650 milliGRAM(s) Oral every 6 hours  acetylcysteine 20% for Nebulization - Peds 3 milliLiter(s) Nebulizer every 6 hours  ALBUTerol    0.083% 2.5 milliGRAM(s) Nebulizer every 6 hours  ammonium lactate 12% Lotion 1 Application(s) Topical two times a day  apixaban 5 milliGRAM(s) Enteral Tube two times a day  chlorhexidine 0.12% Liquid 15 milliLiter(s) Oral Mucosa every 12 hours  dextrose 5%. 1000 milliLiter(s) (100 mL/Hr) IV Continuous <Continuous>  dextrose 5%. 1000 milliLiter(s) (50 mL/Hr) IV Continuous <Continuous>  dextrose 50% Injectable 25 Gram(s) IV Push once  dextrose 50% Injectable 12.5 Gram(s) IV Push once  dextrose 50% Injectable 25 Gram(s) IV Push once  doxycycline monohydrate Capsule 100 milliGRAM(s) Oral every 12 hours  gabapentin 600 milliGRAM(s) Oral three times a day  glucagon  Injectable 1 milliGRAM(s) IntraMuscular once  insulin glargine Injectable (LANTUS) 20 Unit(s) SubCutaneous at bedtime  insulin lispro (ADMELOG) corrective regimen sliding scale   SubCutaneous three times a day before meals  insulin lispro Injectable (ADMELOG) 6 Unit(s) SubCutaneous three times a day before meals  methylPREDNISolone sodium succinate Injectable 60 milliGRAM(s) IV Push two times a day  multivitamin 1 Tablet(s) Oral daily  pantoprazole   Suspension 40 milliGRAM(s) Oral daily  polyethylene glycol 3350 17 Gram(s) Oral two times a day  senna 2 Tablet(s) Oral at bedtime    MEDICATIONS  (PRN):  ALBUTerol    90 MICROgram(s) HFA Inhaler 2 Puff(s) Inhalation every 6 hours PRN Shortness of Breath and/or Wheezing  ALPRAZolam 0.5 milliGRAM(s) Oral every 6 hours PRN anxiety  dextrose Oral Gel 15 Gram(s) Oral once PRN Blood Glucose LESS THAN 70 milliGRAM(s)/deciliter  HYDROmorphone   Tablet 4 milliGRAM(s) Oral every 4 hours PRN Severe Pain (7 - 10)  HYDROmorphone  Injectable 1 milliGRAM(s) IV Push every 8 hours PRN Moderate Pain (4 - 6)      Allergies    penicillin (Swelling)      Vital Signs Last 24 Hrs    T(F): 98  HR:  71  BP:  141/65    RR: 18  SpO2:  100%, trach collar 40%    PHYSICAL EXAM:      Constitutional: pt alert, oriented  verbal,  bedbound chr ill looking but in NAD on trach collar 40%    Eyes: nonicteric    ENMT: dry oral mucosa, dental defects,     Neck: + trach tube supple, no stridor, + trach,     Respiratory: shallow resp, diminished harsh bronchial  BS, scattered rhonchi, no wheezing    Cardiovascular: S1S2 reg    Gastrointestinal: globular, soft and benign, + PEG, + BS    Genitourinary:    Extremities: moves all ext, dec motor strength of lower ext, + BL mm atrophy, + BL foot drop    Vascular: dec pedal pulses    Neurological: nonfocal    Skin: lower ext dark almost black hyperpigmentation of lower ext    Lymph Nodes: not enlarged    Musculoskeletal: dec mm mass and tone    Psychiatric: anxious, depressed but stable        LABS:                10.6  7.5)-----------( 159            31      146  |  105  |  9  ----------------------------<  106  4.5   |  31  |  0.5    , 115, 123, 109, 115  Ca    9.7       Mg     1.9, 1.8, 2.0, 1.6, 1.7, 1.8, 2.6, 1.7  trop <0.01  Vit D 18  folate 9.7  Haptos 126  ferritin 982  TPro  5.9, 5.5, 5.4  /  Alb  3.3, 3.4, 3.3  /  TBili  0.3  /  DBili  x   /  AST  20  /  ALT  28  /  AlkPhos  77  08-05    RADIOLOGY & ADDITIONAL TESTS:    EKG:  NSR R axis, low voltage, nonspecific ST-T changed  EKG 8/17:  NSR 69/min, incomplete RBBB, no acute changes  ECHO:  nl sys function, EF 60%, GIDD, so sig valvular dis, borderline Pul HTN, sm pericardial eff  CXR:  interstitial prominence, L pl based opacity unchanged  AA doppler:  normal arterial blood flow  CT of soft tissue of the neck 8/16/22:  exophytic expansile mucosal mass 4.4x2x2.4cm involving the BL aryepiglotticfolds and along the BL glossoepiglottic folds as well as along the  laryngea/lingual surface of the epiglottis, 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

## 2022-08-19 NOTE — CHART NOTE - NSCHARTNOTEFT_GEN_A_CORE
PACU ANESTHESIA ADMISSION NOTE      Procedure: DL, oropharyngeal tumor bx    ____  Intubated  TV:______       Rate: ______      FiO2: ______    __x__  Patent Airway    __x__  Full return of protective reflexes    __x__  Full recovery from anesthesia / back to baseline status    Vitals:  T(C): 36.8 (08-19-22 @ 11:00), Max: 36.8 (08-19-22 @ 11:00)  HR: 79 (08-19-22 @ 11:00) (71 - 81)  BP: 132/59 (08-19-22 @ 11:00) (100/57 - 141/65)  RR: 15 (08-19-22 @ 11:00) (15 - 18)  SpO2: 97% (08-19-22 @ 11:00) (95% - 100%)    Mental Status:  __x__ Awake   ___x__ Alert   _____ Drowsy   _____ Sedated    Nausea/Vomiting:  __x__ NO  ______Yes,   See Post - Op Orders          Pain Scale (0-10):  _____    Treatment: ____ None    __x__ See Post - Op/PCA Orders    Post - Operative Fluids:   ____ Oral   __x__ See Post - Op Orders    Plan: Discharge:   ____Home       __x___Floor     _____Critical Care    _____  Other:_________________    Comments: Patient had smooth intraoperative event, no anesthesia complication.

## 2022-08-19 NOTE — CHART NOTE - NSCHARTNOTEFT_GEN_A_CORE
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 Pathology pending.     Ok to resume feeds this afternoon, ok to resume Heparin SC for PPX. Eliquis ordered to restart 8/20 AM.     Pt's tracheostomy was changed to a cuffed tube in order to ventilate patient during procedure. The balloon should remain inflated until tomorrow (ENT will deflate in AM), as pt had some bloody secretions post procedure. We will exchange trach out to a cuffless fenestrated on Monday as long as patient does not have any respiratory issues over the weekend.

## 2022-08-19 NOTE — PROGRESS NOTE ADULT - SUBJECTIVE AND OBJECTIVE BOX
ENT DAILY PROGRESS NOTE - POST OP CHECK    Pt is a 57y Female with large intralaryngeal/base of tongue mass, s/p DL & bx, POD #0. PT seen and examined at bedside, doing well. Pt still very anxious and upset, but otherwise doing ok, no complaints.       REVIEW OF SYSTEMS   [x] A ten-point review of systems was otherwise negative except as noted.    Allergies    penicillin (Swelling)    Intolerances    MEDICATIONS:  acetaminophen     Tablet .. 1000 milliGRAM(s) Oral every 8 hours  acetylcysteine 20% for Nebulization - Peds 3 milliLiter(s) Nebulizer every 6 hours  ALBUTerol    0.083% 2.5 milliGRAM(s) Nebulizer every 6 hours  ALBUTerol    90 MICROgram(s) HFA Inhaler 2 Puff(s) Inhalation every 6 hours PRN  ALPRAZolam 0.5 milliGRAM(s) Oral every 6 hours PRN  ammonium lactate 12% Lotion 1 Application(s) Topical two times a day  dextrose 5%. 1000 milliLiter(s) IV Continuous <Continuous>  dextrose 5%. 1000 milliLiter(s) IV Continuous <Continuous>  dextrose 50% Injectable 25 Gram(s) IV Push once  dextrose 50% Injectable 12.5 Gram(s) IV Push once  dextrose 50% Injectable 25 Gram(s) IV Push once  dextrose Oral Gel 15 Gram(s) Oral once PRN  ergocalciferol 97700 Unit(s) Oral every week  gabapentin 600 milliGRAM(s) Oral three times a day  glucagon  Injectable 1 milliGRAM(s) IntraMuscular once  guaifenesin/dextromethorphan Oral Liquid 20 milliLiter(s) Oral every 6 hours  heparin   Injectable 5000 Unit(s) SubCutaneous every 8 hours  HYDROmorphone   Tablet 4 milliGRAM(s) Enteral Tube every 4 hours PRN  HYDROmorphone  Injectable 0.5 milliGRAM(s) IV Push every 3 hours PRN  insulin glargine Injectable (LANTUS) 20 Unit(s) SubCutaneous at bedtime  insulin lispro (ADMELOG) corrective regimen sliding scale   SubCutaneous three times a day before meals  insulin lispro Injectable (ADMELOG) 6 Unit(s) SubCutaneous three times a day before meals  ipratropium    for Nebulization 500 MICROGram(s) Nebulizer every 6 hours  losartan 100 milliGRAM(s) Oral daily  multivitamin 1 Tablet(s) Oral daily  mupirocin 2% Ointment 1 Application(s) Topical two times a day  pantoprazole   Suspension 40 milliGRAM(s) Oral daily  polyethylene glycol 3350 17 Gram(s) Oral two times a day  senna 2 Tablet(s) Oral at bedtime      Vital Signs Last 24 Hrs  T(C): 36.9 (19 Aug 2022 14:55), Max: 36.9 (19 Aug 2022 14:55)  T(F): 98.4 (19 Aug 2022 14:55), Max: 98.4 (19 Aug 2022 14:55)  HR: 93 (19 Aug 2022 14:55) (71 - 98)  BP: 150/64 (19 Aug 2022 14:55) (106/53 - 150/64)  RR: 20 (19 Aug 2022 14:55) (13 - 26)  SpO2: 96% (19 Aug 2022 14:00) (95% - 100%)    Parameters below as of 19 Aug 2022 14:00  Patient On (Oxygen Delivery Method): T-piece  O2 Flow (L/min): 12  O2 Concentration (%): 40      08-18 @ 07:01  -  08-19 @ 07:00  --------------------------------------------------------  IN:  Total IN: 0 mL    OUT:    Ureteral Catheter (mL): 2075 mL  Total OUT: 2075 mL    Total NET: -2075 mL      PHYSICAL EXAM:    GEN: NAD, awake and alert. No drooling or pooling of secretions. No stridor or stertor.   SKIN: Good color, non diaphoretic  HEENT: Oral mucosa pink and moist. No erythema or edema noted to buccal mucosa, tongue, FOM, uvula or posterior oropharynx. Uvula midline  NECK:  Trachea midline. + cuffed trach in place, no surrounding edema or erythema.  RESP: No dyspnea, non-labored breathing. No use of accessory muscles. on t-piece  CARDIO: +S1/S2  ABDO: Soft, NT. PEG.  EXT: JONAS x 4    LABS:  CBC-                        10.6   7.53  )-----------( 150      ( 19 Aug 2022 07:27 )             31.0     BMP/CMP-  19 Aug 2022 07:27    146    |  105    |  9      ----------------------------<  106    4.5     |  31     |  <0.5     Ca    9.6        19 Aug 2022 07:27  Mg     1.7       19 Aug 2022 07:27    TPro  5.3    /  Alb  3.1    /  TBili  0.3    /  DBili  x      /  AST  14     /  ALT  15     /  AlkPhos  103    19 Aug 2022 07:27    Coagulation Studies-    Endocrine Panel-  Calcium, Total Serum: 9.6 mg/dL (08-19 @ 07:27)   ENT DAILY PROGRESS NOTE - POST OP CHECK    Pt is a 57y Female with large intralaryngeal/base of tongue mass, s/p DL & bx, POD #0. PT seen and examined at bedside, doing well. Pt still very anxious and upset, but otherwise doing ok. Spitting up some bloody mucus, streaked. Some throat discomfort.       REVIEW OF SYSTEMS   [x] A ten-point review of systems was otherwise negative except as noted.    Allergies    penicillin (Swelling)    Intolerances    MEDICATIONS:  acetaminophen     Tablet .. 1000 milliGRAM(s) Oral every 8 hours  acetylcysteine 20% for Nebulization - Peds 3 milliLiter(s) Nebulizer every 6 hours  ALBUTerol    0.083% 2.5 milliGRAM(s) Nebulizer every 6 hours  ALBUTerol    90 MICROgram(s) HFA Inhaler 2 Puff(s) Inhalation every 6 hours PRN  ALPRAZolam 0.5 milliGRAM(s) Oral every 6 hours PRN  ammonium lactate 12% Lotion 1 Application(s) Topical two times a day  dextrose 5%. 1000 milliLiter(s) IV Continuous <Continuous>  dextrose 5%. 1000 milliLiter(s) IV Continuous <Continuous>  dextrose 50% Injectable 25 Gram(s) IV Push once  dextrose 50% Injectable 12.5 Gram(s) IV Push once  dextrose 50% Injectable 25 Gram(s) IV Push once  dextrose Oral Gel 15 Gram(s) Oral once PRN  ergocalciferol 44572 Unit(s) Oral every week  gabapentin 600 milliGRAM(s) Oral three times a day  glucagon  Injectable 1 milliGRAM(s) IntraMuscular once  guaifenesin/dextromethorphan Oral Liquid 20 milliLiter(s) Oral every 6 hours  heparin   Injectable 5000 Unit(s) SubCutaneous every 8 hours  HYDROmorphone   Tablet 4 milliGRAM(s) Enteral Tube every 4 hours PRN  HYDROmorphone  Injectable 0.5 milliGRAM(s) IV Push every 3 hours PRN  insulin glargine Injectable (LANTUS) 20 Unit(s) SubCutaneous at bedtime  insulin lispro (ADMELOG) corrective regimen sliding scale   SubCutaneous three times a day before meals  insulin lispro Injectable (ADMELOG) 6 Unit(s) SubCutaneous three times a day before meals  ipratropium    for Nebulization 500 MICROGram(s) Nebulizer every 6 hours  losartan 100 milliGRAM(s) Oral daily  multivitamin 1 Tablet(s) Oral daily  mupirocin 2% Ointment 1 Application(s) Topical two times a day  pantoprazole   Suspension 40 milliGRAM(s) Oral daily  polyethylene glycol 3350 17 Gram(s) Oral two times a day  senna 2 Tablet(s) Oral at bedtime      Vital Signs Last 24 Hrs  T(C): 36.9 (19 Aug 2022 14:55), Max: 36.9 (19 Aug 2022 14:55)  T(F): 98.4 (19 Aug 2022 14:55), Max: 98.4 (19 Aug 2022 14:55)  HR: 93 (19 Aug 2022 14:55) (71 - 98)  BP: 150/64 (19 Aug 2022 14:55) (106/53 - 150/64)  RR: 20 (19 Aug 2022 14:55) (13 - 26)  SpO2: 96% (19 Aug 2022 14:00) (95% - 100%)    Parameters below as of 19 Aug 2022 14:00  Patient On (Oxygen Delivery Method): T-piece  O2 Flow (L/min): 12  O2 Concentration (%): 40      08-18 @ 07:01  -  08-19 @ 07:00  --------------------------------------------------------  IN:  Total IN: 0 mL    OUT:    Ureteral Catheter (mL): 2075 mL  Total OUT: 2075 mL    Total NET: -2075 mL      PHYSICAL EXAM:    GEN: NAD, awake and alert. No drooling or pooling of secretions. No stridor or stertor.   SKIN: Good color, non diaphoretic  HEENT: Oral mucosa pink and moist. No erythema or edema noted to buccal mucosa, tongue, FOM, uvula or posterior oropharynx. Uvula midline  NECK:  Trachea midline. + cuffed trach in place, no surrounding edema or erythema.  RESP: No dyspnea, non-labored breathing. No use of accessory muscles. on t-piece  CARDIO: +S1/S2  ABDO: Soft, NT. PEG.  EXT: JONAS x 4    LABS:  CBC-                        10.6   7.53  )-----------( 150      ( 19 Aug 2022 07:27 )             31.0     BMP/CMP-  19 Aug 2022 07:27    146    |  105    |  9      ----------------------------<  106    4.5     |  31     |  <0.5     Ca    9.6        19 Aug 2022 07:27  Mg     1.7       19 Aug 2022 07:27    TPro  5.3    /  Alb  3.1    /  TBili  0.3    /  DBili  x      /  AST  14     /  ALT  15     /  AlkPhos  103    19 Aug 2022 07:27    Coagulation Studies-    Endocrine Panel-  Calcium, Total Serum: 9.6 mg/dL (08-19 @ 07:27)

## 2022-08-19 NOTE — PROGRESS NOTE ADULT - ASSESSMENT
Pt is a 57y Female with large intralaryngeal/base of tongue mass, s/p DL & bx, POD #0.    ·	restart Eliquis tomorrow  ·	leave cuff inflated overnight, will deflate in AM (RT and RN aware)  ·	will plan to change out to a cuffless trach on Monday if no issues over the weekend  ·	cont all other medications  ·	cont trach care  ·	will f/u biopsy results  ·	w/d with attng, will follow

## 2022-08-19 NOTE — PROGRESS NOTE ADULT - ASSESSMENT
Unfortunate 56 YO F with acute on chr hypoxic RF read for hypoxia and hypotension and ongoing Bacterial PNA. Hx of ASP PNA, COPD exacerbation and intubation with failure to extubate resulting in trach at [facility], [place] .  The pt is under chronic  at [facility], [place].      Acute on Chr hypoxic RF, mucus plugging  Chronic Tracheostomy  Bacterial PNA  Clinical Debility  Bedbound state  Lower extremity venous insufficiency  BL foot drop, limb weakness, prolonged immobility  Hx of HTN, ASHD HFpEF  Hx of DLD  Hx of COPD, MO, LUCIANA, sp intubation, failure to extubate, chr trach since /[facility], [place]  Hx of DM II  Hx of Ch anemia of ch dis  Hx of GERD, HH, Diverticulosis, Ch constipation  Hx of OA, DJD, DDD, chr pain syn  Hx of lower ext venous stasis dermatitis  Hx of DVT, AC/Eliquis  Hx of Anxiety, Depression         pt stable, A&O, verbal, anxious over upcoming OR  laryngoscopy to R/O laryngeal neoplasm/stricture  pt on trach collar today 40 %, 100% pulse Ox     ENT for OR today, direct laryngoscopy and possible bronchoscopy  Eliquis on hold  NPO   IV: NS at 40cc/hr  lab work, EKG and ECHO reviewed and all acceptable and pt is stable and cleared as a low risk for the procedure  CT of soft tissue of the neck shows  expophytic expansile mucosal mass 4.4x2x2.4cm involving the BL aryepiglottic folds/? supraglottic laryngeal neoplasm  Pul-critical care recall for recommendation prior to OR    Surgery attempted to replace trach to fenestrated tube, the outer tube is fenestrated, the inner is not as suction tube caught in the  fenestrations ( fenestrated inner tube may be placed when pt req less suctioning)  nutrition via PEG for now, allow sm amts of ice chips    cont S&S therapy    low vit D start to replete 50,000 q wk  Venous doppler neg for DVT  AA doppler shows normal blood flow  Vasc consult     ECHO:  nl sys function, EF60%, GIDD, no sig valvular dis  EKG:  NSR 69/min, incomplete RBBB, no acute changes  cont all previous meds  GI prophylaxis  att to bowel regimen  nutrition via PEG/glucerna, add 2 packets of protein per day to feeds  change PEG dressing daily, apply mupirocin  BL inflatable boots to prevcent further foot drop, encourage pt to use them as much as possible    PM: hydromorphone 4mg po q 4 and 1mg IV q 8 for breakthrough pain, gabapentin 600mg q 8, tylenol 650mg QID    Rehab consult for bedside PT, can we get pt OOB to chair, rehab states pt high risk for fall but bedside and  OOB to chair daily, pt highly motivated  BL heel off loading boots, pt with BL foot drop    pt full code  Social SVC pt in cont active care with new fx on CT of neck

## 2022-08-19 NOTE — CHART NOTE - NSCHARTNOTESELECT_GEN_ALL_CORE
ENT/Event Note
trach exchange/Event Note
trach exchange/Event Note
ENT/Event Note
Event Note
Event Note
Follow-Up/Event Note
TRACH EVAL/Event Note
Transfer Note

## 2022-08-20 LAB
ALBUMIN SERPL ELPH-MCNC: 3.1 G/DL — LOW (ref 3.5–5.2)
ALP SERPL-CCNC: 100 U/L — SIGNIFICANT CHANGE UP (ref 30–115)
ALT FLD-CCNC: 13 U/L — SIGNIFICANT CHANGE UP (ref 0–41)
ANION GAP SERPL CALC-SCNC: 10 MMOL/L — SIGNIFICANT CHANGE UP (ref 7–14)
AST SERPL-CCNC: 14 U/L — SIGNIFICANT CHANGE UP (ref 0–41)
BILIRUB SERPL-MCNC: <0.2 MG/DL — SIGNIFICANT CHANGE UP (ref 0.2–1.2)
BUN SERPL-MCNC: 11 MG/DL — SIGNIFICANT CHANGE UP (ref 10–20)
CALCIUM SERPL-MCNC: 9.3 MG/DL — SIGNIFICANT CHANGE UP (ref 8.5–10.1)
CHLORIDE SERPL-SCNC: 97 MMOL/L — LOW (ref 98–110)
CO2 SERPL-SCNC: 30 MMOL/L — SIGNIFICANT CHANGE UP (ref 17–32)
CREAT SERPL-MCNC: 0.5 MG/DL — LOW (ref 0.7–1.5)
EGFR: 109 ML/MIN/1.73M2 — SIGNIFICANT CHANGE UP
GLUCOSE BLDC GLUCOMTR-MCNC: 128 MG/DL — HIGH (ref 70–99)
GLUCOSE BLDC GLUCOMTR-MCNC: 133 MG/DL — HIGH (ref 70–99)
GLUCOSE BLDC GLUCOMTR-MCNC: 161 MG/DL — HIGH (ref 70–99)
GLUCOSE BLDC GLUCOMTR-MCNC: 172 MG/DL — HIGH (ref 70–99)
GLUCOSE SERPL-MCNC: 173 MG/DL — HIGH (ref 70–99)
HCT VFR BLD CALC: 31.1 % — LOW (ref 37–47)
HGB BLD-MCNC: 10.7 G/DL — LOW (ref 12–16)
MAGNESIUM SERPL-MCNC: 1.7 MG/DL — LOW (ref 1.8–2.4)
MCHC RBC-ENTMCNC: 32.4 PG — HIGH (ref 27–31)
MCHC RBC-ENTMCNC: 34.4 G/DL — SIGNIFICANT CHANGE UP (ref 32–37)
MCV RBC AUTO: 94.2 FL — SIGNIFICANT CHANGE UP (ref 81–99)
NRBC # BLD: 0 /100 WBCS — SIGNIFICANT CHANGE UP (ref 0–0)
PLATELET # BLD AUTO: 146 K/UL — SIGNIFICANT CHANGE UP (ref 130–400)
POTASSIUM SERPL-MCNC: 4.4 MMOL/L — SIGNIFICANT CHANGE UP (ref 3.5–5)
POTASSIUM SERPL-SCNC: 4.4 MMOL/L — SIGNIFICANT CHANGE UP (ref 3.5–5)
PROT SERPL-MCNC: 5.4 G/DL — LOW (ref 6–8)
RBC # BLD: 3.3 M/UL — LOW (ref 4.2–5.4)
RBC # FLD: 11.8 % — SIGNIFICANT CHANGE UP (ref 11.5–14.5)
SODIUM SERPL-SCNC: 137 MMOL/L — SIGNIFICANT CHANGE UP (ref 135–146)
WBC # BLD: 6.98 K/UL — SIGNIFICANT CHANGE UP (ref 4.8–10.8)
WBC # FLD AUTO: 6.98 K/UL — SIGNIFICANT CHANGE UP (ref 4.8–10.8)

## 2022-08-20 RX ORDER — MAGNESIUM SULFATE 500 MG/ML
2 VIAL (ML) INJECTION ONCE
Refills: 0 | Status: COMPLETED | OUTPATIENT
Start: 2022-08-20 | End: 2022-08-20

## 2022-08-20 RX ADMIN — Medication 500 MICROGRAM(S): at 20:07

## 2022-08-20 RX ADMIN — Medication 1000 MILLIGRAM(S): at 22:49

## 2022-08-20 RX ADMIN — HYDROMORPHONE HYDROCHLORIDE 4 MILLIGRAM(S): 2 INJECTION INTRAMUSCULAR; INTRAVENOUS; SUBCUTANEOUS at 18:51

## 2022-08-20 RX ADMIN — HYDROMORPHONE HYDROCHLORIDE 0.5 MILLIGRAM(S): 2 INJECTION INTRAMUSCULAR; INTRAVENOUS; SUBCUTANEOUS at 12:00

## 2022-08-20 RX ADMIN — MUPIROCIN 1 APPLICATION(S): 20 OINTMENT TOPICAL at 05:07

## 2022-08-20 RX ADMIN — HYDROMORPHONE HYDROCHLORIDE 0.5 MILLIGRAM(S): 2 INJECTION INTRAMUSCULAR; INTRAVENOUS; SUBCUTANEOUS at 08:19

## 2022-08-20 RX ADMIN — Medication 20 MILLILITER(S): at 13:22

## 2022-08-20 RX ADMIN — POLYETHYLENE GLYCOL 3350 17 GRAM(S): 17 POWDER, FOR SOLUTION ORAL at 17:34

## 2022-08-20 RX ADMIN — Medication 20 MILLILITER(S): at 01:14

## 2022-08-20 RX ADMIN — Medication 1 TABLET(S): at 13:22

## 2022-08-20 RX ADMIN — Medication 3 MILLILITER(S): at 20:07

## 2022-08-20 RX ADMIN — HYDROMORPHONE HYDROCHLORIDE 0.5 MILLIGRAM(S): 2 INJECTION INTRAMUSCULAR; INTRAVENOUS; SUBCUTANEOUS at 14:46

## 2022-08-20 RX ADMIN — ALBUTEROL 2.5 MILLIGRAM(S): 90 AEROSOL, METERED ORAL at 01:34

## 2022-08-20 RX ADMIN — GABAPENTIN 600 MILLIGRAM(S): 400 CAPSULE ORAL at 21:49

## 2022-08-20 RX ADMIN — HYDROMORPHONE HYDROCHLORIDE 0.5 MILLIGRAM(S): 2 INJECTION INTRAMUSCULAR; INTRAVENOUS; SUBCUTANEOUS at 11:39

## 2022-08-20 RX ADMIN — Medication 1000 MILLIGRAM(S): at 13:22

## 2022-08-20 RX ADMIN — HYDROMORPHONE HYDROCHLORIDE 0.5 MILLIGRAM(S): 2 INJECTION INTRAMUSCULAR; INTRAVENOUS; SUBCUTANEOUS at 21:07

## 2022-08-20 RX ADMIN — HYDROMORPHONE HYDROCHLORIDE 0.5 MILLIGRAM(S): 2 INJECTION INTRAMUSCULAR; INTRAVENOUS; SUBCUTANEOUS at 04:46

## 2022-08-20 RX ADMIN — Medication 1000 MILLIGRAM(S): at 21:49

## 2022-08-20 RX ADMIN — Medication 25 GRAM(S): at 17:23

## 2022-08-20 RX ADMIN — Medication 1: at 12:28

## 2022-08-20 RX ADMIN — ALBUTEROL 2.5 MILLIGRAM(S): 90 AEROSOL, METERED ORAL at 09:04

## 2022-08-20 RX ADMIN — Medication 20 MILLILITER(S): at 23:07

## 2022-08-20 RX ADMIN — HYDROMORPHONE HYDROCHLORIDE 4 MILLIGRAM(S): 2 INJECTION INTRAMUSCULAR; INTRAVENOUS; SUBCUTANEOUS at 23:07

## 2022-08-20 RX ADMIN — APIXABAN 5 MILLIGRAM(S): 2.5 TABLET, FILM COATED ORAL at 17:24

## 2022-08-20 RX ADMIN — ALBUTEROL 2.5 MILLIGRAM(S): 90 AEROSOL, METERED ORAL at 14:00

## 2022-08-20 RX ADMIN — MUPIROCIN 1 APPLICATION(S): 20 OINTMENT TOPICAL at 17:25

## 2022-08-20 RX ADMIN — HEPARIN SODIUM 5000 UNIT(S): 5000 INJECTION INTRAVENOUS; SUBCUTANEOUS at 01:13

## 2022-08-20 RX ADMIN — HYDROMORPHONE HYDROCHLORIDE 0.5 MILLIGRAM(S): 2 INJECTION INTRAMUSCULAR; INTRAVENOUS; SUBCUTANEOUS at 17:47

## 2022-08-20 RX ADMIN — PANTOPRAZOLE SODIUM 40 MILLIGRAM(S): 20 TABLET, DELAYED RELEASE ORAL at 13:23

## 2022-08-20 RX ADMIN — Medication 6 UNIT(S): at 12:28

## 2022-08-20 RX ADMIN — Medication 1 APPLICATION(S): at 17:46

## 2022-08-20 RX ADMIN — HYDROMORPHONE HYDROCHLORIDE 4 MILLIGRAM(S): 2 INJECTION INTRAMUSCULAR; INTRAVENOUS; SUBCUTANEOUS at 04:13

## 2022-08-20 RX ADMIN — HYDROMORPHONE HYDROCHLORIDE 0.5 MILLIGRAM(S): 2 INJECTION INTRAMUSCULAR; INTRAVENOUS; SUBCUTANEOUS at 20:52

## 2022-08-20 RX ADMIN — Medication 20 MILLILITER(S): at 17:25

## 2022-08-20 RX ADMIN — LOSARTAN POTASSIUM 100 MILLIGRAM(S): 100 TABLET, FILM COATED ORAL at 05:08

## 2022-08-20 RX ADMIN — Medication 6 UNIT(S): at 17:33

## 2022-08-20 RX ADMIN — HYDROMORPHONE HYDROCHLORIDE 0.5 MILLIGRAM(S): 2 INJECTION INTRAMUSCULAR; INTRAVENOUS; SUBCUTANEOUS at 07:51

## 2022-08-20 RX ADMIN — HYDROMORPHONE HYDROCHLORIDE 0.5 MILLIGRAM(S): 2 INJECTION INTRAMUSCULAR; INTRAVENOUS; SUBCUTANEOUS at 01:17

## 2022-08-20 RX ADMIN — Medication 1000 MILLIGRAM(S): at 05:07

## 2022-08-20 RX ADMIN — Medication 3 MILLILITER(S): at 08:36

## 2022-08-20 RX ADMIN — HYDROMORPHONE HYDROCHLORIDE 4 MILLIGRAM(S): 2 INJECTION INTRAMUSCULAR; INTRAVENOUS; SUBCUTANEOUS at 18:40

## 2022-08-20 RX ADMIN — ALBUTEROL 2.5 MILLIGRAM(S): 90 AEROSOL, METERED ORAL at 20:07

## 2022-08-20 RX ADMIN — Medication 0.5 MILLIGRAM(S): at 20:52

## 2022-08-20 RX ADMIN — Medication 1000 MILLIGRAM(S): at 13:23

## 2022-08-20 RX ADMIN — GABAPENTIN 600 MILLIGRAM(S): 400 CAPSULE ORAL at 05:14

## 2022-08-20 RX ADMIN — Medication 3 MILLILITER(S): at 15:10

## 2022-08-20 RX ADMIN — HYDROMORPHONE HYDROCHLORIDE 4 MILLIGRAM(S): 2 INJECTION INTRAMUSCULAR; INTRAVENOUS; SUBCUTANEOUS at 11:07

## 2022-08-20 RX ADMIN — INSULIN GLARGINE 20 UNIT(S): 100 INJECTION, SOLUTION SUBCUTANEOUS at 21:49

## 2022-08-20 RX ADMIN — Medication 0.5 MILLIGRAM(S): at 01:16

## 2022-08-20 RX ADMIN — Medication 0.5 MILLIGRAM(S): at 14:46

## 2022-08-20 RX ADMIN — ERGOCALCIFEROL 50000 UNIT(S): 1.25 CAPSULE ORAL at 13:22

## 2022-08-20 RX ADMIN — Medication 3 MILLILITER(S): at 01:34

## 2022-08-20 RX ADMIN — GABAPENTIN 600 MILLIGRAM(S): 400 CAPSULE ORAL at 13:22

## 2022-08-20 RX ADMIN — HYDROMORPHONE HYDROCHLORIDE 0.5 MILLIGRAM(S): 2 INJECTION INTRAMUSCULAR; INTRAVENOUS; SUBCUTANEOUS at 18:19

## 2022-08-20 RX ADMIN — Medication 500 MICROGRAM(S): at 01:33

## 2022-08-20 RX ADMIN — HYDROMORPHONE HYDROCHLORIDE 4 MILLIGRAM(S): 2 INJECTION INTRAMUSCULAR; INTRAVENOUS; SUBCUTANEOUS at 10:42

## 2022-08-20 RX ADMIN — Medication 1 APPLICATION(S): at 05:14

## 2022-08-20 RX ADMIN — HYDROMORPHONE HYDROCHLORIDE 0.5 MILLIGRAM(S): 2 INJECTION INTRAMUSCULAR; INTRAVENOUS; SUBCUTANEOUS at 15:20

## 2022-08-20 RX ADMIN — Medication 0.5 MILLIGRAM(S): at 08:34

## 2022-08-20 RX ADMIN — APIXABAN 5 MILLIGRAM(S): 2.5 TABLET, FILM COATED ORAL at 05:07

## 2022-08-20 NOTE — PROGRESS NOTE ADULT - ASSESSMENT
Unfortunate 58 YO F with acute on chr hypoxic RF read for hypoxia and hypotension and ongoing Bacterial PNA. Hx of ASP PNA, COPD exacerbation and intubation with failure to extubate resulting in trach at [facility] .  The pt is under chronic  at [facility].      Acute on Chr hypoxic RF  Chronic Tracheostomy  Bacterial PNA  Clinical Debility  Bedbound state  Lower extremity venous insufficiency  BL foot drop, limb weakness, prolonged immobility  Direct Laryngoscopy in the OR , biopsy of laryngeal mass  Hx of HTN, ASHD HFpEF  Hx of DLD  Hx of COPD, MO, LUCIANA, sp intubation, failure to extubate, chr trach since /[facility]  Hx of DM II  Hx of Ch anemia of ch dis  Hx of GERD, HH, Diverticulosis, Ch constipation  Hx of OA, DJD, DDD, chr pain syn  Hx of lower ext venous stasis dermatitis  Hx of DVT, AC/Eliquis  Hx of Anxiety, Depression       pt sad over the news that a mass was found on the laryngoscopy yesterday  pt's VS stable POD1 OR direct laryngoscopy, fx noted "large intralaryngeal/base of tongue mass, bx taken  sm amt of bloody sputum  ENT ff up appreciated, readjusted trach as it was being pulled by T tube  restarted Eliquis  restarted Peg feeds  IV: NS at 40cc/hr  CT of soft tissue of the neck shows  exophytic expansile mucosal mass 4.4x2x2.4cm involving the BL aryepiglottic folds/? supraglottic laryngeal neoplasm  Pul-critical care     S&S therapy    low vit D start to replete 50,000 q wk  Venous doppler neg for DVT  AA doppler shows normal blood flow  Vasc consult     ECHO:  nl sys function, EF60%, GIDD, no sig valvular dis  EKG:  NSR 69/min, incomplete RBBB, no acute changes  cont all previous meds  GI prophylaxis  att to bowel regimen    change PEG dressing daily, apply mupirocin  BL inflatable boots to prevcent further foot drop, encourage pt to use them as much as possible    PM: hydromorphone 4mg po q 4 and 1mg IV q 8 for breakthrough pain, gabapentin 600mg q 8, tylenol 650mg QID    BL heel off loading boots, pt with BL foot drop    pt full code  Social SVC pt in cont active care with new fx on CT of neck

## 2022-08-20 NOTE — PROGRESS NOTE ADULT - ASSESSMENT
Pt is a 57y Female with large intralaryngeal/base of tongue mass, s/p DL & bx, POD #1.     ·	cont trach care, t-piece was pulling trach out of stoma, readjusted at bedside - RN alerted, will continue to check  ·	ok to start Eliquis this AM - balloon deflated, no bloody secretions noted  ·	will exchange trach for a cuffless tube on Monday if no issues over the weekend  ·	follow up path results  ·	w/d with attng, will follow

## 2022-08-20 NOTE — PROGRESS NOTE ADULT - SUBJECTIVE AND OBJECTIVE BOX
ENT DAILY PROGRESS NOTE    Pt is a 57y Female with large intralaryngeal/base of tongue mass, s/p DL & bx, POD #1. PT seen and examined at bedside, doing well. Noted some bloody sputum again this AM, less than yesterday. Pt denies any throat discomfort. No SOB/diff breathing.       REVIEW OF SYSTEMS   [x] A ten-point review of systems was otherwise negative except as noted.    Allergies    penicillin (Swelling)    Intolerances      MEDICATIONS:  acetaminophen  Tablet .. 1000 milliGRAM(s) Oral every 8 hours  acetylcysteine 20% for Nebulization - Peds 3 milliLiter(s) Nebulizer every 6 hours  ALBUTerol 0.083% 2.5 milliGRAM(s) Nebulizer every 6 hours  ALBUTerol 90 MICROgram(s) HFA Inhaler 2 Puff(s) Inhalation every 6 hours PRN  ALPRAZolam 0.5 milliGRAM(s) Oral every 6 hours PRN  ammonium lactate 12% Lotion 1 Application(s) Topical two times a day  apixaban 5 milliGRAM(s) Enteral Tube every 12 hours  dextrose 5%. 1000 milliLiter(s) IV Continuous <Continuous>  dextrose 5%. 1000 milliLiter(s) IV Continuous <Continuous>  dextrose 50% Injectable 25 Gram(s) IV Push once  dextrose 50% Injectable 12.5 Gram(s) IV Push once  dextrose 50% Injectable 25 Gram(s) IV Push once  dextrose Oral Gel 15 Gram(s) Oral once PRN  ergocalciferol 38218 Unit(s) Oral every week  gabapentin 600 milliGRAM(s) Oral three times a day  glucagon  Injectable 1 milliGRAM(s) IntraMuscular once  guaifenesin/dextromethorphan Oral Liquid 20 milliLiter(s) Oral every 6 hours  HYDROmorphone Tablet 4 milliGRAM(s) Enteral Tube every 4 hours PRN  HYDROmorphone  Injectable 0.5 milliGRAM(s) IV Push every 3 hours PRN  insulin glargine Injectable (LANTUS) 20 Unit(s) SubCutaneous at bedtime  insulin lispro (ADMELOG) corrective regimen sliding scale   SubCutaneous three times a day before meals  insulin lispro Injectable (ADMELOG) 6 Unit(s) SubCutaneous three times a day before meals  ipratropium  for Nebulization 500 MICROGram(s) Nebulizer every 6 hours  losartan 100 milliGRAM(s) Oral daily  multivitamin 1 Tablet(s) Oral daily  mupirocin 2% Ointment 1 Application(s) Topical two times a day  pantoprazole Suspension 40 milliGRAM(s) Oral daily  polyethylene glycol 3350 17 Gram(s) Oral two times a day  senna 2 Tablet(s) Oral at bedtime      Vital Signs Last 24 Hrs  T(C): 36.5 (20 Aug 2022 04:33), Max: 36.9 (19 Aug 2022 14:55)  T(F): 97.7 (20 Aug 2022 04:33), Max: 98.4 (19 Aug 2022 14:55)  HR: 75 (20 Aug 2022 04:33) (75 - 98)  BP: 121/58 (20 Aug 2022 04:33) (106/53 - 150/64)  RR: 18 (20 Aug 2022 04:33) (13 - 26)  SpO2: 96% (19 Aug 2022 14:00) (96% - 100%)    Parameters below as of 19 Aug 2022 14:00  Patient On (Oxygen Delivery Method): T-piece  O2 Flow (L/min): 12  O2 Concentration (%): 40      08-19 @ 07:01  -  08-20 @ 07:00  --------------------------------------------------------  IN:    Enteral Tube Flush: 50 mL    Glucerna: 300 mL  Total IN: 350 mL    OUT:    Ureteral Catheter (mL): 700 mL    Voided (mL): 700 mL  Total OUT: 1400 mL    Total NET: -1050 mL      PHYSICAL EXAM:    GEN: NAD, awake and alert. No drooling or pooling of secretions. No stridor or stertor. aphonic 2* trach, balloon inflated.   SKIN: Good color, non diaphoretic  HEENT: Oral mucosa pink and moist. No erythema or edema noted to buccal mucosa, tongue, FOM, uvula or posterior oropharynx. Uvula midline. no bleeding noted.   NECK:  Trachea midline. trach tube with cuff inflated, deflated at bedside and suctioned, no bloody secretions noted. tracheostomy tube noted to be pulled out by t-piece, trach tube readjusted and secured tightly with strap.   RESP: No dyspnea, non-labored breathing. No use of accessory muscles.  CARDIO: +S1/S2  ABDO: Soft, NT. PEG tube.   EXT: JONAS x 4    LABS:  CBC-                        10.7   6.98  )-----------( 146      ( 20 Aug 2022 09:18 )             31.1     BMP/CMP-  19 Aug 2022 07:27    146    |  105    |  9      ----------------------------<  106    4.5     |  31     |  <0.5     Ca    9.6        19 Aug 2022 07:27  Mg     1.7       19 Aug 2022 07:27    TPro  5.3    /  Alb  3.1    /  TBili  0.3    /  DBili  x      /  AST  14     /  ALT  15     /  AlkPhos  103    19 Aug 2022 07:27

## 2022-08-20 NOTE — PROGRESS NOTE ADULT - SUBJECTIVE AND OBJECTIVE BOX
CRUZ DUMONT 56yo  Female sent to the ER from [facility], [place], operated by [facility] where she is on  care for c/o inc SOB, chest tightness, diff breathing, wheezing i.e. acute on chr hypoxic RF.  Of note the pt has a trach and has had freq readmissions for mucous plugging, RF and has had several bronchoscopies.   EMS noted the pt to be hypotensive and hypoxic.  The pt was vigorously suctioned, received Neb Tx, IV steroids,  BP revived with IV fluids.  The pt was cultured and placed on ABx.  She is v well known to the Pul [facility] and to ID.  The pt is ad with acute on ch hypoxic RF and ongoing Bacterial PNA.  The PMHx includes:  HTN, ASHD, HFpEF, DLD, DM II, Chronic RF, COPD, LUCIANA, ASp PNA, sp intubation, failure to extubate, chronic trach ([facility] 4/22), recurrent mucus plugging of airways, SDVT, AC with Eliquis, lower ext venous stasis dermatitis, bedbound state, GERD, diverticulosis, sp PEG, OA, DDD, DJD, chronic pain syn, depression anxiety.    INTERVAL HPI/OVERNIGHT EVENTS: VS stable, sm amt of bloody sputum,  POD1 direct layrygoscopy, fx: large intralaryngeal/base of tongue mass, bx done, path P, ENT ff up appreciated, trach tube readjusted, Eliquis restarted, Mg low 1.7    MEDICATIONS  (STANDING):  acetaminophen     Tablet .. 650 milliGRAM(s) Oral every 6 hours  acetylcysteine 20% for Nebulization - Peds 3 milliLiter(s) Nebulizer every 6 hours  ALBUTerol    0.083% 2.5 milliGRAM(s) Nebulizer every 6 hours  ammonium lactate 12% Lotion 1 Application(s) Topical two times a day  apixaban 5 milliGRAM(s) Enteral Tube two times a day  chlorhexidine 0.12% Liquid 15 milliLiter(s) Oral Mucosa every 12 hours  dextrose 5%. 1000 milliLiter(s) (100 mL/Hr) IV Continuous <Continuous>  dextrose 5%. 1000 milliLiter(s) (50 mL/Hr) IV Continuous <Continuous>  dextrose 50% Injectable 25 Gram(s) IV Push once  dextrose 50% Injectable 12.5 Gram(s) IV Push once  dextrose 50% Injectable 25 Gram(s) IV Push once  doxycycline monohydrate Capsule 100 milliGRAM(s) Oral every 12 hours  gabapentin 600 milliGRAM(s) Oral three times a day  glucagon  Injectable 1 milliGRAM(s) IntraMuscular once  insulin glargine Injectable (LANTUS) 20 Unit(s) SubCutaneous at bedtime  insulin lispro (ADMELOG) corrective regimen sliding scale   SubCutaneous three times a day before meals  insulin lispro Injectable (ADMELOG) 6 Unit(s) SubCutaneous three times a day before meals  methylPREDNISolone sodium succinate Injectable 60 milliGRAM(s) IV Push two times a day  multivitamin 1 Tablet(s) Oral daily  pantoprazole   Suspension 40 milliGRAM(s) Oral daily  polyethylene glycol 3350 17 Gram(s) Oral two times a day  senna 2 Tablet(s) Oral at bedtime    MEDICATIONS  (PRN):  ALBUTerol    90 MICROgram(s) HFA Inhaler 2 Puff(s) Inhalation every 6 hours PRN Shortness of Breath and/or Wheezing  ALPRAZolam 0.5 milliGRAM(s) Oral every 6 hours PRN anxiety  dextrose Oral Gel 15 Gram(s) Oral once PRN Blood Glucose LESS THAN 70 milliGRAM(s)/deciliter  HYDROmorphone   Tablet 4 milliGRAM(s) Oral every 4 hours PRN Severe Pain (7 - 10)  HYDROmorphone  Injectable 1 milliGRAM(s) IV Push every 8 hours PRN Moderate Pain (4 - 6)      Allergies    penicillin (Swelling)      Vital Signs Last 24 Hrs    T(F): 98.1  HR:  76  BP:  145/65    RR: 17  SpO2:  95%, trach collar 40%    PHYSICAL EXAM:      Constitutional: pt alert, oriented, sad over fx of mass on layryngoscopy    Eyes: nonicteric    ENMT: dry oral mucosa, dental defects,     Neck: + trach tube supple, no stridor, + trach,     Respiratory: shallow resp, diminished harsh bronchial  BS, scattered rhonchi, no wheezing    Cardiovascular: S1S2 reg    Gastrointestinal: globular, soft and benign, + PEG, + BS    Genitourinary:    Extremities: moves all ext, dec motor strength of lower ext, + BL mm atrophy, + BL foot drop    Vascular: dec pedal pulses    Neurological: nonfocal    Skin: lower ext dark almost black hyperpigmentation of lower ext    Lymph Nodes: not enlarged    Musculoskeletal: dec mm mass and tone    Psychiatric: anxious, depressed but stable        LABS:                10.7  6.98)-----------( 146            31      137  |  97  |  11  ----------------------------<  173  4.4   |  30  |  0.5    , 115, 123, 109, 115, 109  Ca    9.7       Mg     1.9, 1.8, 2.0, 1.6, 1.7, 1.8, 2.6, 1.7  trop <0.01  Vit D 18  folate 9.7  Haptos 126  ferritin 982  TPro  5.9, 5.5, 5.4  /  Alb  3.3, 3.4, 3.3  /  TBili  0.3  /  DBili  x   /  AST  20  /  ALT  28  /  AlkPhos  77  08-05    RADIOLOGY & ADDITIONAL TESTS:    EKG:  NSR R axis, low voltage, nonspecific ST-T changed  EKG 8/17:  NSR 69/min, incomplete RBBB, no acute changes  ECHO:  nl sys function, EF 60%, GIDD, so sig valvular dis, borderline Pul HTN, sm pericardial eff  CXR:  interstitial prominence, L pl based opacity unchanged  AA doppler:  normal arterial blood flow  CT of soft tissue of the neck 8/16/22:  exophytic expansile mucosal mass 4.4x2x2.4cm involving the BL aryepiglotticfolds and along the BL glossoepiglottic folds as well as along the  laryngea/lingual surface of the epiglottis, 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

## 2022-08-21 LAB
ALBUMIN SERPL ELPH-MCNC: 3.2 G/DL — LOW (ref 3.5–5.2)
ALP SERPL-CCNC: 99 U/L — SIGNIFICANT CHANGE UP (ref 30–115)
ALT FLD-CCNC: 12 U/L — SIGNIFICANT CHANGE UP (ref 0–41)
ANION GAP SERPL CALC-SCNC: 8 MMOL/L — SIGNIFICANT CHANGE UP (ref 7–14)
AST SERPL-CCNC: 12 U/L — SIGNIFICANT CHANGE UP (ref 0–41)
BASOPHILS # BLD AUTO: 0.04 K/UL — SIGNIFICANT CHANGE UP (ref 0–0.2)
BASOPHILS NFR BLD AUTO: 0.5 % — SIGNIFICANT CHANGE UP (ref 0–1)
BILIRUB SERPL-MCNC: <0.2 MG/DL — SIGNIFICANT CHANGE UP (ref 0.2–1.2)
BUN SERPL-MCNC: 10 MG/DL — SIGNIFICANT CHANGE UP (ref 10–20)
CALCIUM SERPL-MCNC: 9.5 MG/DL — SIGNIFICANT CHANGE UP (ref 8.5–10.1)
CHLORIDE SERPL-SCNC: 96 MMOL/L — LOW (ref 98–110)
CO2 SERPL-SCNC: 31 MMOL/L — SIGNIFICANT CHANGE UP (ref 17–32)
CREAT SERPL-MCNC: <0.5 MG/DL — LOW (ref 0.7–1.5)
EGFR: 115 ML/MIN/1.73M2 — SIGNIFICANT CHANGE UP
EOSINOPHIL # BLD AUTO: 0.19 K/UL — SIGNIFICANT CHANGE UP (ref 0–0.7)
EOSINOPHIL NFR BLD AUTO: 2.5 % — SIGNIFICANT CHANGE UP (ref 0–8)
GLUCOSE BLDC GLUCOMTR-MCNC: 110 MG/DL — HIGH (ref 70–99)
GLUCOSE BLDC GLUCOMTR-MCNC: 116 MG/DL — HIGH (ref 70–99)
GLUCOSE BLDC GLUCOMTR-MCNC: 123 MG/DL — HIGH (ref 70–99)
GLUCOSE BLDC GLUCOMTR-MCNC: 94 MG/DL — SIGNIFICANT CHANGE UP (ref 70–99)
GLUCOSE SERPL-MCNC: 119 MG/DL — HIGH (ref 70–99)
HCT VFR BLD CALC: 31.9 % — LOW (ref 37–47)
HGB BLD-MCNC: 10.7 G/DL — LOW (ref 12–16)
IMM GRANULOCYTES NFR BLD AUTO: 0.4 % — HIGH (ref 0.1–0.3)
LYMPHOCYTES # BLD AUTO: 1.75 K/UL — SIGNIFICANT CHANGE UP (ref 1.2–3.4)
LYMPHOCYTES # BLD AUTO: 22.6 % — SIGNIFICANT CHANGE UP (ref 20.5–51.1)
MAGNESIUM SERPL-MCNC: 1.8 MG/DL — SIGNIFICANT CHANGE UP (ref 1.8–2.4)
MCHC RBC-ENTMCNC: 31.4 PG — HIGH (ref 27–31)
MCHC RBC-ENTMCNC: 33.5 G/DL — SIGNIFICANT CHANGE UP (ref 32–37)
MCV RBC AUTO: 93.5 FL — SIGNIFICANT CHANGE UP (ref 81–99)
MONOCYTES # BLD AUTO: 0.49 K/UL — SIGNIFICANT CHANGE UP (ref 0.1–0.6)
MONOCYTES NFR BLD AUTO: 6.3 % — SIGNIFICANT CHANGE UP (ref 1.7–9.3)
NEUTROPHILS # BLD AUTO: 5.23 K/UL — SIGNIFICANT CHANGE UP (ref 1.4–6.5)
NEUTROPHILS NFR BLD AUTO: 67.7 % — SIGNIFICANT CHANGE UP (ref 42.2–75.2)
NRBC # BLD: 0 /100 WBCS — SIGNIFICANT CHANGE UP (ref 0–0)
PLATELET # BLD AUTO: 154 K/UL — SIGNIFICANT CHANGE UP (ref 130–400)
POTASSIUM SERPL-MCNC: 4 MMOL/L — SIGNIFICANT CHANGE UP (ref 3.5–5)
POTASSIUM SERPL-SCNC: 4 MMOL/L — SIGNIFICANT CHANGE UP (ref 3.5–5)
PROT SERPL-MCNC: 5.5 G/DL — LOW (ref 6–8)
RBC # BLD: 3.41 M/UL — LOW (ref 4.2–5.4)
RBC # FLD: 11.9 % — SIGNIFICANT CHANGE UP (ref 11.5–14.5)
SODIUM SERPL-SCNC: 135 MMOL/L — SIGNIFICANT CHANGE UP (ref 135–146)
WBC # BLD: 7.73 K/UL — SIGNIFICANT CHANGE UP (ref 4.8–10.8)
WBC # FLD AUTO: 7.73 K/UL — SIGNIFICANT CHANGE UP (ref 4.8–10.8)

## 2022-08-21 PROCEDURE — 99231 SBSQ HOSP IP/OBS SF/LOW 25: CPT

## 2022-08-21 RX ORDER — FLUCONAZOLE 150 MG/1
150 TABLET ORAL ONCE
Refills: 0 | Status: COMPLETED | OUTPATIENT
Start: 2022-08-21 | End: 2022-08-21

## 2022-08-21 RX ORDER — GABAPENTIN 400 MG/1
600 CAPSULE ORAL THREE TIMES A DAY
Refills: 0 | Status: DISCONTINUED | OUTPATIENT
Start: 2022-08-21 | End: 2022-08-26

## 2022-08-21 RX ORDER — NYSTATIN CREAM 100000 [USP'U]/G
1 CREAM TOPICAL
Refills: 0 | Status: DISCONTINUED | OUTPATIENT
Start: 2022-08-21 | End: 2022-08-26

## 2022-08-21 RX ADMIN — Medication 1000 MILLIGRAM(S): at 06:47

## 2022-08-21 RX ADMIN — HYDROMORPHONE HYDROCHLORIDE 0.5 MILLIGRAM(S): 2 INJECTION INTRAMUSCULAR; INTRAVENOUS; SUBCUTANEOUS at 15:10

## 2022-08-21 RX ADMIN — Medication 1 APPLICATION(S): at 17:44

## 2022-08-21 RX ADMIN — Medication 0.5 MILLIGRAM(S): at 21:03

## 2022-08-21 RX ADMIN — Medication 1 TABLET(S): at 11:12

## 2022-08-21 RX ADMIN — HYDROMORPHONE HYDROCHLORIDE 4 MILLIGRAM(S): 2 INJECTION INTRAMUSCULAR; INTRAVENOUS; SUBCUTANEOUS at 00:07

## 2022-08-21 RX ADMIN — PANTOPRAZOLE SODIUM 40 MILLIGRAM(S): 20 TABLET, DELAYED RELEASE ORAL at 11:12

## 2022-08-21 RX ADMIN — GABAPENTIN 600 MILLIGRAM(S): 400 CAPSULE ORAL at 14:28

## 2022-08-21 RX ADMIN — HYDROMORPHONE HYDROCHLORIDE 4 MILLIGRAM(S): 2 INJECTION INTRAMUSCULAR; INTRAVENOUS; SUBCUTANEOUS at 04:15

## 2022-08-21 RX ADMIN — HYDROMORPHONE HYDROCHLORIDE 0.5 MILLIGRAM(S): 2 INJECTION INTRAMUSCULAR; INTRAVENOUS; SUBCUTANEOUS at 17:52

## 2022-08-21 RX ADMIN — Medication 6 UNIT(S): at 12:24

## 2022-08-21 RX ADMIN — HYDROMORPHONE HYDROCHLORIDE 4 MILLIGRAM(S): 2 INJECTION INTRAMUSCULAR; INTRAVENOUS; SUBCUTANEOUS at 21:22

## 2022-08-21 RX ADMIN — Medication 1000 MILLIGRAM(S): at 14:06

## 2022-08-21 RX ADMIN — Medication 1000 MILLIGRAM(S): at 21:03

## 2022-08-21 RX ADMIN — HYDROMORPHONE HYDROCHLORIDE 4 MILLIGRAM(S): 2 INJECTION INTRAMUSCULAR; INTRAVENOUS; SUBCUTANEOUS at 11:59

## 2022-08-21 RX ADMIN — Medication 3 MILLILITER(S): at 08:49

## 2022-08-21 RX ADMIN — Medication 20 MILLILITER(S): at 23:27

## 2022-08-21 RX ADMIN — Medication 20 MILLILITER(S): at 05:40

## 2022-08-21 RX ADMIN — Medication 3 MILLILITER(S): at 22:32

## 2022-08-21 RX ADMIN — NYSTATIN CREAM 1 APPLICATION(S): 100000 CREAM TOPICAL at 18:33

## 2022-08-21 RX ADMIN — HYDROMORPHONE HYDROCHLORIDE 0.5 MILLIGRAM(S): 2 INJECTION INTRAMUSCULAR; INTRAVENOUS; SUBCUTANEOUS at 06:34

## 2022-08-21 RX ADMIN — Medication 500 MICROGRAM(S): at 22:54

## 2022-08-21 RX ADMIN — MUPIROCIN 1 APPLICATION(S): 20 OINTMENT TOPICAL at 06:48

## 2022-08-21 RX ADMIN — Medication 3 MILLILITER(S): at 13:28

## 2022-08-21 RX ADMIN — Medication 0.5 MILLIGRAM(S): at 12:33

## 2022-08-21 RX ADMIN — APIXABAN 5 MILLIGRAM(S): 2.5 TABLET, FILM COATED ORAL at 05:41

## 2022-08-21 RX ADMIN — MUPIROCIN 1 APPLICATION(S): 20 OINTMENT TOPICAL at 17:48

## 2022-08-21 RX ADMIN — Medication 6 UNIT(S): at 17:45

## 2022-08-21 RX ADMIN — Medication 20 MILLILITER(S): at 17:45

## 2022-08-21 RX ADMIN — ALBUTEROL 2.5 MILLIGRAM(S): 90 AEROSOL, METERED ORAL at 08:48

## 2022-08-21 RX ADMIN — HYDROMORPHONE HYDROCHLORIDE 0.5 MILLIGRAM(S): 2 INJECTION INTRAMUSCULAR; INTRAVENOUS; SUBCUTANEOUS at 21:04

## 2022-08-21 RX ADMIN — HYDROMORPHONE HYDROCHLORIDE 0.5 MILLIGRAM(S): 2 INJECTION INTRAMUSCULAR; INTRAVENOUS; SUBCUTANEOUS at 11:11

## 2022-08-21 RX ADMIN — ALBUTEROL 2.5 MILLIGRAM(S): 90 AEROSOL, METERED ORAL at 13:29

## 2022-08-21 RX ADMIN — Medication 1000 MILLIGRAM(S): at 05:41

## 2022-08-21 RX ADMIN — Medication 6 UNIT(S): at 08:05

## 2022-08-21 RX ADMIN — GABAPENTIN 600 MILLIGRAM(S): 400 CAPSULE ORAL at 21:04

## 2022-08-21 RX ADMIN — Medication 1 APPLICATION(S): at 05:42

## 2022-08-21 RX ADMIN — INSULIN GLARGINE 20 UNIT(S): 100 INJECTION, SOLUTION SUBCUTANEOUS at 22:28

## 2022-08-21 RX ADMIN — POLYETHYLENE GLYCOL 3350 17 GRAM(S): 17 POWDER, FOR SOLUTION ORAL at 05:41

## 2022-08-21 RX ADMIN — HYDROMORPHONE HYDROCHLORIDE 4 MILLIGRAM(S): 2 INJECTION INTRAMUSCULAR; INTRAVENOUS; SUBCUTANEOUS at 11:11

## 2022-08-21 RX ADMIN — Medication 1000 MILLIGRAM(S): at 22:15

## 2022-08-21 RX ADMIN — Medication 20 MILLILITER(S): at 11:11

## 2022-08-21 RX ADMIN — ALBUTEROL 2.5 MILLIGRAM(S): 90 AEROSOL, METERED ORAL at 22:32

## 2022-08-21 RX ADMIN — HYDROMORPHONE HYDROCHLORIDE 0.5 MILLIGRAM(S): 2 INJECTION INTRAMUSCULAR; INTRAVENOUS; SUBCUTANEOUS at 11:45

## 2022-08-21 RX ADMIN — HYDROMORPHONE HYDROCHLORIDE 0.5 MILLIGRAM(S): 2 INJECTION INTRAMUSCULAR; INTRAVENOUS; SUBCUTANEOUS at 03:29

## 2022-08-21 RX ADMIN — HYDROMORPHONE HYDROCHLORIDE 4 MILLIGRAM(S): 2 INJECTION INTRAMUSCULAR; INTRAVENOUS; SUBCUTANEOUS at 15:35

## 2022-08-21 RX ADMIN — HYDROMORPHONE HYDROCHLORIDE 0.5 MILLIGRAM(S): 2 INJECTION INTRAMUSCULAR; INTRAVENOUS; SUBCUTANEOUS at 18:15

## 2022-08-21 RX ADMIN — APIXABAN 5 MILLIGRAM(S): 2.5 TABLET, FILM COATED ORAL at 17:47

## 2022-08-21 RX ADMIN — HYDROMORPHONE HYDROCHLORIDE 0.5 MILLIGRAM(S): 2 INJECTION INTRAMUSCULAR; INTRAVENOUS; SUBCUTANEOUS at 14:51

## 2022-08-21 RX ADMIN — Medication 0.5 MILLIGRAM(S): at 05:43

## 2022-08-21 RX ADMIN — SENNA PLUS 2 TABLET(S): 8.6 TABLET ORAL at 21:05

## 2022-08-21 RX ADMIN — LOSARTAN POTASSIUM 100 MILLIGRAM(S): 100 TABLET, FILM COATED ORAL at 05:41

## 2022-08-21 RX ADMIN — Medication 1000 MILLIGRAM(S): at 15:10

## 2022-08-21 RX ADMIN — HYDROMORPHONE HYDROCHLORIDE 0.5 MILLIGRAM(S): 2 INJECTION INTRAMUSCULAR; INTRAVENOUS; SUBCUTANEOUS at 03:13

## 2022-08-21 RX ADMIN — FLUCONAZOLE 150 MILLIGRAM(S): 150 TABLET ORAL at 18:33

## 2022-08-21 RX ADMIN — GABAPENTIN 600 MILLIGRAM(S): 400 CAPSULE ORAL at 05:40

## 2022-08-21 NOTE — PROGRESS NOTE ADULT - SUBJECTIVE AND OBJECTIVE BOX
Medicine Progress Note    Patient is a 57y old  Female who presents with a chief complaint of Hypoxia, acute on chronic RF, trach in situ (20 Aug 2022 17:48)      SUBJECTIVE / OVERNIGHT EVENTS: PT seen and examined at bedside, doing well.    ADDITIONAL REVIEW OF SYSTEMS:    MEDICATIONS  (STANDING):  acetaminophen     Tablet .. 1000 milliGRAM(s) Oral every 8 hours  acetylcysteine 20% for Nebulization - Peds 3 milliLiter(s) Nebulizer every 6 hours  ALBUTerol    0.083% 2.5 milliGRAM(s) Nebulizer every 6 hours  ammonium lactate 12% Lotion 1 Application(s) Topical two times a day  apixaban 5 milliGRAM(s) Enteral Tube every 12 hours  dextrose 5%. 1000 milliLiter(s) (100 mL/Hr) IV Continuous <Continuous>  dextrose 5%. 1000 milliLiter(s) (50 mL/Hr) IV Continuous <Continuous>  dextrose 50% Injectable 25 Gram(s) IV Push once  dextrose 50% Injectable 12.5 Gram(s) IV Push once  dextrose 50% Injectable 25 Gram(s) IV Push once  ergocalciferol 84589 Unit(s) Oral every week  gabapentin 600 milliGRAM(s) Oral three times a day  glucagon  Injectable 1 milliGRAM(s) IntraMuscular once  guaifenesin/dextromethorphan Oral Liquid 20 milliLiter(s) Oral every 6 hours  insulin glargine Injectable (LANTUS) 20 Unit(s) SubCutaneous at bedtime  insulin lispro (ADMELOG) corrective regimen sliding scale   SubCutaneous three times a day before meals  insulin lispro Injectable (ADMELOG) 6 Unit(s) SubCutaneous three times a day before meals  ipratropium    for Nebulization 500 MICROGram(s) Nebulizer every 6 hours  losartan 100 milliGRAM(s) Oral daily  multivitamin 1 Tablet(s) Oral daily  mupirocin 2% Ointment 1 Application(s) Topical two times a day  pantoprazole   Suspension 40 milliGRAM(s) Oral daily  polyethylene glycol 3350 17 Gram(s) Oral two times a day  senna 2 Tablet(s) Oral at bedtime    MEDICATIONS  (PRN):  ALBUTerol    90 MICROgram(s) HFA Inhaler 2 Puff(s) Inhalation every 6 hours PRN Shortness of Breath and/or Wheezing  ALPRAZolam 0.5 milliGRAM(s) Oral every 6 hours PRN anxiety  dextrose Oral Gel 15 Gram(s) Oral once PRN Blood Glucose LESS THAN 70 milliGRAM(s)/deciliter  HYDROmorphone   Tablet 4 milliGRAM(s) Enteral Tube every 4 hours PRN Severe Pain (7 - 10)  HYDROmorphone  Injectable 0.5 milliGRAM(s) IV Push every 3 hours PRN Moderate Pain (4 - 6)    CAPILLARY BLOOD GLUCOSE      POCT Blood Glucose.: 133 mg/dL (20 Aug 2022 21:39)  POCT Blood Glucose.: 128 mg/dL (20 Aug 2022 16:57)  POCT Blood Glucose.: 161 mg/dL (20 Aug 2022 11:33)  POCT Blood Glucose.: 172 mg/dL (20 Aug 2022 07:58)    I&O's Summary    19 Aug 2022 07:01  -  20 Aug 2022 07:00  --------------------------------------------------------  IN: 350 mL / OUT: 1400 mL / NET: -1050 mL    20 Aug 2022 07:01  -  21 Aug 2022 02:30  --------------------------------------------------------  IN: 0 mL / OUT: 1900 mL / NET: -1900 mL        PHYSICAL EXAM:  Vital Signs Last 24 Hrs  T(C): 36.7 (20 Aug 2022 21:29), Max: 36.7 (20 Aug 2022 12:45)  T(F): 98 (20 Aug 2022 21:29), Max: 98.1 (20 Aug 2022 12:45)  HR: 72 (20 Aug 2022 21:29) (72 - 76)  BP: 122/60 (20 Aug 2022 21:29) (121/58 - 145/65)  BP(mean): --  RR: 17 (20 Aug 2022 21:29) (17 - 18)  SpO2: --      GEN: NAD, awake and alert. No drooling or pooling of secretions. No stridor or stertor. aphonic 2* trach, balloon inflated.   SKIN: Good color, non diaphoretic  HEENT: Oral mucosa pink and moist. No erythema or edema noted to buccal mucosa, tongue, FOM, uvula or posterior oropharynx. Uvula midline. no bleeding noted.   NECK:  Trachea midline. trach tube with cuff inflated.   RESP: No dyspnea, non-labored breathing. No use of accessory muscles.  CARDIO: +S1/S2  ABDO: Soft, NT. PEG tube.   EXT: JONAS x 4    LABS:                        10.7   6.98  )-----------( 146      ( 20 Aug 2022 09:18 )             31.1     08-20    137  |  97<L>  |  11  ----------------------------<  173<H>  4.4   |  30  |  0.5<L>    Ca    9.3      20 Aug 2022 09:18  Mg     1.7     08-20    TPro  5.4<L>  /  Alb  3.1<L>  /  TBili  <0.2  /  DBili  x   /  AST  14  /  ALT  13  /  AlkPhos  100  08-20              COVID-19 PCR: NotDetec (17 Aug 2022 10:00)  SARS-CoV-2: NotDetec (03 Aug 2022 07:14)  COVID-19 PCR: NotDetec (21 Jul 2022 05:50)  COVID-19 PCR: NotDetec (15 Jul 2022 02:25)  COVID-19 PCR: NotDetec (05 Jul 2022 11:50)  COVID-19 PCR: NotDetec (23 Jun 2022 15:36)  COVID-19 PCR: NotDetec (22 Jun 2022 07:11)  COVID-19 PCR: NotDetec (14 Jun 2022 11:49)  COVID-19 PCR: NotDetec (31 May 2022 17:00)  SARS-CoV-2: NotDetec (27 May 2022 10:30)      RADIOLOGY & ADDITIONAL TESTS:  Imaging from Last 24 Hours:    Electrocardiogram/QTc Interval:    COORDINATION OF CARE:  Care Discussed with Consultants/Other Providers:

## 2022-08-21 NOTE — PROGRESS NOTE ADULT - ASSESSMENT
Unfortunate 56 YO F with acute on chr hypoxic RF read for hypoxia and hypotension and ongoing Bacterial PNA. Hx of ASP PNA, COPD exacerbation and intubation with failure to extubate resulting in trach at [facility] .  The pt is under chronic  at [facility].      Acute on Chr hypoxic RF  Chronic Tracheostomy  Bacterial PNA  Clinical Debility  Bedbound state  Lower extremity venous insufficiency  BL foot drop, limb weakness, prolonged immobility  OR Direct Laryngoscopy  22, biopsy of supraglottic larynteal mass  Hx of HTN, ASHD HFpEF  Hx of DLD  Hx of COPD, MO, LUCIANA, sp intubation, failure to extubate, chr trach since /[facility]  Hx of DM II  Hx of Ch anemia of ch dis  Hx of GERD, HH, Diverticulosis, Ch constipation  Hx of OA, DJD, DDD, chr pain syn  Hx of lower ext venous stasis dermatitis  Hx of DVT, AC/Eliquis  Hx of Anxiety, Depression       pt sad over the news that a mass was found on the laryngoscopy,  emotional support rendered    pt's VS stable POD2 OR direct laryngoscopy, fx noted "large intralaryngeal/base of tongue mass, bx taken  oxygenating wel on trach collar 40% at 98%  ENT ff up appreciated, exchange of  trach for cuffless one on Mon   restarted Eliquis  restarted Peg feeds  IV: NS at 40cc/hr  CT of soft tissue of the neck shows  exophytic expansile mucosal mass 4.4x2x2.4cm involving the BL aryepiglottic folds/? supraglottic laryngeal neoplasm  Pul-critical care     S&S therapy    low vit D start to replete 50,000 q wk  Venous doppler neg for DVT  AA doppler shows normal blood flow  Vasc consult     ECHO:  nl sys function, EF60%, GIDD, no sig valvular dis  EKG:  NSR 69/min, incomplete RBBB, no acute changes  cont all previous meds  GI prophylaxis  att to bowel regimen    change PEG dressing daily, apply mupirocin  BL inflatable boots to prevent further foot drop, encourage pt to use them as much as possible    PM: hydromorphone 4mg po q 4 and 1mg IV q 8 for breakthrough pain, gabapentin 600mg q 8, tylenol 650mg QID    BL heel off loading boots, pt with BL foot drop    pt full code  Social SVC pt in cont active care with new fx on CT of neck and 2 days post bx of supraglotic laryngeal mass   Unfortunate 58 YO F with acute on chr hypoxic RF read for hypoxia and hypotension and ongoing Bacterial PNA. Hx of ASP PNA, COPD exacerbation and intubation with failure to extubate resulting in trach at [facility] .  The pt is under chronic  at [facility].      Acute on Chr hypoxic RF  Chronic Tracheostomy  Bacterial PNA  Clinical Debility  Bedbound state  Lower extremity venous insufficiency  BL foot drop, limb weakness, prolonged immobility  OR Direct Laryngoscopy  22, biopsy of supraglottic larynteal mass  Hx of HTN, ASHD HFpEF  Hx of DLD  Hx of COPD, MO, LUCIANA, sp intubation, failure to extubate, chr trach since /[facility]  Hx of DM II  Hx of Ch anemia of ch dis  Hx of GERD, HH, Diverticulosis, Ch constipation  Hx of OA, DJD, DDD, chr pain syn  Hx of lower ext venous stasis dermatitis  Hx of DVT, AC/Eliquis  Hx of Anxiety, Depression       pt sad over the news that a mass was found on the laryngoscopy,  emotional support rendered    pt's VS stable POD2 OR direct laryngoscopy, fx noted "large intralaryngeal/base of tongue mass, bx taken  oxygenating wel on trach collar 40% at 98%  ENT ff up appreciated, exchange of  trach for cuffless one on Mon   restarted Eliquis  restarted Peg feeds  IV: NS at 40cc/hr  CT of soft tissue of the neck shows  exophytic expansile mucosal mass 4.4x2x2.4cm involving the BL aryepiglottic folds/? supraglottic laryngeal neoplasm  Pul-critical care     S&S therapy    low vit D start to replete 50,000 q wk  Venous doppler neg for DVT  AA doppler shows normal blood flow  Vasc consult     ECHO:  nl sys function, EF60%, GIDD, no sig valvular dis  EKG:  NSR 69/min, incomplete RBBB, no acute changes  cont all previous meds  GI prophylaxis  att to bowel regimen  skin care:  pt has severe intertrigo of the R axilla, groin and buttocks,  apply generous antifungal  cream   change PEG dressing daily, apply mupirocin  cont PEG feeds, pt may have ice chips  BL inflatable boots to prevent further foot drop, encourage pt to use them as much as possible    PM: hydromorphone 4mg po q 4 and 1mg IV q 8 for breakthrough pain, gabapentin 600mg q 8, tylenol 650mg QID    BL heel off loading boots, pt with BL foot drop    pt full code  Social SVC pt in cont active care with new fx on CT of neck and 2 days post bx of supraglotic laryngeal mass

## 2022-08-21 NOTE — PROGRESS NOTE ADULT - ASSESSMENT
Pt is a 57y Female with large intralaryngeal/base of tongue mass, s/p DL & bx, POD #2.     ·	cont trach care, t-piece was pulling trach out of stoma, readjusted at bedside - RN alerted, will continue to check  ·	ok to start Eliquis this AM - balloon deflated, no bloody secretions noted  ·	will exchange trach for a cuffless tube on Monday if no issues over the weekend  ·	follow up path results  ·	w/d with attng, will follow

## 2022-08-21 NOTE — PROGRESS NOTE ADULT - SUBJECTIVE AND OBJECTIVE BOX
ENT DAILY PROGRESS NOTE    Pt is a 57y Female with large intralaryngeal/base of tongue mass, s/p DL & bx, POD #2. PT seen and examined at bedside, doing well. No complaints this AM. Pt denies any throat discomfort. No SOB/diff breathing.       REVIEW OF SYSTEMS   [x] A ten-point review of systems was otherwise negative except as noted.    Allergies    penicillin (Swelling)    Intolerances      MEDICATIONS:  acetaminophen     Tablet .. 1000 milliGRAM(s) Oral every 8 hours  acetylcysteine 20% for Nebulization - Peds 3 milliLiter(s) Nebulizer every 6 hours  ALBUTerol    0.083% 2.5 milliGRAM(s) Nebulizer every 6 hours  ALBUTerol    90 MICROgram(s) HFA Inhaler 2 Puff(s) Inhalation every 6 hours PRN  ALPRAZolam 0.5 milliGRAM(s) Oral every 6 hours PRN  ammonium lactate 12% Lotion 1 Application(s) Topical two times a day  apixaban 5 milliGRAM(s) Enteral Tube every 12 hours  dextrose 5%. 1000 milliLiter(s) IV Continuous <Continuous>  dextrose 5%. 1000 milliLiter(s) IV Continuous <Continuous>  dextrose 50% Injectable 25 Gram(s) IV Push once  dextrose 50% Injectable 12.5 Gram(s) IV Push once  dextrose 50% Injectable 25 Gram(s) IV Push once  dextrose Oral Gel 15 Gram(s) Oral once PRN  ergocalciferol 48529 Unit(s) Oral every week  gabapentin 600 milliGRAM(s) Oral three times a day  glucagon  Injectable 1 milliGRAM(s) IntraMuscular once  guaifenesin/dextromethorphan Oral Liquid 20 milliLiter(s) Oral every 6 hours  HYDROmorphone   Tablet 4 milliGRAM(s) Enteral Tube every 4 hours PRN  HYDROmorphone  Injectable 0.5 milliGRAM(s) IV Push every 3 hours PRN  insulin glargine Injectable (LANTUS) 20 Unit(s) SubCutaneous at bedtime  insulin lispro (ADMELOG) corrective regimen sliding scale   SubCutaneous three times a day before meals  insulin lispro Injectable (ADMELOG) 6 Unit(s) SubCutaneous three times a day before meals  ipratropium    for Nebulization 500 MICROGram(s) Nebulizer every 6 hours  losartan 100 milliGRAM(s) Oral daily  multivitamin 1 Tablet(s) Oral daily  mupirocin 2% Ointment 1 Application(s) Topical two times a day  pantoprazole   Suspension 40 milliGRAM(s) Oral daily  polyethylene glycol 3350 17 Gram(s) Oral two times a day  senna 2 Tablet(s) Oral at bedtime      Vital Signs Last 24 Hrs  T(C): 36.7 (21 Aug 2022 05:00), Max: 36.7 (20 Aug 2022 12:45)  T(F): 98 (21 Aug 2022 05:00), Max: 98.1 (20 Aug 2022 12:45)  HR: 69 (21 Aug 2022 05:00) (69 - 76)  BP: 137/63 (21 Aug 2022 05:00) (122/60 - 145/65)  RR: 18 (21 Aug 2022 05:00) (17 - 18)  SpO2: 98% (21 Aug 2022 08:38) (98% - 98%)    Parameters below as of 21 Aug 2022 08:38  Patient On (Oxygen Delivery Method): tracheostomy collar    O2 Concentration (%): 40      08-20 @ 07:01  -  08-21 @ 07:00  --------------------------------------------------------  IN:  Total IN: 0 mL    OUT:    Ureteral Catheter (mL): 1900 mL    Voided (mL): 700 mL  Total OUT: 2600 mL    Total NET: -2600 mL    PHYSICAL EXAM:    GEN: NAD, awake and alert. No drooling or pooling of secretions. No stridor or stertor. aphonic 2* trach.   SKIN: Good color, non diaphoretic  HEENT: Oral mucosa pink and moist. No erythema or edema noted to buccal mucosa, tongue, FOM, uvula or posterior oropharynx. Uvula midline. no bleeding noted.   NECK:  Trachea midline. trach tube intact, again being pulled by t-piece, adjusted.   RESP: No dyspnea, non-labored breathing. No use of accessory muscles.  CARDIO: +S1/S2  ABDO: Soft, NT. PEG tube.   EXT: JONAS x 4    LABS:  CBC-                        10.7   7.73  )-----------( 154      ( 21 Aug 2022 06:52 )             31.9     BMP/CMP-  21 Aug 2022 06:52    135    |  96     |  10     ----------------------------<  119    4.0     |  31     |  <0.5     Ca    9.5        21 Aug 2022 06:52  Mg     1.8       21 Aug 2022 06:52    TPro  5.5    /  Alb  3.2    /  TBili  <0.2   /  DBili  x      /  AST  12     /  ALT  12     /  AlkPhos  99     21 Aug 2022 06:52    Coagulation Studies-    Endocrine Panel-  Calcium, Total Serum: 9.5 mg/dL (08-21 @ 06:52)

## 2022-08-21 NOTE — PROGRESS NOTE ADULT - SUBJECTIVE AND OBJECTIVE BOX
Patient scheduled for EGD with Dr. Haley at [facility] on 10/12/20. MAC sedation. Patient is aware that [facility] will call with times 1-2 days prior. EGD prep instructions were filed to be mailed to the patient the month prior. Dr. Haley will do the pre-op.    DX:  8 week gastric ulcer follow up    Patient requested to wait until October to have this done at [facility]. She doesn't want to schedule COVID testing and would like to be contacted closer to the procedure to see if she still needs to have it done.      CRUZ DUMONT 58yo  Female sent to the ER from [facility] where she is on  care for c/o inc SOB, chest tightness, diff breathing, wheezing i.e. acute on chr hypoxic RF.  Of note the pt has a trach and has had freq readmissions for mucous plugging, RF and has had several bronchoscopies.   EMS noted the pt to be hypotensive and hypoxic.  The pt was vigorously suctioned, received Neb Tx, IV steroids,  BP revived with IV fluids.  The pt was cultured and placed on ABx.  She is v well known to the Pul SVc and to ID.  The pt is ad with acute on ch hypoxic RF and ongoing Bacterial PNA.  The PMHx includes:  HTN, ASHD, HFpEF, DLD, DM II, Chronic RF, COPD, LUCIANA, ASp PNA, sp intubation, failure to extubate, chronic trach ([facility] 4/22), recurrent mucus plugging of airways, SDVT, AC with Eliquis, lower ext venous stasis dermatitis, bedbound state, GERD, diverticulosis, sp PEG, OA, DDD, DJD, chronic pain syn, depression anxiety.    Hosp Course:  The pt was ad from SNF for inc SOB, hypoxia with mucous plugging and acute on chr RF and ongoing bacterial PNA.  The pt was suctioned and placed on mech ventilation via trach and cont on ABx. The pt has had freq ad to hosp ( [facility]) since the ARF and intubation and failure to wean  that resulted in the trach in 6/22 in [facility].   The pt is mostly bed bound with lower ext weakness, mm atrophy/myopathy of disuse and sever hyperpigmentation skin changes of the lower ext.  The pt was ff by Pul/critical care and ID.  The pt was verbal and had nutrition via PEG.  S&S worked with pt and during w/up pt was noted to have pharyngeal/trach swelling.  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  The pt was taken to the OR by ENT for direct laryngoscopy and bx of the mass  on 8/19/22.      INTERVAL HPI/OVERNIGHT EVENTS: VS stable, oxygenating well on trach collar 40%,  POD2 direct laryngoscopy fx a 4.4cm supraglottic laryngeal lesion, bx path report P, ENT ff pt, for trach exchange for cuffless tube on Mon,check Mg, Eliquis was resumed    MEDICATIONS  (STANDING):  acetaminophen     Tablet .. 650 milliGRAM(s) Oral every 6 hours  acetylcysteine 20% for Nebulization - Peds 3 milliLiter(s) Nebulizer every 6 hours  ALBUTerol    0.083% 2.5 milliGRAM(s) Nebulizer every 6 hours  ammonium lactate 12% Lotion 1 Application(s) Topical two times a day  apixaban 5 milliGRAM(s) Enteral Tube two times a day  chlorhexidine 0.12% Liquid 15 milliLiter(s) Oral Mucosa every 12 hours  dextrose 5%. 1000 milliLiter(s) (100 mL/Hr) IV Continuous <Continuous>  dextrose 5%. 1000 milliLiter(s) (50 mL/Hr) IV Continuous <Continuous>  dextrose 50% Injectable 25 Gram(s) IV Push once  dextrose 50% Injectable 12.5 Gram(s) IV Push once  dextrose 50% Injectable 25 Gram(s) IV Push once  doxycycline monohydrate Capsule 100 milliGRAM(s) Oral every 12 hours  gabapentin 600 milliGRAM(s) Oral three times a day  glucagon  Injectable 1 milliGRAM(s) IntraMuscular once  insulin glargine Injectable (LANTUS) 20 Unit(s) SubCutaneous at bedtime  insulin lispro (ADMELOG) corrective regimen sliding scale   SubCutaneous three times a day before meals  insulin lispro Injectable (ADMELOG) 6 Unit(s) SubCutaneous three times a day before meals  methylPREDNISolone sodium succinate Injectable 60 milliGRAM(s) IV Push two times a day  multivitamin 1 Tablet(s) Oral daily  pantoprazole   Suspension 40 milliGRAM(s) Oral daily  polyethylene glycol 3350 17 Gram(s) Oral two times a day  senna 2 Tablet(s) Oral at bedtime    MEDICATIONS  (PRN):  ALBUTerol    90 MICROgram(s) HFA Inhaler 2 Puff(s) Inhalation every 6 hours PRN Shortness of Breath and/or Wheezing  ALPRAZolam 0.5 milliGRAM(s) Oral every 6 hours PRN anxiety  dextrose Oral Gel 15 Gram(s) Oral once PRN Blood Glucose LESS THAN 70 milliGRAM(s)/deciliter  HYDROmorphone   Tablet 4 milliGRAM(s) Oral every 4 hours PRN Severe Pain (7 - 10)  HYDROmorphone  Injectable 1 milliGRAM(s) IV Push every 8 hours PRN Moderate Pain (4 - 6)      Allergies    penicillin (Swelling)      Vital Signs Last 24 Hrs    T(F): 98.5  HR:  67  BP:  127/58    RR: 19  SpO2:  98%, trach collar 40%    PHYSICAL EXAM:      Constitutional: pt alert, oriented, on trach collar 40%, pulse ox 98%    Eyes: nonicteric    ENMT: dry oral mucosa, dental defects,     Neck: + trach tube supple, no stridor, + trach,     Respiratory: shallow resp, diminished harsh bronchial  BS, scattered rhonchi, no wheezing    Cardiovascular: S1S2 reg    Gastrointestinal: globular, soft and benign, + PEG, + BS    Genitourinary:    Extremities: moves all ext, dec motor strength of lower ext, + BL mm atrophy, + BL foot drop    Vascular: dec pedal pulses    Neurological: nonfocal    Skin: lower ext dark almost black hyperpigmentation of lower ext    Lymph Nodes: not enlarged    Musculoskeletal: dec mm mass and tone    Psychiatric: anxious, depressed but stable        LABS:                10.7  7)-----------( 154            31.9      135  |  96  |  10  ----------------------------<  1119  4.0   |  31  |  0.5    , 115, 123, 109, 115,  Ca    9.7       Mg     1.9, 1.8, 2.0, 1.6, 1.7, 1.8, 2.6, 1.7  trop <0.01  Vit D 18  folate 9.7  Haptos 126  ferritin 982  TPro  5.9, 5.5, 5.4  /  Alb  3.3, 3.4, 3.3  /  TBili  0.3  /  DBili  x   /  AST  20  /  ALT  28  /  AlkPhos  77  08-05    RADIOLOGY & ADDITIONAL TESTS:    EKG:  NSR R axis, low voltage, nonspecific ST-T changed  EKG 8/17:  NSR 69/min, incomplete RBBB, no acute changes  ECHO:  nl sys function, EF 60%, GIDD, so sig valvular dis, borderline Pul HTN, sm pericardial eff  CXR:  interstitial prominence, L pl based opacity unchanged  AA doppler:  normal arterial blood flow  CT of soft tissue of the neck 8/16/22:  exophytic expansile mucosal mass 4.4x2x2.4cm involving the BL aryepiglotticfolds and along the BL glossoepiglottic folds as well as along the  laryngea/lingual surface of the epiglottis, 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 acute on chr hypoxic RF.  Of note the pt has a trach and has had freq readmissions for mucous plugging, RF and has had several bronchoscopies.   EMS noted the pt to be hypotensive and hypoxic.  The pt was vigorously suctioned, received Neb Tx, IV steroids,  BP revived with IV fluids.  The pt was cultured and placed on ABx.  She is v well known to the Pul SVc and to ID.  The pt is ad with acute on ch hypoxic RF and ongoing Bacterial PNA.  The PMHx includes:  HTN, ASHD, HFpEF, DLD, DM II, Chronic RF, COPD, LUCIANA, ASp PNA, sp intubation, failure to extubate, chronic trach ([facility] 4/22), recurrent mucus plugging of airways, SDVT, AC with Eliquis, lower ext venous stasis dermatitis, bedbound state, GERD, diverticulosis, sp PEG, OA, DDD, DJD, chronic pain syn, depression anxiety.    Hosp Course:  The pt was ad from SNF for inc SOB, hypoxia with mucous plugging and acute on chr RF and ongoing bacterial PNA.  The pt was suctioned and placed on mech ventilation via trach and cont on ABx. The pt has had freq ad to hosp ( [facility]) since the ARF and intubation and failure to wean  that resulted in the trach in 6/22 in [facility].   The pt is mostly bed bound with lower ext weakness, mm atrophy/myopathy of disuse and sever hyperpigmentation skin changes of the lower ext.  The pt was ff by Pul/critical care and ID.  The pt was verbal and had nutrition via PEG.  S&S worked with pt and during w/up pt was noted to have pharyngeal/trach swelling.  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  The pt was taken to the OR by ENT for direct laryngoscopy and bx of the mass  on 8/19/22.      INTERVAL HPI/OVERNIGHT EVENTS: VS stable, oxygenating well on trach collar 40%,  POD2 direct laryngoscopy fx a 4.4cm supraglottic laryngeal lesion, bx path report P, ENT ff pt, for trach exchange for cuffless tube on Mon,check Mg, Eliquis was resumed, pt has severe intertrigo,  R axilla, groin and buttocks    MEDICATIONS  (STANDING):  acetaminophen     Tablet .. 650 milliGRAM(s) Oral every 6 hours  acetylcysteine 20% for Nebulization - Peds 3 milliLiter(s) Nebulizer every 6 hours  ALBUTerol    0.083% 2.5 milliGRAM(s) Nebulizer every 6 hours  ammonium lactate 12% Lotion 1 Application(s) Topical two times a day  apixaban 5 milliGRAM(s) Enteral Tube two times a day  chlorhexidine 0.12% Liquid 15 milliLiter(s) Oral Mucosa every 12 hours  dextrose 5%. 1000 milliLiter(s) (100 mL/Hr) IV Continuous <Continuous>  dextrose 5%. 1000 milliLiter(s) (50 mL/Hr) IV Continuous <Continuous>  dextrose 50% Injectable 25 Gram(s) IV Push once  dextrose 50% Injectable 12.5 Gram(s) IV Push once  dextrose 50% Injectable 25 Gram(s) IV Push once  doxycycline monohydrate Capsule 100 milliGRAM(s) Oral every 12 hours  gabapentin 600 milliGRAM(s) Oral three times a day  glucagon  Injectable 1 milliGRAM(s) IntraMuscular once  insulin glargine Injectable (LANTUS) 20 Unit(s) SubCutaneous at bedtime  insulin lispro (ADMELOG) corrective regimen sliding scale   SubCutaneous three times a day before meals  insulin lispro Injectable (ADMELOG) 6 Unit(s) SubCutaneous three times a day before meals  methylPREDNISolone sodium succinate Injectable 60 milliGRAM(s) IV Push two times a day  multivitamin 1 Tablet(s) Oral daily  pantoprazole   Suspension 40 milliGRAM(s) Oral daily  polyethylene glycol 3350 17 Gram(s) Oral two times a day  senna 2 Tablet(s) Oral at bedtime    MEDICATIONS  (PRN):  ALBUTerol    90 MICROgram(s) HFA Inhaler 2 Puff(s) Inhalation every 6 hours PRN Shortness of Breath and/or Wheezing  ALPRAZolam 0.5 milliGRAM(s) Oral every 6 hours PRN anxiety  dextrose Oral Gel 15 Gram(s) Oral once PRN Blood Glucose LESS THAN 70 milliGRAM(s)/deciliter  HYDROmorphone   Tablet 4 milliGRAM(s) Oral every 4 hours PRN Severe Pain (7 - 10)  HYDROmorphone  Injectable 1 milliGRAM(s) IV Push every 8 hours PRN Moderate Pain (4 - 6)      Allergies    penicillin (Swelling)      Vital Signs Last 24 Hrs    T(F): 98.5  HR:  67  BP:  127/58    RR: 19  SpO2:  98%, trach collar 40%    PHYSICAL EXAM:      Constitutional: pt alert, oriented, on trach collar 40%, pulse ox 98%    Eyes: nonicteric    ENMT: dry oral mucosa, dental defects,     Neck: + trach tube supple, no stridor, + trach,     Respiratory: shallow resp, diminished harsh bronchial  BS, scattered rhonchi, no wheezing    Cardiovascular: S1S2 reg    Gastrointestinal: globular, soft and benign, + PEG, + BS    Genitourinary:    Extremities: moves all ext, dec motor strength of lower ext, + BL mm atrophy, + BL foot drop    Vascular: dec pedal pulses    Neurological: nonfocal    Skin: lower ext dark almost black hyperpigmentation of lower ext    Lymph Nodes: not enlarged    Musculoskeletal: dec mm mass and tone    Psychiatric: anxious, depressed but stable        LABS:                10.7  7)-----------( 154            31.9      135  |  96  |  10  ----------------------------<  1119  4.0   |  31  |  0.5    , 115, 123, 109, 115,  Ca    9.7       Mg     1.9, 1.8, 2.0, 1.6, 1.7, 1.8, 2.6, 1.7  trop <0.01  Vit D 18  folate 9.7  Haptos 126  ferritin 982  TPro  5.9, 5.5, 5.4  /  Alb  3.3, 3.4, 3.3  /  TBili  0.3  /  DBili  x   /  AST  20  /  ALT  28  /  AlkPhos  77  08-05    RADIOLOGY & ADDITIONAL TESTS:    EKG:  NSR R axis, low voltage, nonspecific ST-T changed  EKG 8/17:  NSR 69/min, incomplete RBBB, no acute changes  ECHO:  nl sys function, EF 60%, GIDD, so sig valvular dis, borderline Pul HTN, sm pericardial eff  CXR:  interstitial prominence, L pl based opacity unchanged  AA doppler:  normal arterial blood flow  CT of soft tissue of the neck 8/16/22:  exophytic expansile mucosal mass 4.4x2x2.4cm involving the BL aryepiglotticfolds and along the BL glossoepiglottic folds as well as along the  laryngea/lingual surface of the epiglottis, 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

## 2022-08-21 NOTE — PROGRESS NOTE ADULT - ASSESSMENT
57 year old female with a past medical history of IDDM, GERD, HTN, COPD, Chronic hypoxemic and hypercapnic respiratory failure SP Tracheostomy (not chronically vented) & PEG at [facility], DVT on Eliquis, Anxiety, CHF, Recurrent mucous plugs SP bronchoscopies, presenting from [facility] [place] for chest tightness and shortness of breath over the past few days. Was found to have aspiration pneumonia causing COPD exacerbation    Acute on chronic Hypoxic respiratory failure   #s/p trach/PEG at [facility] 04/2022 after being intubated for >1month  #COPD Exacerbation  #Suspected superimposed bacterial PNA  - Completed a course of doxycycline WBC wnl, vitals wnl,  - Taper steroids- c/w prednisone 40 OD cont Nebs q4h & prn   - CXR reviewed and shows unchanged interstitial opacities  - Suction Q8h  - Inhaled NAC and PO Mucinex started    #Dysphagia  - extensive pharyngeal edema found on modified speech and swallow. ct recommended ENt consulted  - Laryngeal mass found on CT neck  - Biopsy performed by ENT on 8/19  - ENT will exchange trach for a cuffless tube on Monday if no issues over the weekend  - follow up path results  - Back on eliquis (8/20)      #Chronic HFpEF  2d Echo 05/2022: LVEF 50-55%. proBNP ~ 444  Pt does not appear volume overloaded  - Low Na diet and daily weight.   - monitor I & O    #Hx of DVT   - Cont Eliquis  - Venous duplex shows no DVT    #DM2  - Cont lantus/lispro 20-6-6-6  - ISS    #Chronic Lower extremity Pain management and Hyperpigmentation   #Chronic atropic B/l LE Hyperpigmentation changes  - Plan was for outpatient burn and dermatology f/u  - Start topical clobetasone ointment  - Gabapentin 600 TID,   - PO dilaudid 4mg Q4 PRN  -Vascular consulted for possible PVD.   -Arterial doppler - No vascular disease  -PT/OT as tolerated  - Stitches from biopsy removed    #Hypomagnesia  -Repleted    Dispo: Back To [facility] when stable.

## 2022-08-22 LAB
ALBUMIN SERPL ELPH-MCNC: 3.2 G/DL — LOW (ref 3.5–5.2)
ALP SERPL-CCNC: 107 U/L — SIGNIFICANT CHANGE UP (ref 30–115)
ALT FLD-CCNC: 10 U/L — SIGNIFICANT CHANGE UP (ref 0–41)
ANION GAP SERPL CALC-SCNC: 7 MMOL/L — SIGNIFICANT CHANGE UP (ref 7–14)
AST SERPL-CCNC: 11 U/L — SIGNIFICANT CHANGE UP (ref 0–41)
BASOPHILS # BLD AUTO: 0.03 K/UL — SIGNIFICANT CHANGE UP (ref 0–0.2)
BASOPHILS NFR BLD AUTO: 0.4 % — SIGNIFICANT CHANGE UP (ref 0–1)
BILIRUB SERPL-MCNC: 0.2 MG/DL — SIGNIFICANT CHANGE UP (ref 0.2–1.2)
BUN SERPL-MCNC: 12 MG/DL — SIGNIFICANT CHANGE UP (ref 10–20)
CALCIUM SERPL-MCNC: 9.9 MG/DL — SIGNIFICANT CHANGE UP (ref 8.5–10.1)
CHLORIDE SERPL-SCNC: 97 MMOL/L — LOW (ref 98–110)
CO2 SERPL-SCNC: 32 MMOL/L — SIGNIFICANT CHANGE UP (ref 17–32)
CREAT SERPL-MCNC: <0.5 MG/DL — LOW (ref 0.7–1.5)
EGFR: 115 ML/MIN/1.73M2 — SIGNIFICANT CHANGE UP
EOSINOPHIL # BLD AUTO: 0.24 K/UL — SIGNIFICANT CHANGE UP (ref 0–0.7)
EOSINOPHIL NFR BLD AUTO: 3 % — SIGNIFICANT CHANGE UP (ref 0–8)
GLUCOSE BLDC GLUCOMTR-MCNC: 103 MG/DL — HIGH (ref 70–99)
GLUCOSE BLDC GLUCOMTR-MCNC: 127 MG/DL — HIGH (ref 70–99)
GLUCOSE BLDC GLUCOMTR-MCNC: 140 MG/DL — HIGH (ref 70–99)
GLUCOSE BLDC GLUCOMTR-MCNC: 151 MG/DL — HIGH (ref 70–99)
GLUCOSE BLDC GLUCOMTR-MCNC: 85 MG/DL — SIGNIFICANT CHANGE UP (ref 70–99)
GLUCOSE SERPL-MCNC: 145 MG/DL — HIGH (ref 70–99)
HCT VFR BLD CALC: 31.9 % — LOW (ref 37–47)
HGB BLD-MCNC: 10.7 G/DL — LOW (ref 12–16)
IMM GRANULOCYTES NFR BLD AUTO: 0.3 % — SIGNIFICANT CHANGE UP (ref 0.1–0.3)
LYMPHOCYTES # BLD AUTO: 1.38 K/UL — SIGNIFICANT CHANGE UP (ref 1.2–3.4)
LYMPHOCYTES # BLD AUTO: 17.4 % — LOW (ref 20.5–51.1)
MAGNESIUM SERPL-MCNC: 1.6 MG/DL — LOW (ref 1.8–2.4)
MCHC RBC-ENTMCNC: 31.3 PG — HIGH (ref 27–31)
MCHC RBC-ENTMCNC: 33.5 G/DL — SIGNIFICANT CHANGE UP (ref 32–37)
MCV RBC AUTO: 93.3 FL — SIGNIFICANT CHANGE UP (ref 81–99)
MONOCYTES # BLD AUTO: 0.53 K/UL — SIGNIFICANT CHANGE UP (ref 0.1–0.6)
MONOCYTES NFR BLD AUTO: 6.7 % — SIGNIFICANT CHANGE UP (ref 1.7–9.3)
NEUTROPHILS # BLD AUTO: 5.73 K/UL — SIGNIFICANT CHANGE UP (ref 1.4–6.5)
NEUTROPHILS NFR BLD AUTO: 72.2 % — SIGNIFICANT CHANGE UP (ref 42.2–75.2)
NRBC # BLD: 0 /100 WBCS — SIGNIFICANT CHANGE UP (ref 0–0)
PLATELET # BLD AUTO: 175 K/UL — SIGNIFICANT CHANGE UP (ref 130–400)
POTASSIUM SERPL-MCNC: 4.3 MMOL/L — SIGNIFICANT CHANGE UP (ref 3.5–5)
POTASSIUM SERPL-SCNC: 4.3 MMOL/L — SIGNIFICANT CHANGE UP (ref 3.5–5)
PROT SERPL-MCNC: 5.5 G/DL — LOW (ref 6–8)
RBC # BLD: 3.42 M/UL — LOW (ref 4.2–5.4)
RBC # FLD: 12.1 % — SIGNIFICANT CHANGE UP (ref 11.5–14.5)
SARS-COV-2 RNA SPEC QL NAA+PROBE: SIGNIFICANT CHANGE UP
SODIUM SERPL-SCNC: 136 MMOL/L — SIGNIFICANT CHANGE UP (ref 135–146)
SURGICAL PATHOLOGY STUDY: SIGNIFICANT CHANGE UP
WBC # BLD: 7.93 K/UL — SIGNIFICANT CHANGE UP (ref 4.8–10.8)
WBC # FLD AUTO: 7.93 K/UL — SIGNIFICANT CHANGE UP (ref 4.8–10.8)

## 2022-08-22 PROCEDURE — 31502 CHANGE OF WINDPIPE AIRWAY: CPT

## 2022-08-22 RX ORDER — MAGNESIUM SULFATE 500 MG/ML
2 VIAL (ML) INJECTION EVERY 12 HOURS
Refills: 0 | Status: DISCONTINUED | OUTPATIENT
Start: 2022-08-22 | End: 2022-08-25

## 2022-08-22 RX ADMIN — HYDROMORPHONE HYDROCHLORIDE 4 MILLIGRAM(S): 2 INJECTION INTRAMUSCULAR; INTRAVENOUS; SUBCUTANEOUS at 17:30

## 2022-08-22 RX ADMIN — Medication 1: at 08:08

## 2022-08-22 RX ADMIN — HYDROMORPHONE HYDROCHLORIDE 0.5 MILLIGRAM(S): 2 INJECTION INTRAMUSCULAR; INTRAVENOUS; SUBCUTANEOUS at 05:42

## 2022-08-22 RX ADMIN — GABAPENTIN 600 MILLIGRAM(S): 400 CAPSULE ORAL at 13:07

## 2022-08-22 RX ADMIN — Medication 0.5 MILLIGRAM(S): at 05:45

## 2022-08-22 RX ADMIN — Medication 3 MILLILITER(S): at 13:03

## 2022-08-22 RX ADMIN — NYSTATIN CREAM 1 APPLICATION(S): 100000 CREAM TOPICAL at 17:27

## 2022-08-22 RX ADMIN — Medication 6 UNIT(S): at 17:38

## 2022-08-22 RX ADMIN — Medication 500 MICROGRAM(S): at 08:54

## 2022-08-22 RX ADMIN — Medication 500 MICROGRAM(S): at 20:05

## 2022-08-22 RX ADMIN — LOSARTAN POTASSIUM 100 MILLIGRAM(S): 100 TABLET, FILM COATED ORAL at 05:04

## 2022-08-22 RX ADMIN — Medication 20 MILLILITER(S): at 12:11

## 2022-08-22 RX ADMIN — APIXABAN 5 MILLIGRAM(S): 2.5 TABLET, FILM COATED ORAL at 05:04

## 2022-08-22 RX ADMIN — PANTOPRAZOLE SODIUM 40 MILLIGRAM(S): 20 TABLET, DELAYED RELEASE ORAL at 12:10

## 2022-08-22 RX ADMIN — Medication 1000 MILLIGRAM(S): at 23:27

## 2022-08-22 RX ADMIN — Medication 20 MILLILITER(S): at 23:27

## 2022-08-22 RX ADMIN — GABAPENTIN 600 MILLIGRAM(S): 400 CAPSULE ORAL at 21:09

## 2022-08-22 RX ADMIN — HYDROMORPHONE HYDROCHLORIDE 0.5 MILLIGRAM(S): 2 INJECTION INTRAMUSCULAR; INTRAVENOUS; SUBCUTANEOUS at 16:09

## 2022-08-22 RX ADMIN — HYDROMORPHONE HYDROCHLORIDE 0.5 MILLIGRAM(S): 2 INJECTION INTRAMUSCULAR; INTRAVENOUS; SUBCUTANEOUS at 16:25

## 2022-08-22 RX ADMIN — HYDROMORPHONE HYDROCHLORIDE 4 MILLIGRAM(S): 2 INJECTION INTRAMUSCULAR; INTRAVENOUS; SUBCUTANEOUS at 05:45

## 2022-08-22 RX ADMIN — Medication 0.5 MILLIGRAM(S): at 13:54

## 2022-08-22 RX ADMIN — MUPIROCIN 1 APPLICATION(S): 20 OINTMENT TOPICAL at 05:17

## 2022-08-22 RX ADMIN — Medication 1000 MILLIGRAM(S): at 13:07

## 2022-08-22 RX ADMIN — HYDROMORPHONE HYDROCHLORIDE 0.5 MILLIGRAM(S): 2 INJECTION INTRAMUSCULAR; INTRAVENOUS; SUBCUTANEOUS at 13:20

## 2022-08-22 RX ADMIN — Medication 20 MILLILITER(S): at 17:26

## 2022-08-22 RX ADMIN — Medication 1 APPLICATION(S): at 05:06

## 2022-08-22 RX ADMIN — SENNA PLUS 2 TABLET(S): 8.6 TABLET ORAL at 21:08

## 2022-08-22 RX ADMIN — POLYETHYLENE GLYCOL 3350 17 GRAM(S): 17 POWDER, FOR SOLUTION ORAL at 17:28

## 2022-08-22 RX ADMIN — POLYETHYLENE GLYCOL 3350 17 GRAM(S): 17 POWDER, FOR SOLUTION ORAL at 05:06

## 2022-08-22 RX ADMIN — Medication 3 MILLILITER(S): at 08:54

## 2022-08-22 RX ADMIN — Medication 1000 MILLIGRAM(S): at 21:09

## 2022-08-22 RX ADMIN — Medication 3 MILLILITER(S): at 20:04

## 2022-08-22 RX ADMIN — Medication 1000 MILLIGRAM(S): at 05:41

## 2022-08-22 RX ADMIN — Medication 500 MICROGRAM(S): at 02:11

## 2022-08-22 RX ADMIN — Medication 1000 MILLIGRAM(S): at 13:52

## 2022-08-22 RX ADMIN — Medication 3 MILLILITER(S): at 02:11

## 2022-08-22 RX ADMIN — HYDROMORPHONE HYDROCHLORIDE 4 MILLIGRAM(S): 2 INJECTION INTRAMUSCULAR; INTRAVENOUS; SUBCUTANEOUS at 06:06

## 2022-08-22 RX ADMIN — Medication 6 UNIT(S): at 08:08

## 2022-08-22 RX ADMIN — HYDROMORPHONE HYDROCHLORIDE 0.5 MILLIGRAM(S): 2 INJECTION INTRAMUSCULAR; INTRAVENOUS; SUBCUTANEOUS at 21:12

## 2022-08-22 RX ADMIN — ALBUTEROL 2.5 MILLIGRAM(S): 90 AEROSOL, METERED ORAL at 08:54

## 2022-08-22 RX ADMIN — Medication 1 TABLET(S): at 12:11

## 2022-08-22 RX ADMIN — HYDROMORPHONE HYDROCHLORIDE 0.5 MILLIGRAM(S): 2 INJECTION INTRAMUSCULAR; INTRAVENOUS; SUBCUTANEOUS at 00:04

## 2022-08-22 RX ADMIN — Medication 20 MILLILITER(S): at 05:05

## 2022-08-22 RX ADMIN — HYDROMORPHONE HYDROCHLORIDE 4 MILLIGRAM(S): 2 INJECTION INTRAMUSCULAR; INTRAVENOUS; SUBCUTANEOUS at 12:10

## 2022-08-22 RX ADMIN — NYSTATIN CREAM 1 APPLICATION(S): 100000 CREAM TOPICAL at 05:17

## 2022-08-22 RX ADMIN — INSULIN GLARGINE 20 UNIT(S): 100 INJECTION, SOLUTION SUBCUTANEOUS at 23:17

## 2022-08-22 RX ADMIN — GABAPENTIN 600 MILLIGRAM(S): 400 CAPSULE ORAL at 05:06

## 2022-08-22 RX ADMIN — Medication 500 MICROGRAM(S): at 13:16

## 2022-08-22 RX ADMIN — MUPIROCIN 1 APPLICATION(S): 20 OINTMENT TOPICAL at 17:27

## 2022-08-22 RX ADMIN — ALBUTEROL 2.5 MILLIGRAM(S): 90 AEROSOL, METERED ORAL at 20:05

## 2022-08-22 RX ADMIN — HYDROMORPHONE HYDROCHLORIDE 4 MILLIGRAM(S): 2 INJECTION INTRAMUSCULAR; INTRAVENOUS; SUBCUTANEOUS at 18:00

## 2022-08-22 RX ADMIN — Medication 1000 MILLIGRAM(S): at 05:03

## 2022-08-22 RX ADMIN — HYDROMORPHONE HYDROCHLORIDE 0.5 MILLIGRAM(S): 2 INJECTION INTRAMUSCULAR; INTRAVENOUS; SUBCUTANEOUS at 13:08

## 2022-08-22 RX ADMIN — HYDROMORPHONE HYDROCHLORIDE 0.5 MILLIGRAM(S): 2 INJECTION INTRAMUSCULAR; INTRAVENOUS; SUBCUTANEOUS at 05:05

## 2022-08-22 RX ADMIN — HYDROMORPHONE HYDROCHLORIDE 4 MILLIGRAM(S): 2 INJECTION INTRAMUSCULAR; INTRAVENOUS; SUBCUTANEOUS at 12:40

## 2022-08-22 RX ADMIN — APIXABAN 5 MILLIGRAM(S): 2.5 TABLET, FILM COATED ORAL at 17:29

## 2022-08-22 RX ADMIN — ALBUTEROL 2.5 MILLIGRAM(S): 90 AEROSOL, METERED ORAL at 02:11

## 2022-08-22 RX ADMIN — Medication 1 APPLICATION(S): at 17:26

## 2022-08-22 RX ADMIN — Medication 0.5 MILLIGRAM(S): at 21:09

## 2022-08-22 RX ADMIN — ALBUTEROL 2.5 MILLIGRAM(S): 90 AEROSOL, METERED ORAL at 13:16

## 2022-08-22 RX ADMIN — Medication 25 GRAM(S): at 17:27

## 2022-08-22 NOTE — PROGRESS NOTE ADULT - SUBJECTIVE AND OBJECTIVE BOX
ENT DAILY PROGRESS NOTE    Pt is a 57y Female  with large intralaryngeal/base of tongue mass, s/p DL & bx, POD #3. PT seen and examined at bedside, doing well. No complaints this AM. Pt denies any throat discomfort. No SOB/diff breathing.       REVIEW OF SYSTEMS   [x] A ten-point review of systems was otherwise negative except as noted.    Allergies    penicillin (Swelling)    Intolerances      MEDICATIONS:  acetaminophen     Tablet .. 1000 milliGRAM(s) Oral every 8 hours  acetylcysteine 20% for Nebulization - Peds 3 milliLiter(s) Nebulizer every 6 hours  ALBUTerol    0.083% 2.5 milliGRAM(s) Nebulizer every 6 hours  ALBUTerol    90 MICROgram(s) HFA Inhaler 2 Puff(s) Inhalation every 6 hours PRN  ALPRAZolam 0.5 milliGRAM(s) Oral every 6 hours PRN  ammonium lactate 12% Lotion 1 Application(s) Topical two times a day  apixaban 5 milliGRAM(s) Enteral Tube every 12 hours  dextrose 5%. 1000 milliLiter(s) IV Continuous <Continuous>  dextrose 5%. 1000 milliLiter(s) IV Continuous <Continuous>  dextrose 50% Injectable 25 Gram(s) IV Push once  dextrose 50% Injectable 12.5 Gram(s) IV Push once  dextrose 50% Injectable 25 Gram(s) IV Push once  dextrose Oral Gel 15 Gram(s) Oral once PRN  ergocalciferol 78723 Unit(s) Oral every week  gabapentin Solution 600 milliGRAM(s) Oral three times a day  glucagon  Injectable 1 milliGRAM(s) IntraMuscular once  guaifenesin/dextromethorphan Oral Liquid 20 milliLiter(s) Oral every 6 hours  HYDROmorphone   Tablet 4 milliGRAM(s) Enteral Tube every 4 hours PRN  HYDROmorphone  Injectable 0.5 milliGRAM(s) IV Push every 3 hours PRN  insulin glargine Injectable (LANTUS) 20 Unit(s) SubCutaneous at bedtime  insulin lispro (ADMELOG) corrective regimen sliding scale   SubCutaneous three times a day before meals  insulin lispro Injectable (ADMELOG) 6 Unit(s) SubCutaneous three times a day before meals  ipratropium    for Nebulization 500 MICROGram(s) Nebulizer every 6 hours  losartan 100 milliGRAM(s) Oral daily  multivitamin 1 Tablet(s) Oral daily  mupirocin 2% Ointment 1 Application(s) Topical two times a day  nystatin Cream 1 Application(s) Topical two times a day  pantoprazole   Suspension 40 milliGRAM(s) Oral daily  polyethylene glycol 3350 17 Gram(s) Oral two times a day  senna 2 Tablet(s) Oral at bedtime      Vital Signs Last 24 Hrs  T(C): 36.8 (22 Aug 2022 04:51), Max: 36.9 (21 Aug 2022 12:18)  T(F): 98.2 (22 Aug 2022 04:51), Max: 98.5 (21 Aug 2022 12:18)  HR: 86 (22 Aug 2022 04:51) (67 - 88)  BP: 132/59 (22 Aug 2022 04:51) (126/60 - 132/59)  RR: 19 (22 Aug 2022 04:51) (18 - 20)  SpO2: 98% (22 Aug 2022 02:11) (98% - 98%)    Parameters below as of 22 Aug 2022 02:11  Patient On (Oxygen Delivery Method): T-piece  O2 Flow (L/min): 8  O2 Concentration (%): 40      08-21 @ 07:01  -  08-22 @ 07:00  --------------------------------------------------------  IN:    Enteral Tube Flush: 200 mL    Glucerna: 600 mL  Total IN: 800 mL    OUT:    Ureteral Catheter (mL): 800 mL  Total OUT: 800 mL    Total NET: 0 mL      PHYSICAL EXAM:    GEN: NAD, awake and alert. No drooling or pooling of secretions. No stridor or stertor. aphonic 2* trach.   SKIN: Good color, non diaphoretic  HEENT: Oral mucosa pink and moist. No erythema or edema noted to buccal mucosa, tongue, FOM, uvula or posterior oropharynx. Uvula midline. no bleeding noted.   NECK:  Trachea midline. trach tube intact, trach changed at bedside to a 6 cuffless.   RESP: No dyspnea, non-labored breathing. No use of accessory muscles.  CARDIO: +S1/S2  ABDO: Soft, NT. PEG tube.   EXT: JONAS x 4    LABS:  CBC-                        10.7   7.93  )-----------( 175      ( 22 Aug 2022 08:56 )             31.9     BMP/CMP-  22 Aug 2022 08:56    136    |  97     |  12     ----------------------------<  145    4.3     |  32     |  <0.5     Ca    9.9        22 Aug 2022 08:56  Mg     1.6       22 Aug 2022 08:56    TPro  5.5    /  Alb  3.2    /  TBili  0.2    /  DBili  x      /  AST  11     /  ALT  10     /  AlkPhos  107    22 Aug 2022 08:56    Coagulation Studies-    Endocrine Panel-  Calcium, Total Serum: 9.9 mg/dL (08-22 @ 08:56)

## 2022-08-22 NOTE — PROGRESS NOTE ADULT - SUBJECTIVE AND OBJECTIVE BOX
CRUZ DUMONT 58yo  Female sent to the ER from [facility] where she is on  care for c/o inc SOB, chest tightness, diff breathing, wheezing i.e. acute on chr hypoxic RF.  Of note the pt has a trach and has had freq readmissions for mucous plugging, RF and has had several bronchoscopies.   EMS noted the pt to be hypotensive and hypoxic.  The pt was vigorously suctioned, received Neb Tx, IV steroids,  BP revived with IV fluids.  The pt was cultured and placed on ABx.  She is v well known to the Pul SVc and to ID.  The pt is ad with acute on ch hypoxic RF and ongoing Bacterial PNA.  The PMHx includes:  HTN, ASHD, HFpEF, DLD, DM II, Chronic RF, COPD, LUCIANA, ASp PNA, sp intubation, failure to extubate, chronic trach ([facility] 4/22), recurrent mucus plugging of airways, SDVT, AC with Eliquis, lower ext venous stasis dermatitis, bedbound state, GERD, diverticulosis, sp PEG, OA, DDD, DJD, chronic pain syn, depression anxiety.    Hosp Course:  The pt was ad from SNF for inc SOB, hypoxia with mucous plugging and acute on chr RF and ongoing bacterial PNA.  The pt was suctioned and placed on mech ventilation via trach and cont on ABx. The pt has had freq ad to hosp ( [facility]) since the ARF and intubation and failure to wean  that resulted in the trach in 6/22 in [facility].   The pt is mostly bed bound with lower ext weakness, mm atrophy/myopathy of disuse and sever hyperpigmentation skin changes of the lower ext.  The pt was ff by Pul/critical care and ID.  The pt was verbal and had nutrition via PEG.  S&S worked with pt and during w/up pt was noted to have pharyngeal/trach swelling.  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  The pt was taken to the OR by ENT for direct laryngoscopy and bx of the mass  on 8/19/22.      INTERVAL HPI/OVERNIGHT EVENTS: VS stable, oxygenating well on trach collar 40%,  POD3 direct laryngoscopy fx a 4.4cm supraglottic laryngeal lesion, bx path report P, ENT ff pt, for trach exchange for cuffless tube todayon Mon, replete  Mg, pt has severe intertrigo,  R axilla, groin and buttocks    MEDICATIONS  (STANDING):  acetaminophen     Tablet .. 650 milliGRAM(s) Oral every 6 hours  acetylcysteine 20% for Nebulization - Peds 3 milliLiter(s) Nebulizer every 6 hours  ALBUTerol    0.083% 2.5 milliGRAM(s) Nebulizer every 6 hours  ammonium lactate 12% Lotion 1 Application(s) Topical two times a day  apixaban 5 milliGRAM(s) Enteral Tube two times a day  chlorhexidine 0.12% Liquid 15 milliLiter(s) Oral Mucosa every 12 hours  dextrose 5%. 1000 milliLiter(s) (100 mL/Hr) IV Continuous <Continuous>  dextrose 5%. 1000 milliLiter(s) (50 mL/Hr) IV Continuous <Continuous>  dextrose 50% Injectable 25 Gram(s) IV Push once  dextrose 50% Injectable 12.5 Gram(s) IV Push once  dextrose 50% Injectable 25 Gram(s) IV Push once  doxycycline monohydrate Capsule 100 milliGRAM(s) Oral every 12 hours  gabapentin 600 milliGRAM(s) Oral three times a day  glucagon  Injectable 1 milliGRAM(s) IntraMuscular once  insulin glargine Injectable (LANTUS) 20 Unit(s) SubCutaneous at bedtime  insulin lispro (ADMELOG) corrective regimen sliding scale   SubCutaneous three times a day before meals  insulin lispro Injectable (ADMELOG) 6 Unit(s) SubCutaneous three times a day before meals  methylPREDNISolone sodium succinate Injectable 60 milliGRAM(s) IV Push two times a day  multivitamin 1 Tablet(s) Oral daily  pantoprazole   Suspension 40 milliGRAM(s) Oral daily  polyethylene glycol 3350 17 Gram(s) Oral two times a day  senna 2 Tablet(s) Oral at bedtime    MEDICATIONS  (PRN):  ALBUTerol    90 MICROgram(s) HFA Inhaler 2 Puff(s) Inhalation every 6 hours PRN Shortness of Breath and/or Wheezing  ALPRAZolam 0.5 milliGRAM(s) Oral every 6 hours PRN anxiety  dextrose Oral Gel 15 Gram(s) Oral once PRN Blood Glucose LESS THAN 70 milliGRAM(s)/deciliter  HYDROmorphone   Tablet 4 milliGRAM(s) Oral every 4 hours PRN Severe Pain (7 - 10)  HYDROmorphone  Injectable 1 milliGRAM(s) IV Push every 8 hours PRN Moderate Pain (4 - 6)      Allergies    penicillin (Swelling)      Vital Signs Last 24 Hrs    T(F): 98.2  HR:  86  BP:  132/59    RR: 19  SpO2:  98%, T piece    PHYSICAL EXAM:      Constitutional: pt alert, oriented,  pulse ox 98%    Eyes: nonicteric    ENMT: dry oral mucosa, dental defects,     Neck: + trach tube supple, no stridor, + trach,     Respiratory: shallow resp, diminished harsh bronchial  BS, scattered rhonchi, no wheezing    Cardiovascular: S1S2 reg    Gastrointestinal: globular, soft and benign, + PEG, + BS    Genitourinary:    Extremities: moves all ext, dec motor strength of lower ext, + BL mm atrophy, + BL foot drop    Vascular: dec pedal pulses    Neurological: nonfocal    Skin: lower ext dark almost black hyperpigmentation of lower ext    Lymph Nodes: not enlarged    Musculoskeletal: dec mm mass and tone    Psychiatric: anxious, depressed but stable        LABS:                10.7  7.9)-----------( 175            31.9      136  |  97  |  12  ----------------------------<  145  4.3   |  32  |  0.5    , 115, 123, 109, 115,  Ca    9.7       Mg     1.9, 1.8, 2.0, 1.6, 1.7, 1.8, 2.6, 1.7, 1.6  trop <0.01  Vit D 18  folate 9.7  Haptos 126  ferritin 982  TPro  5.9, 5.5, 5.4  /  Alb  3.3, 3.4, 3.3  /  TBili  0.3  /  DBili  x   /  AST  20  /  ALT  28  /  AlkPhos  77  08-05    RADIOLOGY & ADDITIONAL TESTS:    EKG:  NSR R axis, low voltage, nonspecific ST-T changed  EKG 8/17:  NSR 69/min, incomplete RBBB, no acute changes  ECHO:  nl sys function, EF 60%, GIDD, so sig valvular dis, borderline Pul HTN, sm pericardial eff  CXR:  interstitial prominence, L pl based opacity unchanged  AA doppler:  normal arterial blood flow  CT of soft tissue of the neck 8/16/22:  exophytic expansile mucosal mass 4.4x2x2.4cm involving the BL aryepiglotticfolds and along the BL glossoepiglottic folds as well as along the  laryngea/lingual surface of the epiglottis, 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

## 2022-08-22 NOTE — PROGRESS NOTE ADULT - SUBJECTIVE AND OBJECTIVE BOX
Medicine Progress Note    Patient is a 57y old  Female who presents with a chief complaint of Hypoxia, acute on chronic RF, trach in situ (20 Aug 2022 17:48)      SUBJECTIVE / OVERNIGHT EVENTS: PT seen and examined at bedside, doing well.    ADDITIONAL REVIEW OF SYSTEMS:    MEDICATIONS  (STANDING):  acetaminophen     Tablet .. 1000 milliGRAM(s) Oral every 8 hours  acetylcysteine 20% for Nebulization - Peds 3 milliLiter(s) Nebulizer every 6 hours  ALBUTerol    0.083% 2.5 milliGRAM(s) Nebulizer every 6 hours  ammonium lactate 12% Lotion 1 Application(s) Topical two times a day  apixaban 5 milliGRAM(s) Enteral Tube every 12 hours  dextrose 5%. 1000 milliLiter(s) (100 mL/Hr) IV Continuous <Continuous>  dextrose 5%. 1000 milliLiter(s) (50 mL/Hr) IV Continuous <Continuous>  dextrose 50% Injectable 25 Gram(s) IV Push once  dextrose 50% Injectable 12.5 Gram(s) IV Push once  dextrose 50% Injectable 25 Gram(s) IV Push once  ergocalciferol 05628 Unit(s) Oral every week  gabapentin 600 milliGRAM(s) Oral three times a day  glucagon  Injectable 1 milliGRAM(s) IntraMuscular once  guaifenesin/dextromethorphan Oral Liquid 20 milliLiter(s) Oral every 6 hours  insulin glargine Injectable (LANTUS) 20 Unit(s) SubCutaneous at bedtime  insulin lispro (ADMELOG) corrective regimen sliding scale   SubCutaneous three times a day before meals  insulin lispro Injectable (ADMELOG) 6 Unit(s) SubCutaneous three times a day before meals  ipratropium    for Nebulization 500 MICROGram(s) Nebulizer every 6 hours  losartan 100 milliGRAM(s) Oral daily  multivitamin 1 Tablet(s) Oral daily  mupirocin 2% Ointment 1 Application(s) Topical two times a day  pantoprazole   Suspension 40 milliGRAM(s) Oral daily  polyethylene glycol 3350 17 Gram(s) Oral two times a day  senna 2 Tablet(s) Oral at bedtime    MEDICATIONS  (PRN):  ALBUTerol    90 MICROgram(s) HFA Inhaler 2 Puff(s) Inhalation every 6 hours PRN Shortness of Breath and/or Wheezing  ALPRAZolam 0.5 milliGRAM(s) Oral every 6 hours PRN anxiety  dextrose Oral Gel 15 Gram(s) Oral once PRN Blood Glucose LESS THAN 70 milliGRAM(s)/deciliter  HYDROmorphone   Tablet 4 milliGRAM(s) Enteral Tube every 4 hours PRN Severe Pain (7 - 10)  HYDROmorphone  Injectable 0.5 milliGRAM(s) IV Push every 3 hours PRN Moderate Pain (4 - 6)    CAPILLARY BLOOD GLUCOSE      POCT Blood Glucose.: 133 mg/dL (20 Aug 2022 21:39)  POCT Blood Glucose.: 128 mg/dL (20 Aug 2022 16:57)  POCT Blood Glucose.: 161 mg/dL (20 Aug 2022 11:33)  POCT Blood Glucose.: 172 mg/dL (20 Aug 2022 07:58)    I&O's Summary    19 Aug 2022 07:01  -  20 Aug 2022 07:00  --------------------------------------------------------  IN: 350 mL / OUT: 1400 mL / NET: -1050 mL    20 Aug 2022 07:01  -  21 Aug 2022 02:30  --------------------------------------------------------  IN: 0 mL / OUT: 1900 mL / NET: -1900 mL        PHYSICAL EXAM:  Vital Signs Last 24 Hrs  T(C): 36.7 (20 Aug 2022 21:29), Max: 36.7 (20 Aug 2022 12:45)  T(F): 98 (20 Aug 2022 21:29), Max: 98.1 (20 Aug 2022 12:45)  HR: 72 (20 Aug 2022 21:29) (72 - 76)  BP: 122/60 (20 Aug 2022 21:29) (121/58 - 145/65)  BP(mean): --  RR: 17 (20 Aug 2022 21:29) (17 - 18)  SpO2: --      GEN: NAD, awake and alert. No drooling or pooling of secretions. No stridor or stertor. aphonic 2* trach, balloon inflated.   SKIN: Good color, non diaphoretic  HEENT: Oral mucosa pink and moist. No erythema or edema noted to buccal mucosa, tongue, FOM, uvula or posterior oropharynx. Uvula midline. no bleeding noted.   NECK:  Trachea midline. trach tube with cuff inflated.   RESP: No dyspnea, non-labored breathing. No use of accessory muscles.  CARDIO: +S1/S2  ABDO: Soft, NT. PEG tube.   EXT: JONAS x 4    LABS:                        10.7   6.98  )-----------( 146      ( 20 Aug 2022 09:18 )             31.1     08-20    137  |  97<L>  |  11  ----------------------------<  173<H>  4.4   |  30  |  0.5<L>    Ca    9.3      20 Aug 2022 09:18  Mg     1.7     08-20    TPro  5.4<L>  /  Alb  3.1<L>  /  TBili  <0.2  /  DBili  x   /  AST  14  /  ALT  13  /  AlkPhos  100  08-20              COVID-19 PCR: NotDetec (17 Aug 2022 10:00)  SARS-CoV-2: NotDetec (03 Aug 2022 07:14)  COVID-19 PCR: NotDetec (21 Jul 2022 05:50)  COVID-19 PCR: NotDetec (15 Jul 2022 02:25)  COVID-19 PCR: NotDetec (05 Jul 2022 11:50)  COVID-19 PCR: NotDetec (23 Jun 2022 15:36)  COVID-19 PCR: NotDetec (22 Jun 2022 07:11)  COVID-19 PCR: NotDetec (14 Jun 2022 11:49)  COVID-19 PCR: NotDetec (31 May 2022 17:00)  SARS-CoV-2: NotDetec (27 May 2022 10:30)      RADIOLOGY & ADDITIONAL TESTS:  Imaging from Last 24 Hours:    Electrocardiogram/QTc Interval:    COORDINATION OF CARE:  Care Discussed with Consultants/Other Providers:

## 2022-08-22 NOTE — PROGRESS NOTE ADULT - ASSESSMENT
Unfortunate 58 YO F with acute on chr hypoxic RF read for hypoxia and hypotension and ongoing Bacterial PNA. Hx of ASP PNA, COPD exacerbation and intubation with failure to extubate resulting in trach at [facility] .  The pt is under chronic  at [facility]      Acute on Chr hypoxic RF  Chronic Tracheostomy  Bacterial PNA  Clinical Debility  Bedbound state  Lower extremity venous insufficiency  BL foot drop, limb weakness, prolonged immobility  OR Direct Laryngoscopy  22, biopsy of supraglottic larynteal mass  Intertrigo  Hx of HTN, ASHD HFpEF  Hx of DLD  Hx of COPD, MO, LUCIANA, sp intubation, failure to extubate, chr trach since /[facility]  Hx of DM II  Hx of Ch anemia of ch dis  Hx of GERD, HH, Diverticulosis, Ch constipation  Hx of OA, DJD, DDD, chr pain syn  Hx of lower ext venous stasis dermatitis  Hx of DVT, AC/Eliquis  Hx of Anxiety, Depression       pt saddened over the news that a mass was found on the laryngoscopy    pt's VS stable POD2 OR direct laryngoscopy, fx noted "large intralaryngeal/base of tongue mass, bx taken  oxygenating well,  at 98%  ENT ff up appreciated, exchange of  trach for cuffless one on Mon   restarted Eliquis  restarted Peg feeds  IV: NS at 40cc/hr, may be D/C  low Mg, replete 2gms ICV x 2 ordered  CT of soft tissue of the neck shows  exophytic expansile mucosal mass 4.4x2x2.4cm involving the BL aryepiglottic folds/? supraglottic laryngeal neoplasm  Pul-critical care     S&S therapy    low vit D start to replete 50,000 q wk  Venous doppler neg for DVT  AA doppler shows normal blood flow  Vasc consult     ECHO:  nl sys function, EF60%, GIDD, no sig valvular dis  EKG:  NSR 69/min, incomplete RBBB, no acute changes  cont all previous meds  GI prophylaxis  att to bowel regimen  skin care:  pt has severe intertrigo of the R axilla, groin and buttocks,  apply generous antifungal  cream   change PEG dressing daily, apply mupirocin  cont PEG feeds, pt may have ice chips  BL inflatable boots to prevent further foot drop, encourage pt to use them as much as possible    PM: hydromorphone 4mg po q 4 and 1mg IV q 8 for breakthrough pain, gabapentin 600mg q 8, tylenol 650mg QID    BL heel off loading boots, pt with BL foot drop    pt full code  Social SVC pt in cont active care with new fx on CT of neck and 2 days post bx of supraglotic laryngeal mass

## 2022-08-22 NOTE — PROCEDURE NOTE - GENERAL PROCEDURE DETAILS
patient suctioned at bedside, trach tube removed, stoma cleaned with 4x4, size 6 cuffless tube placed into stoma. Trach secured with trach ties and reattached to t-piece, suctioned.

## 2022-08-22 NOTE — PROGRESS NOTE ADULT - ASSESSMENT
Pt is a 57y Female  with large intralaryngeal/base of tongue mass, s/p DL & bx, POD #3. Tracheostomy tube changed to a size 6 cuffless ([id number]).    ·	f/u path results  ·	can start to arrange for pt to go back to NH facility, will set patient up with an appt next week   ·	would observe patient today, D/C tomorrow to NH if no issues  ·	w/d with libby, s/w medical resident

## 2022-08-22 NOTE — PROGRESS NOTE ADULT - ASSESSMENT
57 year old female with a past medical history of IDDM, GERD, HTN, COPD, Chronic hypoxemic and hypercapnic respiratory failure SP Tracheostomy (not chronically vented) & PEG at [facility], DVT on Eliquis, Anxiety, CHF, Recurrent mucous plugs SP bronchoscopies, [facility] chest tightness and shortness of breath over the past few days. Was found to have aspiration pneumonia causing COPD exacerbation    Acute on chronic Hypoxic respiratory failure   #s/p trach/PEG at [facility] 04/2022 after being intubated for >1month  #COPD Exacerbation  #Suspected superimposed bacterial PNA  - Completed a course of doxycycline WBC wnl, vitals wnl,  - Taper steroids- c/w prednisone 40 OD cont Nebs q4h & prn   - CXR reviewed and shows unchanged interstitial opacities  - Suction Q8h  - Inhaled NAC and PO Mucinex started    #Dysphagia  - extensive pharyngeal edema found on modified speech and swallow. ct recommended ENt consulted  - Laryngeal mass found on CT neck  - Biopsy performed by ENT on 8/19  - ENT will exchange trach for a cuffless tube today (8/22)  - follow up path results  - Back on eliquis (8/20)      #Chronic HFpEF  2d Echo 05/2022: LVEF 50-55%. proBNP ~ 444  Pt does not appear volume overloaded  - Low Na diet and daily weight.   - monitor I & O    #Hx of DVT   - Cont Eliquis  - Venous duplex shows no DVT    #DM2  - Cont lantus/lispro 20-6-6-6  - ISS    #Chronic Lower extremity Pain management and Hyperpigmentation   #Chronic atropic B/l LE Hyperpigmentation changes  - Plan was for outpatient burn and dermatology f/u  - Start topical clobetasone ointment  - Gabapentin 600 TID,   - PO dilaudid 4mg Q4 PRN  -Vascular consulted for possible PVD.   -Arterial doppler - No vascular disease  -PT/OT as tolerated  - Stitches from biopsy removed    #Hypomagnesia  -Repleted    Dispo: Back To [facility] when stable.

## 2022-08-23 ENCOUNTER — TRANSCRIPTION ENCOUNTER (OUTPATIENT)
Age: 57
End: 2022-08-23

## 2022-08-23 LAB
ALBUMIN SERPL ELPH-MCNC: 3.1 G/DL — LOW (ref 3.5–5.2)
ALP SERPL-CCNC: 105 U/L — SIGNIFICANT CHANGE UP (ref 30–115)
ALT FLD-CCNC: 10 U/L — SIGNIFICANT CHANGE UP (ref 0–41)
ANION GAP SERPL CALC-SCNC: 9 MMOL/L — SIGNIFICANT CHANGE UP (ref 7–14)
AST SERPL-CCNC: 12 U/L — SIGNIFICANT CHANGE UP (ref 0–41)
BILIRUB SERPL-MCNC: 0.3 MG/DL — SIGNIFICANT CHANGE UP (ref 0.2–1.2)
BUN SERPL-MCNC: 10 MG/DL — SIGNIFICANT CHANGE UP (ref 10–20)
CALCIUM SERPL-MCNC: 9.5 MG/DL — SIGNIFICANT CHANGE UP (ref 8.5–10.1)
CHLORIDE SERPL-SCNC: 98 MMOL/L — SIGNIFICANT CHANGE UP (ref 98–110)
CO2 SERPL-SCNC: 31 MMOL/L — SIGNIFICANT CHANGE UP (ref 17–32)
CREAT SERPL-MCNC: 0.5 MG/DL — LOW (ref 0.7–1.5)
EGFR: 109 ML/MIN/1.73M2 — SIGNIFICANT CHANGE UP
GLUCOSE BLDC GLUCOMTR-MCNC: 115 MG/DL — HIGH (ref 70–99)
GLUCOSE BLDC GLUCOMTR-MCNC: 117 MG/DL — HIGH (ref 70–99)
GLUCOSE BLDC GLUCOMTR-MCNC: 136 MG/DL — HIGH (ref 70–99)
GLUCOSE BLDC GLUCOMTR-MCNC: 137 MG/DL — HIGH (ref 70–99)
GLUCOSE BLDC GLUCOMTR-MCNC: 159 MG/DL — HIGH (ref 70–99)
GLUCOSE BLDC GLUCOMTR-MCNC: 171 MG/DL — HIGH (ref 70–99)
GLUCOSE SERPL-MCNC: 167 MG/DL — HIGH (ref 70–99)
HCT VFR BLD CALC: 31.1 % — LOW (ref 37–47)
HGB BLD-MCNC: 10.5 G/DL — LOW (ref 12–16)
MAGNESIUM SERPL-MCNC: 2.4 MG/DL — SIGNIFICANT CHANGE UP (ref 1.8–2.4)
MCHC RBC-ENTMCNC: 31.8 PG — HIGH (ref 27–31)
MCHC RBC-ENTMCNC: 33.8 G/DL — SIGNIFICANT CHANGE UP (ref 32–37)
MCV RBC AUTO: 94.2 FL — SIGNIFICANT CHANGE UP (ref 81–99)
NRBC # BLD: 0 /100 WBCS — SIGNIFICANT CHANGE UP (ref 0–0)
PLATELET # BLD AUTO: 177 K/UL — SIGNIFICANT CHANGE UP (ref 130–400)
POTASSIUM SERPL-MCNC: 4.2 MMOL/L — SIGNIFICANT CHANGE UP (ref 3.5–5)
POTASSIUM SERPL-SCNC: 4.2 MMOL/L — SIGNIFICANT CHANGE UP (ref 3.5–5)
PROT SERPL-MCNC: 5.4 G/DL — LOW (ref 6–8)
RBC # BLD: 3.3 M/UL — LOW (ref 4.2–5.4)
RBC # FLD: 12.1 % — SIGNIFICANT CHANGE UP (ref 11.5–14.5)
SODIUM SERPL-SCNC: 138 MMOL/L — SIGNIFICANT CHANGE UP (ref 135–146)
WBC # BLD: 6.76 K/UL — SIGNIFICANT CHANGE UP (ref 4.8–10.8)
WBC # FLD AUTO: 6.76 K/UL — SIGNIFICANT CHANGE UP (ref 4.8–10.8)

## 2022-08-23 RX ORDER — OXYCODONE HYDROCHLORIDE 5 MG/1
5 TABLET ORAL ONCE
Refills: 0 | Status: DISCONTINUED | OUTPATIENT
Start: 2022-08-23 | End: 2022-08-23

## 2022-08-23 RX ORDER — HYDROXYZINE HCL 10 MG
25 TABLET ORAL ONCE
Refills: 0 | Status: DISCONTINUED | OUTPATIENT
Start: 2022-08-23 | End: 2022-08-26

## 2022-08-23 RX ORDER — KETOROLAC TROMETHAMINE 30 MG/ML
30 SYRINGE (ML) INJECTION ONCE
Refills: 0 | Status: DISCONTINUED | OUTPATIENT
Start: 2022-08-23 | End: 2022-08-23

## 2022-08-23 RX ADMIN — HYDROMORPHONE HYDROCHLORIDE 0.5 MILLIGRAM(S): 2 INJECTION INTRAMUSCULAR; INTRAVENOUS; SUBCUTANEOUS at 14:44

## 2022-08-23 RX ADMIN — Medication 25 GRAM(S): at 05:08

## 2022-08-23 RX ADMIN — Medication 1000 MILLIGRAM(S): at 21:52

## 2022-08-23 RX ADMIN — Medication 1000 MILLIGRAM(S): at 14:01

## 2022-08-23 RX ADMIN — Medication 500 MICROGRAM(S): at 20:52

## 2022-08-23 RX ADMIN — HYDROMORPHONE HYDROCHLORIDE 0.5 MILLIGRAM(S): 2 INJECTION INTRAMUSCULAR; INTRAVENOUS; SUBCUTANEOUS at 13:46

## 2022-08-23 RX ADMIN — Medication 1000 MILLIGRAM(S): at 06:02

## 2022-08-23 RX ADMIN — Medication 0.5 MILLIGRAM(S): at 19:10

## 2022-08-23 RX ADMIN — ALBUTEROL 2.5 MILLIGRAM(S): 90 AEROSOL, METERED ORAL at 14:59

## 2022-08-23 RX ADMIN — MUPIROCIN 1 APPLICATION(S): 20 OINTMENT TOPICAL at 06:28

## 2022-08-23 RX ADMIN — HYDROMORPHONE HYDROCHLORIDE 0.5 MILLIGRAM(S): 2 INJECTION INTRAMUSCULAR; INTRAVENOUS; SUBCUTANEOUS at 07:00

## 2022-08-23 RX ADMIN — HYDROMORPHONE HYDROCHLORIDE 0.5 MILLIGRAM(S): 2 INJECTION INTRAMUSCULAR; INTRAVENOUS; SUBCUTANEOUS at 17:08

## 2022-08-23 RX ADMIN — GABAPENTIN 600 MILLIGRAM(S): 400 CAPSULE ORAL at 06:02

## 2022-08-23 RX ADMIN — NYSTATIN CREAM 1 APPLICATION(S): 100000 CREAM TOPICAL at 06:28

## 2022-08-23 RX ADMIN — Medication 30 MILLIGRAM(S): at 22:06

## 2022-08-23 RX ADMIN — Medication 6 UNIT(S): at 16:50

## 2022-08-23 RX ADMIN — ALBUTEROL 2.5 MILLIGRAM(S): 90 AEROSOL, METERED ORAL at 08:36

## 2022-08-23 RX ADMIN — ALBUTEROL 2.5 MILLIGRAM(S): 90 AEROSOL, METERED ORAL at 02:05

## 2022-08-23 RX ADMIN — HYDROMORPHONE HYDROCHLORIDE 4 MILLIGRAM(S): 2 INJECTION INTRAMUSCULAR; INTRAVENOUS; SUBCUTANEOUS at 13:08

## 2022-08-23 RX ADMIN — HYDROMORPHONE HYDROCHLORIDE 4 MILLIGRAM(S): 2 INJECTION INTRAMUSCULAR; INTRAVENOUS; SUBCUTANEOUS at 17:39

## 2022-08-23 RX ADMIN — Medication 3 MILLILITER(S): at 02:04

## 2022-08-23 RX ADMIN — PANTOPRAZOLE SODIUM 40 MILLIGRAM(S): 20 TABLET, DELAYED RELEASE ORAL at 12:07

## 2022-08-23 RX ADMIN — Medication 20 MILLILITER(S): at 06:02

## 2022-08-23 RX ADMIN — GABAPENTIN 600 MILLIGRAM(S): 400 CAPSULE ORAL at 22:07

## 2022-08-23 RX ADMIN — HYDROMORPHONE HYDROCHLORIDE 0.5 MILLIGRAM(S): 2 INJECTION INTRAMUSCULAR; INTRAVENOUS; SUBCUTANEOUS at 11:05

## 2022-08-23 RX ADMIN — APIXABAN 5 MILLIGRAM(S): 2.5 TABLET, FILM COATED ORAL at 06:02

## 2022-08-23 RX ADMIN — Medication 3 MILLILITER(S): at 20:53

## 2022-08-23 RX ADMIN — Medication 1: at 08:00

## 2022-08-23 RX ADMIN — HYDROMORPHONE HYDROCHLORIDE 0.5 MILLIGRAM(S): 2 INJECTION INTRAMUSCULAR; INTRAVENOUS; SUBCUTANEOUS at 08:10

## 2022-08-23 RX ADMIN — APIXABAN 5 MILLIGRAM(S): 2.5 TABLET, FILM COATED ORAL at 17:08

## 2022-08-23 RX ADMIN — Medication 1 TABLET(S): at 12:09

## 2022-08-23 RX ADMIN — Medication 1 APPLICATION(S): at 17:31

## 2022-08-23 RX ADMIN — Medication 0.5 MILLIGRAM(S): at 09:36

## 2022-08-23 RX ADMIN — HYDROMORPHONE HYDROCHLORIDE 4 MILLIGRAM(S): 2 INJECTION INTRAMUSCULAR; INTRAVENOUS; SUBCUTANEOUS at 14:03

## 2022-08-23 RX ADMIN — GABAPENTIN 600 MILLIGRAM(S): 400 CAPSULE ORAL at 13:04

## 2022-08-23 RX ADMIN — Medication 6 UNIT(S): at 12:04

## 2022-08-23 RX ADMIN — INSULIN GLARGINE 20 UNIT(S): 100 INJECTION, SOLUTION SUBCUTANEOUS at 21:53

## 2022-08-23 RX ADMIN — Medication 1 APPLICATION(S): at 23:26

## 2022-08-23 RX ADMIN — MUPIROCIN 1 APPLICATION(S): 20 OINTMENT TOPICAL at 17:30

## 2022-08-23 RX ADMIN — Medication 1000 MILLIGRAM(S): at 07:41

## 2022-08-23 RX ADMIN — Medication 1000 MILLIGRAM(S): at 13:04

## 2022-08-23 RX ADMIN — HYDROMORPHONE HYDROCHLORIDE 0.5 MILLIGRAM(S): 2 INJECTION INTRAMUSCULAR; INTRAVENOUS; SUBCUTANEOUS at 10:40

## 2022-08-23 RX ADMIN — HYDROMORPHONE HYDROCHLORIDE 0.5 MILLIGRAM(S): 2 INJECTION INTRAMUSCULAR; INTRAVENOUS; SUBCUTANEOUS at 00:37

## 2022-08-23 RX ADMIN — Medication 3 MILLILITER(S): at 08:35

## 2022-08-23 RX ADMIN — Medication 20 MILLILITER(S): at 12:05

## 2022-08-23 RX ADMIN — HYDROMORPHONE HYDROCHLORIDE 0.5 MILLIGRAM(S): 2 INJECTION INTRAMUSCULAR; INTRAVENOUS; SUBCUTANEOUS at 20:17

## 2022-08-23 RX ADMIN — Medication 1 APPLICATION(S): at 06:27

## 2022-08-23 RX ADMIN — Medication 20 MILLILITER(S): at 23:26

## 2022-08-23 RX ADMIN — POLYETHYLENE GLYCOL 3350 17 GRAM(S): 17 POWDER, FOR SOLUTION ORAL at 06:01

## 2022-08-23 RX ADMIN — Medication 3 MILLILITER(S): at 15:00

## 2022-08-23 RX ADMIN — HYDROMORPHONE HYDROCHLORIDE 0.5 MILLIGRAM(S): 2 INJECTION INTRAMUSCULAR; INTRAVENOUS; SUBCUTANEOUS at 23:25

## 2022-08-23 RX ADMIN — Medication 20 MILLILITER(S): at 17:08

## 2022-08-23 RX ADMIN — Medication 500 MICROGRAM(S): at 02:05

## 2022-08-23 RX ADMIN — Medication 6 UNIT(S): at 07:59

## 2022-08-23 RX ADMIN — NYSTATIN CREAM 1 APPLICATION(S): 100000 CREAM TOPICAL at 17:43

## 2022-08-23 RX ADMIN — ALBUTEROL 2.5 MILLIGRAM(S): 90 AEROSOL, METERED ORAL at 20:52

## 2022-08-23 NOTE — CONSULT NOTE ADULT - ASSESSMENT
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     Rad/onc c/s for radiation evaluation.       Noted ENT, pt not a surgical candidate at this time 2/2 size of tumor.  Recommendation for chemoradiation VS induction chemo followed by concurrent chemoradiation.    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     Rad/onc c/s for radiation evaluation.       Noted ENT, pt not a surgical candidate at this time 2/2 size of tumor.  Recommendation for chemoradiation VS induction chemo followed by concurrent chemoradiation.    Hem/Onc out patient f/u  PET CT outpt  MRI  Neck ?  f/u with rad/onc as out pt if medically stable for d/c.   will discuss with Dr. 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     Rad/onc c/s for radiation evaluation.       Noted ENT, pt not a surgical candidate at this time 2/2 size of tumor.  Recommendation for chemoradiation VS induction chemo followed by concurrent chemoradiation.    Hem/Onc out patient f/u  PET CT outpt  MRI  Neck ?  f/u with rad/onc as out pt if medically stable for d/c.   will discuss with Dr. 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     Patient seen at the bedside with Obey Bautista and Ma (8/24/2022).  Pt alert and oriented, no acute resp. distress.  We discussed completing staging work-up with PET CT and f/u as an out-pt with rad/onc.      Rad/onc c/s for radiation evaluation.       Noted ENT, pt not a surgical candidate at this time 2/2 size of tumor.  Recommendation for chemoradiation VS induction chemo followed by concurrent chemoradiation.    Hem/Onc out patient f/u  PET CT outpt  f/u with rad/onc as out pt if medically stable for d/c.   discussed with Obey Bautista/Ma

## 2022-08-23 NOTE — DISCHARGE NOTE PROVIDER - CARE PROVIDER_API CALL
Abdirashid Landaverde)  [facility]  [address] [place] [address]  Phone: [phone]  Fax: [phone]  Follow Up Time: 1 week   Abdirashid Landaverde)  HeadNe Surgery  [address] [place] [address]  Phone: [phone]  Fax: [phone]  Follow Up Time: 1 week    Leonel Mejia)  Hematology; Internal Medicine; Medical Oncology  [address] [place] [address]  Phone: [phone]  Fax: [phone]  Follow Up Time:

## 2022-08-23 NOTE — DISCHARGE NOTE PROVIDER - CARE PROVIDERS DIRECT ADDRESSES
,DirectAddress_Unknown ,DirectAddress_Unknown,[email or web address]

## 2022-08-23 NOTE — PROGRESS NOTE ADULT - SUBJECTIVE AND OBJECTIVE BOX
Patient is a 57y old  Female who presents with a chief complaint of Hypoxia, Acute on Ch RF, trach in situ (23 Aug 2022 12:05)      INTERVAL HPI/OVERNIGHT EVENTS:  None    FAMILY HISTORY:  No pertinent family history in first degree relatives      T(C): 37.1 (08-23-22 @ 12:30), Max: 37.2 (08-23-22 @ 05:42)  HR: 76 (08-23-22 @ 12:30) (76 - 86)  BP: 114/56 (08-23-22 @ 12:30) (101/50 - 119/63)  RR: 18 (08-23-22 @ 12:30) (18 - 18)  SpO2: 98% (08-23-22 @ 12:30) (96% - 98%)  Wt(kg): --Vital Signs Last 24 Hrs  T(C): 37.1 (23 Aug 2022 12:30), Max: 37.2 (23 Aug 2022 05:42)  T(F): 98.8 (23 Aug 2022 12:30), Max: 98.9 (23 Aug 2022 05:42)  HR: 76 (23 Aug 2022 12:30) (76 - 86)  BP: 114/56 (23 Aug 2022 12:30) (101/50 - 119/63)  BP(mean): --  RR: 18 (23 Aug 2022 12:30) (18 - 18)  SpO2: 98% (23 Aug 2022 12:30) (96% - 98%)        PHYSICAL EXAM:    GEN: NAD, awake and alert. No drooling or pooling of secretions. No stridor or stertor. aphonic 2* trach.   SKIN: Good color, non diaphoretic  HEENT: Oral mucosa pink and moist. No erythema or edema noted to buccal mucosa, tongue, FOM, uvula or posterior oropharynx. Uvula midline. no bleeding noted.   NECK:  Trachea midline. trach tube intact, trach changed at bedside to a 6 cuffless.   RESP: No dyspnea, non-labored breathing. No use of accessory muscles.  CARDIO: +S1/S2  ABDO: Soft, NT. PEG tube.   EXT: JONAS x 4      acetaminophen     Tablet .. 1000 milliGRAM(s) Oral every 8 hours  acetylcysteine 20% for Nebulization - Peds 3 milliLiter(s) Nebulizer every 6 hours  ALBUTerol    0.083% 2.5 milliGRAM(s) Nebulizer every 6 hours  ALBUTerol    90 MICROgram(s) HFA Inhaler 2 Puff(s) Inhalation every 6 hours PRN  ALPRAZolam 0.5 milliGRAM(s) Oral every 6 hours PRN  ammonium lactate 12% Lotion 1 Application(s) Topical two times a day  apixaban 5 milliGRAM(s) Enteral Tube every 12 hours  dextrose 5%. 1000 milliLiter(s) IV Continuous <Continuous>  dextrose 5%. 1000 milliLiter(s) IV Continuous <Continuous>  dextrose 50% Injectable 25 Gram(s) IV Push once  dextrose 50% Injectable 12.5 Gram(s) IV Push once  dextrose 50% Injectable 25 Gram(s) IV Push once  dextrose Oral Gel 15 Gram(s) Oral once PRN  ergocalciferol 46387 Unit(s) Oral every week  gabapentin Solution 600 milliGRAM(s) Oral three times a day  glucagon  Injectable 1 milliGRAM(s) IntraMuscular once  guaifenesin/dextromethorphan Oral Liquid 20 milliLiter(s) Oral every 6 hours  HYDROmorphone   Tablet 4 milliGRAM(s) Enteral Tube every 4 hours PRN  HYDROmorphone  Injectable 0.5 milliGRAM(s) IV Push every 3 hours PRN  insulin glargine Injectable (LANTUS) 20 Unit(s) SubCutaneous at bedtime  insulin lispro (ADMELOG) corrective regimen sliding scale   SubCutaneous three times a day before meals  insulin lispro Injectable (ADMELOG) 6 Unit(s) SubCutaneous three times a day before meals  ipratropium    for Nebulization 500 MICROGram(s) Nebulizer every 6 hours  losartan 100 milliGRAM(s) Oral daily  magnesium sulfate  IVPB 2 Gram(s) IV Intermittent every 12 hours  multivitamin 1 Tablet(s) Oral daily  mupirocin 2% Ointment 1 Application(s) Topical two times a day  nystatin Cream 1 Application(s) Topical two times a day  pantoprazole   Suspension 40 milliGRAM(s) Oral daily  polyethylene glycol 3350 17 Gram(s) Oral two times a day  senna 2 Tablet(s) Oral at bedtime      HEALTH ISSUES - PROBLEM Dx:  Painful diabetic neuropathy

## 2022-08-23 NOTE — PROGRESS NOTE ADULT - ASSESSMENT
Pt is a 57y Female with large supraglottic lesion, s/p DL and biopsy, POD #4. Biopsy confirmed SCCa    ·	Dr Landaverde spoke with patient & daughter  ·	pt not a surgical candidate at this time given size of tumor  ·	recommend heme/onc & rad/onc evaluations for concurrent chemoradiation vs induction chemo followed by CCRT  ·	s/w our Head & Neck cancer navigator, will arrange for outpatient heme/onc & rad/onc appts; pt will also need PET scan done as OP  ·	appt made for patient to see Dr Landaverde next week, August 30th at 1pm  ·	w/d with attng

## 2022-08-23 NOTE — CONSULT NOTE ADULT - SUBJECTIVE AND OBJECTIVE BOX
CRUZ DUMONT 58yo  Female sent to the ER from [facility] where she is on  care for c/o inc SOB, chest tightness, diff breathing, wheezing i.e. acute on chr hypoxic RF.  Of note the pt has a trach and has had freq readmissions for mucous plugging, RF and has had several bronchoscopies.   EMS noted the pt to be hypotensive and hypoxic.  The pt was vigorously suctioned, received Neb Tx, IV steroids,  BP revived with IV fluids.  The pt was cultured and placed on ABx.  She is v well known to the Pul SVc and to ID.  The pt is ad with acute on ch hypoxic RF and ongoing Bacterial PNA.  The PMHx includes:  HTN, ASHD, HFpEF, DLD, DM II, Chronic RF, COPD, LUCIANA, ASp PNA, sp intubation, failure to extubate, chronic trach ([facility] 4/22), recurrent mucus plugging of airways, SDVT, AC with Eliquis, lower ext venous stasis dermatitis, bedbound state, GERD, diverticulosis, sp PEG, OA, DDD, DJD, chronic pain syn, depression anxiety.    Hosp Course:  The pt was ad from SNF for inc SOB, hypoxia with mucous plugging and acute on chr RF and ongoing bacterial PNA.  The pt was suctioned and placed on mech ventilation via trach and cont on ABx. The pt has had freq ad to hosp ( [facility]) since the ARF and intubation and failure to wean  that resulted in the trach in 6/22 in [facility].   The pt is mostly bed bound with lower ext weakness, mm atrophy/myopathy of disuse and sever hyperpigmentation skin changes of the lower ext.  The pt was ff by Pul/critical care and ID.  The pt was verbal and had nutrition via PEG.  S&S worked with pt and during w/up pt was noted to have pharyngeal/trach swelling.  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  The pt was taken to the OR by ENT for direct laryngoscopy and bx of the mass  on 8/19/22.      < from: Xray Cinesophagram Swallow Function w/ Contrast (08.12.22 @ 13:26) >  ACC: [id number] EXAM:  XR SWAL FUNC RAUL VID CON STDY                          PROCEDURE DATE:  08/12/2022          INTERPRETATION:  Clinical History / Reason for exam: Dysphagia    Procedure: Various consistencies of food mixed with barium were given to   the patient and swallowing was observed under fluoroscopy.  This study   was performed in conjunction with the speech pathology department.    Findings/  Impression:    Please refer to report from the department of speech pathology for   detailed description of findings and recommendations.    < end of copied text >    < from: CT Neck Soft Tissue w/ IV Cont (08.16.22 @ 09:07) >  Impression:    Status post tracheostomy. 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 Partial effacement of   the preepiglottic fat.    Mildly enhancing 1.1 cm right supraclavicular lymph node, nonspecific.    Left hemidiaphragm elevation with compressive atelectasis in   opacification of the partial imaged left lower lobe which could be   infectiousor inflammatory. Small left pleural effusion. Further   evaluation with chest CT can be considered.      < end of copied text >    Surgical Pathology Report:   ACCESSION No: [id number]   Patient: CRUZ DUMONT   Accession: [id number]   Collected Date/Time: 8/19/2022 12:00 EDT   Received Date/Time: 8/19/2022 15:00 EDT   Surgical Pathology Report - Auth (Verified)   Specimen(s) Submitted   Tongue base   Final Diagnosis   Tongue base mass, biopsy:   -Superficial fragments of invasive squamous cell carcinoma, non-   keratinized. See note below   Note: The case was received intradepartmental peer review and the   diagnosis represent a consensus opinion.   Verified by: Aide Louise M.D.   (Electronic Signature)   Reported on: 08/22/22 18:01 EDT, [facility],   [address] [place] [address]   Phone: [phone] Fax: [phone]

## 2022-08-23 NOTE — PROGRESS NOTE ADULT - SUBJECTIVE AND OBJECTIVE BOX
CRUZ DUMONT 58yo  Female sent to the ER from [facility] where she is on  care for c/o inc SOB, chest tightness, diff breathing, wheezing i.e. acute on chr hypoxic RF.  Of note the pt has a trach and has had freq readmissions for mucous plugging, RF and has had several bronchoscopies.   EMS noted the pt to be hypotensive and hypoxic.  The pt was vigorously suctioned, received Neb Tx, IV steroids,  BP revived with IV fluids.  The pt was cultured and placed on ABx.  She is v well known to the Pul SVc and to ID.  The pt is ad with acute on ch hypoxic RF and ongoing Bacterial PNA.  The PMHx includes:  HTN, ASHD, HFpEF, DLD, DM II, Chronic RF, COPD, LUCIANA, ASp PNA, sp intubation, failure to extubate, chronic trach ([facility] 4/22), recurrent mucus plugging of airways, SDVT, AC with Eliquis, lower ext venous stasis dermatitis, bedbound state, GERD, diverticulosis, sp PEG, OA, DDD, DJD, chronic pain syn, depression anxiety.    Hosp Course:  The pt was ad from SNF for inc SOB, hypoxia with mucous plugging and acute on chr RF and ongoing bacterial PNA.  The pt was suctioned and placed on mech ventilation via trach and cont on ABx. The pt has had freq ad to hosp ( [facility]) since the ARF and intubation and failure to wean  that resulted in the trach in 6/22 in [facility].   The pt is mostly bed bound with lower ext weakness, mm atrophy/myopathy of disuse and sever hyperpigmentation skin changes of the lower ext.  The pt was ff by Pul/critical care and ID.  The pt was verbal and had nutrition via PEG.  S&S worked with pt and during w/up pt was noted to have pharyngeal/trach swelling.  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  The pt was taken to the OR by ENT for direct laryngoscopy and bx of the mass  on 8/19/22.      INTERVAL HPI/OVERNIGHT EVENTS: VS stable, oxygenating well, POD4 direct laryngoscopy fx a 4.4cm supraglottic laryngeal lesion, bx path report P, ENT ff pt,  Mg repleted 1.6  to 2.4,  pt has severe intertrigo,  R axilla, groin and buttocks    MEDICATIONS  (STANDING):  acetaminophen     Tablet .. 650 milliGRAM(s) Oral every 6 hours  acetylcysteine 20% for Nebulization - Peds 3 milliLiter(s) Nebulizer every 6 hours  ALBUTerol    0.083% 2.5 milliGRAM(s) Nebulizer every 6 hours  ammonium lactate 12% Lotion 1 Application(s) Topical two times a day  apixaban 5 milliGRAM(s) Enteral Tube two times a day  chlorhexidine 0.12% Liquid 15 milliLiter(s) Oral Mucosa every 12 hours  dextrose 5%. 1000 milliLiter(s) (100 mL/Hr) IV Continuous <Continuous>  dextrose 5%. 1000 milliLiter(s) (50 mL/Hr) IV Continuous <Continuous>  dextrose 50% Injectable 25 Gram(s) IV Push once  dextrose 50% Injectable 12.5 Gram(s) IV Push once  dextrose 50% Injectable 25 Gram(s) IV Push once  doxycycline monohydrate Capsule 100 milliGRAM(s) Oral every 12 hours  gabapentin 600 milliGRAM(s) Oral three times a day  glucagon  Injectable 1 milliGRAM(s) IntraMuscular once  insulin glargine Injectable (LANTUS) 20 Unit(s) SubCutaneous at bedtime  insulin lispro (ADMELOG) corrective regimen sliding scale   SubCutaneous three times a day before meals  insulin lispro Injectable (ADMELOG) 6 Unit(s) SubCutaneous three times a day before meals  methylPREDNISolone sodium succinate Injectable 60 milliGRAM(s) IV Push two times a day  multivitamin 1 Tablet(s) Oral daily  pantoprazole   Suspension 40 milliGRAM(s) Oral daily  polyethylene glycol 3350 17 Gram(s) Oral two times a day  senna 2 Tablet(s) Oral at bedtime    MEDICATIONS  (PRN):  ALBUTerol    90 MICROgram(s) HFA Inhaler 2 Puff(s) Inhalation every 6 hours PRN Shortness of Breath and/or Wheezing  ALPRAZolam 0.5 milliGRAM(s) Oral every 6 hours PRN anxiety  dextrose Oral Gel 15 Gram(s) Oral once PRN Blood Glucose LESS THAN 70 milliGRAM(s)/deciliter  HYDROmorphone   Tablet 4 milliGRAM(s) Oral every 4 hours PRN Severe Pain (7 - 10)  HYDROmorphone  Injectable 1 milliGRAM(s) IV Push every 8 hours PRN Moderate Pain (4 - 6)      Allergies    penicillin (Swelling)      Vital Signs Last 24 Hrs    T(F): 98.29  HR:  86  BP:  101/50    RR: 18  SpO2:  96%, T piece    PHYSICAL EXAM:      Constitutional: pt alert, oriented,  pulse ox 96%    Eyes: nonicteric    ENMT: dry oral mucosa, dental defects,     Neck: + trach tube supple, no stridor, + trach,     Respiratory: shallow resp, diminished harsh bronchial  BS, scattered rhonchi, no wheezing    Cardiovascular: S1S2 reg    Gastrointestinal: globular, soft and benign, + PEG, + BS    Genitourinary:    Extremities: moves all ext, dec motor strength of lower ext, + BL mm atrophy, + BL foot drop    Vascular: dec pedal pulses    Neurological: nonfocal    Skin: lower ext dark almost black hyperpigmentation of lower ext    Lymph Nodes: not enlarged    Musculoskeletal: dec mm mass and tone    Psychiatric: anxious, depressed but stable        LABS:                10  6.7)-----------( 177            31      138 |  98  |  10  ----------------------------<  167  4.2   |  31  |  0.5    , 115, 123, 109, 115,  Ca    9.7       Mg     1.9, 1.8, 2.0, 1.6, 1.7, 1.8, 2.6, 1.7, 1.6, 2.4  trop <0.01  Vit D 18  folate 9.7  Haptos 126  ferritin 982  TPro  5.9, 5.5, 5.4  /  Alb  3.3, 3.4, 3.3  /  TBili  0.3  /  DBili  x   /  AST  20  /  ALT  28  /  AlkPhos  77  08-05    RADIOLOGY & ADDITIONAL TESTS:    EKG:  NSR R axis, low voltage, nonspecific ST-T changed  EKG 8/17:  NSR 69/min, incomplete RBBB, no acute changes  ECHO:  nl sys function, EF 60%, GIDD, so sig valvular dis, borderline Pul HTN, sm pericardial eff  CXR:  interstitial prominence, L pl based opacity unchanged  AA doppler:  normal arterial blood flow  CT of soft tissue of the neck 8/16/22:  exophytic expansile mucosal mass 4.4x2x2.4cm involving the BL aryepiglotticfolds and along the BL glossoepiglottic folds as well as along the  laryngea/lingual surface of the epiglottis, 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 acute on chr hypoxic RF.  Of note the pt has a trach and has had freq readmissions for mucous plugging, RF and has had several bronchoscopies.   EMS noted the pt to be hypotensive and hypoxic.  The pt was vigorously suctioned, received Neb Tx, IV steroids,  BP revived with IV fluids.  The pt was cultured and placed on ABx.  She is v well known to the Pul SVc and to ID.  The pt is ad with acute on ch hypoxic RF and ongoing Bacterial PNA.  The PMHx includes:  HTN, ASHD, HFpEF, DLD, DM II, Chronic RF, COPD, LUCIANA, ASp PNA, sp intubation, failure to extubate, chronic trach ([facility] 4/22), recurrent mucus plugging of airways, SDVT, AC with Eliquis, lower ext venous stasis dermatitis, bedbound state, GERD, diverticulosis, sp PEG, OA, DDD, DJD, chronic pain syn, depression anxiety.    Hosp Course:  The pt was ad from SNF for inc SOB, hypoxia with mucous plugging and acute on chr RF and ongoing bacterial PNA.  The pt was suctioned and placed on mech ventilation via trach and cont on ABx. The pt has had freq ad to hosp ( [facility]) since the ARF and intubation and failure to wean  that resulted in the trach in 6/22 in [facility].   The pt is mostly bed bound with lower ext weakness, mm atrophy/myopathy of disuse and sever hyperpigmentation skin changes of the lower ext.  The pt was ff by Pul/critical care and ID.  The pt was verbal and had nutrition via PEG.  S&S worked with pt and during w/up pt was noted to have pharyngeal/trach swelling.  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  The pt was taken to the OR by ENT for direct laryngoscopy and bx of the mass  on 8/19/22.      INTERVAL HPI/OVERNIGHT EVENTS: VS stable, oxygenating well, POD4 direct laryngoscopy fx a 4.4cm supraglottic laryngeal lesion, bx path:  invasive squamous cell carcinoma,  recall ENT, Hem-onc consult, Rad Onc consult,    Mg repleted 1.6  to 2.4,  pt has severe intertrigo,  R axilla, groin and buttocks    MEDICATIONS  (STANDING):  acetaminophen     Tablet .. 650 milliGRAM(s) Oral every 6 hours  acetylcysteine 20% for Nebulization - Peds 3 milliLiter(s) Nebulizer every 6 hours  ALBUTerol    0.083% 2.5 milliGRAM(s) Nebulizer every 6 hours  ammonium lactate 12% Lotion 1 Application(s) Topical two times a day  apixaban 5 milliGRAM(s) Enteral Tube two times a day  chlorhexidine 0.12% Liquid 15 milliLiter(s) Oral Mucosa every 12 hours  dextrose 5%. 1000 milliLiter(s) (100 mL/Hr) IV Continuous <Continuous>  dextrose 5%. 1000 milliLiter(s) (50 mL/Hr) IV Continuous <Continuous>  dextrose 50% Injectable 25 Gram(s) IV Push once  dextrose 50% Injectable 12.5 Gram(s) IV Push once  dextrose 50% Injectable 25 Gram(s) IV Push once  doxycycline monohydrate Capsule 100 milliGRAM(s) Oral every 12 hours  gabapentin 600 milliGRAM(s) Oral three times a day  glucagon  Injectable 1 milliGRAM(s) IntraMuscular once  insulin glargine Injectable (LANTUS) 20 Unit(s) SubCutaneous at bedtime  insulin lispro (ADMELOG) corrective regimen sliding scale   SubCutaneous three times a day before meals  insulin lispro Injectable (ADMELOG) 6 Unit(s) SubCutaneous three times a day before meals  methylPREDNISolone sodium succinate Injectable 60 milliGRAM(s) IV Push two times a day  multivitamin 1 Tablet(s) Oral daily  pantoprazole   Suspension 40 milliGRAM(s) Oral daily  polyethylene glycol 3350 17 Gram(s) Oral two times a day  senna 2 Tablet(s) Oral at bedtime    MEDICATIONS  (PRN):  ALBUTerol    90 MICROgram(s) HFA Inhaler 2 Puff(s) Inhalation every 6 hours PRN Shortness of Breath and/or Wheezing  ALPRAZolam 0.5 milliGRAM(s) Oral every 6 hours PRN anxiety  dextrose Oral Gel 15 Gram(s) Oral once PRN Blood Glucose LESS THAN 70 milliGRAM(s)/deciliter  HYDROmorphone   Tablet 4 milliGRAM(s) Oral every 4 hours PRN Severe Pain (7 - 10)  HYDROmorphone  Injectable 1 milliGRAM(s) IV Push every 8 hours PRN Moderate Pain (4 - 6)      Allergies    penicillin (Swelling)      Vital Signs Last 24 Hrs    T(F): 98.29  HR:  86  BP:  101/50    RR: 18  SpO2:  96%, T piece    PHYSICAL EXAM:      Constitutional: pt alert, oriented,  pulse ox 96%    Eyes: nonicteric    ENMT: dry oral mucosa, dental defects,     Neck: + trach tube supple, no stridor, + trach,     Respiratory: shallow resp, diminished harsh bronchial  BS, scattered rhonchi, no wheezing    Cardiovascular: S1S2 reg    Gastrointestinal: globular, soft and benign, + PEG, + BS    Genitourinary:    Extremities: moves all ext, dec motor strength of lower ext, + BL mm atrophy, + BL foot drop    Vascular: dec pedal pulses    Neurological: nonfocal    Skin: lower ext dark almost black hyperpigmentation of lower ext    Lymph Nodes: not enlarged    Musculoskeletal: dec mm mass and tone    Psychiatric: anxious, depressed but stable        LABS:                10  6.7)-----------( 177            31      138 |  98  |  10  ----------------------------<  167  4.2   |  31  |  0.5    , 115, 123, 109, 115,  Ca    9.7       Mg     1.9, 1.8, 2.0, 1.6, 1.7, 1.8, 2.6, 1.7, 1.6, 2.4  trop <0.01  Vit D 18  folate 9.7  Haptos 126  ferritin 982  TPro  5.9, 5.5, 5.4  /  Alb  3.3, 3.4, 3.3  /  TBili  0.3  /  DBili  x   /  AST  20  /  ALT  28  /  AlkPhos  77  08-05    RADIOLOGY & ADDITIONAL TESTS:    EKG:  NSR R axis, low voltage, nonspecific ST-T changed  EKG 8/17:  NSR 69/min, incomplete RBBB, no acute changes  ECHO:  nl sys function, EF 60%, GIDD, so sig valvular dis, borderline Pul HTN, sm pericardial eff  CXR:  interstitial prominence, L pl based opacity unchanged  AA doppler:  normal arterial blood flow  CT of soft tissue of the neck 8/16/22:  exophytic expansile mucosal mass 4.4x2x2.4cm involving the BL aryepiglotticfolds and along the BL glossoepiglottic folds as well as along the  laryngea/lingual surface of the epiglottis, 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 acute on chr hypoxic RF.  Of note the pt has a trach and has had freq readmissions for mucous plugging, RF and has had several bronchoscopies.   EMS noted the pt to be hypotensive and hypoxic.  The pt was vigorously suctioned, received Neb Tx, IV steroids,  BP revived with IV fluids.  The pt was cultured and placed on ABx.  She is v well known to the Pul SVc and to ID.  The pt is ad with acute on ch hypoxic RF and ongoing Bacterial PNA.  The PMHx includes:  HTN, ASHD, HFpEF, DLD, DM II, Chronic RF, COPD, LUCIANA, ASp PNA, sp intubation, failure to extubate, chronic trach ([facility] 4/22), recurrent mucus plugging of airways, SDVT, AC with Eliquis, lower ext venous stasis dermatitis, bedbound state, GERD, diverticulosis, sp PEG, OA, DDD, DJD, chronic pain syn, depression anxiety.    Hosp Course:  The pt was ad from SNF for inc SOB, hypoxia with mucous plugging and acute on chr RF and ongoing bacterial PNA.  The pt was suctioned and placed on mech ventilation via trach and cont on ABx. The pt has had freq ad to hosp ( [facility]) since the ARF and intubation and failure to wean  that resulted in the trach in 6/22 in [facility].   The pt is mostly bed bound with lower ext weakness, mm atrophy/myopathy of disuse and sever hyperpigmentation skin changes of the lower ext.  The pt was ff by Pul/critical care and ID.  The pt was verbal and had nutrition via PEG.  S&S worked with pt and during w/up pt was noted to have pharyngeal/trach swelling.  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  The pt was taken to the OR by ENT for direct laryngoscopy and bx of the mass  on 8/19/22.      INTERVAL HPI/OVERNIGHT EVENTS: VS stable, oxygenating well, POD4 direct laryngoscopy fx a 4.4cm supraglottic laryngeal lesion, bx path:  invasive squamous cell carcinoma,  recall ENT, Hem-onc consult, Rad Onc consult,    Mg repleted 1.6  to 2.4,  pt has severe intertrigo,  R axilla, groin and buttocks,    MEDICATIONS  (STANDING):  acetaminophen     Tablet .. 650 milliGRAM(s) Oral every 6 hours  acetylcysteine 20% for Nebulization - Peds 3 milliLiter(s) Nebulizer every 6 hours  ALBUTerol    0.083% 2.5 milliGRAM(s) Nebulizer every 6 hours  ammonium lactate 12% Lotion 1 Application(s) Topical two times a day  apixaban 5 milliGRAM(s) Enteral Tube two times a day  chlorhexidine 0.12% Liquid 15 milliLiter(s) Oral Mucosa every 12 hours  dextrose 5%. 1000 milliLiter(s) (100 mL/Hr) IV Continuous <Continuous>  dextrose 5%. 1000 milliLiter(s) (50 mL/Hr) IV Continuous <Continuous>  dextrose 50% Injectable 25 Gram(s) IV Push once  dextrose 50% Injectable 12.5 Gram(s) IV Push once  dextrose 50% Injectable 25 Gram(s) IV Push once  doxycycline monohydrate Capsule 100 milliGRAM(s) Oral every 12 hours  gabapentin 600 milliGRAM(s) Oral three times a day  glucagon  Injectable 1 milliGRAM(s) IntraMuscular once  insulin glargine Injectable (LANTUS) 20 Unit(s) SubCutaneous at bedtime  insulin lispro (ADMELOG) corrective regimen sliding scale   SubCutaneous three times a day before meals  insulin lispro Injectable (ADMELOG) 6 Unit(s) SubCutaneous three times a day before meals  methylPREDNISolone sodium succinate Injectable 60 milliGRAM(s) IV Push two times a day  multivitamin 1 Tablet(s) Oral daily  pantoprazole   Suspension 40 milliGRAM(s) Oral daily  polyethylene glycol 3350 17 Gram(s) Oral two times a day  senna 2 Tablet(s) Oral at bedtime    MEDICATIONS  (PRN):  ALBUTerol    90 MICROgram(s) HFA Inhaler 2 Puff(s) Inhalation every 6 hours PRN Shortness of Breath and/or Wheezing  ALPRAZolam 0.5 milliGRAM(s) Oral every 6 hours PRN anxiety  dextrose Oral Gel 15 Gram(s) Oral once PRN Blood Glucose LESS THAN 70 milliGRAM(s)/deciliter  HYDROmorphone   Tablet 4 milliGRAM(s) Oral every 4 hours PRN Severe Pain (7 - 10)  HYDROmorphone  Injectable 1 milliGRAM(s) IV Push every 8 hours PRN Moderate Pain (4 - 6)      Allergies    penicillin (Swelling)      Vital Signs Last 24 Hrs    T(F): 98.29  HR:  86  BP:  101/50    RR: 18  SpO2:  96%, T piece    PHYSICAL EXAM:      Constitutional: pt alert, oriented,  pulse ox 96%    Eyes: nonicteric    ENMT: dry oral mucosa, dental defects,     Neck: + trach tube supple, no stridor, + trach,     Respiratory: shallow resp, diminished harsh bronchial  BS, scattered rhonchi, no wheezing    Cardiovascular: S1S2 reg    Gastrointestinal: globular, soft and benign, + PEG, + BS    Genitourinary:    Extremities: moves all ext, dec motor strength of lower ext, + BL mm atrophy, + BL foot drop    Vascular: dec pedal pulses    Neurological: nonfocal    Skin: lower ext dark almost black hyperpigmentation of lower ext    Lymph Nodes: not enlarged    Musculoskeletal: dec mm mass and tone    Psychiatric: anxious, depressed but stable        LABS:                10  6.7)-----------( 177            31      138 |  98  |  10  ----------------------------<  167  4.2   |  31  |  0.5    , 115, 123, 109, 115,  Ca    9.7       Mg     1.9, 1.8, 2.0, 1.6, 1.7, 1.8, 2.6, 1.7, 1.6, 2.4  trop <0.01  Vit D 18  folate 9.7  Haptos 126  ferritin 982  TPro  5.9, 5.5, 5.4  /  Alb  3.3, 3.4, 3.3  /  TBili  0.3  /  DBili  x   /  AST  20  /  ALT  28  /  AlkPhos  77  08-05    RADIOLOGY & ADDITIONAL TESTS:    EKG:  NSR R axis, low voltage, nonspecific ST-T changed  EKG 8/17:  NSR 69/min, incomplete RBBB, no acute changes  ECHO:  nl sys function, EF 60%, GIDD, so sig valvular dis, borderline Pul HTN, sm pericardial eff  CXR:  interstitial prominence, L pl based opacity unchanged  AA doppler:  normal arterial blood flow  CT of soft tissue of the neck 8/16/22:  exophytic expansile mucosal mass 4.4x2x2.4cm involving the BL aryepiglotticfolds and along the BL glossoepiglottic folds as well as along the  laryngea/lingual surface of the epiglottis, 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

## 2022-08-23 NOTE — PROGRESS NOTE ADULT - ASSESSMENT
57 year old female with a past medical history of IDDM, GERD, HTN, COPD, Chronic hypoxemic and hypercapnic respiratory failure SP Tracheostomy (not chronically vented) & PEG at [facility], DVT on Eliquis, Anxiety, CHF, Recurrent mucous plugs SP bronchoscopies, [facility] [place] for chest tightness and shortness of breath over the past few days. Was found to have aspiration pneumonia causing COPD exacerbation    Acute on chronic Hypoxic respiratory failure   #s/p trach/PEG at [facility] 04/2022 after being intubated for >1month  #COPD Exacerbation  #Suspected superimposed bacterial PNA  - Completed a course of doxycycline WBC wnl, vitals wnl,  - Taper steroids- c/w prednisone 40 OD cont Nebs q4h & prn   - CXR reviewed and shows unchanged interstitial opacities  - Suction Q8h  - Inhaled NAC and PO Mucinex started    #Dysphagia  - Extensive pharyngeal edema found on modified speech and swallow  - Laryngeal mass found on CT neck  - Biopsy performed by ENT on 8/19  - Pathology came back as SCC  - Heme onc and onc consult  - Not a good candidate for surgery as per ENT      #Chronic HFpEF  2d Echo 05/2022: LVEF 50-55%. proBNP ~ 444  Pt does not appear volume overloaded  - Low Na diet and daily weight.   - monitor I & O    #Hx of DVT   - Cont Eliquis  - Venous duplex shows no DVT    #DM2  - Cont lantus/lispro 20-6-6-6  - ISS    #Chronic Lower extremity Pain management and Hyperpigmentation   #Chronic atropic B/l LE Hyperpigmentation changes  - Plan was for outpatient burn and dermatology f/u  - Start topical clobetasone ointment  - Gabapentin 600 TID,   - PO dilaudid 4mg Q4 PRN  -Vascular consulted for possible PVD.   -Arterial doppler - No vascular disease  -PT/OT as tolerated  - Stitches from biopsy removed      Dispo: Back To [facility] when stable.

## 2022-08-23 NOTE — DISCHARGE NOTE PROVIDER - NSDCCPCAREPLAN_GEN_ALL_CORE_FT
PRINCIPAL DISCHARGE DIAGNOSIS  Diagnosis: SOB (shortness of breath)  Assessment and Plan of Treatment: You had a pneumonia, You were treated with antibiotics. You had a mass in your neck which was biopsied. The biopsy showed squamous cell carcinoma which is a type of cancer.      SECONDARY DISCHARGE DIAGNOSES  Diagnosis: Lung infiltrate  Assessment and Plan of Treatment:      PRINCIPAL DISCHARGE DIAGNOSIS  Diagnosis: SOB (shortness of breath)  Assessment and Plan of Treatment: You had a pneumonia, You were treated with antibiotics. You had a mass in your neck which was biopsied. The biopsy showed squamous cell carcinoma which is a type of cancer. You will need a PET/CT scan after you leave the hospital to stage the cancer. You will need to follow up with ENT and Hematology/Oncology.      SECONDARY DISCHARGE DIAGNOSES  Diagnosis: Lung infiltrate  Assessment and Plan of Treatment:

## 2022-08-23 NOTE — DISCHARGE NOTE PROVIDER - NSDCMRMEDTOKEN_GEN_ALL_CORE_FT
acetylcysteine 20% inhalation solution: 3 milliliter(s) inhaled every 6 hours  albuterol sulfate 2.5 mg/3 mL (0.083 %) solution for nebulization: milliliter(s) inhaled 3 times a day, As Needed  ALPRAZolam 0.5 mg oral tablet: 1 tab(s) orally 3 times a day (after meals), As Needed  ammonium lactate 12% topical lotion: 1 application topically 2 times a day  betamethasone valerate 0.1% topical cream: 1 application topically 2 times a day  gabapentin 600 mg oral tablet: 1 tab(s) orally 3 times a day via PEG tube  HYDROmorphone 4 mg oral tablet: 1 tab(s) orally every 4 hours, As Needed  insulin glargine 100 units/mL subcutaneous solution: 20 unit(s) subcutaneous once a day (at bedtime)  insulin lispro 100 units/mL injectable solution: 6  injectable 4 times a day (before feeds Q6H)  losartan 100 mg oral tablet: 1 tab(s) orally once a day via PEG tube  Multiple Vitamins oral tablet: 1 tab(s) orally once a day via PEG tube  pantoprazole 40 mg oral delayed release tablet: 1 tab(s) orally once a day (before a meal) via PEG tube  polyethylene glycol 3350 oral powder for reconstitution: 17 gram(s) orally once a day via PEG tube  senna leaf extract oral tablet: 2 tab(s) orally once a day (at bedtime) via PEG tube  Trelegy Ellipta 100 mcg-62.5 mcg-25 mcg powder for inhalation:    acetylcysteine 20% inhalation solution: 3 milliliter(s) inhaled every 6 hours  albuterol sulfate 2.5 mg/3 mL (0.083 %) solution for nebulization: milliliter(s) inhaled 3 times a day, As Needed  ALPRAZolam 0.5 mg oral tablet: 1 tab(s) orally 3 times a day (after meals), As Needed  ammonium lactate 12% topical lotion: 1 application topically 2 times a day  apixaban 5 mg oral tablet: 1 tab(s) orally every 12 hours  betamethasone valerate 0.1% topical cream: 1 application topically 2 times a day  gabapentin 600 mg oral tablet: 1 tab(s) orally 3 times a day via PEG tube  HYDROmorphone 4 mg oral tablet: 1 tab(s) orally every 4 hours, As Needed  insulin glargine 100 units/mL subcutaneous solution: 20 unit(s) subcutaneous once a day (at bedtime)  insulin lispro 100 units/mL injectable solution: 6  injectable 4 times a day (before feeds Q6H)  losartan 100 mg oral tablet: 1 tab(s) orally once a day via PEG tube  Multiple Vitamins oral tablet: 1 tab(s) orally once a day via PEG tube  pantoprazole 40 mg oral delayed release tablet: 1 tab(s) orally once a day (before a meal) via PEG tube  polyethylene glycol 3350 oral powder for reconstitution: 17 gram(s) orally once a day via PEG tube  senna leaf extract oral tablet: 2 tab(s) orally once a day (at bedtime) via PEG tube  Trelegy Ellipta 100 mcg-62.5 mcg-25 mcg powder for inhalation:    acetylcysteine 20% inhalation solution: 3 milliliter(s) inhaled every 6 hours  albuterol sulfate 2.5 mg/3 mL (0.083 %) solution for nebulization: milliliter(s) inhaled 3 times a day, As Needed  ALPRAZolam 0.5 mg oral tablet: 1 tab(s) orally 3 times a day (after meals), As Needed  ammonium lactate 12% topical lotion: 1 application topically 2 times a day  apixaban 5 mg oral tablet: 1 tab(s) orally every 12 hours  betamethasone valerate 0.1% topical cream: 1 application topically 2 times a day  gabapentin 600 mg oral tablet: 1 tab(s) orally 3 times a day via PEG tube  HYDROmorphone 4 mg oral tablet: 1 tab(s) orally every 4 hours, As Needed  insulin glargine 100 units/mL subcutaneous solution: 20 unit(s) subcutaneous once a day (at bedtime)  insulin lispro 100 units/mL injectable solution: 6  injectable 4 times a day (before feeds Q6H)  losartan 100 mg oral tablet: 1 tab(s) orally once a day via PEG tube  Multiple Vitamins oral tablet: 1 tab(s) orally once a day via PEG tube  nystatin 100,000 units/g topical cream: 1 application topically 2 times a day  pantoprazole 40 mg oral delayed release tablet: 1 tab(s) orally once a day (before a meal) via PEG tube  polyethylene glycol 3350 oral powder for reconstitution: 17 gram(s) orally once a day via PEG tube  senna leaf extract oral tablet: 2 tab(s) orally once a day (at bedtime) via PEG tube  sertraline 50 mg oral tablet: 1 tab(s) orally once a day  Trelegy Ellipta 100 mcg-62.5 mcg-25 mcg powder for inhalation:

## 2022-08-23 NOTE — DISCHARGE NOTE PROVIDER - PROVIDER TOKENS
PROVIDER:[TOKEN:[[id number]],FOLLOWUP:[1 week]] PROVIDER:[TOKEN:[[id number]],FOLLOWUP:[1 week]],PROVIDER:[TOKEN:[[id number]]]

## 2022-08-23 NOTE — PROGRESS NOTE ADULT - ASSESSMENT
Unfortunate 58 YO F with acute on chr hypoxic RF read for hypoxia and hypotension and ongoing Bacterial PNA. Hx of ASP PNA, COPD exacerbation and intubation with failure to extubate resulting in trach at [facility] .  The pt is under chronic  at [facility].      Acute on Chr hypoxic RF  Chronic Tracheostomy  Bacterial PNA  Clinical Debility  Bedbound state  Lower extremity venous insufficiency  BL foot drop, limb weakness, prolonged immobility  OR Direct Laryngoscopy  22, biopsy of supraglottic larynteal mass  Intertrigo  Hx of HTN, ASHD HFpEF  Hx of DLD  Hx of COPD, MO, LUCIANA, sp intubation, failure to extubate, chr trach since /[facility]  Hx of DM II  Hx of Ch anemia of ch dis  Hx of GERD, HH, Diverticulosis, Ch constipation  Hx of OA, DJD, DDD, chr pain syn  Hx of lower ext venous stasis dermatitis  Hx of DVT, AC/Eliquis  Hx of Anxiety, Depression       pt's VS stable POD4 OR direct laryngoscopy, fx noted "large supra laryngeal/base of tongue mass, bx taken  oxygenating well,  at 98%  ENT ff up appreciated  restarted Eliquis 5mg BID  restarted Peg feeds, low protein and alb  IV: NS at 40cc/hr, may be D/C  low Mg, replete 2gms ICV x 2 ordered  CT of soft tissue of the neck shows  exophytic expansile mucosal mass 4.4x2x2.4cm involving the BL aryepiglottic folds/? supraglottic laryngeal neoplasm  Pul-critical care     S&S therapy    low vit D start to replete 50,000 q wk  Venous doppler neg for DVT  AA doppler shows normal blood flow  Vasc consult     ECHO:  nl sys function, EF60%, GIDD, no sig valvular dis  EKG:  NSR 69/min, incomplete RBBB, no acute changes  cont all previous meds  GI prophylaxis  att to bowel regimen  skin care:  pt has severe intertrigo of the R axilla, groin and buttocks,  apply generous antifungal  cream   change PEG dressing daily, apply mupirocin  cont PEG feeds, pt may have ice chips  BL inflatable boots to prevent further foot drop, encourage pt to use them as much as possible    PM: hydromorphone 4mg po q 4 and 1mg IV q 8 for breakthrough pain, gabapentin 600mg q 8, tylenol 650mg QID    BL heel off loading boots, pt with BL foot drop    pt full code  Social SVC pt in cont active care with new fx on CT of neck and 2 days post bx of supraglotic laryngeal mass   Unfortunate 58 YO F with acute on chr hypoxic RF read for hypoxia and hypotension and ongoing Bacterial PNA. Hx of ASP PNA, COPD exacerbation and intubation with failure to extubate resulting in trach at [facility] .  The pt is under chronic  at [facility].      Acute on Chr hypoxic RF  Chronic Tracheostomy  Bacterial PNA  Clinical Debility  Bedbound state  Lower extremity venous insufficiency  BL foot drop, limb weakness, prolonged immobility  OR Direct Laryngoscopy  22, biopsy of supraglottic larynteal mass  Intertrigo  Hx of HTN, ASHD HFpEF  Hx of DLD  Hx of COPD, MO, LUCIANA, sp intubation, failure to extubate, chr trach since /[facility]  Hx of DM II  Hx of Ch anemia of ch dis  Hx of GERD, HH, Diverticulosis, Ch constipation  Hx of OA, DJD, DDD, chr pain syn  Hx of lower ext venous stasis dermatitis  Hx of DVT, AC/Eliquis  Hx of Anxiety, Depression       pt's VS stable POD4 OR direct laryngoscopy, fx noted "large supra laryngeal/base of tongue mass, bx taken: invasive squamous cell carcinoma  oxygenating well,  at 98%  ENT ff up, is there any surgical intervention  Hem-onc consult  Rad Onc consult  restarted Eliquis 5mg BID  restarted Peg feeds, low protein and alb  IV: NS at 40cc/hr, may be D/C  low Mg, replete 2gms ICV x 2 ordered  CT of soft tissue of the neck shows  exophytic expansile mucosal mass 4.4x2x2.4cm involving the BL aryepiglottic folds/? supraglottic laryngeal neoplasm  Pul-critical care     S&S therapy    low vit D start to replete 50,000 q wk  Venous doppler neg for DVT  AA doppler shows normal blood flow  Vasc consult     ECHO:  nl sys function, EF60%, GIDD, no sig valvular dis  EKG:  NSR 69/min, incomplete RBBB, no acute changes  cont all previous meds  GI prophylaxis  att to bowel regimen  skin care:  pt has severe intertrigo of the R axilla, groin and buttocks,  apply generous antifungal  cream   change PEG dressing daily, apply mupirocin  cont PEG feeds, pt may have ice chips  BL inflatable boots to prevent further foot drop, encourage pt to use them as much as possible    PM: hydromorphone 4mg po q 4 and 1mg IV q 8 for breakthrough pain, gabapentin 600mg q 8, tylenol 650mg QID    BL heel off loading boots, pt with BL foot drop    pt full code  Social SVC pt in cont active care with new fx on CT of neck and 2 days post bx of supraglotic laryngeal mass

## 2022-08-23 NOTE — DISCHARGE NOTE PROVIDER - NSDCFUSCHEDAPPT_GEN_ALL_CORE_FT
Leonel Mejia  [facility]  [facility] [address]  Scheduled Appointment: 09/06/2022

## 2022-08-23 NOTE — DISCHARGE NOTE PROVIDER - HOSPITAL COURSE
57 year old female with a past medical history of IDDM, GERD, HTN, COPD, Chronic hypoxemic and hypercapnic respiratory failure SP Tracheostomy (not chronically vented) & PEG at [facility], DVT on Eliquis, Anxiety, CHF, Recurrent mucous plugs SP bronchoscopies, presenting from [facility] for chest tightness and shortness of breath over the past few days.  Per EMS patient was hypotensive and hypoxic at the facility today, around 60%.  Patient was recently started on PO trial last week.  Patient reports increased secretions requiring more suctioning, still unable to clear them.  She also reports increased cough and wheezing.  Denies fevers, chills, lightheadedness, dizziness, blurry vision, double vision, headache, extremity weakness or deficits, chest pain, palpitations, abdominal pain, nausea, vomiting, diarrhea, hematochezia, melena, dysuria, hematuria, flank pain, lower extremity swelling/tenderness/erythema.    ED: NS 2.7L, Vancomycin 1250mg, Levofloxacin 750mg, Solumedrol 125mg, Morphine, Diazepam, Albuterol, Placed on pressure support.    Xray on admission showed bilat opacities.   EKG showed Normal sinus rhythm with Right axis deviation    Patient completed a course of doxycycline for pneumonia   On speech and swallow eval, patient was found to have a mass in neck  CT confirmed a laryngeal mass  ENT performed biopsy which showed squamous cell carcinoma   Patient remains stable for DC

## 2022-08-23 NOTE — PROGRESS NOTE ADULT - SUBJECTIVE AND OBJECTIVE BOX
ENT DAILY PROGRESS NOTE    Pt is a 57y Female with large supraglottic lesion, s/p DL and biopsy, POD #4. Pt seen and examined at bedside, Dr Landaverde s/w patient and daughter regarding biopsy results.       REVIEW OF SYSTEMS   [x] A ten-point review of systems was otherwise negative except as noted.    Allergies    penicillin (Swelling)    Intolerances      MEDICATIONS:  acetaminophen Tablet .. 1000 milliGRAM(s) Oral every 8 hours  acetylcysteine 20% for Nebulization - Peds 3 milliLiter(s) Nebulizer every 6 hours  ALBUTerol 0.083% 2.5 milliGRAM(s) Nebulizer every 6 hours  ALBUTerol 90 MICROgram(s) HFA Inhaler 2 Puff(s) Inhalation every 6 hours PRN  ALPRAZolam 0.5 milliGRAM(s) Oral every 6 hours PRN  ammonium lactate 12% Lotion 1 Application(s) Topical two times a day  apixaban 5 milliGRAM(s) Enteral Tube every 12 hours  dextrose 5%. 1000 milliLiter(s) IV Continuous <Continuous>  dextrose 5%. 1000 milliLiter(s) IV Continuous <Continuous>  dextrose 50% Injectable 25 Gram(s) IV Push once  dextrose 50% Injectable 25 Gram(s) IV Push once  dextrose 50% Injectable 12.5 Gram(s) IV Push once  dextrose Oral Gel 15 Gram(s) Oral once PRN  ergocalciferol 42444 Unit(s) Oral every week  gabapentin Solution 600 milliGRAM(s) Oral three times a day  glucagon  Injectable 1 milliGRAM(s) IntraMuscular once  guaifenesin/dextromethorphan Oral Liquid 20 milliLiter(s) Oral every 6 hours  HYDROmorphone   Tablet 4 milliGRAM(s) Enteral Tube every 4 hours PRN  HYDROmorphone  Injectable 0.5 milliGRAM(s) IV Push every 3 hours PRN  insulin glargine Injectable (LANTUS) 20 Unit(s) SubCutaneous at bedtime  insulin lispro (ADMELOG) corrective regimen sliding scale   SubCutaneous three times a day before meals  insulin lispro Injectable (ADMELOG) 6 Unit(s) SubCutaneous three times a day before meals  ipratropium    for Nebulization 500 MICROGram(s) Nebulizer every 6 hours  losartan 100 milliGRAM(s) Oral daily  magnesium sulfate  IVPB 2 Gram(s) IV Intermittent every 12 hours  multivitamin 1 Tablet(s) Oral daily  mupirocin 2% Ointment 1 Application(s) Topical two times a day  nystatin Cream 1 Application(s) Topical two times a day  pantoprazole   Suspension 40 milliGRAM(s) Oral daily  polyethylene glycol 3350 17 Gram(s) Oral two times a day  senna 2 Tablet(s) Oral at bedtime      Vital Signs Last 24 Hrs  T(C): 37.1 (23 Aug 2022 12:30), Max: 37.2 (23 Aug 2022 05:42)  T(F): 98.8 (23 Aug 2022 12:30), Max: 98.9 (23 Aug 2022 05:42)  HR: 76 (23 Aug 2022 12:30) (76 - 86)  BP: 114/56 (23 Aug 2022 12:30) (101/50 - 119/63)  RR: 18 (23 Aug 2022 12:30) (18 - 18)  SpO2: 98% (23 Aug 2022 12:30) (96% - 98%)      08-22 @ 07:01  -  08-23 @ 07:00  --------------------------------------------------------  IN:    Enteral Tube Flush: 120 mL    Glucerna: 600 mL  Total IN: 720 mL    OUT:    Ureteral Catheter (mL): 700 mL  Total OUT: 700 mL    Total NET: 20 mL      08-23 @ 07:01  -  08-23 @ 14:08  --------------------------------------------------------  IN:  Total IN: 0 mL    OUT:    Ureteral Catheter (mL): 1000 mL  Total OUT: 1000 mL    Total NET: -1000 mL    PHYSICAL EXAM:    GEN: NAD, awake and alert. No drooling or pooling of secretions. No stridor or stertor. Good vocal quality with PMSV.   SKIN: Good color, non diaphoretic  HEENT: Oral mucosa pink and moist. No erythema or edema noted to buccal mucosa, tongue, FOM, uvula or posterior oropharynx. Uvula midline  NECK:  Trachea midline. 6 cuffless trach in place, neck supple, no TTP to B/L lateral neck, no cervical LAD.  RESP: No dyspnea, non-labored breathing. No use of accessory muscles.  CARDIO: +S1/S2  ABDO: Soft, NT.  PEG  EXT: JONAS x 4    LABS:  CBC-                        10.5   6.76  )-----------( 177      ( 23 Aug 2022 08:19 )             31.1     BMP/CMP-  23 Aug 2022 08:19    138    |  98     |  10     ----------------------------<  167    4.2     |  31     |  0.5      Ca    9.5        23 Aug 2022 08:19  Mg     2.4       23 Aug 2022 08:19    TPro  5.4    /  Alb  3.1    /  TBili  0.3    /  DBili  x      /  AST  12     /  ALT  10     /  AlkPhos  105    23 Aug 2022 08:19    Coagulation Studies-    Endocrine Panel-  Calcium, Total Serum: 9.5 mg/dL (08-23 @ 08:19)    Surgical Pathology Report:   ACCESSION No: [id number]   Patient: CRUZ DUMONT   Accession: [id number]   Collected Date/Time: 8/19/2022 12:00 EDT   Received Date/Time: 8/19/2022 15:00 EDT   Surgical Pathology Report - Auth (Verified)   Specimen(s) Submitted   Tongue base   Final Diagnosis   Tongue base mass, biopsy:   -Superficial fragments of invasive squamous cell carcinoma, non-   keratinized. See note below   Note: The case was received intradepartmental peer review and the   diagnosis represent a consensus opinion.   Verified by: Aide Louise M.D.   (Electronic Signature)   Reported on: 08/22/22 18:01 EDT, [facility],   [address] [place] [address]   Phone: [phone] Fax: [phone]   _________________________________________________________________   Clinical Information   Direct laryngoscopy with biopsy   Perioperative Diagnosis   Tongue mass   Gross Description   Received in formalin labeled "tongue biopsy". The specimen consists of   multiple fragments of soft tan-pink tissue measuring <0.1 cm - 0.3 cm.   The smallest fragment may not survive processing. The specimen is   entirely submitted. (1 block)   Specimen was received and underwent gross examination at [facility], [address] [place] [id number].   08/19/2022 15:42:58 EDT LB (08.19.22 @ 12:00)       Historical Values  Surgical Pathology Report:   ACCESSION No: [id number]   Patient: CRUZ DUMONT   Accession: [id number]   Collected Date/Time: 8/19/2022 12:00 EDT   Received Date/Time: 8/19/2022 15:00 EDT   Surgical Pathology Report - Auth (Verified)   Specimen(s) Submitted   Tongue base   Final Diagnosis   Tongue base mass, biopsy:   -Superficial fragments of invasive squamous cell carcinoma, non-   keratinized. See note below   Note: The case was received intradepartmental peer review and the   diagnosis represent a consensus opinion.   Verified by: Aide Louise M.D.   (Electronic Signature)   Reported on: 08/22/22 18:01 EDT, [facility],   [address] [place] [address]   Phone: [phone] Fax: [phone]   _________________________________________________________________   Clinical Information   Direct laryngoscopy with biopsy   Perioperative Diagnosis   Tongue mass   Gross Description   Received in formalin labeled "tongue biopsy". The specimen consists of   multiple fragments of soft tan-pink tissue measuring <0.1 cm - 0.3 cm.   The smallest fragment may not survive processing. The specimen is   entirely submitted. (1 block)   Specimen was received and underwent gross examination at [facility], [address] [place] [address].   08/19/2022 15:42:58 EDT LB (08.19.22 @ 12:00)

## 2022-08-24 LAB
ALBUMIN SERPL ELPH-MCNC: 3.2 G/DL — LOW (ref 3.5–5.2)
ALP SERPL-CCNC: 107 U/L — SIGNIFICANT CHANGE UP (ref 30–115)
ALT FLD-CCNC: 11 U/L — SIGNIFICANT CHANGE UP (ref 0–41)
ANION GAP SERPL CALC-SCNC: 6 MMOL/L — LOW (ref 7–14)
AST SERPL-CCNC: 14 U/L — SIGNIFICANT CHANGE UP (ref 0–41)
BILIRUB SERPL-MCNC: 0.3 MG/DL — SIGNIFICANT CHANGE UP (ref 0.2–1.2)
BUN SERPL-MCNC: 14 MG/DL — SIGNIFICANT CHANGE UP (ref 10–20)
CALCIUM SERPL-MCNC: 9.7 MG/DL — SIGNIFICANT CHANGE UP (ref 8.5–10.1)
CHLORIDE SERPL-SCNC: 94 MMOL/L — LOW (ref 98–110)
CO2 SERPL-SCNC: 30 MMOL/L — SIGNIFICANT CHANGE UP (ref 17–32)
CREAT SERPL-MCNC: 0.6 MG/DL — LOW (ref 0.7–1.5)
EGFR: 104 ML/MIN/1.73M2 — SIGNIFICANT CHANGE UP
GLUCOSE BLDC GLUCOMTR-MCNC: 114 MG/DL — HIGH (ref 70–99)
GLUCOSE BLDC GLUCOMTR-MCNC: 125 MG/DL — HIGH (ref 70–99)
GLUCOSE BLDC GLUCOMTR-MCNC: 147 MG/DL — HIGH (ref 70–99)
GLUCOSE BLDC GLUCOMTR-MCNC: 96 MG/DL — SIGNIFICANT CHANGE UP (ref 70–99)
GLUCOSE SERPL-MCNC: 145 MG/DL — HIGH (ref 70–99)
HCT VFR BLD CALC: 32.2 % — LOW (ref 37–47)
HGB BLD-MCNC: 10.8 G/DL — LOW (ref 12–16)
MAGNESIUM SERPL-MCNC: 1.9 MG/DL — SIGNIFICANT CHANGE UP (ref 1.8–2.4)
MCHC RBC-ENTMCNC: 31.4 PG — HIGH (ref 27–31)
MCHC RBC-ENTMCNC: 33.5 G/DL — SIGNIFICANT CHANGE UP (ref 32–37)
MCV RBC AUTO: 93.6 FL — SIGNIFICANT CHANGE UP (ref 81–99)
NRBC # BLD: 0 /100 WBCS — SIGNIFICANT CHANGE UP (ref 0–0)
PLATELET # BLD AUTO: 186 K/UL — SIGNIFICANT CHANGE UP (ref 130–400)
POTASSIUM SERPL-MCNC: 4 MMOL/L — SIGNIFICANT CHANGE UP (ref 3.5–5)
POTASSIUM SERPL-SCNC: 4 MMOL/L — SIGNIFICANT CHANGE UP (ref 3.5–5)
PROT SERPL-MCNC: 5.3 G/DL — LOW (ref 6–8)
RBC # BLD: 3.44 M/UL — LOW (ref 4.2–5.4)
RBC # FLD: 11.9 % — SIGNIFICANT CHANGE UP (ref 11.5–14.5)
SODIUM SERPL-SCNC: 130 MMOL/L — LOW (ref 135–146)
WBC # BLD: 7.97 K/UL — SIGNIFICANT CHANGE UP (ref 4.8–10.8)
WBC # FLD AUTO: 7.97 K/UL — SIGNIFICANT CHANGE UP (ref 4.8–10.8)

## 2022-08-24 PROCEDURE — 99222 1ST HOSP IP/OBS MODERATE 55: CPT

## 2022-08-24 RX ADMIN — Medication 6 UNIT(S): at 17:34

## 2022-08-24 RX ADMIN — Medication 500 MICROGRAM(S): at 16:05

## 2022-08-24 RX ADMIN — HYDROMORPHONE HYDROCHLORIDE 0.5 MILLIGRAM(S): 2 INJECTION INTRAMUSCULAR; INTRAVENOUS; SUBCUTANEOUS at 09:27

## 2022-08-24 RX ADMIN — HYDROMORPHONE HYDROCHLORIDE 0.5 MILLIGRAM(S): 2 INJECTION INTRAMUSCULAR; INTRAVENOUS; SUBCUTANEOUS at 15:45

## 2022-08-24 RX ADMIN — Medication 1 APPLICATION(S): at 11:14

## 2022-08-24 RX ADMIN — PANTOPRAZOLE SODIUM 40 MILLIGRAM(S): 20 TABLET, DELAYED RELEASE ORAL at 11:01

## 2022-08-24 RX ADMIN — Medication 0.5 MILLIGRAM(S): at 10:47

## 2022-08-24 RX ADMIN — Medication 6 UNIT(S): at 08:11

## 2022-08-24 RX ADMIN — GABAPENTIN 600 MILLIGRAM(S): 400 CAPSULE ORAL at 06:17

## 2022-08-24 RX ADMIN — NYSTATIN CREAM 1 APPLICATION(S): 100000 CREAM TOPICAL at 17:42

## 2022-08-24 RX ADMIN — Medication 3 MILLILITER(S): at 20:56

## 2022-08-24 RX ADMIN — Medication 1000 MILLIGRAM(S): at 06:17

## 2022-08-24 RX ADMIN — HYDROMORPHONE HYDROCHLORIDE 0.5 MILLIGRAM(S): 2 INJECTION INTRAMUSCULAR; INTRAVENOUS; SUBCUTANEOUS at 09:42

## 2022-08-24 RX ADMIN — HYDROMORPHONE HYDROCHLORIDE 0.5 MILLIGRAM(S): 2 INJECTION INTRAMUSCULAR; INTRAVENOUS; SUBCUTANEOUS at 16:00

## 2022-08-24 RX ADMIN — Medication 3 MILLILITER(S): at 02:27

## 2022-08-24 RX ADMIN — Medication 20 MILLILITER(S): at 11:00

## 2022-08-24 RX ADMIN — HYDROMORPHONE HYDROCHLORIDE 4 MILLIGRAM(S): 2 INJECTION INTRAMUSCULAR; INTRAVENOUS; SUBCUTANEOUS at 15:23

## 2022-08-24 RX ADMIN — NYSTATIN CREAM 1 APPLICATION(S): 100000 CREAM TOPICAL at 06:45

## 2022-08-24 RX ADMIN — ALBUTEROL 2.5 MILLIGRAM(S): 90 AEROSOL, METERED ORAL at 20:57

## 2022-08-24 RX ADMIN — Medication 1000 MILLIGRAM(S): at 21:48

## 2022-08-24 RX ADMIN — HYDROMORPHONE HYDROCHLORIDE 0.5 MILLIGRAM(S): 2 INJECTION INTRAMUSCULAR; INTRAVENOUS; SUBCUTANEOUS at 18:47

## 2022-08-24 RX ADMIN — Medication 1 APPLICATION(S): at 06:44

## 2022-08-24 RX ADMIN — HYDROMORPHONE HYDROCHLORIDE 0.5 MILLIGRAM(S): 2 INJECTION INTRAMUSCULAR; INTRAVENOUS; SUBCUTANEOUS at 22:19

## 2022-08-24 RX ADMIN — Medication 500 MICROGRAM(S): at 02:31

## 2022-08-24 RX ADMIN — Medication 1000 MILLIGRAM(S): at 13:03

## 2022-08-24 RX ADMIN — GABAPENTIN 600 MILLIGRAM(S): 400 CAPSULE ORAL at 13:01

## 2022-08-24 RX ADMIN — MUPIROCIN 1 APPLICATION(S): 20 OINTMENT TOPICAL at 06:44

## 2022-08-24 RX ADMIN — Medication 3 MILLILITER(S): at 16:07

## 2022-08-24 RX ADMIN — ALBUTEROL 2.5 MILLIGRAM(S): 90 AEROSOL, METERED ORAL at 08:13

## 2022-08-24 RX ADMIN — HYDROMORPHONE HYDROCHLORIDE 4 MILLIGRAM(S): 2 INJECTION INTRAMUSCULAR; INTRAVENOUS; SUBCUTANEOUS at 10:47

## 2022-08-24 RX ADMIN — HYDROMORPHONE HYDROCHLORIDE 4 MILLIGRAM(S): 2 INJECTION INTRAMUSCULAR; INTRAVENOUS; SUBCUTANEOUS at 14:53

## 2022-08-24 RX ADMIN — Medication 500 MICROGRAM(S): at 20:57

## 2022-08-24 RX ADMIN — APIXABAN 5 MILLIGRAM(S): 2.5 TABLET, FILM COATED ORAL at 17:37

## 2022-08-24 RX ADMIN — Medication 20 MILLILITER(S): at 17:35

## 2022-08-24 RX ADMIN — LOSARTAN POTASSIUM 100 MILLIGRAM(S): 100 TABLET, FILM COATED ORAL at 06:17

## 2022-08-24 RX ADMIN — Medication 1 APPLICATION(S): at 17:41

## 2022-08-24 RX ADMIN — HYDROMORPHONE HYDROCHLORIDE 0.5 MILLIGRAM(S): 2 INJECTION INTRAMUSCULAR; INTRAVENOUS; SUBCUTANEOUS at 19:02

## 2022-08-24 RX ADMIN — ALBUTEROL 2.5 MILLIGRAM(S): 90 AEROSOL, METERED ORAL at 02:31

## 2022-08-24 RX ADMIN — Medication 25 GRAM(S): at 17:36

## 2022-08-24 RX ADMIN — MUPIROCIN 1 APPLICATION(S): 20 OINTMENT TOPICAL at 17:41

## 2022-08-24 RX ADMIN — HYDROMORPHONE HYDROCHLORIDE 4 MILLIGRAM(S): 2 INJECTION INTRAMUSCULAR; INTRAVENOUS; SUBCUTANEOUS at 19:54

## 2022-08-24 RX ADMIN — HYDROMORPHONE HYDROCHLORIDE 4 MILLIGRAM(S): 2 INJECTION INTRAMUSCULAR; INTRAVENOUS; SUBCUTANEOUS at 20:24

## 2022-08-24 RX ADMIN — HYDROMORPHONE HYDROCHLORIDE 0.5 MILLIGRAM(S): 2 INJECTION INTRAMUSCULAR; INTRAVENOUS; SUBCUTANEOUS at 12:53

## 2022-08-24 RX ADMIN — HYDROMORPHONE HYDROCHLORIDE 0.5 MILLIGRAM(S): 2 INJECTION INTRAMUSCULAR; INTRAVENOUS; SUBCUTANEOUS at 21:49

## 2022-08-24 RX ADMIN — Medication 1000 MILLIGRAM(S): at 22:18

## 2022-08-24 RX ADMIN — Medication 20 MILLILITER(S): at 06:16

## 2022-08-24 RX ADMIN — INSULIN GLARGINE 20 UNIT(S): 100 INJECTION, SOLUTION SUBCUTANEOUS at 21:17

## 2022-08-24 RX ADMIN — GABAPENTIN 600 MILLIGRAM(S): 400 CAPSULE ORAL at 21:17

## 2022-08-24 RX ADMIN — ALBUTEROL 2.5 MILLIGRAM(S): 90 AEROSOL, METERED ORAL at 16:06

## 2022-08-24 RX ADMIN — Medication 0.5 MILLIGRAM(S): at 19:54

## 2022-08-24 RX ADMIN — Medication 20 MILLILITER(S): at 23:28

## 2022-08-24 RX ADMIN — HYDROMORPHONE HYDROCHLORIDE 0.5 MILLIGRAM(S): 2 INJECTION INTRAMUSCULAR; INTRAVENOUS; SUBCUTANEOUS at 06:17

## 2022-08-24 RX ADMIN — HYDROMORPHONE HYDROCHLORIDE 0.5 MILLIGRAM(S): 2 INJECTION INTRAMUSCULAR; INTRAVENOUS; SUBCUTANEOUS at 12:38

## 2022-08-24 RX ADMIN — APIXABAN 5 MILLIGRAM(S): 2.5 TABLET, FILM COATED ORAL at 06:17

## 2022-08-24 RX ADMIN — HYDROMORPHONE HYDROCHLORIDE 4 MILLIGRAM(S): 2 INJECTION INTRAMUSCULAR; INTRAVENOUS; SUBCUTANEOUS at 11:17

## 2022-08-24 RX ADMIN — Medication 1 TABLET(S): at 11:00

## 2022-08-24 NOTE — PROGRESS NOTE ADULT - ASSESSMENT
Unfortunate 58 YO F with acute on chr hypoxic RF read for hypoxia and hypotension and ongoing Bacterial PNA. Hx of ASP PNA, COPD exacerbation and intubation with failure to extubate resulting in trach at [facility] .  The pt is under chronic  at [facility].      Acute on Chr hypoxic RF  Chronic Tracheostomy  Bacterial PNA  Clinical Debility  Bedbound state  Lower extremity venous insufficiency  BL foot drop, limb weakness, prolonged immobility  OR Direct Laryngoscopy  22, biopsy of supraglottic larynteal mass  Intertrigo  Hx of HTN, ASHD HFpEF  Hx of DLD  Hx of COPD, MO, LUCIANA, sp intubation, failure to extubate, chr trach since /[facility]  Hx of DM II  Hx of Ch anemia of ch dis  Hx of GERD, HH, Diverticulosis, Ch constipation  Hx of OA, DJD, DDD, chr pain syn  Hx of lower ext venous stasis dermatitis  Hx of DVT, AC/Eliquis  Hx of Anxiety, Depression       pt's VS stable, sp laryngoscopy, "large supra laryngeal/base of tongue mass "  invasive squamous cell carcinoma    ENT ff up, not a surgical candidate, H&N Ca C  arranging for out pt Pet CT and ff up with Dr Landaverde    Hem-onc consult  Rad Onc consult  restarted Eliquis 5mg BID  Peg feeds, low protein and alb, add beneprotein 2 packets per   CT of soft tissue of the neck shows  exophytic expansile mucosal mass 4.4x2x2.4cm involving the BL aryepiglottic folds/? supraglottic laryngeal neoplasm  Pul-critical care     S&S therapy    low vit D start to replete 50,000 q wk  Venous doppler neg for DVT  AA doppler shows normal blood flow  Vasc consult     ECHO:  nl sys function, EF60%, GIDD, no sig valvular dis  EKG:  NSR 69/min, incomplete RBBB, no acute changes  cont all previous meds  GI prophylaxis  att to bowel regimen  skin care:  pt has severe intertrigo of the R axilla, groin and buttocks,  apply generous antifungal  cream and may add Silvadene on top of the antifungal to buttock area  change PEG dressing daily, apply mupirocin  cont PEG feeds, pt may have ice chips  BL inflatable boots to prevent further foot drop, encourage pt to use them as much as possible    PM: hydromorphone 4mg po q 4 and 1mg IV q 8 for breakthrough pain, gabapentin 600mg q 8, tylenol 650mg QID    BL heel off loading boots, pt with BL foot drop    pt full code  Social SVC goal is for out pt Pet CT  and ff up with ENT, start plans to return to SNF

## 2022-08-24 NOTE — CONSULT NOTE ADULT - CONSULT REQUESTED DATE/TIME
05-Aug-2022 08:59
23-Aug-2022 00:00
05-Aug-2022 06:34
13-Aug-2022 07:21
06-Aug-2022 10:37
08-Aug-2022 14:11
24-Aug-2022 09:30
03-Aug-2022 09:42
05-Aug-2022 07:41

## 2022-08-24 NOTE — CONSULT NOTE ADULT - CONSULT REASON
Supralaryngeal SCC
SOB
laryngeal edema
COPD exacerbation, SP trach
b/l le pain with hyperpigmentation
trach exchange
Evaluate for XRT - invasive SCC
bilateral leg/foot pain
PNA

## 2022-08-24 NOTE — PROGRESS NOTE ADULT - SUBJECTIVE AND OBJECTIVE BOX
CRUZ DUMONT 56yo  Female sent to the ER from [facility] where she is on  care for c/o inc SOB, chest tightness, diff breathing, wheezing i.e. acute on chr hypoxic RF.  Of note the pt has a trach and has had freq readmissions for mucous plugging, RF and has had several bronchoscopies.   EMS noted the pt to be hypotensive and hypoxic.  The pt was vigorously suctioned, received Neb Tx, IV steroids,  BP revived with IV fluids.  The pt was cultured and placed on ABx.  She is v well known to the Pul SVc and to ID.  The pt is ad with acute on ch hypoxic RF and ongoing Bacterial PNA.  The PMHx includes:  HTN, ASHD, HFpEF, DLD, DM II, Chronic RF, COPD, LUCIANA, ASp PNA, sp intubation, failure to extubate, chronic trach ([facility] 4/22), recurrent mucus plugging of airways, SDVT, AC with Eliquis, lower ext venous stasis dermatitis, bedbound state, GERD, diverticulosis, sp PEG, OA, DDD, DJD, chronic pain syn, depression anxiety.    Hosp Course:  The pt was ad from SNF for inc SOB, hypoxia with mucous plugging and acute on chr RF and ongoing bacterial PNA.  The pt was suctioned and placed on mech ventilation via trach and cont on ABx. The pt has had freq ad to hosp ( [facility]) since the ARF and intubation and failure to wean  that resulted in the trach in 6/22 in [facility].   The pt is mostly bed bound with lower ext weakness, mm atrophy/myopathy of disuse and sever hyperpigmentation skin changes of the lower ext.  The pt was ff by Pul/critical care and ID.  The pt was verbal and had nutrition via PEG.  S&S worked with pt and during w/up pt was noted to have pharyngeal/trach swelling.  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  The pt was taken to the OR by ENT for direct laryngoscopy and bx of the mass  on 8/19/22.      INTERVAL HPI/OVERNIGHT EVENTS: VS stable, sp direct laryngoscopy 8/19 4.4cm supraglottic laryngeal lesion, invasive squamous cell carcinoma,  as per ENT not a surgical candidate, Rad-onc and Hem-onc consults, Head and Neck Ca svc making arrangements for out pt ff up with Dr Landaverde 8/30 and Pet CT, pt has severe intertrigo,  R axilla, groin and buttocks,    MEDICATIONS  (STANDING):  acetaminophen     Tablet .. 650 milliGRAM(s) Oral every 6 hours  acetylcysteine 20% for Nebulization - Peds 3 milliLiter(s) Nebulizer every 6 hours  ALBUTerol    0.083% 2.5 milliGRAM(s) Nebulizer every 6 hours  ammonium lactate 12% Lotion 1 Application(s) Topical two times a day  apixaban 5 milliGRAM(s) Enteral Tube two times a day  chlorhexidine 0.12% Liquid 15 milliLiter(s) Oral Mucosa every 12 hours  dextrose 5%. 1000 milliLiter(s) (100 mL/Hr) IV Continuous <Continuous>  dextrose 5%. 1000 milliLiter(s) (50 mL/Hr) IV Continuous <Continuous>  dextrose 50% Injectable 25 Gram(s) IV Push once  dextrose 50% Injectable 12.5 Gram(s) IV Push once  dextrose 50% Injectable 25 Gram(s) IV Push once  doxycycline monohydrate Capsule 100 milliGRAM(s) Oral every 12 hours  gabapentin 600 milliGRAM(s) Oral three times a day  glucagon  Injectable 1 milliGRAM(s) IntraMuscular once  insulin glargine Injectable (LANTUS) 20 Unit(s) SubCutaneous at bedtime  insulin lispro (ADMELOG) corrective regimen sliding scale   SubCutaneous three times a day before meals  insulin lispro Injectable (ADMELOG) 6 Unit(s) SubCutaneous three times a day before meals  methylPREDNISolone sodium succinate Injectable 60 milliGRAM(s) IV Push two times a day  multivitamin 1 Tablet(s) Oral daily  pantoprazole   Suspension 40 milliGRAM(s) Oral daily  polyethylene glycol 3350 17 Gram(s) Oral two times a day  senna 2 Tablet(s) Oral at bedtime    MEDICATIONS  (PRN):  ALBUTerol    90 MICROgram(s) HFA Inhaler 2 Puff(s) Inhalation every 6 hours PRN Shortness of Breath and/or Wheezing  ALPRAZolam 0.5 milliGRAM(s) Oral every 6 hours PRN anxiety  dextrose Oral Gel 15 Gram(s) Oral once PRN Blood Glucose LESS THAN 70 milliGRAM(s)/deciliter  HYDROmorphone   Tablet 4 milliGRAM(s) Oral every 4 hours PRN Severe Pain (7 - 10)  HYDROmorphone  Injectable 1 milliGRAM(s) IV Push every 8 hours PRN Moderate Pain (4 - 6)      Allergies    penicillin (Swelling)      Vital Signs Last 24 Hrs    T(F): 98.29  HR:  86  BP:  101/50    RR: 18  SpO2:  96%, T piece    PHYSICAL EXAM:      Constitutional: pt alert, oriented,  pulse ox 96%    Eyes: nonicteric    ENMT: dry oral mucosa, dental defects,     Neck: + trach tube supple, no stridor, + trach,     Respiratory: shallow resp, diminished harsh bronchial  BS, scattered rhonchi, no wheezing    Cardiovascular: S1S2 reg    Gastrointestinal: globular, soft and benign, + PEG, + BS    Genitourinary:    Extremities: moves all ext, dec motor strength of lower ext, + BL mm atrophy, + BL foot drop    Vascular: dec pedal pulses    Neurological: nonfocal    Skin: lower ext dark almost black hyperpigmentation of lower ext    Lymph Nodes: not enlarged    Musculoskeletal: dec mm mass and tone    Psychiatric: anxious, depressed but stable        LABS:                10.8  7.9)-----------( 186            32      130 |  94  |  14  ----------------------------<  145  4.0   |  30  |  0.6    , 115, 123, 109, 115, 104  Ca    9.7       Mg     1.9, 1.8, 2.0, 1.6, 1.7, 1.8, 2.6, 1.7, 1.6, 2.4, 1.9  trop <0.01  Vit D 18  folate 9.7  Haptos 126  ferritin 982  TPro  5.9, 5.5, 5.4  /  Alb  3.3, 3.4, 3.3  /  TBili  0.3  /  DBili  x   /  AST  20  /  ALT  28  /  AlkPhos  77  08-05    RADIOLOGY & ADDITIONAL TESTS:    EKG:  NSR R axis, low voltage, nonspecific ST-T changed  EKG 8/17:  NSR 69/min, incomplete RBBB, no acute changes  ECHO:  nl sys function, EF 60%, GIDD, so sig valvular dis, borderline Pul HTN, sm pericardial eff  CXR:  interstitial prominence, L pl based opacity unchanged  AA doppler:  normal arterial blood flow  CT of soft tissue of the neck 8/16/22:  exophytic expansile mucosal mass 4.4x2x2.4cm involving the BL aryepiglotticfolds and along the BL glossoepiglottic folds as well as along the  laryngea/lingual surface of the epiglottis, 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

## 2022-08-24 NOTE — PROGRESS NOTE ADULT - SUBJECTIVE AND OBJECTIVE BOX
Patient is a 57y old  Female who presents with a chief complaint of Hypoxia (23 Aug 2022 15:30)      INTERVAL HPI/OVERNIGHT EVENTS:  None    FAMILY HISTORY:  No pertinent family history in first degree relatives      T(C): 36.8 (08-24-22 @ 05:18), Max: 37.1 (08-23-22 @ 12:30)  HR: 73 (08-24-22 @ 05:18) (73 - 105)  BP: 123/63 (08-24-22 @ 05:18) (114/56 - 172/74)  RR: 18 (08-24-22 @ 05:18) (18 - 18)  SpO2: 100% (08-24-22 @ 05:18) (98% - 100%)  Wt(kg): --Vital Signs Last 24 Hrs  T(C): 36.8 (24 Aug 2022 05:18), Max: 37.1 (23 Aug 2022 12:30)  T(F): 98.3 (24 Aug 2022 05:18), Max: 98.8 (23 Aug 2022 12:30)  HR: 73 (24 Aug 2022 05:18) (73 - 105)  BP: 123/63 (24 Aug 2022 05:18) (114/56 - 172/74)  BP(mean): --  RR: 18 (24 Aug 2022 05:18) (18 - 18)  SpO2: 100% (24 Aug 2022 05:18) (98% - 100%)    Parameters below as of 24 Aug 2022 05:18  Patient On (Oxygen Delivery Method): ventilator      PHYSICAL EXAM:  GEN: NAD, awake and alert. No drooling or pooling of secretions. No stridor or stertor. aphonic 2* trach.   SKIN: Good color, non diaphoretic  HEENT: Oral mucosa pink and moist. No erythema or edema noted to buccal mucosa, tongue, FOM, uvula or posterior oropharynx. Uvula midline. no bleeding noted.   NECK:  Trachea midline. trach tube intact, trach changed at bedside to a 6 cuffless.   RESP: No dyspnea, non-labored breathing. No use of accessory muscles.  CARDIO: +S1/S2  ABDO: Soft, NT. PEG tube.   EXT: JONAS x 4    acetaminophen     Tablet .. 1000 milliGRAM(s) Oral every 8 hours  acetylcysteine 20% for Nebulization - Peds 3 milliLiter(s) Nebulizer every 6 hours  ALBUTerol    0.083% 2.5 milliGRAM(s) Nebulizer every 6 hours  ALBUTerol    90 MICROgram(s) HFA Inhaler 2 Puff(s) Inhalation every 6 hours PRN  ALPRAZolam 0.5 milliGRAM(s) Oral every 6 hours PRN  ammonium lactate 12% Lotion 1 Application(s) Topical two times a day  apixaban 5 milliGRAM(s) Enteral Tube every 12 hours  dextrose 5%. 1000 milliLiter(s) IV Continuous <Continuous>  dextrose 5%. 1000 milliLiter(s) IV Continuous <Continuous>  dextrose 50% Injectable 25 Gram(s) IV Push once  dextrose 50% Injectable 12.5 Gram(s) IV Push once  dextrose 50% Injectable 25 Gram(s) IV Push once  dextrose Oral Gel 15 Gram(s) Oral once PRN  ergocalciferol 47918 Unit(s) Oral every week  gabapentin Solution 600 milliGRAM(s) Oral three times a day  glucagon  Injectable 1 milliGRAM(s) IntraMuscular once  guaifenesin/dextromethorphan Oral Liquid 20 milliLiter(s) Oral every 6 hours  HYDROmorphone   Tablet 4 milliGRAM(s) Enteral Tube every 4 hours PRN  HYDROmorphone  Injectable 0.5 milliGRAM(s) IV Push every 3 hours PRN  hydrOXYzine hydrochloride 25 milliGRAM(s) Oral once  insulin glargine Injectable (LANTUS) 20 Unit(s) SubCutaneous at bedtime  insulin lispro (ADMELOG) corrective regimen sliding scale   SubCutaneous three times a day before meals  insulin lispro Injectable (ADMELOG) 6 Unit(s) SubCutaneous three times a day before meals  ipratropium    for Nebulization 500 MICROGram(s) Nebulizer every 6 hours  losartan 100 milliGRAM(s) Oral daily  magnesium sulfate  IVPB 2 Gram(s) IV Intermittent every 12 hours  multivitamin 1 Tablet(s) Oral daily  mupirocin 2% Ointment 1 Application(s) Topical two times a day  nystatin Cream 1 Application(s) Topical two times a day  pantoprazole   Suspension 40 milliGRAM(s) Oral daily  silver sulfADIAZINE 1% Cream 1 Application(s) Topical daily      HEALTH ISSUES - PROBLEM Dx:  Painful diabetic neuropathy

## 2022-08-24 NOTE — CONSULT NOTE ADULT - ATTENDING COMMENTS
IMPRESSION:    Acute on Chronic Hypoxemic Respiratory Failure  Chronic Hypercapnic Respiratory Failure  SP Tracheostomy and PEG ( not chronically vented )  COPD exacerbation  HO recurrent mucous plugs SP multiple bronchoscopies  HO DVT on Eliquis  HFpEF    Plan as outlined above
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  She is status post trach and trach because of prolonged hospital stay which is complicated by DVT on Eliquis.  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    Imaging also demonstrated some nonspecific adenopathy below her neck which may be reactive.    She was seen by ENT and radiation oncology.    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  I am not sure if the right supraclavicular lymph node is indeed metastatic thus she may just have stage II disease, T2NxMx and may possible need a biopsy to confirm involvemnt depending on PET/CT results   -She was deemed not a candidate for surgery  -ECOG is a 3 but this is mainly from deconditioning from prolonged hospitalization and she is improving    -If indeed has only stage II disease she may be treated with just radiation alone for cure if tumor volume permits, if indeed has lymph node involvement or has T3 for example disease would require definitive chemoradiation if indeed deemed a candidate for definitive radiation. She would  not tolerate induction chemotherapy with  Docetaxel/cisplatin and 5FU if this is indeed needed (induction chemotherapy does not improve OS)  I would only offer carbo and Taxol.  I do suspect she will likely tolerate concurrent chemotherapy with radiation  with single agent cetuximab, carboplatin or carboplatin and Taxol but this would be easier for me to assess when I see her as outpatient in order to better evaluate her fitness level    -thus in summary patient needs PET CT scan to complete staging and depending on extent of disease and discussion with radiation oncology we will finalize treatment plan

## 2022-08-24 NOTE — PROGRESS NOTE ADULT - ASSESSMENT
57 year old female with a past medical history of IDDM, GERD, HTN, COPD, Chronic hypoxemic and hypercapnic respiratory failure SP Tracheostomy (not chronically vented) & PEG at [facility], DVT on Eliquis, Anxiety, CHF, Recurrent mucous plugs SP bronchoscopies, [facility] for chest tightness and shortness of breath over the past few days. Was found to have aspiration pneumonia causing COPD exacerbation    Acute on chronic Hypoxic respiratory failure   #s/p trach/PEG at [facility] 04/2022 after being intubated for >1month  #COPD Exacerbation  #Suspected superimposed bacterial PNA  - Completed a course of doxycycline WBC wnl, vitals wnl,  - Taper steroids- c/w prednisone 40 OD cont Nebs q4h & prn   - CXR reviewed and shows unchanged interstitial opacities  - Suction Q8h  - Inhaled NAC and PO Mucinex started    #Dysphagia  - Extensive pharyngeal edema found on modified speech and swallow  - Laryngeal mass found on CT neck  - Biopsy performed by ENT on 8/19  - Pathology came back as SCC  - Heme onc and onc consult - Will likely need chemoradiation or induction therapy followed by chemoradiation  - Not a good candidate for surgery as per ENT  - F/u Hem/onc and ENT outpatient   - Has appointment with Dr. Landaverde on the August 30th at 1 pm.     #Chronic HFpEF  2d Echo 05/2022: LVEF 50-55%. proBNP ~ 444  Pt does not appear volume overloaded  - Low Na diet and daily weight.   - monitor I & O    #Hx of DVT   - Cont Eliquis  - Venous duplex shows no DVT    #DM2  - Cont lantus/lispro 20-6-6-6  - ISS    #Chronic Lower extremity Pain management and Hyperpigmentation   #Chronic atropic B/l LE Hyperpigmentation changes  - Plan was for outpatient burn and dermatology f/u  - Start topical clobetasone ointment  - Gabapentin 600 TID,   - PO dilaudid 4mg Q4 PRN  -Vascular consulted for possible PVD.   -Arterial doppler - No vascular disease  -PT/OT as tolerated  - Stitches from biopsy removed      Dispo: Back To [facility] when stable.

## 2022-08-24 NOTE — CONSULT NOTE ADULT - ASSESSMENT
56 y/o F with PMHx HTN, HFpEF, DLD, DM II, COPD, Chronic respiratory failure s/p trach and PEG 4/2022 @ [facility] following intubation x 1 mo with frequent admissions for mucus plugging, LUCIANA, DVT on Eliquis, LE venous stasis dermatitis, bedbound state, GERD, diverticulosis,. [facility], wheezing, and increased secretions worsening x 1 week following PO trial week prior. Pt admitted for respiratory failure 2/2 PNA and mucus plugging and started on abx. Pt with ongoing severe dysphagia, with CT showing 4.4x2x2.4 cm supraglottic mass, found to have invasive squamous cell carcinoma, non-keratinized. ENT said pt not a surgical candidate given size for tumor and recommended consultation from medical oncology and radiation oncology. Pt currently NPO with PEG feeds, is wheelchair bound since 4/2022.    #Supraglottic SCC  - VFSS 8/12: severe pharyngeal edema  - FEES 8/12: interarytenoid/post-commissure edema; Interarytenoid/Arytenoid edema; Interarytenoid/Arytenoid erythema; Baseline pooling of secretions; Baseline penetration of secretions; +diffuse pharyngeal edema, globular shaped arytenoids  - CT neck soft tissue 8/16: exophytic expansile 4.4x2x2.4 cm supraglottic mass. 1.1 cm right supraclavicular lymph node.    - ENT Direct laryngoscopy 8/19 with biopsy  - path report: invasive squamous cell carcinoma, non-keratinized  - per ENT not surgical candidate given size of tumor    RECOMMENDATIONS:     **INCOMPLETE pending discussion with attending**         58 y/o F with PMHx HTN, HFpEF, DLD, DM II, COPD, Chronic respiratory failure s/p trach and PEG 4/2022 @ [facility] following intubation x 1 mo with frequent admissions for mucus plugging, LUCIANA, DVT on Eliquis, LE venous stasis dermatitis, bedbound state, GERD, diverticulosis,. Pt sent to the ER from [facility] rehab for SOB, wheezing, and increased secretions worsening x 1 week following PO trial week prior. Pt admitted for respiratory failure 2/2 PNA and mucus plugging and started on abx. Pt with ongoing severe dysphagia, with CT showing 4.4x2x2.4 cm supraglottic mass, found to have invasive squamous cell carcinoma, non-keratinized. ENT said pt not a surgical candidate given size for tumor and recommended consultation from medical oncology and radiation oncology. Pt currently NPO with PEG feeds, is wheelchair bound since 4/2022.    #Supraglottic SCC  - VFSS 8/12: severe pharyngeal edema  - FEES 8/12: interarytenoid/post-commissure edema; Interarytenoid/Arytenoid edema; Interarytenoid/Arytenoid erythema; Baseline pooling of secretions; Baseline penetration of secretions; +diffuse pharyngeal edema, globular shaped arytenoids  - CT neck soft tissue 8/16: exophytic expansile 4.4x2x2.4 cm supraglottic mass. 1.1 cm right supraclavicular lymph node.    - ENT Direct laryngoscopy 8/19 with biopsy  - path report: invasive squamous cell carcinoma, non-keratinized  - per ENT not surgical candidate given size of tumor    #Normocytic Anemia likely Anemia of Chronic Disease  - Hb 10.8, MCV 93.6  - Ferritin 786, % sat 42, TIBC 173  - folate and B12 wnl  - haptoglobin, retics, Tbili wnl    RECOMMENDATIONS:     **INCOMPLETE pending discussion with attending**         56 y/o F with PMHx HTN, HFpEF, DLD, DM II, COPD, Chronic respiratory failure s/p trach and PEG 4/2022 @ [facility] following intubation x 1 mo with frequent admissions for mucus plugging, LUCIANA, DVT on Eliquis, LE venous stasis dermatitis, bedbound state, GERD, diverticulosis,. Pt sent to the ER from [facility] rehab for SOB, wheezing, and increased secretions worsening x 1 week following PO trial week prior. Pt admitted for respiratory failure 2/2 PNA and mucus plugging and started on abx. Pt with ongoing severe dysphagia, with CT showing 4.4x2x2.4 cm supraglottic mass, found to have invasive squamous cell carcinoma, non-keratinized. ENT said pt not a surgical candidate given size for tumor and recommended consultation from medical oncology and radiation oncology. Pt currently NPO with PEG feeds, is wheelchair bound since 4/2022.    #Supraglottic SCC  - VFSS 8/12: severe pharyngeal edema  - FEES 8/12: interarytenoid/post-commissure edema; Interarytenoid/Arytenoid edema; Interarytenoid/Arytenoid erythema; Baseline pooling of secretions; Baseline penetration of secretions; +diffuse pharyngeal edema, globular shaped arytenoids  - CT neck soft tissue 8/16: exophytic expansile 4.4x2x2.4 cm supraglottic mass. 1.1 cm right supraclavicular lymph node.    - ENT Direct laryngoscopy 8/19 with biopsy  - path report from base of tongue biopsy: invasive squamous cell carcinoma, non-keratinized  - per ENT not surgical candidate given size of tumor  - Most likely T4aN1 -> Stage 4a Head and Neck SCC (pending complete staging workup with PET/CT)    #Normocytic Anemia likely Anemia of Chronic Disease  - Hb 10.8, MCV 93.6  - Ferritin 786, % sat 42, TIBC 173  - folate and B12 wnl  - haptoglobin, retics, Tbili wnl    RECOMMENDATIONS:   - PET/CT as outpatient for complete staging  - poor functional status (ECOG 4) likely deconditioning from recurrent admissions but regardless may still not tolerate chemoradiation given multiple comorbidities  - if determined to be poor candidate for chemoradiation consider palliative radiation  - if candidate for chemoradiation will consider carbo/taxol or cetuximab as outpatient         Marlene Miranda is an unfortunate 56 yo F who have multiple comorbidities including PMH of HTN, HFpEF, DLD, DM II, COPD, Chronic respiratory failure s/p trach and PEG 4/2022 @ [facility] following intubation x 1 mo with frequent admissions for mucus plugging, LUCIANA, DVT on Eliquis, LE venous stasis dermatitis, bedbound state, GERD, diverticulosis,. She was sent to the ER from [facility] rehab for SOB, wheezing, and increased secretions worsening x 1 week following PO trial week prior. Pt admitted for respiratory failure secondary to bacterial PNA and mucus plugging and started on abx. Pt had ongoing severe dysphagia in which she underwent CT of neck. It showed a 4.4 x 2 x 2.4 cm supraglottic mass. She underwent direct larygoscopy with biopsy. Pathology proven to be invasive squamous cell carcinoma, non-keratinized, P16 negative. ENT was consulted and pt deemed not a surgical candidate given size for tumor. Oncology consulted for management of pt's supraglottic SCC.     # Suspected locally advanced supraglottic SCC  - VFSS 8/12: severe pharyngeal edema  - FEES 8/12: interarytenoid/post-commissure edema; Interarytenoid/Arytenoid edema; Interarytenoid/Arytenoid erythema; Baseline pooling of secretions; Baseline penetration of secretions; +diffuse pharyngeal edema, globular shaped arytenoids  - CT neck soft tissue 8/16: exophytic expansile 4.4 x 2 x 2.4 cm supraglottic mass. 1.1 cm right supraclavicular lymph node.    - ENT Direct laryngoscopy 8/19 with biopsy taken from base of tongue  - path of base of tongue biopsy: invasive squamous cell carcinoma, non-keratinized, P16 negative   - per ENT not surgical candidate given size of tumor  - Likely at least clinical T2 (not sure if invaded to only base of tongue mucosa or deep extrinsic muscle of tongue) N1 (given R supraclavicular LN) - at least stage III disease   - current ECOG of 3-4     Plan:  - given pt's comorbidity pt unlikely to tolerate induction chemo  - not sure her frail is due to deconditioning from multiple hospitalization or if it is chronic, we can see her in clinic and decide chemoRT vs single agent chemo   - we can actually consider cisplatin as outpatient if she is stronger after d/c   - will add PDL1, we will task pathologist  - obtain PET/CT as outpatient  - will schedule an appointment to see us as outpatient and treatment will depend on her performance status, as well as finding from PET/CT    # Normocytic anemia due to anemia of chronic disease  - Hb 10.8, MCV 93.6  - Ferritin 786, % sat 42, TIBC 173  - folate and B12 wnl  - haptoglobin, retics, Tbili wnl  - trend CBC  - keep active T&S and hgb > 7

## 2022-08-24 NOTE — CONSULT NOTE ADULT - SUBJECTIVE AND OBJECTIVE BOX
CRUZ DUMONT 57y Female  MRN#: [id number]     Oncology Consult    HPI: 56 y/o F with PMHx HTN, HFpEF, DLD, DM II, COPD, Chronic respiratory failure s/p trach and PEG 4/2022 @ [facility] following intubation x 1 mo with frequent admissions for mucus plugging, LUCIANA, DVT on Eliquis, LE venous stasis dermatitis, bedbound state, GERD, diverticulosis,. Pt sent to the ER from [facility], [place], operated by [facility] for SOB, wheezing, and increased secretions worsening x 1 week following PO trial week prior. Of note pt has a trach and has had frequent readmissions since 4/2022 for mucous plugging and respiratory failure and has had several bronchoscopies. EMS noted the pt to be hypotensive and hypoxic. In ED 8/3/22, CXR showed new b/l interstitial opacities. Pt was vigorously suctioned, received Neb Tx, IV steroids, BP revived with IV fluids and admitted for respiratory failure and started on abx. Pt with ongoing severe dysphagia, eval with S&S, and was kept NPO with continued PEG feeds. VFSS 8/12 showed diffuse pharyngeal edema. 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  The pt was taken to the OR by ENT for direct laryngoscopy and bx of the mass  on 8/19/22.      OBJECTIVE  PAST MEDICAL & SURGICAL HISTORY  COPD (chronic obstructive pulmonary disease)    CHF (congestive heart failure)    DM (diabetes mellitus)    H/O tracheostomy                                              -----------------------------------------------------------  ALLERGIES:  penicillin (Swelling)                                            ------------------------------------------------------------    HOME MEDICATIONS  Home Medications:  acetylcysteine 20% inhalation solution: 3 milliliter(s) inhaled every 6 hours (03 Aug 2022 13:51)  albuterol sulfate 2.5 mg/3 mL (0.083 %) solution for nebulization: milliliter(s) inhaled 3 times a day, As Needed (19 Jul 2022 10:06)  ALPRAZolam 0.5 mg oral tablet: 1 tab(s) orally 3 times a day (after meals), As Needed (03 Aug 2022 13:58)  ammonium lactate 12% topical lotion: 1 application topically 2 times a day (19 Jul 2022 10:06)  betamethasone valerate 0.1% topical cream: 1 application topically 2 times a day (19 Jul 2022 10:06)  gabapentin 600 mg oral tablet: 1 tab(s) orally 3 times a day via PEG tube (23 Jun 2022 16:45)  HYDROmorphone 4 mg oral tablet: 1 tab(s) orally every 4 hours, As Needed (19 Jul 2022 10:06)  insulin glargine 100 units/mL subcutaneous solution: 20 unit(s) subcutaneous once a day (at bedtime) (23 Jun 2022 16:45)  insulin lispro 100 units/mL injectable solution: 6  injectable 4 times a day (before feeds Q6H) (19 Jul 2022 15:19)  losartan 100 mg oral tablet: 1 tab(s) orally once a day via PEG tube (23 Jun 2022 16:45)  Multiple Vitamins oral tablet: 1 tab(s) orally once a day via PEG tube (23 Jun 2022 16:45)  pantoprazole 40 mg oral delayed release tablet: 1 tab(s) orally once a day (before a meal) via PEG tube (23 Jun 2022 16:45)  polyethylene glycol 3350 oral powder for reconstitution: 17 gram(s) orally once a day via PEG tube (23 Jun 2022 16:45)  senna leaf extract oral tablet: 2 tab(s) orally once a day (at bedtime) via PEG tube (23 Jun 2022 16:45)  Trelegy Ellipta 100 mcg-62.5 mcg-25 mcg powder for inhalation:  (23 Jun 2022 16:33)                           MEDICATIONS:  STANDING MEDICATIONS  acetaminophen     Tablet .. 1000 milliGRAM(s) Oral every 8 hours  acetylcysteine 20% for Nebulization - Peds 3 milliLiter(s) Nebulizer every 6 hours  ALBUTerol    0.083% 2.5 milliGRAM(s) Nebulizer every 6 hours  ammonium lactate 12% Lotion 1 Application(s) Topical two times a day  apixaban 5 milliGRAM(s) Enteral Tube every 12 hours  dextrose 5%. 1000 milliLiter(s) IV Continuous <Continuous>  dextrose 5%. 1000 milliLiter(s) IV Continuous <Continuous>  dextrose 50% Injectable 25 Gram(s) IV Push once  dextrose 50% Injectable 12.5 Gram(s) IV Push once  dextrose 50% Injectable 25 Gram(s) IV Push once  ergocalciferol 99368 Unit(s) Oral every week  gabapentin Solution 600 milliGRAM(s) Oral three times a day  glucagon  Injectable 1 milliGRAM(s) IntraMuscular once  guaifenesin/dextromethorphan Oral Liquid 20 milliLiter(s) Oral every 6 hours  hydrOXYzine hydrochloride 25 milliGRAM(s) Oral once  insulin glargine Injectable (LANTUS) 20 Unit(s) SubCutaneous at bedtime  insulin lispro (ADMELOG) corrective regimen sliding scale   SubCutaneous three times a day before meals  insulin lispro Injectable (ADMELOG) 6 Unit(s) SubCutaneous three times a day before meals  ipratropium    for Nebulization 500 MICROGram(s) Nebulizer every 6 hours  losartan 100 milliGRAM(s) Oral daily  magnesium sulfate  IVPB 2 Gram(s) IV Intermittent every 12 hours  multivitamin 1 Tablet(s) Oral daily  mupirocin 2% Ointment 1 Application(s) Topical two times a day  nystatin Cream 1 Application(s) Topical two times a day  pantoprazole   Suspension 40 milliGRAM(s) Oral daily  silver sulfADIAZINE 1% Cream 1 Application(s) Topical daily    PRN MEDICATIONS  ALBUTerol    90 MICROgram(s) HFA Inhaler 2 Puff(s) Inhalation every 6 hours PRN  ALPRAZolam 0.5 milliGRAM(s) Oral every 6 hours PRN  dextrose Oral Gel 15 Gram(s) Oral once PRN  HYDROmorphone   Tablet 4 milliGRAM(s) Enteral Tube every 4 hours PRN  HYDROmorphone  Injectable 0.5 milliGRAM(s) IV Push every 3 hours PRN                                            ------------------------------------------------------------  VITAL SIGNS: Last 24 Hours  T(C): 36.8 (24 Aug 2022 05:18), Max: 37.1 (23 Aug 2022 12:30)  T(F): 98.3 (24 Aug 2022 05:18), Max: 98.8 (23 Aug 2022 12:30)  HR: 73 (24 Aug 2022 05:18) (73 - 105)  BP: 123/63 (24 Aug 2022 05:18) (114/56 - 172/74)  BP(mean): --  RR: 18 (24 Aug 2022 05:18) (18 - 18)  SpO2: 100% (24 Aug 2022 05:18) (98% - 100%)      08-23-22 @ 07:01  -  08-24-22 @ 07:00  --------------------------------------------------------  IN: 0 mL / OUT: 1000 mL / NET: -1000 mL                                             --------------------------------------------------------------  LABS:                        10.8   7.97  )-----------( 186      ( 24 Aug 2022 07:49 )             32.2     08-24    130<L>  |  94<L>  |  14  ----------------------------<  145<H>  4.0   |  30  |  0.6<L>    Ca    9.7      24 Aug 2022 07:49  Mg     1.9     08-24    TPro  5.3<L>  /  Alb  3.2<L>  /  TBili  0.3  /  DBili  x   /  AST  14  /  ALT  11  /  AlkPhos  107  08-24    Surgical Pathology Report (08.19.22 @ 12:00)   Surgical Pathology Report:   ACCESSION No: [id number]   Patient: CRUZ DUMONT   Accession: [id number]   Collected Date/Time: 8/19/2022 12:00 EDT   Received Date/Time: 8/19/2022 15:00 EDT   Surgical Pathology Report - Auth (Verified)   Specimen(s) Submitted   Tongue base   Final Diagnosis   Tongue base mass, biopsy:   -Superficial fragments of invasive squamous cell carcinoma, non-   keratinized. See note below   Note: The case was received intradepartmental peer review and the   diagnosis represent a consensus opinion.   Verified by: Aide Louise M.D.   (Electronic Signature)   Reported on: 08/22/22 18:01 EDT, [facility],      RADIOLOGY:    CT Neck Soft Tissue w/ IV Cont (08.16.22 @ 09:07)  CT NECK SOFT TISSUE IC                          Impression:    Status post tracheostomy. 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 Partial effacement of   the preepiglottic fat.    Mildly enhancing 1.1 cm right supraclavicular lymph node, nonspecific.    Left hemidiaphragm elevation with compressive atelectasis in   opacification of the partial imaged left lower lobe which could be   infectiousor inflammatory. Small left pleural effusion. Further   evaluation with chest CT can be considered.    --- End of Report ---      PHYSICAL EXAM:  GENERAL: NAD, lying in bed comfortably  HEAD:  Atraumatic, normocephalic  EYES: EOMI, conjunctiva and sclera clear  ENT: Moist mucous membranes  NECK: Supple, no JVD, trach tube intact  HEART: Regular rate and rhythm, no murmurs, rubs, or gallops  LUNGS: Unlabored respirations  ABDOMEN: Soft, nontender, PEG intact  EXTREMITIES: Hyperpigmentation b/l LE  NERVOUS SYSTEM:  A&Ox3, no focal deficits   SKIN: No rashes or lesions CRUZ DUMONT 57y Female  MRN#: [id number]     Oncology Consult    HPI: 56 y/o F with PMHx HTN, HFpEF, DLD, DM II, COPD, Chronic respiratory failure s/p trach and PEG 4/2022 @ [facility] following intubation x 1 mo with frequent admissions for mucus plugging, LUCIANA, DVT on Eliquis, LE venous stasis dermatitis, bedbound state, GERD, diverticulosis,. Pt sent to the ER from [facility] rehab for SOB, wheezing, and increased secretions worsening x 1 week following PO trial week prior. Of note pt has a trach and has had frequent readmissions since 4/2022 for mucous plugging and respiratory failure and has had several bronchoscopies. EMS noted the pt to be hypotensive and hypoxic. In ED 8/3/22, CXR showed new b/l interstitial opacities. Pt was vigorously suctioned, received Neb Tx, IV steroids, BP revived with IV fluids and admitted for respiratory failure and started on abx. Pt with ongoing severe dysphagia, eval with S&S, and was kept NPO with continued PEG feeds. VFSS 8/12 showed diffuse pharyngeal edema. 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  The pt was taken to the OR by ENT for direct laryngoscopy and bx of the mass on 8/19/22. Biopsy results showed invasive squamous cell carcinoma, non-keratinized.  Pt is wheelchair bound since 4/2022 and requires intensive assistance with most ADLs; previously came from home (lives with  and daughter) prior to [facility] re[facility].     REVIEW OF SYSTEMS  CONSTITUTIONAL: No weakness, fevers or chills  EYES/ENT: No visual changes  NECK: No pain or stiffness  RESPIRATORY: +cough, + excess secretions  CARDIOVASCULAR: No chest pain or palpitations  GASTROINTESTINAL: No abdominal or epigastric pain. No nausea, vomiting, or hematemesis  GENITOURINARY: No dysuria, frequency or hematuria  NEUROLOGICAL: No numbness or weakness  SKIN: +LE hyperpigmentation      OBJECTIVE  PAST MEDICAL & SURGICAL HISTORY  COPD (chronic obstructive pulmonary disease)    CHF (congestive heart failure)    DM (diabetes mellitus)    H/O tracheostomy                                              -----------------------------------------------------------  ALLERGIES:  penicillin (Swelling)                                            ------------------------------------------------------------    HOME MEDICATIONS  Home Medications:  acetylcysteine 20% inhalation solution: 3 milliliter(s) inhaled every 6 hours (03 Aug 2022 13:51)  albuterol sulfate 2.5 mg/3 mL (0.083 %) solution for nebulization: milliliter(s) inhaled 3 times a day, As Needed (19 Jul 2022 10:06)  ALPRAZolam 0.5 mg oral tablet: 1 tab(s) orally 3 times a day (after meals), As Needed (03 Aug 2022 13:58)  ammonium lactate 12% topical lotion: 1 application topically 2 times a day (19 Jul 2022 10:06)  betamethasone valerate 0.1% topical cream: 1 application topically 2 times a day (19 Jul 2022 10:06)  gabapentin 600 mg oral tablet: 1 tab(s) orally 3 times a day via PEG tube (23 Jun 2022 16:45)  HYDROmorphone 4 mg oral tablet: 1 tab(s) orally every 4 hours, As Needed (19 Jul 2022 10:06)  insulin glargine 100 units/mL subcutaneous solution: 20 unit(s) subcutaneous once a day (at bedtime) (23 Jun 2022 16:45)  insulin lispro 100 units/mL injectable solution: 6  injectable 4 times a day (before feeds Q6H) (19 Jul 2022 15:19)  losartan 100 mg oral tablet: 1 tab(s) orally once a day via PEG tube (23 Jun 2022 16:45)  Multiple Vitamins oral tablet: 1 tab(s) orally once a day via PEG tube (23 Jun 2022 16:45)  pantoprazole 40 mg oral delayed release tablet: 1 tab(s) orally once a day (before a meal) via PEG tube (23 Jun 2022 16:45)  polyethylene glycol 3350 oral powder for reconstitution: 17 gram(s) orally once a day via PEG tube (23 Jun 2022 16:45)  senna leaf extract oral tablet: 2 tab(s) orally once a day (at bedtime) via PEG tube (23 Jun 2022 16:45)  Trelegy Ellipta 100 mcg-62.5 mcg-25 mcg powder for inhalation:  (23 Jun 2022 16:33)                           MEDICATIONS:  STANDING MEDICATIONS  acetaminophen     Tablet .. 1000 milliGRAM(s) Oral every 8 hours  acetylcysteine 20% for Nebulization - Peds 3 milliLiter(s) Nebulizer every 6 hours  ALBUTerol    0.083% 2.5 milliGRAM(s) Nebulizer every 6 hours  ammonium lactate 12% Lotion 1 Application(s) Topical two times a day  apixaban 5 milliGRAM(s) Enteral Tube every 12 hours  dextrose 5%. 1000 milliLiter(s) IV Continuous <Continuous>  dextrose 5%. 1000 milliLiter(s) IV Continuous <Continuous>  dextrose 50% Injectable 25 Gram(s) IV Push once  dextrose 50% Injectable 12.5 Gram(s) IV Push once  dextrose 50% Injectable 25 Gram(s) IV Push once  ergocalciferol 28603 Unit(s) Oral every week  gabapentin Solution 600 milliGRAM(s) Oral three times a day  glucagon  Injectable 1 milliGRAM(s) IntraMuscular once  guaifenesin/dextromethorphan Oral Liquid 20 milliLiter(s) Oral every 6 hours  hydrOXYzine hydrochloride 25 milliGRAM(s) Oral once  insulin glargine Injectable (LANTUS) 20 Unit(s) SubCutaneous at bedtime  insulin lispro (ADMELOG) corrective regimen sliding scale   SubCutaneous three times a day before meals  insulin lispro Injectable (ADMELOG) 6 Unit(s) SubCutaneous three times a day before meals  ipratropium    for Nebulization 500 MICROGram(s) Nebulizer every 6 hours  losartan 100 milliGRAM(s) Oral daily  magnesium sulfate  IVPB 2 Gram(s) IV Intermittent every 12 hours  multivitamin 1 Tablet(s) Oral daily  mupirocin 2% Ointment 1 Application(s) Topical two times a day  nystatin Cream 1 Application(s) Topical two times a day  pantoprazole   Suspension 40 milliGRAM(s) Oral daily  silver sulfADIAZINE 1% Cream 1 Application(s) Topical daily    PRN MEDICATIONS  ALBUTerol    90 MICROgram(s) HFA Inhaler 2 Puff(s) Inhalation every 6 hours PRN  ALPRAZolam 0.5 milliGRAM(s) Oral every 6 hours PRN  dextrose Oral Gel 15 Gram(s) Oral once PRN  HYDROmorphone   Tablet 4 milliGRAM(s) Enteral Tube every 4 hours PRN  HYDROmorphone  Injectable 0.5 milliGRAM(s) IV Push every 3 hours PRN                                            ------------------------------------------------------------  VITAL SIGNS: Last 24 Hours  T(C): 36.8 (24 Aug 2022 05:18), Max: 37.1 (23 Aug 2022 12:30)  T(F): 98.3 (24 Aug 2022 05:18), Max: 98.8 (23 Aug 2022 12:30)  HR: 73 (24 Aug 2022 05:18) (73 - 105)  BP: 123/63 (24 Aug 2022 05:18) (114/56 - 172/74)  BP(mean): --  RR: 18 (24 Aug 2022 05:18) (18 - 18)  SpO2: 100% (24 Aug 2022 05:18) (98% - 100%)      08-23-22 @ 07:01  -  08-24-22 @ 07:00  --------------------------------------------------------  IN: 0 mL / OUT: 1000 mL / NET: -1000 mL                                             --------------------------------------------------------------  LABS:                        10.8   7.97  )-----------( 186      ( 24 Aug 2022 07:49 )             32.2     08-24    130<L>  |  94<L>  |  14  ----------------------------<  145<H>  4.0   |  30  |  0.6<L>    Ca    9.7      24 Aug 2022 07:49  Mg     1.9     08-24    TPro  5.3<L>  /  Alb  3.2<L>  /  TBili  0.3  /  DBili  x   /  AST  14  /  ALT  11  /  AlkPhos  107  08-24    Surgical Pathology Report (08.19.22 @ 12:00)   Surgical Pathology Report:   ACCESSION No: [id number]   Patient: CRUZ DUMONT   Accession: [id number]   Collected Date/Time: 8/19/2022 12:00 EDT   Received Date/Time: 8/19/2022 15:00 EDT   Surgical Pathology Report - Auth (Verified)   Specimen(s) Submitted   Tongue base   Final Diagnosis   Tongue base mass, biopsy:   -Superficial fragments of invasive squamous cell carcinoma, non-   keratinized. See note below   Note: The case was received intradepartmental peer review and the   diagnosis represent a consensus opinion.   Verified by: Aide Louise M.D.   (Electronic Signature)   Reported on: 08/22/22 18:01 EDT, [facility],      RADIOLOGY:    CT Neck Soft Tissue w/ IV Cont (08.16.22 @ 09:07)  CT NECK SOFT TISSUE IC                          Impression:    Status post tracheostomy. 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 Partial effacement of   the preepiglottic fat.    Mildly enhancing 1.1 cm right supraclavicular lymph node, nonspecific.    Left hemidiaphragm elevation with compressive atelectasis in   opacification of the partial imaged left lower lobe which could be   infectiousor inflammatory. Small left pleural effusion. Further   evaluation with chest CT can be considered.    --- End of Report ---      PHYSICAL EXAM:  GENERAL: NAD, lying in bed comfortably  HEAD:  Atraumatic, normocephalic  EYES: EOMI, conjunctiva and sclera clear  ENT: Moist mucous membranes  NECK: Supple, no JVD, trach tube intact  HEART: Regular rate and rhythm, no murmurs, rubs, or gallops  LUNGS: Unlabored respirations  ABDOMEN: Soft, nontender, PEG intact  EXTREMITIES: Hyperpigmentation b/l LE  NERVOUS SYSTEM:  A&Ox3, no focal deficits   SKIN: No rashes or lesions CRUZ DUMONT 57y Female  MRN#: [id number]     Oncology Consult    HPI: 56 y/o F with PMHx HTN, HFpEF, DLD, DM II, COPD, Chronic respiratory failure s/p trach and PEG 4/2022 @ [facility] following intubation x 1 mo with frequent admissions for mucus plugging, LUCIANA, DVT on Eliquis, LE venous stasis dermatitis, bedbound state, GERD, diverticulosis,. Pt sent to the ER from [facility] rehab for SOB, wheezing, and increased secretions worsening x 1 week following PO trial week prior. Of note pt has a trach and has had frequent readmissions since 4/2022 for mucous plugging and respiratory failure and has had several bronchoscopies. EMS noted the pt to be hypotensive and hypoxic. In ED 8/3/22, CXR showed new b/l interstitial opacities. Pt was vigorously suctioned, received Neb Tx, IV steroids, BP revived with IV fluids and admitted for respiratory failure and started on abx. Pt with ongoing severe dysphagia, eval with S&S, and was kept NPO with continued PEG feeds. VFSS 8/12 showed diffuse pharyngeal edema. 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  The pt was taken to the OR by ENT for direct laryngoscopy and bx of the mass on 8/19/22. Biopsy results showed invasive squamous cell carcinoma, non-keratinized.  Pt is wheelchair bound since 4/2022 and requires intensive assistance with most ADLs; previously came from home (lives with  and daughter) prior to [facility] re[facility].     REVIEW OF SYSTEMS  CONSTITUTIONAL: No weakness, fevers or chills  EYES/ENT: No visual changes  NECK: No pain or stiffness  RESPIRATORY: +cough, + excess secretions  CARDIOVASCULAR: No chest pain or palpitations  GASTROINTESTINAL: No abdominal or epigastric pain. No nausea, vomiting, or hematemesis  GENITOURINARY: No dysuria, frequency or hematuria  NEUROLOGICAL: No numbness or weakness  SKIN: +LE hyperpigmentation      OBJECTIVE  PAST MEDICAL & SURGICAL HISTORY  COPD (chronic obstructive pulmonary disease)    CHF (congestive heart failure)    DM (diabetes mellitus)    H/O tracheostomy                                              -----------------------------------------------------------  ALLERGIES:  penicillin (Swelling)                                            ------------------------------------------------------------    HOME MEDICATIONS  Home Medications:  acetylcysteine 20% inhalation solution: 3 milliliter(s) inhaled every 6 hours (03 Aug 2022 13:51)  albuterol sulfate 2.5 mg/3 mL (0.083 %) solution for nebulization: milliliter(s) inhaled 3 times a day, As Needed (19 Jul 2022 10:06)  ALPRAZolam 0.5 mg oral tablet: 1 tab(s) orally 3 times a day (after meals), As Needed (03 Aug 2022 13:58)  ammonium lactate 12% topical lotion: 1 application topically 2 times a day (19 Jul 2022 10:06)  betamethasone valerate 0.1% topical cream: 1 application topically 2 times a day (19 Jul 2022 10:06)  gabapentin 600 mg oral tablet: 1 tab(s) orally 3 times a day via PEG tube (23 Jun 2022 16:45)  HYDROmorphone 4 mg oral tablet: 1 tab(s) orally every 4 hours, As Needed (19 Jul 2022 10:06)  insulin glargine 100 units/mL subcutaneous solution: 20 unit(s) subcutaneous once a day (at bedtime) (23 Jun 2022 16:45)  insulin lispro 100 units/mL injectable solution: 6  injectable 4 times a day (before feeds Q6H) (19 Jul 2022 15:19)  losartan 100 mg oral tablet: 1 tab(s) orally once a day via PEG tube (23 Jun 2022 16:45)  Multiple Vitamins oral tablet: 1 tab(s) orally once a day via PEG tube (23 Jun 2022 16:45)  pantoprazole 40 mg oral delayed release tablet: 1 tab(s) orally once a day (before a meal) via PEG tube (23 Jun 2022 16:45)  polyethylene glycol 3350 oral powder for reconstitution: 17 gram(s) orally once a day via PEG tube (23 Jun 2022 16:45)  senna leaf extract oral tablet: 2 tab(s) orally once a day (at bedtime) via PEG tube (23 Jun 2022 16:45)  Trelegy Ellipta 100 mcg-62.5 mcg-25 mcg powder for inhalation:  (23 Jun 2022 16:33)                           MEDICATIONS:  STANDING MEDICATIONS  acetaminophen     Tablet .. 1000 milliGRAM(s) Oral every 8 hours  acetylcysteine 20% for Nebulization - Peds 3 milliLiter(s) Nebulizer every 6 hours  ALBUTerol    0.083% 2.5 milliGRAM(s) Nebulizer every 6 hours  ammonium lactate 12% Lotion 1 Application(s) Topical two times a day  apixaban 5 milliGRAM(s) Enteral Tube every 12 hours  dextrose 5%. 1000 milliLiter(s) IV Continuous <Continuous>  dextrose 5%. 1000 milliLiter(s) IV Continuous <Continuous>  dextrose 50% Injectable 25 Gram(s) IV Push once  dextrose 50% Injectable 12.5 Gram(s) IV Push once  dextrose 50% Injectable 25 Gram(s) IV Push once  ergocalciferol 85002 Unit(s) Oral every week  gabapentin Solution 600 milliGRAM(s) Oral three times a day  glucagon  Injectable 1 milliGRAM(s) IntraMuscular once  guaifenesin/dextromethorphan Oral Liquid 20 milliLiter(s) Oral every 6 hours  hydrOXYzine hydrochloride 25 milliGRAM(s) Oral once  insulin glargine Injectable (LANTUS) 20 Unit(s) SubCutaneous at bedtime  insulin lispro (ADMELOG) corrective regimen sliding scale   SubCutaneous three times a day before meals  insulin lispro Injectable (ADMELOG) 6 Unit(s) SubCutaneous three times a day before meals  ipratropium    for Nebulization 500 MICROGram(s) Nebulizer every 6 hours  losartan 100 milliGRAM(s) Oral daily  magnesium sulfate  IVPB 2 Gram(s) IV Intermittent every 12 hours  multivitamin 1 Tablet(s) Oral daily  mupirocin 2% Ointment 1 Application(s) Topical two times a day  nystatin Cream 1 Application(s) Topical two times a day  pantoprazole   Suspension 40 milliGRAM(s) Oral daily  silver sulfADIAZINE 1% Cream 1 Application(s) Topical daily    PRN MEDICATIONS  ALBUTerol    90 MICROgram(s) HFA Inhaler 2 Puff(s) Inhalation every 6 hours PRN  ALPRAZolam 0.5 milliGRAM(s) Oral every 6 hours PRN  dextrose Oral Gel 15 Gram(s) Oral once PRN  HYDROmorphone   Tablet 4 milliGRAM(s) Enteral Tube every 4 hours PRN  HYDROmorphone  Injectable 0.5 milliGRAM(s) IV Push every 3 hours PRN                                            ------------------------------------------------------------  VITAL SIGNS: Last 24 Hours  T(C): 36.8 (24 Aug 2022 05:18), Max: 37.1 (23 Aug 2022 12:30)  T(F): 98.3 (24 Aug 2022 05:18), Max: 98.8 (23 Aug 2022 12:30)  HR: 73 (24 Aug 2022 05:18) (73 - 105)  BP: 123/63 (24 Aug 2022 05:18) (114/56 - 172/74)  BP(mean): --  RR: 18 (24 Aug 2022 05:18) (18 - 18)  SpO2: 100% (24 Aug 2022 05:18) (98% - 100%)      08-23-22 @ 07:01  -  08-24-22 @ 07:00  --------------------------------------------------------  IN: 0 mL / OUT: 1000 mL / NET: -1000 mL                                             --------------------------------------------------------------  LABS:                        10.8   7.97  )-----------( 186      ( 24 Aug 2022 07:49 )             32.2     08-24    130<L>  |  94<L>  |  14  ----------------------------<  145<H>  4.0   |  30  |  0.6<L>    Ca    9.7      24 Aug 2022 07:49  Mg     1.9     08-24    TPro  5.3<L>  /  Alb  3.2<L>  /  TBili  0.3  /  DBili  x   /  AST  14  /  ALT  11  /  AlkPhos  107  08-24    Surgical Pathology Report (08.19.22 @ 12:00)   Surgical Pathology Report:   ACCESSION No: [id number]   Patient: CRUZ DUMONT   Accession: [id number]   Collected Date/Time: 8/19/2022 12:00 EDT   Received Date/Time: 8/19/2022 15:00 EDT   Surgical Pathology Report - Auth (Verified)   Specimen(s) Submitted   Tongue base   Final Diagnosis   Tongue base mass, biopsy:   -Superficial fragments of invasive squamous cell carcinoma, non-   keratinized. See note below   Note: The case was received intradepartmental peer review and the   diagnosis represent a consensus opinion.   Verified by: Aide Louise M.D.   (Electronic Signature)   Reported on: 08/22/22 18:01 EDT, [facility],      RADIOLOGY:    CT Neck Soft Tissue w/ IV Cont (08.16.22 @ 09:07)  CT NECK SOFT TISSUE IC                          Impression:    Status post tracheostomy. 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 Partial effacement of   the preepiglottic fat.    Mildly enhancing 1.1 cm right supraclavicular lymph node, nonspecific.    Left hemidiaphragm elevation with compressive atelectasis in   opacification of the partial imaged left lower lobe which could be   infectiousor inflammatory. Small left pleural effusion. Further   evaluation with chest CT can be considered.    --- End of Report ---      PHYSICAL EXAM:  GENERAL: NAD, lying in bed comfortably, trached and peg  HEAD:  Atraumatic, normocephalic  EYES: EOMI, conjunctiva and sclera clear  ENT: Moist mucous membranes  NECK: Supple, no JVD, trach tube intact  HEART: Regular rate and rhythm, no murmurs, rubs, or gallops  LUNGS: Unlabored respirations  ABDOMEN: Soft, nontender, PEG intact  EXTREMITIES: Hyperpigmentation b/l LE  NERVOUS SYSTEM:  A&Ox3, no focal deficits   SKIN: No rashes or lesions

## 2022-08-24 NOTE — CONSULT NOTE ADULT - PROVIDER SPECIALTY LIST ADULT
Vascular Surgery
Heme/Onc
Critical Care
Pain Medicine
ENT
Rad Onc
Pulmonology
Thoracic Surgery
Infectious Disease

## 2022-08-25 LAB
ALBUMIN SERPL ELPH-MCNC: 3.1 G/DL — LOW (ref 3.5–5.2)
ALP SERPL-CCNC: 103 U/L — SIGNIFICANT CHANGE UP (ref 30–115)
ALT FLD-CCNC: 9 U/L — SIGNIFICANT CHANGE UP (ref 0–41)
ANION GAP SERPL CALC-SCNC: 10 MMOL/L — SIGNIFICANT CHANGE UP (ref 7–14)
AST SERPL-CCNC: 12 U/L — SIGNIFICANT CHANGE UP (ref 0–41)
BILIRUB SERPL-MCNC: 0.3 MG/DL — SIGNIFICANT CHANGE UP (ref 0.2–1.2)
BUN SERPL-MCNC: 16 MG/DL — SIGNIFICANT CHANGE UP (ref 10–20)
CALCIUM SERPL-MCNC: 9.8 MG/DL — SIGNIFICANT CHANGE UP (ref 8.5–10.1)
CHLORIDE SERPL-SCNC: 99 MMOL/L — SIGNIFICANT CHANGE UP (ref 98–110)
CO2 SERPL-SCNC: 30 MMOL/L — SIGNIFICANT CHANGE UP (ref 17–32)
CREAT SERPL-MCNC: 0.5 MG/DL — LOW (ref 0.7–1.5)
EGFR: 109 ML/MIN/1.73M2 — SIGNIFICANT CHANGE UP
GLUCOSE BLDC GLUCOMTR-MCNC: 102 MG/DL — HIGH (ref 70–99)
GLUCOSE BLDC GLUCOMTR-MCNC: 105 MG/DL — HIGH (ref 70–99)
GLUCOSE BLDC GLUCOMTR-MCNC: 108 MG/DL — HIGH (ref 70–99)
GLUCOSE BLDC GLUCOMTR-MCNC: 111 MG/DL — HIGH (ref 70–99)
GLUCOSE BLDC GLUCOMTR-MCNC: 121 MG/DL — HIGH (ref 70–99)
GLUCOSE SERPL-MCNC: 111 MG/DL — HIGH (ref 70–99)
HCT VFR BLD CALC: 30.1 % — LOW (ref 37–47)
HGB BLD-MCNC: 10.1 G/DL — LOW (ref 12–16)
MAGNESIUM SERPL-MCNC: 2.6 MG/DL — HIGH (ref 1.8–2.4)
MCHC RBC-ENTMCNC: 31.5 PG — HIGH (ref 27–31)
MCHC RBC-ENTMCNC: 33.6 G/DL — SIGNIFICANT CHANGE UP (ref 32–37)
MCV RBC AUTO: 93.8 FL — SIGNIFICANT CHANGE UP (ref 81–99)
NRBC # BLD: 0 /100 WBCS — SIGNIFICANT CHANGE UP (ref 0–0)
PLATELET # BLD AUTO: 203 K/UL — SIGNIFICANT CHANGE UP (ref 130–400)
POTASSIUM SERPL-MCNC: 4.5 MMOL/L — SIGNIFICANT CHANGE UP (ref 3.5–5)
POTASSIUM SERPL-SCNC: 4.5 MMOL/L — SIGNIFICANT CHANGE UP (ref 3.5–5)
PROT SERPL-MCNC: 5.4 G/DL — LOW (ref 6–8)
RBC # BLD: 3.21 M/UL — LOW (ref 4.2–5.4)
RBC # FLD: 12 % — SIGNIFICANT CHANGE UP (ref 11.5–14.5)
SARS-COV-2 RNA SPEC QL NAA+PROBE: SIGNIFICANT CHANGE UP
SODIUM SERPL-SCNC: 139 MMOL/L — SIGNIFICANT CHANGE UP (ref 135–146)
WBC # BLD: 6.17 K/UL — SIGNIFICANT CHANGE UP (ref 4.8–10.8)
WBC # FLD AUTO: 6.17 K/UL — SIGNIFICANT CHANGE UP (ref 4.8–10.8)

## 2022-08-25 RX ORDER — APIXABAN 2.5 MG/1
1 TABLET, FILM COATED ORAL
Qty: 0 | Refills: 0 | DISCHARGE
Start: 2022-08-25

## 2022-08-25 RX ADMIN — ALBUTEROL 2.5 MILLIGRAM(S): 90 AEROSOL, METERED ORAL at 01:04

## 2022-08-25 RX ADMIN — Medication 500 MICROGRAM(S): at 08:46

## 2022-08-25 RX ADMIN — Medication 1000 MILLIGRAM(S): at 21:51

## 2022-08-25 RX ADMIN — Medication 0.5 MILLIGRAM(S): at 19:29

## 2022-08-25 RX ADMIN — LOSARTAN POTASSIUM 100 MILLIGRAM(S): 100 TABLET, FILM COATED ORAL at 05:27

## 2022-08-25 RX ADMIN — Medication 500 MICROGRAM(S): at 14:19

## 2022-08-25 RX ADMIN — GABAPENTIN 600 MILLIGRAM(S): 400 CAPSULE ORAL at 13:15

## 2022-08-25 RX ADMIN — Medication 6 UNIT(S): at 10:40

## 2022-08-25 RX ADMIN — HYDROMORPHONE HYDROCHLORIDE 0.5 MILLIGRAM(S): 2 INJECTION INTRAMUSCULAR; INTRAVENOUS; SUBCUTANEOUS at 14:55

## 2022-08-25 RX ADMIN — HYDROMORPHONE HYDROCHLORIDE 0.5 MILLIGRAM(S): 2 INJECTION INTRAMUSCULAR; INTRAVENOUS; SUBCUTANEOUS at 21:06

## 2022-08-25 RX ADMIN — Medication 3 MILLILITER(S): at 20:07

## 2022-08-25 RX ADMIN — Medication 3 MILLILITER(S): at 08:45

## 2022-08-25 RX ADMIN — Medication 6 UNIT(S): at 17:26

## 2022-08-25 RX ADMIN — NYSTATIN CREAM 1 APPLICATION(S): 100000 CREAM TOPICAL at 17:04

## 2022-08-25 RX ADMIN — ALBUTEROL 2.5 MILLIGRAM(S): 90 AEROSOL, METERED ORAL at 14:19

## 2022-08-25 RX ADMIN — Medication 1000 MILLIGRAM(S): at 05:26

## 2022-08-25 RX ADMIN — ALBUTEROL 2.5 MILLIGRAM(S): 90 AEROSOL, METERED ORAL at 20:07

## 2022-08-25 RX ADMIN — Medication 6 UNIT(S): at 06:12

## 2022-08-25 RX ADMIN — Medication 0.5 MILLIGRAM(S): at 13:13

## 2022-08-25 RX ADMIN — Medication 1 APPLICATION(S): at 17:01

## 2022-08-25 RX ADMIN — HYDROMORPHONE HYDROCHLORIDE 0.5 MILLIGRAM(S): 2 INJECTION INTRAMUSCULAR; INTRAVENOUS; SUBCUTANEOUS at 17:59

## 2022-08-25 RX ADMIN — HYDROMORPHONE HYDROCHLORIDE 4 MILLIGRAM(S): 2 INJECTION INTRAMUSCULAR; INTRAVENOUS; SUBCUTANEOUS at 00:09

## 2022-08-25 RX ADMIN — Medication 1 APPLICATION(S): at 11:22

## 2022-08-25 RX ADMIN — GABAPENTIN 600 MILLIGRAM(S): 400 CAPSULE ORAL at 21:52

## 2022-08-25 RX ADMIN — Medication 20 MILLILITER(S): at 11:24

## 2022-08-25 RX ADMIN — NYSTATIN CREAM 1 APPLICATION(S): 100000 CREAM TOPICAL at 05:25

## 2022-08-25 RX ADMIN — HYDROMORPHONE HYDROCHLORIDE 4 MILLIGRAM(S): 2 INJECTION INTRAMUSCULAR; INTRAVENOUS; SUBCUTANEOUS at 22:21

## 2022-08-25 RX ADMIN — Medication 1000 MILLIGRAM(S): at 15:10

## 2022-08-25 RX ADMIN — HYDROMORPHONE HYDROCHLORIDE 4 MILLIGRAM(S): 2 INJECTION INTRAMUSCULAR; INTRAVENOUS; SUBCUTANEOUS at 06:29

## 2022-08-25 RX ADMIN — Medication 25 GRAM(S): at 17:09

## 2022-08-25 RX ADMIN — Medication 1 APPLICATION(S): at 05:26

## 2022-08-25 RX ADMIN — Medication 25 GRAM(S): at 05:28

## 2022-08-25 RX ADMIN — HYDROMORPHONE HYDROCHLORIDE 4 MILLIGRAM(S): 2 INJECTION INTRAMUSCULAR; INTRAVENOUS; SUBCUTANEOUS at 21:51

## 2022-08-25 RX ADMIN — Medication 500 MICROGRAM(S): at 01:04

## 2022-08-25 RX ADMIN — Medication 20 MILLILITER(S): at 23:16

## 2022-08-25 RX ADMIN — HYDROMORPHONE HYDROCHLORIDE 4 MILLIGRAM(S): 2 INJECTION INTRAMUSCULAR; INTRAVENOUS; SUBCUTANEOUS at 06:59

## 2022-08-25 RX ADMIN — HYDROMORPHONE HYDROCHLORIDE 0.5 MILLIGRAM(S): 2 INJECTION INTRAMUSCULAR; INTRAVENOUS; SUBCUTANEOUS at 05:58

## 2022-08-25 RX ADMIN — ALBUTEROL 2.5 MILLIGRAM(S): 90 AEROSOL, METERED ORAL at 08:45

## 2022-08-25 RX ADMIN — Medication 20 MILLILITER(S): at 05:25

## 2022-08-25 RX ADMIN — Medication 1 TABLET(S): at 11:23

## 2022-08-25 RX ADMIN — Medication 20 MILLILITER(S): at 17:04

## 2022-08-25 RX ADMIN — Medication 3 MILLILITER(S): at 01:05

## 2022-08-25 RX ADMIN — HYDROMORPHONE HYDROCHLORIDE 4 MILLIGRAM(S): 2 INJECTION INTRAMUSCULAR; INTRAVENOUS; SUBCUTANEOUS at 00:39

## 2022-08-25 RX ADMIN — MUPIROCIN 1 APPLICATION(S): 20 OINTMENT TOPICAL at 17:02

## 2022-08-25 RX ADMIN — Medication 500 MICROGRAM(S): at 20:06

## 2022-08-25 RX ADMIN — Medication 3 MILLILITER(S): at 14:19

## 2022-08-25 RX ADMIN — HYDROMORPHONE HYDROCHLORIDE 0.5 MILLIGRAM(S): 2 INJECTION INTRAMUSCULAR; INTRAVENOUS; SUBCUTANEOUS at 21:36

## 2022-08-25 RX ADMIN — APIXABAN 5 MILLIGRAM(S): 2.5 TABLET, FILM COATED ORAL at 05:27

## 2022-08-25 RX ADMIN — Medication 1000 MILLIGRAM(S): at 06:06

## 2022-08-25 RX ADMIN — HYDROMORPHONE HYDROCHLORIDE 0.5 MILLIGRAM(S): 2 INJECTION INTRAMUSCULAR; INTRAVENOUS; SUBCUTANEOUS at 08:52

## 2022-08-25 RX ADMIN — APIXABAN 5 MILLIGRAM(S): 2.5 TABLET, FILM COATED ORAL at 17:03

## 2022-08-25 RX ADMIN — HYDROMORPHONE HYDROCHLORIDE 0.5 MILLIGRAM(S): 2 INJECTION INTRAMUSCULAR; INTRAVENOUS; SUBCUTANEOUS at 05:28

## 2022-08-25 RX ADMIN — MUPIROCIN 1 APPLICATION(S): 20 OINTMENT TOPICAL at 05:26

## 2022-08-25 RX ADMIN — Medication 0.5 MILLIGRAM(S): at 06:29

## 2022-08-25 RX ADMIN — GABAPENTIN 600 MILLIGRAM(S): 400 CAPSULE ORAL at 05:26

## 2022-08-25 RX ADMIN — PANTOPRAZOLE SODIUM 40 MILLIGRAM(S): 20 TABLET, DELAYED RELEASE ORAL at 11:23

## 2022-08-25 RX ADMIN — HYDROMORPHONE HYDROCHLORIDE 4 MILLIGRAM(S): 2 INJECTION INTRAMUSCULAR; INTRAVENOUS; SUBCUTANEOUS at 13:26

## 2022-08-25 RX ADMIN — HYDROMORPHONE HYDROCHLORIDE 0.5 MILLIGRAM(S): 2 INJECTION INTRAMUSCULAR; INTRAVENOUS; SUBCUTANEOUS at 09:22

## 2022-08-25 RX ADMIN — Medication 1000 MILLIGRAM(S): at 22:21

## 2022-08-25 RX ADMIN — HYDROMORPHONE HYDROCHLORIDE 4 MILLIGRAM(S): 2 INJECTION INTRAMUSCULAR; INTRAVENOUS; SUBCUTANEOUS at 17:32

## 2022-08-25 RX ADMIN — INSULIN GLARGINE 20 UNIT(S): 100 INJECTION, SOLUTION SUBCUTANEOUS at 21:52

## 2022-08-25 RX ADMIN — HYDROMORPHONE HYDROCHLORIDE 0.5 MILLIGRAM(S): 2 INJECTION INTRAMUSCULAR; INTRAVENOUS; SUBCUTANEOUS at 11:54

## 2022-08-25 RX ADMIN — Medication 1000 MILLIGRAM(S): at 15:45

## 2022-08-25 NOTE — PROGRESS NOTE ADULT - SUBJECTIVE AND OBJECTIVE BOX
CRUZ DUMONT 58yo  Female sent to the ER from [facility] where she is on  care for c/o inc SOB, chest tightness, diff breathing, wheezing i.e. acute on chr hypoxic RF.  Of note the pt has a trach and has had freq readmissions for mucous plugging, RF and has had several bronchoscopies.   EMS noted the pt to be hypotensive and hypoxic.  The pt was vigorously suctioned, received Neb Tx, IV steroids,  BP revived with IV fluids.  The pt was cultured and placed on ABx.  She is v well known to the Pul SVc and to ID.  The pt is ad with acute on ch hypoxic RF and ongoing Bacterial PNA.  The PMHx includes:  HTN, ASHD, HFpEF, DLD, DM II, Chronic RF, COPD, LUCIANA, ASp PNA, sp intubation, failure to extubate, chronic trach ([facility] 4/22), recurrent mucus plugging of airways, SDVT, AC with Eliquis, lower ext venous stasis dermatitis, bedbound state, GERD, diverticulosis, sp PEG, OA, DDD, DJD, chronic pain syn, depression anxiety.    Hosp Course:  The pt was ad from SNF for inc SOB, hypoxia with mucous plugging and acute on chr RF and ongoing bacterial PNA.  The pt was suctioned and placed on mech ventilation via trach and cont on ABx. The pt has had freq ad to hosp ( [facility]) since the ARF and intubation and failure to wean  that resulted in the trach in 6/22 in [facility].   The pt is mostly bed bound with lower ext weakness, mm atrophy/myopathy of disuse and sever hyperpigmentation skin changes of the lower ext.  The pt was ff by Pul/critical care and ID.  The pt was verbal and had nutrition via PEG.  S&S worked with pt and during w/up pt was noted to have pharyngeal/trach swelling.  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  The pt was taken to the OR by ENT for direct laryngoscopy and bx of the mass  on 8/19/22.  The path report + for invasive squamous cell carcinoma.    INTERVAL HPI/OVERNIGHT EVENTS: VS stable, sp direct laryngoscopy 8/19 4.4cm  invasive squamous cell carcinoma,  as per ENT not a surgical candidate, Rad-onc and Hem-onc consults, reviewed, Head and Neck Ca svc making arrangements for out pt ff up with Dr Landaverde 8/30 and Pet CT, pt has severe intertrigo,  R axilla, groin and buttocks,    MEDICATIONS  (STANDING):  acetaminophen     Tablet .. 650 milliGRAM(s) Oral every 6 hours  acetylcysteine 20% for Nebulization - Peds 3 milliLiter(s) Nebulizer every 6 hours  ALBUTerol    0.083% 2.5 milliGRAM(s) Nebulizer every 6 hours  ammonium lactate 12% Lotion 1 Application(s) Topical two times a day  apixaban 5 milliGRAM(s) Enteral Tube two times a day  chlorhexidine 0.12% Liquid 15 milliLiter(s) Oral Mucosa every 12 hours  dextrose 5%. 1000 milliLiter(s) (100 mL/Hr) IV Continuous <Continuous>  dextrose 5%. 1000 milliLiter(s) (50 mL/Hr) IV Continuous <Continuous>  dextrose 50% Injectable 25 Gram(s) IV Push once  dextrose 50% Injectable 12.5 Gram(s) IV Push once  dextrose 50% Injectable 25 Gram(s) IV Push once  doxycycline monohydrate Capsule 100 milliGRAM(s) Oral every 12 hours  gabapentin 600 milliGRAM(s) Oral three times a day  glucagon  Injectable 1 milliGRAM(s) IntraMuscular once  insulin glargine Injectable (LANTUS) 20 Unit(s) SubCutaneous at bedtime  insulin lispro (ADMELOG) corrective regimen sliding scale   SubCutaneous three times a day before meals  insulin lispro Injectable (ADMELOG) 6 Unit(s) SubCutaneous three times a day before meals  methylPREDNISolone sodium succinate Injectable 60 milliGRAM(s) IV Push two times a day  multivitamin 1 Tablet(s) Oral daily  pantoprazole   Suspension 40 milliGRAM(s) Oral daily  polyethylene glycol 3350 17 Gram(s) Oral two times a day  senna 2 Tablet(s) Oral at bedtime    MEDICATIONS  (PRN):  ALBUTerol    90 MICROgram(s) HFA Inhaler 2 Puff(s) Inhalation every 6 hours PRN Shortness of Breath and/or Wheezing  ALPRAZolam 0.5 milliGRAM(s) Oral every 6 hours PRN anxiety  dextrose Oral Gel 15 Gram(s) Oral once PRN Blood Glucose LESS THAN 70 milliGRAM(s)/deciliter  HYDROmorphone   Tablet 4 milliGRAM(s) Oral every 4 hours PRN Severe Pain (7 - 10)  HYDROmorphone  Injectable 1 milliGRAM(s) IV Push every 8 hours PRN Moderate Pain (4 - 6)      Allergies    penicillin (Swelling)      Vital Signs Last 24 Hrs    T(F): 98.2  HR:  86  BP:  132/59    RR: 18  SpO2:  98%, T piece    PHYSICAL EXAM:      Constitutional: pt alert, oriented,  pulse ox 96%    Eyes: nonicteric    ENMT: dry oral mucosa, dental defects,     Neck: + trach tube supple, no stridor, + trach,     Respiratory: shallow resp, diminished harsh bronchial  BS, scattered rhonchi, no wheezing    Cardiovascular: S1S2 reg    Gastrointestinal: globular, soft and benign, + PEG, + BS    Genitourinary:    Extremities: moves all ext, dec motor strength of lower ext, + BL mm atrophy, + BL foot drop    Vascular: dec pedal pulses    Neurological: nonfocal    Skin: lower ext dark almost black hyperpigmentation of lower ext    Lymph Nodes: not enlarged    Musculoskeletal: dec mm mass and tone    Psychiatric: anxious, depressed but stable        LABS:                10  6)-----------( 203            30      139 |  99   |  16  ----------------------------<  111  4.5   |  30  |  0.5    , 115, 123, 109, 115, 104, 109  Ca    9.7       Mg     1.9, 1.8, 2.0, 1.6, 1.7, 1.8, 2.6, 1.7, 1.6, 2.4, 1.9, 2.6  trop <0.01  Vit D 18  folate 9.7  Haptos 126  ferritin 982  TPro  5.9, 5.5, 5.4  /  Alb  3.3, 3.4, 3.3, 3.1  /  TBili  0.3  /  DBili  x   /  AST  20  /  ALT  28  /  AlkPhos  77  08-05    RADIOLOGY & ADDITIONAL TESTS:    EKG:  NSR R axis, low voltage, nonspecific ST-T changed  EKG 8/17:  NSR 69/min, incomplete RBBB, no acute changes  ECHO:  nl sys function, EF 60%, GIDD, so sig valvular dis, borderline Pul HTN, sm pericardial eff  CXR:  interstitial prominence, L pl based opacity unchanged  AA doppler:  normal arterial blood flow  CT of soft tissue of the neck 8/16/22:  exophytic expansile mucosal mass 4.4x2x2.4cm involving the BL aryepiglotticfolds and along the BL glossoepiglottic folds as well as along the  laryngea/lingual surface of the epiglottis, 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

## 2022-08-25 NOTE — PROGRESS NOTE ADULT - SUBJECTIVE AND OBJECTIVE BOX
Patient is a 57y old  Female who presents with a chief complaint of Hypoxia, Acute on cg RF (24 Aug 2022 12:36)      INTERVAL HPI/OVERNIGHT EVENTS:  None    FAMILY HISTORY:  No pertinent family history in first degree relatives      T(C): 36.5 (08-25-22 @ 05:42), Max: 37.2 (08-24-22 @ 13:12)  HR: 71 (08-25-22 @ 05:42) (69 - 75)  BP: 129/73 (08-25-22 @ 05:42) (129/73 - 139/58)  RR: 18 (08-25-22 @ 05:42) (18 - 19)  SpO2: 100% (08-25-22 @ 05:42) (99% - 100%)  Wt(kg): --Vital Signs Last 24 Hrs  T(C): 36.5 (25 Aug 2022 05:42), Max: 37.2 (24 Aug 2022 13:12)  T(F): 97.7 (25 Aug 2022 05:42), Max: 98.9 (24 Aug 2022 13:12)  HR: 71 (25 Aug 2022 05:42) (69 - 75)  BP: 129/73 (25 Aug 2022 05:42) (129/73 - 139/58)  BP(mean): --  RR: 18 (25 Aug 2022 05:42) (18 - 19)  SpO2: 100% (25 Aug 2022 05:42) (99% - 100%)    Parameters below as of 25 Aug 2022 05:42  Patient On (Oxygen Delivery Method): T-piece        PHYSICAL EXAM:  GEN: NAD, awake and alert. No drooling or pooling of secretions. No stridor or stertor. aphonic 2* trach.   SKIN: Good color, non diaphoretic  HEENT: Oral mucosa pink and moist. No erythema or edema noted to buccal mucosa, tongue, FOM, uvula or posterior oropharynx. Uvula midline. no bleeding noted.   NECK:  Trachea midline. trach tube intact, trach changed at bedside to a 6 cuffless.   RESP: No dyspnea, non-labored breathing. No use of accessory muscles.  CARDIO: +S1/S2  ABDO: Soft, NT. PEG tube.   EXT: JONAS x 4      acetaminophen     Tablet .. 1000 milliGRAM(s) Oral every 8 hours  acetylcysteine 20% for Nebulization - Peds 3 milliLiter(s) Nebulizer every 6 hours  ALBUTerol    0.083% 2.5 milliGRAM(s) Nebulizer every 6 hours  ALBUTerol    90 MICROgram(s) HFA Inhaler 2 Puff(s) Inhalation every 6 hours PRN  ALPRAZolam 0.5 milliGRAM(s) Oral every 6 hours PRN  ammonium lactate 12% Lotion 1 Application(s) Topical two times a day  apixaban 5 milliGRAM(s) Enteral Tube every 12 hours  dextrose 5%. 1000 milliLiter(s) IV Continuous <Continuous>  dextrose 5%. 1000 milliLiter(s) IV Continuous <Continuous>  dextrose 50% Injectable 25 Gram(s) IV Push once  dextrose 50% Injectable 12.5 Gram(s) IV Push once  dextrose 50% Injectable 25 Gram(s) IV Push once  dextrose Oral Gel 15 Gram(s) Oral once PRN  ergocalciferol 96078 Unit(s) Oral every week  gabapentin Solution 600 milliGRAM(s) Oral three times a day  glucagon  Injectable 1 milliGRAM(s) IntraMuscular once  guaifenesin/dextromethorphan Oral Liquid 20 milliLiter(s) Oral every 6 hours  HYDROmorphone   Tablet 4 milliGRAM(s) Enteral Tube every 4 hours PRN  HYDROmorphone  Injectable 0.5 milliGRAM(s) IV Push every 3 hours PRN  hydrOXYzine hydrochloride 25 milliGRAM(s) Oral once  insulin glargine Injectable (LANTUS) 20 Unit(s) SubCutaneous at bedtime  insulin lispro (ADMELOG) corrective regimen sliding scale   SubCutaneous three times a day before meals  insulin lispro Injectable (ADMELOG) 6 Unit(s) SubCutaneous three times a day before meals  ipratropium    for Nebulization 500 MICROGram(s) Nebulizer every 6 hours  losartan 100 milliGRAM(s) Oral daily  magnesium sulfate  IVPB 2 Gram(s) IV Intermittent every 12 hours  multivitamin 1 Tablet(s) Oral daily  mupirocin 2% Ointment 1 Application(s) Topical two times a day  nystatin Cream 1 Application(s) Topical two times a day  pantoprazole   Suspension 40 milliGRAM(s) Oral daily  silver sulfADIAZINE 1% Cream 1 Application(s) Topical daily      HEALTH ISSUES - PROBLEM Dx:  Painful diabetic neuropathy

## 2022-08-25 NOTE — PROGRESS NOTE ADULT - ASSESSMENT
57 year old female with a past medical history of IDDM, GERD, HTN, COPD, Chronic hypoxemic and hypercapnic respiratory failure SP Tracheostomy (not chronically vented) & PEG at [facility], DVT on Eliquis, Anxiety, CHF, Recurrent mucous plugs SP bronchoscopies, presenting from [facility] for chest tightness and shortness of breath over the past few days. Was found to have aspiration pneumonia causing COPD exacerbation    Acute on chronic Hypoxic respiratory failure   #s/p trach/PEG at [facility] 04/2022 after being intubated for >1month  #COPD Exacerbation  #Suspected superimposed bacterial PNA  - Completed a course of doxycycline WBC wnl, vitals wnl,  - Taper steroids- c/w prednisone 40 OD cont Nebs q4h & prn   - CXR reviewed and shows unchanged interstitial opacities  - Suction Q8h  - Inhaled NAC and PO Mucinex started    #Dysphagia/SCC of larynx   - Extensive pharyngeal edema found on modified speech and swallow  - Laryngeal mass found on CT neck  - Biopsy performed by ENT on 8/19  - Pathology came back as SCC  - Heme onc and onc consult - Will likely need chemoradiation  - Not a good candidate for surgery as per ENT  - F/u Hem/onc and ENT outpatient   - Has appointment with Dr. Landaverde on the August 30th at 1 pm.     #Chronic HFpEF  2d Echo 05/2022: LVEF 50-55%. proBNP ~ 444  Pt does not appear volume overloaded  - Low Na diet and daily weight.   - monitor I & O    #Hx of DVT   - Cont Eliquis  - Venous duplex shows no DVT    #DM2  - Cont lantus/lispro 20-6-6-6  - ISS    #Chronic Lower extremity Pain management and Hyperpigmentation   #Chronic atropic B/l LE Hyperpigmentation changes  - Plan was for outpatient burn and dermatology f/u  - Start topical clobetasone ointment  - Gabapentin 600 TID,   - PO dilaudid 4mg Q4 PRN  -Vascular consulted for possible PVD.   -Arterial doppler - No vascular disease  -PT/OT as tolerated  - Stitches from biopsy removed      Dispo: Back To [facility] when stable.

## 2022-08-25 NOTE — PROGRESS NOTE ADULT - ASSESSMENT
Unfortunate 56 YO F with acute on chr hypoxic RF read for hypoxia and hypotension and ongoing Bacterial PNA. Hx of ASP PNA, COPD exacerbation and intubation with failure to extubate resulting in trach at [facility] .  The pt is under chronic  at [facility].      Acute on Chr hypoxic RF  Chronic Tracheostomy  Bacterial PNA  Clinical Debility  Bedbound state  Lower extremity venous insufficiency  BL foot drop, limb weakness, prolonged immobility  OR Direct Laryngoscopy  22, biopsy of supraglottic larynteal mass  Intertrigo  Hx of HTN, ASHD HFpEF  Hx of DLD  Hx of COPD, MO, LUCIANA, sp intubation, failure to extubate, chr trach since /[facility]  Hx of DM II  Hx of Ch anemia of ch dis  Hx of GERD, HH, Diverticulosis, Ch constipation  Hx of OA, DJD, DDD, chr pain syn  Hx of lower ext venous stasis dermatitis  Hx of DVT, AC/Eliquis  Hx of Anxiety, Depression       pt's VS stable, sp laryngoscopy, "large supra laryngeal/base of tongue mass "  invasive squamous cell carcinoma    ENT ff up, not a surgical candidate, H&N Ca SVC  arranging for out pt Pet CT and ff up with Dr Landaverde    Hem-onc consult appreciated, need PET CT for staging, will need combination f chemo and RT  Rad Onc consult appreciated  restarted Eliquis 5mg BID  Peg feeds, low protein and alb, add beneprotein 2 packets per   CT of soft tissue of the neck shows  exophytic expansile mucosal mass 4.4x2x2.4cm involving the BL aryepiglottic folds/? supraglottic laryngeal neoplasm  Pul-critical care     S&S therapy    low vit D start to replete 50,000 q wk  Venous doppler neg for DVT  AA doppler shows normal blood flow  Vasc consult     ECHO:  nl sys function, EF60%, GIDD, no sig valvular dis  EKG:  NSR 69/min, incomplete RBBB, no acute changes  cont all previous meds  GI prophylaxis  att to bowel regimen  skin care:  pt has severe intertrigo of the R axilla, groin and buttocks,  apply generous antifungal  cream and may add Silvadene on top of the antifungal to buttock area  change PEG dressing daily, apply mupirocin  cont PEG feeds, pt may have ice chips  BL inflatable boots to prevent further foot drop, encourage pt to use them as much as possible    PM: hydromorphone 4mg po q 4 and 1mg IV q 8 for breakthrough pain, gabapentin 600mg q 8, tylenol 650mg QID    BL heel off loading boots, pt with BL foot drop    pt full code  Social SVC goal is for out pt Pet CT  and ff up with ENT, start plans to return to SNF   Unfortunate 56 YO F with acute on chr hypoxic RF read for hypoxia and hypotension and ongoing Bacterial PNA. Hx of ASP PNA, COPD exacerbation and intubation with failure to extubate resulting in trach at [facility] .  The pt is under chronic  at [facility].      Acute on Chr hypoxic RF  Chronic Tracheostomy  Bacterial PNA  Clinical Debility  Bedbound state  Lower extremity venous insufficiency  BL foot drop, limb weakness, prolonged immobility  OR Direct Laryngoscopy  22, biopsy of supraglottic larynteal mass  Intertrigo  Hx of HTN, ASHD HFpEF  Hx of DLD  Hx of COPD, MO, LUCIANA, sp intubation, failure to extubate, chr trach since /[facility]  Hx of DM II  Hx of Ch anemia of ch dis  Hx of GERD, HH, Diverticulosis, Ch constipation  Hx of OA, DJD, DDD, chr pain syn  Hx of lower ext venous stasis dermatitis  Hx of DVT, AC/Eliquis  Hx of Anxiety, Depression       pt's VS stable, sp laryngoscopy, "large supra laryngeal/base of tongue mass "  invasive squamous cell carcinoma    ENT ff up, not a surgical candidate, H&N Ca SVC  arranging for out pt Pet CT and ff up with Dr Landaverde    Onc consult appreciated, need PET CT for staging, chemo induction ff by chem RT vs chemo RT ( s the  chemo induction)  Rad Onc consult appreciated, staging then will work with Onc   restarted Eliquis 5mg BID  Peg feeds, low protein and alb, add beneprotein 2 packets per   CT of soft tissue of the neck shows  exophytic expansile mucosal mass 4.4x2x2.4cm involving the BL aryepiglottic folds/? supraglottic laryngeal neoplasm  Pul-critical care     S&S therapy    low vit D start to replete 50,000 q wk  Venous doppler neg for DVT  AA doppler shows normal blood flow  Vasc consult     ECHO:  nl sys function, EF60%, GIDD, no sig valvular dis  EKG:  NSR 69/min, incomplete RBBB, no acute changes  cont all previous meds  GI prophylaxis  att to bowel regimen  skin care:  pt has severe intertrigo of the R axilla, groin and buttocks,  apply generous antifungal  cream and may add Silvadene on top of the antifungal to buttock area  change PEG dressing daily, apply mupirocin  cont PEG feeds, pt may have ice chips  BL inflatable boots to prevent further foot drop, encourage pt to use them as much as possible    PM: hydromorphone 4mg po q 4 and 1mg IV q 8 for breakthrough pain, gabapentin 600mg q 8, tylenol 650mg QID    BL heel off loading boots, pt with BL foot drop    pt full code  Social SVC goal is for out pt Pet CT  and ff up with ENT, start plans to return to SNF

## 2022-08-26 ENCOUNTER — TRANSCRIPTION ENCOUNTER (OUTPATIENT)
Age: 57
End: 2022-08-26

## 2022-08-26 VITALS
OXYGEN SATURATION: 99 % | SYSTOLIC BLOOD PRESSURE: 135 MMHG | RESPIRATION RATE: 19 BRPM | HEART RATE: 69 BPM | TEMPERATURE: 98 F | DIASTOLIC BLOOD PRESSURE: 53 MMHG

## 2022-08-26 LAB
ALBUMIN SERPL ELPH-MCNC: 3.2 G/DL — LOW (ref 3.5–5.2)
ALP SERPL-CCNC: 100 U/L — SIGNIFICANT CHANGE UP (ref 30–115)
ALT FLD-CCNC: 9 U/L — SIGNIFICANT CHANGE UP (ref 0–41)
ANION GAP SERPL CALC-SCNC: 10 MMOL/L — SIGNIFICANT CHANGE UP (ref 7–14)
AST SERPL-CCNC: 16 U/L — SIGNIFICANT CHANGE UP (ref 0–41)
BILIRUB SERPL-MCNC: 0.3 MG/DL — SIGNIFICANT CHANGE UP (ref 0.2–1.2)
BUN SERPL-MCNC: 14 MG/DL — SIGNIFICANT CHANGE UP (ref 10–20)
CALCIUM SERPL-MCNC: 9.9 MG/DL — SIGNIFICANT CHANGE UP (ref 8.5–10.1)
CHLORIDE SERPL-SCNC: 97 MMOL/L — LOW (ref 98–110)
CO2 SERPL-SCNC: 27 MMOL/L — SIGNIFICANT CHANGE UP (ref 17–32)
CREAT SERPL-MCNC: 0.5 MG/DL — LOW (ref 0.7–1.5)
EGFR: 109 ML/MIN/1.73M2 — SIGNIFICANT CHANGE UP
GLUCOSE BLDC GLUCOMTR-MCNC: 103 MG/DL — HIGH (ref 70–99)
GLUCOSE BLDC GLUCOMTR-MCNC: 87 MG/DL — SIGNIFICANT CHANGE UP (ref 70–99)
GLUCOSE SERPL-MCNC: 101 MG/DL — HIGH (ref 70–99)
HCT VFR BLD CALC: 31.1 % — LOW (ref 37–47)
HGB BLD-MCNC: 10.7 G/DL — LOW (ref 12–16)
MAGNESIUM SERPL-MCNC: 2 MG/DL — SIGNIFICANT CHANGE UP (ref 1.8–2.4)
MCHC RBC-ENTMCNC: 32.2 PG — HIGH (ref 27–31)
MCHC RBC-ENTMCNC: 34.4 G/DL — SIGNIFICANT CHANGE UP (ref 32–37)
MCV RBC AUTO: 93.7 FL — SIGNIFICANT CHANGE UP (ref 81–99)
NRBC # BLD: 0 /100 WBCS — SIGNIFICANT CHANGE UP (ref 0–0)
PLATELET # BLD AUTO: 184 K/UL — SIGNIFICANT CHANGE UP (ref 130–400)
POTASSIUM SERPL-MCNC: 4.3 MMOL/L — SIGNIFICANT CHANGE UP (ref 3.5–5)
POTASSIUM SERPL-SCNC: 4.3 MMOL/L — SIGNIFICANT CHANGE UP (ref 3.5–5)
PROT SERPL-MCNC: 5.6 G/DL — LOW (ref 6–8)
RBC # BLD: 3.32 M/UL — LOW (ref 4.2–5.4)
RBC # FLD: 12.1 % — SIGNIFICANT CHANGE UP (ref 11.5–14.5)
SODIUM SERPL-SCNC: 134 MMOL/L — LOW (ref 135–146)
WBC # BLD: 5.47 K/UL — SIGNIFICANT CHANGE UP (ref 4.8–10.8)
WBC # FLD AUTO: 5.47 K/UL — SIGNIFICANT CHANGE UP (ref 4.8–10.8)

## 2022-08-26 RX ORDER — HYDROMORPHONE HYDROCHLORIDE 2 MG/ML
4 INJECTION INTRAMUSCULAR; INTRAVENOUS; SUBCUTANEOUS ONCE
Refills: 0 | Status: DISCONTINUED | OUTPATIENT
Start: 2022-08-26 | End: 2022-08-26

## 2022-08-26 RX ORDER — NYSTATIN CREAM 100000 [USP'U]/G
1 CREAM TOPICAL
Qty: 0 | Refills: 0 | DISCHARGE
Start: 2022-08-26

## 2022-08-26 RX ADMIN — Medication 20 MILLILITER(S): at 12:02

## 2022-08-26 RX ADMIN — MUPIROCIN 1 APPLICATION(S): 20 OINTMENT TOPICAL at 05:30

## 2022-08-26 RX ADMIN — HYDROMORPHONE HYDROCHLORIDE 0.5 MILLIGRAM(S): 2 INJECTION INTRAMUSCULAR; INTRAVENOUS; SUBCUTANEOUS at 00:37

## 2022-08-26 RX ADMIN — Medication 0.5 MILLIGRAM(S): at 12:01

## 2022-08-26 RX ADMIN — NYSTATIN CREAM 1 APPLICATION(S): 100000 CREAM TOPICAL at 05:29

## 2022-08-26 RX ADMIN — GABAPENTIN 600 MILLIGRAM(S): 400 CAPSULE ORAL at 13:41

## 2022-08-26 RX ADMIN — Medication 500 MICROGRAM(S): at 02:33

## 2022-08-26 RX ADMIN — Medication 6 UNIT(S): at 05:51

## 2022-08-26 RX ADMIN — HYDROMORPHONE HYDROCHLORIDE 4 MILLIGRAM(S): 2 INJECTION INTRAMUSCULAR; INTRAVENOUS; SUBCUTANEOUS at 12:01

## 2022-08-26 RX ADMIN — GABAPENTIN 600 MILLIGRAM(S): 400 CAPSULE ORAL at 05:29

## 2022-08-26 RX ADMIN — HYDROMORPHONE HYDROCHLORIDE 0.5 MILLIGRAM(S): 2 INJECTION INTRAMUSCULAR; INTRAVENOUS; SUBCUTANEOUS at 00:07

## 2022-08-26 RX ADMIN — Medication 1000 MILLIGRAM(S): at 06:00

## 2022-08-26 RX ADMIN — PANTOPRAZOLE SODIUM 40 MILLIGRAM(S): 20 TABLET, DELAYED RELEASE ORAL at 12:02

## 2022-08-26 RX ADMIN — Medication 3 MILLILITER(S): at 09:01

## 2022-08-26 RX ADMIN — Medication 1 APPLICATION(S): at 05:30

## 2022-08-26 RX ADMIN — Medication 500 MICROGRAM(S): at 09:02

## 2022-08-26 RX ADMIN — HYDROMORPHONE HYDROCHLORIDE 4 MILLIGRAM(S): 2 INJECTION INTRAMUSCULAR; INTRAVENOUS; SUBCUTANEOUS at 06:39

## 2022-08-26 RX ADMIN — Medication 0.5 MILLIGRAM(S): at 06:09

## 2022-08-26 RX ADMIN — Medication 1 TABLET(S): at 12:02

## 2022-08-26 RX ADMIN — HYDROMORPHONE HYDROCHLORIDE 0.5 MILLIGRAM(S): 2 INJECTION INTRAMUSCULAR; INTRAVENOUS; SUBCUTANEOUS at 07:54

## 2022-08-26 RX ADMIN — LOSARTAN POTASSIUM 100 MILLIGRAM(S): 100 TABLET, FILM COATED ORAL at 05:30

## 2022-08-26 RX ADMIN — Medication 1000 MILLIGRAM(S): at 13:40

## 2022-08-26 RX ADMIN — Medication 1 APPLICATION(S): at 12:03

## 2022-08-26 RX ADMIN — Medication 20 MILLILITER(S): at 05:30

## 2022-08-26 RX ADMIN — Medication 3 MILLILITER(S): at 02:33

## 2022-08-26 RX ADMIN — ALBUTEROL 2.5 MILLIGRAM(S): 90 AEROSOL, METERED ORAL at 09:02

## 2022-08-26 RX ADMIN — APIXABAN 5 MILLIGRAM(S): 2.5 TABLET, FILM COATED ORAL at 05:30

## 2022-08-26 RX ADMIN — HYDROMORPHONE HYDROCHLORIDE 0.5 MILLIGRAM(S): 2 INJECTION INTRAMUSCULAR; INTRAVENOUS; SUBCUTANEOUS at 04:57

## 2022-08-26 RX ADMIN — HYDROMORPHONE HYDROCHLORIDE 4 MILLIGRAM(S): 2 INJECTION INTRAMUSCULAR; INTRAVENOUS; SUBCUTANEOUS at 06:09

## 2022-08-26 RX ADMIN — HYDROMORPHONE HYDROCHLORIDE 0.5 MILLIGRAM(S): 2 INJECTION INTRAMUSCULAR; INTRAVENOUS; SUBCUTANEOUS at 04:27

## 2022-08-26 RX ADMIN — ALBUTEROL 2.5 MILLIGRAM(S): 90 AEROSOL, METERED ORAL at 02:33

## 2022-08-26 RX ADMIN — Medication 1000 MILLIGRAM(S): at 05:30

## 2022-08-26 NOTE — PROGRESS NOTE ADULT - REASON FOR ADMISSION
Hypoxia
Hypoxia, Acute on Chr RF, trach in situ
Hypoxia, Chr RF, tracheostomy in situ
Hypoxia, acute on ch RF, tracheostomy in place
Hypoxia, acute on ch RF, tracheostomy in situ, debilitated state
Hypoxia, acute on chr RF
Hypoxia, acute on chr RF, trach in situ
Hypoxia
Hypoxia, Acute on Ch RF, trach in situ
Hypoxia, Acute on Ch RF, tracheostomy in situ
Hypoxia, acute on chr RF, + trach
Hypoxia, acute on chr RF, trach in situ
Hypoxia, acute on chronid RF, trach in situ, Peg in situ
Hypoxia
Hypoxia, Acute on Cg RF, during w/up pt noted to have supraglottic sq cell ca
Hypoxia, Acute on Ch RF, trach in situ
Hypoxia, Acute on cg RF
Hypoxia, acute on ch RF
Hypoxia, acute on ch RF, tracheostomy, PEG in sity, bedbound state
Hypoxia, acute on chr RF, trach in situ
Hypoxia, acute on chronic RF, trach in situ
Hypoxia, chronic RF, sp trach
Hypoxia, hypotension acute on CH RF, ongoing bacterial PNA
Hypoxia
Hypoxia, acute on ch RF

## 2022-08-26 NOTE — PROGRESS NOTE ADULT - ASSESSMENT
57 year old female with a past medical history of IDDM, GERD, HTN, COPD, Chronic hypoxemic and hypercapnic respiratory failure SP Tracheostomy (not chronically vented) & PEG at [facility], DVT on Eliquis, Anxiety, CHF, Recurrent mucous plugs SP bronchoscopies, [facility] chest tightness and shortness of breath over the past few days. Was found to have aspiration pneumonia causing COPD exacerbation    Acute on chronic Hypoxic respiratory failure   #s/p trach/PEG at [facility] 04/2022 after being intubated for >1month  #COPD Exacerbation  #Suspected superimposed bacterial PNA  - Completed a course of doxycycline WBC wnl, vitals wnl,  - Suction Q8h  - Inhaled NAC and PO Mucinex started    #Dysphagia/SCC of larynx   - Extensive pharyngeal edema found on modified speech and swallow  - Laryngeal mass found on CT neck  - Biopsy performed by ENT on 8/19  - Pathology came back as SCC  - Not a good candidate for surgery as per ENT  - F/u Hem/onc and ENT outpatient   - Has appointment with Dr. Landaverde on the August 30th at 1 pm.   - Will need PET/CT after discharge.     #Chronic HFpEF  2d Echo 05/2022: LVEF 50-55%. proBNP ~ 444  Pt does not appear volume overloaded  - Low Na diet and daily weight.   - monitor I & O    #Hx of DVT   - Cont Eliquis  - Venous duplex shows no DVT    #DM2  - Cont lantus/lispro 20-6-6-6  - ISS    #Chronic Lower extremity Pain management and Hyperpigmentation   #Chronic atropic B/l LE Hyperpigmentation changes  - Plan was for outpatient burn and dermatology f/u  - Gabapentin 600 TID,   - PO dilaudid 4mg Q4 PRN  -Vascular consulted for possible PVD.   -Arterial doppler - No vascular disease  -PT/OT as tolerated  - Stitches from biopsy removed      Dispo: Back To [facility] when stable.

## 2022-08-26 NOTE — PROGRESS NOTE ADULT - SUBJECTIVE AND OBJECTIVE BOX
CRUZ DUMONT 56yo  Female sent to the ER from [facility] where she is on  care for c/o inc SOB, chest tightness, diff breathing, wheezing i.e. acute on chr hypoxic RF.  Of note the pt has a trach and has had freq readmissions for mucous plugging, RF and has had several bronchoscopies.   EMS noted the pt to be hypotensive and hypoxic.  The pt was vigorously suctioned, received Neb Tx, IV steroids,  BP revived with IV fluids.  The pt was cultured and placed on ABx.  She is v well known to the Pul SVc and to ID.  The pt is ad with acute on ch hypoxic RF and ongoing Bacterial PNA.  The PMHx includes:  HTN, ASHD, HFpEF, DLD, DM II, Chronic RF, COPD, LUCIANA, ASp PNA, sp intubation, failure to extubate, chronic trach ([facility] 4/22), recurrent mucus plugging of airways, SDVT, AC with Eliquis, lower ext venous stasis dermatitis, bedbound state, GERD, diverticulosis, sp PEG, OA, DDD, DJD, chronic pain syn, depression anxiety.    Hosp Course:  The pt was ad from SNF for inc SOB, hypoxia with mucous plugging and acute on chr RF and ongoing bacterial PNA.  The pt was suctioned and placed on mech ventilation via trach and cont on ABx. The pt has had freq ad to hosp ( [facility]) since the ARF and intubation and failure to wean  that resulted in the trach in 6/22 in [facility].   The pt is mostly bed bound with lower ext weakness, mm atrophy/myopathy of disuse and sever hyperpigmentation skin changes of the lower ext.  The pt was ff by Pul/critical care and ID.  The pt was verbal and had nutrition via PEG.  S&S worked with pt and during w/up pt was noted to have pharyngeal/trach swelling.  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  The pt was taken to the OR by ENT for direct laryngoscopy and bx of the mass  on 8/19/22.  The path report + for invasive squamous cell carcinoma.    INTERVAL HPI/OVERNIGHT EVENTS: VS stable, sp direct laryngoscopy 8/19 4.4cm  invasive squamous cell carcinoma,  as per ENT not a surgical candidate, Rad-onc and Hem-onc consults, reviewed, Head and Neck Ca svc making arrangements for out pt ff up with Dr Landaverde 8/30 and Pet CT, pt has severe intertrigo,  R axilla, groin and buttocks, nystatin cream, recall resp to adjust inner trach cannula so pt has easier time to speak and try trach mask    MEDICATIONS  (STANDING):  acetaminophen     Tablet .. 650 milliGRAM(s) Oral every 6 hours  acetylcysteine 20% for Nebulization - Peds 3 milliLiter(s) Nebulizer every 6 hours  ALBUTerol    0.083% 2.5 milliGRAM(s) Nebulizer every 6 hours  ammonium lactate 12% Lotion 1 Application(s) Topical two times a day  apixaban 5 milliGRAM(s) Enteral Tube two times a day  chlorhexidine 0.12% Liquid 15 milliLiter(s) Oral Mucosa every 12 hours  dextrose 5%. 1000 milliLiter(s) (100 mL/Hr) IV Continuous <Continuous>  dextrose 5%. 1000 milliLiter(s) (50 mL/Hr) IV Continuous <Continuous>  dextrose 50% Injectable 25 Gram(s) IV Push once  dextrose 50% Injectable 12.5 Gram(s) IV Push once  dextrose 50% Injectable 25 Gram(s) IV Push once  doxycycline monohydrate Capsule 100 milliGRAM(s) Oral every 12 hours  gabapentin 600 milliGRAM(s) Oral three times a day  glucagon  Injectable 1 milliGRAM(s) IntraMuscular once  insulin glargine Injectable (LANTUS) 20 Unit(s) SubCutaneous at bedtime  insulin lispro (ADMELOG) corrective regimen sliding scale   SubCutaneous three times a day before meals  insulin lispro Injectable (ADMELOG) 6 Unit(s) SubCutaneous three times a day before meals  methylPREDNISolone sodium succinate Injectable 60 milliGRAM(s) IV Push two times a day  multivitamin 1 Tablet(s) Oral daily  pantoprazole   Suspension 40 milliGRAM(s) Oral daily  polyethylene glycol 3350 17 Gram(s) Oral two times a day  senna 2 Tablet(s) Oral at bedtime    MEDICATIONS  (PRN):  ALBUTerol    90 MICROgram(s) HFA Inhaler 2 Puff(s) Inhalation every 6 hours PRN Shortness of Breath and/or Wheezing  ALPRAZolam 0.5 milliGRAM(s) Oral every 6 hours PRN anxiety  dextrose Oral Gel 15 Gram(s) Oral once PRN Blood Glucose LESS THAN 70 milliGRAM(s)/deciliter  HYDROmorphone   Tablet 4 milliGRAM(s) Oral every 4 hours PRN Severe Pain (7 - 10)  HYDROmorphone  Injectable 1 milliGRAM(s) IV Push every 8 hours PRN Moderate Pain (4 - 6)      Allergies    penicillin (Swelling)      Vital Signs Last 24 Hrs    T(F): 95  HR:  63  BP:  141/78    RR: 18  SpO2:  100%, T piece    PHYSICAL EXAM:      Constitutional: pt alert, oriented,  pulse ox 96%    Eyes: nonicteric    ENMT: dry oral mucosa, dental defects,     Neck: + trach tube supple, no stridor, + trach,     Respiratory: shallow resp, diminished harsh bronchial  BS, scattered rhonchi, no wheezing    Cardiovascular: S1S2 reg    Gastrointestinal: globular, soft and benign, + PEG, + BS    Genitourinary:    Extremities: moves all ext, dec motor strength of lower ext, + BL mm atrophy, + BL foot drop    Vascular: dec pedal pulses    Neurological: nonfocal    Skin: lower ext dark almost black hyperpigmentation of lower ext    Lymph Nodes: not enlarged    Musculoskeletal: dec mm mass and tone    Psychiatric: anxious, depressed but stable        LABS:                10.7  5)-----------( 184            31      134 |  97   |  14  ----------------------------<  101  4.5   |  27  |  0.5    , 115, 123, 109, 115, 104, 109, 100  Ca    9.7       Mg     1.9, 1.8, 2.0, 1.6, 1.7, 1.8, 2.6, 1.7, 1.6, 2.4, 1.9, 2.6, 2.0  trop <0.01  Vit D 18  folate 9.7  Haptos 126  ferritin 982  TPro  5.9, 5.5, 5.4, 5.6 /  Alb  3.3, 3.4, 3.3, 3.1, 3.2 /  TBili  0.3  /  DBili  x   /  AST  20  /  ALT  28  /  AlkPhos  77  08-05    RADIOLOGY & ADDITIONAL TESTS:    EKG:  NSR R axis, low voltage, nonspecific ST-T changed  EKG 8/17:  NSR 69/min, incomplete RBBB, no acute changes  ECHO:  nl sys function, EF 60%, GIDD, so sig valvular dis, borderline Pul HTN, sm pericardial eff  CXR:  interstitial prominence, L pl based opacity unchanged  AA doppler:  normal arterial blood flow  CT of soft tissue of the neck 8/16/22:  exophytic expansile mucosal mass 4.4x2x2.4cm involving the BL aryepiglotticfolds and along the BL glossoepiglottic folds as well as along the  laryngea/lingual surface of the epiglottis, 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

## 2022-08-26 NOTE — PROGRESS NOTE ADULT - ASSESSMENT
Unfortunate 58 YO F with acute on chr hypoxic RF read for hypoxia and hypotension and ongoing Bacterial PNA. Hx of ASP PNA, COPD exacerbation and intubation with failure to extubate resulting in trach at [facility] .  The pt is under chronic  at [facility].      Acute on Chr hypoxic RF  Chronic Tracheostomy  Bacterial PNA  Clinical Debility  Bedbound state  Lower extremity venous insufficiency  BL foot drop, limb weakness, prolonged immobility  OR Direct Laryngoscopy  22, biopsy of supraglottic larynteal mass  Intertrigo  Hx of HTN, ASHD HFpEF  Hx of DLD  Hx of COPD, MO, LUCIANA, sp intubation, failure to extubate, chr trach since /[facility]  Hx of DM II  Hx of Ch anemia of ch dis  Hx of GERD, HH, Diverticulosis, Ch constipation  Hx of OA, DJD, DDD, chr pain syn  Hx of lower ext venous stasis dermatitis  Hx of DVT, AC/Eliquis  Hx of Anxiety, Depression       recal resp to adjust inner cannula to help with speech, transition to trach collar mask  pt's VS stable, sp laryngoscopy, "large supra laryngeal/base of tongue mass "  invasive squamous cell carcinoma    ENT ff up, not a surgical candidate, H&N Ca SVC  arranging for out pt Pet CT and ff up with Dr Landaverde    Onc consult appreciated, need PET CT for staging, chemo induction ff by chem RT vs chemo RT ( s the  chemo induction)  Rad Onc consult appreciated, staging then will work with Onc   restarted Eliquis 5mg BID  Peg feeds, low protein and alb, add beneprotein 2 packets per   CT of soft tissue of the neck shows  exophytic expansile mucosal mass 4.4x2x2.4cm involving the BL aryepiglottic folds/? supraglottic laryngeal neoplasm  Pul-critical care     S&S therapy    low vit D start to replete 50,000 q wk  Venous doppler neg for DVT  AA doppler shows normal blood flow  Vasc consult     ECHO:  nl sys function, EF60%, GIDD, no sig valvular dis  EKG:  NSR 69/min, incomplete RBBB, no acute changes  cont all previous meds  GI prophylaxis  att to bowel regimen  skin care:  pt has severe intertrigo of the R axilla, groin and buttocks,  apply generous antifungal  cream and may add Silvadene on top of the antifungal to buttock area  change PEG dressing daily, apply mupirocin  cont PEG feeds, pt may have ice chips  BL inflatable boots to prevent further foot drop, encourage pt to use them as much as possible    PM: hydromorphone 4mg po q 4 and 1mg IV q 8 for breakthrough pain, gabapentin 600mg q 8, tylenol 650mg QID    BL heel off loading boots, pt with BL foot drop    pt full code  Social SVC goal is for out pt Pet CT  and ff up with ENT, plan to return to SNF

## 2022-08-26 NOTE — PROGRESS NOTE ADULT - SUBJECTIVE AND OBJECTIVE BOX
Patient is a 57y old  Female who presents with a chief complaint of Hypoxia, acute on ch RF (25 Aug 2022 13:12)      INTERVAL HPI/OVERNIGHT EVENTS:  None    FAMILY HISTORY:  No pertinent family history in first degree relatives      T(C): 35 (08-26-22 @ 05:55), Max: 35.9 (08-25-22 @ 20:09)  HR: 63 (08-26-22 @ 05:55) (62 - 68)  BP: 141/78 (08-26-22 @ 05:55) (130/60 - 141/78)  RR: 18 (08-26-22 @ 05:55) (18 - 18)  SpO2: 100% (08-26-22 @ 05:55) (97% - 100%)  Wt(kg): --Vital Signs Last 24 Hrs  T(C): 35 (26 Aug 2022 05:55), Max: 35.9 (25 Aug 2022 20:09)  T(F): 95 (26 Aug 2022 05:55), Max: 96.7 (25 Aug 2022 20:09)  HR: 63 (26 Aug 2022 05:55) (62 - 68)  BP: 141/78 (26 Aug 2022 05:55) (130/60 - 141/78)  BP(mean): --  RR: 18 (26 Aug 2022 05:55) (18 - 18)  SpO2: 100% (26 Aug 2022 05:55) (97% - 100%)    Parameters below as of 26 Aug 2022 05:55  Patient On (Oxygen Delivery Method): T-piece      PHYSICAL EXAM:  GEN: NAD, awake and alert. No drooling or pooling of secretions. No stridor or stertor. aphonic 2* trach.   SKIN: Good color, non diaphoretic  HEENT: Oral mucosa pink and moist. No erythema or edema noted to buccal mucosa, tongue, FOM, uvula or posterior oropharynx. Uvula midline. no bleeding noted.   NECK:  Trachea midline. trach tube intact, trach changed at bedside to a 6 cuffless.   RESP: No dyspnea, non-labored breathing. No use of accessory muscles.  CARDIO: +S1/S2  ABDO: Soft, NT. PEG tube.   EXT: JONAS x 4      acetaminophen     Tablet .. 1000 milliGRAM(s) Oral every 8 hours  acetylcysteine 20% for Nebulization - Peds 3 milliLiter(s) Nebulizer every 6 hours  ALBUTerol    0.083% 2.5 milliGRAM(s) Nebulizer every 6 hours  ALBUTerol    90 MICROgram(s) HFA Inhaler 2 Puff(s) Inhalation every 6 hours PRN  ALPRAZolam 0.5 milliGRAM(s) Oral every 6 hours PRN  ammonium lactate 12% Lotion 1 Application(s) Topical two times a day  apixaban 5 milliGRAM(s) Enteral Tube every 12 hours  dextrose 5%. 1000 milliLiter(s) IV Continuous <Continuous>  dextrose 5%. 1000 milliLiter(s) IV Continuous <Continuous>  dextrose 50% Injectable 25 Gram(s) IV Push once  dextrose 50% Injectable 12.5 Gram(s) IV Push once  dextrose 50% Injectable 25 Gram(s) IV Push once  dextrose Oral Gel 15 Gram(s) Oral once PRN  ergocalciferol 27771 Unit(s) Oral every week  gabapentin Solution 600 milliGRAM(s) Oral three times a day  glucagon  Injectable 1 milliGRAM(s) IntraMuscular once  guaifenesin/dextromethorphan Oral Liquid 20 milliLiter(s) Oral every 6 hours  HYDROmorphone   Tablet 4 milliGRAM(s) Enteral Tube every 4 hours PRN  HYDROmorphone  Injectable 0.5 milliGRAM(s) IV Push every 3 hours PRN  hydrOXYzine hydrochloride 25 milliGRAM(s) Oral once  insulin glargine Injectable (LANTUS) 20 Unit(s) SubCutaneous at bedtime  insulin lispro (ADMELOG) corrective regimen sliding scale   SubCutaneous three times a day before meals  insulin lispro Injectable (ADMELOG) 6 Unit(s) SubCutaneous three times a day before meals  ipratropium    for Nebulization 500 MICROGram(s) Nebulizer every 6 hours  losartan 100 milliGRAM(s) Oral daily  multivitamin 1 Tablet(s) Oral daily  mupirocin 2% Ointment 1 Application(s) Topical two times a day  nystatin Cream 1 Application(s) Topical two times a day  pantoprazole   Suspension 40 milliGRAM(s) Oral daily  silver sulfADIAZINE 1% Cream 1 Application(s) Topical daily      HEALTH ISSUES - PROBLEM Dx:  Painful diabetic neuropathy

## 2022-08-26 NOTE — DISCHARGE NOTE NURSING/CASE MANAGEMENT/SOCIAL WORK - NSDCPEFALRISK_GEN_ALL_CORE
For information on Fall & Injury Prevention, visit: [email or web address] OR  [email or web address] OR  https://www.cdc.gov/steadi/patient.html

## 2022-08-26 NOTE — DISCHARGE NOTE NURSING/CASE MANAGEMENT/SOCIAL WORK - PATIENT PORTAL LINK FT
You can access the FollowMyHealth Patient Portal offered by [facility] by registering at the following website: [email or web address]. By joining [facility]’s FollowMyHealth portal, you will also be able to view your health information using other applications (apps) compatible with our system.

## 2022-08-30 ENCOUNTER — APPOINTMENT (OUTPATIENT)
Dept: OTOLARYNGOLOGY | Facility: CLINIC | Age: 57
End: 2022-08-30

## 2022-09-06 ENCOUNTER — APPOINTMENT (OUTPATIENT)
Dept: HEMATOLOGY ONCOLOGY | Facility: CLINIC | Age: 57
End: 2022-09-06

## 2022-09-06 DIAGNOSIS — Z93.0 TRACHEOSTOMY STATUS: ICD-10-CM

## 2022-09-06 DIAGNOSIS — N39.0 URINARY TRACT INFECTION, SITE NOT SPECIFIED: ICD-10-CM

## 2022-09-06 DIAGNOSIS — Z79.4 LONG TERM (CURRENT) USE OF INSULIN: ICD-10-CM

## 2022-09-06 DIAGNOSIS — J44.1 CHRONIC OBSTRUCTIVE PULMONARY DISEASE WITH (ACUTE) EXACERBATION: ICD-10-CM

## 2022-09-06 DIAGNOSIS — Z87.891 PERSONAL HISTORY OF NICOTINE DEPENDENCE: ICD-10-CM

## 2022-09-06 DIAGNOSIS — Z79.02 LONG TERM (CURRENT) USE OF ANTITHROMBOTICS/ANTIPLATELETS: ICD-10-CM

## 2022-09-06 DIAGNOSIS — J15.9 UNSPECIFIED BACTERIAL PNEUMONIA: ICD-10-CM

## 2022-09-06 DIAGNOSIS — C01 MALIGNANT NEOPLASM OF BASE OF TONGUE: ICD-10-CM

## 2022-09-06 DIAGNOSIS — R06.02 SHORTNESS OF BREATH: ICD-10-CM

## 2022-09-06 DIAGNOSIS — I95.9 HYPOTENSION, UNSPECIFIED: ICD-10-CM

## 2022-09-06 DIAGNOSIS — J96.21 ACUTE AND CHRONIC RESPIRATORY FAILURE WITH HYPOXIA: ICD-10-CM

## 2022-09-06 DIAGNOSIS — E11.9 TYPE 2 DIABETES MELLITUS WITHOUT COMPLICATIONS: ICD-10-CM

## 2022-09-06 DIAGNOSIS — T17.990A OTHER FOREIGN OBJECT IN RESPIRATORY TRACT, PART UNSPECIFIED IN CAUSING ASPHYXIATION, INITIAL ENCOUNTER: ICD-10-CM

## 2022-09-06 DIAGNOSIS — F41.9 ANXIETY DISORDER, UNSPECIFIED: ICD-10-CM

## 2022-09-06 DIAGNOSIS — Z93.1 GASTROSTOMY STATUS: ICD-10-CM

## 2022-09-06 DIAGNOSIS — D64.9 ANEMIA, UNSPECIFIED: ICD-10-CM

## 2022-09-06 DIAGNOSIS — K59.00 CONSTIPATION, UNSPECIFIED: ICD-10-CM

## 2022-09-06 DIAGNOSIS — M21.371 FOOT DROP, RIGHT FOOT: ICD-10-CM

## 2022-09-06 DIAGNOSIS — I87.2 VENOUS INSUFFICIENCY (CHRONIC) (PERIPHERAL): ICD-10-CM

## 2022-09-06 DIAGNOSIS — J44.0 CHRONIC OBSTRUCTIVE PULMONARY DISEASE WITH (ACUTE) LOWER RESPIRATORY INFECTION: ICD-10-CM

## 2022-09-06 DIAGNOSIS — I11.0 HYPERTENSIVE HEART DISEASE WITH HEART FAILURE: ICD-10-CM

## 2022-09-06 DIAGNOSIS — G47.33 OBSTRUCTIVE SLEEP APNEA (ADULT) (PEDIATRIC): ICD-10-CM

## 2022-09-06 DIAGNOSIS — K57.90 DIVERTICULOSIS OF INTESTINE, PART UNSPECIFIED, WITHOUT PERFORATION OR ABSCESS WITHOUT BLEEDING: ICD-10-CM

## 2022-09-06 DIAGNOSIS — Z79.01 LONG TERM (CURRENT) USE OF ANTICOAGULANTS: ICD-10-CM

## 2022-09-06 DIAGNOSIS — J98.11 ATELECTASIS: ICD-10-CM

## 2022-09-06 DIAGNOSIS — M21.372 FOOT DROP, LEFT FOOT: ICD-10-CM

## 2022-09-06 DIAGNOSIS — K21.9 GASTRO-ESOPHAGEAL REFLUX DISEASE WITHOUT ESOPHAGITIS: ICD-10-CM

## 2022-09-06 DIAGNOSIS — E78.5 HYPERLIPIDEMIA, UNSPECIFIED: ICD-10-CM

## 2022-09-06 DIAGNOSIS — I50.32 CHRONIC DIASTOLIC (CONGESTIVE) HEART FAILURE: ICD-10-CM

## 2022-09-06 DIAGNOSIS — J96.12 CHRONIC RESPIRATORY FAILURE WITH HYPERCAPNIA: ICD-10-CM

## 2022-09-06 DIAGNOSIS — E66.01 MORBID (SEVERE) OBESITY DUE TO EXCESS CALORIES: ICD-10-CM

## 2022-09-19 ENCOUNTER — APPOINTMENT (OUTPATIENT)
Dept: HEMATOLOGY ONCOLOGY | Facility: CLINIC | Age: 57
End: 2022-09-19

## 2022-09-21 ENCOUNTER — OUTPATIENT (OUTPATIENT)
Dept: OUTPATIENT SERVICES | Facility: HOSPITAL | Age: 57
LOS: 1 days | Discharge: HOME | End: 2022-09-21

## 2022-09-21 DIAGNOSIS — Z98.890 OTHER SPECIFIED POSTPROCEDURAL STATES: Chronic | ICD-10-CM

## 2022-09-21 DIAGNOSIS — C32.1 MALIGNANT NEOPLASM OF SUPRAGLOTTIS: ICD-10-CM

## 2022-09-21 LAB — GLUCOSE BLDC GLUCOMTR-MCNC: 134 MG/DL — HIGH (ref 70–99)

## 2022-09-21 PROCEDURE — 78815 PET IMAGE W/CT SKULL-THIGH: CPT | Mod: 26,PI,MH

## 2022-09-29 ENCOUNTER — APPOINTMENT (OUTPATIENT)
Dept: RADIATION ONCOLOGY | Facility: HOSPITAL | Age: 57
End: 2022-09-29

## 2022-09-29 ENCOUNTER — OUTPATIENT (OUTPATIENT)
Dept: OUTPATIENT SERVICES | Facility: HOSPITAL | Age: 57
LOS: 1 days | End: 2022-09-29

## 2022-09-29 VITALS
DIASTOLIC BLOOD PRESSURE: 66 MMHG | HEART RATE: 64 BPM | OXYGEN SATURATION: 95 % | SYSTOLIC BLOOD PRESSURE: 159 MMHG | TEMPERATURE: 98.1 F | RESPIRATION RATE: 16 BRPM

## 2022-09-29 DIAGNOSIS — C32.1 MALIGNANT NEOPLASM OF SUPRAGLOTTIS: ICD-10-CM

## 2022-09-29 DIAGNOSIS — Z98.890 OTHER SPECIFIED POSTPROCEDURAL STATES: Chronic | ICD-10-CM

## 2022-09-29 PROCEDURE — 77334 RADIATION TREATMENT AID(S): CPT | Mod: 26

## 2022-09-29 PROCEDURE — 77263 THER RADIOLOGY TX PLNG CPLX: CPT

## 2022-09-29 PROCEDURE — 99214 OFFICE O/P EST MOD 30 MIN: CPT | Mod: 25

## 2022-09-29 RX ORDER — SERTRALINE HYDROCHLORIDE 100 MG/1
100 TABLET, FILM COATED ORAL
Refills: 0 | Status: ACTIVE | COMMUNITY

## 2022-09-29 RX ORDER — LOSARTAN POTASSIUM 100 MG/1
100 TABLET, FILM COATED ORAL
Refills: 0 | Status: ACTIVE | COMMUNITY

## 2022-09-29 RX ORDER — APIXABAN 5 MG/1
5 TABLET, FILM COATED ORAL
Refills: 0 | Status: ACTIVE | COMMUNITY

## 2022-09-29 RX ORDER — HYDROMORPHONE HYDROCHLORIDE 4 MG/1
4 TABLET ORAL
Refills: 0 | Status: ACTIVE | COMMUNITY

## 2022-09-29 RX ORDER — SENNOSIDES 8.6 MG TABLETS 8.6 MG/1
TABLET ORAL
Refills: 0 | Status: ACTIVE | COMMUNITY

## 2022-09-29 RX ORDER — OMEPRAZOLE 40 MG/1
40 CAPSULE, DELAYED RELEASE ORAL
Refills: 0 | Status: ACTIVE | COMMUNITY

## 2022-09-29 RX ORDER — FLUTICASONE FUROATE, UMECLIDINIUM BROMIDE AND VILANTEROL TRIFENATATE 100; 62.5; 25 UG/1; UG/1; UG/1
100-62.5-25 POWDER RESPIRATORY (INHALATION)
Refills: 0 | Status: ACTIVE | COMMUNITY

## 2022-09-29 RX ORDER — MULTIVITAMIN
TABLET ORAL
Refills: 0 | Status: ACTIVE | COMMUNITY

## 2022-09-29 RX ORDER — ALPRAZOLAM 0.5 MG/1
0.5 TABLET ORAL
Refills: 0 | Status: ACTIVE | COMMUNITY

## 2022-09-29 RX ORDER — DOXYCYCLINE HYCLATE 100 MG/1
100 TABLET ORAL
Refills: 0 | Status: ACTIVE | COMMUNITY

## 2022-09-29 RX ORDER — ALBUTEROL SULFATE 2.5 MG/3ML
(2.5 MG/3ML) SOLUTION RESPIRATORY (INHALATION)
Refills: 0 | Status: ACTIVE | COMMUNITY

## 2022-09-29 RX ORDER — GABAPENTIN 600 MG/1
600 TABLET, COATED ORAL
Refills: 0 | Status: ACTIVE | COMMUNITY

## 2022-09-29 RX ORDER — ACETYLCYSTEINE 200 MG/ML
20 SOLUTION ORAL; RESPIRATORY (INHALATION)
Refills: 0 | Status: ACTIVE | COMMUNITY

## 2022-09-29 RX ORDER — INSULIN LISPRO 100 [IU]/ML
100 INJECTION, SOLUTION INTRAVENOUS; SUBCUTANEOUS
Refills: 0 | Status: ACTIVE | COMMUNITY

## 2022-09-29 RX ORDER — INSULIN GLARGINE 100 [IU]/ML
100 INJECTION, SOLUTION SUBCUTANEOUS
Refills: 0 | Status: ACTIVE | COMMUNITY

## 2022-09-29 RX ORDER — ACETAMINOPHEN 325 MG/1
325 TABLET ORAL
Refills: 0 | Status: ACTIVE | COMMUNITY

## 2022-10-06 ENCOUNTER — APPOINTMENT (OUTPATIENT)
Dept: HEMATOLOGY ONCOLOGY | Facility: CLINIC | Age: 57
End: 2022-10-06

## 2022-10-06 VITALS — WEIGHT: 187 LBS | BODY MASS INDEX: 35.3 KG/M2 | HEIGHT: 61 IN

## 2022-10-06 DIAGNOSIS — F32.A ANXIETY DISORDER, UNSPECIFIED: ICD-10-CM

## 2022-10-06 DIAGNOSIS — I50.30 UNSPECIFIED DIASTOLIC (CONGESTIVE) HEART FAILURE: ICD-10-CM

## 2022-10-06 DIAGNOSIS — E11.40 TYPE 2 DIABETES MELLITUS WITH DIABETIC NEUROPATHY, UNSPECIFIED: ICD-10-CM

## 2022-10-06 DIAGNOSIS — F41.9 ANXIETY DISORDER, UNSPECIFIED: ICD-10-CM

## 2022-10-06 DIAGNOSIS — I87.2 VENOUS INSUFFICIENCY (CHRONIC) (PERIPHERAL): ICD-10-CM

## 2022-10-06 DIAGNOSIS — G47.33 OBSTRUCTIVE SLEEP APNEA (ADULT) (PEDIATRIC): ICD-10-CM

## 2022-10-06 DIAGNOSIS — Z86.73 PERSONAL HISTORY OF TRANSIENT ISCHEMIC ATTACK (TIA), AND CEREBRAL INFARCTION W/OUT RESIDUAL DEFICITS: ICD-10-CM

## 2022-10-06 DIAGNOSIS — I82.4Z9 ACUTE EMBOLISM AND THROMBOSIS OF UNSPECIFIED DEEP VEINS OF UNSPECIFIED DISTAL LOWER EXTREMITY: ICD-10-CM

## 2022-10-06 DIAGNOSIS — Z87.09 PERSONAL HISTORY OF OTHER DISEASES OF THE RESPIRATORY SYSTEM: ICD-10-CM

## 2022-10-06 DIAGNOSIS — Z87.891 PERSONAL HISTORY OF NICOTINE DEPENDENCE: ICD-10-CM

## 2022-10-06 DIAGNOSIS — M19.90 UNSPECIFIED OSTEOARTHRITIS, UNSPECIFIED SITE: ICD-10-CM

## 2022-10-06 DIAGNOSIS — Z80.1 FAMILY HISTORY OF MALIGNANT NEOPLASM OF TRACHEA, BRONCHUS AND LUNG: ICD-10-CM

## 2022-10-06 PROCEDURE — 99215 OFFICE O/P EST HI 40 MIN: CPT

## 2022-10-07 PROCEDURE — 77300 RADIATION THERAPY DOSE PLAN: CPT | Mod: 26

## 2022-10-07 PROCEDURE — 77338 DESIGN MLC DEVICE FOR IMRT: CPT | Mod: 26

## 2022-10-07 PROCEDURE — 77301 RADIOTHERAPY DOSE PLAN IMRT: CPT | Mod: 26

## 2022-10-17 PROCEDURE — 77427 RADIATION TX MANAGEMENT X5: CPT

## 2022-10-18 ENCOUNTER — NON-APPOINTMENT (OUTPATIENT)
Age: 57
End: 2022-10-18

## 2022-10-20 ENCOUNTER — APPOINTMENT (OUTPATIENT)
Dept: INFUSION THERAPY | Facility: CLINIC | Age: 57
End: 2022-10-20

## 2022-10-20 VITALS
BODY MASS INDEX: 35.3 KG/M2 | TEMPERATURE: 98.6 F | DIASTOLIC BLOOD PRESSURE: 60 MMHG | SYSTOLIC BLOOD PRESSURE: 116 MMHG | WEIGHT: 187 LBS | HEART RATE: 66 BPM | HEIGHT: 61 IN

## 2022-10-20 LAB
BASOPHILS # BLD AUTO: 0.04 K/UL
BASOPHILS NFR BLD AUTO: 0.5 %
EOSINOPHIL # BLD AUTO: 0.2 K/UL
EOSINOPHIL NFR BLD AUTO: 2.6 %
HCT VFR BLD CALC: 29.9 %
HGB BLD-MCNC: 10.3 G/DL
IMM GRANULOCYTES NFR BLD AUTO: 0.3 %
LYMPHOCYTES # BLD AUTO: 1.66 K/UL
LYMPHOCYTES NFR BLD AUTO: 21.7 %
MAN DIFF?: NORMAL
MCHC RBC-ENTMCNC: 31.7 PG
MCHC RBC-ENTMCNC: 34.4 G/DL
MCV RBC AUTO: 92 FL
MONOCYTES # BLD AUTO: 0.6 K/UL
MONOCYTES NFR BLD AUTO: 7.8 %
NEUTROPHILS # BLD AUTO: 5.13 K/UL
NEUTROPHILS NFR BLD AUTO: 67.1 %
PLATELET # BLD AUTO: 177 K/UL
RBC # BLD: 3.25 M/UL
RBC # FLD: 12.1 %
WBC # FLD AUTO: 7.65 K/UL

## 2022-10-20 PROCEDURE — 77334 RADIATION TREATMENT AID(S): CPT | Mod: 26

## 2022-10-20 RX ORDER — ONDANSETRON 8 MG/1
16 TABLET, FILM COATED ORAL ONCE
Refills: 0 | Status: COMPLETED | OUTPATIENT
Start: 2022-10-20 | End: 2022-10-20

## 2022-10-20 RX ORDER — CARBOPLATIN 50 MG
225 VIAL (EA) INTRAVENOUS ONCE
Refills: 0 | Status: COMPLETED | OUTPATIENT
Start: 2022-10-20 | End: 2022-10-20

## 2022-10-20 RX ORDER — FOSAPREPITANT DIMEGLUMINE 150 MG/5ML
150 INJECTION, POWDER, LYOPHILIZED, FOR SOLUTION INTRAVENOUS ONCE
Refills: 0 | Status: COMPLETED | OUTPATIENT
Start: 2022-10-20 | End: 2022-10-20

## 2022-10-20 RX ORDER — ONDANSETRON 4 MG/1
4 TABLET, ORALLY DISINTEGRATING ORAL EVERY 8 HOURS
Qty: 30 | Refills: 1 | Status: ACTIVE | COMMUNITY
Start: 2022-10-20 | End: 1900-01-01

## 2022-10-20 RX ORDER — MORPHINE SULFATE 50 MG/1
4 CAPSULE, EXTENDED RELEASE ORAL ONCE
Refills: 0 | Status: DISCONTINUED | OUTPATIENT
Start: 2022-10-20 | End: 2022-10-20

## 2022-10-20 RX ADMIN — Medication 225 MILLIGRAM(S): at 12:50

## 2022-10-20 RX ADMIN — FOSAPREPITANT DIMEGLUMINE 150 MILLIGRAM(S): 150 INJECTION, POWDER, LYOPHILIZED, FOR SOLUTION INTRAVENOUS at 11:00

## 2022-10-20 RX ADMIN — FOSAPREPITANT DIMEGLUMINE 500 MILLIGRAM(S): 150 INJECTION, POWDER, LYOPHILIZED, FOR SOLUTION INTRAVENOUS at 11:00

## 2022-10-20 RX ADMIN — ONDANSETRON 116 MILLIGRAM(S): 8 TABLET, FILM COATED ORAL at 10:45

## 2022-10-20 RX ADMIN — ONDANSETRON 16 MILLIGRAM(S): 8 TABLET, FILM COATED ORAL at 11:00

## 2022-10-20 RX ADMIN — MORPHINE SULFATE 4 MILLIGRAM(S): 50 CAPSULE, EXTENDED RELEASE ORAL at 11:48

## 2022-10-20 RX ADMIN — Medication 225 MILLIGRAM(S): at 11:50

## 2022-10-21 LAB
ALBUMIN SERPL ELPH-MCNC: 3.9 G/DL
ALP BLD-CCNC: 103 U/L
ALT SERPL-CCNC: 14 U/L
ANION GAP SERPL CALC-SCNC: 8 MMOL/L
AST SERPL-CCNC: 16 U/L
BILIRUB SERPL-MCNC: 0.4 MG/DL
BUN SERPL-MCNC: 18 MG/DL
CALCIUM SERPL-MCNC: 9.8 MG/DL
CHLORIDE SERPL-SCNC: 91 MMOL/L
CO2 SERPL-SCNC: 32 MMOL/L
CREAT SERPL-MCNC: 0.5 MG/DL
EGFR: 109 ML/MIN/1.73M2
GLUCOSE SERPL-MCNC: 113 MG/DL
POTASSIUM SERPL-SCNC: 4.7 MMOL/L
PROT SERPL-MCNC: 6.6 G/DL
SODIUM SERPL-SCNC: 131 MMOL/L

## 2022-10-21 PROCEDURE — 77387B: CUSTOM | Mod: 26

## 2022-10-24 PROCEDURE — 77387B: CUSTOM | Mod: 26

## 2022-10-25 ENCOUNTER — NON-APPOINTMENT (OUTPATIENT)
Age: 57
End: 2022-10-25

## 2022-10-25 VITALS
WEIGHT: 187 LBS | OXYGEN SATURATION: 97 % | HEART RATE: 63 BPM | HEART RATE: 59 BPM | DIASTOLIC BLOOD PRESSURE: 66 MMHG | SYSTOLIC BLOOD PRESSURE: 178 MMHG | BODY MASS INDEX: 35.3 KG/M2 | OXYGEN SATURATION: 99 % | TEMPERATURE: 98.6 F | HEIGHT: 61 IN | RESPIRATION RATE: 20 BRPM | SYSTOLIC BLOOD PRESSURE: 126 MMHG | TEMPERATURE: 97.9 F | DIASTOLIC BLOOD PRESSURE: 75 MMHG

## 2022-10-25 PROCEDURE — 77387B: CUSTOM | Mod: 26

## 2022-10-26 ENCOUNTER — APPOINTMENT (OUTPATIENT)
Dept: HEMATOLOGY ONCOLOGY | Facility: CLINIC | Age: 57
End: 2022-10-26

## 2022-10-26 ENCOUNTER — APPOINTMENT (OUTPATIENT)
Dept: INFUSION THERAPY | Facility: CLINIC | Age: 57
End: 2022-10-26

## 2022-10-26 PROCEDURE — 99214 OFFICE O/P EST MOD 30 MIN: CPT

## 2022-10-26 PROCEDURE — 77387B: CUSTOM | Mod: 26

## 2022-10-26 RX ORDER — MORPHINE SULFATE 50 MG/1
4 CAPSULE, EXTENDED RELEASE ORAL ONCE
Refills: 0 | Status: DISCONTINUED | OUTPATIENT
Start: 2022-10-26 | End: 2022-10-26

## 2022-10-26 RX ORDER — FOSAPREPITANT DIMEGLUMINE 150 MG/5ML
150 INJECTION, POWDER, LYOPHILIZED, FOR SOLUTION INTRAVENOUS ONCE
Refills: 0 | Status: COMPLETED | OUTPATIENT
Start: 2022-10-26 | End: 2022-10-26

## 2022-10-26 RX ORDER — ONDANSETRON 8 MG/1
16 TABLET, FILM COATED ORAL ONCE
Refills: 0 | Status: COMPLETED | OUTPATIENT
Start: 2022-10-26 | End: 2022-10-26

## 2022-10-26 RX ORDER — CARBOPLATIN 50 MG
225 VIAL (EA) INTRAVENOUS ONCE
Refills: 0 | Status: COMPLETED | OUTPATIENT
Start: 2022-10-26 | End: 2022-10-26

## 2022-10-26 RX ADMIN — ONDANSETRON 116 MILLIGRAM(S): 8 TABLET, FILM COATED ORAL at 12:22

## 2022-10-26 RX ADMIN — Medication 225 MILLIGRAM(S): at 12:43

## 2022-10-26 RX ADMIN — FOSAPREPITANT DIMEGLUMINE 500 MILLIGRAM(S): 150 INJECTION, POWDER, LYOPHILIZED, FOR SOLUTION INTRAVENOUS at 12:22

## 2022-10-26 RX ADMIN — MORPHINE SULFATE 4 MILLIGRAM(S): 50 CAPSULE, EXTENDED RELEASE ORAL at 12:24

## 2022-10-27 LAB
BASOPHILS # BLD AUTO: 0.03 K/UL
BASOPHILS NFR BLD AUTO: 0.4 %
EOSINOPHIL # BLD AUTO: 0.18 K/UL
EOSINOPHIL NFR BLD AUTO: 2.3 %
HCT VFR BLD CALC: 29.9 %
HGB BLD-MCNC: 10.4 G/DL
IMM GRANULOCYTES NFR BLD AUTO: 0.3 %
LYMPHOCYTES # BLD AUTO: 1.19 K/UL
LYMPHOCYTES NFR BLD AUTO: 15.5 %
MAN DIFF?: NORMAL
MCHC RBC-ENTMCNC: 31.7 PG
MCHC RBC-ENTMCNC: 34.8 G/DL
MCV RBC AUTO: 91.2 FL
MONOCYTES # BLD AUTO: 0.46 K/UL
MONOCYTES NFR BLD AUTO: 6 %
NEUTROPHILS # BLD AUTO: 5.82 K/UL
NEUTROPHILS NFR BLD AUTO: 75.5 %
PLATELET # BLD AUTO: 181 K/UL
RBC # BLD: 3.28 M/UL
RBC # FLD: 11.8 %
WBC # FLD AUTO: 7.7 K/UL

## 2022-10-27 PROCEDURE — 77014: CPT | Mod: 26

## 2022-10-31 PROCEDURE — 77387B: CUSTOM | Mod: 26

## 2022-11-01 ENCOUNTER — NON-APPOINTMENT (OUTPATIENT)
Age: 57
End: 2022-11-01

## 2022-11-01 VITALS
OXYGEN SATURATION: 98 % | TEMPERATURE: 97.8 F | RESPIRATION RATE: 18 BRPM | HEART RATE: 61 BPM | SYSTOLIC BLOOD PRESSURE: 172 MMHG | DIASTOLIC BLOOD PRESSURE: 89 MMHG

## 2022-11-01 PROCEDURE — 77427 RADIATION TX MANAGEMENT X5: CPT

## 2022-11-01 PROCEDURE — 77387B: CUSTOM | Mod: 26

## 2022-11-02 ENCOUNTER — APPOINTMENT (OUTPATIENT)
Dept: INFUSION THERAPY | Facility: CLINIC | Age: 57
End: 2022-11-02

## 2022-11-02 RX ORDER — CARBOPLATIN 50 MG
225 VIAL (EA) INTRAVENOUS ONCE
Refills: 0 | Status: COMPLETED | OUTPATIENT
Start: 2022-11-02 | End: 2022-11-02

## 2022-11-02 RX ORDER — ONDANSETRON 8 MG/1
16 TABLET, FILM COATED ORAL ONCE
Refills: 0 | Status: COMPLETED | OUTPATIENT
Start: 2022-11-02 | End: 2022-11-02

## 2022-11-02 RX ORDER — MORPHINE SULFATE 50 MG/1
4 CAPSULE, EXTENDED RELEASE ORAL ONCE
Refills: 0 | Status: DISCONTINUED | OUTPATIENT
Start: 2022-11-02 | End: 2022-11-02

## 2022-11-02 RX ORDER — FOSAPREPITANT DIMEGLUMINE 150 MG/5ML
150 INJECTION, POWDER, LYOPHILIZED, FOR SOLUTION INTRAVENOUS ONCE
Refills: 0 | Status: COMPLETED | OUTPATIENT
Start: 2022-11-02 | End: 2022-11-02

## 2022-11-02 RX ADMIN — ONDANSETRON 116 MILLIGRAM(S): 8 TABLET, FILM COATED ORAL at 15:47

## 2022-11-02 RX ADMIN — FOSAPREPITANT DIMEGLUMINE 500 MILLIGRAM(S): 150 INJECTION, POWDER, LYOPHILIZED, FOR SOLUTION INTRAVENOUS at 15:47

## 2022-11-02 RX ADMIN — MORPHINE SULFATE 4 MILLIGRAM(S): 50 CAPSULE, EXTENDED RELEASE ORAL at 15:52

## 2022-11-02 RX ADMIN — Medication 225 MILLIGRAM(S): at 16:00

## 2022-11-03 LAB
BASOPHILS # BLD AUTO: 0.03 K/UL
BASOPHILS NFR BLD AUTO: 0.5 %
EOSINOPHIL # BLD AUTO: 0.07 K/UL
EOSINOPHIL NFR BLD AUTO: 1.2 %
HCT VFR BLD CALC: 25.8 %
HGB BLD-MCNC: 9 G/DL
IMM GRANULOCYTES NFR BLD AUTO: 0.4 %
LYMPHOCYTES # BLD AUTO: 0.72 K/UL
LYMPHOCYTES NFR BLD AUTO: 12.8 %
MAN DIFF?: NORMAL
MCHC RBC-ENTMCNC: 31.7 PG
MCHC RBC-ENTMCNC: 34.9 G/DL
MCV RBC AUTO: 90.8 FL
MONOCYTES # BLD AUTO: 0.36 K/UL
MONOCYTES NFR BLD AUTO: 6.4 %
NEUTROPHILS # BLD AUTO: 4.44 K/UL
NEUTROPHILS NFR BLD AUTO: 78.7 %
PLATELET # BLD AUTO: 130 K/UL
RBC # BLD: 2.84 M/UL
RBC # FLD: 11.6 %
WBC # FLD AUTO: 5.64 K/UL

## 2022-11-04 PROCEDURE — 77014: CPT | Mod: 26

## 2022-11-07 PROCEDURE — 77427 RADIATION TX MANAGEMENT X5: CPT

## 2022-11-07 PROCEDURE — 77387B: CUSTOM | Mod: 26

## 2022-11-08 ENCOUNTER — NON-APPOINTMENT (OUTPATIENT)
Age: 57
End: 2022-11-08

## 2022-11-08 VITALS
WEIGHT: 180 LBS | RESPIRATION RATE: 16 BRPM | DIASTOLIC BLOOD PRESSURE: 53 MMHG | TEMPERATURE: 97.9 F | HEART RATE: 73 BPM | BODY MASS INDEX: 34.01 KG/M2 | SYSTOLIC BLOOD PRESSURE: 111 MMHG | OXYGEN SATURATION: 98 %

## 2022-11-08 PROCEDURE — 77387B: CUSTOM | Mod: 26

## 2022-11-09 ENCOUNTER — APPOINTMENT (OUTPATIENT)
Dept: HEMATOLOGY ONCOLOGY | Facility: CLINIC | Age: 57
End: 2022-11-09

## 2022-11-09 ENCOUNTER — EMERGENCY (EMERGENCY)
Facility: HOSPITAL | Age: 57
LOS: 0 days | Discharge: HOME | End: 2022-11-09
Attending: EMERGENCY MEDICINE | Admitting: EMERGENCY MEDICINE

## 2022-11-09 ENCOUNTER — APPOINTMENT (OUTPATIENT)
Dept: INFUSION THERAPY | Facility: CLINIC | Age: 57
End: 2022-11-09

## 2022-11-09 VITALS
SYSTOLIC BLOOD PRESSURE: 110 MMHG | TEMPERATURE: 99 F | OXYGEN SATURATION: 99 % | RESPIRATION RATE: 20 BRPM | HEART RATE: 69 BPM | DIASTOLIC BLOOD PRESSURE: 58 MMHG

## 2022-11-09 VITALS
DIASTOLIC BLOOD PRESSURE: 56 MMHG | SYSTOLIC BLOOD PRESSURE: 114 MMHG | HEART RATE: 64 BPM | RESPIRATION RATE: 20 BRPM | OXYGEN SATURATION: 98 % | TEMPERATURE: 99 F

## 2022-11-09 DIAGNOSIS — Z79.4 LONG TERM (CURRENT) USE OF INSULIN: ICD-10-CM

## 2022-11-09 DIAGNOSIS — J96.12 CHRONIC RESPIRATORY FAILURE WITH HYPERCAPNIA: ICD-10-CM

## 2022-11-09 DIAGNOSIS — C01 MALIGNANT NEOPLASM OF BASE OF TONGUE: ICD-10-CM

## 2022-11-09 DIAGNOSIS — Z98.890 OTHER SPECIFIED POSTPROCEDURAL STATES: Chronic | ICD-10-CM

## 2022-11-09 DIAGNOSIS — Z88.0 ALLERGY STATUS TO PENICILLIN: ICD-10-CM

## 2022-11-09 DIAGNOSIS — J95.01 HEMORRHAGE FROM TRACHEOSTOMY STOMA: ICD-10-CM

## 2022-11-09 DIAGNOSIS — E11.9 TYPE 2 DIABETES MELLITUS WITHOUT COMPLICATIONS: ICD-10-CM

## 2022-11-09 DIAGNOSIS — Z86.718 PERSONAL HISTORY OF OTHER VENOUS THROMBOSIS AND EMBOLISM: ICD-10-CM

## 2022-11-09 DIAGNOSIS — I50.9 HEART FAILURE, UNSPECIFIED: ICD-10-CM

## 2022-11-09 DIAGNOSIS — K14.8 OTHER DISEASES OF TONGUE: ICD-10-CM

## 2022-11-09 DIAGNOSIS — F41.9 ANXIETY DISORDER, UNSPECIFIED: ICD-10-CM

## 2022-11-09 DIAGNOSIS — Z87.891 PERSONAL HISTORY OF NICOTINE DEPENDENCE: ICD-10-CM

## 2022-11-09 DIAGNOSIS — Z79.01 LONG TERM (CURRENT) USE OF ANTICOAGULANTS: ICD-10-CM

## 2022-11-09 DIAGNOSIS — K21.9 GASTRO-ESOPHAGEAL REFLUX DISEASE WITHOUT ESOPHAGITIS: ICD-10-CM

## 2022-11-09 DIAGNOSIS — J44.9 CHRONIC OBSTRUCTIVE PULMONARY DISEASE, UNSPECIFIED: ICD-10-CM

## 2022-11-09 DIAGNOSIS — I11.0 HYPERTENSIVE HEART DISEASE WITH HEART FAILURE: ICD-10-CM

## 2022-11-09 LAB
ALBUMIN SERPL ELPH-MCNC: 3.8 G/DL — SIGNIFICANT CHANGE UP (ref 3.5–5.2)
ALP SERPL-CCNC: 99 U/L — SIGNIFICANT CHANGE UP (ref 30–115)
ALT FLD-CCNC: 14 U/L — SIGNIFICANT CHANGE UP (ref 0–41)
ANION GAP SERPL CALC-SCNC: 9 MMOL/L — SIGNIFICANT CHANGE UP (ref 7–14)
AST SERPL-CCNC: 24 U/L — SIGNIFICANT CHANGE UP (ref 0–41)
BASOPHILS # BLD AUTO: 0.02 K/UL — SIGNIFICANT CHANGE UP (ref 0–0.2)
BASOPHILS NFR BLD AUTO: 0.4 % — SIGNIFICANT CHANGE UP (ref 0–1)
BILIRUB SERPL-MCNC: 0.3 MG/DL — SIGNIFICANT CHANGE UP (ref 0.2–1.2)
BUN SERPL-MCNC: 15 MG/DL — SIGNIFICANT CHANGE UP (ref 10–20)
CALCIUM SERPL-MCNC: 9.5 MG/DL — SIGNIFICANT CHANGE UP (ref 8.4–10.5)
CHLORIDE SERPL-SCNC: 96 MMOL/L — LOW (ref 98–110)
CO2 SERPL-SCNC: 28 MMOL/L — SIGNIFICANT CHANGE UP (ref 17–32)
CREAT SERPL-MCNC: <0.5 MG/DL — LOW (ref 0.7–1.5)
EGFR: 115 ML/MIN/1.73M2 — SIGNIFICANT CHANGE UP
EOSINOPHIL # BLD AUTO: 0.03 K/UL — SIGNIFICANT CHANGE UP (ref 0–0.7)
EOSINOPHIL NFR BLD AUTO: 0.6 % — SIGNIFICANT CHANGE UP (ref 0–8)
GLUCOSE SERPL-MCNC: 120 MG/DL — HIGH (ref 70–99)
HCT VFR BLD CALC: 23.9 % — LOW (ref 37–47)
HGB BLD-MCNC: 8.3 G/DL — LOW (ref 12–16)
IMM GRANULOCYTES NFR BLD AUTO: 0.4 % — HIGH (ref 0.1–0.3)
LYMPHOCYTES # BLD AUTO: 0.41 K/UL — LOW (ref 1.2–3.4)
LYMPHOCYTES # BLD AUTO: 8.6 % — LOW (ref 20.5–51.1)
MCHC RBC-ENTMCNC: 31.9 PG — HIGH (ref 27–31)
MCHC RBC-ENTMCNC: 34.7 G/DL — SIGNIFICANT CHANGE UP (ref 32–37)
MCV RBC AUTO: 91.9 FL — SIGNIFICANT CHANGE UP (ref 81–99)
MONOCYTES # BLD AUTO: 0.37 K/UL — SIGNIFICANT CHANGE UP (ref 0.1–0.6)
MONOCYTES NFR BLD AUTO: 7.8 % — SIGNIFICANT CHANGE UP (ref 1.7–9.3)
NEUTROPHILS # BLD AUTO: 3.92 K/UL — SIGNIFICANT CHANGE UP (ref 1.4–6.5)
NEUTROPHILS NFR BLD AUTO: 82.2 % — HIGH (ref 42.2–75.2)
NRBC # BLD: 0 /100 WBCS — SIGNIFICANT CHANGE UP (ref 0–0)
PLATELET # BLD AUTO: 89 K/UL — LOW (ref 130–400)
POTASSIUM SERPL-MCNC: 5.6 MMOL/L — HIGH (ref 3.5–5)
POTASSIUM SERPL-SCNC: 5.6 MMOL/L — HIGH (ref 3.5–5)
PROT SERPL-MCNC: 6.6 G/DL — SIGNIFICANT CHANGE UP (ref 6–8)
RBC # BLD: 2.6 M/UL — LOW (ref 4.2–5.4)
RBC # FLD: 12.2 % — SIGNIFICANT CHANGE UP (ref 11.5–14.5)
SODIUM SERPL-SCNC: 133 MMOL/L — LOW (ref 135–146)
WBC # BLD: 4.77 K/UL — LOW (ref 4.8–10.8)
WBC # FLD AUTO: 4.77 K/UL — LOW (ref 4.8–10.8)

## 2022-11-09 PROCEDURE — 71045 X-RAY EXAM CHEST 1 VIEW: CPT | Mod: 26

## 2022-11-09 PROCEDURE — 99284 EMERGENCY DEPT VISIT MOD MDM: CPT | Mod: FS

## 2022-11-09 PROCEDURE — 99222 1ST HOSP IP/OBS MODERATE 55: CPT | Mod: 25

## 2022-11-09 PROCEDURE — 70498 CT ANGIOGRAPHY NECK: CPT | Mod: 26,MA

## 2022-11-09 PROCEDURE — 31575 DIAGNOSTIC LARYNGOSCOPY: CPT

## 2022-11-09 RX ORDER — HYDROMORPHONE HYDROCHLORIDE 2 MG/ML
4 INJECTION INTRAMUSCULAR; INTRAVENOUS; SUBCUTANEOUS ONCE
Refills: 0 | Status: DISCONTINUED | OUTPATIENT
Start: 2022-11-09 | End: 2022-11-09

## 2022-11-09 RX ORDER — MORPHINE SULFATE 50 MG/1
4 CAPSULE, EXTENDED RELEASE ORAL ONCE
Refills: 0 | Status: DISCONTINUED | OUTPATIENT
Start: 2022-11-09 | End: 2022-11-09

## 2022-11-09 RX ORDER — ALPRAZOLAM 0.25 MG
0.5 TABLET ORAL ONCE
Refills: 0 | Status: DISCONTINUED | OUTPATIENT
Start: 2022-11-09 | End: 2022-11-09

## 2022-11-09 RX ADMIN — HYDROMORPHONE HYDROCHLORIDE 4 MILLIGRAM(S): 2 INJECTION INTRAMUSCULAR; INTRAVENOUS; SUBCUTANEOUS at 10:21

## 2022-11-09 RX ADMIN — Medication 0.5 MILLIGRAM(S): at 13:54

## 2022-11-09 RX ADMIN — MORPHINE SULFATE 4 MILLIGRAM(S): 50 CAPSULE, EXTENDED RELEASE ORAL at 13:54

## 2022-11-09 RX ADMIN — MORPHINE SULFATE 4 MILLIGRAM(S): 50 CAPSULE, EXTENDED RELEASE ORAL at 09:00

## 2022-11-09 RX ADMIN — HYDROMORPHONE HYDROCHLORIDE 4 MILLIGRAM(S): 2 INJECTION INTRAMUSCULAR; INTRAVENOUS; SUBCUTANEOUS at 11:00

## 2022-11-09 RX ADMIN — MORPHINE SULFATE 4 MILLIGRAM(S): 50 CAPSULE, EXTENDED RELEASE ORAL at 08:41

## 2022-11-09 NOTE — ED PROVIDER NOTE - NSFOLLOWUPINSTRUCTIONS_ED_ALL_ED_FT
Please follow up with your ENT and oncologist within 24-72 hours and return immediately if symptoms worsen.

## 2022-11-09 NOTE — ED ADULT TRIAGE NOTE - CHIEF COMPLAINT QUOTE
She's from [facility], her trach is bleeding - EMS  Patient reports trach bleeding for two days, is doing radiation treatments for throat CA as well as chemo,

## 2022-11-09 NOTE — ED PROVIDER NOTE - CLINICAL SUMMARY MEDICAL DECISION MAKING FREE TEXT BOX
57 year old female with PMH of IDDM, GERD, HTN, COPD, Chronic hypoxemic and hypercapnic respiratory failure SP Tracheostomy (not chronically vented) & PEG at [facility], DVT on Eliquis, Anxiety, CHF, Recurrent mucous plugs s/p  bronchoscopies, Large SCC at [facility], s/p DL and biopsy (8/2022) [facility]    Plan:   - Patient seen and examined at bedside  - FFL per attending - exophytic tongue lesion noted with some bleeding   - Continue Suctioning trach prn   - Obtain CTA for further evaluation     CTA reviewed with Dr. Landaverde, patient able to obtain chemoradiation as Heme/onc, no acute surgical ENT intervention at this time. Discussed detail with patient and daughter at bedside, suction with minimal bleeding at this time, likely 2/2 to chemoradiation therapy. Cleared by ENT for discharge back to SNF.

## 2022-11-09 NOTE — ED ADULT NURSE NOTE - CHIEF COMPLAINT QUOTE
She's from [facility], her trach is bleeding - EMS  Patient reports trach bleeding for two days, is doing radiation treatments for throat CA as well as chemo,

## 2022-11-09 NOTE — ED ADULT NURSE NOTE - OBJECTIVE STATEMENT
Patient came in from NH with complaints of bleeding on trach. As per patient it started yesterday morning and she woke up this morning with blood on her clothing from her trach. No complaints of pain or discomfort. No  or SOB. No fever or chills Patient came in from NH with complaints of bleeding on trach. As per patient it started yesterday morning and she woke up this morning with blood on her clothing from her trach. No complaints of pain or discomfort. No  or SOB. No fever or chills. Pt with PEG on ED arrival , not able to take anything p/o

## 2022-11-09 NOTE — ED PROVIDER NOTE - PHYSICAL EXAMINATION
Gen: NAD, AOx3  Head: NCAT  HEENT: trach in place with no active bleeding, PERRL, oral mucosa moist, normal conjunctiva, oropharynx clear without exudate or erythema  Lung: CTAB, no respiratory distress, no wheezing, rales, rhonchi  CV: normal s1/s2, rrr, Normal perfusion, pulses 2+ throughout  Abd: soft, NTND, no CVA tenderness  Genitourinary: no pelvic tenderness  MSK: No edema, no visible deformities, full range of motion in all 4 extremities  Neuro: No focal neurologic deficits  Skin: No rash   Psych: normal affect

## 2022-11-09 NOTE — CONSULT NOTE ADULT - SUBJECTIVE AND OBJECTIVE BOX
ENT CONSULT     Patient is a 57 year old female with PMH of IDDM, GERD, HTN, COPD, Chronic hypoxemic and hypercapnic respiratory failure SP Tracheostomy (not chronically vented) & PEG at [facility], DVT on Eliquis, Anxiety, CHF, Recurrent mucous plugs s/p  bronchoscopies, Large SCC at [facility], s/p DL and biopsy (8/2022) presenting from [facility] with bleeding from trach for 2 days. Patient seen and examined at bedside with Dr. Landaverde. Patient reports she was noted with bleeding from trach 2 days ago at NH during suctioning. She reports last night she was noted with blood saturating her gown this AM. She reports she was not suctioned overnight. Denies any fever, chills, SOB/difficulty breathing, coughing, dysphagia, odynphagia,  Patient reports she currently of chemo/radiation with Dr. Elaine and has completed 12 treatments.     PAST MEDICAL & SURGICAL HISTORY:  COPD (chronic obstructive pulmonary disease)      CHF (congestive heart failure)      DM (diabetes mellitus)      H/O tracheostomy          MEDICATIONS  (STANDING):    MEDICATIONS  (PRN):      Allergies    penicillin (Swelling)    Intolerances          SOCIAL HISTORY:    FAMILY HISTORY:  No pertinent family history in first degree relatives        REVIEW OF SYSTEMS   [x] A ten-point review of systems was otherwise negative except as noted.  [ ] Due to altered mental status/intubation, subjective information were not able to be obtained from patient. History was obtained, to the extent possible, from review of the chart and collateral sources of information.    Vital Signs Last 24 Hrs  T(C): 37.1 (09 Nov 2022 07:42), Max: 37.2 (09 Nov 2022 06:28)  T(F): 98.8 (09 Nov 2022 07:42), Max: 98.9 (09 Nov 2022 06:28)  HR: 64 (09 Nov 2022 07:42) (64 - 69)  BP: 114/56 (09 Nov 2022 07:42) (110/58 - 114/56)  RR: 20 (09 Nov 2022 07:42) (20 - 20)  SpO2: 98% (09 Nov 2022 07:42) (98% - 99%)    Parameters below as of 09 Nov 2022 07:42  Patient On (Oxygen Delivery Method): tracheostomy collar        GEN: Well-developed, well-nourished. NAD, awake and alert. No drooling or pooling of secretions. No stridor or stertor. Good vocal quality, no hoarseness.   SKIN: Good color, non diaphoretic.  HEENT: NC/AT; Oral mucosa prink and moist. +Exophytic lesion noted to tongue with some bleeding. No erythema or edema noted, FOM, uvula or posterior oropharynx. Uvula midline.   NECK: Traceha midline, Neck supple, no TTP to B/L lateral neck, no cervical LAD.  RESP: No dyspnea, non-labored breathing. No use of accessory muscles.   CARDIO: +S1/S2  ABDO: Soft, NT.  EXT: JONAS x 4    Fiberoptic Laryngoscopy per attending No masses or lesions noted to NP/OP/HP. Laryngeal structures intact, no edema or erythema noted. Epiglottis crisp, no edema, tracheal layngoscopy without evidence of bleeding or clots noted.     LABS:                        8.3    4.77  )-----------( 89       ( 09 Nov 2022 08:50 )             23.9     11-09    133<L>  |  96<L>  |  15  ----------------------------<  120<H>  5.6<H>   |  28  |  <0.5<L>    Ca    9.5      09 Nov 2022 08:50    TPro  6.6  /  Alb  3.8  /  TBili  0.3  /  DBili  x   /  AST  24  /  ALT  14  /  AlkPhos  99  11-09          RADIOLOGY & ADDITIONAL STUDIES: ENT CONSULT     Patient is a 57 year old female with PMH of IDDM, GERD, HTN, COPD, Chronic hypoxemic and hypercapnic respiratory failure SP Tracheostomy (not chronically vented) & PEG at [facility], DVT on Eliquis, Anxiety, CHF, Recurrent mucous plugs s/p  bronchoscopies, Large SCC at [facility], s/p DL and biopsy (8/2022) presenting from [facility] with bleeding from trach for 2 days. Patient seen and examined at bedside with Dr. Landaverde. Patient reports she was noted with bleeding from trach 2 days ago at NH during suctioning. She reports last night she was noted with blood saturating her gown this AM. She reports she was not suctioned overnight. Denies any fever, chills, SOB/difficulty breathing, coughing, dysphagia, odynphagia,  Patient reports she currently of chemo/radiation with Dr. Elaine and has completed 12 treatments.     PAST MEDICAL & SURGICAL HISTORY:  COPD (chronic obstructive pulmonary disease)      CHF (congestive heart failure)      DM (diabetes mellitus)      H/O tracheostomy          MEDICATIONS  (STANDING):    MEDICATIONS  (PRN):      Allergies    penicillin (Swelling)    Intolerances          SOCIAL HISTORY:    FAMILY HISTORY:  No pertinent family history in first degree relatives        REVIEW OF SYSTEMS   [x] A ten-point review of systems was otherwise negative except as noted.  [ ] Due to altered mental status/intubation, subjective information were not able to be obtained from patient. History was obtained, to the extent possible, from review of the chart and collateral sources of information.    Vital Signs Last 24 Hrs  T(C): 37.1 (09 Nov 2022 07:42), Max: 37.2 (09 Nov 2022 06:28)  T(F): 98.8 (09 Nov 2022 07:42), Max: 98.9 (09 Nov 2022 06:28)  HR: 64 (09 Nov 2022 07:42) (64 - 69)  BP: 114/56 (09 Nov 2022 07:42) (110/58 - 114/56)  RR: 20 (09 Nov 2022 07:42) (20 - 20)  SpO2: 98% (09 Nov 2022 07:42) (98% - 99%)    Parameters below as of 09 Nov 2022 07:42  Patient On (Oxygen Delivery Method): tracheostomy collar        GEN: Well-developed, well-nourished. NAD, awake and alert. No drooling or pooling of secretions. No stridor or stertor. Good vocal quality, no hoarseness.   SKIN: Good color, non diaphoretic.  HEENT: NC/AT; Oral mucosa prink and moist. +Exophytic lesion noted to tongue with some bleeding. No erythema or edema noted, FOM, uvula or posterior oropharynx. Uvula midline.   NECK: +Trach in place, scant blood noted to drainage sponge  Traceha midline, Neck supple, no TTP to B/L lateral neck, no cervical LAD.  RESP: No dyspnea, non-labored breathing. No use of accessory muscles.   CARDIO: +S1/S2  ABDO: Soft, NT.  EXT: JONAS x 4    Fiberoptic Laryngoscopy per attending No masses or lesions noted to NP/OP/HP. Laryngeal structures intact, no edema or erythema noted. Epiglottis crisp, no edema, tracheal layngoscopy without evidence of bleeding or clots noted.     LABS:                        8.3    4.77  )-----------( 89       ( 09 Nov 2022 08:50 )             23.9     11-09    133<L>  |  96<L>  |  15  ----------------------------<  120<H>  5.6<H>   |  28  |  <0.5<L>    Ca    9.5      09 Nov 2022 08:50    TPro  6.6  /  Alb  3.8  /  TBili  0.3  /  DBili  x   /  AST  24  /  ALT  14  /  AlkPhos  99  11-09          RADIOLOGY & ADDITIONAL STUDIES:

## 2022-11-09 NOTE — ED PROVIDER NOTE - OBJECTIVE STATEMENT
58 yo female with a pmh of HTN, DM, HLD, COPD, CVA on eliquis, and throat CA currently on chemo/radiation presents c/o bleeding from her trach. pt states to have noted bleeding after trach suction last night and this morning woke up with blood on her chest from the trach. pt denies any other symptoms including fevers, chill, headache, recent illness/travel, cough, abdominal pain, chest pain, or SOB.

## 2022-11-09 NOTE — ED PROVIDER NOTE - PATIENT PORTAL LINK FT
You can access the FollowMyHealth Patient Portal offered by [facility] by registering at the following website: [email or web address]. By joining [employer]’s FollowMyHealth portal, you will also be able to view your health information using other applications (apps) compatible with our system.

## 2022-11-09 NOTE — ED PROVIDER NOTE - NS ED ATTENDING STATEMENT MOD
This was a shared visit with the CHRISTEL. I reviewed and verified the documentation and independently performed the documented:

## 2022-11-09 NOTE — ED ADULT NURSE NOTE - JUGULAR VENOUS DISTENTION
[address] [facility]  Discharge- [facility] 1943, [de-identified] y o  female MRN: [id number]  Unit/Bed#: -01 Encounter: [id number]  Primary Care Provider: LUCY Lopez   Date and time admitted to hospital: 3/6/2022  9:59 AM    * Acute encephalopathy  Assessment & Plan  · Patient with baseline mild dementia here with increased confusion with CT head no acute abnormality, electrolytes unremarkable  · Baseline mentation per daughter (POA) A&Ox4 however does wax and wane, now improved, back to baseline mentation   · Initially suspected 2/2 recurrent UTI however urine cx <10,000 gram negative brayan-like, therefore IV abx discontinued after 2 days; unclear etiology possible in setting of acute CHF exacerbation  · CT head no acute findings  · Vit B12, TSH WNL  · Minimize sedative use, close monitoring of mental status  · PT/OT recommend STR- discharge today to [facility]   · Patient currently lives with daughter ([place]) andrewrts difficulty caring for patient at home     Frequent UTI  Assessment & Plan  · H/o ESBL UTI here with confusion, dysuria, frequency, subjective fever and chills  · UA with leukocytes, nitrites, bacteria  · Was on cefepime and switched to Ertapenem per prior C&S results which were discontinued after 2 days based on urine cx   · Urine cx showed <10,000 gram negative rods   · Blood cultures negative     Acute on chronic diastolic heart failure (HCC)  Assessment & Plan  Wt Readings from Last 3 Encounters:   03/07/22 122 kg (268 lb)   10/19/21 122 kg (268 lb)   09/29/21 122 kg (268 lb)   · Increased shortness of breath, elevated [id number], troponin 11>10, EKG nonischemic likely CHF exacerbation  · 6/2017 Echo ventricle was dilated with systolic function was low normal, EF 55%  · Repeat Echo stable   · CXR shows mild CHF  · Not on diuretic PTA, hx of being on lasix 40mg daily   · Was on IV lasix 20mg daily, will transition to PO lasix 40mg qdaily tomorrow    · Renal function absent tolerating diuresis  · Strict I/Os-Daily weight  · Salt and fluid restricted diet       C  difficile diarrhea  Assessment & Plan  · Saw GI as outpatient for recurrent diarrhea on 3/01   · On multiple courses of abx for recurrent UTI   · Hx of c diff 12/13 and positive PCR re-tests on 1/21 and 1/25  · Continue PTA vancomycin 250mg qid until outpatient follow up with GI to prevent C diff recurrence as patient was on IV abx while inpatient   · Will place patient on probiotic  · Contact precaution    Dementia ([facility] [address] )  Assessment & Plan  · Mild frontal lobe dementia  · Continue home regimen zoloft and trazodone     Sleep apnea  Assessment & Plan  · Refuses CPAP  · Not on oxygen at home     [address]  · PDMP reviewed continue xanax 1mg BID PRN    COPD (chronic obstructive pulmonary disease) ([facility])  Assessment & Plan  · Albuterol p r n  Hypertension  Assessment & Plan  · BP stable   · Continue on Norvasc and losartan  · Monitor on diuresis     Atrial fibrillation ([facility])  Assessment & Plan  · Rate controlled   · Continue Eliquis         Medical Problems             Resolved Problems  Date Reviewed: 3/9/2022    None              Discharging Physician / Practitioner: Malini De Oliveira PA-C  PCP: LUCY Jacques  Admission Date:   Admission Orders (From admission, onward)     Ordered        03/07/22 0732  Inpatient Admission  Once            03/06/22 1221  Place in Observation  Once                      Discharge Date: 03/09/22    Consultations During Hospital Stay:  · None    Procedures Performed:   · None    Significant Findings / Test Results:   · 03/06 CT head:  No acute intracranial abnormality  · 03/06 CXR:  Mild CHF  · NT proBNP:  1452  · Urine cx: <10,000 gram negative brayan enteric like     Incidental Findings:   · None      Test Results Pending at Discharge (will require follow up):    · None      Outpatient Tests Requested:  · None     Complications:  None     Reason for Admission: acute metabolic encephalopathy and acute CHF exacerbation     Hospital Course:   Richard Dias is a [de-identified] y o  female patient who originally presented to the hospital on 3/6/2022 due to increased confusion compared to patient's baseline per daughter  Daughter also was stating that patient had increased shortness of breath and lower extremity edema  Initially, there was concern for urinary tract infection given evidence of white blood cell, leukocytes, nitrites, bacteria on urinalysis  Patient was therefore on IV antibiotics cefepime and ertapenem for 2 days  Antibiotics were discontinued after urine culture resulted showing <10,000 gram negative brayan enteric like  Not true UTI  Patient was also maintained on p o  Vancomycin for C diff prophylaxis per review of prior records, patient was started on this by GI as an outpatient  Would recommend continuing this until patient follows up with GI within 1 week  Patient was maintained on IV Lasix 20 mg daily while inpatient with improvement in breathing and lower extremity edema  Will resume diuretic at discharge- p o  Lasix 40 mg daily  Unclear cause of patient's acute metabolic encephalopathy, patient does have history of waxing and waning mentation with underlying frontal dementia  It is possible that she was intermittently confused in the setting of acute CHF exacerbation also with underlying history of psychiatric illness  Per daughter who is the patient's POA, states that she is unable to care for patient at home anymore  Patient was seen and evaluated by PT/OT who is recommending for short-term rehab at discharge  Patient will be discharged to [facility]  Please see above list of diagnoses and related plan for additional information  Condition at Discharge: stable    Discharge Day Visit / Exam:   * Please refer to separate progress note for these details *    Discussion with Family: Attempted to update  (daughter) via phone   Left voicemail  Discharge instructions/Information to patient and family:   See after visit summary for information provided to patient and family  Provisions for Follow-Up Care:  See after visit summary for information related to follow-up care and any pertinent home health orders  Disposition:   [place] [facility]      Planned Readmission: None     Discharge Statement:  I spent 45 minutes discharging the patient  This time was spent on the day of discharge  I had direct contact with the patient on the day of discharge  Greater than 50% of the total time was spent examining patient, answering all patient questions, arranging and discussing plan of care with patient as well as directly providing post-discharge instructions  Additional time then spent on discharge activities  Discharge Medications:  See after visit summary for reconciled discharge medications provided to patient and/or family        **Please Note: This note may have been constructed using a voice recognition system**

## 2022-11-09 NOTE — CONSULT NOTE ADULT - NS ATTEND AMEND GEN_ALL_CORE FT
Seen and examined. Flexible laryngoscopy performed with right tongue base mass with scant fresh blood on ulcerative surface, no active bleeding. Tracheobronchoscopy is clear with no irritation or granulation in the trachea.    Plan for CTA neck; if active extravasation plan for IR-guided tumor emoblization. Otherwise continue RT.

## 2022-11-09 NOTE — ED ADULT NURSE NOTE - NSIMPLEMENTINTERV_GEN_ALL_ED
Implemented All Fall with Harm Risk Interventions:  [place] to call system. Call bell, personal items and telephone within reach. Instruct patient to call for assistance. Room bathroom lighting operational. Non-slip footwear when patient is off stretcher. Physically safe environment: no spills, clutter or unnecessary equipment. Stretcher in lowest position, wheels locked, appropriate side rails in place. Provide visual cue, wrist band, yellow gown, etc. Monitor gait and stability. Monitor for mental status changes and reorient to person, place, and time. Review medications for side effects contributing to fall risk. Reinforce activity limits and safety measures with patient and family. Provide visual clues: red socks.

## 2022-11-09 NOTE — CONSULT NOTE ADULT - ASSESSMENT
Patient is a 57 year old female with PMH of IDDM, GERD, HTN, COPD, Chronic hypoxemic and hypercapnic respiratory failure SP Tracheostomy (not chronically vented) & PEG at [facility], DVT on Eliquis, Anxiety, CHF, Recurrent mucous plugs s/p  bronchoscopies, Large SCC at [facility], s/p DL and biopsy (8/2022) presenting from [facility] [place] with bleeding from trach for 2 days    Plan:   - Patient seen and examined at bedside  - FFL per attending - exophytic tongue lesion noted with some bleeding   - Continue Suctioning trach prn   - Obtain CTA for further evaluation   - Heme/Onc & Rad/onc for management of chemo  - d/w ED team  Patient is a 57 year old female with PMH of IDDM, GERD, HTN, COPD, Chronic hypoxemic and hypercapnic respiratory failure SP Tracheostomy (not chronically vented) & PEG at [facility], DVT on Eliquis, Anxiety, CHF, Recurrent mucous plugs s/p  bronchoscopies, Large SCC at [facility], s/p DL and biopsy (8/2022) [facility] [place] with bleeding from trach for 2 days    Plan:   - Patient seen and examined at bedside  - FFL per attending - exophytic tongue lesion noted with some bleeding   - Continue Suctioning trach prn   - Obtain CTA for further evaluation   - Heme/Onc & Rad/onc for management of chemo today, possible transfer   - d/w ED team     ADDENDUM:     CTA reviewed with Dr. Landaverde, patient able to obtain chemoradiation as Heme/onc, no acute surgical ENT intervention at this time. Discussed detail with patient and daughter at bedside, suction with minimal bleeding at this time, likely 2/2 to chemoradiation therapy. Spoke with ED team.       < from: CT Angio Neck w/ IV Cont (11.09.22 @ 11:08) >  ACC: [id number] EXAM:  CT ANGIO NECK (W)AW IC                          PROCEDURE DATE:  11/09/2022          INTERPRETATION:  Clinical History / Reason for exam: Trach bleeding.    TECHNIQUE: CTA neck. Contiguous CT axial images of the neck were obtained   following the intravenous administration of 75 cc of Optiray with   coronal, sagittal and multiple 3-D MIP and volume rendered reformats.    Correlation is made with CT soft tissues neck 8/16/2022, PET/CT dated   9/21/2022.    Findings:    The visualized aortic arch and great vessel origins demonstrate mild   calcific plaque without significant stenosis. There is a common origin of   the brachiocephalic and the left common carotid arteries, normal   variation (bovine configuration).    The common, internal and external carotid arteries are patent. There is   calcific plaque at bilateral carotid bifurcations with mild (<50%   stenosis) on the right and no significant stenosis on the left.    The vertebral arteries are patent.    Attentionto the airway demonstrates no evidence of active arterial   contrast extravasation.    There is a tracheostomy tube in stable position. The tube and the trachea   distal to the tube appear patent.    There is diffuse thickening of the posterior nasopharynx, soft palate,   palatine tonsils, tongue base, epiglottis and supraglottic larynx   including marked thickening of the aryepiglottic folds, and the vocal   cords. The appearance is similar compared to the prior exams. The   preepiglottic fat appears infiltrated.    Mildly prominent mediastinal, supraclavicular and cervical lymph nodes.    Degenerative changes of the spine.    Mild scattered paranasal sinus mucosal thickening. Opacification of the   left mastoid and middle ear cavities.    IMPRESSION:    1.  No CTA evidence of active contrast extravasation into the airway.    2.  No evidence of major vascular stenosis or occlusion.    3.  Redemonstration of extensive thickening involving the nasopharynx,   oropharynx and supraglottic/glottic larynx in this patient with known   neoplasm.    --- End of Report ---      DANIAL PUENTES MD; Attending Radiologist  This document has been electronically signed. Nov 9 2022 11:26AM    < end of copied text >

## 2022-11-10 PROCEDURE — 77387B: CUSTOM | Mod: 26

## 2022-11-14 PROCEDURE — 77014: CPT | Mod: 26

## 2022-11-15 ENCOUNTER — NON-APPOINTMENT (OUTPATIENT)
Age: 57
End: 2022-11-15

## 2022-11-15 VITALS
HEART RATE: 96 BPM | OXYGEN SATURATION: 98 % | RESPIRATION RATE: 18 BRPM | HEIGHT: 61 IN | SYSTOLIC BLOOD PRESSURE: 112 MMHG | WEIGHT: 180 LBS | DIASTOLIC BLOOD PRESSURE: 56 MMHG | BODY MASS INDEX: 33.99 KG/M2 | TEMPERATURE: 98.3 F

## 2022-11-15 PROCEDURE — 77387B: CUSTOM | Mod: 26

## 2022-11-15 PROCEDURE — 77427 RADIATION TX MANAGEMENT X5: CPT

## 2022-11-16 ENCOUNTER — APPOINTMENT (OUTPATIENT)
Dept: INFUSION THERAPY | Facility: CLINIC | Age: 57
End: 2022-11-16
Payer: MEDICARE

## 2022-11-16 ENCOUNTER — APPOINTMENT (OUTPATIENT)
Dept: HEMATOLOGY ONCOLOGY | Facility: CLINIC | Age: 57
End: 2022-11-16
Payer: MEDICARE

## 2022-11-16 DIAGNOSIS — Z00.00 ENCOUNTER FOR GENERAL ADULT MEDICAL EXAMINATION W/OUT ABNORMAL FINDINGS: ICD-10-CM

## 2022-11-16 LAB
ALBUMIN SERPL ELPH-MCNC: 4.1 G/DL
ALP BLD-CCNC: 99 U/L
ALT SERPL-CCNC: 12 U/L
ANION GAP SERPL CALC-SCNC: 6 MMOL/L
AST SERPL-CCNC: 12 U/L
BASOPHILS # BLD AUTO: 0 K/UL
BASOPHILS NFR BLD AUTO: 0 %
BILIRUB SERPL-MCNC: 0.2 MG/DL
BUN SERPL-MCNC: 16 MG/DL
CALCIUM SERPL-MCNC: 9.1 MG/DL
CHLORIDE SERPL-SCNC: 97 MMOL/L
CO2 SERPL-SCNC: 31 MMOL/L
CREAT SERPL-MCNC: 0.5 MG/DL
EGFR: 109 ML/MIN/1.73M2
EOSINOPHIL # BLD AUTO: 0.01 K/UL
EOSINOPHIL NFR BLD AUTO: 0.3 %
GLUCOSE SERPL-MCNC: 119 MG/DL
HCT VFR BLD CALC: 21.7 %
HGB BLD-MCNC: 7.5 G/DL
IMM GRANULOCYTES NFR BLD AUTO: 0.3 %
IRON SATN MFR SERPL: 50 %
IRON SERPL-MCNC: 105 UG/DL
LYMPHOCYTES # BLD AUTO: 0.62 K/UL
LYMPHOCYTES NFR BLD AUTO: 20.1 %
MAN DIFF?: NORMAL
MCHC RBC-ENTMCNC: 32.3 PG
MCHC RBC-ENTMCNC: 34.6 G/DL
MCV RBC AUTO: 93.5 FL
MONOCYTES # BLD AUTO: 0.15 K/UL
MONOCYTES NFR BLD AUTO: 4.9 %
NEUTROPHILS # BLD AUTO: 2.29 K/UL
NEUTROPHILS NFR BLD AUTO: 74.4 %
PLATELET # BLD AUTO: 31 K/UL
POTASSIUM SERPL-SCNC: 4.2 MMOL/L
PROT SERPL-MCNC: 6.4 G/DL
RBC # BLD: 2.32 M/UL
RBC # FLD: 13.2 %
SODIUM SERPL-SCNC: 134 MMOL/L
TIBC SERPL-MCNC: 209 UG/DL
UIBC SERPL-MCNC: 104 UG/DL
WBC # FLD AUTO: 3.08 K/UL

## 2022-11-16 PROCEDURE — 99214 OFFICE O/P EST MOD 30 MIN: CPT

## 2022-11-17 LAB — FERRITIN SERPL-MCNC: 734 NG/ML

## 2022-11-17 PROCEDURE — 77387B: CUSTOM | Mod: 26

## 2022-11-21 PROCEDURE — 77014: CPT | Mod: 26

## 2022-11-22 ENCOUNTER — NON-APPOINTMENT (OUTPATIENT)
Age: 57
End: 2022-11-22

## 2022-11-22 VITALS
RESPIRATION RATE: 16 BRPM | SYSTOLIC BLOOD PRESSURE: 109 MMHG | OXYGEN SATURATION: 98 % | HEART RATE: 65 BPM | TEMPERATURE: 97.8 F | DIASTOLIC BLOOD PRESSURE: 51 MMHG

## 2022-11-22 PROCEDURE — 77427 RADIATION TX MANAGEMENT X5: CPT

## 2022-11-23 ENCOUNTER — APPOINTMENT (OUTPATIENT)
Dept: INFUSION THERAPY | Facility: CLINIC | Age: 57
End: 2022-11-23
Payer: MEDICARE

## 2022-11-23 ENCOUNTER — APPOINTMENT (OUTPATIENT)
Dept: HEMATOLOGY ONCOLOGY | Facility: CLINIC | Age: 57
End: 2022-11-23
Payer: MEDICARE

## 2022-11-23 ENCOUNTER — EMERGENCY (EMERGENCY)
Facility: HOSPITAL | Age: 57
LOS: 0 days | Discharge: HOME | End: 2022-11-24
Attending: EMERGENCY MEDICINE | Admitting: EMERGENCY MEDICINE

## 2022-11-23 VITALS
RESPIRATION RATE: 18 BRPM | DIASTOLIC BLOOD PRESSURE: 55 MMHG | HEIGHT: 61 IN | TEMPERATURE: 100 F | OXYGEN SATURATION: 99 % | SYSTOLIC BLOOD PRESSURE: 109 MMHG | HEART RATE: 77 BPM

## 2022-11-23 DIAGNOSIS — Z88.0 ALLERGY STATUS TO PENICILLIN: ICD-10-CM

## 2022-11-23 DIAGNOSIS — R53.83 OTHER FATIGUE: ICD-10-CM

## 2022-11-23 DIAGNOSIS — Z98.890 OTHER SPECIFIED POSTPROCEDURAL STATES: Chronic | ICD-10-CM

## 2022-11-23 DIAGNOSIS — I11.0 HYPERTENSIVE HEART DISEASE WITH HEART FAILURE: ICD-10-CM

## 2022-11-23 DIAGNOSIS — Z79.4 LONG TERM (CURRENT) USE OF INSULIN: ICD-10-CM

## 2022-11-23 DIAGNOSIS — Z87.891 PERSONAL HISTORY OF NICOTINE DEPENDENCE: ICD-10-CM

## 2022-11-23 DIAGNOSIS — G47.33 OBSTRUCTIVE SLEEP APNEA (ADULT) (PEDIATRIC): ICD-10-CM

## 2022-11-23 DIAGNOSIS — J44.9 CHRONIC OBSTRUCTIVE PULMONARY DISEASE, UNSPECIFIED: ICD-10-CM

## 2022-11-23 DIAGNOSIS — E78.5 HYPERLIPIDEMIA, UNSPECIFIED: ICD-10-CM

## 2022-11-23 DIAGNOSIS — D64.9 ANEMIA, UNSPECIFIED: ICD-10-CM

## 2022-11-23 DIAGNOSIS — C32.9 MALIGNANT NEOPLASM OF LARYNX, UNSPECIFIED: ICD-10-CM

## 2022-11-23 DIAGNOSIS — Z79.01 LONG TERM (CURRENT) USE OF ANTICOAGULANTS: ICD-10-CM

## 2022-11-23 DIAGNOSIS — D63.0 ANEMIA IN NEOPLASTIC DISEASE: ICD-10-CM

## 2022-11-23 DIAGNOSIS — E11.9 TYPE 2 DIABETES MELLITUS WITHOUT COMPLICATIONS: ICD-10-CM

## 2022-11-23 DIAGNOSIS — I50.9 HEART FAILURE, UNSPECIFIED: ICD-10-CM

## 2022-11-23 LAB
ALBUMIN SERPL ELPH-MCNC: 4.1 G/DL — SIGNIFICANT CHANGE UP (ref 3.5–5.2)
ALP SERPL-CCNC: 110 U/L — SIGNIFICANT CHANGE UP (ref 30–115)
ALT FLD-CCNC: 13 U/L — SIGNIFICANT CHANGE UP (ref 0–41)
ANION GAP SERPL CALC-SCNC: 7 MMOL/L — SIGNIFICANT CHANGE UP (ref 7–14)
APTT BLD: 33 SEC — SIGNIFICANT CHANGE UP (ref 27–39.2)
AST SERPL-CCNC: 13 U/L — SIGNIFICANT CHANGE UP (ref 0–41)
BASOPHILS # BLD AUTO: 0 K/UL — SIGNIFICANT CHANGE UP (ref 0–0.2)
BASOPHILS NFR BLD AUTO: 0 % — SIGNIFICANT CHANGE UP (ref 0–1)
BILIRUB SERPL-MCNC: 0.3 MG/DL — SIGNIFICANT CHANGE UP (ref 0.2–1.2)
BLD GP AB SCN SERPL QL: SIGNIFICANT CHANGE UP
BUN SERPL-MCNC: 15 MG/DL — SIGNIFICANT CHANGE UP (ref 10–20)
CALCIUM SERPL-MCNC: 9.5 MG/DL — SIGNIFICANT CHANGE UP (ref 8.4–10.4)
CHLORIDE SERPL-SCNC: 94 MMOL/L — LOW (ref 98–110)
CO2 SERPL-SCNC: 30 MMOL/L — SIGNIFICANT CHANGE UP (ref 17–32)
CREAT SERPL-MCNC: 0.5 MG/DL — LOW (ref 0.7–1.5)
EGFR: 109 ML/MIN/1.73M2 — SIGNIFICANT CHANGE UP
EOSINOPHIL # BLD AUTO: 0.06 K/UL — SIGNIFICANT CHANGE UP (ref 0–0.7)
EOSINOPHIL NFR BLD AUTO: 3.2 % — SIGNIFICANT CHANGE UP (ref 0–8)
GLUCOSE SERPL-MCNC: 102 MG/DL — HIGH (ref 70–99)
HCT VFR BLD CALC: 20.1 % — LOW (ref 37–47)
HGB BLD-MCNC: 7 G/DL — LOW (ref 12–16)
IMM GRANULOCYTES NFR BLD AUTO: 0.5 % — HIGH (ref 0.1–0.3)
INR BLD: 1.08 RATIO — SIGNIFICANT CHANGE UP (ref 0.65–1.3)
LYMPHOCYTES # BLD AUTO: 0.43 K/UL — LOW (ref 1.2–3.4)
LYMPHOCYTES # BLD AUTO: 23 % — SIGNIFICANT CHANGE UP (ref 20.5–51.1)
MCHC RBC-ENTMCNC: 33 PG — HIGH (ref 27–31)
MCHC RBC-ENTMCNC: 34.8 G/DL — SIGNIFICANT CHANGE UP (ref 32–37)
MCV RBC AUTO: 94.8 FL — SIGNIFICANT CHANGE UP (ref 81–99)
MONOCYTES # BLD AUTO: 0.13 K/UL — SIGNIFICANT CHANGE UP (ref 0.1–0.6)
MONOCYTES NFR BLD AUTO: 7 % — SIGNIFICANT CHANGE UP (ref 1.7–9.3)
NEUTROPHILS # BLD AUTO: 1.24 K/UL — LOW (ref 1.4–6.5)
NEUTROPHILS NFR BLD AUTO: 66.3 % — SIGNIFICANT CHANGE UP (ref 42.2–75.2)
NRBC # BLD: 0 /100 WBCS — SIGNIFICANT CHANGE UP (ref 0–0)
PLATELET # BLD AUTO: 71 K/UL — LOW (ref 130–400)
POTASSIUM SERPL-MCNC: 4.3 MMOL/L — SIGNIFICANT CHANGE UP (ref 3.5–5)
POTASSIUM SERPL-SCNC: 4.3 MMOL/L — SIGNIFICANT CHANGE UP (ref 3.5–5)
PROT SERPL-MCNC: 6.6 G/DL — SIGNIFICANT CHANGE UP (ref 6–8)
PROTHROM AB SERPL-ACNC: 12.4 SEC — SIGNIFICANT CHANGE UP (ref 9.95–12.87)
RBC # BLD: 2.12 M/UL — LOW (ref 4.2–5.4)
RBC # FLD: 14.7 % — HIGH (ref 11.5–14.5)
SODIUM SERPL-SCNC: 131 MMOL/L — LOW (ref 135–146)
WBC # BLD: 1.87 K/UL — LOW (ref 4.8–10.8)
WBC # FLD AUTO: 1.87 K/UL — LOW (ref 4.8–10.8)

## 2022-11-23 PROCEDURE — 99284 EMERGENCY DEPT VISIT MOD MDM: CPT | Mod: FS

## 2022-11-23 PROCEDURE — 99214 OFFICE O/P EST MOD 30 MIN: CPT

## 2022-11-23 RX ORDER — HYDROMORPHONE HYDROCHLORIDE 2 MG/ML
2 INJECTION INTRAMUSCULAR; INTRAVENOUS; SUBCUTANEOUS ONCE
Refills: 0 | Status: DISCONTINUED | OUTPATIENT
Start: 2022-11-23 | End: 2022-11-23

## 2022-11-23 RX ORDER — FUROSEMIDE 40 MG
20 TABLET ORAL ONCE
Refills: 0 | Status: COMPLETED | OUTPATIENT
Start: 2022-11-23 | End: 2022-11-23

## 2022-11-23 RX ORDER — ALPRAZOLAM 0.25 MG
0.5 TABLET ORAL ONCE
Refills: 0 | Status: DISCONTINUED | OUTPATIENT
Start: 2022-11-23 | End: 2022-11-23

## 2022-11-23 RX ADMIN — HYDROMORPHONE HYDROCHLORIDE 2 MILLIGRAM(S): 2 INJECTION INTRAMUSCULAR; INTRAVENOUS; SUBCUTANEOUS at 19:49

## 2022-11-23 RX ADMIN — HYDROMORPHONE HYDROCHLORIDE 2 MILLIGRAM(S): 2 INJECTION INTRAMUSCULAR; INTRAVENOUS; SUBCUTANEOUS at 20:19

## 2022-11-23 NOTE — ED PROVIDER NOTE - PATIENT PORTAL LINK FT
You can access the FollowMyHealth Patient Portal offered by [facility] by registering at the following website: [email or web address]. By joining [employer]’s FollowMyHealth portal, you will also be able to view your health information using other applications (apps) compatible with our system.

## 2022-11-23 NOTE — ED PROVIDER NOTE - NSFOLLOWUPINSTRUCTIONS_ED_ALL_ED_FT
Please make sure to follow up with your primary care doctor in 3 days.    Anemia    Anemia is a condition in which the concentration of red blood cells or hemoglobin in the blood is below normal. Hemoglobin is a substance in red blood cells that carries oxygen to the tissues of the body. Anemia results in not enough oxygen reaching these tissues which can cause symptoms such as weakness, dizziness/lightheadedness, shortness of breath, chest pain, paleness, or nausea.    SEEK IMMEDIATE MEDICAL CARE IF YOU HAVE THE FOLLOWING SYMPTOMS: extreme weakness/chest pain/shortness of breath, black or bloody stools, vomiting blood, fainting, fever, or any signs of dehydration.

## 2022-11-23 NOTE — ED PROVIDER NOTE - ATTENDING APP SHARED VISIT CONTRIBUTION OF CARE
57yF HTN DLD COPD LUCIANA throat cancer on chemo on eliquis sent in by oncologist for Hgb 6.7.  Pt c/o feeling fatigued and tired.  No overt bleeding.

## 2022-11-23 NOTE — ED ADULT NURSE NOTE - OBJECTIVE STATEMENT
Pt complaining of abnormal hemoglobin levels due to chemotherapy. Pt claims her oncologist is recommending a blood transfusion. On assessment pt denies SOB. Pt complaining of feet pain. Pedal pulses 2+ and edematous w/bark looking rash/dry skin. Aox4. Fall precautions in place.

## 2022-11-23 NOTE — ED PROVIDER NOTE - PHYSICAL EXAMINATION
VITAL SIGNS: I have reviewed nursing notes and confirm.  CONSTITUTIONAL:  in no acute distress. pt trached  SKIN: Skin exam is warm and dry, no acute rash. pt appears pale  HEAD: Normocephalic; atraumatic.  EYES:  EOM intact; conjunctiva pale and sclera clear.  ENT: No nasal discharge; airway clear.   CARD: S1, S2 normal; no murmurs, gallops, or rubs. Regular rate and rhythm.  RESP: No wheezes, rales or rhonchi. Speaking in full sentences.   ABD: soft; non-distended; non-tender; No rebound or guarding.   EXT: Normal ROM. No clubbing, cyanosis or edema.  NEURO: Alert, oriented. Grossly unremarkable. No focal deficits.   PSYCH: Cooperative, appropriate.

## 2022-11-23 NOTE — ED PROVIDER NOTE - PROGRESS NOTE DETAILS
EK - pt given dilaudid 2mg IVP for her pain (paperwork shows home dose 4mg PO, though pt says she gets 4mg IV as needed).  Labs w/ Hgb 7.0, but given recent prior 6.7 and oncologist requesting transfusion 1u pRBC, will transfuse as requested. Transfusion completed and lasix given. Pt is stable for discharge.

## 2022-11-23 NOTE — ED PROVIDER NOTE - CLINICAL SUMMARY MEDICAL DECISION MAKING FREE TEXT BOX
Patient was signed out to me by Dr. Klerman, pending completion of the blood transfusion of one unit PRBC. Patient remained stable in ED. Patient requested for her routine medication for anxiety and was given. Patient tolerated blood transfusion well and no complications noted. Patient remained stable in ED and comfortable. Patient left ED with transport in stable condition.

## 2022-11-23 NOTE — ED ADULT TRIAGE NOTE - AS TEMP SITE
Attendance at Delivery    Reason for attendance /Preeclampsia/Premature   Oxygen Needed Yes; 30-70%  Positive Pressure Needed None; CPAP +5  Baby Vigorous Yes  Evidence of Meconium None  APGAR 5-8  Baby came out and cord clamping was delayed for about 30 seconds. Baby then came to the warmer and CPAP +5/30% was initiated due to Fine Crackles throughout and decreased color. Over the course of 10 minutes, FiO2 was increased up to 70% and then weaned down to 55%. Baby was then taken to the NICU on BCPAP +5/50%. Will continue to closely monitor baby.                       oral

## 2022-11-23 NOTE — ED PROVIDER NOTE - OBJECTIVE STATEMENT
57-year-old female history of throat cancer on chemo, hypertension, sleep apnea, HLD, COPD presents to ED for abnormal labs.  Patient sent in by her oncologist Dr. Nuñez who checked patient's labs earlier today and found her hemoglobin to be 6.5.  Patient endorses feeling of fatigue and tiredness but has no other complaints here in the emergency department

## 2022-11-23 NOTE — ED ADULT NURSE NOTE - IS THE PATIENT ABLE TO BE SCREENED?
What Type Of Note Output Would You Prefer (Optional)?: Standard Output
How Severe Is Your Rash?: moderate
Is This A New Presentation, Or A Follow-Up?: Rash
Yes

## 2022-11-23 NOTE — ED ADULT NURSE NOTE - NSIMPLEMENTINTERV_GEN_ALL_ED
Implemented All Fall with Harm Risk Interventions:  [place] to call system. Call bell, personal items and telephone within reach. Instruct patient to call for assistance. Room bathroom lighting operational. Non-slip footwear when patient is off stretcher. Physically safe environment: no spills, clutter or unnecessary equipment. Stretcher in lowest position, wheels locked, appropriate side rails in place. Provide visual cue, wrist band, yellow gown, etc. Monitor gait and stability. Monitor for mental status changes and reorient to person, place, and time. Review medications for side effects contributing to fall risk. Reinforce activity limits and safety measures with patient and family. Provide visual clues: red socks.

## 2022-11-23 NOTE — ED PROVIDER NOTE - NS ED ROS FT
Constitutional: (-) fever, (-) chills  Eyes: (-) visual changes  ENT: (-) nasal congestions  Cardiovascular: (-) chest pain, (-) syncope  Respiratory: (-) cough, (-) shortness of breath, (-) dyspnea,   Gastrointestinal: (-) vomiting, (-) diarrhea, (-)nausea,  Musculoskeletal: (-) neck pain, (-) back pain, (-) joint pain,  Integumentary: (-) rash, (-) edema, (-) bruises  Neurological: (-) headache, (-) loc, (-) dizziness, (-) tingling, (-)numbness,  Peripheral Vascular: (-) leg swelling  :  (-)dysuria,  (-) hematuria

## 2022-11-24 VITALS
OXYGEN SATURATION: 99 % | DIASTOLIC BLOOD PRESSURE: 59 MMHG | RESPIRATION RATE: 19 BRPM | SYSTOLIC BLOOD PRESSURE: 125 MMHG | HEART RATE: 58 BPM | TEMPERATURE: 98 F

## 2022-11-24 RX ADMIN — Medication 20 MILLIGRAM(S): at 00:28

## 2022-11-24 RX ADMIN — Medication 0.5 MILLIGRAM(S): at 00:27

## 2022-11-28 ENCOUNTER — APPOINTMENT (OUTPATIENT)
Dept: HEMATOLOGY ONCOLOGY | Facility: CLINIC | Age: 57
End: 2022-11-28

## 2022-11-28 LAB
BASOPHILS # BLD AUTO: 0.01 K/UL
BASOPHILS NFR BLD AUTO: 0.6 %
EOSINOPHIL # BLD AUTO: 0.05 K/UL
EOSINOPHIL NFR BLD AUTO: 3.2 %
HCT VFR BLD CALC: 22.2 %
HGB BLD-MCNC: 7.7 G/DL
IMM GRANULOCYTES NFR BLD AUTO: 0.6 %
LYMPHOCYTES # BLD AUTO: 0.43 K/UL
LYMPHOCYTES NFR BLD AUTO: 27.7 %
MAN DIFF?: NORMAL
MCHC RBC-ENTMCNC: 33.2 PG
MCHC RBC-ENTMCNC: 34.7 G/DL
MCV RBC AUTO: 95.7 FL
MONOCYTES # BLD AUTO: 0.37 K/UL
MONOCYTES NFR BLD AUTO: 23.9 %
NEUTROPHILS # BLD AUTO: 0.68 K/UL
NEUTROPHILS NFR BLD AUTO: 44 %
PLATELET # BLD AUTO: 207 K/UL
RBC # BLD: 2.32 M/UL
RBC # FLD: 16 %
WBC # FLD AUTO: 1.55 K/UL

## 2022-11-28 PROCEDURE — 77387B: CUSTOM | Mod: 26

## 2022-11-29 ENCOUNTER — NON-APPOINTMENT (OUTPATIENT)
Age: 57
End: 2022-11-29

## 2022-11-29 VITALS
DIASTOLIC BLOOD PRESSURE: 72 MMHG | HEART RATE: 79 BPM | OXYGEN SATURATION: 95 % | TEMPERATURE: 95.7 F | RESPIRATION RATE: 17 BRPM | SYSTOLIC BLOOD PRESSURE: 124 MMHG

## 2022-11-29 PROCEDURE — 77387B: CUSTOM | Mod: 26

## 2022-11-30 ENCOUNTER — APPOINTMENT (OUTPATIENT)
Dept: INFUSION THERAPY | Facility: CLINIC | Age: 57
End: 2022-11-30

## 2022-11-30 PROCEDURE — 77014: CPT | Mod: 26

## 2022-11-30 RX ORDER — MORPHINE SULFATE 50 MG/1
4 CAPSULE, EXTENDED RELEASE ORAL ONCE
Refills: 0 | Status: DISCONTINUED | OUTPATIENT
Start: 2022-11-30 | End: 2022-11-30

## 2022-11-30 RX ORDER — DIPHENHYDRAMINE HCL 50 MG
50 CAPSULE ORAL ONCE
Refills: 0 | Status: COMPLETED | OUTPATIENT
Start: 2022-11-30 | End: 2022-11-30

## 2022-11-30 RX ORDER — CETUXIMAB 2 MG/ML
450 SOLUTION INTRAVENOUS ONCE
Refills: 0 | Status: COMPLETED | OUTPATIENT
Start: 2022-11-30 | End: 2022-11-30

## 2022-11-30 RX ORDER — ACETAMINOPHEN 500 MG
650 TABLET ORAL ONCE
Refills: 0 | Status: COMPLETED | OUTPATIENT
Start: 2022-11-30 | End: 2022-11-30

## 2022-11-30 RX ADMIN — CETUXIMAB 450 MILLIGRAM(S): 2 SOLUTION INTRAVENOUS at 16:38

## 2022-11-30 RX ADMIN — MORPHINE SULFATE 4 MILLIGRAM(S): 50 CAPSULE, EXTENDED RELEASE ORAL at 17:34

## 2022-11-30 RX ADMIN — Medication 650 MILLIGRAM(S): at 16:04

## 2022-11-30 RX ADMIN — Medication 102 MILLIGRAM(S): at 16:04

## 2022-12-01 LAB
ALBUMIN SERPL ELPH-MCNC: 4 G/DL
ALP BLD-CCNC: 122 U/L
ALT SERPL-CCNC: 14 U/L
ANION GAP SERPL CALC-SCNC: 8 MMOL/L
AST SERPL-CCNC: 12 U/L
BILIRUB SERPL-MCNC: 0.3 MG/DL
BUN SERPL-MCNC: 13 MG/DL
CALCIUM SERPL-MCNC: 9.5 MG/DL
CHLORIDE SERPL-SCNC: 94 MMOL/L
CO2 SERPL-SCNC: 30 MMOL/L
CREAT SERPL-MCNC: <0.5 MG/DL
EGFR: 115 ML/MIN/1.73M2
GLUCOSE SERPL-MCNC: 109 MG/DL
MAGNESIUM SERPL-MCNC: 1.8 MG/DL
POTASSIUM SERPL-SCNC: 4.6 MMOL/L
PROT SERPL-MCNC: 6.6 G/DL
SODIUM SERPL-SCNC: 132 MMOL/L

## 2022-12-02 PROCEDURE — 77427 RADIATION TX MANAGEMENT X5: CPT

## 2022-12-05 PROCEDURE — 77387B: CUSTOM | Mod: 26

## 2022-12-06 ENCOUNTER — NON-APPOINTMENT (OUTPATIENT)
Age: 57
End: 2022-12-06

## 2022-12-06 VITALS
SYSTOLIC BLOOD PRESSURE: 115 MMHG | TEMPERATURE: 98.8 F | OXYGEN SATURATION: 95 % | RESPIRATION RATE: 16 BRPM | DIASTOLIC BLOOD PRESSURE: 56 MMHG | HEART RATE: 66 BPM

## 2022-12-06 PROCEDURE — 77387B: CUSTOM | Mod: 26

## 2022-12-07 ENCOUNTER — APPOINTMENT (OUTPATIENT)
Dept: INFUSION THERAPY | Facility: CLINIC | Age: 57
End: 2022-12-07

## 2022-12-07 ENCOUNTER — APPOINTMENT (OUTPATIENT)
Dept: HEMATOLOGY ONCOLOGY | Facility: CLINIC | Age: 57
End: 2022-12-07

## 2022-12-07 LAB
BASOPHILS # BLD AUTO: 0.05 K/UL
BASOPHILS NFR BLD AUTO: 0.7 %
EOSINOPHIL # BLD AUTO: 0.02 K/UL
EOSINOPHIL NFR BLD AUTO: 0.3 %
HCT VFR BLD CALC: 26.5 %
HGB BLD-MCNC: 9.1 G/DL
IMM GRANULOCYTES NFR BLD AUTO: 1.6 %
LYMPHOCYTES # BLD AUTO: 0.54 K/UL
LYMPHOCYTES NFR BLD AUTO: 8 %
MAN DIFF?: NORMAL
MCHC RBC-ENTMCNC: 33.1 PG
MCHC RBC-ENTMCNC: 34.3 G/DL
MCV RBC AUTO: 96.4 FL
MONOCYTES # BLD AUTO: 0.78 K/UL
MONOCYTES NFR BLD AUTO: 11.5 %
NEUTROPHILS # BLD AUTO: 5.27 K/UL
NEUTROPHILS NFR BLD AUTO: 77.9 %
PLATELET # BLD AUTO: 234 K/UL
RBC # BLD: 2.75 M/UL
RBC # FLD: 16.8 %
WBC # FLD AUTO: 6.77 K/UL

## 2022-12-07 PROCEDURE — 99214 OFFICE O/P EST MOD 30 MIN: CPT

## 2022-12-07 PROCEDURE — 77387B: CUSTOM | Mod: 26

## 2022-12-07 RX ORDER — CETUXIMAB 2 MG/ML
270 SOLUTION INTRAVENOUS ONCE
Refills: 0 | Status: COMPLETED | OUTPATIENT
Start: 2022-12-07 | End: 2022-12-07

## 2022-12-07 RX ORDER — ACETAMINOPHEN 500 MG
650 TABLET ORAL ONCE
Refills: 0 | Status: DISCONTINUED | OUTPATIENT
Start: 2022-12-07 | End: 2022-12-07

## 2022-12-07 RX ORDER — ACETAMINOPHEN 500 MG
650 TABLET ORAL ONCE
Refills: 0 | Status: COMPLETED | OUTPATIENT
Start: 2022-12-07 | End: 2022-12-07

## 2022-12-07 RX ORDER — MORPHINE SULFATE 50 MG/1
4 CAPSULE, EXTENDED RELEASE ORAL ONCE
Refills: 0 | Status: DISCONTINUED | OUTPATIENT
Start: 2022-12-07 | End: 2022-12-07

## 2022-12-07 RX ORDER — DIPHENHYDRAMINE HCL 50 MG
50 CAPSULE ORAL ONCE
Refills: 0 | Status: COMPLETED | OUTPATIENT
Start: 2022-12-07 | End: 2022-12-07

## 2022-12-07 RX ADMIN — MORPHINE SULFATE 4 MILLIGRAM(S): 50 CAPSULE, EXTENDED RELEASE ORAL at 13:44

## 2022-12-07 RX ADMIN — CETUXIMAB 270 MILLIGRAM(S): 2 SOLUTION INTRAVENOUS at 14:44

## 2022-12-07 RX ADMIN — Medication 101.5 MILLIGRAM(S): at 13:45

## 2022-12-07 RX ADMIN — Medication 650 MILLIGRAM(S): at 13:45

## 2022-12-07 RX ADMIN — MORPHINE SULFATE 4 MILLIGRAM(S): 50 CAPSULE, EXTENDED RELEASE ORAL at 14:40

## 2022-12-07 RX ADMIN — Medication 102 MILLIGRAM(S): at 13:46

## 2022-12-07 RX ADMIN — Medication 650 MILLIGRAM(S): at 13:50

## 2022-12-08 LAB
ALBUMIN SERPL ELPH-MCNC: 3.9 G/DL
ALP BLD-CCNC: 111 U/L
ALT SERPL-CCNC: 16 U/L
ANION GAP SERPL CALC-SCNC: 6 MMOL/L
AST SERPL-CCNC: 15 U/L
BILIRUB SERPL-MCNC: 0.3 MG/DL
BUN SERPL-MCNC: 11 MG/DL
CALCIUM SERPL-MCNC: 9.3 MG/DL
CHLORIDE SERPL-SCNC: 94 MMOL/L
CO2 SERPL-SCNC: 30 MMOL/L
CREAT SERPL-MCNC: <0.5 MG/DL
EGFR: 115 ML/MIN/1.73M2
GLUCOSE SERPL-MCNC: 120 MG/DL
MAGNESIUM SERPL-MCNC: 1.6 MG/DL
POTASSIUM SERPL-SCNC: 4.9 MMOL/L
PROT SERPL-MCNC: 6.7 G/DL
SODIUM SERPL-SCNC: 130 MMOL/L

## 2022-12-12 PROCEDURE — 77014: CPT | Mod: 26

## 2022-12-13 ENCOUNTER — NON-APPOINTMENT (OUTPATIENT)
Age: 57
End: 2022-12-13

## 2022-12-13 VITALS
OXYGEN SATURATION: 96 % | DIASTOLIC BLOOD PRESSURE: 58 MMHG | RESPIRATION RATE: 16 BRPM | HEART RATE: 75 BPM | TEMPERATURE: 96.7 F | SYSTOLIC BLOOD PRESSURE: 119 MMHG

## 2022-12-13 PROCEDURE — 77387B: CUSTOM | Mod: 26

## 2022-12-13 PROCEDURE — 77427 RADIATION TX MANAGEMENT X5: CPT

## 2022-12-14 ENCOUNTER — APPOINTMENT (OUTPATIENT)
Dept: HEMATOLOGY ONCOLOGY | Facility: CLINIC | Age: 57
End: 2022-12-14

## 2022-12-14 ENCOUNTER — APPOINTMENT (OUTPATIENT)
Dept: INFUSION THERAPY | Facility: CLINIC | Age: 57
End: 2022-12-14

## 2022-12-14 DIAGNOSIS — Z71.89 OTHER SPECIFIED COUNSELING: ICD-10-CM

## 2022-12-14 DIAGNOSIS — L27.0 GENERALIZED SKIN ERUPTION DUE TO DRUGS AND MEDICAMENTS TAKEN INTERNALLY: ICD-10-CM

## 2022-12-14 PROCEDURE — 99214 OFFICE O/P EST MOD 30 MIN: CPT

## 2022-12-14 PROCEDURE — 77387B: CUSTOM | Mod: 26

## 2022-12-14 RX ORDER — DIPHENHYDRAMINE HCL 50 MG
50 CAPSULE ORAL ONCE
Refills: 0 | Status: COMPLETED | OUTPATIENT
Start: 2022-12-14 | End: 2022-12-14

## 2022-12-14 RX ORDER — ACETAMINOPHEN 500 MG
650 TABLET ORAL ONCE
Refills: 0 | Status: COMPLETED | OUTPATIENT
Start: 2022-12-14 | End: 2022-12-14

## 2022-12-14 RX ORDER — MORPHINE SULFATE 50 MG/1
4 CAPSULE, EXTENDED RELEASE ORAL ONCE
Refills: 0 | Status: DISCONTINUED | OUTPATIENT
Start: 2022-12-14 | End: 2022-12-14

## 2022-12-14 RX ORDER — CETUXIMAB 2 MG/ML
270 SOLUTION INTRAVENOUS ONCE
Refills: 0 | Status: COMPLETED | OUTPATIENT
Start: 2022-12-14 | End: 2022-12-14

## 2022-12-14 RX ADMIN — Medication 102 MILLIGRAM(S): at 15:21

## 2022-12-14 RX ADMIN — MORPHINE SULFATE 4 MILLIGRAM(S): 50 CAPSULE, EXTENDED RELEASE ORAL at 14:41

## 2022-12-14 RX ADMIN — CETUXIMAB 270 MILLIGRAM(S): 2 SOLUTION INTRAVENOUS at 15:20

## 2022-12-14 RX ADMIN — Medication 650 MILLIGRAM(S): at 15:21

## 2022-12-15 LAB
ALBUMIN SERPL ELPH-MCNC: 3.8 G/DL
ALP BLD-CCNC: 83 U/L
ALT SERPL-CCNC: 18 U/L
ANION GAP SERPL CALC-SCNC: 7 MMOL/L
AST SERPL-CCNC: 14 U/L
BASOPHILS # BLD AUTO: 0.04 K/UL
BASOPHILS NFR BLD AUTO: 0.5 %
BILIRUB SERPL-MCNC: 0.3 MG/DL
BUN SERPL-MCNC: 12 MG/DL
CALCIUM SERPL-MCNC: 9 MG/DL
CHLORIDE SERPL-SCNC: 94 MMOL/L
CO2 SERPL-SCNC: 29 MMOL/L
CREAT SERPL-MCNC: <0.5 MG/DL
EGFR: 115 ML/MIN/1.73M2
EOSINOPHIL # BLD AUTO: 0 K/UL
EOSINOPHIL NFR BLD AUTO: 0 %
GLUCOSE SERPL-MCNC: 210 MG/DL
HCT VFR BLD CALC: 28.1 %
HGB BLD-MCNC: 9.7 G/DL
IMM GRANULOCYTES NFR BLD AUTO: 0.7 %
LYMPHOCYTES # BLD AUTO: 0.24 K/UL
LYMPHOCYTES NFR BLD AUTO: 2.8 %
MAGNESIUM SERPL-MCNC: 1.7 MG/DL
MAN DIFF?: NORMAL
MCHC RBC-ENTMCNC: 33.3 PG
MCHC RBC-ENTMCNC: 34.5 G/DL
MCV RBC AUTO: 96.6 FL
MONOCYTES # BLD AUTO: 0.18 K/UL
MONOCYTES NFR BLD AUTO: 2.1 %
NEUTROPHILS # BLD AUTO: 8.15 K/UL
NEUTROPHILS NFR BLD AUTO: 93.9 %
PLATELET # BLD AUTO: 187 K/UL
POTASSIUM SERPL-SCNC: 4.9 MMOL/L
PROT SERPL-MCNC: 6.5 G/DL
RBC # BLD: 2.91 M/UL
RBC # FLD: 16.5 %
SODIUM SERPL-SCNC: 130 MMOL/L
WBC # FLD AUTO: 8.67 K/UL

## 2022-12-15 PROCEDURE — 77387B: CUSTOM | Mod: 26

## 2022-12-21 ENCOUNTER — OUTPATIENT (OUTPATIENT)
Dept: OUTPATIENT SERVICES | Facility: HOSPITAL | Age: 57
LOS: 1 days | Discharge: HOME | End: 2022-12-21

## 2022-12-21 ENCOUNTER — APPOINTMENT (OUTPATIENT)
Dept: INFUSION THERAPY | Facility: CLINIC | Age: 57
End: 2022-12-21

## 2022-12-21 DIAGNOSIS — C32.1 MALIGNANT NEOPLASM OF SUPRAGLOTTIS: ICD-10-CM

## 2022-12-21 DIAGNOSIS — Z51.11 ENCOUNTER FOR ANTINEOPLASTIC CHEMOTHERAPY: ICD-10-CM

## 2022-12-21 DIAGNOSIS — Z98.890 OTHER SPECIFIED POSTPROCEDURAL STATES: Chronic | ICD-10-CM

## 2022-12-21 LAB
BASOPHILS # BLD AUTO: 0.05 K/UL
BASOPHILS NFR BLD AUTO: 0.6 %
EOSINOPHIL # BLD AUTO: 0.22 K/UL
EOSINOPHIL NFR BLD AUTO: 2.6 %
HCT VFR BLD CALC: 26.2 %
HGB BLD-MCNC: 9.1 G/DL
IMM GRANULOCYTES NFR BLD AUTO: 0.7 %
LYMPHOCYTES # BLD AUTO: 0.31 K/UL
LYMPHOCYTES NFR BLD AUTO: 3.6 %
MAN DIFF?: NORMAL
MCHC RBC-ENTMCNC: 33.3 PG
MCHC RBC-ENTMCNC: 34.7 G/DL
MCV RBC AUTO: 96 FL
MONOCYTES # BLD AUTO: 0.84 K/UL
MONOCYTES NFR BLD AUTO: 9.8 %
NEUTROPHILS # BLD AUTO: 7.1 K/UL
NEUTROPHILS NFR BLD AUTO: 82.7 %
PLATELET # BLD AUTO: 147 K/UL
RBC # BLD: 2.73 M/UL
RBC # FLD: 16.2 %
WBC # FLD AUTO: 8.58 K/UL

## 2022-12-21 PROCEDURE — 77387B: CUSTOM | Mod: 26

## 2022-12-21 RX ORDER — MORPHINE SULFATE 50 MG/1
4 CAPSULE, EXTENDED RELEASE ORAL ONCE
Refills: 0 | Status: DISCONTINUED | OUTPATIENT
Start: 2022-12-21 | End: 2022-12-21

## 2022-12-21 RX ADMIN — MORPHINE SULFATE 4 MILLIGRAM(S): 50 CAPSULE, EXTENDED RELEASE ORAL at 14:08

## 2022-12-22 LAB
ALBUMIN SERPL ELPH-MCNC: 3.4 G/DL
ALP BLD-CCNC: 95 U/L
ALT SERPL-CCNC: 14 U/L
ANION GAP SERPL CALC-SCNC: 11 MMOL/L
AST SERPL-CCNC: 15 U/L
BILIRUB SERPL-MCNC: 0.4 MG/DL
BUN SERPL-MCNC: 12 MG/DL
CALCIUM SERPL-MCNC: 9.3 MG/DL
CHLORIDE SERPL-SCNC: 94 MMOL/L
CO2 SERPL-SCNC: 24 MMOL/L
CREAT SERPL-MCNC: 0.5 MG/DL
EGFR: 109 ML/MIN/1.73M2
GLUCOSE SERPL-MCNC: 145 MG/DL
MAGNESIUM SERPL-MCNC: 1.6 MG/DL
POTASSIUM SERPL-SCNC: 4.4 MMOL/L
PROT SERPL-MCNC: 6 G/DL
SODIUM SERPL-SCNC: 129 MMOL/L

## 2022-12-28 ENCOUNTER — APPOINTMENT (OUTPATIENT)
Dept: INFUSION THERAPY | Facility: CLINIC | Age: 57
End: 2022-12-28

## 2023-01-14 NOTE — SWALLOW FEES ASSESSMENT ADULT - SLP PATIENT PROFILE REVIEW
Nurses Note -- 4 Eyes      1/13/2023  19:13 PM      Skin assessed during: Q Shift Change      [x] No Pressure Injuries Present    []Prevention Measures Documented      [] Yes- Altered Skin Integrity Present or Discovered   [] LDA Added if Not in Epic (Describe Wound)   [] New Altered Skin Integrity was Present on Admit and Documented in LDA   [] Wound Image Taken    Wound Care Consulted? No    Attending Nurse:  CA BERNARD RN     Second RN/Staff Member:  SUMA Calle     
yes
yes

## 2023-01-18 ENCOUNTER — OUTPATIENT (OUTPATIENT)
Dept: OUTPATIENT SERVICES | Facility: HOSPITAL | Age: 58
LOS: 1 days | End: 2023-01-18

## 2023-01-18 ENCOUNTER — APPOINTMENT (OUTPATIENT)
Dept: HEMATOLOGY ONCOLOGY | Facility: CLINIC | Age: 58
End: 2023-01-18
Payer: MEDICARE

## 2023-01-18 DIAGNOSIS — Z98.890 OTHER SPECIFIED POSTPROCEDURAL STATES: Chronic | ICD-10-CM

## 2023-01-18 PROCEDURE — 99214 OFFICE O/P EST MOD 30 MIN: CPT

## 2023-01-18 NOTE — HISTORY OF PRESENT ILLNESS
[Disease: _____________________] : Disease: [unfilled] [T: ___] : T[unfilled] [N: ___] : N[unfilled] [M: ___] : M[unfilled] [AJCC Stage: ____] : AJCC Stage: [unfilled] [Therapy: ___] : Therapy: [unfilled] [Cycle: ___] : Cycle: [unfilled] [de-identified] : THis  is a 56yo female sent to the ER from [facility] where she is on ch care for c/o inc SOB, chest tightness, diff breathing, wheezing i.e. acute on chr hypoxic RF. Of note the pt has a trach and has had freq readmissions for mucous plugging, RF and has had several bronchoscopies. EMS noted the pt to be hypotensive and hypoxic. The pt was vigorously suctioned, received Neb Tx, IV steroids, BP revived with IV fluids. The pt was cultured and placed on ABx. She is v well known to the Pul SVc and to ID. The pt is ad with acute on ch hypoxic RF and ongoing Bacterial PNA. The PMHx includes: HTN, ASHD, HFpEF, DLD, DM II, Chronic RF, COPD, LUCIANA, ASp PNA, sp intubation, failure to extubate, chronic trach ([facility] 4/22), recurrent mucus plugging of airways, DVT, AC with Eliquis, lower ext venous stasis dermatitis, bedbound state, GERD, diverticulosis, sp PEG, OA, DDD, DJD, chronic pain syn, depression anxiety. CVA and has loos of sensation on right side of the body\par \par Hosp Course: The pt was ad from SNF for inc SOB, hypoxia with mucous plugging and acute on chr RF and ongoing bacterial PNA. The pt was suctioned and placed on mech ventilation via trach and cont on ABx. The pt has had freq ad to hosp ( [facility]) since the AR[facility] and intubation and failure to wean that resulted in the trach in 6/22 in [facility]. The pt is mostly bed bound with lower ext weakness, mm atrophy/myopathy of disuse and sever hyperpigmentation skin changes of the lower ext. The pt was ff by Pul/critical care and ID. The pt was verbal and had nutrition via PEG. S&S worked with pt and during w/up pt was noted to have pharyngeal/trach swelling. 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 The pt was taken to the OR by ENT for direct laryngoscopy and bx of the mass on 8/19/22 with path showing \par \par \par Today she is in a stretcher, trach is connect to oxygen . Does not walk. Has diabetic neuropathy in both feet  that is very painful. Not SOB.  She has 3 childer. Lives in [facility], [place], operated by [facility] and is here with her \par \par Review of imaging: \par 7/1/2022 CT abd/pelvis:  IMPRESSION:\par \par Area of fat stranding surrounding the cecum and ascending colon, new since CT abdomen pelvis performed on June 20, 2022. Findings suggestive of cecitis. No discrete surrounding fluid collection/abscess.\par \par Partially visualized left lower lobe consolidative opacity. Consider follow-up with radiographs until resolution.\par \par 8/16/2022 CT neck: Impression:\par \par Status post tracheostomy. 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 Partial effacement of the preepiglottic fat.\par \par Mildly enhancing 1.1 cm right supraclavicular lymph node, nonspecific.\par \par Left hemidiaphragm elevation with compressive atelectasis in opacification of the partial imaged left lower lobe which could be infectious or inflammatory. Small left pleural effusion. Further evaluation with chest CT can be considered.\par \par 9/21/22: PET/CT IMPRESSION:\par \par Supraglottic mass measuring approximately 2.5 x 3.8 x 3.7 cm at the aryepiglottic folds, with max SUV of 18.7.\par \par Additional right level 2 cervical lymph node (Max SUV 5.0) and left level 2 cervical lymph node (Max SUV 3.9). These nodes are indeterminate for biologic tumor activity.\par \par Subsegmental area of atelectasis seen at the left lung base containing 2 foci of increased FDG uptake SUV measuring 10.1 and 6.1 (166, 171) within. These nodules are indeterminate for biologic tumor activity.\par \par Trace left pleural effusion.\par \par Bilateral FDG uptake in the skin of the axilla with adjacent subcutaneous fat stranding seen on localizer CT. This is probably infectious/inflammatory in etiology.\par \par Punctate focus of mild FDG uptake seen in the posterior back soft tissues at the level of the kidneys, favor benign etiology. Correlate with physical exam.\par \par Pathology\par 8/19/2022: stain shows the carcinoma is negative for p16.\par \par Tongue base mass, biopsy:\par -Superficial fragments of invasive squamous cell carcinoma, non-keratinized.  See note below\par \par LAbs: WBC 9.38, HGb 10.2, Plts 229  [de-identified] : squamous cell carcinoma [FreeTextEntry1] : Started Cetuximab on 11.30.2022 (concurrent RT) [de-identified] : 10/26/2022\par Resides in [facility]. Reports feeling relatively well; no changes in chronic conditions or new complaints since the last visit.\par \par 11/16/2022\par She is here for follow up. She  is on chemoRT  Current Dose: 3000cGy. Planned Dose: 7000cGy from 11/15/22. She is due for week  4 of 7 of chemotherapy.  No recent blood work. She missed her treatment last week due bleeding from the trach and throat and went to ED.   She had CT angioneck on 11/9/2022 that shwoed \par \par 1.  No CTA evidence of active contrast extravasation into the airway.\par \par 2.  No evidence of major vascular stenosis or occlusion.\par \par 3.  Redemonstration of extensive thickening involving the nasopharynx, \par oropharynx and supraglottic/glottic larynx in this patient with known \par neoplasm.\par \par She had blood work on 11/9/2022 that shwoed hgb of 8.3, Plts 89, \par \par Her bleeding stopped on its own. Createnine was stalbe 0.5\par \par 11/23/2022\par She is here for follow up. on 11/22 dose of radiation was  Current Dose: 4200cGy. Planned Dose: 7000cGy. She had CBC earlier today that showed ANC of  1110, Hgb keeps trending down  now 6.5, Pls are 48 went up.  Iroin studies were normal.  Carbo was on hold since last week.  She denies any bleeding except for some mild bleeding from trach. She states her apixiban has not been held in facility but since no major bleeding and plts now near 50 she can continue.\par \par 12/7/2022\par She is here for follow up had 5600cGy. Planned Dose: 7000cGy on 12/6/2022. She is on cetuxiamb CBC from last week showed WBC 1.55 Hgb 7.7 trending up and pltsa are now normal. She has been on Cetuximab since 11/30/2022 She had PRBC trasnfusion last week. She developed a rash on her mouth and has stomatitis and mild mucositis. The rash is aslo on her face. I called facillity and they did not administer the Minocycline. Rashis grade 2-3. She states the pain in her mouth is not terible. 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  She is laying in stretcher, currently residing in facility.  Reviewed most recent CBC, which shows slight improvement in hgb up to 9.7g/dL.  Patient continues on concurrent chemoRT regimen; Current Dose: 6600cGy. Planned Dose: 7000cGy. Treatment Site: supraglottis.  Spoke with [facility], who tentatively have her completion date as 12/19.  Patient denies fever, chills, nausea, vomiting, dyspnea, new palpable lumps/bumps or bleeding.  She has been on Cetuximab since 11/30/2022.  She developed a rash on her mouth and has stomatitis and mild mucositis following first cycle of Cetuximab;  Rash still present, grade 2-3 but reportedly slightly improved as per patient.  She has been applying hydrocortisone as needed and doxycycline daily.  Patient is due for week 3 of Cetuximab today.  \par \par 1/18/2022\par She is here for follow up. She completed chemoRT in Mid december. RAsh is better She had blood work in facility today and will obtain the rash is also much better.  She has no new complaints.  No longer has any dysphagia because of the trach she only uses ice chips unable to swallow.  She did have recent blood work in the facility approximately 1 week ago essentially white cells and platelets were normal hemoglobin was just over 8

## 2023-01-18 NOTE — PHYSICAL EXAM
[Completely disabled. Cannot carry on any self care. Totally confined to bed or chair] : Status 4- Completely disabled. Cannot carry on any self care. Totally confined to bed or chair [Obese] : obese [Normal] : affect appropriate [de-identified] : Tracheostomy in place. Missing teeth. no blood in dressing [de-identified] : Transmitted trach sounds on exam [de-identified] : BLE stasis dermatitis, edema is better [de-identified] : PEG [de-identified] : Rash on her face is resolving [de-identified] : Unable to walk.

## 2023-01-18 NOTE — END OF VISIT
[FreeTextEntry3] : I was physically present for the key portions of the evaluation and management service provided.  I agree with the history and physical, and plan which I have reviewed and edited where appropriate.\par \par She is here for follow up. She id oing well on chemoRT with carbo forher Supraglotic SCC  and is due for week 2 today. CBC is stable. She can see us back in 2 weeks  no palpitations

## 2023-01-18 NOTE — REVIEW OF SYSTEMS
[Fatigue] : fatigue [Muscle Weakness] : muscle weakness [Skin Rash] : skin rash [Difficulty Walking] : difficulty walking [Anxiety] : anxiety [Negative] : Allergic/Immunologic [Recent Change In Weight] : ~T no recent weight change [Dysphagia] : no dysphagia [Odynophagia] : no odynophagia [Chest Pain] : no chest pain [Lower Ext Edema] : no lower extremity edema [Abdominal Pain] : no abdominal pain [Diarrhea] : no diarrhea [Skin Wound] : no skin wound

## 2023-01-18 NOTE — CONSULT LETTER
[Dear  ___] : Dear  [unfilled], [Courtesy Letter:] : I had the pleasure of seeing your patient, [unfilled], in my office today. [Please see my note below.] : Please see my note below. [Consult Closing:] : Thank you very much for allowing me to participate in the care of this patient.  If you have any questions, please do not hesitate to contact me. [Sincerely,] : Sincerely, [FreeTextEntry3] : Rangel

## 2023-01-18 NOTE — ASSESSMENT
[FreeTextEntry1] : # Stage T3N3C? 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  I do believe she would tolerate definitive chemoradiation and I recommended we incorporate carboplatin with an AUC dose of 1.5 weekly.  Explained to the patient may be some inferior outcomes compared to cisplatin but I am concerned about weekly cisplatin with all her medical comorbidities.  I would not add Taxol to the carboplatin given her severe neuropathy\par - 10/20/2022 started chemoradiation with Carboplatin\par - she did not tolerate carboplatin due to cytopenia it was stopped and switch to Cetuximab 250 mg/m2 to complete with radiation\par - she completed chemoRT in 12/2022 \par - repeat PET in 3 months after chemo/RT completion.\par -will have her see Dr. Landaverde of ENT for surveillance as well and to confirm CR\par \par # Drug Rash, likely 2/2 Cetuximab; Grade 2-3 rash \par - c/w Doxy 100 mg daily, Hydrocortisone cream BID to rash \par - s/p prednisone 40 mg daily for 7 days\par - spoke about holding the cetuximab but decided to continue with decrease dose to 150 mg/m2 weekly until she completed radiation which is planned for upcoming Monday 12/19\par \par # BLE pain  much better \par - was on  on hydromorphone 4 mg [facility] \par \par # DVT on apixiban \par - on Apixiban since no obvious bleeding and plts  normal \par \par # Mild thrombocytopenia from chemo\par - resolved \par \par #Anemia due to critical illness and chemotherapy  worsening no obvious bleeding\par - will transfuse to keep hgb around 7-8\par - she is asymptomatic today \par \par RTC in 2 months will order PET/CT than

## 2023-01-30 ENCOUNTER — APPOINTMENT (OUTPATIENT)
Dept: OTOLARYNGOLOGY | Facility: CLINIC | Age: 58
End: 2023-01-30
Payer: MEDICARE

## 2023-01-30 ENCOUNTER — RESULT REVIEW (OUTPATIENT)
Age: 58
End: 2023-01-30

## 2023-01-30 VITALS — HEIGHT: 61 IN | WEIGHT: 173 LBS | BODY MASS INDEX: 32.66 KG/M2

## 2023-01-30 DIAGNOSIS — H65.22 CHRONIC SEROUS OTITIS MEDIA, LEFT EAR: ICD-10-CM

## 2023-01-30 PROCEDURE — 99214 OFFICE O/P EST MOD 30 MIN: CPT | Mod: 25

## 2023-01-30 PROCEDURE — 31575 DIAGNOSTIC LARYNGOSCOPY: CPT

## 2023-01-30 NOTE — PROCEDURE
[Complicated Symptoms] : complicated symptoms requiring more thorough examination than provided by mirror [None] : none [Flexible Endoscope] : examined with the flexible endoscope [de-identified] : Flexible laryngoscopy performed. Nasal cavity, nasopharynx, oropharynx, hypopharynx, and larynx evaluated. No masses or lesions, bilateral true vocal folds symmetric and mobile.\par \par Severe radiation laryngitis, airway patent, no masses or lesions

## 2023-01-30 NOTE — PHYSICAL EXAM
[de-identified] : #6 cuffless trach in place, PMV, tolerated finger occlusion [de-identified] : left ear serous effusion [Normal] : mucosa is normal [Midline] : trachea located in midline position

## 2023-01-30 NOTE — HISTORY OF PRESENT ILLNESS
[de-identified] : Oncology Summary\par Stage: [id number]\par Site: Supraglottis\par Pathology: SCC\par Treatment: Chemoradiation completed 12/15/2022\par Disease status: ARLIN\par Swallow: G tube dependent\par Airway: Trach with PMV\par Smoking: Former smoker, since quit\par Thyroid: Will check 6/2023\par  [FreeTextEntry1] : RV following chemoRT

## 2023-02-01 ENCOUNTER — APPOINTMENT (OUTPATIENT)
Dept: RADIATION ONCOLOGY | Facility: HOSPITAL | Age: 58
End: 2023-02-01

## 2023-02-07 NOTE — DISEASE MANAGEMENT
[Clinical] : TNM Stage: c [SUBHA] : SUBHA [TTNM] : 3 [NTNM] : 2c [MTNM] : 0 [de-identified] : 0782cGy [de-identified] : 7000cGy [de-identified] : supraglottis

## 2023-02-07 NOTE — HISTORY OF PRESENT ILLNESS
[FreeTextEntry1] : 2/1/2023: PTE  received 7000cGy/35 Fx to the supraglottis( larynx/neck) from 10/21/2022- 12/21/2022. \par \par \par \par 12/13/2022 OTV 6200cGy/7000cGy to the supraglottis: Patient stable. she still has the burning/redness to lips and face since last week that they attribute to chemotherapy. Last chemo due tomorrow. steroid cream in am, and lidocaine at night. neck area looks and feels about the same. skin care to neck area continued at [place]. Comes [facility] by [facility] from [facility] [place]. Trach collar O2 continued. \par \par 12/6/2022 OTV 5600cGy/7000cGy to the supraglottis: pt generally stable. As of Saturday she has experienced pain and redness/chapped area to her lips and around lips. No pain in her mouth noted. Pt has used A/D, vaseline and chapstick as well with mild relief. She gets some relief but has to reapply often. The pain to neck area has been the same. Relief met with the combination of pain meds and silvadene/ skin care. Pain to libs can be 10/10 and pain to neck area can be 8/10. She has appointment with hem-onc tomorrow and will also discuss skin reaction with them. \par \par 11/29/2022 OTV   4800cGy/7000cGy  to the supraglottis:\par Pt stable and doing well. Resides at [facility], [place]. Did not require suctioning. continues with O2 via trach collar.Brought up some phlegm orally with coughing up. Pt states she still has pain at neck area. 10/10. Skin is a little more irritated. no drainage noted. neck area red and slightly peeling. Silvadene being used at [place]. Gauze dressing changed to telfa and easier to remove. Denies any fever or chills. tolerating ice chips and lollipops.Continues with GT feeds.\par \par \par 11/22/2022 OTV 4200cGy/7000cGy to the supraglottis: patient generally stable and doing well. Resides at [facility], [place].  Did not require suction from trach today. She does state she has pain at neck area and takes dilaudid for pain with some relief. trach site has had some bleeding on gauze and nurses at [place] clean and change area as per protocol. Denies any fever, chills, or any other drainage from trach site.tolerates ice chips and sips of water.\par \par 11/15/2022 OTV 3000cGy/7000cGy to the supraglottis: Patient states she is doing well. Did not require suction from trach today. Continuing taking magic mouth wash and dilaudid, which she states relief. Able to tolerate ice chips orally. States last week she was bleeding while getting suctioned, but that has subsided this week. \par \par 11/8/2022 OTV 2400cGy/7000cGy to the supraglottis: Pt states she is doing better. Did not require any suction from trach today. 02 98% on 8L. Pain 7/10 to the throat. Says magic mouth wash helps. Gets dilaudid q4hr. Receives all nutritional intake from PEG. \par \par 11/1/2022 OTV 1600cGy/7000cGy to the supraglottis:Pt trach was suctioned after treatment. States she had relief after. Pt c/o pain 6/10 to the throat. States she gets dilaudid q4hr at the facility but it does not relieve her pain. Oxygen 98% on 8L. Has PEG tube that she takes all nutritional intake. \par \par 10/25/2022 OTV 600cGy/7000cGy to the supraglottis: Pt states she is a little anxious from her treatment. Pt has PEG tube which she takes all nutritional intake from. States she only can tolerate ice cubes orally. Denies any pain or discomfort in her throat/neck area. Suctioned pt trach upon request, pt states she is doing much better afterwards. Pt currently on 8L o2.

## 2023-02-21 ENCOUNTER — OUTPATIENT (OUTPATIENT)
Dept: OUTPATIENT SERVICES | Facility: HOSPITAL | Age: 58
LOS: 1 days | End: 2023-02-21
Payer: MEDICARE

## 2023-02-21 ENCOUNTER — APPOINTMENT (OUTPATIENT)
Dept: RADIATION ONCOLOGY | Facility: HOSPITAL | Age: 58
End: 2023-02-21
Payer: MEDICARE

## 2023-02-21 ENCOUNTER — NON-APPOINTMENT (OUTPATIENT)
Age: 58
End: 2023-02-21

## 2023-02-21 VITALS
RESPIRATION RATE: 16 BRPM | SYSTOLIC BLOOD PRESSURE: 145 MMHG | TEMPERATURE: 99.1 F | OXYGEN SATURATION: 95 % | DIASTOLIC BLOOD PRESSURE: 65 MMHG | HEART RATE: 77 BPM

## 2023-02-21 DIAGNOSIS — R13.14 DYSPHAGIA, PHARYNGOESOPHAGEAL PHASE: ICD-10-CM

## 2023-02-21 DIAGNOSIS — C32.1 MALIGNANT NEOPLASM OF SUPRAGLOTTIS: ICD-10-CM

## 2023-02-21 DIAGNOSIS — Z98.890 OTHER SPECIFIED POSTPROCEDURAL STATES: Chronic | ICD-10-CM

## 2023-02-21 DIAGNOSIS — Z00.8 ENCOUNTER FOR OTHER GENERAL EXAMINATION: ICD-10-CM

## 2023-02-21 DIAGNOSIS — R13.12 DYSPHAGIA, OROPHARYNGEAL PHASE: ICD-10-CM

## 2023-02-21 DIAGNOSIS — R49.0 DYSPHONIA: ICD-10-CM

## 2023-02-21 DIAGNOSIS — Z92.3 PERSONAL HISTORY OF IRRADIATION: ICD-10-CM

## 2023-02-21 PROCEDURE — 92611 MOTION FLUOROSCOPY/SWALLOW: CPT | Mod: GN

## 2023-02-21 PROCEDURE — 74230 X-RAY XM SWLNG FUNCJ C+: CPT | Mod: 26

## 2023-02-21 PROCEDURE — 74230 X-RAY XM SWLNG FUNCJ C+: CPT

## 2023-02-21 PROCEDURE — 99024 POSTOP FOLLOW-UP VISIT: CPT

## 2023-02-21 NOTE — VITALS
[Maximal Pain Intensity: 5/10] : 5/10 [Pain Location: ___] : Pain Location: [unfilled] [NSAID/Non-Opioid] : NSAID/Non-Opioid [50: Requires considerable assistance and frequent medical care.] : 50: Requires considerable assistance and frequent medical care.

## 2023-02-22 DIAGNOSIS — R13.14 DYSPHAGIA, PHARYNGOESOPHAGEAL PHASE: ICD-10-CM

## 2023-02-22 DIAGNOSIS — R49.0 DYSPHONIA: ICD-10-CM

## 2023-02-23 DIAGNOSIS — R13.12 DYSPHAGIA, OROPHARYNGEAL PHASE: ICD-10-CM

## 2023-02-23 NOTE — ASSESSMENT
[Work-up necessary to assess local, regional or metastatic recurrence] : Work-up necessary to assess local, regional or metastatic recurrence [FreeTextEntry1] : Pet scan 3 months post treatment

## 2023-02-23 NOTE — DISEASE MANAGEMENT
[TTNM] : 3 [NTNM] : 2c [MTNM] : 0 [de-identified] : 7000cGy [de-identified] : 7000cGy [de-identified] : supraglottis

## 2023-02-23 NOTE — PHYSICAL EXAM
[General Appearance - Alert] : alert [General Appearance - In No Acute Distress] : in no acute distress [Heart Rate And Rhythm] : heart rate and rhythm were normal [Heart Sounds] : normal S1 and S2 [Feeding Tube] : a feeding tube was present [Normal] : normal skin color and pigmentation and no rash [Oriented To Time, Place, And Person] : oriented to person, place, and time [de-identified] : Trach in place/ O2 collar 4 liters [de-identified] : F

## 2023-02-23 NOTE — REVIEW OF SYSTEMS
[Negative] : Eyes [Dysphagia: Grade 2 - Symptomatic and altered eating/swallowing] : Dysphagia: Grade 2 - Symptomatic and altered eating/swallowing [Esophagitis: Grade 0] : Esophagitis: Grade 0 [Fatigue: Grade 1 - Fatigue relieved by rest] : Fatigue: Grade 1 - Fatigue relieved by rest [Dermatitis Radiation: Grade 1 - Faint erythema or dry desquamation] : Dermatitis Radiation: Grade 1 - Faint erythema or dry desquamation [Dysphagia] : no dysphagia [Loss of Hearing] : no loss of hearing [Nosebleeds] : no nosebleeds [Hearing Disturbances] : no hearing disturbances [Wheezing] : no wheezing [Cough] : no cough [Abdominal Pain] : no abdominal pain [Vomiting] : no vomiting [Constipation] : no constipation [Diarrhea] : no diarrhea [Urinary Frequency] : no change in urinary frequency [Nocturia] : no nocturia [Vaginal Discharge] : no vaginal discharge [Dysmenorrhea/Abn Vaginal Bleeding] : no dysmenorrhea/abnormal vaginal bleeding [Joint Pain] : no joint pain [Muscle Pain] : no muscle pain [Confused] : no confusion [Dizziness] : no dizziness [Fainting] : no fainting [Suicidal] : not suicidal [Insomnia] : no insomnia [Depression] : no depression [Proptosis] : no proptosis [Hot Flashes] : no hot flashes [FreeTextEntry4] : trach/ sips of water and ice chips allowed [FreeTextEntry6] : Supplemental O2 continued to trach [FreeTextEntry7] : Gtube for meds and feeds [FreeTextEntry8] : Incontinent

## 2023-02-23 NOTE — END OF VISIT
[Time Spent: ___ minutes] : I have spent [unfilled] minutes of time on the encounter. [FreeTextEntry3] : MD Note: I was present with the N.P. during the key portions of the history and exam. I agree with the findings and plan as documented in the N.P's note, unless noted below. \par I was physically present for the key portions of the evaluation and management service provided. I agree with the history and physical, and plan which I have reviewed and edited where appropriate.

## 2023-02-23 NOTE — HISTORY OF PRESENT ILLNESS
[FreeTextEntry1] : 2/21/2023 PTE 7000cGy to supraglottis from 10/21/2022- 12/21/2022. Patient stable. Completed most of chemo except one because of her skin reaction. Facial sores and trach site irritation healed well. Pain at neck/trach site has improved significantly. Still on narcotics for pain in her legs. Trach collar O2 4 liters continued. Resides at [facility]. Pt is bed ridden. Will return to PT once neck has improved. She has followed up with Dr. Landaverde and . ENT has ordered her a speech and swallow evaluation in which she has today after our visit. \par \par 12/13/2022 OTV 6200cGy/7000cGy to the supraglottis: Patient stable. she still has the burning/redness to lips and face since last week that they attribute to chemotherapy. Last chemo due tomorrow. steroid cream in am, and lidocaine at night. neck area looks and feels about the same. skin care to neck area continued at [facility]. Comes assisted by EMT from [facility]. Trach collar O2 continued. \par \par 12/6/2022 OTV 5600cGy/7000cGy to the supraglottis: pt generally stable. As of Saturday she has experienced pain and redness/chapped area to her lips and around lips. No pain in her mouth noted. Pt has used A/D, vaseline and chapstick as well with mild relief. She gets some relief but has to reapply often. The pain to neck area has been the same. Relief met with the combination of pain meds and silvadene/ skin care. Pain to libs can be 10/10 and pain to neck area can be 8/10. She has appointment with hem-onc tomorrow and will also discuss skin reaction with them. \par \par 11/29/2022 OTV   4800cGy/7000cGy  to the supraglottis:\par Pt stable and doing well. Resides at [facility]. Did not require suctioning. continues with O2 via trach collar.Brought up some phlegm orally with coughing up. Pt states she still has pain at neck area. 10/10. Skin is a little more irritated. no drainage noted. neck area red and slightly peeling. Silvadene being used at [place]. Gauze dressing changed to telfa and easier to remove. Denies any fever or chills. tolerating ice chips and lollipops.Continues with GT feeds.\par \par \par 11/22/2022 OTV 4200cGy/7000cGy to the supraglottis: patient generally stable and doing well. Resides at [facility].  Did not require suction from trach today. She does state she has pain at neck area and takes dilaudid for pain with some relief. trach site has had some bleeding on gauze and nurses at [facility] clean and change area as per protocol. Denies any fever, chills, or any other drainage from trach site.tolerates ice chips and sips of water.\par \par 11/15/2022 OTV 3000cGy/7000cGy to the supraglottis: Patient states she is doing well. Did not require suction from trach today. Continuing taking magic mouth wash and dilaudid, which she states relief. Able to tolerate ice chips orally. States last week she was bleeding while getting suctioned, but that has subsided this week. \par \par 11/8/2022 OTV 2400cGy/7000cGy to the supraglottis: Pt states she is doing better. Did not require any suction from trach today. 02 98% on 8L. Pain 7/10 to the throat. Says magic mouth wash helps. Gets dilaudid q4hr. Receives all nutritional intake from PEG. \par \par 11/1/2022 OTV 1600cGy/7000cGy to the supraglottis:Pt trach was suctioned after treatment. States she had relief after. Pt c/o pain 6/10 to the throat. States she gets dilaudid q4hr at the facility but it does not relieve her pain. Oxygen 98% on 8L. Has PEG tube that she takes all nutritional intake. \par \par 10/25/2022 OTV 600cGy/7000cGy to the supraglottis: Pt states she is a little anxious from her treatment. Pt has PEG tube which she takes all nutritional intake from. States she only can tolerate ice cubes orally. Denies any pain or discomfort in her throat/neck area. Suctioned pt trach upon request, pt states she is doing much better afterwards. Pt currently on 8L o2.

## 2023-03-03 ENCOUNTER — OUTPATIENT (OUTPATIENT)
Dept: OUTPATIENT SERVICES | Facility: HOSPITAL | Age: 58
LOS: 1 days | End: 2023-03-03
Payer: MEDICARE

## 2023-03-03 DIAGNOSIS — Z00.8 ENCOUNTER FOR OTHER GENERAL EXAMINATION: ICD-10-CM

## 2023-03-03 DIAGNOSIS — Z12.31 ENCOUNTER FOR SCREENING MAMMOGRAM FOR MALIGNANT NEOPLASM OF BREAST: ICD-10-CM

## 2023-03-03 DIAGNOSIS — Z98.890 OTHER SPECIFIED POSTPROCEDURAL STATES: Chronic | ICD-10-CM

## 2023-03-03 PROCEDURE — 77067 SCR MAMMO BI INCL CAD: CPT

## 2023-03-03 PROCEDURE — 77067 SCR MAMMO BI INCL CAD: CPT | Mod: 26

## 2023-03-03 PROCEDURE — 77063 BREAST TOMOSYNTHESIS BI: CPT

## 2023-03-03 PROCEDURE — 77063 BREAST TOMOSYNTHESIS BI: CPT | Mod: 26

## 2023-03-04 DIAGNOSIS — Z12.31 ENCOUNTER FOR SCREENING MAMMOGRAM FOR MALIGNANT NEOPLASM OF BREAST: ICD-10-CM

## 2023-03-10 ENCOUNTER — OUTPATIENT (OUTPATIENT)
Dept: OUTPATIENT SERVICES | Facility: HOSPITAL | Age: 58
LOS: 1 days | End: 2023-03-10
Payer: MEDICARE

## 2023-03-10 DIAGNOSIS — C77.0 SECONDARY AND UNSPECIFIED MALIGNANT NEOPLASM OF LYMPH NODES OF HEAD, FACE AND NECK: ICD-10-CM

## 2023-03-10 DIAGNOSIS — Z98.890 OTHER SPECIFIED POSTPROCEDURAL STATES: Chronic | ICD-10-CM

## 2023-03-10 LAB — GLUCOSE BLDC GLUCOMTR-MCNC: 109 MG/DL — HIGH (ref 70–99)

## 2023-03-10 PROCEDURE — 82962 GLUCOSE BLOOD TEST: CPT

## 2023-03-10 PROCEDURE — 78815 PET IMAGE W/CT SKULL-THIGH: CPT | Mod: 26,PS,MH

## 2023-03-10 PROCEDURE — A9552: CPT

## 2023-03-10 PROCEDURE — 78815 PET IMAGE W/CT SKULL-THIGH: CPT | Mod: PS

## 2023-03-11 DIAGNOSIS — C77.0 SECONDARY AND UNSPECIFIED MALIGNANT NEOPLASM OF LYMPH NODES OF HEAD, FACE AND NECK: ICD-10-CM

## 2023-03-17 ENCOUNTER — OUTPATIENT (OUTPATIENT)
Dept: OUTPATIENT SERVICES | Facility: HOSPITAL | Age: 58
LOS: 1 days | End: 2023-03-17
Payer: MEDICARE

## 2023-03-17 ENCOUNTER — APPOINTMENT (OUTPATIENT)
Dept: HEMATOLOGY ONCOLOGY | Facility: CLINIC | Age: 58
End: 2023-03-17

## 2023-03-17 ENCOUNTER — APPOINTMENT (OUTPATIENT)
Dept: HEMATOLOGY ONCOLOGY | Facility: CLINIC | Age: 58
End: 2023-03-17
Payer: MEDICARE

## 2023-03-17 VITALS
TEMPERATURE: 97.3 F | DIASTOLIC BLOOD PRESSURE: 62 MMHG | SYSTOLIC BLOOD PRESSURE: 136 MMHG | HEIGHT: 61 IN | RESPIRATION RATE: 16 BRPM | HEART RATE: 69 BPM

## 2023-03-17 DIAGNOSIS — Z98.890 OTHER SPECIFIED POSTPROCEDURAL STATES: Chronic | ICD-10-CM

## 2023-03-17 DIAGNOSIS — D64.9 ANEMIA, UNSPECIFIED: ICD-10-CM

## 2023-03-17 LAB
ALBUMIN SERPL ELPH-MCNC: 3.8 G/DL
ALP BLD-CCNC: 88 U/L
ALT SERPL-CCNC: 8 U/L
ANION GAP SERPL CALC-SCNC: 10 MMOL/L
APTT BLD: 39.2 SEC
AST SERPL-CCNC: 10 U/L
BILIRUB SERPL-MCNC: 0.2 MG/DL
BUN SERPL-MCNC: 14 MG/DL
CALCIUM SERPL-MCNC: 9.4 MG/DL
CHLORIDE SERPL-SCNC: 92 MMOL/L
CO2 SERPL-SCNC: 28 MMOL/L
CREAT SERPL-MCNC: 0.5 MG/DL
EGFR: 109 ML/MIN/1.73M2
GLUCOSE SERPL-MCNC: 120 MG/DL
INR PPP: 1.11 RATIO
MAGNESIUM SERPL-MCNC: 2.2 MG/DL
POTASSIUM SERPL-SCNC: 4.2 MMOL/L
PROT SERPL-MCNC: 6.8 G/DL
PT BLD: 12.7 SEC
SODIUM SERPL-SCNC: 130 MMOL/L

## 2023-03-17 PROCEDURE — 36415 COLL VENOUS BLD VENIPUNCTURE: CPT

## 2023-03-17 PROCEDURE — 80053 COMPREHEN METABOLIC PANEL: CPT

## 2023-03-17 PROCEDURE — 85730 THROMBOPLASTIN TIME PARTIAL: CPT

## 2023-03-17 PROCEDURE — 83735 ASSAY OF MAGNESIUM: CPT

## 2023-03-17 PROCEDURE — 99214 OFFICE O/P EST MOD 30 MIN: CPT

## 2023-03-17 PROCEDURE — 85610 PROTHROMBIN TIME: CPT

## 2023-03-17 NOTE — ASSESSMENT
[FreeTextEntry1] : # Stage T3N3C? 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  I do believe she would tolerate definitive chemoradiation and I recommended we incorporate carboplatin with an AUC dose of 1.5 weekly.  Explained to the patient may be some inferior outcomes compared to cisplatin but I am concerned about weekly cisplatin with all her medical comorbidities.  I would not add Taxol to the carboplatin given her severe neuropathy\par - 10/20/2022 started chemoradiation with Carboplatin\par - she did not tolerate carboplatin due to cytopenia it was stopped and switch to Cetuximab 250 mg/m2 to complete with radiation\par - 12/21/2022 completed chemoRT.\par -will have her see Dr. Landaverde of ENT for surveillance as well and to confirm CR.\par - 03/10/2023 PET - Since 9/21/2022, No definite new sites of biologic tumor activity. Interval decrease in size and FDG uptake of the supraglottic laryngeal mass, SUV max 16.1, previously 18.7 (14% decrease). Resolution of bilateral FDG avid cervical lymph nodes. Interval increase in size and FDG uptake of 2 left lower lobe nodules, SUV max up to 20.0, previously 10.1 (98% increase). Few scattered right lung tree-in-bud nodular opacities are not FDG avid, likely from small airways disease. Scattered areas of skin uptake along the upper abdomen bilaterally which could represent areas of contamination from tracer or less likely possible areas of inflammation. Correlate with exam.\par \par # Drug Rash, likely 2/2 Cetuximab; Grade 2-3 rash.\par - c/w Doxy 100 mg daily, Hydrocortisone cream BID to rash.\par - s/p prednisone 40 mg daily for 7 days\par - spoke about holding the cetuximab but decided to continue with decrease dose to 150 mg/m2 weekly until she completed radiation which is planned for upcoming Monday 12/19/2022.\par \par # BLE pain  much better \par - was on hydromorphone 4 mg via G-tube Q4H PRN given at [facility].\par \par # DVT on apixaban \par - on Apixaban since no obvious bleeding and PLT normal.\par \par # Mild thrombocytopenia from chemo - resolved.\par \par # Anemia due to critical illness and chemotherapy  worsening no obvious bleeding\par - will transfuse to keep hgb around 7-8\par - she is asymptomatic today and her CBC CMP are stable.\par \par PLAN:\par \par - proceed with biopsy of 2 left lower lobe nodules.\par \par - HOLD Eliquis 4 doses prior to biopsy.\par \par - Labs today: Mg and PT/PTT/INR.\par \par RTC after biopsy.

## 2023-03-17 NOTE — END OF VISIT
[FreeTextEntry3] : I was physically present for the key portions of the evaluation and management service provided.  I agree with the history and physical, and plan which I have reviewed and edited where appropriate.\par \par She is here for follow up. She saw Dr. Landaverde on 1/30/2023 and was ARLIN via laryngoscopy but had severe  radiation laryngitis. She had PET/CT done on 3/10/2023 that showed  No definite new sites of biologic tumor activity.\par \par Interval decrease in size and FDG uptake of the supraglottic laryngeal mass, SUV max 16.1, previously 18.7 (14% decrease).\par \par Resolution of bilateral FDG avid cervical lymph nodes.\par \par Interval increase in size and FDG uptake of 2 left lower lobe nodules, SUV max up to 20.0, previously 10.1 (98% increase).\par \par It spossible that the uptake in laryngreal mass is still from radaition but i suspect she had oligometastatic disease in the lungs \par \par Plan\par -will arrange for biopsy of lung nodule if possitive and same tumor would proceed with definitive Radiation vs surgical resection ( unlikely would be a candidate for surgery but will present at tumor board)\par -rtc post biopsy.\par -blood work today\par =hold apixaban 2 days ( 4 doses and day of) prior to the biopsy and can restart next day if not bleeding

## 2023-03-17 NOTE — HISTORY OF PRESENT ILLNESS
[Disease: _____________________] : Disease: [unfilled] [T: ___] : T[unfilled] [N: ___] : N[unfilled] [M: ___] : M[unfilled] [AJCC Stage: ____] : AJCC Stage: [unfilled] [Therapy: ___] : Therapy: [unfilled] [Cycle: ___] : Cycle: [unfilled] [de-identified] : THis  is a 56yo female sent to the ER from [facility] where she is on ch care for c/o inc SOB, chest tightness, diff breathing, wheezing i.e. acute on chr hypoxic RF. Of note the pt has a trach and has had freq readmissions for mucous plugging, RF and has had several bronchoscopies. EMS noted the pt to be hypotensive and hypoxic. The pt was vigorously suctioned, received Neb Tx, IV steroids, BP revived with IV fluids. The pt was cultured and placed on ABx. She is v well known to the Pul SVc and to ID. The pt is ad with acute on ch hypoxic RF and ongoing Bacterial PNA. The PMHx includes: HTN, ASHD, HFpEF, DLD, DM II, Chronic RF, COPD, LUCIANA, ASp PNA, sp intubation, failure to extubate, chronic trach ([facility] 4/22), recurrent mucus plugging of airways, DVT, AC with Eliquis, lower ext venous stasis dermatitis, bedbound state, GERD, diverticulosis, sp PEG, OA, DDD, DJD, chronic pain syn, depression anxiety. CVA and has loos of sensation on right side of the body\par \par Hosp Course: The pt was ad from SNF for inc SOB, hypoxia with mucous plugging and acute on chr RF and ongoing bacterial PNA. The pt was suctioned and placed on mech ventilation via trach and cont on ABx. The pt has had freq ad to hosp ( [facility]) since the [facility] and intubation and failure to wean that resulted in the trach in 6/22 in [facility]. The pt is mostly bed bound with lower ext weakness, mm atrophy/myopathy of disuse and sever hyperpigmentation skin changes of the lower ext. The pt was ff by Pul/critical care and ID. The pt was verbal and had nutrition via PEG. S&S worked with pt and during w/up pt was noted to have pharyngeal/trach swelling. 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 The pt was taken to the OR by ENT for direct laryngoscopy and bx of the mass on 8/19/22 with path showing \par \par \par Today she is in a stretcher, trach is connect to oxygen . Does not walk. Has diabetic neuropathy in both feet  that is very painful. Not SOB.  She has 3 childer. Lives in [facility] and is here with her \par \par Review of imaging: \par 7/1/2022 CT abd/pelvis:  IMPRESSION:\par \par Area of fat stranding surrounding the cecum and ascending colon, new since CT abdomen pelvis performed on June 20, 2022. Findings suggestive of cecitis. No discrete surrounding fluid collection/abscess.\par \par Partially visualized left lower lobe consolidative opacity. Consider follow-up with radiographs until resolution.\par \par 8/16/2022 CT neck: Impression:\par \par Status post tracheostomy. 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 Partial effacement of the preepiglottic fat.\par \par Mildly enhancing 1.1 cm right supraclavicular lymph node, nonspecific.\par \par Left hemidiaphragm elevation with compressive atelectasis in opacification of the partial imaged left lower lobe which could be infectious or inflammatory. Small left pleural effusion. Further evaluation with chest CT can be considered.\par \par 9/21/22: PET/CT IMPRESSION:\par \par Supraglottic mass measuring approximately 2.5 x 3.8 x 3.7 cm at the aryepiglottic folds, with max SUV of 18.7.\par \par Additional right level 2 cervical lymph node (Max SUV 5.0) and left level 2 cervical lymph node (Max SUV 3.9). These nodes are indeterminate for biologic tumor activity.\par \par Subsegmental area of atelectasis seen at the left lung base containing 2 foci of increased FDG uptake SUV measuring 10.1 and 6.1 (166, 171) within. These nodules are indeterminate for biologic tumor activity.\par \par Trace left pleural effusion.\par \par Bilateral FDG uptake in the skin of the axilla with adjacent subcutaneous fat stranding seen on localizer CT. This is probably infectious/inflammatory in etiology.\par \par Punctate focus of mild FDG uptake seen in the posterior back soft tissues at the level of the kidneys, favor benign etiology. Correlate with physical exam.\par \par Pathology\par 8/19/2022: stain shows the carcinoma is negative for p16.\par \par Tongue base mass, biopsy:\par -Superficial fragments of invasive squamous cell carcinoma, non-keratinized.  See note below\par \par LAbs: WBC 9.38, HGb 10.2, Plts 229  [de-identified] : squamous cell carcinoma [FreeTextEntry1] : Started Cetuximab on 11.30.2022 (concurrent RT) [de-identified] : 10/26/2022\par Resides in [facility]. Reports feeling relatively well; no changes in chronic conditions or new complaints since the last visit.\par \par 11/16/2022\par She is here for follow up. She  is on chemoRT  Current Dose: 3000cGy. Planned Dose: 7000cGy from 11/15/22. She is due for week  4 of 7 of chemotherapy.  No recent blood work. She missed her treatment last week due bleeding from the trach and throat and went to ED.   She had CT angioneck on 11/9/2022 that shwoed \par \par 1.  No CTA evidence of active contrast extravasation into the airway.\par \par 2.  No evidence of major vascular stenosis or occlusion.\par \par 3.  Redemonstration of extensive thickening involving the nasopharynx, \par oropharynx and supraglottic/glottic larynx in this patient with known \par neoplasm.\par \par She had blood work on 11/9/2022 that shwoed hgb of 8.3, Plts 89, \par \par Her bleeding stopped on its own. Createnine was stalbe 0.5\par \par 11/23/2022\par She is here for follow up. on 11/22 dose of radiation was  Current Dose: 4200cGy. Planned Dose: 7000cGy. She had CBC earlier today that showed ANC of  1110, Hgb keeps trending down  now 6.5, Pls are 48 went up.  Iroin studies were normal.  Carbo was on hold since last week.  She denies any bleeding except for some mild bleeding from trach. She states her apixiban has not been held in facility but since no major bleeding and plts now near 50 she can continue.\par \par 12/7/2022\par She is here for follow up had 5600cGy. Planned Dose: 7000cGy on 12/6/2022. She is on cetuxiamb CBC from last week showed WBC 1.55 Hgb 7.7 trending up and pltsa are now normal. She has been on Cetuximab since 11/30/2022 She had PRBC trasnfusion last week. She developed a rash on her mouth and has stomatitis and mild mucositis. The rash is aslo on her face. I called facillity and they did not administer the Minocycline. Rashis grade 2-3. She states the pain in her mouth is not terible. 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  She is laying in stretcher, currently residing in facility.  Reviewed most recent CBC, which shows slight improvement in hgb up to 9.7g/dL.  Patient continues on concurrent chemoRT regimen; Current Dose: 6600cGy. Planned Dose: 7000cGy. Treatment Site: supraglottis.  Spoke with [facility], who tentatively have her completion date as 12/19.  Patient denies fever, chills, nausea, vomiting, dyspnea, new palpable lumps/bumps or bleeding.  She has been on Cetuximab since 11/30/2022.  She developed a rash on her mouth and has stomatitis and mild mucositis following first cycle of Cetuximab;  Rash still present, grade 2-3 but reportedly slightly improved as per patient.  She has been applying hydrocortisone as needed and doxycycline daily.  Patient is due for week 3 of Cetuximab today.  \par \par 1/18/2022\par She is here for follow up. She completed chemoRT in Mid december. RAsh is better She had blood work in facility today and will obtain the rash is also much better.  She has no new complaints.  No longer has any dysphagia because of the trach she only uses ice chips unable to swallow.  She did have recent blood work in the facility approximately 1 week ago essentially white cells and platelets were normal hemoglobin was just over 8

## 2023-03-17 NOTE — PHYSICAL EXAM
[Completely disabled. Cannot carry on any self care. Totally confined to bed or chair] : Status 4- Completely disabled. Cannot carry on any self care. Totally confined to bed or chair [Obese] : obese [Normal] : affect appropriate [de-identified] : Tracheostomy in place. Missing teeth. no blood in dressing [de-identified] : Transmitted trach sounds on exam [de-identified] : BLE stasis dermatitis, edema is better [de-identified] : PEG [de-identified] : Rash on her face is resolving [de-identified] : Unable to walk.

## 2023-03-18 DIAGNOSIS — D64.9 ANEMIA, UNSPECIFIED: ICD-10-CM

## 2023-03-21 DIAGNOSIS — C78.02 SECONDARY MALIGNANT NEOPLASM OF LEFT LUNG: ICD-10-CM

## 2023-03-23 ENCOUNTER — APPOINTMENT (OUTPATIENT)
Dept: OTOLARYNGOLOGY | Facility: CLINIC | Age: 58
End: 2023-03-23
Payer: MEDICARE

## 2023-03-23 DIAGNOSIS — R49.0 DYSPHONIA: ICD-10-CM

## 2023-03-23 PROCEDURE — 99214 OFFICE O/P EST MOD 30 MIN: CPT | Mod: 25

## 2023-03-23 PROCEDURE — 31575 DIAGNOSTIC LARYNGOSCOPY: CPT

## 2023-03-23 NOTE — ASSESSMENT
[FreeTextEntry1] : PET/CT and laryngoscopy indeterminate for persistent local disease. There are no obvious mucosal masses, but there remains a great deal of edema and PET/CT shows persistent FDG avidity although decreased.  Dr. Mejia ordered bx of new lung nodules\par RV 6 weeks for repeat endoscopy; if lung bx negative and progressive laryngeal findings, will consider bx of larynx. Not a candidate for salvage surgery due to medical comorbidity so this would be to make decision re: systemic therapy

## 2023-03-23 NOTE — DATA REVIEWED
[de-identified] : PET/Ct personally reviewed and interpreted. Persistent but reduced in both size and FDG avidity mass centered on left supraglottis/luis base. No cervical adenopathy.\par \par Report reviewed with new lung nodules.

## 2023-03-23 NOTE — HISTORY OF PRESENT ILLNESS
[de-identified] : Oncology Summary\par Stage: [id number]\par Site: Supraglottis\par Pathology: SCC\par Treatment: Chemoradiation completed 12/15/2022\par Disease status: Possible metastasis\par Swallow: G tube dependent\par Airway: Trach with PMV\par Smoking: Former smoker, since quit\par Thyroid: Will check 6/2023\par  [FreeTextEntry1] : Patient following up on clogged ears , muffled hearing .   Patient states she has no improvement since last visit

## 2023-03-23 NOTE — PROCEDURE
[de-identified] : Flexible laryngoscopy performed. Nasal cavity, nasopharynx, oropharynx, hypopharynx, and larynx evaluated. \par \par Improved laryngeal edema, althogh remains difficult to visualize landmarks. True cords visualized, left is poorly mobile. No obvious mucosal masses or lesions seen today.

## 2023-03-24 ENCOUNTER — OUTPATIENT (OUTPATIENT)
Dept: OUTPATIENT SERVICES | Facility: HOSPITAL | Age: 58
LOS: 1 days | End: 2023-03-24

## 2023-03-24 ENCOUNTER — APPOINTMENT (OUTPATIENT)
Dept: HEMATOLOGY ONCOLOGY | Facility: CLINIC | Age: 58
End: 2023-03-24

## 2023-03-24 DIAGNOSIS — Z98.890 OTHER SPECIFIED POSTPROCEDURAL STATES: Chronic | ICD-10-CM

## 2023-03-24 DIAGNOSIS — D64.9 ANEMIA, UNSPECIFIED: ICD-10-CM

## 2023-03-24 DIAGNOSIS — C32.1 MALIGNANT NEOPLASM OF SUPRAGLOTTIS: ICD-10-CM

## 2023-03-24 DIAGNOSIS — C78.02 SECONDARY MALIGNANT NEOPLASM OF LEFT LUNG: ICD-10-CM

## 2023-03-27 ENCOUNTER — APPOINTMENT (OUTPATIENT)
Dept: INTERVENTIONAL RADIOLOGY/VASCULAR | Facility: CLINIC | Age: 58
End: 2023-03-27
Payer: MEDICARE

## 2023-03-27 VITALS
OXYGEN SATURATION: 97 % | TEMPERATURE: 98.2 F | DIASTOLIC BLOOD PRESSURE: 71 MMHG | SYSTOLIC BLOOD PRESSURE: 144 MMHG | HEART RATE: 71 BPM

## 2023-03-27 PROCEDURE — 99202 OFFICE O/P NEW SF 15 MIN: CPT

## 2023-03-27 NOTE — PHYSICAL EXAM
[Alert] : alert [No Acute Distress] : no acute distress [Normal Outer Ear/Nose] : the ears and nose were normal in appearance [No Accessory Muscle Use] : no accessory muscle use [Completely disabled. Cannot carry on any selfcare. Totally confined to bed or chair] : Completely disabled. Cannot carry on any selfcare. Totally confined to bed or chair [de-identified] : post surgery with trach.  [de-identified] : on trach, requiring humidiefied air. no distress.

## 2023-03-27 NOTE — ASSESSMENT
[FreeTextEntry1] : 57-year-old female with laryngeal squamous cell carcinoma status post chemoradiation with follow-up PET scan showing interval decrease in size/SUV of multiple lymph nodes as well as the neck mass however there was an interval increase in size of the left lower lobe nodules as well as increase in SUV max.\par \par The patient is confined to a stretcher with a trach.  Explained the risks/benefits/alternatives of the procedure and the patient wished to proceed.\par \par Instructed the patient to hold Eliquis 2 days before the procedure and restart the next day if there is no signs or symptoms of bleeding.\par \par Presurgical testing will be performed at the nursing home and will schedule patient for biopsy.

## 2023-03-27 NOTE — HISTORY OF PRESENT ILLNESS
[FreeTextEntry1] : 57-year-old female with known supraglottic squamous cell carcinoma, ECOG of 4 with extensive medical comorbidities status post chemoradiation with follow-up PET demonstrating no new lesions and interval decrease in size/SUV uptake in the supraglottic laryngeal mass as well as cervical lymph nodes however previously identified left lower lobe nodules demonstrated an interval increase in size and interval increase in SUV max.  Hematology/oncology requesting to biopsy the lung lesions.

## 2023-04-24 ENCOUNTER — APPOINTMENT (OUTPATIENT)
Dept: HEMATOLOGY ONCOLOGY | Facility: CLINIC | Age: 58
End: 2023-04-24

## 2023-04-24 ENCOUNTER — OUTPATIENT (OUTPATIENT)
Dept: OUTPATIENT SERVICES | Facility: HOSPITAL | Age: 58
LOS: 1 days | End: 2023-04-24

## 2023-04-24 DIAGNOSIS — Z98.890 OTHER SPECIFIED POSTPROCEDURAL STATES: Chronic | ICD-10-CM

## 2023-04-24 DIAGNOSIS — C32.1 MALIGNANT NEOPLASM OF SUPRAGLOTTIS: ICD-10-CM

## 2023-04-25 DIAGNOSIS — C32.1 MALIGNANT NEOPLASM OF SUPRAGLOTTIS: ICD-10-CM

## 2023-05-04 RX ORDER — ACETYLCYSTEINE 200 MG/ML
4 VIAL (ML) MISCELLANEOUS
Qty: 0 | Refills: 0 | DISCHARGE

## 2023-05-04 RX ORDER — AMLODIPINE BESYLATE 2.5 MG/1
0 TABLET ORAL
Qty: 0 | Refills: 3 | DISCHARGE

## 2023-05-04 RX ORDER — METFORMIN HYDROCHLORIDE 850 MG/1
0 TABLET ORAL
Qty: 0 | Refills: 0 | DISCHARGE

## 2023-05-04 RX ORDER — APIXABAN 2.5 MG/1
0 TABLET, FILM COATED ORAL
Qty: 0 | Refills: 0 | DISCHARGE

## 2023-05-04 RX ORDER — GABAPENTIN 400 MG/1
0 CAPSULE ORAL
Qty: 0 | Refills: 0 | DISCHARGE

## 2023-05-04 RX ORDER — ALPRAZOLAM 0.25 MG
0 TABLET ORAL
Qty: 0 | Refills: 0 | DISCHARGE

## 2023-05-04 RX ORDER — ACETYLCYSTEINE 200 MG/ML
3 VIAL (ML) MISCELLANEOUS
Qty: 0 | Refills: 0 | DISCHARGE

## 2023-05-04 RX ORDER — ALBUTEROL 90 UG/1
0 AEROSOL, METERED ORAL
Qty: 0 | Refills: 0 | DISCHARGE

## 2023-05-04 RX ORDER — LOSARTAN POTASSIUM 100 MG/1
0 TABLET, FILM COATED ORAL
Qty: 0 | Refills: 0 | DISCHARGE

## 2023-05-04 RX ORDER — INSULIN ASPART 100 [IU]/ML
0 INJECTION, SOLUTION SUBCUTANEOUS
Qty: 0 | Refills: 0 | DISCHARGE

## 2023-05-04 RX ORDER — BUMETANIDE 0.25 MG/ML
0 INJECTION INTRAMUSCULAR; INTRAVENOUS
Qty: 0 | Refills: 0 | DISCHARGE

## 2023-05-04 RX ORDER — INSULIN GLARGINE 100 [IU]/ML
0 INJECTION, SOLUTION SUBCUTANEOUS
Qty: 0 | Refills: 0 | DISCHARGE

## 2023-05-04 RX ORDER — SERTRALINE 25 MG/1
1 TABLET, FILM COATED ORAL
Qty: 0 | Refills: 0 | DISCHARGE

## 2023-05-04 RX ORDER — LABETALOL HCL 100 MG
0 TABLET ORAL
Qty: 0 | Refills: 0 | DISCHARGE

## 2023-05-04 RX ORDER — CLOPIDOGREL BISULFATE 75 MG/1
0 TABLET, FILM COATED ORAL
Qty: 0 | Refills: 0 | DISCHARGE

## 2023-05-04 RX ORDER — ATORVASTATIN CALCIUM 80 MG/1
0 TABLET, FILM COATED ORAL
Qty: 0 | Refills: 3 | DISCHARGE

## 2023-05-04 RX ORDER — GLIMEPIRIDE 1 MG
0 TABLET ORAL
Qty: 0 | Refills: 0 | DISCHARGE

## 2023-05-06 ENCOUNTER — EMERGENCY (EMERGENCY)
Facility: HOSPITAL | Age: 58
LOS: 0 days | Discharge: HOME | End: 2023-05-06
Attending: EMERGENCY MEDICINE
Payer: MEDICARE

## 2023-05-06 VITALS
SYSTOLIC BLOOD PRESSURE: 160 MMHG | HEART RATE: 64 BPM | DIASTOLIC BLOOD PRESSURE: 70 MMHG | RESPIRATION RATE: 18 BRPM | OXYGEN SATURATION: 97 %

## 2023-05-06 VITALS
SYSTOLIC BLOOD PRESSURE: 121 MMHG | HEIGHT: 66 IN | TEMPERATURE: 98 F | OXYGEN SATURATION: 97 % | HEART RATE: 65 BPM | WEIGHT: 173.06 LBS | DIASTOLIC BLOOD PRESSURE: 59 MMHG | RESPIRATION RATE: 20 BRPM

## 2023-05-06 DIAGNOSIS — Z98.890 OTHER SPECIFIED POSTPROCEDURAL STATES: Chronic | ICD-10-CM

## 2023-05-06 DIAGNOSIS — K94.23 GASTROSTOMY MALFUNCTION: ICD-10-CM

## 2023-05-06 DIAGNOSIS — I50.9 HEART FAILURE, UNSPECIFIED: ICD-10-CM

## 2023-05-06 DIAGNOSIS — E11.9 TYPE 2 DIABETES MELLITUS WITHOUT COMPLICATIONS: ICD-10-CM

## 2023-05-06 DIAGNOSIS — Z79.4 LONG TERM (CURRENT) USE OF INSULIN: ICD-10-CM

## 2023-05-06 DIAGNOSIS — J44.9 CHRONIC OBSTRUCTIVE PULMONARY DISEASE, UNSPECIFIED: ICD-10-CM

## 2023-05-06 DIAGNOSIS — Z88.0 ALLERGY STATUS TO PENICILLIN: ICD-10-CM

## 2023-05-06 PROCEDURE — 96372 THER/PROPH/DIAG INJ SC/IM: CPT

## 2023-05-06 PROCEDURE — 99284 EMERGENCY DEPT VISIT MOD MDM: CPT | Mod: 25

## 2023-05-06 PROCEDURE — 99284 EMERGENCY DEPT VISIT MOD MDM: CPT | Mod: FS

## 2023-05-06 PROCEDURE — 74018 RADEX ABDOMEN 1 VIEW: CPT

## 2023-05-06 PROCEDURE — 74018 RADEX ABDOMEN 1 VIEW: CPT | Mod: 26,76

## 2023-05-06 RX ORDER — HYDROMORPHONE HYDROCHLORIDE 2 MG/ML
1 INJECTION INTRAMUSCULAR; INTRAVENOUS; SUBCUTANEOUS ONCE
Refills: 0 | Status: DISCONTINUED | OUTPATIENT
Start: 2023-05-06 | End: 2023-05-06

## 2023-05-06 RX ORDER — ALPRAZOLAM 0.25 MG
1 TABLET ORAL ONCE
Refills: 0 | Status: DISCONTINUED | OUTPATIENT
Start: 2023-05-06 | End: 2023-05-06

## 2023-05-06 RX ORDER — DIATRIZOATE MEGLUMINE 180 MG/ML
30 INJECTION, SOLUTION INTRAVESICAL ONCE
Refills: 0 | Status: DISCONTINUED | OUTPATIENT
Start: 2023-05-06 | End: 2023-05-06

## 2023-05-06 RX ADMIN — HYDROMORPHONE HYDROCHLORIDE 1 MILLIGRAM(S): 2 INJECTION INTRAMUSCULAR; INTRAVENOUS; SUBCUTANEOUS at 17:10

## 2023-05-06 RX ADMIN — HYDROMORPHONE HYDROCHLORIDE 1 MILLIGRAM(S): 2 INJECTION INTRAMUSCULAR; INTRAVENOUS; SUBCUTANEOUS at 22:21

## 2023-05-06 NOTE — ED PROVIDER NOTE - ATTENDING APP SHARED VISIT CONTRIBUTION OF CARE
58 yo f with pmh of chf, copd, dm, throat ca s/p trach, peg (balloonless), sent from NH for "dislodged peg".  as per pt, was never out of the stoma.  says the nursing staff was "milking" the peg and noted the external retention ring was further away from the skin than before.  staff also says that they were unable to flush the tube when giving medication.  exam: PEG in place, no balloon port, able to flush tube with no resistance and able to pull back gastric contents imp: pt with concern for PEG displacement.  appears to be in place and flushing well.  will flush with gastrografin to confirm placement and xr, if not in place, GI consult

## 2023-05-06 NOTE — ED ADULT TRIAGE NOTE - CHIEF COMPLAINT QUOTE
BIBA from [facility] [place] for a bleeding g-tube, EMS states her g-tube was "tugged"

## 2023-05-06 NOTE — ED PROVIDER NOTE - PATIENT PORTAL LINK FT
You can access the FollowMyHealth Patient Portal offered by [facility] by registering at the following website: [email or web address]. By joining [employer]’s FollowMyHealth portal, you will also be able to view your health information using other applications (apps) compatible with our system.

## 2023-05-06 NOTE — ED PROVIDER NOTE - CLINICAL SUMMARY MEDICAL DECISION MAKING FREE TEXT BOX
Pt evaluated after sign out. Pt with PEG tube malfunction. Tube flushed. Works well. XR done, confirmed placement. Will d/c.

## 2023-05-06 NOTE — ED PROVIDER NOTE - PROGRESS NOTE DETAILS
first xray done without oral contrast    2nd xray prelim oral contrast appears intraluminal, pending radiology read

## 2023-05-06 NOTE — ED PROVIDER NOTE - PHYSICAL EXAMINATION
VITAL SIGNS: I have reviewed nursing notes and confirm.  CONSTITUTIONAL: in no acute distress. pt bed bound, G tube in place and flushing without resistance    SKIN: Skin exam is warm and dry, no acute rash.  HEAD: Normocephalic; atraumatic.  EYES:  EOM intact; conjunctiva and sclera clear.  ENT: No nasal discharge; airway clear.   NECK: Supple; non tender.  CARD: S1, S2 normal; no murmurs, gallops, or rubs. Regular rate and rhythm.  RESP: No wheezes, rales or rhonchi. Speaking in full sentences.   ABD: soft; non-distended; non-tender; No rebound or guarding

## 2023-05-06 NOTE — ED PROVIDER NOTE - OBJECTIVE STATEMENT
57 female history of diabetes, CHF, COPD presenting to ED for G-tube malfunction.  Patient states that earlier today the G-tube was tugged and afterwards when he tried to flush the G-tube it was not flushing and they were unable to give her medication.  Here in ED patient denies any pain or discomfort

## 2023-05-10 ENCOUNTER — NON-APPOINTMENT (OUTPATIENT)
Age: 58
End: 2023-05-10

## 2023-05-10 ENCOUNTER — APPOINTMENT (OUTPATIENT)
Dept: RADIATION ONCOLOGY | Facility: HOSPITAL | Age: 58
End: 2023-05-10

## 2023-05-10 VITALS
SYSTOLIC BLOOD PRESSURE: 139 MMHG | TEMPERATURE: 97.5 F | OXYGEN SATURATION: 96 % | RESPIRATION RATE: 16 BRPM | DIASTOLIC BLOOD PRESSURE: 63 MMHG | HEART RATE: 57 BPM

## 2023-05-16 NOTE — PROGRESS NOTE ADULT - PROVIDER SPECIALTY LIST ADULT
CC:  Ralph De Luna is here today for back pain.    Medications: currently is not taking any medications  Refills needed today?  NO  denies Latex allergy or sensitivity  Patient would like communication of their results via:      Cell Phone:   Telephone Information:   Mobile [phone]     Okay to leave a message containing results? Yes  Tobacco history: verified      Patient   Health Maintenance Due   Topic Date Due   • COVID-19 Vaccine (1) Never done   • Meningococcal Serogroup B Vaccine (2 of 2 - Risk Bexsero 2-dose series) 03/23/2021   • Depression Screening  05/31/2023       Patient is due for the topics as listed above and wishes to proceed with them.                                  ENT

## 2023-05-18 ENCOUNTER — APPOINTMENT (OUTPATIENT)
Dept: OTOLARYNGOLOGY | Facility: CLINIC | Age: 58
End: 2023-05-18
Payer: MEDICARE

## 2023-05-18 PROCEDURE — 31575 DIAGNOSTIC LARYNGOSCOPY: CPT | Mod: 59

## 2023-05-18 PROCEDURE — 99213 OFFICE O/P EST LOW 20 MIN: CPT | Mod: 25

## 2023-05-18 PROCEDURE — 31615 TRCHEOBRNCHSC EST TRACHS INC: CPT

## 2023-05-18 NOTE — PHYSICAL EXAM
[de-identified] : #6 cuffless trach in place, PMV, tolerated finger occlusion [de-identified] : left ear serous effusion [Normal] : mucosa is normal [Midline] : trachea located in midline position

## 2023-05-18 NOTE — PROCEDURE
[Complicated Symptoms] : complicated symptoms requiring more thorough examination than provided by mirror [None] : none [Flexible Endoscope] : examined with the flexible endoscope [de-identified] : Flexible laryngoscopy performed. Nasal cavity, nasopharynx, oropharynx, and limited laryngeal evaluation performed. Tongue base clear of masses, there is intense supraglottic edema and secretions effacing full evaluation of her larynx. The tip of epiglottis at least is clear of disease\par \par In a separately identifiable procedure, scope introduced through existing tracheostoma. Trachea and bilateral mainstem bronchi evaluated. No masses or lesions, thin secretions, airway patent.\par

## 2023-05-18 NOTE — HISTORY OF PRESENT ILLNESS
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  She is  accompanied by  EMS .

## 2023-05-18 NOTE — REASON FOR VISIT
[Initial Evaluation] : an initial evaluation for [FreeTextEntry2] : malignant neoplasm of supraglottis , dysphonia

## 2023-05-18 NOTE — ASSESSMENT
[FreeTextEntry1] : Needs lung bx\par Will coordinate with IR to evaluate\par Exam of head and neck limited by secretions; no obvious disease noted by cannot fully evaluate larynx

## 2023-05-20 ENCOUNTER — EMERGENCY (EMERGENCY)
Facility: HOSPITAL | Age: 58
LOS: 0 days | Discharge: HOME | End: 2023-05-21
Attending: EMERGENCY MEDICINE
Payer: MEDICARE

## 2023-05-20 VITALS
SYSTOLIC BLOOD PRESSURE: 114 MMHG | DIASTOLIC BLOOD PRESSURE: 57 MMHG | OXYGEN SATURATION: 94 % | HEART RATE: 64 BPM | TEMPERATURE: 99 F | RESPIRATION RATE: 16 BRPM | HEIGHT: 66 IN

## 2023-05-20 DIAGNOSIS — Z87.891 PERSONAL HISTORY OF NICOTINE DEPENDENCE: ICD-10-CM

## 2023-05-20 DIAGNOSIS — J96.11 CHRONIC RESPIRATORY FAILURE WITH HYPOXIA: ICD-10-CM

## 2023-05-20 DIAGNOSIS — Z98.890 OTHER SPECIFIED POSTPROCEDURAL STATES: Chronic | ICD-10-CM

## 2023-05-20 DIAGNOSIS — Z79.01 LONG TERM (CURRENT) USE OF ANTICOAGULANTS: ICD-10-CM

## 2023-05-20 DIAGNOSIS — I50.9 HEART FAILURE, UNSPECIFIED: ICD-10-CM

## 2023-05-20 DIAGNOSIS — I11.0 HYPERTENSIVE HEART DISEASE WITH HEART FAILURE: ICD-10-CM

## 2023-05-20 DIAGNOSIS — Z79.4 LONG TERM (CURRENT) USE OF INSULIN: ICD-10-CM

## 2023-05-20 DIAGNOSIS — K94.23 GASTROSTOMY MALFUNCTION: ICD-10-CM

## 2023-05-20 DIAGNOSIS — K21.9 GASTRO-ESOPHAGEAL REFLUX DISEASE WITHOUT ESOPHAGITIS: ICD-10-CM

## 2023-05-20 DIAGNOSIS — E11.9 TYPE 2 DIABETES MELLITUS WITHOUT COMPLICATIONS: ICD-10-CM

## 2023-05-20 DIAGNOSIS — F41.9 ANXIETY DISORDER, UNSPECIFIED: ICD-10-CM

## 2023-05-20 DIAGNOSIS — J44.9 CHRONIC OBSTRUCTIVE PULMONARY DISEASE, UNSPECIFIED: ICD-10-CM

## 2023-05-20 DIAGNOSIS — Z88.0 ALLERGY STATUS TO PENICILLIN: ICD-10-CM

## 2023-05-20 DIAGNOSIS — Z86.718 PERSONAL HISTORY OF OTHER VENOUS THROMBOSIS AND EMBOLISM: ICD-10-CM

## 2023-05-20 PROCEDURE — 96372 THER/PROPH/DIAG INJ SC/IM: CPT

## 2023-05-20 PROCEDURE — 74018 RADEX ABDOMEN 1 VIEW: CPT

## 2023-05-20 PROCEDURE — 99284 EMERGENCY DEPT VISIT MOD MDM: CPT | Mod: FS

## 2023-05-20 PROCEDURE — 74018 RADEX ABDOMEN 1 VIEW: CPT | Mod: 26

## 2023-05-20 PROCEDURE — 99284 EMERGENCY DEPT VISIT MOD MDM: CPT | Mod: 25

## 2023-05-20 RX ORDER — MORPHINE SULFATE 50 MG/1
4 CAPSULE, EXTENDED RELEASE ORAL ONCE
Refills: 0 | Status: DISCONTINUED | OUTPATIENT
Start: 2023-05-20 | End: 2023-05-20

## 2023-05-20 RX ORDER — DIATRIZOATE MEGLUMINE 180 MG/ML
30 INJECTION, SOLUTION INTRAVESICAL ONCE
Refills: 0 | Status: COMPLETED | OUTPATIENT
Start: 2023-05-20 | End: 2023-05-20

## 2023-05-20 RX ADMIN — MORPHINE SULFATE 4 MILLIGRAM(S): 50 CAPSULE, EXTENDED RELEASE ORAL at 23:18

## 2023-05-20 RX ADMIN — DIATRIZOATE MEGLUMINE 30 MILLILITER(S): 180 INJECTION, SOLUTION INTRAVESICAL at 23:18

## 2023-05-20 RX ADMIN — Medication 0.5 MILLIGRAM(S): at 23:20

## 2023-05-20 NOTE — ED PROVIDER NOTE - OBJECTIVE STATEMENT
58 y/o female with a PMH of IDDM, GERD, HTN, COPD, Chronic hypoxemic and hypercapnic respiratory failure SP Tracheostomy (not chronically vented) & PEG at [facility], DVT on Eliquis, Anxiety, and CHF presents to the ED for evaluation of peg tube confirm. pt reports her previous peg tube came out earlier today, and the nursing home replaced it with with a 20 Uzbek tube but pt was sent here to confirm placement. pt reports the peg tube that was dislodged today was thrown out and the one that is currently in her was the replacement. pt reports yesterday when the peg tube was being used it was hurting pt. when I pushed contrast threw pt current peg tube it went through with ease and pt has no pain. pt denies fever, chills, n/v/d/c, weakness, or recent trauma.

## 2023-05-20 NOTE — ED PROVIDER NOTE - WR ORDER DATE AND TIME 1
11/13/2020    To: Mansi Chaparro MD    Re:  Shirley Lange    YOB: 2016    MRN: [id number]    Dear Colleague,     It was my pleasure to see Shirley on 11/13/2020.  In summary, Shirley Lange is a 3 year old female who presents with:     Pseudostrabismus  Without any strabismus on exam today.   - Educated to return to clinic for any worsening of the perceived misalignment or development of demonstrable misalignment of the eyes.    Myopic astigmatism of both eyes  Currently 20/20 without correction.   - Will need glasses in the future. Plan for annual cycloplegic refraction.      Thank you for the opportunity to care for Shirley. I have asked her to Return in about 1 year (around 11/13/2021) for Pediatric Optometry.  Until then, please do not hesitate to contact me or my clinic with any questions or concerns.          Warm regards,          Ashanti Parisi MD                 Pediatric Ophthalmology & Strabismus        Department of Ophthalmology & Visual Neurosciences        [facility]   CC:  MD Maxine Ricardo DO  Guardian of Shirley Lange    
20-May-2023 22:40

## 2023-05-20 NOTE — ED PROVIDER NOTE - PROGRESS NOTE DETAILS
FF: pt pushed contrast through pt 20 Yoruba peg tube with ease. there is mild drainage from around the peg tube that I cleaned and new dressing was placed.

## 2023-05-20 NOTE — ED PROVIDER NOTE - ATTENDING APP SHARED VISIT CONTRIBUTION OF CARE
57 female history of diabetes, CHF, COPD, trach and PEG presents after PEG tube dislogement. States that her PEG tube came out at nursing home facility. Tube was replaced at facility but sent to the ED for confirmation. no fevers. no abdominal pain. no n/v/d. States that surgery originally placed tube.     CONSTITUTIONAL: Well-developed; well-nourished; in no acute distress.   SKIN: warm, dry. no cellulitis.   HEAD: Normocephalic; atraumatic.  EYES: PERRL, EOMI, no conjunctival erythema  ENT: No nasal discharge; airway clear.  NECK: Supple; non tender. Trach.   CARD: S1, S2 normal;  Regular rate and rhythm.   RESP: No wheezes, rales or rhonchi.  ABD: soft non tender, non distended, no rebound or guarding. PEG tube in left upper abdomen.   EXT: Normal ROM.  5/5 strength in all 4 extremities   LYMPH: No acute cervical adenopathy.  NEURO: Alert, oriented, grossly unremarkable. neurovascularly intact  PSYCH: Cooperative, appropriate.

## 2023-05-20 NOTE — ED ADULT NURSE NOTE - CHIEF COMPLAINT QUOTE
Pt. BIBA from [facility] due to PEG tube dislodgement. As per EMS nursing home replaced tube, however unsure of placement, needs to be confirm   Pt. on trach collar 40% O2

## 2023-05-20 NOTE — ED ADULT NURSE NOTE - NSFALLHARMRISKINTERV_ED_ALL_ED
Assistance OOB with selected safe patient handling equipment if applicable/Assistance with ambulation/Communicate risk of Fall with Harm to all staff, patient, and family/Monitor gait and stability/Provide visual cue: red socks, yellow wristband, yellow gown, etc/Reinforce activity limits and safety measures with patient and family/Bed in lowest position, wheels locked, appropriate side rails in place/Call bell, personal items and telephone in reach/Instruct patient to call for assistance before getting out of bed/chair/stretcher/Non-slip footwear applied when patient is off stretcher/[place] to call system/Physically safe environment - no spills, clutter or unnecessary equipment/Purposeful Proactive Rounding/Room/bathroom lighting operational, light cord in reach

## 2023-05-20 NOTE — ED PROVIDER NOTE - CLINICAL SUMMARY MEDICAL DECISION MAKING FREE TEXT BOX
Patient presents after PEG tube dislodgement. Tube replaced at nursing home but sent to the ED for confirmation. Xray done, and read which confirms appropriate placement. Sent back to nursing home facility.

## 2023-05-20 NOTE — ED ADULT TRIAGE NOTE - CHIEF COMPLAINT QUOTE
Pt. BIBA from [facility] due to PEG tube dislodgement. As per EMS nursing home replaced tube, however unsure of placement, needs to be confirm   Pt. on trach collar 40% O2

## 2023-05-20 NOTE — ED PROVIDER NOTE - NSFOLLOWUPINSTRUCTIONS_ED_ALL_ED_FT
Please follow up with your primary care physician.       How to Care for a Feeding Tube  A feeding tube is a soft, flexible tube through which medicine, water, and liquid food can be given. A person may have a feeding tube if she or he has trouble swallowing or cannot have food or medicine by mouth.    Supplies needed to care for the tube site:  Clean gloves.  Clean washcloth, gauze pads, or soft paper towel.  Cotton swabs.  Skin barrier ointment or cream, such as petroleum jelly.  Soap and water.  Pre-cut foam pads or gauze for around the tube.  Tube tape.  Anchoring device (optional).  How to care for the tube site  Have all supplies ready and available.    Wash your hands well.    Put on clean gloves.    If there is a foam pad or gauze under the tube stabilizing disc and it is soiled or moist or has been there for more than one day, replace it.    Check the skin around the tube site for redness, a rash, swelling, drainage, or extra tissue growth. If you notice any of these, call your health care provider.    Use water and soap to moisten gauze pads and cotton swabs.    Use the moistened cotton swabs to wipe the area closest to the tube, right near the opening in the abdomen (stoma).    Use the moistened gauze pads to wipe the surrounding skin.    Rinse with water.    Use a washcloth, dry gauze pad, or soft paper towel to dry the skin and stoma site.    If the skin is red, use a cotton swab to apply a skin barrier cream or ointment in a circular motion. The cream or ointment will help the wound to heal. Do not apply antibiotic ointments at the tube site.    Apply a new pre-cut foam pad or gauze around the tube. If there is no drainage, you can leave off the foam pads or gauze.    Secure the pre-cut foam pad or gauze with tape around the edges.    Use tape or an anchoring device to fasten the feeding tube to the skin for comfort or as directed. Alternate where you put the tape to avoid damaging the skin.    Position the person in a semi-upright position, at about a 30–45 degree angle.    Throw away used supplies.    Remove your gloves.    Wash your hands.    Supplies needed to flush a feeding tube:  Clean gloves.  A clean 60 mL syringe that connects to the feeding tube.  Towel.  Sterile or purified water. Follow these guidelines:    Use sterile water if:    You have a weak immune system and have difficulty fighting off infections (are immunocompromised).  You are unsure about the amount of chemical contaminants in purified or drinking water.    Do not use fresh water found in lakes, rivers, reservoirs, or aquifers without treating or filtering first.  To purify drinking water by boiling:    Boil water for at least 1 minute. Keep a lid over the water while it boils.  Allow the water to cool to room temperature before using.      How to flush a feeding tube  Have all supplies ready and available.    Wash your hands well.    Put on clean gloves.    Draw up 30 mL of water into the syringe.    Before flushing, place the towel under the tube to catch any fluid leaks.    Kink the feeding tube while disconnecting it from the feeding-bag tubing or while removing the cap at the end of the tube. Kinking closes the tube and prevents fluid in the tube from spilling out.    Insert the tip of the syringe into the end of the feeding tube.    Release the kink.    Slowly inject the water. If you are unable to inject the water, the tip of the tube may be against the person's stomach, blocking fluid flow. To fix this problem, have the person with the feeding tube lie on his or her left side. Then, try injecting the water again. If there is resistance, do not use a lot of force to overcome it because this could cause the tube to tear.    Remove the syringe and replace the cap.    Throw away used supplies.    Remove your gloves.    Wash your hands.    Follow these instructions at home:  Caring for the tube     If there is a foam pad or gauze under the tube stabilizing disc, change it every day and when it is soiled or moist.  Do not apply antibiotic ointments at the tube site.  Flushing the tube     Do not use a syringe that is smaller than 60 mL.  To prevent medicine from clogging the tube, flush the tube at all of these times:    Before the person is given the first medicine.  Between medicines.  After the person is given the final medicine before starting a feeding.    Do not mix medicines with formula or with other medicines.  Thoroughly flush medicines through the tube so they do not mix with formula.  Contact a health care provider if:  The tube becomes blocked or clogged.  You find any of these on the skin around the tube site:    Redness.  A rash.  Swelling.  Drainage.  Extra tissue growth.    Summary  A feeding tube is a soft, flexible tube through which medicine, water, and liquid food can be given. A person may have a feeding tube if she or he has trouble swallowing or cannot have food or medicine by mouth.  Follow instructions from your health care provider about daily care and flushing of the tube.  Contact your health care provider if the tube becomes blocked or clogged, or if you notice swelling, drainage, or changes in skin around the tube site.  This information is not intended to replace advice given to you by your health care provider. Make sure you discuss any questions you have with your health care provider.

## 2023-05-20 NOTE — ED PROVIDER NOTE - PATIENT PORTAL LINK FT
You can access the FollowMyHealth Patient Portal offered by [facility] by registering at the following website: [email or web address]. By joining [facility]’s FollowMyHealth portal, you will also be able to view your health information using other applications (apps) compatible with our system.

## 2023-05-20 NOTE — ED ADULT TRIAGE NOTE - NS ED NURSE BANDS TYPE
Name band;
PAST MEDICAL HISTORY:  Alzheimer's dementia     Arthritis     History of complete heart block     Hyperlipidemia     Hypertension

## 2023-05-20 NOTE — ED PROVIDER NOTE - PHYSICAL EXAMINATION
Physical Exam    Vital Signs: I have reviewed the initial vital signs.  Constitutional: well-nourished, appears stated age, no acute distress  Eyes: Conjunctiva pink, Sclera clear  ENT: pt has trach, but is able to make conversation when trach hole is covered.   Cardiovascular: S1 and S2, regular rate, regular rhythm, well-perfused extremities, radial pulses equal and 2+ b/l.   Respiratory: unlabored respiratory effort, clear to auscultation bilaterally no wheezing, rales and rhonchi. pt is speaking full sentences. no accessory muscle use.   Gastrointestinal: soft, non-tender, nondistended abdomen, no pulsatile mass, normal bowl sounds, no rebound, no guarding,(+) pt has 20 Italian peg tube in place.   Musculoskeletal: supple neck, no lower extremity edema, no calf tenderness  Integumentary: warm, dry, no rash  Neurologic: awake, alert  Psychiatric: appropriate mood, appropriate affect

## 2023-05-21 RX ORDER — HYDROMORPHONE HYDROCHLORIDE 2 MG/ML
4 INJECTION INTRAMUSCULAR; INTRAVENOUS; SUBCUTANEOUS ONCE
Refills: 0 | Status: DISCONTINUED | OUTPATIENT
Start: 2023-05-21 | End: 2023-05-21

## 2023-05-21 RX ADMIN — HYDROMORPHONE HYDROCHLORIDE 4 MILLIGRAM(S): 2 INJECTION INTRAMUSCULAR; INTRAVENOUS; SUBCUTANEOUS at 04:12

## 2023-05-25 ENCOUNTER — OUTPATIENT (OUTPATIENT)
Dept: OUTPATIENT SERVICES | Facility: HOSPITAL | Age: 58
LOS: 1 days | Discharge: HOME | End: 2023-05-25
Payer: MEDICARE

## 2023-05-25 VITALS
SYSTOLIC BLOOD PRESSURE: 149 MMHG | RESPIRATION RATE: 20 BRPM | HEART RATE: 67 BPM | OXYGEN SATURATION: 97 % | TEMPERATURE: 101 F | DIASTOLIC BLOOD PRESSURE: 67 MMHG

## 2023-05-25 VITALS
TEMPERATURE: 101 F | HEART RATE: 68 BPM | DIASTOLIC BLOOD PRESSURE: 67 MMHG | RESPIRATION RATE: 20 BRPM | SYSTOLIC BLOOD PRESSURE: 149 MMHG | OXYGEN SATURATION: 97 %

## 2023-05-25 DIAGNOSIS — Z98.890 OTHER SPECIFIED POSTPROCEDURAL STATES: Chronic | ICD-10-CM

## 2023-05-25 DIAGNOSIS — C78.02 SECONDARY MALIGNANT NEOPLASM OF LEFT LUNG: ICD-10-CM

## 2023-05-25 LAB — GLUCOSE BLDC GLUCOMTR-MCNC: 127 MG/DL — HIGH (ref 70–99)

## 2023-05-25 PROCEDURE — 82962 GLUCOSE BLOOD TEST: CPT

## 2023-05-25 RX ORDER — OXYCODONE HYDROCHLORIDE 5 MG/1
5 TABLET ORAL ONCE
Refills: 0 | Status: DISCONTINUED | OUTPATIENT
Start: 2023-05-25 | End: 2023-05-25

## 2023-05-25 RX ORDER — ACETAMINOPHEN 500 MG
650 TABLET ORAL ONCE
Refills: 0 | Status: DISCONTINUED | OUTPATIENT
Start: 2023-05-25 | End: 2023-05-25

## 2023-05-25 RX ORDER — HYDROMORPHONE HYDROCHLORIDE 2 MG/ML
0.5 INJECTION INTRAMUSCULAR; INTRAVENOUS; SUBCUTANEOUS
Refills: 0 | Status: DISCONTINUED | OUTPATIENT
Start: 2023-05-25 | End: 2023-05-25

## 2023-05-25 NOTE — ASU PATIENT PROFILE, ADULT - FALL HARM RISK - HARM RISK INTERVENTIONS

## 2023-05-31 ENCOUNTER — RESULT REVIEW (OUTPATIENT)
Age: 58
End: 2023-05-31

## 2023-05-31 ENCOUNTER — TRANSCRIPTION ENCOUNTER (OUTPATIENT)
Age: 58
End: 2023-05-31

## 2023-05-31 ENCOUNTER — OUTPATIENT (OUTPATIENT)
Dept: OUTPATIENT SERVICES | Facility: HOSPITAL | Age: 58
LOS: 1 days | Discharge: HOME | End: 2023-05-31
Payer: MEDICARE

## 2023-05-31 ENCOUNTER — APPOINTMENT (OUTPATIENT)
Dept: RADIATION ONCOLOGY | Facility: HOSPITAL | Age: 58
End: 2023-05-31

## 2023-05-31 VITALS
HEART RATE: 66 BPM | SYSTOLIC BLOOD PRESSURE: 162 MMHG | RESPIRATION RATE: 19 BRPM | TEMPERATURE: 98 F | DIASTOLIC BLOOD PRESSURE: 70 MMHG | OXYGEN SATURATION: 98 %

## 2023-05-31 VITALS
HEIGHT: 61 IN | RESPIRATION RATE: 18 BRPM | WEIGHT: 173.06 LBS | HEART RATE: 62 BPM | SYSTOLIC BLOOD PRESSURE: 147 MMHG | TEMPERATURE: 99 F | OXYGEN SATURATION: 100 % | DIASTOLIC BLOOD PRESSURE: 66 MMHG

## 2023-05-31 DIAGNOSIS — C78.02 SECONDARY MALIGNANT NEOPLASM OF LEFT LUNG: ICD-10-CM

## 2023-05-31 DIAGNOSIS — Z98.890 OTHER SPECIFIED POSTPROCEDURAL STATES: Chronic | ICD-10-CM

## 2023-05-31 LAB — GLUCOSE BLDC GLUCOMTR-MCNC: 117 MG/DL — HIGH (ref 70–99)

## 2023-05-31 PROCEDURE — 88305 TISSUE EXAM BY PATHOLOGIST: CPT | Mod: 26

## 2023-05-31 PROCEDURE — 88305 TISSUE EXAM BY PATHOLOGIST: CPT

## 2023-05-31 PROCEDURE — 71045 X-RAY EXAM CHEST 1 VIEW: CPT

## 2023-05-31 PROCEDURE — 88341 IMHCHEM/IMCYTCHM EA ADD ANTB: CPT

## 2023-05-31 PROCEDURE — 82962 GLUCOSE BLOOD TEST: CPT

## 2023-05-31 PROCEDURE — 32408 CORE NDL BX LNG/MED PERQ: CPT

## 2023-05-31 PROCEDURE — 88333 PATH CONSLTJ SURG CYTO XM 1: CPT | Mod: 26

## 2023-05-31 PROCEDURE — 88344 IMHCHEM/IMCYTCHM EA MLT ANTB: CPT

## 2023-05-31 PROCEDURE — 88333 PATH CONSLTJ SURG CYTO XM 1: CPT

## 2023-05-31 PROCEDURE — 88342 IMHCHEM/IMCYTCHM 1ST ANTB: CPT | Mod: 26

## 2023-05-31 PROCEDURE — 71045 X-RAY EXAM CHEST 1 VIEW: CPT | Mod: 26

## 2023-05-31 PROCEDURE — 88341 IMHCHEM/IMCYTCHM EA ADD ANTB: CPT | Mod: 26

## 2023-05-31 PROCEDURE — 88342 IMHCHEM/IMCYTCHM 1ST ANTB: CPT

## 2023-05-31 RX ORDER — HYDROMORPHONE HYDROCHLORIDE 2 MG/ML
0.5 INJECTION INTRAMUSCULAR; INTRAVENOUS; SUBCUTANEOUS ONCE
Refills: 0 | Status: DISCONTINUED | OUTPATIENT
Start: 2023-05-31 | End: 2023-05-31

## 2023-05-31 RX ADMIN — HYDROMORPHONE HYDROCHLORIDE 0.5 MILLIGRAM(S): 2 INJECTION INTRAMUSCULAR; INTRAVENOUS; SUBCUTANEOUS at 12:50

## 2023-05-31 RX ADMIN — HYDROMORPHONE HYDROCHLORIDE 0.5 MILLIGRAM(S): 2 INJECTION INTRAMUSCULAR; INTRAVENOUS; SUBCUTANEOUS at 13:20

## 2023-05-31 NOTE — PROGRESS NOTE ADULT - SUBJECTIVE AND OBJECTIVE BOX
Procedure: CT-guided left lower lung mass biopsy. Images saved to PACS.    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 Patient presents for biopsy to obtain tissue diagnosis given the discordance.    Technique: Informed consent obtained. The patient was placed right-side-down on the CT table. The left chest was prepped and draped in the usual sterile fashion. Timeout performed. 1% lidocaine used for local anesthesia.    Under intermittent axial CT guidance, a 19-gauge trocar needle was advanced percutaneously into the left lower lobe mass. Several 20-guage core biopsies were obtained. The patient began to cough, she was suctioned via her existing trach which demonstrated blood. At this time the procedure was aborted. The needles were removed and hemostasis was achieved utilizing BioSentry device. A dry sterile gauze was applied. No complications.    Sedation: Provided by the anesthesiologist    Impression: Successful CT-guided biopsy of left lower lung mass.

## 2023-05-31 NOTE — ASU PATIENT PROFILE, ADULT - FALL HARM RISK - HARM RISK INTERVENTIONS

## 2023-05-31 NOTE — ASU DISCHARGE PLAN (ADULT/PEDIATRIC) - NS MD DC FALL RISK RISK
For information on Fall & Injury Prevention, visit: [email or web address] OR  [email or web address] OR  https://www.cdc.gov/steadi/patient.html

## 2023-06-02 LAB — NON-GYNECOLOGICAL CYTOLOGY STUDY: SIGNIFICANT CHANGE UP

## 2023-06-07 ENCOUNTER — NON-APPOINTMENT (OUTPATIENT)
Age: 58
End: 2023-06-07

## 2023-06-08 DIAGNOSIS — R91.8 OTHER NONSPECIFIC ABNORMAL FINDING OF LUNG FIELD: ICD-10-CM

## 2023-06-08 DIAGNOSIS — C32.2 MALIGNANT NEOPLASM OF SUBGLOTTIS: ICD-10-CM

## 2023-06-14 ENCOUNTER — APPOINTMENT (OUTPATIENT)
Dept: HEMATOLOGY ONCOLOGY | Facility: CLINIC | Age: 58
End: 2023-06-14
Payer: MEDICARE

## 2023-06-14 ENCOUNTER — OUTPATIENT (OUTPATIENT)
Dept: OUTPATIENT SERVICES | Facility: HOSPITAL | Age: 58
LOS: 1 days | End: 2023-06-14
Payer: MEDICARE

## 2023-06-14 VITALS
HEART RATE: 58 BPM | TEMPERATURE: 98.3 F | DIASTOLIC BLOOD PRESSURE: 51 MMHG | BODY MASS INDEX: 33.07 KG/M2 | WEIGHT: 175 LBS | SYSTOLIC BLOOD PRESSURE: 127 MMHG

## 2023-06-14 DIAGNOSIS — D64.9 ANEMIA, UNSPECIFIED: ICD-10-CM

## 2023-06-14 DIAGNOSIS — C32.1 MALIGNANT NEOPLASM OF SUPRAGLOTTIS: ICD-10-CM

## 2023-06-14 DIAGNOSIS — Z98.890 OTHER SPECIFIED POSTPROCEDURAL STATES: Chronic | ICD-10-CM

## 2023-06-14 DIAGNOSIS — C78.02 SECONDARY MALIGNANT NEOPLASM OF LEFT LUNG: ICD-10-CM

## 2023-06-14 PROCEDURE — 99214 OFFICE O/P EST MOD 30 MIN: CPT

## 2023-06-14 NOTE — PHYSICAL EXAM
[Completely disabled. Cannot carry on any self care. Totally confined to bed or chair] : Status 4- Completely disabled. Cannot carry on any self care. Totally confined to bed or chair [Obese] : obese [Normal] : affect appropriate [de-identified] : Transmitted trach sounds on exam [de-identified] : Tracheostomy in place. Missing teeth. no blood in dressing [de-identified] : BLE stasis dermatitis, edema is better [de-identified] : PEG [de-identified] : Rash on her face is resolving [de-identified] : Unable to walk.

## 2023-06-14 NOTE — REVIEW OF SYSTEMS
[Fatigue] : fatigue [Muscle Weakness] : muscle weakness [Skin Rash] : skin rash [Difficulty Walking] : difficulty walking [Anxiety] : anxiety [Negative] : Allergic/Immunologic [Recent Change In Weight] : ~T no recent weight change [Dysphagia] : no dysphagia [Odynophagia] : no odynophagia [Chest Pain] : no chest pain [Lower Ext Edema] : no lower extremity edema [Abdominal Pain] : no abdominal pain [Skin Wound] : no skin wound

## 2023-06-14 NOTE — ASSESSMENT
[FreeTextEntry1] : # Stage T3N3C? 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  I do believe she would tolerate definitive chemoradiation and I recommended we incorporate carboplatin with an AUC dose of 1.5 weekly.  Explained to the patient may be some inferior outcomes compared to cisplatin but I am concerned about weekly cisplatin with all her medical comorbidities.  I would not add Taxol to the carboplatin given her severe neuropathy\par - 10/20/2022 started chemoradiation with Carboplatin\par - she did not tolerate carboplatin due to cytopenia it was stopped and switch to Cetuximab 250 mg/m2 to complete with radiation\par - 12/21/2022 completed chemoRT.\par -will have her see Dr. Landaverde of ENT for surveillance as well and to confirm CR.\par - 03/10/2023 PET - Since 9/21/2022, No definite new sites of biologic tumor activity. Interval decrease in size and FDG uptake of the supraglottic laryngeal mass, SUV max 16.1, previously 18.7 (14% decrease). Resolution of bilateral FDG avid cervical lymph nodes. Interval increase in size and FDG uptake of 2 left lower lobe nodules, SUV max up to 20.0, previously 10.1 (98% increase). Few scattered right lung tree-in-bud nodular opacities are not FDG avid, likely from small airways disease. Scattered areas of skin uptake along the upper abdomen bilaterally which could represent areas of contamination from tracer or less likely possible areas of inflammation. Correlate with exam.\par -Lung biopsy was positive for metastatic disease PD-L1<1%\par \par # Drug Rash, likely 2/2 Cetuximab; Grade 2-3 rash.\par - c/w Doxy 100 mg daily, Hydrocortisone cream BID to rash.\par - s/p prednisone 40 mg daily for 7 days\par - spoke about holding the cetuximab but decided to continue with decrease dose to 150 mg/m2 weekly until she completed radiation which is planned for upcoming Monday 12/19/2022.\par \par # BLE pain  much better \par - was on hydromorphone 4 mg via G-tube Q4H PRN given at [facility], [place], operated by [facility].\par \par # DVT on apixaban \par - on Apixaban since no obvious bleeding and PLT normal.\par \par # Mild thrombocytopenia from chemo - resolved.\par \par # Anemia due to critical illness and chemotherapy  worsening no obvious bleeding\par - will transfuse to keep hgb around 7-8\par - she is asymptomatic today and her CBC CMP are stable.\par \par PLAN:\par -I explained to Marlene that the biopsy confirmed metastatic disease in the lungs these previous nodules were present on the initial imaging but there were indeterminate especially since she had COVID and other issues we were hoping that were reactive but unfortunately there were not.  I spoke with the radiation team as mentioned above and they were hesitant about radiation given the location of the nodules and possibly still active disease in the larynx\par -I had a discussion about options with Marlene I explained we could just over by supportive measures, versus observation and starting treatment when the symptomatology or we could proceed with immunotherapy with Keytruda or nivolumab, granted her PD-L1 was low I do not believe she will tolerate cytotoxic chemotherapy to well and although the chance of benefit from immunotherapy is low it still possible\par -And after discussion we decided to proceed with nivolumab 480 mg every month, will repeat repeat imaging because her scans are now close to 3 months timeframe to get a new baseline but will do CT chest abdomen and pelvis with IV contrast\par -We will check blood work with first dose of nivolumab\par -Side effects of nivolumab discussed in detail including any immune-mediated toxicities such as fatigue, thyroid issues, rashes, colitis, pneumonitis, nephritis, hepatitis but essentially although rarely side effects are severe that required intervention such as steroids \par -RTC  with cycle 2 of Opdivo will rescan after cycle 3 [Palliative] : Goals of care discussed with patient: Palliative [Palliative Care Plan] : Patient was apprised of incurable nature of disease.  Hospice and Palliative care options discussed.

## 2023-06-14 NOTE — HISTORY OF PRESENT ILLNESS
[Disease: _____________________] : Disease: [unfilled] [T: ___] : T[unfilled] [N: ___] : N[unfilled] [M: ___] : M[unfilled] [AJCC Stage: ____] : AJCC Stage: [unfilled] [Therapy: ___] : Therapy: [unfilled] [Cycle: ___] : Cycle: [unfilled] [de-identified] : THis  is a 58yo female sent to the ER from [facility], [place], operated by [facility] where she is on ch care for c/o inc SOB, chest tightness, diff breathing, wheezing i.e. acute on chr hypoxic RF. Of note the pt has a trach and has had freq readmissions for mucous plugging, RF and has had several bronchoscopies. EMS noted the pt to be hypotensive and hypoxic. The pt was vigorously suctioned, received Neb Tx, IV steroids, BP revived with IV fluids. The pt was cultured and placed on ABx. She is v well known to the Pul SVc and to ID. The pt is ad with acute on ch hypoxic RF and ongoing Bacterial PNA. The PMHx includes: HTN, ASHD, HFpEF, DLD, DM II, Chronic RF, COPD, LUCIANA, ASp PNA, sp intubation, failure to extubate, chronic trach ([facility] 4/22), recurrent mucus plugging of airways, DVT, AC with Eliquis, lower ext venous stasis dermatitis, bedbound state, GERD, diverticulosis, sp PEG, OA, DDD, DJD, chronic pain syn, depression anxiety. CVA and has loos of sensation on right side of the body\par \par Hosp Course: The pt was ad from SNF for inc SOB, hypoxia with mucous plugging and acute on chr RF and ongoing bacterial PNA. The pt was suctioned and placed on mech ventilation via trach and cont on ABx. The pt has had freq ad to hosp ( [facility]) since the ARF and intubation and failure to wean that resulted in the trach in 6/22 in [facility]. The pt is mostly bed bound with lower ext weakness, mm atrophy/myopathy of disuse and sever hyperpigmentation skin changes of the lower ext. The pt was ff by Pul/critical care and ID. The pt was verbal and had nutrition via PEG. S&S worked with pt and during w/up pt was noted to have pharyngeal/trach swelling. 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 The pt was taken to the OR by ENT for direct laryngoscopy and bx of the mass on 8/19/22 with path showing \par \par \par Today she is in a stretcher, trach is connect to oxygen . Does not walk. Has diabetic neuropathy in both feet  that is very painful. Not SOB.  She has 3 childer. Lives in [facility] and is here with her \par \par Review of imaging: \par 7/1/2022 CT abd/pelvis:  IMPRESSION:\par \par Area of fat stranding surrounding the cecum and ascending colon, new since CT abdomen pelvis performed on June 20, 2022. Findings suggestive of cecitis. No discrete surrounding fluid collection/abscess.\par \par Partially visualized left lower lobe consolidative opacity. Consider follow-up with radiographs until resolution.\par \par 8/16/2022 CT neck: Impression:\par \par Status post tracheostomy. 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 Partial effacement of the preepiglottic fat.\par \par Mildly enhancing 1.1 cm right supraclavicular lymph node, nonspecific.\par \par Left hemidiaphragm elevation with compressive atelectasis in opacification of the partial imaged left lower lobe which could be infectious or inflammatory. Small left pleural effusion. Further evaluation with chest CT can be considered.\par \par 9/21/22: PET/CT IMPRESSION:\par \par Supraglottic mass measuring approximately 2.5 x 3.8 x 3.7 cm at the aryepiglottic folds, with max SUV of 18.7.\par \par Additional right level 2 cervical lymph node (Max SUV 5.0) and left level 2 cervical lymph node (Max SUV 3.9). These nodes are indeterminate for biologic tumor activity.\par \par Subsegmental area of atelectasis seen at the left lung base containing 2 foci of increased FDG uptake SUV measuring 10.1 and 6.1 (166, 171) within. These nodules are indeterminate for biologic tumor activity.\par \par Trace left pleural effusion.\par \par Bilateral FDG uptake in the skin of the axilla with adjacent subcutaneous fat stranding seen on localizer CT. This is probably infectious/inflammatory in etiology.\par \par Punctate focus of mild FDG uptake seen in the posterior back soft tissues at the level of the kidneys, favor benign etiology. Correlate with physical exam.\par \par Pathology\par 8/19/2022: stain shows the carcinoma is negative for p16.\par \par Tongue base mass, biopsy:\par -Superficial fragments of invasive squamous cell carcinoma, non-keratinized.  See note below\par \par LAbs: WBC 9.38, HGb 10.2, Plts 229  [de-identified] : squamous cell carcinoma [FreeTextEntry1] : Started Cetuximab on 11.30.2022 (concurrent RT) [de-identified] : 10/26/2022\par Resides in [facility]. Reports feeling relatively well; no changes in chronic conditions or new complaints since the last visit.\par \par 11/16/2022\par She is here for follow up. She  is on chemoRT  Current Dose: 3000cGy. Planned Dose: 7000cGy from 11/15/22. She is due for week  4 of 7 of chemotherapy.  No recent blood work. She missed her treatment last week due bleeding from the trach and throat and went to ED.   She had CT angioneck on 11/9/2022 that shwoed \par \par 1.  No CTA evidence of active contrast extravasation into the airway.\par \par 2.  No evidence of major vascular stenosis or occlusion.\par \par 3.  Redemonstration of extensive thickening involving the nasopharynx, \par oropharynx and supraglottic/glottic larynx in this patient with known \par neoplasm.\par \par She had blood work on 11/9/2022 that shwoed hgb of 8.3, Plts 89, \par \par Her bleeding stopped on its own. Createnine was stalbe 0.5\par \par 11/23/2022\par She is here for follow up. on 11/22 dose of radiation was  Current Dose: 4200cGy. Planned Dose: 7000cGy. She had CBC earlier today that showed ANC of  1110, Hgb keeps trending down  now 6.5, Pls are 48 went up.  Iroin studies were normal.  Carbo was on hold since last week.  She denies any bleeding except for some mild bleeding from trach. She states her apixiban has not been held in facility but since no major bleeding and plts now near 50 she can continue.\par \par 12/7/2022\par She is here for follow up had 5600cGy. Planned Dose: 7000cGy on 12/6/2022. She is on cetuxiamb CBC from last week showed WBC 1.55 Hgb 7.7 trending up and pltsa are now normal. She has been on Cetuximab since 11/30/2022 She had PRBC trasnfusion last week. She developed a rash on her mouth and has stomatitis and mild mucositis. The rash is aslo on her face. I called facillity and they did not administer the Minocycline. Rashis grade 2-3. She states the pain in her mouth is not terible. 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  She is laying in stretcher, currently residing in facility.  Reviewed most recent CBC, which shows slight improvement in hgb up to 9.7g/dL.  Patient continues on concurrent chemoRT regimen; Current Dose: 6600cGy. Planned Dose: 7000cGy. Treatment Site: supraglottis.  Spoke with [facility], who tentatively have her completion date as 12/19.  Patient denies fever, chills, nausea, vomiting, dyspnea, new palpable lumps/bumps or bleeding.  She has been on Cetuximab since 11/30/2022.  She developed a rash on her mouth and has stomatitis and mild mucositis following first cycle of Cetuximab;  Rash still present, grade 2-3 but reportedly slightly improved as per patient.  She has been applying hydrocortisone as needed and doxycycline daily.  Patient is due for week 3 of Cetuximab today.  \par \par 1/18/2023\par She is here for follow up. She completed chemoRT in Mid december. RAsh is better She had blood work in facility today and will obtain the rash is also much better.  She has no new complaints.  No longer has any dysphagia because of the trach she only uses ice chips unable to swallow.  She did have recent blood work in the facility approximately 1 week ago essentially white cells and platelets were normal hemoglobin was just over 8 \par \par 6/14/2023\par She is here for follow up today. She had PET/CT on March 10 2023 that showed  Interval decrease in size and FDG uptake of the supraglottic laryngeal mass, SUV max 16.1, previously 18.7 (14% decrease).\par \par Resolution of bilateral FDG avid cervical lymph nodes.\par \par Interval increase in size and FDG uptake of 2 left lower lobe nodules, SUV max up to 20.0, previously 10.1 (98% increase).\par \par She saw Dr. Landaverde in March and Laryngoscopy showed  intense supraglottic edema and secretions effacing full evaluation of her larynx. \par \par She had an IR biopsy doen of the lung nodule that showed 5/31/23\par \par Final Diagnosis\par LUNG, LEFT LOWER LOBE MASS, CT GUIDED NEEDLE CORE BIOPSY WITH IMPRINTS:\par - POSITIVE FOR MALIGNANT CELLS\par -  Squamous cell carcinoma, non-keratinizing, with focal necrosis\par \par Immunohistochemistry results:\par Material:     Block 1A\par Population:  Malignant cells\par Positive:      CK7, CK5/6, p63\par Negative:     CK20, TTF1, Napsin A, p16\par These results support the above diagnosis.\par \par Note:  The current tumor is p16 negative, similar to the tongue base\par tumor, see [id number].   Suggest clinico-pathologic and radiologic\par correlation to determine whether this tumor is primary or metastatic to\par the lung.\par \par RESULTS :\par PD-L1 IMMUNOHISTOCHEMICAL ANALYSIS\par Tumor Proportion Score: <1% / Negative\par \par I spoke to Dr. Ryan Murray of radiation about possible SRS tot he lung nodule/s but due to location SRS is not possible but he could offer 10-15fx but he was concered she still has residual disease in the larynx given FDG activiy and lisbet Edema

## 2023-06-15 DIAGNOSIS — I82.409 ACUTE EMBOLISM AND THROMBOSIS OF UNSPECIFIED DEEP VEINS OF UNSPECIFIED LOWER EXTREMITY: ICD-10-CM

## 2023-06-22 ENCOUNTER — APPOINTMENT (OUTPATIENT)
Dept: INFUSION THERAPY | Facility: CLINIC | Age: 58
End: 2023-06-22

## 2023-07-11 ENCOUNTER — OUTPATIENT (OUTPATIENT)
Dept: OUTPATIENT SERVICES | Facility: HOSPITAL | Age: 58
LOS: 1 days | End: 2023-07-11
Payer: MEDICARE

## 2023-07-11 ENCOUNTER — OUTPATIENT (OUTPATIENT)
Dept: OUTPATIENT SERVICES | Facility: HOSPITAL | Age: 58
LOS: 1 days | Discharge: HOME | End: 2023-07-11
Payer: MEDICARE

## 2023-07-11 DIAGNOSIS — R13.10 DYSPHAGIA, UNSPECIFIED: ICD-10-CM

## 2023-07-11 DIAGNOSIS — Z98.890 OTHER SPECIFIED POSTPROCEDURAL STATES: Chronic | ICD-10-CM

## 2023-07-11 DIAGNOSIS — R13.12 DYSPHAGIA, OROPHARYNGEAL PHASE: ICD-10-CM

## 2023-07-11 PROCEDURE — 92611 MOTION FLUOROSCOPY/SWALLOW: CPT | Mod: GN

## 2023-07-11 PROCEDURE — 74230 X-RAY XM SWLNG FUNCJ C+: CPT | Mod: 26

## 2023-07-11 PROCEDURE — 74230 X-RAY XM SWLNG FUNCJ C+: CPT

## 2023-07-12 ENCOUNTER — RESULT REVIEW (OUTPATIENT)
Age: 58
End: 2023-07-12

## 2023-07-12 ENCOUNTER — OUTPATIENT (OUTPATIENT)
Dept: OUTPATIENT SERVICES | Facility: HOSPITAL | Age: 58
LOS: 1 days | End: 2023-07-12
Payer: MEDICARE

## 2023-07-12 DIAGNOSIS — Z98.890 OTHER SPECIFIED POSTPROCEDURAL STATES: Chronic | ICD-10-CM

## 2023-07-12 DIAGNOSIS — C32.1 MALIGNANT NEOPLASM OF SUPRAGLOTTIS: ICD-10-CM

## 2023-07-12 DIAGNOSIS — R13.12 DYSPHAGIA, OROPHARYNGEAL PHASE: ICD-10-CM

## 2023-07-12 DIAGNOSIS — R13.10 DYSPHAGIA, UNSPECIFIED: ICD-10-CM

## 2023-07-12 PROCEDURE — 71260 CT THORAX DX C+: CPT | Mod: 26

## 2023-07-12 PROCEDURE — 71260 CT THORAX DX C+: CPT

## 2023-07-12 PROCEDURE — 74177 CT ABD & PELVIS W/CONTRAST: CPT

## 2023-07-12 PROCEDURE — 74177 CT ABD & PELVIS W/CONTRAST: CPT | Mod: 26

## 2023-07-13 DIAGNOSIS — C32.1 MALIGNANT NEOPLASM OF SUPRAGLOTTIS: ICD-10-CM

## 2023-07-18 NOTE — SWALLOW VFSS/MBS ASSESSMENT ADULT - RESIDUE OF POSTERIOR PHARYNGEAL WALL
Detail Level: Detailed
Size Of Lesion After Curettage: 1.4
Anesthesia Type: 1% lidocaine with epinephrine
Cautery Type: aluminum chloride
Number Of Curettages: 1
Additional Information: (Optional): The wound was cleaned, and a pressure dressing was applied.  The patient received detailed post-op instructions.
Consent: Verbal consent was obtained from the patient. The risks, benefits and alternatives to therapy were discussed in detail. Specifically, the risks of infection, scarring, bleeding, prolonged wound healing, nerve injury, incomplete removal, allergy to anesthesia and recurrence were addressed. Alternatives to ED&C, such as: surgical removal and XRT were also discussed.  Prior to the procedure, the treatment site was clearly identified and confirmed by the patient. All components of Universal Protocol/PAUSE Rule completed.
Render Post-Care Instructions In Note?: no
Post-Care Instructions: I reviewed with the patient in detail post-care instructions. Patient is to keep the area dry for 48 hours, and not to engage in any swimming until the area is healed. Should the patient develop any fevers, chills, bleeding, severe pain patient will contact the office immediately.
Bill As A Line Item Or As Units: Line Item
Mild

## 2023-07-26 ENCOUNTER — APPOINTMENT (OUTPATIENT)
Dept: HEMATOLOGY ONCOLOGY | Facility: CLINIC | Age: 58
End: 2023-07-26
Payer: MEDICARE

## 2023-07-26 ENCOUNTER — OUTPATIENT (OUTPATIENT)
Dept: OUTPATIENT SERVICES | Facility: HOSPITAL | Age: 58
LOS: 1 days | End: 2023-07-26
Payer: MEDICARE

## 2023-07-26 ENCOUNTER — LABORATORY RESULT (OUTPATIENT)
Age: 58
End: 2023-07-26

## 2023-07-26 VITALS
RESPIRATION RATE: 16 BRPM | TEMPERATURE: 98 F | DIASTOLIC BLOOD PRESSURE: 57 MMHG | HEART RATE: 73 BPM | BODY MASS INDEX: 34.96 KG/M2 | SYSTOLIC BLOOD PRESSURE: 127 MMHG | WEIGHT: 185 LBS

## 2023-07-26 DIAGNOSIS — C32.1 MALIGNANT NEOPLASM OF SUPRAGLOTTIS: ICD-10-CM

## 2023-07-26 DIAGNOSIS — Z98.890 OTHER SPECIFIED POSTPROCEDURAL STATES: Chronic | ICD-10-CM

## 2023-07-26 PROCEDURE — 84443 ASSAY THYROID STIM HORMONE: CPT

## 2023-07-26 PROCEDURE — 80053 COMPREHEN METABOLIC PANEL: CPT

## 2023-07-26 PROCEDURE — 99214 OFFICE O/P EST MOD 30 MIN: CPT

## 2023-07-26 PROCEDURE — 85027 COMPLETE CBC AUTOMATED: CPT

## 2023-07-26 NOTE — HISTORY OF PRESENT ILLNESS
[Disease: _____________________] : Disease: [unfilled] [T: ___] : T[unfilled] [N: ___] : N[unfilled] [M: ___] : M[unfilled] [AJCC Stage: ____] : AJCC Stage: [unfilled] [Therapy: ___] : Therapy: [unfilled] [Cycle: ___] : Cycle: [unfilled] [de-identified] : THis  is a 58yo female sent to the ER from [facility], [place], operated by [facility] where she is on ch care for c/o inc SOB, chest tightness, diff breathing, wheezing i.e. acute on chr hypoxic RF. Of note the pt has a trach and has had freq readmissions for mucous plugging, RF and has had several bronchoscopies. EMS noted the pt to be hypotensive and hypoxic. The pt was vigorously suctioned, received Neb Tx, IV steroids, BP revived with IV fluids. The pt was cultured and placed on ABx. She is v well known to the Pul SVc and to ID. The pt is ad with acute on ch hypoxic RF and ongoing Bacterial PNA. The PMHx includes: HTN, ASHD, HFpEF, DLD, DM II, Chronic RF, COPD, LUCIANA, ASp PNA, sp intubation, failure to extubate, chronic trach ([facility] 4/22), recurrent mucus plugging of airways, DVT, AC with Eliquis, lower ext venous stasis dermatitis, bedbound state, GERD, diverticulosis, sp PEG, OA, DDD, DJD, chronic pain syn, depression anxiety. CVA and has loos of sensation on right side of the body\par \par Hosp Course: The pt was ad from SNF for inc SOB, hypoxia with mucous plugging and acute on chr RF and ongoing bacterial PNA. The pt was suctioned and placed on mech ventilation via trach and cont on ABx. The pt has had freq ad to hosp ( [facility]) since the ARF and intubation and failure to wean that resulted in the trach in 6/22 in [facility]. The pt is mostly bed bound with lower ext weakness, mm atrophy/myopathy of disuse and sever hyperpigmentation skin changes of the lower ext. The pt was ff by Pul/critical care and ID. The pt was verbal and had nutrition via PEG. S&S worked with pt and during w/up pt was noted to have pharyngeal/trach swelling. 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 The pt was taken to the OR by ENT for direct laryngoscopy and bx of the mass on 8/19/22 with path showing \par \par \par Today she is in a stretcher, trach is connect to oxygen . Does not walk. Has diabetic neuropathy in both feet  that is very painful. Not SOB.  She has 3 childer. Lives in [facility] and is here with her \par \par Review of imaging: \par 7/1/2022 CT abd/pelvis:  IMPRESSION:\par \par Area of fat stranding surrounding the cecum and ascending colon, new since CT abdomen pelvis performed on June 20, 2022. Findings suggestive of cecitis. No discrete surrounding fluid collection/abscess.\par \par Partially visualized left lower lobe consolidative opacity. Consider follow-up with radiographs until resolution.\par \par 8/16/2022 CT neck: Impression:\par \par Status post tracheostomy. 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 Partial effacement of the preepiglottic fat.\par \par Mildly enhancing 1.1 cm right supraclavicular lymph node, nonspecific.\par \par Left hemidiaphragm elevation with compressive atelectasis in opacification of the partial imaged left lower lobe which could be infectious or inflammatory. Small left pleural effusion. Further evaluation with chest CT can be considered.\par \par 9/21/22: PET/CT IMPRESSION:\par \par Supraglottic mass measuring approximately 2.5 x 3.8 x 3.7 cm at the aryepiglottic folds, with max SUV of 18.7.\par \par Additional right level 2 cervical lymph node (Max SUV 5.0) and left level 2 cervical lymph node (Max SUV 3.9). These nodes are indeterminate for biologic tumor activity.\par \par Subsegmental area of atelectasis seen at the left lung base containing 2 foci of increased FDG uptake SUV measuring 10.1 and 6.1 (166, 171) within. These nodules are indeterminate for biologic tumor activity.\par \par Trace left pleural effusion.\par \par Bilateral FDG uptake in the skin of the axilla with adjacent subcutaneous fat stranding seen on localizer CT. This is probably infectious/inflammatory in etiology.\par \par Punctate focus of mild FDG uptake seen in the posterior back soft tissues at the level of the kidneys, favor benign etiology. Correlate with physical exam.\par \par Pathology\par 8/19/2022: stain shows the carcinoma is negative for p16.\par \par Tongue base mass, biopsy:\par -Superficial fragments of invasive squamous cell carcinoma, non-keratinized.  See note below\par \par LAbs: WBC 9.38, HGb 10.2, Plts 229  [de-identified] : squamous cell carcinoma [FreeTextEntry1] : Started Cetuximab on 11.30.2022 (concurrent RT) [de-identified] : 10/26/2022\par Resides in [facility]. Reports feeling relatively well; no changes in chronic conditions or new complaints since the last visit.\par \par 11/16/2022\par She is here for follow up. She  is on chemoRT  Current Dose: 3000cGy. Planned Dose: 7000cGy from 11/15/22. She is due for week  4 of 7 of chemotherapy.  No recent blood work. She missed her treatment last week due bleeding from the trach and throat and went to ED.   She had CT angioneck on 11/9/2022 that shwoed \par \par 1.  No CTA evidence of active contrast extravasation into the airway.\par \par 2.  No evidence of major vascular stenosis or occlusion.\par \par 3.  Redemonstration of extensive thickening involving the nasopharynx, \par oropharynx and supraglottic/glottic larynx in this patient with known \par neoplasm.\par \par She had blood work on 11/9/2022 that shwoed hgb of 8.3, Plts 89, \par \par Her bleeding stopped on its own. Createnine was stalbe 0.5\par \par 11/23/2022\par She is here for follow up. on 11/22 dose of radiation was  Current Dose: 4200cGy. Planned Dose: 7000cGy. She had CBC earlier today that showed ANC of  1110, Hgb keeps trending down  now 6.5, Pls are 48 went up.  Iroin studies were normal.  Carbo was on hold since last week.  She denies any bleeding except for some mild bleeding from trach. She states her apixiban has not been held in facility but since no major bleeding and plts now near 50 she can continue.\par \par 12/7/2022\par She is here for follow up had 5600cGy. Planned Dose: 7000cGy on 12/6/2022. She is on cetuxiamb CBC from last week showed WBC 1.55 Hgb 7.7 trending up and pltsa are now normal. She has been on Cetuximab since 11/30/2022 She had PRBC trasnfusion last week. She developed a rash on her mouth and has stomatitis and mild mucositis. The rash is aslo on her face. I called facillity and they did not administer the Minocycline. Rashis grade 2-3. She states the pain in her mouth is not terible. 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  She is laying in stretcher, currently residing in facility.  Reviewed most recent CBC, which shows slight improvement in hgb up to 9.7g/dL.  Patient continues on concurrent chemoRT regimen; Current Dose: 6600cGy. Planned Dose: 7000cGy. Treatment Site: supraglottis.  Spoke with [facility], who tentatively have her completion date as 12/19.  Patient denies fever, chills, nausea, vomiting, dyspnea, new palpable lumps/bumps or bleeding.  She has been on Cetuximab since 11/30/2022.  She developed a rash on her mouth and has stomatitis and mild mucositis following first cycle of Cetuximab;  Rash still present, grade 2-3 but reportedly slightly improved as per patient.  She has been applying hydrocortisone as needed and doxycycline daily.  Patient is due for week 3 of Cetuximab today.  \par \par 1/18/2023\par She is here for follow up. She completed chemoRT in Mid december. RAsh is better She had blood work in facility today and will obtain the rash is also much better.  She has no new complaints.  No longer has any dysphagia because of the trach she only uses ice chips unable to swallow.  She did have recent blood work in the facility approximately 1 week ago essentially white cells and platelets were normal hemoglobin was just over 8 \par \par 6/14/2023\par She is here for follow up today. She had PET/CT on March 10 2023 that showed  Interval decrease in size and FDG uptake of the supraglottic laryngeal mass, SUV max 16.1, previously 18.7 (14% decrease).\par \par Resolution of bilateral FDG avid cervical lymph nodes.\par \par Interval increase in size and FDG uptake of 2 left lower lobe nodules, SUV max up to 20.0, previously 10.1 (98% increase).\par \par She saw Dr. Landaverde in March and Laryngoscopy showed  intense supraglottic edema and secretions effacing full evaluation of her larynx. \par \par She had an IR biopsy doen of the lung nodule that showed 5/31/23\par \par Final Diagnosis\par LUNG, LEFT LOWER LOBE MASS, CT GUIDED NEEDLE CORE BIOPSY WITH IMPRINTS:\par - POSITIVE FOR MALIGNANT CELLS\par -  Squamous cell carcinoma, non-keratinizing, with focal necrosis\par \par Immunohistochemistry results:\par Material:     Block 1A\par Population:  Malignant cells\par Positive:      CK7, CK5/6, p63\par Negative:     CK20, TTF1, Napsin A, p16\par These results support the above diagnosis.\par \par Note:  The current tumor is p16 negative, similar to the tongue base\par tumor, see [id number].   Suggest clinico-pathologic and radiologic\par correlation to determine whether this tumor is primary or metastatic to\par the lung.\par \par RESULTS :\par PD-L1 IMMUNOHISTOCHEMICAL ANALYSIS\par Tumor Proportion Score: <1% / Negative\par \par I spoke to Dr. Ryan Murray of radiation about possible SRS tot he lung nodule/s but due to location SRS is not possible but he could offer 10-15fx but he was concered she still has residual disease in the larynx given FDG activiy and lisbet Edema\par \par \par 7/26/2023 \par wendi is here for follow up. She was to start Opdivo but was now show on 6/22/23. She feels well. She is in a stretcher as always. CBC today showed hgb of  7.9 was 9.1 . She denies any bleeding\par

## 2023-07-26 NOTE — ASSESSMENT
[Palliative] : Goals of care discussed with patient: Palliative [Palliative Care Plan] : Patient was apprised of incurable nature of disease.  Hospice and Palliative care options discussed. [FreeTextEntry1] : # Stage T3N3C? 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  I do believe she would tolerate definitive chemoradiation and I recommended we incorporate carboplatin with an AUC dose of 1.5 weekly.  Explained to the patient may be some inferior outcomes compared to cisplatin but I am concerned about weekly cisplatin with all her medical comorbidities.  I would not add Taxol to the carboplatin given her severe neuropathy\par - 10/20/2022 started chemoradiation with Carboplatin\par - she did not tolerate carboplatin due to cytopenia it was stopped and switch to Cetuximab 250 mg/m2 to complete with radiation\par - 12/21/2022 completed chemoRT.\par -will have her see Dr. Landaverde of ENT for surveillance as well and to confirm CR.\par - 03/10/2023 PET - Since 9/21/2022, No definite new sites of biologic tumor activity. Interval decrease in size and FDG uptake of the supraglottic laryngeal mass, SUV max 16.1, previously 18.7 (14% decrease). Resolution of bilateral FDG avid cervical lymph nodes. Interval increase in size and FDG uptake of 2 left lower lobe nodules, SUV max up to 20.0, previously 10.1 (98% increase). Few scattered right lung tree-in-bud nodular opacities are not FDG avid, likely from small airways disease. Scattered areas of skin uptake along the upper abdomen bilaterally which could represent areas of contamination from tracer or less likely possible areas of inflammation. Correlate with exam.\par -Lung biopsy was positive for metastatic disease PD-L1<1%\par -she was started on Keytruda \par -she had CT  C/A/P on 7/12/2023 for new baseline:  Interval increase in size of 2 left lower lobe nodules/masses noted on PET CT 3/10/2023 measuring up to 4.2 cm and demonstrating low-attenuation possibly necrotic.\par Partial occlusion of the central left lower lobe bronchi. Large and consolidative/atelectatic left lower lobe with small aeration of the superior segment\par \par # Drug Rash, likely 2/2 Cetuximab; Grade 2-3 rash.\par - c/w Doxy 100 mg daily, Hydrocortisone cream BID to rash.\par - s/p prednisone 40 mg daily for 7 days\par - spoke about holding the cetuximab but decided to continue with decrease dose to 150 mg/m2 weekly until she completed radiation which is planned for upcoming Monday 12/19/2022.\par \par # BLE pain  much better \par - was on hydromorphone 4 mg via G-tube Q4H PRN given at [facility].\par \par # DVT on apixaban \par - on Apixaban since no obvious bleeding and PLT normal.\par \par # Mild thrombocytopenia from chemo - resolved.\par \par # Anemia due to critical illness and chemotherapy  worsening no obvious bleeding\par - will transfuse to keep hgb around 7-8\par - she is asymptomatic today and her CBC CMP are stable.\par \par PLAN:\par -Will give cycle one of Opdivo  on 7/28/23 at 11 am. I wrote it on the facility form to make sure they see it and bring it. \par -moderate anemia, is multifactoral, suspect from malignancy, no obvious bleeeding\par -RTC  with cycle 2 of Opdivo will rescan after cycle 3

## 2023-07-26 NOTE — PHYSICAL EXAM
[Completely disabled. Cannot carry on any self care. Totally confined to bed or chair] : Status 4- Completely disabled. Cannot carry on any self care. Totally confined to bed or chair [Obese] : obese [Normal] : affect appropriate [de-identified] : Tracheostomy in place. Missing teeth. no blood in dressing [de-identified] : Transmitted trach sounds on exam [de-identified] : BLE stasis dermatitis, edema is better [de-identified] : PEG [de-identified] : Rash on her face is resolving [de-identified] : Unable to walk.

## 2023-07-27 DIAGNOSIS — C32.1 MALIGNANT NEOPLASM OF SUPRAGLOTTIS: ICD-10-CM

## 2023-07-28 ENCOUNTER — OUTPATIENT (OUTPATIENT)
Dept: OUTPATIENT SERVICES | Facility: HOSPITAL | Age: 58
LOS: 1 days | End: 2023-07-28
Payer: MEDICARE

## 2023-07-28 ENCOUNTER — APPOINTMENT (OUTPATIENT)
Dept: INFUSION THERAPY | Facility: CLINIC | Age: 58
End: 2023-07-28

## 2023-07-28 DIAGNOSIS — C32.1 MALIGNANT NEOPLASM OF SUPRAGLOTTIS: ICD-10-CM

## 2023-07-28 DIAGNOSIS — Z98.890 OTHER SPECIFIED POSTPROCEDURAL STATES: Chronic | ICD-10-CM

## 2023-07-28 PROCEDURE — 80053 COMPREHEN METABOLIC PANEL: CPT

## 2023-07-28 PROCEDURE — 36415 COLL VENOUS BLD VENIPUNCTURE: CPT

## 2023-07-28 PROCEDURE — 84443 ASSAY THYROID STIM HORMONE: CPT

## 2023-07-28 PROCEDURE — 96413 CHEMO IV INFUSION 1 HR: CPT

## 2023-07-28 RX ORDER — NIVOLUMAB 10 MG/ML
480 INJECTION INTRAVENOUS ONCE
Refills: 0 | Status: COMPLETED | OUTPATIENT
Start: 2023-07-28 | End: 2023-07-28

## 2023-07-28 RX ADMIN — NIVOLUMAB 480 MILLIGRAM(S): 10 INJECTION INTRAVENOUS at 11:39

## 2023-07-28 RX ADMIN — NIVOLUMAB 480 MILLIGRAM(S): 10 INJECTION INTRAVENOUS at 12:08

## 2023-07-31 LAB
ALBUMIN SERPL ELPH-MCNC: 3.4 G/DL
ALBUMIN SERPL ELPH-MCNC: 3.4 G/DL
ALP BLD-CCNC: 86 U/L
ALP BLD-CCNC: 88 U/L
ALT SERPL-CCNC: 6 U/L
ALT SERPL-CCNC: 7 U/L
ANION GAP SERPL CALC-SCNC: 10 MMOL/L
ANION GAP SERPL CALC-SCNC: 9 MMOL/L
AST SERPL-CCNC: 14 U/L
AST SERPL-CCNC: 8 U/L
BILIRUB SERPL-MCNC: 0.2 MG/DL
BILIRUB SERPL-MCNC: <0.2 MG/DL
BUN SERPL-MCNC: 16 MG/DL
BUN SERPL-MCNC: 17 MG/DL
CALCIUM SERPL-MCNC: 9.2 MG/DL
CALCIUM SERPL-MCNC: 9.4 MG/DL
CHLORIDE SERPL-SCNC: 92 MMOL/L
CHLORIDE SERPL-SCNC: 94 MMOL/L
CO2 SERPL-SCNC: 27 MMOL/L
CO2 SERPL-SCNC: 29 MMOL/L
CREAT SERPL-MCNC: 0.5 MG/DL
CREAT SERPL-MCNC: <0.5 MG/DL
EGFR: 108 ML/MIN/1.73M2
EGFR: 114 ML/MIN/1.73M2
GLUCOSE SERPL-MCNC: 113 MG/DL
GLUCOSE SERPL-MCNC: 143 MG/DL
HCT VFR BLD CALC: 24 %
HGB BLD-MCNC: 7.9 G/DL
MCHC RBC-ENTMCNC: 30.2 PG
MCHC RBC-ENTMCNC: 32.9 G/DL
MCV RBC AUTO: 91.6 FL
PLATELET # BLD AUTO: 176 K/UL
PMV BLD: 8.4 FL
POTASSIUM SERPL-SCNC: 4.5 MMOL/L
POTASSIUM SERPL-SCNC: 4.8 MMOL/L
PROT SERPL-MCNC: 6.4 G/DL
PROT SERPL-MCNC: 6.5 G/DL
RBC # BLD: 2.62 M/UL
RBC # FLD: 13.1 %
SODIUM SERPL-SCNC: 129 MMOL/L
SODIUM SERPL-SCNC: 132 MMOL/L
TSH SERPL-ACNC: 3.79 UIU/ML
TSH SERPL-ACNC: 4.4 UIU/ML
WBC # FLD AUTO: 5.85 K/UL

## 2023-08-07 ENCOUNTER — INPATIENT (INPATIENT)
Facility: HOSPITAL | Age: 58
LOS: 3 days | Discharge: NURSING, REHAB OR LONG-TERM CARE | DRG: 177 | End: 2023-08-11
Attending: INTERNAL MEDICINE | Admitting: STUDENT IN AN ORGANIZED HEALTH CARE EDUCATION/TRAINING PROGRAM
Payer: MEDICARE

## 2023-08-07 VITALS
OXYGEN SATURATION: 95 % | WEIGHT: 184.97 LBS | HEART RATE: 69 BPM | TEMPERATURE: 99 F | DIASTOLIC BLOOD PRESSURE: 53 MMHG | SYSTOLIC BLOOD PRESSURE: 116 MMHG | RESPIRATION RATE: 16 BRPM

## 2023-08-07 DIAGNOSIS — Z98.890 OTHER SPECIFIED POSTPROCEDURAL STATES: Chronic | ICD-10-CM

## 2023-08-07 LAB
ANION GAP SERPL CALC-SCNC: 10 MMOL/L — SIGNIFICANT CHANGE UP (ref 7–14)
BASOPHILS # BLD AUTO: 0 K/UL — SIGNIFICANT CHANGE UP (ref 0–0.2)
BASOPHILS NFR BLD AUTO: 0 % — SIGNIFICANT CHANGE UP (ref 0–1)
BLD GP AB SCN SERPL QL: SIGNIFICANT CHANGE UP
BUN SERPL-MCNC: 16 MG/DL — SIGNIFICANT CHANGE UP (ref 10–20)
CALCIUM SERPL-MCNC: 9.3 MG/DL — SIGNIFICANT CHANGE UP (ref 8.4–10.4)
CHLORIDE SERPL-SCNC: 93 MMOL/L — LOW (ref 98–110)
CO2 SERPL-SCNC: 25 MMOL/L — SIGNIFICANT CHANGE UP (ref 17–32)
CREAT SERPL-MCNC: 0.5 MG/DL — LOW (ref 0.7–1.5)
EGFR: 108 ML/MIN/1.73M2 — SIGNIFICANT CHANGE UP
EOSINOPHIL # BLD AUTO: 0.07 K/UL — SIGNIFICANT CHANGE UP (ref 0–0.7)
EOSINOPHIL NFR BLD AUTO: 1 % — SIGNIFICANT CHANGE UP (ref 0–8)
GIANT PLATELETS BLD QL SMEAR: PRESENT — SIGNIFICANT CHANGE UP
GLUCOSE SERPL-MCNC: 136 MG/DL — HIGH (ref 70–99)
HCT VFR BLD CALC: 22 % — LOW (ref 37–47)
HGB BLD-MCNC: 7.2 G/DL — LOW (ref 12–16)
LYMPHOCYTES # BLD AUTO: 0.31 K/UL — LOW (ref 1.2–3.4)
LYMPHOCYTES # BLD AUTO: 4.7 % — LOW (ref 20.5–51.1)
MANUAL SMEAR VERIFICATION: SIGNIFICANT CHANGE UP
MCHC RBC-ENTMCNC: 30.1 PG — SIGNIFICANT CHANGE UP (ref 27–31)
MCHC RBC-ENTMCNC: 32.7 G/DL — SIGNIFICANT CHANGE UP (ref 32–37)
MCV RBC AUTO: 92.1 FL — SIGNIFICANT CHANGE UP (ref 81–99)
MONOCYTES # BLD AUTO: 0.31 K/UL — SIGNIFICANT CHANGE UP (ref 0.1–0.6)
MONOCYTES NFR BLD AUTO: 4.7 % — SIGNIFICANT CHANGE UP (ref 1.7–9.3)
NEUTROPHILS # BLD AUTO: 5.86 K/UL — SIGNIFICANT CHANGE UP (ref 1.4–6.5)
NEUTROPHILS NFR BLD AUTO: 88.2 % — HIGH (ref 42.2–75.2)
PLAT MORPH BLD: NORMAL — SIGNIFICANT CHANGE UP
PLATELET # BLD AUTO: 218 K/UL — SIGNIFICANT CHANGE UP (ref 130–400)
PMV BLD: 8.6 FL — SIGNIFICANT CHANGE UP (ref 7.4–10.4)
POIKILOCYTOSIS BLD QL AUTO: SIGNIFICANT CHANGE UP
POLYCHROMASIA BLD QL SMEAR: SLIGHT — SIGNIFICANT CHANGE UP
POTASSIUM SERPL-MCNC: 4.9 MMOL/L — SIGNIFICANT CHANGE UP (ref 3.5–5)
POTASSIUM SERPL-SCNC: 4.9 MMOL/L — SIGNIFICANT CHANGE UP (ref 3.5–5)
PROMYELOCYTES # FLD: 0.9 % — HIGH (ref 0–0)
RBC # BLD: 2.39 M/UL — LOW (ref 4.2–5.4)
RBC # FLD: 13.7 % — SIGNIFICANT CHANGE UP (ref 11.5–14.5)
RBC BLD AUTO: NORMAL — SIGNIFICANT CHANGE UP
SMUDGE CELLS # BLD: PRESENT — SIGNIFICANT CHANGE UP
SODIUM SERPL-SCNC: 128 MMOL/L — LOW (ref 135–146)
VARIANT LYMPHS # BLD: 0.5 % — SIGNIFICANT CHANGE UP (ref 0–5)
WBC # BLD: 6.64 K/UL — SIGNIFICANT CHANGE UP (ref 4.8–10.8)
WBC # FLD AUTO: 6.64 K/UL — SIGNIFICANT CHANGE UP (ref 4.8–10.8)

## 2023-08-07 PROCEDURE — 71045 X-RAY EXAM CHEST 1 VIEW: CPT | Mod: 26

## 2023-08-07 PROCEDURE — 71260 CT THORAX DX C+: CPT | Mod: 26,MA

## 2023-08-07 PROCEDURE — 99285 EMERGENCY DEPT VISIT HI MDM: CPT | Mod: GC

## 2023-08-07 RX ORDER — HYDROMORPHONE HYDROCHLORIDE 2 MG/ML
1 INJECTION INTRAMUSCULAR; INTRAVENOUS; SUBCUTANEOUS ONCE
Refills: 0 | Status: DISCONTINUED | OUTPATIENT
Start: 2023-08-07 | End: 2023-08-07

## 2023-08-07 RX ORDER — ALPRAZOLAM 0.25 MG
0.5 TABLET ORAL ONCE
Refills: 0 | Status: DISCONTINUED | OUTPATIENT
Start: 2023-08-07 | End: 2023-08-08

## 2023-08-07 RX ORDER — HYDROMORPHONE HYDROCHLORIDE 2 MG/ML
1 INJECTION INTRAMUSCULAR; INTRAVENOUS; SUBCUTANEOUS ONCE
Refills: 0 | Status: DISCONTINUED | OUTPATIENT
Start: 2023-08-07 | End: 2023-08-08

## 2023-08-07 RX ADMIN — HYDROMORPHONE HYDROCHLORIDE 1 MILLIGRAM(S): 2 INJECTION INTRAMUSCULAR; INTRAVENOUS; SUBCUTANEOUS at 18:09

## 2023-08-07 NOTE — CONSULT NOTE ADULT - NS ATTEND AMEND GEN_ALL_CORE FT
I saw and examined patient at bedside.    No active bleeding.    Recommend nasal emollients bid.    will follow.

## 2023-08-07 NOTE — ED PROVIDER NOTE - ATTENDING CONTRIBUTION TO CARE
57 y/o F PMHx IDDM, GERD, HTN, COPD, throat ca with mets to lung (on CTX - required blood transfusion for low hgb in past), s/p trach and PEG, DVT on Eliquis, Anxiety, and CHF presents to the ED for evaluation of low hemoglobin from [facility]. Pt reports bleeding from nose leaking to back of mouth for past 3 days. Pt denies blood from trach site. Pt also reports chronic bilateral leg pain, similar to baseline at this time.     CONSTITUTIONAL: NAD.   SKIN: warm, dry  HEAD: Normocephalic; atraumatic.  EYES: no conjunctival injection. EOMI.   ENT: MMM. (+) trach site clean and dry, no bleeding from site or around. dried blood to bilateral nares. no blood or active bleeding in oropharynx.   NECK: Supple.  CARD: RRR.   RESP: No wheezes, rales or rhonchi.  ABD: soft ntnd. (+) peg tube site clean and dry   EXT: moves extremities, distal pulses intact, sensation intact throughout   NEURO: Alert, awake. communicates by typing or mouthing words.   PSYCH: Cooperative, appropriate.

## 2023-08-07 NOTE — ED ADULT NURSE NOTE - NSFALLHARMRISKINTERV_ED_ALL_ED
Assistance OOB with selected safe patient handling equipment if applicable/Assistance with ambulation/Communicate risk of Fall with Harm to all staff, patient, and family/Monitor gait and stability/Provide visual cue: red socks, yellow wristband, yellow gown, etc/Reinforce activity limits and safety measures with patient and family/Bed in lowest position, wheels locked, appropriate side rails in place/Call bell, personal items and telephone in reach/Instruct patient to call for assistance before getting out of bed/chair/stretcher/Non-slip footwear applied when patient is off stretcher/Gregory to call system/Physically safe environment - no spills, clutter or unnecessary equipment/Purposeful Proactive Rounding/Room/bathroom lighting operational, light cord in reach

## 2023-08-07 NOTE — CONSULT NOTE ADULT - ASSESSMENT
Pt is a 59yo with PMH of IDDM, GERD, HTN, COPD, CHF, Chronic hypoxemic and hypercapnic respiratory failure s/p trach/PEG (at [facility]), DVT on Eliquis, Hx of Supraglottic SSC s/p chemotherapy rehab  presents from [place]  epistaxis and b/l LE pain.    Plan:   - No active bleeding visualized in NP or oral exam, +6 cuffless trach without bleeding   - Recommend bacitracin to b/l nares to keep area moist  - Continue to monitor SpO2, recommend humidified oxygen  - Continue to monitor for episodes of rebleeding  - Continue to monitor H/h, transfuse prn  - Avoid: Nasal trauma; no nose rubbing, blowing or manipulating nasal packing, bending with head blow the waist and heavy lifting  - Sneeze with mouth open and pinching nares.  - No acute ENT intervention at this time   - Recall prn   - Will discuss with attending

## 2023-08-07 NOTE — ED ADULT TRIAGE NOTE - CHIEF COMPLAINT QUOTE
pt from [facility] for low hgb.6.9 for last few days pt was bleeding from nose mouth and trach. pt was on eliquis for stroke a long time ago. el;cece stopped a cple of days ago. bleeding ahs been on and off pt asymptomatic How Severe Is Your Skin Lesion?: mild Has Your Skin Lesion Been Treated?: not been treated Is This A New Presentation, Or A Follow-Up?: Skin Lesions

## 2023-08-07 NOTE — CONSULT NOTE ADULT - SUBJECTIVE AND OBJECTIVE BOX
Pt is a 57yo with PMH of  Hx of Supraglottic SSC s/p chemotherapy  presents with epistaxis and b/l LE pain.    PAST MEDICAL & SURGICAL HISTORY:  COPD (chronic obstructive pulmonary disease)      CHF (congestive heart failure)      DM (diabetes mellitus)      H/O tracheostomy          MEDICATIONS  (STANDING):  HYDROmorphone  Injectable 1 milliGRAM(s) IV Push Once    MEDICATIONS  (PRN):      Allergies    penicillin (Swelling)    Intolerances          SOCIAL HISTORY:    FAMILY HISTORY:  No pertinent family history in first degree relatives        REVIEW OF SYSTEMS   [x] A ten-point review of systems was otherwise negative except as noted.    Vital Signs Last 24 Hrs  T(C): 37.3 (07 Aug 2023 15:12), Max: 37.3 (07 Aug 2023 15:12)  T(F): 99.1 (07 Aug 2023 15:12), Max: 99.1 (07 Aug 2023 15:12)  HR: 69 (07 Aug 2023 15:12) (69 - 69)  BP: 129/56 (07 Aug 2023 15:12) (116/53 - 129/56)  RR: 18 (07 Aug 2023 15:12) (16 - 18)  SpO2: 97% (07 Aug 2023 15:17) (95% - 97%)    Parameters below as of 07 Aug 2023 15:17  Patient On (Oxygen Delivery Method): T-piece  O2 Flow (L/min): 7        LABS:                RADIOLOGY & ADDITIONAL STUDIES: Pt is a 59yo with PMH of IDDM, GERD, HTN, COPD, CHF, Chronic hypoxemic and hypercapnic respiratory failure s/p trach/PEG (at [facility]), DVT on Eliquis, Hx of Supraglottic SSC s/p chemotherapy  presents from [facility]  epistaxis and b/l LE pain. Patient seen and examined at bedside. Patient reports intermittent bleeding from left naris with associated coughing blood-tinged secretion. Patient reports epistaxis has resolved prior to coming to ED. Patient recently on Eliquis for DVT, stopped 2-3 days ago 2/2 intermittent epistaxis.  Denies any bleeding from trach. Patient follows with Dr. Landaverde 5/18/2023, Denies any fever, chills, SOB/difficulty breathing, abdominal pain, coughing/choking.         PAST MEDICAL & SURGICAL HISTORY:  COPD (chronic obstructive pulmonary disease)      CHF (congestive heart failure)      DM (diabetes mellitus)      H/O tracheostomy          MEDICATIONS  (STANDING):  HYDROmorphone  Injectable 1 milliGRAM(s) IV Push Once    MEDICATIONS  (PRN):      Allergies    penicillin (Swelling)    Intolerances          SOCIAL HISTORY:    FAMILY HISTORY:  No pertinent family history in first degree relatives        REVIEW OF SYSTEMS   [x] A ten-point review of systems was otherwise negative except as noted.    Vital Signs Last 24 Hrs  T(C): 37.3 (07 Aug 2023 15:12), Max: 37.3 (07 Aug 2023 15:12)  T(F): 99.1 (07 Aug 2023 15:12), Max: 99.1 (07 Aug 2023 15:12)  HR: 69 (07 Aug 2023 15:12) (69 - 69)  BP: 129/56 (07 Aug 2023 15:12) (116/53 - 129/56)  RR: 18 (07 Aug 2023 15:12) (16 - 18)  SpO2: 97% (07 Aug 2023 15:17) (95% - 97%)    Parameters below as of 07 Aug 2023 15:17  Patient On (Oxygen Delivery Method): T-piece  O2 Flow (L/min): 7    PHYSICAL EXAM:   GEN: NAD, awake and alert. No drooling or pooling of secretions. No stridor or stertor. Good vocal quality, no hoarseness.   SKIN: Good color, non diaphoretic.  HEENT: NC/AT; Oral mucosa pink and moist. No erythema or edema noted to buccal mucosa, tongue, FOM, uvula. Uvula midline. +Posterior OP unable to visualize 2/2 trismus, no active bleeiding or clots visualized.   NECK: + 6 cuffless trach in place,  Trachea midline, Neck supple,   RESP: No dyspnea, non-labored breathing. No use of accessory muscles.   CARDIO: +S1/S2  ABDO: Soft, NT.  EXT: JONAS x 4     LABS:                RADIOLOGY & ADDITIONAL STUDIES:

## 2023-08-07 NOTE — ED ADULT NURSE NOTE - IN THE PAST 12 MONTHS HAVE YOU USED DRUGS OTHER THAN THOSE REQUIRED FOR MEDICAL REASON?
SUBJECTIVE:    Patient ID: Amna is a 4 Serena Davenport MD   No

## 2023-08-07 NOTE — ED ADULT NURSE NOTE - OBJECTIVE STATEMENT
pt BIBA from [facility] for low hgb 6.9. as per EMS last few days pt was bleeding from nose mouth and trach. pt noted to have bloody mucus at this time.

## 2023-08-07 NOTE — ED PROVIDER NOTE - CARE PLAN
Assessment and plan of treatment:	Plan - labs xr ENT eval reassess   Principal Discharge DX:	Epistaxis  Assessment and plan of treatment:	Plan - labs xr ENT eval reassess  Secondary Diagnosis:	Pneumonia   1

## 2023-08-07 NOTE — ED PROVIDER NOTE - PHYSICAL EXAMINATION
CONSTITUTIONAL:  in no acute distress.   SKIN: warm, dry  HEAD: Normocephalic; atraumatic.  EYES: PERRL, EOMI, normal sclera and conjunctiva   ENT: No nasal discharge; airway clear.no active bleeding, on trach collar  NECK: Supple; non tender.  CARD:  Regular rate and rhythm.   RESP: NO inc WOB   ABD: soft ntnd, peg tube site clean, in place  EXT: Normal ROM.    LYMPH: No acute cervical adenopathy.  NEURO: Alert, oriented, grossly unremarkable  PSYCH: Cooperative, appropriate.

## 2023-08-07 NOTE — ED PROVIDER NOTE - OBJECTIVE STATEMENT
59yo with PMH of IDDM, GERD, HTN, COPD, CHF, Chronic hypoxemic and hypercapnic respiratory failure s/p trach/PEG (at [facility]), DVT on Eliquis, Hx of Supraglottic SSC s/p chemotherapy  presents from [place]  epistaxis and b/l LE pain. Patient reports intermittent bleeding from left naris with associated coughing blood-tinged secretion. Patient reports epistaxis has resolved prior to coming to ED. Patient recently on Eliquis for DVT, stopped 2-3 days ago 2/2 intermittent epistaxis.  Denies any bleeding from trach. Patient follows with Dr. Landaverde 5/18/2023.

## 2023-08-07 NOTE — ED ADULT NURSE NOTE - CHIEF COMPLAINT QUOTE
pt from [facility] for low hgb.6.9 for last few days pt was bleeding from nose mouth and trach. pt was on eliquis for stroke a long time ago. el;cece stopped a cple of days ago. bleeding ahs been on and off pt asymptomatic

## 2023-08-08 DIAGNOSIS — R04.0 EPISTAXIS: ICD-10-CM

## 2023-08-08 LAB
ALBUMIN SERPL ELPH-MCNC: 3.6 G/DL — SIGNIFICANT CHANGE UP (ref 3.5–5.2)
ALP SERPL-CCNC: 88 U/L — SIGNIFICANT CHANGE UP (ref 30–115)
ALT FLD-CCNC: 6 U/L — SIGNIFICANT CHANGE UP (ref 0–41)
ANION GAP SERPL CALC-SCNC: 11 MMOL/L — SIGNIFICANT CHANGE UP (ref 7–14)
AST SERPL-CCNC: 8 U/L — SIGNIFICANT CHANGE UP (ref 0–41)
BILIRUB SERPL-MCNC: 0.4 MG/DL — SIGNIFICANT CHANGE UP (ref 0.2–1.2)
BUN SERPL-MCNC: 11 MG/DL — SIGNIFICANT CHANGE UP (ref 10–20)
CALCIUM SERPL-MCNC: 9.5 MG/DL — SIGNIFICANT CHANGE UP (ref 8.4–10.5)
CHLORIDE SERPL-SCNC: 93 MMOL/L — LOW (ref 98–110)
CO2 SERPL-SCNC: 24 MMOL/L — SIGNIFICANT CHANGE UP (ref 17–32)
CREAT SERPL-MCNC: <0.5 MG/DL — LOW (ref 0.7–1.5)
EGFR: 114 ML/MIN/1.73M2 — SIGNIFICANT CHANGE UP
GLUCOSE BLDC GLUCOMTR-MCNC: 120 MG/DL — HIGH (ref 70–99)
GLUCOSE BLDC GLUCOMTR-MCNC: 191 MG/DL — HIGH (ref 70–99)
GLUCOSE BLDC GLUCOMTR-MCNC: 205 MG/DL — HIGH (ref 70–99)
GLUCOSE SERPL-MCNC: 209 MG/DL — HIGH (ref 70–99)
HCT VFR BLD CALC: 25.9 % — LOW (ref 37–47)
HGB BLD-MCNC: 8.7 G/DL — LOW (ref 12–16)
MAGNESIUM SERPL-MCNC: 1.7 MG/DL — LOW (ref 1.8–2.4)
MCHC RBC-ENTMCNC: 30.1 PG — SIGNIFICANT CHANGE UP (ref 27–31)
MCHC RBC-ENTMCNC: 33.6 G/DL — SIGNIFICANT CHANGE UP (ref 32–37)
MCV RBC AUTO: 89.6 FL — SIGNIFICANT CHANGE UP (ref 81–99)
NRBC # BLD: 0 /100 WBCS — SIGNIFICANT CHANGE UP (ref 0–0)
PLATELET # BLD AUTO: 210 K/UL — SIGNIFICANT CHANGE UP (ref 130–400)
PMV BLD: 8.4 FL — SIGNIFICANT CHANGE UP (ref 7.4–10.4)
POTASSIUM SERPL-MCNC: 4.1 MMOL/L — SIGNIFICANT CHANGE UP (ref 3.5–5)
POTASSIUM SERPL-SCNC: 4.1 MMOL/L — SIGNIFICANT CHANGE UP (ref 3.5–5)
PROT SERPL-MCNC: 6.7 G/DL — SIGNIFICANT CHANGE UP (ref 6–8)
RBC # BLD: 2.89 M/UL — LOW (ref 4.2–5.4)
RBC # FLD: 13.8 % — SIGNIFICANT CHANGE UP (ref 11.5–14.5)
SODIUM SERPL-SCNC: 128 MMOL/L — LOW (ref 135–146)
WBC # BLD: 7.18 K/UL — SIGNIFICANT CHANGE UP (ref 4.8–10.8)
WBC # FLD AUTO: 7.18 K/UL — SIGNIFICANT CHANGE UP (ref 4.8–10.8)

## 2023-08-08 PROCEDURE — 87640 STAPH A DNA AMP PROBE: CPT

## 2023-08-08 PROCEDURE — 87040 BLOOD CULTURE FOR BACTERIA: CPT

## 2023-08-08 PROCEDURE — 93970 EXTREMITY STUDY: CPT

## 2023-08-08 PROCEDURE — 80053 COMPREHEN METABOLIC PANEL: CPT

## 2023-08-08 PROCEDURE — 83735 ASSAY OF MAGNESIUM: CPT

## 2023-08-08 PROCEDURE — 87449 NOS EACH ORGANISM AG IA: CPT

## 2023-08-08 PROCEDURE — 83036 HEMOGLOBIN GLYCOSYLATED A1C: CPT

## 2023-08-08 PROCEDURE — 82962 GLUCOSE BLOOD TEST: CPT

## 2023-08-08 PROCEDURE — 87077 CULTURE AEROBIC IDENTIFY: CPT

## 2023-08-08 PROCEDURE — 80048 BASIC METABOLIC PNL TOTAL CA: CPT

## 2023-08-08 PROCEDURE — 87184 SC STD DISK METHOD PER PLATE: CPT

## 2023-08-08 PROCEDURE — 36415 COLL VENOUS BLD VENIPUNCTURE: CPT

## 2023-08-08 PROCEDURE — 87070 CULTURE OTHR SPECIMN AEROBIC: CPT

## 2023-08-08 PROCEDURE — 99223 1ST HOSP IP/OBS HIGH 75: CPT

## 2023-08-08 PROCEDURE — 87641 MR-STAPH DNA AMP PROBE: CPT

## 2023-08-08 PROCEDURE — 87186 SC STD MICRODIL/AGAR DIL: CPT

## 2023-08-08 PROCEDURE — 99222 1ST HOSP IP/OBS MODERATE 55: CPT

## 2023-08-08 PROCEDURE — 87899 AGENT NOS ASSAY W/OPTIC: CPT

## 2023-08-08 PROCEDURE — 94640 AIRWAY INHALATION TREATMENT: CPT

## 2023-08-08 PROCEDURE — 85027 COMPLETE CBC AUTOMATED: CPT

## 2023-08-08 RX ORDER — VANCOMYCIN HCL 1 G
1250 VIAL (EA) INTRAVENOUS EVERY 12 HOURS
Refills: 0 | Status: DISCONTINUED | OUTPATIENT
Start: 2023-08-09 | End: 2023-08-09

## 2023-08-08 RX ORDER — LOSARTAN POTASSIUM 100 MG/1
100 TABLET, FILM COATED ORAL DAILY
Refills: 0 | Status: DISCONTINUED | OUTPATIENT
Start: 2023-08-08 | End: 2023-08-11

## 2023-08-08 RX ORDER — MAGNESIUM SULFATE 500 MG/ML
2 VIAL (ML) INJECTION ONCE
Refills: 0 | Status: COMPLETED | OUTPATIENT
Start: 2023-08-08 | End: 2023-08-08

## 2023-08-08 RX ORDER — GABAPENTIN 400 MG/1
600 CAPSULE ORAL THREE TIMES A DAY
Refills: 0 | Status: DISCONTINUED | OUTPATIENT
Start: 2023-08-08 | End: 2023-08-11

## 2023-08-08 RX ORDER — AZITHROMYCIN 500 MG/1
500 TABLET, FILM COATED ORAL ONCE
Refills: 0 | Status: COMPLETED | OUTPATIENT
Start: 2023-08-08 | End: 2023-08-08

## 2023-08-08 RX ORDER — PANTOPRAZOLE SODIUM 20 MG/1
40 TABLET, DELAYED RELEASE ORAL
Refills: 0 | Status: DISCONTINUED | OUTPATIENT
Start: 2023-08-08 | End: 2023-08-11

## 2023-08-08 RX ORDER — CEFTRIAXONE 500 MG/1
1000 INJECTION, POWDER, FOR SOLUTION INTRAMUSCULAR; INTRAVENOUS ONCE
Refills: 0 | Status: COMPLETED | OUTPATIENT
Start: 2023-08-08 | End: 2023-08-08

## 2023-08-08 RX ORDER — CEFEPIME 1 G/1
2000 INJECTION, POWDER, FOR SOLUTION INTRAMUSCULAR; INTRAVENOUS EVERY 8 HOURS
Refills: 0 | Status: DISCONTINUED | OUTPATIENT
Start: 2023-08-09 | End: 2023-08-09

## 2023-08-08 RX ORDER — ALPRAZOLAM 0.25 MG
0.5 TABLET ORAL THREE TIMES A DAY
Refills: 0 | Status: DISCONTINUED | OUTPATIENT
Start: 2023-08-08 | End: 2023-08-11

## 2023-08-08 RX ORDER — SODIUM CHLORIDE 9 MG/ML
1000 INJECTION, SOLUTION INTRAVENOUS
Refills: 0 | Status: DISCONTINUED | OUTPATIENT
Start: 2023-08-08 | End: 2023-08-11

## 2023-08-08 RX ORDER — DEXTROSE 50 % IN WATER 50 %
25 SYRINGE (ML) INTRAVENOUS ONCE
Refills: 0 | Status: DISCONTINUED | OUTPATIENT
Start: 2023-08-08 | End: 2023-08-11

## 2023-08-08 RX ORDER — BUDESONIDE AND FORMOTEROL FUMARATE DIHYDRATE 160; 4.5 UG/1; UG/1
2 AEROSOL RESPIRATORY (INHALATION)
Refills: 0 | Status: DISCONTINUED | OUTPATIENT
Start: 2023-08-08 | End: 2023-08-11

## 2023-08-08 RX ORDER — ACETAMINOPHEN 500 MG
650 TABLET ORAL ONCE
Refills: 0 | Status: COMPLETED | OUTPATIENT
Start: 2023-08-08 | End: 2023-08-08

## 2023-08-08 RX ORDER — DEXTROSE 50 % IN WATER 50 %
15 SYRINGE (ML) INTRAVENOUS ONCE
Refills: 0 | Status: DISCONTINUED | OUTPATIENT
Start: 2023-08-08 | End: 2023-08-11

## 2023-08-08 RX ORDER — HYDROMORPHONE HYDROCHLORIDE 2 MG/ML
4 INJECTION INTRAMUSCULAR; INTRAVENOUS; SUBCUTANEOUS EVERY 4 HOURS
Refills: 0 | Status: DISCONTINUED | OUTPATIENT
Start: 2023-08-08 | End: 2023-08-11

## 2023-08-08 RX ORDER — GLUCAGON INJECTION, SOLUTION 0.5 MG/.1ML
1 INJECTION, SOLUTION SUBCUTANEOUS ONCE
Refills: 0 | Status: DISCONTINUED | OUTPATIENT
Start: 2023-08-08 | End: 2023-08-11

## 2023-08-08 RX ORDER — SENNA PLUS 8.6 MG/1
2 TABLET ORAL AT BEDTIME
Refills: 0 | Status: DISCONTINUED | OUTPATIENT
Start: 2023-08-08 | End: 2023-08-11

## 2023-08-08 RX ORDER — ACETYLCYSTEINE 200 MG/ML
3 VIAL (ML) MISCELLANEOUS EVERY 6 HOURS
Refills: 0 | Status: DISCONTINUED | OUTPATIENT
Start: 2023-08-08 | End: 2023-08-11

## 2023-08-08 RX ORDER — INSULIN GLARGINE 100 [IU]/ML
20 INJECTION, SOLUTION SUBCUTANEOUS AT BEDTIME
Refills: 0 | Status: DISCONTINUED | OUTPATIENT
Start: 2023-08-08 | End: 2023-08-11

## 2023-08-08 RX ORDER — INSULIN LISPRO 100/ML
6 VIAL (ML) SUBCUTANEOUS
Refills: 0 | Status: DISCONTINUED | OUTPATIENT
Start: 2023-08-08 | End: 2023-08-11

## 2023-08-08 RX ORDER — DEXTROSE 50 % IN WATER 50 %
12.5 SYRINGE (ML) INTRAVENOUS ONCE
Refills: 0 | Status: DISCONTINUED | OUTPATIENT
Start: 2023-08-08 | End: 2023-08-11

## 2023-08-08 RX ORDER — ALBUTEROL 90 UG/1
2.5 AEROSOL, METERED ORAL ONCE
Refills: 0 | Status: DISCONTINUED | OUTPATIENT
Start: 2023-08-08 | End: 2023-08-11

## 2023-08-08 RX ORDER — SOD,AMMONIUM,POTASSIUM LACTATE
1 CREAM (GRAM) TOPICAL
Refills: 0 | Status: DISCONTINUED | OUTPATIENT
Start: 2023-08-08 | End: 2023-08-11

## 2023-08-08 RX ORDER — POLYETHYLENE GLYCOL 3350 17 G/17G
17 POWDER, FOR SOLUTION ORAL DAILY
Refills: 0 | Status: DISCONTINUED | OUTPATIENT
Start: 2023-08-08 | End: 2023-08-11

## 2023-08-08 RX ADMIN — HYDROMORPHONE HYDROCHLORIDE 1 MILLIGRAM(S): 2 INJECTION INTRAMUSCULAR; INTRAVENOUS; SUBCUTANEOUS at 02:11

## 2023-08-08 RX ADMIN — GABAPENTIN 600 MILLIGRAM(S): 400 CAPSULE ORAL at 23:49

## 2023-08-08 RX ADMIN — Medication 1 TABLET(S): at 13:38

## 2023-08-08 RX ADMIN — Medication 0.5 MILLIGRAM(S): at 22:02

## 2023-08-08 RX ADMIN — Medication 6 UNIT(S): at 17:41

## 2023-08-08 RX ADMIN — HYDROMORPHONE HYDROCHLORIDE 4 MILLIGRAM(S): 2 INJECTION INTRAMUSCULAR; INTRAVENOUS; SUBCUTANEOUS at 19:51

## 2023-08-08 RX ADMIN — Medication 650 MILLIGRAM(S): at 02:08

## 2023-08-08 RX ADMIN — Medication 3 MILLILITER(S): at 17:40

## 2023-08-08 RX ADMIN — HYDROMORPHONE HYDROCHLORIDE 4 MILLIGRAM(S): 2 INJECTION INTRAMUSCULAR; INTRAVENOUS; SUBCUTANEOUS at 14:30

## 2023-08-08 RX ADMIN — GABAPENTIN 600 MILLIGRAM(S): 400 CAPSULE ORAL at 13:16

## 2023-08-08 RX ADMIN — Medication 1 APPLICATION(S): at 17:41

## 2023-08-08 RX ADMIN — Medication 25 GRAM(S): at 16:34

## 2023-08-08 RX ADMIN — CEFTRIAXONE 100 MILLIGRAM(S): 500 INJECTION, POWDER, FOR SOLUTION INTRAMUSCULAR; INTRAVENOUS at 07:10

## 2023-08-08 RX ADMIN — Medication 0.5 MILLIGRAM(S): at 02:58

## 2023-08-08 RX ADMIN — HYDROMORPHONE HYDROCHLORIDE 4 MILLIGRAM(S): 2 INJECTION INTRAMUSCULAR; INTRAVENOUS; SUBCUTANEOUS at 13:15

## 2023-08-08 RX ADMIN — INSULIN GLARGINE 20 UNIT(S): 100 INJECTION, SOLUTION SUBCUTANEOUS at 23:56

## 2023-08-08 RX ADMIN — AZITHROMYCIN 255 MILLIGRAM(S): 500 TABLET, FILM COATED ORAL at 09:29

## 2023-08-08 RX ADMIN — Medication 6 UNIT(S): at 22:30

## 2023-08-08 RX ADMIN — Medication 0.5 MILLIGRAM(S): at 13:16

## 2023-08-08 RX ADMIN — HYDROMORPHONE HYDROCHLORIDE 4 MILLIGRAM(S): 2 INJECTION INTRAMUSCULAR; INTRAVENOUS; SUBCUTANEOUS at 23:57

## 2023-08-08 NOTE — H&P ADULT - NSHPREVIEWOFSYSTEMS_GEN_ALL_CORE
REVIEW OF SYSTEMS:    CONSTITUTIONAL: No weakness, fevers or chills  EYES/ENT: No visual changes;  No vertigo or throat pain   NECK: No pain or stiffness  RESPIRATORY: As per HPI, stable cough with now blood tinged sputum from trach, increased dyspnea,  CARDIOVASCULAR: No chest pain or palpitations  GASTROINTESTINAL: No abdominal or epigastric pain. No nausea, vomiting, or hematemesis; No diarrhea or constipation. No melena or hematochezia.  GENITOURINARY: No dysuria, frequency or hematuria  NEUROLOGICAL: No numbness or weakness, pain in b/l lower extremity below knee  SKIN: No itching, rashes

## 2023-08-08 NOTE — H&P ADULT - ATTENDING COMMENTS
58 yr olf male presented with c/o epistaxis  VITAL SIGNS (Last 24 hrs):  T(C): 36.5 (08-08-23 @ 09:57), Max: 37.4 (08-08-23 @ 01:00)  HR: 73 (08-08-23 @ 09:57) (58 - 88)  BP: 179/76 (08-08-23 @ 09:57) (149/67 - 179/76)  RR: 18 (08-08-23 @ 09:57) (18 - 18)  SpO2: 99% (08-08-23 @ 09:57) (95% - 99%)  Wt(kg): --  Daily     Daily     I&O's Summary                        8.7    7.18  )-----------( 210      ( 08 Aug 2023 12:32 )             25.9   08-08    128<L>  |  93<L>  |  11  ----------------------------<  209<H>  4.1   |  24  |  <0.5<L>    Ca    9.5      08 Aug 2023 12:32  Mg     1.7     08-08    TPro  6.7  /  Alb  3.6  /  TBili  0.4  /  DBili  x   /  AST  8   /  ALT  6   /  AlkPhos  88  08-08  aaox3  chest--b/l air entry  trach present  cvs--s1s2n  patient is bed bound but moves lower ext  assessment and plan:  # Epistaxis-- patient was bleeding --had stopped eliquis on her own -- seen by ENT-- no bleeding noted on exam  # Anemia-- s/p 1 unit of PRBC-- hb from 7.2--8.7  # hx of SCC of nasopharynx s/p chemo and now on trach--oxygen requirement increased from 6Lto 10L  --CT chest showed thick lower lobe masses-- it was deemed inoperable in aug 2022 and  radiation oncology last saw her in march of 2023 and did not follow up  for may 2023-- will call radiation oncology consult  empiric treatment with ABX for now-- can deescalate  later  # DVT-- -- high risk bedbound with cancer-- restart eliquis AM  # chr hyponatremia  # functional quadriplegia

## 2023-08-08 NOTE — H&P ADULT - HISTORY OF PRESENT ILLNESS
Patient is a 58 year old female with PMH of IDDM, GERD, HTN, COPD, Chronic hypoxemic and hypercapnic respiratory failure SP Tracheostomy (on 6L at [place]) & PEG at [facility], DVT on Eliquis, Anxiety, Hx of Supraglottic SSC s/p chemotherapy  presenting from [place] rehab with bleeding from trach, epistaxis for 3 days and anemia. Patient is nonverbal from trach but able to communicate with notepad and lip reading. She states that when the facility was doing the weekly labs noted to be anemic so sent for management. Of note, she has had increased dyspnea and secretions from the trach with blood as well as epistaxis, with no change in cough. She was endorsing anxiety and chronic leg pain b/l. Denies any fever, chills, worsening cough, chest pain, nausea, vomiting or diarrhea,      In the ED, afebrile, /67, sat 99% on 10L with Tpiece. Labs significant for Hb 7.2, WBC 6.6, Na 128. Xray showed Left basilar opacity, CT chest done showed stable appearance of two left lower lobe thick walled masses with low attenuating centers and gas concerning for central necrosis. Of note, a component of infection/inflammation cannot be excluded and Persistent left lower lobe partial occlusion with left lower lobe consolidative opacity with air bronchograms and superimposed atelectasis and trace underlying effusion. Patient received 1U pRCB and 1 dose azithromycin and ceftriazone

## 2023-08-08 NOTE — H&P ADULT - NSHPPHYSICALEXAM_GEN_ALL_CORE
PHYSICAL EXAM:  GENERAL: No acute distress, unable to verbalize with trach but communicate with notepad  HEAD:  Atraumatic, Normocephalic  EYES: EOMI, PERRLA, conjunctiva and sclera clear  NECK: Supple, no lymphadenopathy, no JVD  CHEST/LUNG: ; Rhonchi heard R>L   HEART: Regular rate and rhythm; No murmurs, rubs, or gallops  ABDOMEN: Soft, non-tender, non-distended; normal bowel sounds, PEG with granulation tissue seen around site, tube was not stitched in place  EXTREMITIES:  2+ peripheral pulses b/l, No clubbing, cyanosis, or edema, TTP in b/l lower extremity  NEUROLOGY: A&O x 3  SKIN: No rashes or lesions

## 2023-08-08 NOTE — H&P ADULT - ASSESSMENT
Pt is a 59yo with PMH of IDDM, GERD, HTN, COPD, CHF, Chronic hypoxemic and hypercapnic respiratory failure s/p trach/PEG (at [facility]), DVT on Eliquis, Hx of Supraglottic SSC s/p chemotherapy rehab  presents from [facility]  epistaxis and b/l LE pain.    #Epistaxis  #Anemia  - Hb 7.2, s/p 1u pRBC  - Follow up 11am CBC  - Appreciate ENT recs  - Recommend bacitracin to b/l nares to keep area moist  - Continue to monitor SpO2, recommend humidified oxygen  - Continue to monitor for episodes of rebleeding  - Avoid: Nasal trauma; no nose rubbing, blowing or manipulating nasal packing, bending with head blow the waist and heavy lifting  - Sneeze with mouth open and pinching nares.  - No acute ENT intervention at this time     #Left lower lobe masses r/o infection  - Increased dyspnea, sputum from tach  - SIRS negative  - Empiric course of azithromycin and ceftriazone  - Frequent suctioning and chest PT    #nxiety  - Alprazolam .5 TID    #Lower extremity pain  - C/w home meds gabapentin 600 TID  - Dilauded 4mg Q4    #HO DVT was on Eliquis  #Venous stasis dermatitis  - Eliquis 5mg BID per NH  - Holding in setting of nasal bleed    #Peg tube  - Noted to not be stitched in place  - Confirm placement prior to feeds    #IDDM  - Follow up a1C  - basal, bolus insulin as per med rec  - c/w with PEG feed    #HTN  - losartan 100mg     DVT ppx:   GI ppx: PPI  Dispo:   Diet: Peg tube feeds when placement confirmed     Pt is a 57yo with PMH of IDDM, GERD, HTN, COPD, CHF, Chronic hypoxemic and hypercapnic respiratory failure s/p trach/PEG (at [facility]), DVT on Eliquis, Hx of Supraglottic SSC s/p chemotherapy rehab  presents from [facility]  epistaxis and b/l LE pain.    #Epistaxis  #Anemia  - Hb 7.2, s/p 1u pRBC  - Follow up 11am CBC  - Appreciate ENT recs  - Recommend bacitracin to b/l nares to keep area moist  - Continue to monitor SpO2, recommend humidified oxygen  - Continue to monitor for episodes of rebleeding  - Avoid: Nasal trauma; no nose rubbing, blowing or manipulating nasal packing, bending with head blow the waist and heavy lifting  - Sneeze with mouth open and pinching nares.  - No acute ENT intervention at this time     #Left lower lobe masses r/o infection  - Increased dyspnea, sputum from tach  - SIRS negative  - Empiric course of cefepime and vancomycin  - Suctioning and chest PT    #COPD  - on Trelegy at home  - C/w Symbicort and albuterol    #Hyponatremia  - Na 128  - Likely chronic, monitor      #Anxiety  - Alprazolam .5 TID    #Lower extremity pain  - C/w home meds gabapentin 600 TID  - Dilauded 4mg Q4 PRN    #HO DVT was on Eliquis  #Venous stasis dermatitis  - Eliquis 5mg BID per NH  - Holding in setting of nasal bleed      #IDDM  - Follow up a1C  - basal, bolus insulin as per med rec  - c/w with PEG feed    #HTN  - losartan 100mg     DVT ppx: holding for bleed  GI ppx: PPI  Dispo: acute  Diet: Peg tube feeds    Pt is a 57yo with PMH of IDDM, GERD, HTN, COPD, CHF, Chronic hypoxemic and hypercapnic respiratory failure s/p trach/PEG (at [facility]), DVT on Eliquis, Hx of Supraglottic SSC s/p chemotherapy rehab  presents from [facility] [place] by [facility]  epistaxis and b/l LE pain.    #Epistaxis  #Anemia  - Hb 7.2, s/p 1u pRBC  - Follow up 11am CBC  - Appreciate ENT recs  - Recommend bacitracin to b/l nares to keep area moist  - Continue to monitor SpO2, recommend humidified oxygen  - Continue to monitor for episodes of rebleeding  - Avoid: Nasal trauma; no nose rubbing, blowing or manipulating nasal packing, bending with head blow the waist and heavy lifting  - Sneeze with mouth open and pinching nares.  - No acute ENT intervention at this time  - Epistaxis resolved, continue to monitor    #Left lower lobe masses r/o infection  - Increased dyspnea, sputum from tach  - No white count, afebrile, does not meet SIRS criteria  - Start Empiric course of cefepime and vancomycin  - Suctioning and chest PT    #COPD  - on Trelegy at home  - C/w Symbicort and albuterol    #Hyponatremia  - Na 128  - Likely chronic, monitor      #Anxiety  - Alprazolam .5 TID    #Lower extremity pain  - C/w home meds gabapentin 600 TID  - Dilauded 4mg Q4 PRN    #HO DVT was on Eliquis  #Venous stasis dermatitis  - Eliquis 5mg BID per NH  - Holding in setting of nasal bleed      #IDDM  - Follow up a1C  - basal, bolus insulin as per med rec  - c/w with PEG feed    #HTN  - losartan 100mg     DVT ppx: holding for bleed  GI ppx: PPI  Dispo: acute  Diet: Peg tube feeds

## 2023-08-09 LAB
A1C WITH ESTIMATED AVERAGE GLUCOSE RESULT: 5.9 % — HIGH (ref 4–5.6)
ANION GAP SERPL CALC-SCNC: 11 MMOL/L — SIGNIFICANT CHANGE UP (ref 7–14)
BUN SERPL-MCNC: 10 MG/DL — SIGNIFICANT CHANGE UP (ref 10–20)
CALCIUM SERPL-MCNC: 9.7 MG/DL — SIGNIFICANT CHANGE UP (ref 8.4–10.5)
CHLORIDE SERPL-SCNC: 95 MMOL/L — LOW (ref 98–110)
CO2 SERPL-SCNC: 25 MMOL/L — SIGNIFICANT CHANGE UP (ref 17–32)
CREAT SERPL-MCNC: <0.5 MG/DL — LOW (ref 0.7–1.5)
EGFR: 114 ML/MIN/1.73M2 — SIGNIFICANT CHANGE UP
ESTIMATED AVERAGE GLUCOSE: 123 MG/DL — HIGH (ref 68–114)
GLUCOSE BLDC GLUCOMTR-MCNC: 120 MG/DL — HIGH (ref 70–99)
GLUCOSE BLDC GLUCOMTR-MCNC: 121 MG/DL — HIGH (ref 70–99)
GLUCOSE BLDC GLUCOMTR-MCNC: 149 MG/DL — HIGH (ref 70–99)
GLUCOSE BLDC GLUCOMTR-MCNC: 162 MG/DL — HIGH (ref 70–99)
GLUCOSE BLDC GLUCOMTR-MCNC: 167 MG/DL — HIGH (ref 70–99)
GLUCOSE SERPL-MCNC: 163 MG/DL — HIGH (ref 70–99)
HCT VFR BLD CALC: 26.2 % — LOW (ref 37–47)
HGB BLD-MCNC: 8.8 G/DL — LOW (ref 12–16)
MAGNESIUM SERPL-MCNC: 2 MG/DL — SIGNIFICANT CHANGE UP (ref 1.8–2.4)
MCHC RBC-ENTMCNC: 30.3 PG — SIGNIFICANT CHANGE UP (ref 27–31)
MCHC RBC-ENTMCNC: 33.6 G/DL — SIGNIFICANT CHANGE UP (ref 32–37)
MCV RBC AUTO: 90.3 FL — SIGNIFICANT CHANGE UP (ref 81–99)
NRBC # BLD: 0 /100 WBCS — SIGNIFICANT CHANGE UP (ref 0–0)
PLATELET # BLD AUTO: 225 K/UL — SIGNIFICANT CHANGE UP (ref 130–400)
PMV BLD: 8.6 FL — SIGNIFICANT CHANGE UP (ref 7.4–10.4)
POTASSIUM SERPL-MCNC: 4.2 MMOL/L — SIGNIFICANT CHANGE UP (ref 3.5–5)
POTASSIUM SERPL-SCNC: 4.2 MMOL/L — SIGNIFICANT CHANGE UP (ref 3.5–5)
RBC # BLD: 2.9 M/UL — LOW (ref 4.2–5.4)
RBC # FLD: 13.8 % — SIGNIFICANT CHANGE UP (ref 11.5–14.5)
SODIUM SERPL-SCNC: 131 MMOL/L — LOW (ref 135–146)
WBC # BLD: 7.09 K/UL — SIGNIFICANT CHANGE UP (ref 4.8–10.8)
WBC # FLD AUTO: 7.09 K/UL — SIGNIFICANT CHANGE UP (ref 4.8–10.8)

## 2023-08-09 RX ADMIN — HYDROMORPHONE HYDROCHLORIDE 4 MILLIGRAM(S): 2 INJECTION INTRAMUSCULAR; INTRAVENOUS; SUBCUTANEOUS at 21:05

## 2023-08-09 RX ADMIN — Medication 1 APPLICATION(S): at 05:34

## 2023-08-09 RX ADMIN — Medication 6 UNIT(S): at 21:02

## 2023-08-09 RX ADMIN — HYDROMORPHONE HYDROCHLORIDE 4 MILLIGRAM(S): 2 INJECTION INTRAMUSCULAR; INTRAVENOUS; SUBCUTANEOUS at 11:22

## 2023-08-09 RX ADMIN — CEFEPIME 100 MILLIGRAM(S): 1 INJECTION, POWDER, FOR SOLUTION INTRAMUSCULAR; INTRAVENOUS at 13:30

## 2023-08-09 RX ADMIN — GABAPENTIN 600 MILLIGRAM(S): 400 CAPSULE ORAL at 05:34

## 2023-08-09 RX ADMIN — POLYETHYLENE GLYCOL 3350 17 GRAM(S): 17 POWDER, FOR SOLUTION ORAL at 11:22

## 2023-08-09 RX ADMIN — Medication 166.67 MILLIGRAM(S): at 05:33

## 2023-08-09 RX ADMIN — HYDROMORPHONE HYDROCHLORIDE 4 MILLIGRAM(S): 2 INJECTION INTRAMUSCULAR; INTRAVENOUS; SUBCUTANEOUS at 15:30

## 2023-08-09 RX ADMIN — HYDROMORPHONE HYDROCHLORIDE 4 MILLIGRAM(S): 2 INJECTION INTRAMUSCULAR; INTRAVENOUS; SUBCUTANEOUS at 11:52

## 2023-08-09 RX ADMIN — Medication 6 UNIT(S): at 08:21

## 2023-08-09 RX ADMIN — CEFEPIME 100 MILLIGRAM(S): 1 INJECTION, POWDER, FOR SOLUTION INTRAMUSCULAR; INTRAVENOUS at 05:33

## 2023-08-09 RX ADMIN — INSULIN GLARGINE 20 UNIT(S): 100 INJECTION, SOLUTION SUBCUTANEOUS at 21:02

## 2023-08-09 RX ADMIN — Medication 0.5 MILLIGRAM(S): at 11:22

## 2023-08-09 RX ADMIN — HYDROMORPHONE HYDROCHLORIDE 4 MILLIGRAM(S): 2 INJECTION INTRAMUSCULAR; INTRAVENOUS; SUBCUTANEOUS at 20:35

## 2023-08-09 RX ADMIN — GABAPENTIN 600 MILLIGRAM(S): 400 CAPSULE ORAL at 13:30

## 2023-08-09 RX ADMIN — Medication 1 APPLICATION(S): at 17:11

## 2023-08-09 RX ADMIN — Medication 0.5 MILLIGRAM(S): at 20:35

## 2023-08-09 RX ADMIN — Medication 1 TABLET(S): at 11:22

## 2023-08-09 RX ADMIN — LOSARTAN POTASSIUM 100 MILLIGRAM(S): 100 TABLET, FILM COATED ORAL at 05:33

## 2023-08-09 RX ADMIN — HYDROMORPHONE HYDROCHLORIDE 4 MILLIGRAM(S): 2 INJECTION INTRAMUSCULAR; INTRAVENOUS; SUBCUTANEOUS at 16:00

## 2023-08-09 RX ADMIN — GABAPENTIN 600 MILLIGRAM(S): 400 CAPSULE ORAL at 21:02

## 2023-08-09 RX ADMIN — Medication 6 UNIT(S): at 13:35

## 2023-08-09 RX ADMIN — PANTOPRAZOLE SODIUM 40 MILLIGRAM(S): 20 TABLET, DELAYED RELEASE ORAL at 05:33

## 2023-08-09 NOTE — DIETITIAN INITIAL EVALUATION ADULT - NSFNSGIIOFT_GEN_A_CORE
^ height per NH documentation  ^ weight currently 8/9/23  UBW 83.6kg/184lbs per NH docs   IBW 48.2kg    previous admission weights (kg):  86.6 ~1 year ago --> indicates ~3% weight loss over the course of a year which is relatively stable  94.3 6/6/2022

## 2023-08-09 NOTE — PHARMACOTHERAPY INTERVENTION NOTE - NSPHARMCOMMASP
ASP - Dose optimization/Non-Renal dose adjustment
ASP - Dose optimization/Non-Renal dose adjustment
ASP - Lab/ test recommended
ASP - Lab/ test recommended
ASP - Dose optimization/Non-Renal dose adjustment

## 2023-08-09 NOTE — DIETITIAN INITIAL EVALUATION ADULT - ADD RECOMMEND
bowel regimen to promote regular bowel movements   insulin regimen to promote euglycemia  MV   Patient at HIGH nutrition risk   will follow up within 3-5days  [place], [place]  [id number] or via TEAMS

## 2023-08-09 NOTE — PROGRESS NOTE ADULT - ASSESSMENT
Pt is a 59yo with PMH of IDDM, GERD, HTN, COPD, CHF, Chronic hypoxemic and hypercapnic respiratory failure s/p trach/PEG (at [facility]), DVT on Eliquis, Hx of Supraglottic SSC s/p chemotherapy rehab  presents from [facility]  epistaxis and b/l LE pain.    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  - Increased dyspnea, sputum from tach  - No white count, afebrile, does not meet SIRS criteria  - d/c abx for now as no clinical evidence of infection  - f/u ID consult  - Suctioning and chest PT  - Biopsy of lung nodule in May showed SSC    #COPD  - on Trelegy at home  - C/w Symbicort and albuterol    #Hyponatremia  - Na 131 this AM  - Likely chronic, monitor    #Anxiety  - Alprazolam .5 TID    #Lower extremity pain  - C/w home meds gabapentin 600 TID  - Dilauded 4mg Q4 PRN    #HO DVT was on Eliquis  #Venous stasis dermatitis  - Eliquis 5mg BID per NH  - Holding in setting of nasal bleed  - Repeat LE duplex    #IDDM  - Follow up a1C  - basal, bolus insulin as per med rec  - c/w with PEG feed    #HTN  - losartan 100mg       # DVT prophylaxis: Hold for bleeding  # GI prophylaxis: PPI  # Diet: Peg tube feeds  # Activity: bedbound  # Code status: Full Code  # Disposition: TBD    Pending: ID consult, f/u LE duplex

## 2023-08-09 NOTE — DIETITIAN INITIAL EVALUATION ADULT - NS FNS DIET ORDER
Diet, NPO with Tube Feed:   Tube Feeding Modality: Gastrostomy  Glucerna 1.2 Martin  Total Volume for 24 Hours (mL): 1200  Bolus  Total Volume of Bolus (mL):  300  Total # of Feeds: 4  Tube Feed Frequency: Every 6 hours   Tube Feed Start Time: 14:00  Bolus Feed Rate (mL per Hour): 300   Bolus Feed Duration (in Hours): 1  Free Water Flush  Bolus   Total Volume per Flush (mL): 30   Frequency: Every 6 Hours  Free Water Flush Instructions:  Post feed  No Carb Prosource TF     Qty per Day:  2x (08-08-23 @ 12:18) [Active]

## 2023-08-09 NOTE — DIETITIAN INITIAL EVALUATION ADULT - PERTINENT MEDS FT
MEDICATIONS  (STANDING):  acetylcysteine 20%  Inhalation 3 milliLiter(s) Inhalation every 6 hours  ammonium lactate 12% Lotion 1 Application(s) Topical two times a day  budesonide  80 MICROgram(s)/formoterol 4.5 MICROgram(s) Inhaler 2 Puff(s) Inhalation two times a day  cefepime   IVPB 2000 milliGRAM(s) IV Intermittent every 8 hours  dextrose 5%. 1000 milliLiter(s) (50 mL/Hr) IV Continuous <Continuous>  dextrose 5%. 1000 milliLiter(s) (100 mL/Hr) IV Continuous <Continuous>  dextrose 50% Injectable 25 Gram(s) IV Push once  dextrose 50% Injectable 25 Gram(s) IV Push once  dextrose 50% Injectable 12.5 Gram(s) IV Push once  gabapentin 600 milliGRAM(s) Oral three times a day  glucagon  Injectable 1 milliGRAM(s) IntraMuscular once  insulin glargine Injectable (LANTUS) 20 Unit(s) SubCutaneous at bedtime  insulin lispro Injectable (ADMELOG) 6 Unit(s) SubCutaneous four times a day before meals  losartan 100 milliGRAM(s) Oral daily  multivitamin 1 Tablet(s) Oral daily  pantoprazole    Tablet 40 milliGRAM(s) Oral before breakfast  polyethylene glycol 3350 17 Gram(s) Oral daily  senna 2 Tablet(s) Oral at bedtime  vancomycin  IVPB 1250 milliGRAM(s) IV Intermittent every 12 hours    MEDICATIONS  (PRN):  albuterol    0.083%. 2.5 milliGRAM(s) Nebulizer once PRN Shortness of Breath and/or Wheezing  ALPRAZolam 0.5 milliGRAM(s) Oral three times a day PRN Anxiety  dextrose Oral Gel 15 Gram(s) Oral once PRN Blood Glucose LESS THAN 70 milliGRAM(s)/deciliter  HYDROmorphone   Tablet 4 milliGRAM(s) Oral every 4 hours PRN Severe Pain (7 - 10)  silver sulfADIAZINE 1% Cream 1 Application(s) Topical daily PRN Wound Care

## 2023-08-09 NOTE — DIETITIAN INITIAL EVALUATION ADULT - PERTINENT LABORATORY DATA
08-09    131<L>  |  95<L>  |  10  ----------------------------<  163<H>  4.2   |  25  |  <0.5<L>    Ca    9.7      09 Aug 2023 06:23  Mg     2.0     08-09    TPro  6.7  /  Alb  3.6  /  TBili  0.4  /  DBili  x   /  AST  8   /  ALT  6   /  AlkPhos  88  08-08  POCT Blood Glucose.: 162 mg/dL (08-09-23 @ 13:00)  A1C with Estimated Average Glucose Result: 5.9 % (08-09-23 @ 06:23)

## 2023-08-09 NOTE — PROGRESS NOTE ADULT - ASSESSMENT
unfortunate 59 YO F with chr trach and PEG, chr RF, supraglottic SCC, pul changes sugg or lung malig, sent from SNF for anemia, epistaxis and bleeding around the trach.  The pt is sp 1u PRBC from ER and on IV Abx for CXR/CT pul changes sugg of inf.    Epistaxis/bleeding around tracj  Anemia  Hx of HTN, ASHD  Hx of COPD, chr RF, hypoxia, hypercapnea, sp permanent trach  Hx of Supraglottic SCC sp chemo  Hx of LLL massess + FDG active 3/23 highly suspicious for malignancy  Hx of DVT, AC with Eliquis  Hx of OA, DDD, DJD  Hx of chr PEG  Hx of chr anemia of chronic dis  Hx of anxiety, depression  Hx of chr pain syn    CT of chest reviewed:  chr changes of emphysema, LLL partial bronchial occ,  chr LLL consolidation with air bronchograms, redemonstrated 2 LLL massess with gas changes sugg of necrosis, prior FDG+ and sugg of maignancy  check nasal MRSA after which apply mupirocin BID  att to meticulous trach hygiene, oral hygiene  ENT consult to assess nasal passages, ? need for cauterization and assess status of trach and retropharyngeal area/? necrotic tissue/ca recurrence?  pt was given 1 u of PRBC in the Er for Hgb 7.2  pt placed on ABx Cefepime and Vanco  ID consult for ? neeed for Abx as CT/CXR changes are chr, no fever, WBC 7  nutrition via PEG  maintain home meds  Rehab consult  social svc, goal is to stabilize pt and return to SNF     unfortunate 57 YO F with chr trach and PEG, chr RF, supraglottic SCC, pul changes sugg or lung malig, sent from SNF for anemia, epistaxis and bleeding around the trach.  The pt is sp 1u PRBC from ER and on IV Abx for CXR/CT pul changes sugg of inf.    Epistaxis/bleeding around tracj  Anemia  Hx of HTN, ASHD  Hx of COPD, chr RF, hypoxia, hypercapnea, sp permanent trach  Hx of Supraglottic SCC sp chemo  Hx of LLL massess + FDG active 3/23 highly suspicious for malignancy  Hx of DVT, AC with Eliquis  Hx of OA, DDD, DJD  Hx of chr PEG  Hx of chr anemia of chronic dis  Hx of anxiety, depression  Hx of chr pain syn    CT of chest reviewed:  chr changes of emphysema, LLL partial bronchial occ,  chr LLL consolidation with air bronchograms, redemonstrated 2 LLL massess with gas changes sugg of necrosis, prior FDG+ and sugg of maignancy  check nasal MRSA after which apply mupirocin BID  att to meticulous trach hygiene, oral hygiene  ENT consult to assess nasal passages, ? need for cauterization and assess status of trach and retropharyngeal area/? necrotic tissue/ca recurrence?  pt was given 1 u of PRBC in the Er for Hgb 7.2 to 8.8  pt placed on ABx Cefepime and Vanco, no fever, no WBC doubt she req Abx  ID consult for ? neeed for Abx as CT/CXR changes are chr, no fever, WBC 7  nutrition via PEG  maintain home meds  Rehab consult  BNB:  pt has appt with Onc Dr TRUPTI Mejia for  social svc, goal is to return to SNF. prepare pt for D/C

## 2023-08-09 NOTE — DIETITIAN INITIAL EVALUATION ADULT - ENTERAL
Glucerna 1.2  360mL 3x/day at meal times 7am/12pm/5pm; 1080mL formula, 1296kcal, 65g protein, 875mL free water  No Carb Prosource 1x prior to first feed in am [+60kcal, +15g protein]   50cc pre/post water flushes [+ 300mL]  = total 1356kcal, 80g protein 1175mL free water

## 2023-08-09 NOTE — PHARMACOTHERAPY INTERVENTION NOTE - COMMENTS
Modified penicillin allergy to state patient tolerated ceftriaxone during this admission.    Malcolm Oglesby, PharmD  Clinical Pharmacy Specialist, Infectious Diseases  Tele-Antimicrobial Stewardship Program (Tele-ASP)  Tele-ASP Phone: [phone] 
Recommended continuing vancomycin 1250mg IV q12h. According to [employer], a Bayesian pharmacokinetic modeling program, continuing vancomycin 1250mg IV q12h is predicted to result in a future vancomycin AUC of ~550, which is within the goal range of 400-600.    Avelino DelgadoD  Clinical Pharmacy Specialist, Infectious Diseases  Tele-Antimicrobial Stewardship Program (Tele-ASP)  Tele-ASP Phone: [phone] 
Vancomycin 1250 mg q12h  Peak 23.5  Trough: 10.3
As per policy, discontinued the vancomycin trough level in the setting of vancomycin being discontinued.    Malcolm Oglesby, PharmD  Clinical Pharmacy Specialist, Infectious Diseases  Tele-Antimicrobial Stewardship Program (Tele-ASP)  Tele-ASP Phone: [phone] 
Recommended obtaining an MRSA nasal PCR in the setting of vancomycin ordered empirically for pneumonia.    Malcolm Oglesby, PharmD  Clinical Pharmacy Specialist, Infectious Diseases  Tele-Antimicrobial Stewardship Program (Tele-ASP)  Tele-ASP Phone: [phone] 
Recommended obtaining a vancomycin trough level prior to the 4th overall dose of vancomycin 1250mg IV q12h to assist with further vancomycin dosing optimization.    Avelino DelgadoD  Clinical Pharmacy Specialist, Infectious Diseases  Tele-Antimicrobial Stewardship Program (Tele-ASP)  Tele-ASP Phone: [phone] 
Modified patient's height to 154.9cm utilizing [facility]'s Growth Chart to assist with vancomycin dosing optimization.    Avelino DelgadoD  Clinical Pharmacy Specialist, Infectious Diseases  Tele-Antimicrobial Stewardship Program (Tele-ASP)  Tele-ASP Phone: [phone]

## 2023-08-09 NOTE — DIETITIAN INITIAL EVALUATION ADULT - ORAL INTAKE PTA/DIET HISTORY
prior to admission: patient NPO w/ PEG tube feeds; allows for small amount of ice chips upon pt request with speech pathologist  regimen is glucerna 1.5 to gravity, 69mL/hr x16 hr nocturnal feeds: 1100mL/day, 1650kcal, 83g protein   - free water via pump, 1100mL additional  - ferrous sulfate, multivitamin, senna, probiotic, omeprazole    during admission:  NPO with feeds via PEG tube   regimen provides 1440kcal, 72g protein, 972mL water flush+ 2NCPS modulars = total 1560kcal, 102g protein   30mL post feeds = total 1092mL free water

## 2023-08-09 NOTE — PATIENT PROFILE ADULT - FALL HARM RISK - HARM RISK INTERVENTIONS

## 2023-08-09 NOTE — PROGRESS NOTE ADULT - ASSESSMENT
57yo with PMH of IDDM, GERD, HTN, COPD, CHF, Chronic hypoxemic and hypercapnic respiratory failure s/p trach/PEG (at [facility]), DVT on Eliquis, Hx of Supraglottic SSC s/p chemotherapy  presents from [place]  epistaxis and b/l LE pain a/w pneumonia.    Plan:   - No active bleeding visualized in NP or oral exam, +6 cuffless trach without bleeding   - Continue  bacitracin to b/l nares to keep area moist  - Continue to monitor SpO2, recommend humidified oxygen  - Continue to monitor for episodes of rebleeding  - Continue to monitor H/h, transfuse prn  - No acute further ENT intervention at this time   - Will d/w attending

## 2023-08-09 NOTE — DIETITIAN INITIAL EVALUATION ADULT - OTHER CALCULATIONS
using 72.6kg with consideration for age, wt, BMI  ENERGY: 6632-2003 kcal/day (MSJ x 1-1.2)  PROTEIN: 73-87g/day (1-1.2g/kg)  FLUIDS: 1mL/kcal

## 2023-08-10 LAB
ANION GAP SERPL CALC-SCNC: 9 MMOL/L — SIGNIFICANT CHANGE UP (ref 7–14)
BUN SERPL-MCNC: 11 MG/DL — SIGNIFICANT CHANGE UP (ref 10–20)
CALCIUM SERPL-MCNC: 9.5 MG/DL — SIGNIFICANT CHANGE UP (ref 8.4–10.5)
CHLORIDE SERPL-SCNC: 96 MMOL/L — LOW (ref 98–110)
CO2 SERPL-SCNC: 25 MMOL/L — SIGNIFICANT CHANGE UP (ref 17–32)
CREAT SERPL-MCNC: <0.5 MG/DL — LOW (ref 0.7–1.5)
EGFR: 114 ML/MIN/1.73M2 — SIGNIFICANT CHANGE UP
GLUCOSE BLDC GLUCOMTR-MCNC: 129 MG/DL — HIGH (ref 70–99)
GLUCOSE BLDC GLUCOMTR-MCNC: 134 MG/DL — HIGH (ref 70–99)
GLUCOSE BLDC GLUCOMTR-MCNC: 145 MG/DL — HIGH (ref 70–99)
GLUCOSE BLDC GLUCOMTR-MCNC: 164 MG/DL — HIGH (ref 70–99)
GLUCOSE BLDC GLUCOMTR-MCNC: 93 MG/DL — SIGNIFICANT CHANGE UP (ref 70–99)
GLUCOSE SERPL-MCNC: 118 MG/DL — HIGH (ref 70–99)
HCT VFR BLD CALC: 25.2 % — LOW (ref 37–47)
HGB BLD-MCNC: 8.3 G/DL — LOW (ref 12–16)
MAGNESIUM SERPL-MCNC: 1.9 MG/DL — SIGNIFICANT CHANGE UP (ref 1.8–2.4)
MCHC RBC-ENTMCNC: 30.3 PG — SIGNIFICANT CHANGE UP (ref 27–31)
MCHC RBC-ENTMCNC: 32.9 G/DL — SIGNIFICANT CHANGE UP (ref 32–37)
MCV RBC AUTO: 92 FL — SIGNIFICANT CHANGE UP (ref 81–99)
MRSA PCR RESULT.: NEGATIVE — SIGNIFICANT CHANGE UP
NRBC # BLD: 0 /100 WBCS — SIGNIFICANT CHANGE UP (ref 0–0)
PLATELET # BLD AUTO: 212 K/UL — SIGNIFICANT CHANGE UP (ref 130–400)
PMV BLD: 8.4 FL — SIGNIFICANT CHANGE UP (ref 7.4–10.4)
POTASSIUM SERPL-MCNC: 4.2 MMOL/L — SIGNIFICANT CHANGE UP (ref 3.5–5)
POTASSIUM SERPL-SCNC: 4.2 MMOL/L — SIGNIFICANT CHANGE UP (ref 3.5–5)
RBC # BLD: 2.74 M/UL — LOW (ref 4.2–5.4)
RBC # FLD: 14 % — SIGNIFICANT CHANGE UP (ref 11.5–14.5)
SODIUM SERPL-SCNC: 130 MMOL/L — LOW (ref 135–146)
WBC # BLD: 7.19 K/UL — SIGNIFICANT CHANGE UP (ref 4.8–10.8)
WBC # FLD AUTO: 7.19 K/UL — SIGNIFICANT CHANGE UP (ref 4.8–10.8)

## 2023-08-10 PROCEDURE — 93970 EXTREMITY STUDY: CPT | Mod: 26

## 2023-08-10 RX ORDER — CEFEPIME 1 G/1
2000 INJECTION, POWDER, FOR SOLUTION INTRAMUSCULAR; INTRAVENOUS EVERY 8 HOURS
Refills: 0 | Status: DISCONTINUED | OUTPATIENT
Start: 2023-08-10 | End: 2023-08-11

## 2023-08-10 RX ORDER — VANCOMYCIN HCL 1 G
1250 VIAL (EA) INTRAVENOUS EVERY 12 HOURS
Refills: 0 | Status: DISCONTINUED | OUTPATIENT
Start: 2023-08-10 | End: 2023-08-11

## 2023-08-10 RX ADMIN — HYDROMORPHONE HYDROCHLORIDE 4 MILLIGRAM(S): 2 INJECTION INTRAMUSCULAR; INTRAVENOUS; SUBCUTANEOUS at 22:59

## 2023-08-10 RX ADMIN — Medication 166.67 MILLIGRAM(S): at 17:22

## 2023-08-10 RX ADMIN — GABAPENTIN 600 MILLIGRAM(S): 400 CAPSULE ORAL at 05:20

## 2023-08-10 RX ADMIN — SENNA PLUS 2 TABLET(S): 8.6 TABLET ORAL at 21:22

## 2023-08-10 RX ADMIN — Medication 0.5 MILLIGRAM(S): at 22:59

## 2023-08-10 RX ADMIN — Medication 0.5 MILLIGRAM(S): at 05:42

## 2023-08-10 RX ADMIN — HYDROMORPHONE HYDROCHLORIDE 4 MILLIGRAM(S): 2 INJECTION INTRAMUSCULAR; INTRAVENOUS; SUBCUTANEOUS at 12:06

## 2023-08-10 RX ADMIN — HYDROMORPHONE HYDROCHLORIDE 4 MILLIGRAM(S): 2 INJECTION INTRAMUSCULAR; INTRAVENOUS; SUBCUTANEOUS at 16:39

## 2023-08-10 RX ADMIN — HYDROMORPHONE HYDROCHLORIDE 4 MILLIGRAM(S): 2 INJECTION INTRAMUSCULAR; INTRAVENOUS; SUBCUTANEOUS at 23:29

## 2023-08-10 RX ADMIN — HYDROMORPHONE HYDROCHLORIDE 4 MILLIGRAM(S): 2 INJECTION INTRAMUSCULAR; INTRAVENOUS; SUBCUTANEOUS at 15:39

## 2023-08-10 RX ADMIN — GABAPENTIN 600 MILLIGRAM(S): 400 CAPSULE ORAL at 21:22

## 2023-08-10 RX ADMIN — Medication 1 APPLICATION(S): at 05:20

## 2023-08-10 RX ADMIN — INSULIN GLARGINE 20 UNIT(S): 100 INJECTION, SOLUTION SUBCUTANEOUS at 22:16

## 2023-08-10 RX ADMIN — CEFEPIME 100 MILLIGRAM(S): 1 INJECTION, POWDER, FOR SOLUTION INTRAMUSCULAR; INTRAVENOUS at 13:53

## 2023-08-10 RX ADMIN — HYDROMORPHONE HYDROCHLORIDE 4 MILLIGRAM(S): 2 INJECTION INTRAMUSCULAR; INTRAVENOUS; SUBCUTANEOUS at 11:06

## 2023-08-10 RX ADMIN — Medication 6 UNIT(S): at 22:18

## 2023-08-10 RX ADMIN — Medication 1 TABLET(S): at 11:06

## 2023-08-10 RX ADMIN — Medication 0.5 MILLIGRAM(S): at 15:39

## 2023-08-10 RX ADMIN — LOSARTAN POTASSIUM 100 MILLIGRAM(S): 100 TABLET, FILM COATED ORAL at 05:20

## 2023-08-10 RX ADMIN — Medication 6 UNIT(S): at 11:23

## 2023-08-10 RX ADMIN — Medication 6 UNIT(S): at 17:22

## 2023-08-10 RX ADMIN — HYDROMORPHONE HYDROCHLORIDE 4 MILLIGRAM(S): 2 INJECTION INTRAMUSCULAR; INTRAVENOUS; SUBCUTANEOUS at 06:12

## 2023-08-10 RX ADMIN — Medication 6 UNIT(S): at 08:19

## 2023-08-10 RX ADMIN — CEFEPIME 100 MILLIGRAM(S): 1 INJECTION, POWDER, FOR SOLUTION INTRAMUSCULAR; INTRAVENOUS at 21:21

## 2023-08-10 RX ADMIN — Medication 1 APPLICATION(S): at 17:24

## 2023-08-10 RX ADMIN — GABAPENTIN 600 MILLIGRAM(S): 400 CAPSULE ORAL at 13:54

## 2023-08-10 RX ADMIN — HYDROMORPHONE HYDROCHLORIDE 4 MILLIGRAM(S): 2 INJECTION INTRAMUSCULAR; INTRAVENOUS; SUBCUTANEOUS at 05:42

## 2023-08-10 NOTE — PROGRESS NOTE ADULT - ASSESSMENT
Pt is a 57yo with PMH of IDDM, GERD, HTN, COPD, CHF, Chronic hypoxemic and hypercapnic respiratory failure s/p trach/PEG (at [facility]), DVT on Eliquis, Hx of Supraglottic SSC s/p chemotherapy rehab  presents from [facility]  epistaxis and b/l LE pain.    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  - Increased dyspnea, sputum from tach  - No white count, afebrile, does not meet SIRS criteria  - ID reqs appreciated  - Urine for strep pneumonia/legionella antigen  - Nares ORSA  - DET gm stain, cultures  - BCx   - Vancomycin 1250 gm iv q12h  - cefepime 2 gm iv q8h  - Option is a PICC with 3 weeks of ABx as outpt ( targeting the sputum cultures )  - Suctioning and chest PT  - Biopsy of lung nodule in May showed SSC    #COPD  - on Trelegy at home  - C/w Symbicort and albuterol    #Hyponatremia  - Na 130 this AM  - Likely chronic, monitor    #Anxiety  - Alprazolam .5 TID    #Lower extremity pain  - C/w home meds gabapentin 600 TID  - Dilauded 4mg Q4 PRN    #HO DVT was on Eliquis  #Venous stasis dermatitis  - Eliquis 5mg BID per NH  - Holding in setting of nasal bleed  - Repeat LE duplex    #IDDM  - Follow up a1C  - basal, bolus insulin as per med rec  - c/w with PEG feed    #HTN  - losartan 100mg       # DVT prophylaxis: Hold for bleeding  # GI prophylaxis: PPI  # Diet: Peg tube feeds  # Activity: bedbound  # Code status: Full Code  # Disposition: TBD    Pending: f/u LE duplex, f/u infection workup

## 2023-08-10 NOTE — CONSULT NOTE ADULT - ASSESSMENT
58 year old female with PMH of IDDM, GERD, HTN, COPD, Chronic hypoxemic and hypercapnic respiratory failure SP Tracheostomy (on 6L at [place]) & PEG at [facility], DVT on Eliquis, Anxiety, Hx of Supraglottic SSC s/p chemotherapy  presenting from [place] rehab with bleeding from trach, epistaxis for 3 days and anemia. Patient is nonverbal from trach but able to communicate with notepad and lip reading. She states that when the facility was doing the weekly labs noted to be anemic so sent for management. Of note, she has had increased dyspnea and secretions from the trach with blood as well as epistaxis, with no change in cough. She was endorsing anxiety and chronic leg pain b/l. Denies any fever, chills, worsening cough, chest pain, nausea, vomiting or diarrhea,      In the ED, afebrile, /67, sat 99% on 10L with Tpiece. Labs significant for Hb 7.2, WBC 6.6, Na 128. Xray showed Left basilar opacity, CT chest done showed stable appearance of two left lower lobe thick walled masses with low attenuating centers and gas concerning for central necrosis. Of note, a component of infection/inflammation cannot be excluded and Persistent left lower lobe partial occlusion with left lower lobe consolidative opacity with air bronchograms and superimposed atelectasis and trace underlying effusion.    IMPRESSION/RECOMMENDATIONS  Clinically asymptomatic   Trach collar with no fevers, WBC 7.0  7/7 CT suggestive of necrotising PNA LLL with possible cavitation.  Possible aspiration with slow evolving changes in LLL    -Urine for strep pneumonia/legionella antigen  -Nares ORSA  -DET gm stain, cultures  -BCx   -Vancomycin 1250 gm iv q12h  -cefepime 2 gm iv q8h  -Off loading to prevent pressure sores and preventive measures to avoid aspiration   Pt wants to be discharged  Option is a PICC with 3 weeks of ABx as outpt ( targeting the sputum cultures )

## 2023-08-10 NOTE — PROGRESS NOTE ADULT - ASSESSMENT
unfortunate 59 YO F with chr trach and PEG, chr RF, supraglottic SCC, pul changes sugg or lung malig, sent from SNF for anemia, epistaxis and bleeding around the trach.  The pt is sp 1u PRBC from ER and on IV Abx for CXR/CT pul changes sugg of inf.    Epistaxis/bleeding around tracj  Anemia  Hx of HTN, ASHD  Hx of COPD, chr RF, hypoxia, hypercapnea, sp permanent trach  Hx of Supraglottic SCC sp chemo  Hx of LLL massess + FDG active 3/23, lung bx 5/23 + SCC  Hx of DVT, AC with Eliquis  Hx of OA, DDD, DJD  Hx of chr PEG  Hx of chr anemia of chronic dis  Hx of anxiety, depression  Hx of chr pain syn    CT of chest reviewed:  chr changes of emphysema, LLL partial bronchial occ,  chr LLL consolidation with air bronchograms, redemonstrated 2 LLL massess with gas changes sugg of necrosis, prior FDG+ and sugg of malignancy, NB lung bx 5/23 + SCC  check nasal MRSA  check  U Strept and legionella Ag  att to meticulous trach hygiene, oral hygiene  ENT consult   pt was given 1 u of PRBC in the Er for Hgb 7.2 to 8.3 today    ID consult:  CT changes sugg of slowly evolving necrotizing LLL PNA, probable cavitation , possible aspiration, recommend 3 wks of Abx CVanco 1250 q12 and Cefepime 2gmsq 8  place PICC line  nutrition via PEG  maintain home meds  Rehab consult  NB:  pt has appt with Onc Dr TRUPTI Mejia for  social svc, goal is to return to SNF. prepare pt for D/C

## 2023-08-10 NOTE — CONSULT NOTE ADULT - SUBJECTIVE AND OBJECTIVE BOX
CRUZ DUMONT  58y, Female  Allergy: penicillin (Swelling)      All historical available data reviewed.    HPI:  Patient is a 58 year old female with PMH of IDDM, GERD, HTN, COPD, Chronic hypoxemic and hypercapnic respiratory failure SP Tracheostomy (on 6L at [place]) & PEG at [facility], DVT on Eliquis, Anxiety, Hx of Supraglottic SSC s/p chemotherapy  presenting from [facility] [place] [facility] with bleeding from trach, epistaxis for 3 days and anemia. Patient is nonverbal from trach but able to communicate with notepad and lip reading. She states that when the facility was doing the weekly labs noted to be anemic so sent for management. Of note, she has had increased dyspnea and secretions from the trach with blood as well as epistaxis, with no change in cough. She was endorsing anxiety and chronic leg pain b/l. Denies any fever, chills, worsening cough, chest pain, nausea, vomiting or diarrhea,      In the ED, afebrile, /67, sat 99% on 10L with Tpiece. Labs significant for Hb 7.2, WBC 6.6, Na 128. Xray showed Left basilar opacity, CT chest done showed stable appearance of two left lower lobe thick walled masses with low attenuating centers and gas concerning for central necrosis. Of note, a component of infection/inflammation cannot be excluded and Persistent left lower lobe partial occlusion with left lower lobe consolidative opacity with air bronchograms and superimposed atelectasis and trace underlying effusion. Patient received 1U pRCB and 1 dose azithromycin and ceftriazone (08 Aug 2023 10:57)    FAMILY HISTORY:  No pertinent family history in first degree relatives      PAST MEDICAL & SURGICAL HISTORY:  COPD (chronic obstructive pulmonary disease)      CHF (congestive heart failure)      DM (diabetes mellitus)      H/O tracheostomy            VITALS:  T(F): 100.1, Max: 100.1 (08-10-23 @ 04:37)  HR: 81  BP: 134/63  RR: 18Vital Signs Last 24 Hrs  T(C): 37.8 (10 Aug 2023 04:37), Max: 37.8 (10 Aug 2023 04:37)  T(F): 100.1 (10 Aug 2023 04:37), Max: 100.1 (10 Aug 2023 04:37)  HR: 81 (10 Aug 2023 04:37) (61 - 81)  BP: 134/63 (10 Aug 2023 04:37) (117/58 - 140/64)  BP(mean): --  RR: 18 (10 Aug 2023 04:37) (18 - 20)  SpO2: 97% (10 Aug 2023 04:37) (95% - 100%)    Parameters below as of 10 Aug 2023 04:37  Patient On (Oxygen Delivery Method): tracheostomy collar        TESTS & MEASUREMENTS:                        8.8    7.09  )-----------( 225      ( 09 Aug 2023 06:23 )             26.2     08-09    131<L>  |  95<L>  |  10  ----------------------------<  163<H>  4.2   |  25  |  <0.5<L>    Ca    9.7      09 Aug 2023 06:23  Mg     2.0     08-09    TPro  6.7  /  Alb  3.6  /  TBili  0.4  /  DBili  x   /  AST  8   /  ALT  6   /  AlkPhos  88  08-08    LIVER FUNCTIONS - ( 08 Aug 2023 12:32 )  Alb: 3.6 g/dL / Pro: 6.7 g/dL / ALK PHOS: 88 U/L / ALT: 6 U/L / AST: 8 U/L / GGT: x             Urinalysis Basic - ( 09 Aug 2023 06:23 )    Color: x / Appearance: x / SG: x / pH: x  Gluc: 163 mg/dL / Ketone: x  / Bili: x / Urobili: x   Blood: x / Protein: x / Nitrite: x   Leuk Esterase: x / RBC: x / WBC x   Sq Epi: x / Non Sq Epi: x / Bacteria: x          RADIOLOGY & ADDITIONAL TESTS:  Personal review of radiological diagnostics performed  Echo and EKG results noted when applicable.     MEDICATIONS:  acetylcysteine 20%  Inhalation 3 milliLiter(s) Inhalation every 6 hours  albuterol    0.083%. 2.5 milliGRAM(s) Nebulizer once PRN  ALPRAZolam 0.5 milliGRAM(s) Oral three times a day PRN  ammonium lactate 12% Lotion 1 Application(s) Topical two times a day  budesonide  80 MICROgram(s)/formoterol 4.5 MICROgram(s) Inhaler 2 Puff(s) Inhalation two times a day  dextrose 5%. 1000 milliLiter(s) IV Continuous <Continuous>  dextrose 5%. 1000 milliLiter(s) IV Continuous <Continuous>  dextrose 50% Injectable 25 Gram(s) IV Push once  dextrose 50% Injectable 25 Gram(s) IV Push once  dextrose 50% Injectable 12.5 Gram(s) IV Push once  dextrose Oral Gel 15 Gram(s) Oral once PRN  gabapentin 600 milliGRAM(s) Oral three times a day  glucagon  Injectable 1 milliGRAM(s) IntraMuscular once  HYDROmorphone   Tablet 4 milliGRAM(s) Oral every 4 hours PRN  insulin glargine Injectable (LANTUS) 20 Unit(s) SubCutaneous at bedtime  insulin lispro Injectable (ADMELOG) 6 Unit(s) SubCutaneous four times a day before meals  losartan 100 milliGRAM(s) Oral daily  multivitamin 1 Tablet(s) Oral daily  pantoprazole    Tablet 40 milliGRAM(s) Oral before breakfast  polyethylene glycol 3350 17 Gram(s) Oral daily  senna 2 Tablet(s) Oral at bedtime  silver sulfADIAZINE 1% Cream 1 Application(s) Topical daily PRN      ANTIBIOTICS:

## 2023-08-11 ENCOUNTER — TRANSCRIPTION ENCOUNTER (OUTPATIENT)
Age: 58
End: 2023-08-11

## 2023-08-11 VITALS
SYSTOLIC BLOOD PRESSURE: 125 MMHG | DIASTOLIC BLOOD PRESSURE: 60 MMHG | RESPIRATION RATE: 18 BRPM | OXYGEN SATURATION: 99 % | TEMPERATURE: 98 F | HEART RATE: 59 BPM

## 2023-08-11 LAB
ANION GAP SERPL CALC-SCNC: 8 MMOL/L — SIGNIFICANT CHANGE UP (ref 7–14)
BUN SERPL-MCNC: 13 MG/DL — SIGNIFICANT CHANGE UP (ref 10–20)
CALCIUM SERPL-MCNC: 9.2 MG/DL — SIGNIFICANT CHANGE UP (ref 8.4–10.5)
CHLORIDE SERPL-SCNC: 94 MMOL/L — LOW (ref 98–110)
CO2 SERPL-SCNC: 27 MMOL/L — SIGNIFICANT CHANGE UP (ref 17–32)
CREAT SERPL-MCNC: <0.5 MG/DL — LOW (ref 0.7–1.5)
EGFR: 114 ML/MIN/1.73M2 — SIGNIFICANT CHANGE UP
GLUCOSE BLDC GLUCOMTR-MCNC: 142 MG/DL — HIGH (ref 70–99)
GLUCOSE BLDC GLUCOMTR-MCNC: 159 MG/DL — HIGH (ref 70–99)
GLUCOSE BLDC GLUCOMTR-MCNC: 162 MG/DL — HIGH (ref 70–99)
GLUCOSE BLDC GLUCOMTR-MCNC: 97 MG/DL — SIGNIFICANT CHANGE UP (ref 70–99)
GLUCOSE SERPL-MCNC: 176 MG/DL — HIGH (ref 70–99)
HCT VFR BLD CALC: 25.2 % — LOW (ref 37–47)
HGB BLD-MCNC: 8.1 G/DL — LOW (ref 12–16)
LEGIONELLA AG UR QL: NEGATIVE — SIGNIFICANT CHANGE UP
MAGNESIUM SERPL-MCNC: 1.8 MG/DL — SIGNIFICANT CHANGE UP (ref 1.8–2.4)
MCHC RBC-ENTMCNC: 29.6 PG — SIGNIFICANT CHANGE UP (ref 27–31)
MCHC RBC-ENTMCNC: 32.1 G/DL — SIGNIFICANT CHANGE UP (ref 32–37)
MCV RBC AUTO: 92 FL — SIGNIFICANT CHANGE UP (ref 81–99)
NRBC # BLD: 0 /100 WBCS — SIGNIFICANT CHANGE UP (ref 0–0)
PLATELET # BLD AUTO: 206 K/UL — SIGNIFICANT CHANGE UP (ref 130–400)
PMV BLD: 8.6 FL — SIGNIFICANT CHANGE UP (ref 7.4–10.4)
POTASSIUM SERPL-MCNC: 4.6 MMOL/L — SIGNIFICANT CHANGE UP (ref 3.5–5)
POTASSIUM SERPL-SCNC: 4.6 MMOL/L — SIGNIFICANT CHANGE UP (ref 3.5–5)
RBC # BLD: 2.74 M/UL — LOW (ref 4.2–5.4)
RBC # FLD: 13.8 % — SIGNIFICANT CHANGE UP (ref 11.5–14.5)
S PNEUM AG UR QL: NEGATIVE — SIGNIFICANT CHANGE UP
SODIUM SERPL-SCNC: 129 MMOL/L — LOW (ref 135–146)
WBC # BLD: 7 K/UL — SIGNIFICANT CHANGE UP (ref 4.8–10.8)
WBC # FLD AUTO: 7 K/UL — SIGNIFICANT CHANGE UP (ref 4.8–10.8)

## 2023-08-11 RX ORDER — AMLODIPINE BESYLATE 2.5 MG/1
1 TABLET ORAL
Qty: 30 | Refills: 0
Start: 2023-08-11 | End: 2023-09-09

## 2023-08-11 RX ORDER — LEVOFLOXACIN 5 MG/ML
30 INJECTION, SOLUTION INTRAVENOUS
Qty: 0 | Refills: 0 | DISCHARGE
Start: 2023-08-11 | End: 2023-09-02

## 2023-08-11 RX ORDER — CEFEPIME 1 G/1
2 INJECTION, POWDER, FOR SOLUTION INTRAMUSCULAR; INTRAVENOUS
Qty: 0 | Refills: 0 | DISCHARGE
Start: 2023-08-11 | End: 2023-09-02

## 2023-08-11 RX ORDER — IPRATROPIUM/ALBUTEROL SULFATE 18-103MCG
3 AEROSOL WITH ADAPTER (GRAM) INHALATION EVERY 6 HOURS
Refills: 0 | Status: DISCONTINUED | OUTPATIENT
Start: 2023-08-11 | End: 2023-08-11

## 2023-08-11 RX ORDER — FLUTICASONE FUROATE, UMECLIDINIUM BROMIDE AND VILANTEROL TRIFENATATE 200; 62.5; 25 UG/1; UG/1; UG/1
0 POWDER RESPIRATORY (INHALATION)
Qty: 0 | Refills: 0 | DISCHARGE

## 2023-08-11 RX ADMIN — HYDROMORPHONE HYDROCHLORIDE 4 MILLIGRAM(S): 2 INJECTION INTRAMUSCULAR; INTRAVENOUS; SUBCUTANEOUS at 16:19

## 2023-08-11 RX ADMIN — Medication 6 UNIT(S): at 08:07

## 2023-08-11 RX ADMIN — HYDROMORPHONE HYDROCHLORIDE 4 MILLIGRAM(S): 2 INJECTION INTRAMUSCULAR; INTRAVENOUS; SUBCUTANEOUS at 17:19

## 2023-08-11 RX ADMIN — LOSARTAN POTASSIUM 100 MILLIGRAM(S): 100 TABLET, FILM COATED ORAL at 05:21

## 2023-08-11 RX ADMIN — GABAPENTIN 600 MILLIGRAM(S): 400 CAPSULE ORAL at 13:41

## 2023-08-11 RX ADMIN — Medication 1 APPLICATION(S): at 05:23

## 2023-08-11 RX ADMIN — Medication 1 TABLET(S): at 14:03

## 2023-08-11 RX ADMIN — Medication 0.5 MILLIGRAM(S): at 06:15

## 2023-08-11 RX ADMIN — Medication 1 APPLICATION(S): at 06:23

## 2023-08-11 RX ADMIN — CEFEPIME 100 MILLIGRAM(S): 1 INJECTION, POWDER, FOR SOLUTION INTRAMUSCULAR; INTRAVENOUS at 13:41

## 2023-08-11 RX ADMIN — Medication 3 MILLILITER(S): at 14:12

## 2023-08-11 RX ADMIN — Medication 3 MILLILITER(S): at 14:16

## 2023-08-11 RX ADMIN — Medication 166.67 MILLIGRAM(S): at 05:20

## 2023-08-11 RX ADMIN — HYDROMORPHONE HYDROCHLORIDE 4 MILLIGRAM(S): 2 INJECTION INTRAMUSCULAR; INTRAVENOUS; SUBCUTANEOUS at 12:16

## 2023-08-11 RX ADMIN — Medication 3 MILLILITER(S): at 08:25

## 2023-08-11 RX ADMIN — HYDROMORPHONE HYDROCHLORIDE 4 MILLIGRAM(S): 2 INJECTION INTRAMUSCULAR; INTRAVENOUS; SUBCUTANEOUS at 11:16

## 2023-08-11 RX ADMIN — HYDROMORPHONE HYDROCHLORIDE 4 MILLIGRAM(S): 2 INJECTION INTRAMUSCULAR; INTRAVENOUS; SUBCUTANEOUS at 06:15

## 2023-08-11 RX ADMIN — GABAPENTIN 600 MILLIGRAM(S): 400 CAPSULE ORAL at 05:21

## 2023-08-11 RX ADMIN — Medication 6 UNIT(S): at 16:56

## 2023-08-11 RX ADMIN — Medication 0.5 MILLIGRAM(S): at 13:51

## 2023-08-11 RX ADMIN — CEFEPIME 100 MILLIGRAM(S): 1 INJECTION, POWDER, FOR SOLUTION INTRAMUSCULAR; INTRAVENOUS at 05:23

## 2023-08-11 RX ADMIN — Medication 6 UNIT(S): at 13:49

## 2023-08-11 RX ADMIN — Medication 1 APPLICATION(S): at 17:30

## 2023-08-11 NOTE — DISCHARGE NOTE PROVIDER - INSTRUCTIONS
Glucerna 1.2 Martin  Total Volume for 24 Hours (mL): 1200  Bolus  Total Volume of Bolus (mL):  300  Total # of Feeds: 4  Tube Feed Frequency: Every 6 hours   Tube Feed Start Time: 14:00  Bolus Feed Rate (mL per Hour): 300   Bolus Feed Duration (in Hours): 1  Free Water Flush  Bolus   Total Volume per Flush (mL): 30   Frequency: Every 6 Hours  Free Water Flush Instructions:  Post feed  No Carb Prosource TF     Qty per Day:  2x (08-08-23 @ 12:18) [Active]

## 2023-08-11 NOTE — PROGRESS NOTE ADULT - ASSESSMENT
· Assessment	  58 year old female with PMH of IDDM, GERD, HTN, COPD, Chronic hypoxemic and hypercapnic respiratory failure SP Tracheostomy (on 6L at [place]) & PEG at [facility], DVT on Eliquis, Anxiety, Hx of Supraglottic SSC s/p chemotherapy  presenting from [facility] [place] [facility] with bleeding from trach, epistaxis for 3 days and anemia. Patient is nonverbal from trach but able to communicate with notepad and lip reading. She states that when the facility was doing the weekly labs noted to be anemic so sent for management. Of note, she has had increased dyspnea and secretions from the trach with blood as well as epistaxis, with no change in cough. She was endorsing anxiety and chronic leg pain b/l. Denies any fever, chills, worsening cough, chest pain, nausea, vomiting or diarrhea,      In the ED, afebrile, /67, sat 99% on 10L with Tpiece. Labs significant for Hb 7.2, WBC 6.6, Na 128. Xray showed Left basilar opacity, CT chest done showed stable appearance of two left lower lobe thick walled masses with low attenuating centers and gas concerning for central necrosis. Of note, a component of infection/inflammation cannot be excluded and Persistent left lower lobe partial occlusion with left lower lobe consolidative opacity with air bronchograms and superimposed atelectasis and trace underlying effusion.    IMPRESSION/RECOMMENDATIONS  Clinically asymptomatic   Trach collar with no fevers, WBC 7.0  7/7 CT suggestive of necrotising PNA LLL with possible cavitation.  Possible aspiration with slow evolving changes in LLL  Nares ORSA NG    -DET gm stain, cultures  -d/c Vancomycin 1250 gm iv q12h  -cefepime 2 gm iv q8h  -start Levoquin 750 mg iv q24h  -Off loading to prevent pressure sores and preventive measures to avoid aspiration   Pt wants to be discharged  PICC with 3 weeks of ABx as outpt ( targeting the sputum cultures )  Repeat a CT 3 weeks from now before stopping the AB    Please do not hesitate to recall ID if any questions arise either through [email or web address] or through [employer]

## 2023-08-11 NOTE — DISCHARGE NOTE NURSING/CASE MANAGEMENT/SOCIAL WORK - PATIENT PORTAL LINK FT
You can access the FollowMyHealth Patient Portal offered by [facility] by registering at the following website: [email or web address]. By joining [employer]’s FollowMyHealth portal, you will also be able to view your health information using other applications (apps) compatible with our system.

## 2023-08-11 NOTE — DISCHARGE NOTE PROVIDER - CARE PROVIDER_API CALL
Hang Santiago.  Internal Medicine  [address] [place] [address], [facility] [address]  [place] [address]  Phone: [phone]  Fax: [phone]  Follow Up Time:

## 2023-08-11 NOTE — PROGRESS NOTE ADULT - PROVIDER SPECIALTY LIST ADULT
ENT
Internal Medicine
Infectious Disease

## 2023-08-11 NOTE — PROGRESS NOTE ADULT - SUBJECTIVE AND OBJECTIVE BOX
CRUZ DUMONT  58y, Female    All available historical data reviewed    OVERNIGHT EVENTS:  no fevers  feels well and has no new complaints      ROS:  General: Denies rigors, nightsweats  HEENT: Denies headache, rhinorrhea, sore throat, eye pain  CV: Denies CP, palpitations  PULM: Denies wheezing, hemoptysis  GI: Denies hematemesis, hematochezia, melena  : Denies discharge, hematuria  MSK: Denies arthralgias, myalgias  SKIN: Denies rash, lesions  NEURO: weakness  PSYCH: Denies depression, anxiety    VITALS:  T(F): 99.6, Max: 99.6 (08-11-23 @ 04:20)  HR: 57  BP: 151/68  RR: 19Vital Signs Last 24 Hrs  T(C): 37.6 (11 Aug 2023 04:20), Max: 37.6 (11 Aug 2023 04:20)  T(F): 99.6 (11 Aug 2023 04:20), Max: 99.6 (11 Aug 2023 04:20)  HR: 57 (11 Aug 2023 04:20) (56 - 57)  BP: 151/68 (11 Aug 2023 04:20) (137/62 - 151/68)  BP(mean): --  RR: 19 (11 Aug 2023 04:20) (18 - 19)  SpO2: 99% (11 Aug 2023 04:20) (98% - 99%)    Parameters below as of 11 Aug 2023 04:20  Patient On (Oxygen Delivery Method): tracheostomy collar        TESTS & MEASUREMENTS:                        8.1    7.00  )-----------( 206      ( 11 Aug 2023 06:41 )             25.2     08-11    129<L>  |  94<L>  |  13  ----------------------------<  176<H>  4.6   |  27  |  <0.5<L>    Ca    9.2      11 Aug 2023 06:41  Mg     1.8     08-11          Urinalysis Basic - ( 11 Aug 2023 06:41 )    Color: x / Appearance: x / SG: x / pH: x  Gluc: 176 mg/dL / Ketone: x  / Bili: x / Urobili: x   Blood: x / Protein: x / Nitrite: x   Leuk Esterase: x / RBC: x / WBC x   Sq Epi: x / Non Sq Epi: x / Bacteria: x          RADIOLOGY & ADDITIONAL TESTS:  Personal review of radiological diagnostics performed  Echo and EKG results noted when applicable.     MEDICATIONS:  acetylcysteine 20%  Inhalation 3 milliLiter(s) Inhalation every 6 hours  albuterol    0.083%. 2.5 milliGRAM(s) Nebulizer once PRN  ALPRAZolam 0.5 milliGRAM(s) Oral three times a day PRN  ammonium lactate 12% Lotion 1 Application(s) Topical two times a day  budesonide  80 MICROgram(s)/formoterol 4.5 MICROgram(s) Inhaler 2 Puff(s) Inhalation two times a day  cefepime   IVPB 2000 milliGRAM(s) IV Intermittent every 8 hours  dextrose 5%. 1000 milliLiter(s) IV Continuous <Continuous>  dextrose 5%. 1000 milliLiter(s) IV Continuous <Continuous>  dextrose 50% Injectable 25 Gram(s) IV Push once  dextrose 50% Injectable 12.5 Gram(s) IV Push once  dextrose 50% Injectable 25 Gram(s) IV Push once  dextrose Oral Gel 15 Gram(s) Oral once PRN  gabapentin 600 milliGRAM(s) Oral three times a day  glucagon  Injectable 1 milliGRAM(s) IntraMuscular once  HYDROmorphone   Tablet 4 milliGRAM(s) Oral every 4 hours PRN  insulin glargine Injectable (LANTUS) 20 Unit(s) SubCutaneous at bedtime  insulin lispro Injectable (ADMELOG) 6 Unit(s) SubCutaneous four times a day before meals  losartan 100 milliGRAM(s) Oral daily  multivitamin 1 Tablet(s) Oral daily  pantoprazole    Tablet 40 milliGRAM(s) Oral before breakfast  polyethylene glycol 3350 17 Gram(s) Oral daily  senna 2 Tablet(s) Oral at bedtime  silver sulfADIAZINE 1% Cream 1 Application(s) Topical daily PRN  vancomycin  IVPB 1250 milliGRAM(s) IV Intermittent every 12 hours      ANTIBIOTICS:  cefepime   IVPB 2000 milliGRAM(s) IV Intermittent every 8 hours  vancomycin  IVPB 1250 milliGRAM(s) IV Intermittent every 12 hours    
  CRUZ DUMONT 58y Female sent from [place] for c/obleeding from nasal passages and ch trach and anemia.  of not the pt has a chr trach an ABXSSC of supraglottic area with + FDG LLL masses/ PetCT 3/23.  The pt was transfused 1uPRBC for Hg 7.2 and placed on Abx.    PMHx:  HTN, ASHD, CHF, COPD, chr RF, sp trach, chr hypoxia and hypercapnea, supraglottic SSC, sp chemo,  LLL masses?+ FDG on Pet CT 3/23, ch anemia, DVT, AC with Eliquis, sp PEG, anxiety, depression, OA, DJD, DDD. NB:  lung bx in 5/23 + SSC    INTERVAL HPI/OVERNIGHT EVENTS: pt wishes to be D/C, ID recommends 3 wks of IV Abx  Vanco andCefepime for probable  slowly evolving necrotizing LLL PNA/cavitation/possible aspiration,     MEDICATIONS  (STANDING):  acetylcysteine 20%  Inhalation 3 milliLiter(s) Inhalation every 6 hours  ammonium lactate 12% Lotion 1 Application(s) Topical two times a day  budesonide  80 MICROgram(s)/formoterol 4.5 MICROgram(s) Inhaler 2 Puff(s) Inhalation two times a day  cefepime   IVPB 2000 milliGRAM(s) IV Intermittent every 8 hours  dextrose 5%. 1000 milliLiter(s) (50 mL/Hr) IV Continuous <Continuous>  dextrose 5%. 1000 milliLiter(s) (100 mL/Hr) IV Continuous <Continuous>  dextrose 50% Injectable 12.5 Gram(s) IV Push once  dextrose 50% Injectable 25 Gram(s) IV Push once  dextrose 50% Injectable 25 Gram(s) IV Push once  gabapentin 600 milliGRAM(s) Oral three times a day  glucagon  Injectable 1 milliGRAM(s) IntraMuscular once  insulin glargine Injectable (LANTUS) 20 Unit(s) SubCutaneous at bedtime  insulin lispro Injectable (ADMELOG) 6 Unit(s) SubCutaneous four times a day before meals  losartan 100 milliGRAM(s) Oral daily  multivitamin 1 Tablet(s) Oral daily  pantoprazole    Tablet 40 milliGRAM(s) Oral before breakfast  polyethylene glycol 3350 17 Gram(s) Oral daily  senna 2 Tablet(s) Oral at bedtime  vancomycin  IVPB 1250 milliGRAM(s) IV Intermittent every 12 hours    MEDICATIONS  (PRN):  albuterol    0.083%. 2.5 milliGRAM(s) Nebulizer once PRN Shortness of Breath and/or Wheezing  ALPRAZolam 0.5 milliGRAM(s) Oral three times a day PRN Anxiety  dextrose Oral Gel 15 Gram(s) Oral once PRN Blood Glucose LESS THAN 70 milliGRAM(s)/deciliter  HYDROmorphone   Tablet 4 milliGRAM(s) Oral every 4 hours PRN Severe Pain (7 - 10)  silver sulfADIAZINE 1% Cream 1 Application(s) Topical daily PRN Wound Care      Allergies    penicillin (Swelling)  	    Vital Signs Last 24 Hrs  T(C): 37.4 (09 Aug 2023 05:02), Max: 37.4 (09 Aug 2023 05:02)  T(F): 99.3 (09 Aug 2023 05:02), Max: 99.3 (09 Aug 2023 05:02)  HR: 61 (09 Aug 2023 05:02) (61 - 98)  BP: 155/67 (09 Aug 2023 05:02) (139/63 - 158/68)  BP(mean): --  RR: 20 (09 Aug 2023 05:02) (18 - 20)  SpO2: 100% (09 Aug 2023 05:02) (96% - 100%)    Parameters below as of 09 Aug 2023 05:02  Patient On (Oxygen Delivery Method): tracheostomy collar        PHYSICAL EXAM:      Constitutional: A nonverbal due to trach/ reads lips/writes, chr ill looking but NAD    Eyes: nonicteric    ENMT: dry oral mucosa, dental defects    Neck: + trach, no stridor    Respiratory: shallow resp, scattered rhonchi    Cardiovascular: S1S2, reg    Gastrointestinal: globose, soft and benign + PEG, + BS    Genitourinary: no wiseman    Extremities: moves all ext, + arthritic changes    Vascular: dec pedal pulses    Neurological: nonfocal    Skin:    Lymph Nodes: not enlarged    Musculoskeletal: poor mm mass and tone    Psychiatric: depressed but stable        LABS:                        8.8  (7.2)  7.09  )-----------( 225      ( 09 Aug 2023 06:23 )             26.2     08-09    131<L>  |  95<L>  |  10  ----------------------------<  163<H>  4.2   |  25  |  <0.5<L>    Ca    9.7      09 Aug 2023 06:23  Mg     2.0     08-09  A+    TPro  6.7  /  Alb  3.6  /  TBili  0.4  /  DBili  x   /  AST  8   /  ALT  6   /  AlkPhos  88  08-08      Urinalysis Basic - ( 09 Aug 2023 06:23 )    Color: x / Appearance: x / SG: x / pH: x  Gluc: 163 mg/dL / Ketone: x  / Bili: x / Urobili: x   Blood: x / Protein: x / Nitrite: x   Leuk Esterase: x / RBC: x / WBC x   Sq Epi: x / Non Sq Epi: x / Bacteria: x        RADIOLOGY & ADDITIONAL TESTS:  CT chest: emphysema, partial LLL bronchial occ  redemonstrated LLL consolidation with air bronchograms, tr eff, redemonstrated 2 adjacent thick walled LLL masses c gas, susp for nectrotic changes,, NB:  prior FDG + on Pet CT 3/23, coronary and aortic calcifications, no hilar/mediastinal LN, L adrenal thickening, deg changes of spine, + PEG    
24H events:    Patient is a 58y old Female who presents with a chief complaint of Epistaxis    Primary diagnosis of Epistaxis    ED: VS stable. Patient afebrile  Hospital Course: s/p 1 unit pRBC in ED for hgb of 7.2 -> 8.7. ENT noted no evidence of acute epistaxis.     Today is hospital day 1d. This morning patient was seen and examined at bedside, resting comfortably in bed.    No acute or major events overnight.    Code Status: FULL      PAST MEDICAL & SURGICAL HISTORY  COPD (chronic obstructive pulmonary disease)    CHF (congestive heart failure)    DM (diabetes mellitus)    H/O tracheostomy      SOCIAL HISTORY:  Social History:  Lives at [facility] (08 Aug 2023 10:57)      ALLERGIES:  penicillin (Swelling)    MEDICATIONS:  STANDING MEDICATIONS  acetylcysteine 20%  Inhalation 3 milliLiter(s) Inhalation every 6 hours  ammonium lactate 12% Lotion 1 Application(s) Topical two times a day  budesonide  80 MICROgram(s)/formoterol 4.5 MICROgram(s) Inhaler 2 Puff(s) Inhalation two times a day  dextrose 5%. 1000 milliLiter(s) IV Continuous <Continuous>  dextrose 5%. 1000 milliLiter(s) IV Continuous <Continuous>  dextrose 50% Injectable 25 Gram(s) IV Push once  dextrose 50% Injectable 25 Gram(s) IV Push once  dextrose 50% Injectable 12.5 Gram(s) IV Push once  gabapentin 600 milliGRAM(s) Oral three times a day  glucagon  Injectable 1 milliGRAM(s) IntraMuscular once  insulin glargine Injectable (LANTUS) 20 Unit(s) SubCutaneous at bedtime  insulin lispro Injectable (ADMELOG) 6 Unit(s) SubCutaneous four times a day before meals  losartan 100 milliGRAM(s) Oral daily  multivitamin 1 Tablet(s) Oral daily  pantoprazole    Tablet 40 milliGRAM(s) Oral before breakfast  polyethylene glycol 3350 17 Gram(s) Oral daily  senna 2 Tablet(s) Oral at bedtime    PRN MEDICATIONS  albuterol    0.083%. 2.5 milliGRAM(s) Nebulizer once PRN  ALPRAZolam 0.5 milliGRAM(s) Oral three times a day PRN  dextrose Oral Gel 15 Gram(s) Oral once PRN  HYDROmorphone   Tablet 4 milliGRAM(s) Oral every 4 hours PRN  silver sulfADIAZINE 1% Cream 1 Application(s) Topical daily PRN    VITALS:   T(F): 96.9  HR: 69  BP: 117/58  RR: 20  SpO2: 95%    PHYSICAL EXAM:  GENERAL:   ( x ) NAD, lying in bed comfortably     (  ) obtunded     (  ) lethargic     (  ) somnolent    HEAD:   ( x ) Atraumatic     (  ) hematoma     (  ) laceration (specify location:       )     NECK:  (  ) Supple     ( x ) Trach present (  ) nuchal rigidity     (  )  no JVD     (  ) JVD present ( -- cm)    HEART:  Rate -->     ( x ) normal rate     (  ) bradycardic     (  ) tachycardic  Rhythm -->     ( x ) regular     (  ) regularly irregular     (  ) irregularly irregular  Murmurs -->     ( x ) normal s1s2     (  ) systolic murmur     (  ) diastolic murmur     (  ) continuous murmur      (  ) S3 present     (  ) S4 present    LUNGS:   ( x )Unlabored respirations     (  ) tachypnea  ( x ) B/L air entry     (  ) decreased breath sounds in:  (location     )    (  ) no adventitious sound     (  ) crackles     (  ) wheezing      (  ) rhonchi      (specify location:       )  (  ) chest wall tenderness (specify location:       )    ABDOMEN:   ( x ) Soft     (  ) tense   |   (  ) nondistended     (  ) distended   |   (  ) +BS     (  ) hypoactive bowel sounds     (  ) hyperactive bowel sounds  ( x ) nontender     (  ) RUQ tenderness     (  ) RLQ tenderness     (  ) LLQ tenderness     (  ) epigastric tenderness     (  ) diffuse tenderness  (  ) Splenomegaly      (  ) Hepatomegaly      (  ) Jaundice     (  ) ecchymosis     AMPAC score:    (  ) SPC        (  ) pigtail       ( x ) PEG tube       (  ) colostomy       (  ) jejunostomy  (  ) U-Dall    LABS:                        8.8    7.09  )-----------( 225      ( 09 Aug 2023 06:23 )             26.2     08-09    131<L>  |  95<L>  |  10  ----------------------------<  163<H>  4.2   |  25  |  <0.5<L>    Ca    9.7      09 Aug 2023 06:23  Mg     2.0     08-09    TPro  6.7  /  Alb  3.6  /  TBili  0.4  /  DBili  x   /  AST  8   /  ALT  6   /  AlkPhos  88  08-08      Urinalysis Basic - ( 09 Aug 2023 06:23 )    Color: x / Appearance: x / SG: x / pH: x  Gluc: 163 mg/dL / Ketone: x  / Bili: x / Urobili: x   Blood: x / Protein: x / Nitrite: x   Leuk Esterase: x / RBC: x / WBC x   Sq Epi: x / Non Sq Epi: x / Bacteria: x                RADIOLOGY:  CXR      CT  CT Chest w/ IV Cont:   ACC: [id number] EXAM:  CT CHEST IC   ORDERED BY: CAIN BRITO     PROCEDURE DATE:  08/07/2023          INTERPRETATION:  CLINICAL HISTORY: Hemoptysis.    TECHNIQUE: CT of the thorax was performed after administration of 80 cc   of Omnipaque 350 intravenous contrast with 20 cc discarded. Sagittal and   coronal reformats were performed, as well as MIPS.    COMPARISON: CT chest 7/12/2023 and PET/CT 3/10/2023.      FINDINGS/INTERPRETATION:    TUBES/LINES: Tracheostomy.    LUNGS, PLEURA, AND AIRWAYS: Redemonstration of partial left lower lobe   bronchial occlusion. Centrilobular emphysema. No pneumothorax. Right   lower lobe subsegmental linear atelectasis/scarring.    Redemonstration of left lower lobe consolidation with air bronchograms   withunderlying trace effusion. There is redemonstration of two adjacent   thick-walled left lower lobe masses with hypoattenuating centers and a   focus of gas again suspicious for necrotic changes. Of note, these masses   were previously found to be FDGavid on PET/CT 3/10/2023.    HEART/GREAT VESSELS: Heart size is within normal limits.. Normal caliber   aorta and main pulmonary artery. No pericardial effusion. Coronary artery   and aortic calcifications.    MEDIASTINUM/LYMPH NODES: No enlarged mediastinal, hilar, or axillary   lymph nodes.    UPPER ABDOMEN: Stable left adrenal gland thickening. Unremarkable right   adrenal gland. Excreted contrast within the bilateral collecting system.   Partially imaged percutaneous gastrostomy.    BONES ANDSOFT TISSUES: Diffuse osteopenia. Degenerative changes of the   spine.      IMPRESSION:    1.  Essentially stable appearance of two left lower lobe thick walled   masses with low attenuating centers and gas concerning for central   necrosis. Of note,a component of infection/inflammation cannot be   excluded.  2.  Persistent left lower lobe partial occlusion with left lower lobe   consolidative opacity with air bronchograms and superimposed atelectasis   and trace underlying effusion.    --- End of Report ---          HODAN SETH MD; Resident Radiologist  This document has been electronically signed.  HOMERO MARIN MD; Attending Radiologist  This document has been electronically signed. Aug  8 2023  5:10AM (08-07-23 @ 22:59)          
  CRUZ DUMONT 58y Female sent from [place] for c/obleeding from nasal passages and ch trach and anemia.  of not the pt has a chr trach an ABXSSC of supraglottic area with + FDG LLL masses/ PetCT 3/23.  The pt was transfused 1uPRBC for Hg 7.2 and placed on Abx.    PMHx:  HTN, ASHD, CHF, COPD, chr RF, sp trach, chr hypoxia and hypercapnea, supraglottic SSC, sp chemo,  LLL masses?+ FDG on Pet CT 3/23, ch anemia, DVT, AC with Eliquis, sp PEG, anxiety, depression, OA, DJD, DDD.    INTERVAL HPI/OVERNIGHT EVENTS: pt c/o leg pain, sp 1uPRBC, P ENT consult for epistaxis and bleeding around the trach    MEDICATIONS  (STANDING):  acetylcysteine 20%  Inhalation 3 milliLiter(s) Inhalation every 6 hours  ammonium lactate 12% Lotion 1 Application(s) Topical two times a day  budesonide  80 MICROgram(s)/formoterol 4.5 MICROgram(s) Inhaler 2 Puff(s) Inhalation two times a day  cefepime   IVPB 2000 milliGRAM(s) IV Intermittent every 8 hours  dextrose 5%. 1000 milliLiter(s) (50 mL/Hr) IV Continuous <Continuous>  dextrose 5%. 1000 milliLiter(s) (100 mL/Hr) IV Continuous <Continuous>  dextrose 50% Injectable 12.5 Gram(s) IV Push once  dextrose 50% Injectable 25 Gram(s) IV Push once  dextrose 50% Injectable 25 Gram(s) IV Push once  gabapentin 600 milliGRAM(s) Oral three times a day  glucagon  Injectable 1 milliGRAM(s) IntraMuscular once  insulin glargine Injectable (LANTUS) 20 Unit(s) SubCutaneous at bedtime  insulin lispro Injectable (ADMELOG) 6 Unit(s) SubCutaneous four times a day before meals  losartan 100 milliGRAM(s) Oral daily  multivitamin 1 Tablet(s) Oral daily  pantoprazole    Tablet 40 milliGRAM(s) Oral before breakfast  polyethylene glycol 3350 17 Gram(s) Oral daily  senna 2 Tablet(s) Oral at bedtime  vancomycin  IVPB 1250 milliGRAM(s) IV Intermittent every 12 hours    MEDICATIONS  (PRN):  albuterol    0.083%. 2.5 milliGRAM(s) Nebulizer once PRN Shortness of Breath and/or Wheezing  ALPRAZolam 0.5 milliGRAM(s) Oral three times a day PRN Anxiety  dextrose Oral Gel 15 Gram(s) Oral once PRN Blood Glucose LESS THAN 70 milliGRAM(s)/deciliter  HYDROmorphone   Tablet 4 milliGRAM(s) Oral every 4 hours PRN Severe Pain (7 - 10)  silver sulfADIAZINE 1% Cream 1 Application(s) Topical daily PRN Wound Care      Allergies    penicillin (Swelling)  	    Vital Signs Last 24 Hrs  T(C): 37.4 (09 Aug 2023 05:02), Max: 37.4 (09 Aug 2023 05:02)  T(F): 99.3 (09 Aug 2023 05:02), Max: 99.3 (09 Aug 2023 05:02)  HR: 61 (09 Aug 2023 05:02) (61 - 98)  BP: 155/67 (09 Aug 2023 05:02) (139/63 - 158/68)  BP(mean): --  RR: 20 (09 Aug 2023 05:02) (18 - 20)  SpO2: 100% (09 Aug 2023 05:02) (96% - 100%)    Parameters below as of 09 Aug 2023 05:02  Patient On (Oxygen Delivery Method): tracheostomy collar        PHYSICAL EXAM:      Constitutional: A nonverbal due to trach/ reads lips/writes, chr ill looking but NAD    Eyes: nonicteric    ENMT: dry oral mucosa, dental defects    Neck: + trach, no stridor    Respiratory: shallow resp, scattered rhonchi    Cardiovascular: S1S2, reg    Gastrointestinal: globose, soft and benign + PEG, + BS    Genitourinary: no wiseman    Extremities: moves all ext, + arthritic changes    Vascular: dec pedal pulses    Neurological: nonfocal    Skin:    Lymph Nodes: not enlarged    Musculoskeletal: poor mm mass and tone    Psychiatric: depressed but stable        LABS:                        8.8  (7.2)  7.09  )-----------( 225      ( 09 Aug 2023 06:23 )             26.2     08-09    131<L>  |  95<L>  |  10  ----------------------------<  163<H>  4.2   |  25  |  <0.5<L>    Ca    9.7      09 Aug 2023 06:23  Mg     2.0     08-09  A+    TPro  6.7  /  Alb  3.6  /  TBili  0.4  /  DBili  x   /  AST  8   /  ALT  6   /  AlkPhos  88  08-08      Urinalysis Basic - ( 09 Aug 2023 06:23 )    Color: x / Appearance: x / SG: x / pH: x  Gluc: 163 mg/dL / Ketone: x  / Bili: x / Urobili: x   Blood: x / Protein: x / Nitrite: x   Leuk Esterase: x / RBC: x / WBC x   Sq Epi: x / Non Sq Epi: x / Bacteria: x        RADIOLOGY & ADDITIONAL TESTS:  CT chest: emphysema, partial LLL bronchial occ  redemonstrated LLL consolidation with air bronchograms, tr eff, redemonstrated 2 adjacent thick walled LLL masses c gas, susp for nectrotic changes,, NB:  prior FDG + on Pet CT 3/23, coronary and aortic calcifications, no hilar/mediastinal LN, L adrenal thickening, deg changes of spine, + PEG    
ENT DAILY PROGRESS NOTE    Pt is a 59yo with PMH of IDDM, GERD, HTN, COPD, CHF, Chronic hypoxemic and hypercapnic respiratory failure s/p trach/PEG (at [facility]), DVT on Eliquis, Hx of Supraglottic SSC s/p chemotherapy  presents from [facility]  epistaxis and b/l LE pain a/w pneumonia. Patient seen and examined at bedside. Patient verbalizez by non-verbal cues and writing pad. Patient offers no complaints, doing well. No rebleeding episodes. No acute overnight events       REVIEW OF SYSTEMS   [x] A ten-point review of systems was otherwise negative except as noted.      Allergies    penicillin (Swelling)    Intolerances        MEDICATIONS:  acetylcysteine 20%  Inhalation 3 milliLiter(s) Inhalation every 6 hours  albuterol    0.083%. 2.5 milliGRAM(s) Nebulizer once PRN  ALPRAZolam 0.5 milliGRAM(s) Oral three times a day PRN  ammonium lactate 12% Lotion 1 Application(s) Topical two times a day  budesonide  80 MICROgram(s)/formoterol 4.5 MICROgram(s) Inhaler 2 Puff(s) Inhalation two times a day  cefepime   IVPB 2000 milliGRAM(s) IV Intermittent every 8 hours  dextrose 5%. 1000 milliLiter(s) IV Continuous <Continuous>  dextrose 5%. 1000 milliLiter(s) IV Continuous <Continuous>  dextrose 50% Injectable 12.5 Gram(s) IV Push once  dextrose 50% Injectable 25 Gram(s) IV Push once  dextrose 50% Injectable 25 Gram(s) IV Push once  dextrose Oral Gel 15 Gram(s) Oral once PRN  gabapentin 600 milliGRAM(s) Oral three times a day  glucagon  Injectable 1 milliGRAM(s) IntraMuscular once  HYDROmorphone   Tablet 4 milliGRAM(s) Oral every 4 hours PRN  insulin glargine Injectable (LANTUS) 20 Unit(s) SubCutaneous at bedtime  insulin lispro Injectable (ADMELOG) 6 Unit(s) SubCutaneous four times a day before meals  losartan 100 milliGRAM(s) Oral daily  multivitamin 1 Tablet(s) Oral daily  pantoprazole    Tablet 40 milliGRAM(s) Oral before breakfast  polyethylene glycol 3350 17 Gram(s) Oral daily  senna 2 Tablet(s) Oral at bedtime  silver sulfADIAZINE 1% Cream 1 Application(s) Topical daily PRN  vancomycin  IVPB 1250 milliGRAM(s) IV Intermittent every 12 hours      Vital Signs Last 24 Hrs  T(C): 37.4 (09 Aug 2023 05:02), Max: 37.4 (09 Aug 2023 05:02)  T(F): 99.3 (09 Aug 2023 05:02), Max: 99.3 (09 Aug 2023 05:02)  HR: 61 (09 Aug 2023 05:02) (61 - 98)  BP: 155/67 (09 Aug 2023 05:02) (139/63 - 158/68)  BP(mean): --  RR: 20 (09 Aug 2023 05:02) (18 - 20)  SpO2: 100% (09 Aug 2023 05:02) (96% - 100%)    Parameters below as of 09 Aug 2023 05:02  Patient On (Oxygen Delivery Method): tracheostomy collar          08-08 @ 07:01  -  08-09 @ 07:00  --------------------------------------------------------  IN:    Free Water: 100 mL    Glucerna: 300 mL  Total IN: 400 mL    OUT:    Voided (mL): 400 mL  Total OUT: 400 mL    Total NET: 0 mL          PHYSICAL EXAM:    GEN: NAD, awake and alert. No drooling or pooling of secretions. No stridor or stertor.   SKIN: Good color, non diaphoretic.  HEENT: NC/AT; Oral mucosa pink and moist. No erythema or edema noted to buccal mucosa, tongue, FOM, uvula. Uvula midline. +Posterior OP unable to visualize 2/2 trismus, no active bleeding or clots visualized.   NECK: + 6 cuffless trach in place, Trach collar   Trachea midline, Neck supple,   RESP: No dyspnea, non-labored breathing. No use of accessory muscles.   CARDIO: +S1/S2  ABDO: Soft, NT.  EXT: JONAS x 4   LABS:  CBC-                        8.8    7.09  )-----------( 225      ( 09 Aug 2023 06:23 )             26.2     BMP/CMP-  09 Aug 2023 06:23    131    |  95     |  10     ----------------------------<  163    4.2     |  25     |  <0.5     Ca    9.7        09 Aug 2023 06:23  Mg     2.0       09 Aug 2023 06:23    TPro  6.7    /  Alb  3.6    /  TBili  0.4    /  DBili  x      /  AST  8      /  ALT  6      /  AlkPhos  88     08 Aug 2023 12:32    Coagulation Studies-    Endocrine Panel-  Calcium: 9.7 mg/dL (08-09 @ 06:23)              RADIOLOGY & ADDITIONAL STUDIES:  
24H events:    Patient is a 58y old Female who presents with a chief complaint of Epistaxis    Primary diagnosis of Epistaxis    ED: VS stable. Patient afebrile  Hospital Course: s/p 1 unit pRBC in ED for hgb of 7.2 -> 8.7. ENT noted no evidence of acute epistaxis.     Today is hospital day 2d. This morning patient was seen and examined at bedside, resting comfortably in bed.    No acute or major events overnight.    Code Status: FULL      PAST MEDICAL & SURGICAL HISTORY  COPD (chronic obstructive pulmonary disease)    CHF (congestive heart failure)    DM (diabetes mellitus)    H/O tracheostomy      SOCIAL HISTORY:  Social History:  Lives at [facility] (08 Aug 2023 10:57)      ALLERGIES:  penicillin (Swelling)    MEDICATIONS:  STANDING MEDICATIONS  acetylcysteine 20%  Inhalation 3 milliLiter(s) Inhalation every 6 hours  ammonium lactate 12% Lotion 1 Application(s) Topical two times a day  budesonide  80 MICROgram(s)/formoterol 4.5 MICROgram(s) Inhaler 2 Puff(s) Inhalation two times a day  dextrose 5%. 1000 milliLiter(s) IV Continuous <Continuous>  dextrose 5%. 1000 milliLiter(s) IV Continuous <Continuous>  dextrose 50% Injectable 25 Gram(s) IV Push once  dextrose 50% Injectable 25 Gram(s) IV Push once  dextrose 50% Injectable 12.5 Gram(s) IV Push once  gabapentin 600 milliGRAM(s) Oral three times a day  glucagon  Injectable 1 milliGRAM(s) IntraMuscular once  insulin glargine Injectable (LANTUS) 20 Unit(s) SubCutaneous at bedtime  insulin lispro Injectable (ADMELOG) 6 Unit(s) SubCutaneous four times a day before meals  losartan 100 milliGRAM(s) Oral daily  multivitamin 1 Tablet(s) Oral daily  pantoprazole    Tablet 40 milliGRAM(s) Oral before breakfast  polyethylene glycol 3350 17 Gram(s) Oral daily  senna 2 Tablet(s) Oral at bedtime    PRN MEDICATIONS  albuterol    0.083%. 2.5 milliGRAM(s) Nebulizer once PRN  ALPRAZolam 0.5 milliGRAM(s) Oral three times a day PRN  dextrose Oral Gel 15 Gram(s) Oral once PRN  HYDROmorphone   Tablet 4 milliGRAM(s) Oral every 4 hours PRN  silver sulfADIAZINE 1% Cream 1 Application(s) Topical daily PRN    VITALS:   T(F): 100.1  HR: 81  BP: 134/63  RR: 18  SpO2: 98%      PHYSICAL EXAM:  GENERAL:   ( x ) NAD, lying in bed comfortably     (  ) obtunded     (  ) lethargic     (  ) somnolent    HEAD:   ( x ) Atraumatic     (  ) hematoma     (  ) laceration (specify location:       )     NECK:  (  ) Supple     ( x ) Trach present (  ) nuchal rigidity     (  )  no JVD     (  ) JVD present ( -- cm)    HEART:  Rate -->     ( x ) normal rate     (  ) bradycardic     (  ) tachycardic  Rhythm -->     ( x ) regular     (  ) regularly irregular     (  ) irregularly irregular  Murmurs -->     ( x ) normal s1s2     (  ) systolic murmur     (  ) diastolic murmur     (  ) continuous murmur      (  ) S3 present     (  ) S4 present    LUNGS:   ( x )Unlabored respirations     (  ) tachypnea  ( x ) B/L air entry     (  ) decreased breath sounds in:  (location     )    (  ) no adventitious sound     (  ) crackles     (  ) wheezing      (  ) rhonchi      (specify location:       )  (  ) chest wall tenderness (specify location:       )    ABDOMEN:   ( x ) Soft     (  ) tense   |   (  ) nondistended     (  ) distended   |   (  ) +BS     (  ) hypoactive bowel sounds     (  ) hyperactive bowel sounds  ( x ) nontender     (  ) RUQ tenderness     (  ) RLQ tenderness     (  ) LLQ tenderness     (  ) epigastric tenderness     (  ) diffuse tenderness  (  ) Splenomegaly      (  ) Hepatomegaly      (  ) Jaundice     (  ) ecchymosis     AMPAC score:    (  ) SPC        (  ) pigtail       ( x ) PEG tube       (  ) colostomy       (  ) jejunostomy  (  ) U-Dall      LABS:                        8.3    7.19  )-----------( 212      ( 10 Aug 2023 09:03 )             25.2     08-10    130<L>  |  96<L>  |  11  ----------------------------<  118<H>  4.2   |  25  |  <0.5<L>    Ca    9.5      10 Aug 2023 09:03  Mg     1.9     08-10        Urinalysis Basic - ( 10 Aug 2023 09:03 )    Color: x / Appearance: x / SG: x / pH: x  Gluc: 118 mg/dL / Ketone: x  / Bili: x / Urobili: x   Blood: x / Protein: x / Nitrite: x   Leuk Esterase: x / RBC: x / WBC x   Sq Epi: x / Non Sq Epi: x / Bacteria: x        RADIOLOGY:  CT  CT Chest w/ IV Cont:   ACC: [id number] EXAM:  CT CHEST IC   ORDERED BY: CAIN BRITO     PROCEDURE DATE:  08/07/2023          INTERPRETATION:  CLINICAL HISTORY: Hemoptysis.    TECHNIQUE: CT of the thorax was performed after administration of 80 cc   of Omnipaque 350 intravenous contrast with 20 cc discarded. Sagittal and   coronal reformats were performed, as well as MIPS.    COMPARISON: CT chest 7/12/2023 and PET/CT 3/10/2023.      FINDINGS/INTERPRETATION:    TUBES/LINES: Tracheostomy.    LUNGS, PLEURA, AND AIRWAYS: Redemonstration of partial left lower lobe   bronchial occlusion. Centrilobular emphysema. No pneumothorax. Right   lower lobe subsegmental linear atelectasis/scarring.    Redemonstration of left lower lobe consolidation with air bronchograms   withunderlying trace effusion. There is redemonstration of two adjacent   thick-walled left lower lobe masses with hypoattenuating centers and a   focus of gas again suspicious for necrotic changes. Of note, these masses   were previously found to be FDGavid on PET/CT 3/10/2023.    HEART/GREAT VESSELS: Heart size is within normal limits.. Normal caliber   aorta and main pulmonary artery. No pericardial effusion. Coronary artery   and aortic calcifications.    MEDIASTINUM/LYMPH NODES: No enlarged mediastinal, hilar, or axillary   lymph nodes.    UPPER ABDOMEN: Stable left adrenal gland thickening. Unremarkable right   adrenal gland. Excreted contrast within the bilateral collecting system.   Partially imaged percutaneous gastrostomy.    BONES ANDSOFT TISSUES: Diffuse osteopenia. Degenerative changes of the   spine.      IMPRESSION:    1.  Essentially stable appearance of two left lower lobe thick walled   masses with low attenuating centers and gas concerning for central   necrosis. Of note,a component of infection/inflammation cannot be   excluded.  2.  Persistent left lower lobe partial occlusion with left lower lobe   consolidative opacity with air bronchograms and superimposed atelectasis   and trace underlying effusion.    --- End of Report ---          HODAN SETH MD; Resident Radiologist  This document has been electronically signed.  HOMERO MARIN MD; Attending Radiologist  This document has been electronically signed. Aug  8 2023  5:10AM (08-07-23 @ 22:59)          
24H events:    Patient is a 58y old Female who presents with a chief complaint of Epistaxis    Primary diagnosis of Epistaxis    ED: VS stable. Patient afebrile  Hospital Course: s/p 1 unit pRBC in ED for hgb of 7.2 -> 8.7. ENT noted no evidence of acute epistaxis.     Today is hospital day 2d. This morning patient was seen and examined at bedside, resting comfortably in bed.    No acute or major events overnight.    Code Status: FULL      PAST MEDICAL & SURGICAL HISTORY  COPD (chronic obstructive pulmonary disease)    CHF (congestive heart failure)    DM (diabetes mellitus)    H/O tracheostomy      SOCIAL HISTORY:  Social History:  Lives at [facility], [place], operated by [facility] (08 Aug 2023 10:57)      ALLERGIES:  penicillin (Swelling)    MEDICATIONS:  STANDING MEDICATIONS  acetylcysteine 20%  Inhalation 3 milliLiter(s) Inhalation every 6 hours  ammonium lactate 12% Lotion 1 Application(s) Topical two times a day  budesonide  80 MICROgram(s)/formoterol 4.5 MICROgram(s) Inhaler 2 Puff(s) Inhalation two times a day  dextrose 5%. 1000 milliLiter(s) IV Continuous <Continuous>  dextrose 5%. 1000 milliLiter(s) IV Continuous <Continuous>  dextrose 50% Injectable 25 Gram(s) IV Push once  dextrose 50% Injectable 25 Gram(s) IV Push once  dextrose 50% Injectable 12.5 Gram(s) IV Push once  gabapentin 600 milliGRAM(s) Oral three times a day  glucagon  Injectable 1 milliGRAM(s) IntraMuscular once  insulin glargine Injectable (LANTUS) 20 Unit(s) SubCutaneous at bedtime  insulin lispro Injectable (ADMELOG) 6 Unit(s) SubCutaneous four times a day before meals  losartan 100 milliGRAM(s) Oral daily  multivitamin 1 Tablet(s) Oral daily  pantoprazole    Tablet 40 milliGRAM(s) Oral before breakfast  polyethylene glycol 3350 17 Gram(s) Oral daily  senna 2 Tablet(s) Oral at bedtime    PRN MEDICATIONS  albuterol    0.083%. 2.5 milliGRAM(s) Nebulizer once PRN  ALPRAZolam 0.5 milliGRAM(s) Oral three times a day PRN  dextrose Oral Gel 15 Gram(s) Oral once PRN  HYDROmorphone   Tablet 4 milliGRAM(s) Oral every 4 hours PRN  silver sulfADIAZINE 1% Cream 1 Application(s) Topical daily PRN    VITALS:   T(F): 99.6  HR: 57  BP: 151/68  RR: 19  SpO2: 99%      PHYSICAL EXAM:  GENERAL:   ( x ) NAD, lying in bed comfortably     (  ) obtunded     (  ) lethargic     (  ) somnolent    HEAD:   ( x ) Atraumatic     (  ) hematoma     (  ) laceration (specify location:       )     NECK:  (  ) Supple     ( x ) Trach present (  ) nuchal rigidity     (  )  no JVD     (  ) JVD present ( -- cm)    HEART:  Rate -->     ( x ) normal rate     (  ) bradycardic     (  ) tachycardic  Rhythm -->     ( x ) regular     (  ) regularly irregular     (  ) irregularly irregular  Murmurs -->     ( x ) normal s1s2     (  ) systolic murmur     (  ) diastolic murmur     (  ) continuous murmur      (  ) S3 present     (  ) S4 present    LUNGS:   ( x )Unlabored respirations     (  ) tachypnea  ( x ) B/L air entry     (  ) decreased breath sounds in:  (location     )    (  ) no adventitious sound     (  ) crackles     (  ) wheezing      (  ) rhonchi      (specify location:       )  (  ) chest wall tenderness (specify location:       )    ABDOMEN:   ( x ) Soft     (  ) tense   |   (  ) nondistended     (  ) distended   |   (  ) +BS     (  ) hypoactive bowel sounds     (  ) hyperactive bowel sounds  ( x ) nontender     (  ) RUQ tenderness     (  ) RLQ tenderness     (  ) LLQ tenderness     (  ) epigastric tenderness     (  ) diffuse tenderness  (  ) Splenomegaly      (  ) Hepatomegaly      (  ) Jaundice     (  ) ecchymosis     AMPAC score:    (  ) SPC        (  ) pigtail       ( x ) PEG tube       (  ) colostomy       (  ) jejunostomy  (  ) U-Dall      LABS:                        8.1    7.00  )-----------( 206      ( 11 Aug 2023 06:41 )             25.2     08-11    129<L>  |  94<L>  |  13  ----------------------------<  176<H>  4.6   |  27  |  <0.5<L>    Ca    9.2      11 Aug 2023 06:41  Mg     1.8     08-11        Urinalysis Basic - ( 11 Aug 2023 06:41 )    Color: x / Appearance: x / SG: x / pH: x  Gluc: 176 mg/dL / Ketone: x  / Bili: x / Urobili: x   Blood: x / Protein: x / Nitrite: x   Leuk Esterase: x / RBC: x / WBC x   Sq Epi: x / Non Sq Epi: x / Bacteria: x

## 2023-08-11 NOTE — PROGRESS NOTE ADULT - ASSESSMENT
Pt is a 59yo with PMH of IDDM, GERD, HTN, COPD, CHF, Chronic hypoxemic and hypercapnic respiratory failure s/p trach/PEG (at [facility]), DVT on Eliquis, Hx of Supraglottic SSC s/p chemotherapy rehab  presents from [facility]  epistaxis and b/l LE pain.    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  - Increased dyspnea, sputum from tach  - No white count, afebrile, does not meet SIRS criteria  - ID reqs appreciated  - f/u Urine for strep pneumonia/legionella antigen  - MRSA negative  - f/u DET gm stain, cultures  - f/u BCx   - Vancomycin 1250 gm iv q12h  - cefepime 2 gm iv q8h  - Option is a PICC with 3 weeks of ABx as outpt ( targeting the sputum cultures )  - Suctioning and chest PT  - Biopsy of lung nodule in May showed SSC    #COPD  - on Trelegy at home  - C/w Symbicort and albuterol    #Hyponatremia  - Na 129 this AM  - Likely chronic, monitor    #Anxiety  - Alprazolam 0.5 TID    #Lower extremity pain  - C/w home meds gabapentin 600 TID  - Dilauded 4mg Q4 PRN    #HO DVT was on Eliquis  #Venous stasis dermatitis  - Eliquis 5mg BID per [facility]  - Holding in setting of nasal bleed  - LE duplex negative for DVT (8/10)    #IDDM  - A1C 5.9  - basal, bolus insulin as per med rec  - c/w with PEG feed    #HTN  - losartan 100mg       # DVT prophylaxis: Hold for bleeding  # GI prophylaxis: PPI  # Diet: Peg tube feeds  # Activity: bedbound  # Code status: Full Code  # Disposition: TBD    Pending: f/u infection workup

## 2023-08-11 NOTE — PROGRESS NOTE ADULT - REASON FOR ADMISSION
hemoptysis
hemoptysis
hemoptysis, SSC, chr trach
hemoptysis, epistaxis, chr trach Hx o supraglottic SCC
hemoptysis

## 2023-08-11 NOTE — DISCHARGE NOTE NURSING/CASE MANAGEMENT/SOCIAL WORK - NSDCPEFALRISK_GEN_ALL_CORE
For information on Fall & Injury Prevention, visit: [email or web address] OR  [email or web address] OR  https://www.cdc.gov/steadi/patient.html

## 2023-08-11 NOTE — DISCHARGE NOTE PROVIDER - NSDCMRMEDTOKEN_GEN_ALL_CORE_FT
acetylcysteine 20% inhalation solution: 3 milliliter(s) inhaled every 6 hours  albuterol sulfate 2.5 mg/3 mL (0.083 %) solution for nebulization: milliliter(s) inhaled 3 times a day, As Needed  ALPRAZolam 0.5 mg oral tablet: 1 tab(s) orally 3 times a day (after meals), As Needed  ammonium lactate 12% topical lotion: 1 application topically 2 times a day  apixaban 5 mg oral tablet: 1 tab(s) orally every 12 hours  betamethasone valerate 0.1% topical cream: 1 application topically 2 times a day  cefepime 2 g intravenous injection: 2 gram(s) intravenous every 8 hours Continue for 3 weks. Stop on 9/2/23.  gabapentin 600 mg oral tablet: 1 tab(s) orally 3 times a day via PEG tube  HYDROmorphone 4 mg oral tablet: 1 tab(s) orally every 4 hours, As Needed  insulin glargine 100 units/mL subcutaneous solution: 20 unit(s) subcutaneous once a day (at bedtime)  insulin lispro 100 units/mL injectable solution: 6  injectable 4 times a day (before feeds Q6H)  levoFLOXacin 25 mg/mL intravenous solution: 30 milliliter(s) intravenous every 24 hours Continue for 3 weks. Stop on 9/2/23.  losartan 100 mg oral tablet: 1 tab(s) orally once a day via PEG tube  Multiple Vitamins oral tablet: 1 tab(s) orally once a day via PEG tube  nystatin 100,000 units/g topical cream: 1 application topically 2 times a day  pantoprazole 40 mg oral delayed release tablet: 1 tab(s) orally once a day (before a meal) via PEG tube  polyethylene glycol 3350 oral powder for reconstitution: 17 gram(s) orally once a day via PEG tube  senna leaf extract oral tablet: 2 tab(s) orally once a day (at bedtime) via PEG tube  sertraline 50 mg oral tablet: 1 tab(s) orally once a day  Trelegy Ellipta 100 mcg-62.5 mcg-25 mcg powder for inhalation:

## 2023-08-11 NOTE — DISCHARGE NOTE PROVIDER - HOSPITAL COURSE
Patient is a 58 year old female with PMH of IDDM, GERD, HTN, COPD, Chronic hypoxemic and hypercapnic respiratory failure SP Tracheostomy (on 6L at [place]) & PEG at [facility], DVT on Eliquis, Anxiety, Hx of Supraglottic SSC s/p chemotherapy  presenting from [place] rehab with bleeding from trach, epistaxis for 3 days and anemia. Patient is nonverbal from trach but able to communicate with notepad and lip reading. She states that when the facility was doing the weekly labs noted to be anemic so sent for management. Of note, she has had increased dyspnea and secretions from the trach with blood as well as epistaxis, with no change in cough. She was endorsing anxiety and chronic leg pain b/l. Denies any fever, chills, worsening cough, chest pain, nausea, vomiting or diarrhea,      In the ED, afebrile, /67, sat 99% on 10L with Tpiece. Labs significant for Hb 7.2, WBC 6.6, Na 128. Xray showed Left basilar opacity, CT chest done showed stable appearance of two left lower lobe thick walled masses with low attenuating centers and gas concerning for central necrosis. Of note, a component of infection/inflammation cannot be excluded and Persistent left lower lobe partial occlusion with left lower lobe consolidative opacity with air bronchograms and superimposed atelectasis and trace underlying effusion. Patient received 1U pRCB and 1 dose azithromycin and ceftriazone    Hospital Course: ENT noted no evidence of acute epistaxis. ID noted 7/7 CT suggestive of necrotising PNA LLL with possible cavitation. Possible aspiration with slow evolving changes in LLL. Nares ORSA NG. Midline placed and will discharge with cefepime 2 gm iv q8h and Levoquin 750 mg iv q24h for 3 weeks.       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  - Increased dyspnea, sputum from tach  - No white count, afebrile, does not meet SIRS criteria  - f/u Urine for strep pneumonia/legionella antigen  - f/u DET gm stain, cultures  - f/u BCx   - Suctioning and chest PT  - Biopsy of lung nodule in May showed SSC    #COPD  - on Trelegy at home  - C/w Symbicort and albuterol    #Hyponatremia  - Na 129 this AM  - Likely chronic, monitor    #Anxiety  - Alprazolam 0.5 TID    #Lower extremity pain  - C/w home meds gabapentin 600 TID  - Dilauded 4mg Q4 PRN    #HO DVT was on Eliquis  #Venous stasis dermatitis  - Eliquis 5mg BID per NH  - LE duplex negative for DVT (8/10)    #IDDM  - A1C 5.9  - basal, bolus insulin as per med rec  - c/w with PEG feed    #HTN  - losartan 100mg

## 2023-08-11 NOTE — DISCHARGE NOTE PROVIDER - NSDCCPCAREPLAN_GEN_ALL_CORE_FT
PRINCIPAL DISCHARGE DIAGNOSIS  Diagnosis: Epistaxis  Assessment and Plan of Treatment: Epistaxis is the medical term for a nosebleed. Nosebleeds are common and can be caused by many conditions, such as injury, infections, dry environments, medicines, nose picking, and home heating and cooling systems. Aspirin and blood thinners make bleeding more likely. If you are prescribed these medicines and you suffer from nosebleeds, ask your health care provider if you should stop taking the medicines or adjust the dose. Do not stop medicines unless directed by your health care provider.  In the hospital ENT noted that there was no acute bleeding after packing your nose. CT scan showed signs concerning of infection in your lungs. Because you need antibiotics for 3 weeks, a midline IV was placed in your left arm. Please continue to take your antibiotics for the next 3 weeks with an end date of 9/2/23.      SECONDARY DISCHARGE DIAGNOSES  Diagnosis: Pneumonia  Assessment and Plan of Treatment:

## 2023-08-12 LAB
GRAM STN FLD: SIGNIFICANT CHANGE UP
SPECIMEN SOURCE: SIGNIFICANT CHANGE UP

## 2023-08-13 LAB
-  MINOCYCLINE: SIGNIFICANT CHANGE UP
METHOD TYPE: SIGNIFICANT CHANGE UP

## 2023-08-15 LAB
-  LEVOFLOXACIN: SIGNIFICANT CHANGE UP
-  TRIMETHOPRIM/SULFAMETHOXAZOLE: SIGNIFICANT CHANGE UP
CULTURE RESULTS: SIGNIFICANT CHANGE UP
METHOD TYPE: SIGNIFICANT CHANGE UP
ORGANISM # SPEC MICROSCOPIC CNT: SIGNIFICANT CHANGE UP
SPECIMEN SOURCE: SIGNIFICANT CHANGE UP

## 2023-08-16 DIAGNOSIS — Z86.718 PERSONAL HISTORY OF OTHER VENOUS THROMBOSIS AND EMBOLISM: ICD-10-CM

## 2023-08-16 DIAGNOSIS — Z99.81 DEPENDENCE ON SUPPLEMENTAL OXYGEN: ICD-10-CM

## 2023-08-16 DIAGNOSIS — Z92.21 PERSONAL HISTORY OF ANTINEOPLASTIC CHEMOTHERAPY: ICD-10-CM

## 2023-08-16 DIAGNOSIS — J43.9 EMPHYSEMA, UNSPECIFIED: ICD-10-CM

## 2023-08-16 DIAGNOSIS — J96.11 CHRONIC RESPIRATORY FAILURE WITH HYPOXIA: ICD-10-CM

## 2023-08-16 DIAGNOSIS — Z93.0 TRACHEOSTOMY STATUS: ICD-10-CM

## 2023-08-16 DIAGNOSIS — I87.2 VENOUS INSUFFICIENCY (CHRONIC) (PERIPHERAL): ICD-10-CM

## 2023-08-16 DIAGNOSIS — F41.9 ANXIETY DISORDER, UNSPECIFIED: ICD-10-CM

## 2023-08-16 DIAGNOSIS — R04.0 EPISTAXIS: ICD-10-CM

## 2023-08-16 DIAGNOSIS — D63.0 ANEMIA IN NEOPLASTIC DISEASE: ICD-10-CM

## 2023-08-16 DIAGNOSIS — Z79.51 LONG TERM (CURRENT) USE OF INHALED STEROIDS: ICD-10-CM

## 2023-08-16 DIAGNOSIS — C78.02 SECONDARY MALIGNANT NEOPLASM OF LEFT LUNG: ICD-10-CM

## 2023-08-16 DIAGNOSIS — F32.A DEPRESSION, UNSPECIFIED: ICD-10-CM

## 2023-08-16 DIAGNOSIS — I25.10 ATHEROSCLEROTIC HEART DISEASE OF NATIVE CORONARY ARTERY WITHOUT ANGINA PECTORIS: ICD-10-CM

## 2023-08-16 DIAGNOSIS — Z79.4 LONG TERM (CURRENT) USE OF INSULIN: ICD-10-CM

## 2023-08-16 DIAGNOSIS — Z79.01 LONG TERM (CURRENT) USE OF ANTICOAGULANTS: ICD-10-CM

## 2023-08-16 DIAGNOSIS — Z74.01 BED CONFINEMENT STATUS: ICD-10-CM

## 2023-08-16 DIAGNOSIS — I50.9 HEART FAILURE, UNSPECIFIED: ICD-10-CM

## 2023-08-16 DIAGNOSIS — E11.9 TYPE 2 DIABETES MELLITUS WITHOUT COMPLICATIONS: ICD-10-CM

## 2023-08-16 DIAGNOSIS — R53.2 FUNCTIONAL QUADRIPLEGIA: ICD-10-CM

## 2023-08-16 DIAGNOSIS — J96.12 CHRONIC RESPIRATORY FAILURE WITH HYPERCAPNIA: ICD-10-CM

## 2023-08-16 DIAGNOSIS — M19.90 UNSPECIFIED OSTEOARTHRITIS, UNSPECIFIED SITE: ICD-10-CM

## 2023-08-16 DIAGNOSIS — Z93.1 GASTROSTOMY STATUS: ICD-10-CM

## 2023-08-16 DIAGNOSIS — E87.1 HYPO-OSMOLALITY AND HYPONATREMIA: ICD-10-CM

## 2023-08-16 DIAGNOSIS — J98.4 OTHER DISORDERS OF LUNG: ICD-10-CM

## 2023-08-16 DIAGNOSIS — I11.0 HYPERTENSIVE HEART DISEASE WITH HEART FAILURE: ICD-10-CM

## 2023-08-16 DIAGNOSIS — E83.42 HYPOMAGNESEMIA: ICD-10-CM

## 2023-08-16 DIAGNOSIS — J69.0 PNEUMONITIS DUE TO INHALATION OF FOOD AND VOMIT: ICD-10-CM

## 2023-08-16 DIAGNOSIS — Z85.20 PERSONAL HISTORY OF MALIGNANT NEOPLASM OF UNSPECIFIED RESPIRATORY ORGAN: ICD-10-CM

## 2023-08-16 DIAGNOSIS — K21.9 GASTRO-ESOPHAGEAL REFLUX DISEASE WITHOUT ESOPHAGITIS: ICD-10-CM

## 2023-08-16 DIAGNOSIS — J98.11 ATELECTASIS: ICD-10-CM

## 2023-08-16 DIAGNOSIS — J85.0 GANGRENE AND NECROSIS OF LUNG: ICD-10-CM

## 2023-08-16 DIAGNOSIS — Z88.0 ALLERGY STATUS TO PENICILLIN: ICD-10-CM

## 2023-08-16 LAB
CULTURE RESULTS: SIGNIFICANT CHANGE UP
CULTURE RESULTS: SIGNIFICANT CHANGE UP
SPECIMEN SOURCE: SIGNIFICANT CHANGE UP
SPECIMEN SOURCE: SIGNIFICANT CHANGE UP

## 2023-09-01 ENCOUNTER — APPOINTMENT (OUTPATIENT)
Dept: HEMATOLOGY ONCOLOGY | Facility: CLINIC | Age: 58
End: 2023-09-01
Payer: MEDICARE

## 2023-09-01 ENCOUNTER — LABORATORY RESULT (OUTPATIENT)
Age: 58
End: 2023-09-01

## 2023-09-01 ENCOUNTER — OUTPATIENT (OUTPATIENT)
Dept: OUTPATIENT SERVICES | Facility: HOSPITAL | Age: 58
LOS: 1 days | End: 2023-09-01
Payer: MEDICARE

## 2023-09-01 ENCOUNTER — APPOINTMENT (OUTPATIENT)
Dept: INFUSION THERAPY | Facility: CLINIC | Age: 58
End: 2023-09-01
Payer: MEDICARE

## 2023-09-01 DIAGNOSIS — C32.1 MALIGNANT NEOPLASM OF SUPRAGLOTTIS: ICD-10-CM

## 2023-09-01 DIAGNOSIS — Z51.11 ENCOUNTER FOR ANTINEOPLASTIC CHEMOTHERAPY: ICD-10-CM

## 2023-09-01 DIAGNOSIS — Z98.890 OTHER SPECIFIED POSTPROCEDURAL STATES: Chronic | ICD-10-CM

## 2023-09-01 LAB
HCT VFR BLD CALC: 23.2 %
HGB BLD-MCNC: 7.7 G/DL
MCHC RBC-ENTMCNC: 29.7 PG
MCHC RBC-ENTMCNC: 33.2 G/DL
MCV RBC AUTO: 89.6 FL
PLATELET # BLD AUTO: 174 K/UL
PMV BLD: 8.6 FL
RBC # BLD: 2.59 M/UL
RBC # FLD: 13.8 %
WBC # FLD AUTO: 6.45 K/UL

## 2023-09-01 PROCEDURE — 99214 OFFICE O/P EST MOD 30 MIN: CPT

## 2023-09-01 PROCEDURE — 85027 COMPLETE CBC AUTOMATED: CPT

## 2023-09-01 PROCEDURE — 84443 ASSAY THYROID STIM HORMONE: CPT

## 2023-09-01 PROCEDURE — 36415 COLL VENOUS BLD VENIPUNCTURE: CPT

## 2023-09-01 PROCEDURE — 96413 CHEMO IV INFUSION 1 HR: CPT

## 2023-09-01 PROCEDURE — 80053 COMPREHEN METABOLIC PANEL: CPT

## 2023-09-01 RX ORDER — NIVOLUMAB 10 MG/ML
480 INJECTION INTRAVENOUS ONCE
Refills: 0 | Status: COMPLETED | OUTPATIENT
Start: 2023-09-01 | End: 2023-09-01

## 2023-09-01 RX ADMIN — NIVOLUMAB 480 MILLIGRAM(S): 10 INJECTION INTRAVENOUS at 12:43

## 2023-09-02 DIAGNOSIS — C32.1 MALIGNANT NEOPLASM OF SUPRAGLOTTIS: ICD-10-CM

## 2023-09-05 LAB
ALBUMIN SERPL ELPH-MCNC: 3.6 G/DL
ALP BLD-CCNC: 85 U/L
ALT SERPL-CCNC: 6 U/L
ANION GAP SERPL CALC-SCNC: 10 MMOL/L
AST SERPL-CCNC: 10 U/L
BILIRUB SERPL-MCNC: <0.2 MG/DL
BUN SERPL-MCNC: 15 MG/DL
CALCIUM SERPL-MCNC: 9.1 MG/DL
CHLORIDE SERPL-SCNC: 93 MMOL/L
CO2 SERPL-SCNC: 27 MMOL/L
CREAT SERPL-MCNC: <0.5 MG/DL
EGFR: 114 ML/MIN/1.73M2
GLUCOSE SERPL-MCNC: 130 MG/DL
POTASSIUM SERPL-SCNC: 4.8 MMOL/L
PROT SERPL-MCNC: 6.3 G/DL
SODIUM SERPL-SCNC: 130 MMOL/L
TSH SERPL-ACNC: 3.1 UIU/ML

## 2023-09-06 NOTE — PHYSICAL EXAM
[Completely disabled. Cannot carry on any self care. Totally confined to bed or chair] : Status 4- Completely disabled. Cannot carry on any self care. Totally confined to bed or chair [Obese] : obese [Normal] : normal spine exam without palpable tenderness, no kyphosis or scoliosis [de-identified] : Tracheostomy in place. Missing teeth. no blood in dressing [de-identified] : Transmitted trach sounds on exam [de-identified] : BLE stasis dermatitis, edema is better [de-identified] : PEG [de-identified] : Rash on her face - still present [de-identified] : Unable to walk.

## 2023-09-06 NOTE — HISTORY OF PRESENT ILLNESS
[Disease: _____________________] : Disease: [unfilled] [T: ___] : T[unfilled] [N: ___] : N[unfilled] [M: ___] : M[unfilled] [AJCC Stage: ____] : AJCC Stage: [unfilled] [de-identified] : This  is a 56yo female sent to the ER from [facility] where she is on ch care for c/o inc SOB, chest tightness, diff breathing, wheezing i.e. acute on chr hypoxic RF. Of note the pt has a trach and has had freq readmissions for mucous plugging, RF and has had several bronchoscopies. EMS noted the pt to be hypotensive and hypoxic. The pt was vigorously suctioned, received Neb Tx, IV steroids, BP revived with IV fluids. The pt was cultured and placed on ABx. She is v well known to the Pul SVc and to ID. The pt is ad with acute on ch hypoxic RF and ongoing Bacterial PNA. The PMHx includes: HTN, ASHD, HFpEF, DLD, DM II, Chronic RF, COPD, LUCIANA, ASp PNA, sp intubation, failure to extubate, chronic trach ([facility] ), recurrent mucus plugging of airways, DVT, AC with Eliquis, lower ext venous stasis dermatitis, bedbound state, GERD, diverticulosis, sp PEG, OA, DDD, DJD, chronic pain syn, depression anxiety. CVA and has loos of sensation on right side of the body.  Hosp Course: The pt was ad from SNF for inc SOB, hypoxia with mucous plugging and acute on chr RF and ongoing bacterial PNA. The pt was suctioned and placed on mech ventilation via trach and cont on ABx. The pt has had freq ad to hosp ([facility]) since the [facility] and intubation and failure to wean that resulted in the trach in  in [facility]. The pt is mostly bed bound with lower ext weakness, mm atrophy/myopathy of disuse and sever hyperpigmentation skin changes of the lower ext. The pt was ff by Pul/critical care and ID. The pt was verbal and had nutrition via PEG. S&S worked with pt and during w/up pt was noted to have pharyngeal/trach swelling. 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 The pt was taken to the OR by ENT for direct laryngoscopy and bx of the mass on 22 with path showing.  Today she is in a stretcher, trach is connected to oxygen. Does not walk. Has diabetic neuropathy in both feet that is very painful. Not SOB.  She has 3 children. Lives in [facility] and is here with her .  Review of imagin2022 CT abd/pelvis IMPRESSION: Area of fat stranding surrounding the cecum and ascending colon, new since CT abdomen pelvis performed on 2022. Findings suggestive of cecitis. No discrete surrounding fluid collection/abscess. Partially visualized left lower lobe consolidative opacity. Consider follow-up with radiographs until resolution.  2022 CT neck: Impression:  Status post tracheostomy. 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 Partial effacement of the preepiglottic fat. Mildly enhancing 1.1 cm right supraclavicular lymph node, nonspecific. Left hemidiaphragm elevation with compressive atelectasis in opacification of the partial imaged left lower lobe which could be infectious or inflammatory. Small left pleural effusion. Further evaluation with chest CT can be considered.  22: PET/CT IMPRESSION: Supraglottic mass measuring approximately 2.5 x 3.8 x 3.7 cm at the aryepiglottic folds, with max SUV of 18.7. Additional right level 2 cervical lymph node (Max SUV 5.0) and left level 2 cervical lymph node (Max SUV 3.9). These nodes are indeterminate for biologic tumor activity. Subsegmental area of atelectasis seen at the left lung base containing 2 foci of increased FDG uptake SUV measuring 10.1 and 6.1 (166, 171) within. These nodules are indeterminate for biologic tumor activity. Trace left pleural effusion. Bilateral FDG uptake in the skin of the axilla with adjacent subcutaneous fat stranding seen on localizer CT. This is probably infectious/inflammatory in etiology. Punctate focus of mild FDG uptake seen in the posterior back soft tissues at the level of the kidneys, favor benign etiology. Correlate with physical exam.  Pathology 2022: stain shows the carcinoma is negative for p16. Tongue base mass, biopsy: -Superficial fragments of invasive squamous cell carcinoma, non-keratinized.   Labs: WBC 9.38, HGb 10.2, Plts 229  [de-identified] : squamous cell carcinoma [de-identified] : 10/26/2022 Resides in [facility]. Reports feeling relatively well; no changes in chronic conditions or new complaints since the last visit.  11/16/2022 She is here for follow up. She is on chemoRT Current Dose: 3000cGy. Planned Dose: 7000cGy from 11/15/22. She is due for week 4 of 7 of chemotherapy. No recent blood work. She missed her treatment last week due bleeding from the trach and throat and went to ED. She had CT angioneck on 11/9/2022 that showed.  1.  No CTA evidence of active contrast extravasation into the airway. 2.  No evidence of major vascular stenosis or occlusion. 3.  Redemonstration of extensive thickening involving the nasopharynx, oropharynx and supraglottic/glottic larynx in this patient with known neoplasm. She had blood work on 11/9/2022 that showed HGB 8.3, PLT 89,  Her bleeding stopped on its own. Createnine was stalbe 0.5.  11/23/2022 She is here for follow up. on 11/22 dose of radiation was Current Dose: 4200cGy. Planned Dose: 7000cGy. She had CBC earlier today that showed ANC 1110, HGB keeps trending down now 6.5, Pls are 48 went up. Iron studies were normal. Carbo was on hold since last week. She denies any bleeding except for some mild bleeding from trach. She states her apixaban has not been held in facility but since no major bleeding and PLT now near 50 she can continue.  12/7/2022 She is here for follow up had 5600cGy. Planned Dose: 7000cGy on 12/6/2022. She is on cetuximab CBC from last week showed WBC 1.55 Hgb 7.7 trending up and PLT are now normal. She has been on Cetuximab since 11/30/2022 She had PRBC transfusion last week. She developed a rash on her mouth and has stomatitis and mild mucositis. The rash is also on her face. I called facility and they did not administer the Minocycline. Rash is grade 2-3. She states the pain in her mouth is not terrible.   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  She is laying in stretcher, currently residing in facility. Reviewed most recent CBC, which shows slight improvement in HGB up to 9.7g/dL. Patient continues on concurrent chemoRT regimen; Current Dose: 6600cGy. Planned Dose: 7000cGy. Treatment Site: supraglottitis. Spoke with [facility], who tentatively have her completion date as 12/19. Patient denies fever, chills, nausea, vomiting, dyspnea, new palpable lumps/bumps or bleeding. She has been on Cetuximab since 11/30/2022. She developed a rash on her mouth and has stomatitis and mild mucositis following first cycle of Cetuximab; Rash still present, grade 2-3 but reportedly slightly improved as per patient. She has been applying hydrocortisone as needed and doxycycline daily. Patient is due for week 3 of Cetuximab today.    1/18/2023 She is here for follow up. She completed chemoRT in Mid-December. Rash is better She had blood work in facility today and will obtain the rash is also much better. She has no new complaints. No longer has any dysphagia because of the trach she only uses ice chips unable to swallow. She did have recent blood work in the facility approximately 1 week ago essentially white cells and platelets were normal hemoglobin was just over 8.  6/14/2023 She is here for follow up today. She had PET/CT on March 10, 2023, that showed Interval decrease in size and FDG uptake of the supraglottic laryngeal mass, SUV max 16.1, previously 18.7 (14% decrease). Resolution of bilateral FDG avid cervical lymph nodes. Interval increase in size and FDG uptake of 2 left lower lobe nodules, SUV max up to 20.0, previously 10.1 (98% increase).  She saw Dr. Landaverde in March and Laryngoscopy showed intense supraglottic edema and secretions effacing full evaluation of her larynx.  She had an IR biopsy doen of the lung nodule that showed 5/31/23 Final Diagnosis LUNG, LEFT LOWER LOBE MASS, CT GUIDED NEEDLE CORE BIOPSY WITH IMPRINTS: - POSITIVE FOR MALIGNANT CELLS -  Squamous cell carcinoma, non-keratinizing, with focal necrosis Immunohistochemistry results: Material:     Block 1A Population:  Malignant cells Positive:      CK7, CK5/6, p63 Negative:     CK20, TTF1, Napsin A, p16 These results support the above diagnosis. Note:  The current tumor is p16 negative, similar to the tongue base tumor, see [id number]. Suggest clinico-pathologic and radiologic correlation to determine whether this tumor is primary or metastatic to the lung. RESULTS : PD-L1 IMMUNOHISTOCHEMICAL ANALYSIS Tumor Proportion Score: <1% / Negative I spoke to Dr. Ryan Murray of radiation about possible SRS to the lung nodule/s but due to location SRS is not possible but he could offer 10-15fx but he was concerned she still has residual disease in the larynx given FDG activity and lisbet Edema.  7/26/2023  She is here for follow up. She was to start Opdivo but was no show on 6/22/23. She feels well. She is in a stretcher as always. CBC today showed HGB 7.9 was 9.1. She denies any bleeding.  09/01/2023 She is here to continue treatment. She is a resident of the nursing home and came today on stretchers. She reports no new issues and no obvious bleeding.

## 2023-09-06 NOTE — END OF VISIT
[FreeTextEntry3] : I was physically present for the key portions of the evaluation and management service provided.  I agree with the history and physical, and plan which I have reviewed and edited where appropriate.  continue Opdivo 480 mg IV Q4W - C2  today will reimamge with cycle 3 with CTs. RTC in one month. Blood work was done. c/w apixiban

## 2023-09-06 NOTE — ASSESSMENT
[Palliative] : Goals of care discussed with patient: Palliative [Palliative Care Plan] : Patient was apprised of incurable nature of disease.  Hospice and Palliative care options discussed. [FreeTextEntry1] : # Stage T3N3C? 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  I do believe she would tolerate definitive chemoradiation and I recommended we incorporate carboplatin with an AUC dose of 1.5 weekly.  Explained to the patient may be some inferior outcomes compared to cisplatin but I am concerned about weekly cisplatin with all her medical comorbidities.  I would not add Taxol to the carboplatin given her severe neuropathy - 10/20/2022 started chemoradiation with Carboplatin - she did not tolerate carboplatin due to cytopenia it was stopped and on 11/30/2022 switched to Cetuximab 250 mg/m2 to complete with radiation. - 12/21/2022 completed chemoRT. -will have her see Dr. Landaverde of ENT for surveillance as well and to confirm CR. - 03/10/2023 PET - Since 9/21/2022, No definite new sites of biologic tumor activity. Interval decrease in size and FDG uptake of the supraglottic laryngeal mass, SUV max 16.1, previously 18.7 (14% decrease). Resolution of bilateral FDG avid cervical lymph nodes. Interval increase in size and FDG uptake of 2 left lower lobe nodules, SUV max up to 20.0, previously 10.1 (98% increase). Few scattered right lung tree-in-bud nodular opacities are not FDG avid, likely from small airways disease. Scattered areas of skin uptake along the upper abdomen bilaterally which could represent areas of contamination from tracer or less likely possible areas of inflammation. Correlate with exam. - Lung biopsy was positive for metastatic disease PD-L1<1% - she was started on Keytruda. - 07/12/2023 CT C/A/P for new baseline: Interval increase in size of 2 left lower lobe nodules/masses noted on PET CT 3/10/2023 measuring up to 4.2 cm and demonstrating low-attenuation possibly necrotic. Partial occlusion of the central left lower lobe bronchi. Large and consolidative/atelectatic left lower lobe with small aeration of the superior segment. - 07/28/2023 started Opdivo 480 mg IV Q4W.  # Drug Rash, likely 2/2 Cetuximab; Grade 2-3 rash. - S/P Doxy 100 mg daily, Hydrocortisone cream BID to rash. - S/P prednisone 40 mg daily for 7 days. - spoke about holding the cetuximab but decided to continue with decrease dose to 150 mg/m2 weekly until she completed radiation which is planned for upcoming Monday 12/19/2022.  # BLE pain - much better  - was on hydromorphone 4 mg via G-tube Q4H PRN given at [facility].  # DVT on apixaban  - on Apixaban since no obvious bleeding and PLT normal.  # Mild thrombocytopenia from chemo - resolved.  # Anemia due to critical illness and chemotherapy worsening no obvious bleeding - asymptomatic - will transfuse to keep HGB around 7-8 g/dL.  09/01/2023 Labs reviewed and results discussed with the patient - remains clinically stable to continue current management. All questions were answered to satisfaction.  PLAN: - continue Opdivo 480 mg IV Q4W - C2 GIVE TODAY. - will transfuse to keep HGB around 7-8 g/dL. - repeat CT after cycle 3. - Labs today: CBC CMP TSH RTC in 4 weeks with CBC CMP TSH and treatment.

## 2023-09-06 NOTE — REVIEW OF SYSTEMS
[Fatigue] : fatigue [Muscle Weakness] : muscle weakness [Skin Rash] : skin rash [Anxiety] : anxiety [Lower Ext Edema] : lower extremity edema [Negative] : Heme/Lymph [Dysphagia] : no dysphagia [Odynophagia] : no odynophagia [FreeTextEntry2] : non-ambulatory anymore - on stretchers. [FreeTextEntry6] : trach

## 2023-09-22 ENCOUNTER — OUTPATIENT (OUTPATIENT)
Dept: OUTPATIENT SERVICES | Facility: HOSPITAL | Age: 58
LOS: 1 days | End: 2023-09-22
Payer: MEDICARE

## 2023-09-22 ENCOUNTER — LABORATORY RESULT (OUTPATIENT)
Age: 58
End: 2023-09-22

## 2023-09-22 ENCOUNTER — APPOINTMENT (OUTPATIENT)
Dept: HEMATOLOGY ONCOLOGY | Facility: CLINIC | Age: 58
End: 2023-09-22
Payer: MEDICARE

## 2023-09-22 ENCOUNTER — APPOINTMENT (OUTPATIENT)
Dept: INFUSION THERAPY | Facility: CLINIC | Age: 58
End: 2023-09-22
Payer: MEDICARE

## 2023-09-22 VITALS — TEMPERATURE: 97.3 F | DIASTOLIC BLOOD PRESSURE: 82 MMHG | SYSTOLIC BLOOD PRESSURE: 116 MMHG | HEART RATE: 80 BPM

## 2023-09-22 DIAGNOSIS — D64.9 ANEMIA, UNSPECIFIED: ICD-10-CM

## 2023-09-22 DIAGNOSIS — Z98.890 OTHER SPECIFIED POSTPROCEDURAL STATES: Chronic | ICD-10-CM

## 2023-09-22 DIAGNOSIS — C32.1 MALIGNANT NEOPLASM OF SUPRAGLOTTIS: ICD-10-CM

## 2023-09-22 LAB
HCT VFR BLD CALC: 25.5 %
HGB BLD-MCNC: 8.3 G/DL
MCHC RBC-ENTMCNC: 29.9 PG
MCHC RBC-ENTMCNC: 32.5 G/DL
MCV RBC AUTO: 91.7 FL
PLATELET # BLD AUTO: 169 K/UL
PMV BLD: 8.5 FL
RBC # BLD: 2.78 M/UL
RBC # FLD: 15 %
WBC # FLD AUTO: 5.67 K/UL

## 2023-09-22 PROCEDURE — 83540 ASSAY OF IRON: CPT

## 2023-09-22 PROCEDURE — 80053 COMPREHEN METABOLIC PANEL: CPT

## 2023-09-22 PROCEDURE — 85027 COMPLETE CBC AUTOMATED: CPT

## 2023-09-22 PROCEDURE — 99214 OFFICE O/P EST MOD 30 MIN: CPT

## 2023-09-22 PROCEDURE — 83550 IRON BINDING TEST: CPT

## 2023-09-22 PROCEDURE — 84443 ASSAY THYROID STIM HORMONE: CPT

## 2023-09-22 PROCEDURE — 36415 COLL VENOUS BLD VENIPUNCTURE: CPT

## 2023-09-22 PROCEDURE — 82728 ASSAY OF FERRITIN: CPT

## 2023-09-22 PROCEDURE — 96413 CHEMO IV INFUSION 1 HR: CPT

## 2023-09-22 RX ORDER — NIVOLUMAB 10 MG/ML
480 INJECTION INTRAVENOUS ONCE
Refills: 0 | Status: COMPLETED | OUTPATIENT
Start: 2023-09-22 | End: 2023-09-22

## 2023-09-22 RX ADMIN — NIVOLUMAB 480 MILLIGRAM(S): 10 INJECTION INTRAVENOUS at 15:20

## 2023-09-22 RX ADMIN — NIVOLUMAB 480 MILLIGRAM(S): 10 INJECTION INTRAVENOUS at 15:50

## 2023-09-25 LAB
ALBUMIN SERPL ELPH-MCNC: 3.8 G/DL
ALP BLD-CCNC: 85 U/L
ALT SERPL-CCNC: 6 U/L
ANION GAP SERPL CALC-SCNC: 10 MMOL/L
AST SERPL-CCNC: 14 U/L
BILIRUB SERPL-MCNC: 0.2 MG/DL
BUN SERPL-MCNC: 14 MG/DL
CALCIUM SERPL-MCNC: 9.3 MG/DL
CHLORIDE SERPL-SCNC: 97 MMOL/L
CO2 SERPL-SCNC: 26 MMOL/L
CREAT SERPL-MCNC: <0.5 MG/DL
EGFR: 114 ML/MIN/1.73M2
FERRITIN SERPL-MCNC: 376 NG/ML
GLUCOSE SERPL-MCNC: 114 MG/DL
IRON SATN MFR SERPL: 24 %
IRON SERPL-MCNC: 52 UG/DL
POTASSIUM SERPL-SCNC: 5.1 MMOL/L
PROT SERPL-MCNC: 6.7 G/DL
SODIUM SERPL-SCNC: 133 MMOL/L
TIBC SERPL-MCNC: 218 UG/DL
TSH SERPL-ACNC: 3.06 UIU/ML
UIBC SERPL-MCNC: 166 UG/DL

## 2023-10-06 ENCOUNTER — OUTPATIENT (OUTPATIENT)
Dept: OUTPATIENT SERVICES | Facility: HOSPITAL | Age: 58
LOS: 1 days | End: 2023-10-06
Payer: MEDICARE

## 2023-10-06 ENCOUNTER — RESULT REVIEW (OUTPATIENT)
Age: 58
End: 2023-10-06

## 2023-10-06 DIAGNOSIS — C32.1 MALIGNANT NEOPLASM OF SUPRAGLOTTIS: ICD-10-CM

## 2023-10-06 DIAGNOSIS — Z98.890 OTHER SPECIFIED POSTPROCEDURAL STATES: Chronic | ICD-10-CM

## 2023-10-06 PROCEDURE — 71260 CT THORAX DX C+: CPT

## 2023-10-06 PROCEDURE — 74177 CT ABD & PELVIS W/CONTRAST: CPT | Mod: 26

## 2023-10-06 PROCEDURE — 71260 CT THORAX DX C+: CPT | Mod: 26

## 2023-10-06 PROCEDURE — 74177 CT ABD & PELVIS W/CONTRAST: CPT

## 2023-10-07 DIAGNOSIS — C32.1 MALIGNANT NEOPLASM OF SUPRAGLOTTIS: ICD-10-CM

## 2023-10-20 ENCOUNTER — LABORATORY RESULT (OUTPATIENT)
Age: 58
End: 2023-10-20

## 2023-10-20 ENCOUNTER — APPOINTMENT (OUTPATIENT)
Dept: HEMATOLOGY ONCOLOGY | Facility: CLINIC | Age: 58
End: 2023-10-20
Payer: MEDICARE

## 2023-10-20 ENCOUNTER — APPOINTMENT (OUTPATIENT)
Dept: INFUSION THERAPY | Facility: CLINIC | Age: 58
End: 2023-10-20
Payer: MEDICARE

## 2023-10-20 ENCOUNTER — OUTPATIENT (OUTPATIENT)
Dept: OUTPATIENT SERVICES | Facility: HOSPITAL | Age: 58
LOS: 1 days | End: 2023-10-20
Payer: MEDICARE

## 2023-10-20 VITALS
DIASTOLIC BLOOD PRESSURE: 65 MMHG | BODY MASS INDEX: 35.3 KG/M2 | RESPIRATION RATE: 16 BRPM | HEART RATE: 82 BPM | HEIGHT: 61 IN | WEIGHT: 187 LBS | SYSTOLIC BLOOD PRESSURE: 140 MMHG | TEMPERATURE: 98.3 F

## 2023-10-20 DIAGNOSIS — C78.02 SECONDARY MALIGNANT NEOPLASM OF LEFT LUNG: ICD-10-CM

## 2023-10-20 DIAGNOSIS — C32.1 MALIGNANT NEOPLASM OF SUPRAGLOTTIS: ICD-10-CM

## 2023-10-20 DIAGNOSIS — Z51.12 ENCOUNTER FOR ANTINEOPLASTIC CHEMOTHERAPY: ICD-10-CM

## 2023-10-20 DIAGNOSIS — Z51.11 ENCOUNTER FOR ANTINEOPLASTIC CHEMOTHERAPY: ICD-10-CM

## 2023-10-20 DIAGNOSIS — I82.409 ACUTE EMBOLISM AND THROMBOSIS OF UNSPECIFIED DEEP VEINS OF UNSPECIFIED LOWER EXTREMITY: ICD-10-CM

## 2023-10-20 DIAGNOSIS — Z98.890 OTHER SPECIFIED POSTPROCEDURAL STATES: Chronic | ICD-10-CM

## 2023-10-20 LAB
ALBUMIN SERPL ELPH-MCNC: 3 G/DL
ALP BLD-CCNC: 61 U/L
ALT SERPL-CCNC: 6 U/L
ANION GAP SERPL CALC-SCNC: 9 MMOL/L
AST SERPL-CCNC: 11 U/L
BILIRUB SERPL-MCNC: <0.2 MG/DL
BUN SERPL-MCNC: 13 MG/DL
CALCIUM SERPL-MCNC: 6.6 MG/DL
CHLORIDE SERPL-SCNC: 105 MMOL/L
CO2 SERPL-SCNC: 21 MMOL/L
CREAT SERPL-MCNC: <0.5 MG/DL
EGFR: 122 ML/MIN/1.73M2
GLUCOSE SERPL-MCNC: 102 MG/DL
HCT VFR BLD CALC: 29.3 %
HGB BLD-MCNC: 9.6 G/DL
MCHC RBC-ENTMCNC: 30.6 PG
MCHC RBC-ENTMCNC: 32.8 G/DL
MCV RBC AUTO: 93.3 FL
PLATELET # BLD AUTO: 197 K/UL
PMV BLD: 8.5 FL
POTASSIUM SERPL-SCNC: 3.4 MMOL/L
PROT SERPL-MCNC: 5.3 G/DL
RBC # BLD: 3.14 M/UL
RBC # FLD: 15.2 %
SODIUM SERPL-SCNC: 135 MMOL/L
WBC # FLD AUTO: 6.82 K/UL

## 2023-10-20 PROCEDURE — 99214 OFFICE O/P EST MOD 30 MIN: CPT

## 2023-10-20 PROCEDURE — 80053 COMPREHEN METABOLIC PANEL: CPT

## 2023-10-20 PROCEDURE — 85027 COMPLETE CBC AUTOMATED: CPT

## 2023-10-20 PROCEDURE — 96413 CHEMO IV INFUSION 1 HR: CPT

## 2023-10-20 PROCEDURE — 84443 ASSAY THYROID STIM HORMONE: CPT

## 2023-10-20 PROCEDURE — 36415 COLL VENOUS BLD VENIPUNCTURE: CPT

## 2023-10-20 RX ORDER — NIVOLUMAB 10 MG/ML
480 INJECTION INTRAVENOUS ONCE
Refills: 0 | Status: COMPLETED | OUTPATIENT
Start: 2023-10-20 | End: 2023-10-20

## 2023-10-20 RX ADMIN — NIVOLUMAB 480 MILLIGRAM(S): 10 INJECTION INTRAVENOUS at 12:02

## 2023-10-20 RX ADMIN — NIVOLUMAB 480 MILLIGRAM(S): 10 INJECTION INTRAVENOUS at 11:31

## 2023-10-21 DIAGNOSIS — C78.02 SECONDARY MALIGNANT NEOPLASM OF LEFT LUNG: ICD-10-CM

## 2023-10-27 LAB — TSH SERPL-ACNC: 6.89 UIU/ML

## 2023-11-17 ENCOUNTER — APPOINTMENT (OUTPATIENT)
Dept: HEMATOLOGY ONCOLOGY | Facility: CLINIC | Age: 58
End: 2023-11-17

## 2023-11-17 ENCOUNTER — APPOINTMENT (OUTPATIENT)
Dept: INFUSION THERAPY | Facility: CLINIC | Age: 58
End: 2023-11-17

## 2024-02-14 NOTE — ED PROVIDER NOTE - NSFOLLOWUPINSTRUCTIONS_ED_ALL_ED_FT
ENDOCRINOLOGY FOLLOW-UP         HISTORY OF PRESENT ILLNESS    Taylor Sanford is seen in follow-up.     Recently discovered to have mildly displaced fracture of the right clavicle on CT on 1/20/2024 after developing chest pain after a fall; CT also incidentally revealed a complex appearing lesion in the upper pole of the left kidney.  Renal ultrasound performed on 2/1/2024 showed a hypoechoic lesion with some internal complexity, unclear if cyst.  Renal MRI was suggested by radiology.    We resumed Trulicity at last visit in 8/2023.  However, patient contacted our clinic in 9/2023 with progressing GI symptoms (heartburn) after resuming Trulicity.  We therefore discontinued Trulicity and started Tradjenta at that time.    Current diabetes regimen: Glipizide XL 10 mg twice daily, Tradjenta 5 mg daily.    Reviewed glucose data downloaded from meter.  In the past 2 weeks, average glucose has been 171.      Reports that she continues on Prolia through [facility].    Pertinent endocrine and related history:  1.  Diabetes mellitus, type 2.  Diagnosed approximately in the late 1990s.  -Metformin had to be discontinued due to change in kidney function.  -Transitioned from Tradjenta to Trulicity in 1/2023.  2. History of amiodarone-induced thyroiditis. 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 Thyroid ultrasound 2011 showed heterogenous thyroid parenchyma: two subcentimeter thyroid nodules were noted, one in left and another in right lobe.  -Most recent thyroid US 9/24/2013 showed the following:  Right lobe spongiform 0.4 x 0.3  x 0.4 cm nodule in the midpole, previously measuring 0.3 x 0.2 cm.   Left lobe isoechoic nodule in the lower pole measuring 0.6 x 0.5 x 0.5 cm unchanged with little vascularity.   3. Chronic kidney disease.  4.  Osteoporosis.   Noted on DXA scan performed in the [facility] system in 2020.  History of right hip fracture after falling from standing height.  Has preferred to have osteoporosis managed by orthopedics and PCP.  5.  Atrial fibrillation.  6.  Coronary artery disease, status post PCI in 2002.  7.  Hyperlipidemia.  8.  Hypertension.  9.  Depression.    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  Had 2 children, a son and a daughter; her daughter passed away.    PAST MEDICAL HISTORY  Past Medical History:   Diagnosis Date    Benign essential hypertension     CAD (coronary artery disease)     Mixed hyperlipidemia     Paroxysmal atrial fibrillation (H)     Type 2 diabetes mellitus with skin complication, without long-term current use of insulin (H)        MEDICATIONS  Current Outpatient Medications   Medication Sig Dispense Refill    cholecalciferol 50 MCG (2000 UT) CAPS Take 2,000 Units by mouth daily      glipiZIDE (GLUCOTROL XL) 10 MG 24 hr tablet Take 1 tablet (10 mg) by mouth 2 times daily 180 tablet 0    loperamide (IMODIUM) 2 MG capsule Take 2 mg by mouth daily      tirzepatide (MOUNJARO) 2.5 MG/0.5ML pen Inject 2.5 mg Subcutaneous every 7 days 2 mL 1    Vibegron (GEMTESA) 75 MG TABS tablet       warfarin ANTICOAGULANT (COUMADIN) 2 MG tablet warfarin 2 mg tablet      allopurinol (ZYLOPRIM) 100 MG tablet Take 200 mg by mouth      amLODIPine (NORVASC) 5 MG tablet amlodipine 5 mg tablet      aspirin (ASA) 81 MG chewable tablet Asprin Ec Low Dose      atorvastatin (LIPITOR) 80 MG tablet Take 80 mg by mouth      blood glucose (ACCU-CHEK ADIS PLUS) test strip 1 strip by In Vitro route daily      buPROPion (WELLBUTRIN) 75 MG tablet One and a half tablets bid      Calcium Carb-Cholecalciferol (CALCIUM CARBONATE-VITAMIN D3) 600-400 MG-UNIT TABS Take 1 tablet by mouth 2 times daily      Calcium Citrate (CITRACAL OR) Take 1 tablet by mouth daily      cilostazol (PLETAL)  100 MG tablet Take 100 mg by mouth 2 times daily      furosemide (LASIX) 20 MG tablet 40 mg daily      gabapentin (NEURONTIN) 300 MG capsule Take 1 capsule by mouth 3 times daily Two tablets in the morning and one tablet in the evening      hyoscyamine (LEVSIN) 0.125 MG tablet Take 0.125 mg by mouth      lisinopril (ZESTRIL) 5 MG tablet 5 mg daily      magnesium 250 MG tablet 2 tablets 2 times daily      metoprolol succinate ER (TOPROL-XL) 50 MG 24 hr tablet 150 mg      mupirocin (BACTROBAN) 2 % external ointment Apply topically daily 30 g 3    nitroFURantoin macrocrystal-monohydrate (MACROBID) 100 MG capsule Take 100 mg by mouth daily      omeprazole (PRILOSEC) 20 MG DR capsule Take 20 mg by mouth daily      sodium fluoride dental gel (PREVIDENT) 1.1 % GEL topical gel          Allergies, family, and social history were reviewed and documented as needed in EHR.     REVIEW OF SYSTEMS  A focused ROS was performed, with pertinent positives and negatives as noted in the HPI.    PHYSICAL EXAM  /62 (BP Location: Right arm, Patient Position: Sitting, Cuff Size: Adult Regular)   Pulse 84   Wt 92.1 kg (203 lb)   BMI 32.77 kg/m    Body mass index is 32.77 kg/m .  Constitutional: Vital signs reviewed, as recorded above. Patient is alert, oriented and appears in no acute distress.  Eyes: PER, EOMI, no stare, lid lag, or retraction; no conjunctival injection.  Respiratory: Normal chest wall motion and respiratory effort.  ENTM: Mucous membranes clear, no tongue swelling, no angioedema.  MSK: No clubbing or cyanosis; normal muscle bulk and tone.  Skin: No lesions on visible skin,  Neurological: Alert and oriented times 3. No tremor.      DATA REVIEW  Each of the following laboratory and/or imaging studies were reviewed.            ASSESSMENT  1.  Diabetes mellitus, type 2.  Hemoglobin A1c checked prior to this visit has risen significantly and fingerstick glucose values are outside of ideal range.  Remains hesitant to  use insulin.  Her kidney function limits our options: I would consider challenging with GIP/GLP-1 RA (she has had side effect with Trulicity in the past but may be able to tolerate lower dosages of Mounjaro).  We reviewed potential side effects and importance of keeping well-hydrated with this medication.  Ms. Sanford is agreeable to moving forward.    2.  Diabetes preventive care.  -No known diabetic retinopathy, had annual eye exam [facility] on 8/26/2022, no diabetic retinopathy  -CKD, I referred to nephrology at last visit in 9 8/2023 however patient notes that this was delayed after her fracture and when she reached out to reschedule, nephrology denied appointment; for now, I will defer rereferral since she is currently undergoing workup for renal mass.  I had considered SGLT2 inhibitor but we did not start immediately given her urinary continence issues.  Defer for now.  -Has peripheral neuropathic symptoms; followed by Dr. Enciso for foot ulcers, discharged from his care in 7/2023 after ulcers healed (she was measured for diabetic shoes and inserts)    3.  Osteoporosis.  Based on DXA from 2020 and history of low trauma fractures.  Followed in orthopedics clinic.  Therefore deferred.    5.  Hypertension.  Blood pressure controlled.    6.  Hyperlipidemia.  On statin therapy.    7.  Coronary artery disease and paroxysmal atrial fibrillation.  Following in cardiology.    8.  History of hyperthyroidism. Clinically appears euthyroid.  Thyroid function test checked in 11/2023 was normal.    9.  History of thyroid nodules. Subcentimeter and stable based on last US report from 2013.  We will request follow-up ultrasound prior to next visit.    PLAN  -We will start Mounjaro 2.5 mg weekly  -When starting Mounjaro, stop Tradjenta  -Continue glipizide without changes, 10 mg twice daily  -Visit with diabetes education in about 1 month to review blood glucose; at that time, we can consider increasing Mounjaro  dose  -Check blood glucose once daily as much as possible at alternating times, first thing in the morning on one day and before dinner on another; also check with any unusual symptoms that could be due to low blood glucose  -Return for a follow-up visit in summer 2024, with labs and thyroid ultrasound before visit  -We will communicate results by letter, or if needed by phone       Orders Placed This Encounter   Procedures    Basic metabolic panel    Hemoglobin A1c    Adult Diabetes Education  Referral       I spent a total of 48 minutes on the date of encounter reviewing medical records, evaluating the patient, coordinating care and documenting in the EHR, as detailed above.    Clarissa Sampson MD   Division of Diabetes, Endocrinology and Metabolism  Department of Medicine      cc: Pelon Drew MD   Please follow up with your primary care doctor in 1-3 days

## 2024-03-18 ENCOUNTER — APPOINTMENT (OUTPATIENT)
Dept: OTOLARYNGOLOGY | Facility: CLINIC | Age: 59
End: 2024-03-18
Payer: MEDICARE

## 2024-03-18 DIAGNOSIS — R91.8 OTHER NONSPECIFIC ABNORMAL FINDING OF LUNG FIELD: ICD-10-CM

## 2024-03-18 DIAGNOSIS — C32.1 MALIGNANT NEOPLASM OF SUPRAGLOTTIS: ICD-10-CM

## 2024-03-18 DIAGNOSIS — C77.0 SECONDARY AND UNSPECIFIED MALIGNANT NEOPLASM OF LYMPH NODES OF HEAD, FACE AND NECK: ICD-10-CM

## 2024-03-18 DIAGNOSIS — R13.14 DYSPHAGIA, PHARYNGOESOPHAGEAL PHASE: ICD-10-CM

## 2024-03-18 DIAGNOSIS — Z93.0 TRACHEOSTOMY STATUS: ICD-10-CM

## 2024-03-18 PROCEDURE — 31615 TRCHEOBRNCHSC EST TRACHS INC: CPT

## 2024-03-18 PROCEDURE — 99213 OFFICE O/P EST LOW 20 MIN: CPT | Mod: 25

## 2024-03-18 PROCEDURE — 31575 DIAGNOSTIC LARYNGOSCOPY: CPT | Mod: 59

## 2024-03-18 NOTE — REASON FOR VISIT
Size Of Lesion In Cm (Required): 0.7 [Subsequent Evaluation] : a subsequent evaluation for [FreeTextEntry2] :  malignant neoplasm of supraglottis , dysphonia

## 2024-03-18 NOTE — HISTORY OF PRESENT ILLNESS
[de-identified] : Oncology Summary Stage: [id number] Site: Supraglottis Pathology: SCC Treatment: Chemoradiation completed 12/15/2022 Disease status: Metastatic Swallow: G tube dependent Airway: Trach with PMV Smoking: Former smoker, since quit Thyroid: Will check 6/2023  [FreeTextEntry1] :  Patient following up on malignant neoplasm of supraglottic, dysphonia. Has muffled hearing in both ears that started a few months ago. Was given ear drops in the nursing home with no improvement. Denies any otalgia or otorrhea. She states she is doing well with no complaints.

## 2024-03-18 NOTE — ASSESSMENT
[FreeTextEntry1] : Persistent disease in larynx/tongue base Continue tracheostomy and g tube Plan for trach exchange 3 months

## 2024-03-18 NOTE — PHYSICAL EXAM
[Midline] : trachea located in midline position [de-identified] : #6 cuffless trach in place, PMV, tolerated finger occlusion.  [Normal] : tympanic membranes are normal in both ears

## 2024-03-18 NOTE — PROCEDURE
[de-identified] : Flexible laryngoscopy performed. Nasal cavity, nasopharynx, oropharynx, hypopharynx, and larynx evaluated.   There is edema/mass involving right supraglottis and tongue base with 100% airway effacement  In a separately identifiable procedure, scope introduced through existing tracheostoma. Trachea and bilateral mainstem bronchi evaluated. No masses or lesions, thin secretions, airway patent.

## 2024-06-17 ENCOUNTER — APPOINTMENT (OUTPATIENT)
Dept: OTOLARYNGOLOGY | Facility: CLINIC | Age: 59
End: 2024-06-17

## 2024-07-08 ENCOUNTER — APPOINTMENT (OUTPATIENT)
Dept: OTOLARYNGOLOGY | Facility: CLINIC | Age: 59
End: 2024-07-08
Payer: MEDICARE

## 2024-07-08 DIAGNOSIS — K14.8 OTHER DISEASES OF TONGUE: ICD-10-CM

## 2024-07-08 DIAGNOSIS — Z93.0 TRACHEOSTOMY STATUS: ICD-10-CM

## 2024-07-08 PROCEDURE — 31575 DIAGNOSTIC LARYNGOSCOPY: CPT

## 2024-07-08 PROCEDURE — 99214 OFFICE O/P EST MOD 30 MIN: CPT | Mod: 25

## 2024-07-22 ENCOUNTER — OUTPATIENT (OUTPATIENT)
Dept: OUTPATIENT SERVICES | Facility: HOSPITAL | Age: 59
LOS: 1 days | End: 2024-07-22
Payer: MEDICARE

## 2024-07-22 ENCOUNTER — APPOINTMENT (OUTPATIENT)
Age: 59
End: 2024-07-22
Payer: MEDICARE

## 2024-07-22 VITALS
DIASTOLIC BLOOD PRESSURE: 60 MMHG | OXYGEN SATURATION: 97 % | RESPIRATION RATE: 16 BRPM | HEART RATE: 74 BPM | SYSTOLIC BLOOD PRESSURE: 114 MMHG

## 2024-07-22 DIAGNOSIS — C32.1 MALIGNANT NEOPLASM OF SUPRAGLOTTIS: ICD-10-CM

## 2024-07-22 DIAGNOSIS — Z98.890 OTHER SPECIFIED POSTPROCEDURAL STATES: Chronic | ICD-10-CM

## 2024-07-22 DIAGNOSIS — C77.0 SECONDARY AND UNSPECIFIED MALIGNANT NEOPLASM OF LYMPH NODES OF HEAD, FACE AND NECK: ICD-10-CM

## 2024-07-22 DIAGNOSIS — D64.9 ANEMIA, UNSPECIFIED: ICD-10-CM

## 2024-07-22 DIAGNOSIS — C78.02 SECONDARY MALIGNANT NEOPLASM OF LEFT LUNG: ICD-10-CM

## 2024-07-22 PROCEDURE — 99214 OFFICE O/P EST MOD 30 MIN: CPT

## 2024-07-22 PROCEDURE — G2211 COMPLEX E/M VISIT ADD ON: CPT

## 2024-07-23 DIAGNOSIS — D64.9 ANEMIA, UNSPECIFIED: ICD-10-CM

## 2024-07-23 NOTE — PHYSICAL EXAM
[Completely disabled. Cannot carry on any self care. Totally confined to bed or chair] : Status 4- Completely disabled. Cannot carry on any self care. Totally confined to bed or chair [Obese] : obese [Normal] : affect appropriate [de-identified] : Tounge is  extruding, trach in place  Tracheostomy in place. Missing teeth. no blood in dressing [de-identified] : Transmitted trach sounds on exam [de-identified] : BLE stasis dermatitis,  no edema today  [de-identified] : PEG but soft  [de-identified] : Rash resolved

## 2024-07-23 NOTE — ASSESSMENT
[Palliative] : Goals of care discussed with patient: Palliative [Palliative Care Plan] : Patient was apprised of incurable nature of disease.  Hospice and Palliative care options discussed. 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 - She has been recovering quite well. I do believe she would tolerate definitive chemoradiation and I recommended we incorporate carboplatin with an AUC dose of 1.5 weekly. Explained to the patient may be some inferior outcomes compared to cisplatin, but I am concerned about weekly cisplatin with all her medical comorbidities. I would not add Taxol to the carboplatin given her severe neuropathy. - 10/20/2022 started chemoradiation with Carboplatin - she did not tolerate carboplatin due to cytopenia it was stopped and on 11/30/2022 switched to Cetuximab 250 mg/m2 to complete with radiation. - 12/21/2022 completed chemoRT. - will have her see Dr. Landaverde of ENT for surveillance as well and to confirm CR. - 03/10/2023 PET - Since 9/21/2022, No definite new sites of biologic tumor activity. Interval decrease in size and FDG uptake of the supraglottic laryngeal mass, SUV max 16.1, previously 18.7 (14% decrease). Resolution of bilateral FDG avid cervical lymph nodes. Interval increase in size and FDG uptake of 2 left lower lobe nodules, SUV max up to 20.0, previously 10.1 (98% increase). Few scattered right lung tree-in-bud nodular opacities are not FDG avid, likely from small airways disease. Scattered areas of skin uptake along the upper abdomen bilaterally which could represent areas of contamination from tracer or less likely possible areas of inflammation. Correlate with exam. - Lung biopsy was positive for metastatic disease PD-L1<1% - she was started on Keytruda. - 07/12/2023 CT C/A/P for new baseline: Interval increase in size of 2 left lower lobe nodules/masses noted on PET CT 3/10/2023 measuring up to 4.2 cm and demonstrating low-attenuation possibly necrotic. Partial occlusion of the central left lower lobe bronchi. Large and consolidative/atelectatic left lower lobe with small aeration of the superior segment. - 07/28/2023 started Opdivo 480 mg IV Q4W. - 10/06/2023 CT C/A/P - Since  8/7/2023 Right lower lobe atelectasis with heterogeneous lobulated left lower lobe consolidation, diminished from earlier study. No parenchymal mass or adenopathy. Dilated main pulmonary artery segment 3.6 cm (302/27) compatible with underlying pulmonary hypertension. Since July 12, 2023, overall stable exam, without definite evidence of acute/inflammatory process within the abdomen or pelvis. -she  had opdivo on 10/20/2023 this was cycle 4 than was lost to follow up -7/2024 local recurrence in her mouth with extruding tongue  # Drug Rash, likely 2/2 Cetuximab; Grade 2-3 rash - resolved. - S/P Doxy 100 mg daily, Hydrocortisone cream BID to rash. - S/P prednisone 40 mg daily for 7 days.  # BLE pain -  - [facility] and [facility]   # DVT was  apixaban - now on Lovenox Subq  prophylactic ose   # Mild thrombocytopenia from chemo - resolved.  # Anemia due to critical illness and chemotherapy worsening no obvious bleeding - asymptomatic - will transfuse to keep HGB around 7-8 g/dL. - 09/11/2023 ferritin 505   PLAN: -Spoke to Marlene in detail she is quite frail and we spoke about restarting nivolumab and possibly combination with cetuximab given there seem to be synergistic effect with benefits but she did not want to experience the possibility of the rash she had before so after discussion we decided to proceed with just nivolumab this will be dose monthly as before -She still continues to have moderate anemia this is multifactorial likely due to critical illness and will transfuse to keep greater than 7 to 8 g/dL -She should benefit from going back on apixaban I am not sure why it was stopped maybe she had some bleeding?  I wrote in the facility paperwork to consider restarting her back on it may be 2.5 mg twice a day otherwise she could remain on Lovenox -I ordered restaging scans with CT neck chest abdomen and pelvis as well -She will see me back with cycle 2

## 2024-07-23 NOTE — ASSESSMENT
[Palliative] : Goals of care discussed with patient: Palliative [Palliative Care Plan] : Patient was apprised of incurable nature of disease.  Hospice and Palliative care options discussed. 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 - She has been recovering quite well. I do believe she would tolerate definitive chemoradiation and I recommended we incorporate carboplatin with an AUC dose of 1.5 weekly. Explained to the patient may be some inferior outcomes compared to cisplatin, but I am concerned about weekly cisplatin with all her medical comorbidities. I would not add Taxol to the carboplatin given her severe neuropathy. - 10/20/2022 started chemoradiation with Carboplatin - she did not tolerate carboplatin due to cytopenia it was stopped and on 11/30/2022 switched to Cetuximab 250 mg/m2 to complete with radiation. - 12/21/2022 completed chemoRT. - will have her see Dr. Landaverde of ENT for surveillance as well and to confirm CR. - 03/10/2023 PET - Since 9/21/2022, No definite new sites of biologic tumor activity. Interval decrease in size and FDG uptake of the supraglottic laryngeal mass, SUV max 16.1, previously 18.7 (14% decrease). Resolution of bilateral FDG avid cervical lymph nodes. Interval increase in size and FDG uptake of 2 left lower lobe nodules, SUV max up to 20.0, previously 10.1 (98% increase). Few scattered right lung tree-in-bud nodular opacities are not FDG avid, likely from small airways disease. Scattered areas of skin uptake along the upper abdomen bilaterally which could represent areas of contamination from tracer or less likely possible areas of inflammation. Correlate with exam. - Lung biopsy was positive for metastatic disease PD-L1<1% - she was started on Keytruda. - 07/12/2023 CT C/A/P for new baseline: Interval increase in size of 2 left lower lobe nodules/masses noted on PET CT 3/10/2023 measuring up to 4.2 cm and demonstrating low-attenuation possibly necrotic. Partial occlusion of the central left lower lobe bronchi. Large and consolidative/atelectatic left lower lobe with small aeration of the superior segment. - 07/28/2023 started Opdivo 480 mg IV Q4W. - 10/06/2023 CT C/A/P - Since  8/7/2023 Right lower lobe atelectasis with heterogeneous lobulated left lower lobe consolidation, diminished from earlier study. No parenchymal mass or adenopathy. Dilated main pulmonary artery segment 3.6 cm (302/27) compatible with underlying pulmonary hypertension. Since July 12, 2023, overall stable exam, without definite evidence of acute/inflammatory process within the abdomen or pelvis. -she  had opdivo on 10/20/2023 this was cycle 4 than was lost to follow up -7/2024 local recurrence in her mouth with extruding tongue  # Drug Rash, likely 2/2 Cetuximab; Grade 2-3 rash - resolved. - S/P Doxy 100 mg daily, Hydrocortisone cream BID to rash. - S/P prednisone 40 mg daily for 7 days.  # BLE pain -  - [facility] and [facility]   # DVT was  apixaban - now on Lovenox Subq  prophylactic ose   # Mild thrombocytopenia from chemo - resolved.  # Anemia due to critical illness and chemotherapy worsening no obvious bleeding - asymptomatic - will transfuse to keep HGB around 7-8 g/dL. - 09/11/2023 ferritin 505   PLAN: -Spoke to Marlene in detail she is quite frail and we spoke about restarting nivolumab and possibly combination with cetuximab given there seem to be synergistic effect with benefits but she did not want to experience the possibility of the rash she had before so after discussion we decided to proceed with just nivolumab this will be dose monthly as before -She still continues to have moderate anemia this is multifactorial likely due to critical illness and will transfuse to keep greater than 7 to 8 g/dL -She should benefit from going back on apixaban I am not sure why it was stopped maybe she had some bleeding?  I wrote in the facility paperwork to consider restarting her back on it may be 2.5 mg twice a day otherwise she could remain on Lovenox -I ordered restaging scans with CT neck chest abdomen and pelvis as well -She will see me back with cycle 2

## 2024-07-23 NOTE — HISTORY OF PRESENT ILLNESS
[Disease: _____________________] : Disease: [unfilled] [T: ___] : T[unfilled] [N: ___] : N[unfilled] [M: ___] : M[unfilled] [AJCC Stage: ____] : AJCC Stage: [unfilled] [de-identified] : This  is a 58yo female sent to the ER from [facility] where she is on ch care for c/o inc SOB, chest tightness, diff breathing, wheezing i.e. acute on chr hypoxic RF. Of note the pt has a trach and has had freq readmissions for mucous plugging, RF and has had several bronchoscopies. EMS noted the pt to be hypotensive and hypoxic. The pt was vigorously suctioned, received Neb Tx, IV steroids, BP revived with IV fluids. The pt was cultured and placed on ABx. She is v well known to the Pul SVc and to ID. The pt is ad with acute on ch hypoxic RF and ongoing Bacterial PNA. The PMHx includes: HTN, ASHD, HFpEF, DLD, DM II, Chronic RF, COPD, LUCIANA, ASp PNA, sp intubation, failure to extubate, chronic trach ([facility] ), recurrent mucus plugging of airways, DVT, AC with Eliquis, lower ext venous stasis dermatitis, bedbound state, GERD, diverticulosis, sp PEG, OA, DDD, DJD, chronic pain syn, depression anxiety. CVA and has loos of sensation on right side of the body.  Hosp Course: The pt was ad from SNF for inc SOB, hypoxia with mucous plugging and acute on chr RF and ongoing bacterial PNA. The pt was suctioned and placed on mech ventilation via trach and cont on ABx. The pt has had freq ad to hosp ([facility]) since the [facility] and intubation and failure to wean that resulted in the trach in  in [facility]. The pt is mostly bed bound with lower ext weakness, mm atrophy/myopathy of disuse and sever hyperpigmentation skin changes of the lower ext. The pt was ff by Pul/critical care and ID. The pt was verbal and had nutrition via PEG. S&S worked with pt and during w/up pt was noted to have pharyngeal/trach swelling. 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 The pt was taken to the OR by ENT for direct laryngoscopy and bx of the mass on 22 with path showing.  Today she is in a stretcher, trach is connected to oxygen. Does not walk. Has diabetic neuropathy in both feet that is very painful. Not SOB.  She has 3 children. Lives in [facility] and is here with her .  Review of imagin2022 CT abd/pelvis IMPRESSION: Area of fat stranding surrounding the cecum and ascending colon, new since CT abdomen pelvis performed on 2022. Findings suggestive of cecitis. No discrete surrounding fluid collection/abscess. Partially visualized left lower lobe consolidative opacity. Consider follow-up with radiographs until resolution.  2022 CT neck: Impression:  Status post tracheostomy. 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 Partial effacement of the preepiglottic fat. Mildly enhancing 1.1 cm right supraclavicular lymph node, nonspecific. Left hemidiaphragm elevation with compressive atelectasis in opacification of the partial imaged left lower lobe which could be infectious or inflammatory. Small left pleural effusion. Further evaluation with chest CT can be considered.  22: PET/CT IMPRESSION: Supraglottic mass measuring approximately 2.5 x 3.8 x 3.7 cm at the aryepiglottic folds, with max SUV of 18.7. Additional right level 2 cervical lymph node (Max SUV 5.0) and left level 2 cervical lymph node (Max SUV 3.9). These nodes are indeterminate for biologic tumor activity. Subsegmental area of atelectasis seen at the left lung base containing 2 foci of increased FDG uptake SUV measuring 10.1 and 6.1 (166, 171) within. These nodules are indeterminate for biologic tumor activity. Trace left pleural effusion. Bilateral FDG uptake in the skin of the axilla with adjacent subcutaneous fat stranding seen on localizer CT. This is probably infectious/inflammatory in etiology. Punctate focus of mild FDG uptake seen in the posterior back soft tissues at the level of the kidneys, favor benign etiology. Correlate with physical exam.  Pathology 2022: stain shows the carcinoma is negative for p16. Tongue base mass, biopsy: -Superficial fragments of invasive squamous cell carcinoma, non-keratinized.   Labs: WBC 9.38, HGb 10.2, Plts 229  [de-identified] : squamous cell carcinoma [de-identified] : 10/26/2022 Resides in [facility]. Reports feeling relatively well; no changes in chronic conditions or new complaints since the last visit.  11/16/2022 She is here for follow up. She is on chemoRT Current Dose: 3000cGy. Planned Dose: 7000cGy from 11/15/22. She is due for week 4 of 7 of chemotherapy. No recent blood work. She missed her treatment last week due bleeding from the trach and throat and went to ED. She had CT angioneck on 11/9/2022 that showed.  1.  No CTA evidence of active contrast extravasation into the airway. 2.  No evidence of major vascular stenosis or occlusion. 3.  Redemonstration of extensive thickening involving the nasopharynx, oropharynx and supraglottic/glottic larynx in this patient with known neoplasm. She had blood work on 11/9/2022 that showed HGB 8.3, PLT 89,  Her bleeding stopped on its own. Createnine was stalbe 0.5.  11/23/2022 She is here for follow up. on 11/22 dose of radiation was Current Dose: 4200cGy. Planned Dose: 7000cGy. She had CBC earlier today that showed ANC 1110, HGB keeps trending down now 6.5, Pls are 48 went up. Iron studies were normal. Carbo was on hold since last week. She denies any bleeding except for some mild bleeding from trach. She states her apixaban has not been held in facility but since no major bleeding and PLT now near 50 she can continue.  12/7/2022 She is here for follow up had 5600cGy. Planned Dose: 7000cGy on 12/6/2022. She is on cetuximab CBC from last week showed WBC 1.55 Hgb 7.7 trending up and PLT are now normal. She has been on Cetuximab since 11/30/2022 She had PRBC transfusion last week. She developed a rash on her mouth and has stomatitis and mild mucositis. The rash is also on her face. I called facility and they did not administer the Minocycline. Rash is grade 2-3. She states the pain in her mouth is not terrible.   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  She is laying in stretcher, currently residing in facility. Reviewed most recent CBC, which shows slight improvement in HGB up to 9.7g/dL. Patient continues on concurrent chemoRT regimen; Current Dose: 6600cGy. Planned Dose: 7000cGy. Treatment Site: supraglottitis. Spoke with [facility], who tentatively have her completion date as 12/19. Patient denies fever, chills, nausea, vomiting, dyspnea, new palpable lumps/bumps or bleeding. She has been on Cetuximab since 11/30/2022. She developed a rash on her mouth and has stomatitis and mild mucositis following first cycle of Cetuximab; Rash still present, grade 2-3 but reportedly slightly improved as per patient. She has been applying hydrocortisone as needed and doxycycline daily. Patient is due for week 3 of Cetuximab today.    1/18/2023 She is here for follow up. She completed chemoRT in Mid-December. Rash is better She had blood work in facility today and will obtain the rash is also much better. She has no new complaints. No longer has any dysphagia because of the trach she only uses ice chips unable to swallow. She did have recent blood work in the facility approximately 1 week ago essentially white cells and platelets were normal hemoglobin was just over 8.  6/14/2023 She is here for follow up today. She had PET/CT on March 10, 2023, that showed Interval decrease in size and FDG uptake of the supraglottic laryngeal mass, SUV max 16.1, previously 18.7 (14% decrease). Resolution of bilateral FDG avid cervical lymph nodes. Interval increase in size and FDG uptake of 2 left lower lobe nodules, SUV max up to 20.0, previously 10.1 (98% increase).  She saw Dr. Landaverde in March and Laryngoscopy showed intense supraglottic edema and secretions effacing full evaluation of her larynx.  She had an IR biopsy doen of the lung nodule that showed 5/31/23 Final Diagnosis LUNG, LEFT LOWER LOBE MASS, CT GUIDED NEEDLE CORE BIOPSY WITH IMPRINTS: - POSITIVE FOR MALIGNANT CELLS -  Squamous cell carcinoma, non-keratinizing, with focal necrosis Immunohistochemistry results: Material:     Block 1A Population:  Malignant cells Positive:      CK7, CK5/6, p63 Negative:     CK20, TTF1, Napsin A, p16 These results support the above diagnosis. Note:  The current tumor is p16 negative, similar to the tongue base tumor, see [id number]. Suggest clinico-pathologic and radiologic correlation to determine whether this tumor is primary or metastatic to the lung. RESULTS : PD-L1 IMMUNOHISTOCHEMICAL ANALYSIS Tumor Proportion Score: <1% / Negative I spoke to Dr. Ryan Murray of radiation about possible SRS to the lung nodule/s but due to location SRS is not possible but he could offer 10-15fx but he was concerned she still has residual disease in the larynx given FDG activity and lisbet Edema.  7/26/2023  She is here for follow up. She was to start Opdivo but was no show on 6/22/23. She feels well. She is in a stretcher as always. CBC today showed HGB 7.9 was 9.1. She denies any bleeding.  09/01/2023 She is here to continue treatment. She is a resident of the nursing home and came today on stretchers. She reports no new issues and no obvious bleeding.  09/22/2023 Reports feeling well, no changes in chronic conditions or new complaints since the last visit.  10/20/2023 accompanied by her daughter; she is a resident of [facility] [place], operated by [facility] came today on stretchers; Reports feeling well, no changes in chronic conditions or new complaints since the last visit.  7/22/2024 She  was lost to follow up. since 10/2023. was seen recenly by Dr. Landaverde and has progression of disease in her tounge. The tounge has been pertruding out for about one month. She also recenlty was hospitilized in [facility]  I spoke to her and she wishes to continue to restart treatment she did not progress  on Nivolumab.  Review of most recent medication from her facility includes losartan 100 mg daily, albuterol, Xanax 0.5 mg tablet every 8 hours MiraLAX, senna, sertraline 50 mg, Lovenox 40 mg subcu gabapentin 600 mg 3 times a day insulin NovoLog, chlorhexidine, Flonase fluconazole 100 mg hydromorphone 4 mg tablet every 4 hours duloxetine 20 mg delayed release metformin 1000 mg twice a day aspirin 325 mg nifedipine 30 mg extended release  Recent blood work from 7/18/2024 sodium 130 creatinine less than 0.5 LFTs are normal white blood cell count of 6.9 hemoglobin 8.7 platelet count is 206,000 MCV is normal D-dimer less than 150 NT-proBNP 115

## 2024-07-23 NOTE — HISTORY OF PRESENT ILLNESS
[Disease: _____________________] : Disease: [unfilled] [T: ___] : T[unfilled] [N: ___] : N[unfilled] [M: ___] : M[unfilled] [AJCC Stage: ____] : AJCC Stage: [unfilled] [de-identified] : This  is a 58yo female sent [facility] the [facility] where she is on ch care for c/o inc SOB, chest tightness, diff breathing, wheezing i.e. acute on chr hypoxic RF. Of note the pt has a trach and has had freq readmissions for mucous plugging, RF and has had several bronchoscopies. EMS noted the pt to be hypotensive and hypoxic. The pt was vigorously suctioned, received Neb Tx, IV steroids, BP revived with IV fluids. The pt was cultured and placed on ABx. She is v well known to the Pul SVc and to ID. The pt is ad with acute on ch hypoxic RF and ongoing Bacterial PNA. The PMHx includes: HTN, ASHD, HFpEF, DLD, DM II, Chronic RF, COPD, LUCIANA, ASp PNA, sp intubation, failure to extubate, chronic trach ([facility] ), recurrent mucus plugging of airways, DVT, AC with Eliquis, lower ext venous stasis dermatitis, bedbound state, GERD, diverticulosis, sp PEG, OA, DDD, DJD, chronic pain syn, depression anxiety. CVA and has loos of sensation on right side of the body.  Hosp Course: The pt was ad from SNF for inc SOB, hypoxia with mucous plugging and acute on chr RF and ongoing bacterial PNA. The pt was suctioned and placed on mech ventilation via trach and cont on ABx. The pt has had freq ad to hosp ([facility]) since the [facility] and intubation and failure to wean that resulted in the trach in  in [facility]. The pt is mostly bed bound with lower ext weakness, mm atrophy/myopathy of disuse and sever hyperpigmentation skin changes of the lower ext. The pt was ff by Pul/critical care and ID. The pt was verbal and had nutrition via PEG. S&S worked with pt and during w/up pt was noted to have pharyngeal/trach swelling. 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 The pt was taken to the OR by ENT for direct laryngoscopy and bx of the mass on 22 with path showing.  Today she is in a stretcher, trach is connected to oxygen. Does not walk. Has diabetic neuropathy in both feet that is very painful. Not SOB.  She has 3 children. Lives in [facility] and is here with her .  Review of imagin2022 CT abd/pelvis IMPRESSION: Area of fat stranding surrounding the cecum and ascending colon, new since CT abdomen pelvis performed on 2022. Findings suggestive of cecitis. No discrete surrounding fluid collection/abscess. Partially visualized left lower lobe consolidative opacity. Consider follow-up with radiographs until resolution.  2022 CT neck: Impression:  Status post tracheostomy. 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 Partial effacement of the preepiglottic fat. Mildly enhancing 1.1 cm right supraclavicular lymph node, nonspecific. Left hemidiaphragm elevation with compressive atelectasis in opacification of the partial imaged left lower lobe which could be infectious or inflammatory. Small left pleural effusion. Further evaluation with chest CT can be considered.  22: PET/CT IMPRESSION: Supraglottic mass measuring approximately 2.5 x 3.8 x 3.7 cm at the aryepiglottic folds, with max SUV of 18.7. Additional right level 2 cervical lymph node (Max SUV 5.0) and left level 2 cervical lymph node (Max SUV 3.9). These nodes are indeterminate for biologic tumor activity. Subsegmental area of atelectasis seen at the left lung base containing 2 foci of increased FDG uptake SUV measuring 10.1 and 6.1 (166, 171) within. These nodules are indeterminate for biologic tumor activity. Trace left pleural effusion. Bilateral FDG uptake in the skin of the axilla with adjacent subcutaneous fat stranding seen on localizer CT. This is probably infectious/inflammatory in etiology. Punctate focus of mild FDG uptake seen in the posterior back soft tissues at the level of the kidneys, favor benign etiology. Correlate with physical exam.  Pathology 2022: stain shows the carcinoma is negative for p16. Tongue base mass, biopsy: -Superficial fragments of invasive squamous cell carcinoma, non-keratinized.   Labs: WBC 9.38, HGb 10.2, Plts 229  [de-identified] : squamous cell carcinoma [de-identified] : 10/26/2022 Resides in [facility]. Reports feeling relatively well; no changes in chronic conditions or new complaints since the last visit.  11/16/2022 She is here for follow up. She is on chemoRT Current Dose: 3000cGy. Planned Dose: 7000cGy from 11/15/22. She is due for week 4 of 7 of chemotherapy. No recent blood work. She missed her treatment last week due bleeding from the trach and throat and went to ED. She had CT angioneck on 11/9/2022 that showed.  1.  No CTA evidence of active contrast extravasation into the airway. 2.  No evidence of major vascular stenosis or occlusion. 3.  Redemonstration of extensive thickening involving the nasopharynx, oropharynx and supraglottic/glottic larynx in this patient with known neoplasm. She had blood work on 11/9/2022 that showed HGB 8.3, PLT 89,  Her bleeding stopped on its own. Createnine was stalbe 0.5.  11/23/2022 She is here for follow up. on 11/22 dose of radiation was Current Dose: 4200cGy. Planned Dose: 7000cGy. She had CBC earlier today that showed ANC 1110, HGB keeps trending down now 6.5, Pls are 48 went up. Iron studies were normal. Carbo was on hold since last week. She denies any bleeding except for some mild bleeding from trach. She states her apixaban has not been held in facility but since no major bleeding and PLT now near 50 she can continue.  12/7/2022 She is here for follow up had 5600cGy. Planned Dose: 7000cGy on 12/6/2022. She is on cetuximab CBC from last week showed WBC 1.55 Hgb 7.7 trending up and PLT are now normal. She has been on Cetuximab since 11/30/2022 She had PRBC transfusion last week. She developed a rash on her mouth and has stomatitis and mild mucositis. The rash is also on her face. I called facility and they did not administer the Minocycline. Rash is grade 2-3. She states the pain in her mouth is not terrible.   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  She is laying in stretcher, currently residing in facility. Reviewed most recent CBC, which shows slight improvement in HGB up to 9.7g/dL. Patient continues on concurrent chemoRT regimen; Current Dose: 6600cGy. Planned Dose: 7000cGy. Treatment Site: supraglottitis. Spoke with [facility], who tentatively have her completion date as 12/19. Patient denies fever, chills, nausea, vomiting, dyspnea, new palpable lumps/bumps or bleeding. She has been on Cetuximab since 11/30/2022. She developed a rash on her mouth and has stomatitis and mild mucositis following first cycle of Cetuximab; Rash still present, grade 2-3 but reportedly slightly improved as per patient. She has been applying hydrocortisone as needed and doxycycline daily. Patient is due for week 3 of Cetuximab today.    1/18/2023 She is here for follow up. She completed chemoRT in Mid-December. Rash is better She had blood work in facility today and will obtain the rash is also much better. She has no new complaints. No longer has any dysphagia because of the trach she only uses ice chips unable to swallow. She did have recent blood work in the facility approximately 1 week ago essentially white cells and platelets were normal hemoglobin was just over 8.  6/14/2023 She is here for follow up today. She had PET/CT on March 10, 2023, that showed Interval decrease in size and FDG uptake of the supraglottic laryngeal mass, SUV max 16.1, previously 18.7 (14% decrease). Resolution of bilateral FDG avid cervical lymph nodes. Interval increase in size and FDG uptake of 2 left lower lobe nodules, SUV max up to 20.0, previously 10.1 (98% increase).  She saw Dr. Landaverde in March and Laryngoscopy showed intense supraglottic edema and secretions effacing full evaluation of her larynx.  She had an IR biopsy doen of the lung nodule that showed 5/31/23 Final Diagnosis LUNG, LEFT LOWER LOBE MASS, CT GUIDED NEEDLE CORE BIOPSY WITH IMPRINTS: - POSITIVE FOR MALIGNANT CELLS -  Squamous cell carcinoma, non-keratinizing, with focal necrosis Immunohistochemistry results: Material:     Block 1A Population:  Malignant cells Positive:      CK7, CK5/6, p63 Negative:     CK20, TTF1, Napsin A, p16 These results support the above diagnosis. Note:  The current tumor is p16 negative, similar to the tongue base tumor, see [id number]. Suggest clinico-pathologic and radiologic correlation to determine whether this tumor is primary or metastatic to the lung. RESULTS : PD-L1 IMMUNOHISTOCHEMICAL ANALYSIS Tumor Proportion Score: <1% / Negative I spoke to Dr. Ryan Murray of radiation about possible SRS to the lung nodule/s but due to location SRS is not possible but he could offer 10-15fx but he was concerned she still has residual disease in the larynx given FDG activity and lisbet Edema.  7/26/2023  She is here for follow up. She was to start Opdivo but was no show on 6/22/23. She feels well. She is in a stretcher as always. CBC today showed HGB 7.9 was 9.1. She denies any bleeding.  09/01/2023 She is here to continue treatment. She is a resident of the nursing home and came today on stretchers. She reports no new issues and no obvious bleeding.  09/22/2023 Reports feeling well, no changes in chronic conditions or new complaints since the last visit.  10/20/2023 accompanied by her daughter; she is a resident of [facility] came today on stretchers; Reports feeling well, no changes in chronic conditions or new complaints since the last visit.  7/22/2024 She  was lost to follow up. since 10/2023. was seen recenly by Dr. Landaverde and has progression of disease in her tounge. The tounge has been pertruding out for about one month. She also recenlty was hospitilized in [facility]  I spoke to her and she wishes to continue to restart treatment she did not progress  on Nivolumab.  Review of most recent medication from her facility includes losartan 100 mg daily, albuterol, Xanax 0.5 mg tablet every 8 hours MiraLAX, senna, sertraline 50 mg, Lovenox 40 mg subcu gabapentin 600 mg 3 times a day insulin NovoLog, chlorhexidine, Flonase fluconazole 100 mg hydromorphone 4 mg tablet every 4 hours duloxetine 20 mg delayed release metformin 1000 mg twice a day aspirin 325 mg nifedipine 30 mg extended release  Recent blood work from 7/18/2024 sodium 130 creatinine less than 0.5 LFTs are normal white blood cell count of 6.9 hemoglobin 8.7 platelet count is 206,000 MCV is normal D-dimer less than 150 NT-proBNP 115   Ketoconazole Counseling:   Patient counseled regarding improving absorption with orange juice.  Adverse effects include but are not limited to breast enlargement, headache, diarrhea, nausea, upset stomach, liver function test abnormalities, taste disturbance, and stomach pain.  There is a rare possibility of liver failure that can occur when taking ketoconazole. The patient understands that monitoring of LFTs may be required, especially at baseline. The patient verbalized understanding of the proper use and possible adverse effects of ketoconazole.  All of the patient's questions and concerns were addressed.

## 2024-07-23 NOTE — PHYSICAL EXAM
[Completely disabled. Cannot carry on any self care. Totally confined to bed or chair] : Status 4- Completely disabled. Cannot carry on any self care. Totally confined to bed or chair [Obese] : obese [Normal] : affect appropriate [de-identified] : Tounge is  extruding, trach in place  Tracheostomy in place. Missing teeth. no blood in dressing [de-identified] : Transmitted trach sounds on exam [de-identified] : BLE stasis dermatitis,  no edema today  [de-identified] : PEG but soft  [de-identified] : Rash resolved

## 2024-07-23 NOTE — REVIEW OF SYSTEMS
[Fatigue] : fatigue [Dysphagia] : dysphagia [Lower Ext Edema] : lower extremity edema [Muscle Weakness] : muscle weakness [Skin Rash] : skin rash [Anxiety] : anxiety [Negative] : Allergic/Immunologic [FreeTextEntry2] : non-ambulatory anymore - on stretcher  [FreeTextEntry6] : trach

## 2024-08-02 ENCOUNTER — APPOINTMENT (OUTPATIENT)
Age: 59
End: 2024-08-02

## 2024-08-02 ENCOUNTER — OUTPATIENT (OUTPATIENT)
Dept: OUTPATIENT SERVICES | Facility: HOSPITAL | Age: 59
LOS: 1 days | End: 2024-08-02
Payer: MEDICARE

## 2024-08-02 DIAGNOSIS — Z98.890 OTHER SPECIFIED POSTPROCEDURAL STATES: Chronic | ICD-10-CM

## 2024-08-02 DIAGNOSIS — D64.9 ANEMIA, UNSPECIFIED: ICD-10-CM

## 2024-08-02 PROCEDURE — 96413 CHEMO IV INFUSION 1 HR: CPT

## 2024-08-02 RX ORDER — NIVOLUMAB 10 MG/ML
480 INJECTION INTRAVENOUS ONCE
Refills: 0 | Status: COMPLETED | OUTPATIENT
Start: 2024-08-02 | End: 2024-08-02

## 2024-08-02 RX ADMIN — NIVOLUMAB 480 MILLIGRAM(S): 10 INJECTION INTRAVENOUS at 13:57

## 2024-08-02 RX ADMIN — NIVOLUMAB 480 MILLIGRAM(S): 10 INJECTION INTRAVENOUS at 12:57

## 2024-08-03 DIAGNOSIS — D64.9 ANEMIA, UNSPECIFIED: ICD-10-CM

## 2024-08-13 ENCOUNTER — APPOINTMENT (OUTPATIENT)
Dept: NEUROLOGY | Facility: HOSPITAL | Age: 59
End: 2024-08-13

## 2024-08-16 ENCOUNTER — TRANSCRIPTION ENCOUNTER (OUTPATIENT)
Age: 59
End: 2024-08-16

## 2024-08-20 ENCOUNTER — OUTPATIENT (OUTPATIENT)
Dept: OUTPATIENT SERVICES | Facility: HOSPITAL | Age: 59
LOS: 1 days | End: 2024-08-20
Payer: MEDICARE

## 2024-08-20 ENCOUNTER — RESULT REVIEW (OUTPATIENT)
Age: 59
End: 2024-08-20

## 2024-08-20 DIAGNOSIS — Z00.8 ENCOUNTER FOR OTHER GENERAL EXAMINATION: ICD-10-CM

## 2024-08-20 DIAGNOSIS — C77.0 SECONDARY AND UNSPECIFIED MALIGNANT NEOPLASM OF LYMPH NODES OF HEAD, FACE AND NECK: ICD-10-CM

## 2024-08-20 DIAGNOSIS — Z98.890 OTHER SPECIFIED POSTPROCEDURAL STATES: Chronic | ICD-10-CM

## 2024-08-20 DIAGNOSIS — C32.1 MALIGNANT NEOPLASM OF SUPRAGLOTTIS: ICD-10-CM

## 2024-08-20 PROCEDURE — 71260 CT THORAX DX C+: CPT | Mod: 26

## 2024-08-20 PROCEDURE — 74177 CT ABD & PELVIS W/CONTRAST: CPT

## 2024-08-20 PROCEDURE — 74177 CT ABD & PELVIS W/CONTRAST: CPT | Mod: 26

## 2024-08-20 PROCEDURE — 71260 CT THORAX DX C+: CPT

## 2024-08-21 DIAGNOSIS — C32.1 MALIGNANT NEOPLASM OF SUPRAGLOTTIS: ICD-10-CM

## 2024-08-21 DIAGNOSIS — C77.0 SECONDARY AND UNSPECIFIED MALIGNANT NEOPLASM OF LYMPH NODES OF HEAD, FACE AND NECK: ICD-10-CM

## 2024-08-23 ENCOUNTER — INPATIENT (INPATIENT)
Facility: HOSPITAL | Age: 59
LOS: 6 days | Discharge: HOME | DRG: 948 | End: 2024-08-30
Attending: INTERNAL MEDICINE | Admitting: INTERNAL MEDICINE
Payer: MEDICARE

## 2024-08-23 VITALS
TEMPERATURE: 99 F | SYSTOLIC BLOOD PRESSURE: 100 MMHG | OXYGEN SATURATION: 94 % | HEIGHT: 60 IN | WEIGHT: 259.93 LBS | DIASTOLIC BLOOD PRESSURE: 44 MMHG | RESPIRATION RATE: 16 BRPM | HEART RATE: 96 BPM

## 2024-08-23 DIAGNOSIS — Z98.890 OTHER SPECIFIED POSTPROCEDURAL STATES: Chronic | ICD-10-CM

## 2024-08-23 LAB
ALBUMIN SERPL ELPH-MCNC: 3.5 G/DL — SIGNIFICANT CHANGE UP (ref 3.5–5.2)
ALP SERPL-CCNC: 80 U/L — SIGNIFICANT CHANGE UP (ref 30–115)
ALT FLD-CCNC: 6 U/L — SIGNIFICANT CHANGE UP (ref 0–41)
ANION GAP SERPL CALC-SCNC: 10 MMOL/L — SIGNIFICANT CHANGE UP (ref 7–14)
APTT BLD: 35.2 SEC — SIGNIFICANT CHANGE UP (ref 27–39.2)
AST SERPL-CCNC: 7 U/L — SIGNIFICANT CHANGE UP (ref 0–41)
BASOPHILS # BLD AUTO: 0.02 K/UL — SIGNIFICANT CHANGE UP (ref 0–0.2)
BASOPHILS NFR BLD AUTO: 0.3 % — SIGNIFICANT CHANGE UP (ref 0–1)
BILIRUB SERPL-MCNC: 0.2 MG/DL — SIGNIFICANT CHANGE UP (ref 0.2–1.2)
BUN SERPL-MCNC: 17 MG/DL — SIGNIFICANT CHANGE UP (ref 10–20)
CALCIUM SERPL-MCNC: 8.9 MG/DL — SIGNIFICANT CHANGE UP (ref 8.4–10.5)
CHLORIDE SERPL-SCNC: 90 MMOL/L — LOW (ref 98–110)
CO2 SERPL-SCNC: 28 MMOL/L — SIGNIFICANT CHANGE UP (ref 17–32)
CREAT SERPL-MCNC: 0.5 MG/DL — LOW (ref 0.7–1.5)
EGFR: 107 ML/MIN/1.73M2 — SIGNIFICANT CHANGE UP
EOSINOPHIL # BLD AUTO: 0.07 K/UL — SIGNIFICANT CHANGE UP (ref 0–0.7)
EOSINOPHIL NFR BLD AUTO: 1.1 % — SIGNIFICANT CHANGE UP (ref 0–8)
FLUAV AG NPH QL: SIGNIFICANT CHANGE UP
FLUBV AG NPH QL: SIGNIFICANT CHANGE UP
GAS PNL BLDV: SIGNIFICANT CHANGE UP
GLUCOSE SERPL-MCNC: 172 MG/DL — HIGH (ref 70–99)
HCT VFR BLD CALC: 23.8 % — LOW (ref 37–47)
HGB BLD-MCNC: 7.8 G/DL — LOW (ref 12–16)
IMM GRANULOCYTES NFR BLD AUTO: 0.5 % — HIGH (ref 0.1–0.3)
INR BLD: 0.9 RATIO — SIGNIFICANT CHANGE UP (ref 0.65–1.3)
LYMPHOCYTES # BLD AUTO: 0.29 K/UL — LOW (ref 1.2–3.4)
LYMPHOCYTES # BLD AUTO: 4.6 % — LOW (ref 20.5–51.1)
MCHC RBC-ENTMCNC: 29.5 PG — SIGNIFICANT CHANGE UP (ref 27–31)
MCHC RBC-ENTMCNC: 32.8 G/DL — SIGNIFICANT CHANGE UP (ref 32–37)
MCV RBC AUTO: 90.2 FL — SIGNIFICANT CHANGE UP (ref 81–99)
MONOCYTES # BLD AUTO: 0.39 K/UL — SIGNIFICANT CHANGE UP (ref 0.1–0.6)
MONOCYTES NFR BLD AUTO: 6.2 % — SIGNIFICANT CHANGE UP (ref 1.7–9.3)
NEUTROPHILS # BLD AUTO: 5.53 K/UL — SIGNIFICANT CHANGE UP (ref 1.4–6.5)
NEUTROPHILS NFR BLD AUTO: 87.3 % — HIGH (ref 42.2–75.2)
NRBC # BLD: 0 /100 WBCS — SIGNIFICANT CHANGE UP (ref 0–0)
PLATELET # BLD AUTO: 180 K/UL — SIGNIFICANT CHANGE UP (ref 130–400)
PMV BLD: 8.8 FL — SIGNIFICANT CHANGE UP (ref 7.4–10.4)
POTASSIUM SERPL-MCNC: 5.4 MMOL/L — HIGH (ref 3.5–5)
POTASSIUM SERPL-SCNC: 5.4 MMOL/L — HIGH (ref 3.5–5)
PROT SERPL-MCNC: 6.7 G/DL — SIGNIFICANT CHANGE UP (ref 6–8)
PROTHROM AB SERPL-ACNC: 10.3 SEC — SIGNIFICANT CHANGE UP (ref 9.95–12.87)
RBC # BLD: 2.64 M/UL — LOW (ref 4.2–5.4)
RBC # FLD: 13.9 % — SIGNIFICANT CHANGE UP (ref 11.5–14.5)
RSV RNA NPH QL NAA+NON-PROBE: SIGNIFICANT CHANGE UP
SARS-COV-2 RNA SPEC QL NAA+PROBE: SIGNIFICANT CHANGE UP
SODIUM SERPL-SCNC: 128 MMOL/L — LOW (ref 135–146)
WBC # BLD: 6.33 K/UL — SIGNIFICANT CHANGE UP (ref 4.8–10.8)
WBC # FLD AUTO: 6.33 K/UL — SIGNIFICANT CHANGE UP (ref 4.8–10.8)

## 2024-08-23 PROCEDURE — 80053 COMPREHEN METABOLIC PANEL: CPT

## 2024-08-23 PROCEDURE — 82962 GLUCOSE BLOOD TEST: CPT

## 2024-08-23 PROCEDURE — 94640 AIRWAY INHALATION TREATMENT: CPT

## 2024-08-23 PROCEDURE — 36415 COLL VENOUS BLD VENIPUNCTURE: CPT

## 2024-08-23 PROCEDURE — 87086 URINE CULTURE/COLONY COUNT: CPT

## 2024-08-23 PROCEDURE — 87186 SC STD MICRODIL/AGAR DIL: CPT

## 2024-08-23 PROCEDURE — 80048 BASIC METABOLIC PNL TOTAL CA: CPT

## 2024-08-23 PROCEDURE — 70450 CT HEAD/BRAIN W/O DYE: CPT | Mod: 26,MC

## 2024-08-23 PROCEDURE — 81001 URINALYSIS AUTO W/SCOPE: CPT

## 2024-08-23 PROCEDURE — 86901 BLOOD TYPING SEROLOGIC RH(D): CPT

## 2024-08-23 PROCEDURE — 93970 EXTREMITY STUDY: CPT

## 2024-08-23 PROCEDURE — 70487 CT MAXILLOFACIAL W/DYE: CPT | Mod: MC

## 2024-08-23 PROCEDURE — 93005 ELECTROCARDIOGRAM TRACING: CPT

## 2024-08-23 PROCEDURE — 99285 EMERGENCY DEPT VISIT HI MDM: CPT | Mod: FS

## 2024-08-23 PROCEDURE — 71045 X-RAY EXAM CHEST 1 VIEW: CPT | Mod: 26

## 2024-08-23 PROCEDURE — 86900 BLOOD TYPING SEROLOGIC ABO: CPT

## 2024-08-23 PROCEDURE — 83735 ASSAY OF MAGNESIUM: CPT

## 2024-08-23 PROCEDURE — 85025 COMPLETE CBC W/AUTO DIFF WBC: CPT

## 2024-08-23 PROCEDURE — 86850 RBC ANTIBODY SCREEN: CPT

## 2024-08-23 RX ORDER — SODIUM CHLORIDE 9 MG/ML
1000 INJECTION INTRAMUSCULAR; INTRAVENOUS; SUBCUTANEOUS ONCE
Refills: 0 | Status: COMPLETED | OUTPATIENT
Start: 2024-08-23 | End: 2024-08-23

## 2024-08-23 RX ORDER — DOXYCYCLINE MONOHYDRATE 100 MG
100 TABLET ORAL ONCE
Refills: 0 | Status: COMPLETED | OUTPATIENT
Start: 2024-08-23 | End: 2024-08-23

## 2024-08-23 NOTE — ED ADULT NURSE NOTE - OBJECTIVE STATEMENT
Pt is a 59 yr old female from [facility] presented to the ER c/o ams as per son. Pt has a hx of throat cancer, with possible tumor in the tongue, trach to o2. As per son patient is A&Ox4 and able t o communicate viz tablet. Pts mental status has been altered for several days and not able to communicate normally. Pt denies of any new pain , n/v/d. Pt has chronic b/l leg pain.

## 2024-08-23 NOTE — ED ADULT NURSE NOTE - CHIEF COMPLAINT QUOTE
pt BIBA from [facility] with worsening altered mental status since tuesday

## 2024-08-23 NOTE — ED PROVIDER NOTE - OBJECTIVE STATEMENT
Patient is 59 year old female with pmhx of htn, dm, gerd, copd, trach/peg dependent, no longer on eliquis s/c to epistaxis, oropharyngeal ca presents from [facility] for altered mental status. She is present with son who is providing history. Patient has been behaving differently since yesterday. patient normally interacts using ipad and hand gestures however has been minimally interactive. Son denies any known specific complaints.

## 2024-08-23 NOTE — ED PROVIDER NOTE - PHYSICAL EXAMINATION
As Follows:  CONST: Well appearing in NAD  EYES: PERRL, EOMI, Sclera and conjunctiva clear.   CARD: No murmurs, rubs, or gallops; Normal rate and rhythm  RESP: BS present B/L, transmitted breath sounds. No distress or accessory breathing  GI: Soft, non-tender, non-distended. PEG tube placement.   MS: Lower extremity PVD. Normal ROM in all extremities.  SKIN: Warm, dry, no acute rashes. MMM  NEURO: Awake and alert, no focal deficits.

## 2024-08-23 NOTE — ED ADULT NURSE NOTE - NSFALLUNIVINTERV_ED_ALL_ED
Bed/Stretcher in lowest position, wheels locked, appropriate side rails in place/Call bell, personal items and telephone in reach/Instruct patient to call for assistance before getting out of bed/chair/stretcher/Non-slip footwear applied when patient is off stretcher/[place] to call system/Physically safe environment - no spills, clutter or unnecessary equipment/Purposeful proactive rounding/Room/bathroom lighting operational, light cord in reach

## 2024-08-23 NOTE — ED PROVIDER NOTE - CLINICAL SUMMARY MEDICAL DECISION MAKING FREE TEXT BOX
Throughout ED observation period, pt remained clinically and hemodynamically stable.  labs w/o acute findings  cth w/o acute findings apart from sinusitis   pt w/ mild hyponatremia but hx hyponatremia  ua pending, will admit for further w/u, monitoring

## 2024-08-23 NOTE — ED ADULT NURSE NOTE - CAS EDP DISCH DISPOSITION ADMI
"Chief Complaint   Patient presents with     Fever     Pt c/o fever and cough.        Initial /81 (BP Location: Left arm, Patient Position: Chair, Cuff Size: Adult Small)  Pulse 130  Temp 99.7  F (37.6  C) (Oral)  Ht 3' 11\" (1.194 m)  Wt 45 lb (20.4 kg)  SpO2 98%  BMI 14.32 kg/m2 Estimated body mass index is 14.32 kg/(m^2) as calculated from the following:    Height as of this encounter: 3' 11\" (1.194 m).    Weight as of this encounter: 45 lb (20.4 kg).  Medication Reconciliation: complete     Clarissa Barrera CMA (AAMA)      "
[facility]

## 2024-08-23 NOTE — ED PROVIDER NOTE - ATTENDING APP SHARED VISIT CONTRIBUTION OF CARE
58 y/o female with a PMH of IDDM, GERD, HTN, COPD,  Chronic hypoxemic and hypercapnic respiratory failure SP Tracheostomy (on 6L at [place]) & PEG at [facility], DVT not on AC due to recurrent epistaxis, Anxiety, Hx of Supraglottic SSC s/p chemotherapy, normocytic anemia of chronic disease (follows Dr Mejia) , admission earlier in the month for anemia   presents for eval of ams. son at the bedside states pt is not communicating as she typically does (text to speech with ipad). no reported fever, dysuria, cough. pt is a limited historian and cannot provide additional description.     vss  gen- NAD, awake, alert   card-rrr  lungs- trached, diffuse airway coarse vs transmitted breathsounds  abd-sntnd, no guarding or rebound,  neuro-limited exam, moving extremities symmetric, following commands 58 y/o female with a PMH of IDDM, GERD, HTN, COPD,  Chronic hypoxemic and hypercapnic respiratory failure SP Tracheostomy (on 6L at [place]) & PEG at [facility], DVT not on AC due to recurrent epistaxis, Anxiety, Hx of Supraglottic SSC s/p chemotherapy, normocytic anemia of chronic disease (follows Dr Mejia) , admission earlier in the month for anemia   presents for eval of ams. son at the bedside states pt is not communicating as she typically does (text to speech with ipad). no reported fever, dysuria, cough. pt is a limited historian and cannot provide additional description.     vss  gen- NAD, awake, alert   card-rrr  lungs- trached, diffuse airway coarse vs transmitted breathsounds  abd-sntnd, no guarding or rebound, peg site w/o erythema  neuro-limited exam, moving extremities symmetric, following commands  HENT- nasal discharge

## 2024-08-24 DIAGNOSIS — R53.83 OTHER FATIGUE: ICD-10-CM

## 2024-08-24 LAB
ALBUMIN SERPL ELPH-MCNC: 3.6 G/DL — SIGNIFICANT CHANGE UP (ref 3.5–5.2)
ALP SERPL-CCNC: 81 U/L — SIGNIFICANT CHANGE UP (ref 30–115)
ALT FLD-CCNC: 7 U/L — SIGNIFICANT CHANGE UP (ref 0–41)
ANION GAP SERPL CALC-SCNC: 12 MMOL/L — SIGNIFICANT CHANGE UP (ref 7–14)
APPEARANCE UR: ABNORMAL
AST SERPL-CCNC: 8 U/L — SIGNIFICANT CHANGE UP (ref 0–41)
BASOPHILS # BLD AUTO: 0.02 K/UL — SIGNIFICANT CHANGE UP (ref 0–0.2)
BASOPHILS NFR BLD AUTO: 0.3 % — SIGNIFICANT CHANGE UP (ref 0–1)
BILIRUB SERPL-MCNC: 0.4 MG/DL — SIGNIFICANT CHANGE UP (ref 0.2–1.2)
BILIRUB UR-MCNC: NEGATIVE — SIGNIFICANT CHANGE UP
BUN SERPL-MCNC: 11 MG/DL — SIGNIFICANT CHANGE UP (ref 10–20)
CALCIUM SERPL-MCNC: 8.8 MG/DL — SIGNIFICANT CHANGE UP (ref 8.4–10.5)
CHLORIDE SERPL-SCNC: 91 MMOL/L — LOW (ref 98–110)
CO2 SERPL-SCNC: 26 MMOL/L — SIGNIFICANT CHANGE UP (ref 17–32)
COLOR SPEC: YELLOW — SIGNIFICANT CHANGE UP
CREAT SERPL-MCNC: <0.5 MG/DL — LOW (ref 0.7–1.5)
DIFF PNL FLD: NEGATIVE — SIGNIFICANT CHANGE UP
EGFR: 113 ML/MIN/1.73M2 — SIGNIFICANT CHANGE UP
EOSINOPHIL # BLD AUTO: 0.01 K/UL — SIGNIFICANT CHANGE UP (ref 0–0.7)
EOSINOPHIL NFR BLD AUTO: 0.2 % — SIGNIFICANT CHANGE UP (ref 0–8)
GLUCOSE BLDC GLUCOMTR-MCNC: 154 MG/DL — HIGH (ref 70–99)
GLUCOSE BLDC GLUCOMTR-MCNC: 158 MG/DL — HIGH (ref 70–99)
GLUCOSE BLDC GLUCOMTR-MCNC: 206 MG/DL — HIGH (ref 70–99)
GLUCOSE BLDC GLUCOMTR-MCNC: 218 MG/DL — HIGH (ref 70–99)
GLUCOSE BLDC GLUCOMTR-MCNC: 229 MG/DL — HIGH (ref 70–99)
GLUCOSE SERPL-MCNC: 201 MG/DL — HIGH (ref 70–99)
GLUCOSE UR QL: NEGATIVE MG/DL — SIGNIFICANT CHANGE UP
HCT VFR BLD CALC: 24 % — LOW (ref 37–47)
HGB BLD-MCNC: 7.9 G/DL — LOW (ref 12–16)
IMM GRANULOCYTES NFR BLD AUTO: 0.3 % — SIGNIFICANT CHANGE UP (ref 0.1–0.3)
KETONES UR-MCNC: ABNORMAL MG/DL
LEUKOCYTE ESTERASE UR-ACNC: ABNORMAL
LYMPHOCYTES # BLD AUTO: 0.35 K/UL — LOW (ref 1.2–3.4)
LYMPHOCYTES # BLD AUTO: 5.7 % — LOW (ref 20.5–51.1)
MAGNESIUM SERPL-MCNC: 1.6 MG/DL — LOW (ref 1.8–2.4)
MCHC RBC-ENTMCNC: 29.4 PG — SIGNIFICANT CHANGE UP (ref 27–31)
MCHC RBC-ENTMCNC: 32.9 G/DL — SIGNIFICANT CHANGE UP (ref 32–37)
MCV RBC AUTO: 89.2 FL — SIGNIFICANT CHANGE UP (ref 81–99)
MONOCYTES # BLD AUTO: 0.42 K/UL — SIGNIFICANT CHANGE UP (ref 0.1–0.6)
MONOCYTES NFR BLD AUTO: 6.9 % — SIGNIFICANT CHANGE UP (ref 1.7–9.3)
NEUTROPHILS # BLD AUTO: 5.28 K/UL — SIGNIFICANT CHANGE UP (ref 1.4–6.5)
NEUTROPHILS NFR BLD AUTO: 86.6 % — HIGH (ref 42.2–75.2)
NITRITE UR-MCNC: POSITIVE
NRBC # BLD: 0 /100 WBCS — SIGNIFICANT CHANGE UP (ref 0–0)
PH UR: 7 — SIGNIFICANT CHANGE UP (ref 5–8)
PLATELET # BLD AUTO: 195 K/UL — SIGNIFICANT CHANGE UP (ref 130–400)
PMV BLD: 8.9 FL — SIGNIFICANT CHANGE UP (ref 7.4–10.4)
POTASSIUM SERPL-MCNC: 4.2 MMOL/L — SIGNIFICANT CHANGE UP (ref 3.5–5)
POTASSIUM SERPL-SCNC: 4.2 MMOL/L — SIGNIFICANT CHANGE UP (ref 3.5–5)
PROT SERPL-MCNC: 6.8 G/DL — SIGNIFICANT CHANGE UP (ref 6–8)
PROT UR-MCNC: 100 MG/DL
RBC # BLD: 2.69 M/UL — LOW (ref 4.2–5.4)
RBC # FLD: 13.7 % — SIGNIFICANT CHANGE UP (ref 11.5–14.5)
SODIUM SERPL-SCNC: 129 MMOL/L — LOW (ref 135–146)
SP GR SPEC: 1.02 — SIGNIFICANT CHANGE UP (ref 1–1.03)
UROBILINOGEN FLD QL: 1 MG/DL — SIGNIFICANT CHANGE UP (ref 0.2–1)
WBC # BLD: 6.1 K/UL — SIGNIFICANT CHANGE UP (ref 4.8–10.8)
WBC # FLD AUTO: 6.1 K/UL — SIGNIFICANT CHANGE UP (ref 4.8–10.8)

## 2024-08-24 PROCEDURE — 93010 ELECTROCARDIOGRAM REPORT: CPT

## 2024-08-24 RX ORDER — SODIUM CHLORIDE 0.65 %
1 AEROSOL, SPRAY (ML) NASAL
Refills: 0 | Status: DISCONTINUED | OUTPATIENT
Start: 2024-08-24 | End: 2024-08-30

## 2024-08-24 RX ORDER — GABAPENTIN 100 MG
600 CAPSULE ORAL THREE TIMES A DAY
Refills: 0 | Status: DISCONTINUED | OUTPATIENT
Start: 2024-08-24 | End: 2024-08-30

## 2024-08-24 RX ORDER — SENNA 187 MG
2 TABLET ORAL AT BEDTIME
Refills: 0 | Status: DISCONTINUED | OUTPATIENT
Start: 2024-08-24 | End: 2024-08-30

## 2024-08-24 RX ORDER — ZINC OXIDE 100 MG/G
1 OINTMENT TOPICAL DAILY
Refills: 0 | Status: DISCONTINUED | OUTPATIENT
Start: 2024-08-24 | End: 2024-08-30

## 2024-08-24 RX ORDER — GLYCOPYRROLATE 0.2 MG/ML
2 INJECTION INTRAMUSCULAR; INTRAVENOUS DAILY
Refills: 0 | Status: DISCONTINUED | OUTPATIENT
Start: 2024-08-24 | End: 2024-08-30

## 2024-08-24 RX ORDER — SODIUM CHLORIDE 9 MG/ML
1 INJECTION INTRAMUSCULAR; INTRAVENOUS; SUBCUTANEOUS
Refills: 0 | Status: DISCONTINUED | OUTPATIENT
Start: 2024-08-24 | End: 2024-08-29

## 2024-08-24 RX ORDER — POLYETHYLENE GLYCOL 3350 17 G/17G
17 POWDER, FOR SOLUTION ORAL DAILY
Refills: 0 | Status: DISCONTINUED | OUTPATIENT
Start: 2024-08-24 | End: 2024-08-30

## 2024-08-24 RX ORDER — IPRATROPIUM BROMIDE AND ALBUTEROL SULFATE .5; 3 MG/3ML; MG/3ML
3 SOLUTION RESPIRATORY (INHALATION) EVERY 6 HOURS
Refills: 0 | Status: DISCONTINUED | OUTPATIENT
Start: 2024-08-24 | End: 2024-08-30

## 2024-08-24 RX ORDER — PSYLLIUM HUSK 0.4 G
15 CAPSULE ORAL DAILY
Refills: 0 | Status: DISCONTINUED | OUTPATIENT
Start: 2024-08-24 | End: 2024-08-30

## 2024-08-24 RX ORDER — FLUTICASONE PROPIONATE 50 UG/1
1 SPRAY, METERED NASAL
Refills: 0 | DISCHARGE

## 2024-08-24 RX ORDER — AMLODIPINE BESYLATE 10 MG/1
10 TABLET ORAL DAILY
Refills: 0 | Status: DISCONTINUED | OUTPATIENT
Start: 2024-08-24 | End: 2024-08-30

## 2024-08-24 RX ORDER — ALPRAZOLAM 0.25 MG
0.5 TABLET ORAL EVERY 8 HOURS
Refills: 0 | Status: DISCONTINUED | OUTPATIENT
Start: 2024-08-24 | End: 2024-08-30

## 2024-08-24 RX ORDER — INSULIN GLARGINE 100 [IU]/ML
25 INJECTION, SOLUTION SUBCUTANEOUS AT BEDTIME
Refills: 0 | Status: DISCONTINUED | OUTPATIENT
Start: 2024-08-24 | End: 2024-08-30

## 2024-08-24 RX ORDER — LOSARTAN POTASSIUM 50 MG/1
100 TABLET ORAL DAILY
Refills: 0 | Status: DISCONTINUED | OUTPATIENT
Start: 2024-08-24 | End: 2024-08-24

## 2024-08-24 RX ORDER — DEXTROSE 15 G/33 G
15 GEL IN PACKET (GRAM) ORAL ONCE
Refills: 0 | Status: DISCONTINUED | OUTPATIENT
Start: 2024-08-24 | End: 2024-08-30

## 2024-08-24 RX ORDER — FLUTICASONE PROPIONATE 50 UG/1
1 SPRAY, METERED NASAL
Refills: 0 | Status: DISCONTINUED | OUTPATIENT
Start: 2024-08-24 | End: 2024-08-30

## 2024-08-24 RX ORDER — CHLORHEXIDINE GLUCONATE 40 MG/ML
15 SOLUTION TOPICAL
Refills: 0 | DISCHARGE

## 2024-08-24 RX ORDER — ACETYLCYSTEINE 200 MG/ML
3 VIAL (ML) MISCELLANEOUS EVERY 6 HOURS
Refills: 0 | Status: DISCONTINUED | OUTPATIENT
Start: 2024-08-24 | End: 2024-08-30

## 2024-08-24 RX ORDER — SODIUM ZIRCONIUM CYCLOSILICATE 5 G/5G
10 POWDER, FOR SUSPENSION ORAL ONCE
Refills: 0 | Status: COMPLETED | OUTPATIENT
Start: 2024-08-24 | End: 2024-08-24

## 2024-08-24 RX ORDER — ZINC OXIDE 100 MG/G
1 OINTMENT TOPICAL
Refills: 0 | DISCHARGE

## 2024-08-24 RX ORDER — ASPIRIN 81 MG
81 TABLET, DELAYED RELEASE (ENTERIC COATED) ORAL DAILY
Refills: 0 | Status: DISCONTINUED | OUTPATIENT
Start: 2024-08-24 | End: 2024-08-30

## 2024-08-24 RX ORDER — ENOXAPARIN SODIUM 100 MG/ML
40 INJECTION SUBCUTANEOUS EVERY 24 HOURS
Refills: 0 | Status: DISCONTINUED | OUTPATIENT
Start: 2024-08-24 | End: 2024-08-30

## 2024-08-24 RX ORDER — HYDROMORPHONE HYDROCHLORIDE 2 MG/1
4 TABLET ORAL EVERY 4 HOURS
Refills: 0 | Status: DISCONTINUED | OUTPATIENT
Start: 2024-08-24 | End: 2024-08-30

## 2024-08-24 RX ORDER — CHLORHEXIDINE GLUCONATE 40 MG/ML
15 SOLUTION TOPICAL
Refills: 0 | Status: DISCONTINUED | OUTPATIENT
Start: 2024-08-24 | End: 2024-08-30

## 2024-08-24 RX ADMIN — Medication 1 SPRAY(S): at 21:56

## 2024-08-24 RX ADMIN — SODIUM ZIRCONIUM CYCLOSILICATE 10 GRAM(S): 5 POWDER, FOR SUSPENSION ORAL at 02:31

## 2024-08-24 RX ADMIN — INSULIN GLARGINE 25 UNIT(S): 100 INJECTION, SOLUTION SUBCUTANEOUS at 22:10

## 2024-08-24 RX ADMIN — HYDROMORPHONE HYDROCHLORIDE 4 MILLIGRAM(S): 2 TABLET ORAL at 17:02

## 2024-08-24 RX ADMIN — IPRATROPIUM BROMIDE AND ALBUTEROL SULFATE 3 MILLILITER(S): .5; 3 SOLUTION RESPIRATORY (INHALATION) at 08:21

## 2024-08-24 RX ADMIN — Medication 100 MILLIGRAM(S): at 00:26

## 2024-08-24 RX ADMIN — IPRATROPIUM BROMIDE AND ALBUTEROL SULFATE 3 MILLILITER(S): .5; 3 SOLUTION RESPIRATORY (INHALATION) at 20:32

## 2024-08-24 RX ADMIN — Medication 81 MILLIGRAM(S): at 13:28

## 2024-08-24 RX ADMIN — Medication 100 MILLIGRAM(S): at 17:03

## 2024-08-24 RX ADMIN — Medication 1 SPRAY(S): at 05:51

## 2024-08-24 RX ADMIN — Medication 4: at 08:12

## 2024-08-24 RX ADMIN — FLUTICASONE PROPIONATE 1 SPRAY(S): 50 SPRAY, METERED NASAL at 05:51

## 2024-08-24 RX ADMIN — POLYETHYLENE GLYCOL 3350 17 GRAM(S): 17 POWDER, FOR SOLUTION ORAL at 13:28

## 2024-08-24 RX ADMIN — CHLORHEXIDINE GLUCONATE 15 MILLILITER(S): 40 SOLUTION TOPICAL at 05:51

## 2024-08-24 RX ADMIN — Medication 0.5 MILLIGRAM(S): at 10:23

## 2024-08-24 RX ADMIN — Medication 3 MILLILITER(S): at 08:21

## 2024-08-24 RX ADMIN — Medication 3 MILLILITER(S): at 20:32

## 2024-08-24 RX ADMIN — SODIUM CHLORIDE 1000 MILLILITER(S): 9 INJECTION INTRAMUSCULAR; INTRAVENOUS; SUBCUTANEOUS at 00:26

## 2024-08-24 RX ADMIN — AMLODIPINE BESYLATE 10 MILLIGRAM(S): 10 TABLET ORAL at 05:51

## 2024-08-24 RX ADMIN — IPRATROPIUM BROMIDE AND ALBUTEROL SULFATE 3 MILLILITER(S): .5; 3 SOLUTION RESPIRATORY (INHALATION) at 04:01

## 2024-08-24 RX ADMIN — Medication 0.5 MILLIGRAM(S): at 21:56

## 2024-08-24 RX ADMIN — FLUTICASONE PROPIONATE 1 SPRAY(S): 50 SPRAY, METERED NASAL at 21:56

## 2024-08-24 RX ADMIN — HYDROMORPHONE HYDROCHLORIDE 4 MILLIGRAM(S): 2 TABLET ORAL at 23:11

## 2024-08-24 RX ADMIN — Medication 600 MILLIGRAM(S): at 05:51

## 2024-08-24 RX ADMIN — ZINC OXIDE 1 APPLICATION(S): 100 OINTMENT TOPICAL at 13:29

## 2024-08-24 RX ADMIN — HYDROMORPHONE HYDROCHLORIDE 4 MILLIGRAM(S): 2 TABLET ORAL at 22:11

## 2024-08-24 RX ADMIN — Medication 600 MILLIGRAM(S): at 21:56

## 2024-08-24 RX ADMIN — Medication 6 UNIT(S): at 08:12

## 2024-08-24 RX ADMIN — Medication 600 MILLIGRAM(S): at 13:28

## 2024-08-24 RX ADMIN — CHLORHEXIDINE GLUCONATE 15 MILLILITER(S): 40 SOLUTION TOPICAL at 21:56

## 2024-08-24 RX ADMIN — Medication 3 MILLILITER(S): at 04:01

## 2024-08-24 NOTE — PROGRESS NOTE ADULT - ASSESSMENT
Unfortunate 59YOWF, bedbound, nonverbal, chr trach, chr RF, sp PEG with Hx of sq cell supraglottic to BOT ca, sp recent BL lingual aa embolization 8/13/24 with neurovasc surgery sent to the ER for change of MS noted to have cont grossly protruding luis, opacification od sinuses on CTH and + nitrites + bacteria in the urine.    Elias of MS R/O infection  Pansinusitis  Cystitis  -CT H reviewed no acute intracranial path but partially visualized sinuses show complete opacification  -order dedicated CT of sinuses  -U/A + for nitrites and bacteria, C&S P  -BC&S   -start Ceftriaxone 1gm IV q 24  -check NH3, AM cortisol    Hx of sq supraglottic to BOT ca, met to lungs,  Lingual protrusion, progression of ca  Permanent trach, 6L T tube  Hx of COPD, ex tobacco use  -sp recent BL lingual aa embolization neurovasc surgery 8/13  -flush and suction nares, red bulb soft cath  -suction trach, send for C&S  -NEB tx with resp q 6hrs, monitor pulse ox  -ENT Dr Landaverde  -Hem-Onc Dr Mejia  -Nares MRSA    Hx of HTN, ASHD, cont meds  Hx of Anemia, sp 2 u on last ad 8/10, monitor and transfuse as needed, try to keep Hgb 8  Hx of permanent PEG, feeds glucerna  q 6 hrs, flush with water  Hx of chr hyponatremia, NaCl 1gm  via PEG BID  Hx of bedbound state, lower ext weakness, muscle atrophy, BL foot drop, freq off loading, decubiti prevention  Hx of anxiety, depression Unfortunate 59YOWF, bedbound, nonverbal, chr trach, chr RF, sp PEG with Hx of sq cell supraglottic to BOT ca, sp recent BL lingual aa embolization 8/13/24 with neurovasc surgery sent to the ER for change of MS noted to have cont grossly protruding luis, opacification od sinuses on CTH and + nitrites + bacteria in the urine.    Elias of MS R/O infection  Pansinusitis  Cystitis  -CT H reviewed no acute intracranial path but partially visualized sinuses show complete opacification  -order dedicated CT of sinuses  -U/A + for nitrites and bacteria, C&S P  -BC&S   -start Ceftriaxone 1gm IV q 24  -check NH3, AM cortisol    Hx of sq supraglottic to BOT ca, met to lungs,  Lingual protrusion, progression of ca  Permanent trach, 6L T tube  Hx of COPD, ex tobacco use  -sp recent BL lingual aa embolization neurovasc surgery 8/13  -flush and suction nares, red bulb soft cath  -suction trach, send for C&S  -NEB tx with resp q 6hrs, monitor pulse ox  -ENT Dr Landaverde  -Hem-Onc Dr Mejia  -Nares MRSA    Hx of HTN, ASHD, cont meds  Hx of DM II monitor FS, insulin check A1C  Hx of Anemia, sp 2 u on last ad 8/10, monitor and transfuse as needed, try to keep Hgb 8  Hx of permanent PEG, feeds glucerna  q 6 hrs, flush with water  Hx of chr hyponatremia, NaCl 1gm  via PEG BID  Hx of bedbound state, lower ext weakness, muscle atrophy, BL foot drop, freq off loading, decubiti prevention  Hx of anxiety, depression

## 2024-08-24 NOTE — H&P ADULT - HISTORY OF PRESENT ILLNESS
58 y/o female with a PMH of IDDM, GERD, HTN, COPD,  Chronic hypoxemic and hypercapnic respiratory failure SP Tracheostomy (on 6L at [place]) & PEG at [facility], DVT on Eliquis?, Anxiety, Hx of Supraglottic SSC s/p chemotherapy, normocytic anemia of chronic disease (follows Dr Mejia) presenting from [facility] rehab for evaluation of altered mental status.     Son who provided history in the ED. Patient has been behaving differently since yesterday.     Patient normally interacts using ipad and hand gestures however has been minimally interactive. Son denies any known specific complaints.    In the ED:  - Vitals: T 37.2 HR 96 -42 SpO2 94% RR 16  - Labs; WBC 6.33 Hgb 7.8  Na 128 K 5.4 Cr 0.5

## 2024-08-24 NOTE — H&P ADULT - NSHPLABSRESULTS_GEN_ALL_CORE
7.8    6.33  )-----------( 180      ( 23 Aug 2024 22:47 )             23.8       08-23    128<L>  |  90<L>  |  17  ----------------------------<  172<H>  5.4<H>   |  28  |  0.5<L>    Ca    8.9      23 Aug 2024 22:47    TPro  6.7  /  Alb  3.5  /  TBili  0.2  /  DBili  x   /  AST  7   /  ALT  6   /  AlkPhos  80  08-23 7.8    6.33  )-----------( 180      ( 23 Aug 2024 22:47 )             23.8       08-23    128<L>  |  90<L>  |  17  ----------------------------<  172<H>  5.4<H>   |  28  |  0.5<L>    Ca    8.9      23 Aug 2024 22:47    TPro  6.7  /  Alb  3.5  /  TBili  0.2  /  DBili  x   /  AST  7   /  ALT  6   /  AlkPhos  80  08-23      ACC: [id number] EXAM:  CT BRAIN   ORDERED BY: YECENIA PUENTES     PROCEDURE DATE:  08/23/2024          INTERPRETATION:  Clinical History / Reason for exam: Altered mental   status.    Technique: CT head without IV contrast performed. Multiple axial CT images of the head were obtained from the base of the skull to the vertex without the administration of IV contrast. Sagittal and coronal reformats were obtained.    Comparison: CT soft tissue neck 8/8/2024.    Findings:    Ventricular system, basal cisterns and sulcal patterns are symmetric and appropriate for patient's age.    No acute extra-axial collection.  No mass effect, midline shift or acute intracranial hemorrhage.    Patchy hypodensities in the periventricular white matter without mass effect compatible with mild chronic microvascular changes.    No depressed skull fracture.    Partially imaged near completely opacified sinuses.    Impression:  No evidence of acute intracranial abnormality.  Extensive sinusitis.    --- End of Report ---    MIKE MCKNIGHT MD; Attending Radiologist  This document has been electronically signed. Aug 23 2024 10:35PM

## 2024-08-24 NOTE — PATIENT PROFILE ADULT - FALL HARM RISK - HARM RISK INTERVENTIONS

## 2024-08-24 NOTE — H&P ADULT - ASSESSMENT
60 y/o female with a PMH of IDDM, GERD, HTN, COPD,  Chronic hypoxemic and hypercapnic respiratory failure SP Tracheostomy (on 6L at [place]) & PEG at [facility], DVT on Eliquis, Anxiety, Hx of Supraglottic SSC s/p chemotherapy, normocytic anemia of chronic disease (follows Dr Mejia) presenting from [place] rehab for evaluation of behavioral changes per son.    # Anemia of chronic illness  -Hgb 7.8 on admission   -Follows Dr Mejia  for chemotherapy and anemia  -Keep Hgb around 7-8 g/dL (per Dr Richmond outpt notes)  -Active T&S  -Monitor cbc    # Supraglottic SSC s/p chemotherapy  - F/U with Oncology outpatient    #HO DVT was on Eliquis  #Venous stasis dermatitis  #Lower extremity pain  -Lovenox  40 mg daily  - F/U [facility]  - C/w home meds   - gabapentin 600 TID  - Dilaudid 4mg Q4 PRN    # Hyponatremia  -Na 128 on this admission  -continue to monitor     #COPD  - c/w home inhalers    #Anxiety  - Alprazolam 0.5 TID    #IDDM  - c/w home insulin   - c/w with PEG feed    #HTN  - losartan 100mg will be held for hyperkalemia --> s/p Lokelma 10 mg : follow up electrolytes in AM  -amlodipine 10mg    #DVTppx- Lovenox   #Diet- PEG - glucerna  #Activity - IAT   #Dispo - acute    60 y/o female with a PMH of IDDM, GERD, HTN, COPD,  Chronic hypoxemic and hypercapnic respiratory failure SP Tracheostomy (on 6L at [place]) & PEG at [facility], DVT on Eliquis, Anxiety, Hx of Supraglottic SSC s/p chemotherapy, normocytic anemia of chronic disease (follows Dr Mejia) presenting from [place] rehab for evaluation of behavioral changes per son.    #Extensive sinusitis  - s/p Doxycycline 100 mg IV  - 0 SIRS criteria on admission: no elevated wbc, no tachycardia no tachypnea, no fever  - monitor off antibiotics    # Anemia of chronic illness  -Hgb 7.8 on admission   -Follows Dr Mejia  for chemotherapy and anemia  -Keep Hgb around 7-8 g/dL (per Dr Richmond outpt notes)  -Active T&S  -Monitor cbc    # Supraglottic SSC s/p chemotherapy  - F/U with Oncology outpatient    #HO DVT was on Eliquis  #Venous stasis dermatitis  #Lower extremity pain  -Lovenox  40 mg daily  - F/U [facility]  - C/w home meds   - gabapentin 600 TID  - Dilaudid 4mg Q4 PRN    # Hyponatremia  -Na 128 on this admission  -continue to monitor     #COPD  - c/w home inhalers    #Anxiety  - Alprazolam 0.5 TID    #IDDM  - c/w home insulin   - c/w with PEG feed    #HTN  - losartan 100mg will be held for hyperkalemia --> s/p Lokelma 10 mg : follow up electrolytes in AM  -amlodipine 10mg    #DVTppx- Lovenox   #Diet- PEG - glucerna  #Activity - IAT   #Dispo - acute

## 2024-08-24 NOTE — CHART NOTE - NSCHARTNOTEFT_GEN_A_CORE
ENT called by primary for copious secretions from trach. Pt requires adequate suctioning and trach care by respiratory therapy/covering RN. As per covering resident, pt hemodynamically stable with O2 sats 99% on T-piece.     Continue daily trach care, suction prn, change inner cannula daily   Follow-up with Dr. Jackie Sher/Onc as outpatient   recall ENT prn  spoke with primary team, aware
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
## 2024-08-24 NOTE — H&P ADULT - NSHPPHYSICALEXAM_GEN_ALL_CORE
CONST:  no acute distress  EYES: PERRL, EOMI, Sclera and conjunctiva clear.   CARD: No murmurs, rubs, or gallops; Normal rate and rhythm  RESP: BS present B/L, transmitted breath sounds. No distress or accessory breathing  GI: Soft, non-tender, non-distended. PEG tube placement.   MS: Lower extremity venous dermatitis.   SKIN: Warm, dry, no acute rashes   NEURO: Awake and alert, no focal deficits.

## 2024-08-25 LAB
GLUCOSE BLDC GLUCOMTR-MCNC: 168 MG/DL — HIGH (ref 70–99)
GLUCOSE BLDC GLUCOMTR-MCNC: 183 MG/DL — HIGH (ref 70–99)
GLUCOSE BLDC GLUCOMTR-MCNC: 197 MG/DL — HIGH (ref 70–99)
GLUCOSE BLDC GLUCOMTR-MCNC: 215 MG/DL — HIGH (ref 70–99)
GLUCOSE BLDC GLUCOMTR-MCNC: 89 MG/DL — SIGNIFICANT CHANGE UP (ref 70–99)

## 2024-08-25 PROCEDURE — 70487 CT MAXILLOFACIAL W/DYE: CPT | Mod: 26

## 2024-08-25 RX ADMIN — Medication 81 MILLIGRAM(S): at 12:01

## 2024-08-25 RX ADMIN — FLUTICASONE PROPIONATE 1 SPRAY(S): 50 SPRAY, METERED NASAL at 06:18

## 2024-08-25 RX ADMIN — Medication 600 MILLIGRAM(S): at 13:30

## 2024-08-25 RX ADMIN — POLYETHYLENE GLYCOL 3350 17 GRAM(S): 17 POWDER, FOR SOLUTION ORAL at 12:01

## 2024-08-25 RX ADMIN — HYDROMORPHONE HYDROCHLORIDE 4 MILLIGRAM(S): 2 TABLET ORAL at 21:54

## 2024-08-25 RX ADMIN — IPRATROPIUM BROMIDE AND ALBUTEROL SULFATE 3 MILLILITER(S): .5; 3 SOLUTION RESPIRATORY (INHALATION) at 09:26

## 2024-08-25 RX ADMIN — GLYCOPYRROLATE 2 MILLIGRAM(S): 0.2 INJECTION INTRAMUSCULAR; INTRAVENOUS at 12:02

## 2024-08-25 RX ADMIN — IPRATROPIUM BROMIDE AND ALBUTEROL SULFATE 3 MILLILITER(S): .5; 3 SOLUTION RESPIRATORY (INHALATION) at 20:10

## 2024-08-25 RX ADMIN — Medication 15 MILLILITER(S): at 12:01

## 2024-08-25 RX ADMIN — INSULIN GLARGINE 25 UNIT(S): 100 INJECTION, SOLUTION SUBCUTANEOUS at 22:12

## 2024-08-25 RX ADMIN — Medication 25 GRAM(S): at 06:18

## 2024-08-25 RX ADMIN — Medication 0.5 MILLIGRAM(S): at 06:51

## 2024-08-25 RX ADMIN — IPRATROPIUM BROMIDE AND ALBUTEROL SULFATE 3 MILLILITER(S): .5; 3 SOLUTION RESPIRATORY (INHALATION) at 15:31

## 2024-08-25 RX ADMIN — CHLORHEXIDINE GLUCONATE 15 MILLILITER(S): 40 SOLUTION TOPICAL at 17:06

## 2024-08-25 RX ADMIN — Medication 3 MILLILITER(S): at 20:10

## 2024-08-25 RX ADMIN — Medication 6 UNIT(S): at 17:15

## 2024-08-25 RX ADMIN — HYDROMORPHONE HYDROCHLORIDE 4 MILLIGRAM(S): 2 TABLET ORAL at 02:31

## 2024-08-25 RX ADMIN — Medication 2: at 17:16

## 2024-08-25 RX ADMIN — Medication 1 SPRAY(S): at 17:06

## 2024-08-25 RX ADMIN — HYDROMORPHONE HYDROCHLORIDE 4 MILLIGRAM(S): 2 TABLET ORAL at 03:31

## 2024-08-25 RX ADMIN — Medication 100 MILLIGRAM(S): at 17:07

## 2024-08-25 RX ADMIN — FLUTICASONE PROPIONATE 1 SPRAY(S): 50 SPRAY, METERED NASAL at 17:06

## 2024-08-25 RX ADMIN — Medication 600 MILLIGRAM(S): at 22:12

## 2024-08-25 RX ADMIN — Medication 6 UNIT(S): at 12:07

## 2024-08-25 RX ADMIN — ZINC OXIDE 1 APPLICATION(S): 100 OINTMENT TOPICAL at 12:01

## 2024-08-25 RX ADMIN — Medication 2: at 08:09

## 2024-08-25 RX ADMIN — CHLORHEXIDINE GLUCONATE 15 MILLILITER(S): 40 SOLUTION TOPICAL at 06:18

## 2024-08-25 RX ADMIN — Medication 0.5 MILLIGRAM(S): at 14:28

## 2024-08-25 RX ADMIN — SODIUM CHLORIDE 1 GRAM(S): 9 INJECTION INTRAMUSCULAR; INTRAVENOUS; SUBCUTANEOUS at 06:17

## 2024-08-25 RX ADMIN — AMLODIPINE BESYLATE 10 MILLIGRAM(S): 10 TABLET ORAL at 06:16

## 2024-08-25 RX ADMIN — Medication 25 GRAM(S): at 17:16

## 2024-08-25 RX ADMIN — Medication 3 MILLILITER(S): at 15:32

## 2024-08-25 RX ADMIN — HYDROMORPHONE HYDROCHLORIDE 4 MILLIGRAM(S): 2 TABLET ORAL at 06:51

## 2024-08-25 RX ADMIN — HYDROMORPHONE HYDROCHLORIDE 4 MILLIGRAM(S): 2 TABLET ORAL at 14:28

## 2024-08-25 RX ADMIN — SODIUM CHLORIDE 1 GRAM(S): 9 INJECTION INTRAMUSCULAR; INTRAVENOUS; SUBCUTANEOUS at 17:06

## 2024-08-25 RX ADMIN — HYDROMORPHONE HYDROCHLORIDE 4 MILLIGRAM(S): 2 TABLET ORAL at 20:54

## 2024-08-25 RX ADMIN — Medication 3 MILLILITER(S): at 09:23

## 2024-08-25 RX ADMIN — Medication 600 MILLIGRAM(S): at 06:15

## 2024-08-25 RX ADMIN — Medication 6 UNIT(S): at 08:09

## 2024-08-25 RX ADMIN — Medication 1 SPRAY(S): at 06:18

## 2024-08-25 NOTE — PROGRESS NOTE ADULT - ASSESSMENT
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    Change of MS R/O infection  Pansinusitis  Cystitis  -CT H reviewed no acute intracranial path but partially visualized sinuses show complete opacification  -order dedicated CT of sinuses, done results P  -U/A + for nitrites and bacteria, C&S P  -BC&S   -start Ceftriaxone 1gm IV q 24  -check NH3, AM cortisol    Hx of sq supraglottic to BOT ca, met to lungs,  Lingual protrusion, progression of ca  Permanent trach, 6L T tube  Hx of COPD, ex tobacco use  -sp recent BL lingual aa embolization neurovasc surgery 8/13  -flush and suction nares, red bulb soft cath  -suction trach, send for C&S  -NEB tx with resp q 6hrs, monitor pulse ox  -ENT Dr Ladnaverde for pansinusitis and inc nasal/sinus/ trach secretions, BL Lingual embolization 8/13, is there any lingual shrinkage, if not what other options ? glossectomy  -Hem-Onc Dr Mejia  -Nares MRSA    Hx of Anemia, sp recent 2 u of PRBC, cont to monitor, on AC, may need another U, check iron panel    Hx of HTN, ASHD, cont meds  Hx of DM II monitor FS, insulin check A1C  Hx of permanent PEG, feeds glucerna  q 6 hrs, flush with water  Hx of chr hyponatremia, NaCl 1gm  via PEG BID  Hx of bedbound state, lower ext weakness, muscle atrophy, BL foot drop, freq off loading, decubiti prevention  Hx of anxiety, depression

## 2024-08-26 LAB
ALBUMIN SERPL ELPH-MCNC: 3.6 G/DL — SIGNIFICANT CHANGE UP (ref 3.5–5.2)
ALP SERPL-CCNC: 77 U/L — SIGNIFICANT CHANGE UP (ref 30–115)
ALT FLD-CCNC: 7 U/L — SIGNIFICANT CHANGE UP (ref 0–41)
ANION GAP SERPL CALC-SCNC: 13 MMOL/L — SIGNIFICANT CHANGE UP (ref 7–14)
AST SERPL-CCNC: 9 U/L — SIGNIFICANT CHANGE UP (ref 0–41)
BASOPHILS # BLD AUTO: 0.02 K/UL — SIGNIFICANT CHANGE UP (ref 0–0.2)
BASOPHILS NFR BLD AUTO: 0.3 % — SIGNIFICANT CHANGE UP (ref 0–1)
BILIRUB SERPL-MCNC: 0.4 MG/DL — SIGNIFICANT CHANGE UP (ref 0.2–1.2)
BUN SERPL-MCNC: 7 MG/DL — LOW (ref 10–20)
CALCIUM SERPL-MCNC: 8.8 MG/DL — SIGNIFICANT CHANGE UP (ref 8.4–10.5)
CHLORIDE SERPL-SCNC: 93 MMOL/L — LOW (ref 98–110)
CO2 SERPL-SCNC: 26 MMOL/L — SIGNIFICANT CHANGE UP (ref 17–32)
CREAT SERPL-MCNC: 0.5 MG/DL — LOW (ref 0.7–1.5)
CULTURE RESULTS: SIGNIFICANT CHANGE UP
EGFR: 107 ML/MIN/1.73M2 — SIGNIFICANT CHANGE UP
EOSINOPHIL # BLD AUTO: 0.08 K/UL — SIGNIFICANT CHANGE UP (ref 0–0.7)
EOSINOPHIL NFR BLD AUTO: 1.3 % — SIGNIFICANT CHANGE UP (ref 0–8)
GLUCOSE BLDC GLUCOMTR-MCNC: 105 MG/DL — HIGH (ref 70–99)
GLUCOSE BLDC GLUCOMTR-MCNC: 137 MG/DL — HIGH (ref 70–99)
GLUCOSE BLDC GLUCOMTR-MCNC: 182 MG/DL — HIGH (ref 70–99)
GLUCOSE BLDC GLUCOMTR-MCNC: 212 MG/DL — HIGH (ref 70–99)
GLUCOSE SERPL-MCNC: 173 MG/DL — HIGH (ref 70–99)
HCT VFR BLD CALC: 25.3 % — LOW (ref 37–47)
HGB BLD-MCNC: 8.4 G/DL — LOW (ref 12–16)
IMM GRANULOCYTES NFR BLD AUTO: 0.6 % — HIGH (ref 0.1–0.3)
LYMPHOCYTES # BLD AUTO: 0.72 K/UL — LOW (ref 1.2–3.4)
LYMPHOCYTES # BLD AUTO: 11.5 % — LOW (ref 20.5–51.1)
MAGNESIUM SERPL-MCNC: 2.4 MG/DL — SIGNIFICANT CHANGE UP (ref 1.8–2.4)
MCHC RBC-ENTMCNC: 30 PG — SIGNIFICANT CHANGE UP (ref 27–31)
MCHC RBC-ENTMCNC: 33.2 G/DL — SIGNIFICANT CHANGE UP (ref 32–37)
MCV RBC AUTO: 90.4 FL — SIGNIFICANT CHANGE UP (ref 81–99)
MONOCYTES # BLD AUTO: 0.59 K/UL — SIGNIFICANT CHANGE UP (ref 0.1–0.6)
MONOCYTES NFR BLD AUTO: 9.4 % — HIGH (ref 1.7–9.3)
NEUTROPHILS # BLD AUTO: 4.83 K/UL — SIGNIFICANT CHANGE UP (ref 1.4–6.5)
NEUTROPHILS NFR BLD AUTO: 76.9 % — HIGH (ref 42.2–75.2)
NRBC # BLD: 0 /100 WBCS — SIGNIFICANT CHANGE UP (ref 0–0)
PLATELET # BLD AUTO: 212 K/UL — SIGNIFICANT CHANGE UP (ref 130–400)
PMV BLD: 8.7 FL — SIGNIFICANT CHANGE UP (ref 7.4–10.4)
POTASSIUM SERPL-MCNC: 4.1 MMOL/L — SIGNIFICANT CHANGE UP (ref 3.5–5)
POTASSIUM SERPL-SCNC: 4.1 MMOL/L — SIGNIFICANT CHANGE UP (ref 3.5–5)
PROT SERPL-MCNC: 6.8 G/DL — SIGNIFICANT CHANGE UP (ref 6–8)
RBC # BLD: 2.8 M/UL — LOW (ref 4.2–5.4)
RBC # FLD: 13.9 % — SIGNIFICANT CHANGE UP (ref 11.5–14.5)
SODIUM SERPL-SCNC: 132 MMOL/L — LOW (ref 135–146)
SPECIMEN SOURCE: SIGNIFICANT CHANGE UP
WBC # BLD: 6.28 K/UL — SIGNIFICANT CHANGE UP (ref 4.8–10.8)
WBC # FLD AUTO: 6.28 K/UL — SIGNIFICANT CHANGE UP (ref 4.8–10.8)

## 2024-08-26 PROCEDURE — 99233 SBSQ HOSP IP/OBS HIGH 50: CPT

## 2024-08-26 PROCEDURE — 93970 EXTREMITY STUDY: CPT | Mod: 26

## 2024-08-26 RX ADMIN — Medication 0.5 MILLIGRAM(S): at 23:52

## 2024-08-26 RX ADMIN — HYDROMORPHONE HYDROCHLORIDE 4 MILLIGRAM(S): 2 TABLET ORAL at 15:27

## 2024-08-26 RX ADMIN — Medication 0.5 MILLIGRAM(S): at 01:12

## 2024-08-26 RX ADMIN — Medication 3 MILLILITER(S): at 14:59

## 2024-08-26 RX ADMIN — FLUTICASONE PROPIONATE 1 SPRAY(S): 50 SPRAY, METERED NASAL at 17:28

## 2024-08-26 RX ADMIN — AMLODIPINE BESYLATE 10 MILLIGRAM(S): 10 TABLET ORAL at 05:41

## 2024-08-26 RX ADMIN — Medication 25 GRAM(S): at 05:42

## 2024-08-26 RX ADMIN — HYDROMORPHONE HYDROCHLORIDE 4 MILLIGRAM(S): 2 TABLET ORAL at 19:46

## 2024-08-26 RX ADMIN — CHLORHEXIDINE GLUCONATE 15 MILLILITER(S): 40 SOLUTION TOPICAL at 05:41

## 2024-08-26 RX ADMIN — IPRATROPIUM BROMIDE AND ALBUTEROL SULFATE 3 MILLILITER(S): .5; 3 SOLUTION RESPIRATORY (INHALATION) at 08:01

## 2024-08-26 RX ADMIN — HYDROMORPHONE HYDROCHLORIDE 4 MILLIGRAM(S): 2 TABLET ORAL at 14:57

## 2024-08-26 RX ADMIN — Medication 2: at 17:27

## 2024-08-26 RX ADMIN — Medication 600 MILLIGRAM(S): at 13:47

## 2024-08-26 RX ADMIN — Medication 600 MILLIGRAM(S): at 05:41

## 2024-08-26 RX ADMIN — GLYCOPYRROLATE 2 MILLIGRAM(S): 0.2 INJECTION INTRAMUSCULAR; INTRAVENOUS at 13:48

## 2024-08-26 RX ADMIN — Medication 100 MILLIGRAM(S): at 17:32

## 2024-08-26 RX ADMIN — Medication 15 MILLILITER(S): at 13:47

## 2024-08-26 RX ADMIN — SODIUM CHLORIDE 1 GRAM(S): 9 INJECTION INTRAMUSCULAR; INTRAVENOUS; SUBCUTANEOUS at 05:41

## 2024-08-26 RX ADMIN — Medication 600 MILLIGRAM(S): at 21:51

## 2024-08-26 RX ADMIN — CHLORHEXIDINE GLUCONATE 15 MILLILITER(S): 40 SOLUTION TOPICAL at 17:31

## 2024-08-26 RX ADMIN — Medication 6 UNIT(S): at 08:30

## 2024-08-26 RX ADMIN — IPRATROPIUM BROMIDE AND ALBUTEROL SULFATE 3 MILLILITER(S): .5; 3 SOLUTION RESPIRATORY (INHALATION) at 03:16

## 2024-08-26 RX ADMIN — IPRATROPIUM BROMIDE AND ALBUTEROL SULFATE 3 MILLILITER(S): .5; 3 SOLUTION RESPIRATORY (INHALATION) at 15:01

## 2024-08-26 RX ADMIN — SODIUM CHLORIDE 1 GRAM(S): 9 INJECTION INTRAMUSCULAR; INTRAVENOUS; SUBCUTANEOUS at 17:27

## 2024-08-26 RX ADMIN — ZINC OXIDE 1 APPLICATION(S): 100 OINTMENT TOPICAL at 13:48

## 2024-08-26 RX ADMIN — HYDROMORPHONE HYDROCHLORIDE 4 MILLIGRAM(S): 2 TABLET ORAL at 05:41

## 2024-08-26 RX ADMIN — HYDROMORPHONE HYDROCHLORIDE 4 MILLIGRAM(S): 2 TABLET ORAL at 23:53

## 2024-08-26 RX ADMIN — HYDROMORPHONE HYDROCHLORIDE 4 MILLIGRAM(S): 2 TABLET ORAL at 02:12

## 2024-08-26 RX ADMIN — FLUTICASONE PROPIONATE 1 SPRAY(S): 50 SPRAY, METERED NASAL at 05:42

## 2024-08-26 RX ADMIN — Medication 1 SPRAY(S): at 17:48

## 2024-08-26 RX ADMIN — HYDROMORPHONE HYDROCHLORIDE 4 MILLIGRAM(S): 2 TABLET ORAL at 01:12

## 2024-08-26 RX ADMIN — POLYETHYLENE GLYCOL 3350 17 GRAM(S): 17 POWDER, FOR SOLUTION ORAL at 13:45

## 2024-08-26 RX ADMIN — Medication 3 MILLILITER(S): at 03:17

## 2024-08-26 RX ADMIN — IPRATROPIUM BROMIDE AND ALBUTEROL SULFATE 3 MILLILITER(S): .5; 3 SOLUTION RESPIRATORY (INHALATION) at 20:00

## 2024-08-26 RX ADMIN — Medication 6 UNIT(S): at 17:28

## 2024-08-26 RX ADMIN — Medication 6 UNIT(S): at 12:17

## 2024-08-26 RX ADMIN — Medication 3 MILLILITER(S): at 20:00

## 2024-08-26 RX ADMIN — Medication 3 MILLILITER(S): at 08:01

## 2024-08-26 RX ADMIN — Medication 1 SPRAY(S): at 05:42

## 2024-08-26 RX ADMIN — Medication 25 GRAM(S): at 17:32

## 2024-08-26 RX ADMIN — ENOXAPARIN SODIUM 40 MILLIGRAM(S): 100 INJECTION SUBCUTANEOUS at 21:51

## 2024-08-26 RX ADMIN — HYDROMORPHONE HYDROCHLORIDE 4 MILLIGRAM(S): 2 TABLET ORAL at 20:30

## 2024-08-26 RX ADMIN — Medication 4: at 08:30

## 2024-08-26 RX ADMIN — Medication 81 MILLIGRAM(S): at 13:47

## 2024-08-26 NOTE — CONSULT NOTE ADULT - ASSESSMENT
ASSESSMENT    59y F pmh IDDM, GERD, HTN, COPD on 6L,  Chronic hypoxemic and hypercapnic respiratory failure s/p Tracheostomy and PEG, DVT on Eliquis, anxiety and stage IV Supraglottic SSC s/p chemotherapy, normocytic anemia of chronic disease presenting from [facility] rehab for evaluation of altered mental status.  ASSESSMENT    59y F pmh IDDM, GERD, HTN, COPD on 6L,  Chronic hypoxemic and hypercapnic respiratory failure s/p Tracheostomy and PEG, DVT on Eliquis, anxiety and stage IV Supraglottic SSC s/p chemotherapy, normocytic anemia of chronic disease presenting from [facility] for evaluation of altered mental status. Oncology consulted for management of SCC.     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 Mildly enhancing 1.1 cm right supraclavicular lymph node, nonspecific.  - 9/21/22: PET/CT - Supraglottic mass measuring approximately 2.5 x 3.8 x 3.7 cm at the aryepiglottic folds, with max SUV of 18.7. Additional right level 2 cervical lymph node (Max SUV 5.0) and left level 2 cervical lymph node (Max SUV 3.9). These nodes are indeterminate for biologic tumor activity. Subsegmental area of atelectasis seen at the left lung base containing 2 foci of increased FDG uptake SUV measuring 10.1 and 6.1 (166, 171) within. These nodules are indeterminate for biologic tumor activity  - 8/19/2022: stain shows the carcinoma is negative for p16. Tongue base mass, biopsy: Superficial fragments of invasive squamous cell carcinoma, non-keratinized.   - completed chemoRT 12/2022 - 7 weeks  of Cetuximab - did not tolerate carboplatin iso pancytopenia   - interval PET/CT 3/2023 - interval decrease in uptake of supraglottic laryngeal mass SUV 18.7>16.1 and resolution of b/l FGD avid cervical LN, increase in size and FDG uptake of 2 LLL nodules   - Lung biopsy 5/31/23 - squamous cell carcinoma, [id number] w. focal necrosis - started on keytruda  - 7/28/23 - started opdivo outpatient and completed 4 cycles until 10/20/23 then she was lost to follow up   - presented 7/2024 with local recurrence with tongue extrusion         PLAN  - continue with workup for altered mental status  - follows w/ Dr. Mejia outpatient, recommend to continue with Nivolumab outpatient (scheduled for cycle 2 with Dr. Mejia)  - s/p b/l lingual artery embolization with resolution of epistaxis, continue to monitor for bleeding            ASSESSMENT    59y F pmh IDDM, GERD, HTN, COPD on 6L,  Chronic hypoxemic and hypercapnic respiratory failure s/p tracheostomy and PEG, DVT on Eliquis, anxiety and stage IV supraglottic SSC s/p chemotherapy/RT, now recurrent, normocytic anemia of chronic disease, presenting from [facility] for evaluation of altered mental status. Oncology consulted for management of SCC.     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 Mildly enhancing 1.1 cm right supraclavicular lymph node, nonspecific.  - 9/21/22: PET/CT - Supraglottic mass measuring approximately 2.5 x 3.8 x 3.7 cm at the aryepiglottic folds, with max SUV of 18.7. Additional right level 2 cervical lymph node (Max SUV 5.0) and left level 2 cervical lymph node (Max SUV 3.9). These nodes are indeterminate for biologic tumor activity. Subsegmental area of atelectasis seen at the left lung base containing 2 foci of increased FDG uptake SUV measuring 10.1 and 6.1 (166, 171) within. These nodules are indeterminate for biologic tumor activity  - 8/19/2022: stain shows the carcinoma is negative for p16. Tongue base mass, biopsy: Superficial fragments of invasive squamous cell carcinoma, non-keratinized.   - Completed chemo-RT 12/2022 - 7 weeks  of Cetuximab - did not tolerate carboplatin iso pancytopenia   - Interval PET/CT 3/2023 - interval decrease in uptake of supraglottic laryngeal mass SUV 18.7>16.1 and resolution of b/l FGD avid cervical LN, increase in size and FDG uptake of 2 LLL nodules   - Lung biopsy 5/31/23 - squamous cell carcinoma, [id number] w. focal necrosis - started on keytruda  - 7/28/23 - Started Opdivo outpatient and completed 4 cycles until 10/20/23 then she was lost to follow up   - Presented 7/2024 with local recurrence with tongue extrusion         PLAN  - Continue with workup for altered mental status  - Follows w/ Dr. Mejia outpatient, recommend to continue with Nivolumab outpatient (scheduled for cycle 2 with Dr. Mejia)  - S/P b/l lingual artery embolization with resolution of epistaxis, continue to monitor for bleeding

## 2024-08-26 NOTE — PROGRESS NOTE ADULT - ASSESSMENT
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    Change of MS R/O infection  Pansinusitis  Cystitis  Progression of oral tumor  -CT H reviewed no acute intracranial path but partially visualized sinuses show complete opacification  -order dedicated CT of sinuses, done results P  -U/A + for nitrites and bacteria, C&S P  -BC&S   -start Ceftriaxone 1gm IV q 24  -check NH3, AM cortisol    Hx of sq supraglottic to BOT ca, met to lungs,  Lingual protrusion, progression of ca  Permanent trach, 6L T tube  Hx of COPD, ex tobacco use  -sp recent BL lingual aa embolization neurovasc surgery 8/13  -flush and suction nares, red bulb soft cath  -suction trach, send for C&S  -NEB tx with resp q 6hrs, monitor pulse ox  -ENT Dr Landaverde for pansinusitis and inc nasal/sinus/ trach secretions, BL Lingual embolization 8/13, is there any lingual shrinkage, if not what other options ? glossectomy  -Hem-Onc Dr Mejia  -Ruth MRSA    Hx of Anemia, sp recent 2 u of PRBC, cont to monitor, on AC, may need another U, check iron panel    Hx of HTN, ASHD, cont meds  Hx of DM II monitor FS, insulin check A1C  Hx of permanent PEG, feeds glucerna  q 6 hrs, flush with water  Hx of chr hyponatremia, NaCl 1gm  via PEG BID  Hx of bedbound state, lower ext weakness, muscle atrophy, BL foot drop, freq off loading, decubiti prevention  Hx of anxiety, depression

## 2024-08-26 NOTE — CONSULT NOTE ADULT - ATTENDING COMMENTS
Patient also seen and examined by myself. I agree with the above note. Mild modifications made.  All questions answered.

## 2024-08-26 NOTE — CONSULT NOTE ADULT - SUBJECTIVE AND OBJECTIVE BOX
HPI:  60 y/o female with a PMH of IDDM, GERD, HTN, COPD,  Chronic hypoxemic and hypercapnic respiratory failure SP Tracheostomy (on 6L at [facility]) & PEG at [email or web address]], DVT on Eliquis?, Anxiety, Hx of Supraglottic SSC s/p chemotherapy, normocytic anemia of chronic disease (follows Dr Mejia) presenting from [facility] for evaluation of altered mental status.     Son who provided history in the ED. Patient has been behaving differently since yesterday.     Patient normally interacts using ipad and hand gestures however has been minimally interactive. Son denies any known specific complaints.    In the ED:  - Vitals: T 37.2 HR 96 -42 SpO2 94% RR 16  - Labs; WBC 6.33 Hgb 7.8  Na 128 K 5.4 Cr 0.5   (24 Aug 2024 01:29)    Currently admitted to medicine with the primary diagnosis of Lethargy       Today is hospital day 3d.     INTERVAL HPI / OVERNIGHT EVENTS:  Patient was examined and seen at bedside. This morning she is resting in bed, no overnight events. Hemodynamically stable, tolerating PEG tube feeds, voiding appropriately, having BMs, on baseline level of oxygen without change in secretions.     ROS: Otherwise unremarkable     PAST MEDICAL & SURGICAL HISTORY  COPD (chronic obstructive pulmonary disease)    CHF (congestive heart failure)    DM (diabetes mellitus)    H/O tracheostomy      ALLERGIES  penicillin (Swelling)    MEDICATIONS  STANDING MEDICATIONS  acetylcysteine 20%  Inhalation 3 milliLiter(s) Inhalation every 6 hours  albuterol/ipratropium for Nebulization 3 milliLiter(s) Nebulizer every 6 hours  amLODIPine   Tablet 10 milliGRAM(s) Oral daily  aspirin  chewable 81 milliGRAM(s) Oral daily  cefTRIAXone   IVPB      cefTRIAXone   IVPB 1000 milliGRAM(s) IV Intermittent every 24 hours  chlorhexidine 0.12% Liquid 15 milliLiter(s) Oral Mucosa two times a day  dextrose 5%. 1000 milliLiter(s) IV Continuous <Continuous>  enoxaparin Injectable 40 milliGRAM(s) SubCutaneous every 24 hours  fluticasone propionate 50 MICROgram(s)/spray Nasal Spray 1 Spray(s) Both Nostrils two times a day  gabapentin 600 milliGRAM(s) Oral three times a day  glycopyrrolate 2 milliGRAM(s) Oral daily  insulin glargine Injectable (LANTUS) 25 Unit(s) SubCutaneous at bedtime  insulin lispro (ADMELOG) corrective regimen sliding scale   SubCutaneous three times a day before meals  insulin lispro Injectable (ADMELOG) 6 Unit(s) SubCutaneous three times a day before meals  magnesium sulfate  IVPB 2 Gram(s) IV Intermittent every 12 hours  multivitamin/minerals/iron Oral Solution (CENTRUM) 15 milliLiter(s) Oral daily  polyethylene glycol 3350 17 Gram(s) Oral daily  scopolamine 1 mG/72 Hr(s) Patch 1 Patch Transdermal every 72 hours  senna 2 Tablet(s) Oral at bedtime  sodium chloride 1 Gram(s) Oral two times a day  sodium chloride 0.65% Nasal 1 Spray(s) Both Nostrils two times a day  zinc oxide 20% Ointment 1 Application(s) Topical daily    PRN MEDICATIONS  ALPRAZolam 0.5 milliGRAM(s) Oral every 8 hours PRN  dextrose Oral Gel 15 Gram(s) Oral once PRN  HYDROmorphone   Tablet 4 milliGRAM(s) Oral every 4 hours PRN    VITALS:  T(F): 98.3  HR: 80  BP: 149/66  RR: 18  SpO2: 96%    PHYSICAL EXAM  GEN: NAD, Resting comfortably in bed, cooperative, tired but arousable  HEENT: Protruding enlarged tongue w/ xerosis, nares patent, no cervical LAD  PULM: b/l rhonchi, trach in place without secretions, no respiratory distress or wheezing  CVS: Regular rate and rhythm, S1-S2, no murmurs, heaves, thrills  ABD: Soft, non-tender, non-distended, no guarding, BS +, PEG tube in place without any surrounding erythema, discharge or erosions  EXT: b/l venous stasis, no cyanosis, moves all extremities, strength 5/5  NEURO: A&Ox3, no focal deficits, non verbal but shakes heads to questions    LABS                        8.4    6.28  )-----------( 212      ( 26 Aug 2024 06:41 )             25.3     08-26    132<L>  |  93<L>  |  7<L>  ----------------------------<  173<H>  4.1   |  26  |  0.5<L>    Ca    8.8      26 Aug 2024 06:41  Mg     2.4     08-26    TPro  6.8  /  Alb  3.6  /  TBili  0.4  /  DBili  x   /  AST  9   /  ALT  7   /  AlkPhos  77  08-26      Urinalysis Basic - ( 26 Aug 2024 06:41 )    Color: x / Appearance: x / SG: x / pH: x  Gluc: 173 mg/dL / Ketone: x  / Bili: x / Urobili: x   Blood: x / Protein: x / Nitrite: x   Leuk Esterase: x / RBC: x / WBC x   Sq Epi: x / Non Sq Epi: x / Bacteria: x            Culture - Urine (collected 24 Aug 2024 16:32)  Source: Clean Catch Clean Catch (Midstream)  Final Report (26 Aug 2024 12:02):    >=3 organisms. Probable collection contamination.    Urinalysis with Rflx Culture (collected 24 Aug 2024 01:07)    Culture - Urine (collected 24 Aug 2024 01:07)  Source: Clean Catch None  Preliminary Report (26 Aug 2024 14:03):    >100,000 CFU/ml Escherichia coli    <10,000 CFU/ml Normal Urogenital perri present    Culture - Blood (collected 23 Aug 2024 22:47)  Source: .Blood Blood-Peripheral  Preliminary Report (26 Aug 2024 09:01):    No growth at 48 Hours    Culture - Blood (collected 23 Aug 2024 22:47)  Source: .Blood Blood-Peripheral  Preliminary Report (26 Aug 2024 09:01):    No growth at 48 Hours            RADIOLOGY    CT NECK and SOFT TISSUE 8/8/24  There is a bulky base of tongue necrotic mass measuring about 5.0 x 6.3 x   0.2 cm (AP by transverse by CC), displacing the oral tongue anteriorly.   The mass engulfs and causes destructive changes in the hyoid and thyroid   cartilages. There is complete loss of the supraglottic airway. There is   greater extension of the mass to the left submandibular region.    Patient has tracheostomy in place. There is no clear delineation of the   inferior portion of the tumor but likely involves the glottis without   subglottic extension.    There are several nonenlarged but suspicious lymph nodes in the left   neck, level 5 supraclavicular regions. The nodes measuring less than 1 cm   but are mildly enhancing. There is a right superior mediastinal node   adjacent to the esophagus measuring 1 cm.    Near complete opacification of the frontal, ethmoid, maxillary and   sphenoid sinuses demonstrated, likely obstructed. The nasopharynx is   opacified with secretions.    Upper lung fields included on the study. Please see CT scan chest report   August 7.    Visualized brain parenchyma unremarkable    Osseous structures unremarkable.     HPI:  58 y/o female with a PMH of IDDM, GERD, HTN, COPD,  Chronic hypoxemic and hypercapnic respiratory failure SP tracheostomy (on 6L at [place]) & PEG at [facility], DVT on Eliquis?, anxiety, Hx of supraglottic SSC s/p chemotherapy, RT, normocytic anemia of chronic disease (follows Dr Mejia), presenting from [place] [facility] for evaluation of altered mental status.     Son provided history in the ED. Patient has been behaving differently since yesterday.     Patient normally interacts using Ipad and hand gestures, however, has been minimally interactive. Son denies any known specific complaints.    In the ED:  - Vitals: T 37.2 HR 96 -42 SpO2 94% RR 16  - Labs; WBC 6.33 Hgb 7.8  Na 128 K 5.4 Cr 0.5   (24 Aug 2024 01:29)    Currently admitted to medicine with the primary diagnosis of Lethargy       Today is hospital day 3d.     INTERVAL HPI / OVERNIGHT EVENTS:  Patient was examined and seen at bedside. This morning she is resting in bed, no overnight events. Hemodynamically stable, tolerating PEG tube feeds, voiding appropriately, having BMs, on baseline level of oxygen without change in secretions.     ROS: Otherwise unremarkable     PAST MEDICAL & SURGICAL HISTORY  COPD (chronic obstructive pulmonary disease)    CHF (congestive heart failure)    DM (diabetes mellitus)    H/O tracheostomy      ALLERGIES  penicillin (Swelling)    MEDICATIONS  STANDING MEDICATIONS  acetylcysteine 20%  Inhalation 3 milliLiter(s) Inhalation every 6 hours  albuterol/ipratropium for Nebulization 3 milliLiter(s) Nebulizer every 6 hours  amLODIPine   Tablet 10 milliGRAM(s) Oral daily  aspirin  chewable 81 milliGRAM(s) Oral daily  cefTRIAXone   IVPB      cefTRIAXone   IVPB 1000 milliGRAM(s) IV Intermittent every 24 hours  chlorhexidine 0.12% Liquid 15 milliLiter(s) Oral Mucosa two times a day  dextrose 5%. 1000 milliLiter(s) IV Continuous <Continuous>  enoxaparin Injectable 40 milliGRAM(s) SubCutaneous every 24 hours  fluticasone propionate 50 MICROgram(s)/spray Nasal Spray 1 Spray(s) Both Nostrils two times a day  gabapentin 600 milliGRAM(s) Oral three times a day  glycopyrrolate 2 milliGRAM(s) Oral daily  insulin glargine Injectable (LANTUS) 25 Unit(s) SubCutaneous at bedtime  insulin lispro (ADMELOG) corrective regimen sliding scale   SubCutaneous three times a day before meals  insulin lispro Injectable (ADMELOG) 6 Unit(s) SubCutaneous three times a day before meals  magnesium sulfate  IVPB 2 Gram(s) IV Intermittent every 12 hours  multivitamin/minerals/iron Oral Solution (CENTRUM) 15 milliLiter(s) Oral daily  polyethylene glycol 3350 17 Gram(s) Oral daily  scopolamine 1 mG/72 Hr(s) Patch 1 Patch Transdermal every 72 hours  senna 2 Tablet(s) Oral at bedtime  sodium chloride 1 Gram(s) Oral two times a day  sodium chloride 0.65% Nasal 1 Spray(s) Both Nostrils two times a day  zinc oxide 20% Ointment 1 Application(s) Topical daily    PRN MEDICATIONS  ALPRAZolam 0.5 milliGRAM(s) Oral every 8 hours PRN  dextrose Oral Gel 15 Gram(s) Oral once PRN  HYDROmorphone   Tablet 4 milliGRAM(s) Oral every 4 hours PRN    VITALS:  T(F): 98.3  HR: 80  BP: 149/66  RR: 18  SpO2: 96%    PHYSICAL EXAM  GEN: NAD, Resting comfortably in bed, cooperative, tired but arousable  HEENT: Protruding enlarged tongue w/ xerosis, nares patent, no cervical LAD. Tracheostomy noted. Difficult to examine the oral cavity.  PULM: b/l rhonchi, trach in place without secretions, no respiratory distress or wheezing  CVS: Regular rate and rhythm, S1-S2, no murmurs, heaves, thrills  ABD: Soft, non-tender, non-distended, no guarding, BS +, PEG tube in place without any surrounding erythema, discharge or erosions  EXT: b/l venous stasis, no cyanosis, moves all extremities, strength 5/5  NEURO: A&Ox3, no focal deficits, non verbal (given her mouth and tongue condition) but shakes heads to questions    LABS                        8.4    6.28  )-----------( 212      ( 26 Aug 2024 06:41 )             25.3     08-26    132<L>  |  93<L>  |  7<L>  ----------------------------<  173<H>  4.1   |  26  |  0.5<L>    Ca    8.8      26 Aug 2024 06:41  Mg     2.4     08-26    TPro  6.8  /  Alb  3.6  /  TBili  0.4  /  DBili  x   /  AST  9   /  ALT  7   /  AlkPhos  77  08-26      Urinalysis Basic - ( 26 Aug 2024 06:41 )    Color: x / Appearance: x / SG: x / pH: x  Gluc: 173 mg/dL / Ketone: x  / Bili: x / Urobili: x   Blood: x / Protein: x / Nitrite: x   Leuk Esterase: x / RBC: x / WBC x   Sq Epi: x / Non Sq Epi: x / Bacteria: x            Culture - Urine (collected 24 Aug 2024 16:32)  Source: Clean Catch Clean Catch (Midstream)  Final Report (26 Aug 2024 12:02):    >=3 organisms. Probable collection contamination.    Urinalysis with Rflx Culture (collected 24 Aug 2024 01:07)    Culture - Urine (collected 24 Aug 2024 01:07)  Source: Clean Catch None  Preliminary Report (26 Aug 2024 14:03):    >100,000 CFU/ml Escherichia coli    <10,000 CFU/ml Normal Urogenital perri present    Culture - Blood (collected 23 Aug 2024 22:47)  Source: .Blood Blood-Peripheral  Preliminary Report (26 Aug 2024 09:01):    No growth at 48 Hours    Culture - Blood (collected 23 Aug 2024 22:47)  Source: .Blood Blood-Peripheral  Preliminary Report (26 Aug 2024 09:01):    No growth at 48 Hours            RADIOLOGY    CT NECK and SOFT TISSUE 8/8/24  There is a bulky base of tongue necrotic mass measuring about 5.0 x 6.3 x   0.2 cm (AP by transverse by CC), displacing the oral tongue anteriorly.   The mass engulfs and causes destructive changes in the hyoid and thyroid   cartilages. There is complete loss of the supraglottic airway. There is   greater extension of the mass to the left submandibular region.    Patient has tracheostomy in place. There is no clear delineation of the   inferior portion of the tumor but likely involves the glottis without   subglottic extension.    There are several nonenlarged but suspicious lymph nodes in the left   neck, level 5 supraclavicular regions. The nodes measuring less than 1 cm   but are mildly enhancing. There is a right superior mediastinal node   adjacent to the esophagus measuring 1 cm.    Near complete opacification of the frontal, ethmoid, maxillary and   sphenoid sinuses demonstrated, likely obstructed. The nasopharynx is   opacified with secretions.    Upper lung fields included on the study. Please see CT scan chest report   August 7.    Visualized brain parenchyma unremarkable    Osseous structures unremarkable.

## 2024-08-26 NOTE — PROGRESS NOTE ADULT - ASSESSMENT
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 glossectomy  -Hem-Onc Dr Mejia, f/u hem onc note       #Hx of Anemia, sp recent 2 u of PRBC, cont to monitor, on AC    Hx of HTN, ASHD, cont meds  Hx of DM II monitor FS, insulin check A1C  Hx of permanent PEG, feeds glucerna  q 6 hrs, flush with water  Hx of chr hyponatremia, NaCl 1gm  via PEG BID  Hx of bedbound state, lower ext weakness, muscle atrophy, BL foot drop, freq off loading, decubiti prevention  Hx of anxiety, depression      MISC:  GI ppx:PPI    DVT ppx: on lovenox   Diet: tube feeds   Activity:ATT       #PENDING :   -f/u hemon , requires glossectomy ?  -on ceftrixone for cystitis

## 2024-08-27 LAB
ALBUMIN SERPL ELPH-MCNC: 3.7 G/DL — SIGNIFICANT CHANGE UP (ref 3.5–5.2)
ALP SERPL-CCNC: 75 U/L — SIGNIFICANT CHANGE UP (ref 30–115)
ALT FLD-CCNC: 7 U/L — SIGNIFICANT CHANGE UP (ref 0–41)
ANION GAP SERPL CALC-SCNC: 10 MMOL/L — SIGNIFICANT CHANGE UP (ref 7–14)
AST SERPL-CCNC: 7 U/L — SIGNIFICANT CHANGE UP (ref 0–41)
BASOPHILS # BLD AUTO: 0.02 K/UL — SIGNIFICANT CHANGE UP (ref 0–0.2)
BASOPHILS NFR BLD AUTO: 0.3 % — SIGNIFICANT CHANGE UP (ref 0–1)
BILIRUB SERPL-MCNC: <0.2 MG/DL — SIGNIFICANT CHANGE UP (ref 0.2–1.2)
BUN SERPL-MCNC: 10 MG/DL — SIGNIFICANT CHANGE UP (ref 10–20)
CALCIUM SERPL-MCNC: 8.9 MG/DL — SIGNIFICANT CHANGE UP (ref 8.4–10.4)
CHLORIDE SERPL-SCNC: 94 MMOL/L — LOW (ref 98–110)
CO2 SERPL-SCNC: 27 MMOL/L — SIGNIFICANT CHANGE UP (ref 17–32)
CREAT SERPL-MCNC: 0.5 MG/DL — LOW (ref 0.7–1.5)
EGFR: 107 ML/MIN/1.73M2 — SIGNIFICANT CHANGE UP
EOSINOPHIL # BLD AUTO: 0.16 K/UL — SIGNIFICANT CHANGE UP (ref 0–0.7)
EOSINOPHIL NFR BLD AUTO: 2.4 % — SIGNIFICANT CHANGE UP (ref 0–8)
GLUCOSE BLDC GLUCOMTR-MCNC: 111 MG/DL — HIGH (ref 70–99)
GLUCOSE BLDC GLUCOMTR-MCNC: 124 MG/DL — HIGH (ref 70–99)
GLUCOSE BLDC GLUCOMTR-MCNC: 201 MG/DL — HIGH (ref 70–99)
GLUCOSE BLDC GLUCOMTR-MCNC: 246 MG/DL — HIGH (ref 70–99)
GLUCOSE SERPL-MCNC: 137 MG/DL — HIGH (ref 70–99)
HCT VFR BLD CALC: 26.4 % — LOW (ref 37–47)
HGB BLD-MCNC: 8.5 G/DL — LOW (ref 12–16)
IMM GRANULOCYTES NFR BLD AUTO: 0.5 % — HIGH (ref 0.1–0.3)
LYMPHOCYTES # BLD AUTO: 0.51 K/UL — LOW (ref 1.2–3.4)
LYMPHOCYTES # BLD AUTO: 7.7 % — LOW (ref 20.5–51.1)
MAGNESIUM SERPL-MCNC: 2.2 MG/DL — SIGNIFICANT CHANGE UP (ref 1.8–2.4)
MCHC RBC-ENTMCNC: 29.3 PG — SIGNIFICANT CHANGE UP (ref 27–31)
MCHC RBC-ENTMCNC: 32.2 G/DL — SIGNIFICANT CHANGE UP (ref 32–37)
MCV RBC AUTO: 91 FL — SIGNIFICANT CHANGE UP (ref 81–99)
MONOCYTES # BLD AUTO: 0.55 K/UL — SIGNIFICANT CHANGE UP (ref 0.1–0.6)
MONOCYTES NFR BLD AUTO: 8.3 % — SIGNIFICANT CHANGE UP (ref 1.7–9.3)
NEUTROPHILS # BLD AUTO: 5.38 K/UL — SIGNIFICANT CHANGE UP (ref 1.4–6.5)
NEUTROPHILS NFR BLD AUTO: 80.8 % — HIGH (ref 42.2–75.2)
NRBC # BLD: 0 /100 WBCS — SIGNIFICANT CHANGE UP (ref 0–0)
PLATELET # BLD AUTO: 237 K/UL — SIGNIFICANT CHANGE UP (ref 130–400)
PMV BLD: 8.5 FL — SIGNIFICANT CHANGE UP (ref 7.4–10.4)
POTASSIUM SERPL-MCNC: 4.4 MMOL/L — SIGNIFICANT CHANGE UP (ref 3.5–5)
POTASSIUM SERPL-SCNC: 4.4 MMOL/L — SIGNIFICANT CHANGE UP (ref 3.5–5)
PROT SERPL-MCNC: 6.9 G/DL — SIGNIFICANT CHANGE UP (ref 6–8)
RBC # BLD: 2.9 M/UL — LOW (ref 4.2–5.4)
RBC # FLD: 14.1 % — SIGNIFICANT CHANGE UP (ref 11.5–14.5)
SODIUM SERPL-SCNC: 131 MMOL/L — LOW (ref 135–146)
WBC # BLD: 6.65 K/UL — SIGNIFICANT CHANGE UP (ref 4.8–10.8)
WBC # FLD AUTO: 6.65 K/UL — SIGNIFICANT CHANGE UP (ref 4.8–10.8)

## 2024-08-27 RX ADMIN — HYDROMORPHONE HYDROCHLORIDE 4 MILLIGRAM(S): 2 TABLET ORAL at 01:00

## 2024-08-27 RX ADMIN — ENOXAPARIN SODIUM 40 MILLIGRAM(S): 100 INJECTION SUBCUTANEOUS at 21:50

## 2024-08-27 RX ADMIN — GLYCOPYRROLATE 2 MILLIGRAM(S): 0.2 INJECTION INTRAMUSCULAR; INTRAVENOUS at 13:39

## 2024-08-27 RX ADMIN — Medication 4: at 08:09

## 2024-08-27 RX ADMIN — IPRATROPIUM BROMIDE AND ALBUTEROL SULFATE 3 MILLILITER(S): .5; 3 SOLUTION RESPIRATORY (INHALATION) at 20:39

## 2024-08-27 RX ADMIN — Medication 600 MILLIGRAM(S): at 21:54

## 2024-08-27 RX ADMIN — SODIUM CHLORIDE 1 GRAM(S): 9 INJECTION INTRAMUSCULAR; INTRAVENOUS; SUBCUTANEOUS at 05:51

## 2024-08-27 RX ADMIN — POLYETHYLENE GLYCOL 3350 17 GRAM(S): 17 POWDER, FOR SOLUTION ORAL at 12:20

## 2024-08-27 RX ADMIN — AMLODIPINE BESYLATE 10 MILLIGRAM(S): 10 TABLET ORAL at 05:51

## 2024-08-27 RX ADMIN — HYDROMORPHONE HYDROCHLORIDE 4 MILLIGRAM(S): 2 TABLET ORAL at 08:45

## 2024-08-27 RX ADMIN — HYDROMORPHONE HYDROCHLORIDE 4 MILLIGRAM(S): 2 TABLET ORAL at 12:20

## 2024-08-27 RX ADMIN — Medication 600 MILLIGRAM(S): at 05:51

## 2024-08-27 RX ADMIN — Medication 6 UNIT(S): at 08:10

## 2024-08-27 RX ADMIN — IPRATROPIUM BROMIDE AND ALBUTEROL SULFATE 3 MILLILITER(S): .5; 3 SOLUTION RESPIRATORY (INHALATION) at 14:28

## 2024-08-27 RX ADMIN — Medication 15 MILLILITER(S): at 12:34

## 2024-08-27 RX ADMIN — Medication 6 UNIT(S): at 18:39

## 2024-08-27 RX ADMIN — FLUTICASONE PROPIONATE 1 SPRAY(S): 50 SPRAY, METERED NASAL at 18:44

## 2024-08-27 RX ADMIN — Medication 4: at 18:37

## 2024-08-27 RX ADMIN — Medication 1 SPRAY(S): at 18:45

## 2024-08-27 RX ADMIN — HYDROMORPHONE HYDROCHLORIDE 4 MILLIGRAM(S): 2 TABLET ORAL at 18:43

## 2024-08-27 RX ADMIN — Medication 81 MILLIGRAM(S): at 12:20

## 2024-08-27 RX ADMIN — Medication 0.5 MILLIGRAM(S): at 08:09

## 2024-08-27 RX ADMIN — IPRATROPIUM BROMIDE AND ALBUTEROL SULFATE 3 MILLILITER(S): .5; 3 SOLUTION RESPIRATORY (INHALATION) at 01:30

## 2024-08-27 RX ADMIN — CHLORHEXIDINE GLUCONATE 15 MILLILITER(S): 40 SOLUTION TOPICAL at 18:40

## 2024-08-27 RX ADMIN — FLUTICASONE PROPIONATE 1 SPRAY(S): 50 SPRAY, METERED NASAL at 05:50

## 2024-08-27 RX ADMIN — Medication 100 MILLIGRAM(S): at 15:13

## 2024-08-27 RX ADMIN — Medication 3 MILLILITER(S): at 01:30

## 2024-08-27 RX ADMIN — CHLORHEXIDINE GLUCONATE 15 MILLILITER(S): 40 SOLUTION TOPICAL at 05:51

## 2024-08-27 RX ADMIN — HYDROMORPHONE HYDROCHLORIDE 4 MILLIGRAM(S): 2 TABLET ORAL at 19:13

## 2024-08-27 RX ADMIN — Medication 1 PATCH: at 12:42

## 2024-08-27 RX ADMIN — Medication 1 PATCH: at 19:00

## 2024-08-27 RX ADMIN — INSULIN GLARGINE 25 UNIT(S): 100 INJECTION, SOLUTION SUBCUTANEOUS at 21:51

## 2024-08-27 RX ADMIN — Medication 2 TABLET(S): at 21:50

## 2024-08-27 RX ADMIN — HYDROMORPHONE HYDROCHLORIDE 4 MILLIGRAM(S): 2 TABLET ORAL at 08:09

## 2024-08-27 RX ADMIN — Medication 3 MILLILITER(S): at 07:59

## 2024-08-27 RX ADMIN — HYDROMORPHONE HYDROCHLORIDE 4 MILLIGRAM(S): 2 TABLET ORAL at 06:18

## 2024-08-27 RX ADMIN — Medication 25 GRAM(S): at 05:52

## 2024-08-27 RX ADMIN — Medication 600 MILLIGRAM(S): at 13:42

## 2024-08-27 RX ADMIN — Medication 0.5 MILLIGRAM(S): at 22:07

## 2024-08-27 RX ADMIN — Medication 25 GRAM(S): at 18:39

## 2024-08-27 RX ADMIN — ZINC OXIDE 1 APPLICATION(S): 100 OINTMENT TOPICAL at 12:42

## 2024-08-27 RX ADMIN — HYDROMORPHONE HYDROCHLORIDE 4 MILLIGRAM(S): 2 TABLET ORAL at 04:00

## 2024-08-27 RX ADMIN — Medication 3 MILLILITER(S): at 14:29

## 2024-08-27 RX ADMIN — Medication 1 SPRAY(S): at 05:50

## 2024-08-27 RX ADMIN — SODIUM CHLORIDE 1 GRAM(S): 9 INJECTION INTRAMUSCULAR; INTRAVENOUS; SUBCUTANEOUS at 18:40

## 2024-08-27 RX ADMIN — HYDROMORPHONE HYDROCHLORIDE 4 MILLIGRAM(S): 2 TABLET ORAL at 12:50

## 2024-08-27 RX ADMIN — Medication 3 MILLILITER(S): at 20:40

## 2024-08-27 RX ADMIN — IPRATROPIUM BROMIDE AND ALBUTEROL SULFATE 3 MILLILITER(S): .5; 3 SOLUTION RESPIRATORY (INHALATION) at 07:52

## 2024-08-27 NOTE — DIETITIAN INITIAL EVALUATION ADULT - NS FNS DIET ORDER
Diet, NPO:   Tube Feeding Modality: Gastro-Jejunostomy  Glucerna 1.2 Martin (GLUCERNARTH)  Total Volume for 24 Hours (mL): 1600  Bolus  Tube Feed Frequency: Every 6 hours   Tube Feed Start Time: 00:00  Bolus Feed Rate (mL per Hour): 200   Bolus Feed Duration (in Hours): 2  Free Water Flush   Total Volume per Flush (mL): 100   Total Volume of Bolus (mL):  400   Frequency: Every 6 Hours   Total Daily Volume of Flush (mL): 400 (08-24-24 @ 01:40) [Active]

## 2024-08-27 NOTE — DIETITIAN INITIAL EVALUATION ADULT - ENERGY INTAKE
Receiving tube feeding and tolerating @ goal rate  Per Nursing home chart, pt tube feed regimen of Glucerna (1.5 kcal ) 900 mL/day @ 80 mL/hr for 12.5 hours to provide 1350 kcals and meeting 90% of DRIs.

## 2024-08-27 NOTE — DIETITIAN INITIAL EVALUATION ADULT - NAME AND PHONE
Katie Love x [id number] or VIA teams	 Follow up in 3 days for high risk   -Interventions: EN recommendations, coordination of care Monitoring/evaluation: Glucose/A1c, POCT glucose,  labs to return WNL, Tube feed tolerance within 3-5 days, I/Os, BMs I have reviewed and confirmed nurses' notes... Katie Love x [id number] or VIA teams	 Follow up in 3-5 days for high risk     -Interventions: EN recommendations, coordination of care Monitoring/evaluation: Glucose/A1c, POCT glucose,  labs to return WNL, Tube feed tolerance within 3-5 days, I/Os, BMs

## 2024-08-27 NOTE — DIETITIAN INITIAL EVALUATION ADULT - ENTERAL
Consider changing EN regimen to Glucerna (1.2) 281 mL q6h to provide 1350 kcal and 66 grams Protein, 1289 mL of free water from formula + 400 mL flushes (50 mL pre/post feeds)= 1689 mL total water. Adjust free water flushes PRN.    Diet order shows pt has PEJ, however EMR states pt with chronic PEG. Please confirm.  Current Tube feed regimen at goal rate provides >105% of estimated needs. Consider changing EN regimen to Glucerna (1.2) 281 mL q6h to provide 1350 kcal and 66 grams Protein, 1289 mL of free water from formula + 400 mL flushes (50 mL pre/post feeds)= 1689 mL total water. Adjust free water flushes PRN.    Diet order shows pt has PEJ, however EMR states pt with chronic PEG. Please confirm.  Current Tube feed regimen at goal rate provides >105% of estimated needs. Consider changing EN regimen to Glucerna (1.2) 300 mL q6h to provide 1440 kcal and 71.8 grams Protein, 967 mL of free water from formula + 400 mL flushes (50 mL pre/post feeds)= 1367 mL total water. This will meet >85% and <105% of estimated needs.  Adjust free water flushes PRN.    Diet order shows pt has PEJ, however EMR states pt with chronic PEG. Please confirm.

## 2024-08-27 NOTE — DIETITIAN INITIAL EVALUATION ADULT - OTHER CALCULATIONS
Estimated Energy and protein needs with consideration for age, physical activity level, BMI, and present comorbidities.  Tube feed regimen at goal to provide 100% kcal & 100% protein needs at this time. Estimated Energy and protein needs with consideration for age, physical activity level, BMI, and present comorbidities.   Estimated Fluid requirements: 1 kcal / mL or per team recommendations.   Estimated Energy and protein needs with consideration for age, physical activity level, BMI, and present comorbidities.   Estimated Fluid requirements: 1 kcal / mL or per team recommendations.

## 2024-08-27 NOTE — DIETITIAN INITIAL EVALUATION ADULT - NSFNSGIIOFT_GEN_A_CORE
08-26-24 @ 07:01  -  08-27-24 @ 07:00  --------------------------------------------------------  IN: 1900 mL / OUT: 700 mL / NET: 1200 mL    Pt denies any N/V diarrhea or constipation x 24 hours. Last  8/26     08-26-24 @ 07:01  -  08-27-24 @ 07:00  --------------------------------------------------------  IN: 1900 mL / OUT: 700 mL / NET: 1200 mL

## 2024-08-27 NOTE — DIETITIAN INITIAL EVALUATION ADULT - PERTINENT LABORATORY DATA
08-27    131<L>  |  94<L>  |  10  ----------------------------<  137<H>  4.4   |  27  |  0.5<L>    Ca    8.9      27 Aug 2024 05:45  Mg     2.2     08-27    TPro  6.9  /  Alb  3.7  /  TBili  <0.2  /  DBili  x   /  AST  7   /  ALT  7   /  AlkPhos  75  08-27  POCT Blood Glucose.: 246 mg/dL (08-27-24 @ 07:43)  A1C with Estimated Average Glucose Result: 6.6 % (08-08-24 @ 07:22)

## 2024-08-27 NOTE — PROGRESS NOTE ADULT - ASSESSMENT
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 glossectomy  -Hem-Onc Dr Mejia  -Nares MRSA    Hx of Anemia, sp recent 2 u of PRBC, cont to monitor, on AC, may need another U, check iron panel    Hx of HTN, ASHD, cont meds  Hx of DM II monitor FS, insulin check A1C  Hx of permanent PEG, feeds glucerna  q 6 hrs, flush with water  Hx of chr hyponatremia, NaCl 1gm  via PEG BID  Hx of bedbound state, lower ext weakness, muscle atrophy, BL foot drop, freq off loading, decubiti prevention  Hx of anxiety, depression

## 2024-08-27 NOTE — PROGRESS NOTE ADULT - ASSESSMENT
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      #Cystitis  -U/A + for nitrites and bacteria, C&S P  -On Ceftriaxone 1gm IV q 24 started on 08/24       #Sinusitis :   -CT H reviewed no acute intracranial path but partially visualized sinuses show complete opacification  -repeat Ct scan showed  findings are essentially unchanged since the CT neck   dated 8/8/24.      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,  Lingual protrusion, progression of ca  #Permanent trach, 6L T tube  #Hx of COPD, ex tobacco use  -sp recent BL lingual aa embolization neurovasc surgery 8/13  -flush and suction nares, red bulb soft cath  -suction trach, send for C&S  -NEB tx with resp q 6hrs, monitor pulse ox  -ENT Dr Landaverde for pansinusitis and inc nasal/sinus/ trach secretions, BL Lingual embolization 8/13,   -Hem-Onc Dr Mejia as per Hem Onc note   -tongue size is not shrinking might need glossectomy       #Hx of Anemia, sp recent 2 u of PRBC, cont to monitor, on AC    Hx of HTN, ASHD, cont meds  Hx of DM II monitor FS, insulin check A1C  Hx of permanent PEG, feeds glucerna  q 6 hrs, flush with water  Hx of chr hyponatremia, NaCl 1gm  via PEG BID  Hx of bedbound state, lower ext weakness, muscle atrophy, BL foot drop, freq off loading, decubiti prevention  Hx of anxiety, depression      MISC:  GI ppx:PPI    DVT ppx: on lovenox   Diet: tube feeds   Activity:ATT       #PENDING :   -requires glossectomy ?  -on ceftriaxone for cystitis day 4 (total 7 days)

## 2024-08-27 NOTE — DIETITIAN INITIAL EVALUATION ADULT - NUTRITIONGOAL OUTCOME1
Pt to demonstrate tolerance to Nutrition Support Regimen, meeting >85% and <105% of estimated nutrient needs over next 3-5 days.

## 2024-08-27 NOTE — DIETITIAN INITIAL EVALUATION ADULT - OTHER INFO
59y F pmh IDDM, GERD, HTN, COPD on 6L,  Chronic hypoxemic and hypercapnic respiratory failure s/p tracheostomy and PEG, DVT on Eliquis, anxiety and stage IV supraglottic SSC s/p chemotherapy/RT, now recurrent, normocytic anemia of chronic disease, [facility]. Oncology consulted for management of SCC.     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

## 2024-08-27 NOTE — DIETITIAN INITIAL EVALUATION ADULT - ORAL INTAKE PTA/DIET HISTORY
Pt nonverbal. Pt arrived from [facility] with PEG tube. Per Nursing home chart, pt tube feed regimen of Glucerna (1.5 kcal ) 900 mL/day @ 80 mL/hr for 12.5 hours to provide 1350 kcals and meeting 90% of DRIs. NKFA.  Pt nonverbal with no family at bedside. Pt arrived from [facility] with PEG tube. NKFA.  Pt nonverbal but alert and able to demonstrate understanding of limited questions with head nod and hand gestures. No family at bedside. Pt arrived from [facility] with PEG tube.  Per Nursing home chart, pt tube feed regimen of Glucerna (1.5 kcal ) 900 mL/day @ 80 mL/hr for 12.5 hours to provide 1350 kcals and meeting 90% of DRIs. NKFA.

## 2024-08-27 NOTE — DIETITIAN INITIAL EVALUATION ADULT - PERTINENT MEDS FT
MEDICATIONS  (STANDING):  acetylcysteine 20%  Inhalation 3 milliLiter(s) Inhalation every 6 hours  albuterol/ipratropium for Nebulization 3 milliLiter(s) Nebulizer every 6 hours  amLODIPine   Tablet 10 milliGRAM(s) Oral daily  aspirin  chewable 81 milliGRAM(s) Oral daily  cefTRIAXone   IVPB      cefTRIAXone   IVPB 1000 milliGRAM(s) IV Intermittent every 24 hours  chlorhexidine 0.12% Liquid 15 milliLiter(s) Oral Mucosa two times a day  dextrose 5%. 1000 milliLiter(s) (50 mL/Hr) IV Continuous <Continuous>  enoxaparin Injectable 40 milliGRAM(s) SubCutaneous every 24 hours  fluticasone propionate 50 MICROgram(s)/spray Nasal Spray 1 Spray(s) Both Nostrils two times a day  gabapentin 600 milliGRAM(s) Oral three times a day  glycopyrrolate 2 milliGRAM(s) Oral daily  insulin glargine Injectable (LANTUS) 25 Unit(s) SubCutaneous at bedtime  insulin lispro (ADMELOG) corrective regimen sliding scale   SubCutaneous three times a day before meals  insulin lispro Injectable (ADMELOG) 6 Unit(s) SubCutaneous three times a day before meals  magnesium sulfate  IVPB 2 Gram(s) IV Intermittent every 12 hours  multivitamin/minerals/iron Oral Solution (CENTRUM) 15 milliLiter(s) Oral daily  polyethylene glycol 3350 17 Gram(s) Oral daily  scopolamine 1 mG/72 Hr(s) Patch 1 Patch Transdermal every 72 hours  senna 2 Tablet(s) Oral at bedtime  sodium chloride 1 Gram(s) Oral two times a day  sodium chloride 0.65% Nasal 1 Spray(s) Both Nostrils two times a day  zinc oxide 20% Ointment 1 Application(s) Topical daily    MEDICATIONS  (PRN):  ALPRAZolam 0.5 milliGRAM(s) Oral every 8 hours PRN Anxiety  dextrose Oral Gel 15 Gram(s) Oral once PRN Blood Glucose LESS THAN 70 milliGRAM(s)/deciliter  HYDROmorphone   Tablet 4 milliGRAM(s) Oral every 4 hours PRN Severe Pain (7 - 10)

## 2024-08-28 LAB
ALBUMIN SERPL ELPH-MCNC: 3.7 G/DL — SIGNIFICANT CHANGE UP (ref 3.5–5.2)
ALP SERPL-CCNC: 77 U/L — SIGNIFICANT CHANGE UP (ref 30–115)
ALT FLD-CCNC: 7 U/L — SIGNIFICANT CHANGE UP (ref 0–41)
ANION GAP SERPL CALC-SCNC: 11 MMOL/L — SIGNIFICANT CHANGE UP (ref 7–14)
AST SERPL-CCNC: 8 U/L — SIGNIFICANT CHANGE UP (ref 0–41)
BASOPHILS # BLD AUTO: 0.03 K/UL — SIGNIFICANT CHANGE UP (ref 0–0.2)
BASOPHILS NFR BLD AUTO: 0.4 % — SIGNIFICANT CHANGE UP (ref 0–1)
BILIRUB SERPL-MCNC: <0.2 MG/DL — SIGNIFICANT CHANGE UP (ref 0.2–1.2)
BUN SERPL-MCNC: 9 MG/DL — LOW (ref 10–20)
CALCIUM SERPL-MCNC: 9.2 MG/DL — SIGNIFICANT CHANGE UP (ref 8.4–10.5)
CHLORIDE SERPL-SCNC: 95 MMOL/L — LOW (ref 98–110)
CO2 SERPL-SCNC: 27 MMOL/L — SIGNIFICANT CHANGE UP (ref 17–32)
CREAT SERPL-MCNC: 0.5 MG/DL — LOW (ref 0.7–1.5)
EGFR: 107 ML/MIN/1.73M2 — SIGNIFICANT CHANGE UP
EOSINOPHIL # BLD AUTO: 0.16 K/UL — SIGNIFICANT CHANGE UP (ref 0–0.7)
EOSINOPHIL NFR BLD AUTO: 2.2 % — SIGNIFICANT CHANGE UP (ref 0–8)
GLUCOSE BLDC GLUCOMTR-MCNC: 119 MG/DL — HIGH (ref 70–99)
GLUCOSE BLDC GLUCOMTR-MCNC: 125 MG/DL — HIGH (ref 70–99)
GLUCOSE BLDC GLUCOMTR-MCNC: 241 MG/DL — HIGH (ref 70–99)
GLUCOSE SERPL-MCNC: 140 MG/DL — HIGH (ref 70–99)
HCT VFR BLD CALC: 26.9 % — LOW (ref 37–47)
HGB BLD-MCNC: 8.7 G/DL — LOW (ref 12–16)
IMM GRANULOCYTES NFR BLD AUTO: 0.4 % — HIGH (ref 0.1–0.3)
LYMPHOCYTES # BLD AUTO: 0.55 K/UL — LOW (ref 1.2–3.4)
LYMPHOCYTES # BLD AUTO: 7.6 % — LOW (ref 20.5–51.1)
MAGNESIUM SERPL-MCNC: 2.7 MG/DL — HIGH (ref 1.8–2.4)
MCHC RBC-ENTMCNC: 29.8 PG — SIGNIFICANT CHANGE UP (ref 27–31)
MCHC RBC-ENTMCNC: 32.3 G/DL — SIGNIFICANT CHANGE UP (ref 32–37)
MCV RBC AUTO: 92.1 FL — SIGNIFICANT CHANGE UP (ref 81–99)
MONOCYTES # BLD AUTO: 0.45 K/UL — SIGNIFICANT CHANGE UP (ref 0.1–0.6)
MONOCYTES NFR BLD AUTO: 6.2 % — SIGNIFICANT CHANGE UP (ref 1.7–9.3)
NEUTROPHILS # BLD AUTO: 6.06 K/UL — SIGNIFICANT CHANGE UP (ref 1.4–6.5)
NEUTROPHILS NFR BLD AUTO: 83.2 % — HIGH (ref 42.2–75.2)
NRBC # BLD: 0 /100 WBCS — SIGNIFICANT CHANGE UP (ref 0–0)
PLATELET # BLD AUTO: 248 K/UL — SIGNIFICANT CHANGE UP (ref 130–400)
PMV BLD: 8.7 FL — SIGNIFICANT CHANGE UP (ref 7.4–10.4)
POTASSIUM SERPL-MCNC: 4.4 MMOL/L — SIGNIFICANT CHANGE UP (ref 3.5–5)
POTASSIUM SERPL-SCNC: 4.4 MMOL/L — SIGNIFICANT CHANGE UP (ref 3.5–5)
PROT SERPL-MCNC: 6.9 G/DL — SIGNIFICANT CHANGE UP (ref 6–8)
RBC # BLD: 2.92 M/UL — LOW (ref 4.2–5.4)
RBC # FLD: 14.2 % — SIGNIFICANT CHANGE UP (ref 11.5–14.5)
SODIUM SERPL-SCNC: 133 MMOL/L — LOW (ref 135–146)
WBC # BLD: 7.28 K/UL — SIGNIFICANT CHANGE UP (ref 4.8–10.8)
WBC # FLD AUTO: 7.28 K/UL — SIGNIFICANT CHANGE UP (ref 4.8–10.8)

## 2024-08-28 RX ADMIN — IPRATROPIUM BROMIDE AND ALBUTEROL SULFATE 3 MILLILITER(S): .5; 3 SOLUTION RESPIRATORY (INHALATION) at 19:28

## 2024-08-28 RX ADMIN — IPRATROPIUM BROMIDE AND ALBUTEROL SULFATE 3 MILLILITER(S): .5; 3 SOLUTION RESPIRATORY (INHALATION) at 09:00

## 2024-08-28 RX ADMIN — Medication 25 GRAM(S): at 17:31

## 2024-08-28 RX ADMIN — Medication 81 MILLIGRAM(S): at 12:06

## 2024-08-28 RX ADMIN — Medication 4: at 12:19

## 2024-08-28 RX ADMIN — Medication 3 MILLILITER(S): at 09:00

## 2024-08-28 RX ADMIN — Medication 100 MILLIGRAM(S): at 15:39

## 2024-08-28 RX ADMIN — Medication 2 TABLET(S): at 21:26

## 2024-08-28 RX ADMIN — Medication 3 MILLILITER(S): at 19:28

## 2024-08-28 RX ADMIN — Medication 6 UNIT(S): at 17:24

## 2024-08-28 RX ADMIN — Medication 0.5 MILLIGRAM(S): at 06:56

## 2024-08-28 RX ADMIN — Medication 6 UNIT(S): at 08:21

## 2024-08-28 RX ADMIN — HYDROMORPHONE HYDROCHLORIDE 4 MILLIGRAM(S): 2 TABLET ORAL at 22:05

## 2024-08-28 RX ADMIN — Medication 600 MILLIGRAM(S): at 05:09

## 2024-08-28 RX ADMIN — Medication 3 MILLILITER(S): at 13:44

## 2024-08-28 RX ADMIN — IPRATROPIUM BROMIDE AND ALBUTEROL SULFATE 3 MILLILITER(S): .5; 3 SOLUTION RESPIRATORY (INHALATION) at 13:43

## 2024-08-28 RX ADMIN — Medication 600 MILLIGRAM(S): at 15:33

## 2024-08-28 RX ADMIN — GLYCOPYRROLATE 2 MILLIGRAM(S): 0.2 INJECTION INTRAMUSCULAR; INTRAVENOUS at 15:31

## 2024-08-28 RX ADMIN — CHLORHEXIDINE GLUCONATE 15 MILLILITER(S): 40 SOLUTION TOPICAL at 05:08

## 2024-08-28 RX ADMIN — FLUTICASONE PROPIONATE 1 SPRAY(S): 50 SPRAY, METERED NASAL at 17:30

## 2024-08-28 RX ADMIN — CHLORHEXIDINE GLUCONATE 15 MILLILITER(S): 40 SOLUTION TOPICAL at 17:28

## 2024-08-28 RX ADMIN — Medication 600 MILLIGRAM(S): at 21:26

## 2024-08-28 RX ADMIN — Medication 0.5 MILLIGRAM(S): at 23:42

## 2024-08-28 RX ADMIN — HYDROMORPHONE HYDROCHLORIDE 4 MILLIGRAM(S): 2 TABLET ORAL at 05:11

## 2024-08-28 RX ADMIN — FLUTICASONE PROPIONATE 1 SPRAY(S): 50 SPRAY, METERED NASAL at 05:08

## 2024-08-28 RX ADMIN — HYDROMORPHONE HYDROCHLORIDE 4 MILLIGRAM(S): 2 TABLET ORAL at 14:03

## 2024-08-28 RX ADMIN — POLYETHYLENE GLYCOL 3350 17 GRAM(S): 17 POWDER, FOR SOLUTION ORAL at 12:05

## 2024-08-28 RX ADMIN — SODIUM CHLORIDE 1 GRAM(S): 9 INJECTION INTRAMUSCULAR; INTRAVENOUS; SUBCUTANEOUS at 17:29

## 2024-08-28 RX ADMIN — Medication 1 SPRAY(S): at 05:08

## 2024-08-28 RX ADMIN — ENOXAPARIN SODIUM 40 MILLIGRAM(S): 100 INJECTION SUBCUTANEOUS at 21:26

## 2024-08-28 RX ADMIN — Medication 4: at 17:27

## 2024-08-28 RX ADMIN — Medication 0.5 MILLIGRAM(S): at 15:38

## 2024-08-28 RX ADMIN — Medication 6 UNIT(S): at 12:19

## 2024-08-28 RX ADMIN — SODIUM CHLORIDE 1 GRAM(S): 9 INJECTION INTRAMUSCULAR; INTRAVENOUS; SUBCUTANEOUS at 05:10

## 2024-08-28 RX ADMIN — AMLODIPINE BESYLATE 10 MILLIGRAM(S): 10 TABLET ORAL at 05:09

## 2024-08-28 RX ADMIN — Medication 25 GRAM(S): at 05:09

## 2024-08-28 RX ADMIN — Medication 1 SPRAY(S): at 17:30

## 2024-08-28 RX ADMIN — ZINC OXIDE 1 APPLICATION(S): 100 OINTMENT TOPICAL at 12:06

## 2024-08-28 RX ADMIN — INSULIN GLARGINE 25 UNIT(S): 100 INJECTION, SOLUTION SUBCUTANEOUS at 21:27

## 2024-08-28 RX ADMIN — Medication 15 MILLILITER(S): at 15:31

## 2024-08-28 RX ADMIN — HYDROMORPHONE HYDROCHLORIDE 4 MILLIGRAM(S): 2 TABLET ORAL at 13:33

## 2024-08-28 NOTE — PROGRESS NOTE ADULT - ASSESSMENT
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 glossectomy  -Hem-Onc Dr Mejia  -Nares MRSA    Hx of Anemia, sp recent 2 u of PRBC, cont to monitor, on AC, may need another U, check iron panel    Hx of HTN, ASHD, cont meds  Hx of DM II monitor FS, insulin check A1C  Hx of permanent PEG, feeds glucerna  q 6 hrs, flush with water  Hx of chr hyponatremia, NaCl 1gm  via PEG BID  Hx of bedbound state, lower ext weakness, muscle atrophy, BL foot drop, freq off loading, decubiti prevention  Hx of anxiety, depression

## 2024-08-28 NOTE — PROGRESS NOTE ADULT - ASSESSMENT
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      #Cystitis  -U/A + for nitrites and bacteria, C&S P  -On Ceftriaxone 1gm IV q 24 started on 08/24 , till 8/30       #Sinusitis :   -CT H reviewed no acute intracranial path but partially visualized sinuses show complete opacification  -repeat Ct scan showed  findings are essentially unchanged since the CT neck   dated 8/8/24.  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,  Lingual protrusion, progression of ca  #Permanent trach, 6L T tube  #Hx of COPD, ex tobacco use  -sp recent BL lingual aa embolization neurovasc surgery 8/13  -flush and suction nares, red bulb soft cath  -suction trach, send for C&S  -NEB tx with resp q 6hrs, monitor pulse ox  -ENT Dr Landaverde for pansinusitis and inc nasal/sinus/ trach secretions, BL Lingual embolization 8/13,   -Hem-Onc Dr Mejia as per Hem Onc note   -tongue size is not shrinking might need glossectomy   -f/u OMFS c/s for reccs   -pt on PEG feeds       #Hx of Anemia, sp recent 2 u of PRBC, cont to monitor, on AC    Hx of HTN, ASHD, cont meds  Hx of DM II monitor FS, insulin check A1C  Hx of permanent PEG, feeds glucerna  q 6 hrs, flush with water  Hx of chr hyponatremia, NaCl 1gm  via PEG BID  Hx of bedbound state, lower ext weakness, muscle atrophy, BL foot drop, freq off loading, decubiti prevention  Hx of anxiety, depression      MISC:  GI ppx:PPI    DVT ppx: on lovenox   Diet: tube feeds   Activity:ATT       #PENDING :   -On Ceftriaxone 1gm IV q 24 started on 08/24 , till 8/30   -f/u OMFS c/s for reccs   -pt on PEG feeds

## 2024-08-29 ENCOUNTER — APPOINTMENT (OUTPATIENT)
Age: 59
End: 2024-08-29

## 2024-08-29 LAB
ALBUMIN SERPL ELPH-MCNC: 3.7 G/DL — SIGNIFICANT CHANGE UP (ref 3.5–5.2)
ALP SERPL-CCNC: 73 U/L — SIGNIFICANT CHANGE UP (ref 30–115)
ALT FLD-CCNC: 5 U/L — SIGNIFICANT CHANGE UP (ref 0–41)
ANION GAP SERPL CALC-SCNC: 11 MMOL/L — SIGNIFICANT CHANGE UP (ref 7–14)
ANION GAP SERPL CALC-SCNC: 12 MMOL/L — SIGNIFICANT CHANGE UP (ref 7–14)
AST SERPL-CCNC: 7 U/L — SIGNIFICANT CHANGE UP (ref 0–41)
BASOPHILS # BLD AUTO: 0.02 K/UL — SIGNIFICANT CHANGE UP (ref 0–0.2)
BASOPHILS NFR BLD AUTO: 0.2 % — SIGNIFICANT CHANGE UP (ref 0–1)
BILIRUB SERPL-MCNC: 0.3 MG/DL — SIGNIFICANT CHANGE UP (ref 0.2–1.2)
BUN SERPL-MCNC: 10 MG/DL — SIGNIFICANT CHANGE UP (ref 10–20)
BUN SERPL-MCNC: 9 MG/DL — LOW (ref 10–20)
CALCIUM SERPL-MCNC: 8.9 MG/DL — SIGNIFICANT CHANGE UP (ref 8.4–10.5)
CALCIUM SERPL-MCNC: 9 MG/DL — SIGNIFICANT CHANGE UP (ref 8.4–10.5)
CHLORIDE SERPL-SCNC: 92 MMOL/L — LOW (ref 98–110)
CHLORIDE SERPL-SCNC: 95 MMOL/L — LOW (ref 98–110)
CO2 SERPL-SCNC: 25 MMOL/L — SIGNIFICANT CHANGE UP (ref 17–32)
CO2 SERPL-SCNC: 26 MMOL/L — SIGNIFICANT CHANGE UP (ref 17–32)
CREAT SERPL-MCNC: 0.5 MG/DL — LOW (ref 0.7–1.5)
CREAT SERPL-MCNC: 0.5 MG/DL — LOW (ref 0.7–1.5)
CULTURE RESULTS: SIGNIFICANT CHANGE UP
CULTURE RESULTS: SIGNIFICANT CHANGE UP
EGFR: 107 ML/MIN/1.73M2 — SIGNIFICANT CHANGE UP
EGFR: 107 ML/MIN/1.73M2 — SIGNIFICANT CHANGE UP
EOSINOPHIL # BLD AUTO: 0.16 K/UL — SIGNIFICANT CHANGE UP (ref 0–0.7)
EOSINOPHIL NFR BLD AUTO: 1.9 % — SIGNIFICANT CHANGE UP (ref 0–8)
GLUCOSE BLDC GLUCOMTR-MCNC: 136 MG/DL — HIGH (ref 70–99)
GLUCOSE BLDC GLUCOMTR-MCNC: 140 MG/DL — HIGH (ref 70–99)
GLUCOSE BLDC GLUCOMTR-MCNC: 149 MG/DL — HIGH (ref 70–99)
GLUCOSE BLDC GLUCOMTR-MCNC: 157 MG/DL — HIGH (ref 70–99)
GLUCOSE BLDC GLUCOMTR-MCNC: 219 MG/DL — HIGH (ref 70–99)
GLUCOSE SERPL-MCNC: 126 MG/DL — HIGH (ref 70–99)
GLUCOSE SERPL-MCNC: 145 MG/DL — HIGH (ref 70–99)
HCT VFR BLD CALC: 25.3 % — LOW (ref 37–47)
HGB BLD-MCNC: 8.2 G/DL — LOW (ref 12–16)
IMM GRANULOCYTES NFR BLD AUTO: 0.5 % — HIGH (ref 0.1–0.3)
LYMPHOCYTES # BLD AUTO: 0.59 K/UL — LOW (ref 1.2–3.4)
LYMPHOCYTES # BLD AUTO: 7 % — LOW (ref 20.5–51.1)
MAGNESIUM SERPL-MCNC: 2.1 MG/DL — SIGNIFICANT CHANGE UP (ref 1.8–2.4)
MCHC RBC-ENTMCNC: 29.9 PG — SIGNIFICANT CHANGE UP (ref 27–31)
MCHC RBC-ENTMCNC: 32.4 G/DL — SIGNIFICANT CHANGE UP (ref 32–37)
MCV RBC AUTO: 92.3 FL — SIGNIFICANT CHANGE UP (ref 81–99)
MONOCYTES # BLD AUTO: 0.47 K/UL — SIGNIFICANT CHANGE UP (ref 0.1–0.6)
MONOCYTES NFR BLD AUTO: 5.5 % — SIGNIFICANT CHANGE UP (ref 1.7–9.3)
NEUTROPHILS # BLD AUTO: 7.2 K/UL — HIGH (ref 1.4–6.5)
NEUTROPHILS NFR BLD AUTO: 84.9 % — HIGH (ref 42.2–75.2)
NRBC # BLD: 0 /100 WBCS — SIGNIFICANT CHANGE UP (ref 0–0)
PLATELET # BLD AUTO: 256 K/UL — SIGNIFICANT CHANGE UP (ref 130–400)
PMV BLD: 8.7 FL — SIGNIFICANT CHANGE UP (ref 7.4–10.4)
POTASSIUM SERPL-MCNC: 4.3 MMOL/L — SIGNIFICANT CHANGE UP (ref 3.5–5)
POTASSIUM SERPL-MCNC: 5.1 MMOL/L — HIGH (ref 3.5–5)
POTASSIUM SERPL-SCNC: 4.3 MMOL/L — SIGNIFICANT CHANGE UP (ref 3.5–5)
POTASSIUM SERPL-SCNC: 5.1 MMOL/L — HIGH (ref 3.5–5)
PROT SERPL-MCNC: 6.6 G/DL — SIGNIFICANT CHANGE UP (ref 6–8)
RBC # BLD: 2.74 M/UL — LOW (ref 4.2–5.4)
RBC # FLD: 14.6 % — HIGH (ref 11.5–14.5)
SODIUM SERPL-SCNC: 129 MMOL/L — LOW (ref 135–146)
SODIUM SERPL-SCNC: 132 MMOL/L — LOW (ref 135–146)
SPECIMEN SOURCE: SIGNIFICANT CHANGE UP
SPECIMEN SOURCE: SIGNIFICANT CHANGE UP
WBC # BLD: 8.48 K/UL — SIGNIFICANT CHANGE UP (ref 4.8–10.8)
WBC # FLD AUTO: 8.48 K/UL — SIGNIFICANT CHANGE UP (ref 4.8–10.8)

## 2024-08-29 RX ORDER — SODIUM CHLORIDE 9 MG/ML
1 INJECTION INTRAMUSCULAR; INTRAVENOUS; SUBCUTANEOUS THREE TIMES A DAY
Refills: 0 | Status: DISCONTINUED | OUTPATIENT
Start: 2024-08-29 | End: 2024-08-30

## 2024-08-29 RX ORDER — SODIUM CHLORIDE 9 MG/ML
2 INJECTION INTRAMUSCULAR; INTRAVENOUS; SUBCUTANEOUS
Refills: 0 | Status: DISCONTINUED | OUTPATIENT
Start: 2024-08-29 | End: 2024-08-29

## 2024-08-29 RX ORDER — SODIUM CHLORIDE 9 MG/ML
1000 INJECTION INTRAMUSCULAR; INTRAVENOUS; SUBCUTANEOUS
Refills: 0 | Status: DISCONTINUED | OUTPATIENT
Start: 2024-08-29 | End: 2024-08-29

## 2024-08-29 RX ADMIN — FLUTICASONE PROPIONATE 1 SPRAY(S): 50 SPRAY, METERED NASAL at 17:21

## 2024-08-29 RX ADMIN — Medication 0.5 MILLIGRAM(S): at 08:15

## 2024-08-29 RX ADMIN — Medication 6 UNIT(S): at 12:11

## 2024-08-29 RX ADMIN — CHLORHEXIDINE GLUCONATE 15 MILLILITER(S): 40 SOLUTION TOPICAL at 17:20

## 2024-08-29 RX ADMIN — Medication 1 SPRAY(S): at 06:16

## 2024-08-29 RX ADMIN — Medication 3 MILLILITER(S): at 13:54

## 2024-08-29 RX ADMIN — Medication 25 GRAM(S): at 06:16

## 2024-08-29 RX ADMIN — Medication 600 MILLIGRAM(S): at 13:22

## 2024-08-29 RX ADMIN — FLUTICASONE PROPIONATE 1 SPRAY(S): 50 SPRAY, METERED NASAL at 06:16

## 2024-08-29 RX ADMIN — HYDROMORPHONE HYDROCHLORIDE 4 MILLIGRAM(S): 2 TABLET ORAL at 12:15

## 2024-08-29 RX ADMIN — Medication 81 MILLIGRAM(S): at 12:12

## 2024-08-29 RX ADMIN — Medication 0.5 MILLIGRAM(S): at 23:12

## 2024-08-29 RX ADMIN — IPRATROPIUM BROMIDE AND ALBUTEROL SULFATE 3 MILLILITER(S): .5; 3 SOLUTION RESPIRATORY (INHALATION) at 20:50

## 2024-08-29 RX ADMIN — GLYCOPYRROLATE 2 MILLIGRAM(S): 0.2 INJECTION INTRAMUSCULAR; INTRAVENOUS at 12:14

## 2024-08-29 RX ADMIN — Medication 6 UNIT(S): at 17:19

## 2024-08-29 RX ADMIN — INSULIN GLARGINE 25 UNIT(S): 100 INJECTION, SOLUTION SUBCUTANEOUS at 21:54

## 2024-08-29 RX ADMIN — Medication 15 MILLILITER(S): at 12:12

## 2024-08-29 RX ADMIN — ENOXAPARIN SODIUM 40 MILLIGRAM(S): 100 INJECTION SUBCUTANEOUS at 21:56

## 2024-08-29 RX ADMIN — Medication 1 SPRAY(S): at 17:21

## 2024-08-29 RX ADMIN — HYDROMORPHONE HYDROCHLORIDE 4 MILLIGRAM(S): 2 TABLET ORAL at 19:01

## 2024-08-29 RX ADMIN — Medication 6 UNIT(S): at 08:14

## 2024-08-29 RX ADMIN — Medication 100 MILLIGRAM(S): at 17:19

## 2024-08-29 RX ADMIN — Medication 25 GRAM(S): at 17:20

## 2024-08-29 RX ADMIN — IPRATROPIUM BROMIDE AND ALBUTEROL SULFATE 3 MILLILITER(S): .5; 3 SOLUTION RESPIRATORY (INHALATION) at 08:15

## 2024-08-29 RX ADMIN — HYDROMORPHONE HYDROCHLORIDE 4 MILLIGRAM(S): 2 TABLET ORAL at 23:12

## 2024-08-29 RX ADMIN — ZINC OXIDE 1 APPLICATION(S): 100 OINTMENT TOPICAL at 12:14

## 2024-08-29 RX ADMIN — AMLODIPINE BESYLATE 10 MILLIGRAM(S): 10 TABLET ORAL at 06:15

## 2024-08-29 RX ADMIN — HYDROMORPHONE HYDROCHLORIDE 4 MILLIGRAM(S): 2 TABLET ORAL at 06:47

## 2024-08-29 RX ADMIN — IPRATROPIUM BROMIDE AND ALBUTEROL SULFATE 3 MILLILITER(S): .5; 3 SOLUTION RESPIRATORY (INHALATION) at 13:54

## 2024-08-29 RX ADMIN — CHLORHEXIDINE GLUCONATE 15 MILLILITER(S): 40 SOLUTION TOPICAL at 06:17

## 2024-08-29 RX ADMIN — Medication 600 MILLIGRAM(S): at 06:15

## 2024-08-29 RX ADMIN — POLYETHYLENE GLYCOL 3350 17 GRAM(S): 17 POWDER, FOR SOLUTION ORAL at 12:15

## 2024-08-29 RX ADMIN — SODIUM CHLORIDE 1 GRAM(S): 9 INJECTION INTRAMUSCULAR; INTRAVENOUS; SUBCUTANEOUS at 06:15

## 2024-08-29 RX ADMIN — Medication 600 MILLIGRAM(S): at 21:55

## 2024-08-29 RX ADMIN — Medication 3 MILLILITER(S): at 20:50

## 2024-08-29 RX ADMIN — Medication 3 MILLILITER(S): at 08:16

## 2024-08-29 RX ADMIN — SODIUM CHLORIDE 1 GRAM(S): 9 INJECTION INTRAMUSCULAR; INTRAVENOUS; SUBCUTANEOUS at 21:56

## 2024-08-29 NOTE — PROGRESS NOTE ADULT - ASSESSMENT
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      #Cystitis  -U/A + for nitrites and bacteria, C&S P  -On Ceftriaxone 1gm IV q 24 started on 08/24 , till 8/30 \    #HYPONATREMIA :   -sodium trending down   PLAN   -encourage fluid flushes via peg   -Increase Na1 g to 2g BID   -f/u repeat BMP for sodium @8 pm       #Sinusitis :   -CT H reviewed no acute intracranial path but partially visualized sinuses show complete opacification  -repeat Ct scan showed  findings are essentially unchanged since the CT neck   dated 8/8/24.  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,  Lingual protrusion, progression of ca  #Permanent trach, 6L T tube  #Hx of COPD, ex tobacco use  -sp recent BL lingual aa embolization neurovasc surgery 8/13  -flush and suction nares, red bulb soft cath  -suction trach, send for C&S  -NEB tx with resp q 6hrs, monitor pulse ox  -ENT Dr Landaverde for pansinusitis and inc nasal/sinus/ trach secretions, BL Lingual embolization 8/13,   -Hem-Onc Dr Mejia as per Hem Onc note   -tongue size is not shrinking might need glossectomy   -f/u OMFS c/s for reccs   -pt on PEG feeds       #Hx of Anemia, sp recent 2 u of PRBC, cont to monitor, on AC    Hx of HTN, ASHD, cont meds  Hx of DM II monitor FS, insulin check A1C  Hx of permanent PEG, feeds glucerna  q 6 hrs, flush with water  Hx of chr hyponatremia, NaCl 1gm  via PEG BID  Hx of bedbound state, lower ext weakness, muscle atrophy, BL foot drop, freq off loading, decubiti prevention  Hx of anxiety, depression      MISC:  GI ppx:PPI    DVT ppx: on lovenox   Diet: tube feeds   Activity:ATT       #PENDING :   -On Ceftriaxone 1gm IV q 24 started on 08/24 , till 8/30   -f/u OMFS c/s for reccs   -pt on PEG feeds   -encourage fluid flushes via peg   -Increase Na1 g to 2g BID   -f/u repeat BMP for sodium @8 pm

## 2024-08-29 NOTE — PROGRESS NOTE ADULT - SUBJECTIVE AND OBJECTIVE BOX
CRUZ DUMONT 59y Female chr resident ot [place] sent to the ER for change of MS, as per son pt less alert, less interactive and mor e physically and mentally sluggish.  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  Normally nonverbal but v alert and communicates through IPAD.  Of note the pt was recently in the hospital for low H/H 6.8/21 received 2 u PRBCs, evaluated by ENT for nasal and trach secretions and enlarged anteriorly protruding tongue and underwent BL lingual aa embolization with nerovascular surgery. The pt is on Eliquis 2.5mg, and on ad had CTH which showed no acute intracranial path but almost completely opacified sinuses, u/a shows bacteria and + nitrites.  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   The pt is cultured and will be place on ceftrixone, CT dedicated to the sinuses ordered and  ENT consult placed.    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, sp chemo, sp RT, grossly enlarged protruding tongue, sp BL lingual aa embolization 8/13, COPD, Hx of DVT, AC/Eliquis, sp PEG, V insuff, bedbound state, chr hyponatremia, DM II, chr constipation   EBNT:  Dr Landaverde, Hem-Onc:  Dr elizondo    INTERVAL HPI/OVERNIGHT EVENTS: pt comfortable, afebrile, on Ceftriaxone, WBC 8, cont issue with gross tongue protrusion due to probable progression of supraglottic to BOT ca, recent 8/13 BL lingual aa embolization, needs longer period of time for results to be noted, + hyponatremia 129, NaCl 1g, TID with enteral feeds    MEDICATIONS  (STANDING):  acetylcysteine 20%  Inhalation 3 milliLiter(s) Inhalation every 6 hours  albuterol/ipratropium for Nebulization 3 milliLiter(s) Nebulizer every 6 hours  amLODIPine   Tablet 10 milliGRAM(s) Oral daily  aspirin  chewable 81 milliGRAM(s) Oral daily  chlorhexidine 0.12% Liquid 15 milliLiter(s) Oral Mucosa two times a day  dextrose 5%. 1000 milliLiter(s) (50 mL/Hr) IV Continuous <Continuous>  enoxaparin Injectable 40 milliGRAM(s) SubCutaneous every 24 hours  fluticasone propionate 50 MICROgram(s)/spray Nasal Spray 1 Spray(s) Both Nostrils two times a day  gabapentin 600 milliGRAM(s) Oral three times a day  insulin glargine Injectable (LANTUS) 25 Unit(s) SubCutaneous at bedtime  insulin lispro (ADMELOG) corrective regimen sliding scale   SubCutaneous three times a day before meals  insulin lispro Injectable (ADMELOG) 6 Unit(s) SubCutaneous three times a day before meals  multivitamin/minerals/iron Oral Solution (CENTRUM) 15 milliLiter(s) Oral daily  polyethylene glycol 3350 17 Gram(s) Oral daily  scopolamine 1 mG/72 Hr(s) Patch 1 Patch Transdermal every 72 hours  senna 2 Tablet(s) Oral at bedtime  sodium chloride 0.65% Nasal 1 Spray(s) Both Nostrils two times a day  zinc oxide 20% Ointment 1 Application(s) Topical daily    MEDICATIONS  (PRN):  ALPRAZolam 0.5 milliGRAM(s) Oral every 8 hours PRN Anxiety  dextrose Oral Gel 15 Gram(s) Oral once PRN Blood Glucose LESS THAN 70 milliGRAM(s)/deciliter  HYDROmorphone   Tablet 4 milliGRAM(s) Oral every 4 hours PRN Severe Pain (7 - 10)      Allergies    penicillin (Swelling)    Intolerances      Vital Signs Last 24 Hrs    T(F): 97.8  HR:  81  BP: 129/62    RR:  18  SpO2: 97%  T piece, 30%  O2 Concentration (%): 30      PHYSICAL EXAM:      Constitutional: A&O and appears at baseline,  bedbound, chr ill looking, nonverbal, + IPAD,  on T tube    Eyes: nonicteric    ENMT: grossly enlarged tongue protruding from oral cavity, no bleeding    Neck: short, thick, supple, no stridor, + trach.T tube    Back: mild Th kyphosis    Respiratory: shallow resp, scattered rhonchi, no crackles/rales/wheezing    Cardiovascular: S1S2 reg    Gastrointestinal: globose, + PEG    Genitourinary: no wiseman    Extremities: moves upper ext, lower ext dec motor function, + mm atrophy, hyperpigmented skin changes, BL foot drop    Vascular: dec pedal pulses    Skin: no rash    Lymph Nodes: not enlarged    Psychiatric: anxious but stable        LABS:                      8.2  8.4 )-----------( 256             25       129  |  92  |  9  ----------------------------<  126  4.3   |  26  |  0.5    Ca    8.8       Mg     1.6, 2.4, 2.2, 2., 2.1    TPro  6.8  /  Alb  3.6  /  TBili  0.4  /  DBili  x   /  AST  8   /  ALT  7   /  AlkPhos  81  08-24    PT/INR - ( 23 Aug 2024 22:47 )   PT: 10.30 sec;   INR: 0.90 ratio         PTT - ( 23 Aug 2024 22:47 )  PTT:35.2 sec  Urinalysis Basic - ( 24 Aug 2024 10:48 )    Color: x / Appearance: x / SG: x / pH: x  Gluc: 201 mg/dL / Ketone: x  / Bili: x / Urobili: x   Blood: x / Protein: x / Nitrite: x   Leuk Esterase: x / RBC: x / WBC x   Sq Epi: x / Non Sq Epi: x / Bacteria: x        RADIOLOGY & ADDITIONAL TESTS:  CXR:  no consolidation  CTH:  white matter changes, no acute intracranial path, no depressed skull fx, partially imaged near completely opacified sinuses    CT abd/pelvis:  liver, ile ducts, GB, spleen, pancreas WNL, redemonstrated nodular thickening  of L adrenal, stable punctate R nonobs calculus, bladder WNL,, mild presacral edema, no bowel obs, no LN, + gastrostomy tube, atherosclerotic vessels, deg changes, no evidence of met dis    CT sinuses with IVC:  there is essentially complete opacification of the paranasal sinuses and mastoids/middle ear cavities, infiltrative changes within the L parotid and  spaces, redemnstration of bulky tongue mass extending into the oropharynx with complete luminal obs of the oral airway, there is complete opacification of the nasopharynx  
  CRUZ DUMONT 59y Female chr resident ot [place] sent to the ER for change of MS, as per son pt less alert, less interactive and mor e physically and mentally sluggish.  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  Normally nonverbal but v alert and communicates through IPAD.  Of note the pt was recently in the hospital for low H/H 6.8/21 received 2 u PRBCs, evaluated by ENT for nasal and trach secretions and enlarged anteriorly protruding tongue and underwent BL lingual aa embolization with nerovascular surgery. The pt is on Eliquis 2.5mg, and on ad had CTH which showed no acute intracranial path but almost completely opacified sinuses, u/a shows bacteria and + nitrites.  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   The pt is cultured and will be place on ceftrixone, CT dedicated to the sinuses ordered and  ENT consult placed.    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, sp chemo, sp RT, grossly enlarged protruding tongue, sp BL lingual aa embolization 8/13, COPD, Hx of DVT, AC/Eliquis, sp PEG, V insuff, bedbound state, chr hyponatremia, DM II, chr constipation   EBNT:  Dr Landaverde, Hem-Onc:  Dr elizondo    INTERVAL HPI/OVERNIGHT EVENTS: pt kavya at base line,  CTH shows almost complete sinus opacification, dedicated CT of sinuses done, results P,  u/a + nitrites and bacteria, cultured,  started Ceftriaxone, adequate coverage for both pansinusitis and cystitis,  ENT consult for sinus/trach congestion, sp BL Lingual aa embolization  8/13, ques to ENT any shrinkage of tongue ?  and if embolization is not successful is glossectomy an option in this unfortunate situation, suction nares with soft red bulb cath, nasal saline, suction trach, trial of scopolamine to dec secretions    MEDICATIONS  (STANDING):  acetylcysteine 20%  Inhalation 3 milliLiter(s) Inhalation every 6 hours  albuterol/ipratropium for Nebulization 3 milliLiter(s) Nebulizer every 6 hours  amLODIPine   Tablet 10 milliGRAM(s) Oral daily  aspirin  chewable 81 milliGRAM(s) Oral daily  chlorhexidine 0.12% Liquid 15 milliLiter(s) Oral Mucosa two times a day  dextrose 5%. 1000 milliLiter(s) (50 mL/Hr) IV Continuous <Continuous>  enoxaparin Injectable 40 milliGRAM(s) SubCutaneous every 24 hours  fluticasone propionate 50 MICROgram(s)/spray Nasal Spray 1 Spray(s) Both Nostrils two times a day  gabapentin 600 milliGRAM(s) Oral three times a day  insulin glargine Injectable (LANTUS) 25 Unit(s) SubCutaneous at bedtime  insulin lispro (ADMELOG) corrective regimen sliding scale   SubCutaneous three times a day before meals  insulin lispro Injectable (ADMELOG) 6 Unit(s) SubCutaneous three times a day before meals  multivitamin/minerals/iron Oral Solution (CENTRUM) 15 milliLiter(s) Oral daily  polyethylene glycol 3350 17 Gram(s) Oral daily  scopolamine 1 mG/72 Hr(s) Patch 1 Patch Transdermal every 72 hours  senna 2 Tablet(s) Oral at bedtime  sodium chloride 0.65% Nasal 1 Spray(s) Both Nostrils two times a day  zinc oxide 20% Ointment 1 Application(s) Topical daily    MEDICATIONS  (PRN):  ALPRAZolam 0.5 milliGRAM(s) Oral every 8 hours PRN Anxiety  dextrose Oral Gel 15 Gram(s) Oral once PRN Blood Glucose LESS THAN 70 milliGRAM(s)/deciliter  HYDROmorphone   Tablet 4 milliGRAM(s) Oral every 4 hours PRN Severe Pain (7 - 10)      Allergies    penicillin (Swelling)    Intolerances      Vital Signs Last 24 Hrs    T(F): 97.9  HR:  74  BP: 119/70    RR:  20  SpO2: 99%  T piece, 30%  O2 Concentration (%): 30      PHYSICAL EXAM:      Constitutional: A&O and appears at baseline,  bedbound, chr ill looking, nonverbal, + IPAD,  on T tube    Eyes: nonicteric    ENMT: grossly enlarged tongue protruding from oral cavity, no bleeding    Neck: short, thick, supple, no stridor, + trach.T tube    Back: mild Th kyphosis    Respiratory: shallow resp, scattered rhonchi, no crackles/rales/wheezing    Cardiovascular: S1S2 reg    Gastrointestinal: globose, + PEG    Genitourinary: no wiseman    Extremities: moves upper ext, lower ext dec motor function, + mm atrophy, hyperpigmented skin changes, BL foot drop    Vascular: dec pedal pulses    Skin: no rash    Lymph Nodes: not enlarged    Psychiatric: anxious but stable        LABS:  8/24                      7.9    6.10  )-----------( 195      ( 24 Aug 2024 10:48 )             24.0     08-24    129<L>  |  91<L>  |  11  ----------------------------<  201<H>  4.2   |  26  |  <0.5<L>    Ca    8.8      24 Aug 2024 10:48  Mg     1.6     08-24    TPro  6.8  /  Alb  3.6  /  TBili  0.4  /  DBili  x   /  AST  8   /  ALT  7   /  AlkPhos  81  08-24    PT/INR - ( 23 Aug 2024 22:47 )   PT: 10.30 sec;   INR: 0.90 ratio         PTT - ( 23 Aug 2024 22:47 )  PTT:35.2 sec  Urinalysis Basic - ( 24 Aug 2024 10:48 )    Color: x / Appearance: x / SG: x / pH: x  Gluc: 201 mg/dL / Ketone: x  / Bili: x / Urobili: x   Blood: x / Protein: x / Nitrite: x   Leuk Esterase: x / RBC: x / WBC x   Sq Epi: x / Non Sq Epi: x / Bacteria: x        RADIOLOGY & ADDITIONAL TESTS:  CXR:  no consolidation  CTH:  white matter changes, no acute intracranial path, no depressed skull fx, partially imaged near completely opacified sinuses    CT abd/pelvis:  liver, ile ducts, GB, spleen, pancreas WNL, redemonstrated nodular thickening  of L adrenal, stable punctate R nonobs calculus, bladder WNL,, mild presacral edema, no bowel obs, no LN, + gastrostomy tube, atherosclerotic vessels, deg changes, no evidence of met dis  
CRUZ DUMONT 59y Female  MRN#: [id number]     Hospital Day: 5d    Pt is currently admitted with the primary diagnosis of  Other fatigue        SUBJECTIVE         Subjective complaints  Pt was evaluated this am.                                             ----------------------------------------------------------  OBJECTIVE  PAST MEDICAL & SURGICAL HISTORY  COPD (chronic obstructive pulmonary disease)    CHF (congestive heart failure)    DM (diabetes mellitus)    H/O tracheostomy                                              -----------------------------------------------------------  ALLERGIES:  penicillin (Swelling)                                            ------------------------------------------------------------    HOME MEDICATIONS  Home Medications:  acetylcysteine 20% inhalation solution: 3 milliliter(s) inhaled every 6 hours (07 Aug 2024 23:32)  albuterol sulfate 2.5 mg/3 mL (0.083 %) solution for nebulization: milliliter(s) inhaled 3 times a day, As Needed (07 Aug 2024 23:32)  ALPRAZolam 0.5 mg oral tablet: 1 tab(s) orally 3 times a day (after meals) x anxiety (12 Aug 2024 12:41)  amLODIPine 10 mg oral tablet: 1 tab(s) orally once a day (16 Aug 2024 14:08)  aspirin 81 mg oral capsule: 1 cap(s) orally once a day (07 Aug 2024 23:30)  chlorhexidine 0.12% mucous membrane liquid: 15 milliliter(s) orally 2 times a day (24 Aug 2024 00:48)  enoxaparin: 40 unit(s) subcutaneous once a day (16 Aug 2024 14:08)  Flonase 50 mcg/inh nasal spray: 1 spray(s) in each nostril once a day (24 Aug 2024 00:49)  gabapentin 600 mg oral tablet: 1 tab(s) orally 3 times a day via PEG tube (07 Aug 2024 23:32)  HYDROmorphone 4 mg oral tablet: 1 tab(s) orally every 4 hours As needed Severe Pain (7 - 10) (16 Aug 2024 14:08)  insulin glargine 100 units/mL subcutaneous solution: 25 unit(s) subcutaneous once a day (at bedtime) (07 Aug 2024 23:27)  insulin lispro 100 units/mL injectable solution: 6  injectable 4 times a day (before feeds Q6H) (07 Aug 2024 23:32)  losartan 100 mg oral tablet: 1 tab(s) orally once a day via PEG tube (07 Aug 2024 23:32)  metFORMIN 1000 mg oral tablet: 1 tab(s) orally 2 times a day via PEG (12 Aug 2024 12:49)  Multiple Vitamins with Minerals oral liquid: 1 tab(s) orally once a day (16 Aug 2024 14:08)  polyethylene glycol 3350 oral powder for reconstitution: 17 gram(s) orally once a day via PEG tube (07 Aug 2024 23:32)  senna leaf extract oral tablet: 2 tab(s) orally once a day (at bedtime) via PEG tube (07 Aug 2024 23:32)  sodium chloride 0.65% nasal spray: 1 spray(s) nasal 3 times a day (16 Aug 2024 14:08)  zinc oxide 20% topical ointment: Apply topically to affected area once a day apply to excoriations underneath bilateral breasts daily (24 Aug 2024 00:53)                           MEDICATIONS:  STANDING MEDICATIONS  acetylcysteine 20%  Inhalation 3 milliLiter(s) Inhalation every 6 hours  albuterol/ipratropium for Nebulization 3 milliLiter(s) Nebulizer every 6 hours  amLODIPine   Tablet 10 milliGRAM(s) Oral daily  aspirin  chewable 81 milliGRAM(s) Oral daily  cefTRIAXone   IVPB      cefTRIAXone   IVPB 1000 milliGRAM(s) IV Intermittent every 24 hours  chlorhexidine 0.12% Liquid 15 milliLiter(s) Oral Mucosa two times a day  dextrose 5%. 1000 milliLiter(s) IV Continuous <Continuous>  enoxaparin Injectable 40 milliGRAM(s) SubCutaneous every 24 hours  fluticasone propionate 50 MICROgram(s)/spray Nasal Spray 1 Spray(s) Both Nostrils two times a day  gabapentin 600 milliGRAM(s) Oral three times a day  glycopyrrolate 2 milliGRAM(s) Oral daily  insulin glargine Injectable (LANTUS) 25 Unit(s) SubCutaneous at bedtime  insulin lispro (ADMELOG) corrective regimen sliding scale   SubCutaneous three times a day before meals  insulin lispro Injectable (ADMELOG) 6 Unit(s) SubCutaneous three times a day before meals  magnesium sulfate  IVPB 2 Gram(s) IV Intermittent every 12 hours  multivitamin/minerals/iron Oral Solution (CENTRUM) 15 milliLiter(s) Oral daily  polyethylene glycol 3350 17 Gram(s) Oral daily  scopolamine 1 mG/72 Hr(s) Patch 1 Patch Transdermal every 72 hours  senna 2 Tablet(s) Oral at bedtime  sodium chloride 1 Gram(s) Oral two times a day  sodium chloride 0.65% Nasal 1 Spray(s) Both Nostrils two times a day  zinc oxide 20% Ointment 1 Application(s) Topical daily    PRN MEDICATIONS  ALPRAZolam 0.5 milliGRAM(s) Oral every 8 hours PRN  dextrose Oral Gel 15 Gram(s) Oral once PRN  HYDROmorphone   Tablet 4 milliGRAM(s) Oral every 4 hours PRN                                            ------------------------------------------------------------  VITAL SIGNS: Last 24 Hours  T(C): 37 (28 Aug 2024 04:34), Max: 37 (28 Aug 2024 04:34)  T(F): 98.6 (28 Aug 2024 04:34), Max: 98.6 (28 Aug 2024 04:34)  HR: 86 (28 Aug 2024 04:34) (71 - 86)  BP: 150/76 (28 Aug 2024 04:34) (131/65 - 150/76)  BP(mean): 87 (27 Aug 2024 20:54) (87 - 87)  RR: 18 (28 Aug 2024 04:34) (16 - 18)  SpO2: 99% (28 Aug 2024 04:34) (97% - 99%)      08-27-24 @ 07:01  -  08-28-24 @ 07:00  --------------------------------------------------------  IN: 0 mL / OUT: 1000 mL / NET: -1000 mL                                             --------------------------------------------------------------  LABS:                        8.7    7.28  )-----------( 248      ( 28 Aug 2024 06:50 )             26.9     08-28    133<L>  |  95<L>  |  9<L>  ----------------------------<  140<H>  4.4   |  27  |  0.5<L>    Ca    9.2      28 Aug 2024 06:50  Mg     2.7     08-28    TPro  6.9  /  Alb  3.7  /  TBili  <0.2  /  DBili  x   /  AST  8   /  ALT  7   /  AlkPhos  77  08-28      Urinalysis Basic - ( 28 Aug 2024 06:50 )    Color: x / Appearance: x / SG: x / pH: x  Gluc: 140 mg/dL / Ketone: x  / Bili: x / Urobili: x   Blood: x / Protein: x / Nitrite: x   Leuk Esterase: x / RBC: x / WBC x   Sq Epi: x / Non Sq Epi: x / Bacteria: x                                    
CRUZ DUMONT 59y Female  MRN#: [id number]     Hospital Day: 6d    Pt is currently admitted with the primary diagnosis of  Other fatigue        SUBJECTIVE     Overnight events  sodium trending down     Subjective complaints  Pt was evaluated this am.                                             ----------------------------------------------------------  OBJECTIVE  PAST MEDICAL & SURGICAL HISTORY  COPD (chronic obstructive pulmonary disease)    CHF (congestive heart failure)    DM (diabetes mellitus)    H/O tracheostomy                                              -----------------------------------------------------------  ALLERGIES:  penicillin (Swelling)                                            ------------------------------------------------------------    HOME MEDICATIONS  Home Medications:  acetylcysteine 20% inhalation solution: 3 milliliter(s) inhaled every 6 hours (07 Aug 2024 23:32)  albuterol sulfate 2.5 mg/3 mL (0.083 %) solution for nebulization: milliliter(s) inhaled 3 times a day, As Needed (07 Aug 2024 23:32)  ALPRAZolam 0.5 mg oral tablet: 1 tab(s) orally 3 times a day (after meals) x anxiety (12 Aug 2024 12:41)  amLODIPine 10 mg oral tablet: 1 tab(s) orally once a day (16 Aug 2024 14:08)  aspirin 81 mg oral capsule: 1 cap(s) orally once a day (07 Aug 2024 23:30)  chlorhexidine 0.12% mucous membrane liquid: 15 milliliter(s) orally 2 times a day (24 Aug 2024 00:48)  enoxaparin: 40 unit(s) subcutaneous once a day (16 Aug 2024 14:08)  Flonase 50 mcg/inh nasal spray: 1 spray(s) in each nostril once a day (24 Aug 2024 00:49)  gabapentin 600 mg oral tablet: 1 tab(s) orally 3 times a day via PEG tube (07 Aug 2024 23:32)  HYDROmorphone 4 mg oral tablet: 1 tab(s) orally every 4 hours As needed Severe Pain (7 - 10) (16 Aug 2024 14:08)  insulin glargine 100 units/mL subcutaneous solution: 25 unit(s) subcutaneous once a day (at bedtime) (07 Aug 2024 23:27)  insulin lispro 100 units/mL injectable solution: 6  injectable 4 times a day (before feeds Q6H) (07 Aug 2024 23:32)  losartan 100 mg oral tablet: 1 tab(s) orally once a day via PEG tube (07 Aug 2024 23:32)  metFORMIN 1000 mg oral tablet: 1 tab(s) orally 2 times a day via PEG (12 Aug 2024 12:49)  Multiple Vitamins with Minerals oral liquid: 1 tab(s) orally once a day (16 Aug 2024 14:08)  polyethylene glycol 3350 oral powder for reconstitution: 17 gram(s) orally once a day via PEG tube (07 Aug 2024 23:32)  senna leaf extract oral tablet: 2 tab(s) orally once a day (at bedtime) via PEG tube (07 Aug 2024 23:32)  sodium chloride 0.65% nasal spray: 1 spray(s) nasal 3 times a day (16 Aug 2024 14:08)  zinc oxide 20% topical ointment: Apply topically to affected area once a day apply to excoriations underneath bilateral breasts daily (24 Aug 2024 00:53)                           MEDICATIONS:  STANDING MEDICATIONS  acetylcysteine 20%  Inhalation 3 milliLiter(s) Inhalation every 6 hours  albuterol/ipratropium for Nebulization 3 milliLiter(s) Nebulizer every 6 hours  amLODIPine   Tablet 10 milliGRAM(s) Oral daily  aspirin  chewable 81 milliGRAM(s) Oral daily  cefTRIAXone   IVPB      cefTRIAXone   IVPB 1000 milliGRAM(s) IV Intermittent every 24 hours  chlorhexidine 0.12% Liquid 15 milliLiter(s) Oral Mucosa two times a day  dextrose 5%. 1000 milliLiter(s) IV Continuous <Continuous>  enoxaparin Injectable 40 milliGRAM(s) SubCutaneous every 24 hours  fluticasone propionate 50 MICROgram(s)/spray Nasal Spray 1 Spray(s) Both Nostrils two times a day  gabapentin 600 milliGRAM(s) Oral three times a day  glycopyrrolate 2 milliGRAM(s) Oral daily  insulin glargine Injectable (LANTUS) 25 Unit(s) SubCutaneous at bedtime  insulin lispro (ADMELOG) corrective regimen sliding scale   SubCutaneous three times a day before meals  insulin lispro Injectable (ADMELOG) 6 Unit(s) SubCutaneous three times a day before meals  magnesium sulfate  IVPB 2 Gram(s) IV Intermittent every 12 hours  multivitamin/minerals/iron Oral Solution (CENTRUM) 15 milliLiter(s) Oral daily  polyethylene glycol 3350 17 Gram(s) Oral daily  scopolamine 1 mG/72 Hr(s) Patch 1 Patch Transdermal every 72 hours  senna 2 Tablet(s) Oral at bedtime  sodium chloride 1 Gram(s) Oral two times a day  sodium chloride 0.65% Nasal 1 Spray(s) Both Nostrils two times a day  sodium chloride 0.9%. 1000 milliLiter(s) IV Continuous <Continuous>  zinc oxide 20% Ointment 1 Application(s) Topical daily    PRN MEDICATIONS  ALPRAZolam 0.5 milliGRAM(s) Oral every 8 hours PRN  dextrose Oral Gel 15 Gram(s) Oral once PRN  HYDROmorphone   Tablet 4 milliGRAM(s) Oral every 4 hours PRN                                            ------------------------------------------------------------  VITAL SIGNS: Last 24 Hours  T(C): 37.2 (29 Aug 2024 05:06), Max: 37.4 (28 Aug 2024 21:06)  T(F): 98.9 (29 Aug 2024 05:06), Max: 99.4 (28 Aug 2024 21:06)  HR: 93 (29 Aug 2024 05:06) (88 - 93)  BP: 138/83 (29 Aug 2024 05:06) (120/61 - 138/83)  BP(mean): 86 (28 Aug 2024 21:06) (86 - 86)  RR: 19 (29 Aug 2024 07:36) (18 - 19)  SpO2: 98% (29 Aug 2024 07:36) (95% - 98%)      08-28-24 @ 07:01  -  08-29-24 @ 07:00  --------------------------------------------------------  IN: 800 mL / OUT: 700 mL / NET: 100 mL                                             --------------------------------------------------------------  LABS:                        8.2    8.48  )-----------( 256      ( 29 Aug 2024 05:49 )             25.3     08-29    129<L>  |  92<L>  |  9<L>  ----------------------------<  126<H>  4.3   |  26  |  0.5<L>    Ca    8.9      29 Aug 2024 05:49  Mg     2.1     08-29    TPro  6.6  /  Alb  3.7  /  TBili  0.3  /  DBili  x   /  AST  7   /  ALT  5   /  AlkPhos  73  08-29      Urinalysis Basic - ( 29 Aug 2024 05:49 )    Color: x / Appearance: x / SG: x / pH: x  Gluc: 126 mg/dL / Ketone: x  / Bili: x / Urobili: x   Blood: x / Protein: x / Nitrite: x   Leuk Esterase: x / RBC: x / WBC x   Sq Epi: x / Non Sq Epi: x / Bacteria: x                                                            -------------------------------------------------------------  RADIOLOGY:                                            --------------------------------------------------------------    PHYSICAL EXAM:  GENERAL: NAD, lying in bed comfortably, enlarged tongue , protruding out of mouth , trac n peg   CHEST/LUNG: Clear to auscultation bilaterally; No rales, rhonchi, wheezing, or rubs. Unlabored respirations  HEART: regular rate and rhythm; No murmurs, rubs, or gallops                                                --------------------------------------------------------------                
  CRUZ DUMONT 59y Female chr resident ot [place] sent to [place] ER for change of MS, as per son pt less alert, less interactive and mor e physically and mentally sluggish.  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  Normally nonverbal but v alert and communicates through IPAD.  Of note the pt was recently in the hospital for low H/H 6.8/21 received 2 u PRBCs, evaluated by ENT for nasal and trach secretions and enlarged anteriorly protruding tongue and underwent BL lingual aa embolization with nerovascular surgery. The pt is on Eliquis 2.5mg, and on ad had CTH which showed no acute intracranial path but almost completely opacified sinuses, u/a shows bacteria and + nitrites.  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   The pt is cultured and will be place on ceftrixone, CT dedicated to the sinuses ordered and  ENT consult placed.    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, sp chemo, sp RT, grossly enlarged protruding tongue, sp BL lingual aa embolization 8/13, COPD, Hx of DVT, AC/Eliquis, sp PEG, V insuff, bedbound state, chr hyponatremia, DM II, chr constipation   EBNT:  Dr Landaverde, Hem-Onc:  Dr elizondo    INTERVAL HPI/OVERNIGHT EVENTS: pt afebrile WBC 6, CTH shows almost complet sinus opacification, u/a + nitrites and bacteria, cultured start Ceftriaxone, order CT sinuses, ENT consult, suction nares with red bulb cath, nasal saline, suction trach, trial of scopolamine to dec secretions    MEDICATIONS  (STANDING):  acetylcysteine 20%  Inhalation 3 milliLiter(s) Inhalation every 6 hours  albuterol/ipratropium for Nebulization 3 milliLiter(s) Nebulizer every 6 hours  amLODIPine   Tablet 10 milliGRAM(s) Oral daily  aspirin  chewable 81 milliGRAM(s) Oral daily  chlorhexidine 0.12% Liquid 15 milliLiter(s) Oral Mucosa two times a day  dextrose 5%. 1000 milliLiter(s) (50 mL/Hr) IV Continuous <Continuous>  enoxaparin Injectable 40 milliGRAM(s) SubCutaneous every 24 hours  fluticasone propionate 50 MICROgram(s)/spray Nasal Spray 1 Spray(s) Both Nostrils two times a day  gabapentin 600 milliGRAM(s) Oral three times a day  insulin glargine Injectable (LANTUS) 25 Unit(s) SubCutaneous at bedtime  insulin lispro (ADMELOG) corrective regimen sliding scale   SubCutaneous three times a day before meals  insulin lispro Injectable (ADMELOG) 6 Unit(s) SubCutaneous three times a day before meals  multivitamin/minerals/iron Oral Solution (CENTRUM) 15 milliLiter(s) Oral daily  polyethylene glycol 3350 17 Gram(s) Oral daily  scopolamine 1 mG/72 Hr(s) Patch 1 Patch Transdermal every 72 hours  senna 2 Tablet(s) Oral at bedtime  sodium chloride 0.65% Nasal 1 Spray(s) Both Nostrils two times a day  zinc oxide 20% Ointment 1 Application(s) Topical daily    MEDICATIONS  (PRN):  ALPRAZolam 0.5 milliGRAM(s) Oral every 8 hours PRN Anxiety  dextrose Oral Gel 15 Gram(s) Oral once PRN Blood Glucose LESS THAN 70 milliGRAM(s)/deciliter  HYDROmorphone   Tablet 4 milliGRAM(s) Oral every 4 hours PRN Severe Pain (7 - 10)      Allergies    penicillin (Swelling)    Intolerances      Vital Signs Last 24 Hrs  T(C): 36.9 (24 Aug 2024 12:47), Max: 37.2 (23 Aug 2024 19:26)  T(F): 98.4 (24 Aug 2024 12:47), Max: 98.9 (23 Aug 2024 19:26)  HR: 82 (24 Aug 2024 12:47) (82 - 98)  BP: 118/67 (24 Aug 2024 12:47) (100/44 - 158/68)  BP(mean): 84 (24 Aug 2024 12:47) (84 - 84)  RR: 18 (24 Aug 2024 12:47) (16 - 18)  SpO2: 99% (24 Aug 2024 12:47) (94% - 100%)    Parameters below as of 24 Aug 2024 12:47  Patient On (Oxygen Delivery Method): T-piece    O2 Concentration (%): 30    PHYSICAL EXAM:      Constitutional: alert, mentally sluggish, bedbound, chr ill looking, nonverbal, + IPAD,  on T tube    Eyes: nonicteric    ENMT: grossly enlarged tongue protruding from oral cavity, no bleeding    Neck: short, thick, supple, no stridor, + trach.T tube    Back: mild Th kyphosis    Respiratory: shallow resp, scattered rhonchi, no crackles/rales/wheezing    Cardiovascular: S1S2 reg    Gastrointestinal: globose, + PEG    Genitourinary: no wiseman    Extremities: moves upper ext, lower ext dec motor function, + mm atrophy, hyperpigmented skin changes, BL foot drop    Vascular: dec pedal pulses    Skin: no rash    Lymph Nodes: not enlarged    Psychiatric: anxious but stable        LABS:                        7.9    6.10  )-----------( 195      ( 24 Aug 2024 10:48 )             24.0     08-24    129<L>  |  91<L>  |  11  ----------------------------<  201<H>  4.2   |  26  |  <0.5<L>    Ca    8.8      24 Aug 2024 10:48  Mg     1.6     08-24    TPro  6.8  /  Alb  3.6  /  TBili  0.4  /  DBili  x   /  AST  8   /  ALT  7   /  AlkPhos  81  08-24    PT/INR - ( 23 Aug 2024 22:47 )   PT: 10.30 sec;   INR: 0.90 ratio         PTT - ( 23 Aug 2024 22:47 )  PTT:35.2 sec  Urinalysis Basic - ( 24 Aug 2024 10:48 )    Color: x / Appearance: x / SG: x / pH: x  Gluc: 201 mg/dL / Ketone: x  / Bili: x / Urobili: x   Blood: x / Protein: x / Nitrite: x   Leuk Esterase: x / RBC: x / WBC x   Sq Epi: x / Non Sq Epi: x / Bacteria: x        RADIOLOGY & ADDITIONAL TESTS:  CXR:  no consolidation  CTH:  white matter changes, no acute intracranial path, no depressed skull fx, partially imaged near completely opacified sinuses    CT abd/pelvis:  liver, ile ducts, GB, spleen, pancreas WNL, redemonstrated nodular thickening  of L adrenal, stable punctate R nonobs calculus, bladder WNL,, mild presacral edema, no bowel obs, no LN, + gastrostomy tube, atherosclerotic vessels, deg changes, no evidence of met dis  
CRUZ DUMONT 59y Female  MRN#: [id number]     Hospital Day: 4d    Pt is currently admitted with the primary diagnosis of  Other fatigue                                                ----------------------------------------------------------  OBJECTIVE  PAST MEDICAL & SURGICAL HISTORY  COPD (chronic obstructive pulmonary disease)    CHF (congestive heart failure)    DM (diabetes mellitus)    H/O tracheostomy                                              -----------------------------------------------------------  ALLERGIES:  penicillin (Swelling)                                            ------------------------------------------------------------    HOME MEDICATIONS  Home Medications:  acetylcysteine 20% inhalation solution: 3 milliliter(s) inhaled every 6 hours (07 Aug 2024 23:32)  albuterol sulfate 2.5 mg/3 mL (0.083 %) solution for nebulization: milliliter(s) inhaled 3 times a day, As Needed (07 Aug 2024 23:32)  ALPRAZolam 0.5 mg oral tablet: 1 tab(s) orally 3 times a day (after meals) x anxiety (12 Aug 2024 12:41)  amLODIPine 10 mg oral tablet: 1 tab(s) orally once a day (16 Aug 2024 14:08)  aspirin 81 mg oral capsule: 1 cap(s) orally once a day (07 Aug 2024 23:30)  chlorhexidine 0.12% mucous membrane liquid: 15 milliliter(s) orally 2 times a day (24 Aug 2024 00:48)  enoxaparin: 40 unit(s) subcutaneous once a day (16 Aug 2024 14:08)  Flonase 50 mcg/inh nasal spray: 1 spray(s) in each nostril once a day (24 Aug 2024 00:49)  gabapentin 600 mg oral tablet: 1 tab(s) orally 3 times a day via PEG tube (07 Aug 2024 23:32)  HYDROmorphone 4 mg oral tablet: 1 tab(s) orally every 4 hours As needed Severe Pain (7 - 10) (16 Aug 2024 14:08)  insulin glargine 100 units/mL subcutaneous solution: 25 unit(s) subcutaneous once a day (at bedtime) (07 Aug 2024 23:27)  insulin lispro 100 units/mL injectable solution: 6  injectable 4 times a day (before feeds Q6H) (07 Aug 2024 23:32)  losartan 100 mg oral tablet: 1 tab(s) orally once a day via PEG tube (07 Aug 2024 23:32)  metFORMIN 1000 mg oral tablet: 1 tab(s) orally 2 times a day via PEG (12 Aug 2024 12:49)  Multiple Vitamins with Minerals oral liquid: 1 tab(s) orally once a day (16 Aug 2024 14:08)  polyethylene glycol 3350 oral powder for reconstitution: 17 gram(s) orally once a day via PEG tube (07 Aug 2024 23:32)  senna leaf extract oral tablet: 2 tab(s) orally once a day (at bedtime) via PEG tube (07 Aug 2024 23:32)  sodium chloride 0.65% nasal spray: 1 spray(s) nasal 3 times a day (16 Aug 2024 14:08)  zinc oxide 20% topical ointment: Apply topically to affected area once a day apply to excoriations underneath bilateral breasts daily (24 Aug 2024 00:53)                           MEDICATIONS:  STANDING MEDICATIONS  acetylcysteine 20%  Inhalation 3 milliLiter(s) Inhalation every 6 hours  albuterol/ipratropium for Nebulization 3 milliLiter(s) Nebulizer every 6 hours  amLODIPine   Tablet 10 milliGRAM(s) Oral daily  aspirin  chewable 81 milliGRAM(s) Oral daily  cefTRIAXone   IVPB      cefTRIAXone   IVPB 1000 milliGRAM(s) IV Intermittent every 24 hours  chlorhexidine 0.12% Liquid 15 milliLiter(s) Oral Mucosa two times a day  dextrose 5%. 1000 milliLiter(s) IV Continuous <Continuous>  enoxaparin Injectable 40 milliGRAM(s) SubCutaneous every 24 hours  fluticasone propionate 50 MICROgram(s)/spray Nasal Spray 1 Spray(s) Both Nostrils two times a day  gabapentin 600 milliGRAM(s) Oral three times a day  glycopyrrolate 2 milliGRAM(s) Oral daily  insulin glargine Injectable (LANTUS) 25 Unit(s) SubCutaneous at bedtime  insulin lispro (ADMELOG) corrective regimen sliding scale   SubCutaneous three times a day before meals  insulin lispro Injectable (ADMELOG) 6 Unit(s) SubCutaneous three times a day before meals  magnesium sulfate  IVPB 2 Gram(s) IV Intermittent every 12 hours  multivitamin/minerals/iron Oral Solution (CENTRUM) 15 milliLiter(s) Oral daily  polyethylene glycol 3350 17 Gram(s) Oral daily  scopolamine 1 mG/72 Hr(s) Patch 1 Patch Transdermal every 72 hours  senna 2 Tablet(s) Oral at bedtime  sodium chloride 1 Gram(s) Oral two times a day  sodium chloride 0.65% Nasal 1 Spray(s) Both Nostrils two times a day  zinc oxide 20% Ointment 1 Application(s) Topical daily    PRN MEDICATIONS  ALPRAZolam 0.5 milliGRAM(s) Oral every 8 hours PRN  dextrose Oral Gel 15 Gram(s) Oral once PRN  HYDROmorphone   Tablet 4 milliGRAM(s) Oral every 4 hours PRN                                            ------------------------------------------------------------  VITAL SIGNS: Last 24 Hours  T(C): 36.9 (27 Aug 2024 04:31), Max: 36.9 (27 Aug 2024 04:31)  T(F): 98.5 (27 Aug 2024 04:31), Max: 98.5 (27 Aug 2024 04:31)  HR: 73 (27 Aug 2024 04:31) (73 - 80)  BP: 146/64 (27 Aug 2024 04:31) (141/62 - 149/66)  BP(mean): 91 (27 Aug 2024 04:31) (88 - 91)  RR: 18 (27 Aug 2024 04:31) (18 - 18)  SpO2: 96% (27 Aug 2024 04:31) (96% - 100%)      08-26-24 @ 07:01  -  08-27-24 @ 07:00  --------------------------------------------------------  IN: 1900 mL / OUT: 700 mL / NET: 1200 mL                                             --------------------------------------------------------------  LABS:                        8.5    6.65  )-----------( 237      ( 27 Aug 2024 05:45 )             26.4     08-27    131<L>  |  94<L>  |  10  ----------------------------<  137<H>  4.4   |  27  |  0.5<L>    Ca    8.9      27 Aug 2024 05:45  Mg     2.2     08-27    TPro  6.9  /  Alb  3.7  /  TBili  <0.2  /  DBili  x   /  AST  7   /  ALT  7   /  AlkPhos  75  08-27      Urinalysis Basic - ( 27 Aug 2024 05:45 )    Color: x / Appearance: x / SG: x / pH: x  Gluc: 137 mg/dL / Ketone: x  / Bili: x / Urobili: x   Blood: x / Protein: x / Nitrite: x   Leuk Esterase: x / RBC: x / WBC x   Sq Epi: x / Non Sq Epi: x / Bacteria: x              Culture - Urine (collected 24 Aug 2024 16:32)  Source: Clean Catch Clean Catch (Midstream)  Final Report (26 Aug 2024 12:02):    >=3 organisms. Probable collection contamination.                    
  CRUZ DUMONT 59y Female chr resident ot [place] sent to the ER for change of MS, as per son pt less alert, less interactive and mor e physically and mentally sluggish.  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  Normally nonverbal but v alert and communicates through IPAD.  Of note the pt was recently in the hospital for low H/H 6.8/21 received 2 u PRBCs, evaluated by ENT for nasal and trach secretions and enlarged anteriorly protruding tongue and underwent BL lingual aa embolization with nerovascular surgery. The pt is on Eliquis 2.5mg, and on ad had CTH which showed no acute intracranial path but almost completely opacified sinuses, u/a shows bacteria and + nitrites.  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   The pt is cultured and will be place on ceftrixone, CT dedicated to the sinuses ordered and  ENT consult placed.    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  Ceftriaxone, for pansinusitis and cystitis,  sp BL Lingual aa embolization  8/13, Hem-Onc consult    MEDICATIONS  (STANDING):  acetylcysteine 20%  Inhalation 3 milliLiter(s) Inhalation every 6 hours  albuterol/ipratropium for Nebulization 3 milliLiter(s) Nebulizer every 6 hours  amLODIPine   Tablet 10 milliGRAM(s) Oral daily  aspirin  chewable 81 milliGRAM(s) Oral daily  chlorhexidine 0.12% Liquid 15 milliLiter(s) Oral Mucosa two times a day  dextrose 5%. 1000 milliLiter(s) (50 mL/Hr) IV Continuous <Continuous>  enoxaparin Injectable 40 milliGRAM(s) SubCutaneous every 24 hours  fluticasone propionate 50 MICROgram(s)/spray Nasal Spray 1 Spray(s) Both Nostrils two times a day  gabapentin 600 milliGRAM(s) Oral three times a day  insulin glargine Injectable (LANTUS) 25 Unit(s) SubCutaneous at bedtime  insulin lispro (ADMELOG) corrective regimen sliding scale   SubCutaneous three times a day before meals  insulin lispro Injectable (ADMELOG) 6 Unit(s) SubCutaneous three times a day before meals  multivitamin/minerals/iron Oral Solution (CENTRUM) 15 milliLiter(s) Oral daily  polyethylene glycol 3350 17 Gram(s) Oral daily  scopolamine 1 mG/72 Hr(s) Patch 1 Patch Transdermal every 72 hours  senna 2 Tablet(s) Oral at bedtime  sodium chloride 0.65% Nasal 1 Spray(s) Both Nostrils two times a day  zinc oxide 20% Ointment 1 Application(s) Topical daily    MEDICATIONS  (PRN):  ALPRAZolam 0.5 milliGRAM(s) Oral every 8 hours PRN Anxiety  dextrose Oral Gel 15 Gram(s) Oral once PRN Blood Glucose LESS THAN 70 milliGRAM(s)/deciliter  HYDROmorphone   Tablet 4 milliGRAM(s) Oral every 4 hours PRN Severe Pain (7 - 10)      Allergies    penicillin (Swelling)    Intolerances      Vital Signs Last 24 Hrs    T(F): 99.4  HR:  88  BP: 121/68    RR:  18  SpO2: 95%  T piece, 30%  O2 Concentration (%): 30      PHYSICAL EXAM:      Constitutional: A&O and appears at baseline,  bedbound, chr ill looking, nonverbal, + IPAD,  on T tube    Eyes: nonicteric    ENMT: grossly enlarged tongue protruding from oral cavity, no bleeding    Neck: short, thick, supple, no stridor, + trach.T tube    Back: mild Th kyphosis    Respiratory: shallow resp, scattered rhonchi, no crackles/rales/wheezing    Cardiovascular: S1S2 reg    Gastrointestinal: globose, + PEG    Genitourinary: no wiseman    Extremities: moves upper ext, lower ext dec motor function, + mm atrophy, hyperpigmented skin changes, BL foot drop    Vascular: dec pedal pulses    Skin: no rash    Lymph Nodes: not enlarged    Psychiatric: anxious but stable        LABS:                      8.7  7.2 )-----------( 248             26.9       133  |  95  |  9  ----------------------------<  140  4.4   |  27  |  0.5    Ca    8.8       Mg     1.6, 2.4, 2.2, 2.7    TPro  6.8  /  Alb  3.6  /  TBili  0.4  /  DBili  x   /  AST  8   /  ALT  7   /  AlkPhos  81  08-24    PT/INR - ( 23 Aug 2024 22:47 )   PT: 10.30 sec;   INR: 0.90 ratio         PTT - ( 23 Aug 2024 22:47 )  PTT:35.2 sec  Urinalysis Basic - ( 24 Aug 2024 10:48 )    Color: x / Appearance: x / SG: x / pH: x  Gluc: 201 mg/dL / Ketone: x  / Bili: x / Urobili: x   Blood: x / Protein: x / Nitrite: x   Leuk Esterase: x / RBC: x / WBC x   Sq Epi: x / Non Sq Epi: x / Bacteria: x        RADIOLOGY & ADDITIONAL TESTS:  CXR:  no consolidation  CTH:  white matter changes, no acute intracranial path, no depressed skull fx, partially imaged near completely opacified sinuses    CT abd/pelvis:  liver, ile ducts, GB, spleen, pancreas WNL, redemonstrated nodular thickening  of L adrenal, stable punctate R nonobs calculus, bladder WNL,, mild presacral edema, no bowel obs, no LN, + gastrostomy tube, atherosclerotic vessels, deg changes, no evidence of met dis    CT sinuses with IVC:  there is essentially complete opacification of the paranasal sinuses and mastoids/middle ear cavities, infiltrative changes within the L parotid and  spaces, redemnstration of bulky tongue mass extending into the oropharynx with complete luminal obs of the oral airway, there is complete opacification of the nasopharynx  
  CRUZ DUMONT 59y Female chr resident ot [place] sent to the ER for change of MS, as per son pt less alert, less interactive and mor e physically and mentally sluggish.  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  Normally nonverbal but v alert and communicates through IPAD.  Of note the pt was recently in the hospital for low H/H 6.8/21 received 2 u PRBCs, evaluated by ENT for nasal and trach secretions and enlarged anteriorly protruding tongue and underwent BL lingual aa embolization with nerovascular surgery. The pt is on Eliquis 2.5mg, and on ad had CTH which showed no acute intracranial path but almost completely opacified sinuses, u/a shows bacteria and + nitrites.  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   The pt is cultured and will be place on ceftrixone, CT dedicated to the sinuses ordered and  ENT consult placed.    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, sp chemo, sp RT, grossly enlarged protruding tongue, sp BL lingual aa embolization 8/13, COPD, Hx of DVT, AC/Eliquis, sp PEG, V insuff, bedbound state, chr hyponatremia, DM II, chr constipation   EBNT:  Dr Landaverde, Hem-Onc:  Dr elizondo    INTERVAL HPI/OVERNIGHT EVENTS: pt seems  at baseline, CTH shows almost complete sinus opacification, dedicated CT of sinuses complete opacification of paranasal sinuses and mastoids and  middle ear cavities, infiltrative changes in L parotid and  spaces sugg of progression of tumor  and infiltration  of these spaces,   u/a + nitrites and bacteria,  started Ceftriaxone, adequate coverage for both pansinusitis and cystitis,  ENT consult for sinus/trach congestion, and how to decompress these areas, sp BL Lingual aa embolization  8/13,     MEDICATIONS  (STANDING):  acetylcysteine 20%  Inhalation 3 milliLiter(s) Inhalation every 6 hours  albuterol/ipratropium for Nebulization 3 milliLiter(s) Nebulizer every 6 hours  amLODIPine   Tablet 10 milliGRAM(s) Oral daily  aspirin  chewable 81 milliGRAM(s) Oral daily  chlorhexidine 0.12% Liquid 15 milliLiter(s) Oral Mucosa two times a day  dextrose 5%. 1000 milliLiter(s) (50 mL/Hr) IV Continuous <Continuous>  enoxaparin Injectable 40 milliGRAM(s) SubCutaneous every 24 hours  fluticasone propionate 50 MICROgram(s)/spray Nasal Spray 1 Spray(s) Both Nostrils two times a day  gabapentin 600 milliGRAM(s) Oral three times a day  insulin glargine Injectable (LANTUS) 25 Unit(s) SubCutaneous at bedtime  insulin lispro (ADMELOG) corrective regimen sliding scale   SubCutaneous three times a day before meals  insulin lispro Injectable (ADMELOG) 6 Unit(s) SubCutaneous three times a day before meals  multivitamin/minerals/iron Oral Solution (CENTRUM) 15 milliLiter(s) Oral daily  polyethylene glycol 3350 17 Gram(s) Oral daily  scopolamine 1 mG/72 Hr(s) Patch 1 Patch Transdermal every 72 hours  senna 2 Tablet(s) Oral at bedtime  sodium chloride 0.65% Nasal 1 Spray(s) Both Nostrils two times a day  zinc oxide 20% Ointment 1 Application(s) Topical daily    MEDICATIONS  (PRN):  ALPRAZolam 0.5 milliGRAM(s) Oral every 8 hours PRN Anxiety  dextrose Oral Gel 15 Gram(s) Oral once PRN Blood Glucose LESS THAN 70 milliGRAM(s)/deciliter  HYDROmorphone   Tablet 4 milliGRAM(s) Oral every 4 hours PRN Severe Pain (7 - 10)      Allergies    penicillin (Swelling)    Intolerances      Vital Signs Last 24 Hrs    T(F): 97.9  HR:  77  BP: 141/62    RR:  18  SpO2: 100%  T piece, 30%  O2 Concentration (%): 30      PHYSICAL EXAM:      Constitutional: A&O and appears at baseline,  bedbound, chr ill looking, nonverbal, + IPAD,  on T tube    Eyes: nonicteric    ENMT: grossly enlarged tongue protruding from oral cavity, no bleeding    Neck: short, thick, supple, no stridor, + trach.T tube    Back: mild Th kyphosis    Respiratory: shallow resp, scattered rhonchi, no crackles/rales/wheezing    Cardiovascular: S1S2 reg    Gastrointestinal: globose, + PEG    Genitourinary: no wiseman    Extremities: moves upper ext, lower ext dec motor function, + mm atrophy, hyperpigmented skin changes, BL foot drop    Vascular: dec pedal pulses    Skin: no rash    Lymph Nodes: not enlarged    Psychiatric: anxious but stable        LABS:  8/24                      8.4  6  )-----------( 212             25       132  |  93  |  7  ----------------------------<  201<H>  4.1   |  26  |  <0.5    Ca    8.8       Mg     1.6, 2.4    TPro  6.8  /  Alb  3.6  /  TBili  0.4  /  DBili  x   /  AST  8   /  ALT  7   /  AlkPhos  81  08-24    PT/INR - ( 23 Aug 2024 22:47 )   PT: 10.30 sec;   INR: 0.90 ratio         PTT - ( 23 Aug 2024 22:47 )  PTT:35.2 sec  Urinalysis Basic - ( 24 Aug 2024 10:48 )    Color: x / Appearance: x / SG: x / pH: x  Gluc: 201 mg/dL / Ketone: x  / Bili: x / Urobili: x   Blood: x / Protein: x / Nitrite: x   Leuk Esterase: x / RBC: x / WBC x   Sq Epi: x / Non Sq Epi: x / Bacteria: x        RADIOLOGY & ADDITIONAL TESTS:  CXR:  no consolidation  CTH:  white matter changes, no acute intracranial path, no depressed skull fx, partially imaged near completely opacified sinuses    CT abd/pelvis:  liver, ile ducts, GB, spleen, pancreas WNL, redemonstrated nodular thickening  of L adrenal, stable punctate R nonobs calculus, bladder WNL,, mild presacral edema, no bowel obs, no LN, + gastrostomy tube, atherosclerotic vessels, deg changes, no evidence of met dis    CT sinuses with IVC:  there is essentially complete opacification of the paranasal sinuses and mastoids/middle ear cavities, infiltrative changes within the L parotid and  spaces, redemnstration of bulky tongue mass extending into the oropharynx with complete luminal obs of the oral airway, there is complete opacification of the nasopharynx  
  CRUZ DUMONT 59y Female chr resident ot [place] sent to the ER for change of MS, as per son pt less alert, less interactive and mor e physically and mentally sluggish.  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  Normally nonverbal but v alert and communicates through IPAD.  Of note the pt was recently in the hospital for low H/H 6.8/21 received 2 u PRBCs, evaluated by ENT for nasal and trach secretions and enlarged anteriorly protruding tongue and underwent BL lingual aa embolization with nerovascular surgery. The pt is on Eliquis 2.5mg, and on ad had CTH which showed no acute intracranial path but almost completely opacified sinuses, u/a shows bacteria and + nitrites.  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   The pt is cultured and will be place on ceftrixone, CT dedicated to the sinuses ordered and  ENT consult placed.    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, sp chemo, sp RT, grossly enlarged protruding tongue, sp BL lingual aa embolization 8/13, COPD, Hx of DVT, AC/Eliquis, sp PEG, V insuff, bedbound state, chr hyponatremia, DM II, chr constipation   EBNT:  Dr Landaverde, Hem-Onc:  Dr elizondo    INTERVAL HPI/OVERNIGHT EVENTS: pt seems  at baseline, CTH shows almost complete sinus opacification, dedicated CT of sinuses complete opacification of paranasal sinuses and mastoids and  middle ear cavities, infiltrative changes in L parotid and  spaces sugg of progression of tumor  and infiltration  of these spaces,   u/a + nitrites and bacteria,  started Ceftriaxone, adequate coverage for both pansinusitis and cystitis,  ENT consult for sinus/trach congestion, and how to decompress these areas, sp BL Lingual aa embolization  8/13, Hem-Onc consult    MEDICATIONS  (STANDING):  acetylcysteine 20%  Inhalation 3 milliLiter(s) Inhalation every 6 hours  albuterol/ipratropium for Nebulization 3 milliLiter(s) Nebulizer every 6 hours  amLODIPine   Tablet 10 milliGRAM(s) Oral daily  aspirin  chewable 81 milliGRAM(s) Oral daily  chlorhexidine 0.12% Liquid 15 milliLiter(s) Oral Mucosa two times a day  dextrose 5%. 1000 milliLiter(s) (50 mL/Hr) IV Continuous <Continuous>  enoxaparin Injectable 40 milliGRAM(s) SubCutaneous every 24 hours  fluticasone propionate 50 MICROgram(s)/spray Nasal Spray 1 Spray(s) Both Nostrils two times a day  gabapentin 600 milliGRAM(s) Oral three times a day  insulin glargine Injectable (LANTUS) 25 Unit(s) SubCutaneous at bedtime  insulin lispro (ADMELOG) corrective regimen sliding scale   SubCutaneous three times a day before meals  insulin lispro Injectable (ADMELOG) 6 Unit(s) SubCutaneous three times a day before meals  multivitamin/minerals/iron Oral Solution (CENTRUM) 15 milliLiter(s) Oral daily  polyethylene glycol 3350 17 Gram(s) Oral daily  scopolamine 1 mG/72 Hr(s) Patch 1 Patch Transdermal every 72 hours  senna 2 Tablet(s) Oral at bedtime  sodium chloride 0.65% Nasal 1 Spray(s) Both Nostrils two times a day  zinc oxide 20% Ointment 1 Application(s) Topical daily    MEDICATIONS  (PRN):  ALPRAZolam 0.5 milliGRAM(s) Oral every 8 hours PRN Anxiety  dextrose Oral Gel 15 Gram(s) Oral once PRN Blood Glucose LESS THAN 70 milliGRAM(s)/deciliter  HYDROmorphone   Tablet 4 milliGRAM(s) Oral every 4 hours PRN Severe Pain (7 - 10)      Allergies    penicillin (Swelling)    Intolerances      Vital Signs Last 24 Hrs    T(F): 98.5  HR:  73  BP: 146/64    RR:  18  SpO2: 96%  T piece, 30%  O2 Concentration (%): 30      PHYSICAL EXAM:      Constitutional: A&O and appears at baseline,  bedbound, chr ill looking, nonverbal, + IPAD,  on T tube    Eyes: nonicteric    ENMT: grossly enlarged tongue protruding from oral cavity, no bleeding    Neck: short, thick, supple, no stridor, + trach.T tube    Back: mild Th kyphosis    Respiratory: shallow resp, scattered rhonchi, no crackles/rales/wheezing    Cardiovascular: S1S2 reg    Gastrointestinal: globose, + PEG    Genitourinary: no wiseman    Extremities: moves upper ext, lower ext dec motor function, + mm atrophy, hyperpigmented skin changes, BL foot drop    Vascular: dec pedal pulses    Skin: no rash    Lymph Nodes: not enlarged    Psychiatric: anxious but stable        LABS:                      8.5  6.6  )-----------( 237             26       131  |  94  |  10  ----------------------------<  137  4.4   |  27  |  <0.5    Ca    8.8       Mg     1.6, 2.4, 2.2    TPro  6.8  /  Alb  3.6  /  TBili  0.4  /  DBili  x   /  AST  8   /  ALT  7   /  AlkPhos  81  08-24    PT/INR - ( 23 Aug 2024 22:47 )   PT: 10.30 sec;   INR: 0.90 ratio         PTT - ( 23 Aug 2024 22:47 )  PTT:35.2 sec  Urinalysis Basic - ( 24 Aug 2024 10:48 )    Color: x / Appearance: x / SG: x / pH: x  Gluc: 201 mg/dL / Ketone: x  / Bili: x / Urobili: x   Blood: x / Protein: x / Nitrite: x   Leuk Esterase: x / RBC: x / WBC x   Sq Epi: x / Non Sq Epi: x / Bacteria: x        RADIOLOGY & ADDITIONAL TESTS:  CXR:  no consolidation  CTH:  white matter changes, no acute intracranial path, no depressed skull fx, partially imaged near completely opacified sinuses    CT abd/pelvis:  liver, ile ducts, GB, spleen, pancreas WNL, redemonstrated nodular thickening  of L adrenal, stable punctate R nonobs calculus, bladder WNL,, mild presacral edema, no bowel obs, no LN, + gastrostomy tube, atherosclerotic vessels, deg changes, no evidence of met dis    CT sinuses with IVC:  there is essentially complete opacification of the paranasal sinuses and mastoids/middle ear cavities, infiltrative changes within the L parotid and  spaces, redemnstration of bulky tongue mass extending into the oropharynx with complete luminal obs of the oral airway, there is complete opacification of the nasopharynx  
CRUZ DUMONT 59y Female  MRN#: [id number]     Hospital Day: 3d    Pt is currently admitted with the primary diagnosis of  Other fatigue                                                  ----------------------------------------------------------  OBJECTIVE  PAST MEDICAL & SURGICAL HISTORY  COPD (chronic obstructive pulmonary disease)    CHF (congestive heart failure)    DM (diabetes mellitus)    H/O tracheostomy                                              -----------------------------------------------------------  ALLERGIES:  penicillin (Swelling)                                            ------------------------------------------------------------    HOME MEDICATIONS  Home Medications:  acetylcysteine 20% inhalation solution: 3 milliliter(s) inhaled every 6 hours (07 Aug 2024 23:32)  albuterol sulfate 2.5 mg/3 mL (0.083 %) solution for nebulization: milliliter(s) inhaled 3 times a day, As Needed (07 Aug 2024 23:32)  ALPRAZolam 0.5 mg oral tablet: 1 tab(s) orally 3 times a day (after meals) x anxiety (12 Aug 2024 12:41)  amLODIPine 10 mg oral tablet: 1 tab(s) orally once a day (16 Aug 2024 14:08)  aspirin 81 mg oral capsule: 1 cap(s) orally once a day (07 Aug 2024 23:30)  chlorhexidine 0.12% mucous membrane liquid: 15 milliliter(s) orally 2 times a day (24 Aug 2024 00:48)  enoxaparin: 40 unit(s) subcutaneous once a day (16 Aug 2024 14:08)  Flonase 50 mcg/inh nasal spray: 1 spray(s) in each nostril once a day (24 Aug 2024 00:49)  gabapentin 600 mg oral tablet: 1 tab(s) orally 3 times a day via PEG tube (07 Aug 2024 23:32)  HYDROmorphone 4 mg oral tablet: 1 tab(s) orally every 4 hours As needed Severe Pain (7 - 10) (16 Aug 2024 14:08)  insulin glargine 100 units/mL subcutaneous solution: 25 unit(s) subcutaneous once a day (at bedtime) (07 Aug 2024 23:27)  insulin lispro 100 units/mL injectable solution: 6  injectable 4 times a day (before feeds Q6H) (07 Aug 2024 23:32)  losartan 100 mg oral tablet: 1 tab(s) orally once a day via PEG tube (07 Aug 2024 23:32)  metFORMIN 1000 mg oral tablet: 1 tab(s) orally 2 times a day via PEG (12 Aug 2024 12:49)  Multiple Vitamins with Minerals oral liquid: 1 tab(s) orally once a day (16 Aug 2024 14:08)  polyethylene glycol 3350 oral powder for reconstitution: 17 gram(s) orally once a day via PEG tube (07 Aug 2024 23:32)  senna leaf extract oral tablet: 2 tab(s) orally once a day (at bedtime) via PEG tube (07 Aug 2024 23:32)  sodium chloride 0.65% nasal spray: 1 spray(s) nasal 3 times a day (16 Aug 2024 14:08)  zinc oxide 20% topical ointment: Apply topically to affected area once a day apply to excoriations underneath bilateral breasts daily (24 Aug 2024 00:53)                           MEDICATIONS:  STANDING MEDICATIONS  acetylcysteine 20%  Inhalation 3 milliLiter(s) Inhalation every 6 hours  albuterol/ipratropium for Nebulization 3 milliLiter(s) Nebulizer every 6 hours  amLODIPine   Tablet 10 milliGRAM(s) Oral daily  aspirin  chewable 81 milliGRAM(s) Oral daily  cefTRIAXone   IVPB      cefTRIAXone   IVPB 1000 milliGRAM(s) IV Intermittent every 24 hours  chlorhexidine 0.12% Liquid 15 milliLiter(s) Oral Mucosa two times a day  dextrose 5%. 1000 milliLiter(s) IV Continuous <Continuous>  enoxaparin Injectable 40 milliGRAM(s) SubCutaneous every 24 hours  fluticasone propionate 50 MICROgram(s)/spray Nasal Spray 1 Spray(s) Both Nostrils two times a day  gabapentin 600 milliGRAM(s) Oral three times a day  glycopyrrolate 2 milliGRAM(s) Oral daily  insulin glargine Injectable (LANTUS) 25 Unit(s) SubCutaneous at bedtime  insulin lispro (ADMELOG) corrective regimen sliding scale   SubCutaneous three times a day before meals  insulin lispro Injectable (ADMELOG) 6 Unit(s) SubCutaneous three times a day before meals  magnesium sulfate  IVPB 2 Gram(s) IV Intermittent every 12 hours  multivitamin/minerals/iron Oral Solution (CENTRUM) 15 milliLiter(s) Oral daily  polyethylene glycol 3350 17 Gram(s) Oral daily  scopolamine 1 mG/72 Hr(s) Patch 1 Patch Transdermal every 72 hours  senna 2 Tablet(s) Oral at bedtime  sodium chloride 1 Gram(s) Oral two times a day  sodium chloride 0.65% Nasal 1 Spray(s) Both Nostrils two times a day  zinc oxide 20% Ointment 1 Application(s) Topical daily    PRN MEDICATIONS  ALPRAZolam 0.5 milliGRAM(s) Oral every 8 hours PRN  dextrose Oral Gel 15 Gram(s) Oral once PRN  HYDROmorphone   Tablet 4 milliGRAM(s) Oral every 4 hours PRN                                            ------------------------------------------------------------  VITAL SIGNS: Last 24 Hours  T(C): 36.4 (26 Aug 2024 05:02), Max: 36.7 (25 Aug 2024 20:25)  T(F): 97.5 (26 Aug 2024 05:02), Max: 98.1 (25 Aug 2024 20:25)  HR: 70 (26 Aug 2024 05:02) (70 - 74)  BP: 155/91 (26 Aug 2024 05:02) (119/70 - 155/91)  BP(mean): 112 (26 Aug 2024 05:02) (86 - 112)  RR: 20 (26 Aug 2024 05:02) (20 - 20)  SpO2: 100% (26 Aug 2024 05:02) (98% - 100%)      08-25-24 @ 07:01  -  08-26-24 @ 07:00  --------------------------------------------------------  IN: 300 mL / OUT: 0 mL / NET: 300 mL                                             --------------------------------------------------------------  LABS:                        8.4    6.28  )-----------( 212      ( 26 Aug 2024 06:41 )             25.3     08-26    132<L>  |  93<L>  |  7<L>  ----------------------------<  173<H>  4.1   |  26  |  0.5<L>    Ca    8.8      26 Aug 2024 06:41  Mg     2.4     08-26    TPro  6.8  /  Alb  3.6  /  TBili  0.4  /  DBili  x   /  AST  9   /  ALT  7   /  AlkPhos  77  08-26      Urinalysis Basic - ( 26 Aug 2024 06:41 )    Color: x / Appearance: x / SG: x / pH: x  Gluc: 173 mg/dL / Ketone: x  / Bili: x / Urobili: x   Blood: x / Protein: x / Nitrite: x   Leuk Esterase: x / RBC: x / WBC x   Sq Epi: x / Non Sq Epi: x / Bacteria: x              Urinalysis with Rflx Culture (collected 24 Aug 2024 01:07)    Culture - Blood (collected 23 Aug 2024 22:47)  Source: .Blood Blood-Peripheral  Preliminary Report (26 Aug 2024 09:01):    No growth at 48 Hours    Culture - Blood (collected 23 Aug 2024 22:47)  Source: .Blood Blood-Peripheral  Preliminary Report (26 Aug 2024 09:01):    No growth at 48 Hours

## 2024-08-29 NOTE — PROGRESS NOTE ADULT - ASSESSMENT
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    Change of MS  Pansinusitis  Cystitis  Progression of oral tumor, Gross protrusion of tongue out of the oral cavity, infiltration/obstruction of nasopharynx,   -CT H reviewed no acute intracranial path but partially visualized sinuses show complete opacification  -dedicated CT of sinuses complete opacification of paranasal sinuses/mastoids and , spaces, redemonstrated bulky tongue mass extending into oropharynx, complete luminal obs of oral airway, complete soft tissue opacification of the nasopharynx  -U/A + for nitrites and bacteria, C&S P  -BC&S   -[id number] IV q 24  -check NH3, AM cortisol    Hx of sq supraglottic to BOT ca, met to lungs,  Lingual protrusion, progression of ca  Permanent trach, 6L T tube  Hx of COPD, ex tobacco use  -sp recent BL lingual aa embolization neurovasc surgery 8/13  -flush and suction nares, red bulb soft cath  -suction trach, send for C&S  -NEB tx with resp q 6hrs, monitor pulse ox  -ENT Dr Landaverde for pansinusitis and inc nasal/sinus/ trach secretions, BL Lingual embolization 8/13, is there any lingual shrinkage, if not what other options ? glossectomy  -Hem-Onc Dr Mejia  -Shamar MRSA    Hx of Anemia, sp recent 2 u of PRBC, cont to monitor, on AC, may need another U, check iron panel    Hx of HTN, ASHD, cont meds  Hx of DM II monitor FS, insulin check A1C  Hx of permanent PEG, feeds glucerna  q 6 hrs, flush with water  Hx of chr hyponatremia, NaCl 1gm  via PEG BID. inc to 1gm TID with enteral feeds  Hx of bedbound state, lower ext weakness, muscle atrophy, BL foot drop, freq off loading, decubiti prevention  Hx of anxiety, depression

## 2024-08-29 NOTE — PROGRESS NOTE ADULT - REASON FOR ADMISSION
AMS
AMS, pansinusitis, cystitis, progression of supraglottic/tongue
AMS, progressing sq cell ca of supraglottic to BOT, protrusion of tongue anteriorly, prior lung mets, Hx if trach and Hx of PEG
AMS
AMS
AMS, pansinusitis, cystitis, extensive supraglottic to BOT ca
AMS, pansinusitis, cystitis, progression of supraglottic to bottom of tongue ca, protrusion of tongue out of oral cavity, recent BL lingual aa embolization
AMS

## 2024-08-30 ENCOUNTER — TRANSCRIPTION ENCOUNTER (OUTPATIENT)
Age: 59
End: 2024-08-30

## 2024-08-30 ENCOUNTER — APPOINTMENT (OUTPATIENT)
Dept: NEUROLOGY | Facility: CLINIC | Age: 59
End: 2024-08-30

## 2024-08-30 VITALS
HEART RATE: 90 BPM | SYSTOLIC BLOOD PRESSURE: 165 MMHG | OXYGEN SATURATION: 96 % | RESPIRATION RATE: 18 BRPM | TEMPERATURE: 98 F | DIASTOLIC BLOOD PRESSURE: 71 MMHG

## 2024-08-30 LAB
ALBUMIN SERPL ELPH-MCNC: 3.4 G/DL — LOW (ref 3.5–5.2)
ALP SERPL-CCNC: 72 U/L — SIGNIFICANT CHANGE UP (ref 30–115)
ALT FLD-CCNC: 6 U/L — SIGNIFICANT CHANGE UP (ref 0–41)
ANION GAP SERPL CALC-SCNC: 11 MMOL/L — SIGNIFICANT CHANGE UP (ref 7–14)
AST SERPL-CCNC: 8 U/L — SIGNIFICANT CHANGE UP (ref 0–41)
BASOPHILS # BLD AUTO: 0.03 K/UL — SIGNIFICANT CHANGE UP (ref 0–0.2)
BASOPHILS NFR BLD AUTO: 0.3 % — SIGNIFICANT CHANGE UP (ref 0–1)
BILIRUB SERPL-MCNC: <0.2 MG/DL — SIGNIFICANT CHANGE UP (ref 0.2–1.2)
BUN SERPL-MCNC: 10 MG/DL — SIGNIFICANT CHANGE UP (ref 10–20)
CALCIUM SERPL-MCNC: 9 MG/DL — SIGNIFICANT CHANGE UP (ref 8.4–10.4)
CHLORIDE SERPL-SCNC: 95 MMOL/L — LOW (ref 98–110)
CO2 SERPL-SCNC: 27 MMOL/L — SIGNIFICANT CHANGE UP (ref 17–32)
CREAT SERPL-MCNC: 0.6 MG/DL — LOW (ref 0.7–1.5)
EGFR: 103 ML/MIN/1.73M2 — SIGNIFICANT CHANGE UP
EOSINOPHIL # BLD AUTO: 0.37 K/UL — SIGNIFICANT CHANGE UP (ref 0–0.7)
EOSINOPHIL NFR BLD AUTO: 4.2 % — SIGNIFICANT CHANGE UP (ref 0–8)
GLUCOSE BLDC GLUCOMTR-MCNC: 108 MG/DL — HIGH (ref 70–99)
GLUCOSE BLDC GLUCOMTR-MCNC: 158 MG/DL — HIGH (ref 70–99)
GLUCOSE SERPL-MCNC: 101 MG/DL — HIGH (ref 70–99)
HCT VFR BLD CALC: 26.2 % — LOW (ref 37–47)
HGB BLD-MCNC: 8.1 G/DL — LOW (ref 12–16)
IMM GRANULOCYTES NFR BLD AUTO: 0.3 % — SIGNIFICANT CHANGE UP (ref 0.1–0.3)
LYMPHOCYTES # BLD AUTO: 0.69 K/UL — LOW (ref 1.2–3.4)
LYMPHOCYTES # BLD AUTO: 7.9 % — LOW (ref 20.5–51.1)
MAGNESIUM SERPL-MCNC: 2.4 MG/DL — SIGNIFICANT CHANGE UP (ref 1.8–2.4)
MCHC RBC-ENTMCNC: 29 PG — SIGNIFICANT CHANGE UP (ref 27–31)
MCHC RBC-ENTMCNC: 30.9 G/DL — LOW (ref 32–37)
MCV RBC AUTO: 93.9 FL — SIGNIFICANT CHANGE UP (ref 81–99)
MONOCYTES # BLD AUTO: 0.61 K/UL — HIGH (ref 0.1–0.6)
MONOCYTES NFR BLD AUTO: 7 % — SIGNIFICANT CHANGE UP (ref 1.7–9.3)
NEUTROPHILS # BLD AUTO: 7.01 K/UL — HIGH (ref 1.4–6.5)
NEUTROPHILS NFR BLD AUTO: 80.3 % — HIGH (ref 42.2–75.2)
NRBC # BLD: 0 /100 WBCS — SIGNIFICANT CHANGE UP (ref 0–0)
PLATELET # BLD AUTO: 263 K/UL — SIGNIFICANT CHANGE UP (ref 130–400)
PMV BLD: 8.6 FL — SIGNIFICANT CHANGE UP (ref 7.4–10.4)
POTASSIUM SERPL-MCNC: 4.3 MMOL/L — SIGNIFICANT CHANGE UP (ref 3.5–5)
POTASSIUM SERPL-SCNC: 4.3 MMOL/L — SIGNIFICANT CHANGE UP (ref 3.5–5)
PROT SERPL-MCNC: 6.6 G/DL — SIGNIFICANT CHANGE UP (ref 6–8)
RBC # BLD: 2.79 M/UL — LOW (ref 4.2–5.4)
RBC # FLD: 14.3 % — SIGNIFICANT CHANGE UP (ref 11.5–14.5)
SODIUM SERPL-SCNC: 133 MMOL/L — LOW (ref 135–146)
WBC # BLD: 8.74 K/UL — SIGNIFICANT CHANGE UP (ref 4.8–10.8)
WBC # FLD AUTO: 8.74 K/UL — SIGNIFICANT CHANGE UP (ref 4.8–10.8)

## 2024-08-30 RX ORDER — SODIUM CHLORIDE 9 MG/ML
1 INJECTION INTRAMUSCULAR; INTRAVENOUS; SUBCUTANEOUS
Qty: 90 | Refills: 0
Start: 2024-08-30 | End: 2024-09-28

## 2024-08-30 RX ADMIN — Medication 600 MILLIGRAM(S): at 13:56

## 2024-08-30 RX ADMIN — Medication 25 GRAM(S): at 05:28

## 2024-08-30 RX ADMIN — Medication 15 MILLILITER(S): at 11:32

## 2024-08-30 RX ADMIN — IPRATROPIUM BROMIDE AND ALBUTEROL SULFATE 3 MILLILITER(S): .5; 3 SOLUTION RESPIRATORY (INHALATION) at 13:48

## 2024-08-30 RX ADMIN — SODIUM CHLORIDE 1 GRAM(S): 9 INJECTION INTRAMUSCULAR; INTRAVENOUS; SUBCUTANEOUS at 05:28

## 2024-08-30 RX ADMIN — IPRATROPIUM BROMIDE AND ALBUTEROL SULFATE 3 MILLILITER(S): .5; 3 SOLUTION RESPIRATORY (INHALATION) at 09:11

## 2024-08-30 RX ADMIN — HYDROMORPHONE HYDROCHLORIDE 4 MILLIGRAM(S): 2 TABLET ORAL at 03:12

## 2024-08-30 RX ADMIN — ZINC OXIDE 1 APPLICATION(S): 100 OINTMENT TOPICAL at 11:32

## 2024-08-30 RX ADMIN — AMLODIPINE BESYLATE 10 MILLIGRAM(S): 10 TABLET ORAL at 05:28

## 2024-08-30 RX ADMIN — FLUTICASONE PROPIONATE 1 SPRAY(S): 50 SPRAY, METERED NASAL at 05:27

## 2024-08-30 RX ADMIN — Medication 81 MILLIGRAM(S): at 11:31

## 2024-08-30 RX ADMIN — Medication 2: at 08:05

## 2024-08-30 RX ADMIN — Medication 1 SPRAY(S): at 05:27

## 2024-08-30 RX ADMIN — Medication 6 UNIT(S): at 08:05

## 2024-08-30 RX ADMIN — Medication 1 PATCH: at 13:56

## 2024-08-30 RX ADMIN — Medication 600 MILLIGRAM(S): at 05:28

## 2024-08-30 RX ADMIN — Medication 1 PATCH: at 07:30

## 2024-08-30 RX ADMIN — Medication 3 MILLILITER(S): at 13:48

## 2024-08-30 RX ADMIN — CHLORHEXIDINE GLUCONATE 15 MILLILITER(S): 40 SOLUTION TOPICAL at 05:29

## 2024-08-30 RX ADMIN — HYDROMORPHONE HYDROCHLORIDE 4 MILLIGRAM(S): 2 TABLET ORAL at 11:31

## 2024-08-30 RX ADMIN — SODIUM CHLORIDE 1 GRAM(S): 9 INJECTION INTRAMUSCULAR; INTRAVENOUS; SUBCUTANEOUS at 13:57

## 2024-08-30 RX ADMIN — Medication 3 MILLILITER(S): at 09:11

## 2024-08-30 RX ADMIN — GLYCOPYRROLATE 2 MILLIGRAM(S): 0.2 INJECTION INTRAMUSCULAR; INTRAVENOUS at 11:32

## 2024-08-30 RX ADMIN — Medication 0.5 MILLIGRAM(S): at 11:31

## 2024-08-30 RX ADMIN — Medication 1 PATCH: at 13:00

## 2024-08-30 RX ADMIN — Medication 6 UNIT(S): at 12:28

## 2024-08-30 NOTE — DISCHARGE NOTE PROVIDER - INSTRUCTIONS
Glucerna 1.2 Martin (GLUCERNARTH)  Total Volume for 24 Hours (mL): 1200  Bolus  Total # of Feeds: 4  Tube Feed Frequency: Every 6 hours   Tube Feed Start Time: 06:00  Bolus Feed Rate (mL per Hour): 150   Bolus Feed Duration (in Hours): 2  Free Water Flush  Bolus   Total Volume per Flush (mL): 100   Total Volume of Bolus (mL):  300   Frequency: Every 6 Hours   Total Daily Volume of Flush (mL): 400  Free Water Flush Instructions:  50 mL flush pre and post feed (08-27-24 @ 18:17) [Pending Verification By Attending]  Diet, NPO:   Tube Feeding Modality: Gastro-Jejunostomy  Glucerna 1.2 Martin (GLUCERNARTH)  Total Volume for 24 Hours (mL): 1600  Bolus  Tube Feed Frequency: Every 6 hours   Tube Feed Start Time: 00:00  Bolus Feed Rate (mL per Hour): 200   Bolus Feed Duration (in Hours): 2  Free Water Flush   Total Volume per Flush (mL): 100   Total Volume of Bolus (mL):  400   Frequency: Every 6 Hours   Total Daily Volume of Flush (mL): 400

## 2024-08-30 NOTE — DISCHARGE NOTE PROVIDER - CARE PROVIDERS DIRECT ADDRESSES
,[email or web address]

## 2024-08-30 NOTE — DISCHARGE NOTE PROVIDER - HOSPITAL COURSE
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      #Cystitis  -U/A + for nitrites and bacteria, C&S P  -On Ceftriaxone 1gm IV q 24 started on 08/24 , till 8/30 \par  #HYPONATREMIA :   -sodium trending down   PLAN   -encourage fluid flushes via peg   -Increase Na1 g to 2g BID   -f/u repeat BMP for sodium @8 pm       #Sinusitis :   -CT H reviewed no acute intracranial path but partially visualized sinuses show complete opacification  -repeat Ct scan showed  findings are essentially unchanged since the CT neck   dated 8/8/24.  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,  Lingual protrusion, progression of ca  #Permanent trach, 6L T tube  #Hx of COPD, ex tobacco use  -sp recent BL lingual aa embolization neurovasc surgery 8/13  -flush and suction nares, red bulb soft cath  -suction trach, send for C&S  -NEB tx with resp q 6hrs, monitor pulse ox  -ENT Dr Landaverde for pansinusitis and inc nasal/sinus/ trach secretions, BL Lingual embolization 8/13,   -Hem-Onc Dr Mejia as per Hem Onc note   -tongue size is not shrinking might need glossectomy   -f/u OMFS c/s for reccs   -pt on PEG feeds       #Hx of Anemia, sp recent 2 u of PRBC, cont to monitor, on AC    pt is medically stable for discharge

## 2024-08-30 NOTE — DISCHARGE NOTE PROVIDER - NSDCCPCAREPLAN_GEN_ALL_CORE_FT
PRINCIPAL DISCHARGE DIAGNOSIS  Diagnosis: Acute cystitis  Assessment and Plan of Treatment: you came here with enlarge tongue   We did all the required workup and mangement.   We gave you antibiotics for possible infection of your urinary tract and gave you sal tablets for chronically low sodium in blood   We consulted Oncology and OMFS and they recc outpt regualr follow up with them for further management.  Kindly take your medications as prescribed .  follow up your apointments as scheduled .  if you experience any shortness of breathe ,fever ,chills nausea vomiting or anyother sign contact a Doc asap.  If you still have any question or concern dont heistate to ask .

## 2024-08-30 NOTE — DISCHARGE NOTE PROVIDER - NSDCMRMEDTOKEN_GEN_ALL_CORE_FT
acetylcysteine 20% inhalation solution: 3 milliliter(s) inhaled every 6 hours  albuterol sulfate 2.5 mg/3 mL (0.083 %) solution for nebulization: milliliter(s) inhaled 3 times a day, As Needed  ALPRAZolam 0.5 mg oral tablet: 1 tab(s) orally 3 times a day (after meals) x anxiety  amLODIPine 10 mg oral tablet: 1 tab(s) orally once a day  aspirin 81 mg oral capsule: 1 cap(s) orally once a day  chlorhexidine 0.12% mucous membrane liquid: 15 milliliter(s) orally 2 times a day  enoxaparin: 40 unit(s) subcutaneous once a day  Flonase 50 mcg/inh nasal spray: 1 spray(s) in each nostril once a day  gabapentin 600 mg oral tablet: 1 tab(s) orally 3 times a day via PEG tube  HYDROmorphone 4 mg oral tablet: 1 tab(s) orally every 4 hours As needed Severe Pain (7 - 10)  insulin glargine 100 units/mL subcutaneous solution: 25 unit(s) subcutaneous once a day (at bedtime)  insulin lispro 100 units/mL injectable solution: 6  injectable 4 times a day (before feeds Q6H)  losartan 100 mg oral tablet: 1 tab(s) orally once a day via PEG tube  metFORMIN 1000 mg oral tablet: 1 tab(s) orally 2 times a day via PEG  Multiple Vitamins with Minerals oral liquid: 1 tab(s) orally once a day  polyethylene glycol 3350 oral powder for reconstitution: 17 gram(s) orally once a day via PEG tube  senna leaf extract oral tablet: 2 tab(s) orally once a day (at bedtime) via PEG tube  sodium chloride 0.65% nasal spray: 1 spray(s) nasal 3 times a day  zinc oxide 20% topical ointment: Apply topically to affected area once a day apply to excoriations underneath bilateral breasts daily

## 2024-08-30 NOTE — DISCHARGE NOTE PROVIDER - NSDCFUSCHEDAPPT_GEN_ALL_CORE_FT
[facility]  [facility] [address]  Scheduled Appointment: 10/07/2024

## 2024-08-30 NOTE — DISCHARGE NOTE PROVIDER - CARE PROVIDER_API CALL
Hang Santiago  Internal Medicine  [address] [facility], [facility] [address]  [place] [address]  Phone: [phone]  Fax: [phone]  Follow Up Time: 2 weeks    Leonel Mejia  Internal Medicine  [address] [place] [id number]  Phone: [phone]  Fax: [phone]  Follow Up Time: 2 weeks

## 2024-08-30 NOTE — DISCHARGE NOTE NURSING/CASE MANAGEMENT/SOCIAL WORK - PATIENT PORTAL LINK FT
You can access the FollowMyHealth Patient Portal offered by [facility] by registering at the following website: [email or web address]. By joining [employer]’s FollowMyHealth portal, you will also be able to view your health information using other applications (apps) compatible with our system.

## 2024-08-30 NOTE — DISCHARGE NOTE PROVIDER - PROVIDER TOKENS
PROVIDER:[TOKEN:[[id number]],FOLLOWUP:[2 weeks]],PROVIDER:[TOKEN:[[id number]],FOLLOWUP:[2 weeks]]

## 2024-09-03 ENCOUNTER — NON-APPOINTMENT (OUTPATIENT)
Age: 59
End: 2024-09-03

## 2024-09-10 ENCOUNTER — APPOINTMENT (OUTPATIENT)
Dept: OTOLARYNGOLOGY | Facility: CLINIC | Age: 59
End: 2024-09-10

## 2024-09-17 ENCOUNTER — OUTPATIENT (OUTPATIENT)
Dept: OUTPATIENT SERVICES | Facility: HOSPITAL | Age: 59
LOS: 1 days | End: 2024-09-17
Payer: MEDICARE

## 2024-09-17 ENCOUNTER — LABORATORY RESULT (OUTPATIENT)
Age: 59
End: 2024-09-17

## 2024-09-17 ENCOUNTER — APPOINTMENT (OUTPATIENT)
Age: 59
End: 2024-09-17
Payer: MEDICARE

## 2024-09-17 VITALS
SYSTOLIC BLOOD PRESSURE: 136 MMHG | HEART RATE: 80 BPM | DIASTOLIC BLOOD PRESSURE: 80 MMHG | RESPIRATION RATE: 16 BRPM | OXYGEN SATURATION: 100 %

## 2024-09-17 DIAGNOSIS — Z98.890 OTHER SPECIFIED POSTPROCEDURAL STATES: Chronic | ICD-10-CM

## 2024-09-17 DIAGNOSIS — C32.1 MALIGNANT NEOPLASM OF SUPRAGLOTTIS: ICD-10-CM

## 2024-09-17 DIAGNOSIS — E87.1 HYPO-OSMOLALITY AND HYPONATREMIA: ICD-10-CM

## 2024-09-17 DIAGNOSIS — C78.02 SECONDARY MALIGNANT NEOPLASM OF LEFT LUNG: ICD-10-CM

## 2024-09-17 PROCEDURE — 99214 OFFICE O/P EST MOD 30 MIN: CPT

## 2024-09-17 PROCEDURE — 96413 CHEMO IV INFUSION 1 HR: CPT

## 2024-09-17 PROCEDURE — 36415 COLL VENOUS BLD VENIPUNCTURE: CPT

## 2024-09-17 PROCEDURE — 83550 IRON BINDING TEST: CPT

## 2024-09-17 PROCEDURE — 86850 RBC ANTIBODY SCREEN: CPT

## 2024-09-17 PROCEDURE — 80053 COMPREHEN METABOLIC PANEL: CPT

## 2024-09-17 PROCEDURE — 85027 COMPLETE CBC AUTOMATED: CPT

## 2024-09-17 PROCEDURE — G2211 COMPLEX E/M VISIT ADD ON: CPT

## 2024-09-17 PROCEDURE — 82533 TOTAL CORTISOL: CPT | Mod: 59

## 2024-09-17 PROCEDURE — 86900 BLOOD TYPING SEROLOGIC ABO: CPT

## 2024-09-17 PROCEDURE — 84449 ASSAY OF TRANSCORTIN: CPT

## 2024-09-17 PROCEDURE — 82728 ASSAY OF FERRITIN: CPT

## 2024-09-17 PROCEDURE — 84443 ASSAY THYROID STIM HORMONE: CPT

## 2024-09-17 PROCEDURE — 83540 ASSAY OF IRON: CPT

## 2024-09-17 RX ORDER — NIVOLUMAB 10 MG/ML
480 INJECTION INTRAVENOUS ONCE
Refills: 0 | Status: COMPLETED | OUTPATIENT
Start: 2024-09-17 | End: 2024-09-17

## 2024-09-17 RX ADMIN — NIVOLUMAB 480 MILLIGRAM(S): 10 INJECTION INTRAVENOUS at 11:54

## 2024-09-17 NOTE — PHYSICAL EXAM
[Completely disabled. Cannot carry on any self care. Totally confined to bed or chair] : Status 4- Completely disabled. Cannot carry on any self care. Totally confined to bed or chair [Obese] : obese [Normal] : affect appropriate [de-identified] : Tounge is  extruding, trach in place  Tracheostomy in place. Missing teeth. no blood in dressing [de-identified] : Transmitted trach sounds on exam [de-identified] : BLE stasis dermatitis,  no edema today  [de-identified] : PEG but soft  [de-identified] : Rash resolved

## 2024-09-17 NOTE — ASSESSMENT
[Palliative] : Goals of care discussed with patient: Palliative [Palliative Care Plan] : Patient was apprised of incurable nature of disease.  Hospice and Palliative care options discussed. 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 - She has been recovering quite well. I do believe she would tolerate definitive chemoradiation and I recommended we incorporate carboplatin with an AUC dose of 1.5 weekly. Explained to the patient may be some inferior outcomes compared to cisplatin, but I am concerned about weekly cisplatin with all her medical comorbidities. I would not add Taxol to the carboplatin given her severe neuropathy. - 10/20/2022 started chemoradiation with Carboplatin - she did not tolerate carboplatin due to cytopenia it was stopped and on 11/30/2022 switched to Cetuximab 250 mg/m2 to complete with radiation. - 12/21/2022 completed chemoRT. - will have her see Dr. Landaverde of ENT for surveillance as well and to confirm CR. - 03/10/2023 PET - Since 9/21/2022, No definite new sites of biologic tumor activity. Interval decrease in size and FDG uptake of the supraglottic laryngeal mass, SUV max 16.1, previously 18.7 (14% decrease). Resolution of bilateral FDG avid cervical lymph nodes. Interval increase in size and FDG uptake of 2 left lower lobe nodules, SUV max up to 20.0, previously 10.1 (98% increase). Few scattered right lung tree-in-bud nodular opacities are not FDG avid, likely from small airways disease. Scattered areas of skin uptake along the upper abdomen bilaterally which could represent areas of contamination from tracer or less likely possible areas of inflammation. Correlate with exam. - Lung biopsy was positive for metastatic disease PD-L1<1% - she was started on Keytruda. - 07/12/2023 CT C/A/P for new baseline: Interval increase in size of 2 left lower lobe nodules/masses noted on PET CT 3/10/2023 measuring up to 4.2 cm and demonstrating low-attenuation possibly necrotic. Partial occlusion of the central left lower lobe bronchi. Large and consolidative/atelectatic left lower lobe with small aeration of the superior segment. - 07/28/2023 started Opdivo 480 mg IV Q4W. - 10/06/2023 CT C/A/P - Since  8/7/2023 Right lower lobe atelectasis with heterogeneous lobulated left lower lobe consolidation, diminished from earlier study. No parenchymal mass or adenopathy. Dilated main pulmonary artery segment 3.6 cm (302/27) compatible with underlying pulmonary hypertension. Since July 12, 2023, overall stable exam, without definite evidence of acute/inflammatory process within the abdomen or pelvis. -she  had opdivo on 10/20/2023 this was cycle 4 than was lost to follow up -7/2024 local recurrence in her mouth with extruding tongue -he only had one does of Nivoluamb on 8/2/2024 -CT C/A/P no distant Mets in 8/2024  # Drug Rash, likely 2/2 Cetuximab; Grade 2-3 rash - resolved. - S/P Doxy 100 mg daily, Hydrocortisone cream BID to rash. - S/P prednisone 40 mg daily for 7 days.  # BLE pain -  - on hydromorphone 4 mg via [facility] and on gabapentin   # DVT was  apixaban - now on Lovenox Subq  prophylactic ose   # Mild thrombocytopenia from chemo - resolved.  # Anemia due to critical illness and had bleeding form tumor had IR embolization - will transfuse to keep HGB around 7-8 g/dL. - 09/11/2023 ferritin 505  #Hyponatremia due to SIADH?, Immune mediated adrenal insufficiency   PLAN: -c/w Nivoluamb due for cycle 2 today  -She still continues to have moderate anemia this is multifactorial likely due to critical illness and will transfuse to keep greater than  8 g/dL - on Lovenox 40 mg daily for DVT prophylaxis subcu with hold if bleeding -CT neck, C/A/P again after cycle 3-4 will possibly add Cetuximab if progression -Will repeat sodium today we will also check ACTH and cortisol level granted will be random.  She should stay on salt tablets -Will also check iron studies today as well as TSH -She will see me back with cycle 3

## 2024-09-17 NOTE — HISTORY OF PRESENT ILLNESS
[Disease: _____________________] : Disease: [unfilled] [T: ___] : T[unfilled] [N: ___] : N[unfilled] [M: ___] : M[unfilled] [AJCC Stage: ____] : AJCC Stage: [unfilled] [de-identified] : This  is a 58yo female sent to the ER from [facility] where she is on ch care for c/o inc SOB, chest tightness, diff breathing, wheezing i.e. acute on chr hypoxic RF. Of note the pt has a trach and has had freq readmissions for mucous plugging, RF and has had several bronchoscopies. EMS noted the pt to be hypotensive and hypoxic. The pt was vigorously suctioned, received Neb Tx, IV steroids, BP revived with IV fluids. The pt was cultured and placed on ABx. She is v well known to the Pul SVc and to ID. The pt is ad with acute on ch hypoxic RF and ongoing Bacterial PNA. The PMHx includes: HTN, ASHD, HFpEF, DLD, DM II, Chronic RF, COPD, LUCIANA, ASp PNA, sp intubation, failure to extubate, chronic trach ([facility] ), recurrent mucus plugging of airways, DVT, AC with Eliquis, lower ext venous stasis dermatitis, bedbound state, GERD, diverticulosis, sp PEG, OA, DDD, DJD, chronic pain syn, depression anxiety. CVA and has loos of sensation on right side of the body.  Hosp Course: The pt was ad from SNF for inc SOB, hypoxia with mucous plugging and acute on chr RF and ongoing bacterial PNA. The pt was suctioned and placed on mech ventilation via trach and cont on ABx. The pt has had freq ad to hosp ([facility]) since the [facility] and intubation and failure to wean that resulted in the trach in  in [facility]. The pt is mostly bed bound with lower ext weakness, mm atrophy/myopathy of disuse and sever hyperpigmentation skin changes of the lower ext. The pt was ff by Pul/critical care and ID. The pt was verbal and had nutrition via PEG. S&S worked with pt and during w/up pt was noted to have pharyngeal/trach swelling. 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 The pt was taken to the OR by ENT for direct laryngoscopy and bx of the mass on 22 with path showing.  Today she is in a stretcher, trach is connected to oxygen. Does not walk. Has diabetic neuropathy in both feet that is very painful. Not SOB.  She has 3 children. Lives in [facility] [place], operated by [facility] and is here with her .  Review of imagin2022 CT abd/pelvis IMPRESSION: Area of fat stranding surrounding the cecum and ascending colon, new since CT abdomen pelvis performed on 2022. Findings suggestive of cecitis. No discrete surrounding fluid collection/abscess. Partially visualized left lower lobe consolidative opacity. Consider follow-up with radiographs until resolution.  2022 CT neck: Impression:  Status post tracheostomy. 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 Partial effacement of the preepiglottic fat. Mildly enhancing 1.1 cm right supraclavicular lymph node, nonspecific. Left hemidiaphragm elevation with compressive atelectasis in opacification of the partial imaged left lower lobe which could be infectious or inflammatory. Small left pleural effusion. Further evaluation with chest CT can be considered.  22: PET/CT IMPRESSION: Supraglottic mass measuring approximately 2.5 x 3.8 x 3.7 cm at the aryepiglottic folds, with max SUV of 18.7. Additional right level 2 cervical lymph node (Max SUV 5.0) and left level 2 cervical lymph node (Max SUV 3.9). These nodes are indeterminate for biologic tumor activity. Subsegmental area of atelectasis seen at the left lung base containing 2 foci of increased FDG uptake SUV measuring 10.1 and 6.1 (166, 171) within. These nodules are indeterminate for biologic tumor activity. Trace left pleural effusion. Bilateral FDG uptake in the skin of the axilla with adjacent subcutaneous fat stranding seen on localizer CT. This is probably infectious/inflammatory in etiology. Punctate focus of mild FDG uptake seen in the posterior back soft tissues at the level of the kidneys, favor benign etiology. Correlate with physical exam.  Pathology 2022: stain shows the carcinoma is negative for p16. Tongue base mass, biopsy: -Superficial fragments of invasive squamous cell carcinoma, non-keratinized.   Labs: WBC 9.38, HGb 10.2, Plts 229  [de-identified] : squamous cell carcinoma [de-identified] : 10/26/2022 Resides in [facility]. Reports feeling relatively well; no changes in chronic conditions or new complaints since the last visit.  11/16/2022 She is here for follow up. She is on chemoRT Current Dose: 3000cGy. Planned Dose: 7000cGy from 11/15/22. She is due for week 4 of 7 of chemotherapy. No recent blood work. She missed her treatment last week due bleeding from the trach and throat and went to ED. She had CT angioneck on 11/9/2022 that showed.  1.  No CTA evidence of active contrast extravasation into the airway. 2.  No evidence of major vascular stenosis or occlusion. 3.  Redemonstration of extensive thickening involving the nasopharynx, oropharynx and supraglottic/glottic larynx in this patient with known neoplasm. She had blood work on 11/9/2022 that showed HGB 8.3, PLT 89,  Her bleeding stopped on its own. Createnine was stalbe 0.5.  11/23/2022 She is here for follow up. on 11/22 dose of radiation was Current Dose: 4200cGy. Planned Dose: 7000cGy. She had CBC earlier today that showed ANC 1110, HGB keeps trending down now 6.5, Pls are 48 went up. Iron studies were normal. Carbo was on hold since last week. She denies any bleeding except for some mild bleeding from trach. She states her apixaban has not been held in facility but since no major bleeding and PLT now near 50 she can continue.  12/7/2022 She is here for follow up had 5600cGy. Planned Dose: 7000cGy on 12/6/2022. She is on cetuximab CBC from last week showed WBC 1.55 Hgb 7.7 trending up and PLT are now normal. She has been on Cetuximab since 11/30/2022 She had PRBC transfusion last week. She developed a rash on her mouth and has stomatitis and mild mucositis. The rash is also on her face. I called facility and they did not administer the Minocycline. Rash is grade 2-3. She states the pain in her mouth is not terrible.   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  She is laying in stretcher, currently residing in facility. Reviewed most recent CBC, which shows slight improvement in HGB up to 9.7g/dL. Patient continues on concurrent chemoRT regimen; Current Dose: 6600cGy. Planned Dose: 7000cGy. Treatment Site: supraglottitis. Spoke with [facility], who tentatively have her completion date as 12/19. Patient denies fever, chills, nausea, vomiting, dyspnea, new palpable lumps/bumps or bleeding. She has been on Cetuximab since 11/30/2022. She developed a rash on her mouth and has stomatitis and mild mucositis following first cycle of Cetuximab; Rash still present, grade 2-3 but reportedly slightly improved as per patient. She has been applying hydrocortisone as needed and doxycycline daily. Patient is due for week 3 of Cetuximab today.    1/18/2023 She is here for follow up. She completed chemoRT in Mid-December. Rash is better She had blood work in facility today and will obtain the rash is also much better. She has no new complaints. No longer has any dysphagia because of the trach she only uses ice chips unable to swallow. She did have recent blood work in the facility approximately 1 week ago essentially white cells and platelets were normal hemoglobin was just over 8.  6/14/2023 She is here for follow up today. She had PET/CT on March 10, 2023, that showed Interval decrease in size and FDG uptake of the supraglottic laryngeal mass, SUV max 16.1, previously 18.7 (14% decrease). Resolution of bilateral FDG avid cervical lymph nodes. Interval increase in size and FDG uptake of 2 left lower lobe nodules, SUV max up to 20.0, previously 10.1 (98% increase).  She saw Dr. Landaverde in March and Laryngoscopy showed intense supraglottic edema and secretions effacing full evaluation of her larynx.  She had an IR biopsy doen of the lung nodule that showed 5/31/23 Final Diagnosis LUNG, LEFT LOWER LOBE MASS, CT GUIDED NEEDLE CORE BIOPSY WITH IMPRINTS: - POSITIVE FOR MALIGNANT CELLS -  Squamous cell carcinoma, non-keratinizing, with focal necrosis Immunohistochemistry results: Material:     Block 1A Population:  Malignant cells Positive:      CK7, CK5/6, p63 Negative:     CK20, TTF1, Napsin A, p16 These results support the above diagnosis. Note:  The current tumor is p16 negative, similar to the tongue base tumor, see [id number]. Suggest clinico-pathologic and radiologic correlation to determine whether this tumor is primary or metastatic to the lung. RESULTS : PD-L1 IMMUNOHISTOCHEMICAL ANALYSIS Tumor Proportion Score: <1% / Negative I spoke to Dr. Ryan Murray of radiation about possible SRS to the lung nodule/s but due to location SRS is not possible but he could offer 10-15fx but he was concerned she still has residual disease in the larynx given FDG activity and lisbet Edema.  7/26/2023  She is here for follow up. She was to start Opdivo but was no show on 6/22/23. She feels well. She is in a stretcher as always. CBC today showed HGB 7.9 was 9.1. She denies any bleeding.  09/01/2023 She is here to continue treatment. She is a resident of the nursing home and came today on stretchers. She reports no new issues and no obvious bleeding.  09/22/2023 Reports feeling well, no changes in chronic conditions or new complaints since the last visit.  10/20/2023 accompanied by her daughter; she is a resident of [facility] came today on stretchers; Reports feeling well, no changes in chronic conditions or new complaints since the last visit.  7/22/2024 She  was lost to follow up. since 10/2023. was seen recenly by Dr. Landaverde and has progression of disease in her tounge. The tounge has been pertruding out for about one month. She also recenlty was hospitilized in [facility]  I spoke to her and she wishes to continue to restart treatment she did not progress  on Nivolumab.  Review of most recent medication from her facility includes losartan 100 mg daily, albuterol, Xanax 0.5 mg tablet every 8 hours MiraLAX, senna, sertraline 50 mg, Lovenox 40 mg subcu gabapentin 600 mg 3 times a day insulin NovoLog, chlorhexidine, Flonase fluconazole 100 mg hydromorphone 4 mg tablet every 4 hours duloxetine 20 mg delayed release metformin 1000 mg twice a day aspirin 325 mg nifedipine 30 mg extended release  Recent blood work from 7/18/2024 sodium 130 creatinine less than 0.5 LFTs are normal white blood cell count of 6.9 hemoglobin 8.7 platelet count is 206,000 MCV is normal D-dimer less than 150 NT-proBNP 115  9/17/2024 She is here for follow up. She had CT C/a/p done on 8/20/24 No metastatic disease, lungs were fine.  She was addmited fom 8/7/21 to 8/16/24  due to anemia  due to bleeding from her tumor and had IR.  She went home on propholatic AC She was admitted for cystitis and hyponatremia  8/23-8/30 she was started on salt tabs 2g bid. She had CT sinuses done IMPRESSION:  1. Partial visualization of the previously described bulky tongue mass occluding the oropharyngeal airway. There is also prominent nasopharyngeal soft tissue occluding the nasopharyngeal airway. 2. Persistent essentially complete opacification of the sinonasal cavity and mastoids/middle ear cavities. 3. These facial findings are essentially unchanged since the CT neck dated 8/8/24.   When she was addmited previooslu she had CT neck in 8/8/2024: IMPRESSION:  1. Large bulky, necrotic base of tongue/supraglottic mass measuring about 5 x 6 cm  2. Complete obstruction of the airway. The oral tongue is edematous and displaced anteriorly. Tracheostomy in place  3. Destruction of the thyroid and hyoid cartilages. Extension predominantly to the left neck  4. Soft tissue contiguous with the left ICA over a small portion of its diameter. This usually does not indicate invasion.  5. Nonenlarged but suspicious left level 5 and right upper mediastinal lymph nodes.  6. Near complete sinonasal opacification. The nasopharynx demonstrates slightly dense secretions which can indicate hemorrhage.  7. This is not a CT angiogram but there is no evidence for any active intravasation.  She only had one does of Nivoluamb on 8/2/2024  She is here with her sister I also had a family member on the phone overall she thinks the tongue is a bit better she still has bleeding on and off the sodium is still a problem in a nursing home although they report a sodium of 133 she is on Lovenox for DVT prophylaxis.  I do not have blood work attached to her nursing home paperwork.  We spoke about prognosis again they are recommending hospice in the facility but we decided to keep going with the immunotherapy for now

## 2024-09-18 DIAGNOSIS — C78.02 SECONDARY MALIGNANT NEOPLASM OF LEFT LUNG: ICD-10-CM

## 2024-09-18 LAB
ABO + RH PNL BLD: NORMAL
ACTH STIM CORTISOL BASELINE: 13 UG/DL
ALBUMIN SERPL ELPH-MCNC: 3.7 G/DL
ALP BLD-CCNC: 65 U/L
ALT SERPL-CCNC: <5 U/L
ANION GAP SERPL CALC-SCNC: 12 MMOL/L
AST SERPL-CCNC: 14 U/L
BILIRUB SERPL-MCNC: 0.2 MG/DL
BLD GP AB SCN SERPL QL: NORMAL
BUN SERPL-MCNC: 12 MG/DL
CALCIUM SERPL-MCNC: 9.1 MG/DL
CHLORIDE SERPL-SCNC: 92 MMOL/L
CO2 SERPL-SCNC: 26 MMOL/L
CREAT SERPL-MCNC: 0.5 MG/DL
EGFR: 107 ML/MIN/1.73M2
FERRITIN SERPL-MCNC: 178 NG/ML
GLUCOSE SERPL-MCNC: 110 MG/DL
HCT VFR BLD CALC: 22.2 %
HGB BLD-MCNC: 7.1 G/DL
IRON SATN MFR SERPL: 13 %
IRON SERPL-MCNC: 32 UG/DL
MCHC RBC-ENTMCNC: 29.3 PG
MCHC RBC-ENTMCNC: 32 G/DL
MCV RBC AUTO: 91.7 FL
PLATELET # BLD AUTO: 254 K/UL
PMV BLD: 8.6 FL
POTASSIUM SERPL-SCNC: 4.9 MMOL/L
PROT SERPL-MCNC: 6.7 G/DL
RBC # BLD: 2.42 M/UL
RBC # FLD: 15.9 %
SODIUM SERPL-SCNC: 130 MMOL/L
TIBC SERPL-MCNC: 244 UG/DL
TSH SERPL-ACNC: 4.15 UIU/ML
UIBC SERPL-MCNC: 212 UG/DL
WBC # FLD AUTO: 7.05 K/UL

## 2024-09-19 NOTE — CDI QUERY NOTE - NSCDIOTHERTXTBX_GEN_ALL_CORE_HH
Clinical documentation and/or evidence in the medical record indicates that this patient has functional status limitations.  In order to ensure accurate coding and accuracy of the clinical record, the documentation in this patient’s medical record requires additional clarification.      Please include more specific documentation regarding the patient’s functional status in your progress note and/or discharge summary.  Please clarify if bedbound status / total assistance can be further specified as:    •	Bedbound status / total assistance associated with functional quadriplegia  •	Bedbound status / total assistance associated with other (please specify)  •	Other (specify)  Supporting documentation and/or clinical evidence:   8/27 Progress Note Adult-Internal Medicine Attending: … Hx of bedbound state, lower ext weakness, muscle atrophy, BL foot drop …    Nursing assessments:  8/24-8/27 Daily activity: total assistance; Basic mobility: total assistance  8/26 Nutrition: total feed / enteral feed / NPO    Thank you,  Anitra Figueroa RN, BSN  [phone]
Clinical documentation and/or evidence of the patient’s presentation, evaluation, and medical management, as evidenced below, may support a diagnosis that is not documented in the medical record.  In order to ensure accurate coding and accuracy of the clinical record, the documentation in this patient’s medical record requires additional clarification.    If you think the supporting documentation and/or clinical evidence supports a more specific diagnosis, please include more specific documentation of a diagnosis associated with these findings in your progress note and/or discharge summary.                               ==================================  	  Please clarify if the patient was found to have one of the following:    • Alter mental status is associated with metabolic encephalopathy secondary to cystitis and pansinusitis. 	  • Alter mental status is associated with metabolic encephalopathy secondary to .......... (Please specify the underlying cause if known)  • Alter mental status is not associated with metabolic encephalopathy. Alter mental status is secondary to.............. (please specify the underlying cause if known)	  • Other (specify)    Supporting documentation and/or clinical evidence:   ** 8/23 ED Note:         She is present with son who is providing history. Patient has been behaving differently since yesterday. patient normally interacts using ipad and hand gestures however has been minimally interactive.        Physical Exam: NEURO: Awake and alert, no focal deficits        Principal Discharge DX:	Lethargy.    ** 8/24 H&P:       Reason for Admission: AMS       History of present illness: PMH of IDDM, GERD, HTN, COPD,  Chronic hypoxemic and hypercapnic respiratory failure SP Tracheostomy (on 6L at [place]) & PEG at [facility], DVT on Eliquis, Anxiety, Hx of Supraglottic SSC s/p chemotherapy, normocytic anemia of chronic disease presenting from [place] rehab for evaluation of altered mental status.        Assessment:         # Hyponatremia         -Na 128 on this admission         -continue to monitor     ** 8/25 PN Attending:      PHYSICAL EXAM:         Constitutional: A&O and appears at baseline,  bedbound, chr ill looking, nonverbal, + IPAD,  on T tube    ** 8/26 Consult Heme/Onc:       Physical Exam:           NEURO: A&Ox3, no focal deficits, non verbal (given her mouth and tongue condition) but shakes heads to questions    ** 8/27 PN Attending:       INTERVAL HPI/OVERNIGHT EVENTS: pt seems  at baseline, CTH shows almost complete sinus opacification, dedicated CT of sinuses complete opacification of paranasal sinuses and mastoids and  middle ear cavities, infiltrative changes in L parotid and  spaces sugg of progression of tumor  and infiltration  of these spaces,   u/a + nitrites and bacteria,  started Ceftriaxone, adequate coverage for both pansinusitis and cystitis,    ** 8/30 Discharge Note:        sent to the ER for change of MS noted to have cont grossly protruding tongue, opacification od sinuses on CTH and + nitrites + bacteria in the urine.       #Cystitis         -U/A + for nitrites and bacteria, C&S P         -On Ceftriaxone 1gm IV q 24 started on 08/24 , till 8/30 \par      ** Imaging:     CT Head No Cont (08.23.24 @ 22:04)        INTERPRETATION:  Clinical History / Reason for exam: Altered mental status.       Impression:        No evidence of acute intracranial abnormality. Extensive sinusitis.    ** Orders:  - 8/24 - 30: Rocephin 1000 ng IV / 24 Hr.

## 2024-09-20 ENCOUNTER — OUTPATIENT (OUTPATIENT)
Dept: OUTPATIENT SERVICES | Facility: HOSPITAL | Age: 59
LOS: 1 days | End: 2024-09-20
Payer: MEDICARE

## 2024-09-20 ENCOUNTER — APPOINTMENT (OUTPATIENT)
Age: 59
End: 2024-09-20

## 2024-09-20 DIAGNOSIS — C78.02 SECONDARY MALIGNANT NEOPLASM OF LEFT LUNG: ICD-10-CM

## 2024-09-20 DIAGNOSIS — Z98.890 OTHER SPECIFIED POSTPROCEDURAL STATES: Chronic | ICD-10-CM

## 2024-09-20 PROCEDURE — P9040: CPT

## 2024-09-20 PROCEDURE — 86923 COMPATIBILITY TEST ELECTRIC: CPT

## 2024-09-20 PROCEDURE — 36430 TRANSFUSION BLD/BLD COMPNT: CPT

## 2024-09-20 PROCEDURE — 36415 COLL VENOUS BLD VENIPUNCTURE: CPT

## 2024-09-27 ENCOUNTER — APPOINTMENT (OUTPATIENT)
Dept: OTOLARYNGOLOGY | Facility: CLINIC | Age: 59
End: 2024-09-27
Payer: MEDICARE

## 2024-09-27 VITALS — DIASTOLIC BLOOD PRESSURE: 48 MMHG | OXYGEN SATURATION: 96 % | SYSTOLIC BLOOD PRESSURE: 110 MMHG

## 2024-09-27 DIAGNOSIS — C32.1 MALIGNANT NEOPLASM OF SUPRAGLOTTIS: ICD-10-CM

## 2024-09-27 DIAGNOSIS — K14.8 OTHER DISEASES OF TONGUE: ICD-10-CM

## 2024-09-27 DIAGNOSIS — Z93.0 TRACHEOSTOMY STATUS: ICD-10-CM

## 2024-09-27 DIAGNOSIS — C77.0 SECONDARY AND UNSPECIFIED MALIGNANT NEOPLASM OF LYMPH NODES OF HEAD, FACE AND NECK: ICD-10-CM

## 2024-09-27 PROCEDURE — 31615 TRCHEOBRNCHSC EST TRACHS INC: CPT

## 2024-09-27 PROCEDURE — 99214 OFFICE O/P EST MOD 30 MIN: CPT | Mod: 25

## 2024-09-27 NOTE — HISTORY OF PRESENT ILLNESS
[de-identified] : Oncology Summary Stage: [id number] Site: Supraglottis Pathology: SCC Treatment: Chemoradiation completed 12/15/2022, previously on palliative nivolumab, lost to follow-up Disease status: Metastatic, advanced locoregional Swallow: G tube dependent Airway: Trach with PMV Smoking: Former smoker, since quit Thyroid: Will check 6/2023   [FreeTextEntry1] : Patient here following up on Malignant neoplasm of supraglottic and tongue mass.   Follow up for enlarged tumor right tongue

## 2024-09-27 NOTE — REASON FOR VISIT
[Subsequent Evaluation] : a subsequent evaluation for [FreeTextEntry2] : Malignant neoplasm of supraglottic and tongue mass

## 2024-09-27 NOTE — PROCEDURE
[Complicated Symptoms] : complicated symptoms requiring more thorough examination than provided by mirror [None] : none [Flexible Endoscope] : examined with the flexible endoscope [de-identified] : In a separately identifiable procedure, scope introduced through existing tracheostoma. Trachea and bilateral mainstem bronchi evaluated. No masses or lesions, thin secretions, airway patent.

## 2024-10-15 ENCOUNTER — APPOINTMENT (OUTPATIENT)
Age: 59
End: 2024-10-15
Payer: MEDICARE

## 2024-10-15 ENCOUNTER — LABORATORY RESULT (OUTPATIENT)
Age: 59
End: 2024-10-15

## 2024-10-15 ENCOUNTER — OUTPATIENT (OUTPATIENT)
Dept: OUTPATIENT SERVICES | Facility: HOSPITAL | Age: 59
LOS: 1 days | End: 2024-10-15
Payer: MEDICARE

## 2024-10-15 VITALS
HEART RATE: 71 BPM | SYSTOLIC BLOOD PRESSURE: 147 MMHG | DIASTOLIC BLOOD PRESSURE: 67 MMHG | OXYGEN SATURATION: 99 % | RESPIRATION RATE: 16 BRPM | TEMPERATURE: 98 F

## 2024-10-15 VITALS — SYSTOLIC BLOOD PRESSURE: 147 MMHG | HEART RATE: 71 BPM | TEMPERATURE: 98 F | DIASTOLIC BLOOD PRESSURE: 67 MMHG

## 2024-10-15 DIAGNOSIS — Z51.11 ENCOUNTER FOR ANTINEOPLASTIC CHEMOTHERAPY: ICD-10-CM

## 2024-10-15 DIAGNOSIS — Z98.890 OTHER SPECIFIED POSTPROCEDURAL STATES: Chronic | ICD-10-CM

## 2024-10-15 DIAGNOSIS — C78.02 SECONDARY MALIGNANT NEOPLASM OF LEFT LUNG: ICD-10-CM

## 2024-10-15 DIAGNOSIS — Z51.12 ENCOUNTER FOR ANTINEOPLASTIC CHEMOTHERAPY: ICD-10-CM

## 2024-10-15 DIAGNOSIS — C77.0 SECONDARY AND UNSPECIFIED MALIGNANT NEOPLASM OF LYMPH NODES OF HEAD, FACE AND NECK: ICD-10-CM

## 2024-10-15 DIAGNOSIS — I82.409 ACUTE EMBOLISM AND THROMBOSIS OF UNSPECIFIED DEEP VEINS OF UNSPECIFIED LOWER EXTREMITY: ICD-10-CM

## 2024-10-15 LAB
HCT VFR BLD CALC: 26.7 %
HGB BLD-MCNC: 8.8 G/DL
MCHC RBC-ENTMCNC: 29.6 PG
MCHC RBC-ENTMCNC: 33 G/DL
MCV RBC AUTO: 89.9 FL
PLATELET # BLD AUTO: 178 K/UL
PMV BLD: 8.5 FL
RBC # BLD: 2.97 M/UL
RBC # FLD: 14.5 %
WBC # FLD AUTO: 6.81 K/UL

## 2024-10-15 PROCEDURE — 99214 OFFICE O/P EST MOD 30 MIN: CPT

## 2024-10-15 PROCEDURE — G2211 COMPLEX E/M VISIT ADD ON: CPT

## 2024-10-15 PROCEDURE — 82728 ASSAY OF FERRITIN: CPT

## 2024-10-15 PROCEDURE — 85027 COMPLETE CBC AUTOMATED: CPT

## 2024-10-15 PROCEDURE — 36415 COLL VENOUS BLD VENIPUNCTURE: CPT

## 2024-10-15 PROCEDURE — 83540 ASSAY OF IRON: CPT

## 2024-10-15 PROCEDURE — 84443 ASSAY THYROID STIM HORMONE: CPT

## 2024-10-15 PROCEDURE — 80053 COMPREHEN METABOLIC PANEL: CPT

## 2024-10-15 PROCEDURE — 96413 CHEMO IV INFUSION 1 HR: CPT

## 2024-10-15 PROCEDURE — 83550 IRON BINDING TEST: CPT

## 2024-10-15 RX ORDER — NIVOLUMAB 10 MG/ML
480 INJECTION INTRAVENOUS ONCE
Refills: 0 | Status: COMPLETED | OUTPATIENT
Start: 2024-10-15 | End: 2024-10-15

## 2024-10-15 RX ADMIN — NIVOLUMAB 480 MILLIGRAM(S): 10 INJECTION INTRAVENOUS at 17:35

## 2024-10-15 RX ADMIN — NIVOLUMAB 480 MILLIGRAM(S): 10 INJECTION INTRAVENOUS at 17:05

## 2024-10-16 ENCOUNTER — APPOINTMENT (OUTPATIENT)
Age: 59
End: 2024-10-16

## 2024-10-16 DIAGNOSIS — C78.02 SECONDARY MALIGNANT NEOPLASM OF LEFT LUNG: ICD-10-CM

## 2024-10-16 LAB
ALBUMIN SERPL ELPH-MCNC: 3.9 G/DL
ALP BLD-CCNC: 69 U/L
ALT SERPL-CCNC: 6 U/L
ANION GAP SERPL CALC-SCNC: 7 MMOL/L
AST SERPL-CCNC: 8 U/L
BILIRUB SERPL-MCNC: <0.2 MG/DL
BUN SERPL-MCNC: 9 MG/DL
CALCIUM SERPL-MCNC: 9.5 MG/DL
CHLORIDE SERPL-SCNC: 97 MMOL/L
CO2 SERPL-SCNC: 31 MMOL/L
CREAT SERPL-MCNC: 0.5 MG/DL
EGFR: 107 ML/MIN/1.73M2
FERRITIN SERPL-MCNC: 170 NG/ML
GLUCOSE SERPL-MCNC: 93 MG/DL
IRON SATN MFR SERPL: 19 %
IRON SERPL-MCNC: 36 UG/DL
POTASSIUM SERPL-SCNC: 4.8 MMOL/L
PROT SERPL-MCNC: 6.8 G/DL
SODIUM SERPL-SCNC: 135 MMOL/L
TIBC SERPL-MCNC: 194 UG/DL
TSH SERPL-ACNC: 2.99 UIU/ML
UIBC SERPL-MCNC: 158 UG/DL

## 2024-10-23 ENCOUNTER — LABORATORY RESULT (OUTPATIENT)
Age: 59
End: 2024-10-23

## 2024-10-23 ENCOUNTER — APPOINTMENT (OUTPATIENT)
Age: 59
End: 2024-10-23

## 2024-10-23 ENCOUNTER — OUTPATIENT (OUTPATIENT)
Dept: OUTPATIENT SERVICES | Facility: HOSPITAL | Age: 59
LOS: 1 days | End: 2024-10-23
Payer: MEDICARE

## 2024-10-23 DIAGNOSIS — C78.02 SECONDARY MALIGNANT NEOPLASM OF LEFT LUNG: ICD-10-CM

## 2024-10-23 DIAGNOSIS — Z98.890 OTHER SPECIFIED POSTPROCEDURAL STATES: Chronic | ICD-10-CM

## 2024-10-23 LAB
HCT VFR BLD CALC: 25.8 %
HGB BLD-MCNC: 8.5 G/DL
MCHC RBC-ENTMCNC: 29.8 PG
MCHC RBC-ENTMCNC: 32.9 G/DL
MCV RBC AUTO: 90.5 FL
PLATELET # BLD AUTO: 196 K/UL
PMV BLD: 8.2 FL
RBC # BLD: 2.85 M/UL
RBC # FLD: 14.5 %
WBC # FLD AUTO: 6.42 K/UL

## 2024-10-23 PROCEDURE — 36416 COLLJ CAPILLARY BLOOD SPEC: CPT

## 2024-10-23 PROCEDURE — 85027 COMPLETE CBC AUTOMATED: CPT

## 2024-10-24 ENCOUNTER — APPOINTMENT (OUTPATIENT)
Age: 59
End: 2024-10-24

## 2024-11-12 ENCOUNTER — LABORATORY RESULT (OUTPATIENT)
Age: 59
End: 2024-11-12

## 2024-11-12 ENCOUNTER — OUTPATIENT (OUTPATIENT)
Dept: OUTPATIENT SERVICES | Facility: HOSPITAL | Age: 59
LOS: 1 days | End: 2024-11-12
Payer: MEDICARE

## 2024-11-12 ENCOUNTER — APPOINTMENT (OUTPATIENT)
Age: 59
End: 2024-11-12
Payer: MEDICARE

## 2024-11-12 VITALS
HEART RATE: 74 BPM | OXYGEN SATURATION: 96 % | RESPIRATION RATE: 17 BRPM | SYSTOLIC BLOOD PRESSURE: 127 MMHG | DIASTOLIC BLOOD PRESSURE: 58 MMHG | HEIGHT: 61 IN

## 2024-11-12 DIAGNOSIS — Z51.11 ENCOUNTER FOR ANTINEOPLASTIC CHEMOTHERAPY: ICD-10-CM

## 2024-11-12 DIAGNOSIS — C78.02 SECONDARY MALIGNANT NEOPLASM OF LEFT LUNG: ICD-10-CM

## 2024-11-12 DIAGNOSIS — Z85.21 PERSONAL HISTORY OF MALIGNANT NEOPLASM OF LARYNX: ICD-10-CM

## 2024-11-12 DIAGNOSIS — Z98.890 OTHER SPECIFIED POSTPROCEDURAL STATES: Chronic | ICD-10-CM

## 2024-11-12 DIAGNOSIS — Z51.12 ENCOUNTER FOR ANTINEOPLASTIC CHEMOTHERAPY: ICD-10-CM

## 2024-11-12 DIAGNOSIS — I82.409 ACUTE EMBOLISM AND THROMBOSIS OF UNSPECIFIED DEEP VEINS OF UNSPECIFIED LOWER EXTREMITY: ICD-10-CM

## 2024-11-12 LAB
HCT VFR BLD CALC: 29.7 %
HGB BLD-MCNC: 10 G/DL
MCHC RBC-ENTMCNC: 30.1 PG
MCHC RBC-ENTMCNC: 33.7 G/DL
MCV RBC AUTO: 89.5 FL
PLATELET # BLD AUTO: 171 K/UL
PMV BLD: 8.4 FL
RBC # BLD: 3.32 M/UL
RBC # FLD: 14.2 %
WBC # FLD AUTO: 6.72 K/UL

## 2024-11-12 PROCEDURE — G2211 COMPLEX E/M VISIT ADD ON: CPT

## 2024-11-12 PROCEDURE — 80053 COMPREHEN METABOLIC PANEL: CPT

## 2024-11-12 PROCEDURE — 99214 OFFICE O/P EST MOD 30 MIN: CPT

## 2024-11-12 PROCEDURE — 96413 CHEMO IV INFUSION 1 HR: CPT

## 2024-11-12 PROCEDURE — 84443 ASSAY THYROID STIM HORMONE: CPT

## 2024-11-12 PROCEDURE — 36415 COLL VENOUS BLD VENIPUNCTURE: CPT

## 2024-11-12 PROCEDURE — 85027 COMPLETE CBC AUTOMATED: CPT

## 2024-11-12 RX ORDER — NIVOLUMAB 10 MG/ML
480 INJECTION INTRAVENOUS ONCE
Refills: 0 | Status: COMPLETED | OUTPATIENT
Start: 2024-11-12 | End: 2024-11-12

## 2024-11-12 RX ADMIN — NIVOLUMAB 480 MILLIGRAM(S): 10 INJECTION INTRAVENOUS at 16:15

## 2024-11-12 RX ADMIN — NIVOLUMAB 480 MILLIGRAM(S): 10 INJECTION INTRAVENOUS at 15:44

## 2024-11-13 DIAGNOSIS — C78.02 SECONDARY MALIGNANT NEOPLASM OF LEFT LUNG: ICD-10-CM

## 2024-11-13 LAB
ALBUMIN SERPL ELPH-MCNC: 4.1 G/DL
ALP BLD-CCNC: 65 U/L
ALT SERPL-CCNC: 7 U/L
ANION GAP SERPL CALC-SCNC: 10 MMOL/L
AST SERPL-CCNC: 10 U/L
BILIRUB SERPL-MCNC: <0.2 MG/DL
BUN SERPL-MCNC: 11 MG/DL
CALCIUM SERPL-MCNC: 9.4 MG/DL
CHLORIDE SERPL-SCNC: 97 MMOL/L
CO2 SERPL-SCNC: 25 MMOL/L
CREAT SERPL-MCNC: 0.5 MG/DL
EGFR: 107 ML/MIN/1.73M2
GLUCOSE SERPL-MCNC: 98 MG/DL
POTASSIUM SERPL-SCNC: 4.6 MMOL/L
PROT SERPL-MCNC: 7.4 G/DL
SODIUM SERPL-SCNC: 132 MMOL/L
TSH SERPL-ACNC: 4.2 UIU/ML

## 2024-11-18 ENCOUNTER — NON-APPOINTMENT (OUTPATIENT)
Age: 59
End: 2024-11-18

## 2024-11-18 ENCOUNTER — APPOINTMENT (OUTPATIENT)
Dept: OTOLARYNGOLOGY | Facility: CLINIC | Age: 59
End: 2024-11-18
Payer: MEDICARE

## 2024-11-18 DIAGNOSIS — C77.0 SECONDARY AND UNSPECIFIED MALIGNANT NEOPLASM OF LYMPH NODES OF HEAD, FACE AND NECK: ICD-10-CM

## 2024-11-18 DIAGNOSIS — Z93.0 TRACHEOSTOMY STATUS: ICD-10-CM

## 2024-11-18 DIAGNOSIS — C32.1 MALIGNANT NEOPLASM OF SUPRAGLOTTIS: ICD-10-CM

## 2024-11-18 PROCEDURE — 31575 DIAGNOSTIC LARYNGOSCOPY: CPT | Mod: 59

## 2024-11-18 PROCEDURE — 99214 OFFICE O/P EST MOD 30 MIN: CPT | Mod: 25

## 2024-11-18 PROCEDURE — 31615 TRCHEOBRNCHSC EST TRACHS INC: CPT

## 2024-11-25 NOTE — PATIENT PROFILE ADULT - NSPROMEDSBROUGHTTOHOSP_GEN_A_NUR
Humberto was seen in endocrine clinic for a diabetes follow up.    Today we made the following changes:  - change carb ratio to 6  - bolus 10-15 min before eating  - share dexcom data with parents at all times  - keep rotating pump site    Reminders for patients with diabetes:  1. If you are ill, or have high blood glucose (over 300), you should check ketones and dose insulin corrections every 2 hours until they clear.   2. If your blood sugar is less than 70 mg/dL, eat or drink 15 g of carbohydrate (4 oz fruit juice or 3-4 glucose tablets). Check your blood sugar every 15 minutes and eat or drink another 15 g of carbohydrate if your blood glucose is still less than 70 mg/dL.    You may reach us at: [phone]    Reasons to call the office and ask for urgent nurse assistance  A. Moderate or large ketones needing assistance with sick day plan  B. Pump broken and unsure how to convert to injections  C. Low blood sugars with vomiting illness and ketones  D. Completley out of insulin. This is the only refill that will be handled overnight and is for true emergencies only, not routine refills.     The [facility] portal is the best way to communicate with us for the following  A. Notification of ER visit or hospitalization for diabetes.  B. Blood Sugar pattern is erratic, recurrent highs or lows, concern for dose adjustments  C. Routine blood glucose review  C. Prescriptions (prescriptions can take up to 48 business hours to process DO NOT WAIT until you are completely out to request refills).  D. Prior authorizations  E. School orders or other forms (FMLA, etc)  F. Seeing your last clinic note (and doses)    Reasons to call 911 or take your child to the Emergency Room  A. Severe hypoglycemia (seizures or unconsciousness) needing glucagon administration  B. Vomiting, Shortness of Breath, Decreases mentation, Slurred Speech with Moderate or Large Ketones    We recommend yearly vaccination again influenza, Covid-19. We  recommend Prevnar 20 (a pneumonia vaccine). Please obtain these vaccinations through primary care.     You may reach us at: [phone]  Please contact us during normal business hours and reserve the after hours line for medical emergencies. All refills will be handled during normal business hours.       3. If you are on a pump, please have a written copy of your pump settings in the event of a pump failure. If you pump fails, please call the insulin pump company for a replacement and call us to discuss injection management. Please have both long and short acting insulin. Call our office for a long acting insulin prescription if you do not have long acting insulin.     4. Please upload all diabetes data the day prior to your appointments. Uploading data can take time and we recommend having that data available to your provider so your appointment can run more smoothly.         We will contact you within 1-2 weeks after any laboratory or imaging studies performed. Some labs take longer than 1-2 weeks to result. Please call if you do not hear from us within 2 weeks.     You may reach us at: [phone]  Please contact us during normal business hours and reserve the after hours line for medical emergencies. All refills will be handled during normal business hours.     Thank you.        no

## 2024-12-10 ENCOUNTER — LABORATORY RESULT (OUTPATIENT)
Age: 59
End: 2024-12-10

## 2024-12-10 ENCOUNTER — APPOINTMENT (OUTPATIENT)
Age: 59
End: 2024-12-10
Payer: MEDICARE

## 2024-12-10 ENCOUNTER — OUTPATIENT (OUTPATIENT)
Dept: OUTPATIENT SERVICES | Facility: HOSPITAL | Age: 59
LOS: 1 days | End: 2024-12-10
Payer: MEDICARE

## 2024-12-10 VITALS — TEMPERATURE: 98 F | HEART RATE: 76 BPM | SYSTOLIC BLOOD PRESSURE: 105 MMHG | DIASTOLIC BLOOD PRESSURE: 64 MMHG

## 2024-12-10 VITALS
OXYGEN SATURATION: 96 % | SYSTOLIC BLOOD PRESSURE: 149 MMHG | RESPIRATION RATE: 16 BRPM | DIASTOLIC BLOOD PRESSURE: 72 MMHG | BODY MASS INDEX: 35.3 KG/M2 | HEIGHT: 61 IN | TEMPERATURE: 98.3 F | HEART RATE: 76 BPM | WEIGHT: 187 LBS

## 2024-12-10 DIAGNOSIS — C78.02 SECONDARY MALIGNANT NEOPLASM OF LEFT LUNG: ICD-10-CM

## 2024-12-10 DIAGNOSIS — Z51.11 ENCOUNTER FOR ANTINEOPLASTIC CHEMOTHERAPY: ICD-10-CM

## 2024-12-10 DIAGNOSIS — C77.0 SECONDARY AND UNSPECIFIED MALIGNANT NEOPLASM OF LYMPH NODES OF HEAD, FACE AND NECK: ICD-10-CM

## 2024-12-10 DIAGNOSIS — I82.409 ACUTE EMBOLISM AND THROMBOSIS OF UNSPECIFIED DEEP VEINS OF UNSPECIFIED LOWER EXTREMITY: ICD-10-CM

## 2024-12-10 DIAGNOSIS — Z51.12 ENCOUNTER FOR ANTINEOPLASTIC CHEMOTHERAPY: ICD-10-CM

## 2024-12-10 DIAGNOSIS — L27.0 GENERALIZED SKIN ERUPTION DUE TO DRUGS AND MEDICAMENTS TAKEN INTERNALLY: ICD-10-CM

## 2024-12-10 DIAGNOSIS — C32.1 MALIGNANT NEOPLASM OF SUPRAGLOTTIS: ICD-10-CM

## 2024-12-10 DIAGNOSIS — Z98.890 OTHER SPECIFIED POSTPROCEDURAL STATES: Chronic | ICD-10-CM

## 2024-12-10 LAB
ALBUMIN SERPL ELPH-MCNC: 4.1 G/DL
ALP BLD-CCNC: 63 U/L
ALT SERPL-CCNC: 6 U/L
ANION GAP SERPL CALC-SCNC: 10 MMOL/L
AST SERPL-CCNC: 9 U/L
BILIRUB SERPL-MCNC: 0.3 MG/DL
BUN SERPL-MCNC: 11 MG/DL
CALCIUM SERPL-MCNC: 9.4 MG/DL
CHLORIDE SERPL-SCNC: 94 MMOL/L
CO2 SERPL-SCNC: 27 MMOL/L
CREAT SERPL-MCNC: <0.5 MG/DL
EGFR: 113 ML/MIN/1.73M2
GLUCOSE SERPL-MCNC: 111 MG/DL
HCT VFR BLD CALC: 27.9 %
HGB BLD-MCNC: 9.5 G/DL
MCHC RBC-ENTMCNC: 30.8 PG
MCHC RBC-ENTMCNC: 34.1 G/DL
MCV RBC AUTO: 90.6 FL
PLATELET # BLD AUTO: 160 K/UL
PMV BLD: 8.7 FL
POTASSIUM SERPL-SCNC: 4.8 MMOL/L
PROT SERPL-MCNC: 6.8 G/DL
RBC # BLD: 3.08 M/UL
RBC # FLD: 14.6 %
SODIUM SERPL-SCNC: 131 MMOL/L
WBC # FLD AUTO: 6.26 K/UL

## 2024-12-10 PROCEDURE — 36415 COLL VENOUS BLD VENIPUNCTURE: CPT

## 2024-12-10 PROCEDURE — 99214 OFFICE O/P EST MOD 30 MIN: CPT

## 2024-12-10 PROCEDURE — 85027 COMPLETE CBC AUTOMATED: CPT

## 2024-12-10 PROCEDURE — 96413 CHEMO IV INFUSION 1 HR: CPT

## 2024-12-10 PROCEDURE — 84443 ASSAY THYROID STIM HORMONE: CPT

## 2024-12-10 PROCEDURE — 80053 COMPREHEN METABOLIC PANEL: CPT

## 2024-12-10 RX ORDER — NIVOLUMAB 10 MG/ML
480 INJECTION INTRAVENOUS ONCE
Refills: 0 | Status: COMPLETED | OUTPATIENT
Start: 2024-12-10 | End: 2024-12-10

## 2024-12-10 RX ADMIN — NIVOLUMAB 480 MILLIGRAM(S): 10 INJECTION INTRAVENOUS at 12:50

## 2024-12-10 RX ADMIN — NIVOLUMAB 480 MILLIGRAM(S): 10 INJECTION INTRAVENOUS at 12:21

## 2024-12-11 DIAGNOSIS — C78.02 SECONDARY MALIGNANT NEOPLASM OF LEFT LUNG: ICD-10-CM

## 2024-12-13 LAB — TSH SERPL-ACNC: 10.79 UIU/ML

## 2025-01-08 ENCOUNTER — APPOINTMENT (OUTPATIENT)
Age: 60
End: 2025-01-08
Payer: MEDICARE

## 2025-01-08 ENCOUNTER — OUTPATIENT (OUTPATIENT)
Dept: OUTPATIENT SERVICES | Facility: HOSPITAL | Age: 60
LOS: 1 days | End: 2025-01-08
Payer: MEDICARE

## 2025-01-08 ENCOUNTER — LABORATORY RESULT (OUTPATIENT)
Age: 60
End: 2025-01-08

## 2025-01-08 VITALS
SYSTOLIC BLOOD PRESSURE: 130 MMHG | HEIGHT: 61 IN | HEART RATE: 92 BPM | DIASTOLIC BLOOD PRESSURE: 74 MMHG | TEMPERATURE: 98.2 F

## 2025-01-08 DIAGNOSIS — C32.1 MALIGNANT NEOPLASM OF SUPRAGLOTTIS: ICD-10-CM

## 2025-01-08 DIAGNOSIS — C78.02 SECONDARY MALIGNANT NEOPLASM OF LEFT LUNG: ICD-10-CM

## 2025-01-08 DIAGNOSIS — Z51.12 ENCOUNTER FOR ANTINEOPLASTIC CHEMOTHERAPY: ICD-10-CM

## 2025-01-08 DIAGNOSIS — Z51.11 ENCOUNTER FOR ANTINEOPLASTIC CHEMOTHERAPY: ICD-10-CM

## 2025-01-08 DIAGNOSIS — D63.0 ANEMIA IN NEOPLASTIC DISEASE: ICD-10-CM

## 2025-01-08 DIAGNOSIS — Z98.890 OTHER SPECIFIED POSTPROCEDURAL STATES: Chronic | ICD-10-CM

## 2025-01-08 DIAGNOSIS — Z86.2 PERSONAL HISTORY OF DISEASES OF THE BLOOD AND BLOOD-FORMING ORGANS AND CERTAIN DISORDERS INVOLVING THE IMMUNE MECHANISM: ICD-10-CM

## 2025-01-08 DIAGNOSIS — I82.409 ACUTE EMBOLISM AND THROMBOSIS OF UNSPECIFIED DEEP VEINS OF UNSPECIFIED LOWER EXTREMITY: ICD-10-CM

## 2025-01-08 DIAGNOSIS — C77.0 SECONDARY AND UNSPECIFIED MALIGNANT NEOPLASM OF LYMPH NODES OF HEAD, FACE AND NECK: ICD-10-CM

## 2025-01-08 LAB
ALBUMIN SERPL ELPH-MCNC: 4.2 G/DL
ALP BLD-CCNC: 65 U/L
ALT SERPL-CCNC: 6 U/L
ANION GAP SERPL CALC-SCNC: 11 MMOL/L
AST SERPL-CCNC: 9 U/L
BILIRUB SERPL-MCNC: 0.2 MG/DL
BUN SERPL-MCNC: 10 MG/DL
CALCIUM SERPL-MCNC: 9.2 MG/DL
CHLORIDE SERPL-SCNC: 95 MMOL/L
CO2 SERPL-SCNC: 28 MMOL/L
CREAT SERPL-MCNC: 0.5 MG/DL
EGFR: 107 ML/MIN/1.73M2
GLUCOSE SERPL-MCNC: 103 MG/DL
HCT VFR BLD CALC: 30.6 %
HGB BLD-MCNC: 10.3 G/DL
MCHC RBC-ENTMCNC: 30.9 PG
MCHC RBC-ENTMCNC: 33.7 G/DL
MCV RBC AUTO: 91.9 FL
PLATELET # BLD AUTO: 132 K/UL
PMV BLD: 8.6 FL
POTASSIUM SERPL-SCNC: 4.2 MMOL/L
PROT SERPL-MCNC: 6.9 G/DL
RBC # BLD: 3.33 M/UL
RBC # FLD: 13.6 %
SODIUM SERPL-SCNC: 134 MMOL/L
WBC # FLD AUTO: 6.09 K/UL

## 2025-01-08 PROCEDURE — 96413 CHEMO IV INFUSION 1 HR: CPT

## 2025-01-08 PROCEDURE — G2211 COMPLEX E/M VISIT ADD ON: CPT

## 2025-01-08 PROCEDURE — 80053 COMPREHEN METABOLIC PANEL: CPT

## 2025-01-08 PROCEDURE — 99214 OFFICE O/P EST MOD 30 MIN: CPT

## 2025-01-08 PROCEDURE — 85027 COMPLETE CBC AUTOMATED: CPT

## 2025-01-08 PROCEDURE — 84443 ASSAY THYROID STIM HORMONE: CPT

## 2025-01-08 PROCEDURE — 36415 COLL VENOUS BLD VENIPUNCTURE: CPT

## 2025-01-08 RX ORDER — NIVOLUMAB 10 MG/ML
480 INJECTION INTRAVENOUS ONCE
Refills: 0 | Status: COMPLETED | OUTPATIENT
Start: 2025-01-08 | End: 2025-01-08

## 2025-01-08 RX ADMIN — NIVOLUMAB 480 MILLIGRAM(S): 10 INJECTION INTRAVENOUS at 12:30

## 2025-01-08 RX ADMIN — NIVOLUMAB 480 MILLIGRAM(S): 10 INJECTION INTRAVENOUS at 11:53

## 2025-01-09 DIAGNOSIS — C78.02 SECONDARY MALIGNANT NEOPLASM OF LEFT LUNG: ICD-10-CM

## 2025-01-09 LAB — TSH SERPL-ACNC: 7.45 UIU/ML

## 2025-01-13 NOTE — ED ADULT NURSE NOTE - NSFALLRSKINDICATORS_ED_ALL_ED
Dean Garrison RN         1/6/25 11:11 AM  Note     Writer called Marie from [employer]. Marie and update him soon.     Writer called Marie at [employer] and left a message requesting Release of information document be faxed to [phone]  
yes

## 2025-01-21 ENCOUNTER — RESULT REVIEW (OUTPATIENT)
Age: 60
End: 2025-01-21

## 2025-01-21 ENCOUNTER — OUTPATIENT (OUTPATIENT)
Dept: OUTPATIENT SERVICES | Facility: HOSPITAL | Age: 60
LOS: 1 days | End: 2025-01-21
Payer: MEDICARE

## 2025-01-21 DIAGNOSIS — C32.1 MALIGNANT NEOPLASM OF SUPRAGLOTTIS: ICD-10-CM

## 2025-01-21 DIAGNOSIS — Z98.890 OTHER SPECIFIED POSTPROCEDURAL STATES: Chronic | ICD-10-CM

## 2025-01-21 DIAGNOSIS — Z00.8 ENCOUNTER FOR OTHER GENERAL EXAMINATION: ICD-10-CM

## 2025-01-21 PROCEDURE — 71260 CT THORAX DX C+: CPT | Mod: 26

## 2025-01-21 PROCEDURE — 74177 CT ABD & PELVIS W/CONTRAST: CPT

## 2025-01-21 PROCEDURE — 74177 CT ABD & PELVIS W/CONTRAST: CPT | Mod: 26

## 2025-01-21 PROCEDURE — 71260 CT THORAX DX C+: CPT

## 2025-01-22 ENCOUNTER — OUTPATIENT (OUTPATIENT)
Dept: OUTPATIENT SERVICES | Facility: HOSPITAL | Age: 60
LOS: 1 days | End: 2025-01-22
Payer: MEDICARE

## 2025-01-22 ENCOUNTER — RESULT REVIEW (OUTPATIENT)
Age: 60
End: 2025-01-22

## 2025-01-22 DIAGNOSIS — C32.1 MALIGNANT NEOPLASM OF SUPRAGLOTTIS: ICD-10-CM

## 2025-01-22 DIAGNOSIS — Z98.890 OTHER SPECIFIED POSTPROCEDURAL STATES: Chronic | ICD-10-CM

## 2025-01-22 DIAGNOSIS — Z00.8 ENCOUNTER FOR OTHER GENERAL EXAMINATION: ICD-10-CM

## 2025-01-22 PROCEDURE — 70491 CT SOFT TISSUE NECK W/DYE: CPT | Mod: 26

## 2025-01-22 PROCEDURE — 70491 CT SOFT TISSUE NECK W/DYE: CPT | Mod: MA

## 2025-01-23 DIAGNOSIS — C32.1 MALIGNANT NEOPLASM OF SUPRAGLOTTIS: ICD-10-CM

## 2025-02-05 ENCOUNTER — APPOINTMENT (OUTPATIENT)
Age: 60
End: 2025-02-05
Payer: MEDICARE

## 2025-02-05 ENCOUNTER — OUTPATIENT (OUTPATIENT)
Dept: OUTPATIENT SERVICES | Facility: HOSPITAL | Age: 60
LOS: 1 days | End: 2025-02-05
Payer: MEDICARE

## 2025-02-05 ENCOUNTER — LABORATORY RESULT (OUTPATIENT)
Age: 60
End: 2025-02-05

## 2025-02-05 DIAGNOSIS — C78.02 SECONDARY MALIGNANT NEOPLASM OF LEFT LUNG: ICD-10-CM

## 2025-02-05 DIAGNOSIS — C77.0 SECONDARY AND UNSPECIFIED MALIGNANT NEOPLASM OF LYMPH NODES OF HEAD, FACE AND NECK: ICD-10-CM

## 2025-02-05 DIAGNOSIS — I82.409 ACUTE EMBOLISM AND THROMBOSIS OF UNSPECIFIED DEEP VEINS OF UNSPECIFIED LOWER EXTREMITY: ICD-10-CM

## 2025-02-05 DIAGNOSIS — D63.0 ANEMIA IN NEOPLASTIC DISEASE: ICD-10-CM

## 2025-02-05 DIAGNOSIS — Z51.11 ENCOUNTER FOR ANTINEOPLASTIC CHEMOTHERAPY: ICD-10-CM

## 2025-02-05 DIAGNOSIS — Z71.89 OTHER SPECIFIED COUNSELING: ICD-10-CM

## 2025-02-05 DIAGNOSIS — Z98.890 OTHER SPECIFIED POSTPROCEDURAL STATES: Chronic | ICD-10-CM

## 2025-02-05 LAB
HCT VFR BLD CALC: 29.2 %
HGB BLD-MCNC: 10.1 G/DL
MCHC RBC-ENTMCNC: 31.7 PG
MCHC RBC-ENTMCNC: 34.6 G/DL
MCV RBC AUTO: 91.5 FL
PLATELET # BLD AUTO: 138 K/UL
PMV BLD: 8.6 FL
RBC # BLD: 3.19 M/UL
RBC # FLD: 12.6 %
WBC # FLD AUTO: 5.06 K/UL

## 2025-02-05 PROCEDURE — G2211 COMPLEX E/M VISIT ADD ON: CPT

## 2025-02-05 PROCEDURE — 85027 COMPLETE CBC AUTOMATED: CPT

## 2025-02-05 PROCEDURE — 96413 CHEMO IV INFUSION 1 HR: CPT

## 2025-02-05 PROCEDURE — 99214 OFFICE O/P EST MOD 30 MIN: CPT

## 2025-02-05 RX ORDER — NIVOLUMAB 10 MG/ML
480 INJECTION INTRAVENOUS ONCE
Refills: 0 | Status: COMPLETED | OUTPATIENT
Start: 2025-02-05 | End: 2025-02-05

## 2025-02-05 RX ADMIN — NIVOLUMAB 480 MILLIGRAM(S): 10 INJECTION INTRAVENOUS at 13:52

## 2025-02-05 RX ADMIN — NIVOLUMAB 480 MILLIGRAM(S): 10 INJECTION INTRAVENOUS at 13:21

## 2025-02-06 DIAGNOSIS — Z51.11 ENCOUNTER FOR ANTINEOPLASTIC CHEMOTHERAPY: ICD-10-CM

## 2025-02-20 ENCOUNTER — APPOINTMENT (OUTPATIENT)
Dept: OTOLARYNGOLOGY | Facility: CLINIC | Age: 60
End: 2025-02-20
Payer: MEDICARE

## 2025-02-20 DIAGNOSIS — C32.1 MALIGNANT NEOPLASM OF SUPRAGLOTTIS: ICD-10-CM

## 2025-02-20 DIAGNOSIS — C78.02 SECONDARY MALIGNANT NEOPLASM OF LEFT LUNG: ICD-10-CM

## 2025-02-20 DIAGNOSIS — Z93.0 TRACHEOSTOMY STATUS: ICD-10-CM

## 2025-02-20 DIAGNOSIS — R13.14 DYSPHAGIA, PHARYNGOESOPHAGEAL PHASE: ICD-10-CM

## 2025-02-20 DIAGNOSIS — C77.0 SECONDARY AND UNSPECIFIED MALIGNANT NEOPLASM OF LYMPH NODES OF HEAD, FACE AND NECK: ICD-10-CM

## 2025-02-20 PROCEDURE — 31615 TRCHEOBRNCHSC EST TRACHS INC: CPT

## 2025-02-20 PROCEDURE — 99214 OFFICE O/P EST MOD 30 MIN: CPT | Mod: 25

## 2025-02-20 PROCEDURE — 31575 DIAGNOSTIC LARYNGOSCOPY: CPT | Mod: 59

## 2025-03-05 ENCOUNTER — OUTPATIENT (OUTPATIENT)
Dept: OUTPATIENT SERVICES | Facility: HOSPITAL | Age: 60
LOS: 1 days | End: 2025-03-05
Payer: MEDICARE

## 2025-03-05 ENCOUNTER — APPOINTMENT (OUTPATIENT)
Age: 60
End: 2025-03-05
Payer: MEDICARE

## 2025-03-05 VITALS — HEART RATE: 92 BPM | DIASTOLIC BLOOD PRESSURE: 74 MMHG | TEMPERATURE: 98 F | SYSTOLIC BLOOD PRESSURE: 130 MMHG

## 2025-03-05 DIAGNOSIS — C78.02 SECONDARY MALIGNANT NEOPLASM OF LEFT LUNG: ICD-10-CM

## 2025-03-05 DIAGNOSIS — Z98.890 OTHER SPECIFIED POSTPROCEDURAL STATES: Chronic | ICD-10-CM

## 2025-03-05 DIAGNOSIS — Z51.11 ENCOUNTER FOR ANTINEOPLASTIC CHEMOTHERAPY: ICD-10-CM

## 2025-03-05 DIAGNOSIS — Z51.12 ENCOUNTER FOR ANTINEOPLASTIC CHEMOTHERAPY: ICD-10-CM

## 2025-03-05 DIAGNOSIS — C32.1 MALIGNANT NEOPLASM OF SUPRAGLOTTIS: ICD-10-CM

## 2025-03-05 DIAGNOSIS — I82.409 ACUTE EMBOLISM AND THROMBOSIS OF UNSPECIFIED DEEP VEINS OF UNSPECIFIED LOWER EXTREMITY: ICD-10-CM

## 2025-03-05 LAB
AUTO BASOPHILS #: 0.05 K/UL
AUTO BASOPHILS %: 0.8 %
AUTO EOSINOPHILS #: 0.44 K/UL
AUTO EOSINOPHILS %: 6.6 %
AUTO IMMATURE GRANULOCYTES #: 0.02 K/UL
AUTO LYMPHOCYTES #: 0.75 K/UL
AUTO LYMPHOCYTES %: 11.3 %
AUTO MONOCYTES #: 0.53 K/UL
AUTO MONOCYTES %: 8 %
AUTO NEUTROPHILS #: 4.85 K/UL
AUTO NEUTROPHILS %: 73 %
AUTO NRBC #: 0 K/UL
HCT VFR BLD CALC: 30.2 %
HGB BLD-MCNC: 10.7 G/DL
IMM GRANULOCYTES NFR BLD AUTO: 0.3 %
MAN DIFF?: NORMAL
MCHC RBC-ENTMCNC: 31.3 PG
MCHC RBC-ENTMCNC: 35.4 G/DL
MCV RBC AUTO: 88.3 FL
PLATELET # BLD AUTO: 137 K/UL
PMV BLD AUTO: 0 /100 WBCS
PMV BLD: 8.6 FL
RBC # BLD: 3.42 M/UL
RBC # FLD: 12.5 %
WBC # FLD AUTO: 6.64 K/UL

## 2025-03-05 PROCEDURE — G2211 COMPLEX E/M VISIT ADD ON: CPT

## 2025-03-05 PROCEDURE — 85025 COMPLETE CBC W/AUTO DIFF WBC: CPT

## 2025-03-05 PROCEDURE — 96413 CHEMO IV INFUSION 1 HR: CPT

## 2025-03-05 PROCEDURE — 99214 OFFICE O/P EST MOD 30 MIN: CPT

## 2025-03-05 PROCEDURE — 36415 COLL VENOUS BLD VENIPUNCTURE: CPT

## 2025-03-05 RX ORDER — NIVOLUMAB 10 MG/ML
480 INJECTION INTRAVENOUS ONCE
Refills: 0 | Status: COMPLETED | OUTPATIENT
Start: 2025-03-05 | End: 2025-03-05

## 2025-03-05 RX ADMIN — NIVOLUMAB 480 MILLIGRAM(S): 10 INJECTION INTRAVENOUS at 12:45

## 2025-03-05 RX ADMIN — NIVOLUMAB 480 MILLIGRAM(S): 10 INJECTION INTRAVENOUS at 13:20

## 2025-03-06 DIAGNOSIS — C78.02 SECONDARY MALIGNANT NEOPLASM OF LEFT LUNG: ICD-10-CM

## 2025-04-02 ENCOUNTER — OUTPATIENT (OUTPATIENT)
Dept: OUTPATIENT SERVICES | Facility: HOSPITAL | Age: 60
LOS: 1 days | End: 2025-04-02
Payer: MEDICARE

## 2025-04-02 ENCOUNTER — APPOINTMENT (OUTPATIENT)
Age: 60
End: 2025-04-02
Payer: MEDICARE

## 2025-04-02 DIAGNOSIS — C78.02 SECONDARY MALIGNANT NEOPLASM OF LEFT LUNG: ICD-10-CM

## 2025-04-02 DIAGNOSIS — D63.0 ANEMIA IN NEOPLASTIC DISEASE: ICD-10-CM

## 2025-04-02 DIAGNOSIS — Z51.11 ENCOUNTER FOR ANTINEOPLASTIC CHEMOTHERAPY: ICD-10-CM

## 2025-04-02 DIAGNOSIS — Z51.12 ENCOUNTER FOR ANTINEOPLASTIC CHEMOTHERAPY: ICD-10-CM

## 2025-04-02 DIAGNOSIS — E87.1 HYPO-OSMOLALITY AND HYPONATREMIA: ICD-10-CM

## 2025-04-02 DIAGNOSIS — I82.409 ACUTE EMBOLISM AND THROMBOSIS OF UNSPECIFIED DEEP VEINS OF UNSPECIFIED LOWER EXTREMITY: ICD-10-CM

## 2025-04-02 DIAGNOSIS — Z98.890 OTHER SPECIFIED POSTPROCEDURAL STATES: Chronic | ICD-10-CM

## 2025-04-02 PROBLEM — G47.33 OBSTRUCTIVE APNEA: Status: RESOLVED | Noted: 2022-10-06 | Resolved: 2025-04-02

## 2025-04-02 LAB
ALBUMIN SERPL ELPH-MCNC: 4 G/DL
ALP BLD-CCNC: 62 U/L
ALT SERPL-CCNC: 6 U/L
ANION GAP SERPL CALC-SCNC: 12 MMOL/L
AST SERPL-CCNC: 11 U/L
AUTO BASOPHILS #: 0.03 K/UL
AUTO BASOPHILS %: 0.5 %
AUTO EOSINOPHILS #: 0.44 K/UL
AUTO EOSINOPHILS %: 7.8 %
AUTO IMMATURE GRANULOCYTES #: 0.02 K/UL
AUTO LYMPHOCYTES #: 0.77 K/UL
AUTO LYMPHOCYTES %: 13.6 %
AUTO MONOCYTES #: 0.42 K/UL
AUTO MONOCYTES %: 7.4 %
AUTO NEUTROPHILS #: 3.97 K/UL
AUTO NEUTROPHILS %: 70.3 %
AUTO NRBC #: 0 K/UL
BILIRUB SERPL-MCNC: 0.2 MG/DL
BUN SERPL-MCNC: 13 MG/DL
CALCIUM SERPL-MCNC: 9.9 MG/DL
CHLORIDE SERPL-SCNC: 93 MMOL/L
CO2 SERPL-SCNC: 25 MMOL/L
CREAT SERPL-MCNC: <0.5 MG/DL
EGFRCR SERPLBLD CKD-EPI 2021: 113 ML/MIN/1.73M2
GLUCOSE SERPL-MCNC: 111 MG/DL
HCT VFR BLD CALC: 30.7 %
HGB BLD-MCNC: 10.5 G/DL
IMM GRANULOCYTES NFR BLD AUTO: 0.4 %
MAN DIFF?: NORMAL
MCHC RBC-ENTMCNC: 31.6 PG
MCHC RBC-ENTMCNC: 34.2 G/DL
MCV RBC AUTO: 92.5 FL
PLATELET # BLD AUTO: 124 K/UL
PMV BLD AUTO: 0 /100 WBCS
PMV BLD: 8.3 FL
POTASSIUM SERPL-SCNC: 4.7 MMOL/L
PROT SERPL-MCNC: 6.8 G/DL
RBC # BLD: 3.32 M/UL
RBC # FLD: 12.7 %
SODIUM SERPL-SCNC: 130 MMOL/L
WBC # FLD AUTO: 5.65 K/UL

## 2025-04-02 PROCEDURE — 96413 CHEMO IV INFUSION 1 HR: CPT

## 2025-04-02 PROCEDURE — G2211 COMPLEX E/M VISIT ADD ON: CPT

## 2025-04-02 PROCEDURE — 99214 OFFICE O/P EST MOD 30 MIN: CPT

## 2025-04-02 PROCEDURE — 80053 COMPREHEN METABOLIC PANEL: CPT

## 2025-04-02 PROCEDURE — 36415 COLL VENOUS BLD VENIPUNCTURE: CPT

## 2025-04-02 PROCEDURE — 85025 COMPLETE CBC W/AUTO DIFF WBC: CPT

## 2025-04-02 PROCEDURE — 84443 ASSAY THYROID STIM HORMONE: CPT

## 2025-04-02 RX ORDER — NIVOLUMAB 10 MG/ML
480 INJECTION INTRAVENOUS ONCE
Refills: 0 | Status: COMPLETED | OUTPATIENT
Start: 2025-04-02 | End: 2025-04-02

## 2025-04-02 RX ADMIN — NIVOLUMAB 480 MILLIGRAM(S): 10 INJECTION INTRAVENOUS at 12:20

## 2025-04-03 DIAGNOSIS — C78.02 SECONDARY MALIGNANT NEOPLASM OF LEFT LUNG: ICD-10-CM

## 2025-04-03 LAB — TSH SERPL-ACNC: 4.94 UIU/ML

## 2025-04-09 DIAGNOSIS — C32.1 MALIGNANT NEOPLASM OF SUPRAGLOTTIS: ICD-10-CM

## 2025-04-18 ENCOUNTER — RESULT REVIEW (OUTPATIENT)
Age: 60
End: 2025-04-18

## 2025-04-18 ENCOUNTER — OUTPATIENT (OUTPATIENT)
Dept: OUTPATIENT SERVICES | Facility: HOSPITAL | Age: 60
LOS: 1 days | End: 2025-04-18
Payer: MEDICARE

## 2025-04-18 DIAGNOSIS — Z00.8 ENCOUNTER FOR OTHER GENERAL EXAMINATION: ICD-10-CM

## 2025-04-18 DIAGNOSIS — Z98.890 OTHER SPECIFIED POSTPROCEDURAL STATES: Chronic | ICD-10-CM

## 2025-04-18 DIAGNOSIS — C32.1 MALIGNANT NEOPLASM OF SUPRAGLOTTIS: ICD-10-CM

## 2025-04-18 PROCEDURE — 74177 CT ABD & PELVIS W/CONTRAST: CPT | Mod: 26

## 2025-04-18 PROCEDURE — 71260 CT THORAX DX C+: CPT | Mod: 26

## 2025-04-18 PROCEDURE — 74177 CT ABD & PELVIS W/CONTRAST: CPT

## 2025-04-18 PROCEDURE — 71260 CT THORAX DX C+: CPT

## 2025-04-19 DIAGNOSIS — C32.1 MALIGNANT NEOPLASM OF SUPRAGLOTTIS: ICD-10-CM

## 2025-04-29 ENCOUNTER — APPOINTMENT (OUTPATIENT)
Age: 60
End: 2025-04-29

## 2025-04-29 ENCOUNTER — APPOINTMENT (OUTPATIENT)
Age: 60
End: 2025-04-29
Payer: MEDICARE

## 2025-04-29 ENCOUNTER — OUTPATIENT (OUTPATIENT)
Dept: OUTPATIENT SERVICES | Facility: HOSPITAL | Age: 60
LOS: 1 days | End: 2025-04-29
Payer: MEDICARE

## 2025-04-29 VITALS — HEART RATE: 86 BPM | DIASTOLIC BLOOD PRESSURE: 79 MMHG | SYSTOLIC BLOOD PRESSURE: 103 MMHG | RESPIRATION RATE: 16 BRPM

## 2025-04-29 VITALS
HEART RATE: 86 BPM | DIASTOLIC BLOOD PRESSURE: 79 MMHG | SYSTOLIC BLOOD PRESSURE: 103 MMHG | OXYGEN SATURATION: 95 % | RESPIRATION RATE: 16 BRPM

## 2025-04-29 DIAGNOSIS — C78.02 SECONDARY MALIGNANT NEOPLASM OF LEFT LUNG: ICD-10-CM

## 2025-04-29 DIAGNOSIS — I82.409 ACUTE EMBOLISM AND THROMBOSIS OF UNSPECIFIED DEEP VEINS OF UNSPECIFIED LOWER EXTREMITY: ICD-10-CM

## 2025-04-29 DIAGNOSIS — Z51.12 ENCOUNTER FOR ANTINEOPLASTIC CHEMOTHERAPY: ICD-10-CM

## 2025-04-29 DIAGNOSIS — Z98.890 OTHER SPECIFIED POSTPROCEDURAL STATES: Chronic | ICD-10-CM

## 2025-04-29 DIAGNOSIS — Z51.11 ENCOUNTER FOR ANTINEOPLASTIC CHEMOTHERAPY: ICD-10-CM

## 2025-04-29 DIAGNOSIS — C32.1 MALIGNANT NEOPLASM OF SUPRAGLOTTIS: ICD-10-CM

## 2025-04-29 PROCEDURE — 85025 COMPLETE CBC W/AUTO DIFF WBC: CPT

## 2025-04-29 PROCEDURE — 99214 OFFICE O/P EST MOD 30 MIN: CPT

## 2025-04-29 PROCEDURE — G2211 COMPLEX E/M VISIT ADD ON: CPT

## 2025-04-29 PROCEDURE — 80053 COMPREHEN METABOLIC PANEL: CPT

## 2025-04-29 PROCEDURE — 84443 ASSAY THYROID STIM HORMONE: CPT

## 2025-04-29 PROCEDURE — 96413 CHEMO IV INFUSION 1 HR: CPT

## 2025-04-29 RX ORDER — NIVOLUMAB 10 MG/ML
480 INJECTION INTRAVENOUS ONCE
Refills: 0 | Status: COMPLETED | OUTPATIENT
Start: 2025-04-29 | End: 2025-04-29

## 2025-04-29 RX ADMIN — NIVOLUMAB 480 MILLIGRAM(S): 10 INJECTION INTRAVENOUS at 14:46

## 2025-04-29 RX ADMIN — Medication 2 MILLIGRAM(S): at 15:26

## 2025-04-29 RX ADMIN — NIVOLUMAB 480 MILLIGRAM(S): 10 INJECTION INTRAVENOUS at 15:20

## 2025-04-30 LAB
ALBUMIN SERPL ELPH-MCNC: 4 G/DL
ALP BLD-CCNC: 60 U/L
ALT SERPL-CCNC: 7 U/L
ANION GAP SERPL CALC-SCNC: 11 MMOL/L
AST SERPL-CCNC: 9 U/L
AUTO BASOPHILS #: 0.03 K/UL
AUTO BASOPHILS %: 0.3 %
AUTO EOSINOPHILS #: 0.02 K/UL
AUTO EOSINOPHILS %: 0.2 %
AUTO IMMATURE GRANULOCYTES #: 0.05 K/UL
AUTO LYMPHOCYTES #: 0.45 K/UL
AUTO LYMPHOCYTES %: 4.1 %
AUTO MONOCYTES #: 0.76 K/UL
AUTO MONOCYTES %: 7 %
AUTO NEUTROPHILS #: 9.61 K/UL
AUTO NEUTROPHILS %: 87.9 %
AUTO NRBC #: 0 K/UL
BILIRUB SERPL-MCNC: 0.5 MG/DL
BUN SERPL-MCNC: 16 MG/DL
CALCIUM SERPL-MCNC: 9.1 MG/DL
CHLORIDE SERPL-SCNC: 90 MMOL/L
CO2 SERPL-SCNC: 26 MMOL/L
CREAT SERPL-MCNC: 0.5 MG/DL
EGFRCR SERPLBLD CKD-EPI 2021: 107 ML/MIN/1.73M2
GLUCOSE SERPL-MCNC: 152 MG/DL
HCT VFR BLD CALC: 27.4 %
HGB BLD-MCNC: 9.2 G/DL
IMM GRANULOCYTES NFR BLD AUTO: 0.5 %
MAN DIFF?: NORMAL
MCHC RBC-ENTMCNC: 31.2 PG
MCHC RBC-ENTMCNC: 33.6 G/DL
MCV RBC AUTO: 92.9 FL
PLATELET # BLD AUTO: 113 K/UL
PMV BLD AUTO: 0 /100 WBCS
PMV BLD: 8.2 FL
POTASSIUM SERPL-SCNC: 4.3 MMOL/L
PROT SERPL-MCNC: 6.9 G/DL
RBC # BLD: 2.95 M/UL
RBC # FLD: 13.5 %
SODIUM SERPL-SCNC: 127 MMOL/L
TSH SERPL-ACNC: 2.69 UIU/ML
WBC # FLD AUTO: 10.92 K/UL

## 2025-05-22 ENCOUNTER — APPOINTMENT (OUTPATIENT)
Dept: OTOLARYNGOLOGY | Facility: CLINIC | Age: 60
End: 2025-05-22
Payer: MEDICARE

## 2025-05-22 DIAGNOSIS — C32.1 MALIGNANT NEOPLASM OF SUPRAGLOTTIS: ICD-10-CM

## 2025-05-22 DIAGNOSIS — C77.0 SECONDARY AND UNSPECIFIED MALIGNANT NEOPLASM OF LYMPH NODES OF HEAD, FACE AND NECK: ICD-10-CM

## 2025-05-22 DIAGNOSIS — Z93.0 TRACHEOSTOMY STATUS: ICD-10-CM

## 2025-05-22 PROCEDURE — 31615 TRCHEOBRNCHSC EST TRACHS INC: CPT

## 2025-05-22 PROCEDURE — 99214 OFFICE O/P EST MOD 30 MIN: CPT | Mod: 25

## 2025-05-27 ENCOUNTER — APPOINTMENT (OUTPATIENT)
Age: 60
End: 2025-05-27

## 2025-06-08 NOTE — ED PROVIDER NOTE - NS ED MD DISPO ISOLATION TYPES
Problem: Skin Integrity Alteration  Goal: Skin remains intact with no new/deterioration of wound or pressure injury  Outcome: Monitoring/Evaluating progress     Problem: Diabetes  Goal: Achieves glycemic balance  Description: Goal is to maintain blood sugar within range with no episodes of hypoglycemia  Outcome: Monitoring/Evaluating progress     Problem: Impaired Physical Mobility  Goal: Functional status is maintained or returned to baseline during hospitalization  Outcome: Monitoring/Evaluating progress  Goal: Tolerates activity for discharge setting with no abnormal symptoms  Outcome: Monitoring/Evaluating progress     Problem: Pain  Goal: Acceptable pain level achieved/maintained with activity using appropriate pain scale for the patient  Outcome: Monitoring/Evaluating progress     Problem: At Risk for Falls  Goal: Patient does not fall  Outcome: Monitoring/Evaluating progress  Goal: Patient takes action to control fall-related risks  Outcome: Monitoring/Evaluating progress      None

## 2025-06-12 ENCOUNTER — APPOINTMENT (OUTPATIENT)
Age: 60
End: 2025-06-12
Payer: MEDICARE

## 2025-06-12 ENCOUNTER — OUTPATIENT (OUTPATIENT)
Dept: OUTPATIENT SERVICES | Facility: HOSPITAL | Age: 60
LOS: 1 days | End: 2025-06-12
Payer: MEDICARE

## 2025-06-12 VITALS
SYSTOLIC BLOOD PRESSURE: 122 MMHG | TEMPERATURE: 99.4 F | OXYGEN SATURATION: 92 % | HEIGHT: 61 IN | DIASTOLIC BLOOD PRESSURE: 72 MMHG | HEART RATE: 92 BPM

## 2025-06-12 DIAGNOSIS — C78.02 SECONDARY MALIGNANT NEOPLASM OF LEFT LUNG: ICD-10-CM

## 2025-06-12 DIAGNOSIS — Z98.890 OTHER SPECIFIED POSTPROCEDURAL STATES: Chronic | ICD-10-CM

## 2025-06-12 LAB
AUTO BASOPHILS #: 0.03 K/UL
AUTO BASOPHILS %: 0.5 %
AUTO EOSINOPHILS #: 0.49 K/UL
AUTO EOSINOPHILS %: 8.2 %
AUTO IMMATURE GRANULOCYTES #: 0.03 K/UL
AUTO LYMPHOCYTES #: 0.64 K/UL
AUTO LYMPHOCYTES %: 10.7 %
AUTO MONOCYTES #: 0.39 K/UL
AUTO MONOCYTES %: 6.5 %
AUTO NEUTROPHILS #: 4.39 K/UL
AUTO NEUTROPHILS %: 73.6 %
AUTO NRBC #: 0 K/UL
HCT VFR BLD CALC: 26.5 %
HGB BLD-MCNC: 8.6 G/DL
IMM GRANULOCYTES NFR BLD AUTO: 0.5 %
IMMATURE PLATELET FRACTION #: 1.9 K/UL
IMMATURE PLATELET FRACTION %: 2.3 %
MAN DIFF?: NORMAL
MCHC RBC-ENTMCNC: 29.3 PG
MCHC RBC-ENTMCNC: 32.5 G/DL
MCV RBC AUTO: 90.1 FL
PLATELET # BLD AUTO: 81 K/UL
PMV BLD AUTO: 0 /100 WBCS
PMV BLD: 9.3 FL
RBC # BLD: 2.94 M/UL
RBC # FLD: 16.5 %
WBC # FLD AUTO: 5.97 K/UL

## 2025-06-12 PROCEDURE — G2211 COMPLEX E/M VISIT ADD ON: CPT

## 2025-06-12 PROCEDURE — 99214 OFFICE O/P EST MOD 30 MIN: CPT

## 2025-06-12 PROCEDURE — 96413 CHEMO IV INFUSION 1 HR: CPT

## 2025-06-12 PROCEDURE — 85025 COMPLETE CBC W/AUTO DIFF WBC: CPT

## 2025-06-12 RX ORDER — NIVOLUMAB 10 MG/ML
480 INJECTION INTRAVENOUS ONCE
Refills: 0 | Status: COMPLETED | OUTPATIENT
Start: 2025-06-12 | End: 2025-06-12

## 2025-06-12 RX ADMIN — NIVOLUMAB 480 MILLIGRAM(S): 10 INJECTION INTRAVENOUS at 12:42

## 2025-06-12 RX ADMIN — NIVOLUMAB 480 MILLIGRAM(S): 10 INJECTION INTRAVENOUS at 12:11

## 2025-06-13 DIAGNOSIS — C78.02 SECONDARY MALIGNANT NEOPLASM OF LEFT LUNG: ICD-10-CM

## 2025-06-18 NOTE — PRE-ANESTHESIA EVALUATION ADULT - HEIGHT IN FEET
5 Bed in lowest position, wheels locked, appropriate side rails in place/Call bell, personal items and telephone in reach/Instruct patient to call for assistance before getting out of bed or chair/Non-slip footwear when patient is out of bed/[place] to call system/Physically safe environment - no spills, clutter or unnecessary equipment/Purposeful Proactive Rounding/Room/bathroom lighting operational, light cord in reach

## 2025-07-10 ENCOUNTER — APPOINTMENT (OUTPATIENT)
Age: 60
End: 2025-07-10
Payer: MEDICARE

## 2025-07-10 VITALS
TEMPERATURE: 100 F | HEART RATE: 73 BPM | SYSTOLIC BLOOD PRESSURE: 118 MMHG | DIASTOLIC BLOOD PRESSURE: 60 MMHG | OXYGEN SATURATION: 98 % | RESPIRATION RATE: 16 BRPM

## 2025-07-10 LAB
AUTO BASOPHILS #: 0.03 K/UL
AUTO BASOPHILS %: 0.5 %
AUTO EOSINOPHILS #: 0.34 K/UL
AUTO EOSINOPHILS %: 5.1 %
AUTO IMMATURE GRANULOCYTES #: 0.07 K/UL
AUTO LYMPHOCYTES #: 0.75 K/UL
AUTO LYMPHOCYTES %: 11.3 %
AUTO MONOCYTES #: 0.56 K/UL
AUTO MONOCYTES %: 8.5 %
AUTO NEUTROPHILS #: 4.87 K/UL
AUTO NEUTROPHILS %: 73.5 %
AUTO NRBC #: 0 K/UL
HCT VFR BLD CALC: 25.7 %
HGB BLD-MCNC: 8.5 G/DL
IMM GRANULOCYTES NFR BLD AUTO: 1.1 %
MAN DIFF?: NORMAL
MCHC RBC-ENTMCNC: 30.4 PG
MCHC RBC-ENTMCNC: 33.1 G/DL
MCV RBC AUTO: 91.8 FL
PLATELET # BLD AUTO: 135 K/UL
PMV BLD AUTO: 0 /100 WBCS
PMV BLD: 9.1 FL
RBC # BLD: 2.8 M/UL
RBC # FLD: 15.4 %
WBC # FLD AUTO: 6.62 K/UL

## 2025-07-10 PROCEDURE — 99214 OFFICE O/P EST MOD 30 MIN: CPT

## 2025-07-10 PROCEDURE — G2211 COMPLEX E/M VISIT ADD ON: CPT

## 2025-07-24 ENCOUNTER — APPOINTMENT (OUTPATIENT)
Dept: OTOLARYNGOLOGY | Facility: CLINIC | Age: 60
End: 2025-07-24
Payer: MEDICARE

## 2025-07-24 DIAGNOSIS — Z93.0 TRACHEOSTOMY STATUS: ICD-10-CM

## 2025-07-24 PROCEDURE — 99213 OFFICE O/P EST LOW 20 MIN: CPT | Mod: 25

## 2025-07-24 PROCEDURE — 31615 TRCHEOBRNCHSC EST TRACHS INC: CPT

## 2025-08-07 ENCOUNTER — APPOINTMENT (OUTPATIENT)
Age: 60
End: 2025-08-07

## 2025-08-07 DIAGNOSIS — C32.1 MALIGNANT NEOPLASM OF SUPRAGLOTTIS: ICD-10-CM

## 2025-08-07 DIAGNOSIS — C77.0 SECONDARY AND UNSPECIFIED MALIGNANT NEOPLASM OF LYMPH NODES OF HEAD, FACE AND NECK: ICD-10-CM

## 2025-08-07 DIAGNOSIS — D63.0 ANEMIA IN NEOPLASTIC DISEASE: ICD-10-CM

## 2025-08-07 DIAGNOSIS — Z51.12 ENCOUNTER FOR ANTINEOPLASTIC CHEMOTHERAPY: ICD-10-CM

## 2025-08-07 DIAGNOSIS — Z51.11 ENCOUNTER FOR ANTINEOPLASTIC CHEMOTHERAPY: ICD-10-CM

## 2025-08-07 DIAGNOSIS — C78.02 SECONDARY MALIGNANT NEOPLASM OF LEFT LUNG: ICD-10-CM

## 2025-08-07 DIAGNOSIS — I82.409 ACUTE EMBOLISM AND THROMBOSIS OF UNSPECIFIED DEEP VEINS OF UNSPECIFIED LOWER EXTREMITY: ICD-10-CM

## 2025-08-07 LAB
AUTO BASOPHILS #: 0.04 K/UL
AUTO BASOPHILS %: 0.4 %
AUTO EOSINOPHILS #: 0.52 K/UL
AUTO EOSINOPHILS %: 5.4 %
AUTO IMMATURE GRANULOCYTES #: 0.03 K/UL
AUTO LYMPHOCYTES #: 0.81 K/UL
AUTO LYMPHOCYTES %: 8.5 %
AUTO MONOCYTES #: 0.55 K/UL
AUTO MONOCYTES %: 5.8 %
AUTO NEUTROPHILS #: 7.61 K/UL
AUTO NEUTROPHILS %: 79.6 %
AUTO NRBC #: 0 K/UL
HCT VFR BLD CALC: 30.4 %
HGB BLD-MCNC: 9.8 G/DL
IMM GRANULOCYTES NFR BLD AUTO: 0.3 %
MAN DIFF?: NORMAL
MCHC RBC-ENTMCNC: 29.6 PG
MCHC RBC-ENTMCNC: 32.2 G/DL
MCV RBC AUTO: 91.8 FL
PLATELET # BLD AUTO: 136 K/UL
PMV BLD AUTO: 0 /100 WBCS
PMV BLD: 8.8 FL
RBC # BLD: 3.31 M/UL
RBC # FLD: 14.3 %
WBC # FLD AUTO: 9.56 K/UL

## 2025-08-07 PROCEDURE — 99214 OFFICE O/P EST MOD 30 MIN: CPT

## 2025-08-09 LAB — TSH SERPL-ACNC: 6.46 UIU/ML

## 2025-08-29 RX ORDER — ROSUVASTATIN CALCIUM 5 MG/1
5 TABLET, FILM COATED ORAL DAILY
Refills: 0 | Status: ACTIVE | COMMUNITY

## 2025-08-29 RX ORDER — POLYETHYLENE GLYCOL 3350 17 G/17G
17 POWDER, FOR SOLUTION ORAL
Refills: 0 | Status: ACTIVE | COMMUNITY

## 2025-08-29 RX ORDER — AMLODIPINE BESYLATE 5 MG/1
TABLET ORAL DAILY
Refills: 0 | Status: ACTIVE | COMMUNITY

## 2025-08-29 RX ORDER — CHLORHEXIDINE GLUCONATE, 0.12% ORAL RINSE 1.2 MG/ML
0.12 SOLUTION DENTAL
Refills: 0 | Status: ACTIVE | COMMUNITY

## 2025-08-29 RX ORDER — HYDROMORPHONE HYDROCHLORIDE 2 MG/1
2 TABLET ORAL
Refills: 0 | Status: ACTIVE | COMMUNITY

## 2025-08-29 RX ORDER — NORMAL SALT TABLETS 1 G/G
1 TABLET ORAL
Refills: 0 | Status: ACTIVE | COMMUNITY

## 2025-08-29 RX ORDER — FLUTICASONE PROPIONATE 50 UG/1
50 SPRAY, METERED NASAL TWICE DAILY
Refills: 0 | Status: ACTIVE | COMMUNITY

## 2025-08-29 RX ORDER — METFORMIN HYDROCHLORIDE 1000 MG/1
1000 TABLET, COATED ORAL
Refills: 0 | Status: ACTIVE | COMMUNITY

## 2025-08-29 RX ORDER — LOSARTAN POTASSIUM 50 MG/1
50 TABLET, FILM COATED ORAL DAILY
Refills: 0 | Status: ACTIVE | COMMUNITY

## 2025-09-04 ENCOUNTER — APPOINTMENT (OUTPATIENT)
Age: 60
End: 2025-09-04
Payer: MEDICARE

## 2025-09-04 VITALS
HEART RATE: 90 BPM | OXYGEN SATURATION: 99 % | RESPIRATION RATE: 16 BRPM | SYSTOLIC BLOOD PRESSURE: 117 MMHG | DIASTOLIC BLOOD PRESSURE: 78 MMHG

## 2025-09-04 DIAGNOSIS — C78.02 SECONDARY MALIGNANT NEOPLASM OF LEFT LUNG: ICD-10-CM

## 2025-09-04 DIAGNOSIS — Z51.11 ENCOUNTER FOR ANTINEOPLASTIC CHEMOTHERAPY: ICD-10-CM

## 2025-09-04 DIAGNOSIS — I82.409 ACUTE EMBOLISM AND THROMBOSIS OF UNSPECIFIED DEEP VEINS OF UNSPECIFIED LOWER EXTREMITY: ICD-10-CM

## 2025-09-04 DIAGNOSIS — D63.0 ANEMIA IN NEOPLASTIC DISEASE: ICD-10-CM

## 2025-09-04 DIAGNOSIS — C32.1 MALIGNANT NEOPLASM OF SUPRAGLOTTIS: ICD-10-CM

## 2025-09-04 DIAGNOSIS — Z51.12 ENCOUNTER FOR ANTINEOPLASTIC CHEMOTHERAPY: ICD-10-CM

## 2025-09-04 DIAGNOSIS — C77.0 SECONDARY AND UNSPECIFIED MALIGNANT NEOPLASM OF LYMPH NODES OF HEAD, FACE AND NECK: ICD-10-CM

## 2025-09-04 LAB
AUTO BASOPHILS #: 0.03 K/UL
AUTO BASOPHILS %: 0.5 %
AUTO EOSINOPHILS #: 0.38 K/UL
AUTO EOSINOPHILS %: 6.2 %
AUTO IMMATURE GRANULOCYTES #: 0.04 K/UL
AUTO LYMPHOCYTES #: 0.73 K/UL
AUTO LYMPHOCYTES %: 11.9 %
AUTO MONOCYTES #: 0.54 K/UL
AUTO MONOCYTES %: 8.8 %
AUTO NEUTROPHILS #: 4.42 K/UL
AUTO NEUTROPHILS %: 71.9 %
AUTO NRBC #: 0 K/UL
HCT VFR BLD CALC: 30.6 %
HGB BLD-MCNC: 10.4 G/DL
IMM GRANULOCYTES NFR BLD AUTO: 0.7 %
MAN DIFF?: NORMAL
MCHC RBC-ENTMCNC: 31.1 PG
MCHC RBC-ENTMCNC: 34 G/DL
MCV RBC AUTO: 91.6 FL
PLATELET # BLD AUTO: 126 K/UL
PMV BLD AUTO: 0 /100 WBCS
PMV BLD: 8.6 FL
RBC # BLD: 3.34 M/UL
RBC # FLD: 13.2 %
WBC # FLD AUTO: 6.14 K/UL

## 2025-09-04 PROCEDURE — G2211 COMPLEX E/M VISIT ADD ON: CPT

## 2025-09-04 PROCEDURE — 99214 OFFICE O/P EST MOD 30 MIN: CPT

## 2025-09-05 LAB
ALBUMIN SERPL ELPH-MCNC: 4.4 G/DL
ALP BLD-CCNC: 83 U/L
ALT SERPL-CCNC: 9 U/L
ANION GAP SERPL CALC-SCNC: 14 MMOL/L
AST SERPL-CCNC: 12 U/L
BILIRUB SERPL-MCNC: 0.4 MG/DL
BUN SERPL-MCNC: 10 MG/DL
CALCIUM SERPL-MCNC: 9.4 MG/DL
CHLORIDE SERPL-SCNC: 96 MMOL/L
CO2 SERPL-SCNC: 27 MMOL/L
CREAT SERPL-MCNC: 0.5 MG/DL
EGFRCR SERPLBLD CKD-EPI 2021: 107 ML/MIN/1.73M2
GLUCOSE SERPL-MCNC: 121 MG/DL
POTASSIUM SERPL-SCNC: 4.3 MMOL/L
PROT SERPL-MCNC: 7.5 G/DL
SODIUM SERPL-SCNC: 137 MMOL/L
TSH SERPL-ACNC: 5.02 UIU/ML